# Patient Record
Sex: MALE | Race: WHITE | NOT HISPANIC OR LATINO | URBAN - METROPOLITAN AREA
[De-identification: names, ages, dates, MRNs, and addresses within clinical notes are randomized per-mention and may not be internally consistent; named-entity substitution may affect disease eponyms.]

---

## 2018-12-17 ENCOUNTER — EMERGENCY (EMERGENCY)
Facility: HOSPITAL | Age: 43
LOS: 1 days | Discharge: HOME | End: 2018-12-17
Attending: EMERGENCY MEDICINE
Payer: COMMERCIAL

## 2018-12-17 VITALS
DIASTOLIC BLOOD PRESSURE: 90 MMHG | TEMPERATURE: 98 F | HEART RATE: 74 BPM | WEIGHT: 227.96 LBS | OXYGEN SATURATION: 100 % | SYSTOLIC BLOOD PRESSURE: 133 MMHG | HEIGHT: 67 IN | RESPIRATION RATE: 16 BRPM

## 2018-12-17 PROCEDURE — 99283 EMERGENCY DEPT VISIT LOW MDM: CPT | Mod: 25

## 2018-12-17 PROCEDURE — 90715 TDAP VACCINE 7 YRS/> IM: CPT

## 2018-12-17 PROCEDURE — 99283 EMERGENCY DEPT VISIT LOW MDM: CPT

## 2018-12-17 PROCEDURE — 90471 IMMUNIZATION ADMIN: CPT

## 2018-12-17 RX ORDER — TETANUS TOXOID, REDUCED DIPHTHERIA TOXOID AND ACELLULAR PERTUSSIS VACCINE, ADSORBED 5; 2.5; 8; 8; 2.5 [IU]/.5ML; [IU]/.5ML; UG/.5ML; UG/.5ML; UG/.5ML
0.5 SUSPENSION INTRAMUSCULAR ONCE
Qty: 0 | Refills: 0 | Status: COMPLETED | OUTPATIENT
Start: 2018-12-17 | End: 2018-12-17

## 2018-12-17 RX ORDER — ACETAMINOPHEN 500 MG
975 TABLET ORAL ONCE
Qty: 0 | Refills: 0 | Status: COMPLETED | OUTPATIENT
Start: 2018-12-17 | End: 2018-12-17

## 2018-12-17 RX ADMIN — Medication 975 MILLIGRAM(S): at 22:42

## 2018-12-17 RX ADMIN — TETANUS TOXOID, REDUCED DIPHTHERIA TOXOID AND ACELLULAR PERTUSSIS VACCINE, ADSORBED 0.5 MILLILITER(S): 5; 2.5; 8; 8; 2.5 SUSPENSION INTRAMUSCULAR at 23:02

## 2018-12-17 NOTE — ED PROVIDER NOTE - MEDICAL DECISION MAKING DETAILS
42yo M no pmh presenting after head injury. Likely concussion. negative [place] head CT. DC with PMD f/u, concussion info, work note, and return precautions.

## 2018-12-17 NOTE — ED PROVIDER NOTE - PHYSICAL EXAMINATION
Gen: No acute distress, alert, cooperative  Head: Normocephalic, abrasion posterior left scalp, no active bleeding, no hematoma.   HEENT: PERRL, oral mucosa moist, normal conjunctiva, normal ocular motion all quadrants, no tenderness entire spine, normal neck ROM with no increased pain  Lung: CTAB, no respiratory distress, no crackles or wheezes  CV: rrr, no murmur  Abd: soft, NTND, no rebound or guarding  MSK: No LE edema, normal ROM all extremities  Neuro: No focal neurologic deficits, normal strength and sensation throughout, cranial nerves intact, speech clear, gait normal  Skin: Warm and dry, no evidence of rash   Psych: normal affect, follows commands

## 2018-12-17 NOTE — ED PROVIDER NOTE - OBJECTIVE STATEMENT
44yo M no pmh presenting after head injury. He works as a  and a spare time fell about 5 feet and hit him in the head. He fell to the ground, no LOC, able to get up and walk around after. Happened at 2:30pm. Since then has felt intermittent left sided headache, dizziness, nausea, fatigue. States he just wants to lay down and rest. 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 Tylenol 4:30pm, feeling much better after this. No blood thinners.

## 2018-12-17 NOTE — ED ADULT NURSE NOTE - NSIMPLEMENTINTERV_GEN_ALL_ED
Implemented All Fall Risk Interventions:  [place] to call system. Call bell, personal items and telephone within reach. Instruct patient to call for assistance. Room bathroom lighting operational. Non-slip footwear when patient is off stretcher. Physically safe environment: no spills, clutter or unnecessary equipment. Stretcher in lowest position, wheels locked, appropriate side rails in place. Provide visual cue, wrist band, yellow gown, etc. Monitor gait and stability. Monitor for mental status changes and reorient to person, place, and time. Review medications for side effects contributing to fall risk. Reinforce activity limits and safety measures with patient and family.

## 2018-12-17 NOTE — ED PROVIDER NOTE - ATTENDING CONTRIBUTION TO CARE
Patient presenting complaining of head injury earlier today - was working in his garage when a spare car tire he was storing fell out and struck him in the head.  Reporting seeing stars/headache but no LOC.  No blood thinners.  Since that point has been having headaches, intermittent nausea.  No numbness, tingling and weakness, no excruciating headaches, no projectile vomiting.    Exam:  General: Patient well appearing, vital signs within normal limits  HEENT: airway patent with moist mucous membranes  GI: abdomen soft, non tender, non distended  Neuro: CN II-XII intact, normal strength, sensation, coordination and ambulation  MSK:  no midline spinal tenderness  Skin: warm, well perfused, abrasion on L parietal scalp  Psych: normal mood and affect    Symptoms likely concussion, no evidence of clinically significant head injury/bleed by history and exam, needs updated tetanus, CT head not indicated clinically and patient agrees will give follow up with neurology

## 2018-12-17 NOTE — ED ADULT TRIAGE NOTE - CHIEF COMPLAINT QUOTE
"a spare tire fell on my head" was in garage and a spare tire on a shelf above his head came loose and hit him at 1430  fell to the ground and felt dizzy, did not lose consciousness  no blood thinners

## 2018-12-17 NOTE — ED PROVIDER NOTE - NSFOLLOWUPINSTRUCTIONS_ED_ALL_ED_FT
Follow-up with your Primary Care Doctor in 1-2 days. Return to ED for worsening, progressive or any other concerning symptoms such as fevers, severe pain, trouble breathing, weakness or lightheadedness.     Return with worsening fatigue/confusion/nausea/vomiting. You may have some of these symptoms with your concussion so follow-up with your PMD. You can also follow-up with sports med for concussion follow-up if you cant get in to see your PMD.

## 2018-12-17 NOTE — ED ADULT NURSE NOTE - OBJECTIVE STATEMENT
42 y/o male presenting to ED for head injury. Pt states "I was in the garage moving some stuff when a spare tire about 20-25 lb fell off a shelf above my head and hit me in the head. It knocked me to the ground. I didn't lose consciousness but I saw stars. I feel a little light headed and nauseous here and there." Upon exam pt A&Ox3 gross neuro intact, no difficulty speaking in complete sentences, 3mm PEERLA, able to ambulate, c/o throbbing headache and intermittent blurry vision. Abrasion noted on head, not bleeding at this time.

## 2019-06-01 ENCOUNTER — INPATIENT (INPATIENT)
Facility: HOSPITAL | Age: 44
LOS: 2 days | Discharge: HOME | DRG: 393 | End: 2019-06-04
Attending: INTERNAL MEDICINE | Admitting: INTERNAL MEDICINE
Payer: COMMERCIAL

## 2019-06-01 VITALS
TEMPERATURE: 98 F | SYSTOLIC BLOOD PRESSURE: 127 MMHG | HEIGHT: 67 IN | HEART RATE: 78 BPM | WEIGHT: 231.04 LBS | DIASTOLIC BLOOD PRESSURE: 86 MMHG | OXYGEN SATURATION: 97 % | RESPIRATION RATE: 17 BRPM

## 2019-06-01 DIAGNOSIS — M32.9 SYSTEMIC LUPUS ERYTHEMATOSUS, UNSPECIFIED: ICD-10-CM

## 2019-06-01 DIAGNOSIS — K62.5 HEMORRHAGE OF ANUS AND RECTUM: ICD-10-CM

## 2019-06-01 DIAGNOSIS — J45.20 MILD INTERMITTENT ASTHMA, UNCOMPLICATED: ICD-10-CM

## 2019-06-01 DIAGNOSIS — K92.2 GASTROINTESTINAL HEMORRHAGE, UNSPECIFIED: ICD-10-CM

## 2019-06-01 LAB
ALBUMIN SERPL ELPH-MCNC: 4.5 G/DL — SIGNIFICANT CHANGE UP (ref 3.3–5)
ALP SERPL-CCNC: 81 U/L — SIGNIFICANT CHANGE UP (ref 40–120)
ALT FLD-CCNC: 40 U/L — SIGNIFICANT CHANGE UP (ref 10–45)
ANION GAP SERPL CALC-SCNC: 11 MMOL/L — SIGNIFICANT CHANGE UP (ref 5–17)
APPEARANCE UR: CLEAR — SIGNIFICANT CHANGE UP
APTT BLD: 32.8 SEC — SIGNIFICANT CHANGE UP (ref 27.5–36.3)
AST SERPL-CCNC: 36 U/L — SIGNIFICANT CHANGE UP (ref 10–40)
BASOPHILS # BLD AUTO: 0 K/UL — SIGNIFICANT CHANGE UP (ref 0–0.2)
BASOPHILS NFR BLD AUTO: 0.5 % — SIGNIFICANT CHANGE UP (ref 0–2)
BILIRUB SERPL-MCNC: 0.3 MG/DL — SIGNIFICANT CHANGE UP (ref 0.2–1.2)
BILIRUB UR-MCNC: NEGATIVE — SIGNIFICANT CHANGE UP
BLD GP AB SCN SERPL QL: NEGATIVE — SIGNIFICANT CHANGE UP
BUN SERPL-MCNC: 12 MG/DL — SIGNIFICANT CHANGE UP (ref 7–23)
CALCIUM SERPL-MCNC: 9.7 MG/DL — SIGNIFICANT CHANGE UP (ref 8.4–10.5)
CHLORIDE SERPL-SCNC: 100 MMOL/L — SIGNIFICANT CHANGE UP (ref 96–108)
CO2 SERPL-SCNC: 26 MMOL/L — SIGNIFICANT CHANGE UP (ref 22–31)
COLOR SPEC: SIGNIFICANT CHANGE UP
CREAT SERPL-MCNC: 0.96 MG/DL — SIGNIFICANT CHANGE UP (ref 0.5–1.3)
DIFF PNL FLD: NEGATIVE — SIGNIFICANT CHANGE UP
EOSINOPHIL # BLD AUTO: 0.1 K/UL — SIGNIFICANT CHANGE UP (ref 0–0.5)
EOSINOPHIL NFR BLD AUTO: 2.4 % — SIGNIFICANT CHANGE UP (ref 0–6)
ERYTHROCYTE [SEDIMENTATION RATE] IN BLOOD: 34 MM/HR — HIGH (ref 0–15)
GLUCOSE SERPL-MCNC: 89 MG/DL — SIGNIFICANT CHANGE UP (ref 70–99)
GLUCOSE UR QL: NEGATIVE — SIGNIFICANT CHANGE UP
HCT VFR BLD CALC: 40.1 % — SIGNIFICANT CHANGE UP (ref 39–50)
HGB BLD-MCNC: 14 G/DL — SIGNIFICANT CHANGE UP (ref 13–17)
INR BLD: 1.05 RATIO — SIGNIFICANT CHANGE UP (ref 0.88–1.16)
KETONES UR-MCNC: NEGATIVE — SIGNIFICANT CHANGE UP
LEUKOCYTE ESTERASE UR-ACNC: NEGATIVE — SIGNIFICANT CHANGE UP
LYMPHOCYTES # BLD AUTO: 1.6 K/UL — SIGNIFICANT CHANGE UP (ref 1–3.3)
LYMPHOCYTES # BLD AUTO: 32.3 % — SIGNIFICANT CHANGE UP (ref 13–44)
MCHC RBC-ENTMCNC: 31.4 PG — SIGNIFICANT CHANGE UP (ref 27–34)
MCHC RBC-ENTMCNC: 35 GM/DL — SIGNIFICANT CHANGE UP (ref 32–36)
MCV RBC AUTO: 89.8 FL — SIGNIFICANT CHANGE UP (ref 80–100)
MONOCYTES # BLD AUTO: 0.6 K/UL — SIGNIFICANT CHANGE UP (ref 0–0.9)
MONOCYTES NFR BLD AUTO: 11.8 % — SIGNIFICANT CHANGE UP (ref 2–14)
NEUTROPHILS # BLD AUTO: 2.6 K/UL — SIGNIFICANT CHANGE UP (ref 1.8–7.4)
NEUTROPHILS NFR BLD AUTO: 53 % — SIGNIFICANT CHANGE UP (ref 43–77)
NITRITE UR-MCNC: NEGATIVE — SIGNIFICANT CHANGE UP
OB PNL STL: POSITIVE
PH UR: 6.5 — SIGNIFICANT CHANGE UP (ref 5–8)
PLATELET # BLD AUTO: 329 K/UL — SIGNIFICANT CHANGE UP (ref 150–400)
POTASSIUM SERPL-MCNC: 4 MMOL/L — SIGNIFICANT CHANGE UP (ref 3.5–5.3)
POTASSIUM SERPL-SCNC: 4 MMOL/L — SIGNIFICANT CHANGE UP (ref 3.5–5.3)
PROT SERPL-MCNC: 8.8 G/DL — HIGH (ref 6–8.3)
PROT UR-MCNC: NEGATIVE — SIGNIFICANT CHANGE UP
PROTHROM AB SERPL-ACNC: 12.1 SEC — SIGNIFICANT CHANGE UP (ref 10–12.9)
RBC # BLD: 4.46 M/UL — SIGNIFICANT CHANGE UP (ref 4.2–5.8)
RBC # FLD: 11.8 % — SIGNIFICANT CHANGE UP (ref 10.3–14.5)
RH IG SCN BLD-IMP: POSITIVE — SIGNIFICANT CHANGE UP
RH IG SCN BLD-IMP: POSITIVE — SIGNIFICANT CHANGE UP
SODIUM SERPL-SCNC: 137 MMOL/L — SIGNIFICANT CHANGE UP (ref 135–145)
SP GR SPEC: 1.01 — SIGNIFICANT CHANGE UP (ref 1.01–1.02)
UROBILINOGEN FLD QL: NEGATIVE — SIGNIFICANT CHANGE UP
WBC # BLD: 5 K/UL — SIGNIFICANT CHANGE UP (ref 3.8–10.5)
WBC # FLD AUTO: 5 K/UL — SIGNIFICANT CHANGE UP (ref 3.8–10.5)

## 2019-06-01 PROCEDURE — 99285 EMERGENCY DEPT VISIT HI MDM: CPT

## 2019-06-01 PROCEDURE — 71046 X-RAY EXAM CHEST 2 VIEWS: CPT | Mod: 26

## 2019-06-01 RX ORDER — PANTOPRAZOLE SODIUM 20 MG/1
40 TABLET, DELAYED RELEASE ORAL
Refills: 0 | Status: DISCONTINUED | OUTPATIENT
Start: 2019-06-01 | End: 2019-06-04

## 2019-06-01 RX ORDER — METOCLOPRAMIDE HCL 10 MG
10 TABLET ORAL ONCE
Refills: 0 | Status: COMPLETED | OUTPATIENT
Start: 2019-06-01 | End: 2019-06-01

## 2019-06-01 RX ORDER — SODIUM CHLORIDE 9 MG/ML
1000 INJECTION, SOLUTION INTRAVENOUS ONCE
Refills: 0 | Status: COMPLETED | OUTPATIENT
Start: 2019-06-01 | End: 2019-06-01

## 2019-06-01 RX ADMIN — Medication 10 MILLIGRAM(S): at 13:47

## 2019-06-01 RX ADMIN — PANTOPRAZOLE SODIUM 40 MILLIGRAM(S): 20 TABLET, DELAYED RELEASE ORAL at 18:01

## 2019-06-01 RX ADMIN — SODIUM CHLORIDE 1000 MILLILITER(S): 9 INJECTION, SOLUTION INTRAVENOUS at 13:47

## 2019-06-01 NOTE — H&P ADULT - GASTROINTESTINAL DETAILS
normal/no bruit/no rebound tenderness/no masses palpable/bowel sounds normal/soft/nontender/no distention

## 2019-06-01 NOTE — ED ADULT NURSE NOTE - OBJECTIVE STATEMENT
Pt is a 44 yo M who came to the ED amb c/o rectal bleeding and weakness x 3 days and nausea this morning.  States blood is dark brown, no abd pain, no hematuria. Feels like he is wheezing. A/O x3, resp regular and unlabored, lungs clear, abd soft, non tender, color good, skin warm, dry, + pulses, no edema.

## 2019-06-01 NOTE — H&P ADULT - NEGATIVE CARDIOVASCULAR SYMPTOMS
no chest pain/no dyspnea on exertion/no peripheral edema/no paroxysmal nocturnal dyspnea/no orthopnea/no palpitations

## 2019-06-01 NOTE — ED PROVIDER NOTE - PHYSICAL EXAMINATION
General: well appearing, interactive, well nourished, NAD  HEENT: pupils equal and reactive, normal mucosa, normal oropharynx, no lesions on the lips or on oral mucosa, normal external ears bilaterally   Resp: lungs clear to auscultation bilaterally, symmetric chest wall rise  Abd: soft, nontender, nondistended, normoactive bowel sounds  : no CVA tenderness  Neuro: Moving all extremities  Skin:  normal color for race  Rectal Exam performed with nurse chaparone: Dark blood

## 2019-06-01 NOTE — ED ADULT NURSE NOTE - NSIMPLEMENTINTERV_GEN_ALL_ED
Implemented All Universal Safety Interventions:  [place] to call system. Call bell, personal items and telephone within reach. Instruct patient to call for assistance. Room bathroom lighting operational. Non-slip footwear when patient is off stretcher. Physically safe environment: no spills, clutter or unnecessary equipment. Stretcher in lowest position, wheels locked, appropriate side rails in place.

## 2019-06-01 NOTE — ED PROVIDER NOTE - OBJECTIVE STATEMENT
44yo M pmhx of asthma pw cc of rectal bleeding    Felt weak earlier this week, 3 days ago small amount of blood in stool, this morning had "enough blood to fill up toilet bowl dark not bright red blood". Never has had a colonoscopy. Has an episode of vomiting this AM, now feels nauseas.   No CP/SOB. Has been gaining weight recently.   Diagnosed with SLE in January due to having facial rash, weakness and titers for blood tests came back positive. Did not start medications due to concussion in december, prescribed medications by rheumatologist however has not been taking it.     Meds: Albuterol (last used 1 year)

## 2019-06-01 NOTE — ED ADULT TRIAGE NOTE - CHIEF COMPLAINT QUOTE
Rectal bleed, weakness, nausea and vomiting. Pt states that he had one episode of vomiting, hx of Lupus

## 2019-06-01 NOTE — ED PROVIDER NOTE - CLINICAL SUMMARY MEDICAL DECISION MAKING FREE TEXT BOX
Attending Jesús Shaikh DO: 42 yo male hx of lupus, not on meds presents with large bloody bowel movement 3 hrs ago. No ab pain. +generalized malaise. No hx of GIB in the past. Not on NSAIDs. On exam, well appearing but fatigued, ab soft nt/nd. Concern for GIB, will obtain labs, type and screen, likely admit for observation and GI consultation.

## 2019-06-01 NOTE — H&P ADULT - ASSESSMENT
44yo M pmhx of Asthma ,Lupus .Said I have been feeling weaker and weaker for last few days . Today had blood in stool . Per patient was red as well dark .   	Diagnosed with SLE in January due to having facial rash, weakness and titers for blood tests came back positive. Did not start medications due to concussion in december, prescribed medications by rheumatologist however has not been taking it.

## 2019-06-01 NOTE — ED PROVIDER NOTE - NS ED ROS FT
CONSTITUTIONAL: No fevers, no chills  Eyes: no visual changes  Mouth/Throat: no sore throat  Cardiovascular: No Chest pain  Respiratory: No SOB  Gastrointestinal: No n/v/d, no abd pain  Genitourinary: no dysuria, no hematuria  SKIN: no rashes.  NEURO: +Weakness

## 2019-06-01 NOTE — H&P ADULT - HISTORY OF PRESENT ILLNESS
42yo M pmhx of Asthma ,Lupus .Said I have been feeling weaker and weaker for last few days . Today had blood in stool . Per patient was red as well dark .   	Diagnosed with SLE in January due to having facial rash, weakness and titers for blood tests came back positive. Did not start medications due to concussion in december, prescribed medications by rheumatologist however has not been taking it.

## 2019-06-02 DIAGNOSIS — K62.5 HEMORRHAGE OF ANUS AND RECTUM: ICD-10-CM

## 2019-06-02 DIAGNOSIS — Z71.89 OTHER SPECIFIED COUNSELING: ICD-10-CM

## 2019-06-02 LAB
HCT VFR BLD CALC: 40.4 % — SIGNIFICANT CHANGE UP (ref 39–50)
HCT VFR BLD CALC: 44.5 % — SIGNIFICANT CHANGE UP (ref 39–50)
HGB BLD-MCNC: 13.4 G/DL — SIGNIFICANT CHANGE UP (ref 13–17)
HGB BLD-MCNC: 15.6 G/DL — SIGNIFICANT CHANGE UP (ref 13–17)
MCHC RBC-ENTMCNC: 29.3 PG — SIGNIFICANT CHANGE UP (ref 27–34)
MCHC RBC-ENTMCNC: 31.6 PG — SIGNIFICANT CHANGE UP (ref 27–34)
MCHC RBC-ENTMCNC: 33.2 GM/DL — SIGNIFICANT CHANGE UP (ref 32–36)
MCHC RBC-ENTMCNC: 35 GM/DL — SIGNIFICANT CHANGE UP (ref 32–36)
MCV RBC AUTO: 88.4 FL — SIGNIFICANT CHANGE UP (ref 80–100)
MCV RBC AUTO: 90.3 FL — SIGNIFICANT CHANGE UP (ref 80–100)
PLATELET # BLD AUTO: 295 K/UL — SIGNIFICANT CHANGE UP (ref 150–400)
PLATELET # BLD AUTO: 320 K/UL — SIGNIFICANT CHANGE UP (ref 150–400)
RBC # BLD: 4.57 M/UL — SIGNIFICANT CHANGE UP (ref 4.2–5.8)
RBC # BLD: 4.93 M/UL — SIGNIFICANT CHANGE UP (ref 4.2–5.8)
RBC # FLD: 11.9 % — SIGNIFICANT CHANGE UP (ref 10.3–14.5)
RBC # FLD: 12.4 % — SIGNIFICANT CHANGE UP (ref 10.3–14.5)
WBC # BLD: 4.3 K/UL — SIGNIFICANT CHANGE UP (ref 3.8–10.5)
WBC # BLD: 4.59 K/UL — SIGNIFICANT CHANGE UP (ref 3.8–10.5)
WBC # FLD AUTO: 4.3 K/UL — SIGNIFICANT CHANGE UP (ref 3.8–10.5)
WBC # FLD AUTO: 4.59 K/UL — SIGNIFICANT CHANGE UP (ref 3.8–10.5)

## 2019-06-02 RX ORDER — ONDANSETRON 8 MG/1
4 TABLET, FILM COATED ORAL ONCE
Refills: 0 | Status: DISCONTINUED | OUTPATIENT
Start: 2019-06-02 | End: 2019-06-04

## 2019-06-02 RX ORDER — SODIUM CHLORIDE 9 MG/ML
1000 INJECTION INTRAMUSCULAR; INTRAVENOUS; SUBCUTANEOUS
Refills: 0 | Status: DISCONTINUED | OUTPATIENT
Start: 2019-06-02 | End: 2019-06-04

## 2019-06-02 RX ORDER — SOD SULF/SODIUM/NAHCO3/KCL/PEG
1 SOLUTION, RECONSTITUTED, ORAL ORAL EVERY 4 HOURS
Refills: 0 | Status: COMPLETED | OUTPATIENT
Start: 2019-06-02 | End: 2019-06-02

## 2019-06-02 RX ADMIN — Medication 1 LITER(S): at 17:14

## 2019-06-02 RX ADMIN — PANTOPRAZOLE SODIUM 40 MILLIGRAM(S): 20 TABLET, DELAYED RELEASE ORAL at 05:41

## 2019-06-02 RX ADMIN — PANTOPRAZOLE SODIUM 40 MILLIGRAM(S): 20 TABLET, DELAYED RELEASE ORAL at 17:17

## 2019-06-02 RX ADMIN — Medication 10 MILLIGRAM(S): at 17:13

## 2019-06-02 NOTE — CONSULT NOTE ADULT - PROBLEM SELECTOR RECOMMENDATION 9
- r/o diverticular bleed vs hemorrhoidal bleed vs rapid transit UGIB   - h/h stable and in normal range; cont to trend and transfuse prn   - cont Protonix 40mg IVP BID until upper gi bleed ruled out   - maalox prn   - clear liquid diet today   - bowel prep ordered   - NPO p MN for EGD/Colonoscopy as d/w patient who is agreeable

## 2019-06-02 NOTE — CONSULT NOTE ADULT - SUBJECTIVE AND OBJECTIVE BOX
Chief Complaint:  Patient is a 43y old  Male who presents with a chief complaint of Bleeding per rectum (2019 10:53)    Asthma  Lupus     HPI:  44yo M with h/o Asthma ,Lupus .Said I have been feeling weaker and weaker for last few days . Today had blood in stool . Per patient was red as well dark red. Diagnosed with SLE in January due to having facial rash, weakness and titers for blood tests came back positive. Did not start medications due to concussion in december, prescribed medications by rheumatologist however has not been taking it. GI consulted for rectal bleeding. The patient reports that he has been feeling fatigued recently. He had the urge to have a bowel movement and felt "different" while going. When he got up to look at the stool it was a "dark red" color, no clots noted. He subsequently had another bowel movement a few hours later and the stool was no longer a dark red but rather a lighter and brighter red color stool. He had no associated abdominal pain or cramping with the bowel movements. He did admit to to an episode of nausea with vomiting prior to the episode; vomit was yellow in color; no hematemesis. He has never had an egd or colonoscopy in the past.     No Known Allergies      bisacodyl 10 milliGRAM(s) Oral every 4 hours  pantoprazole  Injectable 40 milliGRAM(s) IV Push two times a day  polyethylene glycol/electrolyte Solution 1 Liter(s) Oral every 4 hours        FAMILY HISTORY:        Review of Systems:    General:  No wt loss, fevers, chills, night sweats, fatigue  Eyes:  Good vision, no reported pain  ENT:  No sore throat, pain, runny nose, dysphagia  CV:  No pain, palpitations, no lightheadedness  Resp:  No dyspnea, cough, tachypnea, wheezing  GI: as above  :  No pain, bleeding, incontinence, nocturia  Muscle:  No pain, weakness  Neuro:  No weakness, tingling, memory problems  Psych:  No fatigue, insomnia, mood problems, depression  Endocrine:  No polyuria, polydypsia, cold/heat intolerance  Heme:  No petechiae, ecchymosis, easy bruisability  Skin:  No rash, tattoos, scars, edema    Relevant Family History:   n/c    Relevant Social History: n/c      Physical Exam:    Vital Signs:  Vital Signs Last 24 Hrs  T(C): 36.5 (2019 11:47), Max: 37.4 (2019 13:42)  T(F): 97.7 (2019 11:47), Max: 99.3 (2019 13:42)  HR: 68 (2019 11:47) (61 - 90)  BP: 116/807 (2019 11:47) (104/70 - 130/88)  BP(mean): 100 (2019 17:09) (100 - 100)  RR: 17 (2019 11:47) (16 - 20)  SpO2: 97% (2019 11:47) (95% - 99%)  Daily Height in cm: 170.18 (2019 12:57)    Daily     General:  Appears stated age, well-groomed, nad  HEENT:  NC/AT,  conjunctivae clear and pink, no thyromegaly, nodules, adenopathy, no JVD  Chest:  Full & symmetric excursion, no increased effort, breath sounds clear  Cardiovascular:  Regular rhythm, S1, S2, no murmur/rub/S3/S4, no abdominal bruit, no edema  Abdomen:  Soft, non-tender, non-distended, normoactive bowel sounds,  no masses ,no hepatosplenomeagaly, no signs of chronic liver disease  Extremities:  no cyanosis,clubbing or edema  Skin:  No rash/erythema/ecchymoses/petechiae/wounds/abscess/warm/dry  Neuro/Psych:  A&Ox3  , no asterixis, no tremor, no encephalopathy    Laboratory:                            13.4   4.59  )-----------( 295      ( 2019 10:09 )             40.4     06-01    137  |  100  |  12  ----------------------------<  89  4.0   |  26  |  0.96    Ca    9.7      2019 13:51    TPro  8.8<H>  /  Alb  4.5  /  TBili  0.3  /  DBili  x   /  AST  36  /  ALT  40  /  AlkPhos  81  06-01    LIVER FUNCTIONS - ( 2019 13:51 )  Alb: 4.5 g/dL / Pro: 8.8 g/dL / ALK PHOS: 81 U/L / ALT: 40 U/L / AST: 36 U/L / GGT: x           PT/INR - ( 2019 13:51 )   PT: 12.1 sec;   INR: 1.05 ratio         PTT - ( 2019 13:51 )  PTT:32.8 sec  Urinalysis Basic - ( 2019 22:27 )    Color: Light Yellow / Appearance: Clear / S.012 / pH: x  Gluc: x / Ketone: Negative  / Bili: Negative / Urobili: Negative   Blood: x / Protein: Negative / Nitrite: Negative   Leuk Esterase: Negative / RBC: x / WBC x   Sq Epi: x / Non Sq Epi: x / Bacteria: x        Imaging:

## 2019-06-02 NOTE — CONSULT NOTE ADULT - ASSESSMENT
44yo male with h/o Asthma, newly diagnosed Lupus in January and cholecystectomy in 2012 presents with episode of rectal bleeding with associated generalized malaise.

## 2019-06-03 ENCOUNTER — RESULT REVIEW (OUTPATIENT)
Age: 44
End: 2019-06-03

## 2019-06-03 ENCOUNTER — TRANSCRIPTION ENCOUNTER (OUTPATIENT)
Age: 44
End: 2019-06-03

## 2019-06-03 LAB
ANION GAP SERPL CALC-SCNC: 12 MMOL/L — SIGNIFICANT CHANGE UP (ref 5–17)
APPEARANCE UR: CLEAR — SIGNIFICANT CHANGE UP
BILIRUB UR-MCNC: NEGATIVE — SIGNIFICANT CHANGE UP
BUN SERPL-MCNC: 9 MG/DL — SIGNIFICANT CHANGE UP (ref 7–23)
CALCIUM SERPL-MCNC: 9.6 MG/DL — SIGNIFICANT CHANGE UP (ref 8.4–10.5)
CHLORIDE SERPL-SCNC: 102 MMOL/L — SIGNIFICANT CHANGE UP (ref 96–108)
CO2 SERPL-SCNC: 25 MMOL/L — SIGNIFICANT CHANGE UP (ref 22–31)
COLOR SPEC: YELLOW — SIGNIFICANT CHANGE UP
CREAT ?TM UR-MCNC: 209 MG/DL — SIGNIFICANT CHANGE UP
CREAT SERPL-MCNC: 0.99 MG/DL — SIGNIFICANT CHANGE UP (ref 0.5–1.3)
DIFF PNL FLD: NEGATIVE — SIGNIFICANT CHANGE UP
GLUCOSE SERPL-MCNC: 84 MG/DL — SIGNIFICANT CHANGE UP (ref 70–99)
GLUCOSE UR QL: NEGATIVE — SIGNIFICANT CHANGE UP
HCT VFR BLD CALC: 40.8 % — SIGNIFICANT CHANGE UP (ref 39–50)
HGB BLD-MCNC: 13.7 G/DL — SIGNIFICANT CHANGE UP (ref 13–17)
KETONES UR-MCNC: NEGATIVE — SIGNIFICANT CHANGE UP
LEUKOCYTE ESTERASE UR-ACNC: NEGATIVE — SIGNIFICANT CHANGE UP
MCHC RBC-ENTMCNC: 29.5 PG — SIGNIFICANT CHANGE UP (ref 27–34)
MCHC RBC-ENTMCNC: 33.6 GM/DL — SIGNIFICANT CHANGE UP (ref 32–36)
MCV RBC AUTO: 87.7 FL — SIGNIFICANT CHANGE UP (ref 80–100)
NITRITE UR-MCNC: NEGATIVE — SIGNIFICANT CHANGE UP
PH UR: 6.5 — SIGNIFICANT CHANGE UP (ref 5–8)
PLATELET # BLD AUTO: 287 K/UL — SIGNIFICANT CHANGE UP (ref 150–400)
POTASSIUM SERPL-MCNC: 4 MMOL/L — SIGNIFICANT CHANGE UP (ref 3.5–5.3)
POTASSIUM SERPL-SCNC: 4 MMOL/L — SIGNIFICANT CHANGE UP (ref 3.5–5.3)
PROT ?TM UR-MCNC: 22 MG/DL — HIGH (ref 0–12)
PROT UR-MCNC: SIGNIFICANT CHANGE UP
PROT/CREAT UR-RTO: 0.1 RATIO — SIGNIFICANT CHANGE UP (ref 0–0.2)
RBC # BLD: 4.65 M/UL — SIGNIFICANT CHANGE UP (ref 4.2–5.8)
RBC # FLD: 12.5 % — SIGNIFICANT CHANGE UP (ref 10.3–14.5)
SODIUM SERPL-SCNC: 139 MMOL/L — SIGNIFICANT CHANGE UP (ref 135–145)
SP GR SPEC: 1.02 — SIGNIFICANT CHANGE UP (ref 1.01–1.02)
UROBILINOGEN FLD QL: SIGNIFICANT CHANGE UP
WBC # BLD: 4.6 K/UL — SIGNIFICANT CHANGE UP (ref 3.8–10.5)
WBC # FLD AUTO: 4.6 K/UL — SIGNIFICANT CHANGE UP (ref 3.8–10.5)

## 2019-06-03 PROCEDURE — 88305 TISSUE EXAM BY PATHOLOGIST: CPT | Mod: 26

## 2019-06-03 PROCEDURE — 99223 1ST HOSP IP/OBS HIGH 75: CPT | Mod: GC

## 2019-06-03 RX ORDER — ALBUTEROL 90 UG/1
2 AEROSOL, METERED ORAL EVERY 6 HOURS
Refills: 0 | Status: DISCONTINUED | OUTPATIENT
Start: 2019-06-03 | End: 2019-06-04

## 2019-06-03 RX ADMIN — PANTOPRAZOLE SODIUM 40 MILLIGRAM(S): 20 TABLET, DELAYED RELEASE ORAL at 05:23

## 2019-06-03 RX ADMIN — PANTOPRAZOLE SODIUM 40 MILLIGRAM(S): 20 TABLET, DELAYED RELEASE ORAL at 18:12

## 2019-06-03 RX ADMIN — SODIUM CHLORIDE 75 MILLILITER(S): 9 INJECTION INTRAMUSCULAR; INTRAVENOUS; SUBCUTANEOUS at 21:47

## 2019-06-03 RX ADMIN — SODIUM CHLORIDE 75 MILLILITER(S): 9 INJECTION INTRAMUSCULAR; INTRAVENOUS; SUBCUTANEOUS at 18:11

## 2019-06-03 RX ADMIN — ALBUTEROL 2 PUFF(S): 90 AEROSOL, METERED ORAL at 23:05

## 2019-06-03 NOTE — CONSULT NOTE ADULT - ASSESSMENT
44 yo M w/ hx of SLE p/w fatigue and rectal bleeding.    -    Chuy Olguin MD  Rheumatology Fellow PGY IV Assessment:  44 yo M with a PMH SLE ( not on medications) and Asthma presented to hospital for Bright red Blood per rectum (BRBPR) and generalized fatigue. Patient notes he was diagnosed with SLE at [facility] 1/2019 and recommended to begin Plaquinel as treatment however he has taken any medications. At this time patient endorses fatigue and stiffness of MCP and PIP bilaterally with no arthralgias nor joint swelling noted. ROS is positive only for flaky rash of right eyebrow, b/l hand stiffness mainly in his MCP and PIP joints. Patient with Elevated ESR ( when age adjusted).    1. Fatigue and b/l MCP and PIP joint stiffness: Patient with reported SLE history however we have records of auto-immune work up. While SLE is a possibility RA could also be consistent with clinical picture.     Plan:  1. SLE:  - Elevated ESR   - CRP pending  - C3/C4     - Rheumatology recommendations to follow Assessment:  44 yo M with a PMH SLE ( not on medications) and Asthma presented to hospital for Bright red Blood per rectum (BRBPR) and generalized fatigue. Patient notes he was diagnosed with SLE at [facility] 1/2019 and recommended to begin Plaquinel as treatment however he has taken any medications. At this time patient endorses fatigue and stiffness of MCP and PIP bilaterally with no arthralgias nor joint swelling noted. ROS is positive only for flaky rash of right eyebrow, b/l hand stiffness mainly in his MCP and PIP joints. Patient with Elevated ESR ( when age adjusted).    1. Fatigue and b/l MCP and PIP joint stiffness: Patient with reported SLE history however we have records of auto-immune work up. While SLE is a possibility RA could also be consistent with clinical picture.     Plan:  1. SLE:  - Patients wife provided results from work up completed with [facility].    RF, Anti-CCP negative  dsDNA positive  Beta 1 glycoprotein 1 AB (Ig M and Ig G are negative but IgA is positive)  - Will repeat work up panel: C3,C4, OWEN, dsDNA, Anti- Smith (REMIGIO) AB, Sjogren Antibodies, Urine Protein:Cr, UA, DRVVT, Silica Clotting time  - Diclofenac 1% gel    Will continue to follow with you Assessment:  42 yo M with a PMH SLE ( not on medications) and Asthma presented to hospital for Bright red Blood per rectum (BRBPR) and generalized fatigue. Patient notes he was diagnosed with SLE at [facility] 1/2019 and recommended to begin Plaquinel as treatment however he has taken any medications. At this time patient endorses fatigue and stiffness of MCP and PIP bilaterally with no arthralgias nor joint swelling noted. ROS is positive only for flaky rash of right eyebrow, b/l hand stiffness mainly in his MCP and PIP joints. Patient with Elevated ESR ( when age adjusted).    1. Fatigue and b/l MCP and PIP joint stiffness: Patient with reported SLE history however we have records of auto-immune work up. While SLE is a possibility RA could also be consistent with clinical picture.     Plan:  1. SLE:  - Patients wife provided results from work up completed with [facility].    RF, Anti-CCP negative  dsDNA positive  Beta 1 glycoprotein 1 AB (Ig M and Ig G are negative but IgA is positive)  - Will repeat work up panel: C3,C4, OWEN, dsDNA, Anti- Smith (REMIGIO) AB, Sjogren Antibodies, Urine Protein:Cr, UA, DRVVT, Silica Clotting time  - Diclofenac 1% gel    Will continue to follow with you. Assessment:  44 yo M with a PMH SLE ( not on medications) and Asthma presented to hospital for Bright red Blood per rectum (BRBPR) and generalized fatigue. Patient notes he was diagnosed with SLE at [facility] 1/2019 and recommended to begin Plaquinel as treatment however he has taken any medications. At this time patient endorses fatigue and stiffness of MCP and PIP bilaterally with no arthralgias nor joint swelling noted. ROS is positive only for flaky rash of right eyebrow, b/l hand stiffness mainly in his MCP and PIP joints. Patient with Elevated ESR ( when age adjusted).    -- Fatigue and b/l MCP and PIP joint stiffness: Patient with reported SLE history however we have records of auto-immune work up that are incomplete.     Plan:  1. SLE:  - Patients wife provided results from work up completed with [facility].    RF, Anti-CCP negative  dsDNA positive  Beta 1 glycoprotein 1 AB (Ig M and Ig G are negative but IgA is positive)  - Will repeat work up panel: C3,C4, OWEN, dsDNA, Anti- Smith (REMIGIO) AB, Sjogren Antibodies, Urine Protein:Cr, UA, DRVVT, Silica Clotting time  - Diclofenac 1% gel    Will continue to follow with you. Assessment:  44 yo M with a PMH SLE ( not on medications) and Asthma presented to hospital for Bright red Blood per rectum (BRBPR) and generalized fatigue. Patient notes he was diagnosed with SLE at [facility] 1/2019 and recommended to begin Plaquinel as treatment however he has taken any medications. At this time patient endorses fatigue and stiffness of MCP and PIP bilaterally with no arthralgias nor joint swelling noted. ROS is positive only for flaky rash of right eyebrow, b/l hand stiffness mainly in his MCP and PIP joints. Patient with Elevated ESR ( when age adjusted).    -- Fatigue and b/l MCP and PIP joint stiffness: Patient with reported SLE history however we have records of auto-immune work up that are incomplete.     Plan:  1. SLE:  - Patients wife provided results from work up completed with [facility].    RF, Anti-CCP negative  dsDNA positive  Beta 1 glycoprotein 1 AB (Ig M and Ig G are negative but IgA is positive)  - Will repeat work up panel: C3,C4, OWEN, dsDNA, Anti- Smith (REMIGIO) AB, Sjogren Antibodies, Urine Protein:Cr, UA, DRVVT, Silica Clotting time  - Diclofenac 1% gel for hand pain for now.  avoid oral nsaids or steroids at this time.     Will continue to follow with you.

## 2019-06-03 NOTE — CONSULT NOTE ADULT - SUBJECTIVE AND OBJECTIVE BOX
AMBERLY ESPITIA  [id number]    HISTORY OF PRESENT ILLNESS:    42 yo M w/ hx of SLE p/w fatigue and rectal bleeding.    Patient stated he has been having rectal bleeding along with fatigue. Patient evaluated by GI and EGD/colonoscopy planned.    Patient stated he was dx with SLE in 2019, he initially had a facial rash, fatigue, saw a rheumatologist and had blood work done which showed evidence of SLE. Patient was prescribed meds for SLE but has not taken them.    PAST MEDICAL & SURGICAL HISTORY:  Asthma  Lupus      Review of Systems:  Gen:  No fevers/chills, weight loss  HEENT: No blurry vision, no difficulty swallowing, no oral or nasal ulcers  CVS: No chest pain/palpitations  Resp: No SOB/wheezing  GI: No N/V/C/D/abdominal pain  MSK:  Skin: No new rashes  Neuro: No headaches    MEDICATIONS  (STANDING):  ondansetron Injectable 4 milliGRAM(s) IV Push once  pantoprazole  Injectable 40 milliGRAM(s) IV Push two times a day  sodium chloride 0.9%. 1000 milliLiter(s) (75 mL/Hr) IV Continuous <Continuous>    MEDICATIONS  (PRN):      Allergies    No Known Allergies    Intolerances        PERTINENT MEDICATION HISTORY:    SOCIAL HISTORY:  OCCUPATION:  TRAVEL HISTORY:    FAMILY HISTORY:      Vital Signs Last 24 Hrs  T(C): 36.4 (2019 04:29), Max: 36.8 (2019 20:55)  T(F): 97.5 (2019 04:29), Max: 98.2 (2019 20:55)  HR: 63 (2019 04:29) (63 - 71)  BP: 116/77 (2019 04:29) (116/77 - 131/86)  BP(mean): --  RR: 17 (2019 04:29) (17 - 17)  SpO2: 99% (2019 04:29) (95% - 99%)    Physical Exam:  General: No apparent distress  HEENT: EOMI, MMM  CVS: +S1/S2, RRR, no murmurs/rubs/gallops  Resp: CTA b/l. No crackles/wheezing  GI: Soft, NT/ND +BS  MSK:  Neuro: AAOx3  Skin: no visible rashes    LABS:                        15.6   4.3   )-----------( 320      ( 2019 18:34 )             44.5     06-03    139  |  102  |  9   ----------------------------<  84  4.0   |  25  |  0.99    Ca    9.6      2019 06:47    TPro  8.8<H>  /  Alb  4.5  /  TBili  0.3  /  DBili  x   /  AST  36  /  ALT  40  /  AlkPhos  81  06-    PT/INR - ( 2019 13:51 )   PT: 12.1 sec;   INR: 1.05 ratio         PTT - ( 2019 13:51 )  PTT:32.8 sec  Urinalysis Basic - ( 2019 22:27 )    Color: Light Yellow / Appearance: Clear / S.012 / pH: x  Gluc: x / Ketone: Negative  / Bili: Negative / Urobili: Negative   Blood: x / Protein: Negative / Nitrite: Negative   Leuk Esterase: Negative / RBC: x / WBC x   Sq Epi: x / Non Sq Epi: x / Bacteria: x        RADIOLOGY & ADDITIONAL STUDIES:    Sedimentation Rate, Erythrocyte: 34 mm/hr (19 @ 13:51) AMBERLY ESPITIA  [id number]    HISTORY OF PRESENT ILLNESS:    42 yo M  PMH: SLE ( not on medications), Asthma  cc: presented to hospital for Bright red Blood per rectum (BRBPR) and generalized fatigue  History Obtained from Patient:  Patient notes that he began to have BRBPR about 3 days ago. Patient notes that the blood is coating the stool and turns the toilet bowel water red. He notes his stool are soft and formed and not black in color or sticky in texture. He has never had an EGD or colonoscopy. He notes that he has had previous episode in the past but it was not as severe as this (usually just red streaks on toilet paper after wiping). Patient denies and weight loss or chills. Admits to subjective night sweats. He denies current usage of Ibuprofen and uses Tylenol occasionally. No family history of cancer. No family history of Rheumatologic conditions. He denies and itching or burning near rectal area. Patient also denies history of hemorrhoids Drinks one medium cup of coffee per day only. Denies previous history of smoking or current smoking. In terms of patient reported SLE history. Patient states that in January he began to develop and erythematous rash on his face and scaly lesions on his eye brow. Patient also notes that his significant other is in the medical profession and advised a Dermatology appointment. After a skin biopsy and blood work Dermatology had sent patient to Rheumatology for further evaluation. At that time patient notes that in addition to rash he was having arthralgias mainly of his hands. He states Rheumatology did a work up with blood tests and stated he had SLE. They had recommended he begin Plaquenil however patient held off until he has his work physical completed. He is currently on medications and has not been on any medications since his diagnosis 2019. Since then patient states his fatigue has been increasing and that he continues to have b/l stiffness of all his fingers.     ROS: Patient current denies erythematous rash, rash due to sun exposure, oral ulcers, chest pain, shortness of breath, foamy urine or hematuria, dysphagia, Anemia, history of seizures, b/le eye pain or eye redness, ulcers of finger tips or ulcers elsewhere on body, myalgias, weakness, or Alopecia.  Amits to flaky rash of right eyebrow.     PAST MEDICAL & SURGICAL HISTORY:  Asthma  Lupus      Review of Systems:  Gen:  No fevers/chills, weight loss  HEENT: No blurry vision, no difficulty swallowing, no oral or nasal ulcers  CVS: No chest pain/palpitations  Resp: No SOB/wheezing  GI: No N/V/C/D/abdominal pain  MSK:  Skin: No new rashes  Neuro: No headaches    MEDICATIONS  (STANDING):  ondansetron Injectable 4 milliGRAM(s) IV Push once  pantoprazole  Injectable 40 milliGRAM(s) IV Push two times a day  sodium chloride 0.9%. 1000 milliLiter(s) (75 mL/Hr) IV Continuous <Continuous>    MEDICATIONS  (PRN):      Allergies    No Known Allergies    Intolerances        PERTINENT MEDICATION HISTORY:    SOCIAL HISTORY:  OCCUPATION:  TRAVEL HISTORY:    FAMILY HISTORY:      Vital Signs Last 24 Hrs  T(C): 36.4 (2019 04:29), Max: 36.8 (2019 20:55)  T(F): 97.5 (2019 04:29), Max: 98.2 (2019 20:55)  HR: 63 (2019 04:29) (63 - 71)  BP: 116/77 (2019 04:29) (116/77 - 131/86)  BP(mean): --  RR: 17 (2019 04:29) (17 - 17)  SpO2: 99% (2019 04:29) (95% - 99%)    Physical Exam:  General: No apparent distress  HEENT: EOMI, MMM  CVS: +S1/S2, RRR, no murmurs/rubs/gallops  Resp: CTA b/l. No crackles/wheezing  GI: Soft, NT/ND +BS  MSK:  Neuro: AAOx3  Skin: no visible rashes    LABS:                        15.6   4.3   )-----------( 320      ( 2019 18:34 )             44.5     06-03    139  |  102  |  9   ----------------------------<  84  4.0   |  25  |  0.99    Ca    9.6      2019 06:47    TPro  8.8<H>  /  Alb  4.5  /  TBili  0.3  /  DBili  x   /  AST  36  /  ALT  40  /  AlkPhos  81  06-    PT/INR - ( 2019 13:51 )   PT: 12.1 sec;   INR: 1.05 ratio         PTT - ( 2019 13:51 )  PTT:32.8 sec  Urinalysis Basic - ( 2019 22:27 )    Color: Light Yellow / Appearance: Clear / S.012 / pH: x  Gluc: x / Ketone: Negative  / Bili: Negative / Urobili: Negative   Blood: x / Protein: Negative / Nitrite: Negative   Leuk Esterase: Negative / RBC: x / WBC x   Sq Epi: x / Non Sq Epi: x / Bacteria: x        RADIOLOGY & ADDITIONAL STUDIES:    Sedimentation Rate, Erythrocyte: 34 mm/hr (19 @ 13:51) AMBERLY ESPITIA  [id number]    HISTORY OF PRESENT ILLNESS:  44 yo M  PMH: SLE ( not on medications), Asthma  cc: presented to hospital for Bright red Blood per rectum (BRBPR) and generalized fatigue  History Obtained from Patient:  Patient notes that he began to have BRBPR about 3 days ago. Patient notes that the blood is coating the stool and turns the toilet bowel water red. He notes his stool are soft and formed and not black in color or sticky in texture. He has never had an EGD or colonoscopy. He notes that he has had previous episode in the past but it was not as severe as this (usually just red streaks on toilet paper after wiping). Patient denies and weight loss or chills. Admits to subjective night sweats. He denies current usage of Ibuprofen and uses Tylenol occasionally. No family history of cancer. No family history of Rheumatologic conditions. He denies and itching or burning near rectal area. Patient also denies history of hemorrhoids Drinks one medium cup of coffee per day only. Denies previous history of smoking or current smoking. In terms of patient reported SLE history. Patient states that in January he began to develop and erythematous rash on his face and scaly lesions on his eye brow. Patient also notes that his significant other is in the medical profession and advised a Dermatology appointment. After a skin biopsy and blood work Dermatology had sent patient to Rheumatology for further evaluation. At that time patient notes that in addition to rash he was having arthralgias mainly of his hands. He states Rheumatology did a work up with blood tests and stated he had SLE. The work up was completed and HSS. They had recommended he begin Plaquenil however patient held off until he has his work physical completed. He is currently on medications and has not been on any medications since his diagnosis 2019. Since then patient states his fatigue has been increasing and that he continues to have b/l stiffness of all his fingers. He denies any erythema or edema of joints in body. Stiff is present through out the day and improved with heat exposure.   ROS: Patient current denies erythematous rash, rash due to sun exposure, oral ulcers, chest pain, shortness of breath, foamy urine or hematuria, dysphagia, Anemia, history of seizures, b/le eye pain or eye redness, ulcers of finger tips or ulcers elsewhere on body, myalgias, weakness, or Alopecia.  Admits to flaky rash of right eyebrow, subjective fevers, increased fatigue when exposed to sun, b/l hand stiffness mainly in his MCP and PIP joints, b/l hand paresthesias and numbness, occasional headaches, and Raynaud's Phenomena. He admits to dry mouth but denies dry eyes.    PAST MEDICAL & SURGICAL HISTORY:  Asthma  Lupus    Review of Systems:   Gen:  Denies chills, weight loss. Endorses subjective fevers prior to hospitalization   HEENT: Denies difficulty swallowing, dry eyes or oral ulcers. Endorses occasional sensation of nasal ulcers and dry mouth  CVS: Denies CP, Palpitations   Resp: Denies SOB, Admits to wheezing  GI: Denies n/v/d/c, abdominal pain  MSK: Admits t b/l hand stiffness currently in MCP and PIP joints. Denies wrist pain  Skin: Endorses rash above right eyebrow and skin changes of fingers and pain with cold exposure.   Neuro: Endorses occasional headahce (not currently)    MEDICATIONS  (STANDING):  ondansetron Injectable 4 milliGRAM(s) IV Push once  pantoprazole  Injectable 40 milliGRAM(s) IV Push two times a day  sodium chloride 0.9%. 1000 milliLiter(s) (75 mL/Hr) IV Continuous <Continuous>    Allergies: No Known Allergies    PERTINENT MEDICATION HISTORY:    SOCIAL HISTORY: Denies tobacco usage, alcohol usage, or illicit drug usage    OCCUPATION: Work for the [employer]    TRAVEL HISTORY:    FAMILY HISTORY: No family history of cancer or Rheumatologic conditions     Vital Signs Last 24 Hrs  T(C): 36.4 (2019 04:29), Max: 36.8 (2019 20:55)  T(F): 97.5 (2019 04:29), Max: 98.2 (2019 20:55)  HR: 63 (2019 04:29) (63 - 71)  BP: 116/77 (2019 04:29) (116/77 - 131/86)  BP(mean): --  RR: 17 (2019 04:29) (17 - 17)  SpO2: 99% (2019 04:29) (95% - 99%)    Physical Exam:  General: No apparent distress, healthy appearing male  HEENT: EOMI, No posterior auricular cervical, or submandibular lymphnodes, Neck is Supple.   CVS: +S1/S2, RRR, no murmurs/rubs/gallops  Resp: CTA b/l. No w/r/r  GI: Soft, NT/ND +BS  MSK: No synovitis/ tenosynovitis of hand joints, writs, elbows, shoudler, knees, ankles. Synovitis of b/l feet noted. No swelling of MCP, PIP, BIP or wrists bilaterally. No swelling of shoulder or left elbow. Slight swelling of right elbow. No limited ROM. No swelling of s of knees, ankles, of joints of the feet.   Neuro: AAOx3  Skin: no visible rashes. Onychomycosis of feet.     LABS:             15.6   4.3   )-----------( 320      ( 2019 18:34 )             44.5     06-    139  |  102  |  9   ----------------------------<  84  4.0   |  25  |  0.99    Ca    9.6      2019 06:47    TPro  8.8<H>  /  Alb  4.5  /  TBili  0.3  /  DBili  x   /  AST  36  /  ALT  40  /  AlkPhos  81  06-    PT/INR - ( 2019 13:51 )   PT: 12.1 sec;   INR: 1.05 ratio      PTT - ( 2019 13:51 )  PTT:32.8 sec  Urinalysis Basic - ( 2019 22:27 )    Color: Light Yellow / Appearance: Clear / S.012 / pH: x  Gluc: x / Ketone: Negative  / Bili: Negative / Urobili: Negative   Blood: x / Protein: Negative / Nitrite: Negative   Leuk Esterase: Negative / RBC: x / WBC x   Sq Epi: x / Non Sq Epi: x / Bacteria: x    Sedimentation Rate, Erythrocyte: 34 mm/hr (19 @ 13:51)    RADIOLOGY & ADDITIONAL STUDIES:  - CXR: No acute cardiopulmonary disease AMBERLY ESPITIA  [id number]    HISTORY OF PRESENT ILLNESS:  44 yo M  PMH: SLE ( not on medications), Asthma  cc: presented to hospital for Bright red Blood per rectum (BRBPR) and generalized fatigue  History Obtained from Patient:  Patient notes that he began to have BRBPR about 3 days ago. Patient notes that the blood is coating the stool and turns the toilet bowel water red. He notes his stool are soft and formed and not black in color or sticky in texture. He has never had an EGD or colonoscopy. He notes that he has had previous episode in the past but it was not as severe as this (usually just red streaks on toilet paper after wiping). Patient denies and weight loss or chills. Admits to subjective night sweats. He denies current usage of Ibuprofen and uses Tylenol occasionally. No family history of cancer. Family history of RA on mothers ( mothers sister daughter). He denies and itching or burning near rectal area. Patient also denies history of hemorrhoids Drinks one medium cup of coffee per day only. Denies previous history of smoking or current smoking. In terms of patient reported SLE history. Patient states that in January he began to develop and erythematous rash on his face and scaly lesions on his eye brow. Patient also notes that his significant other is in the medical profession and advised a Dermatology appointment. After a skin biopsy and blood work Dermatology had sent patient to Rheumatology for further evaluation. At that time patient notes that in addition to rash he was having arthralgias mainly of his hands. He states Rheumatology did a work up with blood tests and stated he had SLE. The work up was completed and HSS. They had recommended he begin Plaquenil however patient held off until he has his work physical completed. He is currently on medications and has not been on any medications since his diagnosis 2019. Since then patient states his fatigue has been increasing and that he continues to have b/l stiffness of all his fingers. He denies any erythema or edema of joints in body. Stiff is present through out the day and improved with heat exposure.   ROS: Patient current denies erythematous rash, rash due to sun exposure, oral ulcers, chest pain, shortness of breath, foamy urine or hematuria, dysphagia, Anemia, history of seizures, b/le eye pain or eye redness, ulcers of finger tips or ulcers elsewhere on body, myalgias, weakness, or Alopecia.  Admits to flaky rash of right eyebrow, subjective fevers, increased fatigue when exposed to sun, b/l hand stiffness mainly in his MCP and PIP joints, b/l hand paresthesias and numbness, occasional headaches, and Raynaud's Phenomena. He admits to dry mouth but denies dry eyes.    PAST MEDICAL & SURGICAL HISTORY:  Asthma  Lupus    Review of Systems:   Gen:  Denies chills, weight loss. Endorses subjective fevers prior to hospitalization   HEENT: Denies difficulty swallowing, dry eyes or oral ulcers. Endorses occasional sensation of nasal ulcers and dry mouth  CVS: Denies CP, Palpitations   Resp: Denies SOB, Admits to wheezing  GI: Denies n/v/d/c, abdominal pain  MSK: Admits t b/l hand stiffness currently in MCP and PIP joints. Denies wrist pain  Skin: Endorses rash above right eyebrow and skin changes of fingers and pain with cold exposure.   Neuro: Endorses occasional headahce (not currently)    MEDICATIONS  (STANDING):  ondansetron Injectable 4 milliGRAM(s) IV Push once  pantoprazole  Injectable 40 milliGRAM(s) IV Push two times a day  sodium chloride 0.9%. 1000 milliLiter(s) (75 mL/Hr) IV Continuous <Continuous>    Allergies: No Known Allergies    PERTINENT MEDICATION HISTORY:    SOCIAL HISTORY: Denies tobacco usage, alcohol usage, or illicit drug usage    OCCUPATION: Work for the [employer] -     TRAVEL HISTORY: Most recent [place] 10/2018    FAMILY HISTORY: RA on mothers ( mothers sister daughter). Denies family history of cancer, Lupus or other Rheumatologic conditions other than RA, denies family history of blood clots.    Vital Signs Last 24 Hrs  T(C): 36.4 (2019 04:29), Max: 36.8 (2019 20:55)  T(F): 97.5 (2019 04:29), Max: 98.2 (2019 20:55)  HR: 63 (2019 04:29) (63 - 71)  BP: 116/77 (2019 04:29) (116/77 - 131/86)  BP(mean): --  RR: 17 (2019 04:29) (17 - 17)  SpO2: 99% (2019 04:29) (95% - 99%)    Physical Exam:  General: No apparent distress, healthy appearing male  HEENT: EOMI, No posterior auricular cervical, or submandibular lymphnodes, Neck is Supple.   CVS: +S1/S2, RRR, no murmurs/rubs/gallops  Resp: CTA b/l. No w/r/r  GI: Soft, NT/ND +BS  MSK: No synovitis/ tenosynovitis of hand joints, writs, elbows, shoudler, knees, ankles. Synovitis of b/l feet noted. No swelling of MCP, PIP, BIP or wrists bilaterally. No swelling of shoulder or left elbow. Slight swelling of right elbow. No limited ROM. No swelling of s of knees, ankles, of joints of the feet.   Neuro: AAOx3  Skin: no visible rashes. Onychomycosis of feet.     LABS:             15.6   4.3   )-----------( 320      ( 2019 18:34 )             44.5     06-03    139  |  102  |  9   ----------------------------<  84  4.0   |  25  |  0.99    Ca    9.6      2019 06:47    TPro  8.8<H>  /  Alb  4.5  /  TBili  0.3  /  DBili  x   /  AST  36  /  ALT  40  /  AlkPhos  81  06-01    PT/INR - ( 2019 13:51 )   PT: 12.1 sec;   INR: 1.05 ratio      PTT - ( 2019 13:51 )  PTT:32.8 sec  Urinalysis Basic - ( 2019 22:27 )    Color: Light Yellow / Appearance: Clear / S.012 / pH: x  Gluc: x / Ketone: Negative  / Bili: Negative / Urobili: Negative   Blood: x / Protein: Negative / Nitrite: Negative   Leuk Esterase: Negative / RBC: x / WBC x   Sq Epi: x / Non Sq Epi: x / Bacteria: x    Sedimentation Rate, Erythrocyte: 34 mm/hr (19 @ 13:51)    RADIOLOGY & ADDITIONAL STUDIES:  - CXR: No acute cardiopulmonary disease AMBERLY ESPITIA  [id number]    HISTORY OF PRESENT ILLNESS:  44 yo M  PMH: SLE ( not on medications), Asthma  cc: presented to hospital for Bright red Blood per rectum (BRBPR) and generalized fatigue  History Obtained from Patient:  Patient notes that he began to have BRBPR about 3 days ago. Patient notes that the blood is coating the stool and turns the toilet bowel water red. He notes his stools are soft and formed and not black in color or sticky in texture. He has never had an EGD or colonoscopy. He notes that he has had previous episode in the past but it was not as severe as this (usually just red streaks on toilet paper after wiping). Patient denies and weight loss or chills. Admits to subjective night sweats. He denies current usage of Ibuprofen and uses Tylenol occasionally. No family history of cancer. Family history of RA on mothers ( mothers sister daughter). He denies and itching or burning near rectal area. Patient also denies history of hemorrhoids. He drinks one medium cup of coffee per day only. Denies previous history of smoking or current smoking. In terms of patient reported SLE history, he states that in January he began to develop and erythematous rash on his face and scaly lesions on his eye brow. Patient also notes that his significant other is in the medical profession and advised a Dermatology appointment. After a skin biopsy and blood work Dermatology had sent patient to Rheumatology for further evaluation. At that time patient notes that in addition to rash he was having arthralgias mainly of his hands. He states Rheumatology did a work up with blood tests and stated he had SLE. The work up was completed and HSS. They had recommended he begin Plaquenil however patient held off until he has his work physical completed. He is currently on nomedications and has not been on any medications since his diagnosis 2019. Since then patient states his fatigue has been increasing and that he continues to have b/l stiffness of all his fingers. He denies any erythema or edema of joints in body. Stiffness is present through out the day and improved with heat exposure.   ROS: Patient current denies erythematous rash, photosensitivity, oral ulcers, chest pain, shortness of breath, foamy urine or hematuria, dysphagia, Anemia, history of seizures, b/le eye pain or eye redness, ulcers of finger tips or ulcers elsewhere on body, myalgias, weakness, or Alopecia.  Admits to flaky rash of right eyebrow, subjective fevers, b/l hand stiffness mainly in his MCP and PIP joints, b/l hand paresthesias and numbness, occasional headaches, and Raynaud's Phenomena. He admits to dry mouth but denies dry eyes.    PAST MEDICAL & SURGICAL HISTORY:  Asthma  Lupus    Review of Systems:   Gen:  Denies chills, weight loss. Endorses subjective fevers prior to hospitalization   HEENT: Denies difficulty swallowing, dry eyes or oral ulcers. Endorses occasional sensation of nasal ulcers and dry mouth  CVS: Denies CP, Palpitations   Resp: Denies SOB, Admits to wheezing  GI: Denies n/v/d/c, abdominal pain  MSK: Admits t b/l hand stiffness currently in MCP and PIP joints. Denies wrist pain  Skin: Endorses rash above right eyebrow and skin changes of fingers and pain with cold exposure.   Neuro: Endorses occasional headahce (not currently)    MEDICATIONS  (STANDING):  ondansetron Injectable 4 milliGRAM(s) IV Push once  pantoprazole  Injectable 40 milliGRAM(s) IV Push two times a day  sodium chloride 0.9%. 1000 milliLiter(s) (75 mL/Hr) IV Continuous <Continuous>    Allergies: No Known Allergies    PERTINENT MEDICATION HISTORY:    SOCIAL HISTORY: Denies tobacco usage, alcohol usage, or illicit drug usage    OCCUPATION: Work for the [employer] -     TRAVEL HISTORY: Most recent [place] 10/2018    FAMILY HISTORY: RA on mothers ( mothers sister daughter). Denies family history of cancer, Lupus or other Rheumatologic conditions other than RA, denies family history of blood clots.    Vital Signs Last 24 Hrs  T(C): 36.4 (2019 04:29), Max: 36.8 (2019 20:55)  T(F): 97.5 (2019 04:29), Max: 98.2 (2019 20:55)  HR: 63 (2019 04:29) (63 - 71)  BP: 116/77 (2019 04:29) (116/77 - 131/86)  BP(mean): --  RR: 17 (2019 04:29) (17 - 17)  SpO2: 99% (2019 04:29) (95% - 99%)    Physical Exam:  General: No apparent distress, healthy appearing male  HEENT: EOMI, No posterior auricular cervical, or submandibular lymphnodes, Neck is Supple. CVS: +S1/S2, RRR, no murmurs/rubs/gallops  Resp: CTA b/l. No w/r/r  GI: Soft, NT/ND +BS  MSK: No synovitis/ tenosynovitis of hand joints, writs, elbows, shoulder, knees, ankles. Synovitis of b/l feet noted. No swelling of MCP, PIP, BIP or wrists bilaterally. No swelling of shoulder or left elbow. Slight swelling of right elbow. No limited ROM. No swelling of s of knees, ankles, of joints of the feet.   Neuro: AAOx3  Skin:. Onychomycosis of feet. Dry scaly skin along the medial superior aspect of right eye brown possibly seborrheic keratosis       LABS:             15.6   4.3   )-----------( 320      ( 2019 18:34 )             44.5     06-    139  |  102  |  9   ----------------------------<  84  4.0   |  25  |  0.99    Ca    9.6      2019 06:47    TPro  8.8<H>  /  Alb  4.5  /  TBili  0.3  /  DBili  x   /  AST  36  /  ALT  40  /  AlkPhos  81  06-    PT/INR - ( 2019 13:51 )   PT: 12.1 sec;   INR: 1.05 ratio      PTT - ( 2019 13:51 )  PTT:32.8 sec  Urinalysis Basic - ( 2019 22:27 )    Color: Light Yellow / Appearance: Clear / S.012 / pH: x  Gluc: x / Ketone: Negative  / Bili: Negative / Urobili: Negative   Blood: x / Protein: Negative / Nitrite: Negative   Leuk Esterase: Negative / RBC: x / WBC x   Sq Epi: x / Non Sq Epi: x / Bacteria: x    Sedimentation Rate, Erythrocyte: 34 mm/hr (19 @ 13:51)    RADIOLOGY & ADDITIONAL STUDIES:  - CXR: No acute cardiopulmonary disease

## 2019-06-03 NOTE — DISCHARGE NOTE NURSING/CASE MANAGEMENT/SOCIAL WORK - NSDCDPATPORTLINK_GEN_ALL_CORE
You can access the [facility] Patient Portal, offered by [facility], by registering with the following website: [email or web address]

## 2019-06-03 NOTE — DISCHARGE NOTE NURSING/CASE MANAGEMENT/SOCIAL WORK - NSDCPEWEB_GEN_ALL_CORE
[facility] for Tobacco Control website --- [email or web address] website --- [email or web address]

## 2019-06-03 NOTE — DISCHARGE NOTE NURSING/CASE MANAGEMENT/SOCIAL WORK - NSDCPEEMAIL_GEN_ALL_CORE
[facility] for Tobacco Control email [email or web address]

## 2019-06-04 ENCOUNTER — TRANSCRIPTION ENCOUNTER (OUTPATIENT)
Age: 44
End: 2019-06-04

## 2019-06-04 VITALS
OXYGEN SATURATION: 98 % | SYSTOLIC BLOOD PRESSURE: 118 MMHG | DIASTOLIC BLOOD PRESSURE: 76 MMHG | HEART RATE: 81 BPM | TEMPERATURE: 98 F | RESPIRATION RATE: 18 BRPM

## 2019-06-04 LAB
ANTI-RIBONUCLEAR PROTEIN: >8 AI — HIGH
C3 SERPL-MCNC: 79 MG/DL — LOW (ref 81–157)
C4 SERPL-MCNC: 11 MG/DL — LOW (ref 13–39)
DRVVT SCREEN TO CONFIRM RATIO: SIGNIFICANT CHANGE UP
DSDNA AB FLD-ACNC: <0.2 AI — SIGNIFICANT CHANGE UP
ENA SM AB FLD QL: >8 AI — HIGH
ENA SS-A AB FLD IA-ACNC: <0.2 AI — SIGNIFICANT CHANGE UP
LA NT DPL PPP QL: 32.6 SEC — SIGNIFICANT CHANGE UP
NORMALIZED SCT PPP-RTO: 1.21 RATIO — HIGH (ref 0–1.16)
NORMALIZED SCT PPP-RTO: ABNORMAL
SURGICAL PATHOLOGY STUDY: SIGNIFICANT CHANGE UP

## 2019-06-04 PROCEDURE — 85652 RBC SED RATE AUTOMATED: CPT

## 2019-06-04 PROCEDURE — 86235 NUCLEAR ANTIGEN ANTIBODY: CPT

## 2019-06-04 PROCEDURE — 88305 TISSUE EXAM BY PATHOLOGIST: CPT

## 2019-06-04 PROCEDURE — 86160 COMPLEMENT ANTIGEN: CPT

## 2019-06-04 PROCEDURE — 81003 URINALYSIS AUTO W/O SCOPE: CPT

## 2019-06-04 PROCEDURE — 86255 FLUORESCENT ANTIBODY SCREEN: CPT

## 2019-06-04 PROCEDURE — 87040 BLOOD CULTURE FOR BACTERIA: CPT

## 2019-06-04 PROCEDURE — 80048 BASIC METABOLIC PNL TOTAL CA: CPT

## 2019-06-04 PROCEDURE — 85610 PROTHROMBIN TIME: CPT

## 2019-06-04 PROCEDURE — 82272 OCCULT BLD FECES 1-3 TESTS: CPT

## 2019-06-04 PROCEDURE — 86900 BLOOD TYPING SEROLOGIC ABO: CPT

## 2019-06-04 PROCEDURE — 71046 X-RAY EXAM CHEST 2 VIEWS: CPT

## 2019-06-04 PROCEDURE — 82570 ASSAY OF URINE CREATININE: CPT

## 2019-06-04 PROCEDURE — 86920 COMPATIBILITY TEST SPIN: CPT

## 2019-06-04 PROCEDURE — 85027 COMPLETE CBC AUTOMATED: CPT

## 2019-06-04 PROCEDURE — 85730 THROMBOPLASTIN TIME PARTIAL: CPT

## 2019-06-04 PROCEDURE — 96374 THER/PROPH/DIAG INJ IV PUSH: CPT

## 2019-06-04 PROCEDURE — 99285 EMERGENCY DEPT VISIT HI MDM: CPT | Mod: 25

## 2019-06-04 PROCEDURE — 86850 RBC ANTIBODY SCREEN: CPT

## 2019-06-04 PROCEDURE — 86901 BLOOD TYPING SEROLOGIC RH(D): CPT

## 2019-06-04 PROCEDURE — 80053 COMPREHEN METABOLIC PANEL: CPT

## 2019-06-04 PROCEDURE — 86038 ANTINUCLEAR ANTIBODIES: CPT

## 2019-06-04 PROCEDURE — 94640 AIRWAY INHALATION TREATMENT: CPT

## 2019-06-04 PROCEDURE — 84156 ASSAY OF PROTEIN URINE: CPT

## 2019-06-04 PROCEDURE — 82955 ASSAY OF G6PD ENZYME: CPT

## 2019-06-04 RX ADMIN — SODIUM CHLORIDE 75 MILLILITER(S): 9 INJECTION INTRAMUSCULAR; INTRAVENOUS; SUBCUTANEOUS at 05:43

## 2019-06-04 RX ADMIN — PANTOPRAZOLE SODIUM 40 MILLIGRAM(S): 20 TABLET, DELAYED RELEASE ORAL at 05:43

## 2019-06-04 NOTE — DISCHARGE NOTE PROVIDER - HOSPITAL COURSE
42yo M pmhx of Asthma ,Lupus .Said I have been feeling weaker and weaker for last few days . Today had blood in stool . Per patient was red as well dark .     	Diagnosed with SLE in January due to having facial rash, weakness and titers for blood tests came back positive. Did not start medications due to concussion in december, prescribed medications by rheumatologist however has not been taking it.         Problem/Plan - 1:    ·  Problem: GI bleed.  Plan: Hemodynamically stable . HH Stable . ON PPI and GI planning EGD as well colonoscopy today.          Problem/Plan - 2:    ·  Problem: Lupus (systemic lupus erythematosus).  Plan: Now with Fatigue and Joint pain Rheumatology consulted and will follow recommendations.           Problem/Plan - 3:    ·  Problem: Mild intermittent asthma, unspecified whether complicated.  Plan: Stable and PRN inhaler.

## 2019-06-04 NOTE — PROGRESS NOTE ADULT - ASSESSMENT
42yo M pmhx of Asthma ,Lupus .Said I have been feeling weaker and weaker for last few days . Today had blood in stool . Per patient was red as well dark .   	Diagnosed with SLE in January due to having facial rash, weakness and titers for blood tests came back positive. Did not start medications due to concussion in december, prescribed medications by rheumatologist however has not been taking it.     Problem/Plan - 1:  ·  Problem: GI bleed.  Plan: Hemodynamically stable . HH Stable . ON PPI and GI planning EGD as well colonoscopy today.      Problem/Plan - 2:  ·  Problem: Lupus (systemic lupus erythematosus).  Plan: Now with Fatigue and Joint pain Rheumatology consulted and will follow recommendations.       Problem/Plan - 3:  ·  Problem: Mild intermittent asthma, unspecified whether complicated.  Plan: Stable and PRN inhaler.
44yo M pmhx of Asthma ,Lupus .Said I have been feeling weaker and weaker for last few days . Today had blood in stool . Per patient was red as well dark .   	Diagnosed with SLE in January due to having facial rash, weakness and titers for blood tests came back positive. Did not start medications due to concussion in december, prescribed medications by rheumatologist however has not been taking it.     Problem/Plan - 1:  ·  Problem: GI bleed.  Plan: Hemodynamically stable . Hgb normal . ON PPI and GI planning EGD as well colonoscopy tomorrow.     Problem/Plan - 2:  ·  Problem: Lupus (systemic lupus erythematosus).  Plan: Rheumatology consult pending .      Problem/Plan - 3:  ·  Problem: Mild intermittent asthma, unspecified whether complicated.  Plan: Stable and PRN inhaler.
42yo male with h/o Asthma, newly diagnosed Lupus in January and cholecystectomy in 2012 presents with episode of rectal bleeding with associated generalized malaise.
44yo M pmhx of Asthma ,Lupus .Said I have been feeling weaker and weaker for last few days . Today had blood in stool . Per patient was red as well dark .   	Diagnosed with SLE in January due to having facial rash, weakness and titers for blood tests came back positive. Did not start medications due to concussion in december, prescribed medications by rheumatologist however has not been taking it.     Problem/Plan - 1:  ·  Problem: GI bleed.  Plan: Hemodynamically stable . HH Stable . ON PPI and GI S/P EGD as well colonoscopy ,.  Likely sec to Hemorrhoids . management per GI      Problem/Plan - 2:  ·  Problem: Lupus (systemic lupus erythematosus).  Plan: Now with Fatigue and Joint pain Rheumatology consult noted and will follow recommendations.       Problem/Plan - 3:  ·  Problem: Mild intermittent asthma, unspecified whether complicated.  Plan: Stable and PRN inhaler.     DC home to follow up outopt with PCP and Rheumatology.

## 2019-06-04 NOTE — DISCHARGE NOTE PROVIDER - NSFOLLOWUPCLINICS_GEN_ALL_ED_FT
[facility]  Rheumatology  [address] [address]  Phone: [phone]  Fax:   Follow Up Time:

## 2019-06-04 NOTE — PROGRESS NOTE ADULT - PROBLEM SELECTOR PLAN 1
s/p  upper gastrointestinal endoscopy 6/3 with  - Normal esophagus  - Gastritis  - Duodenitis. Biopsied  - s/p colonoscopy 6/3 with internal hemorrhoids were found during retroflexion. The hemorrhoids were moderate. The exam was otherwise without abnormality.  - Internal hemorrhoids.  - The examination was otherwise normal  - No specimens collected  - monitor h/h daily, transfuse prn

## 2019-06-04 NOTE — DISCHARGE NOTE PROVIDER - CARE PROVIDER_API CALL
[place] [address]  Phone: [phone]  Fax: [phone]  Follow Up Time: Ramiro Coats)  Medicine  [address] [place] [address]  Phone: [phone]  Fax: [phone]  Follow Up Time:     Katarzyna Ocampo ()  Internal Medicine; Rheumatology  [address] [facility], [address]  [place] [address]  Phone: [phone]  Fax: [phone]  Follow Up Time: Ramiro Coats)  Medicine  [address] [place] [address]  Phone: [phone]  Fax: [phone]  Follow Up Time:     Katarzyna Ocampo (DO)  Internal Medicine; Rheumatology  [address] [facility], [address]  [place] [address]  Phone: [phone]  Fax: [phone]  Follow Up Time:     Neri Harrison (DO)  Gastroenterology; Internal Medicine  [address] [place] [id number]  Phone: [phone]  Fax: [phone]  Follow Up Time:

## 2019-06-04 NOTE — DISCHARGE NOTE PROVIDER - NSDCCPCAREPLAN_GEN_ALL_CORE_FT
PRINCIPAL DISCHARGE DIAGNOSIS  Diagnosis: Rectal bleeding  Assessment and Plan of Treatment: There are 2 common types of GI Bleed, Upper GI Bleed and Lower GI Bleed.  Upper GI Bleed affects the esophagus, stomach, and first part of the small intestine. Lower GI Bleed affects the colon and rectum.  Upper GI Bleed signs and symptoms to notify your Health Care Provider are vomiting blood, or coffee ground vomitus, and bowel movements that look like black tar.  Lower GI Bleed signs and symptoms to notify your health care provider are bright red bloody bowel movements.   Take your medications as prescribed by your Gastroenterologist.  If you have had an Endoscopy or Colonoscopy, follow up with your Gastroenterologist for Pathology results.  Avoid NSAIDs unless your Health Care Provider tells you that it is ok (Aspirin, Ibuprofen, Advil, Motrin, Aleve).  Follow up with your Gastroenterologist within 1-2 weeks of discharge.        SECONDARY DISCHARGE DIAGNOSES  Diagnosis: Lupus (systemic lupus erythematosus)  Assessment and Plan of Treatment: Lupus (systemic lupus erythematosus)  follow up with Dr. Ocampo   Serologies pending       Diagnosis: Mild intermittent asthma, unspecified whether complicated  Assessment and Plan of Treatment: Mild intermittent asthma, unspecified whether complicated  Asthma attacks happen when the airways in the lungs become narrow and inflamed  Asthma symptoms can include - Wheezing or noisy breathing, coughing, tight feeling in the chest, shortness of breath  Almost everyone with asthma has a quick-relief inhaler that works in 5- 10mins that they carry with them and long-term controller medicines control asthma and prevent future symptoms. People with frequent asthma symptoms take these 1 or 2 times each day.  You can stay away from things that cause your symptoms or make them worse. Doctors call these "triggers."  avoid them as much as possible. Some common triggers include - Dust, mold, animals, such as dogs and cats, pollen and plants, cigarette smoke, getting sick with a cold or flu (that's why it's important to get a flu shot), stress  Talk to your caregiver about an action plan for managing asthma attacks. This includes the use of a peak flow meter which measures the severity of the attack and medicines that can help stop the attack.   SEEK MEDICAL CARE IF:  You have wheezing, shortness of breath, or a cough even if taking medicine to prevent attacks.   You have thickening of sputum, sputum changes from clear or white to yellow, green, gray, or bloody.   You have any problems that may be related to the medicines you are taking (such as a rash, itching, swelling, or trouble breathing).  You are using a reliever medicine more than 2–3 times per week.  Your peak flow is still at 50–79% of personal best after following your action plan for 1 hour.  SEEK IMMEDIATE MEDICAL CARE IF:  You are short of breath even at rest,or with very little physical activity, chest pain, heart beating fast, bluish color to your lips or fingernails, fever, dizziness,   You seem to be getting worse and are unresponsive to treatment during an asthma attack.   Your peak flow is less than 50% of personal best.

## 2019-06-04 NOTE — PROGRESS NOTE ADULT - SUBJECTIVE AND OBJECTIVE BOX
INTERVAL HPI/OVERNIGHT EVENTS: I feel fine.   Vital Signs Last 24 Hrs  T(C): 36.4 (2019 11:33), Max: 36.8 (2019 20:55)  T(F): 97.6 (2019 11:33), Max: 98.2 (2019 20:55)  HR: 59 (2019 11:33) (59 - 71)  BP: 135/86 (2019 11:33) (116/77 - 135/86)  BP(mean): --  RR: 18 (2019 11:33) (17 - 18)  SpO2: 98% (2019 11:33) (95% - 99%)  I&O's Summary    2019 07:01  -  2019 07:00  --------------------------------------------------------  IN: 360 mL / OUT: 0 mL / NET: 360 mL    2019 07:01  -  2019 14:59  --------------------------------------------------------  IN: 90 mL / OUT: 0 mL / NET: 90 mL      MEDICATIONS  (STANDING):  ondansetron Injectable 4 milliGRAM(s) IV Push once  pantoprazole  Injectable 40 milliGRAM(s) IV Push two times a day  sodium chloride 0.9%. 1000 milliLiter(s) (75 mL/Hr) IV Continuous <Continuous>    MEDICATIONS  (PRN):    LABS:                        13.7   4.60  )-----------( 287      ( 2019 08:21 )             40.8         139  |  102  |  9   ----------------------------<  84  4.0   |  25  |  0.99    Ca    9.6      2019 06:47        Urinalysis Basic - ( 2019 22:27 )    Color: Light Yellow / Appearance: Clear / S.012 / pH: x  Gluc: x / Ketone: Negative  / Bili: Negative / Urobili: Negative   Blood: x / Protein: Negative / Nitrite: Negative   Leuk Esterase: Negative / RBC: x / WBC x   Sq Epi: x / Non Sq Epi: x / Bacteria: x      CAPILLARY BLOOD GLUCOSE            Urinalysis Basic - ( 2019 22:27 )    Color: Light Yellow / Appearance: Clear / S.012 / pH: x  Gluc: x / Ketone: Negative  / Bili: Negative / Urobili: Negative   Blood: x / Protein: Negative / Nitrite: Negative   Leuk Esterase: Negative / RBC: x / WBC x   Sq Epi: x / Non Sq Epi: x / Bacteria: x      REVIEW OF SYSTEMS:  CONSTITUTIONAL: No fever, weight loss, or fatigue  EYES: No eye pain, visual disturbances, or discharge  ENMT:  No difficulty hearing, tinnitus, vertigo; No sinus or throat pain  NECK: No pain or stiffness  RESPIRATORY: No cough, wheezing, chills or hemoptysis; No shortness of breath  CARDIOVASCULAR: No chest pain, palpitations, dizziness, or leg swelling  GASTROINTESTINAL: No abdominal or epigastric pain. No nausea, vomiting, or hematemesis; No diarrhea or constipation. No melena but  hematochezia.  GENITOURINARY: No dysuria, frequency, hematuria, or incontinence  NEUROLOGICAL: No headaches, memory loss, loss of strength, numbness, or tremors    RADIOLOGY & ADDITIONAL TESTS:    Consultant(s) Notes Reviewed:  [x ] YES  [ ] NO    PHYSICAL EXAM:  GENERAL: NAD, well-groomed, well-developed,not in any distress ,  HEAD:  Atraumatic, Normocephalic  EYES: EOMI, PERRLA, conjunctiva and sclera clear  ENMT: No tonsillar erythema, exudates, or enlargement; Moist mucous membranes, Good dentition, No lesions  NECK: Supple, No JVD, Normal thyroid  NERVOUS SYSTEM:  Alert & Oriented X3, No focal deficit   CHEST/LUNG: Good air entry bilateral with no  rales, rhonchi, wheezing, or rubs  HEART: Regular rate and rhythm; No murmurs, rubs, or gallops  ABDOMEN: Soft, Nontender, Nondistended; Bowel sounds present  EXTREMITIES:  2+ Peripheral Pulses, No clubbing, cyanosis, or edema    Care Discussed with Consultants/Other Providers [ x] YES  [ ] NO
INTERVAL HPI/OVERNIGHT EVENTS: still bleeding per rectum  yvan blood   Vital Signs Last 24 Hrs  T(C): 36.8 (2019 20:55), Max: 36.8 (2019 20:55)  T(F): 98.2 (2019 20:55), Max: 98.2 (2019 20:55)  HR: 71 (2019 20:55) (61 - 71)  BP: 131/86 (2019 20:55) (104/70 - 131/86)  BP(mean): --  RR: 17 (2019 20:55) (17 - 17)  SpO2: 95% (2019 20:55) (95% - 97%)  I&O's Summary    2019 07:01  -  2019 07:00  --------------------------------------------------------  IN: 200 mL / OUT: 400 mL / NET: -200 mL    2019 07:01  -  2019 21:35  --------------------------------------------------------  IN: 240 mL / OUT: 0 mL / NET: 240 mL      MEDICATIONS  (STANDING):  bisacodyl 10 milliGRAM(s) Oral every 4 hours  ondansetron Injectable 4 milliGRAM(s) IV Push once  pantoprazole  Injectable 40 milliGRAM(s) IV Push two times a day  polyethylene glycol/electrolyte Solution 1 Liter(s) Oral every 4 hours  sodium chloride 0.9%. 1000 milliLiter(s) (75 mL/Hr) IV Continuous <Continuous>    MEDICATIONS  (PRN):    LABS:                        15.6   4.3   )-----------( 320      ( 2019 18:34 )             44.5     06-01    137  |  100  |  12  ----------------------------<  89  4.0   |  26  |  0.96    Ca    9.7      2019 13:51    TPro  8.8<H>  /  Alb  4.5  /  TBili  0.3  /  DBili  x   /  AST  36  /  ALT  40  /  AlkPhos  81  06-    PT/INR - ( 2019 13:51 )   PT: 12.1 sec;   INR: 1.05 ratio         PTT - ( 2019 13:51 )  PTT:32.8 sec  Urinalysis Basic - ( 2019 22:27 )    Color: Light Yellow / Appearance: Clear / S.012 / pH: x  Gluc: x / Ketone: Negative  / Bili: Negative / Urobili: Negative   Blood: x / Protein: Negative / Nitrite: Negative   Leuk Esterase: Negative / RBC: x / WBC x   Sq Epi: x / Non Sq Epi: x / Bacteria: x      CAPILLARY BLOOD GLUCOSE            Urinalysis Basic - ( 2019 22:27 )    Color: Light Yellow / Appearance: Clear / S.012 / pH: x  Gluc: x / Ketone: Negative  / Bili: Negative / Urobili: Negative   Blood: x / Protein: Negative / Nitrite: Negative   Leuk Esterase: Negative / RBC: x / WBC x   Sq Epi: x / Non Sq Epi: x / Bacteria: x      REVIEW OF SYSTEMS:  CONSTITUTIONAL: No fever, weight loss, or fatigue  EYES: No eye pain, visual disturbances, or discharge  ENMT:  No difficulty hearing, tinnitus, vertigo; No sinus or throat pain  NECK: No pain or stiffness  RESPIRATORY: No cough, wheezing, chills or hemoptysis; No shortness of breath  CARDIOVASCULAR: No chest pain, palpitations, dizziness, or leg swelling  GASTROINTESTINAL: No abdominal or epigastric pain. No nausea, vomiting, or hematemesis; No diarrhea or constipation. No melena or hematochezia.  GENITOURINARY: No dysuria, frequency, hematuria, or incontinence  NEUROLOGICAL: No headaches, memory loss, loss of strength, numbness, or tremors    Consultant(s) Notes Reviewed:  [x ] YES  [ ] NO    PHYSICAL EXAM:  GENERAL: NAD, well-groomed, well-developed,not in any distress ,  HEAD:  Atraumatic, Normocephalic  EYES: EOMI, PERRLA, conjunctiva and sclera clear  ENMT: No tonsillar erythema, exudates, or enlargement; Moist mucous membranes, Good dentition, No lesions  NECK: Supple, No JVD, Normal thyroid  NERVOUS SYSTEM:  Alert & Oriented X3, No focal deficit   CHEST/LUNG: Good air entry bilateral with no  rales, rhonchi, wheezing, or rubs  HEART: Regular rate and rhythm; No murmurs, rubs, or gallops  ABDOMEN: Soft, Nontender, Nondistended; Bowel sounds present  EXTREMITIES:  2+ Peripheral Pulses, No clubbing, cyanosis, or edema  SKIN: No rashes or lesions    Care Discussed with Consultants/Other Providers [ x] YES  [ ] NO
INTERVAL HPI/OVERNIGHT EVENTS:    pt seen and examined. He denies abdominal pain, n/v. Tolerating PO well. no brbpr/melena      MEDICATIONS  (STANDING):  ondansetron Injectable 4 milliGRAM(s) IV Push once  pantoprazole  Injectable 40 milliGRAM(s) IV Push two times a day    MEDICATIONS  (PRN):  ALBUTerol    90 MICROgram(s) HFA Inhaler 2 Puff(s) Inhalation every 6 hours PRN Shortness of Breath and/or Wheezing      Allergies    No Known Allergies    Intolerances        Review of Systems:    General:  No wt loss, fevers, chills, night sweats,fatigue,   Eyes:  Good vision, no reported pain  ENT:  No sore throat, pain, runny nose, dysphagia  CV:  No pain, palpitations, hypo/hypertension  Resp:  No dyspnea, cough, tachypnea, wheezing  GI:  No pain, No nausea, No vomiting, No diarrhea, No constipation, No weight loss, No fever, No pruritis, No rectal bleeding, No melena, No dysphagia  :  No pain, bleeding, incontinence, nocturia  Muscle:  No pain, weakness  Neuro:  No weakness, tingling, memory problems  Psych:  No fatigue, insomnia, mood problems, depression  Endocrine:  No polyuria, polydypsia, cold/heat intolerance  Heme:  No petechiae, ecchymosis, easy bruisability  Skin:  No rash, tattoos, scars, edema      Vital Signs Last 24 Hrs  T(C): 36.8 (2019 11:46), Max: 36.9 (2019 05:02)  T(F): 98.2 (2019 11:46), Max: 98.4 (2019 05:02)  HR: 81 (2019 11:46) (69 - 81)  BP: 118/76 (2019 11:46) (115/69 - 134/84)  BP(mean): --  RR: 18 (2019 11:46) (18 - 18)  SpO2: 98% (2019 11:46) (96% - 98%)    PHYSICAL EXAM:    Constitutional: NAD, well-developed  HEENT: EOMI, throat clear  Neck: No LAD, supple  Respiratory: CTA and P  Cardiovascular: S1 and S2, RRR, no M  Gastrointestinal: BS+, soft, NT/ND, neg HSM,  Extremities: No peripheral edema, neg clubing, cyanosis  Vascular: 2+ peripheral pulses  Neurological: A/O x 3, no focal deficits  Psychiatric: Normal mood, normal affect  Skin: No rashes      LABS:                        13.7   4.60  )-----------( 287      ( 2019 08:21 )             40.8     06-    139  |  102  |  9   ----------------------------<  84  4.0   |  25  |  0.99    Ca    9.6      2019 06:47        Urinalysis Basic - ( 2019 21:51 )    Color: Yellow / Appearance: Clear / S.023 / pH: x  Gluc: x / Ketone: Negative  / Bili: Negative / Urobili: <2 mg/dL   Blood: x / Protein: Trace / Nitrite: Negative   Leuk Esterase: Negative / RBC: x / WBC x   Sq Epi: x / Non Sq Epi: x / Bacteria: x        RADIOLOGY & ADDITIONAL TESTS:
INTERVAL HPI/OVERNIGHT EVENTS: I feel fine .   Vital Signs Last 24 Hrs  T(C): 36.8 (2019 11:46), Max: 36.9 (2019 05:02)  T(F): 98.2 (2019 11:46), Max: 98.4 (2019 05:02)  HR: 81 (2019 11:46) (69 - 81)  BP: 118/76 (2019 11:46) (115/69 - 134/84)  BP(mean): --  RR: 18 (2019 11:46) (18 - 18)  SpO2: 98% (2019 11:46) (96% - 98%)  I&O's Summary    2019 07:01  -  2019 07:00  --------------------------------------------------------  IN: 90 mL / OUT: 0 mL / NET: 90 mL    2019 07:01  -  2019 14:36  --------------------------------------------------------  IN: 600 mL / OUT: 300 mL / NET: 300 mL      MEDICATIONS  (STANDING):  ondansetron Injectable 4 milliGRAM(s) IV Push once  pantoprazole  Injectable 40 milliGRAM(s) IV Push two times a day    MEDICATIONS  (PRN):  ALBUTerol    90 MICROgram(s) HFA Inhaler 2 Puff(s) Inhalation every 6 hours PRN Shortness of Breath and/or Wheezing    LABS:                        13.7   4.60  )-----------( 287      ( 2019 08:21 )             40.8     -    139  |  102  |  9   ----------------------------<  84  4.0   |  25  |  0.99    Ca    9.6      2019 06:47        Urinalysis Basic - ( 2019 21:51 )    Color: Yellow / Appearance: Clear / S.023 / pH: x  Gluc: x / Ketone: Negative  / Bili: Negative / Urobili: <2 mg/dL   Blood: x / Protein: Trace / Nitrite: Negative   Leuk Esterase: Negative / RBC: x / WBC x   Sq Epi: x / Non Sq Epi: x / Bacteria: x      CAPILLARY BLOOD GLUCOSE            Urinalysis Basic - ( 2019 21:51 )    Color: Yellow / Appearance: Clear / S.023 / pH: x  Gluc: x / Ketone: Negative  / Bili: Negative / Urobili: <2 mg/dL   Blood: x / Protein: Trace / Nitrite: Negative   Leuk Esterase: Negative / RBC: x / WBC x   Sq Epi: x / Non Sq Epi: x / Bacteria: x      REVIEW OF SYSTEMS:  CONSTITUTIONAL: No fever, weight loss, or fatigue  EYES: No eye pain, visual disturbances, or discharge  ENMT:  No difficulty hearing, tinnitus, vertigo; No sinus or throat pain  NECK: No pain or stiffness  BREASTS: No pain, masses, or nipple discharge  RESPIRATORY: No cough, wheezing, chills or hemoptysis; No shortness of breath  CARDIOVASCULAR: No chest pain, palpitations, dizziness, or leg swelling  GASTROINTESTINAL: No abdominal or epigastric pain. No nausea, vomiting, or hematemesis; No diarrhea or constipation. No melena or hematochezia.  GENITOURINARY: No dysuria, frequency, hematuria, or incontinence  NEUROLOGICAL: No headaches, memory loss, loss of strength, numbness, or tremors  SKIN: No itching, burning, rashes, or lesions   LYMPH NODES: No enlarged glands  ENDOCRINE: No heat or cold intolerance; No hair loss  MUSCULOSKELETAL: No joint pain or swelling; No muscle, back, or extremity pain  PSYCHIATRIC: No depression, anxiety, mood swings, or difficulty sleeping  HEME/LYMPH: No easy bruising, or bleeding gums  ALLERY AND IMMUNOLOGIC: No hives or eczema    RADIOLOGY & ADDITIONAL TESTS:    Consultant(s) Notes Reviewed:  [x ] YES  [ ] NO    PHYSICAL EXAM:  GENERAL: NAD, well-groomed, well-developed,not in any distress ,  HEAD:  Atraumatic, Normocephalic  EYES: EOMI, PERRLA, conjunctiva and sclera clear  ENMT: No tonsillar erythema, exudates, or enlargement; Moist mucous membranes, Good dentition, No lesions  NECK: Supple, No JVD, Normal thyroid  NERVOUS SYSTEM:  Alert & Oriented X3, No focal deficit   CHEST/LUNG: Good air entry bilateral with no  rales, rhonchi, wheezing, or rubs  HEART: Regular rate and rhythm; No murmurs, rubs, or gallops  ABDOMEN: Soft, Nontender, Nondistended; Bowel sounds present  EXTREMITIES:  2+ Peripheral Pulses, No clubbing, cyanosis, or edema  SKIN: No rashes or lesions    Care Discussed with Consultants/Other Providers [ x] YES  [ ] NO
Pre-Endoscopy Evaluation      Referring Physician:  dr. romo                                  Procedure:  upper gastrointestinal endoscopy/colonoscopy    Indication for Procedure: gib    Pertinent History: 42yo male with PMH of Asthma, Lupus and cholecystectomy in 2012 presents with episode of rectal bleeding with associated generalized malaise.         Sedation by Anesthesia [x]    PAST MEDICAL & SURGICAL HISTORY:  Asthma  Lupus      PMH of Gastroparesis [ ]  Gastric Surgery [ ]  Gastric Outlet Obstruction [ ]    Allergies:    No Known Allergies    Intolerances:    Latex allergy: [ ] yes [x] no    Medications: MEDICATIONS  (STANDING):  ondansetron Injectable 4 milliGRAM(s) IV Push once  pantoprazole  Injectable 40 milliGRAM(s) IV Push two times a day  sodium chloride 0.9%. 1000 milliLiter(s) (75 mL/Hr) IV Continuous <Continuous>    MEDICATIONS  (PRN):      Smoking: [ ] yes  [x] no    AICD/PPM: [ ] yes   [x] no    Pertinent lab data:                        13.7   4.60  )-----------( 287      ( 03 Jun 2019 08:21 )             40.8     06-03    139  |  102  |  9   ----------------------------<  84  4.0   |  25  |  0.99    Ca    9.6      03 Jun 2019 06:47    TPro  8.8<H>  /  Alb  4.5  /  TBili  0.3  /  DBili  x   /  AST  36  /  ALT  40  /  AlkPhos  81  06-01    PT/INR - ( 01 Jun 2019 13:51 )   PT: 12.1 sec;   INR: 1.05 ratio      PTT - ( 01 Jun 2019 13:51 )  PTT:32.8 sec          Physical Examination:    Daily   Vital Signs Last 24 Hrs  T(C): 36.4 (03 Jun 2019 11:33), Max: 36.8 (02 Jun 2019 20:55)  T(F): 97.6 (03 Jun 2019 11:33), Max: 98.2 (02 Jun 2019 20:55)  HR: 59 (03 Jun 2019 11:33) (59 - 71)  BP: 135/86 (03 Jun 2019 11:33) (116/77 - 135/86)  BP(mean): --  RR: 18 (03 Jun 2019 11:33) (17 - 18)  SpO2: 98% (03 Jun 2019 11:33) (95% - 99%)    Drug Dosing Weight  Height (cm): 170.18 (01 Jun 2019 12:57)  Weight (kg): 104.8 (01 Jun 2019 12:57)  BMI (kg/m2): 36.2 (01 Jun 2019 12:57)  BSA (m2): 2.15 (01 Jun 2019 12:57)      Constitutional: NAD     Neck:  No JVD    Respiratory: CTAB/L    Cardiovascular: S1 and S2    Gastrointestinal: BS+, soft, NT/ND    Extremities: No peripheral edema    Neurological: A/O x 3    : No Reich    Skin: No rashes    Comments:    ASA Class: I [ ]  II [x]  III [ ]  IV [ ]    The patient is a suitable candidate for the planned procedure unless box checked [ ]  No, explain:

## 2019-06-04 NOTE — DISCHARGE NOTE PROVIDER - PROVIDER TOKENS
PROVIDER:[TOKEN:[[id number]]] PROVIDER:[TOKEN:[[id number]]],PROVIDER:[TOKEN:[[id number]]] PROVIDER:[TOKEN:[[id number]]],PROVIDER:[TOKEN:[[id number]]],PROVIDER:[TOKEN:[[id number]]]

## 2019-06-04 NOTE — DISCHARGE NOTE PROVIDER - CARE PROVIDERS DIRECT ADDRESSES
,DirectAddress_Unknown ,DirectAddress_Unknown,[email or web address] ,DirectAddress_Unknown,[email or web address],DirectAddress_Unknown

## 2019-06-04 NOTE — DISCHARGE NOTE PROVIDER - NSDCFUADDINST_GEN_ALL_CORE_FT
Follow-up with your primary care physician within 1 week. Call for appointment.  Please bring all discharge paperwork and list of medications to all follow up appointments  Please call for follow up appoints one day after discharge Follow-up with your primary care physician within 1 week. Call for appointment.  Please bring all discharge paperwork and list of medications to all follow up appointments  Please call for follow up appoints one day after discharge    Follow up with Rheumatology after discharge

## 2019-06-05 PROBLEM — M32.9 SYSTEMIC LUPUS ERYTHEMATOSUS, UNSPECIFIED: Chronic | Status: ACTIVE | Noted: 2019-06-01

## 2019-06-05 PROBLEM — J45.909 UNSPECIFIED ASTHMA, UNCOMPLICATED: Chronic | Status: ACTIVE | Noted: 2019-06-01

## 2019-06-05 LAB
ANA PAT FLD IF-IMP: ABNORMAL
ANA TITR SER: ABNORMAL

## 2019-06-06 LAB
CULTURE RESULTS: SIGNIFICANT CHANGE UP
CULTURE RESULTS: SIGNIFICANT CHANGE UP
DSDNA AB SER QL CLIF: NEGATIVE — SIGNIFICANT CHANGE UP
G6PD RBC-CCNC: 9.4 U/G HGB — SIGNIFICANT CHANGE UP (ref 7–20.5)
SPECIMEN SOURCE: SIGNIFICANT CHANGE UP
SPECIMEN SOURCE: SIGNIFICANT CHANGE UP

## 2019-06-13 ENCOUNTER — APPOINTMENT (OUTPATIENT)
Dept: INTERNAL MEDICINE | Facility: CLINIC | Age: 44
End: 2019-06-13
Payer: COMMERCIAL

## 2019-06-13 VITALS — SYSTOLIC BLOOD PRESSURE: 120 MMHG | DIASTOLIC BLOOD PRESSURE: 88 MMHG

## 2019-06-13 VITALS
OXYGEN SATURATION: 98 % | SYSTOLIC BLOOD PRESSURE: 120 MMHG | WEIGHT: 246 LBS | TEMPERATURE: 97.9 F | HEART RATE: 85 BPM | DIASTOLIC BLOOD PRESSURE: 78 MMHG

## 2019-06-13 DIAGNOSIS — Z82.49 FAMILY HISTORY OF ISCHEMIC HEART DISEASE AND OTHER DISEASES OF THE CIRCULATORY SYSTEM: ICD-10-CM

## 2019-06-13 DIAGNOSIS — F07.81 POSTCONCUSSIONAL SYNDROME: ICD-10-CM

## 2019-06-13 PROCEDURE — 99205 OFFICE O/P NEW HI 60 MIN: CPT

## 2019-06-13 NOTE — HEALTH RISK ASSESSMENT
[0] : 2) Feeling down, depressed, or hopeless: Not at all (0) [] : No [No falls in past year] : Patient reported no falls in the past year [de-identified] : Presently cannot due to lupus flare [[id number]] : 0

## 2019-06-13 NOTE — REVIEW OF SYSTEMS
[Fever] : no fever [Fatigue] : fatigue [Chills] : no chills [Night Sweats] : no night sweats [Recent Change In Weight] : ~T no recent weight change [Joint Stiffness] : joint stiffness [Joint Pain] : joint pain [Back Pain] : no back pain [Muscle Weakness] : no muscle weakness [Muscle Pain] : no muscle pain [Joint Swelling] : no joint swelling [Negative] : Heme/Lymph

## 2019-06-13 NOTE — HISTORY OF PRESENT ILLNESS
[FreeTextEntry8] : \par \par CC; diffuse joint pain and fatigue\par \par HPI: Patient is a 43-year-old male with history of asthma and lupus who presents for establishment of care and post hospital discharge for an acute lupus flare.  In January 2019 the patient developed a rash on face dermatology biopsy showed consistent with Maller rash sent to rheumatologist diagnosed with lupus was started on Plaquenil due to her concussion patient held off on starting medication presented to [facility] 1 with fatigue joint pain and melena patient hospitalized for acute GI bleed work-up of colonoscopy and endoscopy negative except for hemorrhoids patient was discharged on Plaquenil presently patient been taking Plaquenil for 1 week notes persistent joint pain fatigue denies fever, chills, rash states periodically feels short of breath.

## 2019-06-13 NOTE — PHYSICAL EXAM
[No Acute Distress] : no acute distress [Well Nourished] : well nourished [Normal Voice/Communication] : normal voice/communication [Well-Appearing] : well-appearing [Well Developed] : well developed [Normal Sclera/Conjunctiva] : normal sclera/conjunctiva [Normal Outer Ear/Nose] : the outer ears and nose were normal in appearance [PERRL] : pupils equal round and reactive to light [Normal Oropharynx] : the oropharynx was normal [Normal TMs] : both tympanic membranes were normal [Normal Nasal Mucosa] : the nasal mucosa was normal [Supple] : supple [No JVD] : no jugular venous distention [No Lymphadenopathy] : no lymphadenopathy [Thyroid Normal, No Nodules] : the thyroid was normal and there were no nodules present [Clear to Auscultation] : lungs were clear to auscultation bilaterally [No Respiratory Distress] : no respiratory distress  [Normal Percussion] : the chest was normal to percussion [Normal Rate] : normal rate  [No Accessory Muscle Use] : no accessory muscle use [No Murmur] : no murmur heard [Regular Rhythm] : with a regular rhythm [No Carotid Bruits] : no carotid bruits [Normal S1, S2] : normal S1 and S2 [No Edema] : there was no peripheral edema [No Varicosities] : no varicosities [No Abdominal Bruit] : a ~M bruit was not heard ~T in the abdomen [Normal Appearance] : normal in appearance [No Palpable Aorta] : no palpable aorta [No Extremity Clubbing/Cyanosis] : no extremity clubbing/cyanosis [No Axillary Lymphadenopathy] : no axillary lymphadenopathy [No Masses] : no palpable masses [No Nipple Discharge] : no nipple discharge [Non Tender] : non-tender [Non-distended] : non-distended [Soft] : abdomen soft [Normal Axillary Nodes] : no axillary lymphadenopathy [Normal Supraclavicular Nodes] : no supraclavicular lymphadenopathy [Normal Anterior Cervical Nodes] : no anterior cervical lymphadenopathy [No CVA Tenderness] : no CVA  tenderness [No Spinal Tenderness] : no spinal tenderness [Normal Inguinal Nodes] : no inguinal lymphadenopathy [No Rash] : no rash [No Joint Swelling] : no joint swelling [Normal Gait] : normal gait [No Skin Lesions] : no skin lesions [No Focal Deficits] : no focal deficits [Speech Grossly Normal] : speech grossly normal [Coordination Grossly Intact] : coordination grossly intact [Memory Grossly Normal] : memory grossly normal [Alert and Oriented x3] : oriented to person, place, and time [Normal Affect] : the affect was normal [Normal Mood] : the mood was normal [Normal Insight/Judgement] : insight and judgment were intact [de-identified] : The wrist and ankles no swelling no redness strength weakening of the hands

## 2019-06-13 NOTE — ASSESSMENT
[FreeTextEntry1] : Patient is a 43-year-old male with history of asthma allergies lupus and overweight who presents for follow-up of hospitalizations for GI bleed acute lupus flare\par \par \par #1 acute lupus from flare\par Overwhelming fatigue joint pain most likely due to active lupus\par Patient on Plaquenil 200 mg for 1 week\par Follow-up with rheumatology\par Patient notes persistent weakness of hands and overwhelming fatigue\par Follow-up in 1 week\par \par 2.  Asthma\par Presently well controlled on Symbicort Pulmicort Ventolin Spiriva\par Follow-up with pulmonology\par \par 3.  Allergy\par Presently getting injection therapy as per pulmonary\par \par 4.  Health maintenance\par We will schedule annual physical in the future will need vaccines

## 2019-06-20 ENCOUNTER — APPOINTMENT (OUTPATIENT)
Dept: INTERNAL MEDICINE | Facility: CLINIC | Age: 44
End: 2019-06-20
Payer: COMMERCIAL

## 2019-06-20 VITALS
OXYGEN SATURATION: 98 % | DIASTOLIC BLOOD PRESSURE: 80 MMHG | WEIGHT: 242 LBS | HEART RATE: 86 BPM | BODY MASS INDEX: 32.78 KG/M2 | HEIGHT: 72 IN | SYSTOLIC BLOOD PRESSURE: 120 MMHG

## 2019-06-20 PROCEDURE — 36415 COLL VENOUS BLD VENIPUNCTURE: CPT

## 2019-06-20 PROCEDURE — 99214 OFFICE O/P EST MOD 30 MIN: CPT | Mod: 25

## 2019-06-20 NOTE — HISTORY OF PRESENT ILLNESS
[FreeTextEntry1] : \par \par Follow-up for lupus asthma and complaint of increasing joint pain [de-identified] : Patient is a 44-year-old male with history of asthma and lupus recent hemorrhoids and allergic rhinitis who presents for follow-up patient notes no improvement in joint pain in fact hands and knees has increased in pain 7 out of 10 comes as waves denies rash, ulcers, swelling notes still trace bleeding from hemorrhoids denies fever, chills, shortness of breath cough

## 2019-06-20 NOTE — ASSESSMENT
[FreeTextEntry1] : Patient is a 44-year-old male with history of overweight, asthma, lupus and allergic rhinitis who presents for follow-up of lupus and increasing joint pain\par \par 1.  Lupus\par Presently on Plaquenil notes no improvement in pain may add Advil 400-800 every 8 hours for better pain control\par We will check activity levels by checking double-stranded DNA complement sed rate urine CBC\par We will try to schedule early appointment with rheumatology\par \par 2.  Asthma\par Well-controlled\par \par #3 health maintenance\par Check routine labs CPE in 2 to 3 months

## 2019-06-20 NOTE — PHYSICAL EXAM
[Well Nourished] : well nourished [No Acute Distress] : no acute distress [Well Developed] : well developed [Well-Appearing] : well-appearing [Normal Voice/Communication] : normal voice/communication [Normal Sclera/Conjunctiva] : normal sclera/conjunctiva [Normal Oropharynx] : the oropharynx was normal [Normal Outer Ear/Nose] : the outer ears and nose were normal in appearance [No JVD] : no jugular venous distention [Supple] : supple [No Lymphadenopathy] : no lymphadenopathy [No Respiratory Distress] : no respiratory distress  [Clear to Auscultation] : lungs were clear to auscultation bilaterally [No Accessory Muscle Use] : no accessory muscle use [Normal Rate] : normal rate  [Regular Rhythm] : with a regular rhythm [Normal S1, S2] : normal S1 and S2 [Soft] : abdomen soft [Non Tender] : non-tender [Non-distended] : non-distended [No HSM] : no HSM [Normal Bowel Sounds] : normal bowel sounds [Normal Axillary Nodes] : no axillary lymphadenopathy [Normal Supraclavicular Nodes] : no supraclavicular lymphadenopathy [Normal Anterior Cervical Nodes] : no anterior cervical lymphadenopathy [No CVA Tenderness] : no CVA  tenderness [No Spinal Tenderness] : no spinal tenderness

## 2019-06-20 NOTE — REVIEW OF SYSTEMS
[Fever] : no fever [Chills] : no chills [Hot Flashes] : no hot flashes [Fatigue] : fatigue [Recent Change In Weight] : ~T no recent weight change [Abdominal Pain] : no abdominal pain [Night Sweats] : no night sweats [Nausea] : no nausea [Constipation] : no constipation [Diarrhea] : no diarrhea [Vomiting] : no vomiting [Melena] : meljayshree [Joint Stiffness] : joint stiffness [Joint Pain] : joint pain [Muscle Pain] : no muscle pain [Muscle Weakness] : no muscle weakness [Back Pain] : no back pain [Joint Swelling] : no joint swelling [Negative] : Genitourinary

## 2019-06-24 LAB
ALBUMIN SERPL ELPH-MCNC: 4.6 G/DL
ALP BLD-CCNC: 94 U/L
ALT SERPL-CCNC: 35 U/L
ANION GAP SERPL CALC-SCNC: 12 MMOL/L
APPEARANCE: CLEAR
AST SERPL-CCNC: 24 U/L
BACTERIA: NEGATIVE
BASOPHILS # BLD AUTO: 0.02 K/UL
BASOPHILS NFR BLD AUTO: 0.3 %
BILIRUB SERPL-MCNC: 0.3 MG/DL
BILIRUBIN URINE: NEGATIVE
BLOOD URINE: NEGATIVE
BUN SERPL-MCNC: 12 MG/DL
C3 SERPL-MCNC: 121 MG/DL
C4 SERPL-MCNC: 17 MG/DL
CALCIUM SERPL-MCNC: 9.6 MG/DL
CHLORIDE SERPL-SCNC: 100 MMOL/L
CHOLEST SERPL-MCNC: 216 MG/DL
CHOLEST/HDLC SERPL: 4.4 RATIO
CO2 SERPL-SCNC: 26 MMOL/L
COLOR: YELLOW
CREAT SERPL-MCNC: 0.99 MG/DL
CRP SERPL-MCNC: 0.96 MG/DL
EOSINOPHIL # BLD AUTO: 0.12 K/UL
EOSINOPHIL NFR BLD AUTO: 2.1 %
ERYTHROCYTE [SEDIMENTATION RATE] IN BLOOD BY WESTERGREN METHOD: 34 MM/HR
GLUCOSE QUALITATIVE U: NEGATIVE
GLUCOSE SERPL-MCNC: 104 MG/DL
HCT VFR BLD CALC: 43.2 %
HDLC SERPL-MCNC: 49 MG/DL
HGB BLD-MCNC: 14.3 G/DL
HYALINE CASTS: 0 /LPF
IMM GRANULOCYTES NFR BLD AUTO: 0.7 %
KETONES URINE: NEGATIVE
LDLC SERPL CALC-MCNC: 154 MG/DL
LEUKOCYTE ESTERASE URINE: NEGATIVE
LYMPHOCYTES # BLD AUTO: 1.48 K/UL
LYMPHOCYTES NFR BLD AUTO: 25.7 %
MAN DIFF?: NORMAL
MCHC RBC-ENTMCNC: 30 PG
MCHC RBC-ENTMCNC: 33.1 GM/DL
MCV RBC AUTO: 90.8 FL
MICROSCOPIC-UA: NORMAL
MONOCYTES # BLD AUTO: 0.71 K/UL
MONOCYTES NFR BLD AUTO: 12.3 %
NEUTROPHILS # BLD AUTO: 3.39 K/UL
NEUTROPHILS NFR BLD AUTO: 58.9 %
NITRITE URINE: NEGATIVE
PH URINE: 6.5
PLATELET # BLD AUTO: 313 K/UL
POTASSIUM SERPL-SCNC: 4.6 MMOL/L
PROT SERPL-MCNC: 8.5 G/DL
PROTEIN URINE: ABNORMAL
RBC # BLD: 4.76 M/UL
RBC # FLD: 12.8 %
RED BLOOD CELLS URINE: 1 /HPF
SODIUM SERPL-SCNC: 138 MMOL/L
SPECIFIC GRAVITY URINE: 1.03
SQUAMOUS EPITHELIAL CELLS: 1 /HPF
TRIGL SERPL-MCNC: 64 MG/DL
UROBILINOGEN URINE: NORMAL
WBC # FLD AUTO: 5.76 K/UL
WHITE BLOOD CELLS URINE: 0 /HPF

## 2019-06-25 ENCOUNTER — LABORATORY RESULT (OUTPATIENT)
Age: 44
End: 2019-06-25

## 2019-06-25 ENCOUNTER — APPOINTMENT (OUTPATIENT)
Dept: RHEUMATOLOGY | Facility: CLINIC | Age: 44
End: 2019-06-25
Payer: COMMERCIAL

## 2019-06-25 ENCOUNTER — CLINICAL ADVICE (OUTPATIENT)
Age: 44
End: 2019-06-25

## 2019-06-25 VITALS
RESPIRATION RATE: 16 BRPM | SYSTOLIC BLOOD PRESSURE: 133 MMHG | TEMPERATURE: 98.3 F | DIASTOLIC BLOOD PRESSURE: 89 MMHG | HEART RATE: 82 BPM | WEIGHT: 242 LBS | BODY MASS INDEX: 32.78 KG/M2 | OXYGEN SATURATION: 97 % | HEIGHT: 72 IN

## 2019-06-25 LAB — DSDNA AB SER-ACNC: 66 IU/ML

## 2019-06-25 PROCEDURE — 99214 OFFICE O/P EST MOD 30 MIN: CPT

## 2019-06-28 LAB
ALBUMIN SERPL ELPH-MCNC: 4.6 G/DL
ALP BLD-CCNC: 93 U/L
ALT SERPL-CCNC: 30 U/L
ANION GAP SERPL CALC-SCNC: 14 MMOL/L
APPEARANCE: CLEAR
AST SERPL-CCNC: 24 U/L
BACTERIA: NEGATIVE
BASOPHILS # BLD AUTO: 0.02 K/UL
BASOPHILS NFR BLD AUTO: 0.3 %
BILIRUB SERPL-MCNC: 0.4 MG/DL
BILIRUBIN URINE: NEGATIVE
BLOOD URINE: NEGATIVE
BUN SERPL-MCNC: 12 MG/DL
CALCIUM SERPL-MCNC: 9.3 MG/DL
CHLORIDE SERPL-SCNC: 98 MMOL/L
CO2 SERPL-SCNC: 26 MMOL/L
COLOR: YELLOW
CREAT SERPL-MCNC: 1.03 MG/DL
CREAT SPEC-SCNC: 209 MG/DL
CREAT/PROT UR: 0.1 RATIO
DSDNA AB SER-ACNC: 61 IU/ML
EOSINOPHIL # BLD AUTO: 0.1 K/UL
EOSINOPHIL NFR BLD AUTO: 1.5 %
ERYTHROCYTE [SEDIMENTATION RATE] IN BLOOD BY WESTERGREN METHOD: 30 MM/HR
GLUCOSE QUALITATIVE U: NEGATIVE
GLUCOSE SERPL-MCNC: 52 MG/DL
HAV IGM SER QL: NONREACTIVE
HBV CORE IGG+IGM SER QL: NONREACTIVE
HBV CORE IGM SER QL: NONREACTIVE
HBV SURFACE AG SER QL: NONREACTIVE
HCT VFR BLD CALC: 42 %
HCV AB SER QL: NONREACTIVE
HCV S/CO RATIO: 0.08 S/CO
HGB BLD-MCNC: 14.1 G/DL
HYALINE CASTS: 0 /LPF
IMM GRANULOCYTES NFR BLD AUTO: 0.3 %
KETONES URINE: NEGATIVE
LEUKOCYTE ESTERASE URINE: NEGATIVE
LYMPHOCYTES # BLD AUTO: 1.58 K/UL
LYMPHOCYTES NFR BLD AUTO: 24.3 %
M TB IFN-G BLD-IMP: ABNORMAL
MAN DIFF?: NORMAL
MCHC RBC-ENTMCNC: 30.2 PG
MCHC RBC-ENTMCNC: 33.6 GM/DL
MCV RBC AUTO: 89.9 FL
MICROSCOPIC-UA: NORMAL
MONOCYTES # BLD AUTO: 0.65 K/UL
MONOCYTES NFR BLD AUTO: 10 %
NEUTROPHILS # BLD AUTO: 4.13 K/UL
NEUTROPHILS NFR BLD AUTO: 63.6 %
NITRITE URINE: NEGATIVE
PH URINE: 6
PLATELET # BLD AUTO: 342 K/UL
POTASSIUM SERPL-SCNC: 4.1 MMOL/L
PROT SERPL-MCNC: 8.5 G/DL
PROT UR-MCNC: 13 MG/DL
PROTEIN URINE: NORMAL
QUANTIFERON TB PLUS MITOGEN MINUS NIL: 0.3 IU/ML
QUANTIFERON TB PLUS NIL: 0.03 IU/ML
QUANTIFERON TB PLUS TB1 MINUS NIL: 0 IU/ML
QUANTIFERON TB PLUS TB2 MINUS NIL: 0 IU/ML
RBC # BLD: 4.67 M/UL
RBC # FLD: 12.8 %
RED BLOOD CELLS URINE: 1 /HPF
SODIUM SERPL-SCNC: 138 MMOL/L
SPECIFIC GRAVITY URINE: 1.03
SQUAMOUS EPITHELIAL CELLS: 1 /HPF
T3 SERPL-MCNC: 135 NG/DL
T3FREE SERPL-MCNC: 3.57 PG/ML
T3RU NFR SERPL: 1 TBI
T4 FREE SERPL-MCNC: 1 NG/DL
T4 SERPL-MCNC: 6.8 UG/DL
THYROGLOB AB SERPL-ACNC: 398 IU/ML
THYROPEROXIDASE AB SERPL IA-ACNC: 159 IU/ML
TSH SERPL-ACNC: 21.1 UIU/ML
UROBILINOGEN URINE: NORMAL
WBC # FLD AUTO: 6.5 K/UL
WHITE BLOOD CELLS URINE: 1 /HPF

## 2019-06-30 ENCOUNTER — CHART COPY (OUTPATIENT)
Age: 44
End: 2019-06-30

## 2019-06-30 LAB
TPMT ENZYME INTERPRETATION: NORMAL
TPMT ENZYME METHODOLOGY: NORMAL
TPMT ENZYME: 2.2

## 2019-07-01 ENCOUNTER — APPOINTMENT (OUTPATIENT)
Dept: RHEUMATOLOGY | Facility: CLINIC | Age: 44
End: 2019-07-01

## 2019-07-22 ENCOUNTER — RX RENEWAL (OUTPATIENT)
Age: 44
End: 2019-07-22

## 2019-07-24 ENCOUNTER — APPOINTMENT (OUTPATIENT)
Dept: PULMONOLOGY | Facility: CLINIC | Age: 44
End: 2019-07-24
Payer: COMMERCIAL

## 2019-07-24 VITALS
SYSTOLIC BLOOD PRESSURE: 128 MMHG | OXYGEN SATURATION: 98 % | HEART RATE: 84 BPM | WEIGHT: 239 LBS | RESPIRATION RATE: 15 BRPM | DIASTOLIC BLOOD PRESSURE: 72 MMHG | HEIGHT: 72 IN | BODY MASS INDEX: 32.37 KG/M2

## 2019-07-24 PROCEDURE — 94727 GAS DIL/WSHOT DETER LNG VOL: CPT

## 2019-07-24 PROCEDURE — 94640 AIRWAY INHALATION TREATMENT: CPT | Mod: 59

## 2019-07-24 PROCEDURE — 94010 BREATHING CAPACITY TEST: CPT

## 2019-07-24 PROCEDURE — 94729 DIFFUSING CAPACITY: CPT

## 2019-07-24 PROCEDURE — ZZZZZ: CPT

## 2019-07-24 PROCEDURE — 99204 OFFICE O/P NEW MOD 45 MIN: CPT | Mod: 25

## 2019-07-24 NOTE — ASSESSMENT
[FreeTextEntry1] : 44 year old male recent diagnosis Lupus presents for evaluation asthma, symptoms out of proportion to objective data, suspect anxiety a component of symptoms \par \par Initiate Breo_Ellipta 100-25 mcg daily\par Rescue inhaler every 4-6 hours as needed \par \par Follow up 2-4 weeks\par

## 2019-07-24 NOTE — HISTORY OF PRESENT ILLNESS
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  patient reports he has experienced 3 asthma episode in the past three months.  He reports he has been tried on multiple medications including QVAR, Budesonide, Spiriva and Symbicort.  He reports nothing is alleviating his symptoms.  He does report allergy related symptoms and has been seeing an Allergist for many years.  He uses Singulair.  He is here for evaluation of these symptoms.

## 2019-07-24 NOTE — REVIEW OF SYSTEMS
[Fatigue] : fatigue [Recent Wt Gain (___ Lbs)] : recent [unfilled] ~Ulb weight gain [Dyspnea] : dyspnea [Wheezing] : wheezing [Nasal Discharge] : nasal discharge [As Noted in HPI] : as noted in HPI [Rash] : [unfilled] rash [Anxiety] : anxiety [Thyroid Problem] : thyroid problem [Negative] : Pulmonary Hypertension

## 2019-07-24 NOTE — PROCEDURE
[FreeTextEntry1] : PFT 7/24/2019 personally reviewed Normal PFT\par \par CXR 6/1/2019 personally reviewed clear lungs

## 2019-07-24 NOTE — PHYSICAL EXAM
[General Appearance - Well Developed] : well developed [General Appearance - Well Nourished] : well nourished [General Appearance - In No Acute Distress] : no acute distress [Normal Conjunctiva] : the conjunctiva exhibited no abnormalities [Erythema] : erythema of the pharynx [] : the neck was supple [Jugular Venous Distention Increased] : there was no jugular-venous distention [Heart Sounds] : normal S1 and S2 [Respiration, Rhythm And Depth] : normal respiratory rhythm and effort [Murmurs] : no murmurs present [Auscultation Breath Sounds / Voice Sounds] : lungs were clear to auscultation bilaterally [Abdomen Soft] : soft [Abnormal Walk] : normal gait [Nail Clubbing] : no clubbing of the fingernails [Cyanosis, Localized] : no localized cyanosis [Non-Pitting] : non-pitting [FreeTextEntry1] : Multiple tattoos + malar rash [Oriented To Time, Place, And Person] : oriented to person, place, and time [Cranial Nerves] : cranial nerves 2-12 were intact

## 2019-08-01 ENCOUNTER — LABORATORY RESULT (OUTPATIENT)
Age: 44
End: 2019-08-01

## 2019-08-01 ENCOUNTER — APPOINTMENT (OUTPATIENT)
Dept: RHEUMATOLOGY | Facility: CLINIC | Age: 44
End: 2019-08-01
Payer: COMMERCIAL

## 2019-08-01 VITALS
BODY MASS INDEX: 32.37 KG/M2 | DIASTOLIC BLOOD PRESSURE: 81 MMHG | HEART RATE: 91 BPM | OXYGEN SATURATION: 97 % | HEIGHT: 72 IN | TEMPERATURE: 98.9 F | SYSTOLIC BLOOD PRESSURE: 134 MMHG | WEIGHT: 239 LBS

## 2019-08-01 PROCEDURE — 99214 OFFICE O/P EST MOD 30 MIN: CPT

## 2019-08-01 RX ORDER — PREDNISONE 10 MG/1
10 TABLET ORAL TWICE DAILY
Qty: 60 | Refills: 0 | Status: DISCONTINUED | COMMUNITY
Start: 2019-06-25 | End: 2019-08-01

## 2019-08-01 RX ORDER — HYDROXYCHLOROQUINE SULFATE 200 MG/1
200 TABLET, FILM COATED ORAL TWICE DAILY
Qty: 180 | Refills: 3 | Status: DISCONTINUED | COMMUNITY
Start: 2019-06-06 | End: 2019-08-01

## 2019-08-02 LAB
ALBUMIN SERPL ELPH-MCNC: 4.8 G/DL
ALP BLD-CCNC: 95 U/L
ALT SERPL-CCNC: 28 U/L
ANION GAP SERPL CALC-SCNC: 15 MMOL/L
APPEARANCE: CLEAR
AST SERPL-CCNC: 30 U/L
BACTERIA: NEGATIVE
BASOPHILS # BLD AUTO: 0.02 K/UL
BASOPHILS NFR BLD AUTO: 0.4 %
BILIRUB SERPL-MCNC: 0.2 MG/DL
BILIRUBIN URINE: NEGATIVE
BLOOD URINE: NEGATIVE
BUN SERPL-MCNC: 15 MG/DL
C3 SERPL-MCNC: 106 MG/DL
C4 SERPL-MCNC: 16 MG/DL
CALCIUM SERPL-MCNC: 9.9 MG/DL
CHLORIDE SERPL-SCNC: 102 MMOL/L
CO2 SERPL-SCNC: 24 MMOL/L
COLOR: YELLOW
CREAT SERPL-MCNC: 1 MG/DL
CREAT SPEC-SCNC: 118 MG/DL
CREAT/PROT UR: 0.1 RATIO
EOSINOPHIL # BLD AUTO: 0.1 K/UL
EOSINOPHIL NFR BLD AUTO: 1.9 %
ERYTHROCYTE [SEDIMENTATION RATE] IN BLOOD BY WESTERGREN METHOD: 58 MM/HR
GLUCOSE QUALITATIVE U: NEGATIVE
GLUCOSE SERPL-MCNC: 90 MG/DL
HCT VFR BLD CALC: 40.7 %
HGB BLD-MCNC: 13.8 G/DL
HYALINE CASTS: 0 /LPF
IMM GRANULOCYTES NFR BLD AUTO: 0.2 %
KETONES URINE: NEGATIVE
LEUKOCYTE ESTERASE URINE: NEGATIVE
LYMPHOCYTES # BLD AUTO: 1.24 K/UL
LYMPHOCYTES NFR BLD AUTO: 24.1 %
MAN DIFF?: NORMAL
MCHC RBC-ENTMCNC: 29.7 PG
MCHC RBC-ENTMCNC: 33.9 GM/DL
MCV RBC AUTO: 87.7 FL
MICROSCOPIC-UA: NORMAL
MONOCYTES # BLD AUTO: 0.65 K/UL
MONOCYTES NFR BLD AUTO: 12.6 %
NEUTROPHILS # BLD AUTO: 3.12 K/UL
NEUTROPHILS NFR BLD AUTO: 60.8 %
NITRITE URINE: NEGATIVE
PH URINE: 7
PLATELET # BLD AUTO: 350 K/UL
POTASSIUM SERPL-SCNC: 4.4 MMOL/L
PROT SERPL-MCNC: 8.7 G/DL
PROT UR-MCNC: 12 MG/DL
PROTEIN URINE: NORMAL
RBC # BLD: 4.64 M/UL
RBC # FLD: 12.9 %
RED BLOOD CELLS URINE: 1 /HPF
SODIUM SERPL-SCNC: 141 MMOL/L
SPECIFIC GRAVITY URINE: 1.02
SQUAMOUS EPITHELIAL CELLS: 0 /HPF
T3 SERPL-MCNC: 152 NG/DL
T3FREE SERPL-MCNC: 3.83 PG/ML
T3RU NFR SERPL: 1.1 TBI
T4 FREE SERPL-MCNC: 1.1 NG/DL
T4 SERPL-MCNC: 7.8 UG/DL
THYROGLOB AB SERPL-ACNC: 228 IU/ML
THYROPEROXIDASE AB SERPL IA-ACNC: 119 IU/ML
TSH SERPL-ACNC: 18 UIU/ML
UROBILINOGEN URINE: NORMAL
WBC # FLD AUTO: 5.14 K/UL
WHITE BLOOD CELLS URINE: 0 /HPF

## 2019-08-05 LAB — DSDNA AB SER-ACNC: 54 IU/ML

## 2019-08-06 ENCOUNTER — FORM ENCOUNTER (OUTPATIENT)
Age: 44
End: 2019-08-06

## 2019-08-07 ENCOUNTER — OUTPATIENT (OUTPATIENT)
Dept: OUTPATIENT SERVICES | Facility: HOSPITAL | Age: 44
LOS: 1 days | End: 2019-08-07
Payer: COMMERCIAL

## 2019-08-07 ENCOUNTER — APPOINTMENT (OUTPATIENT)
Dept: ULTRASOUND IMAGING | Facility: CLINIC | Age: 44
End: 2019-08-07
Payer: COMMERCIAL

## 2019-08-07 DIAGNOSIS — R22.1 LOCALIZED SWELLING, MASS AND LUMP, NECK: ICD-10-CM

## 2019-08-07 PROCEDURE — 76536 US EXAM OF HEAD AND NECK: CPT

## 2019-08-07 PROCEDURE — 76536 US EXAM OF HEAD AND NECK: CPT | Mod: 26

## 2019-08-13 ENCOUNTER — APPOINTMENT (OUTPATIENT)
Dept: INTERNAL MEDICINE | Facility: CLINIC | Age: 44
End: 2019-08-13
Payer: COMMERCIAL

## 2019-08-13 PROCEDURE — 90791 PSYCH DIAGNOSTIC EVALUATION: CPT

## 2019-08-14 ENCOUNTER — APPOINTMENT (OUTPATIENT)
Dept: INTERNAL MEDICINE | Facility: CLINIC | Age: 44
End: 2019-08-14
Payer: COMMERCIAL

## 2019-08-14 VITALS
OXYGEN SATURATION: 98 % | DIASTOLIC BLOOD PRESSURE: 80 MMHG | HEART RATE: 82 BPM | SYSTOLIC BLOOD PRESSURE: 130 MMHG | WEIGHT: 249 LBS | BODY MASS INDEX: 33.72 KG/M2 | HEIGHT: 72 IN

## 2019-08-14 PROCEDURE — 99214 OFFICE O/P EST MOD 30 MIN: CPT | Mod: 25

## 2019-08-14 PROCEDURE — 90732 PPSV23 VACC 2 YRS+ SUBQ/IM: CPT

## 2019-08-14 PROCEDURE — G0009: CPT

## 2019-08-14 NOTE — REVIEW OF SYSTEMS
[Fever] : no fever [Chills] : no chills [Fatigue] : fatigue [Hot Flashes] : no hot flashes [Night Sweats] : no night sweats [Recent Change In Weight] : ~T no recent weight change [Earache] : no earache [Hearing Loss] : no hearing loss [Nosebleeds] : no nosebleeds [Postnasal Drip] : no postnasal drip [Nasal Discharge] : nasal discharge [Sore Throat] : no sore throat [Hoarseness] : no hoarseness [Joint Pain] : joint pain [Joint Stiffness] : joint stiffness [Muscle Pain] : no muscle pain [Muscle Weakness] : no muscle weakness [Back Pain] : back pain [Joint Swelling] : no joint swelling [Negative] : Integumentary

## 2019-08-14 NOTE — HISTORY OF PRESENT ILLNESS
[FreeTextEntry1] : Follow-up for two week history of sore throat/neck pain and for hypothyroidism lupus [de-identified] : The patient is a 44-year-old male with history of lupus, asthma, allergic rhinitis, hypothyroidism, overweight who presents for two week history of sore throat and neck pain patient underwent sonogram of neck negative denies fever, chills, sick contacts complains of persistent fatigue and occasional joint pain denies fever, chills, rash. Taking Synthroid 50 David for one week

## 2019-08-14 NOTE — PHYSICAL EXAM
[No Acute Distress] : no acute distress [Well Nourished] : well nourished [Well Developed] : well developed [Normal Voice/Communication] : normal voice/communication [Well-Appearing] : well-appearing [PERRL] : pupils equal round and reactive to light [Normal Sclera/Conjunctiva] : normal sclera/conjunctiva [Normal Outer Ear/Nose] : the outer ears and nose were normal in appearance [Normal TMs] : both tympanic membranes were normal [No Lymphadenopathy] : no lymphadenopathy [No JVD] : no jugular venous distention [Supple] : supple [No Respiratory Distress] : no respiratory distress  [No Accessory Muscle Use] : no accessory muscle use [Clear to Auscultation] : lungs were clear to auscultation bilaterally [Normal Rate] : normal rate  [Normal S1, S2] : normal S1 and S2 [Regular Rhythm] : with a regular rhythm [Soft] : abdomen soft [Non Tender] : non-tender [No HSM] : no HSM [Normal Bowel Sounds] : normal bowel sounds [de-identified] : Large tonsils no erythema exudates nasal turbinates erythematous slightly swollen

## 2019-08-19 ENCOUNTER — APPOINTMENT (OUTPATIENT)
Dept: PULMONOLOGY | Facility: CLINIC | Age: 44
End: 2019-08-19
Payer: COMMERCIAL

## 2019-08-19 VITALS
HEIGHT: 72 IN | OXYGEN SATURATION: 97 % | WEIGHT: 249 LBS | SYSTOLIC BLOOD PRESSURE: 120 MMHG | DIASTOLIC BLOOD PRESSURE: 75 MMHG | RESPIRATION RATE: 15 BRPM | HEART RATE: 93 BPM | BODY MASS INDEX: 33.72 KG/M2

## 2019-08-19 PROCEDURE — 99215 OFFICE O/P EST HI 40 MIN: CPT | Mod: 25

## 2019-08-19 PROCEDURE — 94640 AIRWAY INHALATION TREATMENT: CPT

## 2019-08-19 NOTE — ASSESSMENT
[FreeTextEntry1] : 44 year old male recent diagnosis Lupus presents follow up now experiencing lupus flare\par \par Increase Breo-Ellipta 200-25 mcg daily\par Rescue inhaler every 4-6 hours as needed \par Nebulized medication every 4-6 hours if needed \par \par Follow up 2-4 weeks\par

## 2019-08-19 NOTE — PHYSICAL EXAM
[General Appearance - Well Developed] : well developed [General Appearance - Well Nourished] : well nourished [General Appearance - In No Acute Distress] : no acute distress [Normal Conjunctiva] : the conjunctiva exhibited no abnormalities [Erythema] : erythema of the pharynx [] : the neck was supple [Jugular Venous Distention Increased] : there was no jugular-venous distention [Murmurs] : no murmurs present [Heart Sounds] : normal S1 and S2 [Respiration, Rhythm And Depth] : normal respiratory rhythm and effort [Auscultation Breath Sounds / Voice Sounds] : lungs were clear to auscultation bilaterally [Abdomen Soft] : soft [Abnormal Walk] : normal gait [Nail Clubbing] : no clubbing of the fingernails [Cyanosis, Localized] : no localized cyanosis [Non-Pitting] : non-pitting [Cranial Nerves] : cranial nerves 2-12 were intact [Oriented To Time, Place, And Person] : oriented to person, place, and time [FreeTextEntry1] : Multiple tattoos + malar rash

## 2019-08-19 NOTE — HISTORY OF PRESENT ILLNESS
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 Patient is experiencing a Lupus flare.  He has facial rash, fatigue and malaise.  He reports the humidity is not helping respiratory symptoms.  He is here for follow up

## 2019-08-19 NOTE — PROCEDURE
[FreeTextEntry1] : PFT 7/24/2019 personally reviewed Normal PFT\par \par CXR 6/1/2019 personally reviewed clear lungs\par \par Nebulized Duo Neb given in office 8/19/2019

## 2019-08-19 NOTE — REVIEW OF SYSTEMS
[Fatigue] : fatigue [Dyspnea] : dyspnea [Recent Wt Gain (___ Lbs)] : recent [unfilled] ~Ulb weight gain [Wheezing] : wheezing [Nasal Discharge] : nasal discharge [As Noted in HPI] : as noted in HPI [Rash] : [unfilled] rash [Anxiety] : anxiety [Thyroid Problem] : thyroid problem [Negative] : Pulmonary Hypertension

## 2019-08-21 ENCOUNTER — EMERGENCY (EMERGENCY)
Facility: HOSPITAL | Age: 44
LOS: 1 days | Discharge: HOME | End: 2019-08-21
Attending: EMERGENCY MEDICINE
Payer: COMMERCIAL

## 2019-08-21 ENCOUNTER — APPOINTMENT (OUTPATIENT)
Dept: INTERNAL MEDICINE | Facility: CLINIC | Age: 44
End: 2019-08-21

## 2019-08-21 VITALS
TEMPERATURE: 98 F | OXYGEN SATURATION: 98 % | WEIGHT: 238.98 LBS | HEIGHT: 67 IN | HEART RATE: 86 BPM | SYSTOLIC BLOOD PRESSURE: 131 MMHG | RESPIRATION RATE: 16 BRPM | DIASTOLIC BLOOD PRESSURE: 86 MMHG

## 2019-08-21 VITALS
OXYGEN SATURATION: 98 % | TEMPERATURE: 98 F | HEART RATE: 76 BPM | DIASTOLIC BLOOD PRESSURE: 74 MMHG | RESPIRATION RATE: 18 BRPM | SYSTOLIC BLOOD PRESSURE: 119 MMHG

## 2019-08-21 LAB
ALBUMIN SERPL ELPH-MCNC: 4.7 G/DL — SIGNIFICANT CHANGE UP (ref 3.3–5)
ALP SERPL-CCNC: 94 U/L — SIGNIFICANT CHANGE UP (ref 40–120)
ALT FLD-CCNC: 34 U/L — SIGNIFICANT CHANGE UP (ref 10–45)
ANION GAP SERPL CALC-SCNC: 12 MMOL/L — SIGNIFICANT CHANGE UP (ref 5–17)
AST SERPL-CCNC: 40 U/L — SIGNIFICANT CHANGE UP (ref 10–40)
BILIRUB SERPL-MCNC: 0.4 MG/DL — SIGNIFICANT CHANGE UP (ref 0.2–1.2)
BUN SERPL-MCNC: 14 MG/DL — SIGNIFICANT CHANGE UP (ref 7–23)
CALCIUM SERPL-MCNC: 10.3 MG/DL — SIGNIFICANT CHANGE UP (ref 8.4–10.5)
CHLORIDE SERPL-SCNC: 96 MMOL/L — SIGNIFICANT CHANGE UP (ref 96–108)
CO2 SERPL-SCNC: 25 MMOL/L — SIGNIFICANT CHANGE UP (ref 22–31)
CREAT SERPL-MCNC: 0.95 MG/DL — SIGNIFICANT CHANGE UP (ref 0.5–1.3)
CRP SERPL-MCNC: 0.13 MG/DL — SIGNIFICANT CHANGE UP (ref 0–0.4)
ERYTHROCYTE [SEDIMENTATION RATE] IN BLOOD: 42 MM/HR — HIGH (ref 0–15)
GLUCOSE SERPL-MCNC: 93 MG/DL — SIGNIFICANT CHANGE UP (ref 70–99)
HCT VFR BLD CALC: 42.3 % — SIGNIFICANT CHANGE UP (ref 39–50)
HGB BLD-MCNC: 14.5 G/DL — SIGNIFICANT CHANGE UP (ref 13–17)
MCHC RBC-ENTMCNC: 30.5 PG — SIGNIFICANT CHANGE UP (ref 27–34)
MCHC RBC-ENTMCNC: 34.4 GM/DL — SIGNIFICANT CHANGE UP (ref 32–36)
MCV RBC AUTO: 88.8 FL — SIGNIFICANT CHANGE UP (ref 80–100)
PLATELET # BLD AUTO: 326 K/UL — SIGNIFICANT CHANGE UP (ref 150–400)
POTASSIUM SERPL-MCNC: 4.9 MMOL/L — SIGNIFICANT CHANGE UP (ref 3.5–5.3)
POTASSIUM SERPL-SCNC: 4.9 MMOL/L — SIGNIFICANT CHANGE UP (ref 3.5–5.3)
PROT SERPL-MCNC: 9.3 G/DL — HIGH (ref 6–8.3)
RAPID RVP RESULT: SIGNIFICANT CHANGE UP
RBC # BLD: 4.76 M/UL — SIGNIFICANT CHANGE UP (ref 4.2–5.8)
RBC # FLD: 11.8 % — SIGNIFICANT CHANGE UP (ref 10.3–14.5)
S PYO AG SPEC QL IA: NEGATIVE — SIGNIFICANT CHANGE UP
SODIUM SERPL-SCNC: 133 MMOL/L — LOW (ref 135–145)
WBC # BLD: 5.3 K/UL — SIGNIFICANT CHANGE UP (ref 3.8–10.5)
WBC # FLD AUTO: 5.3 K/UL — SIGNIFICANT CHANGE UP (ref 3.8–10.5)

## 2019-08-21 PROCEDURE — 99284 EMERGENCY DEPT VISIT MOD MDM: CPT

## 2019-08-21 PROCEDURE — 80053 COMPREHEN METABOLIC PANEL: CPT

## 2019-08-21 PROCEDURE — 87081 CULTURE SCREEN ONLY: CPT

## 2019-08-21 PROCEDURE — 87633 RESP VIRUS 12-25 TARGETS: CPT

## 2019-08-21 PROCEDURE — 84443 ASSAY THYROID STIM HORMONE: CPT

## 2019-08-21 PROCEDURE — 84436 ASSAY OF TOTAL THYROXINE: CPT

## 2019-08-21 PROCEDURE — 96361 HYDRATE IV INFUSION ADD-ON: CPT

## 2019-08-21 PROCEDURE — 86140 C-REACTIVE PROTEIN: CPT

## 2019-08-21 PROCEDURE — 85027 COMPLETE CBC AUTOMATED: CPT

## 2019-08-21 PROCEDURE — 96374 THER/PROPH/DIAG INJ IV PUSH: CPT

## 2019-08-21 PROCEDURE — 85652 RBC SED RATE AUTOMATED: CPT

## 2019-08-21 PROCEDURE — 82550 ASSAY OF CK (CPK): CPT

## 2019-08-21 PROCEDURE — 87581 M.PNEUMON DNA AMP PROBE: CPT

## 2019-08-21 PROCEDURE — 99282 EMERGENCY DEPT VISIT SF MDM: CPT

## 2019-08-21 PROCEDURE — 87798 DETECT AGENT NOS DNA AMP: CPT

## 2019-08-21 PROCEDURE — 87486 CHLMYD PNEUM DNA AMP PROBE: CPT

## 2019-08-21 PROCEDURE — 87880 STREP A ASSAY W/OPTIC: CPT

## 2019-08-21 PROCEDURE — 99284 EMERGENCY DEPT VISIT MOD MDM: CPT | Mod: 25

## 2019-08-21 RX ORDER — SODIUM CHLORIDE 9 MG/ML
1000 INJECTION INTRAMUSCULAR; INTRAVENOUS; SUBCUTANEOUS ONCE
Refills: 0 | Status: COMPLETED | OUTPATIENT
Start: 2019-08-21 | End: 2019-08-21

## 2019-08-21 RX ORDER — DIPHENHYDRAMINE HCL 50 MG
50 CAPSULE ORAL ONCE
Refills: 0 | Status: COMPLETED | OUTPATIENT
Start: 2019-08-21 | End: 2019-08-21

## 2019-08-21 RX ORDER — LORATADINE 10 MG/1
1 TABLET ORAL
Qty: 30 | Refills: 0
Start: 2019-08-21 | End: 2019-09-19

## 2019-08-21 RX ADMIN — SODIUM CHLORIDE 1000 MILLILITER(S): 9 INJECTION INTRAMUSCULAR; INTRAVENOUS; SUBCUTANEOUS at 14:14

## 2019-08-21 RX ADMIN — Medication 50 MILLIGRAM(S): at 17:09

## 2019-08-21 RX ADMIN — SODIUM CHLORIDE 1000 MILLILITER(S): 9 INJECTION INTRAMUSCULAR; INTRAVENOUS; SUBCUTANEOUS at 15:15

## 2019-08-21 NOTE — ED PROVIDER NOTE - ATTENDING CONTRIBUTION TO CARE
PMD Susanna [id number]  44y male pmh Asthma SLE x 8months, Hypothyrodism, ON synthorids and Plaquenil,inhaler  Pt comes to ed co "I'm having a lupus flare" Pt co fatigue onset 3d ago worsening yesterday. Myalgias/joint pains.NV x1 this am.  No fever chills.sob,hemoptysis, hemetemesis. PE WDWN male looking fatigued NCAT neck supple chest clear ap abd soft +bs no mass guarding neuro no focal defects cv no rgm  Ramiro Bertrand MD, Facep

## 2019-08-21 NOTE — ED PROVIDER NOTE - CARE PROVIDER_API CALL
Ariana Kelly)  Internal Medicine  [address] [place], [place] [id number]  Phone: [phone]  Fax: [phone]  Follow Up Time: 4-6 Days

## 2019-08-21 NOTE — ED ADULT NURSE NOTE - OBJECTIVE STATEMENT
Male 44 years old diagnosed with Lupus January 2019, on Levothyroxine and Plaquinil came in today for weakness. Pt states since Monday he's been feeling very weak, with intermittent sob yesterday and vomiting today. States that his joints are stiff and swollen. Denies chest pain, sob, fever, chills, abdominal pain or urinary symptoms. Comfort/safety maintained. Will continue to reassess. Male 44 years old diagnosed with Lupus January 2019, on Levothyroxine and Plaquenil came in today for weakness. Pt states since Monday he's been feeling very weak, with intermittent sob yesterday and vomiting today. States that his joints are stiff and swollen. Denies chest pain, sob, fever, chills, abdominal pain or urinary symptoms. Comfort/safety maintained. Will continue to reassess.

## 2019-08-21 NOTE — ED PROVIDER NOTE - CLINICAL SUMMARY MEDICAL DECISION MAKING FREE TEXT BOX
Patient is a 44 year old with history of lupus who presents with weakness he states is typical of a lupus flare. Will check inflammatory labs to see if he is in exacerbation. Checking thyroid function tests. Will reach out to Rheumatology to determine if he would benefit from steroid course given his baseline abnormal HPA axis.

## 2019-08-21 NOTE — ED PROVIDER NOTE - NSFOLLOWUPINSTRUCTIONS_ED_ALL_ED_FT
- You were seen by Rheumatology. It was felt that your symptoms were due to your lupus and perhaps exacerbated by your allergies. You were started on Claritin. Please continue this medication at home.   - Follow-up with Rheumatology regarding further treatment for your Lupus.   - Return to the ED if you begin developing any fevers, chills, or worsening fatigue.

## 2019-08-21 NOTE — ED PROVIDER NOTE - OBJECTIVE STATEMENT
Patient is a 44 year old with past history of SLE, asthma, and hypothyroidism who presents with complaints of weakness. Patient states that he began developing a generalized weakness in his back, UEs, and LEs. He states that the weakness steadily progressed until today, when he felt that he was in danger of collapsing on the street if he walked for too long. He was concerned that he would be unable to safely operate at work due to his degree of weakness. He states that his weakness is very similar to what he experiences when he has a flare of his lupus. He states that he had some shortness of breath when the episode of weakness started, but he was evaluated and cleared by his pulmonologist. He currently has no pulmonary complaints. He does not have any chest pain, dizziness, lightheadedness, or palpitations. No abdominal pain, nausea, vomiting, or urinary complaints.     He was diagnosed with lupus earlier this year and has been following with Rheumatology for treatment. He was started on Plaquenil for this. He is on levothyroxine for his thyroid issues.

## 2019-08-21 NOTE — ED ADULT NURSE NOTE - NSIMPLEMENTINTERV_GEN_ALL_ED
Implemented All Universal Safety Interventions:  [place] to call system. Call bell, personal items and telephone within reach. Instruct patient to call for assistance. Room bathroom lighting operational. Non-slip footwear when patient is off stretcher. Physically safe environment: no spills, clutter or unnecessary equipment. Stretcher in lowest position, wheels locked, appropriate side rails in place.

## 2019-08-21 NOTE — CONSULT NOTE ADULT - SUBJECTIVE AND OBJECTIVE BOX
HISTORY OF PRESENT ILLNESS:  44M presenting with 2 days history of severe generalized weakness. Pt report that he has been feeling weak, "inflamed", myalgias, polyarthralgias, since his diagnosis of lupus in 2019, but has felt markedly worse since Monday. Pt also reports nasal congestion and sore throat for the past 2 days. No fevers, chills. Joint pain localized to knees and ankles, no joint swelling appreciated. Also c/o hand stiffness but no swelling.  Pt denies sick contacts, but works as  for [employer] so exposed to a lot of people. Also with small child at home, feels very stressed lately and sleeps little due to  at home.   Pt seen last week by rheumatologist Dr. Neri when diagnosed with hypothyroidism based on elevated TSH (18), normal fT4. Pt was started on Synthroid 50mcg last week. Myalgias were attributed to hypothyroidism and steroids were held at the time.  Pt denies hair loss, oral/nasal ulcers, photosensitivity, facial rash, CP, SOB, GI complaints.    PAST MEDICAL & SURGICAL HISTORY:  Asthma  Lupus  No significant past surgical history    MEDICATIONS  (STANDING):  diphenhydrAMINE   Injectable 50 milliGRAM(s) IV Push Once    Allergies  No Known Allergies  Intolerances - NSAIDS     PERTINENT MEDICATION HISTORY: HCQ 200mg qd, Synthroid 50mcg    SOCIAL HISTORY: NC    FAMILY HISTORY:  No pertinent family history in first degree relatives    Vital Signs Last 24 Hrs  T(C): 36.8 (21 Aug 2019 12:49), Max: 36.8 (21 Aug 2019 12:49)  T(F): 98.3 (21 Aug 2019 12:49), Max: 98.3 (21 Aug 2019 12:49)  HR: 89 (21 Aug 2019 13:30) (86 - 89)  BP: 134/89 (21 Aug 2019 13:30) (131/86 - 134/89)  BP(mean): --  RR: 18 (21 Aug 2019 13:30) (16 - 18)  SpO2: 99% (21 Aug 2019 13:30) (98% - 99%)  Daily Height in cm: 170.18 (21 Aug 2019 12:49)      PHYSICAL EXAM:  Constitutional: NAD  ENMT: enlarged tonsils, slightly red, non-purulent, hyperemic pharynx  Neck: tender, slightly enlarged anterior cervical LN  Respiratory: CTA b/l  Neurological: muscle strength 5/5 UE, 5/5 RLE prox and distal, 4/5 LLE prox and distal, limited by pain  Skin: no rash  Musculoskeletal: TTP of b/l popliteal fossa, no knee effusions or warmth appreciated, TTP L ankle but no effusions or warmth of limited ROM appreciated.    LABS:                        14.5   5.3   )-----------( 326      ( 21 Aug 2019 14:07 )             42.3     08-    133<L>  |  96  |  14  ----------------------------<  93  4.9   |  25  |  0.95    Ca    10.3      21 Aug 2019 14:07    TPro  9.3<H>  /  Alb  4.7  /  TBili  0.4  /  DBili  x   /  AST  40  /  ALT  34  /  AlkPhos  94  -

## 2019-08-21 NOTE — ED PROVIDER NOTE - PROGRESS NOTE DETAILS
Endorsed to Dr NOE Bertrand MD, Facep Discussed case with Rheumatology. Recommended Benadryl and respiratory panel to rule out viral causes. Will follow-up with Rheumatology as outpatient.

## 2019-08-21 NOTE — CONSULT NOTE ADULT - ASSESSMENT
43M with asthma, recent diagnosis of SLE based on positive OWEN, dsDNA, RNP, arthritis, myalgias, also with recently diagnosed hypothyroidism, with worsening myalgias, arthralgias, now with nasal congestion and sore throat, with mild CK elevation.  Symptoms likely related to URI vs allergies vs hypothyroidism. Mil  Pt does not appear to be in yvan SLE flare.    Plan:  -recommend to get RVP. If negative, advise to give antihistamine now and send pt home on several days of claritin. NSAIDs are contraindicated in this patient due to asthma.  -Told patient to call rheumatology office in a few days if he does not feel better, at which point the decision can be made whether to start prednisone or not.  -follow up with rheumatologist Dr. Neri outpt.

## 2019-08-21 NOTE — ED PROVIDER NOTE - CARE PLAN
Principal Discharge DX:	Lupus  Secondary Diagnosis:	Allergic rhinitis due to pollen, unspecified seasonality

## 2019-08-21 NOTE — ED ADULT TRIAGE NOTE - BP NONINVASIVE DIASTOLIC (MM HG)
86 Area M Indication Text: Tumors in this location are included in Area M (cheek, forehead, scalp, neck, jawline and pretibial skin).  Mohs surgery is indicated for tumors 1 cm or larger in these anatomic locations.

## 2019-08-22 LAB
T4 AB SER-ACNC: 8.6 UG/DL — SIGNIFICANT CHANGE UP (ref 4.6–12)
TSH SERPL-MCNC: 12.6 UIU/ML — HIGH (ref 0.27–4.2)

## 2019-08-22 NOTE — ED POST DISCHARGE NOTE - DETAILS
Patient contacted and made aware of abnormal result.  Endorses compliance with synthroid at home.  Instructed to follow up with his PMD for further management.  He expressed understanding.  -Carmelo Hampton PA-C

## 2019-08-28 ENCOUNTER — RX RENEWAL (OUTPATIENT)
Age: 44
End: 2019-08-28

## 2019-09-09 ENCOUNTER — APPOINTMENT (OUTPATIENT)
Dept: INTERNAL MEDICINE | Facility: CLINIC | Age: 44
End: 2019-09-09
Payer: COMMERCIAL

## 2019-09-09 VITALS
HEART RATE: 82 BPM | BODY MASS INDEX: 33.46 KG/M2 | DIASTOLIC BLOOD PRESSURE: 80 MMHG | OXYGEN SATURATION: 98 % | WEIGHT: 247 LBS | HEIGHT: 72 IN | SYSTOLIC BLOOD PRESSURE: 120 MMHG

## 2019-09-09 PROCEDURE — 99396 PREV VISIT EST AGE 40-64: CPT | Mod: 25

## 2019-09-09 PROCEDURE — 36415 COLL VENOUS BLD VENIPUNCTURE: CPT

## 2019-09-09 NOTE — ASSESSMENT
[FreeTextEntry1] : Patient is a 44-year-old male with history of obesity, lupus, hypothyroidism, bronchospasm, allergic rhinitis, snoring, who presents for comprehensive annual physical examination complaints of generalized episodic pruritus and joint pain\par \par 1. Generalized pruritus\par no rash or definable lesions\par continue antihistamines trial of topical steroid cream\par referral to dermatology\par \par 2. Lupus\par continue plaque when L has follow-up with rheumatology on Wednesday\par check said rate UA\par \par 3. Overweight/obesity\par Counseled on a low carbohydrate,manly plant based diet.Closely monitor weight. Daily aerobic exercise for 30 minutes. Avoid high carb drinks.\par \par 4. Snoring\par check sleep study rule out obstructive sleep apnea\par \par 5. hypothyroidism\par continue levothyroxine 50 David check TFTs\par \par 6. Back pain\par check UA observe Tylenol and Advil as needed\par \par 7. Health maintenance\par flu shot when available\par check routine labs\par counseled on low cholesterol diet follow-up in two months\par

## 2019-09-09 NOTE — HEALTH RISK ASSESSMENT
[Fair] :  ~his/her~ mood as fair [No] : No [No falls in past year] : Patient reported no falls in the past year [0] : 2) Feeling down, depressed, or hopeless: Not at all (0) [None] : None [With Family] : lives with family [# of Members in Household ___] :  household currently consist of [unfilled] member(s) [High School] : high school [Employed] : employed [# Of Children ___] : has [unfilled] children [Significant Other] : lives with significant other [Sexually Active] : sexually active [Feels Safe at Home] : Feels safe at home [Fully functional (using the telephone, shopping, preparing meals, housekeeping, doing laundry, using] : Fully functional and needs no help or supervision to perform IADLs (using the telephone, shopping, preparing meals, housekeeping, doing laundry, using transportation, managing medications and managing finances) [Fully functional (bathing, dressing, toileting, transferring, walking, feeding)] : Fully functional (bathing, dressing, toileting, transferring, walking, feeding) [Reports normal functional visual acuity (ie: able to read med bottle)] : Reports normal functional visual acuity [Smoke Detector] : smoke detector [Carbon Monoxide Detector] : carbon monoxide detector [Safety elements used in home] : safety elements used in home [Seat Belt] :  uses seat belt [Sunscreen] : uses sunscreen [] : No [de-identified] : Average [[id number]] : 0 [de-identified] : Minimal [Change in mental status noted] : No change in mental status noted [Language] : denies difficulty with language [Handling Complex Tasks] : denies difficulty handling complex tasks [Behavior] : denies difficulty with behavior [Spatial Ability and Orientation] : denies difficulty with spatial ability and orientation [Reasoning] : denies difficulty with reasoning [High Risk Behavior] : no high risk behavior [Reports changes in hearing] : Reports no changes in hearing [Reports changes in dental health] : Reports no changes in dental health [Reports changes in vision] : Reports no changes in vision [Guns at Home] : no guns at home [TB Exposure] : is not being exposed to tuberculosis [Travel to Developing Areas] : does not  travel to developing areas [Caregiver Concerns] : does not have caregiver concerns

## 2019-09-09 NOTE — HISTORY OF PRESENT ILLNESS
[FreeTextEntry1] : \par \par Comprehensive annual physical examination [de-identified] : Patient is a 44-year-old male with history of rheumatoid arthritis on plaque canal recent hospitalization for increasing joint pain weakness treated with IV fluids, hypothyroidism, bronchospasm, snoring, who presents for comprehensive annual physical examination patient complains of itching periodically severe no rash, joint pain, associated with it patient complains of diffuse mild joint pain denies rash, fever, chills, nausea vomiting, diarrhea occasionally gets lower back pain

## 2019-09-09 NOTE — REVIEW OF SYSTEMS
[Fatigue] : fatigue [Joint Pain] : joint pain [Joint Stiffness] : joint stiffness [Back Pain] : back pain [Joint Swelling] : joint swelling [Itching] : itching [Negative] : Psychiatric [Fever] : no fever [Hot Flashes] : no hot flashes [Night Sweats] : no night sweats [Recent Change In Weight] : ~T no recent weight change [Muscle Pain] : no muscle pain [Muscle Weakness] : no muscle weakness [Nail Changes] : no nail changes [Mole Changes] : no mole changes [Hair Changes] : no hair changes [Skin Rash] : no skin rash

## 2019-09-09 NOTE — PHYSICAL EXAM
[No Acute Distress] : no acute distress [Well Nourished] : well nourished [Well Developed] : well developed [Well-Appearing] : well-appearing [Normal Sclera/Conjunctiva] : normal sclera/conjunctiva [EOMI] : extraocular movements intact [PERRL] : pupils equal round and reactive to light [Normal Outer Ear/Nose] : the outer ears and nose were normal in appearance [Normal Oropharynx] : the oropharynx was normal [No JVD] : no jugular venous distention [No Lymphadenopathy] : no lymphadenopathy [Supple] : supple [Thyroid Normal, No Nodules] : the thyroid was normal and there were no nodules present [No Respiratory Distress] : no respiratory distress  [No Accessory Muscle Use] : no accessory muscle use [Clear to Auscultation] : lungs were clear to auscultation bilaterally [Normal Rate] : normal rate  [Regular Rhythm] : with a regular rhythm [No Murmur] : no murmur heard [Normal S1, S2] : normal S1 and S2 [No Carotid Bruits] : no carotid bruits [No Abdominal Bruit] : a ~M bruit was not heard ~T in the abdomen [No Varicosities] : no varicosities [Pedal Pulses Present] : the pedal pulses are present [No Edema] : there was no peripheral edema [No Palpable Aorta] : no palpable aorta [No Extremity Clubbing/Cyanosis] : no extremity clubbing/cyanosis [Soft] : abdomen soft [Non Tender] : non-tender [Non-distended] : non-distended [No Masses] : no abdominal mass palpated [No HSM] : no HSM [Normal Bowel Sounds] : normal bowel sounds [Normal Sphincter Tone] : normal sphincter tone [No Mass] : no mass [Penis Abnormality] : normal uncircumcised penis [Testes Tenderness] : no tenderness of the testes [Scrotum] : the scrotum was normal [Testes Mass (___cm)] : there were no testicular masses [Prostate Enlargement] : the prostate was not enlarged [Normal Supraclavicular Nodes] : no supraclavicular lymphadenopathy [Prostate Tenderness] : the prostate was not tender [Normal Posterior Cervical Nodes] : no posterior cervical lymphadenopathy [Normal Axillary Nodes] : no axillary lymphadenopathy [Normal Inguinal Nodes] : no inguinal lymphadenopathy [Normal Anterior Cervical Nodes] : no anterior cervical lymphadenopathy [No CVA Tenderness] : no CVA  tenderness [Normal Femoral Nodes] : no femoral lymphadenopathy [No Spinal Tenderness] : no spinal tenderness [No Joint Swelling] : no joint swelling [Grossly Normal Strength/Tone] : grossly normal strength/tone [No Rash] : no rash [Coordination Grossly Intact] : coordination grossly intact [No Focal Deficits] : no focal deficits [Normal Gait] : normal gait [Deep Tendon Reflexes (DTR)] : deep tendon reflexes were 2+ and symmetric [Speech Grossly Normal] : speech grossly normal [Memory Grossly Normal] : memory grossly normal [Normal Mood] : the mood was normal [Normal Affect] : the affect was normal [Normal Insight/Judgement] : insight and judgment were intact [Stool Occult Blood] : stool negative for occult blood

## 2019-09-10 ENCOUNTER — MEDICATION RENEWAL (OUTPATIENT)
Age: 44
End: 2019-09-10

## 2019-09-10 LAB
ANION GAP SERPL CALC-SCNC: 16 MMOL/L
APPEARANCE: CLEAR
BACTERIA: NEGATIVE
BILIRUBIN URINE: NEGATIVE
BLOOD URINE: NEGATIVE
BUN SERPL-MCNC: 9 MG/DL
CALCIUM SERPL-MCNC: 9.8 MG/DL
CHLORIDE SERPL-SCNC: 103 MMOL/L
CO2 SERPL-SCNC: 25 MMOL/L
COLOR: YELLOW
CREAT SERPL-MCNC: 1.07 MG/DL
ERYTHROCYTE [SEDIMENTATION RATE] IN BLOOD BY WESTERGREN METHOD: 22 MM/HR
ESTIMATED AVERAGE GLUCOSE: 114 MG/DL
GLUCOSE QUALITATIVE U: NEGATIVE
GLUCOSE SERPL-MCNC: 79 MG/DL
HBA1C MFR BLD HPLC: 5.6 %
HYALINE CASTS: 0 /LPF
KETONES URINE: NEGATIVE
LEUKOCYTE ESTERASE URINE: NEGATIVE
MICROSCOPIC-UA: NORMAL
NITRITE URINE: NEGATIVE
PH URINE: 6.5
POTASSIUM SERPL-SCNC: 4.5 MMOL/L
PROTEIN URINE: ABNORMAL
RED BLOOD CELLS URINE: 1 /HPF
SODIUM SERPL-SCNC: 144 MMOL/L
SPECIFIC GRAVITY URINE: 1.02
SQUAMOUS EPITHELIAL CELLS: 0 /HPF
T4 FREE SERPL-MCNC: 1.4 NG/DL
TSH SERPL-ACNC: 11.8 UIU/ML
UROBILINOGEN URINE: NORMAL
WHITE BLOOD CELLS URINE: 1 /HPF

## 2019-09-11 ENCOUNTER — APPOINTMENT (OUTPATIENT)
Dept: RHEUMATOLOGY | Facility: CLINIC | Age: 44
End: 2019-09-11
Payer: COMMERCIAL

## 2019-09-11 VITALS
WEIGHT: 247 LBS | SYSTOLIC BLOOD PRESSURE: 131 MMHG | RESPIRATION RATE: 16 BRPM | TEMPERATURE: 98.7 F | BODY MASS INDEX: 33.46 KG/M2 | HEIGHT: 72 IN | HEART RATE: 80 BPM | OXYGEN SATURATION: 98 % | DIASTOLIC BLOOD PRESSURE: 84 MMHG

## 2019-09-11 PROCEDURE — 99214 OFFICE O/P EST MOD 30 MIN: CPT

## 2019-09-12 LAB
APPEARANCE: CLEAR
BACTERIA: NEGATIVE
BILIRUBIN URINE: NEGATIVE
BLOOD URINE: NEGATIVE
COLOR: NORMAL
CREAT SPEC-SCNC: 100 MG/DL
CREAT/PROT UR: 0.1 RATIO
GLUCOSE QUALITATIVE U: NEGATIVE
HYALINE CASTS: 0 /LPF
KETONES URINE: NEGATIVE
LEUKOCYTE ESTERASE URINE: NEGATIVE
MICROSCOPIC-UA: NORMAL
NITRITE URINE: NEGATIVE
PH URINE: 6.5
PROT UR-MCNC: 7 MG/DL
PROTEIN URINE: NEGATIVE
RED BLOOD CELLS URINE: 1 /HPF
SPECIFIC GRAVITY URINE: 1.01
SQUAMOUS EPITHELIAL CELLS: 1 /HPF
UROBILINOGEN URINE: NORMAL
WHITE BLOOD CELLS URINE: 0 /HPF

## 2019-09-13 ENCOUNTER — APPOINTMENT (OUTPATIENT)
Dept: PULMONOLOGY | Facility: CLINIC | Age: 44
End: 2019-09-13
Payer: COMMERCIAL

## 2019-09-13 VITALS
SYSTOLIC BLOOD PRESSURE: 120 MMHG | WEIGHT: 247 LBS | RESPIRATION RATE: 14 BRPM | HEART RATE: 87 BPM | DIASTOLIC BLOOD PRESSURE: 72 MMHG | BODY MASS INDEX: 33.46 KG/M2 | OXYGEN SATURATION: 98 % | HEIGHT: 72 IN

## 2019-09-13 PROCEDURE — 99215 OFFICE O/P EST HI 40 MIN: CPT

## 2019-09-13 NOTE — HISTORY OF PRESENT ILLNESS
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 Patient recently struggling with Lupus flare as well as hypothyroidism.  He is using Breo-Ellipta 200-25 daily.  He was started on prednisone for increased symptoms joint pain, difficulty swallowing.  He is using plaquenil daily.  He is accompanied by his wife and daughter.\par

## 2019-09-13 NOTE — PHYSICAL EXAM
[General Appearance - Well Nourished] : well nourished [General Appearance - Well Developed] : well developed [General Appearance - In No Acute Distress] : no acute distress [Normal Conjunctiva] : the conjunctiva exhibited no abnormalities [] : the neck was supple [Erythema] : erythema of the pharynx [Jugular Venous Distention Increased] : there was no jugular-venous distention [Heart Sounds] : normal S1 and S2 [Murmurs] : no murmurs present [Respiration, Rhythm And Depth] : normal respiratory rhythm and effort [Auscultation Breath Sounds / Voice Sounds] : lungs were clear to auscultation bilaterally [Abdomen Soft] : soft [Abnormal Walk] : normal gait [Nail Clubbing] : no clubbing of the fingernails [Non-Pitting] : non-pitting [Cyanosis, Localized] : no localized cyanosis [Cranial Nerves] : cranial nerves 2-12 were intact [Oriented To Time, Place, And Person] : oriented to person, place, and time [FreeTextEntry1] : Multiple tattoos + malar rash

## 2019-09-13 NOTE — REVIEW OF SYSTEMS
[Recent Wt Gain (___ Lbs)] : recent [unfilled] ~Ulb weight gain [Fatigue] : fatigue [Wheezing] : wheezing [Dyspnea] : dyspnea [As Noted in HPI] : as noted in HPI [Nasal Discharge] : nasal discharge [Rash] : [unfilled] rash [Anxiety] : anxiety [Thyroid Problem] : thyroid problem [Negative] : Pulmonary Hypertension

## 2019-09-13 NOTE — ASSESSMENT
[FreeTextEntry1] : 44 year old male recent diagnosis Lupus presents follow up now on prednisone increased lupus symptoms, \par \par Increase Breo-Ellipta 200-25 mcg daily\par Rescue inhaler every 4-6 hours as needed \par Nebulized medication every 4-6 hours if needed \par \par Follow up 4-6 weeks\par

## 2019-09-16 ENCOUNTER — APPOINTMENT (OUTPATIENT)
Dept: INTERNAL MEDICINE | Facility: CLINIC | Age: 44
End: 2019-09-16

## 2019-10-11 ENCOUNTER — APPOINTMENT (OUTPATIENT)
Dept: PULMONOLOGY | Facility: CLINIC | Age: 44
End: 2019-10-11
Payer: COMMERCIAL

## 2019-10-11 VITALS
WEIGHT: 248 LBS | HEIGHT: 67 IN | OXYGEN SATURATION: 98 % | DIASTOLIC BLOOD PRESSURE: 70 MMHG | BODY MASS INDEX: 38.92 KG/M2 | SYSTOLIC BLOOD PRESSURE: 110 MMHG | RESPIRATION RATE: 16 BRPM | HEART RATE: 92 BPM

## 2019-10-11 PROCEDURE — G0008: CPT

## 2019-10-11 PROCEDURE — 99214 OFFICE O/P EST MOD 30 MIN: CPT | Mod: 25

## 2019-10-11 PROCEDURE — 90682 RIV4 VACC RECOMBINANT DNA IM: CPT

## 2019-10-11 NOTE — ASSESSMENT
[FreeTextEntry1] : 44 year old male recent diagnosis Lupus presents follow up asthma now on prednisone increased lupus symptoms, now with acute sinusitis\par \par Continue Breo-Ellipta 200-25 mcg daily\par Rescue inhaler every 4-6 hours as needed \par Nebulized medication every 4-6 hours if needed \par CT sinuses ordered\par Influenza vaccine administered today\par \par Follow up after CT sinuses\par

## 2019-10-11 NOTE — HISTORY OF PRESENT ILLNESS
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 Patient recently struggling with Lupus flare as well as hypothyroidism.  He is using Breo-Ellipta 200-25 daily.  Patient currently on prednisone and  plaquenil daily. Patient reports he was experiencing increased sinus symptoms and pain.  He was evaluated by ENT and is currently on Augmentin.  He reports he was feeling better on Breo-Ellipta 200-25 until sinus infection.  He is here for follow up. \par

## 2019-10-11 NOTE — PHYSICAL EXAM
[General Appearance - Well Developed] : well developed [General Appearance - Well Nourished] : well nourished [General Appearance - In No Acute Distress] : no acute distress [Normal Conjunctiva] : the conjunctiva exhibited no abnormalities [Erythema] : erythema of the pharynx [] : the neck was supple [Jugular Venous Distention Increased] : there was no jugular-venous distention [Murmurs] : no murmurs present [Heart Sounds] : normal S1 and S2 [Abdomen Soft] : soft [Respiration, Rhythm And Depth] : normal respiratory rhythm and effort [Auscultation Breath Sounds / Voice Sounds] : lungs were clear to auscultation bilaterally [Abnormal Walk] : normal gait [Nail Clubbing] : no clubbing of the fingernails [Cyanosis, Localized] : no localized cyanosis [Non-Pitting] : non-pitting [Cranial Nerves] : cranial nerves 2-12 were intact [Oriented To Time, Place, And Person] : oriented to person, place, and time [FreeTextEntry1] : Multiple tattoos + malar rash

## 2019-10-11 NOTE — REVIEW OF SYSTEMS
[Fatigue] : fatigue [Dyspnea] : dyspnea [Recent Wt Gain (___ Lbs)] : recent [unfilled] ~Ulb weight gain [Nasal Discharge] : nasal discharge [Wheezing] : wheezing [As Noted in HPI] : as noted in HPI [Rash] : [unfilled] rash [Anxiety] : anxiety [Thyroid Problem] : thyroid problem [Negative] : Pulmonary Hypertension

## 2019-10-16 ENCOUNTER — LABORATORY RESULT (OUTPATIENT)
Age: 44
End: 2019-10-16

## 2019-10-16 ENCOUNTER — APPOINTMENT (OUTPATIENT)
Dept: RHEUMATOLOGY | Facility: CLINIC | Age: 44
End: 2019-10-16
Payer: COMMERCIAL

## 2019-10-16 VITALS
DIASTOLIC BLOOD PRESSURE: 88 MMHG | BODY MASS INDEX: 38.84 KG/M2 | OXYGEN SATURATION: 98 % | WEIGHT: 248 LBS | SYSTOLIC BLOOD PRESSURE: 134 MMHG | HEART RATE: 84 BPM

## 2019-10-16 PROCEDURE — 99215 OFFICE O/P EST HI 40 MIN: CPT

## 2019-10-16 NOTE — REASON FOR VISIT
[Follow-Up: _____] : a [unfilled] follow-up visit [FreeTextEntry1] : SLE , fatigue, rash , back pain, joint pain, ankle swelling.

## 2019-10-16 NOTE — HISTORY OF PRESENT ILLNESS
[FreeTextEntry1] : 43 yo M hx SLE here for follow up \par he was started on Levoxyl by pmd after last visit \par he doesn't think he feels any different now than when he was in the hospital \par he notes \par 1)  throat pain - started early July  and has seen ENT.  He was put on antibiotics by dr. Briseno (ENT) and was found to have sores in his throat and nose and was put on antibiotics.  Pending CT sinus from pulmonary. (NEW NASAL ULCERATIONS seen by ENT)\par 2)  sun sensitivity  - makes him feel tired and withdrawn \par 3)  fatigue - unchanged\par 4)  Back pain - center of lower back and feels like it vibrates if he sneezes or coughs and then he feels like his whole back is swollen in the muscles of the back and shoulder. \par 5)  hypothyroid  - on levoxyl  and recently inc from 50 to 75 based on blood work from 9-10-19\par 6)  mood liability - seeing psychologist on 8-13-19 -feels more stable and unchanged. \par 7)  deep itch that starts in mid back and spreads on his body.  worsening over time.  has seen dermatologist and also saw pmd.  Started on topicals and claritin and he doesn't feet it helps \par 8)  tingling in the fingers - this is intermittent and started about 3 weeks ago and worse with cold temperature. Last winter he developed Raynaud in the fingers (white/blue) which he discussed today. \par 10)  cramps in the toes - intermittent. \par 11)  eyesight - feels like has to strain to see distance - recent development. \par 12) joint pain - in the hands, wrist and ankles, \par \par  \par \par \par \par \par Patient was seen in [facility] 6/3/19, +LA, hypocomplementemia, OWEN 1:2560, +Sm, +RNP, fatigue, arthritis. Was told post-hospitalization to start HCQ.

## 2019-10-16 NOTE — HISTORY OF PRESENT ILLNESS
[FreeTextEntry1] : 45 yo M hx SLE here for follow up \par he was started on Levoxyl by pmd after last visit \par he doesn't think he feels any different now than when he was in the hospital \par he notes \par 1)  throat pain - started early July  and has seen ENT.  He was put on antibiotics by dr. Briseno (ENT) and was found to have sores in his throat and nose and was put on antibiotics.  Pending CT sinus from pulmonary. (NEW NASAL ULCERATIONS seen by ENT)\par 2)  sun sensitivity  - makes him feel tired and withdrawn \par 3)  fatigue - unchanged\par 4)  Back pain - center of lower back and feels like it vibrates if he sneezes or coughs and then he feels like his whole back is swollen in the muscles of the back and shoulder. \par 5)  hypothyroid  - on levoxyl  and recently inc from 50 to 75 based on blood work from 9-10-19\par 6)  mood liability - seeing psychologist on 8-13-19 -feels more stable and unchanged. \par 7)  deep itch that starts in mid back and spreads on his body.  worsening over time.  has seen dermatologist and also saw pmd.  Started on topicals and claritin and he doesn't feet it helps \par 8)  tingling in the fingers - this is intermittent and started about 3 weeks ago and worse with cold temperature. Last winter he developed Raynaud in the fingers (white/blue) which he discussed today. \par 10)  cramps in the toes - intermittent. \par 11)  eyesight - feels like has to strain to see distance - recent development. \par 12) joint pain - in the hands, wrist and ankles, \par \par  \par \par \par \par \par Patient was seen in [facility] 6/3/19, +LA, hypocomplementemia, OWEN 1:2560, +Sm, +RNP, fatigue, arthritis. Was told post-hospitalization to start HCQ.

## 2019-10-16 NOTE — DATA REVIEWED
[FreeTextEntry1] : Laboratory and radiology studies that were personally reviewed at the visit on 10/16/2019 are summarized below:\par 9-11-19:  ua and prot/cr - wnl \par 8-1-19 - c3/c4 WNL, dsdna = 54

## 2019-10-16 NOTE — PHYSICAL EXAM
[General Appearance - Alert] : alert [Extraocular Movements] : extraocular movements were intact [Outer Ear] : the ears and nose were normal in appearance [Nasal Cavity] : the nasal mucosa and septum were normal [Neck Appearance] : the appearance of the neck was normal [] : the neck was supple [Auscultation Breath Sounds / Voice Sounds] : lungs were clear to auscultation bilaterally [Heart Sounds] : normal S1 and S2 [Edema] : there was no peripheral edema [Abnormal Walk] : normal gait [Musculoskeletal - Swelling] : no joint swelling seen [Motor Tone] : muscle strength and tone were normal [Skin Color & Pigmentation] : normal skin color and pigmentation [No Focal Deficits] : no focal deficits [Oriented To Time, Place, And Person] : oriented to person, place, and time [FreeTextEntry1] : inappropriate affect - made jokes that were inappropriate while in the exam room.

## 2019-10-16 NOTE — ASSESSMENT
[FreeTextEntry1] : 45 yo M hx SLE here for follow up \par myalgias and fatigue - unclear if due to hypothyroid or due to SLE or combination. \par persistent symptoms despite starting Levoxyl \par notes neck fullness and difficulty swallowing \par \par 1)  elevated TSH:  follow up with PMD for management of levoxyl \par \par 2)  neck fullness and intermittent dysphagia and NEW ULCERS found in NOSE \par [] concern for sle related ulcers \par [] check vasculitis serologies \par [] follow up ENT\par \par 3)  SLE \par - continue HCQ\par - will need Ophth - referral given to patient \par - increase prednisone to 10mg BID\par - rtc in 6 weeks \par - to call if issues with steroids or with mood while on steroids. \par \par 4)  itching \par - follow up derm \par - follow up response to steroid increase\par - change claritin to Atarax\par \par 5)  Back pain \par [] check xray LS spine.\par \par 6)  mood liability - seeing psychologist on 8-13-19 -feels more stable and unchanged. \par [] follow up psychologist\par \par 7)  tingling in the fingers and cramps in toes - concern for neuropathy vs Raynaud \par [] monitor with increased steroids \par [] follow up back imaging. \par \par 8)  Raynaud and tingling in the fingers \par [] add low dose ca channel blocker. \par \par 8)  eyesight - feels like has to strain to see distance - recent development. \par []  to see optho

## 2019-10-18 LAB
ALBUMIN SERPL ELPH-MCNC: 4.7 G/DL
ALP BLD-CCNC: 91 U/L
ALT SERPL-CCNC: 23 U/L
ANION GAP SERPL CALC-SCNC: 13 MMOL/L
APPEARANCE: CLEAR
AST SERPL-CCNC: 31 U/L
BACTERIA: NEGATIVE
BASOPHILS # BLD AUTO: 0.02 K/UL
BASOPHILS NFR BLD AUTO: 0.4 %
BILIRUB SERPL-MCNC: 0.3 MG/DL
BILIRUBIN URINE: NEGATIVE
BLOOD URINE: NEGATIVE
BUN SERPL-MCNC: 12 MG/DL
C3 SERPL-MCNC: 118 MG/DL
C4 SERPL-MCNC: 19 MG/DL
CALCIUM SERPL-MCNC: 9.7 MG/DL
CHLORIDE SERPL-SCNC: 99 MMOL/L
CO2 SERPL-SCNC: 25 MMOL/L
COLOR: YELLOW
CREAT SERPL-MCNC: 0.95 MG/DL
CREAT SPEC-SCNC: 81 MG/DL
CREAT/PROT UR: 0.1 RATIO
DSDNA AB SER-ACNC: 34 IU/ML
EOSINOPHIL # BLD AUTO: 0.15 K/UL
EOSINOPHIL NFR BLD AUTO: 3.2 %
ERYTHROCYTE [SEDIMENTATION RATE] IN BLOOD BY WESTERGREN METHOD: 49 MM/HR
GLUCOSE QUALITATIVE U: NEGATIVE
GLUCOSE SERPL-MCNC: 78 MG/DL
HCT VFR BLD CALC: 41.6 %
HGB BLD-MCNC: 13.9 G/DL
HYALINE CASTS: 0 /LPF
IMM GRANULOCYTES NFR BLD AUTO: 0.4 %
KETONES URINE: NEGATIVE
LEUKOCYTE ESTERASE URINE: NEGATIVE
LYMPHOCYTES # BLD AUTO: 1.17 K/UL
LYMPHOCYTES NFR BLD AUTO: 25.3 %
MAN DIFF?: NORMAL
MCHC RBC-ENTMCNC: 29.4 PG
MCHC RBC-ENTMCNC: 33.4 GM/DL
MCV RBC AUTO: 87.9 FL
MICROSCOPIC-UA: NORMAL
MONOCYTES # BLD AUTO: 0.6 K/UL
MONOCYTES NFR BLD AUTO: 13 %
MPO AB + PR3 PNL SER: NORMAL
MYELOPEROXIDASE AB SER QL IA: <5 UNITS
MYELOPEROXIDASE CELLS FLD QL: NEGATIVE
NEUTROPHILS # BLD AUTO: 2.67 K/UL
NEUTROPHILS NFR BLD AUTO: 57.7 %
NITRITE URINE: NEGATIVE
PH URINE: 6.5
PLATELET # BLD AUTO: 324 K/UL
POTASSIUM SERPL-SCNC: 4.4 MMOL/L
PROT SERPL-MCNC: 8.1 G/DL
PROT UR-MCNC: 7 MG/DL
PROTEIN URINE: NEGATIVE
PROTEINASE3 AB SER IA-ACNC: <5 UNITS
PROTEINASE3 AB SER-ACNC: NEGATIVE
RBC # BLD: 4.73 M/UL
RBC # FLD: 12.8 %
RED BLOOD CELLS URINE: 1 /HPF
SODIUM SERPL-SCNC: 137 MMOL/L
SPECIFIC GRAVITY URINE: 1.02
SQUAMOUS EPITHELIAL CELLS: 0 /HPF
UROBILINOGEN URINE: NORMAL
WBC # FLD AUTO: 4.63 K/UL
WHITE BLOOD CELLS URINE: 0 /HPF

## 2019-10-20 ENCOUNTER — FORM ENCOUNTER (OUTPATIENT)
Age: 44
End: 2019-10-20

## 2019-10-21 ENCOUNTER — OUTPATIENT (OUTPATIENT)
Dept: OUTPATIENT SERVICES | Facility: HOSPITAL | Age: 44
LOS: 1 days | End: 2019-10-21
Payer: COMMERCIAL

## 2019-10-21 ENCOUNTER — APPOINTMENT (OUTPATIENT)
Dept: CT IMAGING | Facility: CLINIC | Age: 44
End: 2019-10-21
Payer: COMMERCIAL

## 2019-10-21 ENCOUNTER — APPOINTMENT (OUTPATIENT)
Dept: RADIOLOGY | Facility: CLINIC | Age: 44
End: 2019-10-21
Payer: COMMERCIAL

## 2019-10-21 DIAGNOSIS — J01.91 ACUTE RECURRENT SINUSITIS, UNSPECIFIED: ICD-10-CM

## 2019-10-21 PROCEDURE — 70486 CT MAXILLOFACIAL W/O DYE: CPT | Mod: 26

## 2019-10-21 PROCEDURE — 72110 X-RAY EXAM L-2 SPINE 4/>VWS: CPT

## 2019-10-21 PROCEDURE — 72070 X-RAY EXAM THORAC SPINE 2VWS: CPT | Mod: 26

## 2019-10-21 PROCEDURE — 72110 X-RAY EXAM L-2 SPINE 4/>VWS: CPT | Mod: 26

## 2019-10-21 PROCEDURE — 72070 X-RAY EXAM THORAC SPINE 2VWS: CPT

## 2019-10-21 PROCEDURE — 70486 CT MAXILLOFACIAL W/O DYE: CPT

## 2019-10-28 ENCOUNTER — EMERGENCY (EMERGENCY)
Facility: HOSPITAL | Age: 44
LOS: 1 days | Discharge: HOME | End: 2019-10-28
Attending: EMERGENCY MEDICINE
Payer: COMMERCIAL

## 2019-10-28 VITALS
HEIGHT: 67 IN | TEMPERATURE: 98 F | HEART RATE: 87 BPM | WEIGHT: 244.93 LBS | SYSTOLIC BLOOD PRESSURE: 137 MMHG | RESPIRATION RATE: 20 BRPM | DIASTOLIC BLOOD PRESSURE: 94 MMHG | OXYGEN SATURATION: 98 %

## 2019-10-28 PROCEDURE — 99284 EMERGENCY DEPT VISIT MOD MDM: CPT

## 2019-10-29 VITALS
DIASTOLIC BLOOD PRESSURE: 76 MMHG | SYSTOLIC BLOOD PRESSURE: 123 MMHG | HEART RATE: 87 BPM | OXYGEN SATURATION: 99 % | RESPIRATION RATE: 18 BRPM | TEMPERATURE: 98 F

## 2019-10-29 LAB
ALBUMIN SERPL ELPH-MCNC: 4.2 G/DL — SIGNIFICANT CHANGE UP (ref 3.3–5)
ALP SERPL-CCNC: 92 U/L — SIGNIFICANT CHANGE UP (ref 40–120)
ALT FLD-CCNC: 32 U/L — SIGNIFICANT CHANGE UP (ref 10–45)
ANION GAP SERPL CALC-SCNC: 12 MMOL/L — SIGNIFICANT CHANGE UP (ref 5–17)
AST SERPL-CCNC: 29 U/L — SIGNIFICANT CHANGE UP (ref 10–40)
BASOPHILS # BLD AUTO: 0.02 K/UL — SIGNIFICANT CHANGE UP (ref 0–0.2)
BASOPHILS NFR BLD AUTO: 0.3 % — SIGNIFICANT CHANGE UP (ref 0–2)
BILIRUB SERPL-MCNC: 0.4 MG/DL — SIGNIFICANT CHANGE UP (ref 0.2–1.2)
BUN SERPL-MCNC: 17 MG/DL — SIGNIFICANT CHANGE UP (ref 7–23)
CALCIUM SERPL-MCNC: 9.4 MG/DL — SIGNIFICANT CHANGE UP (ref 8.4–10.5)
CHLORIDE SERPL-SCNC: 100 MMOL/L — SIGNIFICANT CHANGE UP (ref 96–108)
CO2 SERPL-SCNC: 26 MMOL/L — SIGNIFICANT CHANGE UP (ref 22–31)
CREAT SERPL-MCNC: 0.99 MG/DL — SIGNIFICANT CHANGE UP (ref 0.5–1.3)
EOSINOPHIL # BLD AUTO: 0.2 K/UL — SIGNIFICANT CHANGE UP (ref 0–0.5)
EOSINOPHIL NFR BLD AUTO: 2.7 % — SIGNIFICANT CHANGE UP (ref 0–6)
GAS PNL BLDV: SIGNIFICANT CHANGE UP
GLUCOSE SERPL-MCNC: 97 MG/DL — SIGNIFICANT CHANGE UP (ref 70–99)
HCT VFR BLD CALC: 42.7 % — SIGNIFICANT CHANGE UP (ref 39–50)
HGB BLD-MCNC: 14.9 G/DL — SIGNIFICANT CHANGE UP (ref 13–17)
IMM GRANULOCYTES NFR BLD AUTO: 0.5 % — SIGNIFICANT CHANGE UP (ref 0–1.5)
LIDOCAIN IGE QN: 53 U/L — SIGNIFICANT CHANGE UP (ref 7–60)
LYMPHOCYTES # BLD AUTO: 2.09 K/UL — SIGNIFICANT CHANGE UP (ref 1–3.3)
LYMPHOCYTES # BLD AUTO: 28.7 % — SIGNIFICANT CHANGE UP (ref 13–44)
MCHC RBC-ENTMCNC: 29.7 PG — SIGNIFICANT CHANGE UP (ref 27–34)
MCHC RBC-ENTMCNC: 34.9 GM/DL — SIGNIFICANT CHANGE UP (ref 32–36)
MCV RBC AUTO: 85.2 FL — SIGNIFICANT CHANGE UP (ref 80–100)
MONOCYTES # BLD AUTO: 0.79 K/UL — SIGNIFICANT CHANGE UP (ref 0–0.9)
MONOCYTES NFR BLD AUTO: 10.9 % — SIGNIFICANT CHANGE UP (ref 2–14)
NEUTROPHILS # BLD AUTO: 4.14 K/UL — SIGNIFICANT CHANGE UP (ref 1.8–7.4)
NEUTROPHILS NFR BLD AUTO: 56.9 % — SIGNIFICANT CHANGE UP (ref 43–77)
NRBC # BLD: 0 /100 WBCS — SIGNIFICANT CHANGE UP (ref 0–0)
PLATELET # BLD AUTO: 280 K/UL — SIGNIFICANT CHANGE UP (ref 150–400)
POTASSIUM SERPL-MCNC: 3.9 MMOL/L — SIGNIFICANT CHANGE UP (ref 3.5–5.3)
POTASSIUM SERPL-SCNC: 3.9 MMOL/L — SIGNIFICANT CHANGE UP (ref 3.5–5.3)
PROT SERPL-MCNC: 8.3 G/DL — SIGNIFICANT CHANGE UP (ref 6–8.3)
RAPID RVP RESULT: SIGNIFICANT CHANGE UP
RBC # BLD: 5.01 M/UL — SIGNIFICANT CHANGE UP (ref 4.2–5.8)
RBC # FLD: 12.9 % — SIGNIFICANT CHANGE UP (ref 10.3–14.5)
SODIUM SERPL-SCNC: 138 MMOL/L — SIGNIFICANT CHANGE UP (ref 135–145)
WBC # BLD: 7.28 K/UL — SIGNIFICANT CHANGE UP (ref 3.8–10.5)
WBC # FLD AUTO: 7.28 K/UL — SIGNIFICANT CHANGE UP (ref 3.8–10.5)

## 2019-10-29 PROCEDURE — 83690 ASSAY OF LIPASE: CPT

## 2019-10-29 PROCEDURE — 83605 ASSAY OF LACTIC ACID: CPT

## 2019-10-29 PROCEDURE — 80053 COMPREHEN METABOLIC PANEL: CPT

## 2019-10-29 PROCEDURE — 71046 X-RAY EXAM CHEST 2 VIEWS: CPT | Mod: 26

## 2019-10-29 PROCEDURE — 82947 ASSAY GLUCOSE BLOOD QUANT: CPT

## 2019-10-29 PROCEDURE — 87633 RESP VIRUS 12-25 TARGETS: CPT

## 2019-10-29 PROCEDURE — 71046 X-RAY EXAM CHEST 2 VIEWS: CPT

## 2019-10-29 PROCEDURE — 82330 ASSAY OF CALCIUM: CPT

## 2019-10-29 PROCEDURE — 82803 BLOOD GASES ANY COMBINATION: CPT

## 2019-10-29 PROCEDURE — 84132 ASSAY OF SERUM POTASSIUM: CPT

## 2019-10-29 PROCEDURE — 84295 ASSAY OF SERUM SODIUM: CPT

## 2019-10-29 PROCEDURE — 87581 M.PNEUMON DNA AMP PROBE: CPT

## 2019-10-29 PROCEDURE — 87798 DETECT AGENT NOS DNA AMP: CPT

## 2019-10-29 PROCEDURE — 82565 ASSAY OF CREATININE: CPT

## 2019-10-29 PROCEDURE — 85027 COMPLETE CBC AUTOMATED: CPT

## 2019-10-29 PROCEDURE — 82435 ASSAY OF BLOOD CHLORIDE: CPT

## 2019-10-29 PROCEDURE — 96374 THER/PROPH/DIAG INJ IV PUSH: CPT

## 2019-10-29 PROCEDURE — 99284 EMERGENCY DEPT VISIT MOD MDM: CPT | Mod: 25

## 2019-10-29 PROCEDURE — 87486 CHLMYD PNEUM DNA AMP PROBE: CPT

## 2019-10-29 PROCEDURE — 85014 HEMATOCRIT: CPT

## 2019-10-29 RX ORDER — SODIUM CHLORIDE 9 MG/ML
1000 INJECTION, SOLUTION INTRAVENOUS ONCE
Refills: 0 | Status: COMPLETED | OUTPATIENT
Start: 2019-10-29 | End: 2019-10-29

## 2019-10-29 RX ORDER — ONDANSETRON 8 MG/1
4 TABLET, FILM COATED ORAL ONCE
Refills: 0 | Status: COMPLETED | OUTPATIENT
Start: 2019-10-29 | End: 2019-10-29

## 2019-10-29 RX ORDER — ACETAMINOPHEN 500 MG
650 TABLET ORAL ONCE
Refills: 0 | Status: COMPLETED | OUTPATIENT
Start: 2019-10-29 | End: 2019-10-29

## 2019-10-29 RX ADMIN — ONDANSETRON 4 MILLIGRAM(S): 8 TABLET, FILM COATED ORAL at 01:44

## 2019-10-29 RX ADMIN — SODIUM CHLORIDE 1000 MILLILITER(S): 9 INJECTION, SOLUTION INTRAVENOUS at 04:02

## 2019-10-29 RX ADMIN — Medication 650 MILLIGRAM(S): at 04:02

## 2019-10-29 NOTE — ED PROVIDER NOTE - PATIENT PORTAL LINK FT
You can access the FollowMyHealth Patient Portal offered by [facility] by registering at the following website: [email or web address]. By joining [facility]’s FollowMyHealth portal, you will also be able to view your health information using other applications (apps) compatible with our system.

## 2019-10-29 NOTE — ED ADULT NURSE NOTE - OBJECTIVE STATEMENT
43 YO male PMHx lupus c/o possible lupus flair up. Patient reports severe lethargy, which he reports always happens when he has a flair up. Patient also endorsing nausea, body aches, chills and HA. Patient denies fever, recent travel or sick contacts. Patient A&OX3, walked to bed from triage. denies chest pain, SOB, HA, N/V/D, abdominal pain, urinary symptoms, hematuria, dizziness, Peripheral pulses present b/l. Skin warm, dry and pink. Pt placed in position of comfort. Pt educated on call bell system and provided call bell. Bed in lowest position, wheels locked, appropriate side rails raised. Pt denies needs at this time.

## 2019-10-29 NOTE — ED PROVIDER NOTE - NS ED ROS FT
CONST: no fevers, no chills, no trauma  EYES: no pain, no visual disturbances  ENT: no sore throat, no epistaxis, no rhinorrhea, no hearing changes  CV: no chest pain, no palpitations, no orthopnea, no extremity pain or swelling  RESP: no shortness of breath, no cough, no sputum, no pleurisy, no wheezing  ABD: no abdominal pain, + nausea, no vomiting, no diarrhea, no black or bloody stool  : no dysuria, no hematuria, no frequency, no urgency  MSK: no back pain, no neck pain, no extremity pain  NEURO: no headache, no sensory disturbances, no focal weakness, no dizziness  HEME: no easy bleeding or bruising  SKIN: no diaphoresis, no rash

## 2019-10-29 NOTE — ED PROVIDER NOTE - CLINICAL SUMMARY MEDICAL DECISION MAKING FREE TEXT BOX
Attending MD Diane: 44M with SLE on prednisone and plaquenil presenting with nausea, body pain, fatigue and throat pain. Suspect likely SLE flare, plan for screening labs, discuss with rheum to see if increased dose of steroids warranted for suspect SLE flare

## 2019-10-29 NOTE — ED PROVIDER NOTE - OBJECTIVE STATEMENT
44M hx of SLE presenting with complaints of whole body weakness and pain. States that this feels like his usual SLE flare up, he is on plaquenil and steroids but started feeling very week two days ago. +lower extremity weakness, feels as though he may pass out if he is walking around. +nausea, and loss of appetite. No vomiting. No chest pain, SOB, fevers, chills, recent illness or injury.

## 2019-10-29 NOTE — ED PROVIDER NOTE - ATTENDING CONTRIBUTION TO CARE
Attending MD Diane:  I personally have seen and examined this patient.  Resident note reviewed and agree on plan of care and except where noted.  See HPI, PE, and MDM for details.

## 2019-10-29 NOTE — ED PROVIDER NOTE - PROGRESS NOTE DETAILS
Pt assessed at beside. Pt resting comfortably, pt questions answered. Vital signs stable. Spoke to patient about labs, recommend to follow up with Rheumatologist within the next 2-3 days. Pt agreeable.     Juwan Griffin, PGY1

## 2019-10-29 NOTE — ED PROVIDER NOTE - PHYSICAL EXAMINATION
Const: Well-nourished, Well-developed, appearing stated age.  Eyes: PERRL, no conjunctival injection, and symmetrical lids.  HEENT: Head NCAT, no lesions. Atraumatic external nose and ears. Moist MM.  Neck: Symmetric, trachea midline, No thyromegaly.  CVS: +S1/S2, Peripheral pulses 2+ and equal in all extremities.  RESP: Unlabored respiratory effort. Clear to auscultation bilaterally.  GI: Nontender/Nondistended, No hepatosplenomegaly.  MSK: Normocephalic/Atraumatic, Extremities w/o deformity or ttp. No cyanosis or clubbing  Skin: Warm, dry and intact. No rashes or lesions.  Neuro: CNs II-XII grossly intact. Motor & Sensation grossly intact.  Psych: Awake, Alert, & Oriented (AAO) x3. Appropriate mood and affect.

## 2019-10-30 ENCOUNTER — LABORATORY RESULT (OUTPATIENT)
Age: 44
End: 2019-10-30

## 2019-10-30 ENCOUNTER — APPOINTMENT (OUTPATIENT)
Dept: RHEUMATOLOGY | Facility: CLINIC | Age: 44
End: 2019-10-30
Payer: COMMERCIAL

## 2019-10-30 VITALS
BODY MASS INDEX: 39.16 KG/M2 | TEMPERATURE: 98.5 F | SYSTOLIC BLOOD PRESSURE: 146 MMHG | DIASTOLIC BLOOD PRESSURE: 89 MMHG | HEART RATE: 98 BPM | OXYGEN SATURATION: 98 % | WEIGHT: 250 LBS

## 2019-10-30 DIAGNOSIS — E88.89 OTHER SPECIFIED METABOLIC DISORDERS: ICD-10-CM

## 2019-10-30 PROCEDURE — 99215 OFFICE O/P EST HI 40 MIN: CPT

## 2019-11-02 LAB
ALBUMIN SERPL ELPH-MCNC: 4.5 G/DL
ALP BLD-CCNC: 92 U/L
ALT SERPL-CCNC: 34 U/L
ANION GAP SERPL CALC-SCNC: 13 MMOL/L
APPEARANCE: CLEAR
AST SERPL-CCNC: 31 U/L
BACTERIA: NEGATIVE
BASOPHILS # BLD AUTO: 0.02 K/UL
BASOPHILS NFR BLD AUTO: 0.3 %
BILIRUB SERPL-MCNC: 0.2 MG/DL
BILIRUBIN URINE: NEGATIVE
BLOOD URINE: NEGATIVE
BUN SERPL-MCNC: 10 MG/DL
C3 SERPL-MCNC: 107 MG/DL
C4 SERPL-MCNC: 18 MG/DL
CALCIUM SERPL-MCNC: 9.7 MG/DL
CHLORIDE SERPL-SCNC: 99 MMOL/L
CK SERPL-CCNC: 271 U/L
CO2 SERPL-SCNC: 28 MMOL/L
COLOR: YELLOW
CREAT SERPL-MCNC: 0.99 MG/DL
CREAT SPEC-SCNC: 154 MG/DL
CREAT/PROT UR: 0.1 RATIO
DSDNA AB SER-ACNC: 25 IU/ML
EOSINOPHIL # BLD AUTO: 0.08 K/UL
EOSINOPHIL NFR BLD AUTO: 1.2 %
ERYTHROCYTE [SEDIMENTATION RATE] IN BLOOD BY WESTERGREN METHOD: 14 MM/HR
GLUCOSE QUALITATIVE U: NEGATIVE
GLUCOSE SERPL-MCNC: 88 MG/DL
HCT VFR BLD CALC: 42.6 %
HGB BLD-MCNC: 14.1 G/DL
HYALINE CASTS: 0 /LPF
IMM GRANULOCYTES NFR BLD AUTO: 0.3 %
KETONES URINE: NEGATIVE
LEUKOCYTE ESTERASE URINE: NEGATIVE
LYMPHOCYTES # BLD AUTO: 1.4 K/UL
LYMPHOCYTES NFR BLD AUTO: 21 %
MAN DIFF?: NORMAL
MCHC RBC-ENTMCNC: 29.4 PG
MCHC RBC-ENTMCNC: 33.1 GM/DL
MCV RBC AUTO: 88.9 FL
MICROSCOPIC-UA: NORMAL
MONOCYTES # BLD AUTO: 0.85 K/UL
MONOCYTES NFR BLD AUTO: 12.7 %
NEUTROPHILS # BLD AUTO: 4.3 K/UL
NEUTROPHILS NFR BLD AUTO: 64.5 %
NITRITE URINE: NEGATIVE
PH URINE: 6.5
PLATELET # BLD AUTO: 276 K/UL
POTASSIUM SERPL-SCNC: 4.1 MMOL/L
PROT SERPL-MCNC: 8 G/DL
PROT UR-MCNC: 11 MG/DL
PROTEIN URINE: NORMAL
RBC # BLD: 4.79 M/UL
RBC # FLD: 12.7 %
RED BLOOD CELLS URINE: 1 /HPF
SODIUM SERPL-SCNC: 140 MMOL/L
SPECIFIC GRAVITY URINE: 1.02
SQUAMOUS EPITHELIAL CELLS: 1 /HPF
TSH SERPL-ACNC: 7.36 UIU/ML
UROBILINOGEN URINE: NORMAL
WBC # FLD AUTO: 6.67 K/UL
WHITE BLOOD CELLS URINE: 1 /HPF

## 2019-11-05 ENCOUNTER — MOBILE ON CALL (OUTPATIENT)
Age: 44
End: 2019-11-05

## 2019-11-15 ENCOUNTER — APPOINTMENT (OUTPATIENT)
Dept: OTOLARYNGOLOGY | Facility: CLINIC | Age: 44
End: 2019-11-15
Payer: COMMERCIAL

## 2019-11-15 VITALS
BODY MASS INDEX: 37.67 KG/M2 | HEIGHT: 67 IN | DIASTOLIC BLOOD PRESSURE: 84 MMHG | SYSTOLIC BLOOD PRESSURE: 123 MMHG | WEIGHT: 240 LBS

## 2019-11-15 PROCEDURE — 99204 OFFICE O/P NEW MOD 45 MIN: CPT | Mod: 25

## 2019-11-15 PROCEDURE — 31231 NASAL ENDOSCOPY DX: CPT

## 2019-11-15 RX ORDER — ALBUTEROL SULFATE 90 UG/1
108 (90 BASE) AEROSOL, METERED RESPIRATORY (INHALATION)
Qty: 1 | Refills: 2 | Status: DISCONTINUED | COMMUNITY
Start: 2019-08-19 | End: 2019-11-15

## 2019-11-15 RX ORDER — ALBUTEROL SULFATE 90 UG/1
108 (90 BASE) AEROSOL, METERED RESPIRATORY (INHALATION)
Qty: 1 | Refills: 3 | Status: DISCONTINUED | COMMUNITY
Start: 2019-07-24 | End: 2019-11-15

## 2019-11-15 RX ORDER — TIOTROPIUM BROMIDE 18 UG/1
18 CAPSULE ORAL; RESPIRATORY (INHALATION) DAILY
Qty: 1 | Refills: 1 | Status: DISCONTINUED | COMMUNITY
Start: 2019-06-13 | End: 2019-11-15

## 2019-11-15 RX ORDER — BUDESONIDE 0.5 MG/2ML
0.5 INHALANT ORAL DAILY
Qty: 1 | Refills: 0 | Status: DISCONTINUED | COMMUNITY
Start: 2019-06-13 | End: 2019-11-15

## 2019-11-15 NOTE — PHYSICAL EXAM
[Nasal Endoscopy Performed] : nasal endoscopy was performed, see procedure section for findings [de-identified] : 3+ tonsils [Midline] : trachea located in midline position [Normal] : assessment of respiratory effort is normal

## 2019-11-15 NOTE — HISTORY OF PRESENT ILLNESS
[de-identified] : 44M with multiple medical issues presents with chronic sinonasal complaints.PMH SLE, hypothyroidism, asthma. Follows with Dr. Neri of rheum, [facility] of pul. Also sees outside allergic and is currently receiving allergy shots for environment allergies.\par \par Reports several months of persistent nasal congestion, discolored anterior rhinorrhea, PND, facial pain and pressure. Has good sense of smell. Denies recurrent sinus infectoins but is sick frequently. Also with intermittent hoarseness, throat pain and odynophagia. Previously upper GI which reportedly did not show anything. He recently underwent CT sinuses are ordered by his pulmonologist-- this was reviewed. Demonstrates a severely deviated nasal septum with the R. There is narrowing of both the L and R [facility] but the paranasal sinuses are otherwise normal.\par \par Takes daily pred (20mg) for SLE but not on any nasal meds at this time.

## 2019-11-15 NOTE — REVIEW OF SYSTEMS
[Sneezing] : sneezing [Seasonal Allergies] : seasonal allergies [Nasal Congestion] : nasal congestion [Recurrent Sinus Infections] : recurrent sinus infections [Sinus Pain] : sinus pain [Sinus Pressure] : sinus pressure [Discolored Nasal Discharge] : discolored nasal discharge [Hoarseness] : hoarseness [Throat Dryness] : throat dryness [Throat Pain] : throat pain [Throat Itching] : throat itching [Swelling Neck] : swelling neck [Fever] : fever [Chills] : chills [Eyes Itch] : itching of the eyes [Shortness Of Breath] : shortness of breath [Joint Pain] : joint pain [Itching] : itching [Depression] : depression [Swollen Glands] : swollen glands [Negative] : Genitourinary [de-identified] : headache  [de-identified] : Throat infections, mouth breathing  [FreeTextEntry7] : difficulty swallowing  [FreeTextEntry3] : watery eyes  [de-identified] : feel warmer than others  [de-identified] : night sweats

## 2019-11-18 ENCOUNTER — APPOINTMENT (OUTPATIENT)
Dept: INTERNAL MEDICINE | Facility: CLINIC | Age: 44
End: 2019-11-18
Payer: COMMERCIAL

## 2019-11-18 VITALS
HEIGHT: 67 IN | OXYGEN SATURATION: 98 % | WEIGHT: 250 LBS | DIASTOLIC BLOOD PRESSURE: 85 MMHG | BODY MASS INDEX: 39.24 KG/M2 | HEART RATE: 83 BPM | SYSTOLIC BLOOD PRESSURE: 140 MMHG

## 2019-11-18 DIAGNOSIS — Z92.29 PERSONAL HISTORY OF OTHER DRUG THERAPY: ICD-10-CM

## 2019-11-18 DIAGNOSIS — J01.91 ACUTE RECURRENT SINUSITIS, UNSPECIFIED: ICD-10-CM

## 2019-11-18 DIAGNOSIS — Z87.898 PERSONAL HISTORY OF OTHER SPECIFIED CONDITIONS: ICD-10-CM

## 2019-11-18 DIAGNOSIS — Z87.09 PERSONAL HISTORY OF OTHER DISEASES OF THE RESPIRATORY SYSTEM: ICD-10-CM

## 2019-11-18 DIAGNOSIS — J34.0 ABSCESS, FURUNCLE AND CARBUNCLE OF NOSE: ICD-10-CM

## 2019-11-18 PROCEDURE — 99214 OFFICE O/P EST MOD 30 MIN: CPT

## 2019-11-18 NOTE — HISTORY OF PRESENT ILLNESS
[FreeTextEntry1] : Follow-up for lupus hypothyroidism and asthma  [de-identified] : The patient is a 44-year-old male with history of lupus on prednisone and hydrochloric when with some improvement, hypothyroidism, Raynaud's on nifedipine and chronic sinusitis on fluticasone who presents for follow-up patient notes improvement however complains of cold intolerance also complains of stress secondary new child multiple kids medical issues problems with sleep and appetite has recently gained more weight denies chest pain, shortness of breath palpitation lightheadedness

## 2019-11-18 NOTE — ASSESSMENT
[FreeTextEntry1] : Patient is a 44-year-old male with history of asthma, overweight, lupus, hypothyroidism, chronic sinusitis/allergic rhinitis deviated nasal septum who presents for follow-up\par \par 1. Hypothyroidism\par TSH still elevated seven increased levothyroxine 288 µg\par follow-up in six weeks\par \par 2 anxiety possible depression\par consider starting Lexapro will discuss with rheumatology\par \par 3. Asthma\par well-controlled on Symbicort and albuterol\par \par 4 allergic rhinitis/chronic sinusitis\par continue fluticasone follow-up with ENT\par \par 5 lupus\par continue prednisone reportedly 20 and Hydro chloroquine\par follow-up with rheumatology\par \par 6. Overweight\par  on diet and exercise.

## 2019-11-19 ENCOUNTER — APPOINTMENT (OUTPATIENT)
Dept: INTERNAL MEDICINE | Facility: CLINIC | Age: 44
End: 2019-11-19
Payer: COMMERCIAL

## 2019-11-19 PROCEDURE — 90834 PSYTX W PT 45 MINUTES: CPT

## 2019-11-20 LAB
EJ AB SER QL: NEGATIVE
ENA JO1 AB SER IA-ACNC: <20 UNITS
ENA PM/SCL AB SER-ACNC: 28 UNITS
ENA SM+RNP AB SER IA-ACNC: 146 UNITS
ENA SS-A IGG SER QL: 48 UNITS
FIBRILLARIN AB SER QL: NEGATIVE
KU AB SER QL: NEGATIVE
MDA-5 (P140)(CADM-140): <20 UNITS
MI2 AB SER QL: NEGATIVE
NXP-2 (P140): <20 UNITS
OJ AB SER QL: NEGATIVE
PL12 AB SER QL: NEGATIVE
PL7 AB SER QL: NEGATIVE
SRP AB SERPL QL: NEGATIVE
TIF GAMMA (P155/140): <20 UNITS
U2 SNRNP AB SER QL: NEGATIVE

## 2019-11-27 ENCOUNTER — APPOINTMENT (OUTPATIENT)
Dept: RHEUMATOLOGY | Facility: CLINIC | Age: 44
End: 2019-11-27

## 2019-12-05 ENCOUNTER — APPOINTMENT (OUTPATIENT)
Dept: NEUROLOGY | Facility: CLINIC | Age: 44
End: 2019-12-05
Payer: COMMERCIAL

## 2019-12-05 VITALS
HEIGHT: 67 IN | WEIGHT: 250 LBS | BODY MASS INDEX: 39.24 KG/M2 | SYSTOLIC BLOOD PRESSURE: 146 MMHG | HEART RATE: 90 BPM | DIASTOLIC BLOOD PRESSURE: 91 MMHG

## 2019-12-05 PROCEDURE — 99205 OFFICE O/P NEW HI 60 MIN: CPT

## 2019-12-05 NOTE — REVIEW OF SYSTEMS
[Feeling Poorly] : feeling poorly [Anxiety] : anxiety [Eyesight Problems] : eyesight problems [Constipation] : constipation [As Noted in HPI] : as noted in HPI [Negative] : Heme/Lymph

## 2019-12-05 NOTE — HISTORY OF PRESENT ILLNESS
[FreeTextEntry1] : The patient is a 44-year-old, left-handed, nonhypertensive, nondiabetic man, with a history of lupus, asthma and hypothyroidism, being evaluated for "itching fits".  He has the spontaneous eruption of intense itching that can affect any part of the body. Both commonly, it starts in the small of the back. It can radiate anywhere from there. It can also occur randomly in any part of the body. It can be small patches or widespread. When it happens, he has the urge to either scratch or slap at the spot. It may last from seconds to minutes. It is made worse by stress. He has not noticed any focal weakness.

## 2019-12-05 NOTE — ASSESSMENT
[FreeTextEntry1] : The patient is a 44-year-old, left-handed, nonhypertensive, nondiabetic man, with a history of lupus, asthma and hypothyroidism, being evaluated for "itching fits".  He has the spontaneous eruption of intense itching that can affect any part of the body. Both commonly, it starts in the small of the back. It can radiate anywhere from there. It can also occur randomly in any part of the body. It can be small patches or widespread. When it happens, he has the urge to either scratch or slap at the spot. It may last from seconds to minutes. It is made worse by stress. He has not noticed any focal weakness.\par With the exception of some hypertension and a large number of tattoos, the examination is essentially normal. He has patchy areas of hyperesthesia that do not follow a neurological pattern. The time course and distribution of his symptoms do not follow a neurological pattern. He may well have some skin sensitivity from the lupus, but does not appear to be a primary neurological problem.I have chosen not to order a lateral studies, because even if he has a peripheral neuropathy, it would not explain his current symptoms.  He does not meet the criteria for mast cell syndrome, but this does appear to be a superficial, skin reaction, rather than neuropathy.

## 2019-12-05 NOTE — PHYSICAL EXAM
[General Appearance - In No Acute Distress] : in no acute distress [General Appearance - Alert] : alert [Oriented To Time, Place, And Person] : oriented to person, place, and time [Impaired Insight] : insight and judgment were intact [Affect] : the affect was normal [Person] : oriented to person [Place] : oriented to place [Time] : oriented to time [Concentration Intact] : normal concentrating ability [Naming Objects] : no difficulty naming common objects [Visual Intact] : visual attention was ~T not ~L decreased [Repeating Phrases] : no difficulty repeating a phrase [Fluency] : fluency intact [Writing A Sentence] : no difficulty writing a sentence [Comprehension] : comprehension intact [Reading] : reading intact [Cranial Nerves Optic (II)] : visual acuity intact bilaterally,  visual fields full to confrontation, pupils equal round and reactive to light [Past History] : adequate knowledge of personal past history [Cranial Nerves Trigeminal (V)] : facial sensation intact symmetrically [Cranial Nerves Oculomotor (III)] : extraocular motion intact [Cranial Nerves Facial (VII)] : face symmetrical [Cranial Nerves Glossopharyngeal (IX)] : tongue and palate midline [Cranial Nerves Vestibulocochlear (VIII)] : hearing was intact bilaterally [Cranial Nerves Accessory (XI - Cranial And Spinal)] : head turning and shoulder shrug symmetric [Cranial Nerves Hypoglossal (XII)] : there was no tongue deviation with protrusion [Motor Strength] : muscle strength was normal in all four extremities [Sensation Tactile Decrease] : light touch was intact [No Muscle Atrophy] : normal bulk in all four extremities [Balance] : balance was intact [2+] : Ankle jerk left 2+ [Sclera] : the sclera and conjunctiva were normal [PERRL With Normal Accommodation] : pupils were equal in size, round, reactive to light, with normal accommodation [Extraocular Movements] : extraocular movements were intact [Outer Ear] : the ears and nose were normal in appearance [Oropharynx] : the oropharynx was normal [Jugular Venous Distention Increased] : there was no jugular-venous distention [Neck Cervical Mass (___cm)] : no neck mass was observed [Thyroid Diffuse Enlargement] : the thyroid was not enlarged [Neck Appearance] : the appearance of the neck was normal [Thyroid Nodule] : there were no palpable thyroid nodules [Auscultation Breath Sounds / Voice Sounds] : lungs were clear to auscultation bilaterally [Heart Rate And Rhythm] : heart rate was normal and rhythm regular [Heart Sounds] : normal S1 and S2 [Heart Sounds Gallop] : no gallops [Murmurs] : no murmurs [Heart Sounds Pericardial Friction Rub] : no pericardial rub [Full Pulse] : the pedal pulses are present [Edema] : there was no peripheral edema [No Spinal Tenderness] : no spinal tenderness [No CVA Tenderness] : no ~M costovertebral angle tenderness [Abnormal Walk] : normal gait [Musculoskeletal - Swelling] : no joint swelling seen [Nail Clubbing] : no clubbing  or cyanosis of the fingernails [Motor Tone] : muscle strength and tone were normal [Skin Color & Pigmentation] : normal skin color and pigmentation [] : no rash [Skin Turgor] : normal skin turgor [Past-pointing] : there was no past-pointing [Tremor] : no tremor present [Plantar Reflex Right Only] : normal on the right [Plantar Reflex Left Only] : normal on the left [FreeTextEntry1] : There is a right exophoria.

## 2019-12-11 ENCOUNTER — APPOINTMENT (OUTPATIENT)
Dept: RHEUMATOLOGY | Facility: CLINIC | Age: 44
End: 2019-12-11
Payer: COMMERCIAL

## 2019-12-11 VITALS
HEART RATE: 76 BPM | WEIGHT: 250 LBS | OXYGEN SATURATION: 98 % | SYSTOLIC BLOOD PRESSURE: 118 MMHG | BODY MASS INDEX: 39.24 KG/M2 | TEMPERATURE: 97.7 F | HEIGHT: 67 IN | DIASTOLIC BLOOD PRESSURE: 75 MMHG | RESPIRATION RATE: 16 BRPM

## 2019-12-11 PROCEDURE — 99214 OFFICE O/P EST MOD 30 MIN: CPT

## 2019-12-11 RX ORDER — FLUTICASONE FUROATE AND VILANTEROL TRIFENATATE 100; 25 UG/1; UG/1
100-25 POWDER RESPIRATORY (INHALATION) DAILY
Qty: 1 | Refills: 1 | Status: DISCONTINUED | COMMUNITY
Start: 2019-07-24 | End: 2019-12-11

## 2019-12-11 RX ORDER — BUDESONIDE AND FORMOTEROL FUMARATE DIHYDRATE 160; 4.5 UG/1; UG/1
160-4.5 AEROSOL RESPIRATORY (INHALATION) TWICE DAILY
Qty: 1 | Refills: 3 | Status: DISCONTINUED | COMMUNITY
Start: 2019-06-13 | End: 2019-12-11

## 2019-12-11 RX ORDER — ALBUTEROL SULFATE 2.5 MG/3ML
(2.5 MG/3ML) SOLUTION RESPIRATORY (INHALATION)
Qty: 1 | Refills: 3 | Status: COMPLETED | COMMUNITY
Start: 2019-06-13 | End: 2019-12-11

## 2019-12-16 LAB
ALBUMIN SERPL ELPH-MCNC: 4.6 G/DL
ALP BLD-CCNC: 87 U/L
ALT SERPL-CCNC: 36 U/L
ANION GAP SERPL CALC-SCNC: 15 MMOL/L
APPEARANCE: CLEAR
AST SERPL-CCNC: 40 U/L
BACTERIA: NEGATIVE
BASOPHILS # BLD AUTO: 0.02 K/UL
BASOPHILS NFR BLD AUTO: 0.3 %
BILIRUB SERPL-MCNC: 0.2 MG/DL
BILIRUBIN URINE: NEGATIVE
BLOOD URINE: NEGATIVE
BUN SERPL-MCNC: 11 MG/DL
C3 SERPL-MCNC: 116 MG/DL
C4 SERPL-MCNC: 20 MG/DL
CALCIUM SERPL-MCNC: 9.6 MG/DL
CHLORIDE SERPL-SCNC: 101 MMOL/L
CK SERPL-CCNC: 504 U/L
CO2 SERPL-SCNC: 24 MMOL/L
COLOR: YELLOW
CREAT SERPL-MCNC: 1.01 MG/DL
CREAT SPEC-SCNC: 79 MG/DL
CREAT/PROT UR: 0.1 RATIO
DSDNA AB SER-ACNC: 32 IU/ML
EOSINOPHIL # BLD AUTO: 0.09 K/UL
EOSINOPHIL NFR BLD AUTO: 1.5 %
ERYTHROCYTE [SEDIMENTATION RATE] IN BLOOD BY WESTERGREN METHOD: 22 MM/HR
GLUCOSE QUALITATIVE U: NEGATIVE
GLUCOSE SERPL-MCNC: 90 MG/DL
HCT VFR BLD CALC: 41.5 %
HGB BLD-MCNC: 13.9 G/DL
HYALINE CASTS: 0 /LPF
IMM GRANULOCYTES NFR BLD AUTO: 0.2 %
KETONES URINE: NEGATIVE
LEUKOCYTE ESTERASE URINE: NEGATIVE
LYMPHOCYTES # BLD AUTO: 1.62 K/UL
LYMPHOCYTES NFR BLD AUTO: 26.9 %
MAN DIFF?: NORMAL
MCHC RBC-ENTMCNC: 29.3 PG
MCHC RBC-ENTMCNC: 33.5 GM/DL
MCV RBC AUTO: 87.6 FL
MICROSCOPIC-UA: NORMAL
MONOCYTES # BLD AUTO: 0.69 K/UL
MONOCYTES NFR BLD AUTO: 11.4 %
NEUTROPHILS # BLD AUTO: 3.6 K/UL
NEUTROPHILS NFR BLD AUTO: 59.7 %
NITRITE URINE: NEGATIVE
PH URINE: 6.5
PLATELET # BLD AUTO: 313 K/UL
POTASSIUM SERPL-SCNC: 4 MMOL/L
PROT SERPL-MCNC: 8 G/DL
PROT UR-MCNC: 6 MG/DL
PROTEIN URINE: NORMAL
RBC # BLD: 4.74 M/UL
RBC # FLD: 13.1 %
RED BLOOD CELLS URINE: 0 /HPF
SODIUM SERPL-SCNC: 140 MMOL/L
SPECIFIC GRAVITY URINE: 1.01
SQUAMOUS EPITHELIAL CELLS: 0 /HPF
UROBILINOGEN URINE: NORMAL
WBC # FLD AUTO: 6.03 K/UL
WHITE BLOOD CELLS URINE: 0 /HPF

## 2019-12-17 ENCOUNTER — APPOINTMENT (OUTPATIENT)
Dept: INTERNAL MEDICINE | Facility: CLINIC | Age: 44
End: 2019-12-17
Payer: COMMERCIAL

## 2019-12-17 PROCEDURE — 90834 PSYTX W PT 45 MINUTES: CPT

## 2019-12-18 ENCOUNTER — RX RENEWAL (OUTPATIENT)
Age: 44
End: 2019-12-18

## 2019-12-30 ENCOUNTER — APPOINTMENT (OUTPATIENT)
Dept: INTERNAL MEDICINE | Facility: CLINIC | Age: 44
End: 2019-12-30
Payer: COMMERCIAL

## 2019-12-30 VITALS
HEART RATE: 72 BPM | OXYGEN SATURATION: 98 % | DIASTOLIC BLOOD PRESSURE: 90 MMHG | HEIGHT: 67 IN | SYSTOLIC BLOOD PRESSURE: 140 MMHG | BODY MASS INDEX: 39.24 KG/M2 | WEIGHT: 250 LBS

## 2019-12-30 VITALS — DIASTOLIC BLOOD PRESSURE: 88 MMHG | SYSTOLIC BLOOD PRESSURE: 132 MMHG

## 2019-12-30 PROCEDURE — 99214 OFFICE O/P EST MOD 30 MIN: CPT

## 2019-12-30 NOTE — HISTORY OF PRESENT ILLNESS
[FreeTextEntry1] : Follow-up for hypothyroidism, lupus, [de-identified] : Patient is a 44-year-old man with history of overweight snoring, hypothyroidism, lupus, asthma who presents for follow-up patient feels well complains of stress and fatigue and persistent joint pain however slightly improved denies chest pain, shortness of breath distant exertion coughing or sneezing. No cold or heat intolerance. But notes feels like sweaty

## 2019-12-30 NOTE — PHYSICAL EXAM
[Normal Supraclavicular Nodes] : no supraclavicular lymphadenopathy [Normal Axillary Nodes] : no axillary lymphadenopathy [Normal Posterior Cervical Nodes] : no posterior cervical lymphadenopathy [Normal Anterior Cervical Nodes] : no anterior cervical lymphadenopathy [Normal] : no CVA or spinal tenderness [de-identified] : Bilateral trace edema

## 2019-12-30 NOTE — ASSESSMENT
[FreeTextEntry1] : Patient is a 44-year-old male with history of obesity, asthma, elevated blood pressure, hypothyroidism, lupus, anxiety, allergic rhinitis, snoring who presents for follow-up\par \par 1 lupus\par slight improvement on CellCept fiber milligrams one tab twice a day and prednisone 5 mg two tablets twice a day\par follow-up with rheumatology\par \par 2 hypertension/isolated low blood pressure\par May be secondary storage continue nifedipine ER patient rushed over was late for appointment\par \par 3 obesity\par counseled on diet and exercise\par \par 4 asthma\par well-controlled on Brio and Ventolin as needed\par \par 5 hypothyroidism\par increase levothyroxine 200 David's today check in 62 months\par \par 6 anxiety/depression\par the patient refused antidepressant/antianxiety medication at present time\par \par 7 fatigue\par etiology unclear states he had normal sleep study at work [employer]\par check TFTs

## 2019-12-30 NOTE — REVIEW OF SYSTEMS
[Joint Pain] : joint pain [Back Pain] : back pain [Negative] : Genitourinary [Muscle Weakness] : no muscle weakness [Muscle Pain] : no muscle pain [Joint Stiffness] : no joint stiffness [Joint Swelling] : no joint swelling

## 2020-01-03 ENCOUNTER — APPOINTMENT (OUTPATIENT)
Dept: OTOLARYNGOLOGY | Facility: CLINIC | Age: 45
End: 2020-01-03

## 2020-01-15 ENCOUNTER — APPOINTMENT (OUTPATIENT)
Dept: RHEUMATOLOGY | Facility: CLINIC | Age: 45
End: 2020-01-15

## 2020-01-21 ENCOUNTER — APPOINTMENT (OUTPATIENT)
Dept: INTERNAL MEDICINE | Facility: CLINIC | Age: 45
End: 2020-01-21
Payer: COMMERCIAL

## 2020-01-21 PROCEDURE — 90834 PSYTX W PT 45 MINUTES: CPT

## 2020-02-13 ENCOUNTER — RESULT CHARGE (OUTPATIENT)
Age: 45
End: 2020-02-13

## 2020-02-13 ENCOUNTER — APPOINTMENT (OUTPATIENT)
Dept: INTERNAL MEDICINE | Facility: CLINIC | Age: 45
End: 2020-02-13
Payer: COMMERCIAL

## 2020-02-13 VITALS
SYSTOLIC BLOOD PRESSURE: 120 MMHG | HEIGHT: 67 IN | WEIGHT: 250 LBS | BODY MASS INDEX: 39.24 KG/M2 | HEART RATE: 75 BPM | OXYGEN SATURATION: 98 % | DIASTOLIC BLOOD PRESSURE: 80 MMHG

## 2020-02-13 VITALS — SYSTOLIC BLOOD PRESSURE: 124 MMHG | DIASTOLIC BLOOD PRESSURE: 84 MMHG

## 2020-02-13 LAB
BILIRUB UR QL STRIP: NORMAL
GLUCOSE UR-MCNC: NORMAL
HCG UR QL: 0.2 EU/DL
HGB UR QL STRIP.AUTO: NORMAL
KETONES UR-MCNC: NORMAL
LEUKOCYTE ESTERASE UR QL STRIP: NORMAL
NITRITE UR QL STRIP: NORMAL
PH UR STRIP: 6.5
PROT UR STRIP-MCNC: NORMAL
SP GR UR STRIP: 1.02

## 2020-02-13 PROCEDURE — 36415 COLL VENOUS BLD VENIPUNCTURE: CPT

## 2020-02-13 PROCEDURE — 99214 OFFICE O/P EST MOD 30 MIN: CPT | Mod: 25

## 2020-02-14 ENCOUNTER — RX RENEWAL (OUTPATIENT)
Age: 45
End: 2020-02-14

## 2020-02-18 ENCOUNTER — APPOINTMENT (OUTPATIENT)
Dept: INTERNAL MEDICINE | Facility: CLINIC | Age: 45
End: 2020-02-18

## 2020-02-20 ENCOUNTER — APPOINTMENT (OUTPATIENT)
Dept: INTERNAL MEDICINE | Facility: CLINIC | Age: 45
End: 2020-02-20

## 2020-02-21 ENCOUNTER — APPOINTMENT (OUTPATIENT)
Dept: INTERNAL MEDICINE | Facility: CLINIC | Age: 45
End: 2020-02-21

## 2020-02-25 LAB
ALBUMIN SERPL ELPH-MCNC: 4.7 G/DL
ALP BLD-CCNC: 90 U/L
ALT SERPL-CCNC: 26 U/L
ANION GAP SERPL CALC-SCNC: 13 MMOL/L
AST SERPL-CCNC: 31 U/L
BILIRUB SERPL-MCNC: 0.3 MG/DL
BUN SERPL-MCNC: 10 MG/DL
CALCIUM SERPL-MCNC: 9.8 MG/DL
CHLORIDE SERPL-SCNC: 99 MMOL/L
CO2 SERPL-SCNC: 28 MMOL/L
CREAT SERPL-MCNC: 1.08 MG/DL
GLUCOSE SERPL-MCNC: 87 MG/DL
POTASSIUM SERPL-SCNC: 4.4 MMOL/L
PROT SERPL-MCNC: 8 G/DL
SODIUM SERPL-SCNC: 139 MMOL/L
TSH SERPL-ACNC: 5.14 UIU/ML

## 2020-02-25 NOTE — REVIEW OF SYSTEMS
[Claudication] : leg claudication [Lower Ext Edema] : lower extremity edema [Negative] : Respiratory [Chest Pain] : no chest pain [Orthopena] : no orthopnea [Palpitations] : no palpitations [Paroxysmal Nocturnal Dyspnea] : no paroxysmal nocturnal dyspnea

## 2020-02-25 NOTE — PHYSICAL EXAM
[Normal] : no CVA or spinal tenderness [de-identified] : Lateral 2+ edema negative home and sign no

## 2020-02-25 NOTE — HISTORY OF PRESENT ILLNESS
[FreeTextEntry8] : \par \par CC: bilateral leg edema 1 to 2+\par \par HPI patient is a 44-year-old male with history of lupus on CellCept/prednisone 10 TID/plaque canal, hypertension on Norvasc obesity, chronic rhino sinusitis, asthma, who presents for several week history of increasing swelling of both legs he denies chest pain pleuritic, shortness of breath, distant exertion palpitation lightheadedness dizziness no leg pain

## 2020-02-25 NOTE — ASSESSMENT
[FreeTextEntry1] : Patient is a 44-year-old male with history of lupus, asthma, hypothyroidism, overweight, who presents with several week history of increasing leg edema\par \par 1 bilateral leg edema\par given history of lupus possibility of hypercoagulable state states of possible check Doppler's\par most likely secondary to medication prednisone/Norvasc\par will start Lasix 20 mg and see if diuresis\par counseled on salt use and elevation\par also rule out possibility nephrotic syndrome check UA blood pressure normal\par will discuss with rheumatology\par \par 2 lupus\par continue current management as per rheumatology\par \par 3 obesity\par counseled on diet\par follow-up in two weeks\par \par 4 hypothyroidism\par continue hundred micrograms of levothyroxine check TFTs

## 2020-03-02 ENCOUNTER — APPOINTMENT (OUTPATIENT)
Dept: INTERNAL MEDICINE | Facility: CLINIC | Age: 45
End: 2020-03-02
Payer: COMMERCIAL

## 2020-03-02 VITALS
OXYGEN SATURATION: 97 % | SYSTOLIC BLOOD PRESSURE: 128 MMHG | HEART RATE: 91 BPM | DIASTOLIC BLOOD PRESSURE: 78 MMHG | TEMPERATURE: 98 F | HEIGHT: 67 IN | WEIGHT: 255 LBS | BODY MASS INDEX: 40.02 KG/M2

## 2020-03-02 PROCEDURE — 99214 OFFICE O/P EST MOD 30 MIN: CPT | Mod: 25

## 2020-03-02 PROCEDURE — 36415 COLL VENOUS BLD VENIPUNCTURE: CPT

## 2020-03-02 NOTE — ASSESSMENT
[FreeTextEntry1] : Patient is a 44-year-old male with history of obesity, lupus, hypothyroidism, asthma, allergic rhinitis, obesity, bilateral leg edema who presents for follow-up\par \par 1. Bilateral leg edema\par most likely secondary to medications\par patient again instructed to do Doppler's of legs\par increase Lasix to 40 mg\par check potassium\par DC nifedipine\par \par 2. Hypothyroidism\par increase levothyroxine hundred and 12 µg PO Q day\par observe\par \par 3 asthma\par well-controlled\par \par 4. Lupus\par continue current management\par \par 5 itching\par continue Atarax 25 as needed

## 2020-03-02 NOTE — REVIEW OF SYSTEMS
[Lower Ext Edema] : lower extremity edema [Negative] : Gastrointestinal [Chest Pain] : no chest pain [Orthopena] : no orthopnea [Palpitations] : no palpitations [Claudication] : no  leg claudication [Paroxysmal Nocturnal Dyspnea] : no paroxysmal nocturnal dyspnea

## 2020-03-02 NOTE — HISTORY OF PRESENT ILLNESS
[FreeTextEntry1] : Follow-up for bilateral leg edema hypothyroidism [de-identified] : Patient is a 44-year-old male with history of rheumatoid arthritis on steroids, hypothyroidism, hypertension, obesity, overweight, who presented last visit four bilateral leg edema patient never went for Doppler's of the legs now presents for follow-up patient notes persistent swelling has been taking Lasix daily denies chest pain, shortness of breath lightheadedness dizziness

## 2020-03-02 NOTE — PHYSICAL EXAM
[Normal] : no respiratory distress, lungs were clear to auscultation bilaterally and no accessory muscle use [de-identified] : Bilateral one +2+ edema

## 2020-03-03 LAB
ANION GAP SERPL CALC-SCNC: 16 MMOL/L
BUN SERPL-MCNC: 9 MG/DL
CALCIUM SERPL-MCNC: 9.4 MG/DL
CHLORIDE SERPL-SCNC: 99 MMOL/L
CO2 SERPL-SCNC: 24 MMOL/L
CREAT SERPL-MCNC: 1.23 MG/DL
GLUCOSE SERPL-MCNC: 83 MG/DL
POTASSIUM SERPL-SCNC: 4 MMOL/L
SODIUM SERPL-SCNC: 139 MMOL/L

## 2020-03-09 ENCOUNTER — FORM ENCOUNTER (OUTPATIENT)
Age: 45
End: 2020-03-09

## 2020-03-10 ENCOUNTER — OUTPATIENT (OUTPATIENT)
Dept: OUTPATIENT SERVICES | Facility: HOSPITAL | Age: 45
LOS: 1 days | End: 2020-03-10
Payer: COMMERCIAL

## 2020-03-10 ENCOUNTER — APPOINTMENT (OUTPATIENT)
Dept: ULTRASOUND IMAGING | Facility: IMAGING CENTER | Age: 45
End: 2020-03-10
Payer: COMMERCIAL

## 2020-03-10 ENCOUNTER — APPOINTMENT (OUTPATIENT)
Dept: RHEUMATOLOGY | Facility: CLINIC | Age: 45
End: 2020-03-10
Payer: COMMERCIAL

## 2020-03-10 VITALS
OXYGEN SATURATION: 98 % | HEART RATE: 90 BPM | TEMPERATURE: 98.1 F | HEIGHT: 67 IN | DIASTOLIC BLOOD PRESSURE: 79 MMHG | SYSTOLIC BLOOD PRESSURE: 121 MMHG | RESPIRATION RATE: 16 BRPM | WEIGHT: 255 LBS | BODY MASS INDEX: 40.02 KG/M2

## 2020-03-10 DIAGNOSIS — R60.0 LOCALIZED EDEMA: ICD-10-CM

## 2020-03-10 PROCEDURE — 93970 EXTREMITY STUDY: CPT | Mod: 26

## 2020-03-10 PROCEDURE — 93970 EXTREMITY STUDY: CPT

## 2020-03-10 PROCEDURE — 99214 OFFICE O/P EST MOD 30 MIN: CPT

## 2020-03-10 RX ORDER — NIFEDIPINE 30 MG/1
30 TABLET, FILM COATED, EXTENDED RELEASE ORAL DAILY
Qty: 30 | Refills: 1 | Status: DISCONTINUED | COMMUNITY
Start: 2019-10-16 | End: 2020-03-10

## 2020-03-11 LAB
ALBUMIN SERPL ELPH-MCNC: 4.7 G/DL
ALP BLD-CCNC: 90 U/L
ALT SERPL-CCNC: 26 U/L
ANION GAP SERPL CALC-SCNC: 14 MMOL/L
APPEARANCE: CLEAR
AST SERPL-CCNC: 29 U/L
BACTERIA: NEGATIVE
BASOPHILS # BLD AUTO: 0.02 K/UL
BASOPHILS NFR BLD AUTO: 0.4 %
BILIRUB SERPL-MCNC: 0.3 MG/DL
BILIRUBIN URINE: NEGATIVE
BLOOD URINE: NEGATIVE
BUN SERPL-MCNC: 10 MG/DL
C3 SERPL-MCNC: 124 MG/DL
C4 SERPL-MCNC: 21 MG/DL
CALCIUM SERPL-MCNC: 9.7 MG/DL
CHLORIDE SERPL-SCNC: 99 MMOL/L
CK SERPL-CCNC: 212 U/L
CO2 SERPL-SCNC: 27 MMOL/L
COLOR: YELLOW
CREAT SERPL-MCNC: 1 MG/DL
CREAT SPEC-SCNC: 252 MG/DL
CREAT/PROT UR: 0.1 RATIO
DSDNA AB SER-ACNC: 27 IU/ML
EOSINOPHIL # BLD AUTO: 0.13 K/UL
EOSINOPHIL NFR BLD AUTO: 2.7 %
ERYTHROCYTE [SEDIMENTATION RATE] IN BLOOD BY WESTERGREN METHOD: 37 MM/HR
GLUCOSE QUALITATIVE U: NEGATIVE
GLUCOSE SERPL-MCNC: 81 MG/DL
HCT VFR BLD CALC: 42.5 %
HGB BLD-MCNC: 14.1 G/DL
HYALINE CASTS: 0 /LPF
IMM GRANULOCYTES NFR BLD AUTO: 0.2 %
KETONES URINE: NEGATIVE
LEUKOCYTE ESTERASE URINE: NEGATIVE
LYMPHOCYTES # BLD AUTO: 1.39 K/UL
LYMPHOCYTES NFR BLD AUTO: 29.4 %
MAN DIFF?: NORMAL
MCHC RBC-ENTMCNC: 29.3 PG
MCHC RBC-ENTMCNC: 33.2 GM/DL
MCV RBC AUTO: 88.4 FL
MICROSCOPIC-UA: NORMAL
MONOCYTES # BLD AUTO: 0.64 K/UL
MONOCYTES NFR BLD AUTO: 13.5 %
NEUTROPHILS # BLD AUTO: 2.54 K/UL
NEUTROPHILS NFR BLD AUTO: 53.8 %
NITRITE URINE: NEGATIVE
PH URINE: 6.5
PLATELET # BLD AUTO: 291 K/UL
POTASSIUM SERPL-SCNC: 4 MMOL/L
PROT SERPL-MCNC: 7.8 G/DL
PROT UR-MCNC: 18 MG/DL
PROTEIN URINE: ABNORMAL
RBC # BLD: 4.81 M/UL
RBC # FLD: 13 %
RED BLOOD CELLS URINE: 1 /HPF
SODIUM SERPL-SCNC: 140 MMOL/L
SPECIFIC GRAVITY URINE: 1.03
SQUAMOUS EPITHELIAL CELLS: 0 /HPF
UROBILINOGEN URINE: NORMAL
WBC # FLD AUTO: 4.73 K/UL
WHITE BLOOD CELLS URINE: 1 /HPF

## 2020-03-14 ENCOUNTER — RX RENEWAL (OUTPATIENT)
Age: 45
End: 2020-03-14

## 2020-03-25 LAB
EJ AB SER QL: NEGATIVE
ENA JO1 AB SER IA-ACNC: <20 UNITS
ENA PM/SCL AB SER-ACNC: <20 UNITS
ENA SM+RNP AB SER IA-ACNC: >150 UNITS
ENA SS-A IGG SER QL: 30 UNITS
FIBRILLARIN AB SER QL: NEGATIVE
KU AB SER QL: NEGATIVE
MDA-5 (P140)(CADM-140): <20 UNITS
MI2 AB SER QL: NEGATIVE
NXP-2 (P140): <20 UNITS
OJ AB SER QL: NEGATIVE
PL12 AB SER QL: NEGATIVE
PL7 AB SER QL: NEGATIVE
SRP AB SERPL QL: NEGATIVE
TIF GAMMA (P155/140): <20 UNITS
U2 SNRNP AB SER QL: ABNORMAL

## 2020-04-02 ENCOUNTER — RX RENEWAL (OUTPATIENT)
Age: 45
End: 2020-04-02

## 2020-05-04 ENCOUNTER — APPOINTMENT (OUTPATIENT)
Dept: INTERNAL MEDICINE | Facility: CLINIC | Age: 45
End: 2020-05-04
Payer: COMMERCIAL

## 2020-05-04 PROCEDURE — 99442: CPT

## 2020-05-26 ENCOUNTER — APPOINTMENT (OUTPATIENT)
Dept: RHEUMATOLOGY | Facility: CLINIC | Age: 45
End: 2020-05-26
Payer: COMMERCIAL

## 2020-05-26 ENCOUNTER — APPOINTMENT (OUTPATIENT)
Dept: PULMONOLOGY | Facility: CLINIC | Age: 45
End: 2020-05-26
Payer: COMMERCIAL

## 2020-05-26 VITALS
RESPIRATION RATE: 16 BRPM | OXYGEN SATURATION: 98 % | BODY MASS INDEX: 41.75 KG/M2 | SYSTOLIC BLOOD PRESSURE: 120 MMHG | WEIGHT: 266 LBS | TEMPERATURE: 98.2 F | HEIGHT: 67 IN | DIASTOLIC BLOOD PRESSURE: 80 MMHG | HEART RATE: 95 BPM

## 2020-05-26 VITALS
OXYGEN SATURATION: 98 % | HEIGHT: 67 IN | WEIGHT: 266 LBS | DIASTOLIC BLOOD PRESSURE: 85 MMHG | HEART RATE: 88 BPM | BODY MASS INDEX: 41.75 KG/M2 | SYSTOLIC BLOOD PRESSURE: 120 MMHG | TEMPERATURE: 97.5 F | RESPIRATION RATE: 16 BRPM

## 2020-05-26 PROCEDURE — 99214 OFFICE O/P EST MOD 30 MIN: CPT

## 2020-05-26 NOTE — PHYSICAL EXAM
[No Acute Distress] : no acute distress [Well Nourished] : well nourished [No Deformities] : no deformities [Well Developed] : well developed [Normal Oropharynx] : normal oropharynx [III] : Mallampati Class: III [Normal Appearance] : normal appearance [No Neck Mass] : no neck mass [Normal Rate/Rhythm] : normal rate/rhythm [Normal S1, S2] : normal s1, s2 [No Murmurs] : no murmurs [No Resp Distress] : no resp distress [Clear to Auscultation Bilaterally] : clear to auscultation bilaterally [No Abnormalities] : no abnormalities [Normal Gait] : normal gait [No Clubbing] : no clubbing [No Cyanosis] : no cyanosis [No Edema] : no edema [FROM] : FROM [Normal Color/ Pigmentation] : normal color/ pigmentation [No Focal Deficits] : no focal deficits [Oriented x3] : oriented x3 [Normal Affect] : normal affect

## 2020-05-26 NOTE — HISTORY OF PRESENT ILLNESS
[TextBox_4] : Patient is a 44 year old male [employer]  with history of Asthma, Allergic Rhinitis, Anxiety, Hypothyroidism, recent diagnosis Lupus, presents for follow up. \par \par His chief complaint is Asthma attack 4 days ago. \par \par Patient states on Friday he was taking his children to their pediatrician office and upon arriving back home he started to feel as though he could not catch his breath.  He does believe this was caused by a mix of environmental allergies d/t seasonal temperature changes as well as anxiety.  He admits to being overwhelmed and that turned into an asthma attack.  He notes he became so SOB that he contemplated going to the ED.  He notes he used his Albuterol and Budesonide via nebulizer and felt better.  He has continued with both medications TID via nebulizer since Friday.  Patient notes he is already on Prednisone 30 mg daily for his Lupus.  He notes yesterday afternoon he started to feel better.  \par \par He states today he feels "minor constriction" in his chest upon deep inhalation.  Patient notes that he discontinued his Breo 8-10 months ago because he ran out of it.  He did not request more because at that time his asthma was controlled and he tries to minimize the amount of medication he takes, so he felt as though he did not need it. He notes the mask makes his asthma worse and he feels dyspnea upon exertion while wearing the mask.  Patient notes that he can hear himself wheezing.  He states that he has been experiencing chills, but that's his baseline which he r/t Hypothyroidism and Levothyroxine he takes daily. Patient admits to random episodes of dizziness when he yawns and has a big stretch he "loses his sense of self for a moment", but right after that moment passes the dizziness subsides. \par \par He notes that he saw his ENT, Dr. Salvador, over the weekend for his environmental allergy shots, which he cannot recall the name of.  He notes he as been getting injections for allergies since he was 8 years ago and recently Dr. Salvador increased his dose from 1 injection to 2 injections and he receives them Q2 Weeks. He also notes he takes Atarex for his allergies/itchiness. \par \par Patient notes that his anxiety is currently untreated, but that his PCP, ENT, Rheum, and  MD's all have recommended he treats it.  He notes Dr. Salvador, his ENT, rx'ed him Xanax this past weekend.  Dr. Salvador also gave him a xanax at the visit and the patient notes it was helpful. The patient notes he has yet to pick the RX up because he is worried about the treatment and his job with the [employer].  Patient states he has been on Paternity Leave and due to return next week. \par \par Patient denies cough, fever, or other infectious symptoms at this time. \par

## 2020-05-26 NOTE — REVIEW OF SYSTEMS
[Fatigue] : fatigue [Recent Wt Gain (___ Lbs)] : ~T recent [unfilled] lb weight gain [Chills] : chills [Nasal Congestion] : nasal congestion [Chest Tightness] : chest tightness [Wheezing] : wheezing [SOB on Exertion] : sob on exertion [Itchy Eyes] : itchy eyes [Seasonal Allergies] : seasonal allergies [Headache] : headache [Dizziness] : dizziness [Anxiety] : anxiety [Thyroid Problem] : thyroid problem [Obesity] : obesity [Negative] : Hematologic [Fever] : no fever [Sinus Problems] : no sinus problems [Poor Appetite] : no poor appetite [Cough] : no cough [Sputum] : no sputum [Dyspnea] : no dyspnea [GERD] : no gerd [Vomiting] : no vomiting [Nausea] : no nausea [TextBox_134] : Untreated [Diarrhea] : no diarrhea

## 2020-05-26 NOTE — ASSESSMENT
[FreeTextEntry1] : The plan for the patient is as follows:\par \par 1. Asthma, flared state:\par - Continue Albuterol via nebulizer at least QD before Breo inhaler, or Q4-6H PRN. Discussed side effects of jitteriness and palpitations. \par - Add Breo 200 mcg; 1 inhale daily. Rinse and gargle after use. Discussed importance of oral hygiene at visit. \par - Discontinue Budesonide via nebulizer at this time.\par \par 2. Allergies:\par - Continue to follow-up with ENT, Dr. Salvador, for allergy injection therapy Q2 weeks.\par - Continue medication regimen as RX'ed by ENT.\par \par 3. Anxiety:\par - Patient to schedule appt with Dr. Gomez d/t recent feelings of Anxiety. \par \par 4. Screening for Viral Disease:\par - Patient to get Covid-IgG anti-body serum test. RX given to patient at discharge. \par \par Patient to follow-up with Dr. Goznales in 4 months with full PFT's. \par Patient verbalizes understanding and is agreeable.

## 2020-05-27 LAB
ALBUMIN SERPL ELPH-MCNC: 4.4 G/DL
ALP BLD-CCNC: 107 U/L
ALT SERPL-CCNC: 39 U/L
ANION GAP SERPL CALC-SCNC: 15 MMOL/L
APPEARANCE: CLEAR
AST SERPL-CCNC: 30 U/L
BACTERIA: NEGATIVE
BASOPHILS # BLD AUTO: 0.03 K/UL
BASOPHILS NFR BLD AUTO: 0.3 %
BILIRUB SERPL-MCNC: 0.3 MG/DL
BILIRUBIN URINE: NEGATIVE
BLOOD URINE: NEGATIVE
BUN SERPL-MCNC: 12 MG/DL
C3 SERPL-MCNC: 128 MG/DL
C4 SERPL-MCNC: 20 MG/DL
CALCIUM SERPL-MCNC: 9.6 MG/DL
CHLORIDE SERPL-SCNC: 99 MMOL/L
CO2 SERPL-SCNC: 27 MMOL/L
COLOR: YELLOW
CREAT SERPL-MCNC: 1.06 MG/DL
CREAT SPEC-SCNC: 303 MG/DL
CREAT/PROT UR: 0.1 RATIO
DSDNA AB SER-ACNC: 14 IU/ML
EOSINOPHIL # BLD AUTO: 0.06 K/UL
EOSINOPHIL NFR BLD AUTO: 0.6 %
ERYTHROCYTE [SEDIMENTATION RATE] IN BLOOD BY WESTERGREN METHOD: 43 MM/HR
GLUCOSE QUALITATIVE U: NEGATIVE
GLUCOSE SERPL-MCNC: 74 MG/DL
HCT VFR BLD CALC: 45.8 %
HGB BLD-MCNC: 14.7 G/DL
HYALINE CASTS: 0 /LPF
IMM GRANULOCYTES NFR BLD AUTO: 0.6 %
KETONES URINE: NEGATIVE
LEUKOCYTE ESTERASE URINE: NEGATIVE
LYMPHOCYTES # BLD AUTO: 3.34 K/UL
LYMPHOCYTES NFR BLD AUTO: 35.2 %
MAN DIFF?: NORMAL
MCHC RBC-ENTMCNC: 28.2 PG
MCHC RBC-ENTMCNC: 32.1 GM/DL
MCV RBC AUTO: 87.9 FL
MICROSCOPIC-UA: NORMAL
MONOCYTES # BLD AUTO: 0.95 K/UL
MONOCYTES NFR BLD AUTO: 10 %
NEUTROPHILS # BLD AUTO: 5.06 K/UL
NEUTROPHILS NFR BLD AUTO: 53.3 %
NITRITE URINE: NEGATIVE
PH URINE: 6.5
PLATELET # BLD AUTO: 334 K/UL
POTASSIUM SERPL-SCNC: 3.8 MMOL/L
PROT SERPL-MCNC: 8 G/DL
PROT UR-MCNC: 23 MG/DL
PROTEIN URINE: ABNORMAL
RBC # BLD: 5.21 M/UL
RBC # FLD: 13.3 %
RED BLOOD CELLS URINE: 1 /HPF
SARS-COV-2 IGG SERPL IA-ACNC: <0.1 INDEX
SARS-COV-2 IGG SERPL QL IA: NEGATIVE
SODIUM SERPL-SCNC: 141 MMOL/L
SPECIFIC GRAVITY URINE: 1.03
SQUAMOUS EPITHELIAL CELLS: 0 /HPF
UROBILINOGEN URINE: NORMAL
WBC # FLD AUTO: 9.5 K/UL
WHITE BLOOD CELLS URINE: 1 /HPF

## 2020-06-05 ENCOUNTER — APPOINTMENT (OUTPATIENT)
Dept: INTERNAL MEDICINE | Facility: CLINIC | Age: 45
End: 2020-06-05

## 2020-06-05 ENCOUNTER — APPOINTMENT (OUTPATIENT)
Dept: INTERNAL MEDICINE | Facility: CLINIC | Age: 45
End: 2020-06-05
Payer: COMMERCIAL

## 2020-06-05 VITALS
OXYGEN SATURATION: 96 % | WEIGHT: 262 LBS | BODY MASS INDEX: 41.12 KG/M2 | HEIGHT: 67 IN | HEART RATE: 105 BPM | SYSTOLIC BLOOD PRESSURE: 136 MMHG | DIASTOLIC BLOOD PRESSURE: 88 MMHG | TEMPERATURE: 98.1 F

## 2020-06-05 VITALS — DIASTOLIC BLOOD PRESSURE: 84 MMHG | SYSTOLIC BLOOD PRESSURE: 124 MMHG

## 2020-06-05 DIAGNOSIS — Z87.898 PERSONAL HISTORY OF OTHER SPECIFIED CONDITIONS: ICD-10-CM

## 2020-06-05 DIAGNOSIS — Z11.59 ENCOUNTER FOR SCREENING FOR OTHER VIRAL DISEASES: ICD-10-CM

## 2020-06-05 DIAGNOSIS — E66.3 OVERWEIGHT: ICD-10-CM

## 2020-06-05 DIAGNOSIS — L29.9 PRURITUS, UNSPECIFIED: ICD-10-CM

## 2020-06-05 PROCEDURE — 36415 COLL VENOUS BLD VENIPUNCTURE: CPT

## 2020-06-05 PROCEDURE — 99214 OFFICE O/P EST MOD 30 MIN: CPT | Mod: 25

## 2020-06-05 NOTE — HISTORY OF PRESENT ILLNESS
[FreeTextEntry1] : \par Follow-up for hypothyroidism, anxiety, lupus and complaint of jerking motion at night [de-identified] : The patient is a 44-year-old man with history of obesity, lupus, hypothyroidism, seasonal allergies, hypertension, asthma, and anxiety, who presents for follow-up patient feels well recent bout of lupus flare sort rheumatology. Complains of jerking motion mainly at night fatigue denies fever, chills, bradycardia, tachycardia, cold or heat intolerance. Also feels anxious, gets agitated at times. Denies chest pain, shortness of breath palpitations

## 2020-06-05 NOTE — ASSESSMENT
[FreeTextEntry1] : Patient is a 44-year-old male with history of obesity, asthma, hypertension, hypothyroidism, lupus, allergic rhinitis, anxiety who presents for follow-up of hypothyroidism, lupus, asthma, and complaints of jerky movements\par \par 1. Jerky nocturnal movements\par May be secondary to medication versus thyroid versus periodic limb movement disorder\par consider sleep study patient initially refused\par will observe for now check TSH\par \par 2. Hypothyroidism\par continue levothyroxine hundred and 12 µg PO Q day check TFTs\par \par 3 anxiety disorder\par start Lexapro 10 and continue Xanax as needed\par follow-up in five weeks\par \par 4. Asthma\par controlled all with albuterol and Brio new\par \par 5 hypertension\par controlled with amlodipine five\par \par 6 leg edema\par probably secondary to medication continue Lasix 40 mg a day\par \par 7 lupus\par continue hydroxychloroquine, prednisone 15 and mycophenolate 500 mg three tablets twice a day\par follow-up with rheumatology\par

## 2020-06-05 NOTE — REVIEW OF SYSTEMS
[Fatigue] : fatigue [Recent Change In Weight] : ~T recent weight change [Joint Pain] : joint pain [Joint Stiffness] : joint stiffness [Joint Swelling] : joint swelling [Headache] : headache [Anxiety] : anxiety [Insomnia] : insomnia [Negative] : Heme/Lymph [Fever] : no fever [Chills] : no chills [Night Sweats] : no night sweats [Muscle Pain] : no muscle pain [Hot Flashes] : no hot flashes [Back Pain] : no back pain [Muscle Weakness] : no muscle weakness [Dizziness] : no dizziness [Confusion] : no confusion [Fainting] : no fainting [Depression] : no depression [Unsteady Walk] : no ataxia [Suicidal] : not suicidal

## 2020-06-05 NOTE — PHYSICAL EXAM
[Normal Supraclavicular Nodes] : no supraclavicular lymphadenopathy [Normal Anterior Cervical Nodes] : no anterior cervical lymphadenopathy [Normal] : affect was normal and insight and judgment were intact

## 2020-06-06 ENCOUNTER — LABORATORY RESULT (OUTPATIENT)
Age: 45
End: 2020-06-06

## 2020-06-06 ENCOUNTER — APPOINTMENT (OUTPATIENT)
Dept: DISASTER EMERGENCY | Facility: CLINIC | Age: 45
End: 2020-06-06

## 2020-06-08 LAB
T4 FREE SERPL-MCNC: 1.4 NG/DL
TSH SERPL-ACNC: 2.49 UIU/ML

## 2020-06-09 ENCOUNTER — APPOINTMENT (OUTPATIENT)
Dept: PULMONOLOGY | Facility: CLINIC | Age: 45
End: 2020-06-09
Payer: COMMERCIAL

## 2020-06-09 VITALS
HEIGHT: 67 IN | SYSTOLIC BLOOD PRESSURE: 130 MMHG | DIASTOLIC BLOOD PRESSURE: 70 MMHG | OXYGEN SATURATION: 98 % | WEIGHT: 273 LBS | TEMPERATURE: 97.3 F | BODY MASS INDEX: 42.85 KG/M2 | HEART RATE: 105 BPM | RESPIRATION RATE: 17 BRPM

## 2020-06-09 PROCEDURE — ZZZZZ: CPT

## 2020-06-09 PROCEDURE — 99215 OFFICE O/P EST HI 40 MIN: CPT

## 2020-06-09 NOTE — HISTORY OF PRESENT ILLNESS
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 Patient recently struggling with Lupus flare as well as hypothyroidism.  He is using Breo-Ellipta 200-25 daily.  He was started on prednisone for increased symptoms joint pain, difficulty swallowing.  He is using Plaquenil daily and mycophenolate.  He reports he had another baby on Mother's day.  He is feeling very stressed.  He reports he had two asthma flares over the past month and prednisone was increased to 30 mg daily.  He is here for follow up.

## 2020-06-09 NOTE — COUNSELING
[Other: ____] : [unfilled] [Good understanding] : Patient has a good understanding of lifestyle changes and steps needed to achieve self management goal [de-identified] : Anxiety

## 2020-06-09 NOTE — PHYSICAL EXAM
[General Appearance - Well Developed] : well developed [General Appearance - Well Nourished] : well nourished [Normal Conjunctiva] : the conjunctiva exhibited no abnormalities [General Appearance - In No Acute Distress] : no acute distress [Erythema] : erythema of the pharynx [] : the neck was supple [Jugular Venous Distention Increased] : there was no jugular-venous distention [Heart Sounds] : normal S1 and S2 [Respiration, Rhythm And Depth] : normal respiratory rhythm and effort [Murmurs] : no murmurs present [Auscultation Breath Sounds / Voice Sounds] : lungs were clear to auscultation bilaterally [Abdomen Soft] : soft [Abnormal Walk] : normal gait [Cyanosis, Localized] : no localized cyanosis [Nail Clubbing] : no clubbing of the fingernails [Cranial Nerves] : cranial nerves 2-12 were intact [Non-Pitting] : non-pitting [FreeTextEntry1] : Multiple tattoos + malar rash

## 2020-06-09 NOTE — REVIEW OF SYSTEMS
[Recent Wt Gain (___ Lbs)] : recent [unfilled] ~Ulb weight gain [Fatigue] : fatigue [Wheezing] : wheezing [Dyspnea] : dyspnea [Nasal Discharge] : nasal discharge [Rash] : [unfilled] rash [As Noted in HPI] : as noted in HPI [Anxiety] : anxiety [Thyroid Problem] : thyroid problem [Negative] : Pulmonary Hypertension

## 2020-06-09 NOTE — ASSESSMENT
[FreeTextEntry1] : 44 year old male recent diagnosis Lupus presents follow up now on prednisone, hydroxychloroquine, mycophenolate increased lupus symptoms, anxiety\par \par Breo-Ellipta 200-25 mcg daily\par Add Tudorza 1 puff BID \par Continue prednisone 30 mg for now\par Follow up Rheumatology\par Rescue inhaler every 4-6 hours as needed \par Nebulized medication every 4-6 hours if needed \par Continue Lexapro per PCP \par \par PFT next visit \par \par Follow up after PFT

## 2020-06-11 ENCOUNTER — APPOINTMENT (OUTPATIENT)
Dept: PULMONOLOGY | Facility: CLINIC | Age: 45
End: 2020-06-11
Payer: COMMERCIAL

## 2020-06-11 VITALS
RESPIRATION RATE: 16 BRPM | OXYGEN SATURATION: 98 % | TEMPERATURE: 97.7 F | HEIGHT: 67 IN | WEIGHT: 273 LBS | DIASTOLIC BLOOD PRESSURE: 82 MMHG | SYSTOLIC BLOOD PRESSURE: 135 MMHG | BODY MASS INDEX: 42.85 KG/M2 | HEART RATE: 77 BPM

## 2020-06-11 PROCEDURE — 94727 GAS DIL/WSHOT DETER LNG VOL: CPT

## 2020-06-11 PROCEDURE — 99214 OFFICE O/P EST MOD 30 MIN: CPT | Mod: 25

## 2020-06-11 PROCEDURE — 94010 BREATHING CAPACITY TEST: CPT

## 2020-06-11 PROCEDURE — ZZZZZ: CPT

## 2020-06-11 PROCEDURE — 94729 DIFFUSING CAPACITY: CPT

## 2020-06-11 NOTE — REVIEW OF SYSTEMS
[Fatigue] : fatigue [Recent Wt Gain (___ Lbs)] : recent [unfilled] ~Ulb weight gain [Dyspnea] : dyspnea [Wheezing] : wheezing [Nasal Discharge] : nasal discharge [As Noted in HPI] : as noted in HPI [Rash] : [unfilled] rash [Anxiety] : anxiety [Thyroid Problem] : thyroid problem [Negative] : Neurologic

## 2020-06-11 NOTE — PROCEDURE
[FreeTextEntry1] : PFT 7/24/2019 personally reviewed Normal PFT\par \par CXR 6/1/2019 personally reviewed clear lungs\par \par Nebulized Duo Neb given in office 8/19/2019\par \par PFT 6/11/2020 personally reviewed Normal Spirometry, inspiratory loop suggestive dynamic extrathoracic obstruction, correlate clinically

## 2020-06-11 NOTE — HISTORY OF PRESENT ILLNESS
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 Patient recently struggling with Lupus flare as well as hypothyroidism.  He is using Breo-Ellipta 200-25 daily.  He was started on prednisone for increased symptoms joint pain, difficulty swallowing.  He is using Plaquenil daily and mycophenolate.  He reports he had another baby on Mother's day.  He is feeling very stressed.  He reports he had two asthma flares over the past month and prednisone was increased to 30 mg daily. Patient returns today for repeat PFT and follow up

## 2020-06-11 NOTE — COUNSELING
[Other: ____] : [unfilled] [Good understanding] : Patient has a good understanding of lifestyle changes and steps needed to achieve self management goal [de-identified] : Anxiety

## 2020-06-11 NOTE — ASSESSMENT
[FreeTextEntry1] : 44 year old male recent diagnosis Lupus presents follow up now on prednisone, hydroxychloroquine, mycophenolate increased lupus symptoms, anxiety\par \par Breo-Ellipta 200-25 mcg daily\par Add Tudorza 1 puff BID \par Continue prednisone 30 mg for now\par Follow up Rheumatology\par Rescue inhaler every 4-6 hours as needed \par Nebulized medication every 4-6 hours if needed \par Continue Lexapro per PCP \par Repeat CT sinuses\par \par Follow up after ENT evaluation

## 2020-06-11 NOTE — PHYSICAL EXAM
[General Appearance - Well Nourished] : well nourished [General Appearance - Well Developed] : well developed [General Appearance - In No Acute Distress] : no acute distress [Normal Conjunctiva] : the conjunctiva exhibited no abnormalities [Erythema] : erythema of the pharynx [] : the neck was supple [Jugular Venous Distention Increased] : there was no jugular-venous distention [Murmurs] : no murmurs present [Heart Sounds] : normal S1 and S2 [Respiration, Rhythm And Depth] : normal respiratory rhythm and effort [Auscultation Breath Sounds / Voice Sounds] : lungs were clear to auscultation bilaterally [Abdomen Soft] : soft [Abnormal Walk] : normal gait [Nail Clubbing] : no clubbing of the fingernails [Cyanosis, Localized] : no localized cyanosis [Non-Pitting] : non-pitting [Cranial Nerves] : cranial nerves 2-12 were intact [FreeTextEntry1] : Anxious

## 2020-06-15 ENCOUNTER — APPOINTMENT (OUTPATIENT)
Dept: CT IMAGING | Facility: IMAGING CENTER | Age: 45
End: 2020-06-15
Payer: COMMERCIAL

## 2020-06-15 ENCOUNTER — OUTPATIENT (OUTPATIENT)
Dept: OUTPATIENT SERVICES | Facility: HOSPITAL | Age: 45
LOS: 1 days | End: 2020-06-15
Payer: COMMERCIAL

## 2020-06-15 DIAGNOSIS — J34.2 DEVIATED NASAL SEPTUM: ICD-10-CM

## 2020-06-15 PROCEDURE — 70486 CT MAXILLOFACIAL W/O DYE: CPT

## 2020-06-15 PROCEDURE — 70486 CT MAXILLOFACIAL W/O DYE: CPT | Mod: 26

## 2020-06-18 ENCOUNTER — APPOINTMENT (OUTPATIENT)
Dept: RHEUMATOLOGY | Facility: CLINIC | Age: 45
End: 2020-06-18
Payer: COMMERCIAL

## 2020-07-02 ENCOUNTER — LABORATORY RESULT (OUTPATIENT)
Age: 45
End: 2020-07-02

## 2020-07-02 ENCOUNTER — APPOINTMENT (OUTPATIENT)
Dept: OTOLARYNGOLOGY | Facility: CLINIC | Age: 45
End: 2020-07-02
Payer: COMMERCIAL

## 2020-07-02 ENCOUNTER — APPOINTMENT (OUTPATIENT)
Dept: INTERNAL MEDICINE | Facility: CLINIC | Age: 45
End: 2020-07-02
Payer: COMMERCIAL

## 2020-07-02 VITALS
DIASTOLIC BLOOD PRESSURE: 84 MMHG | SYSTOLIC BLOOD PRESSURE: 140 MMHG | HEART RATE: 98 BPM | BODY MASS INDEX: 42.85 KG/M2 | OXYGEN SATURATION: 96 % | WEIGHT: 273 LBS | HEIGHT: 67 IN | TEMPERATURE: 98.2 F

## 2020-07-02 VITALS — SYSTOLIC BLOOD PRESSURE: 126 MMHG | DIASTOLIC BLOOD PRESSURE: 86 MMHG

## 2020-07-02 DIAGNOSIS — J45.901 UNSPECIFIED ASTHMA WITH (ACUTE) EXACERBATION: ICD-10-CM

## 2020-07-02 PROCEDURE — 31231 NASAL ENDOSCOPY DX: CPT

## 2020-07-02 PROCEDURE — 99214 OFFICE O/P EST MOD 30 MIN: CPT | Mod: 25

## 2020-07-02 PROCEDURE — 99214 OFFICE O/P EST MOD 30 MIN: CPT

## 2020-07-02 NOTE — REASON FOR VISIT
[Subsequent Evaluation] : a subsequent evaluation for [Other: _____] : [unfilled] [FreeTextEntry2] : f/u CT Sinuses 06/15/2020

## 2020-07-02 NOTE — PHYSICAL EXAM
[Nasal Endoscopy Performed] : nasal endoscopy was performed, see procedure section for findings [] : septum deviated to the right [Midline] : trachea located in midline position [de-identified] : malar rash [Normal] : orientation to person, place, and time: normal

## 2020-07-02 NOTE — HISTORY OF PRESENT ILLNESS
[de-identified] : 45M with SLE presents for follow-up\par [facility] 11/2019 at which time he reported chronic sinonasal symptoms-- on exam was found to have severely deviated nasal septum and moderate inflammation throughout sinonasal cavity\par His SLE has been poorly controlled over the last few months-- including 2 pulmonary exacerbations-- currently on pred (30mg), hydroxychloroquine\[facility] Has recently seen Dr. Neri (rheum) who ordered labs), as well as Dr. Gonzales (pul) who ordered CT sinus\par CT sinus- I reviewed- demonstrates a severe R-sided septal deformity, minimal L-sided inflammation and mucosal thickening, paranasal sinuses grossly normal otherwise\par Reports his sinonasal symptoms have persisted-- no improvement on medical therapy\par Continues to be predominate mouth breather during sleep-- was previously told he needs sleep study\par

## 2020-07-02 NOTE — DATA REVIEWED
[de-identified] : \par EXAM: CT SINUSES \par \par \par PROCEDURE DATE: 06/15/2020 \par \par \par \par INTERPRETATION: CLINICAL INFORMATION: Deviated nasal septum. History of \par lupus. Follow-up abnormality on prior CT exam. \par \par TECHNIQUE: A noncontrast CT scan of the paranasal sinuses utilizing a \par [employer] protocol was performed. Sagittal and coronal \par reconstructed images were also reviewed. \par \par COMPARISON: Prior sinus CT study from 10/21/2019. \par \par FINDINGS: \par \par POST SURGICAL CHANGES: None. \par \par SINOCRANIAL AND SINOORBITAL JUNCTIONS: The lamina papyracea, cribiform \par plates, and fovea ethmoidalis are intact. There is unchanged mild bony \par thinning of the right lateral lamella. The left fovea ethmoidalis lies lower \par in relation to the right fovea ethmoidalis. \par \par NASAL SEPTUM: There is unchanged rightward deviation of the nasal septum \par with bony spurring. The nasal septal spurring forms a contact point with the \par inferior turbinate with resultant narrowing of the right nasal passage. \par \par NASAL CAVITY / NASOPHARYNX: Evaluation of the nasal cavity fails to \par demonstrate dominant masses. Paradoxical curvature of the right inferior \par turbinate is again seen. There is also lateralization of the right middle \par turbinate. There is also paradoxical curvature of the bilateral middle \par turbinates. The nasopharynx is unremarkable. \par \par FRONTAL SINUS, DRAINAGE PATHWAYS, AND ASSOCIATED ANATOMIC VARIANTS: The \par frontal sinuses are well developed. A right-sided interfrontal septal cell \par is again seen. Minimal mucosal thickening is again seen within the inferior \par aspect of the left frontal sinus and left anterior ethmoid air cells. \par Bilateral agger nasi cells are noted. Bilateral type I frontal cells are \par seen. \par \par ETHMOID SINUSES: Minimal scattered mucosal thickening is seen. A large \par posterior left inferolateral ethmoid bulla is seen extending underneath the \par orbital wall. \par \par SPHENOID SINUSES, DRAINAGE PATHWAYS, AND ASSOCIATED ANATOMIC VARIANTS: The \par sphenoid sinuses are aerated. Partial aeration of the left anterior clinoid \par process is again seen. There is pterygoid extension of the bilateral \par sphenoid sinuses. There is thinning of the bony roofs of the bilateral \par vidian canals. The bilateral sphenoethmoidal recesses are free of abnormal \par soft tissue. The intersinus septum is bowed toward the left and it's \par posterior attachment is along the basisphenoid. The bilateral carotid canals \par are covered by bone. \par \par MAXILLARY SINUS, DRAINAGE PATHWAYS, AND ASSOCIATED ANATOMIC VARIANTS: A \par small unchanged nonspecific lucent lesion is again seen along the zygomatic \par process of the left maxilla. The maxillary sinuses are well developed and \par mostly aerated. Bilateral accessory maxillary sinus ostia are again seen. A \par very small area of focal mucosal thickening is noted along the junction of \par the left alveolar and zygomatic recesses of the left maxillary sinus. The \par right maxillary sinus is clear. The superior attachment of the uncinate \par processes are along the lamina papyracea with resultant bilateral recesses \par terminalis. A left-sided Lincoln cell is again seen with narrowing of the \par infundibulum. \par \par OTHER: Limited evaluation of the brain parenchyma demonstrates no \par abnormalities. \par \par IMPRESSION: \par \par 1. Unchanged rightward nasal septal deviation and septal spurring. \par \par 2. Unchanged small nonspecific focal lucent lesion involving the zygomatic \par process of the left maxilla. 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 \par \par 3. Unchanged narrowing of the left OMU which may predispose to sinus outflow \par obstruction. \par \par 4. Unchanged minimal mucosal thickening involving the left frontal, anterior \par ethmoid, and left maxillary sinuses. \par \par 5. Sinonasal anatomic variation, as discussed. \par \par \par \par \par \par \par \par \par TEJAS BETANCOURT M.D., ATTENDING RADIOLOGIST \par This document has been electronically signed. Jun 16 2020 11:35AM \par \par

## 2020-07-06 ENCOUNTER — APPOINTMENT (OUTPATIENT)
Dept: INTERNAL MEDICINE | Facility: CLINIC | Age: 45
End: 2020-07-06

## 2020-07-06 LAB
ALBUMIN SERPL ELPH-MCNC: 4.6 G/DL
ALP BLD-CCNC: 97 U/L
ALT SERPL-CCNC: 44 U/L
ANION GAP SERPL CALC-SCNC: 13 MMOL/L
APPEARANCE: CLEAR
AST SERPL-CCNC: 41 U/L
BACTERIA: NEGATIVE
BASOPHILS # BLD AUTO: 0.03 K/UL
BASOPHILS NFR BLD AUTO: 0.5 %
BILIRUB SERPL-MCNC: 0.2 MG/DL
BILIRUBIN URINE: NEGATIVE
BLOOD URINE: NEGATIVE
BUN SERPL-MCNC: 8 MG/DL
C3 SERPL-MCNC: 120 MG/DL
C4 SERPL-MCNC: 21 MG/DL
CALCIUM SERPL-MCNC: 9.5 MG/DL
CHLORIDE SERPL-SCNC: 102 MMOL/L
CK SERPL-CCNC: 324 U/L
CO2 SERPL-SCNC: 26 MMOL/L
COLOR: YELLOW
CREAT SERPL-MCNC: 1.2 MG/DL
CREAT SPEC-SCNC: 233 MG/DL
CREAT/PROT UR: 0.1 RATIO
DSDNA AB SER-ACNC: <12 IU/ML
EOSINOPHIL # BLD AUTO: 0.07 K/UL
EOSINOPHIL NFR BLD AUTO: 1.1 %
ERYTHROCYTE [SEDIMENTATION RATE] IN BLOOD BY WESTERGREN METHOD: 85 MM/HR
GLUCOSE QUALITATIVE U: NEGATIVE
GLUCOSE SERPL-MCNC: 116 MG/DL
HCT VFR BLD CALC: 44.5 %
HGB BLD-MCNC: 14.6 G/DL
HYALINE CASTS: 0 /LPF
IMM GRANULOCYTES NFR BLD AUTO: 0.5 %
KETONES URINE: NEGATIVE
LEUKOCYTE ESTERASE URINE: NEGATIVE
LYMPHOCYTES # BLD AUTO: 1.74 K/UL
LYMPHOCYTES NFR BLD AUTO: 26.9 %
MAN DIFF?: NORMAL
MCHC RBC-ENTMCNC: 28.6 PG
MCHC RBC-ENTMCNC: 32.8 GM/DL
MCV RBC AUTO: 87.1 FL
MICROSCOPIC-UA: NORMAL
MONOCYTES # BLD AUTO: 0.66 K/UL
MONOCYTES NFR BLD AUTO: 10.2 %
MPO AB + PR3 PNL SER: NORMAL
NEUTROPHILS # BLD AUTO: 3.93 K/UL
NEUTROPHILS NFR BLD AUTO: 60.8 %
NITRITE URINE: NEGATIVE
PH URINE: 6.5
PLATELET # BLD AUTO: 355 K/UL
POTASSIUM SERPL-SCNC: 4.1 MMOL/L
PROT SERPL-MCNC: 7.9 G/DL
PROT UR-MCNC: 16 MG/DL
PROTEIN URINE: NORMAL
RBC # BLD: 5.11 M/UL
RBC # FLD: 14.1 %
RED BLOOD CELLS URINE: 1 /HPF
SODIUM SERPL-SCNC: 140 MMOL/L
SPECIFIC GRAVITY URINE: 1.02
SQUAMOUS EPITHELIAL CELLS: 1 /HPF
UROBILINOGEN URINE: NORMAL
WBC # FLD AUTO: 6.46 K/UL
WHITE BLOOD CELLS URINE: 1 /HPF

## 2020-07-06 NOTE — HISTORY OF PRESENT ILLNESS
[FreeTextEntry8] : \par \par cc; severe headache, vomiting, not feeling well \par HPI, patient is a 45-year-old male with history of lupus, asthma, hypertension, hypothyroidism, obesity, chronic rhino sinusitis who presents for several day history of severe pulsatile left-sided headache with chronic right side right-sided mild pain notes nausea and one episode of vomiting with blood denies fever, chills, chest pain, shortness of breath, focal weakness. Patient under a lot of stress eight kids, on temporary family leave due to pandemic and lupus poor sleeping

## 2020-07-06 NOTE — ASSESSMENT
[FreeTextEntry1] : Patient is a 45-year-old male with history of lupus, asthma, anxiety, hypertension, headaches, obesity, hypothyroidism who presents for complaint of headaches with episode of vomiting, not feeling well\par \par 1 headaches\par could be tension versus migraine\par continue Tylenol as needed trial of triptans 100 mg as needed\par observe follow-up in four weeks\par \par 2 episode of vomiting with hemoptysis\par quite negative hemorrhoids on rectal\par \par 3 lupus\par spoke with rheumatology who ordered blood work for active disease observe\par \par 4 anxiety\par continue Xanax trial of Lexapro which she may never started 10 mg a day\par follow-up in four weeks\par \par 5 hypothyroidism\par continue levothyroxine check TFTs\par \par 6 sinus issues\par so ENT today has chronic changes\par \par 7. Hypertension\par continue amlodipine five borderline control but agitated.

## 2020-07-06 NOTE — REVIEW OF SYSTEMS
[Fever] : no fever [Chills] : no chills [Hot Flashes] : no hot flashes [Night Sweats] : no night sweats [Muscle Pain] : no muscle pain [Recent Change In Weight] : ~T no recent weight change [Back Pain] : no back pain [Dizziness] : no dizziness [Fainting] : no fainting [Confusion] : no confusion [Unsteady Walk] : no ataxia [Memory Loss] : no memory loss

## 2020-07-06 NOTE — ED ADULT NURSE NOTE - NS ED NURSE LEVEL OF CONSCIOUSNESS MENTAL STATUS
Awake Quality 226: Preventive Care And Screening: Tobacco Use: Screening And Cessation Intervention: Patient screened for tobacco use and is an ex/non-smoker Detail Level: Detailed Quality 130: Documentation Of Current Medications In The Medical Record: Current Medications Documented Quality 431: Preventive Care And Screening: Unhealthy Alcohol Use - Screening: Patient screened for unhealthy alcohol use using a single question and scores less than 2 times per year

## 2020-07-07 ENCOUNTER — APPOINTMENT (OUTPATIENT)
Dept: RHEUMATOLOGY | Facility: CLINIC | Age: 45
End: 2020-07-07
Payer: COMMERCIAL

## 2020-07-07 VITALS
TEMPERATURE: 97.7 F | DIASTOLIC BLOOD PRESSURE: 89 MMHG | OXYGEN SATURATION: 98 % | RESPIRATION RATE: 16 BRPM | HEIGHT: 67 IN | SYSTOLIC BLOOD PRESSURE: 122 MMHG | HEART RATE: 88 BPM

## 2020-07-07 DIAGNOSIS — J34.2 DEVIATED NASAL SEPTUM: ICD-10-CM

## 2020-07-07 PROCEDURE — 99215 OFFICE O/P EST HI 40 MIN: CPT

## 2020-08-06 ENCOUNTER — APPOINTMENT (OUTPATIENT)
Dept: INTERNAL MEDICINE | Facility: CLINIC | Age: 45
End: 2020-08-06

## 2020-08-07 ENCOUNTER — APPOINTMENT (OUTPATIENT)
Dept: RHEUMATOLOGY | Facility: CLINIC | Age: 45
End: 2020-08-07
Payer: COMMERCIAL

## 2020-08-07 VITALS
TEMPERATURE: 98 F | BODY MASS INDEX: 41.59 KG/M2 | RESPIRATION RATE: 16 BRPM | DIASTOLIC BLOOD PRESSURE: 84 MMHG | OXYGEN SATURATION: 97 % | SYSTOLIC BLOOD PRESSURE: 134 MMHG | HEIGHT: 67 IN | HEART RATE: 101 BPM | WEIGHT: 265 LBS

## 2020-08-07 PROCEDURE — 99214 OFFICE O/P EST MOD 30 MIN: CPT

## 2020-08-11 LAB
25(OH)D3 SERPL-MCNC: 39.5 NG/ML
ALBUMIN SERPL ELPH-MCNC: 4.9 G/DL
ALP BLD-CCNC: 87 U/L
ALT SERPL-CCNC: 44 U/L
ANION GAP SERPL CALC-SCNC: 15 MMOL/L
APPEARANCE: CLEAR
AST SERPL-CCNC: 34 U/L
BACTERIA: NEGATIVE
BASOPHILS # BLD AUTO: 0.02 K/UL
BASOPHILS NFR BLD AUTO: 0.2 %
BILIRUB SERPL-MCNC: 0.3 MG/DL
BILIRUBIN URINE: NEGATIVE
BLOOD URINE: NEGATIVE
BUN SERPL-MCNC: 13 MG/DL
C3 SERPL-MCNC: 137 MG/DL
C4 SERPL-MCNC: 25 MG/DL
CALCIUM SERPL-MCNC: 10.1 MG/DL
CHLORIDE SERPL-SCNC: 100 MMOL/L
CK SERPL-CCNC: 331 U/L
CO2 SERPL-SCNC: 23 MMOL/L
COLOR: NORMAL
CREAT SERPL-MCNC: 0.96 MG/DL
CREAT SPEC-SCNC: 45 MG/DL
CREAT/PROT UR: 0.1 RATIO
DSDNA AB SER-ACNC: 13 IU/ML
EOSINOPHIL # BLD AUTO: 0.02 K/UL
EOSINOPHIL NFR BLD AUTO: 0.2 %
ERYTHROCYTE [SEDIMENTATION RATE] IN BLOOD BY WESTERGREN METHOD: 82 MM/HR
GLUCOSE QUALITATIVE U: NEGATIVE
GLUCOSE SERPL-MCNC: 106 MG/DL
HCT VFR BLD CALC: 42.3 %
HGB BLD-MCNC: 14.4 G/DL
HYALINE CASTS: 0 /LPF
IMM GRANULOCYTES NFR BLD AUTO: 0.3 %
KETONES URINE: NEGATIVE
LEUKOCYTE ESTERASE URINE: NEGATIVE
LYMPHOCYTES # BLD AUTO: 0.92 K/UL
LYMPHOCYTES NFR BLD AUTO: 10.6 %
MAN DIFF?: NORMAL
MCHC RBC-ENTMCNC: 29 PG
MCHC RBC-ENTMCNC: 34 GM/DL
MCV RBC AUTO: 85.1 FL
MICROSCOPIC-UA: NORMAL
MONOCYTES # BLD AUTO: 0.5 K/UL
MONOCYTES NFR BLD AUTO: 5.8 %
NEUTROPHILS # BLD AUTO: 7.17 K/UL
NEUTROPHILS NFR BLD AUTO: 82.9 %
NITRITE URINE: NEGATIVE
PH URINE: 7
PLATELET # BLD AUTO: 338 K/UL
POTASSIUM SERPL-SCNC: 4.6 MMOL/L
PROT SERPL-MCNC: 7.6 G/DL
PROT UR-MCNC: 5 MG/DL
PROTEIN URINE: NEGATIVE
RBC # BLD: 4.97 M/UL
RBC # FLD: 14.2 %
RED BLOOD CELLS URINE: 0 /HPF
SODIUM SERPL-SCNC: 137 MMOL/L
SPECIFIC GRAVITY URINE: 1.01
SQUAMOUS EPITHELIAL CELLS: 0 /HPF
UROBILINOGEN URINE: NORMAL
WBC # FLD AUTO: 8.66 K/UL
WHITE BLOOD CELLS URINE: 0 /HPF

## 2020-08-24 ENCOUNTER — APPOINTMENT (OUTPATIENT)
Dept: CT IMAGING | Facility: CLINIC | Age: 45
End: 2020-08-24
Payer: COMMERCIAL

## 2020-08-24 ENCOUNTER — RESULT REVIEW (OUTPATIENT)
Age: 45
End: 2020-08-24

## 2020-08-24 ENCOUNTER — OUTPATIENT (OUTPATIENT)
Dept: OUTPATIENT SERVICES | Facility: HOSPITAL | Age: 45
LOS: 1 days | End: 2020-08-24
Payer: COMMERCIAL

## 2020-08-24 DIAGNOSIS — R51 HEADACHE: ICD-10-CM

## 2020-08-24 PROCEDURE — 70450 CT HEAD/BRAIN W/O DYE: CPT | Mod: 26

## 2020-08-24 PROCEDURE — 70450 CT HEAD/BRAIN W/O DYE: CPT

## 2020-09-16 ENCOUNTER — APPOINTMENT (OUTPATIENT)
Dept: RHEUMATOLOGY | Facility: CLINIC | Age: 45
End: 2020-09-16
Payer: COMMERCIAL

## 2020-09-16 VITALS
HEART RATE: 99 BPM | TEMPERATURE: 98.1 F | OXYGEN SATURATION: 97 % | DIASTOLIC BLOOD PRESSURE: 82 MMHG | SYSTOLIC BLOOD PRESSURE: 126 MMHG | BODY MASS INDEX: 41.59 KG/M2 | HEIGHT: 67 IN | WEIGHT: 265 LBS | RESPIRATION RATE: 16 BRPM

## 2020-09-16 DIAGNOSIS — Z23 ENCOUNTER FOR IMMUNIZATION: ICD-10-CM

## 2020-09-16 PROCEDURE — G0008: CPT

## 2020-09-16 PROCEDURE — 90686 IIV4 VACC NO PRSV 0.5 ML IM: CPT

## 2020-09-16 PROCEDURE — 99214 OFFICE O/P EST MOD 30 MIN: CPT | Mod: 25

## 2020-09-16 RX ORDER — AMLODIPINE BESYLATE 5 MG/1
5 TABLET ORAL DAILY
Qty: 90 | Refills: 0 | Status: DISCONTINUED | COMMUNITY
Start: 2020-05-26 | End: 2020-09-16

## 2020-09-17 ENCOUNTER — APPOINTMENT (OUTPATIENT)
Dept: INTERNAL MEDICINE | Facility: CLINIC | Age: 45
End: 2020-09-17
Payer: COMMERCIAL

## 2020-09-17 VITALS
DIASTOLIC BLOOD PRESSURE: 78 MMHG | BODY MASS INDEX: 41.59 KG/M2 | OXYGEN SATURATION: 97 % | WEIGHT: 265 LBS | HEART RATE: 96 BPM | HEIGHT: 67 IN | TEMPERATURE: 98 F | SYSTOLIC BLOOD PRESSURE: 128 MMHG

## 2020-09-17 LAB
25(OH)D3 SERPL-MCNC: 43.4 NG/ML
ALBUMIN SERPL ELPH-MCNC: 4.7 G/DL
ALBUMIN SERPL ELPH-MCNC: 4.7 G/DL
ALP BLD-CCNC: 84 U/L
ALP BLD-CCNC: 84 U/L
ALT SERPL-CCNC: 42 U/L
ALT SERPL-CCNC: 42 U/L
ANION GAP SERPL CALC-SCNC: 13 MMOL/L
ANION GAP SERPL CALC-SCNC: 13 MMOL/L
APPEARANCE: CLEAR
AST SERPL-CCNC: 31 U/L
AST SERPL-CCNC: 31 U/L
BACTERIA: NEGATIVE
BASOPHILS # BLD AUTO: 0.02 K/UL
BASOPHILS NFR BLD AUTO: 0.2 %
BILIRUB SERPL-MCNC: 0.3 MG/DL
BILIRUB SERPL-MCNC: 0.3 MG/DL
BILIRUBIN URINE: NEGATIVE
BLOOD URINE: NEGATIVE
BUN SERPL-MCNC: 15 MG/DL
BUN SERPL-MCNC: 15 MG/DL
C3 SERPL-MCNC: 135 MG/DL
C4 SERPL-MCNC: 25 MG/DL
CALCIUM SERPL-MCNC: 9.6 MG/DL
CALCIUM SERPL-MCNC: 9.6 MG/DL
CHLORIDE SERPL-SCNC: 98 MMOL/L
CHLORIDE SERPL-SCNC: 98 MMOL/L
CK SERPL-CCNC: 203 U/L
CO2 SERPL-SCNC: 26 MMOL/L
CO2 SERPL-SCNC: 26 MMOL/L
COLOR: YELLOW
CREAT SERPL-MCNC: 0.96 MG/DL
CREAT SERPL-MCNC: 0.96 MG/DL
CREAT SPEC-SCNC: 187 MG/DL
CREAT/PROT UR: 0.1 RATIO
DSDNA AB SER-ACNC: <12 IU/ML
EOSINOPHIL # BLD AUTO: 0.04 K/UL
EOSINOPHIL NFR BLD AUTO: 0.5 %
ERYTHROCYTE [SEDIMENTATION RATE] IN BLOOD BY WESTERGREN METHOD: 72 MM/HR
GLUCOSE QUALITATIVE U: NEGATIVE
GLUCOSE SERPL-MCNC: 115 MG/DL
HCT VFR BLD CALC: 42.7 %
HGB BLD-MCNC: 14.3 G/DL
HYALINE CASTS: 0 /LPF
IMM GRANULOCYTES NFR BLD AUTO: 0.5 %
KETONES URINE: NEGATIVE
LEUKOCYTE ESTERASE URINE: NEGATIVE
LYMPHOCYTES # BLD AUTO: 1.02 K/UL
LYMPHOCYTES NFR BLD AUTO: 12.1 %
MAN DIFF?: NORMAL
MCHC RBC-ENTMCNC: 28.7 PG
MCHC RBC-ENTMCNC: 33.5 GM/DL
MCV RBC AUTO: 85.6 FL
MICROSCOPIC-UA: NORMAL
MONOCYTES # BLD AUTO: 0.51 K/UL
MONOCYTES NFR BLD AUTO: 6 %
NEUTROPHILS # BLD AUTO: 6.8 K/UL
NEUTROPHILS NFR BLD AUTO: 80.7 %
NITRITE URINE: NEGATIVE
PH URINE: 7.5
PLATELET # BLD AUTO: 331 K/UL
POTASSIUM SERPL-SCNC: 4.3 MMOL/L
POTASSIUM SERPL-SCNC: 4.3 MMOL/L
PROT SERPL-MCNC: 7.6 G/DL
PROT SERPL-MCNC: 7.6 G/DL
PROT UR-MCNC: 18 MG/DL
PROTEIN URINE: ABNORMAL
RBC # BLD: 4.99 M/UL
RBC # FLD: 14.4 %
RED BLOOD CELLS URINE: 3 /HPF
SODIUM SERPL-SCNC: 136 MMOL/L
SODIUM SERPL-SCNC: 136 MMOL/L
SPECIFIC GRAVITY URINE: 1.03
SQUAMOUS EPITHELIAL CELLS: 0 /HPF
UROBILINOGEN URINE: NORMAL
WBC # FLD AUTO: 8.43 K/UL
WHITE BLOOD CELLS URINE: 1 /HPF

## 2020-09-17 PROCEDURE — 99214 OFFICE O/P EST MOD 30 MIN: CPT

## 2020-09-17 NOTE — REVIEW OF SYSTEMS
[Fatigue] : fatigue [Joint Pain] : joint pain [Joint Stiffness] : joint stiffness [Negative] : Gastrointestinal [Fever] : no fever [Chills] : no chills [Hot Flashes] : no hot flashes [Night Sweats] : no night sweats [Recent Change In Weight] : ~T no recent weight change [Earache] : no earache [Hearing Loss] : no hearing loss [Nosebleeds] : no nosebleeds [Postnasal Drip] : no postnasal drip [Nasal Discharge] : no nasal discharge [Sore Throat] : no sore throat [Hoarseness] : no hoarseness [Muscle Pain] : no muscle pain [Muscle Weakness] : no muscle weakness [Back Pain] : no back pain [Joint Swelling] : no joint swelling

## 2020-09-17 NOTE — PHYSICAL EXAM
[Normal Sclera/Conjunctiva] : normal sclera/conjunctiva [Normal] : no posterior cervical lymphadenopathy and no anterior cervical lymphadenopathy [de-identified] : TMs clear slight fluid on the left

## 2020-09-17 NOTE — ASSESSMENT
[FreeTextEntry1] : Patient is a 45-year-old male with history of lupus, obesity, asthma, allergic rhinitis, anxiety, hypothyroidism who presents with symptoms consistent with vertigo and nasal pressure\par \par 1.. Vertigo/nasal pressure\par possibly secondary to allergic rhinitis eustachian tube dysfunction\par trial of amoxicillin 875 BID for seven days and Flonase\par referral to ENT.\par \par 2.. Hypothyroidism\par continue levothyroxine hundred and 112 µg\par check next visit\par \par 3. health maintenance\par receive flu shot yesterday feels slightly 80

## 2020-09-17 NOTE — HISTORY OF PRESENT ILLNESS
[FreeTextEntry8] : CC: several week history of dizziness described as spitting and nasal pressure\par \par HPI: patient is a 45-year-old male with history of overweight/obesity, lupus, asthma, anxiety, hypothyroidism deviated septum who presents with several week history of dizziness described as spinning motion associated with pressure on left side of the head denies rhinorrhea nausea vomiting fever chills joint pain consistent recent increasing prednisone 30 mg a day

## 2020-09-22 ENCOUNTER — RX RENEWAL (OUTPATIENT)
Age: 45
End: 2020-09-22

## 2020-09-24 ENCOUNTER — RX RENEWAL (OUTPATIENT)
Age: 45
End: 2020-09-24

## 2020-10-02 ENCOUNTER — APPOINTMENT (OUTPATIENT)
Dept: OTOLARYNGOLOGY | Facility: CLINIC | Age: 45
End: 2020-10-02
Payer: COMMERCIAL

## 2020-10-02 VITALS
DIASTOLIC BLOOD PRESSURE: 86 MMHG | BODY MASS INDEX: 40.16 KG/M2 | SYSTOLIC BLOOD PRESSURE: 132 MMHG | WEIGHT: 265 LBS | HEART RATE: 73 BPM | HEIGHT: 68 IN

## 2020-10-02 DIAGNOSIS — G43.109 MIGRAINE WITH AURA, NOT INTRACTABLE, W/OUT STATUS MIGRAINOSUS: ICD-10-CM

## 2020-10-02 PROCEDURE — 92504 EAR MICROSCOPY EXAMINATION: CPT

## 2020-10-02 PROCEDURE — 99214 OFFICE O/P EST MOD 30 MIN: CPT | Mod: 25

## 2020-10-02 PROCEDURE — 92557 COMPREHENSIVE HEARING TEST: CPT

## 2020-10-02 PROCEDURE — 92567 TYMPANOMETRY: CPT

## 2020-10-02 NOTE — HISTORY OF PRESENT ILLNESS
[de-identified] : 45 year old male with SLE presents for vertigo  for the past month, occurs about every other day, lasting a few minutes with nausea without vomiting. Describes spinning sensation alternating with sensation of "falling" when leaning back. Reports left ear pressure and otalgia worsen with loud noise, with pressure around the eye. Reports new onset of migraines with photophobia. Seen by PCP, was prescribed and continues to take Amoxicillin. Denies hearing loss, otorrhea, prior ear infections. Reports recent MRI. Currently on prednisone and Mychophenolate for SLE.

## 2020-10-02 NOTE — PROCEDURE
[FreeTextEntry3] : Procedure note:  Binocular microscopy\par \par Oct 02, 2020 \par \par Inspection of the ears was performed under binocular microscopy because of need to accurately visualize otologic landmarks and potential pathologic conditions that would not otherwise be visible through simple otoscopic exam.\par

## 2020-10-02 NOTE — DATA REVIEWED
[de-identified] : I personally reviewed an audiogram, which shows normal hearing bilaterally with type A tympanograms bilaterally.  Word discrimination scores are excellent bilaterally.\par  [de-identified] : I personally reviewed head CT images and the report, which shows no ME/mastoid pathology.\par

## 2020-10-02 NOTE — PHYSICAL EXAM
[Normal] : no rashes [de-identified] : jerrod hallpike negative for nystagmus b/l, positive for subjective dizziness, more so on left

## 2020-10-02 NOTE — CONSULT LETTER
[Consult Letter:] : I had the pleasure of evaluating your patient, [unfilled]. [Please see my note below.] : Please see my note below. [Consult Closing:] : Thank you very much for allowing me to participate in the care of this patient.  If you have any questions, please do not hesitate to contact me. [FreeTextEntry2] : Pritesh Everett MD

## 2020-10-12 ENCOUNTER — APPOINTMENT (OUTPATIENT)
Dept: INTERNAL MEDICINE | Facility: CLINIC | Age: 45
End: 2020-10-12
Payer: COMMERCIAL

## 2020-10-12 VITALS
SYSTOLIC BLOOD PRESSURE: 138 MMHG | HEART RATE: 90 BPM | TEMPERATURE: 96.8 F | DIASTOLIC BLOOD PRESSURE: 84 MMHG | OXYGEN SATURATION: 98 %

## 2020-10-12 PROCEDURE — 99214 OFFICE O/P EST MOD 30 MIN: CPT | Mod: 25

## 2020-10-12 PROCEDURE — 90732 PPSV23 VACC 2 YRS+ SUBQ/IM: CPT

## 2020-10-12 PROCEDURE — G0009: CPT

## 2020-10-12 NOTE — REVIEW OF SYSTEMS
[Joint Pain] : joint pain [Joint Stiffness] : joint stiffness [Muscle Pain] : no muscle pain [Muscle Weakness] : no muscle weakness [Back Pain] : no back pain [Joint Swelling] : joint swelling [Negative] : Integumentary

## 2020-10-12 NOTE — HISTORY OF PRESENT ILLNESS
[FreeTextEntry1] : Follow-up for lupus, isolated elevated blood pressure, hypothyroidism asthma [de-identified] : The patient is 45-year-old male with history of lupus, hypothyroidism, hypertension, asthma, allergic rhinitis, headaches, obesity, snoring, who presents for follow-up patient complains of fatigue joint pain muscle pain unable to be active with kids. Also complains of anxiety stress depression patient works as a  concerned about COVID-19 patient requesting disability. He denies fever, chills, shortness of breath, chest pain

## 2020-10-12 NOTE — ASSESSMENT
[FreeTextEntry1] : Patient is a 45-year-old male with history of obesity, hypertension, lupus, hypothyroidism, asthma, anxiety, history of snoring, who presents for complaint of fatigue joint pain lack of energy\par \par 1. Fatigue life of energy\par etiology unclear whether secondary lupus or other medical condition\par check TFTs\par follow-up with rheumatology on Wednesday\par \par 2. Left ear/head pain/headache\par whether the severity migraines unclear as appointment neurology on Friday.\par \par 3. SLE\par continue steroids\par has follow-up with rheumatology\par \par 4.. Hypothyroidism\par continue hundred 112 µg check TFTs\par \par 5. Obesity\par multifactorial from steroids to depression\par \par 6. Anxiety\par patient should start Lexapro 20 mg a day\par \par 7 asthma\par continue inhalers doing well\par \par 8. Health maintenance\par patient receive flu shot pneumococcal vaccine today\par \par

## 2020-10-12 NOTE — PHYSICAL EXAM
[Normal Sclera/Conjunctiva] : normal sclera/conjunctiva [Normal Outer Ear/Nose] : the outer ears and nose were normal in appearance [Normal] : no CVA or spinal tenderness

## 2020-10-14 ENCOUNTER — APPOINTMENT (OUTPATIENT)
Dept: RHEUMATOLOGY | Facility: CLINIC | Age: 45
End: 2020-10-14
Payer: COMMERCIAL

## 2020-10-14 VITALS
BODY MASS INDEX: 40.16 KG/M2 | HEIGHT: 68 IN | TEMPERATURE: 97.8 F | DIASTOLIC BLOOD PRESSURE: 93 MMHG | WEIGHT: 265 LBS | OXYGEN SATURATION: 96 % | HEART RATE: 91 BPM | SYSTOLIC BLOOD PRESSURE: 134 MMHG | RESPIRATION RATE: 16 BRPM

## 2020-10-14 PROCEDURE — 99215 OFFICE O/P EST HI 40 MIN: CPT

## 2020-10-15 LAB
25(OH)D3 SERPL-MCNC: 34.1 NG/ML
ALBUMIN SERPL ELPH-MCNC: 4.7 G/DL
ALP BLD-CCNC: 100 U/L
ALT SERPL-CCNC: 32 U/L
ANION GAP SERPL CALC-SCNC: 14 MMOL/L
APPEARANCE: CLEAR
AST SERPL-CCNC: 24 U/L
BACTERIA: NEGATIVE
BASOPHILS # BLD AUTO: 0.02 K/UL
BASOPHILS NFR BLD AUTO: 0.2 %
BILIRUB SERPL-MCNC: 0.2 MG/DL
BILIRUBIN URINE: NEGATIVE
BLOOD URINE: NEGATIVE
BUN SERPL-MCNC: 16 MG/DL
C3 SERPL-MCNC: 164 MG/DL
C4 SERPL-MCNC: 28 MG/DL
CALCIUM SERPL-MCNC: 10.2 MG/DL
CHLORIDE SERPL-SCNC: 98 MMOL/L
CK SERPL-CCNC: 145 U/L
CO2 SERPL-SCNC: 24 MMOL/L
COLOR: NORMAL
CREAT SERPL-MCNC: 1.04 MG/DL
CREAT SPEC-SCNC: 49 MG/DL
CREAT/PROT UR: 0.1 RATIO
DSDNA AB SER-ACNC: 14 IU/ML
EOSINOPHIL # BLD AUTO: 0 K/UL
EOSINOPHIL NFR BLD AUTO: 0 %
ERYTHROCYTE [SEDIMENTATION RATE] IN BLOOD BY WESTERGREN METHOD: 60 MM/HR
GLUCOSE QUALITATIVE U: NEGATIVE
GLUCOSE SERPL-MCNC: 113 MG/DL
HAV IGM SER QL: ABNORMAL
HBV CORE IGG+IGM SER QL: NONREACTIVE
HBV CORE IGM SER QL: NONREACTIVE
HBV SURFACE AG SER QL: NONREACTIVE
HCT VFR BLD CALC: 44.6 %
HCV AB SER QL: NONREACTIVE
HCV S/CO RATIO: 0.1 S/CO
HGB BLD-MCNC: 14.7 G/DL
HYALINE CASTS: 0 /LPF
IMM GRANULOCYTES NFR BLD AUTO: 0.7 %
KETONES URINE: NEGATIVE
LEUKOCYTE ESTERASE URINE: NEGATIVE
LYMPHOCYTES # BLD AUTO: 0.97 K/UL
LYMPHOCYTES NFR BLD AUTO: 7.4 %
MAN DIFF?: NORMAL
MCHC RBC-ENTMCNC: 28.6 PG
MCHC RBC-ENTMCNC: 33 GM/DL
MCV RBC AUTO: 86.8 FL
MICROSCOPIC-UA: NORMAL
MONOCYTES # BLD AUTO: 0.48 K/UL
MONOCYTES NFR BLD AUTO: 3.7 %
NEUTROPHILS # BLD AUTO: 11.51 K/UL
NEUTROPHILS NFR BLD AUTO: 88 %
NITRITE URINE: NEGATIVE
PH URINE: 6.5
PLATELET # BLD AUTO: 365 K/UL
POTASSIUM SERPL-SCNC: 4.7 MMOL/L
PROT SERPL-MCNC: 7.8 G/DL
PROT UR-MCNC: 5 MG/DL
PROTEIN URINE: NEGATIVE
RBC # BLD: 5.14 M/UL
RBC # FLD: 14.1 %
RED BLOOD CELLS URINE: 0 /HPF
SODIUM SERPL-SCNC: 136 MMOL/L
SPECIFIC GRAVITY URINE: 1.01
SQUAMOUS EPITHELIAL CELLS: 0 /HPF
TSH SERPL-ACNC: 1.65 UIU/ML
UROBILINOGEN URINE: NORMAL
WBC # FLD AUTO: 13.07 K/UL
WHITE BLOOD CELLS URINE: 0 /HPF

## 2020-10-16 ENCOUNTER — APPOINTMENT (OUTPATIENT)
Dept: PAIN MANAGEMENT | Facility: CLINIC | Age: 45
End: 2020-10-16
Payer: COMMERCIAL

## 2020-10-16 ENCOUNTER — TRANSCRIPTION ENCOUNTER (OUTPATIENT)
Age: 45
End: 2020-10-16

## 2020-10-16 VITALS
DIASTOLIC BLOOD PRESSURE: 83 MMHG | HEIGHT: 68 IN | WEIGHT: 265 LBS | SYSTOLIC BLOOD PRESSURE: 129 MMHG | BODY MASS INDEX: 40.16 KG/M2 | HEART RATE: 93 BPM

## 2020-10-16 PROCEDURE — 99204 OFFICE O/P NEW MOD 45 MIN: CPT

## 2020-10-18 NOTE — PHYSICAL EXAM
[General Appearance - Alert] : alert [General Appearance - Well Nourished] : well nourished [General Appearance - Well-Appearing] : healthy appearing [General Appearance - Well Developed] : well developed [FreeTextEntry1] : morbidly obese [Oriented To Time, Place, And Person] : oriented to person, place, and time [Affect] : the affect was normal [Impaired Insight] : insight and judgment were intact [Mood] : the mood was normal [Memory Recent] : recent memory was not impaired [Memory Remote] : remote memory was not impaired [Place] : oriented to place [Person] : oriented to person [Time] : oriented to time [Short Term Intact] : short term memory intact [Registration Intact] : recent registration memory intact [Remote Intact] : remote memory intact [Concentration Intact] : normal concentrating ability [Naming Objects] : no difficulty naming common objects [Visual Intact] : visual attention was ~T not ~L decreased [Writing A Sentence] : no difficulty writing a sentence [Fluency] : fluency intact [Comprehension] : comprehension intact [Current Events] : adequate knowledge of current events [Reading] : reading intact [Cranial Nerves Oculomotor (III)] : extraocular motion intact [Past History] : adequate knowledge of personal past history [Cranial Nerves Vestibulocochlear (VIII)] : hearing was intact bilaterally [Cranial Nerves Facial (VII)] : face symmetrical [Cranial Nerves Accessory (XI - Cranial And Spinal)] : head turning and shoulder shrug symmetric [Involuntary Movements] : no involuntary movements were seen [No Muscle Atrophy] : normal bulk in all four extremities [Motor Handedness Right-Handed] : the patient is right hand dominant [Dysdiadochokinesia Bilaterally] : not present [Tremor] : no tremor present [Strabismus] : no strabismus was seen [No ILIANA] : no internuclear ophthalmoplegia [Sclera] : the sclera and conjunctiva were normal [Outer Ear] : the ears and nose were normal in appearance [Neck Cervical Mass (___cm)] : no neck mass was observed [Exaggerated Use Of Accessory Muscles For Inspiration] : no accessory muscle use [Edema] : there was no peripheral edema [Abnormal Walk] : normal gait [Nail Clubbing] : no clubbing  or cyanosis of the fingernails [] : no rash [Skin Color & Pigmentation] : normal skin color and pigmentation

## 2020-10-18 NOTE — HISTORY OF PRESENT ILLNESS
[FreeTextEntry1] : Pt has had history of lupus, asthma and hypothyroidism amongst other diagnosis and is on multiple medications including hydrochlorothiazide.\par Notes that he has begun to have migraines severely more recently.  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 Kanner in 12/2019 when he was having generalized parasthesias as well which led to generalized itching worsened with stress.\par Notes that the pressures he has in his head is "like pulsating basketball." mild nausea, mild p/p phobia\par Does feel that headache really only last 6 months.\par Does feel that some of this is related to steroid use and does feel that his other meds may play a role as well.\par \par Feels he has to close eyes.  Feels under great fatigue at night as well.  \par "feel like an echo in my head."\par Does take microphenylate. \par Does note "lock jaw movement." he relates to use of medications including that most recently.\par  [Headache] : headache [Dizziness] : dizziness [Neck Pain] : neck pain [Nausea] : nausea [> 4 hours] : > 4 hours [Scalp Tenderness] : scalp tenderness [Worsened] : The patient reports ~his/her~ symptoms since the last visit have worsened [worsened] : worsened [de-identified] : variable

## 2020-10-18 NOTE — ASSESSMENT
[FreeTextEntry1] : Pt with multifactorial reasons for worsening headaches.  Have migrainous phenotype but unclear worsening secondary to medical illness, obesity, snoring/ apnea or medication.\par Will give trial of vitamin prevention\par consider for use of topiramate vs cgrp antibody prophylactically.\par will give info re: AMF and dawnbuse websites\par consider use of cgrp antagonist prn.

## 2020-10-19 ENCOUNTER — EMERGENCY (EMERGENCY)
Facility: HOSPITAL | Age: 45
LOS: 1 days | Discharge: HOME | End: 2020-10-19
Attending: EMERGENCY MEDICINE
Payer: COMMERCIAL

## 2020-10-19 VITALS
HEART RATE: 89 BPM | OXYGEN SATURATION: 95 % | DIASTOLIC BLOOD PRESSURE: 78 MMHG | WEIGHT: 265 LBS | SYSTOLIC BLOOD PRESSURE: 126 MMHG | TEMPERATURE: 99 F | HEIGHT: 67 IN | RESPIRATION RATE: 19 BRPM

## 2020-10-19 VITALS
SYSTOLIC BLOOD PRESSURE: 132 MMHG | HEART RATE: 75 BPM | OXYGEN SATURATION: 100 % | DIASTOLIC BLOOD PRESSURE: 99 MMHG | RESPIRATION RATE: 16 BRPM

## 2020-10-19 DIAGNOSIS — Z90.49 ACQUIRED ABSENCE OF OTHER SPECIFIED PARTS OF DIGESTIVE TRACT: Chronic | ICD-10-CM

## 2020-10-19 LAB
ALBUMIN SERPL ELPH-MCNC: 4.6 G/DL — SIGNIFICANT CHANGE UP (ref 3.3–5)
ALP SERPL-CCNC: 96 U/L — SIGNIFICANT CHANGE UP (ref 40–120)
ALT FLD-CCNC: 44 U/L — SIGNIFICANT CHANGE UP (ref 10–45)
ANION GAP SERPL CALC-SCNC: 12 MMOL/L — SIGNIFICANT CHANGE UP (ref 5–17)
APPEARANCE UR: CLEAR — SIGNIFICANT CHANGE UP
AST SERPL-CCNC: 24 U/L — SIGNIFICANT CHANGE UP (ref 10–40)
BASOPHILS # BLD AUTO: 0.01 K/UL — SIGNIFICANT CHANGE UP (ref 0–0.2)
BASOPHILS NFR BLD AUTO: 0.1 % — SIGNIFICANT CHANGE UP (ref 0–2)
BILIRUB SERPL-MCNC: 0.2 MG/DL — SIGNIFICANT CHANGE UP (ref 0.2–1.2)
BILIRUB UR-MCNC: NEGATIVE — SIGNIFICANT CHANGE UP
BUN SERPL-MCNC: 9 MG/DL — SIGNIFICANT CHANGE UP (ref 7–23)
CALCIUM SERPL-MCNC: 9.8 MG/DL — SIGNIFICANT CHANGE UP (ref 8.4–10.5)
CHLORIDE SERPL-SCNC: 99 MMOL/L — SIGNIFICANT CHANGE UP (ref 96–108)
CO2 SERPL-SCNC: 27 MMOL/L — SIGNIFICANT CHANGE UP (ref 22–31)
COLOR SPEC: SIGNIFICANT CHANGE UP
CREAT SERPL-MCNC: 0.96 MG/DL — SIGNIFICANT CHANGE UP (ref 0.5–1.3)
DIFF PNL FLD: NEGATIVE — SIGNIFICANT CHANGE UP
EOSINOPHIL # BLD AUTO: 0 K/UL — SIGNIFICANT CHANGE UP (ref 0–0.5)
EOSINOPHIL NFR BLD AUTO: 0 % — SIGNIFICANT CHANGE UP (ref 0–6)
GAS PNL BLDV: SIGNIFICANT CHANGE UP
GAS PNL BLDV: SIGNIFICANT CHANGE UP
GLUCOSE SERPL-MCNC: 123 MG/DL — HIGH (ref 70–99)
GLUCOSE UR QL: NEGATIVE — SIGNIFICANT CHANGE UP
HCT VFR BLD CALC: 44.7 % — SIGNIFICANT CHANGE UP (ref 39–50)
HGB BLD-MCNC: 15.2 G/DL — SIGNIFICANT CHANGE UP (ref 13–17)
IMM GRANULOCYTES NFR BLD AUTO: 0.8 % — SIGNIFICANT CHANGE UP (ref 0–1.5)
KETONES UR-MCNC: NEGATIVE — SIGNIFICANT CHANGE UP
LEUKOCYTE ESTERASE UR-ACNC: NEGATIVE — SIGNIFICANT CHANGE UP
LIDOCAIN IGE QN: 61 U/L — HIGH (ref 7–60)
LYMPHOCYTES # BLD AUTO: 0.93 K/UL — LOW (ref 1–3.3)
LYMPHOCYTES # BLD AUTO: 9.1 % — LOW (ref 13–44)
MCHC RBC-ENTMCNC: 29 PG — SIGNIFICANT CHANGE UP (ref 27–34)
MCHC RBC-ENTMCNC: 34 GM/DL — SIGNIFICANT CHANGE UP (ref 32–36)
MCV RBC AUTO: 85.3 FL — SIGNIFICANT CHANGE UP (ref 80–100)
MONOCYTES # BLD AUTO: 0.38 K/UL — SIGNIFICANT CHANGE UP (ref 0–0.9)
MONOCYTES NFR BLD AUTO: 3.7 % — SIGNIFICANT CHANGE UP (ref 2–14)
NEUTROPHILS # BLD AUTO: 8.87 K/UL — HIGH (ref 1.8–7.4)
NEUTROPHILS NFR BLD AUTO: 86.3 % — HIGH (ref 43–77)
NITRITE UR-MCNC: NEGATIVE — SIGNIFICANT CHANGE UP
NRBC # BLD: 0 /100 WBCS — SIGNIFICANT CHANGE UP (ref 0–0)
PH UR: 7 — SIGNIFICANT CHANGE UP (ref 5–8)
PLATELET # BLD AUTO: 343 K/UL — SIGNIFICANT CHANGE UP (ref 150–400)
POTASSIUM SERPL-MCNC: 3.9 MMOL/L — SIGNIFICANT CHANGE UP (ref 3.5–5.3)
POTASSIUM SERPL-SCNC: 3.9 MMOL/L — SIGNIFICANT CHANGE UP (ref 3.5–5.3)
PROT SERPL-MCNC: 8.4 G/DL — HIGH (ref 6–8.3)
PROT UR-MCNC: NEGATIVE — SIGNIFICANT CHANGE UP
RBC # BLD: 5.24 M/UL — SIGNIFICANT CHANGE UP (ref 4.2–5.8)
RBC # FLD: 13.6 % — SIGNIFICANT CHANGE UP (ref 10.3–14.5)
SODIUM SERPL-SCNC: 138 MMOL/L — SIGNIFICANT CHANGE UP (ref 135–145)
SP GR SPEC: 1.01 — SIGNIFICANT CHANGE UP (ref 1.01–1.02)
TROPONIN T, HIGH SENSITIVITY RESULT: <6 NG/L — SIGNIFICANT CHANGE UP (ref 0–51)
UROBILINOGEN FLD QL: NEGATIVE — SIGNIFICANT CHANGE UP
WBC # BLD: 10.27 K/UL — SIGNIFICANT CHANGE UP (ref 3.8–10.5)
WBC # FLD AUTO: 10.27 K/UL — SIGNIFICANT CHANGE UP (ref 3.8–10.5)

## 2020-10-19 PROCEDURE — 83605 ASSAY OF LACTIC ACID: CPT

## 2020-10-19 PROCEDURE — 82947 ASSAY GLUCOSE BLOOD QUANT: CPT

## 2020-10-19 PROCEDURE — 99284 EMERGENCY DEPT VISIT MOD MDM: CPT | Mod: 25

## 2020-10-19 PROCEDURE — 84484 ASSAY OF TROPONIN QUANT: CPT

## 2020-10-19 PROCEDURE — 76705 ECHO EXAM OF ABDOMEN: CPT

## 2020-10-19 PROCEDURE — 74177 CT ABD & PELVIS W/CONTRAST: CPT

## 2020-10-19 PROCEDURE — 85014 HEMATOCRIT: CPT

## 2020-10-19 PROCEDURE — 80053 COMPREHEN METABOLIC PANEL: CPT

## 2020-10-19 PROCEDURE — 84295 ASSAY OF SERUM SODIUM: CPT

## 2020-10-19 PROCEDURE — 93005 ELECTROCARDIOGRAM TRACING: CPT | Mod: 76

## 2020-10-19 PROCEDURE — 82803 BLOOD GASES ANY COMBINATION: CPT

## 2020-10-19 PROCEDURE — 93010 ELECTROCARDIOGRAM REPORT: CPT

## 2020-10-19 PROCEDURE — 96374 THER/PROPH/DIAG INJ IV PUSH: CPT | Mod: XU

## 2020-10-19 PROCEDURE — 85025 COMPLETE CBC W/AUTO DIFF WBC: CPT

## 2020-10-19 PROCEDURE — 99284 EMERGENCY DEPT VISIT MOD MDM: CPT

## 2020-10-19 PROCEDURE — 81003 URINALYSIS AUTO W/O SCOPE: CPT

## 2020-10-19 PROCEDURE — 71046 X-RAY EXAM CHEST 2 VIEWS: CPT | Mod: 26

## 2020-10-19 PROCEDURE — 76705 ECHO EXAM OF ABDOMEN: CPT | Mod: 26,RT

## 2020-10-19 PROCEDURE — 82435 ASSAY OF BLOOD CHLORIDE: CPT

## 2020-10-19 PROCEDURE — 71046 X-RAY EXAM CHEST 2 VIEWS: CPT

## 2020-10-19 PROCEDURE — 85018 HEMOGLOBIN: CPT

## 2020-10-19 PROCEDURE — 84132 ASSAY OF SERUM POTASSIUM: CPT

## 2020-10-19 PROCEDURE — 82330 ASSAY OF CALCIUM: CPT

## 2020-10-19 PROCEDURE — 83690 ASSAY OF LIPASE: CPT

## 2020-10-19 PROCEDURE — 74177 CT ABD & PELVIS W/CONTRAST: CPT | Mod: 26

## 2020-10-19 PROCEDURE — 96361 HYDRATE IV INFUSION ADD-ON: CPT

## 2020-10-19 RX ORDER — MORPHINE SULFATE 50 MG/1
4 CAPSULE, EXTENDED RELEASE ORAL ONCE
Refills: 0 | Status: DISCONTINUED | OUTPATIENT
Start: 2020-10-19 | End: 2020-10-19

## 2020-10-19 RX ORDER — SODIUM CHLORIDE 9 MG/ML
1000 INJECTION INTRAMUSCULAR; INTRAVENOUS; SUBCUTANEOUS ONCE
Refills: 0 | Status: COMPLETED | OUTPATIENT
Start: 2020-10-19 | End: 2020-10-19

## 2020-10-19 RX ORDER — ACETAMINOPHEN 500 MG
975 TABLET ORAL ONCE
Refills: 0 | Status: COMPLETED | OUTPATIENT
Start: 2020-10-19 | End: 2020-10-19

## 2020-10-19 RX ADMIN — SODIUM CHLORIDE 1000 MILLILITER(S): 9 INJECTION INTRAMUSCULAR; INTRAVENOUS; SUBCUTANEOUS at 17:53

## 2020-10-19 RX ADMIN — Medication 975 MILLIGRAM(S): at 21:18

## 2020-10-19 RX ADMIN — MORPHINE SULFATE 4 MILLIGRAM(S): 50 CAPSULE, EXTENDED RELEASE ORAL at 16:48

## 2020-10-19 RX ADMIN — SODIUM CHLORIDE 1000 MILLILITER(S): 9 INJECTION INTRAMUSCULAR; INTRAVENOUS; SUBCUTANEOUS at 16:49

## 2020-10-19 RX ADMIN — SODIUM CHLORIDE 1000 MILLILITER(S): 9 INJECTION INTRAMUSCULAR; INTRAVENOUS; SUBCUTANEOUS at 19:30

## 2020-10-19 RX ADMIN — MORPHINE SULFATE 4 MILLIGRAM(S): 50 CAPSULE, EXTENDED RELEASE ORAL at 17:53

## 2020-10-19 NOTE — ED ADULT NURSE NOTE - OBJECTIVE STATEMENT
46yo M aaaox4 ambulatory from home presents to Ed c./o abdominal pain and sob, as per pt states 2 days began abdominal pain (epigastrium)  L side area , w/ radiation to the 46yo M aaaox4 Hx lupus and hypothyroidism ambulatory from home presents to Ed c./o abdominal pain and sob, as per pt states 2 days began abdominal pain (epigastrium)  L side area , w/ radiation to the L axilla and "sometimes" to the back, pt reports nausea yesterday, denies at this time nausea ., also c/o ha  Pt denies CP, SOB, vision changes, n/v/d, fevers chills, Gu issues, weakness, dizziness . Safety and comfort measures initiated- bed placed in lowest position and side rails raised. Pt oriented to call bell system.

## 2020-10-19 NOTE — ED ADULT NURSE NOTE - NSIMPLEMENTINTERV_GEN_ALL_ED
Implemented All Universal Safety Interventions:  [place] to call system. Call bell, personal items and telephone within reach. Instruct patient to call for assistance. Room bathroom lighting operational. Non-slip footwear when patient is off stretcher. Physically safe environment: no spills, clutter or unnecessary equipment. Stretcher in lowest position, wheels locked, appropriate side rails in place.

## 2020-10-19 NOTE — ED PROVIDER NOTE - PROGRESS NOTE DETAILS
ED Sign Out 1730, eval for abd pain, pending imaging and close reassessments for disposition decision -- Dilshad Lamar MD labs/imaging wnl, nml VS, tolerating PO, benign repeat exams, in depth dw pt about ddx, tx, chavez, continued close outpt fu. -- Dilshad Lamar MD

## 2020-10-19 NOTE — ED PROVIDER NOTE - NSFOLLOWUPINSTRUCTIONS_ED_ALL_ED_FT
See your Primary Doctor/Specialists this week for follow up -- call to discuss.    Stay well hydrated, eat regular healthy meals, get plenty of rest.    Continue all current medications/treatments as previously directed.    See ABDOMINAL PAIN information and return instructions given to you.    Seek immediate medical care for new/worsening symptoms/concerns.

## 2020-10-19 NOTE — ED ADULT NURSE NOTE - CAS TRG GENERAL NORM CIRC DET
Twan Wayne  Identified pt with two pt identifiers(name and ). Chief Complaint   Patient presents with    Hypertension    Cholesterol Problem       1. Have you been to the ER, urgent care clinic since your last visit? Hospitalized since your last visit? [facility]    2. Have you seen or consulted any other health care providers outside of the [facility] since your last visit? Include any pap smears or colon screening. NO    Today's provider has been notified of reason for visit, vitals and flowsheets obtained on patients. Patient received paperwork for advance directive during previous visit but has not completed at this time     Reviewed record In preparation for visit, huddled with provider and have obtained necessary documentation      There are no preventive care reminders to display for this patient.     Wt Readings from Last 3 Encounters:   19 230 lb (104.3 kg)   19 227 lb 3.2 oz (103.1 kg)   19 227 lb (103 kg)     Temp Readings from Last 3 Encounters:   19 98 °F (36.7 °C) (Oral)   19 98 °F (36.7 °C) (Oral)   19 98.6 °F (37 °C) (Oral)     BP Readings from Last 3 Encounters:   19 124/67   19 134/66   19 130/82     Pulse Readings from Last 3 Encounters:   19 72   19 72   19 79     Vitals:    19 0815   BP: 124/67   Pulse: 72   Resp: 18   Temp: 98 °F (36.7 °C)   TempSrc: Oral   SpO2: 96%   Weight: 230 lb (104.3 kg)   Height: 5' 9\" (1.753 m)   PainSc:   0 - No pain         Learning Assessment:  :     Learning Assessment 10/5/2017 2014   PRIMARY LEARNER Patient Patient   HIGHEST LEVEL OF EDUCATION - PRIMARY LEARNER  - DID NOT GRADUATE HIGH SCHOOL   BARRIERS PRIMARY LEARNER - NONE   CO-LEARNER CAREGIVER - No   PRIMARY LANGUAGE ENGLISH ENGLISH    NEED - No   LEARNER PREFERENCE PRIMARY LISTENING LISTENING     DEMONSTRATION -     READING -   ANSWERED BY patient Patient   RELATIONSHIP SELF SELF Depression Screening:  :     3 most recent PHQ Screens 3/5/2019   Little interest or pleasure in doing things Not at all   Feeling down, depressed, irritable, or hopeless Not at all   Total Score PHQ 2 0       Fall Risk Assessment:  :     Fall Risk Assessment, last 12 mths 3/5/2019   Able to walk? Yes   Fall in past 12 months? No   Fall with injury? -       Abuse Screening:  :     Abuse Screening Questionnaire 9/4/2018 7/3/2018 10/5/2017   Do you ever feel afraid of your partner? N N N   Are you in a relationship with someone who physically or mentally threatens you? N N N   Is it safe for you to go home? Y Y Y       ADL Screening:  :     ADL Assessment 7/3/2018   Feeding yourself No Help Needed   Getting from bed to chair No Help Needed   Getting dressed No Help Needed   Bathing or showering No Help Needed   Walk across the room (includes cane/walker) No Help Needed   Using the telphone No Help Needed   Taking your medications No Help Needed   Preparing meals No Help Needed   Managing money (expenses/bills) No Help Needed   Moderately strenuous housework (laundry) No Help Needed   Shopping for personal items (toiletries/medicines) No Help Needed   Shopping for groceries No Help Needed   Driving No Help Needed   Climbing a flight of stairs No Help Needed   Getting to places beyond walking distances No Help Needed                 Medication reconciliation up to date and corrected with patient at this time. Capillary refill less/equal to 2 seconds/Strong peripheral pulses

## 2020-10-19 NOTE — ED ADULT NURSE NOTE - CHPI ED NUR SYMPTOMS NEG
no blood in stool/no hematuria/no nausea/no dysuria/no chills/no vomiting/no fever/no burning urination/no diarrhea/no abdominal distension

## 2020-10-19 NOTE — ED PROVIDER NOTE - NS ED ROS FT
REVIEW OF SYSTEMS:  General:  no fever, no chills  Cardiac: no chest pain  Respiratory: no cough, + shortness of breath when pain occurs  Gastrointestinal: +epigastric abdominal pain, no nausea, no vomiting  Genitourinary: no hematuria, no dysuria, no urinary frequency, no urinary hesitancy   -Azra Simms PGY-3

## 2020-10-19 NOTE — ED PROVIDER NOTE - OBJECTIVE STATEMENT
46 yo M with PMH lupus on plaquenil, prednisone, hypothyroidism, asthma, on Lasix for LE edema, hx cholecystectomy presents with 2 days of epigastric abdominal pain radiating to the back, initially intermittent, now constant associated with SOB (only feels SOB when he has the pain). No n/v, no urinary symptoms, last BM today. No fevers, no cough, no sick contacts. States pain feels similar to when he had gall bladder disease.

## 2020-10-19 NOTE — ED PROVIDER NOTE - CLINICAL SUMMARY MEDICAL DECISION MAKING FREE TEXT BOX
Attending MD Youssef: 44 yo male with PMH for SLE, hypothyroidism, asthma and migraines, on lasix for LE edema, on Plaquenil presents with epigastric pain initially intermittent and now constant.  SOB when pain comes.  Exam: A & O x 3, NAD, HEENT WNL and no facial asymmetry; lungs CTAB, heart with reg rhythm without murmur; abdomen soft epig and RUQ tenderness to palpation and some mild ; extremities with no edema; skin with no rashes, neuro exam non focal with no motor or sensory deficits.  Plan:  RUQ ultrasound but patient with hx of cholecystectomy so consider choledocholithiasis and if neg CT abd and pelvis

## 2020-10-19 NOTE — ED PROVIDER NOTE - PATIENT PORTAL LINK FT
You can access the FollowMyHealth Patient Portal offered by [facility] by registering at the following website: [email or web address]. By joining [facility]’s FollowMyHealth portal, you will also be able to view your health information using other applications (apps) compatible with our system.

## 2020-10-19 NOTE — ED PROVIDER NOTE - ATTENDING CONTRIBUTION TO CARE
Attending MD Youssef:  I personally have seen and examined this patient.  Resident note reviewed and agree on plan of care and except where noted.

## 2020-10-19 NOTE — ED PROVIDER NOTE - PHYSICAL EXAMINATION
PHYSICAL EXAM:   General: well-appearing, appears stated age, not in extremis   HEENT: NC/AT, airway patent  Cardiovascular: regular rate and rhythm, + S1/S2, no murmurs, rubs, gallops appreciated  Respiratory: clear to auscultation bilaterally, good aeration bilaterally, nonlabored respirations  Abdominal: soft, +bilateral upper quadrant pain (R>L), epigastric and RLQ pain, nondistended, no rebound, guarding or rigidity  Neuro: awake, alert, interactive  Psychiatric: appropriate mood and affect.   -Azra Simms PGY-3

## 2020-10-22 ENCOUNTER — APPOINTMENT (OUTPATIENT)
Dept: INTERNAL MEDICINE | Facility: CLINIC | Age: 45
End: 2020-10-22

## 2020-10-22 PROBLEM — E03.9 HYPOTHYROIDISM, UNSPECIFIED: Chronic | Status: ACTIVE | Noted: 2020-10-19

## 2020-10-23 ENCOUNTER — NON-APPOINTMENT (OUTPATIENT)
Age: 45
End: 2020-10-23

## 2020-11-06 ENCOUNTER — TRANSCRIPTION ENCOUNTER (OUTPATIENT)
Age: 45
End: 2020-11-06

## 2020-11-06 ENCOUNTER — APPOINTMENT (OUTPATIENT)
Dept: RADIOLOGY | Facility: CLINIC | Age: 45
End: 2020-11-06
Payer: COMMERCIAL

## 2020-11-06 ENCOUNTER — OUTPATIENT (OUTPATIENT)
Dept: OUTPATIENT SERVICES | Facility: HOSPITAL | Age: 45
LOS: 1 days | End: 2020-11-06
Payer: COMMERCIAL

## 2020-11-06 DIAGNOSIS — Z00.8 ENCOUNTER FOR OTHER GENERAL EXAMINATION: ICD-10-CM

## 2020-11-06 DIAGNOSIS — Z90.49 ACQUIRED ABSENCE OF OTHER SPECIFIED PARTS OF DIGESTIVE TRACT: Chronic | ICD-10-CM

## 2020-11-06 PROCEDURE — 73564 X-RAY EXAM KNEE 4 OR MORE: CPT | Mod: 26,50

## 2020-11-06 PROCEDURE — 73564 X-RAY EXAM KNEE 4 OR MORE: CPT

## 2020-11-08 ENCOUNTER — APPOINTMENT (OUTPATIENT)
Dept: DISASTER EMERGENCY | Facility: CLINIC | Age: 45
End: 2020-11-08

## 2020-11-09 ENCOUNTER — LABORATORY RESULT (OUTPATIENT)
Age: 45
End: 2020-11-09

## 2020-11-09 ENCOUNTER — APPOINTMENT (OUTPATIENT)
Dept: RHEUMATOLOGY | Facility: CLINIC | Age: 45
End: 2020-11-09
Payer: COMMERCIAL

## 2020-11-09 LAB — SARS-COV-2 N GENE NPH QL NAA+PROBE: NOT DETECTED

## 2020-11-09 PROCEDURE — 99072 ADDL SUPL MATRL&STAF TM PHE: CPT

## 2020-11-09 PROCEDURE — 36415 COLL VENOUS BLD VENIPUNCTURE: CPT

## 2020-11-09 PROCEDURE — 96375 TX/PRO/DX INJ NEW DRUG ADDON: CPT

## 2020-11-09 PROCEDURE — 96413 CHEMO IV INFUSION 1 HR: CPT

## 2020-11-10 ENCOUNTER — TRANSCRIPTION ENCOUNTER (OUTPATIENT)
Age: 45
End: 2020-11-10

## 2020-11-11 ENCOUNTER — NON-APPOINTMENT (OUTPATIENT)
Age: 45
End: 2020-11-11

## 2020-11-13 ENCOUNTER — APPOINTMENT (OUTPATIENT)
Dept: OTOLARYNGOLOGY | Facility: CLINIC | Age: 45
End: 2020-11-13
Payer: COMMERCIAL

## 2020-11-13 PROCEDURE — 92537 CALORIC VSTBLR TEST W/REC: CPT

## 2020-11-13 PROCEDURE — 99072 ADDL SUPL MATRL&STAF TM PHE: CPT

## 2020-11-13 PROCEDURE — 92700A: CUSTOM

## 2020-11-13 PROCEDURE — 92567 TYMPANOMETRY: CPT

## 2020-11-13 PROCEDURE — 92547 SUPPLEMENTAL ELECTRICAL TEST: CPT

## 2020-11-13 PROCEDURE — 92540 BASIC VESTIBULAR EVALUATION: CPT

## 2020-11-23 ENCOUNTER — LABORATORY RESULT (OUTPATIENT)
Age: 45
End: 2020-11-23

## 2020-11-23 ENCOUNTER — APPOINTMENT (OUTPATIENT)
Dept: RHEUMATOLOGY | Facility: CLINIC | Age: 45
End: 2020-11-23
Payer: COMMERCIAL

## 2020-11-23 PROCEDURE — 36415 COLL VENOUS BLD VENIPUNCTURE: CPT

## 2020-11-23 PROCEDURE — 96375 TX/PRO/DX INJ NEW DRUG ADDON: CPT

## 2020-11-23 PROCEDURE — 96413 CHEMO IV INFUSION 1 HR: CPT

## 2020-11-24 ENCOUNTER — TRANSCRIPTION ENCOUNTER (OUTPATIENT)
Age: 45
End: 2020-11-24

## 2020-11-30 ENCOUNTER — NON-APPOINTMENT (OUTPATIENT)
Age: 45
End: 2020-11-30

## 2020-12-03 ENCOUNTER — RX RENEWAL (OUTPATIENT)
Age: 45
End: 2020-12-03

## 2020-12-03 NOTE — PATIENT PROFILE ADULT - DO YOU FEEL LIKE HURTING YOURSELF OR OTHERS?
Detail Level: Detailed
Size Of Lesion: 5x4mm
Size Of Lesion In Cm (Optional): 0
Body Location Override (Optional - Billing Will Still Be Based On Selected Body Map Location If Applicable): BCC left lateral nasal tip 2008
Body Location Override (Optional - Billing Will Still Be Based On Selected Body Map Location If Applicable): SCCIS right nasal wall 2010
Size Of Lesion: 6x5mm
no

## 2020-12-07 ENCOUNTER — LABORATORY RESULT (OUTPATIENT)
Age: 45
End: 2020-12-07

## 2020-12-07 ENCOUNTER — APPOINTMENT (OUTPATIENT)
Dept: RHEUMATOLOGY | Facility: CLINIC | Age: 45
End: 2020-12-07
Payer: COMMERCIAL

## 2020-12-07 PROCEDURE — 96413 CHEMO IV INFUSION 1 HR: CPT

## 2020-12-07 PROCEDURE — 36415 COLL VENOUS BLD VENIPUNCTURE: CPT

## 2020-12-07 PROCEDURE — 99072 ADDL SUPL MATRL&STAF TM PHE: CPT

## 2020-12-07 PROCEDURE — 96375 TX/PRO/DX INJ NEW DRUG ADDON: CPT

## 2020-12-08 ENCOUNTER — TRANSCRIPTION ENCOUNTER (OUTPATIENT)
Age: 45
End: 2020-12-08

## 2020-12-11 ENCOUNTER — APPOINTMENT (OUTPATIENT)
Dept: COLORECTAL SURGERY | Facility: CLINIC | Age: 45
End: 2020-12-11
Payer: COMMERCIAL

## 2020-12-11 VITALS
BODY MASS INDEX: 42.38 KG/M2 | TEMPERATURE: 97.1 F | OXYGEN SATURATION: 97 % | DIASTOLIC BLOOD PRESSURE: 86 MMHG | SYSTOLIC BLOOD PRESSURE: 123 MMHG | WEIGHT: 270 LBS | HEIGHT: 67 IN | RESPIRATION RATE: 16 BRPM | HEART RATE: 95 BPM

## 2020-12-11 PROCEDURE — 45300 PROCTOSIGMOIDOSCOPY DX: CPT

## 2020-12-11 PROCEDURE — 99072 ADDL SUPL MATRL&STAF TM PHE: CPT

## 2020-12-11 PROCEDURE — 99242 OFF/OP CONSLTJ NEW/EST SF 20: CPT | Mod: 25

## 2020-12-11 RX ORDER — ESCITALOPRAM OXALATE 10 MG/1
10 TABLET ORAL DAILY
Qty: 30 | Refills: 1 | Status: DISCONTINUED | COMMUNITY
Start: 2020-06-05 | End: 2020-12-11

## 2020-12-11 RX ORDER — FLUTICASONE PROPIONATE 50 UG/1
50 SPRAY, METERED NASAL
Qty: 1 | Refills: 3 | Status: DISCONTINUED | COMMUNITY
Start: 2019-11-15 | End: 2020-12-11

## 2020-12-11 RX ORDER — FLUTICASONE PROPIONATE 50 UG/1
50 SPRAY, METERED NASAL DAILY
Qty: 1 | Refills: 0 | Status: DISCONTINUED | COMMUNITY
Start: 2020-09-17 | End: 2020-12-11

## 2020-12-11 RX ORDER — AMOXICILLIN 875 MG/1
875 TABLET, FILM COATED ORAL
Qty: 14 | Refills: 0 | Status: DISCONTINUED | COMMUNITY
Start: 2020-09-17 | End: 2020-12-11

## 2020-12-11 RX ORDER — MOMETASONE FUROATE 1 MG/G
0.1 CREAM TOPICAL TWICE DAILY
Qty: 1 | Refills: 0 | Status: DISCONTINUED | COMMUNITY
Start: 2019-09-09 | End: 2020-12-11

## 2020-12-13 NOTE — ASSESSMENT
[FreeTextEntry1] : Pleasant 45-year-old gentleman who carries a diagnosis of lupus. He presents today with a chief complaint of rectal bleeding. He had the bleeding approximately a year and a half ago and he underwent a colonoscopy and upper endoscopy which were reportedly negative. The bleeding stopped but recurred recently. Generally the bleeding is painless but recently he has had pain associated with the bleeding. 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  A limited sigmoidoscopy reveals normal distal rectal mucosa.\par \par I believe the patient requires an examination under anesthesia and anal fistulotomy. He may concomitantly have hemorrhoids and I will assess these in the operating room.

## 2020-12-13 NOTE — HISTORY OF PRESENT ILLNESS
[FreeTextEntry1] : Patient is a 44 yo male here with complaints of rectal pain and bleeding with bowel movements.  He states that sometimes there is no pain with bowel movements but still will experience bleeding.  He was diagnosed with Lupus a couple of years ago and is on chemo type medications. He states that last year he was in the hospital for the Lupus and had the bleeding then and was given an upper and lower endoscopy and found to have hemorrhoids.  Within the last three months the bleeding has increased and that is around the same time that he started Benlysta.  He passes stool usually once daily and does not strain.

## 2020-12-13 NOTE — REVIEW OF SYSTEMS
[Anxiety] : anxiety [Negative] : Neurological [FreeTextEntry6] : Asthma [FreeTextEntry7] : Daily BM [de-identified] : Hypothyroid [de-identified] : Lupus

## 2020-12-13 NOTE — PHYSICAL EXAM
[Normal Breath Sounds] : Normal breath sounds [Normal Heart Sounds] : normal heart sounds [No Rash or Lesion] : No rash or lesion [Alert] : alert [Oriented to Person] : oriented to person [Oriented to Place] : oriented to place [Oriented to Time] : oriented to time [Calm] : calm [de-identified] : obese abdomen, +BS [de-identified] : obese male [de-identified] : NC/AT [de-identified] : TAO/+ROM [de-identified] : Intact

## 2020-12-14 ENCOUNTER — APPOINTMENT (OUTPATIENT)
Dept: INTERNAL MEDICINE | Facility: CLINIC | Age: 45
End: 2020-12-14
Payer: COMMERCIAL

## 2020-12-14 VITALS — WEIGHT: 275 LBS | BODY MASS INDEX: 43.07 KG/M2 | SYSTOLIC BLOOD PRESSURE: 130 MMHG | DIASTOLIC BLOOD PRESSURE: 88 MMHG

## 2020-12-14 VITALS — DIASTOLIC BLOOD PRESSURE: 88 MMHG | SYSTOLIC BLOOD PRESSURE: 130 MMHG

## 2020-12-14 DIAGNOSIS — Z01.818 ENCOUNTER FOR OTHER PREPROCEDURAL EXAMINATION: ICD-10-CM

## 2020-12-14 PROCEDURE — 99214 OFFICE O/P EST MOD 30 MIN: CPT | Mod: 25

## 2020-12-14 PROCEDURE — 99072 ADDL SUPL MATRL&STAF TM PHE: CPT

## 2020-12-14 PROCEDURE — 36415 COLL VENOUS BLD VENIPUNCTURE: CPT

## 2020-12-14 NOTE — HISTORY OF PRESENT ILLNESS
[FreeTextEntry1] : Follow-up for multiple medical problems and complaint of back and joint pain [de-identified] : The patient is a 45-year-old male with history of lupus, obesity, hypothyroidism, hypertension, migraines, chronic fatigue, anxiety, allergic rhinitis snoring who presents for follow-up. Patient complains of fatigue overwhelming at times under a lot of stress issues with low domestic partner children, also complains of diffuse muscle pain and joint pain denies rashes, chest pain, shortness of breath palpitations

## 2020-12-14 NOTE — REVIEW OF SYSTEMS
[Joint Pain] : joint pain [Joint Stiffness] : joint stiffness [Muscle Pain] : muscle pain [Muscle Weakness] : muscle weakness [Back Pain] : back pain [Joint Swelling] : no joint swelling [Negative] : Psychiatric

## 2020-12-14 NOTE — PHYSICAL EXAM
[Normal Sclera/Conjunctiva] : normal sclera/conjunctiva [Normal Outer Ear/Nose] : the outer ears and nose were normal in appearance [Normal] : no joint swelling and grossly normal strength and tone

## 2020-12-14 NOTE — ASSESSMENT
[FreeTextEntry1] : Patient is a 45-year-old male with history of lupus, obesity, hypertension, hypothyroidism, asthma, anxiety, migraines, myalgias, chronic rhino sinusitis who presents for follow-up and complaint of muscle pain fatigue back pain\par \par \par \par 1. Back pain\par musculoskeletal\par referral to rehab\par \par 2 lupus\par continue Benlysta hundred 20 mg infusion every week hydrochloric went and prednisone\par follow-up with rheumatology and CellCept\par complains of muscle pain will check CKs\par \par 3 asthma\par well-controlled on inhalers\par \par 4. Hypertension\par borderline control on Lasix 40 mg\par observe consider medical therapy\par \par 5. Hypothyroidism\par continue levothyroxine 112 µg check TFTs\par \par 6 chronic fatigue\par etiology unclear refuses sleep study check TFTs\par \par 7 anxiety\par patient under a lot of stress will observe for now \par \par 8 follow-up in one month

## 2020-12-18 ENCOUNTER — RX RENEWAL (OUTPATIENT)
Age: 45
End: 2020-12-18

## 2020-12-21 ENCOUNTER — APPOINTMENT (OUTPATIENT)
Dept: RHEUMATOLOGY | Facility: CLINIC | Age: 45
End: 2020-12-21

## 2020-12-21 ENCOUNTER — RX RENEWAL (OUTPATIENT)
Age: 45
End: 2020-12-21

## 2020-12-24 LAB
ANION GAP SERPL CALC-SCNC: 12 MMOL/L
BUN SERPL-MCNC: 10 MG/DL
CALCIUM SERPL-MCNC: 9.5 MG/DL
CHLORIDE SERPL-SCNC: 101 MMOL/L
CK SERPL-CCNC: 200 U/L
CO2 SERPL-SCNC: 26 MMOL/L
CREAT SERPL-MCNC: 1.06 MG/DL
GLUCOSE SERPL-MCNC: 129 MG/DL
POTASSIUM SERPL-SCNC: 3.5 MMOL/L
SODIUM SERPL-SCNC: 138 MMOL/L
T4 FREE SERPL-MCNC: 1.4 NG/DL
TSH SERPL-ACNC: 2.78 UIU/ML

## 2021-01-04 ENCOUNTER — APPOINTMENT (OUTPATIENT)
Dept: INTERNAL MEDICINE | Facility: CLINIC | Age: 46
End: 2021-01-04
Payer: COMMERCIAL

## 2021-01-04 VITALS
HEART RATE: 125 BPM | DIASTOLIC BLOOD PRESSURE: 82 MMHG | TEMPERATURE: 97 F | OXYGEN SATURATION: 96 % | SYSTOLIC BLOOD PRESSURE: 143 MMHG | HEIGHT: 67 IN

## 2021-01-04 VITALS — SYSTOLIC BLOOD PRESSURE: 128 MMHG | DIASTOLIC BLOOD PRESSURE: 80 MMHG

## 2021-01-04 PROCEDURE — 99072 ADDL SUPL MATRL&STAF TM PHE: CPT

## 2021-01-04 PROCEDURE — 99214 OFFICE O/P EST MOD 30 MIN: CPT

## 2021-01-04 NOTE — ASSESSMENT
[FreeTextEntry1] : Patient is a 45-year-old male with history of lupus, asthma, hypertension, obesity, hypothyroidism, and nondistressed who presents for follow-up\par \par 1. Hypertension\par well-controlled on no medication for now\par observe\par \par 2 hypothyroidism\par continue levothyroxine hundred 12 µg\par last TFTs normal clinically you thyroid\par \par 3 lupus\par continue hydrochloric went to hundred milligrams one tablet twice a day prednisone and benlysta\par follow-up with rheumatology\par \par 4. Obesity\par has recently gained weight counseled on diet exercise\par \par 5 back pain\par encourage referral for physical therapy and weight reduction\par \par 6. Asthma\par doing well on albuterol and Breo\par \par 7 obesity\par morbid\par counseled on diet and exercise\par BMI 43.\par Follow-up in 2 to 3 months\par \par

## 2021-01-04 NOTE — HISTORY OF PRESENT ILLNESS
[FreeTextEntry1] : Follow-up for joint pain back pain, weight gain and anxiety [de-identified] : Patient is a 45-year-old male with history of asthma, lupus, hypertension, back pain, allergic rhinitis, migraines/headaches, obesity, snoring, who presents for follow-up patient notes improvement in joint pain and mobility have a muscle still hurt denies chest pain continue to have back pain eating poorly has continued to gain weight not working presently denies chest pain palpitation lightheadedness dizziness fever chills.

## 2021-01-04 NOTE — REVIEW OF SYSTEMS
[Joint Pain] : joint pain [Joint Stiffness] : joint stiffness [Muscle Pain] : muscle pain [Back Pain] : back pain [Anxiety] : anxiety [Negative] : Integumentary [Muscle Weakness] : no muscle weakness [Joint Swelling] : no joint swelling [Suicidal] : not suicidal [Insomnia] : no insomnia [Depression] : no depression

## 2021-01-05 ENCOUNTER — LABORATORY RESULT (OUTPATIENT)
Age: 46
End: 2021-01-05

## 2021-01-05 ENCOUNTER — APPOINTMENT (OUTPATIENT)
Dept: RHEUMATOLOGY | Facility: CLINIC | Age: 46
End: 2021-01-05
Payer: COMMERCIAL

## 2021-01-05 VITALS
DIASTOLIC BLOOD PRESSURE: 83 MMHG | BODY MASS INDEX: 43.16 KG/M2 | TEMPERATURE: 97.9 F | SYSTOLIC BLOOD PRESSURE: 123 MMHG | HEART RATE: 97 BPM | OXYGEN SATURATION: 99 % | RESPIRATION RATE: 16 BRPM | WEIGHT: 275 LBS | HEIGHT: 67 IN

## 2021-01-05 PROCEDURE — 99215 OFFICE O/P EST HI 40 MIN: CPT | Mod: 25

## 2021-01-05 PROCEDURE — 96413 CHEMO IV INFUSION 1 HR: CPT

## 2021-01-05 PROCEDURE — ZZZZZ: CPT

## 2021-01-05 PROCEDURE — 96375 TX/PRO/DX INJ NEW DRUG ADDON: CPT

## 2021-01-05 PROCEDURE — 99072 ADDL SUPL MATRL&STAF TM PHE: CPT

## 2021-01-05 PROCEDURE — 36415 COLL VENOUS BLD VENIPUNCTURE: CPT

## 2021-01-05 RX ORDER — PREDNISONE 5 MG/1
5 TABLET ORAL
Qty: 180 | Refills: 2 | Status: DISCONTINUED | COMMUNITY
Start: 2020-09-24 | End: 2021-01-05

## 2021-01-05 RX ORDER — SUMATRIPTAN 50 MG/1
50 TABLET, FILM COATED ORAL
Qty: 8 | Refills: 1 | Status: DISCONTINUED | COMMUNITY
Start: 2020-07-06 | End: 2021-01-05

## 2021-01-11 ENCOUNTER — RX RENEWAL (OUTPATIENT)
Age: 46
End: 2021-01-11

## 2021-02-03 ENCOUNTER — LABORATORY RESULT (OUTPATIENT)
Age: 46
End: 2021-02-03

## 2021-02-03 ENCOUNTER — APPOINTMENT (OUTPATIENT)
Dept: RHEUMATOLOGY | Facility: CLINIC | Age: 46
End: 2021-02-03
Payer: COMMERCIAL

## 2021-02-03 ENCOUNTER — APPOINTMENT (OUTPATIENT)
Dept: PAIN MANAGEMENT | Facility: CLINIC | Age: 46
End: 2021-02-03

## 2021-02-03 PROCEDURE — 96413 CHEMO IV INFUSION 1 HR: CPT

## 2021-02-03 PROCEDURE — 36415 COLL VENOUS BLD VENIPUNCTURE: CPT

## 2021-02-03 PROCEDURE — 96375 TX/PRO/DX INJ NEW DRUG ADDON: CPT

## 2021-02-03 PROCEDURE — 99072 ADDL SUPL MATRL&STAF TM PHE: CPT

## 2021-02-04 ENCOUNTER — TRANSCRIPTION ENCOUNTER (OUTPATIENT)
Age: 46
End: 2021-02-04

## 2021-02-07 ENCOUNTER — NON-APPOINTMENT (OUTPATIENT)
Age: 46
End: 2021-02-07

## 2021-02-11 ENCOUNTER — APPOINTMENT (OUTPATIENT)
Dept: PULMONOLOGY | Facility: CLINIC | Age: 46
End: 2021-02-11
Payer: COMMERCIAL

## 2021-02-11 ENCOUNTER — NON-APPOINTMENT (OUTPATIENT)
Age: 46
End: 2021-02-11

## 2021-02-11 VITALS
BODY MASS INDEX: 43.32 KG/M2 | TEMPERATURE: 97.3 F | DIASTOLIC BLOOD PRESSURE: 70 MMHG | WEIGHT: 276 LBS | OXYGEN SATURATION: 99 % | SYSTOLIC BLOOD PRESSURE: 126 MMHG | HEIGHT: 67 IN | HEART RATE: 136 BPM | RESPIRATION RATE: 16 BRPM

## 2021-02-11 PROCEDURE — 99215 OFFICE O/P EST HI 40 MIN: CPT

## 2021-02-11 PROCEDURE — 99072 ADDL SUPL MATRL&STAF TM PHE: CPT

## 2021-02-11 NOTE — COUNSELING
[Other: ____] : [unfilled] [Good understanding] : Patient has a good understanding of lifestyle changes and steps needed to achieve self management goal [de-identified] : Anxiety

## 2021-02-11 NOTE — HISTORY OF PRESENT ILLNESS
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 Patient has not been using Breo-Ellipta 200-25 daily. He is very concerned about weight gain and symptoms of fatigue.  Patient currently on prednisone 30 mg daily, Benlysta, Plaquenil and mycophenolate for Lupus.  He reports he feels "terrible" most of the time.  He has been unable to secure a COVID vaccine and is anxious about this. He is very fatigued since weight gain.  He is here for these these symptoms.

## 2021-02-11 NOTE — ASSESSMENT
[FreeTextEntry1] : 44 year old male Hx  Lupus presents follow up now on prednisone, hydroxychloroquine, mycophenolate, Plaquenil, significant weight gain, fatigue, lupus related symptoms, anxiety\par \par Recommend HST \par Initiate trial of Trelegy Ellipta daily as directed, patient counseled on compliance \par Follow up Rheumatology\par Rescue inhaler every 4-6 hours as needed \par Nebulized medication every 4-6 hours if needed \par PFT next visit\par Consider repeat CXR\par \par Follow up 1 month

## 2021-02-11 NOTE — PHYSICAL EXAM
[General Appearance - Well Developed] : well developed [General Appearance - Well Nourished] : well nourished [General Appearance - In No Acute Distress] : no acute distress [Normal Conjunctiva] : the conjunctiva exhibited no abnormalities [Erythema] : erythema of the pharynx [] : the neck was supple [Jugular Venous Distention Increased] : there was no jugular-venous distention [Heart Sounds] : normal S1 and S2 [Murmurs] : no murmurs present [Respiration, Rhythm And Depth] : normal respiratory rhythm and effort [Auscultation Breath Sounds / Voice Sounds] : lungs were clear to auscultation bilaterally [Abdomen Soft] : soft [Abnormal Walk] : normal gait [Nail Clubbing] : no clubbing of the fingernails [Cyanosis, Localized] : no localized cyanosis [Non-Pitting] : non-pitting [Cranial Nerves] : cranial nerves 2-12 were intact [FreeTextEntry1] : Multiple tattoos + malar rash

## 2021-02-12 ENCOUNTER — NON-APPOINTMENT (OUTPATIENT)
Age: 46
End: 2021-02-12

## 2021-02-12 ENCOUNTER — EMERGENCY (EMERGENCY)
Facility: HOSPITAL | Age: 46
LOS: 1 days | End: 2021-02-12
Attending: EMERGENCY MEDICINE
Payer: COMMERCIAL

## 2021-02-12 VITALS
RESPIRATION RATE: 19 BRPM | TEMPERATURE: 98 F | HEIGHT: 67 IN | OXYGEN SATURATION: 97 % | WEIGHT: 274.92 LBS | HEART RATE: 97 BPM | SYSTOLIC BLOOD PRESSURE: 144 MMHG | DIASTOLIC BLOOD PRESSURE: 86 MMHG

## 2021-02-12 VITALS
RESPIRATION RATE: 18 BRPM | HEART RATE: 105 BPM | DIASTOLIC BLOOD PRESSURE: 80 MMHG | TEMPERATURE: 98 F | SYSTOLIC BLOOD PRESSURE: 115 MMHG | OXYGEN SATURATION: 99 %

## 2021-02-12 DIAGNOSIS — Z90.49 ACQUIRED ABSENCE OF OTHER SPECIFIED PARTS OF DIGESTIVE TRACT: Chronic | ICD-10-CM

## 2021-02-12 LAB
ALBUMIN SERPL ELPH-MCNC: 3.7 G/DL — SIGNIFICANT CHANGE UP (ref 3.3–5)
ALP SERPL-CCNC: 92 U/L — SIGNIFICANT CHANGE UP (ref 40–120)
ALT FLD-CCNC: 32 U/L — SIGNIFICANT CHANGE UP (ref 10–45)
ANION GAP SERPL CALC-SCNC: 16 MMOL/L — SIGNIFICANT CHANGE UP (ref 5–17)
AST SERPL-CCNC: 37 U/L — SIGNIFICANT CHANGE UP (ref 10–40)
BASOPHILS # BLD AUTO: 0.03 K/UL — SIGNIFICANT CHANGE UP (ref 0–0.2)
BASOPHILS NFR BLD AUTO: 0.3 % — SIGNIFICANT CHANGE UP (ref 0–2)
BILIRUB SERPL-MCNC: 0.4 MG/DL — SIGNIFICANT CHANGE UP (ref 0.2–1.2)
BUN SERPL-MCNC: 12 MG/DL — SIGNIFICANT CHANGE UP (ref 7–23)
CALCIUM SERPL-MCNC: 9.5 MG/DL — SIGNIFICANT CHANGE UP (ref 8.4–10.5)
CHLORIDE SERPL-SCNC: 100 MMOL/L — SIGNIFICANT CHANGE UP (ref 96–108)
CO2 SERPL-SCNC: 21 MMOL/L — LOW (ref 22–31)
CREAT SERPL-MCNC: 0.93 MG/DL — SIGNIFICANT CHANGE UP (ref 0.5–1.3)
EOSINOPHIL # BLD AUTO: 0.12 K/UL — SIGNIFICANT CHANGE UP (ref 0–0.5)
EOSINOPHIL NFR BLD AUTO: 1.4 % — SIGNIFICANT CHANGE UP (ref 0–6)
GLUCOSE SERPL-MCNC: 116 MG/DL — HIGH (ref 70–99)
HCT VFR BLD CALC: 44.6 % — SIGNIFICANT CHANGE UP (ref 39–50)
HGB BLD-MCNC: 14.9 G/DL — SIGNIFICANT CHANGE UP (ref 13–17)
IMM GRANULOCYTES NFR BLD AUTO: 1.2 % — SIGNIFICANT CHANGE UP (ref 0–1.5)
LYMPHOCYTES # BLD AUTO: 2.1 K/UL — SIGNIFICANT CHANGE UP (ref 1–3.3)
LYMPHOCYTES # BLD AUTO: 24.5 % — SIGNIFICANT CHANGE UP (ref 13–44)
MCHC RBC-ENTMCNC: 29.4 PG — SIGNIFICANT CHANGE UP (ref 27–34)
MCHC RBC-ENTMCNC: 33.4 GM/DL — SIGNIFICANT CHANGE UP (ref 32–36)
MCV RBC AUTO: 88 FL — SIGNIFICANT CHANGE UP (ref 80–100)
MONOCYTES # BLD AUTO: 0.65 K/UL — SIGNIFICANT CHANGE UP (ref 0–0.9)
MONOCYTES NFR BLD AUTO: 7.6 % — SIGNIFICANT CHANGE UP (ref 2–14)
NEUTROPHILS # BLD AUTO: 5.58 K/UL — SIGNIFICANT CHANGE UP (ref 1.8–7.4)
NEUTROPHILS NFR BLD AUTO: 65 % — SIGNIFICANT CHANGE UP (ref 43–77)
NRBC # BLD: 0 /100 WBCS — SIGNIFICANT CHANGE UP (ref 0–0)
PLATELET # BLD AUTO: 284 K/UL — SIGNIFICANT CHANGE UP (ref 150–400)
POTASSIUM SERPL-MCNC: 4.1 MMOL/L — SIGNIFICANT CHANGE UP (ref 3.5–5.3)
POTASSIUM SERPL-SCNC: 4.1 MMOL/L — SIGNIFICANT CHANGE UP (ref 3.5–5.3)
PROT SERPL-MCNC: 7.2 G/DL — SIGNIFICANT CHANGE UP (ref 6–8.3)
RBC # BLD: 5.07 M/UL — SIGNIFICANT CHANGE UP (ref 4.2–5.8)
RBC # FLD: 13.7 % — SIGNIFICANT CHANGE UP (ref 10.3–14.5)
SODIUM SERPL-SCNC: 137 MMOL/L — SIGNIFICANT CHANGE UP (ref 135–145)
WBC # BLD: 8.58 K/UL — SIGNIFICANT CHANGE UP (ref 3.8–10.5)
WBC # FLD AUTO: 8.58 K/UL — SIGNIFICANT CHANGE UP (ref 3.8–10.5)

## 2021-02-12 PROCEDURE — 85025 COMPLETE CBC W/AUTO DIFF WBC: CPT

## 2021-02-12 PROCEDURE — 96374 THER/PROPH/DIAG INJ IV PUSH: CPT

## 2021-02-12 PROCEDURE — 99285 EMERGENCY DEPT VISIT HI MDM: CPT

## 2021-02-12 PROCEDURE — 99284 EMERGENCY DEPT VISIT MOD MDM: CPT | Mod: 25

## 2021-02-12 PROCEDURE — 96375 TX/PRO/DX INJ NEW DRUG ADDON: CPT

## 2021-02-12 PROCEDURE — 71045 X-RAY EXAM CHEST 1 VIEW: CPT | Mod: 26

## 2021-02-12 PROCEDURE — 80053 COMPREHEN METABOLIC PANEL: CPT

## 2021-02-12 PROCEDURE — 71045 X-RAY EXAM CHEST 1 VIEW: CPT

## 2021-02-12 RX ORDER — LIDOCAINE HCL 20 MG/ML
30 VIAL (ML) INJECTION ONCE
Refills: 0 | Status: COMPLETED | OUTPATIENT
Start: 2021-02-12 | End: 2021-02-12

## 2021-02-12 RX ORDER — OXYCODONE HYDROCHLORIDE 5 MG/1
5 TABLET ORAL ONCE
Refills: 0 | Status: DISCONTINUED | OUTPATIENT
Start: 2021-02-12 | End: 2021-02-12

## 2021-02-12 RX ORDER — FUROSEMIDE 40 MG
40 TABLET ORAL ONCE
Refills: 0 | Status: COMPLETED | OUTPATIENT
Start: 2021-02-12 | End: 2021-02-12

## 2021-02-12 RX ORDER — AMPICILLIN SODIUM AND SULBACTAM SODIUM 250; 125 MG/ML; MG/ML
1.5 INJECTION, POWDER, FOR SUSPENSION INTRAMUSCULAR; INTRAVENOUS ONCE
Refills: 0 | Status: COMPLETED | OUTPATIENT
Start: 2021-02-12 | End: 2021-02-12

## 2021-02-12 RX ADMIN — OXYCODONE HYDROCHLORIDE 5 MILLIGRAM(S): 5 TABLET ORAL at 11:19

## 2021-02-12 RX ADMIN — Medication 40 MILLIGRAM(S): at 11:19

## 2021-02-12 RX ADMIN — OXYCODONE HYDROCHLORIDE 5 MILLIGRAM(S): 5 TABLET ORAL at 15:14

## 2021-02-12 RX ADMIN — AMPICILLIN SODIUM AND SULBACTAM SODIUM 200 GRAM(S): 250; 125 INJECTION, POWDER, FOR SUSPENSION INTRAMUSCULAR; INTRAVENOUS at 16:24

## 2021-02-12 RX ADMIN — Medication 30 MILLILITER(S): at 15:26

## 2021-02-12 NOTE — ED PROVIDER NOTE - NSFOLLOWUPINSTRUCTIONS_ED_ALL_ED_FT
You were seen in the Emergency Department for hand swelling.    Follow up with your primary care provider on Tuesday.    Keep your hand elevated, use ice on your hand for swelling.     You are being sent a prescription for doxycyline to take 2 times a day for seven days. Please see medication labels for instructions and warnings.     If you have continued swelling, if your fingers are showing any sign of infection, if your finger looks more swollen, you have change in sensation or motor ability of your fingers, if your redness and swelling begins moving, if you develop fever, chills, nausea, vomiting, new or worsening pain, or if you have any new symptoms return to the Emergency Department.

## 2021-02-12 NOTE — ED PROVIDER NOTE - PATIENT PORTAL LINK FT
You can access the FollowMyHealth Patient Portal offered by [facility] by registering at the following website: [email or web address]. By joining [facility]’s FollowMyHealth portal, you will also be able to view your health information using other applications (apps) compatible with our system.

## 2021-02-12 NOTE — ED PROVIDER NOTE - ATTENDING CONTRIBUTION TO CARE
44 y/o m with pmhx Lupus s/p infusion on Wed presents for increased edema. Hands / legs are swollen and not improving despite taking usual lasix. min urine outpt. no fever or chills no cough. no heart palp. no weakness or numbness.   Gen.  no acute distress  HEENT:  perrl eom  Lungs:  b/l bs, no crackles no wheezing no rhonchi.   CVS: S1S2   Abd;  soft non tender no distention  Ext: b/l upper hand edema no erythema. b/l lower ext pitting edema no erythema no calf tend.   Neuro: aaox3 no focal deficits  MSK: full range of motion. no ttp. strength 5/5 b/l upper and lower ext

## 2021-02-12 NOTE — ED ADULT TRIAGE NOTE - CHIEF COMPLAINT QUOTE
Lupus on chemo last infusion on Wednesday, pt here for edema concerned with rings stuck on left hand

## 2021-02-12 NOTE — ED ADULT NURSE NOTE - DISCHARGE DATE/TIME
Routing refill request to provider for review/approval because:  Medication is reported/historical    CEDRIC ChenN RN          
12-Feb-2021 18:39

## 2021-02-12 NOTE — ED PROVIDER NOTE - PROGRESS NOTE DETAILS
ATTG: : I spoke with Dr. Neri and Dr. CONI Everett (both rheum and pmd), agree with plan. he can follow next week with them. no changes in current medications. return precautions provided.

## 2021-02-12 NOTE — ED PROVIDER NOTE - CLINICAL SUMMARY MEDICAL DECISION MAKING FREE TEXT BOX
ATTG: : edema of ext, likely secondary to underlying diagnosis and therapy, check labs, diuresis, removal of rings, re eval for dispo

## 2021-02-12 NOTE — ED ADULT NURSE REASSESSMENT NOTE - NS ED NURSE REASSESS COMMENT FT1
Pt resting in stretcher. VSS. Pt still c/o some pain in her hands. Call bell at bedside. Will continue to monitor.

## 2021-02-12 NOTE — ED PROVIDER NOTE - CHIEF COMPLAINT
The patient is a 45y Male complaining of  The patient is a 45y Male complaining of hand swelling, rings stuck on his hands

## 2021-02-12 NOTE — ED ADULT NURSE NOTE - NSIMPLEMENTINTERV_GEN_ALL_ED
Implemented All Universal Safety Interventions:  [place] to call system. Call bell, personal items and telephone within reach. Instruct patient to call for assistance. Room bathroom lighting operational. Non-slip footwear when patient is off stretcher. Physically safe environment: no spills, clutter or unnecessary equipment. Stretcher in lowest position, wheels locked, appropriate side rails in place.

## 2021-02-12 NOTE — ED PROVIDER NOTE - OBJECTIVE STATEMENT
45M PMH Lupus on Plaquenil, prednisone, has been getting infusions of Benlysta last infusion on Wednesday presents to ED for edema since wednesday which has caused his hands to swell, trapping rings on both of his hands. He states that he never had swelling like this before from his Benlysta, has been taking his 40mg po lasix as normal, no other changes or trauma which he would think causes his swelling. Pt denies recent illness, fevers, chills. Pt states rings have gotten so tight he cannot remove his rings from his L hand, particularly over his l middle and L ring finger. pt states he still has sensation in his fingers, still has the ability to move his fingers, but is limited by pain especially in his ring finger. Pt tried to take rings of at home without success.

## 2021-02-12 NOTE — ED PROVIDER NOTE - NS ED ROS FT
Constitutional:  (-) fever, (-) chills, (-) lethargy  Eyes:  (-) eye pain (-) visual changes  ENMT: (-) nasal discharge, (-) sore throat. (-) neck pain or stiffness  Cardiac: (-) chest pain (-) palpitations  Respiratory:  (-) cough (-) respiratory distress.   GI:  (-) nausea (-) vomiting (-) diarrhea (-) abdominal pain.  :  (-) dysuria (-) frequency (-) burning.  MS:  (-) back pain (+) finger pain  Neuro:  (-) headache (-) numbness (-) tingling (-) focal weakness   Skin:  (-) rash  Except as documented in the HPI,  all other systems are negative

## 2021-02-12 NOTE — ED PROCEDURE NOTE - CPROC ED TIME OUT STATEMENT1
“Patient's name, , procedure and correct site were confirmed during the [facility] Timeout.”
“Patient's name, , procedure and correct site were confirmed during the [facility] Timeout.”

## 2021-02-12 NOTE — ED PROVIDER NOTE - PHYSICAL EXAMINATION
CONSTITUTIONAL: Uncomfortable appearing  SKIN: Warm dry, normal skin turgor, fingers swollen, L hand digits 3,4 edematous, rings are cutting into skin  HEAD: NCAT  EYES: EOMI, PERRLA, no scleral icterus, conjunctiva pink  ENT: normal pharynx with no erythema or exudates  NECK: Supple; non tender. Full ROM.  CARD: RRR, no murmurs.  RESP: clear to ausculation b/l. No crackles or wheezing.  ABD: soft, non-tender, non-distended, no rebound or guarding.  EXT: L fingers 3-4 swollen, edematous, erythematous, rings cutting into 3rd and 4th fingers on L, unable to remove, cap refill <2 sec distal, sensation in tact distally  NEURO: normal motor. normal sensory. Cerebellar testing normal.   PSYCH: Cooperative, appropriate.

## 2021-02-12 NOTE — ED ADULT NURSE NOTE - OBJECTIVE STATEMENT
45yr old male with a PMH of Lupus, Asthma and hypothyroidism coming in after getting a chemo infusion on Wednesday for his Lupus with B/L upper extremity swelling and unable to get his rings off his fingers. Pt states that he is also on prednisone and takes Lasix, but the swelling has increased and the lasix is not working as well. Pt has multiple metal rings on fingers on b/l hands. Pt has B/L +2 palpable radial pulses. Pt states that he tried multiple things to get the rings off and was unsuccessful. Pt denies any SOB, fever, chills, n/v/d or CP at current time. VSS. Pt A&Ox4. PO oxycodone 5mg given for the pain before attempting to remove the rings. Cap refill <2 seconds. Safety maintained. Call bell at bedside will continue to monitor.

## 2021-02-16 ENCOUNTER — NON-APPOINTMENT (OUTPATIENT)
Age: 46
End: 2021-02-16

## 2021-02-18 ENCOUNTER — RX RENEWAL (OUTPATIENT)
Age: 46
End: 2021-02-18

## 2021-03-01 ENCOUNTER — LABORATORY RESULT (OUTPATIENT)
Age: 46
End: 2021-03-01

## 2021-03-01 ENCOUNTER — NON-APPOINTMENT (OUTPATIENT)
Age: 46
End: 2021-03-01

## 2021-03-01 ENCOUNTER — APPOINTMENT (OUTPATIENT)
Dept: RHEUMATOLOGY | Facility: CLINIC | Age: 46
End: 2021-03-01
Payer: COMMERCIAL

## 2021-03-01 PROCEDURE — 36415 COLL VENOUS BLD VENIPUNCTURE: CPT

## 2021-03-01 PROCEDURE — 99072 ADDL SUPL MATRL&STAF TM PHE: CPT

## 2021-03-01 PROCEDURE — 96375 TX/PRO/DX INJ NEW DRUG ADDON: CPT

## 2021-03-01 PROCEDURE — 96413 CHEMO IV INFUSION 1 HR: CPT

## 2021-03-03 ENCOUNTER — APPOINTMENT (OUTPATIENT)
Dept: RHEUMATOLOGY | Facility: CLINIC | Age: 46
End: 2021-03-03
Payer: COMMERCIAL

## 2021-03-03 PROCEDURE — 99442: CPT

## 2021-03-11 ENCOUNTER — APPOINTMENT (OUTPATIENT)
Dept: PULMONOLOGY | Facility: CLINIC | Age: 46
End: 2021-03-11

## 2021-03-15 ENCOUNTER — RX RENEWAL (OUTPATIENT)
Age: 46
End: 2021-03-15

## 2021-03-19 ENCOUNTER — APPOINTMENT (OUTPATIENT)
Dept: CARDIOLOGY | Facility: CLINIC | Age: 46
End: 2021-03-19
Payer: COMMERCIAL

## 2021-03-19 PROCEDURE — 93306 TTE W/DOPPLER COMPLETE: CPT

## 2021-03-19 PROCEDURE — 99072 ADDL SUPL MATRL&STAF TM PHE: CPT

## 2021-03-24 ENCOUNTER — APPOINTMENT (OUTPATIENT)
Dept: RHEUMATOLOGY | Facility: CLINIC | Age: 46
End: 2021-03-24
Payer: COMMERCIAL

## 2021-03-24 VITALS
SYSTOLIC BLOOD PRESSURE: 136 MMHG | TEMPERATURE: 97.2 F | OXYGEN SATURATION: 98 % | BODY MASS INDEX: 44.42 KG/M2 | DIASTOLIC BLOOD PRESSURE: 78 MMHG | HEIGHT: 67 IN | WEIGHT: 283 LBS | HEART RATE: 100 BPM | RESPIRATION RATE: 16 BRPM

## 2021-03-24 PROCEDURE — 99072 ADDL SUPL MATRL&STAF TM PHE: CPT

## 2021-03-24 PROCEDURE — 99215 OFFICE O/P EST HI 40 MIN: CPT

## 2021-03-24 RX ORDER — AMLODIPINE BESYLATE 2.5 MG/1
2.5 TABLET ORAL
Qty: 90 | Refills: 0 | Status: DISCONTINUED | COMMUNITY
Start: 2021-01-05 | End: 2021-03-24

## 2021-03-25 ENCOUNTER — TRANSCRIPTION ENCOUNTER (OUTPATIENT)
Age: 46
End: 2021-03-25

## 2021-03-25 LAB
25(OH)D3 SERPL-MCNC: 24 NG/ML
ALBUMIN SERPL ELPH-MCNC: 4.5 G/DL
ALP BLD-CCNC: 97 U/L
ALT SERPL-CCNC: 31 U/L
ANION GAP SERPL CALC-SCNC: 18 MMOL/L
APPEARANCE: CLEAR
AST SERPL-CCNC: 27 U/L
BACTERIA: NEGATIVE
BASOPHILS # BLD AUTO: 0.04 K/UL
BASOPHILS NFR BLD AUTO: 0.3 %
BILIRUB SERPL-MCNC: 0.2 MG/DL
BILIRUBIN URINE: NEGATIVE
BLOOD URINE: NEGATIVE
BUN SERPL-MCNC: 6 MG/DL
C3 SERPL-MCNC: 142 MG/DL
C4 SERPL-MCNC: 30 MG/DL
CALCIUM SERPL-MCNC: 9.5 MG/DL
CHLORIDE SERPL-SCNC: 99 MMOL/L
CO2 SERPL-SCNC: 23 MMOL/L
COLOR: NORMAL
CREAT SERPL-MCNC: 0.93 MG/DL
CREAT SPEC-SCNC: 68 MG/DL
CREAT/PROT UR: 0.1 RATIO
CRP SERPL-MCNC: 8 MG/L
DSDNA AB SER-ACNC: <12 IU/ML
EOSINOPHIL # BLD AUTO: 0.11 K/UL
EOSINOPHIL NFR BLD AUTO: 0.9 %
ERYTHROCYTE [SEDIMENTATION RATE] IN BLOOD BY WESTERGREN METHOD: 52 MM/HR
ESTIMATED AVERAGE GLUCOSE: 131 MG/DL
GLUCOSE QUALITATIVE U: NEGATIVE
GLUCOSE SERPL-MCNC: 106 MG/DL
HBA1C MFR BLD HPLC: 6.2 %
HCT VFR BLD CALC: 44.7 %
HGB BLD-MCNC: 14.4 G/DL
HYALINE CASTS: 0 /LPF
IMM GRANULOCYTES NFR BLD AUTO: 0.8 %
KETONES URINE: NEGATIVE
LEUKOCYTE ESTERASE URINE: NEGATIVE
LYMPHOCYTES # BLD AUTO: 1.89 K/UL
LYMPHOCYTES NFR BLD AUTO: 15.9 %
MAGNESIUM SERPL-MCNC: 2.3 MG/DL
MAN DIFF?: NORMAL
MCHC RBC-ENTMCNC: 29 PG
MCHC RBC-ENTMCNC: 32.2 GM/DL
MCV RBC AUTO: 90.1 FL
MICROSCOPIC-UA: NORMAL
MONOCYTES # BLD AUTO: 1.07 K/UL
MONOCYTES NFR BLD AUTO: 9 %
NEUTROPHILS # BLD AUTO: 8.67 K/UL
NEUTROPHILS NFR BLD AUTO: 73.1 %
NITRITE URINE: NEGATIVE
PH URINE: 7
PHOSPHATE SERPL-MCNC: 3.8 MG/DL
PLATELET # BLD AUTO: 376 K/UL
POTASSIUM SERPL-SCNC: 3.7 MMOL/L
PROT SERPL-MCNC: 7.5 G/DL
PROT UR-MCNC: 5 MG/DL
PROTEIN URINE: NEGATIVE
RBC # BLD: 4.96 M/UL
RBC # FLD: 14 %
RED BLOOD CELLS URINE: 1 /HPF
SODIUM SERPL-SCNC: 140 MMOL/L
SPECIFIC GRAVITY URINE: 1.01
SQUAMOUS EPITHELIAL CELLS: 0 /HPF
URINE COMMENTS: NORMAL
UROBILINOGEN URINE: NORMAL
WBC # FLD AUTO: 11.88 K/UL
WHITE BLOOD CELLS URINE: 0 /HPF

## 2021-03-28 ENCOUNTER — TRANSCRIPTION ENCOUNTER (OUTPATIENT)
Age: 46
End: 2021-03-28

## 2021-03-29 ENCOUNTER — APPOINTMENT (OUTPATIENT)
Dept: RHEUMATOLOGY | Facility: CLINIC | Age: 46
End: 2021-03-29

## 2021-03-29 LAB
COVID-19 SPIKE DOMAIN ANTIBODY INTERPRETATION: POSITIVE
SARS-COV-2 AB SERPL IA-ACNC: 81.8 U/ML

## 2021-03-31 ENCOUNTER — LABORATORY RESULT (OUTPATIENT)
Age: 46
End: 2021-03-31

## 2021-04-01 ENCOUNTER — APPOINTMENT (OUTPATIENT)
Dept: RHEUMATOLOGY | Facility: CLINIC | Age: 46
End: 2021-04-01
Payer: COMMERCIAL

## 2021-04-01 PROCEDURE — 99072 ADDL SUPL MATRL&STAF TM PHE: CPT

## 2021-04-01 PROCEDURE — 96413 CHEMO IV INFUSION 1 HR: CPT

## 2021-04-01 PROCEDURE — 96375 TX/PRO/DX INJ NEW DRUG ADDON: CPT

## 2021-04-01 PROCEDURE — 36415 COLL VENOUS BLD VENIPUNCTURE: CPT

## 2021-04-02 ENCOUNTER — TRANSCRIPTION ENCOUNTER (OUTPATIENT)
Age: 46
End: 2021-04-02

## 2021-04-05 ENCOUNTER — APPOINTMENT (OUTPATIENT)
Dept: INTERNAL MEDICINE | Facility: CLINIC | Age: 46
End: 2021-04-05
Payer: COMMERCIAL

## 2021-04-05 VITALS
HEART RATE: 97 BPM | SYSTOLIC BLOOD PRESSURE: 141 MMHG | TEMPERATURE: 97 F | HEIGHT: 67 IN | DIASTOLIC BLOOD PRESSURE: 85 MMHG | OXYGEN SATURATION: 98 %

## 2021-04-05 VITALS — SYSTOLIC BLOOD PRESSURE: 122 MMHG | DIASTOLIC BLOOD PRESSURE: 90 MMHG

## 2021-04-05 PROCEDURE — 99072 ADDL SUPL MATRL&STAF TM PHE: CPT

## 2021-04-05 PROCEDURE — 99214 OFFICE O/P EST MOD 30 MIN: CPT | Mod: 25

## 2021-04-05 PROCEDURE — 36415 COLL VENOUS BLD VENIPUNCTURE: CPT

## 2021-04-05 NOTE — PHYSICAL EXAM
[Normal Sclera/Conjunctiva] : normal sclera/conjunctiva [Normal Outer Ear/Nose] : the outer ears and nose were normal in appearance [Normal] : no rash

## 2021-04-05 NOTE — HISTORY OF PRESENT ILLNESS
[FreeTextEntry1] : Follow-up for hypertension edema [de-identified] : \par \par The patient is a 45-year-old male with history of lupus, obesity, hypothyroidism, asthma, allergic chronic sinusitis, chronic joint pain, edema six secondary to fluid retention from prednisone and medication headaches,/migraines, snoring, Raynaud's phenomena who presents for follow-up patient complains of Raynaud's phenomena however joint pain has improved still complains of slight swelling/edema denies palpitations lightheadedness dizziness recent noted to have an elevated blood pressure after amlodipine DC due to swelling. Patient uses Lasix PRN 1 to 2 pills a day

## 2021-04-05 NOTE — ASSESSMENT
[FreeTextEntry1] : Patient is a 44-year-old male with history of obesity, lupus, Raynaud's phenomena, edema, migraines/headaches, hypothyroidism, hypertension, who presents for follow-up\par \par 1 hypertension\par deseed amlodipine\par start losartan 50 mg once a day\par follow-up in four weeks\par \par 2. Hypothyroidism\par continue levothyroxine 112µg PO Q day\par \par 3. Edema\par continue Lasix 40 mg once a day as needed and Zyrtec as needed.\par check electrolytes\par \par 4. Chronic sinusitis\par refer to ENT for further evaluation\par \par 5 lupus\par continue prednisone 10 mg three times a day\par CellCept 503 tabs Q 12 hours and hydrochloric went to hundred milligrams one tablet BID\par follow-up to rheumatology\par \par 6. Asthma\par continue Breo and albuterol as needed\par \par 7 snoring renal patient should reconsider doing sleep study patient refused

## 2021-04-05 NOTE — REVIEW OF SYSTEMS
[Lower Ext Edema] : lower extremity edema [Joint Pain] : joint pain [Negative] : Integumentary [Fever] : no fever [Chills] : no chills [Fatigue] : no fatigue [Night Sweats] : no night sweats [Recent Change In Weight] : ~T no recent weight change [Chest Pain] : no chest pain [Palpitations] : no palpitations [Claudication] : no  leg claudication [Orthopena] : no orthopnea [Paroxysmal Nocturnal Dyspnea] : no paroxysmal nocturnal dyspnea [Joint Stiffness] : no joint stiffness [Muscle Pain] : no muscle pain [Back Pain] : no back pain [Joint Swelling] : no joint swelling

## 2021-04-06 LAB
ANION GAP SERPL CALC-SCNC: 14 MMOL/L
BUN SERPL-MCNC: 6 MG/DL
CALCIUM SERPL-MCNC: 9.5 MG/DL
CHLORIDE SERPL-SCNC: 97 MMOL/L
CO2 SERPL-SCNC: 27 MMOL/L
CREAT SERPL-MCNC: 1 MG/DL
GLUCOSE SERPL-MCNC: 95 MG/DL
POTASSIUM SERPL-SCNC: 3.1 MMOL/L
SODIUM SERPL-SCNC: 139 MMOL/L
T4 FREE SERPL-MCNC: 1.4 NG/DL
TSH SERPL-ACNC: 4.63 UIU/ML

## 2021-04-07 ENCOUNTER — RX RENEWAL (OUTPATIENT)
Age: 46
End: 2021-04-07

## 2021-04-08 ENCOUNTER — APPOINTMENT (OUTPATIENT)
Dept: CV DIAGNOSITCS | Facility: HOSPITAL | Age: 46
End: 2021-04-08

## 2021-04-08 ENCOUNTER — APPOINTMENT (OUTPATIENT)
Dept: NEPHROLOGY | Facility: CLINIC | Age: 46
End: 2021-04-08
Payer: COMMERCIAL

## 2021-04-08 PROCEDURE — 99205 OFFICE O/P NEW HI 60 MIN: CPT | Mod: 95

## 2021-04-08 NOTE — HISTORY OF PRESENT ILLNESS
[Home] : at home, [unfilled] , at the time of the visit. [Medical Office: ([facility])___] : at the medical office located in  [Verbal consent obtained from patient] : the patient, [unfilled] [FreeTextEntry1] : Today I had the pleasure of meeting Francisco Javier Pinzon who is a 45 year old  with 8 kids.  He is here today for  evaluation of edema in the setting of SLE.  He has a history of asthma in [facility].  About 2 years ago upon returning from [facility] he noted a malar rash and was then diagnosed with lupus.  He has been on prednisone since about that time as well as cellcept and more recently belimumab.  He noted swelling about six months ago which he describes as most pronounced in his legs but really all over his body.  He has says it feels like his legs are just very heavy.  When he presses his ankle it leaves an indentation.  He recently went to the ED because the swelling was so bad.  He reports that when he takes furosemide 40 daily it "keeps him at bay" but it doesn't make progress.  When he takes it twice per day he really feels like the swelling goes away and makes him lighter on his feet.  There is no associated dyspnea, orthopnea.  He reports foamy urine.  Interestingly he presently has only trace edema and no hypoalbuminemia.

## 2021-04-08 NOTE — ASSESSMENT
[FreeTextEntry1] : Francisco Javier Pinzon is a 45 years old man with SLE here for evaluation of edema\par \par Edema -- The etiology of the edema is unclear.  At the present time the patient does not appear to have significant enough albuminuria to be responsible for this pitting edema.  Other etiologies might be related to sodium retention from chronic steroid use or from obesity.  The patient will be started on losartan for hypertension and this will augment the diuretic effect of lasix and help with the hypokalemia.  I have recommended to the patient that since he feels significant relief with BID dosing that he continue it.  I have reviewed the recent notes from Drs latanya and isai as well as the recent CBC, metabolic panel, and urine tests and made an independent assessment.\par \par Hypokalemia -- The patient's last serum potassium was low.  Now that the patient is on potassium chloride, furosemide BID and losartan it is prudent to recheck and monitor for potassium balance closely.  \par \par Patient will follow up with me again within two months.\par \par

## 2021-04-08 NOTE — REVIEW OF SYSTEMS
[Fever] : no fever [Chills] : no chills [Feeling Poorly] : feeling poorly [Eyesight Problems] : no eyesight problems [Nosebleeds] : no nosebleeds [Chest Pain] : no chest pain [Lower Ext Edema] : lower extremity edema [Shortness Of Breath] : no shortness of breath [Abdominal Pain] : no abdominal pain [Vomiting] : no vomiting [As Noted in HPI] : as noted in HPI [Joint Pain] : joint pain [Dizziness] : no dizziness [Fainting] : no fainting [Anxiety] : no anxiety [Depression] : no depression [Easy Bleeding] : no tendency for easy bleeding [Easy Bruising] : no tendency for easy bruising

## 2021-04-12 ENCOUNTER — RX RENEWAL (OUTPATIENT)
Age: 46
End: 2021-04-12

## 2021-04-20 ENCOUNTER — RX RENEWAL (OUTPATIENT)
Age: 46
End: 2021-04-20

## 2021-04-20 RX ORDER — PREDNISONE 5 MG/1
5 TABLET ORAL
Qty: 90 | Refills: 1 | Status: DISCONTINUED | COMMUNITY
Start: 2021-01-11 | End: 2021-04-20

## 2021-04-23 ENCOUNTER — NON-APPOINTMENT (OUTPATIENT)
Age: 46
End: 2021-04-23

## 2021-04-24 ENCOUNTER — OUTPATIENT (OUTPATIENT)
Dept: OUTPATIENT SERVICES | Facility: HOSPITAL | Age: 46
LOS: 1 days | End: 2021-04-24
Payer: COMMERCIAL

## 2021-04-24 ENCOUNTER — APPOINTMENT (OUTPATIENT)
Dept: ULTRASOUND IMAGING | Facility: CLINIC | Age: 46
End: 2021-04-24
Payer: COMMERCIAL

## 2021-04-24 DIAGNOSIS — Z00.8 ENCOUNTER FOR OTHER GENERAL EXAMINATION: ICD-10-CM

## 2021-04-24 DIAGNOSIS — Z90.49 ACQUIRED ABSENCE OF OTHER SPECIFIED PARTS OF DIGESTIVE TRACT: Chronic | ICD-10-CM

## 2021-04-24 PROCEDURE — 93970 EXTREMITY STUDY: CPT

## 2021-04-24 PROCEDURE — 93970 EXTREMITY STUDY: CPT | Mod: 26

## 2021-04-26 ENCOUNTER — TRANSCRIPTION ENCOUNTER (OUTPATIENT)
Age: 46
End: 2021-04-26

## 2021-04-29 ENCOUNTER — LABORATORY RESULT (OUTPATIENT)
Age: 46
End: 2021-04-29

## 2021-04-29 ENCOUNTER — APPOINTMENT (OUTPATIENT)
Dept: RHEUMATOLOGY | Facility: CLINIC | Age: 46
End: 2021-04-29
Payer: COMMERCIAL

## 2021-04-29 VITALS
DIASTOLIC BLOOD PRESSURE: 77 MMHG | BODY MASS INDEX: 44.42 KG/M2 | SYSTOLIC BLOOD PRESSURE: 116 MMHG | HEART RATE: 88 BPM | TEMPERATURE: 97 F | OXYGEN SATURATION: 98 % | HEIGHT: 67 IN | WEIGHT: 283 LBS

## 2021-04-29 PROCEDURE — 99072 ADDL SUPL MATRL&STAF TM PHE: CPT

## 2021-04-29 PROCEDURE — 99214 OFFICE O/P EST MOD 30 MIN: CPT | Mod: 25

## 2021-04-29 PROCEDURE — ZZZZZ: CPT

## 2021-04-29 PROCEDURE — 36415 COLL VENOUS BLD VENIPUNCTURE: CPT

## 2021-04-29 PROCEDURE — 96375 TX/PRO/DX INJ NEW DRUG ADDON: CPT

## 2021-04-29 PROCEDURE — 96413 CHEMO IV INFUSION 1 HR: CPT

## 2021-04-30 ENCOUNTER — NON-APPOINTMENT (OUTPATIENT)
Age: 46
End: 2021-04-30

## 2021-04-30 ENCOUNTER — OUTPATIENT (OUTPATIENT)
Dept: OUTPATIENT SERVICES | Facility: HOSPITAL | Age: 46
LOS: 1 days | End: 2021-04-30
Payer: COMMERCIAL

## 2021-04-30 ENCOUNTER — APPOINTMENT (OUTPATIENT)
Dept: MRI IMAGING | Facility: IMAGING CENTER | Age: 46
End: 2021-04-30
Payer: COMMERCIAL

## 2021-04-30 ENCOUNTER — APPOINTMENT (OUTPATIENT)
Dept: ORTHOPEDIC SURGERY | Facility: CLINIC | Age: 46
End: 2021-04-30
Payer: COMMERCIAL

## 2021-04-30 VITALS — BODY MASS INDEX: 45.52 KG/M2 | WEIGHT: 290 LBS | HEIGHT: 67 IN

## 2021-04-30 DIAGNOSIS — M25.561 PAIN IN RIGHT KNEE: ICD-10-CM

## 2021-04-30 DIAGNOSIS — Z90.49 ACQUIRED ABSENCE OF OTHER SPECIFIED PARTS OF DIGESTIVE TRACT: Chronic | ICD-10-CM

## 2021-04-30 DIAGNOSIS — Z00.8 ENCOUNTER FOR OTHER GENERAL EXAMINATION: ICD-10-CM

## 2021-04-30 PROCEDURE — 99072 ADDL SUPL MATRL&STAF TM PHE: CPT

## 2021-04-30 PROCEDURE — 73721 MRI JNT OF LWR EXTRE W/O DYE: CPT

## 2021-04-30 PROCEDURE — 99203 OFFICE O/P NEW LOW 30 MIN: CPT

## 2021-04-30 PROCEDURE — 73721 MRI JNT OF LWR EXTRE W/O DYE: CPT | Mod: 26,RT

## 2021-04-30 PROCEDURE — 73564 X-RAY EXAM KNEE 4 OR MORE: CPT | Mod: RT

## 2021-04-30 NOTE — HISTORY OF PRESENT ILLNESS
[de-identified] : 45 year old male presents to the office for the first time for an evaluation of right posterior knee pain. Pt has a hx of Lupus and is currently undergoing chemotherapy since October 2020, and is taking steroid medications. He states he has gained approximately 60 lbs since October. Pt has extreme discomfort with sit to stand activities after long periods of time. Pt has a feeling of his knee giving away after sitting too long and than standing. Pain also with knee and hip extension. He has difficulty with stairs. Pt is getting MRI later today.

## 2021-04-30 NOTE — ADDENDUM
[FreeTextEntry1] : All medical record entries made by the [facility] were at my, Dr. Jaylen Rivas, direction and personally dictated by me on 04/30/2021. I have reviewed the chart and agree that the record accurately reflects my personal performance of the history, physical exam, assessment and plan. I have also personally directed, reviewed, and agreed with the chart.

## 2021-04-30 NOTE — PHYSICAL EXAM
[Normal RLE] : Right Lower Extremity: No scars, rashes, lesions, ulcers, skin intact [Normal LLE] : Left Lower Extremity: No scars, rashes, lesions, ulcers, skin intact [Normal Touch] : sensation intact for touch [Normal] : Alert and in no acute distress [Poor Appearance] : well-appearing [Acute Distress] : not in acute distress [Obese] : not obese [de-identified] : Right Lower Extremity  \par o Knee :\par ¦ Inspection/Palpation : medial tenderness, mild lateral tenderness, moderate swelling, 1-2+ effusion, mild popliteal fullness \par ¦ Range of Motion : 0 - 100  degrees, no crepitus\par ¦ Stability : no valgus or varus instability present on provocative testing, negative Lachman's test\par ¦ Strength : quadriceps strength 4/5 with pain\par o Muscle Bulk : normal muscle bulk present \par o Skin : no erythema or ecchymosis present \par o Sensation : sensation to pin intact\par o Vascular Exam : no edema, no cyanosis, dorsalis pedis artery pulse 2+, posterior tibial artery pulse 2+ \par \par Left Lower Extremity \par o Knee :\par ¦ Inspection/Palpation : no tenderness to palpation, 1-2+ effusion\par ¦ Range of Motion : 0 - 110  degrees, no crepitus\par ¦ Stability : no valgus or varus instability present on provocative testing, negative Lachman's test\par ¦ Strength : flexion and extension strength 5/5\par o Muscle Bulk : normal muscle bulk present \par o Skin : no erythema or ecchymosis present \par o Sensation : sensation to pin intact\par o Vascular Exam : no edema, no cyanosis, dorsalis pedis artery pulse 2+, posterior tibial artery pulse 2+  [de-identified] : o Right Knee : AP, lateral, sunrise, and Arango views of the knee were obtained, there are no soft tissue abnormalities, alignment is normal, no fractures, normal appearing joint spaces, normal bone density, no bony lesions

## 2021-04-30 NOTE — DISCUSSION/SUMMARY
[de-identified] : The underlying pathophysiology was reviewed in great detail with the patient as well as the various treatment options, including analgesics, NSAIDS, Physical therapy, aspiration, steroid injections. \par \par Weight reduction was recommended. \par \par A home exercise sheet was given and discussed with the patient to follow. A theraband was provided for the patient to use for exercises. \par \par We will go over the MRI results with him upon obtaining the results in the office and advise him of further treatment options.\par \par All questions were answered, all alternatives discussed and the patient is in complete agreement with that plan. Follow-up appointment as instructed. Any issues and the patient will call or come in sooner.

## 2021-05-03 ENCOUNTER — OUTPATIENT (OUTPATIENT)
Dept: OUTPATIENT SERVICES | Facility: HOSPITAL | Age: 46
LOS: 1 days | End: 2021-05-03
Payer: COMMERCIAL

## 2021-05-03 ENCOUNTER — APPOINTMENT (OUTPATIENT)
Dept: MRI IMAGING | Facility: CLINIC | Age: 46
End: 2021-05-03
Payer: COMMERCIAL

## 2021-05-03 DIAGNOSIS — Z90.49 ACQUIRED ABSENCE OF OTHER SPECIFIED PARTS OF DIGESTIVE TRACT: Chronic | ICD-10-CM

## 2021-05-03 DIAGNOSIS — M54.5 LOW BACK PAIN: ICD-10-CM

## 2021-05-03 PROCEDURE — 72148 MRI LUMBAR SPINE W/O DYE: CPT | Mod: 26

## 2021-05-03 PROCEDURE — 72148 MRI LUMBAR SPINE W/O DYE: CPT

## 2021-05-04 ENCOUNTER — TRANSCRIPTION ENCOUNTER (OUTPATIENT)
Age: 46
End: 2021-05-04

## 2021-05-06 ENCOUNTER — TRANSCRIPTION ENCOUNTER (OUTPATIENT)
Age: 46
End: 2021-05-06

## 2021-05-06 ENCOUNTER — APPOINTMENT (OUTPATIENT)
Dept: INTERNAL MEDICINE | Facility: CLINIC | Age: 46
End: 2021-05-06
Payer: COMMERCIAL

## 2021-05-06 VITALS — SYSTOLIC BLOOD PRESSURE: 122 MMHG | DIASTOLIC BLOOD PRESSURE: 82 MMHG

## 2021-05-06 VITALS
WEIGHT: 295 LBS | BODY MASS INDEX: 46.3 KG/M2 | SYSTOLIC BLOOD PRESSURE: 119 MMHG | OXYGEN SATURATION: 98 % | TEMPERATURE: 97.5 F | HEIGHT: 67 IN | DIASTOLIC BLOOD PRESSURE: 81 MMHG | HEART RATE: 105 BPM

## 2021-05-06 PROCEDURE — 99072 ADDL SUPL MATRL&STAF TM PHE: CPT

## 2021-05-06 PROCEDURE — 99214 OFFICE O/P EST MOD 30 MIN: CPT | Mod: 25

## 2021-05-06 PROCEDURE — 36415 COLL VENOUS BLD VENIPUNCTURE: CPT

## 2021-05-06 NOTE — ASSESSMENT
[FreeTextEntry1] : Patient is a 45-year-old male with history of obesity, lupus, asthma, allergies, hypertension, hypothyroidism, bilateral leg edema who presents for follow-up of hypertension\par \par 1. hypertension\par well-controlled on losartan\par \par 2. Asthma\par well-controlled on inhalers lungs clear\par \par 3 hypokalemia\par continue supplementation check level\par \par 4 hypothyroidism\par continue supplement 112mcg check TFTs\par \par 5 lupus\par add meloxicam 15 mg for pain follow-up with rheumatology.\par

## 2021-05-06 NOTE — HISTORY OF PRESENT ILLNESS
[FreeTextEntry1] : Follow-up for hypertension hypokalemia [de-identified] : Patient is a 45-year-old male with history of asthma, allergies, anxiety,Hypothyroidism lupus, migraines, obesity, Raynaud's phenomena, who presents for follow-up of elevated blood pressure and hypokalemia. Patient complains of some joint pain not taking any chronic pain relief other than Tylenol denies chest pain, shortness of breath distant exertion notes dizziness when getting up suddenly especially in the bathroom. Patient also complains of persistent fatigue, denies cold or heat intolerance

## 2021-05-06 NOTE — PHYSICAL EXAM
[Normal Sclera/Conjunctiva] : normal sclera/conjunctiva [Normal Outer Ear/Nose] : the outer ears and nose were normal in appearance [Normal] : no joint swelling and grossly normal strength and tone [de-identified] : bilateral 1+ edema

## 2021-05-06 NOTE — REVIEW OF SYSTEMS
[Joint Pain] : joint pain [Joint Stiffness] : joint stiffness [Negative] : Integumentary [Muscle Pain] : no muscle pain [Muscle Weakness] : no muscle weakness [Back Pain] : no back pain [Joint Swelling] : no joint swelling

## 2021-05-07 ENCOUNTER — TRANSCRIPTION ENCOUNTER (OUTPATIENT)
Age: 46
End: 2021-05-07

## 2021-05-14 ENCOUNTER — APPOINTMENT (OUTPATIENT)
Dept: ORTHOPEDIC SURGERY | Facility: CLINIC | Age: 46
End: 2021-05-14
Payer: COMMERCIAL

## 2021-05-14 VITALS
SYSTOLIC BLOOD PRESSURE: 135 MMHG | WEIGHT: 283 LBS | DIASTOLIC BLOOD PRESSURE: 85 MMHG | HEART RATE: 80 BPM | HEIGHT: 67 IN | BODY MASS INDEX: 44.42 KG/M2

## 2021-05-14 PROCEDURE — 72082 X-RAY EXAM ENTIRE SPI 2/3 VW: CPT

## 2021-05-14 PROCEDURE — 99072 ADDL SUPL MATRL&STAF TM PHE: CPT

## 2021-05-14 PROCEDURE — 99214 OFFICE O/P EST MOD 30 MIN: CPT

## 2021-05-14 NOTE — DISCUSSION/SUMMARY
[de-identified] : We discussed further treatment options.  He will try course of physical therapy.  We discussed potential referral for pain management for injections should physical therapy fail to improve his symptoms.

## 2021-05-14 NOTE — PHYSICAL EXAM
[Antalgic] : antalgic [Stooped] : stooped [de-identified] : Examination of the lumbar spine reveals no midline tenderness palpation, step-offs, or skin lesions. Decreased range of motion with respect to flexion, extension, lateral bending, and rotation. No tenderness to palpation of the sciatic notch. No tenderness palpation of the bilateral greater trochanters. No pain with passive internal/external rotation of the hips. No instability of bilateral lower extremities.  Negative JOHN. Negative straight leg raise bilaterally. No bowstring. Negative femoral stretch. 5 out of 5 iliopsoas, hip abductors, hips adductors, quadriceps, hamstrings, gastrocsoleus, tibialis anterior, extensor hallucis longus, peroneals. Grossly intact sensation to light touch bilateral lower extremities. 1+ patellar and Achilles reflexes. Downgoing Babinski. No clonus. Intact proprioception. Palpable pulses. No skin lesion and no edema on the right and left lower extremities. [de-identified] : Lumbar MRI reveals degenerative disc disease L3-4 with some increased stenosis\par \par AP lateral scoliosis x-rays reveal some spondylosis with preserved alignment.  No gross instability or aggressive lesions

## 2021-05-14 NOTE — HISTORY OF PRESENT ILLNESS
[de-identified] : Mr. AMBERLY ESPITIA  is a 45 year old male who presents with a chronic 4-6 year history of low back and thoracic pain.  Denies any LE radicular symptoms.  Normal bowel and bladder control.   Denies any recent fevers, chills, sweats, weight loss, or infection.  He takes Tylenol for symptom control.  He has a history of Lupus. \par \par The patients past medical history, past surgical history, medications, allergies, and social history were reviewed by me today with the patient and documented accordingly.  In addition, the patient's family history, which is noncontributory to their visit, was also reviewed.\par

## 2021-05-15 ENCOUNTER — APPOINTMENT (OUTPATIENT)
Dept: MRI IMAGING | Facility: CLINIC | Age: 46
End: 2021-05-15
Payer: COMMERCIAL

## 2021-05-15 ENCOUNTER — OUTPATIENT (OUTPATIENT)
Dept: OUTPATIENT SERVICES | Facility: HOSPITAL | Age: 46
LOS: 1 days | End: 2021-05-15
Payer: COMMERCIAL

## 2021-05-15 DIAGNOSIS — M25.561 PAIN IN RIGHT KNEE: ICD-10-CM

## 2021-05-15 DIAGNOSIS — Z90.49 ACQUIRED ABSENCE OF OTHER SPECIFIED PARTS OF DIGESTIVE TRACT: Chronic | ICD-10-CM

## 2021-05-15 DIAGNOSIS — Z00.8 ENCOUNTER FOR OTHER GENERAL EXAMINATION: ICD-10-CM

## 2021-05-15 PROCEDURE — 73718 MRI LOWER EXTREMITY W/O DYE: CPT

## 2021-05-15 PROCEDURE — 73718 MRI LOWER EXTREMITY W/O DYE: CPT | Mod: 26,RT

## 2021-05-17 ENCOUNTER — EMERGENCY (EMERGENCY)
Facility: HOSPITAL | Age: 46
LOS: 1 days | Discharge: HOME | End: 2021-05-17
Attending: EMERGENCY MEDICINE
Payer: COMMERCIAL

## 2021-05-17 VITALS
SYSTOLIC BLOOD PRESSURE: 133 MMHG | DIASTOLIC BLOOD PRESSURE: 69 MMHG | RESPIRATION RATE: 16 BRPM | HEART RATE: 85 BPM | OXYGEN SATURATION: 100 %

## 2021-05-17 VITALS
HEIGHT: 67 IN | WEIGHT: 283.07 LBS | HEART RATE: 119 BPM | RESPIRATION RATE: 20 BRPM | TEMPERATURE: 98 F | OXYGEN SATURATION: 97 % | SYSTOLIC BLOOD PRESSURE: 112 MMHG | DIASTOLIC BLOOD PRESSURE: 66 MMHG

## 2021-05-17 DIAGNOSIS — Z90.49 ACQUIRED ABSENCE OF OTHER SPECIFIED PARTS OF DIGESTIVE TRACT: Chronic | ICD-10-CM

## 2021-05-17 LAB
ALBUMIN SERPL ELPH-MCNC: 4.1 G/DL — SIGNIFICANT CHANGE UP (ref 3.3–5)
ALP SERPL-CCNC: 93 U/L — SIGNIFICANT CHANGE UP (ref 40–120)
ALT FLD-CCNC: 27 U/L — SIGNIFICANT CHANGE UP (ref 10–45)
ANION GAP SERPL CALC-SCNC: 16 MMOL/L — SIGNIFICANT CHANGE UP (ref 5–17)
APPEARANCE UR: CLEAR — SIGNIFICANT CHANGE UP
APTT BLD: 33.2 SEC — SIGNIFICANT CHANGE UP (ref 27.5–35.5)
AST SERPL-CCNC: 25 U/L — SIGNIFICANT CHANGE UP (ref 10–40)
BACTERIA # UR AUTO: NEGATIVE — SIGNIFICANT CHANGE UP
BASE EXCESS BLDV CALC-SCNC: 4.6 MMOL/L — HIGH (ref -2–2)
BASE EXCESS BLDV CALC-SCNC: 4.8 MMOL/L — HIGH (ref -2–2)
BASOPHILS # BLD AUTO: 0.04 K/UL — SIGNIFICANT CHANGE UP (ref 0–0.2)
BASOPHILS NFR BLD AUTO: 0.4 % — SIGNIFICANT CHANGE UP (ref 0–2)
BILIRUB SERPL-MCNC: 0.4 MG/DL — SIGNIFICANT CHANGE UP (ref 0.2–1.2)
BILIRUB UR-MCNC: NEGATIVE — SIGNIFICANT CHANGE UP
BUN SERPL-MCNC: 8 MG/DL — SIGNIFICANT CHANGE UP (ref 7–23)
CA-I SERPL-SCNC: 1.16 MMOL/L — SIGNIFICANT CHANGE UP (ref 1.12–1.3)
CA-I SERPL-SCNC: 1.19 MMOL/L — SIGNIFICANT CHANGE UP (ref 1.12–1.3)
CALCIUM SERPL-MCNC: 9.4 MG/DL — SIGNIFICANT CHANGE UP (ref 8.4–10.5)
CHLORIDE BLDV-SCNC: 100 MMOL/L — SIGNIFICANT CHANGE UP (ref 96–108)
CHLORIDE BLDV-SCNC: 101 MMOL/L — SIGNIFICANT CHANGE UP (ref 96–108)
CHLORIDE SERPL-SCNC: 99 MMOL/L — SIGNIFICANT CHANGE UP (ref 96–108)
CO2 BLDV-SCNC: 30 MMOL/L — SIGNIFICANT CHANGE UP (ref 22–30)
CO2 BLDV-SCNC: 32 MMOL/L — HIGH (ref 22–30)
CO2 SERPL-SCNC: 24 MMOL/L — SIGNIFICANT CHANGE UP (ref 22–31)
COLOR SPEC: YELLOW — SIGNIFICANT CHANGE UP
CREAT SERPL-MCNC: 1.12 MG/DL — SIGNIFICANT CHANGE UP (ref 0.5–1.3)
DIFF PNL FLD: NEGATIVE — SIGNIFICANT CHANGE UP
EOSINOPHIL # BLD AUTO: 0.13 K/UL — SIGNIFICANT CHANGE UP (ref 0–0.5)
EOSINOPHIL NFR BLD AUTO: 1.4 % — SIGNIFICANT CHANGE UP (ref 0–6)
EPI CELLS # UR: 1 /HPF — SIGNIFICANT CHANGE UP
GAS PNL BLDV: 134 MMOL/L — LOW (ref 135–145)
GAS PNL BLDV: 137 MMOL/L — SIGNIFICANT CHANGE UP (ref 135–145)
GAS PNL BLDV: SIGNIFICANT CHANGE UP
GLUCOSE BLDV-MCNC: 139 MG/DL — HIGH (ref 70–99)
GLUCOSE BLDV-MCNC: 140 MG/DL — HIGH (ref 70–99)
GLUCOSE SERPL-MCNC: 127 MG/DL — HIGH (ref 70–99)
GLUCOSE UR QL: NEGATIVE — SIGNIFICANT CHANGE UP
HCO3 BLDV-SCNC: 29 MMOL/L — SIGNIFICANT CHANGE UP (ref 21–29)
HCO3 BLDV-SCNC: 30 MMOL/L — HIGH (ref 21–29)
HCT VFR BLD CALC: 40.6 % — SIGNIFICANT CHANGE UP (ref 39–50)
HCT VFR BLDA CALC: 42 % — SIGNIFICANT CHANGE UP (ref 39–50)
HCT VFR BLDA CALC: 46 % — SIGNIFICANT CHANGE UP (ref 39–50)
HGB BLD CALC-MCNC: 13.8 G/DL — SIGNIFICANT CHANGE UP (ref 13–17)
HGB BLD CALC-MCNC: 15.1 G/DL — SIGNIFICANT CHANGE UP (ref 13–17)
HGB BLD-MCNC: 13.5 G/DL — SIGNIFICANT CHANGE UP (ref 13–17)
HYALINE CASTS # UR AUTO: 2 /LPF — SIGNIFICANT CHANGE UP (ref 0–2)
IMM GRANULOCYTES NFR BLD AUTO: 1 % — SIGNIFICANT CHANGE UP (ref 0–1.5)
INR BLD: 1.06 RATIO — SIGNIFICANT CHANGE UP (ref 0.88–1.16)
KETONES UR-MCNC: NEGATIVE — SIGNIFICANT CHANGE UP
LACTATE BLDV-MCNC: 1.5 MMOL/L — SIGNIFICANT CHANGE UP (ref 0.7–2)
LACTATE BLDV-MCNC: 2.6 MMOL/L — HIGH (ref 0.7–2)
LEUKOCYTE ESTERASE UR-ACNC: NEGATIVE — SIGNIFICANT CHANGE UP
LYMPHOCYTES # BLD AUTO: 1.02 K/UL — SIGNIFICANT CHANGE UP (ref 1–3.3)
LYMPHOCYTES # BLD AUTO: 10.8 % — LOW (ref 13–44)
MCHC RBC-ENTMCNC: 28.5 PG — SIGNIFICANT CHANGE UP (ref 27–34)
MCHC RBC-ENTMCNC: 33.3 GM/DL — SIGNIFICANT CHANGE UP (ref 32–36)
MCV RBC AUTO: 85.8 FL — SIGNIFICANT CHANGE UP (ref 80–100)
MONOCYTES # BLD AUTO: 0.83 K/UL — SIGNIFICANT CHANGE UP (ref 0–0.9)
MONOCYTES NFR BLD AUTO: 8.8 % — SIGNIFICANT CHANGE UP (ref 2–14)
NEUTROPHILS # BLD AUTO: 7.34 K/UL — SIGNIFICANT CHANGE UP (ref 1.8–7.4)
NEUTROPHILS NFR BLD AUTO: 77.6 % — HIGH (ref 43–77)
NITRITE UR-MCNC: NEGATIVE — SIGNIFICANT CHANGE UP
NRBC # BLD: 0 /100 WBCS — SIGNIFICANT CHANGE UP (ref 0–0)
PCO2 BLDV: 44 MMHG — SIGNIFICANT CHANGE UP (ref 35–50)
PCO2 BLDV: 49 MMHG — SIGNIFICANT CHANGE UP (ref 35–50)
PH BLDV: 7.4 — SIGNIFICANT CHANGE UP (ref 7.35–7.45)
PH BLDV: 7.43 — SIGNIFICANT CHANGE UP (ref 7.35–7.45)
PH UR: 6 — SIGNIFICANT CHANGE UP (ref 5–8)
PLATELET # BLD AUTO: 381 K/UL — SIGNIFICANT CHANGE UP (ref 150–400)
PO2 BLDV: 32 MMHG — SIGNIFICANT CHANGE UP (ref 25–45)
PO2 BLDV: 32 MMHG — SIGNIFICANT CHANGE UP (ref 25–45)
POTASSIUM BLDV-SCNC: 3.1 MMOL/L — LOW (ref 3.5–5.3)
POTASSIUM BLDV-SCNC: 4.3 MMOL/L — SIGNIFICANT CHANGE UP (ref 3.5–5.3)
POTASSIUM SERPL-MCNC: 3.4 MMOL/L — LOW (ref 3.5–5.3)
POTASSIUM SERPL-SCNC: 3.4 MMOL/L — LOW (ref 3.5–5.3)
PROT SERPL-MCNC: 7.2 G/DL — SIGNIFICANT CHANGE UP (ref 6–8.3)
PROT UR-MCNC: 100 — SIGNIFICANT CHANGE UP
PROTHROM AB SERPL-ACNC: 12.7 SEC — SIGNIFICANT CHANGE UP (ref 10.6–13.6)
RBC # BLD: 4.73 M/UL — SIGNIFICANT CHANGE UP (ref 4.2–5.8)
RBC # FLD: 13.3 % — SIGNIFICANT CHANGE UP (ref 10.3–14.5)
RBC CASTS # UR COMP ASSIST: 2 /HPF — SIGNIFICANT CHANGE UP (ref 0–4)
SAO2 % BLDV: 56 % — LOW (ref 67–88)
SAO2 % BLDV: 60 % — LOW (ref 67–88)
SODIUM SERPL-SCNC: 139 MMOL/L — SIGNIFICANT CHANGE UP (ref 135–145)
SP GR SPEC: 1.03 — HIGH (ref 1.01–1.02)
TROPONIN T, HIGH SENSITIVITY RESULT: <6 NG/L — SIGNIFICANT CHANGE UP (ref 0–51)
UROBILINOGEN FLD QL: ABNORMAL
WBC # BLD: 9.45 K/UL — SIGNIFICANT CHANGE UP (ref 3.8–10.5)
WBC # FLD AUTO: 9.45 K/UL — SIGNIFICANT CHANGE UP (ref 3.8–10.5)
WBC UR QL: 2 /HPF — SIGNIFICANT CHANGE UP (ref 0–5)

## 2021-05-17 PROCEDURE — 93005 ELECTROCARDIOGRAM TRACING: CPT

## 2021-05-17 PROCEDURE — 85730 THROMBOPLASTIN TIME PARTIAL: CPT

## 2021-05-17 PROCEDURE — 82803 BLOOD GASES ANY COMBINATION: CPT

## 2021-05-17 PROCEDURE — 84132 ASSAY OF SERUM POTASSIUM: CPT

## 2021-05-17 PROCEDURE — 84484 ASSAY OF TROPONIN QUANT: CPT

## 2021-05-17 PROCEDURE — 82330 ASSAY OF CALCIUM: CPT

## 2021-05-17 PROCEDURE — 93010 ELECTROCARDIOGRAM REPORT: CPT

## 2021-05-17 PROCEDURE — 83605 ASSAY OF LACTIC ACID: CPT

## 2021-05-17 PROCEDURE — 80053 COMPREHEN METABOLIC PANEL: CPT

## 2021-05-17 PROCEDURE — 81001 URINALYSIS AUTO W/SCOPE: CPT

## 2021-05-17 PROCEDURE — 82947 ASSAY GLUCOSE BLOOD QUANT: CPT

## 2021-05-17 PROCEDURE — 99284 EMERGENCY DEPT VISIT MOD MDM: CPT | Mod: 25

## 2021-05-17 PROCEDURE — 85610 PROTHROMBIN TIME: CPT

## 2021-05-17 PROCEDURE — 94640 AIRWAY INHALATION TREATMENT: CPT

## 2021-05-17 PROCEDURE — 71275 CT ANGIOGRAPHY CHEST: CPT

## 2021-05-17 PROCEDURE — 85025 COMPLETE CBC W/AUTO DIFF WBC: CPT

## 2021-05-17 PROCEDURE — 71275 CT ANGIOGRAPHY CHEST: CPT | Mod: 26,MA

## 2021-05-17 PROCEDURE — 85014 HEMATOCRIT: CPT

## 2021-05-17 PROCEDURE — 85018 HEMOGLOBIN: CPT

## 2021-05-17 PROCEDURE — 99285 EMERGENCY DEPT VISIT HI MDM: CPT

## 2021-05-17 PROCEDURE — 84295 ASSAY OF SERUM SODIUM: CPT

## 2021-05-17 PROCEDURE — 82435 ASSAY OF BLOOD CHLORIDE: CPT

## 2021-05-17 RX ORDER — SODIUM CHLORIDE 9 MG/ML
1000 INJECTION, SOLUTION INTRAVENOUS ONCE
Refills: 0 | Status: COMPLETED | OUTPATIENT
Start: 2021-05-17 | End: 2021-05-17

## 2021-05-17 RX ORDER — IPRATROPIUM/ALBUTEROL SULFATE 18-103MCG
3 AEROSOL WITH ADAPTER (GRAM) INHALATION
Refills: 0 | Status: COMPLETED | OUTPATIENT
Start: 2021-05-17 | End: 2021-05-17

## 2021-05-17 RX ORDER — LORATADINE 10 MG/1
10 TABLET ORAL ONCE
Refills: 0 | Status: COMPLETED | OUTPATIENT
Start: 2021-05-17 | End: 2021-05-17

## 2021-05-17 RX ORDER — FLUTICASONE PROPIONATE 50 MCG
1 SPRAY, SUSPENSION NASAL ONCE
Refills: 0 | Status: COMPLETED | OUTPATIENT
Start: 2021-05-17 | End: 2021-05-17

## 2021-05-17 RX ADMIN — Medication 3 MILLILITER(S): at 20:03

## 2021-05-17 RX ADMIN — LORATADINE 10 MILLIGRAM(S): 10 TABLET ORAL at 14:20

## 2021-05-17 RX ADMIN — Medication 3 MILLILITER(S): at 20:16

## 2021-05-17 RX ADMIN — SODIUM CHLORIDE 1000 MILLILITER(S): 9 INJECTION, SOLUTION INTRAVENOUS at 17:32

## 2021-05-17 RX ADMIN — Medication 40 MILLIGRAM(S): at 19:20

## 2021-05-17 RX ADMIN — Medication 1 SPRAY(S): at 14:20

## 2021-05-17 RX ADMIN — Medication 3 MILLILITER(S): at 19:23

## 2021-05-17 NOTE — ED PROVIDER NOTE - DISCHARGE DATE
17-May-2021 bilateral lower extremity ROM was WFL (within functional limits)/bilateral upper extremity ROM was WFL (within functional limits)

## 2021-05-17 NOTE — ED PROVIDER NOTE - PATIENT PORTAL LINK FT
You can access the FollowMyHealth Patient Portal offered by [facility] by registering at the following website: [email or web address]. By joining [employer]’s FollowMyHealth portal, you will also be able to view your health information using other applications (apps) compatible with our system.

## 2021-05-17 NOTE — ED ADULT NURSE NOTE - NS_ED_NURSE_TEACHING_TOPIC_ED_A_ED
Occupational Therapy  Visit Type: treatment  Precautions:  Medical precautions:  fall risk; standard precautions.  Abdominal incision precautions  Lines:     Basic: central line and telemetry    Complex: J.P. drain (PCA)      Lines in chart and on patient reviewed, cautions maintained throughout session.  Safety Measures: bed alarm    SUBJECTIVE                                                                                                            Patient agreed to participate in therapy this date.    Patient is a 64 year old male admitted to [facility] for GI bleed. Patient underwent EGD 11/13 which revealed active bleeding from duodenal ulcer, had epi injection and clip placement.   General surgery consultation obtained and patient was taken for ex lap and pyloroplasty with oversewing of duodenal ulcer on 11/14.  Patient required 8 units PRBC.  Patient started on TPN on 11/15.    Pt lives with significant other and step son in a mobile home with 3 steps entering.  At baseline, patient is Independent with self cares and functional mobility tasks using no assistive device most of time, occassionally uses walking stick due to Lt knee pain. Reports he physically takes care of significant other who is disabled and step son.     Patient / Family Goal: maximize function and return home      OBJECTIVE                                                                                                              Level of consciousness: alert    Oriented to person, place, time and situation   Patient activity tolerance: 1 to 1 activity to rest  Balance  Sitting:    Static:  independent     Dynamic:  independent  Standing - Firm Surface - Eyes Open:     Static: supervision (sba)    Dynamic:  supervision (sba)    Bed mobility:      Supine to sit: supervision (SBA)    Sit to supine: supervision (SBA)  Training completed:    Tasks: all aspects of bed mobility    Education details: body mechanics and patient safety  Transfers:    
Assistive devices: gait belt and 2-wheeled walker    Sit to stand: supervision (sba)    Stand to sit: supervision (sba)  Training completed:    Tasks: sit to stand, stand to sit and toilet    Education details: body mechanics and patient safety  Functional Ambulation:    Assistance:supervision (sba; assist with line management)   Assistive device:gait belt and 2-wheeled walker    Distance (ft): 10;20    Surface: even  Activities of Daily Living (ADLs):  Lower Body Dressing:     Footwear assistance: supervision (SBA; cues to follow abdominal precautions)    Footwear position: edge of bed    Assist needed for: increased time to complete, set up, supervision/safety and verbal cueing  Toilet transfer:     Assist: supervision (sba)    Device: gait belt and 2-wheeled walker    Equipment: grab bar use      Interventions                                                                                                                 Training provided: activity tolerance, bed mobility training, body mechanics, compensatory techniques, transfer training, ADL training and positioning  Skilled input: verbal instruction/cues  Verbal Consent: Writer verbally educated and received verbal consent for hand placement, positioning of patient, and techniques to be performed today from patient         ASSESSMENT                                                                                                                  Visit # since seen by OT:  0    Patient is displaying good progress as evidenced by improving self care and functional mobility participation and tolerance. Pt unable to recall abdominal precautions at beginning of session; re-educated pt, but required additional cues throughout session to maintain precautions. Pt educated on use of figure 4 method to complete LB dressing, able to complete with SBA seated EOB. Pt needing supine<>sit transfer, sit<>stand transfer, and toilet transfer with SBA while maintaining 
precautions and assist for line management. Pt reports having assist from family with ADLs and IADLs as needed. Pt would benefit from additional training to maintain abdominal precautions. At this time the patient continues to demonstrate pain, decreased activity tolerance which is limiting the completion of ADLs, IADLs and functional mobility that require moderate energy exertion.  Further skilled occupational therapy is required to address these limitations in attempt to maximize the patient's independence.    Discharge Recommendations  Recommendations for Discharge: OT WI: Post acute therapy (Home; Home Therapy if family is able to assist)           PT/OT Mobility Equipment for Discharge: TBD  PT/OT ADL Equipment for Discharge: TBD  OT Identified Barriers to Discharge: pain     Skilled therapy is required to address these limitations in attempt to maximize the patient's independence.  Progress: progressing toward goals    End of Session:   Location: in bed  Safety measures: alarm system in place/re-engaged, lines intact and call light within reach  Handoff to: nurse    PLAN                                                                                                                          Suggestions for next session as indicated: Review abdominal precautions, review LB dressing, sinkside grooming tasks, item retrieval    OT Frequency: 5 days/week         Interventions: activity tolerance training, ADL retraining, balance, bed mobility training, body mechanics, compensatory technique education, equipment eval/education, functional transfer training, patient/family training, positioning, transfer training, therapeutic activity, therapeutic exercise, upper extremity strengthening/ROM, use of adaptive equipment and IADL  Agreement to plan and goals: patient agrees with goals and treatment plan      GOALS:  Review Date: 11/23/2020  Long Term Goals: (to be met by time of discharge from hospital)  State 
precautions: Patient able to state precautions independent.  Status: progressing/ongoing  Maintain precautions: Patient able to maintain precautions modified independent.  Status: progressing/ongoing  Grooming: Patient will complete grooming tasks modified independent. Status: progressing/ongoing  Lower body dressing: Patient will complete lower body dressing modified independent. Status: progressing/ongoing  Toileting: Patient will complete toileting modified independent.  Status: progressing/ongoing  Toilet transfer: Patient will complete toilet transfer with gait belt, modified independent.   Status: progressing/ongoing  Item retrieval: Patient will complete item retrieval modified independent.   Status: progressing/ongoing        Documented in the chart in the following areas: Pain. Assessment. Plan.      
Spine appears normal, movement of extremities grossly intact.
Respiratory

## 2021-05-17 NOTE — ED PROVIDER NOTE - CLINICAL SUMMARY MEDICAL DECISION MAKING FREE TEXT BOX
46 y/o M presenting with SOB, sinus congestion considering exacerbation of seasonal allergies and will treat symptomatically. Given hx of SLE also considering PE given tachy and SOB. Unlikely asthma exacerbation. Will obtain basic labs, CTA. Dispo pending results 46 y/o M presenting with SOB, sinus congestion considering exacerbation of seasonal allergies and will treat symptomatically. Given hx of SLE also considering PE given tachy and SOB. Unlikely asthma exacerbation. Will obtain basic labs, CTA. Dispo pending results    Attending MD Youssef: 44 yo male with SLE, seasonal allergies presents with complaint of SOB for 5 days.  Took albuterol without relief.  No LE edama.  Lungs clear.  Denies chest pain.  Given tachycardia and SLE with obtain CTA to rule out PE.  EKG and re-evaluate.

## 2021-05-17 NOTE — ED PROVIDER NOTE - PROGRESS NOTE DETAILS
DO Vasiliy PGY-2: patient reports slight improvement of symptoms, has f/u with rheum tomorrow and pulm day after. will DC with return precautions

## 2021-05-17 NOTE — ED ADULT NURSE NOTE - OBJECTIVE STATEMENT
45 year old male hx of Lupus who gets chemo every month for treatment has increased shortness of breath Pt has seasonal asthma and did his HHN at home with no results Pt heart rate 105 Regular rhythm Pt complaining of back pain  with some chest pain.  Lungs clear no distress noted Pt placed on 2 Lnc Pox 100% but pt asked for the oxygen.  IVL placed and bloods sent as ordered mPuleo rn

## 2021-05-17 NOTE — ED PROVIDER NOTE - OBJECTIVE STATEMENT
44 y/o M with PMH of SLE, hypothyroidism, asthma presenting with SOB x5 days. Also has nasal congestion with difficulty breathing x3 days. States he has ulcers in his nose 2/2 to lupus which are making his symptoms worse. Has tried albuterol neb without any relief. Has not tried OTC allergy meds but states he gets immune shots ever 2-3 weeks.  Has slight chest pain, sore throat

## 2021-05-17 NOTE — ED PROVIDER NOTE - PHYSICAL EXAMINATION
gen: well appearing  Mentation: AAO x 3  psych: mood appropriate  ENT: +sinus tenderness, airway patent, no pharyngeal erythema  Eyes: conjunctivae clear bilaterally  Cardio: tachy, no m/r/g  Resp: normal BS b/l  GI: s/nt/nd  : no CVA tenderness  Neuro: sensation and motor function intact  Skin: No evidence of rash  MSK: normal movement of all extremities  Lymph/Vasc: no LE edema gen: well appearing  Mentation: AAO x 3  psych: mood appropriate  ENT: +sinus tenderness, airway patent, no pharyngeal erythema  Eyes: conjunctivae clear bilaterally  Cardio: tachy, no m/r/g  Resp: normal BS b/l  GI: s/nt/nd  : no CVA tenderness  Neuro: sensation and motor function intact  Skin: +malar rash  MSK: normal movement of all extremities  Lymph/Vasc: no LE edema

## 2021-05-17 NOTE — ED PROVIDER NOTE - NS ED ROS FT
CONSTITUTIONAL: No fevers, no chills, no lightheadedness, no dizziness  Eyes: no visual changes  Ears: no ear drainage, no ear pain  Nose: +nasal congestion  Mouth/Throat: +sore throat  CV: +chest pain, no palpitations  PULM: +SOB, no cough  GI: No n/v/d, no abd pain  : no dysuria, no hematuria  SKIN: no rashes.  NEURO: no headache, no focal weakness or numbness  LYMPH/VASC: no LE swelling

## 2021-05-18 ENCOUNTER — APPOINTMENT (OUTPATIENT)
Dept: RHEUMATOLOGY | Facility: CLINIC | Age: 46
End: 2021-05-18
Payer: COMMERCIAL

## 2021-05-18 VITALS
BODY MASS INDEX: 44.42 KG/M2 | SYSTOLIC BLOOD PRESSURE: 127 MMHG | HEIGHT: 67 IN | RESPIRATION RATE: 16 BRPM | WEIGHT: 283 LBS | HEART RATE: 82 BPM | TEMPERATURE: 97.9 F | OXYGEN SATURATION: 98 % | DIASTOLIC BLOOD PRESSURE: 82 MMHG

## 2021-05-18 PROCEDURE — 99072 ADDL SUPL MATRL&STAF TM PHE: CPT

## 2021-05-18 PROCEDURE — 99214 OFFICE O/P EST MOD 30 MIN: CPT | Mod: GC

## 2021-05-19 ENCOUNTER — APPOINTMENT (OUTPATIENT)
Dept: PULMONOLOGY | Facility: CLINIC | Age: 46
End: 2021-05-19
Payer: COMMERCIAL

## 2021-05-19 ENCOUNTER — TRANSCRIPTION ENCOUNTER (OUTPATIENT)
Age: 46
End: 2021-05-19

## 2021-05-19 VITALS
SYSTOLIC BLOOD PRESSURE: 128 MMHG | RESPIRATION RATE: 16 BRPM | WEIGHT: 283 LBS | TEMPERATURE: 97.7 F | DIASTOLIC BLOOD PRESSURE: 72 MMHG | OXYGEN SATURATION: 98 % | HEIGHT: 67 IN | BODY MASS INDEX: 44.42 KG/M2 | HEART RATE: 115 BPM

## 2021-05-19 LAB
ANION GAP SERPL CALC-SCNC: 13 MMOL/L
BUN SERPL-MCNC: 11 MG/DL
CALCIUM SERPL-MCNC: 9.6 MG/DL
CHLORIDE SERPL-SCNC: 100 MMOL/L
CO2 SERPL-SCNC: 26 MMOL/L
CREAT SERPL-MCNC: 0.94 MG/DL
GLUCOSE SERPL-MCNC: 79 MG/DL
POTASSIUM SERPL-SCNC: 3.8 MMOL/L
SODIUM SERPL-SCNC: 138 MMOL/L
T4 FREE SERPL-MCNC: 1.4 NG/DL
TSH SERPL-ACNC: 4.24 UIU/ML

## 2021-05-19 PROCEDURE — 99072 ADDL SUPL MATRL&STAF TM PHE: CPT

## 2021-05-19 PROCEDURE — 99215 OFFICE O/P EST HI 40 MIN: CPT

## 2021-05-19 NOTE — ASSESSMENT
[FreeTextEntry1] : 45 year old male Hx Lupus/myositis?, asthma, anxiety, presents for follow up recent asthma exacerbation, shortness of breath multifactorial including weight gain secondary steroids, poor mechanics breathing, neuropathy\par \par Increase Trelegy dose 200-25 mcg daily \par Continue prednisone 40 mg x 7 days then reduce to 20 mg\par Discontinue albuterol for now\par Add Yupelri prior to Trelegy in am daily\par Famotidine 40 mg QHS \par \par Follow up 2-3 weeks

## 2021-05-19 NOTE — PROCEDURE
[FreeTextEntry1] : PFT 7/24/2019 personally reviewed Normal PFT\par \par CXR 6/1/2019 personally reviewed clear lungs\par \par Nebulized Duo Neb given in office 8/19/2019\par \par CT angio 5/17/21 personally reviewed negative for pulmonary embolism

## 2021-05-19 NOTE — COUNSELING
[Other: ____] : [unfilled] [Good understanding] : Patient has a good understanding of lifestyle changes and steps needed to achieve self management goal [de-identified] : Anxiety

## 2021-05-19 NOTE — HISTORY OF PRESENT ILLNESS
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 Patient in ER May 17 for asthma exacerbation.  He reports he is having difficulty breathing due to pollen.  He is following with Rheumatology and on CellCept, IV Benlysta, prednisone and Plaquenil.  He was also prescribed colchicine for pain.  He had CT angio which was negative for pulmonary embolus.  He is here for follow up recent ER visit for asthma exacerbation

## 2021-05-20 ENCOUNTER — TRANSCRIPTION ENCOUNTER (OUTPATIENT)
Age: 46
End: 2021-05-20

## 2021-05-21 ENCOUNTER — APPOINTMENT (OUTPATIENT)
Dept: NEPHROLOGY | Facility: CLINIC | Age: 46
End: 2021-05-21
Payer: COMMERCIAL

## 2021-05-21 ENCOUNTER — TRANSCRIPTION ENCOUNTER (OUTPATIENT)
Age: 46
End: 2021-05-21

## 2021-05-21 VITALS
HEART RATE: 98 BPM | WEIGHT: 283 LBS | OXYGEN SATURATION: 99 % | TEMPERATURE: 97 F | HEIGHT: 67 IN | DIASTOLIC BLOOD PRESSURE: 82 MMHG | SYSTOLIC BLOOD PRESSURE: 124 MMHG | BODY MASS INDEX: 44.42 KG/M2

## 2021-05-21 PROCEDURE — 99215 OFFICE O/P EST HI 40 MIN: CPT

## 2021-05-21 PROCEDURE — 99072 ADDL SUPL MATRL&STAF TM PHE: CPT

## 2021-05-21 NOTE — REVIEW OF SYSTEMS
[Fever] : no fever [Chills] : no chills [Feeling Poorly] : feeling poorly [Eyesight Problems] : no eyesight problems [Nosebleeds] : no nosebleeds [Chest Pain] : no chest pain [Lower Ext Edema] : lower extremity edema [Abdominal Pain] : no abdominal pain [Vomiting] : no vomiting [As Noted in HPI] : as noted in HPI [Joint Pain] : joint pain [Dizziness] : no dizziness [Fainting] : no fainting [Anxiety] : no anxiety [Depression] : no depression [Easy Bleeding] : no tendency for easy bleeding [Easy Bruising] : no tendency for easy bruising

## 2021-05-21 NOTE — HISTORY OF PRESENT ILLNESS
[FreeTextEntry1] : Today I had the pleasure of meeting Shereen Pinzon who is a 45 year old  with 8 kids.  He is here today for  evaluation of edema in the setting of SLE.  He has a history of asthma in [facility].  About 2 years ago upon returning from [facility] he noted a malar rash and was then diagnosed with lupus.  He has been on prednisone since about that time as well as cellcept and more recently belimumab.  He noted swelling about six months ago which he describes as most pronounced in his legs but really all over his body.  He has says it feels like his legs are just very heavy.  When he presses his ankle it leaves an indentation.  He recently went to the ED because the swelling was so bad.  He reports that when he takes furosemide 40 daily it "keeps him at bay" but it doesn't make progress.  When he takes it twice per day he really feels like the swelling goes away and makes him lighter on his feet.  There is no associated dyspnea, orthopnea.  He reports foamy urine.  Interestingly he presently has only trace edema and no hypoalbuminemia.\par \par 5/21/21 -- I saw shereen in the office today.  He reports some dyspnea and a recent asthma exacerbation.  Now on colchicine.  has no orthopnea.  reports stable edema on lasix 40 bid.  No chest pain.  Reports urinary frequency.

## 2021-05-21 NOTE — ASSESSMENT
[FreeTextEntry1] : Francisco Javier Pinzon is a 45 years old man with SLE here for evaluation of edema\par \par Edema -- The etiology of the edema is unclear.  At the present time the patient does not appear to have significant enough albuminuria to be responsible for this pitting edema and so nephrotic syndrome is ruled out.  Other etiologies might be related to sodium retention from chronic steroid use and from obesity.  The patient was started on losartan for hypertension and this will augment the diuretic effect of lasix and help with the hypokalemia.  I have recommended to the patient that since he feels significant relief with BID dosing that he continue it.  I have reviewed the recent notes from Alex hammond and isai and shaila as well as the recent CBC, metabolic panel, and urine tests and made an independent assessment.\par \par Hypokalemia -- The patient's last serum potassium was low but is now improved on losartan.  continue to monitor serum potassium\par \par follow up in 6 months or sooner if needed.\par \par

## 2021-05-21 NOTE — PHYSICAL EXAM
[General Appearance - Alert] : alert [General Appearance - In No Acute Distress] : in no acute distress [FreeTextEntry1] : obese [Sclera] : the sclera and conjunctiva were normal [Hearing Threshold Finger Rub Not Traverse] : hearing was normal [Respiration, Rhythm And Depth] : normal respiratory rhythm and effort [Exaggerated Use Of Accessory Muscles For Inspiration] : no accessory muscle use [Auscultation Breath Sounds / Voice Sounds] : lungs were clear to auscultation bilaterally [Heart Sounds] : normal S1 and S2 [___ +] : bilateral [unfilled]+ pretibial pitting edema [Abdomen Soft] : soft [Abdomen Tenderness] : non-tender [] : no hepato-splenomegaly [No CVA Tenderness] : no ~M costovertebral angle tenderness [Abnormal Walk] : normal gait [Oriented To Time, Place, And Person] : oriented to person, place, and time [Impaired Insight] : insight and judgment were intact [Affect] : the affect was normal [Mood] : the mood was normal

## 2021-05-24 ENCOUNTER — TRANSCRIPTION ENCOUNTER (OUTPATIENT)
Age: 46
End: 2021-05-24

## 2021-05-25 ENCOUNTER — TRANSCRIPTION ENCOUNTER (OUTPATIENT)
Age: 46
End: 2021-05-25

## 2021-05-27 ENCOUNTER — APPOINTMENT (OUTPATIENT)
Dept: RHEUMATOLOGY | Facility: CLINIC | Age: 46
End: 2021-05-27
Payer: COMMERCIAL

## 2021-05-27 ENCOUNTER — LABORATORY RESULT (OUTPATIENT)
Age: 46
End: 2021-05-27

## 2021-05-27 PROCEDURE — 36415 COLL VENOUS BLD VENIPUNCTURE: CPT

## 2021-05-27 PROCEDURE — 99072 ADDL SUPL MATRL&STAF TM PHE: CPT

## 2021-05-27 PROCEDURE — 96413 CHEMO IV INFUSION 1 HR: CPT

## 2021-05-27 PROCEDURE — 96375 TX/PRO/DX INJ NEW DRUG ADDON: CPT

## 2021-05-28 ENCOUNTER — TRANSCRIPTION ENCOUNTER (OUTPATIENT)
Age: 46
End: 2021-05-28

## 2021-06-01 ENCOUNTER — RX RENEWAL (OUTPATIENT)
Age: 46
End: 2021-06-01

## 2021-06-09 ENCOUNTER — APPOINTMENT (OUTPATIENT)
Dept: PULMONOLOGY | Facility: CLINIC | Age: 46
End: 2021-06-09
Payer: COMMERCIAL

## 2021-06-09 ENCOUNTER — RX RENEWAL (OUTPATIENT)
Age: 46
End: 2021-06-09

## 2021-06-09 VITALS
RESPIRATION RATE: 16 BRPM | BODY MASS INDEX: 44.42 KG/M2 | WEIGHT: 283 LBS | HEIGHT: 67 IN | TEMPERATURE: 97.3 F | SYSTOLIC BLOOD PRESSURE: 120 MMHG | OXYGEN SATURATION: 97 % | DIASTOLIC BLOOD PRESSURE: 70 MMHG | HEART RATE: 116 BPM

## 2021-06-09 PROCEDURE — 99072 ADDL SUPL MATRL&STAF TM PHE: CPT

## 2021-06-09 PROCEDURE — 99215 OFFICE O/P EST HI 40 MIN: CPT

## 2021-06-09 NOTE — ASSESSMENT
[FreeTextEntry1] : 45 year old male [employer]  presents fopr follow up asthma exacerbation, lupus flare, myositis on Cell Cept , chronic prednisone, plaquenil \par \par Continue Trelegy 200-25 mcg daily\par Continue prednisone 20 mg daily for now\par Continue Yupelri via nebulizer prior to Trelegy\par Continue Famotidine 40 mg daily\par Follow up Rheumatology\par PFT next visit/CXR \par \par Follow up 4-6 weeks

## 2021-06-09 NOTE — COUNSELING
[Other: ____] : [unfilled] [Good understanding] : Patient has a good understanding of lifestyle changes and steps needed to achieve self management goal [de-identified] : Anxiety

## 2021-06-09 NOTE — HISTORY OF PRESENT ILLNESS
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 He reports he is feeling somewhat better this visit.  He needs Rx for Sleep Study per [employer] [facility] guidelines.  He is following with Rheumatology and on CellCept, IV Benlysta, prednisone and Plaquenil.  He was prescribed colchicine for pain. CT angio which was negative for pulmonary embolus.  He is here for follow up  10-Mar-2019 14:22

## 2021-06-14 ENCOUNTER — RX CHANGE (OUTPATIENT)
Age: 46
End: 2021-06-14

## 2021-06-14 RX ORDER — FAMOTIDINE 40 MG/1
40 TABLET, FILM COATED ORAL
Qty: 30 | Refills: 1 | Status: DISCONTINUED | COMMUNITY
Start: 2021-05-19 | End: 2021-06-14

## 2021-06-15 ENCOUNTER — RX RENEWAL (OUTPATIENT)
Age: 46
End: 2021-06-15

## 2021-06-23 ENCOUNTER — LABORATORY RESULT (OUTPATIENT)
Age: 46
End: 2021-06-23

## 2021-06-24 ENCOUNTER — LABORATORY RESULT (OUTPATIENT)
Age: 46
End: 2021-06-24

## 2021-06-24 ENCOUNTER — APPOINTMENT (OUTPATIENT)
Dept: RHEUMATOLOGY | Facility: CLINIC | Age: 46
End: 2021-06-24
Payer: COMMERCIAL

## 2021-06-24 PROCEDURE — 99072 ADDL SUPL MATRL&STAF TM PHE: CPT

## 2021-06-24 PROCEDURE — 36415 COLL VENOUS BLD VENIPUNCTURE: CPT

## 2021-06-24 PROCEDURE — 96413 CHEMO IV INFUSION 1 HR: CPT

## 2021-06-24 PROCEDURE — 96375 TX/PRO/DX INJ NEW DRUG ADDON: CPT

## 2021-06-29 ENCOUNTER — APPOINTMENT (OUTPATIENT)
Dept: RHEUMATOLOGY | Facility: CLINIC | Age: 46
End: 2021-06-29
Payer: COMMERCIAL

## 2021-06-29 VITALS
TEMPERATURE: 97.6 F | HEART RATE: 100 BPM | BODY MASS INDEX: 44.73 KG/M2 | HEIGHT: 67 IN | WEIGHT: 285 LBS | RESPIRATION RATE: 16 BRPM | OXYGEN SATURATION: 97 % | DIASTOLIC BLOOD PRESSURE: 78 MMHG | SYSTOLIC BLOOD PRESSURE: 127 MMHG

## 2021-06-29 PROCEDURE — 99072 ADDL SUPL MATRL&STAF TM PHE: CPT

## 2021-06-29 PROCEDURE — 99214 OFFICE O/P EST MOD 30 MIN: CPT

## 2021-06-29 RX ORDER — COLCHICINE 0.6 MG/1
0.6 CAPSULE ORAL
Qty: 90 | Refills: 0 | Status: DISCONTINUED | COMMUNITY
Start: 2021-05-20 | End: 2021-06-29

## 2021-07-08 ENCOUNTER — APPOINTMENT (OUTPATIENT)
Dept: INTERNAL MEDICINE | Facility: CLINIC | Age: 46
End: 2021-07-08
Payer: COMMERCIAL

## 2021-07-08 VITALS — SYSTOLIC BLOOD PRESSURE: 132 MMHG | DIASTOLIC BLOOD PRESSURE: 88 MMHG

## 2021-07-08 VITALS
HEART RATE: 97 BPM | BODY MASS INDEX: 46.3 KG/M2 | HEIGHT: 67 IN | OXYGEN SATURATION: 94 % | SYSTOLIC BLOOD PRESSURE: 128 MMHG | WEIGHT: 295 LBS | DIASTOLIC BLOOD PRESSURE: 81 MMHG | TEMPERATURE: 97.3 F

## 2021-07-08 VITALS — DIASTOLIC BLOOD PRESSURE: 82 MMHG | SYSTOLIC BLOOD PRESSURE: 130 MMHG

## 2021-07-08 PROCEDURE — G0009: CPT

## 2021-07-08 PROCEDURE — 99214 OFFICE O/P EST MOD 30 MIN: CPT | Mod: 25

## 2021-07-08 PROCEDURE — 99072 ADDL SUPL MATRL&STAF TM PHE: CPT

## 2021-07-08 PROCEDURE — 90670 PCV13 VACCINE IM: CPT

## 2021-07-08 PROCEDURE — 36415 COLL VENOUS BLD VENIPUNCTURE: CPT

## 2021-07-08 NOTE — PHYSICAL EXAM
[Normal Sclera/Conjunctiva] : normal sclera/conjunctiva [Normal Outer Ear/Nose] : the outer ears and nose were normal in appearance [Normal] : no carotid or abdominal bruits heard, no varicosities, pedal pulses are present, no peripheral edema, no extremity clubbing or cyanosis and no palpable aorta PAIN/NAUSEA

## 2021-07-08 NOTE — HISTORY OF PRESENT ILLNESS
[FreeTextEntry1] : Follow-up for multiple medical issues [de-identified] : The patient is a 46-year-old male with history of asthma, lupus, hypothyroidism, hypertension, obesity, chronic rhino sinusitis, lumbar disc disease, history of edema, migraines, who presents for follow-up. Patient feels better less joint pain has taper down to prednisone 15 but still complains of fatigue taking his Synthroid daily denies chest pain, shortness of breath distant exertion palpitation lightheadedness

## 2021-07-08 NOTE — REVIEW OF SYSTEMS
[Fatigue] : fatigue [Joint Pain] : joint pain [Muscle Pain] : muscle pain [Back Pain] : back pain [Negative] : Neurological [Fever] : no fever [Chills] : no chills [Hot Flashes] : no hot flashes [Night Sweats] : no night sweats [Recent Change In Weight] : ~T no recent weight change [Joint Stiffness] : no joint stiffness [Muscle Weakness] : no muscle weakness [Joint Swelling] : no joint swelling [FreeTextEntry9] : Has improved

## 2021-07-09 ENCOUNTER — APPOINTMENT (OUTPATIENT)
Dept: PULMONOLOGY | Facility: CLINIC | Age: 46
End: 2021-07-09
Payer: COMMERCIAL

## 2021-07-09 VITALS
WEIGHT: 287 LBS | SYSTOLIC BLOOD PRESSURE: 130 MMHG | HEART RATE: 106 BPM | RESPIRATION RATE: 16 BRPM | HEIGHT: 67 IN | OXYGEN SATURATION: 98 % | DIASTOLIC BLOOD PRESSURE: 80 MMHG | BODY MASS INDEX: 45.04 KG/M2 | TEMPERATURE: 97.7 F

## 2021-07-09 PROCEDURE — 99215 OFFICE O/P EST HI 40 MIN: CPT

## 2021-07-09 PROCEDURE — 99072 ADDL SUPL MATRL&STAF TM PHE: CPT

## 2021-07-12 NOTE — REASON FOR VISIT
[Follow-Up] : a follow-up visit [Asthma] : asthma [Sleep Apnea] : sleep apnea [TextBox_44] : LUPUs, Central TAMMY

## 2021-07-12 NOTE — COUNSELING
[Other: ____] : [unfilled] [Good understanding] : Patient has a good understanding of lifestyle changes and steps needed to achieve self management goal [de-identified] : Anxiety

## 2021-07-12 NOTE — HISTORY OF PRESENT ILLNESS
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 He reports he is feeling somewhat better this visit. He is following with Rheumatology and on CellCept, IV Benlysta, prednisone and Plaquenil.  He was prescribed colchicine for pain.  Patient underwent Sleep Study revealing central sleep apnea. Patient now prednisone 15 mg daily.  He reports extreme fatigue.  He is here for follow up.

## 2021-07-12 NOTE — ASSESSMENT
[FreeTextEntry1] : 46 year old male [employer]  presents for follow up asthma exacerbation, lupus flare, myositis, now central sleep apnea, on Cell Cept , Benlysta, chronic prednisone, Plaquenil \par \par Continue Trelegy 200-25 mcg daily\par Continue prednisone 15 mg daily for now\par Continue Yupelri via nebulizer prior to Trelegy\par Continue Famotidine 40 mg daily\par Follow up Rheumatology\par In Lab CPAP/BIPAP titratration \par Sleep Medicine Specialist Referral\par PFT next visit\par \par Follow up 3 months

## 2021-07-13 ENCOUNTER — TRANSCRIPTION ENCOUNTER (OUTPATIENT)
Age: 46
End: 2021-07-13

## 2021-07-17 ENCOUNTER — TRANSCRIPTION ENCOUNTER (OUTPATIENT)
Age: 46
End: 2021-07-17

## 2021-07-20 LAB
ANION GAP SERPL CALC-SCNC: 15 MMOL/L
BUN SERPL-MCNC: 12 MG/DL
CALCIUM SERPL-MCNC: 9.2 MG/DL
CHLORIDE SERPL-SCNC: 100 MMOL/L
CO2 SERPL-SCNC: 22 MMOL/L
CREAT SERPL-MCNC: 0.98 MG/DL
GLUCOSE SERPL-MCNC: 85 MG/DL
MAGNESIUM SERPL-MCNC: 2 MG/DL
POTASSIUM SERPL-SCNC: 3.9 MMOL/L
SODIUM SERPL-SCNC: 138 MMOL/L
T4 FREE SERPL-MCNC: 1.3 NG/DL
TSH SERPL-ACNC: 4.26 UIU/ML

## 2021-07-23 ENCOUNTER — LABORATORY RESULT (OUTPATIENT)
Age: 46
End: 2021-07-23

## 2021-07-23 ENCOUNTER — APPOINTMENT (OUTPATIENT)
Dept: RHEUMATOLOGY | Facility: CLINIC | Age: 46
End: 2021-07-23
Payer: COMMERCIAL

## 2021-07-23 PROCEDURE — 99072 ADDL SUPL MATRL&STAF TM PHE: CPT

## 2021-07-23 PROCEDURE — 96413 CHEMO IV INFUSION 1 HR: CPT

## 2021-07-23 PROCEDURE — 96375 TX/PRO/DX INJ NEW DRUG ADDON: CPT

## 2021-07-23 PROCEDURE — 36415 COLL VENOUS BLD VENIPUNCTURE: CPT

## 2021-07-24 ENCOUNTER — NON-APPOINTMENT (OUTPATIENT)
Age: 46
End: 2021-07-24

## 2021-07-24 ENCOUNTER — EMERGENCY (EMERGENCY)
Facility: HOSPITAL | Age: 46
LOS: 1 days | Discharge: HOME | End: 2021-07-24
Attending: EMERGENCY MEDICINE
Payer: COMMERCIAL

## 2021-07-24 VITALS
SYSTOLIC BLOOD PRESSURE: 103 MMHG | HEART RATE: 70 BPM | RESPIRATION RATE: 19 BRPM | OXYGEN SATURATION: 99 % | DIASTOLIC BLOOD PRESSURE: 78 MMHG | TEMPERATURE: 98 F

## 2021-07-24 VITALS
OXYGEN SATURATION: 96 % | HEART RATE: 85 BPM | RESPIRATION RATE: 18 BRPM | SYSTOLIC BLOOD PRESSURE: 127 MMHG | TEMPERATURE: 99 F | WEIGHT: 287.04 LBS | HEIGHT: 67 IN | DIASTOLIC BLOOD PRESSURE: 81 MMHG

## 2021-07-24 DIAGNOSIS — Z90.49 ACQUIRED ABSENCE OF OTHER SPECIFIED PARTS OF DIGESTIVE TRACT: Chronic | ICD-10-CM

## 2021-07-24 LAB
ALBUMIN SERPL ELPH-MCNC: 4.3 G/DL — SIGNIFICANT CHANGE UP (ref 3.3–5)
ALP SERPL-CCNC: 88 U/L — SIGNIFICANT CHANGE UP (ref 40–120)
ALT FLD-CCNC: 27 U/L — SIGNIFICANT CHANGE UP (ref 10–45)
ANION GAP SERPL CALC-SCNC: 13 MMOL/L — SIGNIFICANT CHANGE UP (ref 5–17)
APPEARANCE UR: CLEAR — SIGNIFICANT CHANGE UP
AST SERPL-CCNC: 26 U/L — SIGNIFICANT CHANGE UP (ref 10–40)
BACTERIA # UR AUTO: NEGATIVE — SIGNIFICANT CHANGE UP
BASOPHILS # BLD AUTO: 0.03 K/UL — SIGNIFICANT CHANGE UP (ref 0–0.2)
BASOPHILS NFR BLD AUTO: 0.3 % — SIGNIFICANT CHANGE UP (ref 0–2)
BILIRUB SERPL-MCNC: 0.2 MG/DL — SIGNIFICANT CHANGE UP (ref 0.2–1.2)
BILIRUB UR-MCNC: NEGATIVE — SIGNIFICANT CHANGE UP
BUN SERPL-MCNC: 8 MG/DL — SIGNIFICANT CHANGE UP (ref 7–23)
CALCIUM SERPL-MCNC: 9.8 MG/DL — SIGNIFICANT CHANGE UP (ref 8.4–10.5)
CHLORIDE SERPL-SCNC: 100 MMOL/L — SIGNIFICANT CHANGE UP (ref 96–108)
CO2 SERPL-SCNC: 27 MMOL/L — SIGNIFICANT CHANGE UP (ref 22–31)
COLOR SPEC: YELLOW — SIGNIFICANT CHANGE UP
CREAT SERPL-MCNC: 0.97 MG/DL — SIGNIFICANT CHANGE UP (ref 0.5–1.3)
CRP SERPL-MCNC: 4 MG/L — SIGNIFICANT CHANGE UP (ref 0–4)
DIFF PNL FLD: NEGATIVE — SIGNIFICANT CHANGE UP
EOSINOPHIL # BLD AUTO: 0.14 K/UL — SIGNIFICANT CHANGE UP (ref 0–0.5)
EOSINOPHIL NFR BLD AUTO: 1.6 % — SIGNIFICANT CHANGE UP (ref 0–6)
EPI CELLS # UR: 0 /HPF — SIGNIFICANT CHANGE UP
ERYTHROCYTE [SEDIMENTATION RATE] IN BLOOD: 59 MM/HR — HIGH (ref 0–15)
GLUCOSE SERPL-MCNC: 67 MG/DL — LOW (ref 70–99)
GLUCOSE UR QL: NEGATIVE — SIGNIFICANT CHANGE UP
HCT VFR BLD CALC: 42.1 % — SIGNIFICANT CHANGE UP (ref 39–50)
HGB BLD-MCNC: 14 G/DL — SIGNIFICANT CHANGE UP (ref 13–17)
HIV 1 & 2 AB SERPL IA.RAPID: SIGNIFICANT CHANGE UP
HYALINE CASTS # UR AUTO: 1 /LPF — SIGNIFICANT CHANGE UP (ref 0–2)
IMM GRANULOCYTES NFR BLD AUTO: 0.5 % — SIGNIFICANT CHANGE UP (ref 0–1.5)
KETONES UR-MCNC: NEGATIVE — SIGNIFICANT CHANGE UP
LEUKOCYTE ESTERASE UR-ACNC: NEGATIVE — SIGNIFICANT CHANGE UP
LYMPHOCYTES # BLD AUTO: 2.28 K/UL — SIGNIFICANT CHANGE UP (ref 1–3.3)
LYMPHOCYTES # BLD AUTO: 26.5 % — SIGNIFICANT CHANGE UP (ref 13–44)
MCHC RBC-ENTMCNC: 28.6 PG — SIGNIFICANT CHANGE UP (ref 27–34)
MCHC RBC-ENTMCNC: 33.3 GM/DL — SIGNIFICANT CHANGE UP (ref 32–36)
MCV RBC AUTO: 86.1 FL — SIGNIFICANT CHANGE UP (ref 80–100)
MONOCYTES # BLD AUTO: 1.15 K/UL — HIGH (ref 0–0.9)
MONOCYTES NFR BLD AUTO: 13.4 % — SIGNIFICANT CHANGE UP (ref 2–14)
NEUTROPHILS # BLD AUTO: 4.97 K/UL — SIGNIFICANT CHANGE UP (ref 1.8–7.4)
NEUTROPHILS NFR BLD AUTO: 57.7 % — SIGNIFICANT CHANGE UP (ref 43–77)
NITRITE UR-MCNC: NEGATIVE — SIGNIFICANT CHANGE UP
NRBC # BLD: 0 /100 WBCS — SIGNIFICANT CHANGE UP (ref 0–0)
PH UR: 6.5 — SIGNIFICANT CHANGE UP (ref 5–8)
PLATELET # BLD AUTO: 321 K/UL — SIGNIFICANT CHANGE UP (ref 150–400)
POTASSIUM SERPL-MCNC: 3 MMOL/L — LOW (ref 3.5–5.3)
POTASSIUM SERPL-SCNC: 3 MMOL/L — LOW (ref 3.5–5.3)
PROT SERPL-MCNC: 7.8 G/DL — SIGNIFICANT CHANGE UP (ref 6–8.3)
PROT UR-MCNC: ABNORMAL
RAPID RVP RESULT: SIGNIFICANT CHANGE UP
RBC # BLD: 4.89 M/UL — SIGNIFICANT CHANGE UP (ref 4.2–5.8)
RBC # FLD: 13.2 % — SIGNIFICANT CHANGE UP (ref 10.3–14.5)
RBC CASTS # UR COMP ASSIST: 1 /HPF — SIGNIFICANT CHANGE UP (ref 0–4)
SARS-COV-2 RNA SPEC QL NAA+PROBE: SIGNIFICANT CHANGE UP
SODIUM SERPL-SCNC: 140 MMOL/L — SIGNIFICANT CHANGE UP (ref 135–145)
SP GR SPEC: 1.02 — SIGNIFICANT CHANGE UP (ref 1.01–1.02)
TROPONIN T, HIGH SENSITIVITY RESULT: <6 NG/L — SIGNIFICANT CHANGE UP (ref 0–51)
UROBILINOGEN FLD QL: NEGATIVE — SIGNIFICANT CHANGE UP
WBC # BLD: 8.61 K/UL — SIGNIFICANT CHANGE UP (ref 3.8–10.5)
WBC # FLD AUTO: 8.61 K/UL — SIGNIFICANT CHANGE UP (ref 3.8–10.5)
WBC UR QL: 1 /HPF — SIGNIFICANT CHANGE UP (ref 0–5)

## 2021-07-24 PROCEDURE — 85652 RBC SED RATE AUTOMATED: CPT

## 2021-07-24 PROCEDURE — 87040 BLOOD CULTURE FOR BACTERIA: CPT

## 2021-07-24 PROCEDURE — 93010 ELECTROCARDIOGRAM REPORT: CPT

## 2021-07-24 PROCEDURE — 80053 COMPREHEN METABOLIC PANEL: CPT

## 2021-07-24 PROCEDURE — 99285 EMERGENCY DEPT VISIT HI MDM: CPT

## 2021-07-24 PROCEDURE — 86140 C-REACTIVE PROTEIN: CPT

## 2021-07-24 PROCEDURE — 81001 URINALYSIS AUTO W/SCOPE: CPT

## 2021-07-24 PROCEDURE — 86703 HIV-1/HIV-2 1 RESULT ANTBDY: CPT

## 2021-07-24 PROCEDURE — 71045 X-RAY EXAM CHEST 1 VIEW: CPT

## 2021-07-24 PROCEDURE — 93005 ELECTROCARDIOGRAM TRACING: CPT

## 2021-07-24 PROCEDURE — 85025 COMPLETE CBC W/AUTO DIFF WBC: CPT

## 2021-07-24 PROCEDURE — 99284 EMERGENCY DEPT VISIT MOD MDM: CPT | Mod: 25

## 2021-07-24 PROCEDURE — 83880 ASSAY OF NATRIURETIC PEPTIDE: CPT

## 2021-07-24 PROCEDURE — 87086 URINE CULTURE/COLONY COUNT: CPT

## 2021-07-24 PROCEDURE — 0225U NFCT DS DNA&RNA 21 SARSCOV2: CPT

## 2021-07-24 PROCEDURE — 71045 X-RAY EXAM CHEST 1 VIEW: CPT | Mod: 26

## 2021-07-24 PROCEDURE — 82962 GLUCOSE BLOOD TEST: CPT

## 2021-07-24 PROCEDURE — 84484 ASSAY OF TROPONIN QUANT: CPT

## 2021-07-24 RX ORDER — POTASSIUM CHLORIDE 20 MEQ
40 PACKET (EA) ORAL ONCE
Refills: 0 | Status: COMPLETED | OUTPATIENT
Start: 2021-07-24 | End: 2021-07-24

## 2021-07-24 RX ADMIN — Medication 40 MILLIEQUIVALENT(S): at 15:52

## 2021-07-24 NOTE — ED PROVIDER NOTE - PROGRESS NOTE DETAILS
ZR: rheum fellow contacted, sent to ED for evaluation of fatigue and chest pain. Last CTA was 1 month ago negative for PE. NO DYSPNEA in ED, ECG wnl. Pending Labs within normal limits. Patient denies active CP or SOB. Will discharge home to f/u with rheumatology. Francisco Javier Ly, DO PGY3

## 2021-07-24 NOTE — ED PROVIDER NOTE - PATIENT PORTAL LINK FT
You can access the FollowMyHealth Patient Portal offered by [facility] by registering at the following website: [email or web address]. By joining [employer]’s FollowMyHealth portal, you will also be able to view your health information using other applications (apps) compatible with our system.

## 2021-07-24 NOTE — ED PROVIDER NOTE - NS_EDPROVIDERDISPOUSERTYPE_ED_A_ED
Problem: Patient Care Overview (Adult)  Goal: Plan of Care Review  Outcome: Ongoing (interventions implemented as appropriate)   02/22/18 1919   Coping/Psychosocial Response Interventions   Plan Of Care Reviewed With patient   Patient Care Overview   Progress improving   Outcome Evaluation   Outcome Summary/Follow up Plan VSS. A&Ox4. Nicotine patch dosage increased to 21mg, but pt states he doesn't think it's necessary. Pt has permission to be off telemetry. Will CTM          Attending Attestation (For Attendings USE Only)...

## 2021-07-24 NOTE — ED PROVIDER NOTE - CLINICAL SUMMARY MEDICAL DECISION MAKING FREE TEXT BOX
46 Y OLD morbidly obese male diagnosed with SLE  1/2018  on iv Benlysta  since  october 2021 ,came in complains of weakness ,fatigue ,chest pain and SOB after last treatment ,no fever ,had a chills ,spoke with dr Chavarria rheumatology who recommend him to go to ER ,Will obtain blood work ,chest xray ,ua and reassess ZR

## 2021-07-24 NOTE — ED ADULT NURSE REASSESSMENT NOTE - NS ED NURSE REASSESS COMMENT FT1
vitals stable, sitting comfortably, pt is eating, awaiting a finger stick with an improved blood sugar before discharge

## 2021-07-24 NOTE — ED PROVIDER NOTE - PHYSICAL EXAMINATION
GENERAL: Awake, alert, NAD, obese  HEENT: NC/AT, moist mucous membranes, mild erythematous rash to face  LUNGS: CTAB, no wheezes or crackles   CARDIAC: RRR, no m/r/g  ABDOMEN: Soft, normal BS, non tender, non distended, no rebound, no guarding  EXT: 1+ edema b/l lower extremities, no calf tenderness, 2+ DP pulses bilaterally, no deformities.  NEURO: A&Ox3. Moving all extremities.  SKIN: Warm and dry. No rash.  PSYCH: Normal affect.

## 2021-07-24 NOTE — ED PROVIDER NOTE - IV ALTEPLASE INCLUSION HIDDEN
Patient comes to clinic for follow up anticoagulation visit.  DX: PE Goal range: 2.0-3.0    LAST(INR) below goal at 1.8 on 12/18/17. Dose maintained.   Todays INR is 2.3. Dose maintained as per protocol.  Follow up 4 wks. See Ambulatory Anticoagulation Flow Sheet.    Teaching: dose, return date, conditions requiring contact with [facility]. Dosing calendar provided.    Pt verbalized understanding of instructions and is aware of need to call with any questions or concerns and to report any changes in medications, health, diet and / or lifestyle.     Dr. Rocha is in office today supervising treatment. Note forwarded to physician for review.      show

## 2021-07-24 NOTE — ED PROVIDER NOTE - OBJECTIVE STATEMENT
45 y/o M with PMH lupus, asthma, hypothyroid c/o weakness and fatigue. Patient had his monoclonal infusion yesterday and reports feeling more fatigued afterward. Today began to experience chest tightness and shortness of breath. He called rheumatology and was advised to come to the ED. States he has noticed some increased swelling in his legs over the past few days.

## 2021-07-24 NOTE — ED ADULT NURSE NOTE - NSIMPLEMENTINTERV_GEN_ALL_ED
Implemented All Universal Safety Interventions:  [place] to call system. Call bell, personal items and telephone within reach. Instruct patient to call for assistance. Room bathroom lighting operational. Non-slip footwear when patient is off stretcher. Physically safe environment: no spills, clutter or unnecessary equipment. Stretcher in lowest position, wheels locked, appropriate side rails in place.

## 2021-07-24 NOTE — ED ADULT NURSE NOTE - OBJECTIVE STATEMENT
pt is a 46 y.o. male who came to the ER from home complaining of fatigue and weakness. pt has a PMH of lupus, asthma, and hypothyroidism. pt presented with weakness and a lupus butterfly rash on his face. vital signs are stable, lungs are clear bilaterally, no abdominal tenderness, no lower extremity edema present, skin is dry and intact, steady gait. pt denies any current chest pain or dizziness. pt states that he got an infusion for the lupus yesterday and usually feels this fatigue and weakness after the infusions

## 2021-07-25 LAB
CULTURE RESULTS: NO GROWTH — SIGNIFICANT CHANGE UP
SPECIMEN SOURCE: SIGNIFICANT CHANGE UP

## 2021-07-25 NOTE — ED POST DISCHARGE NOTE - DETAILS
7/25: no answer, will reattempt tomorrow, hx SLE, seen for fatigue and weakness, should follow up with his rheum 7/26: no option for luan - Dodie Min PA-C 7/27/21: No answer, no option to lvm. Will send telegram. - Williams Ruiz PA-C

## 2021-07-26 ENCOUNTER — NON-APPOINTMENT (OUTPATIENT)
Age: 46
End: 2021-07-26

## 2021-07-26 LAB
APPEARANCE: CLEAR
BACTERIA: NEGATIVE
BILIRUBIN URINE: NEGATIVE
BLOOD URINE: NEGATIVE
COLOR: NORMAL
CREAT SPEC-SCNC: 91 MG/DL
CREAT/PROT UR: 0.1 RATIO
GLUCOSE QUALITATIVE U: NEGATIVE
HYALINE CASTS: 0 /LPF
KETONES URINE: NEGATIVE
LEUKOCYTE ESTERASE URINE: NEGATIVE
MICROSCOPIC-UA: NORMAL
NITRITE URINE: NEGATIVE
PH URINE: 6.5
PROT UR-MCNC: 6 MG/DL
PROTEIN URINE: NEGATIVE
RED BLOOD CELLS URINE: 1 /HPF
SPECIFIC GRAVITY URINE: 1.01
SQUAMOUS EPITHELIAL CELLS: 0 /HPF
UROBILINOGEN URINE: NORMAL
WHITE BLOOD CELLS URINE: 0 /HPF

## 2021-07-26 RX ORDER — DOXYCYCLINE 100 MG/1
100 CAPSULE ORAL
Qty: 14 | Refills: 0 | Status: DISCONTINUED | COMMUNITY
Start: 2021-02-12 | End: 2021-07-26

## 2021-07-28 ENCOUNTER — APPOINTMENT (OUTPATIENT)
Dept: PULMONOLOGY | Facility: CLINIC | Age: 46
End: 2021-07-28
Payer: COMMERCIAL

## 2021-07-28 VITALS
OXYGEN SATURATION: 98 % | HEART RATE: 86 BPM | SYSTOLIC BLOOD PRESSURE: 128 MMHG | BODY MASS INDEX: 44.26 KG/M2 | TEMPERATURE: 97.8 F | DIASTOLIC BLOOD PRESSURE: 84 MMHG | WEIGHT: 282 LBS | HEIGHT: 67 IN

## 2021-07-28 PROCEDURE — 99072 ADDL SUPL MATRL&STAF TM PHE: CPT

## 2021-07-28 PROCEDURE — 99205 OFFICE O/P NEW HI 60 MIN: CPT

## 2021-07-28 NOTE — REVIEW OF SYSTEMS
[Negative] : Psychiatric [EDS: ESS=____] : daytime somnolence: ESS=[unfilled] [Fatigue] : fatigue [A.M. Dry Mouth] : a.m. dry mouth [Snoring] : snoring [Witnessed Apneas] : demonstrated ~M apnea [Frequent Nocturnal Awakenings] : frequent nocturnal awakenings from sleep [Daytime Somnolence: ESS=____] : daytime somnolence: ESS=[unfilled] [Unintentional Sleep while inactive] : unintentional sleep while inactive [Awakes Unrefreshed] : nonrestorative sleep [Awakes With Headache] : awakes with a headache [Recent Wt Gain (___ Lbs)] : recent [unfilled] ~Ulb weight gain

## 2021-07-28 NOTE — ASSESSMENT
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 He endorsed EDS and was sent for an HSAT which showed an PREM of 9.7/hr on 06/2021, which per the report was primarily central apnea events.  He was sent for a CPAP titration which showed an optimal pressure of 8 cmH2O but events did persist. Notes a 60 lbs weight gain over the past 3 years while being on steroids.  From the HSAT it is unclear if his central events are wake to sleep transitions or actual CSA events.  Assessment and treatment of TAMMY/CSA is important for management of somnolence and nonrestorative sleep. The patient was referred to the [facility] for a CPAP titration for further assessment. The ramifications of TAMMY/CSA and its potential therapeutic modalities were discussed with the patient. The dangers of drowsy driving were discussed with the patient.  The patient was warned to avoid drowsy driving. The patient will followup after results of this study are available.\par  \par Lance Nix, DO\par Pulmonary, Critical Care and Sleep Medicine Attending

## 2021-07-28 NOTE — HISTORY OF PRESENT ILLNESS
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  He was sent for a CPAP titration which showed an optimal pressure of 8 cmH2O but events did persist. Notes a 60 lbs weight gain over the past 3 years while being on steroids. \par \par ____________________________________________________________________\par EPWORTH SLEEPINESS SCALE\par \par How likely are you to doze off or fall asleep in the situations described below, in contrast to feeling just tired?  \par This refers to your usual way of life in recent times.  \par Even if you haven't done some of these things recently, try to work out how they would have affected you.\par Use the following scale to choose one most appropriate number for each situation.\par \par Chance of dozing.............Situation\par ...............0.........................Sitting and reading\par ...............1.........................Watching TV\par ...............1.........................Sitting inactive in a public place (eg a theatre or a meeting)\par ...............0.........................As a passenger in a car for an hour without a break\par ...............3.........................Lying down to rest in the afternoon when circumstances permit\par ...............0.........................Sitting and talking to someone\par ...............2.........................Sitting quietly after lunch without alcohol\par ...............0.........................In a car, while stopped for a few minutes in traffic\par \par ...............7........................TOTAL SCORE\par \par 0 = Never would doze\par 1 = Slight chance of dozing\par 2 = Moderate chance of dozing\par 3 = High chance of dozing \par ______________________________________________________________________  [Central Sleep Apnea] : central sleep apnea [Snoring] : snoring [Frequent Nocturnal Awakening] : frequent nocturnal awakening [Unintentional Sleep While Inactive] : unintentional sleep while inactive [Awakes Unrefreshed] : awakening unrefreshed [Awakes with Headache] : headache upon awakening [Recent  Weight Gain] : recent weight gain [Daytime Somnolence] : daytime somnolence [ESS] : 7

## 2021-07-29 LAB
CULTURE RESULTS: SIGNIFICANT CHANGE UP
CULTURE RESULTS: SIGNIFICANT CHANGE UP
SPECIMEN SOURCE: SIGNIFICANT CHANGE UP
SPECIMEN SOURCE: SIGNIFICANT CHANGE UP

## 2021-07-30 ENCOUNTER — TRANSCRIPTION ENCOUNTER (OUTPATIENT)
Age: 46
End: 2021-07-30

## 2021-08-02 ENCOUNTER — TRANSCRIPTION ENCOUNTER (OUTPATIENT)
Age: 46
End: 2021-08-02

## 2021-08-04 ENCOUNTER — APPOINTMENT (OUTPATIENT)
Dept: PULMONOLOGY | Facility: CLINIC | Age: 46
End: 2021-08-04
Payer: COMMERCIAL

## 2021-08-04 VITALS
OXYGEN SATURATION: 97 % | BODY MASS INDEX: 44.42 KG/M2 | HEIGHT: 67 IN | TEMPERATURE: 97.3 F | WEIGHT: 283 LBS | HEART RATE: 95 BPM | SYSTOLIC BLOOD PRESSURE: 126 MMHG | DIASTOLIC BLOOD PRESSURE: 80 MMHG | RESPIRATION RATE: 16 BRPM

## 2021-08-04 PROCEDURE — 99214 OFFICE O/P EST MOD 30 MIN: CPT | Mod: 25

## 2021-08-04 PROCEDURE — ZZZZZ: CPT

## 2021-08-04 PROCEDURE — 94010 BREATHING CAPACITY TEST: CPT

## 2021-08-04 PROCEDURE — 94729 DIFFUSING CAPACITY: CPT

## 2021-08-04 PROCEDURE — 94727 GAS DIL/WSHOT DETER LNG VOL: CPT

## 2021-08-04 NOTE — COUNSELING
[Other: ____] : [unfilled] [Good understanding] : Patient has a good understanding of lifestyle changes and steps needed to achieve self management goal [de-identified] : Anxiety

## 2021-08-04 NOTE — PROCEDURE
[FreeTextEntry1] : PFT 7/24/2019 personally reviewed Normal PFT\par \par CXR 6/1/2019 personally reviewed clear lungs\par \par Nebulized Duo Neb given in office 8/19/2019\par \par CT angio 5/17/21 personally reviewed negative for pulmonary embolism \par \par PFT 8/4/21 personally reviewed normal spirometry, normal lung volumes, mild gas exchange abnormality

## 2021-08-04 NOTE — ASSESSMENT
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

## 2021-08-04 NOTE — HISTORY OF PRESENT ILLNESS
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  His prednisone was raised to 30 mg daily.  He had Sleep Medicine evaluation and is following with Sleep Specialist.  He is using Trelegy regularly.  He is here for follow up

## 2021-08-05 ENCOUNTER — APPOINTMENT (OUTPATIENT)
Dept: RHEUMATOLOGY | Facility: CLINIC | Age: 46
End: 2021-08-05
Payer: COMMERCIAL

## 2021-08-05 VITALS
DIASTOLIC BLOOD PRESSURE: 81 MMHG | OXYGEN SATURATION: 98 % | TEMPERATURE: 97.4 F | BODY MASS INDEX: 44.42 KG/M2 | HEIGHT: 67 IN | RESPIRATION RATE: 17 BRPM | HEART RATE: 91 BPM | WEIGHT: 283 LBS | SYSTOLIC BLOOD PRESSURE: 130 MMHG

## 2021-08-05 PROCEDURE — 99214 OFFICE O/P EST MOD 30 MIN: CPT

## 2021-08-06 ENCOUNTER — TRANSCRIPTION ENCOUNTER (OUTPATIENT)
Age: 46
End: 2021-08-06

## 2021-08-06 LAB
ALBUMIN SERPL ELPH-MCNC: 4.5 G/DL
ALBUMIN SERPL ELPH-MCNC: 4.5 G/DL
ALP BLD-CCNC: 102 U/L
ALP BLD-CCNC: 102 U/L
ALT SERPL-CCNC: 29 U/L
ALT SERPL-CCNC: 29 U/L
ANION GAP SERPL CALC-SCNC: 13 MMOL/L
ANION GAP SERPL CALC-SCNC: 13 MMOL/L
APPEARANCE: CLEAR
AST SERPL-CCNC: 24 U/L
AST SERPL-CCNC: 24 U/L
BACTERIA: NEGATIVE
BASOPHILS # BLD AUTO: 0.03 K/UL
BASOPHILS NFR BLD AUTO: 0.2 %
BILIRUB SERPL-MCNC: 0.2 MG/DL
BILIRUB SERPL-MCNC: 0.2 MG/DL
BILIRUBIN URINE: NEGATIVE
BLOOD URINE: NEGATIVE
BUN SERPL-MCNC: 12 MG/DL
BUN SERPL-MCNC: 12 MG/DL
C3 SERPL-MCNC: 140 MG/DL
C4 SERPL-MCNC: 32 MG/DL
CALCIUM SERPL-MCNC: 9.9 MG/DL
CALCIUM SERPL-MCNC: 9.9 MG/DL
CHLORIDE SERPL-SCNC: 99 MMOL/L
CHLORIDE SERPL-SCNC: 99 MMOL/L
CK SERPL-CCNC: 133 U/L
CO2 SERPL-SCNC: 28 MMOL/L
CO2 SERPL-SCNC: 28 MMOL/L
COLOR: YELLOW
COVID-19 SPIKE DOMAIN ANTIBODY INTERPRETATION: POSITIVE
CREAT SERPL-MCNC: 1.02 MG/DL
CREAT SERPL-MCNC: 1.02 MG/DL
CREAT SPEC-SCNC: 146 MG/DL
CREAT/PROT UR: 0.1 RATIO
EOSINOPHIL # BLD AUTO: 0.1 K/UL
EOSINOPHIL NFR BLD AUTO: 0.8 %
GLUCOSE QUALITATIVE U: NEGATIVE
GLUCOSE SERPL-MCNC: 71 MG/DL
HCT VFR BLD CALC: 41.1 %
HGB BLD-MCNC: 13.6 G/DL
HYALINE CASTS: 0 /LPF
IMM GRANULOCYTES NFR BLD AUTO: 0.7 %
KETONES URINE: NEGATIVE
LEUKOCYTE ESTERASE URINE: NEGATIVE
LYMPHOCYTES # BLD AUTO: 2.54 K/UL
LYMPHOCYTES NFR BLD AUTO: 20.8 %
MAN DIFF?: NORMAL
MCHC RBC-ENTMCNC: 28.3 PG
MCHC RBC-ENTMCNC: 33.1 GM/DL
MCV RBC AUTO: 85.4 FL
MICROSCOPIC-UA: NORMAL
MONOCYTES # BLD AUTO: 1.33 K/UL
MONOCYTES NFR BLD AUTO: 10.9 %
NEUTROPHILS # BLD AUTO: 8.14 K/UL
NEUTROPHILS NFR BLD AUTO: 66.6 %
NITRITE URINE: NEGATIVE
PH URINE: 6.5
PLATELET # BLD AUTO: 386 K/UL
POTASSIUM SERPL-SCNC: 4.2 MMOL/L
POTASSIUM SERPL-SCNC: 4.2 MMOL/L
PROT SERPL-MCNC: 7.2 G/DL
PROT SERPL-MCNC: 7.2 G/DL
PROT UR-MCNC: 11 MG/DL
PROTEIN URINE: ABNORMAL
RBC # BLD: 4.81 M/UL
RBC # FLD: 13.5 %
RED BLOOD CELLS URINE: 1 /HPF
SARS-COV-2 AB SERPL IA-ACNC: >250 U/ML
SODIUM SERPL-SCNC: 140 MMOL/L
SODIUM SERPL-SCNC: 140 MMOL/L
SPECIFIC GRAVITY URINE: 1.03
SQUAMOUS EPITHELIAL CELLS: 0 /HPF
UROBILINOGEN URINE: NORMAL
WBC # FLD AUTO: 12.23 K/UL
WHITE BLOOD CELLS URINE: 1 /HPF

## 2021-08-17 ENCOUNTER — NON-APPOINTMENT (OUTPATIENT)
Age: 46
End: 2021-08-17

## 2021-08-19 ENCOUNTER — LABORATORY RESULT (OUTPATIENT)
Age: 46
End: 2021-08-19

## 2021-08-19 ENCOUNTER — APPOINTMENT (OUTPATIENT)
Dept: RHEUMATOLOGY | Facility: CLINIC | Age: 46
End: 2021-08-19
Payer: COMMERCIAL

## 2021-08-19 PROCEDURE — 36415 COLL VENOUS BLD VENIPUNCTURE: CPT

## 2021-08-19 PROCEDURE — 96413 CHEMO IV INFUSION 1 HR: CPT

## 2021-08-19 PROCEDURE — 96375 TX/PRO/DX INJ NEW DRUG ADDON: CPT

## 2021-08-20 ENCOUNTER — TRANSCRIPTION ENCOUNTER (OUTPATIENT)
Age: 46
End: 2021-08-20

## 2021-09-07 ENCOUNTER — RX RENEWAL (OUTPATIENT)
Age: 46
End: 2021-09-07

## 2021-09-08 ENCOUNTER — APPOINTMENT (OUTPATIENT)
Dept: PULMONOLOGY | Facility: CLINIC | Age: 46
End: 2021-09-08
Payer: COMMERCIAL

## 2021-09-08 VITALS
WEIGHT: 282 LBS | OXYGEN SATURATION: 98 % | BODY MASS INDEX: 44.26 KG/M2 | HEART RATE: 100 BPM | HEIGHT: 67 IN | RESPIRATION RATE: 16 BRPM | SYSTOLIC BLOOD PRESSURE: 120 MMHG | TEMPERATURE: 96.8 F | DIASTOLIC BLOOD PRESSURE: 86 MMHG

## 2021-09-08 PROCEDURE — 99214 OFFICE O/P EST MOD 30 MIN: CPT

## 2021-09-08 RX ORDER — FLUTICASONE FUROATE AND VILANTEROL TRIFENATATE 200; 25 UG/1; UG/1
200-25 POWDER RESPIRATORY (INHALATION) DAILY
Qty: 1 | Refills: 2 | Status: DISCONTINUED | COMMUNITY
Start: 2019-08-19 | End: 2021-09-08

## 2021-09-08 NOTE — COUNSELING
[Other: ____] : [unfilled] [Good understanding] : Patient has a good understanding of lifestyle changes and steps needed to achieve self management goal [de-identified] : Anxiety

## 2021-09-08 NOTE — HISTORY OF PRESENT ILLNESS
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 patient currently using Trelegy Ellipta 200 with good symptoms control.  He is currently on methylprednisolone 8 mg as well as Mycophenolate and Benlysta for lupus related symptoms.  He is feeling less swollen lately.  He is using Lasix.  He underwent sleep study revealing mild TAMMY. He is here for follow up.

## 2021-09-13 ENCOUNTER — NON-APPOINTMENT (OUTPATIENT)
Age: 46
End: 2021-09-13

## 2021-09-13 ENCOUNTER — APPOINTMENT (OUTPATIENT)
Dept: INTERNAL MEDICINE | Facility: CLINIC | Age: 46
End: 2021-09-13
Payer: COMMERCIAL

## 2021-09-13 VITALS
TEMPERATURE: 97.8 F | BODY MASS INDEX: 44.26 KG/M2 | SYSTOLIC BLOOD PRESSURE: 126 MMHG | HEART RATE: 95 BPM | WEIGHT: 282 LBS | OXYGEN SATURATION: 95 % | HEIGHT: 67 IN | DIASTOLIC BLOOD PRESSURE: 86 MMHG

## 2021-09-13 VITALS — DIASTOLIC BLOOD PRESSURE: 90 MMHG | SYSTOLIC BLOOD PRESSURE: 130 MMHG

## 2021-09-13 VITALS — DIASTOLIC BLOOD PRESSURE: 74 MMHG | SYSTOLIC BLOOD PRESSURE: 124 MMHG

## 2021-09-13 DIAGNOSIS — H93.292 OTHER ABNORMAL AUDITORY PERCEPTIONS, LEFT EAR: ICD-10-CM

## 2021-09-13 DIAGNOSIS — H92.02 OTALGIA, LEFT EAR: ICD-10-CM

## 2021-09-13 DIAGNOSIS — M25.461 EFFUSION, RIGHT KNEE: ICD-10-CM

## 2021-09-13 DIAGNOSIS — Z87.09 PERSONAL HISTORY OF OTHER DISEASES OF THE RESPIRATORY SYSTEM: ICD-10-CM

## 2021-09-13 DIAGNOSIS — Z92.89 PERSONAL HISTORY OF OTHER MEDICAL TREATMENT: ICD-10-CM

## 2021-09-13 DIAGNOSIS — Z20.822 CONTACT WITH AND (SUSPECTED) EXPOSURE TO COVID-19: ICD-10-CM

## 2021-09-13 DIAGNOSIS — H69.82 OTHER SPECIFIED DISORDERS OF EUSTACHIAN TUBE, LEFT EAR: ICD-10-CM

## 2021-09-13 DIAGNOSIS — Z86.19 PERSONAL HISTORY OF OTHER INFECTIOUS AND PARASITIC DISEASES: ICD-10-CM

## 2021-09-13 DIAGNOSIS — J45.901 UNSPECIFIED ASTHMA WITH (ACUTE) EXACERBATION: ICD-10-CM

## 2021-09-13 DIAGNOSIS — R25.8 OTHER ABNORMAL INVOLUNTARY MOVEMENTS: ICD-10-CM

## 2021-09-13 DIAGNOSIS — Z87.898 PERSONAL HISTORY OF OTHER SPECIFIED CONDITIONS: ICD-10-CM

## 2021-09-13 DIAGNOSIS — M79.606 PAIN IN LEG, UNSPECIFIED: ICD-10-CM

## 2021-09-13 DIAGNOSIS — M54.6 PAIN IN THORACIC SPINE: ICD-10-CM

## 2021-09-13 PROCEDURE — 36415 COLL VENOUS BLD VENIPUNCTURE: CPT

## 2021-09-13 PROCEDURE — 93000 ELECTROCARDIOGRAM COMPLETE: CPT

## 2021-09-13 PROCEDURE — 99396 PREV VISIT EST AGE 40-64: CPT | Mod: 25

## 2021-09-13 NOTE — HEALTH RISK ASSESSMENT
[Fair] :  ~his/her~ mood as fair [No] : No [No falls in past year] : Patient reported no falls in the past year [0] : 2) Feeling down, depressed, or hopeless: Not at all (0) [PHQ-2 Negative - No further assessment needed] : PHQ-2 Negative - No further assessment needed [Patient reported colonoscopy was normal] : Patient reported colonoscopy was normal [HIV test declined] : HIV test declined [Hepatitis C test declined] : Hepatitis C test declined [None] : None [With Family] : lives with family [# of Members in Household ___] :  household currently consist of [unfilled] member(s) [Employed] : employed [High School] : high school [] :  [Sexually Active] : sexually active [Feels Safe at Home] : Feels safe at home [Fully functional (bathing, dressing, toileting, transferring, walking, feeding)] : Fully functional (bathing, dressing, toileting, transferring, walking, feeding) [Fully functional (using the telephone, shopping, preparing meals, housekeeping, doing laundry, using] : Fully functional and needs no help or supervision to perform IADLs (using the telephone, shopping, preparing meals, housekeeping, doing laundry, using transportation, managing medications and managing finances) [Reports normal functional visual acuity (ie: able to read med bottle)] : Reports normal functional visual acuity [Smoke Detector] : smoke detector [Carbon Monoxide Detector] : carbon monoxide detector [Safety elements used in home] : safety elements used in home [Seat Belt] :  uses seat belt [Sunscreen] : uses sunscreen [] : No [de-identified] : Walks [de-identified] : Average [[id number]] : 0 [Change in mental status noted] : No change in mental status noted [Language] : denies difficulty with language [Behavior] : denies difficulty with behavior [Learning/Retaining New Information] : denies difficulty learning/retaining new information [Handling Complex Tasks] : denies difficulty handling complex tasks [Reasoning] : denies difficulty with reasoning [Spatial Ability and Orientation] : denies difficulty with spatial ability and orientation [High Risk Behavior] : no high risk behavior [Reports changes in hearing] : Reports no changes in hearing [Reports changes in vision] : Reports no changes in vision [Reports changes in dental health] : Reports no changes in dental health [Guns at Home] : no guns at home [Travel to Developing Areas] : does not  travel to developing areas [TB Exposure] : is not being exposed to tuberculosis [Caregiver Concerns] : does not have caregiver concerns [ColonoscopyDate] : June 2019

## 2021-09-13 NOTE — ASSESSMENT
[FreeTextEntry1] : Patient is a 46-year-old male with history of lupus/myositis inflammatory mixed connective tissue, obesity, mild obstructive sleep apnea, hypertension, hypothyroidism, asthma, osteoarthritis, neuropathy, who presents for comprehensive annual physical examination complains of fatigue/multiple joint pain and depression/anxiety\par \par 1 lupus myositis inflammation\par continue CellCept methylprednisolone, hydrochloric: sulfate 200 mg twice a day and Benlysta infusion every four weeks meloxicam PRN for pain\par follow-up with rheumatology\par \par 2. Hypertension\par controlled on losartan 50 mg and hydrochlorothiazide\par \par \par 3 asthma\par continue Trlegy one puffed Q day\par follow-up with pulmonary\par \par 4 depression/anxiety\par thought Cymbalta 30 mg once a day\par follow-up one month may also help with pain\par \par 5 obesity\par counseled on diet and exercise\par \par 6 mild obstructive sleep apnea/asthma\par follow-up with pulmonary\par \par 7 health maintenance\par receipt COVID vaccine should get third\par check routine labs\par colonoscopy referral \par \par 8 fatigue\par  most likely secondary to lupus will check TSH TFTs liver function test\par follow-up in one month.

## 2021-09-13 NOTE — PHYSICAL EXAM
[Normal Sclera/Conjunctiva] : normal sclera/conjunctiva [Normal Outer Ear/Nose] : the outer ears and nose were normal in appearance [No Carotid Bruits] : no carotid bruits [No Abdominal Bruit] : a ~M bruit was not heard ~T in the abdomen [No Varicosities] : no varicosities [Pedal Pulses Present] : the pedal pulses are present [No Palpable Aorta] : no palpable aorta [No Extremity Clubbing/Cyanosis] : no extremity clubbing/cyanosis [Normal Appearance] : normal in appearance [No Masses] : no palpable masses [No Nipple Discharge] : no nipple discharge [No Axillary Lymphadenopathy] : no axillary lymphadenopathy [Normal Sphincter Tone] : normal sphincter tone [No Mass] : no mass [Penis Abnormality] : normal circumcised penis [Urinary Bladder Findings] : the bladder was normal on palpation [Normal] : affect was normal and insight and judgment were intact [Stool Occult Blood] : stool negative for occult blood [Prostate Nodule] : did not have a nodule [Prostate Enlarged] : was not enlarged [Prostate Tenderness] : was not tender [Prostate Fluctuant] : was not fluctuant [de-identified] : trace edema [de-identified] : levyos

## 2021-09-13 NOTE — HISTORY OF PRESENT ILLNESS
[FreeTextEntry1] : Comprehensive annual physical examination follow-up for bronchospasm, hypertension, hypothyroidism lupus [de-identified] : The patient is a 46-year-old male with history of lupus/inflammatory myositis overlap, obesity, hypertension, bronchospasm/asthma, mild obstructive sleep apnea, hypothyroidism, lumbar disc disease, who presents for comprehensive annual physical examination. Patient complains of weakness and diffuse joint pain continues to get treatment for Lupus patient denies fever, chills, nausea vomiting but notes recent melena. He denies shortness of breath palpitations lightheadedness dizziness.

## 2021-09-13 NOTE — REVIEW OF SYSTEMS
[Fatigue] : fatigue [Joint Pain] : joint pain [Muscle Pain] : muscle pain [Back Pain] : back pain [Negative] : Heme/Lymph [Fever] : no fever [Chills] : no chills [Hot Flashes] : no hot flashes [Night Sweats] : no night sweats [Recent Change In Weight] : ~T no recent weight change [Joint Stiffness] : no joint stiffness [Muscle Weakness] : no muscle weakness [Joint Swelling] : no joint swelling

## 2021-09-15 ENCOUNTER — APPOINTMENT (OUTPATIENT)
Dept: RHEUMATOLOGY | Facility: CLINIC | Age: 46
End: 2021-09-15
Payer: COMMERCIAL

## 2021-09-15 VITALS
HEIGHT: 67 IN | SYSTOLIC BLOOD PRESSURE: 141 MMHG | WEIGHT: 282 LBS | TEMPERATURE: 97.9 F | DIASTOLIC BLOOD PRESSURE: 79 MMHG | OXYGEN SATURATION: 96 % | BODY MASS INDEX: 44.26 KG/M2 | RESPIRATION RATE: 16 BRPM | HEART RATE: 90 BPM

## 2021-09-15 PROCEDURE — 36415 COLL VENOUS BLD VENIPUNCTURE: CPT

## 2021-09-15 PROCEDURE — 20610 DRAIN/INJ JOINT/BURSA W/O US: CPT | Mod: RT

## 2021-09-15 PROCEDURE — ZZZZZ: CPT

## 2021-09-15 PROCEDURE — 96413 CHEMO IV INFUSION 1 HR: CPT

## 2021-09-15 PROCEDURE — 96375 TX/PRO/DX INJ NEW DRUG ADDON: CPT | Mod: 59

## 2021-09-15 PROCEDURE — 99215 OFFICE O/P EST HI 40 MIN: CPT | Mod: 25

## 2021-09-15 RX ORDER — METHYLPRED ACET/NACL,ISO-OS/PF 40 MG/ML
40 VIAL (ML) INJECTION
Qty: 1 | Refills: 0 | Status: COMPLETED | OUTPATIENT
Start: 2021-09-15

## 2021-09-15 RX ORDER — LIDOCAINE HYDROCHLORIDE 10 MG/ML
1 INJECTION, SOLUTION INFILTRATION; PERINEURAL
Refills: 0 | Status: COMPLETED | OUTPATIENT
Start: 2021-09-15

## 2021-09-15 RX ADMIN — METHYLPREDNISOLONE ACETATE 1 MG/ML: 40 INJECTION, SUSPENSION INTRA-ARTICULAR; INTRALESIONAL; INTRAMUSCULAR; SOFT TISSUE at 00:00

## 2021-09-15 RX ADMIN — LIDOCAINE HYDROCHLORIDE %: 10 INJECTION, SOLUTION INFILTRATION; PERINEURAL at 00:00

## 2021-09-16 ENCOUNTER — TRANSCRIPTION ENCOUNTER (OUTPATIENT)
Age: 46
End: 2021-09-16

## 2021-09-16 LAB
APPEARANCE: CLEAR
BACTERIA: NEGATIVE
BILIRUBIN URINE: NEGATIVE
BLOOD URINE: NEGATIVE
C3 SERPL-MCNC: 139 MG/DL
C4 SERPL-MCNC: 32 MG/DL
CK SERPL-CCNC: 118 U/L
COLOR: NORMAL
CREAT SPEC-SCNC: 90 MG/DL
CREAT/PROT UR: 0.1 RATIO
GLUCOSE QUALITATIVE U: NEGATIVE
HYALINE CASTS: 0 /LPF
KETONES URINE: NEGATIVE
LEUKOCYTE ESTERASE URINE: NEGATIVE
MICROSCOPIC-UA: NORMAL
NITRITE URINE: NEGATIVE
PH URINE: 6
PROT UR-MCNC: 12 MG/DL
PROTEIN URINE: NEGATIVE
RED BLOOD CELLS URINE: 0 /HPF
SPECIFIC GRAVITY URINE: 1.01
SQUAMOUS EPITHELIAL CELLS: 0 /HPF
UROBILINOGEN URINE: NORMAL
WHITE BLOOD CELLS URINE: 0 /HPF

## 2021-09-19 LAB — DSDNA AB SER-ACNC: 16 IU/ML

## 2021-09-20 LAB
MYCOPHENOLATE SERPL LC/MS/MS-MCNC: 1.4 UG/ML
MYCOPHENOLIC ACID GLUCURONIDE: 13 UG/ML

## 2021-09-28 LAB
ALBUMIN SERPL ELPH-MCNC: 4.4 G/DL
ALP BLD-CCNC: 95 U/L
ALT SERPL-CCNC: 30 U/L
ANION GAP SERPL CALC-SCNC: 14 MMOL/L
AST SERPL-CCNC: 23 U/L
BASOPHILS # BLD AUTO: 0.04 K/UL
BASOPHILS NFR BLD AUTO: 0.3 %
BILIRUB SERPL-MCNC: 0.2 MG/DL
BUN SERPL-MCNC: 12 MG/DL
CALCIUM SERPL-MCNC: 9.4 MG/DL
CHLORIDE SERPL-SCNC: 99 MMOL/L
CHOLEST SERPL-MCNC: 215 MG/DL
CO2 SERPL-SCNC: 26 MMOL/L
CREAT SERPL-MCNC: 0.99 MG/DL
EOSINOPHIL # BLD AUTO: 0.1 K/UL
EOSINOPHIL NFR BLD AUTO: 0.8 %
FERRITIN SERPL-MCNC: 97 NG/ML
GLUCOSE SERPL-MCNC: 84 MG/DL
HCT VFR BLD CALC: 45.4 %
HDLC SERPL-MCNC: 63 MG/DL
HGB BLD-MCNC: 14.4 G/DL
IMM GRANULOCYTES NFR BLD AUTO: 0.6 %
LDLC SERPL CALC-MCNC: 137 MG/DL
LYMPHOCYTES # BLD AUTO: 2.24 K/UL
LYMPHOCYTES NFR BLD AUTO: 17.7 %
MAN DIFF?: NORMAL
MCHC RBC-ENTMCNC: 27.9 PG
MCHC RBC-ENTMCNC: 31.7 GM/DL
MCV RBC AUTO: 87.8 FL
MONOCYTES # BLD AUTO: 1.35 K/UL
MONOCYTES NFR BLD AUTO: 10.6 %
NEUTROPHILS # BLD AUTO: 8.87 K/UL
NEUTROPHILS NFR BLD AUTO: 70 %
NONHDLC SERPL-MCNC: 153 MG/DL
PLATELET # BLD AUTO: 333 K/UL
POTASSIUM SERPL-SCNC: 3.6 MMOL/L
PROT SERPL-MCNC: 7.1 G/DL
RBC # BLD: 5.17 M/UL
RBC # FLD: 13.8 %
SODIUM SERPL-SCNC: 139 MMOL/L
T4 FREE SERPL-MCNC: 1.5 NG/DL
TRIGL SERPL-MCNC: 77 MG/DL
TSH SERPL-ACNC: 3.25 UIU/ML
WBC # FLD AUTO: 12.68 K/UL

## 2021-10-05 ENCOUNTER — TRANSCRIPTION ENCOUNTER (OUTPATIENT)
Age: 46
End: 2021-10-05

## 2021-10-11 ENCOUNTER — RX RENEWAL (OUTPATIENT)
Age: 46
End: 2021-10-11

## 2021-10-14 ENCOUNTER — LABORATORY RESULT (OUTPATIENT)
Age: 46
End: 2021-10-14

## 2021-10-14 ENCOUNTER — APPOINTMENT (OUTPATIENT)
Dept: RHEUMATOLOGY | Facility: CLINIC | Age: 46
End: 2021-10-14
Payer: COMMERCIAL

## 2021-10-14 PROCEDURE — 36415 COLL VENOUS BLD VENIPUNCTURE: CPT

## 2021-10-14 PROCEDURE — 96374 THER/PROPH/DIAG INJ IV PUSH: CPT | Mod: 59

## 2021-10-14 PROCEDURE — 96413 CHEMO IV INFUSION 1 HR: CPT

## 2021-10-15 ENCOUNTER — TRANSCRIPTION ENCOUNTER (OUTPATIENT)
Age: 46
End: 2021-10-15

## 2021-10-22 ENCOUNTER — APPOINTMENT (OUTPATIENT)
Dept: NEUROLOGY | Facility: CLINIC | Age: 46
End: 2021-10-22
Payer: COMMERCIAL

## 2021-10-22 VITALS
DIASTOLIC BLOOD PRESSURE: 91 MMHG | SYSTOLIC BLOOD PRESSURE: 137 MMHG | BODY MASS INDEX: 44.73 KG/M2 | WEIGHT: 285 LBS | HEART RATE: 89 BPM | HEIGHT: 67 IN

## 2021-10-22 PROCEDURE — 99214 OFFICE O/P EST MOD 30 MIN: CPT

## 2021-10-22 NOTE — PHYSICAL EXAM
[General Appearance - Alert] : alert [General Appearance - In No Acute Distress] : in no acute distress [Person] : oriented to person [Place] : oriented to place [Time] : oriented to time [Short Term Intact] : short term memory intact [Remote Intact] : remote memory intact [Registration Intact] : recent registration memory intact [Concentration Intact] : normal concentrating ability [Visual Intact] : visual attention was ~T not ~L decreased [Naming Objects] : no difficulty naming common objects [Repeating Phrases] : no difficulty repeating a phrase [Writing A Sentence] : no difficulty writing a sentence [Fluency] : fluency intact [Comprehension] : comprehension intact [Reading] : reading intact [Past History] : adequate knowledge of personal past history [Cranial Nerves Optic (II)] : visual acuity intact bilaterally,  visual fields full to confrontation, pupils equal round and reactive to light [Cranial Nerves Trigeminal (V)] : facial sensation intact symmetrically [Cranial Nerves Facial (VII)] : face symmetrical [Cranial Nerves Vestibulocochlear (VIII)] : hearing was intact bilaterally [Cranial Nerves Glossopharyngeal (IX)] : tongue and palate midline [Cranial Nerves Accessory (XI - Cranial And Spinal)] : head turning and shoulder shrug symmetric [Cranial Nerves Hypoglossal (XII)] : there was no tongue deviation with protrusion [Motor Tone] : muscle tone was normal in all four extremities [Motor Strength] : muscle strength was normal in all four extremities [Involuntary Movements] : no involuntary movements were seen [No Muscle Atrophy] : normal bulk in all four extremities [Motor Handedness Left-Handed] : the patient is left hand dominant [+4] : arm extension  +4/5 [5] : ankle plantar flexion 5/5 [Sensation Tactile Decrease] : light touch was intact [Sensation Vibration Decrease] : vibration was intact [Abnormal Walk] : normal gait [Balance] : balance was intact [2+] : Ankle jerk right 2+ [1+] : Ankle jerk left 1+ [Hand Weakness Right] : normal hand  [Hand Weakness Left] : normal hand  [Romberg's Sign] : Romberg's sign was negtive [Position Sensation Decrease Leg/Foot At Level Of Toes] : not impaired at the toes in the right leg [Past-pointing] : there was no past-pointing [Tremor] : no tremor present [Plantar Reflex Right Only] : normal on the right [Plantar Reflex Left Only] : normal on the left [FreeTextEntry5] : hypertropia OS with left head tilt [FreeTextEntry8] : fair squat

## 2021-10-22 NOTE — ASSESSMENT
[FreeTextEntry1] : Patient has mild UE weakness on exam and subtle findings ?of neuropathy. \par \par CK 2 years ago was elevated to 500 and has come down so myositis is in DDx but not likely with normal CK last month of 118. \par \par Lupus neuropathy is in DDx and will have him back for EMG to help delineate if there is organic weakness that needs to be treated.

## 2021-10-22 NOTE — HISTORY OF PRESENT ILLNESS
[FreeTextEntry1] : 46 year old  LH with a PMH of spinal stenosis, hypothyroidism, SLE (diagnosed 3 years ago) who presents with weakness. \par He first noticed a little more than 3 years ago general sense of fatigue. Has increased difficulty with walking/climbing stairs. Weakness has worsened. Can't stand for long, or walk for long distances  Has difficulty going up stairs because of weakness and also feels winded. Has intermittent difficulty with hand . After about 20 minutes, he regains the strength. Has been having leg cramps R>L in the calf. He can lift heavy objects above his head. Denies muscle pain when he uses them. He notices twitches in his left upper extremity  and notices twitches in his left D2  He denies coughing with swallowing.\par Distribution: legs, arms, and especially the hands\par He has also noticed tingling and numbness in the hands and toes that started about 5 years before being diagnosed with SLE. It is positional in the lower extremities. \par Patient was on prednisone for 3 years, taken off of it in September. He was switched to methylprednisolone.  Has been on mycophenolate for 3 years as well.  \par

## 2021-10-25 ENCOUNTER — APPOINTMENT (OUTPATIENT)
Dept: NEUROLOGY | Facility: CLINIC | Age: 46
End: 2021-10-25
Payer: COMMERCIAL

## 2021-10-25 ENCOUNTER — APPOINTMENT (OUTPATIENT)
Dept: PULMONOLOGY | Facility: CLINIC | Age: 46
End: 2021-10-25
Payer: COMMERCIAL

## 2021-10-25 VITALS
OXYGEN SATURATION: 98 % | DIASTOLIC BLOOD PRESSURE: 70 MMHG | BODY MASS INDEX: 45.04 KG/M2 | WEIGHT: 287 LBS | SYSTOLIC BLOOD PRESSURE: 120 MMHG | TEMPERATURE: 97.3 F | RESPIRATION RATE: 16 BRPM | HEIGHT: 67 IN | HEART RATE: 103 BPM

## 2021-10-25 PROCEDURE — 99214 OFFICE O/P EST MOD 30 MIN: CPT | Mod: 25

## 2021-10-25 PROCEDURE — 95909 NRV CNDJ TST 5-6 STUDIES: CPT

## 2021-10-25 PROCEDURE — 95885 MUSC TST DONE W/NERV TST LIM: CPT | Mod: 50

## 2021-10-25 PROCEDURE — G0008: CPT

## 2021-10-25 PROCEDURE — 90682 RIV4 VACC RECOMBINANT DNA IM: CPT

## 2021-10-25 NOTE — HISTORY OF PRESENT ILLNESS
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 patient currently using Trelegy Ellipta 200 with good symptoms control.  He is currently on methylprednisolone 8 mg as well as Mycophenolate and Benlysta for lupus related symptoms. He was recently evaluated by Neurology and weakness secondary to myositis.  He is here for follow up.

## 2021-10-25 NOTE — ASSESSMENT
[FreeTextEntry1] : EMG today shows normal NCV's and myopathic motor units with minimal irritative features by EMG. 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 \par \par Will f/u with rheumatology

## 2021-10-25 NOTE — HISTORY OF PRESENT ILLNESS
[FreeTextEntry1] : Patient presents for EMG for evaluation of weakness.  \par \par No change in sx's since last visit on Friday.

## 2021-10-25 NOTE — PHYSICAL EXAM
[Person] : oriented to person [Place] : oriented to place [Time] : oriented to time [Short Term Intact] : short term memory intact [Remote Intact] : remote memory intact [Registration Intact] : recent registration memory intact [Concentration Intact] : normal concentrating ability [Visual Intact] : visual attention was ~T not ~L decreased [Naming Objects] : no difficulty naming common objects [Repeating Phrases] : no difficulty repeating a phrase [Writing A Sentence] : no difficulty writing a sentence [Fluency] : fluency intact [Comprehension] : comprehension intact [Reading] : reading intact [Past History] : adequate knowledge of personal past history [Cranial Nerves Optic (II)] : visual acuity intact bilaterally,  visual fields full to confrontation, pupils equal round and reactive to light [Cranial Nerves Trigeminal (V)] : facial sensation intact symmetrically [Cranial Nerves Facial (VII)] : face symmetrical [Cranial Nerves Vestibulocochlear (VIII)] : hearing was intact bilaterally [Cranial Nerves Glossopharyngeal (IX)] : tongue and palate midline [Cranial Nerves Accessory (XI - Cranial And Spinal)] : head turning and shoulder shrug symmetric [Cranial Nerves Hypoglossal (XII)] : there was no tongue deviation with protrusion [Motor Tone] : muscle tone was normal in all four extremities [Motor Strength] : muscle strength was normal in all four extremities [Involuntary Movements] : no involuntary movements were seen [No Muscle Atrophy] : normal bulk in all four extremities [Motor Handedness Left-Handed] : the patient is left hand dominant [+4] : arm extension  +4/5 [5] : ankle plantar flexion 5/5 [Sensation Tactile Decrease] : light touch was intact [Sensation Vibration Decrease] : vibration was intact [Abnormal Walk] : normal gait [Balance] : balance was intact [2+] : Ankle jerk right 2+ [1+] : Ankle jerk left 1+ [Hand Weakness Right] : normal hand  [Hand Weakness Left] : normal hand  [Romberg's Sign] : Romberg's sign was negtive [Position Sensation Decrease Leg/Foot At Level Of Toes] : not impaired at the toes in the right leg [Past-pointing] : there was no past-pointing [Tremor] : no tremor present [Plantar Reflex Right Only] : normal on the right [Plantar Reflex Left Only] : normal on the left [FreeTextEntry5] : hypertropia OS with left head tilt [FreeTextEntry8] : fair squat

## 2021-10-25 NOTE — COUNSELING
[Other: ____] : [unfilled] [Good understanding] : Patient has a good understanding of lifestyle changes and steps needed to achieve self management goal [de-identified] : Anxiety

## 2021-10-25 NOTE — ASSESSMENT
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

## 2021-10-28 ENCOUNTER — APPOINTMENT (OUTPATIENT)
Dept: SLEEP CENTER | Facility: CLINIC | Age: 46
End: 2021-10-28

## 2021-10-29 ENCOUNTER — APPOINTMENT (OUTPATIENT)
Dept: INTERNAL MEDICINE | Facility: CLINIC | Age: 46
End: 2021-10-29

## 2021-11-03 ENCOUNTER — RX RENEWAL (OUTPATIENT)
Age: 46
End: 2021-11-03

## 2021-11-04 ENCOUNTER — APPOINTMENT (OUTPATIENT)
Dept: INTERNAL MEDICINE | Facility: CLINIC | Age: 46
End: 2021-11-04
Payer: COMMERCIAL

## 2021-11-04 VITALS
DIASTOLIC BLOOD PRESSURE: 76 MMHG | WEIGHT: 289 LBS | TEMPERATURE: 97 F | SYSTOLIC BLOOD PRESSURE: 123 MMHG | HEART RATE: 96 BPM | OXYGEN SATURATION: 98 % | HEIGHT: 67 IN | BODY MASS INDEX: 45.36 KG/M2

## 2021-11-04 VITALS — DIASTOLIC BLOOD PRESSURE: 94 MMHG | SYSTOLIC BLOOD PRESSURE: 150 MMHG

## 2021-11-04 PROCEDURE — 36415 COLL VENOUS BLD VENIPUNCTURE: CPT

## 2021-11-04 PROCEDURE — 99214 OFFICE O/P EST MOD 30 MIN: CPT | Mod: 25

## 2021-11-04 NOTE — ASSESSMENT
[FreeTextEntry1] : Patient is a 46-year-old male with history of obesity, sleep apnea, lupus, myositis, hypothyroidism, hypertension, asthma, allergic rhinitis, anxiety, who presents with decrease in urine output, left leg muscle spasm and renewal medication.\par \par 1. Hypertension\par secondary noncompliance\par we start losartan 50 mg once a day\par follow-up in one month\par \par 2. Decreased urine output\par coverage hydration check UA negative for WBCs RBCs\par BUN creatinine check PSA if persist check sonogram\par observe\par continue Lasix 40 mg a day\par \par 3. Leg spasm\par I etiology unclear whether this leg spasm versus myositis will observe for now\par \par 4 lupus\par continue current meds follow-up with rheumatology.\par \par

## 2021-11-04 NOTE — HISTORY OF PRESENT ILLNESS
[FreeTextEntry8] : \par \par C:C: decreased urine output, spasm of left leg times two, and need refill for blood pressure medication\par \par HPI: the patient is a 46-year-old male with history of. Lupus myositis, on medication followed by rheumatology neurology, hypertension, obesity, central sleep apnea, anxiety, asthma,hypothyroidism, migraines obesity, Raynaud's phenomena who presents for decreased urine output in spite of furosemide. Patient notes decreased urine output denies decreased fluid intake, pain fever chills burning also complains of two episodes of spasm of left leg no rash no pain no numbness. Patient manner of blood pressure medication several days ago denies headache, dizziness. Blood pressure noted to be 154/94.

## 2021-11-08 ENCOUNTER — APPOINTMENT (OUTPATIENT)
Dept: INTERNAL MEDICINE | Facility: CLINIC | Age: 46
End: 2021-11-08
Payer: COMMERCIAL

## 2021-11-08 ENCOUNTER — TRANSCRIPTION ENCOUNTER (OUTPATIENT)
Age: 46
End: 2021-11-08

## 2021-11-08 LAB
ANION GAP SERPL CALC-SCNC: 16 MMOL/L
BASOPHILS # BLD AUTO: 0.01 K/UL
BASOPHILS NFR BLD AUTO: 0.1 %
BILIRUB UR QL STRIP: NEGATIVE
BUN SERPL-MCNC: 8 MG/DL
CALCIUM SERPL-MCNC: 9.8 MG/DL
CHLORIDE SERPL-SCNC: 100 MMOL/L
CLARITY UR: CLEAR
CO2 SERPL-SCNC: 25 MMOL/L
COLLECTION METHOD: NORMAL
CREAT SERPL-MCNC: 1.07 MG/DL
EOSINOPHIL # BLD AUTO: 0.03 K/UL
EOSINOPHIL NFR BLD AUTO: 0.2 %
GLUCOSE SERPL-MCNC: 102 MG/DL
GLUCOSE UR-MCNC: NEGATIVE
HCG UR QL: 0.2 EU/DL
HCT VFR BLD CALC: 45.6 %
HGB BLD-MCNC: 14.6 G/DL
HGB UR QL STRIP.AUTO: NEGATIVE
IMM GRANULOCYTES NFR BLD AUTO: 0.6 %
KETONES UR-MCNC: NEGATIVE
LEUKOCYTE ESTERASE UR QL STRIP: NEGATIVE
LYMPHOCYTES # BLD AUTO: 1.36 K/UL
LYMPHOCYTES NFR BLD AUTO: 10.6 %
MAN DIFF?: NORMAL
MCHC RBC-ENTMCNC: 28.1 PG
MCHC RBC-ENTMCNC: 32 GM/DL
MCV RBC AUTO: 87.7 FL
MONOCYTES # BLD AUTO: 1.14 K/UL
MONOCYTES NFR BLD AUTO: 8.9 %
NEUTROPHILS # BLD AUTO: 10.25 K/UL
NEUTROPHILS NFR BLD AUTO: 79.6 %
NITRITE UR QL STRIP: NEGATIVE
PH UR STRIP: 7
PLATELET # BLD AUTO: 328 K/UL
POTASSIUM SERPL-SCNC: 3.8 MMOL/L
PROT UR STRIP-MCNC: NEGATIVE
PSA SERPL-MCNC: 1.16 NG/ML
RBC # BLD: 5.2 M/UL
RBC # FLD: 14.6 %
SODIUM SERPL-SCNC: 141 MMOL/L
SP GR UR STRIP: 1.01
WBC # FLD AUTO: 12.87 K/UL

## 2021-11-08 PROCEDURE — 99214 OFFICE O/P EST MOD 30 MIN: CPT

## 2021-11-08 NOTE — REVIEW OF SYSTEMS
[Joint Pain] : no joint pain [Joint Stiffness] : no joint stiffness [Muscle Pain] : no muscle pain [Muscle Weakness] : muscle weakness [Back Pain] : no back pain [Joint Swelling] : no joint swelling [Negative] : Integumentary

## 2021-11-08 NOTE — HISTORY OF PRESENT ILLNESS
[FreeTextEntry8] : \par \par CC acute onset of left sided weakness\par \par HPI: the patient is a 46-year-old male with history of lupus, obesity, myopathy, neuropathy, hypothyroidism, hypertension, migraines, degenerative disc disease lumbar, obesity, obstructive sleep apnea who presents for sudden onset of this morning of left sided weakness and numbness. Patient awoke this morning complaining of left sided numbness weakness both on legs but not face patient then to cut toes felt dizzy lightheaded call for rule out stroke patient denies chest pain palpitations lightheadedness dizziness..\par \par

## 2021-11-08 NOTE — ASSESSMENT
[FreeTextEntry1] : Patient is a 46-year-old male with history of overweight obstructive sleep apnea lupus, my apathy, neuropathy, hypertension, hypothyroidism, lumbar disc disease, migraines, who presents with acute onset of left sided numbness weakness and episode of dizziness\par \par \par 1. Left sided weakness/numbness\par wonder whether this is cervical neck disease however will rule out stroke even history of lupus.\par Patient may use aspirin 81 mg Q day\par consider MRI of the neck after CT of the head\par \par 2. Dizziness\par most likely vasovagal from bending down cutting toes will observe. Follow-up in two weeks\par \par 3 hypertension\par patient rushed over here slightly hypertension will continue current meds and observe

## 2021-11-08 NOTE — PHYSICAL EXAM
[Normal Outer Ear/Nose] : the outer ears and nose were normal in appearance [No JVD] : no jugular venous distention [No Joint Swelling] : no joint swelling [Normal] : affect was normal and insight and judgment were intact [de-identified] : Slightly weaker on left upper extremity equals lower no numbness [de-identified] : Slightly weaker on left upper extremity equals lower no numbness bilateral one plus knee reflex. Sensory grossly intact

## 2021-11-09 ENCOUNTER — APPOINTMENT (OUTPATIENT)
Dept: CT IMAGING | Facility: IMAGING CENTER | Age: 46
End: 2021-11-09
Payer: COMMERCIAL

## 2021-11-09 ENCOUNTER — OUTPATIENT (OUTPATIENT)
Dept: OUTPATIENT SERVICES | Facility: HOSPITAL | Age: 46
LOS: 1 days | End: 2021-11-09
Payer: COMMERCIAL

## 2021-11-09 DIAGNOSIS — Z90.49 ACQUIRED ABSENCE OF OTHER SPECIFIED PARTS OF DIGESTIVE TRACT: Chronic | ICD-10-CM

## 2021-11-09 DIAGNOSIS — R53.1 WEAKNESS: ICD-10-CM

## 2021-11-09 PROCEDURE — 70450 CT HEAD/BRAIN W/O DYE: CPT

## 2021-11-09 PROCEDURE — 70450 CT HEAD/BRAIN W/O DYE: CPT | Mod: 26

## 2021-11-10 ENCOUNTER — LABORATORY RESULT (OUTPATIENT)
Age: 46
End: 2021-11-10

## 2021-11-10 ENCOUNTER — APPOINTMENT (OUTPATIENT)
Dept: RHEUMATOLOGY | Facility: CLINIC | Age: 46
End: 2021-11-10
Payer: COMMERCIAL

## 2021-11-10 VITALS
RESPIRATION RATE: 16 BRPM | WEIGHT: 289 LBS | OXYGEN SATURATION: 98 % | SYSTOLIC BLOOD PRESSURE: 142 MMHG | HEART RATE: 102 BPM | HEIGHT: 67 IN | BODY MASS INDEX: 45.36 KG/M2 | DIASTOLIC BLOOD PRESSURE: 95 MMHG | TEMPERATURE: 97.8 F

## 2021-11-10 PROCEDURE — 96413 CHEMO IV INFUSION 1 HR: CPT

## 2021-11-10 PROCEDURE — 36415 COLL VENOUS BLD VENIPUNCTURE: CPT

## 2021-11-10 PROCEDURE — 99215 OFFICE O/P EST HI 40 MIN: CPT | Mod: 25

## 2021-11-10 PROCEDURE — ZZZZZ: CPT

## 2021-11-11 ENCOUNTER — TRANSCRIPTION ENCOUNTER (OUTPATIENT)
Age: 46
End: 2021-11-11

## 2021-11-16 ENCOUNTER — TRANSCRIPTION ENCOUNTER (OUTPATIENT)
Age: 46
End: 2021-11-16

## 2021-11-22 ENCOUNTER — APPOINTMENT (OUTPATIENT)
Dept: INTERNAL MEDICINE | Facility: CLINIC | Age: 46
End: 2021-11-22

## 2021-11-29 ENCOUNTER — RX RENEWAL (OUTPATIENT)
Age: 46
End: 2021-11-29

## 2021-11-29 ENCOUNTER — APPOINTMENT (OUTPATIENT)
Dept: PULMONOLOGY | Facility: CLINIC | Age: 46
End: 2021-11-29
Payer: COMMERCIAL

## 2021-11-29 VITALS
RESPIRATION RATE: 16 BRPM | SYSTOLIC BLOOD PRESSURE: 130 MMHG | BODY MASS INDEX: 45.36 KG/M2 | WEIGHT: 289 LBS | HEIGHT: 67 IN | DIASTOLIC BLOOD PRESSURE: 80 MMHG | TEMPERATURE: 97.4 F | OXYGEN SATURATION: 98 % | HEART RATE: 112 BPM

## 2021-11-29 PROCEDURE — 99214 OFFICE O/P EST MOD 30 MIN: CPT

## 2021-11-29 NOTE — COUNSELING
[Other: ____] : [unfilled] [Good understanding] : Patient has a good understanding of lifestyle changes and steps needed to achieve self management goal [de-identified] : Anxiety

## 2021-11-29 NOTE — HISTORY OF PRESENT ILLNESS
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 Patient currently using Trelegy Ellipta 200 with good symptom control.  He is currently on methylprednisolone 8 mg as well as Mycophenolate and Benlysta for lupus related symptoms.He reports he is getting over a Lupus flare.  He is very anxious.  He reports pleuritic chest pain, numbness across left side.  He is here for follow up.

## 2021-11-29 NOTE — PHYSICAL EXAM
[General Appearance - Well Developed] : well developed [General Appearance - Well Nourished] : well nourished [General Appearance - In No Acute Distress] : no acute distress [Normal Conjunctiva] : the conjunctiva exhibited no abnormalities [Erythema] : erythema of the pharynx [] : the neck was supple [Jugular Venous Distention Increased] : there was no jugular-venous distention [Heart Sounds] : normal S1 and S2 [Murmurs] : no murmurs present [Respiration, Rhythm And Depth] : normal respiratory rhythm and effort [Auscultation Breath Sounds / Voice Sounds] : lungs were clear to auscultation bilaterally [Abdomen Soft] : soft [Abnormal Walk] : normal gait [Nail Clubbing] : no clubbing of the fingernails [Cyanosis, Localized] : no localized cyanosis [Non-Pitting] : non-pitting [Cranial Nerves] : cranial nerves 2-12 were intact [FreeTextEntry1] : Multiple tattoos + malar rash, redness

## 2021-11-29 NOTE — ASSESSMENT
[FreeTextEntry1] : 46 year old male [employer]  presents for follow up now lupus associated symptoms, pleuritic chest pain, joint pain, numbness, consider possibility pericarditis  \par \par Continue Trelegy 200-25 mcg daily\par Colchicine .6 mg daily x 5 days pleuritic chest pain\par Continue methylprednisolone 8 mg daily Cell Cept, Benlysta per Rheumatology.\par  Continue Famotidine 40 mg daily\par Follow up Rheumatology\par Recommend Opthalmology evaluation \par \par Follow up 4-6 weeks

## 2021-11-30 ENCOUNTER — APPOINTMENT (OUTPATIENT)
Dept: MRI IMAGING | Facility: CLINIC | Age: 46
End: 2021-11-30
Payer: COMMERCIAL

## 2021-11-30 ENCOUNTER — OUTPATIENT (OUTPATIENT)
Dept: OUTPATIENT SERVICES | Facility: HOSPITAL | Age: 46
LOS: 1 days | End: 2021-11-30
Payer: COMMERCIAL

## 2021-11-30 ENCOUNTER — TRANSCRIPTION ENCOUNTER (OUTPATIENT)
Age: 46
End: 2021-11-30

## 2021-11-30 ENCOUNTER — APPOINTMENT (OUTPATIENT)
Dept: ULTRASOUND IMAGING | Facility: CLINIC | Age: 46
End: 2021-11-30
Payer: COMMERCIAL

## 2021-11-30 DIAGNOSIS — G62.9 POLYNEUROPATHY, UNSPECIFIED: ICD-10-CM

## 2021-11-30 DIAGNOSIS — M79.10 MYALGIA, UNSPECIFIED SITE: ICD-10-CM

## 2021-11-30 DIAGNOSIS — Z90.49 ACQUIRED ABSENCE OF OTHER SPECIFIED PARTS OF DIGESTIVE TRACT: Chronic | ICD-10-CM

## 2021-11-30 PROCEDURE — 93880 EXTRACRANIAL BILAT STUDY: CPT | Mod: 26

## 2021-11-30 PROCEDURE — 70553 MRI BRAIN STEM W/O & W/DYE: CPT

## 2021-11-30 PROCEDURE — 70553 MRI BRAIN STEM W/O & W/DYE: CPT | Mod: 26

## 2021-11-30 PROCEDURE — 93880 EXTRACRANIAL BILAT STUDY: CPT

## 2021-11-30 PROCEDURE — 72141 MRI NECK SPINE W/O DYE: CPT | Mod: 26

## 2021-11-30 PROCEDURE — 72141 MRI NECK SPINE W/O DYE: CPT

## 2021-11-30 PROCEDURE — A9585: CPT

## 2021-12-01 ENCOUNTER — TRANSCRIPTION ENCOUNTER (OUTPATIENT)
Age: 46
End: 2021-12-01

## 2021-12-05 ENCOUNTER — RX RENEWAL (OUTPATIENT)
Age: 46
End: 2021-12-05

## 2021-12-06 ENCOUNTER — TRANSCRIPTION ENCOUNTER (OUTPATIENT)
Age: 46
End: 2021-12-06

## 2021-12-07 ENCOUNTER — TRANSCRIPTION ENCOUNTER (OUTPATIENT)
Age: 46
End: 2021-12-07

## 2021-12-08 ENCOUNTER — APPOINTMENT (OUTPATIENT)
Dept: MRI IMAGING | Facility: CLINIC | Age: 46
End: 2021-12-08

## 2021-12-09 ENCOUNTER — LABORATORY RESULT (OUTPATIENT)
Age: 46
End: 2021-12-09

## 2021-12-09 ENCOUNTER — APPOINTMENT (OUTPATIENT)
Dept: RHEUMATOLOGY | Facility: CLINIC | Age: 46
End: 2021-12-09
Payer: COMMERCIAL

## 2021-12-09 ENCOUNTER — APPOINTMENT (OUTPATIENT)
Dept: INTERNAL MEDICINE | Facility: CLINIC | Age: 46
End: 2021-12-09
Payer: COMMERCIAL

## 2021-12-09 VITALS — SYSTOLIC BLOOD PRESSURE: 128 MMHG | DIASTOLIC BLOOD PRESSURE: 80 MMHG

## 2021-12-09 VITALS
OXYGEN SATURATION: 97 % | WEIGHT: 274 LBS | HEART RATE: 88 BPM | TEMPERATURE: 97.3 F | BODY MASS INDEX: 42.91 KG/M2 | DIASTOLIC BLOOD PRESSURE: 95 MMHG | SYSTOLIC BLOOD PRESSURE: 149 MMHG

## 2021-12-09 PROCEDURE — 36415 COLL VENOUS BLD VENIPUNCTURE: CPT

## 2021-12-09 PROCEDURE — 99214 OFFICE O/P EST MOD 30 MIN: CPT | Mod: 25

## 2021-12-09 PROCEDURE — 96413 CHEMO IV INFUSION 1 HR: CPT

## 2021-12-09 NOTE — HISTORY OF PRESENT ILLNESS
[FreeTextEntry1] : Follow-up for hypertension lupus and asthma [de-identified] : The patient is a 46-year-old male with history of lupus, inflammatory my myositis, fatigue, hypothyroidism, migraines, asthma, obesity and obstructive sleep apnea who presents for follow-up patient was at the infusion center feels weak fatigue and muscle pain denies chest pain, palpitations, lightheadedness, dizziness blood pressure confusion set in normal

## 2021-12-09 NOTE — PHYSICAL EXAM
[Normal Sclera/Conjunctiva] : normal sclera/conjunctiva [Normal Outer Ear/Nose] : the outer ears and nose were normal in appearance [No Carotid Bruits] : no carotid bruits [No Abdominal Bruit] : a ~M bruit was not heard ~T in the abdomen [No Palpable Aorta] : no palpable aorta [No Extremity Clubbing/Cyanosis] : no extremity clubbing/cyanosis [Normal Supraclavicular Nodes] : no supraclavicular lymphadenopathy [Normal Posterior Cervical Nodes] : no posterior cervical lymphadenopathy [Normal Anterior Cervical Nodes] : no anterior cervical lymphadenopathy [Normal] : no CVA or spinal tenderness [de-identified] : Trace edema

## 2021-12-09 NOTE — ASSESSMENT
[FreeTextEntry1] : Patient is a 46-year-old male with history of obesity, hypertension, lupus, myositis related to lupus, fatigue, obstructive sleep apnea, hypothyroidism, who presents for follow-up\par \par 1 hypertension\par continue losartan 50 mg once a day blood pressure well-controlled\par follow-up in two months\par 2,hypothyroidism\par continue levothyroxine hundred 25 µg check TFTs\par \par 3. Asthma\par well-controlled on inhalers recently so pulmonary\par status post flu shot\par \par 4. Lupus/myositis\par check said rate C-reactive protein CK's\par observe continue current management as per rheumatology\par \par 5 fatigue/chronic fatigue\par etiology multifactorial inflammatory disease medications\par check TFTs observe\par \par 6 pain myalgias\par referral to pain management\par \par 7 obesity\par  on diet and exercise\par patient down 15 pounds\par \par 8 obstructive sleep apnea\par observe patient reluctant CPAP mask\par follow-up in two months

## 2021-12-10 ENCOUNTER — NON-APPOINTMENT (OUTPATIENT)
Age: 46
End: 2021-12-10

## 2021-12-28 ENCOUNTER — APPOINTMENT (OUTPATIENT)
Dept: PULMONOLOGY | Facility: CLINIC | Age: 46
End: 2021-12-28

## 2022-01-03 ENCOUNTER — RX RENEWAL (OUTPATIENT)
Age: 47
End: 2022-01-03

## 2022-01-05 ENCOUNTER — TRANSCRIPTION ENCOUNTER (OUTPATIENT)
Age: 47
End: 2022-01-05

## 2022-01-05 ENCOUNTER — NON-APPOINTMENT (OUTPATIENT)
Age: 47
End: 2022-01-05

## 2022-01-06 ENCOUNTER — APPOINTMENT (OUTPATIENT)
Age: 47
End: 2022-01-06

## 2022-01-06 ENCOUNTER — APPOINTMENT (OUTPATIENT)
Dept: RHEUMATOLOGY | Facility: CLINIC | Age: 47
End: 2022-01-06

## 2022-01-06 ENCOUNTER — OUTPATIENT (OUTPATIENT)
Dept: INPATIENT UNIT | Facility: HOSPITAL | Age: 47
LOS: 1 days | Discharge: HOME | End: 2022-01-06

## 2022-01-06 VITALS — TEMPERATURE: 98 F | RESPIRATION RATE: 20 BRPM | OXYGEN SATURATION: 95 % | HEART RATE: 92 BPM

## 2022-01-06 VITALS — TEMPERATURE: 97 F | HEART RATE: 96 BPM | OXYGEN SATURATION: 97 % | RESPIRATION RATE: 18 BRPM

## 2022-01-06 DIAGNOSIS — Z90.49 ACQUIRED ABSENCE OF OTHER SPECIFIED PARTS OF DIGESTIVE TRACT: Chronic | ICD-10-CM

## 2022-01-06 DIAGNOSIS — U07.1 COVID-19: ICD-10-CM

## 2022-01-06 LAB
C4 SERPL-MCNC: 31 MG/DL
CK SERPL-CCNC: 95 U/L
CRP SERPL-MCNC: 6 MG/L
ERYTHROCYTE [SEDIMENTATION RATE] IN BLOOD BY WESTERGREN METHOD: 52 MM/HR
TSH SERPL-ACNC: 1.14 UIU/ML

## 2022-01-06 RX ORDER — SOTROVIMAB 62.5 MG/ML
500 INJECTION, SOLUTION, CONCENTRATE INTRAVENOUS ONCE
Refills: 0 | Status: COMPLETED | OUTPATIENT
Start: 2022-01-06 | End: 2022-01-06

## 2022-01-06 RX ORDER — SODIUM CHLORIDE 9 MG/ML
250 INJECTION INTRAMUSCULAR; INTRAVENOUS; SUBCUTANEOUS
Refills: 0 | Status: DISCONTINUED | OUTPATIENT
Start: 2022-01-06 | End: 2022-01-07

## 2022-01-06 RX ADMIN — SODIUM CHLORIDE 25 MILLILITER(S): 9 INJECTION INTRAMUSCULAR; INTRAVENOUS; SUBCUTANEOUS at 12:07

## 2022-01-06 RX ADMIN — SOTROVIMAB 116 MILLIGRAM(S): 62.5 INJECTION, SOLUTION, CONCENTRATE INTRAVENOUS at 09:00

## 2022-01-06 NOTE — CHART NOTE - NSCHARTNOTEFT_GEN_A_CORE
Pt is s/p monoclonal Ab without complications.  -Tyl prn temp >100.4  -If worsening of condition present to ED

## 2022-01-09 ENCOUNTER — TRANSCRIPTION ENCOUNTER (OUTPATIENT)
Age: 47
End: 2022-01-09

## 2022-01-10 ENCOUNTER — TRANSCRIPTION ENCOUNTER (OUTPATIENT)
Age: 47
End: 2022-01-10

## 2022-01-11 ENCOUNTER — TRANSCRIPTION ENCOUNTER (OUTPATIENT)
Age: 47
End: 2022-01-11

## 2022-01-14 ENCOUNTER — TRANSCRIPTION ENCOUNTER (OUTPATIENT)
Age: 47
End: 2022-01-14

## 2022-01-14 NOTE — ED ADULT NURSE NOTE - NSFALLRSKUNASSIST_ED_ALL_ED
Detail Level: Detailed When Should The Patient Follow-Up For Their Next Full-Body Skin Exam?: 3 Months Quality 397: Melanoma: Reporting: The pathology report includes a pT Category and statement on thickness and ulceration for pT1, mitotic rate. Quality 138: Melanoma: Coordination Of Care: A treatment plan was communicated to the physicians providing continuing care within one month of diagnosis outlining: diagnosis, tumor thickness and a plan for surgery or alternate care. Quality 137: Melanoma: Continuity Of Care - Recall System: Patient information entered into a recall system that includes: target date for the next exam specified AND a process to follow up with patients regarding missed or unscheduled appointments Show Follow-Up Variable: Yes no

## 2022-01-18 ENCOUNTER — TRANSCRIPTION ENCOUNTER (OUTPATIENT)
Age: 47
End: 2022-01-18

## 2022-01-18 DIAGNOSIS — J20.9 ACUTE BRONCHITIS, UNSPECIFIED: ICD-10-CM

## 2022-01-19 ENCOUNTER — TRANSCRIPTION ENCOUNTER (OUTPATIENT)
Age: 47
End: 2022-01-19

## 2022-01-19 RX ORDER — CEFUROXIME AXETIL 250 MG
1 TABLET ORAL
Qty: 0 | Refills: 0 | DISCHARGE
Start: 2022-01-19

## 2022-01-26 ENCOUNTER — RX RENEWAL (OUTPATIENT)
Age: 47
End: 2022-01-26

## 2022-01-26 ENCOUNTER — INPATIENT (INPATIENT)
Facility: HOSPITAL | Age: 47
LOS: 9 days | Discharge: HOME | DRG: 202 | End: 2022-02-05
Attending: HOSPITALIST | Admitting: GENERAL ACUTE CARE HOSPITAL
Payer: COMMERCIAL

## 2022-01-26 ENCOUNTER — APPOINTMENT (OUTPATIENT)
Dept: PULMONOLOGY | Facility: CLINIC | Age: 47
End: 2022-01-26

## 2022-01-26 ENCOUNTER — NON-APPOINTMENT (OUTPATIENT)
Age: 47
End: 2022-01-26

## 2022-01-26 VITALS
HEIGHT: 67 IN | DIASTOLIC BLOOD PRESSURE: 85 MMHG | RESPIRATION RATE: 18 BRPM | SYSTOLIC BLOOD PRESSURE: 124 MMHG | OXYGEN SATURATION: 95 % | WEIGHT: 274.92 LBS | HEART RATE: 113 BPM | TEMPERATURE: 98 F

## 2022-01-26 DIAGNOSIS — R06.02 SHORTNESS OF BREATH: ICD-10-CM

## 2022-01-26 DIAGNOSIS — Z90.49 ACQUIRED ABSENCE OF OTHER SPECIFIED PARTS OF DIGESTIVE TRACT: Chronic | ICD-10-CM

## 2022-01-26 LAB
ALBUMIN SERPL ELPH-MCNC: 4 G/DL — SIGNIFICANT CHANGE UP (ref 3.3–5)
ALP SERPL-CCNC: 98 U/L — SIGNIFICANT CHANGE UP (ref 40–120)
ALT FLD-CCNC: 49 U/L — HIGH (ref 10–45)
ANION GAP SERPL CALC-SCNC: 17 MMOL/L — SIGNIFICANT CHANGE UP (ref 5–17)
AST SERPL-CCNC: 38 U/L — SIGNIFICANT CHANGE UP (ref 10–40)
BASE EXCESS BLDV CALC-SCNC: 7.4 MMOL/L — HIGH (ref -2–2)
BASOPHILS # BLD AUTO: 0.04 K/UL — SIGNIFICANT CHANGE UP (ref 0–0.2)
BASOPHILS NFR BLD AUTO: 0.5 % — SIGNIFICANT CHANGE UP (ref 0–2)
BILIRUB SERPL-MCNC: 0.8 MG/DL — SIGNIFICANT CHANGE UP (ref 0.2–1.2)
BUN SERPL-MCNC: 6 MG/DL — LOW (ref 7–23)
CA-I SERPL-SCNC: 1.17 MMOL/L — SIGNIFICANT CHANGE UP (ref 1.15–1.33)
CALCIUM SERPL-MCNC: 9.8 MG/DL — SIGNIFICANT CHANGE UP (ref 8.4–10.5)
CHLORIDE BLDV-SCNC: 96 MMOL/L — SIGNIFICANT CHANGE UP (ref 96–108)
CHLORIDE SERPL-SCNC: 96 MMOL/L — SIGNIFICANT CHANGE UP (ref 96–108)
CO2 BLDV-SCNC: 36 MMOL/L — HIGH (ref 22–26)
CO2 SERPL-SCNC: 26 MMOL/L — SIGNIFICANT CHANGE UP (ref 22–31)
CREAT SERPL-MCNC: 0.99 MG/DL — SIGNIFICANT CHANGE UP (ref 0.5–1.3)
CRP SERPL-MCNC: 30 MG/L — HIGH (ref 0–4)
D DIMER BLD IA.RAPID-MCNC: 238 NG/ML DDU — HIGH
EOSINOPHIL # BLD AUTO: 0.13 K/UL — SIGNIFICANT CHANGE UP (ref 0–0.5)
EOSINOPHIL NFR BLD AUTO: 1.7 % — SIGNIFICANT CHANGE UP (ref 0–6)
FLUAV AG NPH QL: SIGNIFICANT CHANGE UP
FLUBV AG NPH QL: SIGNIFICANT CHANGE UP
GAS PNL BLDV: 135 MMOL/L — LOW (ref 136–145)
GAS PNL BLDV: SIGNIFICANT CHANGE UP
GAS PNL BLDV: SIGNIFICANT CHANGE UP
GLUCOSE BLDV-MCNC: 87 MG/DL — SIGNIFICANT CHANGE UP (ref 70–99)
GLUCOSE SERPL-MCNC: 85 MG/DL — SIGNIFICANT CHANGE UP (ref 70–99)
HCO3 BLDV-SCNC: 34 MMOL/L — HIGH (ref 22–29)
HCT VFR BLD CALC: 43.5 % — SIGNIFICANT CHANGE UP (ref 39–50)
HCT VFR BLDA CALC: 44 % — SIGNIFICANT CHANGE UP (ref 39–51)
HGB BLD CALC-MCNC: 14.6 G/DL — SIGNIFICANT CHANGE UP (ref 12.6–17.4)
HGB BLD-MCNC: 14.3 G/DL — SIGNIFICANT CHANGE UP (ref 13–17)
IMM GRANULOCYTES NFR BLD AUTO: 0.9 % — SIGNIFICANT CHANGE UP (ref 0–1.5)
LACTATE BLDV-MCNC: 1.9 MMOL/L — SIGNIFICANT CHANGE UP (ref 0.7–2)
LYMPHOCYTES # BLD AUTO: 1.1 K/UL — SIGNIFICANT CHANGE UP (ref 1–3.3)
LYMPHOCYTES # BLD AUTO: 14.7 % — SIGNIFICANT CHANGE UP (ref 13–44)
MCHC RBC-ENTMCNC: 29 PG — SIGNIFICANT CHANGE UP (ref 27–34)
MCHC RBC-ENTMCNC: 32.9 GM/DL — SIGNIFICANT CHANGE UP (ref 32–36)
MCV RBC AUTO: 88.2 FL — SIGNIFICANT CHANGE UP (ref 80–100)
MONOCYTES # BLD AUTO: 1 K/UL — HIGH (ref 0–0.9)
MONOCYTES NFR BLD AUTO: 13.4 % — SIGNIFICANT CHANGE UP (ref 2–14)
NEUTROPHILS # BLD AUTO: 5.13 K/UL — SIGNIFICANT CHANGE UP (ref 1.8–7.4)
NEUTROPHILS NFR BLD AUTO: 68.8 % — SIGNIFICANT CHANGE UP (ref 43–77)
NRBC # BLD: 0 /100 WBCS — SIGNIFICANT CHANGE UP (ref 0–0)
NT-PROBNP SERPL-SCNC: 13 PG/ML — SIGNIFICANT CHANGE UP (ref 0–300)
PCO2 BLDV: 55 MMHG — SIGNIFICANT CHANGE UP (ref 42–55)
PH BLDV: 7.4 — SIGNIFICANT CHANGE UP (ref 7.32–7.43)
PLATELET # BLD AUTO: 330 K/UL — SIGNIFICANT CHANGE UP (ref 150–400)
PO2 BLDV: 21 MMHG — LOW (ref 25–45)
POTASSIUM BLDV-SCNC: 2.9 MMOL/L — CRITICAL LOW (ref 3.5–5.1)
POTASSIUM SERPL-MCNC: 3.3 MMOL/L — LOW (ref 3.5–5.3)
POTASSIUM SERPL-SCNC: 3.3 MMOL/L — LOW (ref 3.5–5.3)
PROCALCITONIN SERPL-MCNC: 0.07 NG/ML — SIGNIFICANT CHANGE UP (ref 0.02–0.1)
PROT SERPL-MCNC: 7.4 G/DL — SIGNIFICANT CHANGE UP (ref 6–8.3)
RBC # BLD: 4.93 M/UL — SIGNIFICANT CHANGE UP (ref 4.2–5.8)
RBC # FLD: 13.5 % — SIGNIFICANT CHANGE UP (ref 10.3–14.5)
RSV RNA NPH QL NAA+NON-PROBE: SIGNIFICANT CHANGE UP
SAO2 % BLDV: 33.1 % — LOW (ref 67–88)
SARS-COV-2 RNA SPEC QL NAA+PROBE: DETECTED
SODIUM SERPL-SCNC: 139 MMOL/L — SIGNIFICANT CHANGE UP (ref 135–145)
TROPONIN T, HIGH SENSITIVITY RESULT: 12 NG/L — SIGNIFICANT CHANGE UP (ref 0–51)
WBC # BLD: 7.47 K/UL — SIGNIFICANT CHANGE UP (ref 3.8–10.5)
WBC # FLD AUTO: 7.47 K/UL — SIGNIFICANT CHANGE UP (ref 3.8–10.5)

## 2022-01-26 PROCEDURE — 99285 EMERGENCY DEPT VISIT HI MDM: CPT

## 2022-01-26 PROCEDURE — 71045 X-RAY EXAM CHEST 1 VIEW: CPT | Mod: 26

## 2022-01-26 PROCEDURE — 71275 CT ANGIOGRAPHY CHEST: CPT | Mod: 26,MA

## 2022-01-26 RX ORDER — LOSARTAN POTASSIUM 100 MG/1
50 TABLET, FILM COATED ORAL DAILY
Refills: 0 | Status: DISCONTINUED | OUTPATIENT
Start: 2022-01-26 | End: 2022-02-02

## 2022-01-26 RX ORDER — POTASSIUM CHLORIDE 20 MEQ
20 PACKET (EA) ORAL DAILY
Refills: 0 | Status: DISCONTINUED | OUTPATIENT
Start: 2022-01-26 | End: 2022-02-05

## 2022-01-26 RX ORDER — MYCOPHENOLATE MOFETIL 250 MG/1
1500 CAPSULE ORAL
Refills: 0 | Status: DISCONTINUED | OUTPATIENT
Start: 2022-01-26 | End: 2022-01-27

## 2022-01-26 RX ORDER — IPRATROPIUM/ALBUTEROL SULFATE 18-103MCG
3 AEROSOL WITH ADAPTER (GRAM) INHALATION EVERY 6 HOURS
Refills: 0 | Status: DISCONTINUED | OUTPATIENT
Start: 2022-01-26 | End: 2022-02-03

## 2022-01-26 RX ORDER — POTASSIUM CHLORIDE 20 MEQ
20 PACKET (EA) ORAL ONCE
Refills: 0 | Status: COMPLETED | OUTPATIENT
Start: 2022-01-26 | End: 2022-01-26

## 2022-01-26 RX ORDER — IPRATROPIUM/ALBUTEROL SULFATE 18-103MCG
3 AEROSOL WITH ADAPTER (GRAM) INHALATION
Refills: 0 | Status: COMPLETED | OUTPATIENT
Start: 2022-01-26 | End: 2022-01-26

## 2022-01-26 RX ORDER — HYDROXYCHLOROQUINE SULFATE 200 MG
200 TABLET ORAL
Refills: 0 | Status: DISCONTINUED | OUTPATIENT
Start: 2022-01-26 | End: 2022-01-29

## 2022-01-26 RX ORDER — POTASSIUM CHLORIDE 20 MEQ
40 PACKET (EA) ORAL ONCE
Refills: 0 | Status: COMPLETED | OUTPATIENT
Start: 2022-01-26 | End: 2022-01-27

## 2022-01-26 RX ORDER — FUROSEMIDE 40 MG
1 TABLET ORAL
Qty: 0 | Refills: 0 | DISCHARGE

## 2022-01-26 RX ORDER — LEVOTHYROXINE SODIUM 125 MCG
125 TABLET ORAL DAILY
Refills: 0 | Status: DISCONTINUED | OUTPATIENT
Start: 2022-01-26 | End: 2022-02-05

## 2022-01-26 RX ORDER — ASPIRIN/CALCIUM CARB/MAGNESIUM 324 MG
81 TABLET ORAL DAILY
Refills: 0 | Status: DISCONTINUED | OUTPATIENT
Start: 2022-01-26 | End: 2022-02-05

## 2022-01-26 RX ORDER — ENOXAPARIN SODIUM 100 MG/ML
40 INJECTION SUBCUTANEOUS
Refills: 0 | Status: DISCONTINUED | OUTPATIENT
Start: 2022-01-26 | End: 2022-02-05

## 2022-01-26 RX ORDER — FUROSEMIDE 40 MG
40 TABLET ORAL DAILY
Refills: 0 | Status: DISCONTINUED | OUTPATIENT
Start: 2022-01-26 | End: 2022-01-29

## 2022-01-26 RX ADMIN — Medication 3 MILLILITER(S): at 15:21

## 2022-01-26 RX ADMIN — Medication 20 MILLIEQUIVALENT(S): at 17:47

## 2022-01-26 RX ADMIN — Medication 125 MILLIGRAM(S): at 15:13

## 2022-01-26 RX ADMIN — Medication 3 MILLILITER(S): at 15:19

## 2022-01-26 RX ADMIN — Medication 3 MILLILITER(S): at 15:16

## 2022-01-26 NOTE — ED PROVIDER NOTE - PHYSICAL EXAMINATION
CONSTITUTIONAL: Well appearing and in no apparent distress.  ENT: Airway patent, moist mucous membranes.   EYES: Pupils equal, round and reactive to light. EOMI. Conjunctiva normal appearing.   CARDIAC: Normal rate, regular rhythm.  Heart sounds S1, S2.    RESPIRATORY: Decreased breath sounds throughout. Speaking in full sentences. O2 sat 98% on RA with good wave form. No resp distress, no accessory muscle use.   GASTROINTESTINAL: Abdomen soft, non-tender, not distended.  MUSCULOSKELETAL: Spine appears normal.  NEUROLOGICAL: Alert and oriented x3, no focal deficits, no motor or sensory deficits. 5/5 muscle strength throughout.  SKIN: Skin normal color, warm, dry and intact.   PSYCHIATRIC: Normal mood and affect.

## 2022-01-26 NOTE — ED ADULT NURSE NOTE - OBJECTIVE STATEMENT
47 yo M with a PMH of asthma (never intubated), Lupus (on Plaquenil, Cellcept when he tested COVID pos) p/w prod cough, chest tightness, SOB x 5 days. States sxs originally started when pt tested COVID pos 01/05/22, received MAB infusion on 01/06/22. Sxs persisted so called PMD who prescribed cefuroxime which he has been taking with no relief. Called PMD again today who advised presenting to ED. Pt states he feels a spasm in his chest every time he tries to speak full sentences, coughs and then is able to continue talking. States this is causing him a lot of chest tightness. . Denies nausea, vomiting, fever, chills, leg pain/swelling, abd pain, headache, syncope. Nonsmoker  Pt has swelling B/L normal FOR HIM PT TAKES HIS LASIX EVERY DAY pT pox 100 % ON ROOM AIR  IVL placed adb bloods sent as ordered Srini

## 2022-01-26 NOTE — PATIENT PROFILE ADULT - FALL HARM RISK - RISK INTERVENTIONS
Bed in lowest position, wheels locked, appropriate side rails in place/Call bell, personal items and telephone in reach/Instruct patient to call for assistance before getting out of bed or chair/Non-slip footwear when patient is out of bed/[place] to call system/Physically safe environment - no spills, clutter or unnecessary equipment/Purposeful Proactive Rounding

## 2022-01-26 NOTE — ED PROVIDER NOTE - SHIFT CHANGE DETAILS
Attending MD Benito: 46M w COVID, asthma no wheezing, got steroids here, on baseline steroids, history of SLE, triple vacc'ed s/p MAB, here with chest pain and sob x 3d, pending CTA, labs

## 2022-01-26 NOTE — H&P ADULT - ASSESSMENT
45 yo M with a PMH of asthma (never intubated), Lupus (on plaquenil, cellcept-stopped when he tested COVID pos) p/w prod cough, chest tightness, SOB x 5 days. States sxs originally started when pt tested COVID pos 01/05/22, received MAB infusion on 01/06/22. Sxs persisted so called PMD who prescribed cefuroxime which he has been taking with no relief. Called PMD again today who advised presenting to ED. Pt states he feels a spasm in his chest every time he tries to speak full sentences, coughs and then is able to continue talking. States this is causing him a lot of chest tightness. Has not been on steroids recently. Denies nausea, vomiting, fever, chills, leg pain/swelling, abd pain, headache, syncope. Nonsmoker    dyspnea : unclear etiology   no PE on CTA   no pna   no wheezing on exam   will give short course of steroids and consult pul   doubt cardiac etiology       Lupus : off cellcept     COVID infection : no hypoxia   monitor

## 2022-01-26 NOTE — ED PROVIDER NOTE - OBJECTIVE STATEMENT
45 yo M with a PMH of asthma (never intubated), Lupus (on plaquenil, cellcept-stopped when he tested COVID pos) p/w prod cough, chest tightness, SOB x 5 days. States sxs originally started when pt tested COVID pos 01/05/22, received MAB infusion on 01/06/22. Sxs persisted so called PMD who prescribed cefuroxime which he has been taking with no relief. Called PMD again today who advised presenting to ED. Pt states he feels a spasm in his chest every time he tries to speak full sentences, coughs and then is able to continue talking. States this is causing him a lot of chest tightness. Has not been on steroids recently. Denies nausea, vomiting, fever, chills, leg pain/swelling, abd pain, headache, syncope. Nonsmoker

## 2022-01-26 NOTE — ED PROVIDER NOTE - CLINICAL SUMMARY MEDICAL DECISION MAKING FREE TEXT BOX
Dr. Gil (Attending Physician)  Pt. with ho SLE on plaquenil and cellcept, asthma, triple vaccinated pw covid since 1/5, monoclonal ab on 1/6. Started on cefuroxime for possible pna 5 days ago. Will check labs, d-dimer, crp, pct, CTA chest. Give duonebs, steroids and reassess. Dr. Gil (Attending Physician)  Pt. with ho SLE on plaquenil and cellcept, asthma, triple vaccinated pw covid since 1/5, monoclonal ab on 1/6. Started on cefuroxime for possible pna 5 days ago. Will check labs, d-dimer, crp, pct, CTA chest. Give duonebs, steroids and reassess.    SOB/cough/chest tightness recent COVID infection and hx of Lupus r/o PE/PNA  Labs  CXR  EKG  CTA chest  Supplemental O2 PRN  Nebs/steroids   Reassess Dr. Gil (Attending Physician)  Pt. with ho SLE on plaquenil and cellcept, asthma, triple vaccinated pw covid since 1/5, monoclonal ab on 1/6. Started on cefuroxime for possible pna 5 days ago. Will check labs, d-dimer, crp, pct, CTA chest. Give duonebs, steroids and reassess. SOB/cough/chest tightness recent COVID infection and hx of Lupus r/o PE/PNA, Labs, CXR, EKG, CTA chest, Supplemental O2 PRN, Nebs/steroids   Reassess

## 2022-01-26 NOTE — ED PROVIDER NOTE - PROGRESS NOTE DETAILS
Attending MD Benito: Case presented to Dr. Camacho, will admit to his service. Attending MD Benito: Patient re-evaluated, with exertion increased work of breathing and tachypnea to 30s briefly 40, sat remains >93%, HR 110s-120s.  No wheezing on exam.  Will admit.

## 2022-01-26 NOTE — H&P ADULT - HISTORY OF PRESENT ILLNESS
45 yo M with a PMH of asthma (never intubated), Lupus (on plaquenil, cellcept-stopped when he tested COVID pos) p/w cough, faituge , SOB x 5 days. States sxs originally started when pt tested COVID pos 01/05/22, received MAB infusion on 01/06/22. Sxs persisted so called PMD who prescribed cefuroxime which he has been taking with no relief. Called PMD again today who advised presenting to ED. Pt states he feels a spasm in his chest every time he tries to speak full sentences, coughs and then is able to continue talking. States this is causing him a lot of chest tightness.  Denies nausea, vomiting, fever, chills, leg pain/swelling, abd pain, headache, syncope. Nonsmoker  no fever or chills   CTA in ED was neg for PE and did not show pna   pt was persistently tachycardiac and tachypnic in ED however without hypoxia

## 2022-01-27 ENCOUNTER — RX RENEWAL (OUTPATIENT)
Age: 47
End: 2022-01-27

## 2022-01-27 ENCOUNTER — TRANSCRIPTION ENCOUNTER (OUTPATIENT)
Age: 47
End: 2022-01-27

## 2022-01-27 DIAGNOSIS — J45.901 UNSPECIFIED ASTHMA WITH (ACUTE) EXACERBATION: ICD-10-CM

## 2022-01-27 DIAGNOSIS — M32.9 SYSTEMIC LUPUS ERYTHEMATOSUS, UNSPECIFIED: ICD-10-CM

## 2022-01-27 DIAGNOSIS — U07.1 COVID-19: ICD-10-CM

## 2022-01-27 LAB
CRP SERPL-MCNC: 25 MG/L — HIGH (ref 0–4)
FERRITIN SERPL-MCNC: 288 NG/ML — SIGNIFICANT CHANGE UP (ref 30–400)
HCT VFR BLD CALC: 42.3 % — SIGNIFICANT CHANGE UP (ref 39–50)
HGB BLD-MCNC: 14.1 G/DL — SIGNIFICANT CHANGE UP (ref 13–17)
LDH SERPL L TO P-CCNC: 352 U/L — HIGH (ref 50–242)
MAGNESIUM SERPL-MCNC: 2.1 MG/DL — SIGNIFICANT CHANGE UP (ref 1.6–2.6)
MCHC RBC-ENTMCNC: 28.7 PG — SIGNIFICANT CHANGE UP (ref 27–34)
MCHC RBC-ENTMCNC: 33.3 GM/DL — SIGNIFICANT CHANGE UP (ref 32–36)
MCV RBC AUTO: 86.2 FL — SIGNIFICANT CHANGE UP (ref 80–100)
NRBC # BLD: 0 /100 WBCS — SIGNIFICANT CHANGE UP (ref 0–0)
NT-PROBNP SERPL-SCNC: 61 PG/ML — SIGNIFICANT CHANGE UP (ref 0–300)
PLATELET # BLD AUTO: 343 K/UL — SIGNIFICANT CHANGE UP (ref 150–400)
RBC # BLD: 4.91 M/UL — SIGNIFICANT CHANGE UP (ref 4.2–5.8)
RBC # FLD: 13.3 % — SIGNIFICANT CHANGE UP (ref 10.3–14.5)
WBC # BLD: 12.26 K/UL — HIGH (ref 3.8–10.5)
WBC # FLD AUTO: 12.26 K/UL — HIGH (ref 3.8–10.5)

## 2022-01-27 RX ORDER — MYCOPHENOLATE MOFETIL 250 MG/1
1500 CAPSULE ORAL
Refills: 0 | Status: DISCONTINUED | OUTPATIENT
Start: 2022-01-28 | End: 2022-01-29

## 2022-01-27 RX ORDER — TIOTROPIUM BROMIDE 18 UG/1
1 CAPSULE ORAL; RESPIRATORY (INHALATION) DAILY
Refills: 0 | Status: DISCONTINUED | OUTPATIENT
Start: 2022-01-27 | End: 2022-02-05

## 2022-01-27 RX ORDER — BUDESONIDE AND FORMOTEROL FUMARATE DIHYDRATE 160; 4.5 UG/1; UG/1
2 AEROSOL RESPIRATORY (INHALATION)
Refills: 0 | Status: DISCONTINUED | OUTPATIENT
Start: 2022-01-27 | End: 2022-02-05

## 2022-01-27 RX ORDER — MYCOPHENOLATE MOFETIL 250 MG/1
1500 CAPSULE ORAL
Refills: 0 | Status: DISCONTINUED | OUTPATIENT
Start: 2022-01-27 | End: 2022-01-27

## 2022-01-27 RX ADMIN — Medication 20 MILLIGRAM(S): at 13:39

## 2022-01-27 RX ADMIN — Medication 3 MILLILITER(S): at 13:50

## 2022-01-27 RX ADMIN — Medication 200 MILLIGRAM(S): at 13:40

## 2022-01-27 RX ADMIN — Medication 20 MILLIEQUIVALENT(S): at 13:38

## 2022-01-27 RX ADMIN — Medication 125 MICROGRAM(S): at 05:38

## 2022-01-27 RX ADMIN — Medication 40 MILLIGRAM(S): at 13:39

## 2022-01-27 RX ADMIN — LOSARTAN POTASSIUM 50 MILLIGRAM(S): 100 TABLET, FILM COATED ORAL at 06:40

## 2022-01-27 RX ADMIN — ENOXAPARIN SODIUM 40 MILLIGRAM(S): 100 INJECTION SUBCUTANEOUS at 05:37

## 2022-01-27 RX ADMIN — Medication 3 MILLILITER(S): at 18:37

## 2022-01-27 RX ADMIN — Medication 20 MILLIGRAM(S): at 18:15

## 2022-01-27 RX ADMIN — Medication 81 MILLIGRAM(S): at 13:39

## 2022-01-27 RX ADMIN — ENOXAPARIN SODIUM 40 MILLIGRAM(S): 100 INJECTION SUBCUTANEOUS at 18:16

## 2022-01-27 RX ADMIN — Medication 40 MILLIEQUIVALENT(S): at 05:37

## 2022-01-27 RX ADMIN — Medication 3 MILLILITER(S): at 05:39

## 2022-01-27 RX ADMIN — Medication 200 MILLIGRAM(S): at 18:15

## 2022-01-27 NOTE — CONSULT NOTE ADULT - SUBJECTIVE AND OBJECTIVE BOX
HPI:   Patient is a 46y male with lupus takes benlysta, cellcept, plaquenil and methylprednisolone. He developed cough and fever 3 weeks ago and tested positive for covid then. He received MAB but not sure which. He continued to have a lot of coughing, feeling hot and chills, severe fatigue, sob with exertion, hard time talking, wheezing. He took a course of antibiotics, uses trentilegy but not better so came to hospital yesterday. He has some diarrhea at times. He has not taken cellcept or benlysta since covid dx. He has no yvan chest pain but the chest feels tight. No report of ha , visual changes , focal neuro complaints. CT angio chest shows no PE no pneumonia. HE is not hypoxic. He is now on iv steroids and feels a bit better.     REVIEW OF SYSTEMS:  All other review of systems negative (Comprehensive ROS)    PAST MEDICAL & SURGICAL HISTORY:  Lupus    Asthma    Hypothyroid    History of cholecystectomy        Allergies    No Known Allergies    Intolerances        Antimicrobials Day #  :  hydroxychloroquine 200 milliGRAM(s) Oral two times a day    Other Medications:  albuterol/ipratropium for Nebulization 3 milliLiter(s) Nebulizer every 6 hours  aspirin enteric coated 81 milliGRAM(s) Oral daily  budesonide 160 MICROgram(s)/formoterol 4.5 MICROgram(s) Inhaler 2 Puff(s) Inhalation two times a day  enoxaparin Injectable 40 milliGRAM(s) SubCutaneous two times a day  furosemide    Tablet 40 milliGRAM(s) Oral daily  levothyroxine 125 MICROGram(s) Oral daily  losartan 50 milliGRAM(s) Oral daily  methylPREDNISolone sodium succinate Injectable 20 milliGRAM(s) IV Push every 8 hours  potassium chloride    Tablet ER 20 milliEquivalent(s) Oral daily  tiotropium 18 MICROgram(s) Capsule 1 Capsule(s) Inhalation daily      FAMILY HISTORY:  No pertinent family history in first degree relatives        SOCIAL HISTORY:  Smoking: [ ]Yes [ x]No  ETOH: [ ]Yes [ x]No  Drug Use: [ ]Yes [ ]xNo   [x ] Single[ ]    T(F): 98 (01-27-22 @ 13:32), Max: 98.4 (01-26-22 @ 19:49)  HR: 92 (01-27-22 @ 13:32)  BP: 124/87 (01-27-22 @ 13:32)  RR: 18 (01-27-22 @ 13:32)  SpO2: 96% (01-27-22 @ 13:32)  Wt(kg): --    PHYSICAL EXAM:  General: alert, no acute distress  Eyes:  anicteric, no conjunctival injection, no discharge  Oropharynx: no lesions or injection 	  Neck: supple, without adenopathy  Lungs: no audible wheeze but slow exhalation to auscultation  Heart: regular rate and rhythm; no murmur, rubs or gallops  Abdomen: soft, nondistended, nontender, without mass or organomegaly  Skin: no lesions  Extremities: no clubbing, cyanosis, or edema  Neurologic: alert, oriented, moves all extremities    LAB RESULTS:                        14.1   12.26 )-----------( 343      ( 27 Jan 2022 11:50 )             42.3     01-26    139  |  96  |  6<L>  ----------------------------<  85  3.3<L>   |  26  |  0.99    Ca    9.8      26 Jan 2022 15:45  Mg     2.1     01-27    TPro  7.4  /  Alb  4.0  /  TBili  0.8  /  DBili  x   /  AST  38  /  ALT  49<H>  /  AlkPhos  98  01-26    LIVER FUNCTIONS - ( 26 Jan 2022 15:45 )  Alb: 4.0 g/dL / Pro: 7.4 g/dL / ALK PHOS: 98 U/L / ALT: 49 U/L / AST: 38 U/L / GGT: x               MICROBIOLOGY:  RECENT CULTURES:        RADIOLOGY REVIEWED:  < from: CT Angio Chest PE Protocol w/ IV Cont (01.26.22 @ 16:20) >    ACC: [id number] EXAM:  CT ANGIO CHEST PULM ART [facility]                          PROCEDURE DATE:  01/26/2022          INTERPRETATION:  CLINICAL INFORMATION: Shortness of breath, cough, chest   tightness for 5 days. Evaluate for pulmonary embolism.    COMPARISON: CT angiogram chest 5/17/2021.    CONTRAST/COMPLICATIONS:  IV Contrast: Omnipaque 350  70 cc administered   30 cc discarded  Oral Contrast: NONE  Complications: None reported at time of study completion    PROCEDURE:  CT Angiogram of the chest was obtained with intravenous contrast. Three   dimensional maximum intensity projection (MIP) images were generated.    FINDINGS:    PULMONARY ANGIOGRAM: No main, right or left main, or lobar pulmonary   embolism. Limited evaluation of the segmental and subsegmental arteries   secondary to transient interruption of the contrast bolus.    LYMPH NODES: No lymphadenopathy. The thyroid gland is normal. Hiatal   hernia.    HEART/VASCULATURE: The heart size is normal. No pericardial effusion.    AIRWAYS/LUNGS/PLEURA: Patent central airways. The lungs are clear. No   pleural effusion or pneumothorax.    UPPER ABDOMEN: Cholecystectomy.    BONES/SOFT TISSUES: Mild degenerative changes of the spine.    IMPRESSION:    1.  No main, right or left main, or lobar pulmonary embolism.        --- End of Report ---            < end of copied text >          Impression:  Patient with lupus on benlysta, plaquenil, cellcept , steroids , dx with covid early Jan, took Mab, despite this ongoing sob, wheezes, tight chest fatigue, feels feverish but not documented. He took a course of po antibiotics. He has been holding benlysta and cellcept since covid dx. He has slow airway excursion on exam. He is not hypoxic, ct chest no PE and no infiltrates. Suspect he has reactive airways/bronchospasm/asthma exacerbation triggered by covid. No indication for remdesivir since already 3 weeks out, no pneumonia, no fever, not behaving like active covid despite being immunosuppressed host. No indication for antibiotics either.   Recommendations:  systemic steroids, bronchodilators per pulmonary  DVT prophylaxis  No ID objection to restart lupus meds

## 2022-01-27 NOTE — PROGRESS NOTE ADULT - ASSESSMENT
45 yo M with a PMH of asthma (never intubated), Lupus (on plaquenil, cellcept-stopped when he tested COVID pos) p/w prod cough, chest tightness, SOB x 5 days. States sxs originally started when pt tested COVID pos 01/05/22, received MAB infusion on 01/06/22. Sxs persisted so called PMD who prescribed cefuroxime which he has been taking with no relief. Called PMD again today who advised presenting to ED. Pt states he feels a spasm in his chest every time he tries to speak full sentences, coughs and then is able to continue talking. States this is causing him a lot of chest tightness. Has not been on steroids recently. Denies nausea, vomiting, fever, chills, leg pain/swelling, abd pain, headache, syncope. Nonsmoker    dyspnea : unclear etiology   ? aasthma exacerbation ?   no PE on CTA   no pna   ? mild  wheezing on exam   will give short course of steroids and consult pul   doubt cardiac etiology       Lupus : cont meds     COVID infection : no hypoxia   monitor     discussed at length with pt   discussed with pul and np   consider dc in 24 to 48 hrs on steroids and fu with pmd

## 2022-01-27 NOTE — CONSULT NOTE ADULT - ASSESSMENT
45 y/o M with a PMH of asthma (never intubated), Lupus (on plaquenil, cellcept-stopped when he tested COVID pos). Presents with worsening cough, SOB wheezing. States sxs originally started when pt tested COVID pos 01/05/22, received MAB infusion on 01/06/22. Sxs persisted so called PMD who prescribed cefuroxime which he has been taking with no relief. Called PMD again today who advised presenting to ED. Pt states he feels a spasm in his chest every time he tries to speak full sentences, coughs and then is able to continue talking. States this is causing him a lot of chest tightness. Pt reportedly tachycardiac and tachypneic in ED. CTA chest neg PE, no PNA.

## 2022-01-27 NOTE — CONSULT NOTE ADULT - PROBLEM SELECTOR RECOMMENDATION 9
pt with c/o worsening SOB, cough, wheezing since dx COVID a few weeks ago. Reportedly tachycardic and tachypneic in ED - physical exam noted for decreased BS.   -CTA chest neg PE, no PNA   -Seems to have better air movement at this time, minimal forced end expiratory wheeze on exam  -Continue solumedrol 20mg IVP q8h for now, will re-evaluate tomorrow.  -Normoxic, keep sats >90% with O2 PRN  -Start Symbicort BID, Spiriva qd (home med: Trelegy)  -Duoneb q6h

## 2022-01-27 NOTE — CHART NOTE - NSCHARTNOTEFT_GEN_A_CORE
Patient is known to me from outpatient setting   He sent me a portal message today on [facility] that he is being offered cellcept while in the hospital.   Chart reviewed   Cellcept d/w on 1-5-22.  Please do not restart cellcept while being treated for infection.   Will restart cellcept once infection has resolved.       Susanna Neri MD  [facility], Division of Rheumatology   [phone]  [email or web address]

## 2022-01-27 NOTE — PROGRESS NOTE ADULT - SUBJECTIVE AND OBJECTIVE BOX
Date of service: 01-27-22 @ 20:53      Patient is a 46y old  Male who presents with a chief complaint of wheeze, sob (27 Jan 2022 16:10)                                                               INTERVAL HPI/OVERNIGHT EVENTS:    REVIEW OF SYSTEMS:     CONSTITUTIONAL: No weakness, fevers or chills  EYES/ENT: No visual changes , no ear ache   NECK: No pain or stiffness  RESPIRATORY: still with cough and shortness of breath  CARDIOVASCULAR: No chest pain or palpitations  GASTROINTESTINAL: No abdominal pain  . No nausea, vomiting, or hematemesis; No diarrhea or constipation. No melena or hematochezia.  GENITOURINARY: No dysuria, frequency or hematuria  NEUROLOGICAL: No numbness or weakness  SKIN: No itching, burning, rashes, or lesions                                                                                                                                                                                                                                                                                 Medications:  MEDICATIONS  (STANDING):  albuterol/ipratropium for Nebulization 3 milliLiter(s) Nebulizer every 6 hours  aspirin enteric coated 81 milliGRAM(s) Oral daily  budesonide 160 MICROgram(s)/formoterol 4.5 MICROgram(s) Inhaler 2 Puff(s) Inhalation two times a day  enoxaparin Injectable 40 milliGRAM(s) SubCutaneous two times a day  furosemide    Tablet 40 milliGRAM(s) Oral daily  hydroxychloroquine 200 milliGRAM(s) Oral two times a day  levothyroxine 125 MICROGram(s) Oral daily  losartan 50 milliGRAM(s) Oral daily  methylPREDNISolone sodium succinate Injectable 20 milliGRAM(s) IV Push every 8 hours  mycophenolate mofetil 1500 milliGRAM(s) Oral two times a day  potassium chloride    Tablet ER 20 milliEquivalent(s) Oral daily  tiotropium 18 MICROgram(s) Capsule 1 Capsule(s) Inhalation daily    MEDICATIONS  (PRN):       Allergies    No Known Allergies    Intolerances      Vital Signs Last 24 Hrs  T(C): 36.7 (27 Jan 2022 13:32), Max: 36.9 (26 Jan 2022 13:50)  T(F): 98 (27 Jan 2022 13:32), Max: 98.4 (26 Jan 2022 13:50)  HR: 92 (27 Jan 2022 13:32) (92 - 117)  BP: 124/87 (27 Jan 2022 13:32) (117/77 - 135/87)  BP(mean): 95 (26 Jan 2022 19:49) (95 - 95)  RR: 18 (27 Jan 2022 13:32) (17 - 24)  SpO2: 96% (27 Jan 2022 13:32) (95% - 97%)      VBG pH 7.40 01-26 @ 15:44  VBG pCO2 55 01-26 @ 15:44  VBG O2 sat 33.1 01-26 @ 15:44  VBG lactate 1.9 01-26 @ 15:44      Physical Exam:    Daily     Daily   General:  morbidly obese   HEENT:  Nonicteric, PERRLA  CV:  RRR, S1S2   Lungs: minimal wheezing   Abdomen:  Soft, non-tender, no distended, positive BS  Extremities:  2+ pulses, no c/c, no edema  Skin:  Warm and dry, no rashes  :  No reeves  Neuro:  AAOx3, non-focal, grossly intact                                                                                                                                                                                                                                                                                                LABS:                               14.1   12.26 )-----------( 343      ( 27 Jan 2022 11:50 )             42.3                      01-26    139  |  96  |  6<L>  ----------------------------<  85  3.3<L>   |  26  |  0.99    Ca    9.8      26 Jan 2022 15:45  Mg     2.1     01-27    TPro  7.4  /  Alb  4.0  /  TBili  0.8  /  DBili  x   /  AST  38  /  ALT  49<H>  /  AlkPhos  98  01-26                       RADIOLOGY & ADDITIONAL TESTS         I personally reviewed: [  ]EKG   [  ]CXR    [  ] CT      A/P:         Discussed with :     Paty consultants' Notes   Time spent :

## 2022-01-27 NOTE — CONSULT NOTE ADULT - SUBJECTIVE AND OBJECTIVE BOX
PULMONARY CONSULT    HPI: 47 y/o M with a PMH of asthma (never intubated), Lupus (on plaquenil, cellcept-stopped when he tested COVID pos). Presents with worsening cough, SOB wheezing. States sxs originally started when pt tested COVID pos 01/05/22, received MAB infusion on 01/06/22. Sxs persisted so called PMD who prescribed cefuroxime which he has been taking with no relief. Called PMD again today who advised presenting to ED. Pt states he feels a spasm in his chest every time he tries to speak full sentences, coughs and then is able to continue talking. States this is causing him a lot of chest tightness. Pt reportedly tachycardiac and tachypneic in ED. CTA chest neg PE, no PNA. Started on IV steroids with some improvement in symptoms.       PAST MEDICAL & SURGICAL HISTORY:  Lupus  Asthma  Hypothyroid  History of cholecystectomy      Allergies  No Known Allergies      FAMILY HISTORY:  No pertinent family history in first degree relatives      Social history: never a smoker     Review of Systems:  CONSTITUTIONAL: No fever, chills, or fatigue  EYES: No eye pain, visual disturbances, or discharge  ENMT:  No difficulty hearing, tinnitus, vertigo; No sinus or throat pain  NECK: No pain or stiffness  RESPIRATORY: Per above  CARDIOVASCULAR: Per above   GASTROINTESTINAL: No abdominal or epigastric pain. No nausea, vomiting, or hematemesis; No diarrhea or constipation. No melena or hematochezia.  GENITOURINARY: No dysuria, frequency, hematuria, or incontinence  NEUROLOGICAL: No headaches, memory loss, loss of strength, numbness, or tremors  SKIN: No itching, burning, rashes, or lesions   MUSCULOSKELETAL: No joint pain or swelling; No muscle, back, or extremity pain  PSYCHIATRIC: No depression, anxiety, mood swings, or difficulty sleeping      Medications:  MEDICATIONS  (STANDING):  albuterol/ipratropium for Nebulization 3 milliLiter(s) Nebulizer every 6 hours  aspirin enteric coated 81 milliGRAM(s) Oral daily  budesonide 160 MICROgram(s)/formoterol 4.5 MICROgram(s) Inhaler 2 Puff(s) Inhalation two times a day  enoxaparin Injectable 40 milliGRAM(s) SubCutaneous two times a day  furosemide    Tablet 40 milliGRAM(s) Oral daily  hydroxychloroquine 200 milliGRAM(s) Oral two times a day  levothyroxine 125 MICROGram(s) Oral daily  losartan 50 milliGRAM(s) Oral daily  methylPREDNISolone sodium succinate Injectable 20 milliGRAM(s) IV Push every 8 hours  potassium chloride    Tablet ER 20 milliEquivalent(s) Oral daily  tiotropium 18 MICROgram(s) Capsule 1 Capsule(s) Inhalation daily          Vital Signs Last 24 Hrs  T(C): 36.7 (27 Jan 2022 13:32), Max: 36.9 (26 Jan 2022 13:50)  T(F): 98 (27 Jan 2022 13:32), Max: 98.4 (26 Jan 2022 13:50)  HR: 92 (27 Jan 2022 13:32) (92 - 117)  BP: 124/87 (27 Jan 2022 13:32) (117/77 - 135/87)  BP(mean): 95 (26 Jan 2022 19:49) (95 - 95)  RR: 18 (27 Jan 2022 13:32) (17 - 24)  SpO2: 96% (27 Jan 2022 13:32) (95% - 97%)      VBG pH 7.40 01-26 @ 15:44  VBG pCO2 55 01-26 @ 15:44  VBG O2 sat 33.1 01-26 @ 15:44  VBG lactate 1.9 01-26 @ 15:44        01-26 @ 07:01  -  01-27 @ 07:00  --------------------------------------------------------  IN: 180 mL / OUT: 0 mL / NET: 180 mL          LABS:                        14.1   12.26 )-----------( 343      ( 27 Jan 2022 11:50 )             42.3     01-26    139  |  96  |  6<L>  ----------------------------<  85  3.3<L>   |  26  |  0.99    Ca    9.8      26 Jan 2022 15:45  Mg     2.1     01-27    TPro  7.4  /  Alb  4.0  /  TBili  0.8  /  DBili  x   /  AST  38  /  ALT  49<H>  /  AlkPhos  98  01-26          CAPILLARY BLOOD GLUCOSE            Procalcitonin, Serum: 0.07 ng/mL (01-26-22 @ 15:45)    Serum Pro-Brain Natriuretic Peptide: 61 pg/mL (01-27-22 @ 07:26)  Serum Pro-Brain Natriuretic Peptide: 13 pg/mL (01-26-22 @ 15:45)                Physical Examination:    General: No acute distress.      HEENT: Pupils equal, reactive to light.  Symmetric.    PULM: minimal forced end expiratory wheeze    CVS: S1, S2    ABD: Soft, nondistended, nontender, normoactive bowel sounds, no masses    EXT: No edema, nontender    SKIN: Warm and well perfused, no rashes noted.    NEURO: Alert, oriented, interactive, nonfocal      RADIOLOGY REVIEWED    CT chest: < from: CT Angio Chest PE Protocol w/ IV Cont (01.26.22 @ 16:20) >  FINDINGS:    PULMONARY ANGIOGRAM: No main, right or left main, or lobar pulmonary   embolism. Limited evaluation of the segmental and subsegmental arteries   secondary to transient interruption of the contrast bolus.    LYMPH NODES: No lymphadenopathy. The thyroid gland is normal. Hiatal   hernia.    HEART/VASCULATURE: The heart size is normal. No pericardial effusion.    AIRWAYS/LUNGS/PLEURA: Patent central airways. The lungs are clear. No   pleural effusion or pneumothorax.    UPPER ABDOMEN: Cholecystectomy.    BONES/SOFT TISSUES: Mild degenerative changes of the spine.    IMPRESSION:    1.  No main, right or left main, or lobar pulmonary embolism.    < end of copied text >

## 2022-01-28 ENCOUNTER — RX RENEWAL (OUTPATIENT)
Age: 47
End: 2022-01-28

## 2022-01-28 LAB
ANION GAP SERPL CALC-SCNC: 16 MMOL/L — SIGNIFICANT CHANGE UP (ref 5–17)
BUN SERPL-MCNC: 13 MG/DL — SIGNIFICANT CHANGE UP (ref 7–23)
CALCIUM SERPL-MCNC: 10 MG/DL — SIGNIFICANT CHANGE UP (ref 8.4–10.5)
CHLORIDE SERPL-SCNC: 99 MMOL/L — SIGNIFICANT CHANGE UP (ref 96–108)
CO2 SERPL-SCNC: 23 MMOL/L — SIGNIFICANT CHANGE UP (ref 22–31)
CREAT SERPL-MCNC: 0.88 MG/DL — SIGNIFICANT CHANGE UP (ref 0.5–1.3)
GLUCOSE SERPL-MCNC: 147 MG/DL — HIGH (ref 70–99)
HCT VFR BLD CALC: 43.6 % — SIGNIFICANT CHANGE UP (ref 39–50)
HGB BLD-MCNC: 14.6 G/DL — SIGNIFICANT CHANGE UP (ref 13–17)
MCHC RBC-ENTMCNC: 29.2 PG — SIGNIFICANT CHANGE UP (ref 27–34)
MCHC RBC-ENTMCNC: 33.5 GM/DL — SIGNIFICANT CHANGE UP (ref 32–36)
MCV RBC AUTO: 87.2 FL — SIGNIFICANT CHANGE UP (ref 80–100)
NRBC # BLD: 0 /100 WBCS — SIGNIFICANT CHANGE UP (ref 0–0)
PLATELET # BLD AUTO: 378 K/UL — SIGNIFICANT CHANGE UP (ref 150–400)
POTASSIUM SERPL-MCNC: 4.2 MMOL/L — SIGNIFICANT CHANGE UP (ref 3.5–5.3)
POTASSIUM SERPL-SCNC: 4.2 MMOL/L — SIGNIFICANT CHANGE UP (ref 3.5–5.3)
RBC # BLD: 5 M/UL — SIGNIFICANT CHANGE UP (ref 4.2–5.8)
RBC # FLD: 13.6 % — SIGNIFICANT CHANGE UP (ref 10.3–14.5)
SODIUM SERPL-SCNC: 138 MMOL/L — SIGNIFICANT CHANGE UP (ref 135–145)
WBC # BLD: 22.32 K/UL — HIGH (ref 3.8–10.5)
WBC # FLD AUTO: 22.32 K/UL — HIGH (ref 3.8–10.5)

## 2022-01-28 PROCEDURE — 99232 SBSQ HOSP IP/OBS MODERATE 35: CPT

## 2022-01-28 RX ADMIN — ENOXAPARIN SODIUM 40 MILLIGRAM(S): 100 INJECTION SUBCUTANEOUS at 06:58

## 2022-01-28 RX ADMIN — TIOTROPIUM BROMIDE 1 CAPSULE(S): 18 CAPSULE ORAL; RESPIRATORY (INHALATION) at 20:22

## 2022-01-28 RX ADMIN — Medication 3 MILLILITER(S): at 06:59

## 2022-01-28 RX ADMIN — Medication 20 MILLIGRAM(S): at 12:16

## 2022-01-28 RX ADMIN — Medication 200 MILLIGRAM(S): at 18:19

## 2022-01-28 RX ADMIN — Medication 40 MILLIGRAM(S): at 18:17

## 2022-01-28 RX ADMIN — BUDESONIDE AND FORMOTEROL FUMARATE DIHYDRATE 2 PUFF(S): 160; 4.5 AEROSOL RESPIRATORY (INHALATION) at 18:19

## 2022-01-28 RX ADMIN — Medication 125 MICROGRAM(S): at 06:58

## 2022-01-28 RX ADMIN — Medication 3 MILLILITER(S): at 02:29

## 2022-01-28 RX ADMIN — Medication 20 MILLIGRAM(S): at 02:29

## 2022-01-28 RX ADMIN — Medication 81 MILLIGRAM(S): at 12:17

## 2022-01-28 RX ADMIN — BUDESONIDE AND FORMOTEROL FUMARATE DIHYDRATE 2 PUFF(S): 160; 4.5 AEROSOL RESPIRATORY (INHALATION) at 06:59

## 2022-01-28 RX ADMIN — ENOXAPARIN SODIUM 40 MILLIGRAM(S): 100 INJECTION SUBCUTANEOUS at 18:18

## 2022-01-28 RX ADMIN — Medication 3 MILLILITER(S): at 18:18

## 2022-01-28 RX ADMIN — LOSARTAN POTASSIUM 50 MILLIGRAM(S): 100 TABLET, FILM COATED ORAL at 12:17

## 2022-01-28 RX ADMIN — Medication 20 MILLIEQUIVALENT(S): at 12:17

## 2022-01-28 RX ADMIN — Medication 20 MILLIGRAM(S): at 18:20

## 2022-01-28 NOTE — PROGRESS NOTE ADULT - SUBJECTIVE AND OBJECTIVE BOX
CC: f/u for post Covid dyspnea    Patient reports: mild improvement in chest tightness    REVIEW OF SYSTEMS:  All other review of systems negative (Comprehensive ROS)    Antimicrobials Day #  :  hydroxychloroquine 200 milliGRAM(s) Oral two times a day    Other Medications Reviewed  MEDICATIONS  (STANDING):  albuterol/ipratropium for Nebulization 3 milliLiter(s) Nebulizer every 6 hours  aspirin enteric coated 81 milliGRAM(s) Oral daily  budesonide 160 MICROgram(s)/formoterol 4.5 MICROgram(s) Inhaler 2 Puff(s) Inhalation two times a day  enoxaparin Injectable 40 milliGRAM(s) SubCutaneous two times a day  furosemide    Tablet 40 milliGRAM(s) Oral daily  hydroxychloroquine 200 milliGRAM(s) Oral two times a day  levothyroxine 125 MICROGram(s) Oral daily  losartan 50 milliGRAM(s) Oral daily  methylPREDNISolone sodium succinate Injectable 20 milliGRAM(s) IV Push every 8 hours  mycophenolate mofetil 1500 milliGRAM(s) Oral two times a day  potassium chloride    Tablet ER 20 milliEquivalent(s) Oral daily  tiotropium 18 MICROgram(s) Capsule 1 Capsule(s) Inhalation daily    T(F): 97.6 (01-28-22 @ 06:44), Max: 98 (01-27-22 @ 22:33)  HR: 87 (01-28-22 @ 06:44)  BP: 131/83 (01-28-22 @ 06:44)  RR: 18 (01-28-22 @ 06:44)  SpO2: 96% (01-28-22 @ 06:44)  Wt(kg): --    PHYSICAL EXAM:  General: alert, no acute distress  Eyes:  anicteric, no conjunctival injection, no discharge  Oropharynx: no lesions or injection 	  Neck: supple, without adenopathy  Lungs: few wheezes  Heart: regular rate and rhythm; no murmur, rubs or gallops  Abdomen: soft, nondistended, nontender, without mass or organomegaly  Skin: no lesions  Extremities: no clubbing, cyanosis, or edema  Neurologic: alert, oriented, moves all extremities    LAB RESULTS:                        14.6   22.32 )-----------( 378      ( 28 Jan 2022 09:30 )             43.6     01-28    138  |  99  |  13  ----------------------------<  147<H>  4.2   |  23  |  0.88    Ca    10.0      28 Jan 2022 09:30  Mg     2.1     01-27    TPro  7.4  /  Alb  4.0  /  TBili  0.8  /  DBili  x   /  AST  38  /  ALT  49<H>  /  AlkPhos  98  01-26    LIVER FUNCTIONS - ( 26 Jan 2022 15:45 )  Alb: 4.0 g/dL / Pro: 7.4 g/dL / ALK PHOS: 98 U/L / ALT: 49 U/L / AST: 38 U/L / GGT: x             MICROBIOLOGY:  RECENT CULTURES:      RADIOLOGY REVIEWED:  < from: Xray Chest 1 View AP/PA (01.26.22 @ 15:27) >  Impression:    Clear lungs.    < end of copied text >  CTA no PE

## 2022-01-28 NOTE — PROGRESS NOTE ADULT - ASSESSMENT
45 yo male with SLE on cellcept and HC , s/p MAB for Covid in early January admitted with chest tightness and wheezes.  History of asthma as well.  No signs of active infection.  Suspect reactive airways related to Covid and underlying asthma.  No evidence of pneumonia.He is oxygenating well on RA.  Suggest:  1.No role for antibiotics  2.No indications for RDV  3.Steroids and respiratory treatment   as per pulmonary  4.Will defer to his rheumatologist on whether to continue cellcept.

## 2022-01-28 NOTE — PROGRESS NOTE ADULT - ASSESSMENT
47 yo M with a PMH of asthma (never intubated), Lupus (on plaquenil, cellcept-stopped when he tested COVID pos) p/w prod cough, chest tightness, SOB x 5 days. States sxs originally started when pt tested COVID pos 01/05/22, received MAB infusion on 01/06/22. Sxs persisted so called PMD who prescribed cefuroxime which he has been taking with no relief. Called PMD again today who advised presenting to ED. Pt states he feels a spasm in his chest every time he tries to speak full sentences, coughs and then is able to continue talking. States this is causing him a lot of chest tightness. Has not been on steroids recently. Denies nausea, vomiting, fever, chills, leg pain/swelling, abd pain, headache, syncope. Nonsmoker

## 2022-01-28 NOTE — PROGRESS NOTE ADULT - SUBJECTIVE AND OBJECTIVE BOX
Follow-up Pulm Progress Note    Feeling better today, less chest tightness     Medications:  MEDICATIONS  (STANDING):  albuterol/ipratropium for Nebulization 3 milliLiter(s) Nebulizer every 6 hours  aspirin enteric coated 81 milliGRAM(s) Oral daily  budesonide 160 MICROgram(s)/formoterol 4.5 MICROgram(s) Inhaler 2 Puff(s) Inhalation two times a day  enoxaparin Injectable 40 milliGRAM(s) SubCutaneous two times a day  furosemide    Tablet 40 milliGRAM(s) Oral daily  hydroxychloroquine 200 milliGRAM(s) Oral two times a day  levothyroxine 125 MICROGram(s) Oral daily  losartan 50 milliGRAM(s) Oral daily  methylPREDNISolone sodium succinate Injectable 20 milliGRAM(s) IV Push every 8 hours  mycophenolate mofetil 1500 milliGRAM(s) Oral two times a day  potassium chloride    Tablet ER 20 milliEquivalent(s) Oral daily  tiotropium 18 MICROgram(s) Capsule 1 Capsule(s) Inhalation daily          Vital Signs Last 24 Hrs  T(C): 36.4 (28 Jan 2022 06:44), Max: 36.7 (27 Jan 2022 11:55)  T(F): 97.6 (28 Jan 2022 06:44), Max: 98.1 (27 Jan 2022 11:55)  HR: 87 (28 Jan 2022 06:44) (87 - 105)  BP: 131/83 (28 Jan 2022 06:44) (119/77 - 135/87)  BP(mean): --  RR: 18 (28 Jan 2022 06:44) (18 - 18)  SpO2: 96% (28 Jan 2022 06:44) (95% - 96%)      VBG pH 7.40 01-26 @ 15:44    VBG pCO2 55 01-26 @ 15:44    VBG O2 sat 33.1 01-26 @ 15:44    VBG lactate 1.9 01-26 @ 15:44      01-27 @ 07:01  -  01-28 @ 07:00  --------------------------------------------------------  IN: 1080 mL / OUT: 500 mL / NET: 580 mL          LABS:                        14.6   22.32 )-----------( 378      ( 28 Jan 2022 09:30 )             43.6     01-28    138  |  99  |  13  ----------------------------<  147<H>  4.2   |  23  |  0.88    Ca    10.0      28 Jan 2022 09:30  Mg     2.1     01-27    TPro  7.4  /  Alb  4.0  /  TBili  0.8  /  DBili  x   /  AST  38  /  ALT  49<H>  /  AlkPhos  98  01-26          Procalcitonin, Serum: 0.07 ng/mL (01-26-22 @ 15:45)    Serum Pro-Brain Natriuretic Peptide: 61 pg/mL (01-27-22 @ 07:26)  Serum Pro-Brain Natriuretic Peptide: 13 pg/mL (01-26-22 @ 15:45)          Physical Examination:  PULM: Clear to auscultation bilaterally, no significant sputum production  CVS: S1, S2 heard    RADIOLOGY REVIEWED    CT chest: < from: CT Angio Chest PE Protocol w/ IV Cont (01.26.22 @ 16:20) >  FINDINGS:    PULMONARY ANGIOGRAM: No main, right or left main, or lobar pulmonary   embolism. Limited evaluation of the segmental and subsegmental arteries   secondary to transient interruption of the contrast bolus.    LYMPH NODES: No lymphadenopathy. The thyroid gland is normal. Hiatal   hernia.    HEART/VASCULATURE: The heart size is normal. No pericardial effusion.    AIRWAYS/LUNGS/PLEURA: Patent central airways. The lungs are clear. No   pleural effusion or pneumothorax.    UPPER ABDOMEN: Cholecystectomy.    BONES/SOFT TISSUES: Mild degenerative changes of the spine.    IMPRESSION:    1.  No main, right or left main, or lobar pulmonary embolism.      < end of copied text >

## 2022-01-28 NOTE — PROGRESS NOTE ADULT - SUBJECTIVE AND OBJECTIVE BOX
[facility] Division of Hospital Medicine  Benny Lewis MD  Pager (M-F, 8A-5P): [phone]  Other Times:  [phone]    Patient is a 46y old  Male who presents with a chief complaint of shortness of breath (28 Jan 2022 13:31)      SUBJECTIVE / OVERNIGHT EVENTS: after shower had wheezing and took his symbicort w improvement  ADDITIONAL REVIEW OF SYSTEMS:    MEDICATIONS  (STANDING):  albuterol/ipratropium for Nebulization 3 milliLiter(s) Nebulizer every 6 hours  aspirin enteric coated 81 milliGRAM(s) Oral daily  budesonide 160 MICROgram(s)/formoterol 4.5 MICROgram(s) Inhaler 2 Puff(s) Inhalation two times a day  enoxaparin Injectable 40 milliGRAM(s) SubCutaneous two times a day  furosemide    Tablet 40 milliGRAM(s) Oral daily  hydroxychloroquine 200 milliGRAM(s) Oral two times a day  levothyroxine 125 MICROGram(s) Oral daily  losartan 50 milliGRAM(s) Oral daily  methylPREDNISolone sodium succinate Injectable 20 milliGRAM(s) IV Push every 8 hours  mycophenolate mofetil 1500 milliGRAM(s) Oral two times a day  potassium chloride    Tablet ER 20 milliEquivalent(s) Oral daily  tiotropium 18 MICROgram(s) Capsule 1 Capsule(s) Inhalation daily    MEDICATIONS  (PRN):      CAPILLARY BLOOD GLUCOSE        I&O's Summary    27 Jan 2022 07:01  -  28 Jan 2022 07:00  --------------------------------------------------------  IN: 1080 mL / OUT: 500 mL / NET: 580 mL        PHYSICAL EXAM:  Vital Signs Last 24 Hrs  T(C): 36.4 (28 Jan 2022 06:44), Max: 36.7 (27 Jan 2022 22:33)  T(F): 97.6 (28 Jan 2022 06:44), Max: 98 (27 Jan 2022 22:33)  HR: 87 (28 Jan 2022 06:44) (87 - 105)  BP: 131/83 (28 Jan 2022 06:44) (119/77 - 131/83)  BP(mean): --  RR: 18 (28 Jan 2022 06:44) (18 - 18)  SpO2: 96% (28 Jan 2022 06:44) (95% - 96%)  General:  morbidly obese   HEENT:  Nonicteric, PERRLA  CV:  RRR, S1S2   Lungs: no wheeze wheezing   Abdomen:  Soft, non-tender, no distended, positive BS  Extremities:  2+ pulses, no c/c, no edema  Skin:  Warm and dry, no rashes. tattoos  :  No reeves  Neuro:  AAOx3, non-focal, grossly intact                                                                                                                                                                                                                                                                                          LABS:                        14.6   22.32 )-----------( 378      ( 28 Jan 2022 09:30 )             43.6     01-28    138  |  99  |  13  ----------------------------<  147<H>  4.2   |  23  |  0.88    Ca    10.0      28 Jan 2022 09:30  Mg     2.1     01-27    TPro  7.4  /  Alb  4.0  /  TBili  0.8  /  DBili  x   /  AST  38  /  ALT  49<H>  /  AlkPhos  98  01-26                RADIOLOGY & ADDITIONAL TESTS:  Results Reviewed:   Imaging Personally Reviewed:  Electrocardiogram Personally Reviewed:    COORDINATION OF CARE:  Care Discussed with Consultants/Other Providers [Y/N]:  Prior or Outpatient Records Reviewed [Y/N]:

## 2022-01-28 NOTE — PROGRESS NOTE ADULT - PROBLEM SELECTOR PLAN 1
pt with c/o worsening SOB, cough, wheezing since dx COVID a few weeks ago. Reportedly tachycardic and tachypneic in ED - physical exam noted for decreased BS.   -CTA chest neg PE, no PNA   -Seems to have better air movement at this time. Minimal forced end exp wheezes on exam yesterday, none today.  -Continue solumedrol 20mg IVP q8h for now. Will likely change to prednisone tomorrow.   -Normoxic, keep sats >90% with O2 PRN  -Symbicort BID, Spiriva qd (home med: Trelegy)  -Duoneb q6h. pt with c/o worsening SOB, cough, wheezing since dx COVID a few weeks ago. Reportedly tachycardic and tachypneic in ED - physical exam noted for decreased BS.   -CTA chest neg PE, no PNA   -Seems to have better air movement at this time. Minimal forced end exp wheezes on exam yesterday, none today.  -Continue solumedrol 20mg IVP q8h through weekend   -Normoxic, keep sats >90% with O2 PRN  -Symbicort BID, Spiriva qd (home med: Trelegy)  -Duoneb q6h.

## 2022-01-29 DIAGNOSIS — E03.9 HYPOTHYROIDISM, UNSPECIFIED: ICD-10-CM

## 2022-01-29 DIAGNOSIS — E66.01 MORBID (SEVERE) OBESITY DUE TO EXCESS CALORIES: ICD-10-CM

## 2022-01-29 DIAGNOSIS — I10 ESSENTIAL (PRIMARY) HYPERTENSION: ICD-10-CM

## 2022-01-29 LAB
ANION GAP SERPL CALC-SCNC: 19 MMOL/L — HIGH (ref 5–17)
BUN SERPL-MCNC: 13 MG/DL — SIGNIFICANT CHANGE UP (ref 7–23)
CALCIUM SERPL-MCNC: 10.2 MG/DL — SIGNIFICANT CHANGE UP (ref 8.4–10.5)
CHLORIDE SERPL-SCNC: 96 MMOL/L — SIGNIFICANT CHANGE UP (ref 96–108)
CO2 SERPL-SCNC: 22 MMOL/L — SIGNIFICANT CHANGE UP (ref 22–31)
CREAT SERPL-MCNC: 0.86 MG/DL — SIGNIFICANT CHANGE UP (ref 0.5–1.3)
GLUCOSE SERPL-MCNC: 129 MG/DL — HIGH (ref 70–99)
HCT VFR BLD CALC: 45.3 % — SIGNIFICANT CHANGE UP (ref 39–50)
HGB BLD-MCNC: 14.7 G/DL — SIGNIFICANT CHANGE UP (ref 13–17)
MCHC RBC-ENTMCNC: 28.9 PG — SIGNIFICANT CHANGE UP (ref 27–34)
MCHC RBC-ENTMCNC: 32.5 GM/DL — SIGNIFICANT CHANGE UP (ref 32–36)
MCV RBC AUTO: 89.2 FL — SIGNIFICANT CHANGE UP (ref 80–100)
NRBC # BLD: 0 /100 WBCS — SIGNIFICANT CHANGE UP (ref 0–0)
PLATELET # BLD AUTO: 383 K/UL — SIGNIFICANT CHANGE UP (ref 150–400)
POTASSIUM SERPL-MCNC: 3.7 MMOL/L — SIGNIFICANT CHANGE UP (ref 3.5–5.3)
POTASSIUM SERPL-SCNC: 3.7 MMOL/L — SIGNIFICANT CHANGE UP (ref 3.5–5.3)
RBC # BLD: 5.08 M/UL — SIGNIFICANT CHANGE UP (ref 4.2–5.8)
RBC # FLD: 13.7 % — SIGNIFICANT CHANGE UP (ref 10.3–14.5)
SODIUM SERPL-SCNC: 137 MMOL/L — SIGNIFICANT CHANGE UP (ref 135–145)
WBC # BLD: 23.2 K/UL — HIGH (ref 3.8–10.5)
WBC # FLD AUTO: 23.2 K/UL — HIGH (ref 3.8–10.5)

## 2022-01-29 PROCEDURE — 99232 SBSQ HOSP IP/OBS MODERATE 35: CPT

## 2022-01-29 RX ORDER — FUROSEMIDE 40 MG
1 TABLET ORAL
Qty: 0 | Refills: 0 | DISCHARGE

## 2022-01-29 RX ORDER — FUROSEMIDE 40 MG
60 TABLET ORAL DAILY
Refills: 0 | Status: DISCONTINUED | OUTPATIENT
Start: 2022-01-30 | End: 2022-02-02

## 2022-01-29 RX ORDER — FUROSEMIDE 40 MG
20 TABLET ORAL ONCE
Refills: 0 | Status: COMPLETED | OUTPATIENT
Start: 2022-01-29 | End: 2022-01-29

## 2022-01-29 RX ORDER — HYDROXYCHLOROQUINE SULFATE 200 MG
400 TABLET ORAL
Refills: 0 | Status: DISCONTINUED | OUTPATIENT
Start: 2022-01-29 | End: 2022-02-05

## 2022-01-29 RX ADMIN — Medication 3 MILLILITER(S): at 12:12

## 2022-01-29 RX ADMIN — BUDESONIDE AND FORMOTEROL FUMARATE DIHYDRATE 2 PUFF(S): 160; 4.5 AEROSOL RESPIRATORY (INHALATION) at 18:19

## 2022-01-29 RX ADMIN — Medication 3 MILLILITER(S): at 18:09

## 2022-01-29 RX ADMIN — TIOTROPIUM BROMIDE 1 CAPSULE(S): 18 CAPSULE ORAL; RESPIRATORY (INHALATION) at 12:10

## 2022-01-29 RX ADMIN — LOSARTAN POTASSIUM 50 MILLIGRAM(S): 100 TABLET, FILM COATED ORAL at 09:52

## 2022-01-29 RX ADMIN — Medication 20 MILLIGRAM(S): at 09:53

## 2022-01-29 RX ADMIN — ENOXAPARIN SODIUM 40 MILLIGRAM(S): 100 INJECTION SUBCUTANEOUS at 18:11

## 2022-01-29 RX ADMIN — Medication 81 MILLIGRAM(S): at 13:01

## 2022-01-29 RX ADMIN — Medication 20 MILLIGRAM(S): at 13:05

## 2022-01-29 RX ADMIN — Medication 20 MILLIGRAM(S): at 00:32

## 2022-01-29 RX ADMIN — ENOXAPARIN SODIUM 40 MILLIGRAM(S): 100 INJECTION SUBCUTANEOUS at 06:51

## 2022-01-29 RX ADMIN — Medication 125 MICROGRAM(S): at 06:51

## 2022-01-29 RX ADMIN — Medication 20 MILLIEQUIVALENT(S): at 09:54

## 2022-01-29 RX ADMIN — Medication 400 MILLIGRAM(S): at 18:19

## 2022-01-29 RX ADMIN — BUDESONIDE AND FORMOTEROL FUMARATE DIHYDRATE 2 PUFF(S): 160; 4.5 AEROSOL RESPIRATORY (INHALATION) at 06:51

## 2022-01-29 RX ADMIN — Medication 20 MILLIGRAM(S): at 18:09

## 2022-01-29 RX ADMIN — Medication 3 MILLILITER(S): at 00:32

## 2022-01-29 RX ADMIN — Medication 3 MILLILITER(S): at 06:51

## 2022-01-29 NOTE — PROGRESS NOTE ADULT - SUBJECTIVE AND OBJECTIVE BOX
Serjio Zendejas MD  Division of Hospital Medicine  Pager [phone]  If no response or off hours page: [phone]  ---------------------------------------------------------    AMBERLY ESPITIA  46y  Male      Patient is a 46y old  Male who presents with a chief complaint of asthma exacerbation (28 Jan 2022 15:08)      INTERVAL HPI/OVERNIGHT EVENTS:  Seen at bedside. Still feels tight.       REVIEW OF SYSTEMS: 10 point ROS negative unless listed above    T(C): 36.6 (01-29-22 @ 06:34), Max: 36.7 (01-28-22 @ 21:12)  HR: 89 (01-29-22 @ 06:34) (89 - 111)  BP: 126/81 (01-29-22 @ 06:34) (126/81 - 131/85)  RR: 18 (01-29-22 @ 06:34) (18 - 18)  SpO2: 96% (01-29-22 @ 06:34) (96% - 96%)  Wt(kg): --Vital Signs Last 24 Hrs  T(C): 36.6 (29 Jan 2022 06:34), Max: 36.7 (28 Jan 2022 21:12)  T(F): 97.8 (29 Jan 2022 06:34), Max: 98 (28 Jan 2022 21:12)  HR: 89 (29 Jan 2022 06:34) (89 - 111)  BP: 126/81 (29 Jan 2022 06:34) (126/81 - 131/85)  BP(mean): --  RR: 18 (29 Jan 2022 06:34) (18 - 18)  SpO2: 96% (29 Jan 2022 06:34) (96% - 96%)    PHYSICAL EXAM:  GENERAL: NAD, morbidly obese   NECK: Supple, No JVD  CHEST/LUNG: Slight wheeze  HEART: Regular rate and rhythm; No murmurs, rubs, or gallops  ABDOMEN: Soft, Nontender, Nondistended; Bowel sounds present.    EXTREMITIES:  2+ Peripheral Pulses, No clubbing, cyanosis, trace edema  SKIN: No rashes or lesions  PSYCH: Alert & Oriented x3    Consultant(s) Notes Reviewed:  [x ] YES  [ ] NO  Care Discussed with Consultants/Other Providers [ x] YES  [ ] NO    LABS:                        14.6   22.32 )-----------( 378      ( 28 Jan 2022 09:30 )             43.6     01-28    138  |  99  |  13  ----------------------------<  147<H>  4.2   |  23  |  0.88    Ca    10.0      28 Jan 2022 09:30          CAPILLARY BLOOD GLUCOSE                RADIOLOGY & ADDITIONAL TESTS:    Imaging Personally Reviewed:  [ ] YES  [ ] NO

## 2022-01-29 NOTE — PROGRESS NOTE ADULT - ASSESSMENT
47 y/o M with a PMH of asthma (never intubated), Lupus (on plaquenil, cellcept-stopped when he tested COVID pos). Presents with worsening cough, SOB wheezing. States sxs originally started when pt tested COVID pos 01/05/22, received MAB infusion on 01/06/22. Sxs persisted so called PMD who prescribed cefuroxime which he has been taking with no relief. Called PMD again today who advised presenting to ED. Pt states he feels a spasm in his chest every time he tries to speak full sentences, coughs and then is able to continue talking. States this is causing him a lot of chest tightness. Pt reportedly tachycardiac and tachypneic in ED. CTA chest neg PE, no PNA.

## 2022-01-29 NOTE — PROGRESS NOTE ADULT - SUBJECTIVE AND OBJECTIVE BOX
Date of Service: 01-29-22 @ 11:17    Patient is a 46y old  Male who presents with a chief complaint of asthma exacerbation (28 Jan 2022 15:08)      Any change in ROS: Still c/o tightness in chest :   dont hear much wheezing:       MEDICATIONS  (STANDING):  albuterol/ipratropium for Nebulization 3 milliLiter(s) Nebulizer every 6 hours  aspirin enteric coated 81 milliGRAM(s) Oral daily  budesonide 160 MICROgram(s)/formoterol 4.5 MICROgram(s) Inhaler 2 Puff(s) Inhalation two times a day  enoxaparin Injectable 40 milliGRAM(s) SubCutaneous two times a day  furosemide   Injectable 20 milliGRAM(s) IV Push once  hydroxychloroquine 400 milliGRAM(s) Oral <User Schedule>  levothyroxine 125 MICROGram(s) Oral daily  losartan 50 milliGRAM(s) Oral daily  methylPREDNISolone sodium succinate Injectable 20 milliGRAM(s) IV Push every 8 hours  potassium chloride    Tablet ER 20 milliEquivalent(s) Oral daily  tiotropium 18 MICROgram(s) Capsule 1 Capsule(s) Inhalation daily    MEDICATIONS  (PRN):    Vital Signs Last 24 Hrs  T(C): 36.6 (29 Jan 2022 06:34), Max: 36.7 (28 Jan 2022 21:12)  T(F): 97.8 (29 Jan 2022 06:34), Max: 98 (28 Jan 2022 21:12)  HR: 89 (29 Jan 2022 06:34) (89 - 111)  BP: 126/81 (29 Jan 2022 06:34) (126/81 - 131/85)  BP(mean): --  RR: 18 (29 Jan 2022 06:34) (18 - 18)  SpO2: 96% (29 Jan 2022 06:34) (96% - 96%)    I&O's Summary    28 Jan 2022 07:01  -  29 Jan 2022 07:00  --------------------------------------------------------  IN: 780 mL / OUT: 575 mL / NET: 205 mL          Physical Exam:   GENERAL: Obese+  HEENT: AUGUST/   Atraumatic, Normocephalic  ENMT: No tonsillar erythema, exudates, or enlargement; Moist mucous membranes, Good dentition, No lesions  NECK: Supple, No JVD, Normal thyroid  CHEST/LUNG: Clear to auscultaion, ; No rales, rhonchi, wheezing, or rubs  CVS: Regular rate and rhythm; No murmurs, rubs, or gallops  GI: : Soft, Nontender, Nondistended; Bowel sounds present  NERVOUS SYSTEM:  Alert & Oriented X3  EXTREMITIES:  2+ Peripheral Pulses, No clubbing, cyanosis, or edema  LYMPH: No lymphadenopathy noted  SKIN: No rashes or lesions  ENDOCRINOLOGY: No Thyromegaly  PSYCH: Appropriate    Labs:  34                            14.6   22.32 )-----------( 378      ( 28 Jan 2022 09:30 )             43.6                         14.1   12.26 )-----------( 343      ( 27 Jan 2022 11:50 )             42.3                         14.3   7.47  )-----------( 330      ( 26 Jan 2022 15:45 )             43.5     01-28    138  |  99  |  13  ----------------------------<  147<H>  4.2   |  23  |  0.88  01-26    139  |  96  |  6<L>  ----------------------------<  85  3.3<L>   |  26  |  0.99    Ca    10.0      28 Jan 2022 09:30    TPro  7.4  /  Alb  4.0  /  TBili  0.8  /  DBili  x   /  AST  38  /  ALT  49<H>  /  AlkPhos  98  01-26    CAPILLARY BLOOD GLUCOSE                D-Dimer Assay, Quantitative: 238 ng/mL DDU (01-26 @ 15:45)  Procalcitonin, Serum: 0.07 ng/mL (01-26 @ 15:45)  Serum Pro-Brain Natriuretic Peptide: 61 pg/mL (01-27 @ 07:26)  Serum Pro-Brain Natriuretic Peptide: 13 pg/mL (01-26 @ 15:45)        RECENT CULTURES:    ra< from: CT Angio Chest PE Protocol w/ IV Cont (01.26.22 @ 16:20) >   was obtained with intravenous contrast. Three   dimensional maximum intensity projection (MIP) images were generated.    FINDINGS:    PULMONARY ANGIOGRAM: No main, right or left main, or lobar pulmonary   embolism. Limited evaluation of the segmental and subsegmental arteries   secondary to transient interruption of the contrast bolus.    LYMPH NODES: No lymphadenopathy. The thyroid gland is normal. Hiatal   hernia.    HEART/VASCULATURE: The heart size is normal. No pericardial effusion.    AIRWAYS/LUNGS/PLEURA: Patent central airways. The lungs are clear. No   pleural effusion or pneumothorax.    UPPER ABDOMEN: Cholecystectomy.    BONES/SOFT TISSUES: Mild degenerative changes of the spine.    IMPRESSION:    1.  No main, right or left main, or lobar pulmonary embolism.        --- End of Report ---           EMILY MILIAN MD; Resident Radiology  This document has been electronically signed.  SUYAPA STILES MD; Attending Radiologist  This document has been electronically signed. Jan 26 2    < end of copied text >      RESPIRATORY CULTURES:          Studies  Chest X-RAY  CT SCAN Chest   Venous Dopplers: LE:   CT Abdomen  Others

## 2022-01-29 NOTE — PROVIDER CONTACT NOTE (OTHER) - SITUATION
Pt refusing Cellcept as Rheumatologist stated he should not be taking it at this time. Pt also stated he takes Plaquenil 400mg during the afternoon all at once instead of 200mg twice a day.

## 2022-01-29 NOTE — PROGRESS NOTE ADULT - PROBLEM SELECTOR PLAN 1
pt with c/o worsening SOB, cough, wheezing since dx COVID a few weeks ago. Reportedly tachycardic and tachypneic in ED - physical exam noted for decreased BS.   -CTA chest neg PE, no PNA   -Seems to have better air movement at this time. Minimal forced end exp wheezes on exam yesterday, none today.  -Continue solumedrol 20mg IVP q8h through weekend   -Normoxic, keep sats >90% with O2 PRN  -Symbicort BID, Spiriva qd (home med: Trelegy)  -Duoneb q6h.    1/29: /: seems OK: still with chest tightness and cough: no wheezing , I could hear: cont steroids today and tomorrow IV ; wbc is high : likely secondary to steroids: no fever:

## 2022-01-29 NOTE — PROGRESS NOTE ADULT - PROBLEM SELECTOR PLAN 2
On Plaquenil and Cellcept as well as chronic steroids.   c/w Plaquenil, pt states he takes 400mg after 6pm, currently receiving 200mg BID, will change at pt request  Cellcept was ordered, rheum recommending holding, will d/c today  Will increase Lasix 40mg to home dose of 60mg daily

## 2022-01-29 NOTE — PROGRESS NOTE ADULT - PROBLEM SELECTOR PLAN 1
Still states that he remains tight. Occasional wheezing on exam.   Imaging negative for PNA or PE  Seen by pulm recommending continuing Solumedrol 20mg Q8 through weekend  c/w duonebs, symbicort and spiriva

## 2022-01-30 LAB
ANION GAP SERPL CALC-SCNC: 14 MMOL/L — SIGNIFICANT CHANGE UP (ref 5–17)
BUN SERPL-MCNC: 11 MG/DL — SIGNIFICANT CHANGE UP (ref 7–23)
CALCIUM SERPL-MCNC: 9.8 MG/DL — SIGNIFICANT CHANGE UP (ref 8.4–10.5)
CHLORIDE SERPL-SCNC: 97 MMOL/L — SIGNIFICANT CHANGE UP (ref 96–108)
CO2 SERPL-SCNC: 24 MMOL/L — SIGNIFICANT CHANGE UP (ref 22–31)
CREAT SERPL-MCNC: 0.81 MG/DL — SIGNIFICANT CHANGE UP (ref 0.5–1.3)
GLUCOSE SERPL-MCNC: 168 MG/DL — HIGH (ref 70–99)
HCT VFR BLD CALC: 42.9 % — SIGNIFICANT CHANGE UP (ref 39–50)
HGB BLD-MCNC: 14 G/DL — SIGNIFICANT CHANGE UP (ref 13–17)
MAGNESIUM SERPL-MCNC: 2.2 MG/DL — SIGNIFICANT CHANGE UP (ref 1.6–2.6)
MCHC RBC-ENTMCNC: 29.1 PG — SIGNIFICANT CHANGE UP (ref 27–34)
MCHC RBC-ENTMCNC: 32.6 GM/DL — SIGNIFICANT CHANGE UP (ref 32–36)
MCV RBC AUTO: 89.2 FL — SIGNIFICANT CHANGE UP (ref 80–100)
NRBC # BLD: 0 /100 WBCS — SIGNIFICANT CHANGE UP (ref 0–0)
PLATELET # BLD AUTO: 336 K/UL — SIGNIFICANT CHANGE UP (ref 150–400)
POTASSIUM SERPL-MCNC: 4 MMOL/L — SIGNIFICANT CHANGE UP (ref 3.5–5.3)
POTASSIUM SERPL-SCNC: 4 MMOL/L — SIGNIFICANT CHANGE UP (ref 3.5–5.3)
RBC # BLD: 4.81 M/UL — SIGNIFICANT CHANGE UP (ref 4.2–5.8)
RBC # FLD: 13.7 % — SIGNIFICANT CHANGE UP (ref 10.3–14.5)
SODIUM SERPL-SCNC: 135 MMOL/L — SIGNIFICANT CHANGE UP (ref 135–145)
WBC # BLD: 17.98 K/UL — HIGH (ref 3.8–10.5)
WBC # FLD AUTO: 17.98 K/UL — HIGH (ref 3.8–10.5)

## 2022-01-30 PROCEDURE — 99232 SBSQ HOSP IP/OBS MODERATE 35: CPT

## 2022-01-30 RX ADMIN — BUDESONIDE AND FORMOTEROL FUMARATE DIHYDRATE 2 PUFF(S): 160; 4.5 AEROSOL RESPIRATORY (INHALATION) at 17:34

## 2022-01-30 RX ADMIN — Medication 3 MILLILITER(S): at 11:25

## 2022-01-30 RX ADMIN — TIOTROPIUM BROMIDE 1 CAPSULE(S): 18 CAPSULE ORAL; RESPIRATORY (INHALATION) at 12:40

## 2022-01-30 RX ADMIN — Medication 3 MILLILITER(S): at 05:24

## 2022-01-30 RX ADMIN — Medication 125 MICROGRAM(S): at 05:24

## 2022-01-30 RX ADMIN — Medication 20 MILLIGRAM(S): at 17:33

## 2022-01-30 RX ADMIN — Medication 3 MILLILITER(S): at 00:19

## 2022-01-30 RX ADMIN — Medication 3 MILLILITER(S): at 23:06

## 2022-01-30 RX ADMIN — ENOXAPARIN SODIUM 40 MILLIGRAM(S): 100 INJECTION SUBCUTANEOUS at 05:24

## 2022-01-30 RX ADMIN — Medication 81 MILLIGRAM(S): at 11:25

## 2022-01-30 RX ADMIN — Medication 3 MILLILITER(S): at 17:33

## 2022-01-30 RX ADMIN — LOSARTAN POTASSIUM 50 MILLIGRAM(S): 100 TABLET, FILM COATED ORAL at 11:26

## 2022-01-30 RX ADMIN — Medication 20 MILLIEQUIVALENT(S): at 11:26

## 2022-01-30 RX ADMIN — BUDESONIDE AND FORMOTEROL FUMARATE DIHYDRATE 2 PUFF(S): 160; 4.5 AEROSOL RESPIRATORY (INHALATION) at 05:26

## 2022-01-30 RX ADMIN — Medication 60 MILLIGRAM(S): at 12:38

## 2022-01-30 RX ADMIN — Medication 20 MILLIGRAM(S): at 11:28

## 2022-01-30 RX ADMIN — Medication 20 MILLIGRAM(S): at 01:51

## 2022-01-30 RX ADMIN — ENOXAPARIN SODIUM 40 MILLIGRAM(S): 100 INJECTION SUBCUTANEOUS at 17:33

## 2022-01-30 RX ADMIN — Medication 400 MILLIGRAM(S): at 17:33

## 2022-01-30 NOTE — PROGRESS NOTE ADULT - SUBJECTIVE AND OBJECTIVE BOX
Serjio Zendejas MD  Division of Hospital Medicine  Pager [phone]  If no response or off hours page: [phone]  ---------------------------------------------------------    AMBERLY ESPITIA  46y  Male      Patient is a 46y old  Male who presents with a chief complaint of wheezes and shortness of breath (30 Jan 2022 07:08)      INTERVAL HPI/OVERNIGHT EVENTS:  Seen at bedside. Feels weaker today, perhaps because of his lupus. Still feels tight      REVIEW OF SYSTEMS: 10 point ROS negative unless listed above    T(C): 36.6 (01-30-22 @ 05:32), Max: 36.8 (01-29-22 @ 21:30)  HR: 96 (01-30-22 @ 05:32) (96 - 114)  BP: 117/74 (01-30-22 @ 05:32) (117/74 - 138/91)  RR: 18 (01-30-22 @ 05:32) (18 - 20)  SpO2: 96% (01-30-22 @ 05:32) (95% - 99%)  Wt(kg): --Vital Signs Last 24 Hrs  T(C): 36.6 (30 Jan 2022 05:32), Max: 36.8 (29 Jan 2022 21:30)  T(F): 97.8 (30 Jan 2022 05:32), Max: 98.2 (29 Jan 2022 21:30)  HR: 96 (30 Jan 2022 05:32) (96 - 114)  BP: 117/74 (30 Jan 2022 05:32) (117/74 - 138/91)  BP(mean): --  RR: 18 (30 Jan 2022 05:32) (18 - 20)  SpO2: 96% (30 Jan 2022 05:32) (95% - 99%)    PHYSICAL EXAM:  GENERAL: NAD, morbidly obese   NECK: Supple, No JVD  CHEST/LUNG: No wheezing.   HEART: Regular rate and rhythm; No murmurs, rubs, or gallops  ABDOMEN: Soft, Nontender, Nondistended; Bowel sounds present.    EXTREMITIES:  2+ Peripheral Pulses, No clubbing, cyanosis, trace edema  SKIN: No rashes or lesions  PSYCH: Alert & Oriented x3    Consultant(s) Notes Reviewed:  [x ] YES  [ ] NO  Care Discussed with Consultants/Other Providers [ x] YES  [ ] NO    LABS:                        14.0   17.98 )-----------( 336      ( 30 Jan 2022 09:43 )             42.9     01-30    135  |  97  |  11  ----------------------------<  168<H>  4.0   |  24  |  0.81    Ca    9.8      30 Jan 2022 09:43  Mg     2.2     01-30          CAPILLARY BLOOD GLUCOSE                RADIOLOGY & ADDITIONAL TESTS:    Imaging Personally Reviewed:  [ ] YES  [ ] NO

## 2022-01-30 NOTE — PROGRESS NOTE ADULT - PROBLEM SELECTOR PLAN 1
Still states that he remains tight. No wheezing on exam.   Imaging negative for PNA or PE  Seen by pulm recommending continuing Solumedrol 20mg Q8 through weekend  c/w duonebs, symbicort and spiriva  f/u further pulm recommendations

## 2022-01-30 NOTE — PROGRESS NOTE ADULT - SUBJECTIVE AND OBJECTIVE BOX
CC: f/u for bronchospasm and recent Covid    Patient reports: slight improvement in dyspnea and chest tightness when walking    REVIEW OF SYSTEMS:  All other review of systems negative (Comprehensive ROS)    Antimicrobials Day #  :  hydroxychloroquine 400 milliGRAM(s) Oral <User Schedule>    Other Medications Reviewed    T(F): 97.8 (01-30-22 @ 05:32), Max: 98.2 (01-29-22 @ 21:30)  HR: 96 (01-30-22 @ 05:32)  BP: 117/74 (01-30-22 @ 05:32)  RR: 18 (01-30-22 @ 05:32)  SpO2: 96% (01-30-22 @ 05:32)  Wt(kg): --    PHYSICAL EXAM:  General: alert, no acute distress  Eyes:  anicteric, no conjunctival injection, no discharge  Oropharynx: no lesions or injection 	  Neck: supple, without adenopathy  Lungs: few wheezes  Heart: regular rate and rhythm; no murmur, rubs or gallops  Abdomen: soft, nondistended, nontender, without mass or organomegaly  Skin: no lesions  Extremities: no clubbing, cyanosis, or edema  Neurologic: alert, oriented, moves all extremities    LAB RESULTS:                        14.7   23.20 )-----------( 383      ( 29 Jan 2022 12:24 )             45.3     01-29    137  |  96  |  13  ----------------------------<  129<H>  3.7   |  22  |  0.86    Ca    10.2      29 Jan 2022 12:24          MICROBIOLOGY:  RECENT CULTURES:      RADIOLOGY REVIEWED:    < from: CT Angio Chest PE Protocol w/ IV Cont (01.26.22 @ 16:20) >    IMPRESSION:    1.  No main, right or left main, or lobar pulmonary embolism.    < end of copied text >

## 2022-01-30 NOTE — PROGRESS NOTE ADULT - ASSESSMENT
47 yo male with SLE on cellcept and HC , s/p MAB for Covid in early January admitted with chest tightness and wheezes.  History of asthma as well.  No signs of active infection.  Suspect reactive airways related to Covid and underlying asthma.  No evidence of pneumonia.He is oxygenating well on RA.  Suggest:  1.No role for antibiotics  2.No indications for RDV  3.Steroids and respiratory treatment   as per pulmonary  4.Will defer to his rheumatologist on whether to continue cellcept.  5.No additional ID w/u planned, we will therefore stop actively following, please call if ID issues arise 45 yo male with SLE on cellcept and HC , s/p MAB for Covid in early January admitted with chest tightness and wheezes.  History of asthma as well.  No signs of active infection.  Suspect reactive airways related to Covid and underlying asthma.  No evidence of pneumonia.He is oxygenating well on RA.  Leukocytosis secondary to steroids  Suggest:  1.No role for antibiotics  2.No indications for RDV  3.Steroids and respiratory treatment   as per pulmonary  4.Will defer to his rheumatologist on whether to continue cellcept.  5.No additional ID w/u planned, we will stop actively following, please call if ID issue arise

## 2022-01-30 NOTE — PROGRESS NOTE ADULT - PROBLEM SELECTOR PLAN 2
On Plaquenil and Cellcept as well as chronic steroids.   c/w Plaquenil, pt states he takes 400mg after 6pm, currently receiving 200mg BID, will change at pt request  Cellcept was ordered, rheum recommending holding, d/c'd on 1/29  Increased Lasix back to home dose of 60mg daily yesterday

## 2022-01-30 NOTE — PROGRESS NOTE ADULT - ASSESSMENT
47 yo M with a PMH of asthma (never intubated), Lupus (on plaquenil, cellcept-stopped when he tested COVID pos) p/w prod cough, chest tightness, SOB x 5 days. States sxs originally started when pt tested COVID pos 01/05/22, received MAB infusion on 01/06/22. Sxs persisted so called PMD who prescribed cefuroxime which he has been taking with no relief. Called PMD again today who advised presenting to ED. Pt states he feels a spasm in his chest every time he tries to speak full sentences, coughs and then is able to continue talking. States this is causing him a lot of chest tightness. Has not been on steroids recently.

## 2022-01-31 LAB — TROPONIN T, HIGH SENSITIVITY RESULT: 8 NG/L — SIGNIFICANT CHANGE UP (ref 0–51)

## 2022-01-31 PROCEDURE — 99255 IP/OBS CONSLTJ NEW/EST HI 80: CPT | Mod: GC

## 2022-01-31 PROCEDURE — 99232 SBSQ HOSP IP/OBS MODERATE 35: CPT

## 2022-01-31 PROCEDURE — 93010 ELECTROCARDIOGRAM REPORT: CPT

## 2022-01-31 RX ORDER — LANOLIN ALCOHOL/MO/W.PET/CERES
3 CREAM (GRAM) TOPICAL ONCE
Refills: 0 | Status: COMPLETED | OUTPATIENT
Start: 2022-01-31 | End: 2022-01-31

## 2022-01-31 RX ORDER — MYCOPHENOLATE MOFETIL 250 MG/1
1500 CAPSULE ORAL DAILY
Refills: 0 | Status: DISCONTINUED | OUTPATIENT
Start: 2022-01-31 | End: 2022-02-03

## 2022-01-31 RX ADMIN — ENOXAPARIN SODIUM 40 MILLIGRAM(S): 100 INJECTION SUBCUTANEOUS at 18:06

## 2022-01-31 RX ADMIN — Medication 20 MILLIGRAM(S): at 10:47

## 2022-01-31 RX ADMIN — BUDESONIDE AND FORMOTEROL FUMARATE DIHYDRATE 2 PUFF(S): 160; 4.5 AEROSOL RESPIRATORY (INHALATION) at 18:06

## 2022-01-31 RX ADMIN — ENOXAPARIN SODIUM 40 MILLIGRAM(S): 100 INJECTION SUBCUTANEOUS at 05:06

## 2022-01-31 RX ADMIN — Medication 1200 MILLIGRAM(S): at 18:07

## 2022-01-31 RX ADMIN — BUDESONIDE AND FORMOTEROL FUMARATE DIHYDRATE 2 PUFF(S): 160; 4.5 AEROSOL RESPIRATORY (INHALATION) at 05:06

## 2022-01-31 RX ADMIN — Medication 3 MILLIGRAM(S): at 21:00

## 2022-01-31 RX ADMIN — Medication 20 MILLIGRAM(S): at 18:05

## 2022-01-31 RX ADMIN — Medication 81 MILLIGRAM(S): at 13:14

## 2022-01-31 RX ADMIN — TIOTROPIUM BROMIDE 1 CAPSULE(S): 18 CAPSULE ORAL; RESPIRATORY (INHALATION) at 13:15

## 2022-01-31 RX ADMIN — LOSARTAN POTASSIUM 50 MILLIGRAM(S): 100 TABLET, FILM COATED ORAL at 13:15

## 2022-01-31 RX ADMIN — Medication 3 MILLILITER(S): at 13:14

## 2022-01-31 RX ADMIN — Medication 3 MILLILITER(S): at 23:12

## 2022-01-31 RX ADMIN — Medication 20 MILLIEQUIVALENT(S): at 13:15

## 2022-01-31 RX ADMIN — Medication 125 MICROGRAM(S): at 05:06

## 2022-01-31 RX ADMIN — Medication 400 MILLIGRAM(S): at 18:06

## 2022-01-31 RX ADMIN — Medication 60 MILLIGRAM(S): at 13:14

## 2022-01-31 RX ADMIN — Medication 20 MILLIGRAM(S): at 01:36

## 2022-01-31 RX ADMIN — Medication 3 MILLILITER(S): at 18:06

## 2022-01-31 RX ADMIN — Medication 3 MILLILITER(S): at 05:06

## 2022-01-31 NOTE — PROGRESS NOTE ADULT - SUBJECTIVE AND OBJECTIVE BOX
Follow-up Pulm Progress Note    Still with WAITE, complaints of chest tightness  O2 sats 96% on RA  +congested cough    Medications:  MEDICATIONS  (STANDING):  albuterol/ipratropium for Nebulization 3 milliLiter(s) Nebulizer every 6 hours  aspirin enteric coated 81 milliGRAM(s) Oral daily  budesonide 160 MICROgram(s)/formoterol 4.5 MICROgram(s) Inhaler 2 Puff(s) Inhalation two times a day  enoxaparin Injectable 40 milliGRAM(s) SubCutaneous two times a day  furosemide    Tablet 60 milliGRAM(s) Oral daily  hydroxychloroquine 400 milliGRAM(s) Oral <User Schedule>  levothyroxine 125 MICROGram(s) Oral daily  losartan 50 milliGRAM(s) Oral daily  methylPREDNISolone sodium succinate Injectable 20 milliGRAM(s) IV Push every 8 hours  potassium chloride    Tablet ER 20 milliEquivalent(s) Oral daily  tiotropium 18 MICROgram(s) Capsule 1 Capsule(s) Inhalation daily    Vital Signs Last 24 Hrs  T(C): 36.4 (31 Jan 2022 04:58), Max: 36.8 (30 Jan 2022 14:02)  T(F): 97.6 (31 Jan 2022 04:58), Max: 98.2 (30 Jan 2022 14:02)  HR: 95 (31 Jan 2022 04:58) (95 - 108)  BP: 121/80 (31 Jan 2022 04:58) (121/80 - 147/85)  BP(mean): --  RR: 18 (31 Jan 2022 04:58) (18 - 18)  SpO2: 95% (31 Jan 2022 04:58) (95% - 97%)    01-30 @ 07:01  -  01-31 @ 07:00  --------------------------------------------------------  IN: 340 mL / OUT: 1850 mL / NET: -1510 mL    LABS:                        14.0   17.98 )-----------( 336      ( 30 Jan 2022 09:43 )             42.9     01-30    135  |  97  |  11  ----------------------------<  168<H>  4.0   |  24  |  0.81    Ca    9.8      30 Jan 2022 09:43  Mg     2.2     01-30    Physical Examination:  PULM: Decreased air entry  CVS: RRR    RADIOLOGY REVIEWED    CT chest: < from: CT Angio Chest PE Protocol w/ IV Cont (01.26.22 @ 16:20) >  FINDINGS:    PULMONARY ANGIOGRAM: No main, right or left main, or lobar pulmonary   embolism. Limited evaluation of the segmental and subsegmental arteries   secondary to transient interruption of the contrast bolus.    LYMPH NODES: No lymphadenopathy. The thyroid gland is normal. Hiatal   hernia.    HEART/VASCULATURE: The heart size is normal. No pericardial effusion.    AIRWAYS/LUNGS/PLEURA: Patent central airways. The lungs are clear. No   pleural effusion or pneumothorax.    UPPER ABDOMEN: Cholecystectomy.    BONES/SOFT TISSUES: Mild degenerative changes of the spine.    IMPRESSION:    1.  No main, right or left main, or lobar pulmonary embolism.    < end of copied text >

## 2022-01-31 NOTE — DIETITIAN INITIAL EVALUATION ADULT. - CHIEF COMPLAINT
This is a "45 yo M with a PMH of asthma (never intubated), Lupus (on plaquenil, cellcept-stopped when he tested COVID pos) p/w prod cough, chest tightness, SOB x 5 days. States sxs originally started when pt tested COVID pos 01/05/22, received MAB infusion on 01/06/22."

## 2022-01-31 NOTE — DIETITIAN INITIAL EVALUATION ADULT. - OTHER INFO
Weight: Pt endorses weight gain over the last ~ 3 years due to steroid use. Reports UBW was ~ 220lbs, now ~ 270lbs. Pt reports he also used to be very active, go to the gym regularly however not recently due to Lupus. Current dosing weight is 276lbs.     Since admission pt reports overall good PO intake and appetite, consuming ~75% of meals or greater for most of meals. No acute GI distress noted. No BM noted. Pt declining need for in-depth diet education, reports nothing he does seems to work regarding losing weight. Pt encouraged to try to incorporate more whole grains, high fiber foods into diet. Also discussed pairing protein with meals to help increase satisfaction with meals. Pt accepted written materials regarding Heart-Healthy Nutrition Therapy and Weight Loss Tips. Patient with no nutrition-related questions at this time. Made aware RD remains available as needed.

## 2022-01-31 NOTE — PROGRESS NOTE ADULT - PROBLEM SELECTOR PLAN 2
On Plaquenil and Cellcept as well as chronic steroids.   c/w Plaquenil, pt states he takes 400mg after 6pm, currently receiving 200mg BID, will change at pt request  Cellcept was ordered, rheum recommending holding, d/c'd on 1/29  Increased Lasix back to home dose of 60mg daily

## 2022-01-31 NOTE — PROGRESS NOTE ADULT - PROBLEM SELECTOR PLAN 1
Still states that he remains tight. No wheezing on exam.   Imaging negative for PNA or PE  Seen by pulm recommending continuing Solumedrol 20mg Q8 through weekend  c/w duonebs, symbicort and spiriva  f/u further pulm recommendations  c/w steroids for now  chest tightness - will check echo and trop, as well as ekg  reached out to pulm and rheum outpt doctors as requested by pt

## 2022-01-31 NOTE — DIETITIAN INITIAL EVALUATION ADULT. - ADD RECOMMEND
1) Continue current diet as tolerated. 2) Diet education provided, reinforce as needed. 3) BMI > 40Kg/m2 placed in EMR

## 2022-01-31 NOTE — PROGRESS NOTE ADULT - SUBJECTIVE AND OBJECTIVE BOX
Patient is a 46y old  Male who presents with a chief complaint of wheezes and shortness of breath (31 Jan 2022 14:08)    SUBJECTIVE / OVERNIGHT EVENTS: patient reporting dyspnea with exertion/small movement, and chest tightness. Patient is upset and frustrated with the current care.     MEDICATIONS  (STANDING):  albuterol/ipratropium for Nebulization 3 milliLiter(s) Nebulizer every 6 hours  aspirin enteric coated 81 milliGRAM(s) Oral daily  budesonide 160 MICROgram(s)/formoterol 4.5 MICROgram(s) Inhaler 2 Puff(s) Inhalation two times a day  enoxaparin Injectable 40 milliGRAM(s) SubCutaneous two times a day  furosemide    Tablet 60 milliGRAM(s) Oral daily  guaiFENesin ER 1200 milliGRAM(s) Oral every 12 hours  hydroxychloroquine 400 milliGRAM(s) Oral <User Schedule>  levothyroxine 125 MICROGram(s) Oral daily  losartan 50 milliGRAM(s) Oral daily  methylPREDNISolone sodium succinate Injectable 20 milliGRAM(s) IV Push every 8 hours  potassium chloride    Tablet ER 20 milliEquivalent(s) Oral daily  tiotropium 18 MICROgram(s) Capsule 1 Capsule(s) Inhalation daily    MEDICATIONS  (PRN):      Vital Signs Last 24 Hrs  T(C): 36.7 (31 Jan 2022 12:58), Max: 36.7 (30 Jan 2022 18:53)  T(F): 98 (31 Jan 2022 12:58), Max: 98 (30 Jan 2022 18:53)  HR: 98 (31 Jan 2022 12:58) (95 - 102)  BP: 136/89 (31 Jan 2022 12:58) (121/80 - 147/85)  BP(mean): --  RR: 18 (31 Jan 2022 12:58) (18 - 18)  SpO2: 95% (31 Jan 2022 12:58) (95% - 97%)  CAPILLARY BLOOD GLUCOSE        I&O's Summary    30 Jan 2022 07:01  -  31 Jan 2022 07:00  --------------------------------------------------------  IN: 340 mL / OUT: 1850 mL / NET: -1510 mL    PHYSICAL EXAM:  GENERAL: NAD  EYES: conjunctiva and sclera clear  CHEST/LUNG: no wheezing noted   HEART: +S1/S2   ABDOMEN: Soft, Nontender, Nondistended  EXTREMITIES: trace LLE edema   PSYCH: AAOx3    LABS:                        14.0   17.98 )-----------( 336      ( 30 Jan 2022 09:43 )             42.9     01-30    135  |  97  |  11  ----------------------------<  168<H>  4.0   |  24  |  0.81    Ca    9.8      30 Jan 2022 09:43  Mg     2.2     01-30

## 2022-01-31 NOTE — PROGRESS NOTE ADULT - PROBLEM SELECTOR PLAN 1
pt with c/o worsening SOB, cough, wheezing since dx COVID a few weeks ago. Reportedly tachycardic and tachypneic in ED - physical exam noted for decreased BS.   -CTA chest neg PE, no PNA   -Seems to have better air movement at this time. No audible wheeze but still with c/o chest tightness and WAITE  -C/w Solumedrol 20 mg IVP q8h for now, will discuss with Dr. Oliveira plan going forward for steroids  -Normoxic, keep sats >90% with O2 PRN  -Symbicort BID, Spiriva qd (home med: Trelegy)  -Duoneb q6h  -Reports congested cough, start Mucinex 1200 mg BID x5 days  -May consider TTE given still with chest tightness despite IV steroids. pt with c/o worsening SOB, cough, wheezing since dx COVID a few weeks ago. Reportedly tachycardic and tachypneic in ED - physical exam noted for decreased BS.   -CTA chest neg PE, no PNA   -Seems to have better air movement at this time. No audible wheeze but still with c/o chest tightness and WAITE  -C/w Solumedrol 20 mg IVP q8h for now, plan to transition to Prednisone 30 mg PO BID tomorrow AM.   -Normoxic, keep sats >90% with O2 PRN  -Symbicort BID, Spiriva qd (home med: Trelegy)  -Duoneb q6h  -Reports congested cough, start Mucinex 1200 mg BID x5 days.

## 2022-01-31 NOTE — CONSULT NOTE ADULT - ASSESSMENT
47 yo M with a PMH of asthma (never intubated), Lupus (on plaquenil, cellcept-stopped when he tested COVID pos) p/w cough, faituge , SOB x 5 days due to asthma exacerbation. Rheumatology called for medication management of known SLE    #SLE  -start PO cellcept 1500mg daily for now and increase back to home dose as tolerated (ordered)  -check dsDNA and complements (ordered)  -steroid plan per pulm then return back to home Medrolafter asthma exacerbation tx is completed    Full note to follow  Plan discussed with Dr. Hawley 45 yo M with a PMH of asthma (never intubated), Lupus (on plaquenil, cellcept-stopped when he tested COVID pos) p/w cough, faituge , SOB x 5 days due to asthma exacerbation. Rheumatology called for medication management of known SLE    #SLE: Dx in 2019. Follows Dr. Neri. Antibody Profile: OWEN 1:2560, + SM, + RNP, + LA, Low complement, + U1rnp and weak positive PM/Sc100. The patient has no history of nephritis. Symptoms include sicca, rash, fatigue, arthritis, arthralgias and sun sensitivity. MRI right thigh 5/2021 - no muscle edema moderate sized right knee joint effusion. Home meds MMF 6 pills daily, HCQ 200mg daily, Benlysta IV 1300mg monthly, medrol  -PO CellCept originally held in the setting of r/o infection. Given infection r/o can restart PO cellcept 1500mg daily for now and increase back to home dose as tolerated (ordered)  -check dsDNA and complements (ordered)  -steroid plan per pulm then return back to home Medrol after asthma exacerbation tx is completed    Plan discussed with Dr. Chandan Mae DO  Rheumatology Fellow PGY4  Pager [phone] 47 yo M with a PMH of asthma (never intubated), Lupus (on plaquenil, cellcept-stopped when he tested COVID pos) p/w cough, faituge , SOB x 5 days due to asthma exacerbation. Rheumatology called for medication management of known SLE as these have been on hold since covid infection.     #SLE: Dx in 2019. Follows Dr. Neri. Antibody Profile: OWEN 1:2560, + SM, + RNP, + LA, Low complement, + U1rnp and weak positive PM/Sc100. The patient has no history of nephritis. Symptoms include sicca, rash, fatigue, arthritis, arthralgias and sun sensitivity. MRI right thigh 5/2021 - no muscle edema moderate sized right knee joint effusion. Home meds MMF 6 pills daily, HCQ 200mg daily, Benlysta IV 1300mg monthly, medrol  -PO CellCept originally held in the setting of r/o infection. Given infection r/o can restart PO cellcept 1500mg daily for now and increase back to home dose as tolerated (ordered)  -check dsDNA and complements (ordered)  -steroid plan per pulm then return back to home Medrol after asthma exacerbation tx is completed  - he is due for Benlysta, will see if this can be done as outpatient or if he will require it while admitted     Plan discussed with Dr. Chandan Mae DO  Rheumatology Fellow PGY4  Pager [phone]

## 2022-01-31 NOTE — DIETITIAN INITIAL EVALUATION ADULT. - ORAL INTAKE PTA/DIET HISTORY
Pt reports decreased PO intake for ~ 1 week PTA due to feeling unwell, Follows regular diet at home with no restrictions. Per diet recall, intake high in refined CHO (donuts, bagels, pasta) with limited intake of high fiber foods. NKFA. Pt denies chewing/swallowing difficulty, nausea, vomiting, diarrhea, constipation. No micronutrient supplementation.

## 2022-01-31 NOTE — CONSULT NOTE ADULT - SUBJECTIVE AND OBJECTIVE BOX
INCOMPLETE NOTE    HISTORY OF PRESENT ILLNESS: 47 yo M with a PMH of asthma (never intubated), Lupus (on plaquenil, cellcept-stopped when he tested COVID pos) p/w cough, faituge , SOB x 5 days. States sxs originally started when pt tested COVID pos 01/05/22, received MAB infusion on 01/06/22. Sxs persisted so called PMD who prescribed cefuroxime which he has been taking with no relief. Called PMD again today who advised presenting to ED. Pt states he feels a spasm in his chest every time he tries to speak full sentences, coughs and then is able to continue talking. States this is causing him a lot of chest tightness.  Denies nausea, vomiting, fever, chills, leg pain/swelling, abd pain, headache, syncope. Nonsmoker  no fever or chills   CTA in ED was neg for PE and did not show pna   pt was persistently tachycardiac and tachypnic in ED however without hypoxia     Hx SLE:   Follows Dr. Neri    The patient was diagnosed with SLE in 2019.   Antibody Profile: OWEN 1:2560, + SM, + RNP, + LA, Low complement,. The patient has no history of nephritis. Symptoms currently experiencing include sicca, rash, fatigue, arthritis, arthralgias and sun sensitivity, but no headaches, no chest pain and no shortness of breath. muscle cramps improved but weakness persists with normal CK and MRI sin muscle edema    + U1rnp and weak positive PM/Sc100  5-19-21: MRI right thigh - no muscle edema moderate sized right knee joint effusion   5-19-21: MRI Lumbar spine - diffuse spinal canal narrowing and mild disc bulge with mod spinal cnala stenosis    Meds: MMF 6 pills daily, HCQ 200mg daily, Benlysta IV 1300mg monthly, medrol 24mg bid  ----------------    Patient seen and examined at bedside    PAST MEDICAL & SURGICAL HISTORY:  Lupus    Asthma    Hypothyroid    History of cholecystectomy        REVIEW OF SYSTEMS      General:	    Skin/Breast:  	  Ophthalmologic:  	  ENMT:	    Respiratory and Thorax:  	  Cardiovascular:	    Gastrointestinal:	    Genitourinary:	    Musculoskeletal:	    Neurological:	    Psychiatric:	    Hematology/Lymphatics:	    Endocrine:	    Allergic/Immunologic:	    MEDICATIONS  (STANDING):  albuterol/ipratropium for Nebulization 3 milliLiter(s) Nebulizer every 6 hours  aspirin enteric coated 81 milliGRAM(s) Oral daily  budesonide 160 MICROgram(s)/formoterol 4.5 MICROgram(s) Inhaler 2 Puff(s) Inhalation two times a day  enoxaparin Injectable 40 milliGRAM(s) SubCutaneous two times a day  furosemide    Tablet 60 milliGRAM(s) Oral daily  guaiFENesin ER 1200 milliGRAM(s) Oral every 12 hours  hydroxychloroquine 400 milliGRAM(s) Oral <User Schedule>  levothyroxine 125 MICROGram(s) Oral daily  losartan 50 milliGRAM(s) Oral daily  methylPREDNISolone sodium succinate Injectable 20 milliGRAM(s) IV Push every 8 hours  potassium chloride    Tablet ER 20 milliEquivalent(s) Oral daily  tiotropium 18 MICROgram(s) Capsule 1 Capsule(s) Inhalation daily    MEDICATIONS  (PRN):      Allergies    No Known Allergies    Intolerances        PERTINENT MEDICATION HISTORY:    SOCIAL HISTORY:    FAMILY HISTORY:  No pertinent family history in first degree relatives        Vital Signs Last 24 Hrs  T(C): 36.7 (31 Jan 2022 12:58), Max: 36.7 (30 Jan 2022 21:16)  T(F): 98 (31 Jan 2022 12:58), Max: 98 (30 Jan 2022 21:16)  HR: 98 (31 Jan 2022 12:58) (95 - 102)  BP: 136/89 (31 Jan 2022 12:58) (121/80 - 136/89)  BP(mean): --  RR: 18 (31 Jan 2022 12:58) (18 - 18)  SpO2: 95% (31 Jan 2022 12:58) (95% - 95%)    Daily     Daily     PHYSICAL EXAM:      Constitutional:    Eyes:    ENMT:    Neck:    Breasts:    Back:    Respiratory:    Cardiovascular:    Gastrointestinal:    Genitourinary:    Rectal:    Extremities:    Vascular:    Neurological:    Skin:    Lymph Nodes:    Musculoskeletal:    Psychiatric:        LABS:                        14.0   17.98 )-----------( 336      ( 30 Jan 2022 09:43 )             42.9     01-30    135  |  97  |  11  ----------------------------<  168<H>  4.0   |  24  |  0.81    Ca    9.8      30 Jan 2022 09:43  Mg     2.2     01-30            RADIOLOGY & ADDITIONAL STUDIES:  < from: CT Angio Chest PE Protocol w/ IV Cont (01.26.22 @ 16:20) >  IMPRESSION:    1.  No main, right or left main, or lobar pulmonary embolism.   HISTORY OF PRESENT ILLNESS: 47 yo M with a PMH of asthma (never intubated), Lupus (on plaquenil, cellcept-stopped when he tested COVID pos) p/w cough, faituge , SOB x 5 days. States sxs originally started when pt tested COVID pos 01/05/22, received MAB infusion on 01/06/22. Sxs persisted so called PMD who prescribed cefuroxime which he has been taking with no relief. Called PMD again today who advised presenting to ED. Pt states he feels a spasm in his chest every time he tries to speak full sentences, coughs and then is able to continue talking. States this is causing him a lot of chest tightness.  Denies nausea, vomiting, fever, chills, leg pain/swelling, abd pain, headache, syncope. Nonsmoker  no fever or chills   CTA in ED was neg for PE and did not show pna   pt was persistently tachycardiac and tachypnic in ED however without hypoxia     Hx SLE:   Follows Dr. Neri    The patient was diagnosed with SLE in 2019.   Antibody Profile: OWEN 1:2560, + SM, + RNP, + LA, Low complement,. The patient has no history of nephritis. Symptoms currently experiencing include sicca, rash, fatigue, arthritis, arthralgias and sun sensitivity, but no headaches, no chest pain and no shortness of breath. muscle cramps improved but weakness persists with normal CK and MRI sin muscle edema    + U1rnp and weak positive PM/Sc100  5-19-21: MRI right thigh - no muscle edema moderate sized right knee joint effusion   5-19-21: MRI Lumbar spine - diffuse spinal canal narrowing and mild disc bulge with mod spinal cnala stenosis    Meds: MMF 6 pills daily, HCQ 200mg daily, Benlysta IV 1300mg monthly, medrol 24mg bid  ----------------    Patient seen and examined at bedside. Patient reports chest tightness and SOB with minimall movements.    PAST MEDICAL & SURGICAL HISTORY:  Lupus    Asthma    Hypothyroid    History of cholecystectomy        REVIEW OF SYSTEMS  as per hpi    MEDICATIONS  (STANDING):  albuterol/ipratropium for Nebulization 3 milliLiter(s) Nebulizer every 6 hours  aspirin enteric coated 81 milliGRAM(s) Oral daily  budesonide 160 MICROgram(s)/formoterol 4.5 MICROgram(s) Inhaler 2 Puff(s) Inhalation two times a day  enoxaparin Injectable 40 milliGRAM(s) SubCutaneous two times a day  furosemide    Tablet 60 milliGRAM(s) Oral daily  guaiFENesin ER 1200 milliGRAM(s) Oral every 12 hours  hydroxychloroquine 400 milliGRAM(s) Oral <User Schedule>  levothyroxine 125 MICROGram(s) Oral daily  losartan 50 milliGRAM(s) Oral daily  methylPREDNISolone sodium succinate Injectable 20 milliGRAM(s) IV Push every 8 hours  potassium chloride    Tablet ER 20 milliEquivalent(s) Oral daily  tiotropium 18 MICROgram(s) Capsule 1 Capsule(s) Inhalation daily    MEDICATIONS  (PRN):      Allergies    No Known Allergies    Intolerances        PERTINENT MEDICATION HISTORY:    SOCIAL HISTORY:    FAMILY HISTORY:  No pertinent family history in first degree relatives        Vital Signs Last 24 Hrs  T(C): 36.7 (31 Jan 2022 12:58), Max: 36.7 (30 Jan 2022 21:16)  T(F): 98 (31 Jan 2022 12:58), Max: 98 (30 Jan 2022 21:16)  HR: 98 (31 Jan 2022 12:58) (95 - 102)  BP: 136/89 (31 Jan 2022 12:58) (121/80 - 136/89)  BP(mean): --  RR: 18 (31 Jan 2022 12:58) (18 - 18)  SpO2: 95% (31 Jan 2022 12:58) (95% - 95%)    Daily     Daily     PHYSICAL EXAM:    Constitutional: WDWN resting comfortably in bed; NAD  Head: NC/AT  Eyes: clear conjunctiva  ENT: MMM  Neck: supple  Respiratory: decreased breath sounds throughout. RUL wheeze  Cardiac: +S1/S2; RRR  Gastrointestinal: soft, NT/ND; no rebound or guarding; +BSx4  Extremities: WWP, no clubbing or cyanosis; no peripheral edema  Musculoskeletal: no synovitis  Dermatologic: skin warm, dry and intact; no rashes, wounds, or scars    LABS:                        14.0   17.98 )-----------( 336      ( 30 Jan 2022 09:43 )             42.9     01-30    135  |  97  |  11  ----------------------------<  168<H>  4.0   |  24  |  0.81    Ca    9.8      30 Jan 2022 09:43  Mg     2.2     01-30            RADIOLOGY & ADDITIONAL STUDIES:  < from: CT Angio Chest PE Protocol w/ IV Cont (01.26.22 @ 16:20) >  IMPRESSION:    1.  No main, right or left main, or lobar pulmonary embolism.   HISTORY OF PRESENT ILLNESS: 45 yo M with a PMH of asthma (never intubated), Lupus (on plaquenil, cellcept-stopped when he tested COVID pos) p/w cough, faituge , SOB x 5 days. States sxs originally started when pt tested COVID pos 01/05/22, received MAB infusion on 01/06/22. Sxs persisted so called PMD who prescribed cefuroxime which he has been taking with no relief. Called PMD again today who advised presenting to ED. Pt states he feels a spasm in his chest every time he tries to speak full sentences, coughs and then is able to continue talking. States this is causing him a lot of chest tightness.  Denies nausea, vomiting, fever, chills, leg pain/swelling, abd pain, headache, syncope. Nonsmoker  no fever or chills   CTA in ED was neg for PE and did not show pna   pt was persistently tachycardiac and tachypnic in ED however without hypoxia     Hx SLE:   Follows Dr. Neri    The patient was diagnosed with SLE in 2019.   Antibody Profile: OWEN 1:2560, + SM, + RNP, + LA, Low complement,. The patient has no history of nephritis. Symptoms currently experiencing include sicca, rash, fatigue, arthritis, arthralgias and sun sensitivity, but no headaches, no chest pain and no shortness of breath. muscle cramps improved but weakness persists with normal CK and MRI sin muscle edema    + U1rnp and weak positive PM/Sc100  5-19-21: MRI right thigh - no muscle edema moderate sized right knee joint effusion   5-19-21: MRI Lumbar spine - diffuse spinal canal narrowing and mild disc bulge with mod spinal cnala stenosis    Meds: MMF 6 pills daily, HCQ 200mg daily, Benlysta IV 1300mg monthly, medrol 24mg bid  ----------------    Patient seen and examined at bedside. Patient reports chest tightness and SOB with minimall movements and reproducible anterior chest wall TTP. Also diffuse arthralgias/stiffness but no yvan synovitis/effusions. Remainder of CTD ROS negative today.     PAST MEDICAL & SURGICAL HISTORY:  Lupus    Asthma    Hypothyroid    History of cholecystectomy        REVIEW OF SYSTEMS  as per hpi    MEDICATIONS  (STANDING):  albuterol/ipratropium for Nebulization 3 milliLiter(s) Nebulizer every 6 hours  aspirin enteric coated 81 milliGRAM(s) Oral daily  budesonide 160 MICROgram(s)/formoterol 4.5 MICROgram(s) Inhaler 2 Puff(s) Inhalation two times a day  enoxaparin Injectable 40 milliGRAM(s) SubCutaneous two times a day  furosemide    Tablet 60 milliGRAM(s) Oral daily  guaiFENesin ER 1200 milliGRAM(s) Oral every 12 hours  hydroxychloroquine 400 milliGRAM(s) Oral <User Schedule>  levothyroxine 125 MICROGram(s) Oral daily  losartan 50 milliGRAM(s) Oral daily  methylPREDNISolone sodium succinate Injectable 20 milliGRAM(s) IV Push every 8 hours  potassium chloride    Tablet ER 20 milliEquivalent(s) Oral daily  tiotropium 18 MICROgram(s) Capsule 1 Capsule(s) Inhalation daily    MEDICATIONS  (PRN):      Allergies    No Known Allergies    Intolerances        PERTINENT MEDICATION HISTORY:    SOCIAL HISTORY:    FAMILY HISTORY:  No pertinent family history in first degree relatives        Vital Signs Last 24 Hrs  T(C): 36.7 (31 Jan 2022 12:58), Max: 36.7 (30 Jan 2022 21:16)  T(F): 98 (31 Jan 2022 12:58), Max: 98 (30 Jan 2022 21:16)  HR: 98 (31 Jan 2022 12:58) (95 - 102)  BP: 136/89 (31 Jan 2022 12:58) (121/80 - 136/89)  RR: 18 (31 Jan 2022 12:58) (18 - 18)  SpO2: 95% (31 Jan 2022 12:58) (95% - 95%)      PHYSICAL EXAM:    Constitutional: WDWN resting comfortably in bed; NAD  Head: NC/AT  Eyes: clear conjunctiva  ENT: MMM  Neck: supple  Respiratory: decreased breath sounds throughout. RUL wheeze  Cardiac: +S1/S2; RRR, + anterior chest wall TTP over costochondral junctions   Gastrointestinal: soft, NT/ND; no rebound or guarding; +BSx4  Extremities: WWP, no clubbing or cyanosis; no peripheral edema  Musculoskeletal: no synovitis but TTP over joints diffusely   Dermatologic: skin warm, dry and intact; no rashes, wounds, or scars    LABS:                        14.0   17.98 )-----------( 336      ( 30 Jan 2022 09:43 )             42.9     01-30    135  |  97  |  11  ----------------------------<  168<H>  4.0   |  24  |  0.81    Ca    9.8      30 Jan 2022 09:43  Mg     2.2     01-30            RADIOLOGY & ADDITIONAL STUDIES:  < from: CT Angio Chest PE Protocol w/ IV Cont (01.26.22 @ 16:20) >  IMPRESSION:    1.  No main, right or left main, or lobar pulmonary embolism.

## 2022-01-31 NOTE — PROGRESS NOTE ADULT - ASSESSMENT
45 yo M with a PMH of asthma (never intubated), Lupus (on plaquenil, cellcept-stopped when he tested COVID pos) p/w prod cough, chest tightness, SOB x 5 days. States sxs originally started when pt tested COVID pos 01/05/22, received MAB infusion on 01/06/22. Sxs persisted so called PMD who prescribed cefuroxime which he has been taking with no relief. Called PMD again today who advised presenting to ED. Pt states he feels a spasm in his chest every time he tries to speak full sentences, coughs and then is able to continue talking. States this is causing him a lot of chest tightness. Has not been on steroids recently.

## 2022-02-01 ENCOUNTER — RX RENEWAL (OUTPATIENT)
Age: 47
End: 2022-02-01

## 2022-02-01 DIAGNOSIS — R07.9 CHEST PAIN, UNSPECIFIED: ICD-10-CM

## 2022-02-01 LAB
C3 SERPL-MCNC: 130 MG/DL — SIGNIFICANT CHANGE UP (ref 81–157)
C4 SERPL-MCNC: 30 MG/DL — SIGNIFICANT CHANGE UP (ref 13–39)
CK MB BLD-MCNC: 2.5 % — SIGNIFICANT CHANGE UP (ref 0–3.5)
CK MB CFR SERPL CALC: 1.5 NG/ML — SIGNIFICANT CHANGE UP (ref 0–6.7)
CK SERPL-CCNC: 59 U/L — SIGNIFICANT CHANGE UP (ref 30–200)
TROPONIN T, HIGH SENSITIVITY RESULT: 8 NG/L — SIGNIFICANT CHANGE UP (ref 0–51)

## 2022-02-01 PROCEDURE — 99232 SBSQ HOSP IP/OBS MODERATE 35: CPT

## 2022-02-01 PROCEDURE — 93010 ELECTROCARDIOGRAM REPORT: CPT

## 2022-02-01 RX ORDER — ACETAMINOPHEN 500 MG
1000 TABLET ORAL ONCE
Refills: 0 | Status: COMPLETED | OUTPATIENT
Start: 2022-02-01 | End: 2022-02-01

## 2022-02-01 RX ORDER — LANOLIN ALCOHOL/MO/W.PET/CERES
3 CREAM (GRAM) TOPICAL ONCE
Refills: 0 | Status: COMPLETED | OUTPATIENT
Start: 2022-02-01 | End: 2022-02-01

## 2022-02-01 RX ADMIN — Medication 20 MILLIGRAM(S): at 10:27

## 2022-02-01 RX ADMIN — Medication 3 MILLILITER(S): at 23:14

## 2022-02-01 RX ADMIN — Medication 3 MILLILITER(S): at 17:10

## 2022-02-01 RX ADMIN — Medication 125 MICROGRAM(S): at 05:56

## 2022-02-01 RX ADMIN — Medication 1200 MILLIGRAM(S): at 05:56

## 2022-02-01 RX ADMIN — Medication 30 MILLIGRAM(S): at 17:11

## 2022-02-01 RX ADMIN — Medication 20 MILLIGRAM(S): at 01:04

## 2022-02-01 RX ADMIN — BUDESONIDE AND FORMOTEROL FUMARATE DIHYDRATE 2 PUFF(S): 160; 4.5 AEROSOL RESPIRATORY (INHALATION) at 06:29

## 2022-02-01 RX ADMIN — Medication 3 MILLILITER(S): at 12:22

## 2022-02-01 RX ADMIN — Medication 3 MILLIGRAM(S): at 22:33

## 2022-02-01 RX ADMIN — ENOXAPARIN SODIUM 40 MILLIGRAM(S): 100 INJECTION SUBCUTANEOUS at 17:10

## 2022-02-01 RX ADMIN — ENOXAPARIN SODIUM 40 MILLIGRAM(S): 100 INJECTION SUBCUTANEOUS at 05:56

## 2022-02-01 RX ADMIN — LOSARTAN POTASSIUM 50 MILLIGRAM(S): 100 TABLET, FILM COATED ORAL at 12:20

## 2022-02-01 RX ADMIN — Medication 1000 MILLIGRAM(S): at 11:30

## 2022-02-01 RX ADMIN — Medication 400 MILLIGRAM(S): at 10:27

## 2022-02-01 RX ADMIN — Medication 400 MILLIGRAM(S): at 17:11

## 2022-02-01 RX ADMIN — Medication 60 MILLIGRAM(S): at 12:20

## 2022-02-01 RX ADMIN — MYCOPHENOLATE MOFETIL 1500 MILLIGRAM(S): 250 CAPSULE ORAL at 12:20

## 2022-02-01 RX ADMIN — TIOTROPIUM BROMIDE 1 CAPSULE(S): 18 CAPSULE ORAL; RESPIRATORY (INHALATION) at 12:20

## 2022-02-01 RX ADMIN — Medication 3 MILLILITER(S): at 05:56

## 2022-02-01 RX ADMIN — BUDESONIDE AND FORMOTEROL FUMARATE DIHYDRATE 2 PUFF(S): 160; 4.5 AEROSOL RESPIRATORY (INHALATION) at 17:11

## 2022-02-01 RX ADMIN — Medication 1200 MILLIGRAM(S): at 17:10

## 2022-02-01 RX ADMIN — Medication 20 MILLIEQUIVALENT(S): at 12:20

## 2022-02-01 RX ADMIN — Medication 81 MILLIGRAM(S): at 12:21

## 2022-02-01 NOTE — CONSULT NOTE ADULT - SUBJECTIVE AND OBJECTIVE BOX
Date of Service :   22 @ 15:34  CHIEF COMPLAINT:Patient is a 46y old  Male who presents with a chief complaint of wheezes and shortness of breath (2022 14:08)      HISTORY OF PRESENT ILLNESS:HPI:  45 yo M with a PMH of asthma (never intubated), Lupus (on plaquenil, cellcept-stopped when he tested COVID pos) p/w cough, faituge , SOB x 5 days. States sxs originally started when pt tested COVID pos 22, received MAB infusion on 22. Sxs persisted so called PMD who prescribed cefuroxime which he has been taking with no relief. Called PMD again today who advised presenting to ED. Pt states he feels a spasm in his chest every time he tries to speak full sentences, coughs and then is able to continue talking. States this is causing him a lot of chest tightness.  Denies nausea, vomiting, fever, chills, leg pain/swelling, abd pain, headache, syncope. Nonsmoker  no fever or chills   CTA in ED was neg for PE and did not show pna   pt was persistently tachycardiac and tachypnic in ED however without hypoxia    (2022 21:47)      PAST MEDICAL & SURGICAL HISTORY:  Lupus    Asthma    Hypothyroid    History of cholecystectomy            MEDICATIONS:  aspirin enteric coated 81 milliGRAM(s) Oral daily  enoxaparin Injectable 40 milliGRAM(s) SubCutaneous two times a day  furosemide    Tablet 60 milliGRAM(s) Oral daily  losartan 50 milliGRAM(s) Oral daily    hydroxychloroquine 400 milliGRAM(s) Oral <User Schedule>    albuterol/ipratropium for Nebulization 3 milliLiter(s) Nebulizer every 6 hours  budesonide 160 MICROgram(s)/formoterol 4.5 MICROgram(s) Inhaler 2 Puff(s) Inhalation two times a day  guaiFENesin ER 1200 milliGRAM(s) Oral every 12 hours  tiotropium 18 MICROgram(s) Capsule 1 Capsule(s) Inhalation daily        levothyroxine 125 MICROGram(s) Oral daily  predniSONE   Tablet 30 milliGRAM(s) Oral two times a day    mycophenolate mofetil 1500 milliGRAM(s) Oral daily  potassium chloride    Tablet ER 20 milliEquivalent(s) Oral daily      FAMILY HISTORY:  No pertinent family history in first degree relatives        Non-contributory    SOCIAL HISTORY:    [ ] Tobacco  [ ] Drugs  [ ] Alcohol    Allergies    No Known Allergies    Intolerances    	    REVIEW OF SYSTEMS:  CONSTITUTIONAL: No fever  EYES: No eye pain, visual disturbances, or discharge  ENMT:  No difficulty hearing, tinnitus  NECK: No pain or stiffness  RESPIRATORY: No cough, wheezing,  CARDIOVASCULAR: No chest pain, palpitations, passing out, dizziness, or leg swelling  GASTROINTESTINAL:  No nausea, vomiting, diarrhea or constipation. No melena.  GENITOURINARY: No dysuria, hematuria  NEUROLOGICAL: No stroke like symptoms  SKIN: No burning or lesions   ENDOCRINE: No heat or cold intolerance  MUSCULOSKELETAL: No joint pain or swelling  PSYCHIATRIC: No  anxiety, mood swings  HEME/LYMPH: No bleeding gums  ALLERGY AND IMMUNOLOGIC: No hives or eczema	    All other ROS negative    PHYSICAL EXAM:  T(C): 37.1 (22 @ 14:00), Max: 37.1 (22 @ 14:00)  HR: 75 (22 @ 14:00) (75 - 100)  BP: 135/76 (22 @ 14:00) (119/77 - 135/76)  RR: 18 (22 @ 14:00) (18 - 19)  SpO2: 95% (22 @ 14:00) (95% - 96%)  Wt(kg): --  I&O's Summary    2022 07:01  -  2022 07:00  --------------------------------------------------------  IN: 670 mL / OUT: 1075 mL / NET: -405 mL        Appearance: Normal	  HEENT:   Normal oral mucosa, EOMI	  Cardiovascular:  S1 S2, No JVD,    Respiratory: Lungs clear to auscultation	  Psychiatry: Alert  Gastrointestinal:  Soft, Non-tender, + BS	  Skin: No rashes   Neurologic: Non-focal  Extremities:  No edema  Vascular: Peripheral pulses palpable    	    	  	  CARDIAC MARKERS:  Labs personally reviewed by me                          EKG: Personally reviewed by me -  W/ TWA   Radiology: Personally reviewed by me -   < from: CT Angio Chest PE Protocol w/ IV Cont (22 @ 16:20) >  IMPRESSION:    1.  No main, right or left main, or lobar pulmonary embolism.        < end of copied text >  < from: Xray Chest 1 View AP/PA (22 @ 15:27) >  Impression:    Clear lungs.      < end of copied text >      Assessment /Plan:   45 yo M with a PMH of asthma (never intubated), Lupus p/w cough, faituge , SOB x 5 days. States sxs originally started when pt tested COVID pos 22, received MAB infusion on 22. Sxs persisted so called PMD who prescribed cefuroxime which he has been taking with no relief. Called PMD again today who advised presenting to ED. Pt states he feels a spasm in his chest every time he tries to speak full sentences, coughs and then is able to continue talking. States this is causing him a lot of chest tightness.  Denies nausea, vomiting, fever, chills, leg pain/swelling, abd pain, headache, syncope. Nonsmoker.     Pt reports cardiac w/u stress test and echo 6 months ago and results were reportedly normal. Denies any cardiac history, on Losartan for Lupus per pt. Pt today  reports CP that starts at midsternum, "waves" radiates to the right breast then the left chest. denies any associates sx other than nausea. Pt reports previous episodes of such pain at home. He reports that it is different from CP with cough. He also reports mildly reproductive with pressure. Pt's mother  or cardiac event at age 46. Pt is obese, denies smoking.           Problem/Plan - 1:  ·  Problem: Chest pain   - EKG noted   - Trop 8 was 12 on admin   - sat well on RA   - CT angio w cors ordered   - tele monitor   - CTA negative for PE   - TTE pending     Problem/Plan - 2:  ·  Problem: Asthma exacerbation.   - CXR clear lungs   - c/w duonebs, symbicort and spiriva  - sat well on RA       Problem/Plan - 3:  ·  Problem: 2019 novel coronavirus disease (COVID-19).    pulm/id following  considered asymptomatic  pt received mab earlier in illness.  supportive care     Problem/Plan - 4:  ·  Problem: Essential hypertension? .   -  c/w Losartan  - monitor BP         Taniya Paiz FN-BC   Anjel English DO [facility]  Cardiovascular Medicine  [address] [place] [address] Dr, [address]  Office [phone]  Cell [phone] Date of Service :   22 @ 15:34  CHIEF COMPLAINT:Patient is a 46y old  Male who presents with a chief complaint of wheezes and shortness of breath (2022 14:08)      HISTORY OF PRESENT ILLNESS:HPI:  47 yo M with a PMH of asthma (never intubated), Lupus (on plaquenil, cellcept-stopped when he tested COVID pos) p/w cough, faituge , SOB x 5 days. States sxs originally started when pt tested COVID pos 22, received MAB infusion on 22. Sxs persisted so called PMD who prescribed cefuroxime which he has been taking with no relief. Called PMD again today who advised presenting to ED. Pt states he feels a spasm in his chest every time he tries to speak full sentences, coughs and then is able to continue talking. States this is causing him a lot of chest tightness.  Denies nausea, vomiting, fever, chills, leg pain/swelling, abd pain, headache, syncope. Nonsmoker  no fever or chills   CTA in ED was neg for PE and did not show pna   pt was persistently tachycardiac and tachypnic in ED however without hypoxia    (2022 21:47)      PAST MEDICAL & SURGICAL HISTORY:  Lupus    Asthma    Hypothyroid    History of cholecystectomy            MEDICATIONS:  aspirin enteric coated 81 milliGRAM(s) Oral daily  enoxaparin Injectable 40 milliGRAM(s) SubCutaneous two times a day  furosemide    Tablet 60 milliGRAM(s) Oral daily  losartan 50 milliGRAM(s) Oral daily    hydroxychloroquine 400 milliGRAM(s) Oral <User Schedule>    albuterol/ipratropium for Nebulization 3 milliLiter(s) Nebulizer every 6 hours  budesonide 160 MICROgram(s)/formoterol 4.5 MICROgram(s) Inhaler 2 Puff(s) Inhalation two times a day  guaiFENesin ER 1200 milliGRAM(s) Oral every 12 hours  tiotropium 18 MICROgram(s) Capsule 1 Capsule(s) Inhalation daily        levothyroxine 125 MICROGram(s) Oral daily  predniSONE   Tablet 30 milliGRAM(s) Oral two times a day    mycophenolate mofetil 1500 milliGRAM(s) Oral daily  potassium chloride    Tablet ER 20 milliEquivalent(s) Oral daily      FAMILY HISTORY:  No pertinent family history in first degree relatives        Non-contributory    SOCIAL HISTORY:    [ ] not a smoker    Allergies    No Known Allergies    Intolerances    	    REVIEW OF SYSTEMS:  CONSTITUTIONAL: No fever  EYES: No eye pain, visual disturbances, or discharge  ENMT:  No difficulty hearing, tinnitus  NECK: No pain or stiffness  RESPIRATORY: No cough, wheezing,   CARDIOVASCULAR: + chest pain, no palpitations, passing out, dizziness, or leg swelling  GASTROINTESTINAL:  No nausea, vomiting, diarrhea or constipation. No melena.  GENITOURINARY: No dysuria, hematuria  NEUROLOGICAL: No stroke like symptoms  SKIN: No burning or lesions   ENDOCRINE: No heat or cold intolerance  MUSCULOSKELETAL: No joint pain or swelling  PSYCHIATRIC: No  anxiety, mood swings  HEME/LYMPH: No bleeding gums  ALLERGY AND IMMUNOLOGIC: No hives or eczema	    All other ROS negative    PHYSICAL EXAM:  T(C): 37.1 (22 @ 14:00), Max: 37.1 (22 @ 14:00)  HR: 75 (22 @ 14:00) (75 - 100)  BP: 135/76 (22 @ 14:00) (119/77 - 135/76)  RR: 18 (22 @ 14:00) (18 - 19)  SpO2: 95% (22 @ 14:00) (95% - 96%)  Wt(kg): --  I&O's Summary    2022 07:01  -  2022 07:00  --------------------------------------------------------  IN: 670 mL / OUT: 1075 mL / NET: -405 mL        Appearance: Normal	  HEENT:   Normal oral mucosa, EOMI	  Cardiovascular:  S1 S2, No JVD,    Respiratory: Lungs clear to auscultation	  Psychiatry: Alert  Gastrointestinal:  Soft, Non-tender, + BS	  Skin: No rashes   Neurologic: Non-focal  Extremities:  No edema  Vascular: Peripheral pulses palpable    	    	  	  CARDIAC MARKERS:  Labs personally reviewed by me          EKG: Personally reviewed by me -  W/ TWA   Radiology: Personally reviewed by me -   < from: CT Angio Chest PE Protocol w/ IV Cont (22 @ 16:20) >  IMPRESSION:    1.  No main, right or left main, or lobar pulmonary embolism.        < end of copied text >  < from: Xray Chest 1 View AP/PA (22 @ 15:27) >  Impression:    Clear lungs.      < end of copied text >      Assessment /Plan:   47 yo M with a PMH of asthma (never intubated), Lupus p/w cough, faituge , SOB x 5 days. States sxs originally started when pt tested COVID pos 22, received MAB infusion on 22. Sxs persisted so called PMD who prescribed cefuroxime which he has been taking with no relief. Called PMD again today who advised presenting to ED. Pt states he feels a spasm in his chest every time he tries to speak full sentences, coughs and then is able to continue talking. States this is causing him a lot of chest tightness.  Denies nausea, vomiting, fever, chills, leg pain/swelling, abd pain, headache, syncope. Nonsmoker.     Pt reports cardiac w/u stress test and echo 6 months ago and results were reportedly normal. Denies any cardiac history, on Losartan for Lupus per pt. Pt today  reports CP that starts at midsternum, "waves" radiates to the right breast then the left chest. denies any associates sx other than nausea. Pt reports previous episodes of such pain at home. He reports that it is different from CP with cough. He also reports mildly reproductive with pressure. Pt's mother  or cardiac event at age 46. Pt is obese, denies smoking.         Problem/Plan - 1:  ·  Problem: Chest pain   - EKG noted, non ishcmemic  - Trop 8 was 12 on admin   - states he has 2 diff types of chest pain - one that is reproducible 2/2 MSK pain from severe cough and another that is pressure like and new   - given persistent chest pain with strong family history of CAD will pursue CT angio heart coronaries to define cor anatomy  - he is tachycardiac that may preclude CT heart -- will hold Prednisone in AM as will exacerbate tachy, hold Losartan in AM as well to allow more room for beta blocker to reduce HR  - tele monitor   - PE ruled out  - TTE pending     Problem/Plan - 2:  ·  Problem: Asthma exacerbation.   - CXR clear lungs   - c/w duonebs, symbicort and spiriva  - sat well on RA       Problem/Plan - 3:  ·  Problem: 2019 novel coronavirus disease (COVID-19).    pulm/id following  considered asymptomatic  pt received mab earlier in illness.  supportive care     Problem/Plan - 4:  ·  Problem: Essential hypertension? .   -  c/w Losartan, hold for CT heart  - monitor BP       Advanced care planning/advanced directives discussed with patient/family. DNR status including forceful chest compressions to attempt to restart the heart, ventilator support/artificial breathing, electric shock, artificial nutrition, health care proxy, Molst form all discussed with pt. Pt wishes to consider.  More than fifteen minutes spent on discussing advanced directives. OMT on six regions for acute somatic dysfunctions done at the bedside       Taniya Paiz [facility]   Anjel English DO [facility]  Cardiovascular Medicine  [address] [place] [address], [address]  Office [phone]  Cell [phone]

## 2022-02-01 NOTE — PROVIDER CONTACT NOTE (OTHER) - REASON
COVID Isolation Discontinuation
Pt refusing Cellcept at this time and pt stated he takes Plaquenil 400mg during the afternoon
Pt complaining of chest pain associated with coughing and ambulation

## 2022-02-01 NOTE — PROGRESS NOTE ADULT - SUBJECTIVE AND OBJECTIVE BOX
Follow-up Pulm Progress Note    C/o chest pain again this AM  +WAITE but improving. SOB worse when CP occuring   sats 96% RA    Medications:  MEDICATIONS  (STANDING):  acetaminophen   IVPB .. 1000 milliGRAM(s) IV Intermittent once  albuterol/ipratropium for Nebulization 3 milliLiter(s) Nebulizer every 6 hours  aspirin enteric coated 81 milliGRAM(s) Oral daily  budesonide 160 MICROgram(s)/formoterol 4.5 MICROgram(s) Inhaler 2 Puff(s) Inhalation two times a day  enoxaparin Injectable 40 milliGRAM(s) SubCutaneous two times a day  furosemide    Tablet 60 milliGRAM(s) Oral daily  guaiFENesin ER 1200 milliGRAM(s) Oral every 12 hours  hydroxychloroquine 400 milliGRAM(s) Oral <User Schedule>  levothyroxine 125 MICROGram(s) Oral daily  losartan 50 milliGRAM(s) Oral daily  methylPREDNISolone sodium succinate Injectable 20 milliGRAM(s) IV Push every 8 hours  mycophenolate mofetil 1500 milliGRAM(s) Oral daily  potassium chloride    Tablet ER 20 milliEquivalent(s) Oral daily  tiotropium 18 MICROgram(s) Capsule 1 Capsule(s) Inhalation daily          Vital Signs Last 24 Hrs  T(C): 36.7 (01 Feb 2022 05:53), Max: 36.7 (31 Jan 2022 12:58)  T(F): 98 (01 Feb 2022 05:53), Max: 98 (31 Jan 2022 12:58)  HR: 90 (01 Feb 2022 05:53) (90 - 100)  BP: 119/77 (01 Feb 2022 05:53) (119/77 - 136/89)  BP(mean): --  RR: 19 (01 Feb 2022 05:53) (18 - 19)  SpO2: 96% (01 Feb 2022 05:53) (95% - 96%)          01-31 @ 07:01  -  02-01 @ 07:00  --------------------------------------------------------  IN: 670 mL / OUT: 1075 mL / NET: -405 mL          Physical Examination:  PULM: Decreased air entry  CVS: RRR    RADIOLOGY REVIEWED    CT chest: < from: CT Angio Chest PE Protocol w/ IV Cont (01.26.22 @ 16:20) >  FINDINGS:    PULMONARY ANGIOGRAM: No main, right or left main, or lobar pulmonary   embolism. Limited evaluation of the segmental and subsegmental arteries   secondary to transient interruption of the contrast bolus.    LYMPH NODES: No lymphadenopathy. The thyroid gland is normal. Hiatal   hernia.    HEART/VASCULATURE: The heart size is normal. No pericardial effusion.    AIRWAYS/LUNGS/PLEURA: Patent central airways. The lungs are clear. No   pleural effusion or pneumothorax.    UPPER ABDOMEN: Cholecystectomy.    BONES/SOFT TISSUES: Mild degenerative changes of the spine.    IMPRESSION:    1.  No main, right or left main, or lobar pulmonary embolism.    < end of copied text >

## 2022-02-01 NOTE — CONSULT NOTE ADULT - ATTENDING COMMENTS
Pt care and plan discussed and reviewed with NP. Plan as outlined above edited by me to reflect our discussion.  Plan of care discussed with patient after the evaluation. Patient expresses clear understanding and satisfaction with the plan of care. OMT on six regions for acute somatic dysfunctions done at the bedside. One hundred ten minutes spent on encounter, of which more than fifty percent of the encounter was spent on counseling and/or coordinating care by the attending physician.
Appears to be having a lupus flare in setting of being off all meds for Covid infection earlier this month, no current infectious sx tho PCR remains +, exam more consistent with asthma exacerbation, increasing dose of steroids per pulm for this as minimal improvement. Some diffuse arthralgias, costochondral TTP and ? pleurisy complicating picture. W/u as above, agree with increased dose of steroids, will resume 1/2 dose of cellcept then if tolerating resume full dose in a few days and see how to get him Benlysta as he missed his Sung dose.
Maintain sat>92% w 02 if needed

## 2022-02-01 NOTE — PROGRESS NOTE ADULT - PROBLEM SELECTOR PLAN 1
pt with c/o worsening SOB, cough, wheezing since dx COVID a few weeks ago. Reportedly tachycardic and tachypneic in ED - physical exam noted for decreased BS.   -CTA chest neg PE, no PNA   -Seems to have better air movement at this time. No audible wheeze but still with c/o chest tightness and WAITE  -C/w Solumedrol 20 mg IVP q8h for now, will re-evaluate later this afternoon/tomorrow AM   -Normoxic, keep sats >90% with O2 PRN  -Symbicort BID, Spiriva qd (home med: Trelegy)  -Duoneb q6h  -Mucinex 1200 mg BID x5 days.

## 2022-02-01 NOTE — PROGRESS NOTE ADULT - PROBLEM SELECTOR PLAN 2
On Plaquenil and Cellcept as well as chronic steroids.   c/w Plaquenil, cellcept to be resumed as per rheum recs  appreciate rheum eval On Plaquenil and Cellcept as well as chronic steroids.   c/w Plaquenil, cellcept to be resumed at half dose as per rheum recs  monitor for the next few days and uptitrate to full dose pending clinical course   ?inpatient Benlysta infusion as pt missed his outpt January dose, f/u rheum

## 2022-02-01 NOTE — PROGRESS NOTE ADULT - PROBLEM SELECTOR PLAN 1
Still states that he remains tight. No wheezing on exam.   Imaging negative for PNA or PE  c/w duonebs, symbicort and spiriva  f/u further pulm recommendations  c/w steroids for now  chest tightness - trop negative, echo is pending   cards eval   will check ABG   discussed with Dr. Gonzales and with Dr. Roberts, patient's outpt physicians. C/w current workup and management per my discussion with them   d/c IV steroids and start prednisone today as per pulm Still states that he remains tight. No wheezing on exam.   Imaging negative for PNA or PE  c/w duonebs, symbicort and spiriva  chest tightness - trop negative, echo is pending   cards eval   will check ABG   discussed with Dr. Gonzales and with Dr. Roberts, patient's outpt physicians. C/w current workup and management per my discussion with them   d/c IV steroids and start prednisone today as per pulm  ?pleuritis?

## 2022-02-01 NOTE — PROVIDER CONTACT NOTE (OTHER) - ACTION/TREATMENT ORDERED:
Pt had covid on Jan 5th, 22, no need for further isolation.
MINOR Cam aware of patient's request not to take Cellcept and also patient's request to take 400mg of Plaquenil once daily in the afternoon. Will continue to monitor
Provider notified. Will discuss with Dr Trinh and will follow up.

## 2022-02-01 NOTE — PROGRESS NOTE ADULT - SUBJECTIVE AND OBJECTIVE BOX
Patient is a 46y old  Male who presents with a chief complaint of wheezes and shortness of breath (31 Jan 2022 14:08)    SUBJECTIVE / OVERNIGHT EVENTS: pt reporting chest pain this morning, central chest area, now improved     MEDICATIONS  (STANDING):  albuterol/ipratropium for Nebulization 3 milliLiter(s) Nebulizer every 6 hours  aspirin enteric coated 81 milliGRAM(s) Oral daily  budesonide 160 MICROgram(s)/formoterol 4.5 MICROgram(s) Inhaler 2 Puff(s) Inhalation two times a day  enoxaparin Injectable 40 milliGRAM(s) SubCutaneous two times a day  furosemide    Tablet 60 milliGRAM(s) Oral daily  guaiFENesin ER 1200 milliGRAM(s) Oral every 12 hours  hydroxychloroquine 400 milliGRAM(s) Oral <User Schedule>  levothyroxine 125 MICROGram(s) Oral daily  losartan 50 milliGRAM(s) Oral daily  methylPREDNISolone sodium succinate Injectable 20 milliGRAM(s) IV Push every 8 hours  mycophenolate mofetil 1500 milliGRAM(s) Oral daily  potassium chloride    Tablet ER 20 milliEquivalent(s) Oral daily  tiotropium 18 MICROgram(s) Capsule 1 Capsule(s) Inhalation daily    MEDICATIONS  (PRN):      Vital Signs Last 24 Hrs  T(C): 36.7 (01 Feb 2022 05:53), Max: 36.7 (01 Feb 2022 05:53)  T(F): 98 (01 Feb 2022 05:53), Max: 98 (01 Feb 2022 05:53)  HR: 90 (01 Feb 2022 05:53) (90 - 100)  BP: 119/77 (01 Feb 2022 05:53) (119/77 - 128/83)  BP(mean): --  RR: 19 (01 Feb 2022 05:53) (18 - 19)  SpO2: 96% (01 Feb 2022 05:53) (95% - 96%)  CAPILLARY BLOOD GLUCOSE        I&O's Summary    31 Jan 2022 07:01  -  01 Feb 2022 07:00  --------------------------------------------------------  IN: 670 mL / OUT: 1075 mL / NET: -405 mL        PHYSICAL EXAM:  GENERAL: NAD  EYES:  conjunctiva and sclera clear  CHEST/LUNG: no wheezing noted   HEART: +S1/S2   ABDOMEN: Soft, Nontender, Nondistended  EXTREMITIES: no LE edema   PSYCH: AAOx3

## 2022-02-01 NOTE — PROVIDER CONTACT NOTE (OTHER) - BACKGROUND
Pt C/O same symptoms yesterday 1/31 where an EKG was done along with blood draw for troponin level.
Pt admitted for SOB x5 days and chest tightness

## 2022-02-02 LAB — SARS-COV-2 RNA SPEC QL NAA+PROBE: SIGNIFICANT CHANGE UP

## 2022-02-02 PROCEDURE — 93306 TTE W/DOPPLER COMPLETE: CPT | Mod: 26

## 2022-02-02 PROCEDURE — 99232 SBSQ HOSP IP/OBS MODERATE 35: CPT | Mod: 25

## 2022-02-02 RX ORDER — BENZOCAINE AND MENTHOL 5; 1 G/100ML; G/100ML
1 LIQUID ORAL
Refills: 0 | Status: DISCONTINUED | OUTPATIENT
Start: 2022-02-02 | End: 2022-02-05

## 2022-02-02 RX ADMIN — TIOTROPIUM BROMIDE 1 CAPSULE(S): 18 CAPSULE ORAL; RESPIRATORY (INHALATION) at 13:08

## 2022-02-02 RX ADMIN — BUDESONIDE AND FORMOTEROL FUMARATE DIHYDRATE 2 PUFF(S): 160; 4.5 AEROSOL RESPIRATORY (INHALATION) at 17:20

## 2022-02-02 RX ADMIN — Medication 1200 MILLIGRAM(S): at 05:17

## 2022-02-02 RX ADMIN — Medication 30 MILLIGRAM(S): at 05:17

## 2022-02-02 RX ADMIN — MYCOPHENOLATE MOFETIL 1500 MILLIGRAM(S): 250 CAPSULE ORAL at 13:08

## 2022-02-02 RX ADMIN — BUDESONIDE AND FORMOTEROL FUMARATE DIHYDRATE 2 PUFF(S): 160; 4.5 AEROSOL RESPIRATORY (INHALATION) at 05:17

## 2022-02-02 RX ADMIN — Medication 81 MILLIGRAM(S): at 13:08

## 2022-02-02 RX ADMIN — Medication 3 MILLILITER(S): at 05:17

## 2022-02-02 RX ADMIN — ENOXAPARIN SODIUM 40 MILLIGRAM(S): 100 INJECTION SUBCUTANEOUS at 17:20

## 2022-02-02 RX ADMIN — Medication 3 MILLILITER(S): at 13:08

## 2022-02-02 RX ADMIN — Medication 30 MILLIGRAM(S): at 17:20

## 2022-02-02 RX ADMIN — ENOXAPARIN SODIUM 40 MILLIGRAM(S): 100 INJECTION SUBCUTANEOUS at 05:17

## 2022-02-02 RX ADMIN — Medication 1200 MILLIGRAM(S): at 17:19

## 2022-02-02 RX ADMIN — Medication 20 MILLIEQUIVALENT(S): at 13:07

## 2022-02-02 RX ADMIN — Medication 400 MILLIGRAM(S): at 17:19

## 2022-02-02 RX ADMIN — Medication 3 MILLILITER(S): at 17:20

## 2022-02-02 RX ADMIN — BENZOCAINE AND MENTHOL 1 LOZENGE: 5; 1 LIQUID ORAL at 18:27

## 2022-02-02 RX ADMIN — Medication 125 MICROGRAM(S): at 05:17

## 2022-02-02 RX ADMIN — Medication 60 MILLIGRAM(S): at 13:08

## 2022-02-02 RX ADMIN — Medication 3 MILLILITER(S): at 23:15

## 2022-02-02 NOTE — PROGRESS NOTE ADULT - SUBJECTIVE AND OBJECTIVE BOX
DATE OF SERVICE: 22      Patient is a 46y old  Male who presents with a chief complaint of WAITE, COVID19 (2022 15:33)      INTERVAL HISTORY: feels ok  REVIEW OF SYSTEMS:  CONSTITUTIONAL: No weakness  EYES/ENT: No visual changes;  No throat pain   NECK: No pain or stiffness  RESPIRATORY: No cough, wheezing; No shortness of breath  CARDIOVASCULAR: No chest pain or palpitations  GASTROINTESTINAL: No abdominal  pain. No nausea, vomiting, or hematemesis  GENITOURINARY: No dysuria, frequency or hematuria  NEUROLOGICAL: No stroke like symptoms  SKIN: No rashes        PHYSICAL EXAM:  T(C): 36.8 (22 @ 22:11), Max: 37.1 (22 @ 13:27)  HR: 95 (22 @ 22:11) (87 - 110)  BP: 111/77 (22 @ 22:11) (111/77 - 132/90)  RR: 17 (22 @ 22:11) (17 - 19)  SpO2: 95% (22 @ 22:11) (95% - 96%)  Wt(kg): --  I&O's Summary    2022 07:  -  2022 07:00  --------------------------------------------------------  IN: 480 mL / OUT: 1050 mL / NET: -570 mL    2022 07:01  -  2022 22:31  --------------------------------------------------------  IN: 1700 mL / OUT: 400 mL / NET: 1300 mL          Appearance: In no distress	  HEENT:    PERRL, EOMI	  Cardiovascular:  S1 S2, No JVD  Respiratory: Lungs clear to auscultation	  Gastrointestinal:  Soft, Non-tender, + BS	  Vascularature:  No edema of LE  Psychiatric: Appropriate affect   Neuro: no acute focal deficits           Labs personally reviewed      EKG: Personally reviewed by me -  110 W/ TWA   Radiology: Personally reviewed by me -   < from: CT Angio Chest PE Protocol w/ IV Cont (22 @ 16:20) >  IMPRESSION:    1.  No main, right or left main, or lobar pulmonary embolism.        < end of copied text >  < from: Xray Chest 1 View AP/PA (22 @ 15:27) >  Impression:    Clear lungs.      < end of copied text >      < from: TTE with Doppler (w/Cont) (22 @ 09:08) >  Conclusions:  1. Normal mitral valve. Minimal mitral regurgitation.  2. Aortic valve not well visualized; appears to be a normal  trileaflet valve with normal leaflet opening. No aortic  valve regurgitation seen.  3. Endocardial visualization enhanced with intravenous  injection of Ultrasonic Enhancing Agent (Definity). Normal  left ventricular systolic function. No segmental wall  motion abnormalities.  4. The right ventricle is not well visualized; grossly  normal right ventricular size and systolic function.    < end of copied text >      Assessment /Plan:   45 yo M with a PMH of asthma (never intubated), Lupus p/w cough, faituge , SOB x 5 days. States sxs originally started when pt tested COVID pos 22, received MAB infusion on 22. Sxs persisted so called PMD who prescribed cefuroxime which he has been taking with no relief. Called PMD again today who advised presenting to ED. Pt states he feels a spasm in his chest every time he tries to speak full sentences, coughs and then is able to continue talking. States this is causing him a lot of chest tightness.  Denies nausea, vomiting, fever, chills, leg pain/swelling, abd pain, headache, syncope. Nonsmoker.   2/  Pt reports cardiac w/u stress test and echo 6 months ago and results were reportedly normal. Denies any cardiac history, on Losartan for Lupus per pt. Pt today  reports CP that starts at midsternum, "waves" radiates to the right breast then the left chest. denies any associates sx other than nausea. Pt reports previous episodes of such pain at home. He reports that it is different from CP with cough. He also reports mildly reproductive with pressure. Pt's mother  or cardiac event at age 46. Pt is obese, denies smoking.         Problem/Plan - 1:  ·  Problem: Chest pain   - EKG noted, non ishcmemic  - Trop 8 was 12 on admin   - states he has 2 diff types of chest pain - one that is reproducible 2/2 MSK pain from severe cough and another that is pressure like and new   - given persistent chest pain with strong family history of CAD will pursue CT angio heart coronaries to define cor anatomy  - he is tachycardiac that may preclude CT heart -- will hold Prednisone in AM as will exacerbate tachy, hold Losartan and Lasix in AM as well to allow more room for beta blocker to reduce HR  - tele monitor   - PE ruled out  - TTE unremarkable    Problem/Plan - 2:  ·  Problem: Asthma exacerbation.   - CXR clear lungs   - c/w duonebs, symbicort and spiriva  - sat well on RA       Problem/Plan - 3:  ·  Problem: 2019 novel coronavirus disease (COVID-19).    pulm/id following  considered asymptomatic  pt received mab earlier in illness.  supportive care     Problem/Plan - 4:  ·  Problem: Essential hypertension? .   -  c/w Losartan, hold for CT heart  - monitor BP     Thirty five minutes spent on encounter, of which more than fifty percent of the encounter was spent on counseling and/or coordinating care by the attending physician.      Anjel English DO [facility]  Cardiovascular Medicine  [address] [place] [address], [address]  Office: [phone]  Cell: [phone]

## 2022-02-02 NOTE — PROGRESS NOTE ADULT - SUBJECTIVE AND OBJECTIVE BOX
Follow-up Pulm Progress Note    Dyspnea and cough improving  Reports intermittent episodes of chest pressure  O2 sats 97% on RA    Medications:  MEDICATIONS  (STANDING):  albuterol/ipratropium for Nebulization 3 milliLiter(s) Nebulizer every 6 hours  aspirin enteric coated 81 milliGRAM(s) Oral daily  budesonide 160 MICROgram(s)/formoterol 4.5 MICROgram(s) Inhaler 2 Puff(s) Inhalation two times a day  enoxaparin Injectable 40 milliGRAM(s) SubCutaneous two times a day  furosemide    Tablet 60 milliGRAM(s) Oral daily  guaiFENesin ER 1200 milliGRAM(s) Oral every 12 hours  hydroxychloroquine 400 milliGRAM(s) Oral <User Schedule>  levothyroxine 125 MICROGram(s) Oral daily  losartan 50 milliGRAM(s) Oral daily  mycophenolate mofetil 1500 milliGRAM(s) Oral daily  potassium chloride    Tablet ER 20 milliEquivalent(s) Oral daily  predniSONE   Tablet 30 milliGRAM(s) Oral two times a day  tiotropium 18 MICROgram(s) Capsule 1 Capsule(s) Inhalation daily    Vital Signs Last 24 Hrs  T(C): 36.4 (02 Feb 2022 06:41), Max: 37.1 (01 Feb 2022 14:00)  T(F): 97.5 (02 Feb 2022 06:41), Max: 98.8 (01 Feb 2022 14:00)  HR: 110 (02 Feb 2022 06:41) (75 - 110)  BP: 127/85 (02 Feb 2022 06:41) (105/69 - 135/76)  BP(mean): --  RR: 19 (02 Feb 2022 06:41) (18 - 19)  SpO2: 96% (02 Feb 2022 06:41) (95% - 96%)    02-01 @ 07:01  -  02-02 @ 07:00  --------------------------------------------------------  IN: 480 mL / OUT: 1050 mL / NET: -570 mL    LABS:    CARDIAC MARKERS ( 01 Feb 2022 13:40 )  x     / x     / 59 U/L / x     / 1.5 ng/mL    CAPILLARY BLOOD GLUCOSE    Physical Examination:  PULM: Decreased air entry  CVS: RRR    RADIOLOGY REVIEWED    CT chest: < from: CT Angio Chest PE Protocol w/ IV Cont (01.26.22 @ 16:20) >  FINDINGS:    PULMONARY ANGIOGRAM: No main, right or left main, or lobar pulmonary   embolism. Limited evaluation of the segmental and subsegmental arteries   secondary to transient interruption of the contrast bolus.    LYMPH NODES: No lymphadenopathy. The thyroid gland is normal. Hiatal   hernia.    HEART/VASCULATURE: The heart size is normal. No pericardial effusion.    AIRWAYS/LUNGS/PLEURA: Patent central airways. The lungs are clear. No   pleural effusion or pneumothorax.    UPPER ABDOMEN: Cholecystectomy.    BONES/SOFT TISSUES: Mild degenerative changes of the spine.    IMPRESSION:    1.  No main, right or left main, or lobar pulmonary embolism.        --- End of Report ---    < end of copied text >

## 2022-02-02 NOTE — PROGRESS NOTE ADULT - PROBLEM SELECTOR PLAN 1
pt with c/o worsening SOB, cough, wheezing since dx COVID a few weeks ago. Reportedly tachycardic and tachypneic in ED - physical exam noted for decreased BS.   -CTA chest neg PE, no PNA   -Seems to have better air movement at this time. No audible wheeze but still with c/o chest tightness and WAITE  -C/w Prednisone 30 mg PO BID for now, will re-evaluate later this afternoon/tomorrow AM regarding further taper  -Normoxic, keep sats >90% with O2 PRN  -Symbicort BID, Spiriva qd (home med: Trelegy)  -Duoneb q6h  -Mucinex 1200 mg BID x5 days.

## 2022-02-02 NOTE — PROGRESS NOTE ADULT - PROBLEM SELECTOR PLAN 2
On Plaquenil and Cellcept as well as chronic steroids.   c/w Plaquenil, cellcept to be resumed at half dose as per rheum recs  monitor for the next few days and uptitrate to full dose pending clinical course   pt tp get Benlysta infusion as an outpt, as pt missed his outpt January dose, f/u rheum

## 2022-02-02 NOTE — PROGRESS NOTE ADULT - SUBJECTIVE AND OBJECTIVE BOX
Patient is a 46y old  Male who presents with a chief complaint of RAVINDRA, COVID19 (01 Feb 2022 15:33)    SUBJECTIVE / OVERNIGHT EVENTS: pt had chest tightness again this morning    MEDICATIONS  (STANDING):  albuterol/ipratropium for Nebulization 3 milliLiter(s) Nebulizer every 6 hours  aspirin enteric coated 81 milliGRAM(s) Oral daily  budesonide 160 MICROgram(s)/formoterol 4.5 MICROgram(s) Inhaler 2 Puff(s) Inhalation two times a day  enoxaparin Injectable 40 milliGRAM(s) SubCutaneous two times a day  guaiFENesin ER 1200 milliGRAM(s) Oral every 12 hours  hydroxychloroquine 400 milliGRAM(s) Oral <User Schedule>  levothyroxine 125 MICROGram(s) Oral daily  mycophenolate mofetil 1500 milliGRAM(s) Oral daily  potassium chloride    Tablet ER 20 milliEquivalent(s) Oral daily  predniSONE   Tablet 30 milliGRAM(s) Oral two times a day  tiotropium 18 MICROgram(s) Capsule 1 Capsule(s) Inhalation daily    MEDICATIONS  (PRN):      Vital Signs Last 24 Hrs  T(C): 37.1 (02 Feb 2022 13:27), Max: 37.1 (02 Feb 2022 13:27)  T(F): 98.7 (02 Feb 2022 13:27), Max: 98.7 (02 Feb 2022 13:27)  HR: 87 (02 Feb 2022 13:27) (87 - 110)  BP: 132/90 (02 Feb 2022 13:27) (105/69 - 132/90)  BP(mean): --  RR: 18 (02 Feb 2022 13:27) (18 - 19)  SpO2: 96% (02 Feb 2022 13:27) (95% - 96%)  CAPILLARY BLOOD GLUCOSE        I&O's Summary    01 Feb 2022 07:01  -  02 Feb 2022 07:00  --------------------------------------------------------  IN: 480 mL / OUT: 1050 mL / NET: -570 mL    02 Feb 2022 07:01  -  02 Feb 2022 14:41  --------------------------------------------------------  IN: 500 mL / OUT: 400 mL / NET: 100 mL        PHYSICAL EXAM:  GENERAL: NAD  EYES: conjunctiva and sclera clear  NECK: Supple, No JVD  CHEST/LUNG: no wheezing noted   HEART: +s1/s2   ABDOMEN: Soft, Nontender, Nondistended  EXTREMITIES:  trace b/l LE edema   PSYCH: AAOx3              CARDIAC MARKERS ( 01 Feb 2022 13:40 )  x     / x     / 59 U/L / x     / 1.5 ng/mL      Consultant(s) Notes Reviewed:  Cardiology

## 2022-02-02 NOTE — CHART NOTE - NSCHARTNOTEFT_GEN_A_CORE
As per cardiology recommended , hold losartan and Lasix in order to make room for CT coronaries . Patient needs HR in low 60s and presently in 100s . As per cardiology recommended , hold losartan and Lasix in order to make room for Beta blocker so patient can have Ct coronaries. Patient needs HR in low 60s and presently in 100s .

## 2022-02-02 NOTE — PROGRESS NOTE ADULT - PROBLEM SELECTOR PLAN 1
Still states that he remains tight. No wheezing on exam.   Imaging negative for PNA or PE  c/w duonebs, symbicort and spiriva  chest tightness - trop negative, echo is pending   cards eval   will check ABG   discussed with Dr. Gonzales and with Dr. Roberts, patient's outpt physicians. C/w current workup and management per my discussion with them   c/w prednisone  appreciate cardiology eval for chest discomfort/chest pain, planning for CT coronaries. D/Rj lorsartan and lasix to make room for BB to be given tomorrow morning in preparation for the test   echo unremarkable

## 2022-02-03 ENCOUNTER — APPOINTMENT (OUTPATIENT)
Dept: RHEUMATOLOGY | Facility: CLINIC | Age: 47
End: 2022-02-03

## 2022-02-03 LAB
APPEARANCE UR: CLEAR — SIGNIFICANT CHANGE UP
BILIRUB UR-MCNC: NEGATIVE — SIGNIFICANT CHANGE UP
COLOR SPEC: SIGNIFICANT CHANGE UP
CREAT ?TM UR-MCNC: 63 MG/DL — SIGNIFICANT CHANGE UP
DIFF PNL FLD: NEGATIVE — SIGNIFICANT CHANGE UP
DSDNA AB SER-ACNC: <12 IU/ML — SIGNIFICANT CHANGE UP
GLUCOSE UR QL: ABNORMAL
KETONES UR-MCNC: NEGATIVE — SIGNIFICANT CHANGE UP
LEUKOCYTE ESTERASE UR-ACNC: NEGATIVE — SIGNIFICANT CHANGE UP
NITRITE UR-MCNC: NEGATIVE — SIGNIFICANT CHANGE UP
PH UR: 6.5 — SIGNIFICANT CHANGE UP (ref 5–8)
PROT ?TM UR-MCNC: 8 MG/DL — SIGNIFICANT CHANGE UP (ref 0–12)
PROT UR-MCNC: SIGNIFICANT CHANGE UP
PROT/CREAT UR-RTO: 0.1 RATIO — SIGNIFICANT CHANGE UP (ref 0–0.2)
SP GR SPEC: 1.02 — SIGNIFICANT CHANGE UP (ref 1.01–1.02)
UROBILINOGEN FLD QL: NEGATIVE — SIGNIFICANT CHANGE UP

## 2022-02-03 PROCEDURE — 99232 SBSQ HOSP IP/OBS MODERATE 35: CPT

## 2022-02-03 PROCEDURE — 99232 SBSQ HOSP IP/OBS MODERATE 35: CPT | Mod: GC

## 2022-02-03 PROCEDURE — 75574 CT ANGIO HRT W/3D IMAGE: CPT | Mod: 26

## 2022-02-03 RX ORDER — ACETAMINOPHEN 500 MG
650 TABLET ORAL ONCE
Refills: 0 | Status: COMPLETED | OUTPATIENT
Start: 2022-02-03 | End: 2022-02-03

## 2022-02-03 RX ORDER — DIPHENHYDRAMINE HCL 50 MG
25 CAPSULE ORAL ONCE
Refills: 0 | Status: COMPLETED | OUTPATIENT
Start: 2022-02-03 | End: 2022-02-03

## 2022-02-03 RX ORDER — METOPROLOL TARTRATE 50 MG
50 TABLET ORAL ONCE
Refills: 0 | Status: COMPLETED | OUTPATIENT
Start: 2022-02-03 | End: 2022-02-03

## 2022-02-03 RX ORDER — MYCOPHENOLATE MOFETIL 250 MG/1
1500 CAPSULE ORAL
Refills: 0 | Status: DISCONTINUED | OUTPATIENT
Start: 2022-02-04 | End: 2022-02-05

## 2022-02-03 RX ORDER — FLUCONAZOLE 150 MG/1
200 TABLET ORAL DAILY
Refills: 0 | Status: DISCONTINUED | OUTPATIENT
Start: 2022-02-04 | End: 2022-02-05

## 2022-02-03 RX ORDER — FLUCONAZOLE 150 MG/1
400 TABLET ORAL ONCE
Refills: 0 | Status: COMPLETED | OUTPATIENT
Start: 2022-02-03 | End: 2022-02-03

## 2022-02-03 RX ORDER — IPRATROPIUM BROMIDE 0.2 MG/ML
500 SOLUTION, NON-ORAL INHALATION EVERY 6 HOURS
Refills: 0 | Status: DISCONTINUED | OUTPATIENT
Start: 2022-02-03 | End: 2022-02-03

## 2022-02-03 RX ORDER — ALBUTEROL 90 UG/1
1.25 AEROSOL, METERED ORAL EVERY 6 HOURS
Refills: 0 | Status: DISCONTINUED | OUTPATIENT
Start: 2022-02-03 | End: 2022-02-03

## 2022-02-03 RX ORDER — IPRATROPIUM/ALBUTEROL SULFATE 18-103MCG
3 AEROSOL WITH ADAPTER (GRAM) INHALATION EVERY 6 HOURS
Refills: 0 | Status: DISCONTINUED | OUTPATIENT
Start: 2022-02-03 | End: 2022-02-05

## 2022-02-03 RX ORDER — LOSARTAN POTASSIUM 100 MG/1
25 TABLET, FILM COATED ORAL DAILY
Refills: 0 | Status: DISCONTINUED | OUTPATIENT
Start: 2022-02-03 | End: 2022-02-05

## 2022-02-03 RX ORDER — FUROSEMIDE 40 MG
60 TABLET ORAL DAILY
Refills: 0 | Status: DISCONTINUED | OUTPATIENT
Start: 2022-02-03 | End: 2022-02-05

## 2022-02-03 RX ADMIN — Medication 125 MICROGRAM(S): at 05:49

## 2022-02-03 RX ADMIN — MYCOPHENOLATE MOFETIL 1500 MILLIGRAM(S): 250 CAPSULE ORAL at 12:37

## 2022-02-03 RX ADMIN — Medication 3 MILLILITER(S): at 15:02

## 2022-02-03 RX ADMIN — Medication 3 MILLILITER(S): at 05:49

## 2022-02-03 RX ADMIN — ENOXAPARIN SODIUM 40 MILLIGRAM(S): 100 INJECTION SUBCUTANEOUS at 05:49

## 2022-02-03 RX ADMIN — Medication 650 MILLIGRAM(S): at 21:55

## 2022-02-03 RX ADMIN — Medication 400 MILLIGRAM(S): at 17:54

## 2022-02-03 RX ADMIN — Medication 1200 MILLIGRAM(S): at 17:54

## 2022-02-03 RX ADMIN — Medication 3 MILLILITER(S): at 22:29

## 2022-02-03 RX ADMIN — Medication 81 MILLIGRAM(S): at 11:16

## 2022-02-03 RX ADMIN — Medication 30 MILLIGRAM(S): at 14:52

## 2022-02-03 RX ADMIN — BUDESONIDE AND FORMOTEROL FUMARATE DIHYDRATE 2 PUFF(S): 160; 4.5 AEROSOL RESPIRATORY (INHALATION) at 17:51

## 2022-02-03 RX ADMIN — Medication 30 MILLIGRAM(S): at 17:58

## 2022-02-03 RX ADMIN — BUDESONIDE AND FORMOTEROL FUMARATE DIHYDRATE 2 PUFF(S): 160; 4.5 AEROSOL RESPIRATORY (INHALATION) at 05:49

## 2022-02-03 RX ADMIN — TIOTROPIUM BROMIDE 1 CAPSULE(S): 18 CAPSULE ORAL; RESPIRATORY (INHALATION) at 15:58

## 2022-02-03 RX ADMIN — Medication 1200 MILLIGRAM(S): at 05:49

## 2022-02-03 RX ADMIN — Medication 20 MILLIEQUIVALENT(S): at 11:16

## 2022-02-03 RX ADMIN — FLUCONAZOLE 400 MILLIGRAM(S): 150 TABLET ORAL at 21:55

## 2022-02-03 RX ADMIN — ENOXAPARIN SODIUM 40 MILLIGRAM(S): 100 INJECTION SUBCUTANEOUS at 17:54

## 2022-02-03 RX ADMIN — Medication 25 MILLIGRAM(S): at 21:56

## 2022-02-03 RX ADMIN — Medication 3 MILLILITER(S): at 17:56

## 2022-02-03 RX ADMIN — Medication 25 MILLIGRAM(S): at 17:49

## 2022-02-03 RX ADMIN — Medication 50 MILLIGRAM(S): at 11:16

## 2022-02-03 RX ADMIN — Medication 650 MILLIGRAM(S): at 22:55

## 2022-02-03 NOTE — PROGRESS NOTE ADULT - SUBJECTIVE AND OBJECTIVE BOX
Date of Service   22 @ 13:56    Patient is a 46y old  Male who presents with a chief complaint of WAITE, ERICID19 (2022 15:33)      INTERVAL HISTORY: pt feels ok     REVIEW OF SYSTEMS:   CONSTITUTIONAL: No weakness  EYES/ENT: No visual changes; No throat pain  Neck: No pain or stiffness  Respiratory: No cough, wheezing, No shortness of breath  CARDIOVASCULAR: no chest pain or palpitations  GASTROINTESTINAL: No abdominal pain, no nausea, vomiting or hematemesis  GENITOURINARY: No dysuria, frequency or hematuria  NEUROLOGICAL: No stroke like symptoms  SKIN: No rashes    	  MEDICATIONS:        PHYSICAL EXAM:  T(C): 36.4 (22 @ 13:18), Max: 36.8 (22 @ 22:11)  HR: 89 (22 @ 13:18) (89 - 102)  BP: 116/81 (22 @ 13:18) (111/77 - 119/74)  RR: 18 (22 @ 13:18) (17 - 18)  SpO2: 95% (22 @ 13:18) (94% - 95%)  Wt(kg): --  I&O's Summary    2022 07:01  -  2022 07:00  --------------------------------------------------------  IN: 1940 mL / OUT: 1200 mL / NET: 740 mL          Appearance: In no distress	  HEENT:    PERRL, EOMI	  Cardiovascular:  S1 S2, No JVD  Respiratory: Lungs clear to auscultation	  Gastrointestinal:  Soft, Non-tender, + BS	  Vascularature:  No edema of LE  Psychiatric: Appropriate affect   Neuro: no acute focal deficits                     Labs personally reviewed      ASSESSMENT/PLAN: 	  45 yo M with a PMH of asthma (never intubated), Lupus p/w cough, faituge , SOB x 5 days. States sxs originally started when pt tested COVID pos 22, received MAB infusion on 22. Sxs persisted so called PMD who prescribed cefuroxime which he has been taking with no relief. Called PMD again today who advised presenting to ED. Pt states he feels a spasm in his chest every time he tries to speak full sentences, coughs and then is able to continue talking. States this is causing him a lot of chest tightness.  Denies nausea, vomiting, fever, chills, leg pain/swelling, abd pain, headache, syncope. Nonsmoker.     Pt reports cardiac w/u stress test and echo 6 months ago and results were reportedly normal. Denies any cardiac history, on Losartan for Lupus per pt. Pt today  reports CP that starts at midsternum, "waves" radiates to the right breast then the left chest. denies any associates sx other than nausea. Pt reports previous episodes of such pain at home. He reports that it is different from CP with cough. He also reports mildly reproductive with pressure. Pt's mother  or cardiac event at age 46. Pt is obese, denies smoking.         Problem/Plan - 1:  ·  Problem: Chest pain   - EKG noted, non ishcmemic  - Trop 8 was 12 on admin   - states he has 2 diff types of chest pain - one that is reproducible 2/2 MSK pain from severe cough and another that is pressure like and new   - given persistent chest pain with strong family history of CAD will pursue CT angio heart coronaries to define cor anatomy  - he is tachycardiac that may preclude CT heart -- Prednisone held this AM as will exacerbate tachy, hold Losartan and Lasix as well to allow more room for beta blocker to reduce HR  - tele monitor   - PE ruled out  - TTE unremarkable  - CT angio w cors pending     Problem/Plan - 2:  ·  Problem: Asthma exacerbation.   - CXR clear lungs   - c/w duonebs, symbicort and spiriva  - sat well on RA       Problem/Plan - 3:  ·  Problem: 2019 novel coronavirus disease (COVID-19).    pulm/id following  considered asymptomatic  pt received mab earlier in illness.  supportive care     Problem/Plan - 4:  ·  Problem: Essential hypertension? .   -  c/w Losartan, hold for CT heart  - monitor BP           Taniya MAHARAJ-BC   Anjel English DO [facility]  Cardiovascular Medicine  [address], [address]  Office: [phone]  Cell: [phone] Date of Service   22 @ 13:56    Patient is a 46y old  Male who presents with a chief complaint of WAITE, EIRCID19 (2022 15:33)      INTERVAL HISTORY: pt feels ok     REVIEW OF SYSTEMS:   CONSTITUTIONAL: No weakness  EYES/ENT: No visual changes; No throat pain  Neck: No pain or stiffness  Respiratory: No cough, wheezing, No shortness of breath  CARDIOVASCULAR: no chest pain or palpitations  GASTROINTESTINAL: No abdominal pain, no nausea, vomiting or hematemesis  GENITOURINARY: No dysuria, frequency or hematuria  NEUROLOGICAL: No stroke like symptoms  SKIN: No rashes    	  MEDICATIONS:        PHYSICAL EXAM:  T(C): 36.4 (22 @ 13:18), Max: 36.8 (22 @ 22:11)  HR: 89 (22 @ 13:18) (89 - 102)  BP: 116/81 (22 @ 13:18) (111/77 - 119/74)  RR: 18 (22 @ 13:18) (17 - 18)  SpO2: 95% (22 @ 13:18) (94% - 95%)  Wt(kg): --  I&O's Summary    2022 07:01  -  2022 07:00  --------------------------------------------------------  IN: 1940 mL / OUT: 1200 mL / NET: 740 mL          Appearance: In no distress	  HEENT:    PERRL, EOMI	  Cardiovascular:  S1 S2, No JVD  Respiratory: Lungs clear to auscultation	  Gastrointestinal:  Soft, Non-tender, + BS	  Vascularature:  No edema of LE  Psychiatric: Appropriate affect   Neuro: no acute focal deficits                     Labs personally reviewed      ASSESSMENT/PLAN: 	  47 yo M with a PMH of asthma (never intubated), Lupus p/w cough, faituge , SOB x 5 days. States sxs originally started when pt tested COVID pos 22, received MAB infusion on 22. Sxs persisted so called PMD who prescribed cefuroxime which he has been taking with no relief. Called PMD again today who advised presenting to ED. Pt states he feels a spasm in his chest every time he tries to speak full sentences, coughs and then is able to continue talking. States this is causing him a lot of chest tightness.  Denies nausea, vomiting, fever, chills, leg pain/swelling, abd pain, headache, syncope. Nonsmoker.     Pt reports cardiac w/u stress test and echo 6 months ago and results were reportedly normal. Denies any cardiac history, on Losartan for Lupus per pt. Pt today  reports CP that starts at midsternum, "waves" radiates to the right breast then the left chest. denies any associates sx other than nausea. Pt reports previous episodes of such pain at home. He reports that it is different from CP with cough. He also reports mildly reproductive with pressure. Pt's mother  or cardiac event at age 46. Pt is obese, denies smoking.         Problem/Plan - 1:  ·  Problem: Chest pain   - EKG noted, non ishcmemic  - Trop 8 was 12 on admin   - states he has 2 diff types of chest pain - one that is reproducible 2/2 MSK pain from severe cough and another that is pressure like and new   - given persistent chest pain with strong family history of CAD will pursue CT angio heart coronaries to define cor anatomy  - s/p CT cors with normal coronaries though small in caliber  - resume Losartan, Lasix now post CT heart  - tele monitor   - PE ruled out  - TTE unremarkable    Problem/Plan - 2:  ·  Problem: Asthma exacerbation.   - CXR clear lungs   - c/w duonebs, symbicort and spiriva  - sat well on RA       Problem/Plan - 3:  ·  Problem: 2019 novel coronavirus disease (COVID-19).    pulm/id following  considered asymptomatic  pt received mab earlier in illness.  supportive care     Problem/Plan - 4:  ·  Problem: Essential hypertension .   -  c/w Losartan, Lasix  - monitor BP           Taniya NINA-BC   Anjel English DO [facility]  Cardiovascular Medicine  [address], [address]  Office: [phone]  Cell: [phone]

## 2022-02-03 NOTE — PROGRESS NOTE ADULT - SUBJECTIVE AND OBJECTIVE BOX
Follow-up Pulm Progress Note    Prednisone held this AM per cards  O2 sats 97% on RA  Chest tightness, dyspnea improving     Medications:  MEDICATIONS  (STANDING):  aspirin enteric coated 81 milliGRAM(s) Oral daily  budesonide 160 MICROgram(s)/formoterol 4.5 MICROgram(s) Inhaler 2 Puff(s) Inhalation two times a day  enoxaparin Injectable 40 milliGRAM(s) SubCutaneous two times a day  guaiFENesin ER 1200 milliGRAM(s) Oral every 12 hours  hydroxychloroquine 400 milliGRAM(s) Oral <User Schedule>  ipratropium    for Nebulization 500 MICROGram(s) Nebulizer every 6 hours  levothyroxine 125 MICROGram(s) Oral daily  mycophenolate mofetil 1500 milliGRAM(s) Oral daily  potassium chloride    Tablet ER 20 milliEquivalent(s) Oral daily  predniSONE   Tablet 30 milliGRAM(s) Oral two times a day  tiotropium 18 MICROgram(s) Capsule 1 Capsule(s) Inhalation daily    MEDICATIONS  (PRN):  benzocaine 15 mG/menthol 3.6 mG Lozenge 1 Lozenge Oral five times a day PRN Sore Throat    Vital Signs Last 24 Hrs  T(C): 36.6 (03 Feb 2022 06:41), Max: 37.1 (02 Feb 2022 13:27)  T(F): 97.8 (03 Feb 2022 06:41), Max: 98.7 (02 Feb 2022 13:27)  HR: 102 (03 Feb 2022 06:41) (87 - 102)  BP: 119/74 (03 Feb 2022 06:41) (111/77 - 132/90)  BP(mean): --  RR: 18 (03 Feb 2022 06:41) (17 - 18)  SpO2: 94% (03 Feb 2022 06:41) (94% - 96%)    02-02 @ 07:01  -  02-03 @ 07:00  --------------------------------------------------------  IN: 1940 mL / OUT: 1200 mL / NET: 740 mL    CARDIAC MARKERS ( 01 Feb 2022 13:40 )  x     / x     / 59 U/L / x     / 1.5 ng/mL    CAPILLARY BLOOD GLUCOSE    Physical Examination:  PULM: Decreased air entry, improving   CVS: S1, S2 heard    RADIOLOGY REVIEWED  CT chest: < from: CT Angio Chest PE Protocol w/ IV Cont (01.26.22 @ 16:20) >    FINDINGS:    PULMONARY ANGIOGRAM: No main, right or left main, or lobar pulmonary   embolism. Limited evaluation of the segmental and subsegmental arteries   secondary to transient interruption of the contrast bolus.    LYMPH NODES: No lymphadenopathy. The thyroid gland is normal. Hiatal   hernia.    HEART/VASCULATURE: The heart size is normal. No pericardial effusion.    AIRWAYS/LUNGS/PLEURA: Patent central airways. The lungs are clear. No   pleural effusion or pneumothorax.    UPPER ABDOMEN: Cholecystectomy.    BONES/SOFT TISSUES: Mild degenerative changes of the spine.    IMPRESSION:    1.  No main, right or left main, or lobar pulmonary embolism.        --- End of Report ---    < end of copied text >    < from: TTE with Doppler (w/Cont) (02.02.22 @ 09:08) >  ------------------------------------------------------------------------  PROCEDURE: Transthoracic echocardiogram with 2-D, M-Mode  and complete spectral and color flow Doppler. Verbal  consent was obtained for injection of  Ultrasonic Enhancing  Agent following a discussion of risks and benefits.  Following intravenous injection of Ultrasonic Enhancing  Agent , harmonic imaging was performed.  INDICATION: Dyspnea, unspecified (R06.00)  ------------------------------------------------------------------------  Dimensions:    Normal Values:  LA:     3.6    2.0 - 4.0 cm  Ao:     3.9    2.0 - 3.8 cm  SEPTUM: 1.2    0.6 - 1.2 cm  PWT:    0.8    0.6 - 1.1 cm  LVIDd:  3.7    3.0 - 5.6 cm  LVIDs:  2.5    1.8 - 4.0 cm  Derived variables:  LVMI: 49 g/m2  RWT: 0.43  Fractional short: 32 %  EF (Visual Estimate): 55-60 %  ------------------------------------------------------------------------  Observations:  Mitral Valve: Normal mitral valve. Minimal mitral  regurgitation.  Aortic Valve/Aorta: Aortic valve not well visualized;  appears to be a normal trileaflet valve with normal leaflet  opening. No aortic valve regurgitation seen.  Aortic Root: 3.9 cm.  Left Atrium: Left atrium notwell visualized, probably  normal.  Left Ventricle: Endocardial visualization enhanced with  intravenous injection of Ultrasonic Enhancing Agent  (Definity). Normal left ventricular systolic function. No  segmental wall motion abnormalities. Normal left  ventricular internal dimensions and wall thicknesses.  Indeterminate diastolic function.  Right Heart: Right atrium not well visualized, probably  normal. The right ventricle is not well visualized; grossly  normal right ventricular size and systolic function.  Tricuspid valve not well visualized, probably normal.  Minimal tricuspid regurgitation. Pulmonic valve not well  visualized, probably normal. No pulmonic regurgitation.  Pericardium/Pleura: Normal pericardium with no pericardial  effusion.  Hemodynamic: Estimated right atrial pressure is 8 mm Hg.  Inadequate tricuspid regurgitation Doppler envelope  precludes estimation of RVSP.  ------------------------------------------------------------------------  Conclusions:  1. Normal mitral valve. Minimal mitral regurgitation.  2. Aortic valve not well visualized; appears to be a normal  trileaflet valve with normal leaflet opening. No aortic  valve regurgitation seen.  3. Endocardial visualization enhanced with intravenous  injection of Ultrasonic Enhancing Agent (Definity). Normal  left ventricular systolic function. No segmental wall  motion abnormalities.  4. The right ventricle is not well visualized; grossly  normal right ventricular size and systolic function.    < end of copied text >

## 2022-02-03 NOTE — PROGRESS NOTE ADULT - PROBLEM SELECTOR PLAN 1
Still states that he remains tight. No wheezing on exam.   Imaging negative for PNA or PE  c/w duonebs, symbicort and spiriva  chest tightness - trop negative, echo is pending   cards eval   will check ABG   discussed with Dr. Gonzales and with Dr. Roberts, patient's outpt physicians. C/w current workup and management per my discussion with them   c/w prednisone  appreciate cardiology eval for chest discomfort/chest pain, planning for CT coronaries. D/Rj lorsartan and lasix to make room for BB, which was given this morning  f/u CT coronaries result, pt went down for test today unable to clinically determine if triggered by COVID alone  Still states that he remains tight. No wheezing on exam.   Imaging negative for PNA or PE  c/w duonebs, symbicort and spiriva  chest tightness - trop negative, echo is pending   cards eval   will check ABG   discussed with Dr. Gonzales and with Dr. Roberts, patient's outpt physicians. C/w current workup and management per my discussion with them   c/w prednisone  appreciate cardiology eval for chest discomfort/chest pain, planning for CT coronaries. D/Rj lorsartan and lasix to make room for BB, which was given this morning  f/u CT coronaries result, pt went down for test today

## 2022-02-03 NOTE — PROGRESS NOTE ADULT - SUBJECTIVE AND OBJECTIVE BOX
Patient is a 46y old  Male who presents with a chief complaint of WAITE, COVID19 (01 Feb 2022 15:33)    SUBJECTIVE / OVERNIGHT EVENTS: no acute events overnight, got BB this AM     MEDICATIONS  (STANDING):  ALBUTerol   0.042% 1.25 milliGRAM(s) Nebulizer every 6 hours  aspirin enteric coated 81 milliGRAM(s) Oral daily  budesonide 160 MICROgram(s)/formoterol 4.5 MICROgram(s) Inhaler 2 Puff(s) Inhalation two times a day  enoxaparin Injectable 40 milliGRAM(s) SubCutaneous two times a day  guaiFENesin ER 1200 milliGRAM(s) Oral every 12 hours  hydroxychloroquine 400 milliGRAM(s) Oral <User Schedule>  ipratropium    for Nebulization 500 MICROGram(s) Nebulizer every 6 hours  levothyroxine 125 MICROGram(s) Oral daily  mycophenolate mofetil 1500 milliGRAM(s) Oral daily  potassium chloride    Tablet ER 20 milliEquivalent(s) Oral daily  predniSONE   Tablet 30 milliGRAM(s) Oral two times a day  tiotropium 18 MICROgram(s) Capsule 1 Capsule(s) Inhalation daily    MEDICATIONS  (PRN):  benzocaine 15 mG/menthol 3.6 mG Lozenge 1 Lozenge Oral five times a day PRN Sore Throat      Vital Signs Last 24 Hrs  T(C): 36.4 (03 Feb 2022 13:18), Max: 36.8 (02 Feb 2022 22:11)  T(F): 97.6 (03 Feb 2022 13:18), Max: 98.2 (02 Feb 2022 22:11)  HR: 89 (03 Feb 2022 13:18) (89 - 102)  BP: 116/81 (03 Feb 2022 13:18) (111/77 - 119/74)  BP(mean): --  RR: 18 (03 Feb 2022 13:18) (17 - 18)  SpO2: 95% (03 Feb 2022 13:18) (94% - 95%)  CAPILLARY BLOOD GLUCOSE        I&O's Summary    02 Feb 2022 07:01  -  03 Feb 2022 07:00  --------------------------------------------------------  IN: 1940 mL / OUT: 1200 mL / NET: 740 mL        PHYSICAL EXAM:  GENERAL: NAD  EYES: EOMI, PERRLA, conjunctiva and sclera clear  NECK: Supple, No JVD  CHEST/LUNG: Clear to auscultation bilaterally; No wheeze  HEART: +S1/S2   ABDOMEN: Soft, Nontender, Nondistended  EXTREMITIES: no LE edema   PSYCH: AAOx3

## 2022-02-03 NOTE — PROGRESS NOTE ADULT - SUBJECTIVE AND OBJECTIVE BOX
INCOMPLETE NOTE    OVERNIGHT EVENTS:    SUBJECTIVE / INTERVAL HPI: Patient seen and examined at bedside.     VITAL SIGNS:  Vital Signs Last 24 Hrs  T(C): 36.6 (03 Feb 2022 06:41), Max: 37.1 (02 Feb 2022 13:27)  T(F): 97.8 (03 Feb 2022 06:41), Max: 98.7 (02 Feb 2022 13:27)  HR: 102 (03 Feb 2022 06:41) (87 - 102)  BP: 119/74 (03 Feb 2022 06:41) (111/77 - 132/90)  BP(mean): --  RR: 18 (03 Feb 2022 06:41) (17 - 18)  SpO2: 94% (03 Feb 2022 06:41) (94% - 96%)    PHYSICAL EXAM:    General: WDWN  HEENT: NC/AT; PERRL, anicteric sclera; MMM  Neck: supple  Cardiovascular: +S1/S2, RRR  Respiratory: CTA B/L; no W/R/R  Gastrointestinal: soft, NT/ND; +BSx4  Extremities: WWP; no edema, clubbing or cyanosis  Vascular: 2+ radial, DP/PT pulses B/L  Neurological: AAOx3; no focal deficits    MEDICATIONS:  MEDICATIONS  (STANDING):  albuterol/ipratropium for Nebulization 3 milliLiter(s) Nebulizer every 6 hours  aspirin enteric coated 81 milliGRAM(s) Oral daily  budesonide 160 MICROgram(s)/formoterol 4.5 MICROgram(s) Inhaler 2 Puff(s) Inhalation two times a day  enoxaparin Injectable 40 milliGRAM(s) SubCutaneous two times a day  guaiFENesin ER 1200 milliGRAM(s) Oral every 12 hours  hydroxychloroquine 400 milliGRAM(s) Oral <User Schedule>  levothyroxine 125 MICROGram(s) Oral daily  metoprolol tartrate 50 milliGRAM(s) Oral once  mycophenolate mofetil 1500 milliGRAM(s) Oral daily  potassium chloride    Tablet ER 20 milliEquivalent(s) Oral daily  predniSONE   Tablet 30 milliGRAM(s) Oral two times a day  tiotropium 18 MICROgram(s) Capsule 1 Capsule(s) Inhalation daily    MEDICATIONS  (PRN):  benzocaine 15 mG/menthol 3.6 mG Lozenge 1 Lozenge Oral five times a day PRN Sore Throat      ALLERGIES:  Allergies    No Known Allergies    Intolerances        LABS:              CAPILLARY BLOOD GLUCOSE          RADIOLOGY & ADDITIONAL TESTS: Reviewed. INCOMPLETE NOTE    SUBJECTIVE / INTERVAL HPI: Patient seen and examined at bedside. Patient reports some improvement in chest pain and SOB but still with multiple complaints - diffuse myalgias/arthralgias, joint pain and stiffness (AM worse, 3 hours) worse in the b/l hands and feet joints, muscle weakness (generalized but worse in the LLE), HA, left eye visual change (reports black ?floater/bubble, no blindness), b/l jaw pain with ?jaw claudication, left scalp tenderness, chronic painful nasal ulcer, sore throat, foamy urine, b/l arm and leg paresthesias  Denies fevers, rash, eye redness, nausea, vomiting, diarrhea, LE swelling, hematuria, dysuria, urinary freq    VITAL SIGNS:  Vital Signs Last 24 Hrs  T(C): 36.6 (03 Feb 2022 06:41), Max: 37.1 (02 Feb 2022 13:27)  T(F): 97.8 (03 Feb 2022 06:41), Max: 98.7 (02 Feb 2022 13:27)  HR: 102 (03 Feb 2022 06:41) (87 - 102)  BP: 119/74 (03 Feb 2022 06:41) (111/77 - 132/90)  BP(mean): --  RR: 18 (03 Feb 2022 06:41) (17 - 18)  SpO2: 94% (03 Feb 2022 06:41) (94% - 96%)    PHYSICAL EXAM:    General: WDWN  HEENT: clear conjunctiva. Left scalp tenderness. Left temporal pain. b/l symmetrically diminished temporal artery pulses. Mouth with ulcer over hard palate with posterior pharyngeal erythema.   Neck: supple  Cardiovascular: +S1/S2, RRR  Respiratory: CTA B/L; no W/R/R  Gastrointestinal: soft, NT/ND; +BSx4  Extremities: WWP; no edema, clubbing or cyanosis  MSK: No synovitis. Tenderness to palpation over trapezius muscle, arms, forearms, thighs, legs b/l  Neurological: Muscle strength 5/5 throughout except at left hip flexion 4/5.    MEDICATIONS:  MEDICATIONS  (STANDING):  albuterol/ipratropium for Nebulization 3 milliLiter(s) Nebulizer every 6 hours  aspirin enteric coated 81 milliGRAM(s) Oral daily  budesonide 160 MICROgram(s)/formoterol 4.5 MICROgram(s) Inhaler 2 Puff(s) Inhalation two times a day  enoxaparin Injectable 40 milliGRAM(s) SubCutaneous two times a day  guaiFENesin ER 1200 milliGRAM(s) Oral every 12 hours  hydroxychloroquine 400 milliGRAM(s) Oral <User Schedule>  levothyroxine 125 MICROGram(s) Oral daily  metoprolol tartrate 50 milliGRAM(s) Oral once  mycophenolate mofetil 1500 milliGRAM(s) Oral daily  potassium chloride    Tablet ER 20 milliEquivalent(s) Oral daily  predniSONE   Tablet 30 milliGRAM(s) Oral two times a day  tiotropium 18 MICROgram(s) Capsule 1 Capsule(s) Inhalation daily    MEDICATIONS  (PRN):  benzocaine 15 mG/menthol 3.6 mG Lozenge 1 Lozenge Oral five times a day PRN Sore Throat      ALLERGIES:  Allergies    No Known Allergies    Intolerances        LABS:              CAPILLARY BLOOD GLUCOSE          RADIOLOGY & ADDITIONAL TESTS: Reviewed. SUBJECTIVE / INTERVAL HPI: Patient seen and examined at bedside. Patient reports some improvement in chest pain and SOB but still with multiple complaints - diffuse myalgias/arthralgias, joint pain and stiffness (AM worse, 3 hours) worse in the b/l hands and feet joints, muscle weakness (generalized but worse in the LLE), HA, left eye visual change (reports black ?floater/bubble, no blindness), b/l jaw pain with ?jaw claudication, left scalp tenderness, chronic painful nasal ulcer, sore throat, foamy urine, b/l arm and leg paresthesias  Denies fevers, rash, eye redness, nausea, vomiting, diarrhea, LE swelling, hematuria, dysuria, urinary freq    VITAL SIGNS:  Vital Signs Last 24 Hrs  T(C): 36.6 (03 Feb 2022 06:41), Max: 37.1 (02 Feb 2022 13:27)  T(F): 97.8 (03 Feb 2022 06:41), Max: 98.7 (02 Feb 2022 13:27)  HR: 102 (03 Feb 2022 06:41) (87 - 102)  BP: 119/74 (03 Feb 2022 06:41) (111/77 - 132/90)  BP(mean): --  RR: 18 (03 Feb 2022 06:41) (17 - 18)  SpO2: 94% (03 Feb 2022 06:41) (94% - 96%)    PHYSICAL EXAM:    General: WDWN  HEENT: clear conjunctiva. Left scalp tenderness. Left temporal pain. b/l symmetrically diminished temporal artery pulses. Mouth with ulcer over hard palate with posterior pharyngeal erythema.   Neck: supple  Cardiovascular: +S1/S2, RRR  Respiratory: CTA B/L; no W/R/R  Gastrointestinal: soft, NT/ND; +BSx4  Extremities: WWP; no edema, clubbing or cyanosis  MSK: No synovitis. Tenderness to palpation over trapezius muscle, arms, forearms, thighs, legs b/l  Neurological: Muscle strength 5/5 throughout except at left hip flexion 4/5.    MEDICATIONS:  MEDICATIONS  (STANDING):  albuterol/ipratropium for Nebulization 3 milliLiter(s) Nebulizer every 6 hours  aspirin enteric coated 81 milliGRAM(s) Oral daily  budesonide 160 MICROgram(s)/formoterol 4.5 MICROgram(s) Inhaler 2 Puff(s) Inhalation two times a day  enoxaparin Injectable 40 milliGRAM(s) SubCutaneous two times a day  guaiFENesin ER 1200 milliGRAM(s) Oral every 12 hours  hydroxychloroquine 400 milliGRAM(s) Oral <User Schedule>  levothyroxine 125 MICROGram(s) Oral daily  metoprolol tartrate 50 milliGRAM(s) Oral once  mycophenolate mofetil 1500 milliGRAM(s) Oral daily  potassium chloride    Tablet ER 20 milliEquivalent(s) Oral daily  predniSONE   Tablet 30 milliGRAM(s) Oral two times a day  tiotropium 18 MICROgram(s) Capsule 1 Capsule(s) Inhalation daily    MEDICATIONS  (PRN):  benzocaine 15 mG/menthol 3.6 mG Lozenge 1 Lozenge Oral five times a day PRN Sore Throat      ALLERGIES:  Allergies    No Known Allergies    Intolerances        LABS:              CAPILLARY BLOOD GLUCOSE          RADIOLOGY & ADDITIONAL TESTS: Reviewed.

## 2022-02-03 NOTE — PROGRESS NOTE ADULT - PROBLEM SELECTOR PLAN 1
pt with c/o worsening SOB, cough, wheezing since dx COVID a few weeks ago. Reportedly tachycardic and tachypneic in ED - physical exam noted for decreased BS.   -CTA chest neg PE, no PNA   -Seems to have better air movement at this time. No audible wheeze but still with c/o chest tightness and WAITE  -On Prednisone 30 mg PO BID for now. Dose this AM held per cardiology due to concern for tachycardia. Will likely further taper this afternoon. Unlikely that Prednisone is contributing to tachycardia, would not hold any future doses  -Normoxic, keep sats >90% with O2 PRN  -Symbicort BID, Spiriva qd (home med: Trelegy)  -Due to tachycardia, will change nebs to Atrovent 500 mcg q6h/Albuterol 1.25 mg q6h - discussed with Pharmacy, if still tachycardic with 1/2 dose of Albuterol, would need to document intolerance to Albuterol to start Xopenex.   -Mucinex 1200 mg BID x5 days. pt with c/o worsening SOB, cough, wheezing since dx COVID a few weeks ago. Reportedly tachycardic and tachypneic in ED - physical exam noted for decreased BS.   -CTA chest neg PE, no PNA   -Seems to have better air movement at this time. No audible wheeze but still with c/o chest tightness and WAITE  -C/w Prednisone 30 mg PO BID. Dose this AM held per cardiology due to concern for tachycardia. Unlikely that Prednisone is contributing to tachycardia, would not hold any future doses  -Give dose of Prednisone 30 mg PO now, okay to give scheduled dose this evening  -Normoxic, keep sats >90% with O2 PRN  -Symbicort BID, Spiriva qd (home med: Trelegy)  -Due to tachycardia, will change nebs to Atrovent 500 mcg q6h/Albuterol 1.25 mg q6h - discussed with Pharmacy, if still tachycardic with 1/2 dose of Albuterol, would need to document intolerance to Albuterol to start Xopenex.   -Mucinex 1200 mg BID x5 days. pt with c/o worsening SOB, cough, wheezing since dx COVID a few weeks ago. Reportedly tachycardic and tachypneic in ED - physical exam noted for decreased BS.   -CTA chest neg PE, no PNA   -Seems to have better air movement at this time. No audible wheeze but still with c/o chest tightness and WAITE  -C/w Prednisone 30 mg PO BID. Dose this AM held per cardiology due to concern for tachycardia. Unlikely that Prednisone is contributing to tachycardia, would not hold any future doses  -Give dose of Prednisone 30 mg PO now, okay to give scheduled dose this evening  -Normoxic, keep sats >90% with O2 PRN  -Symbicort BID, Spiriva qd (home med: Trelegy)  -Duoneb q6h  -Mucinex 1200 mg BID x5 days.

## 2022-02-03 NOTE — PROGRESS NOTE ADULT - ASSESSMENT
45 yo M with a PMH of asthma (never intubated), Lupus (on plaquenil, cellcept-stopped when he tested COVID pos) p/w cough, faituge , SOB x 5 days due to asthma exacerbation. Rheumatology called for medication management of known SLE as these have been on hold since covid infection.     #SLE: Dx in 2019. Follows Dr. Neri. Antibody Profile: OWEN 1:2560, + SM, + RNP, + LA, Low complement, + U1rnp and weak positive PM/Sc100. The patient has no history of nephritis. Symptoms include sicca, rash, fatigue, arthritis, arthralgias and sun sensitivity. MRI right thigh 5/2021 - no muscle edema moderate sized right knee joint effusion. Home meds MMF 6 pills daily, HCQ 200mg daily, Benlysta IV 1300mg monthly, medrol 8mg/day  -PO CellCept originally held in the setting of r/o infection. Given infection r/o and patient tolerating PO cellcept 1500mg daily, can increase back to home dose of PO CellCept 1500mg BID tomorrow (ordered)   - he is due for his home benlysta infusion however will defer inpatient administration and coordinate outpatient when patient is discharged  -dsDNA and complements WNL  -check UA and urine p/c ratio given report of foamy urine (ordered)  -steroid plan per pulm then return back to home Medrol after asthma exacerbation tx is complete  -patient with multiple somatic complaints including myalgias/arthralgias, joint stiffness which do not appear rheumatologic. Patient is on high doses of steroids and therefore doubt that arthritic symptoms are related to SLE activity. Patient does not appear to be in flare  -Patient will require outpatient rheumatologic follow up with Dr. Neri upon discharge within 1-2 weeks. [address] [facility], [address], [place] [address]. Phone: [phone]  -no rheumatologic contraindication to discharge    #Myalgias/Arthralgias: Patient with diffuse arthralgias/myalgias with several tender points on back, arms, forearms, joints, thighs, legs b/l. Given history of SLE and ?overlap myositis with +U1RNP and weak positive PM/ there is a low threshold to consider symptomatology to be related to underlying autoimmune illness. However, patient is on high doses of steroids (PO prednisone 60mg/day) which would improve symptoms if related to underlying inflammatory process. CK WNL but ?weakness left leg. Overall doubt flaring of SLE, possible contributing non-inflammatory pain etiology such as fibromyalgia is possible   -outpatient rheum follow up    #Headache: Left sided head pain with tenderness to palpation over the left temporal area and jaw pain. Does report left eye visual disturbance described as black bubbles/floaters, ?aura - has history of migraines. Patient is too young to be considered at risk for GCA so doubt this as etiology. Possibly TMJ?  -work up per primary team, does not appear rheumatologic    #Throat Pain: Oral thrush with pain upon swallowing in the context of immunosuppression. Possibly 2/2 candida esophagitis  -Treat with PO fluconazole 400mg x1 then 200mg/day for 14 days (ordered)    Plan discussed with Dr. Chandan Mae DO  Rheumatology Fellow PGY4  Pager [phone] 45 yo M with a PMH of asthma (never intubated), Lupus (on plaquenil, cellcept-stopped when he tested COVID pos) p/w cough, faituge , SOB x 5 days due to asthma exacerbation. Rheumatology called for medication management of known SLE as these have been on hold since covid infection.     #SLE: Dx in 2019. Follows Dr. Neri. Antibody Profile: OWEN 1:2560, + SM, + RNP, + LA, Low complement, + U1rnp and weak positive PM/Sc100. The patient has no history of nephritis. Symptoms include sicca, rash, fatigue, arthritis, arthralgias and sun sensitivity. MRI right thigh 5/2021 - no muscle edema moderate sized right knee joint effusion. Home meds MMF 6 pills daily, HCQ 200mg daily, Benlysta IV 1300mg monthly, medrol 8mg/day  -PO CellCept originally held in the setting of r/o infection. Given infection r/o and patient tolerating PO cellcept 1500mg daily, can increase back to home dose of PO CellCept 1500mg BID tomorrow (ordered)   - he is due for his home benlysta infusion however will defer inpatient administration and coordinate outpatient when patient is discharged  -dsDNA and complements WNL  -check UA and urine p/c ratio given report of foamy urine (ordered)  -steroid plan per pulm then return back to home Medrol dose of 8mg/day after asthma exacerbation tx is complete  -patient with multiple somatic complaints including myalgias/arthralgias, joint stiffness which do not appear rheumatologic. Patient is on high doses of steroids and therefore doubt that arthritic symptoms are related to SLE activity. Patient does not appear to be in flare  -Patient will require outpatient rheumatologic follow up with Dr. Neri upon discharge within 1-2 weeks. [address] [facility], [address], [place] [address]. Phone: [phone]  -no rheumatologic contraindication to discharge    #Myalgias/Arthralgias: Patient with diffuse arthralgias/myalgias with several tender points on back, arms, forearms, joints, thighs, legs b/l. Given history of SLE and ?overlap myositis with +U1RNP and weak positive PM/ there is a low threshold to consider symptomatology to be related to underlying autoimmune illness. However, patient is on high doses of steroids (PO prednisone 60mg/day) which would improve symptoms if related to underlying inflammatory process. CK WNL but ?weakness left leg. Overall doubt flaring of SLE, possible contributing non-inflammatory pain etiology such as fibromyalgia is possible   -outpatient rheum follow up    #Headache: Left sided head pain with tenderness to palpation over the left temporal area and jaw pain. Does report left eye visual disturbance described as black bubbles/floaters, ?aura - has history of migraines. Patient is too young to be considered at risk for GCA and on full dose of indicated steroid without improvement so doubt this as etiology. Possibly TMJ?  -work up per primary team, does not appear rheumatologic    #Throat Pain: Oral thrush with pain upon swallowing in the context of immunosuppression. Possibly 2/2 candida esophagitis  -Treat with PO fluconazole 400mg x1 then 200mg/day for 14 days (ordered)    Plan discussed with Dr. Chandan Mae DO  Rheumatology Fellow PGY4  Pager [phone]

## 2022-02-04 DIAGNOSIS — R07.9 CHEST PAIN, UNSPECIFIED: ICD-10-CM

## 2022-02-04 PROCEDURE — 99232 SBSQ HOSP IP/OBS MODERATE 35: CPT

## 2022-02-04 RX ORDER — ACETAMINOPHEN 500 MG
650 TABLET ORAL ONCE
Refills: 0 | Status: COMPLETED | OUTPATIENT
Start: 2022-02-04 | End: 2022-02-04

## 2022-02-04 RX ORDER — PANTOPRAZOLE SODIUM 20 MG/1
40 TABLET, DELAYED RELEASE ORAL
Refills: 0 | Status: DISCONTINUED | OUTPATIENT
Start: 2022-02-04 | End: 2022-02-05

## 2022-02-04 RX ORDER — LANOLIN ALCOHOL/MO/W.PET/CERES
5 CREAM (GRAM) TOPICAL ONCE
Refills: 0 | Status: COMPLETED | OUTPATIENT
Start: 2022-02-04 | End: 2022-02-04

## 2022-02-04 RX ADMIN — Medication 650 MILLIGRAM(S): at 10:00

## 2022-02-04 RX ADMIN — BENZOCAINE AND MENTHOL 1 LOZENGE: 5; 1 LIQUID ORAL at 12:54

## 2022-02-04 RX ADMIN — Medication 125 MICROGRAM(S): at 05:40

## 2022-02-04 RX ADMIN — ENOXAPARIN SODIUM 40 MILLIGRAM(S): 100 INJECTION SUBCUTANEOUS at 17:39

## 2022-02-04 RX ADMIN — TIOTROPIUM BROMIDE 1 CAPSULE(S): 18 CAPSULE ORAL; RESPIRATORY (INHALATION) at 12:55

## 2022-02-04 RX ADMIN — Medication 400 MILLIGRAM(S): at 17:40

## 2022-02-04 RX ADMIN — Medication 5 MILLIGRAM(S): at 22:15

## 2022-02-04 RX ADMIN — Medication 3 MILLILITER(S): at 05:40

## 2022-02-04 RX ADMIN — Medication 3 MILLILITER(S): at 17:42

## 2022-02-04 RX ADMIN — Medication 30 MILLIGRAM(S): at 17:42

## 2022-02-04 RX ADMIN — Medication 1 TABLET(S): at 17:45

## 2022-02-04 RX ADMIN — BUDESONIDE AND FORMOTEROL FUMARATE DIHYDRATE 2 PUFF(S): 160; 4.5 AEROSOL RESPIRATORY (INHALATION) at 17:39

## 2022-02-04 RX ADMIN — BUDESONIDE AND FORMOTEROL FUMARATE DIHYDRATE 2 PUFF(S): 160; 4.5 AEROSOL RESPIRATORY (INHALATION) at 05:43

## 2022-02-04 RX ADMIN — LOSARTAN POTASSIUM 25 MILLIGRAM(S): 100 TABLET, FILM COATED ORAL at 05:41

## 2022-02-04 RX ADMIN — Medication 20 MILLIEQUIVALENT(S): at 12:54

## 2022-02-04 RX ADMIN — MYCOPHENOLATE MOFETIL 1500 MILLIGRAM(S): 250 CAPSULE ORAL at 05:40

## 2022-02-04 RX ADMIN — FLUCONAZOLE 200 MILLIGRAM(S): 150 TABLET ORAL at 12:58

## 2022-02-04 RX ADMIN — Medication 1200 MILLIGRAM(S): at 05:40

## 2022-02-04 RX ADMIN — Medication 3 MILLILITER(S): at 23:01

## 2022-02-04 RX ADMIN — Medication 60 MILLIGRAM(S): at 12:54

## 2022-02-04 RX ADMIN — Medication 1200 MILLIGRAM(S): at 17:40

## 2022-02-04 RX ADMIN — ENOXAPARIN SODIUM 40 MILLIGRAM(S): 100 INJECTION SUBCUTANEOUS at 05:40

## 2022-02-04 RX ADMIN — MYCOPHENOLATE MOFETIL 1500 MILLIGRAM(S): 250 CAPSULE ORAL at 17:40

## 2022-02-04 RX ADMIN — Medication 30 MILLIGRAM(S): at 05:41

## 2022-02-04 RX ADMIN — Medication 81 MILLIGRAM(S): at 12:54

## 2022-02-04 RX ADMIN — Medication 650 MILLIGRAM(S): at 10:30

## 2022-02-04 RX ADMIN — Medication 3 MILLILITER(S): at 12:55

## 2022-02-04 NOTE — PROGRESS NOTE ADULT - SUBJECTIVE AND OBJECTIVE BOX
Date of Service   22 @ 12:17    Patient is a 46y old  Male who presents with a chief complaint of WAITE, COVID19 (2022 15:33)      INTERVAL HISTORY: pt feels ok,     REVIEW OF SYSTEMS:   CONSTITUTIONAL: No weakness  EYES/ENT: No visual changes; No throat pain  Neck: No pain or stiffness  Respiratory: No cough, wheezing, No shortness of breath  CARDIOVASCULAR: no chest pain or palpitations  GASTROINTESTINAL: No abdominal pain, no nausea, vomiting or hematemesis  GENITOURINARY: No dysuria, frequency or hematuria  NEUROLOGICAL: No stroke like symptoms  SKIN: No rashes    	  MEDICATIONS:  furosemide    Tablet 60 milliGRAM(s) Oral daily  losartan 25 milliGRAM(s) Oral daily        PHYSICAL EXAM:  T(C): 37.2 (22 @ 10:02), Max: 37.2 (22 @ 10:02)  HR: 84 (22 @ 10:02) (83 - 89)  BP: 115/76 (22 @ 10:02) (101/69 - 120/78)  RR: 17 (22 @ 10:02) (16 - 18)  SpO2: 97% (22 @ 10:02) (95% - 97%)  Wt(kg): --  I&O's Summary    2022 07:01  -  2022 07:00  --------------------------------------------------------  IN: 950 mL / OUT: 1700 mL / NET: -750 mL    2022 07:01  -  2022 12:17  --------------------------------------------------------  IN: 300 mL / OUT: 0 mL / NET: 300 mL          Appearance: In no distress	  HEENT:    PERRL, EOMI	  Cardiovascular:  S1 S2, No JVD  Respiratory: Lungs clear to auscultation	  Gastrointestinal:  Soft, Non-tender, + BS	  Vascularature:  trace edema of LE  Psychiatric: Appropriate affect   Neuro: no acute focal deficits                     Labs personally reviewed      ASSESSMENT/PLAN: 	    47 yo M with a PMH of asthma (never intubated), Lupus p/w cough, faituge , SOB x 5 days. States sxs originally started when pt tested COVID pos 22, received MAB infusion on 22. Sxs persisted so called PMD who prescribed cefuroxime which he has been taking with no relief. Called PMD again today who advised presenting to ED. Pt states he feels a spasm in his chest every time he tries to speak full sentences, coughs and then is able to continue talking. States this is causing him a lot of chest tightness.  Denies nausea, vomiting, fever, chills, leg pain/swelling, abd pain, headache, syncope. Nonsmoker.     Pt reports cardiac w/u stress test and echo 6 months ago and results were reportedly normal. Denies any cardiac history, on Losartan for Lupus per pt. Pt today  reports CP that starts at midsternum, "waves" radiates to the right breast then the left chest. denies any associates sx other than nausea. Pt reports previous episodes of such pain at home. He reports that it is different from CP with cough. He also reports mildly reproductive with pressure. Pt's mother  or cardiac event at age 45. Pt is obese, denies smoking.         Problem/Plan - 1:  ·  Problem: Chest pain   - EKG noted, non ishcmemic  - Trop 8 was 12 on admin   - states he has 2 diff types of chest pain - one that is reproducible 2/2 MSK pain from severe cough and another that is pressure like and new   - given persistent chest pain with strong family history of CAD will pursue CT angio heart coronaries to define cor anatomy  - s/p CT cors with normal coronaries though small in caliber  - resume Losartan, Lasix now post CT heart  - tele monitor   - PE ruled out  - TTE unremarkable  - most likely MSK / or from cough, as reproducible with pressure    Problem/Plan - 2:  ·  Problem: Asthma exacerbation.   - CXR clear lungs   - c/w duonebs, symbicort and spiriva  - sat well on RA     Problem/Plan - 3:  ·  Problem: 2019 novel coronavirus disease (COVID-19).    pulm/id following  considered asymptomatic  pt received mab earlier in illness.  supportive care     Problem/Plan - 4:  ·  Problem: Essential hypertension .   -  c/w Losartan, Lasix  - monitor BP         Taniya MAHARAJMary Bridge Children's Hospital   Anjel English DO [facility]  Cardiovascular Medicine  [address] [place] [address], [address]  Office: [phone]  Cell: [phone]

## 2022-02-04 NOTE — PROGRESS NOTE ADULT - PROBLEM SELECTOR PLAN 1
chest tightness - trop negative, echo unremarkable   discussed with Dr. Gonzales and with Dr. Roberts, patient's outpt physicians. C/w current workup and management per my discussion with them   c/w prednisone  appreciate cardiology eval for chest discomfort/chest pain, Ct coronaries done which are negative  reproducible pain, likely MSK  per rheum eval, likely not from lupus flare. ?fibromyalgia?  will get pain service eval

## 2022-02-04 NOTE — PROGRESS NOTE ADULT - PROBLEM SELECTOR PLAN 1
Pt with c/o worsening SOB, cough, wheezing since dx COVID a few weeks ago. Reportedly tachycardic and tachypneic in ED - physical exam noted for decreased BS.   -CTA chest neg PE, no PNA   -Seems to have better air movement at this time. No audible wheeze. Dyspnea improved, still with mild chest tightness.  -C/w Prednisone 30 mg PO BID for now. Will reassess later this afternoon regarding further taper for d/c planning  -Normoxic, keep sats >90% with O2 PRN  -Symbicort BID, Spiriva qd (home med: Trelegy)  -Duoneb q6h  -Mucinex 1200 mg BID x5 days. Pt with c/o worsening SOB, cough, wheezing since dx COVID a few weeks ago. Reportedly tachycardic and tachypneic in ED - physical exam noted for decreased BS.   -CTA chest neg PE, no PNA   -Seems to have better air movement at this time. No audible wheeze. Dyspnea improved, still with mild chest tightness.  -Normoxic, keep sats >90% with O2 PRN  -Symbicort BID, Spiriva qd (home med: Trelegy)  -Duoneb q6h  -Mucinex 1200 mg BID x5 days  -C/w Prednisone 30 mg PO BID until f/u with outpatient pulmonologist. Start Bactrim 1 DS tab 3x a week for PCP ppx, GI ppx with Protonix, discussed with ACP  -D/c planning per primary team, no c/i from pulmonary perspective.

## 2022-02-04 NOTE — PROGRESS NOTE ADULT - PROBLEM SELECTOR PLAN 5
Nutrition eval
holding losartan as above
Nutrition eval

## 2022-02-04 NOTE — PROGRESS NOTE ADULT - PROBLEM SELECTOR PROBLEM 5
Morbid obesity
Essential hypertension

## 2022-02-04 NOTE — PROGRESS NOTE ADULT - SUBJECTIVE AND OBJECTIVE BOX
Patient is a 46y old  Male who presents with a chief complaint of RAVINDRA, ERICID19 (2022 15:33)      SUBJECTIVE / OVERNIGHT EVENTS: patient reports that wheezing has improved, but still sore in his chest area and all over his body. Reporting whole body myalgias and soreness, and feeling very tired and weak.     MEDICATIONS  (STANDING):  albuterol/ipratropium for Nebulization 3 milliLiter(s) Nebulizer every 6 hours  aspirin enteric coated 81 milliGRAM(s) Oral daily  budesonide 160 MICROgram(s)/formoterol 4.5 MICROgram(s) Inhaler 2 Puff(s) Inhalation two times a day  enoxaparin Injectable 40 milliGRAM(s) SubCutaneous two times a day  fluconAZOLE   Tablet 200 milliGRAM(s) Oral daily  furosemide    Tablet 60 milliGRAM(s) Oral daily  guaiFENesin ER 1200 milliGRAM(s) Oral every 12 hours  hydroxychloroquine 400 milliGRAM(s) Oral <User Schedule>  levothyroxine 125 MICROGram(s) Oral daily  losartan 25 milliGRAM(s) Oral daily  mycophenolate mofetil 1500 milliGRAM(s) Oral two times a day  potassium chloride    Tablet ER 20 milliEquivalent(s) Oral daily  predniSONE   Tablet 30 milliGRAM(s) Oral two times a day  tiotropium 18 MICROgram(s) Capsule 1 Capsule(s) Inhalation daily    MEDICATIONS  (PRN):  benzocaine 15 mG/menthol 3.6 mG Lozenge 1 Lozenge Oral five times a day PRN Sore Throat      Vital Signs Last 24 Hrs  T(C): 37.2 (2022 10:02), Max: 37.2 (2022 10:02)  T(F): 98.9 (2022 10:02), Max: 98.9 (2022 10:02)  HR: 84 (2022 10:02) (83 - 89)  BP: 115/76 (2022 10:02) (101/69 - 120/78)  BP(mean): --  RR: 17 (2022 10:02) (16 - 18)  SpO2: 97% (2022 10:02) (95% - 97%)  CAPILLARY BLOOD GLUCOSE        I&O's Summary    2022 07:01  -  2022 07:00  --------------------------------------------------------  IN: 950 mL / OUT: 1700 mL / NET: -750 mL    2022 07:01  -  2022 12:30  --------------------------------------------------------  IN: 300 mL / OUT: 0 mL / NET: 300 mL        PHYSICAL EXAM:  GENERAL: NAD  EYES: conjunctiva and sclera clear  CHEST/LUNG: no wheezing noted   HEART: +S1/S2   ABDOMEN: Soft, Nontender, Nondistended  EXTREMITIES: no LE edema   PSYCH: AAOx3  MSK: tenderness to palpation in the mid sternal area             Urinalysis Basic - ( 2022 21:54 )    Color: Light Yellow / Appearance: Clear / S.021 / pH: x  Gluc: x / Ketone: Negative  / Bili: Negative / Urobili: Negative   Blood: x / Protein: Trace / Nitrite: Negative   Leuk Esterase: Negative / RBC: x / WBC x   Sq Epi: x / Non Sq Epi: x / Bacteria: x        Consultant(s) Notes Reviewed:  Rheum, Cards

## 2022-02-04 NOTE — PROGRESS NOTE ADULT - NUTRITIONAL ASSESSMENT
This patient has been assessed with a concern for Malnutrition and has been determined to have a diagnosis/diagnoses of Morbid obesity (BMI > 40).    This patient is being managed with:   Diet Regular-  Entered: Jan 26 2022  9:55PM    

## 2022-02-04 NOTE — PROGRESS NOTE ADULT - PROBLEM SELECTOR PLAN 2
unable to clinically determine if triggered by COVID alone  Still states that he remains tight. No wheezing on exam.   Imaging negative for PNA or PE  c/w duonebs, symbicort and spiriva  c/w prednisone  sats stable on RA

## 2022-02-04 NOTE — CHART NOTE - NSCHARTNOTEFT_GEN_A_CORE
Chart and plan discussed with attending.     UA without proteinuria. Remainder of plan as per note from yesterday. Patient will require outpatient follow up with Dr. Neri outpatient within 1-2 weeks from discharge. Office [address] [facility], [address], [place] [address]. Phone: [phone]    Rheumatology will sign off. Please re-consult if there are further questions.  Plan discussed with Dr. Hawley Chart and plan discussed with attending.     UA without proteinuria. Remainder of plan as per note from yesterday. Patient will require outpatient follow up with Dr. Neri outpatient within 1-2 weeks from discharge. Will coordinate Benlysta infusion. Office [address] [facility], [address], [place] [address]. Phone: [phone]    Rheumatology will sign off. Please re-consult if there are further questions.  Plan discussed with Dr. Hawley

## 2022-02-04 NOTE — PROGRESS NOTE ADULT - SUBJECTIVE AND OBJECTIVE BOX
Follow-up Pulm Progress Note    Reports feeling weak, muscle/joint aches  Still with chest tightness but overall improved  Dyspnea improved  O2 sats 97% on RA    Medications:  MEDICATIONS  (STANDING):  albuterol/ipratropium for Nebulization 3 milliLiter(s) Nebulizer every 6 hours  aspirin enteric coated 81 milliGRAM(s) Oral daily  budesonide 160 MICROgram(s)/formoterol 4.5 MICROgram(s) Inhaler 2 Puff(s) Inhalation two times a day  enoxaparin Injectable 40 milliGRAM(s) SubCutaneous two times a day  fluconAZOLE   Tablet 200 milliGRAM(s) Oral daily  furosemide    Tablet 60 milliGRAM(s) Oral daily  guaiFENesin ER 1200 milliGRAM(s) Oral every 12 hours  hydroxychloroquine 400 milliGRAM(s) Oral <User Schedule>  levothyroxine 125 MICROGram(s) Oral daily  losartan 25 milliGRAM(s) Oral daily  mycophenolate mofetil 1500 milliGRAM(s) Oral two times a day  potassium chloride    Tablet ER 20 milliEquivalent(s) Oral daily  predniSONE   Tablet 30 milliGRAM(s) Oral two times a day  tiotropium 18 MICROgram(s) Capsule 1 Capsule(s) Inhalation daily    MEDICATIONS  (PRN):  benzocaine 15 mG/menthol 3.6 mG Lozenge 1 Lozenge Oral five times a day PRN Sore Throat    Vital Signs Last 24 Hrs  T(C): 37.2 (2022 10:02), Max: 37.2 (2022 10:02)  T(F): 98.9 (2022 10:02), Max: 98.9 (2022 10:02)  HR: 84 (2022 10:02) (83 - 89)  BP: 115/76 (2022 10:02) (101/69 - 120/78)  BP(mean): --  RR: 17 (2022 10:02) (16 - 18)  SpO2: 97% (2022 10:02) (95% - 97%)    -03 @ 07:01  -  02-04 @ 07:00  --------------------------------------------------------  IN: 950 mL / OUT: 1700 mL / NET: -750 mL    Urinalysis Basic - ( 2022 21:54 )    Color: Light Yellow / Appearance: Clear / S.021 / pH: x  Gluc: x / Ketone: Negative  / Bili: Negative / Urobili: Negative   Blood: x / Protein: Trace / Nitrite: Negative   Leuk Esterase: Negative / RBC: x / WBC x   Sq Epi: x / Non Sq Epi: x / Bacteria: x    Physical Examination:  PULM: Mildly decreased BS, improving air entry   CVS: RRR    RADIOLOGY REVIEWED      CT chest: < from: CT Angio Chest PE Protocol w/ IV Cont (22 @ 16:20) >  FINDINGS:    PULMONARY ANGIOGRAM: No main, right or left main, or lobar pulmonary   embolism. Limited evaluation of the segmental and subsegmental arteries   secondary to transient interruption of the contrast bolus.    LYMPH NODES: No lymphadenopathy. The thyroid gland is normal. Hiatal   hernia.    HEART/VASCULATURE: The heart size is normal. No pericardial effusion.    AIRWAYS/LUNGS/PLEURA: Patent central airways. The lungs are clear. No   pleural effusion or pneumothorax.    UPPER ABDOMEN: Cholecystectomy.    BONES/SOFT TISSUES: Mild degenerative changes of the spine.    IMPRESSION:    1.  No main, right or left main, or lobar pulmonary embolism.        --- End of Report ---    < end of copied text >    < from: TTE with Doppler (w/Cont) (22 @ 09:08) >  Dimensions:    Normal Values:  LA:     3.6    2.0 - 4.0 cm  Ao:     3.9    2.0 - 3.8 cm  SEPTUM: 1.2    0.6 - 1.2 cm  PWT:    0.8    0.6 - 1.1 cm  LVIDd:  3.7    3.0 - 5.6 cm  LVIDs:  2.5    1.8 - 4.0 cm  Derived variables:  LVMI: 49 g/m2  RWT: 0.43  Fractional short: 32 %  EF (Visual Estimate): 55-60 %  ------------------------------------------------------------------------  Observations:  Mitral Valve: Normal mitral valve. Minimal mitral  regurgitation.  Aortic Valve/Aorta: Aortic valve not well visualized;  appears to be a normal trileaflet valve with normal leaflet  opening. No aortic valve regurgitation seen.  Aortic Root: 3.9 cm.  Left Atrium: Left atrium notwell visualized, probably  normal.  Left Ventricle: Endocardial visualization enhanced with  intravenous injection of Ultrasonic Enhancing Agent  (Definity). Normal left ventricular systolic function. No  segmental wall motion abnormalities. Normal left  ventricular internal dimensions and wall thicknesses.  Indeterminate diastolic function.  Right Heart: Right atrium not well visualized, probably  normal. The right ventricle is not well visualized; grossly  normal right ventricular size and systolic function.  Tricuspid valve not well visualized, probably normal.  Minimal tricuspid regurgitation. Pulmonic valve not well  visualized, probably normal. No pulmonic regurgitation.  Pericardium/Pleura: Normal pericardium with no pericardial  effusion.  Hemodynamic: Estimated right atrial pressure is 8 mm Hg.  Inadequate tricuspid regurgitation Doppler envelope  precludes estimation of RVSP.  ------------------------------------------------------------------------  Conclusions:  1. Normal mitral valve. Minimal mitral regurgitation.  2. Aortic valve not well visualized; appears to be a normal  trileaflet valve with normal leaflet opening. No aortic  valve regurgitation seen.  3. Endocardial visualization enhanced with intravenous  injection of Ultrasonic Enhancing Agent (Definity). Normal  left ventricular systolic function. No segmental wall  motion abnormalities.  4. The right ventricle is not well visualized; grossly  normal right ventricular size and systolic function.    < end of copied text >    < from: CT Angio Heart and Coronaries w/ IV Cont (22 @ 14:32) >  COMPARISON: Chest CT from 2022.      TECHNIQUE: Unenhanced prospective and enhanced retrospective images were   obtained of the heart. Images were obtained using a Siemens Somatom   Drive. Images were obtained before and after the uneventful   administration of nonionic intravenous contrast (72 cc Omnipaque 350 was   administered; 28 cc Omnipaque 350 was discarded). The patient was imaged   using a body mass index protocol, automatic exposure control, and   iterative reconstruction. Curved multiplanar images and 3-D reformatted   images were generated using an independent software program.      MEDICATION:  0.8 mg SL nitroglycerin.  10 mg IV metoprolol.    HEART RATE: Average 90 BPM during acquisition.    DOSE: 801.1 mGy.cm.      QUALITY:  Fair. Coronaries were assessed on multiple phases of the R-R   interval.      FINDINGS:    CALCIUM SCORE: The calculated Agatston score is 0.    Agatston Score:  Left main: 0.  Left anterior descendin.  Left circumflex: 0.  Right coronary: 0.    Calcium volume (cubic millimeters):  Left main: 0.  Left anterior descendin.  Left circumflex: 0.  Right coronary: 0.      0: No plaque is present.  1-10: A minimal amount of plaque is present.  : A mild amount of plaque is present.  101-400: A moderate amount of plaque is present.  Greater than 400: A large amount of plaque is present.    CORONARY: Left artery dominance. No evidence for anomalous coronary   arteries.    LEFT MAIN: Normal bifurcation into LAD, LCX. No significant stenosis or   plaques.    LEFT ANTERIOR DESCENDING: Three patent diagonal branches. Distal vessel   wraps around apex.  No significant stenosis or plaques.    LEFT CIRCUMFLEX: Two patent obtuse marginal branches. No significant   stenosis or plaques.    RIGHT CORONARY ARTERY: No significant stenosis or plaques.    MORPHOLOGY: Normal LV size and shape.  Normal end diastolic left   ventricular wall thickness.    VALVES: Within normal limits.    PERICARDIUM: Normal pericardial contour. No pericardial effusion.    NONCARDIAC FINDINGS: Mild degenerative changes of the visualized spine.    IMPRESSION:    Small caliber coronaries despite vasodilator administration. Grossly   normal coronaries.    --- End of Report ---      < end of copied text >

## 2022-02-05 ENCOUNTER — TRANSCRIPTION ENCOUNTER (OUTPATIENT)
Age: 47
End: 2022-02-05

## 2022-02-05 VITALS
DIASTOLIC BLOOD PRESSURE: 91 MMHG | RESPIRATION RATE: 18 BRPM | SYSTOLIC BLOOD PRESSURE: 130 MMHG | HEART RATE: 97 BPM | OXYGEN SATURATION: 95 % | TEMPERATURE: 98 F

## 2022-02-05 PROCEDURE — 96374 THER/PROPH/DIAG INJ IV PUSH: CPT

## 2022-02-05 PROCEDURE — 93005 ELECTROCARDIOGRAM TRACING: CPT

## 2022-02-05 PROCEDURE — 36415 COLL VENOUS BLD VENIPUNCTURE: CPT

## 2022-02-05 PROCEDURE — 83735 ASSAY OF MAGNESIUM: CPT

## 2022-02-05 PROCEDURE — U0003: CPT

## 2022-02-05 PROCEDURE — 82330 ASSAY OF CALCIUM: CPT

## 2022-02-05 PROCEDURE — 82435 ASSAY OF BLOOD CHLORIDE: CPT

## 2022-02-05 PROCEDURE — 84484 ASSAY OF TROPONIN QUANT: CPT

## 2022-02-05 PROCEDURE — C8929: CPT

## 2022-02-05 PROCEDURE — 86225 DNA ANTIBODY NATIVE: CPT

## 2022-02-05 PROCEDURE — 82947 ASSAY GLUCOSE BLOOD QUANT: CPT

## 2022-02-05 PROCEDURE — 94640 AIRWAY INHALATION TREATMENT: CPT

## 2022-02-05 PROCEDURE — 84156 ASSAY OF PROTEIN URINE: CPT

## 2022-02-05 PROCEDURE — 87637 SARSCOV2&INF A&B&RSV AMP PRB: CPT

## 2022-02-05 PROCEDURE — 83615 LACTATE (LD) (LDH) ENZYME: CPT

## 2022-02-05 PROCEDURE — 85027 COMPLETE CBC AUTOMATED: CPT

## 2022-02-05 PROCEDURE — 75574 CT ANGIO HRT W/3D IMAGE: CPT

## 2022-02-05 PROCEDURE — 80053 COMPREHEN METABOLIC PANEL: CPT

## 2022-02-05 PROCEDURE — 82803 BLOOD GASES ANY COMBINATION: CPT

## 2022-02-05 PROCEDURE — 85014 HEMATOCRIT: CPT

## 2022-02-05 PROCEDURE — 82553 CREATINE MB FRACTION: CPT

## 2022-02-05 PROCEDURE — 71275 CT ANGIOGRAPHY CHEST: CPT | Mod: MA

## 2022-02-05 PROCEDURE — 99239 HOSP IP/OBS DSCHRG MGMT >30: CPT

## 2022-02-05 PROCEDURE — 84295 ASSAY OF SERUM SODIUM: CPT

## 2022-02-05 PROCEDURE — 85379 FIBRIN DEGRADATION QUANT: CPT

## 2022-02-05 PROCEDURE — 86140 C-REACTIVE PROTEIN: CPT

## 2022-02-05 PROCEDURE — 81003 URINALYSIS AUTO W/O SCOPE: CPT

## 2022-02-05 PROCEDURE — 82570 ASSAY OF URINE CREATININE: CPT

## 2022-02-05 PROCEDURE — 82550 ASSAY OF CK (CPK): CPT

## 2022-02-05 PROCEDURE — 86160 COMPLEMENT ANTIGEN: CPT

## 2022-02-05 PROCEDURE — 71045 X-RAY EXAM CHEST 1 VIEW: CPT

## 2022-02-05 PROCEDURE — 84145 PROCALCITONIN (PCT): CPT

## 2022-02-05 PROCEDURE — 85025 COMPLETE CBC W/AUTO DIFF WBC: CPT

## 2022-02-05 PROCEDURE — 83880 ASSAY OF NATRIURETIC PEPTIDE: CPT

## 2022-02-05 PROCEDURE — 85018 HEMOGLOBIN: CPT

## 2022-02-05 PROCEDURE — 80048 BASIC METABOLIC PNL TOTAL CA: CPT

## 2022-02-05 PROCEDURE — 82728 ASSAY OF FERRITIN: CPT

## 2022-02-05 PROCEDURE — U0005: CPT

## 2022-02-05 PROCEDURE — 84132 ASSAY OF SERUM POTASSIUM: CPT

## 2022-02-05 PROCEDURE — 99285 EMERGENCY DEPT VISIT HI MDM: CPT | Mod: 25

## 2022-02-05 PROCEDURE — 83605 ASSAY OF LACTIC ACID: CPT

## 2022-02-05 RX ORDER — HYDROXYCHLOROQUINE SULFATE 200 MG
1 TABLET ORAL
Qty: 0 | Refills: 0 | DISCHARGE

## 2022-02-05 RX ORDER — LOSARTAN POTASSIUM 100 MG/1
1 TABLET, FILM COATED ORAL
Qty: 25 | Refills: 0
Start: 2022-02-05 | End: 2022-03-01

## 2022-02-05 RX ORDER — PANTOPRAZOLE SODIUM 20 MG/1
1 TABLET, DELAYED RELEASE ORAL
Qty: 30 | Refills: 0
Start: 2022-02-05 | End: 2022-03-06

## 2022-02-05 RX ORDER — HYDROXYCHLOROQUINE SULFATE 200 MG
2 TABLET ORAL
Qty: 60 | Refills: 0
Start: 2022-02-05 | End: 2022-03-06

## 2022-02-05 RX ORDER — LOSARTAN POTASSIUM 100 MG/1
1 TABLET, FILM COATED ORAL
Qty: 0 | Refills: 0 | DISCHARGE

## 2022-02-05 RX ADMIN — FLUCONAZOLE 200 MILLIGRAM(S): 150 TABLET ORAL at 13:39

## 2022-02-05 RX ADMIN — LOSARTAN POTASSIUM 25 MILLIGRAM(S): 100 TABLET, FILM COATED ORAL at 07:47

## 2022-02-05 RX ADMIN — BUDESONIDE AND FORMOTEROL FUMARATE DIHYDRATE 2 PUFF(S): 160; 4.5 AEROSOL RESPIRATORY (INHALATION) at 06:25

## 2022-02-05 RX ADMIN — MYCOPHENOLATE MOFETIL 1500 MILLIGRAM(S): 250 CAPSULE ORAL at 06:24

## 2022-02-05 RX ADMIN — ENOXAPARIN SODIUM 40 MILLIGRAM(S): 100 INJECTION SUBCUTANEOUS at 06:20

## 2022-02-05 RX ADMIN — Medication 3 MILLILITER(S): at 06:24

## 2022-02-05 RX ADMIN — PANTOPRAZOLE SODIUM 40 MILLIGRAM(S): 20 TABLET, DELAYED RELEASE ORAL at 06:23

## 2022-02-05 RX ADMIN — Medication 30 MILLIGRAM(S): at 06:23

## 2022-02-05 RX ADMIN — Medication 125 MICROGRAM(S): at 06:20

## 2022-02-05 RX ADMIN — TIOTROPIUM BROMIDE 1 CAPSULE(S): 18 CAPSULE ORAL; RESPIRATORY (INHALATION) at 11:34

## 2022-02-05 RX ADMIN — Medication 20 MILLIEQUIVALENT(S): at 11:34

## 2022-02-05 RX ADMIN — Medication 1200 MILLIGRAM(S): at 06:24

## 2022-02-05 RX ADMIN — Medication 81 MILLIGRAM(S): at 11:34

## 2022-02-05 RX ADMIN — Medication 3 MILLILITER(S): at 13:39

## 2022-02-05 RX ADMIN — Medication 60 MILLIGRAM(S): at 11:34

## 2022-02-05 NOTE — DISCHARGE NOTE PROVIDER - NSDCMRMEDTOKEN_GEN_ALL_CORE_FT
aspirin 81 mg oral delayed release tablet: 1 tab(s) orally once a day  cefuroxime 500 mg oral tablet: 1 tab(s) orally every 12 hours x5 days  NOTE: due to GI upset patient was taking only one tablet per day instead of one tablet twice daily  DuoNeb 0.5 mg-2.5 mg/3 mL inhalation solution: 3 milliliter(s) inhaled 4 times a day, As Needed  furosemide 20 mg oral tablet: 3 tab(s) orally once a day  hydroxychloroquine 200 mg oral tablet: 1 tab(s) orally 2 times a day  losartan 50 mg oral tablet: 1 tab(s) orally once a day  methylPREDNISolone 8 mg oral tablet: 3 tab(s) orally once a day  mycophenolate mofetil 500 mg oral tablet: 3 tab(s) orally 2 times a day  NOTE: on hold as of 1/5/22  potassium chloride 20 mEq oral tablet, extended release: 1 tab(s) orally once a day  predniSONE 20 mg oral tablet: 1.5 tab(s) orally every 12 hours   Synthroid 125 mcg (0.125 mg) oral tablet: 1 tab(s) orally once a day  Trelegy Ellipta 200 mcg-62.5 mcg-25 mcg/inh inhalation powder: 1 puff(s) inhaled once a day  NOTE: last dispensed July 2021   aspirin 81 mg oral delayed release tablet: 1 tab(s) orally once a day  DuoNeb 0.5 mg-2.5 mg/3 mL inhalation solution: 3 milliliter(s) inhaled 4 times a day, As Needed  fluconazole 200 mg oral tablet: 1 tab(s) orally once a day  furosemide 20 mg oral tablet: 3 tab(s) orally once a day  hydroxychloroquine 200 mg oral tablet: 2 tab(s) orally once a day.  TAKE AT 6:30 PM.   losartan 25 mg oral tablet: 1 tab(s) orally once a day  mycophenolate mofetil 500 mg oral tablet: 3 tab(s) orally 2 times a day  NOTE: on hold as of 1/5/22  pantoprazole 40 mg oral delayed release tablet: 1 tab(s) orally once a day (before a meal)  potassium chloride 20 mEq oral tablet, extended release: 1 tab(s) orally once a day  predniSONE 20 mg oral tablet: 1.5 tab(s) orally every 12 hours   sulfamethoxazole-trimethoprim 800 mg-160 mg oral tablet: 1 tab(s) orally 3 times a week   TAKE ON SUNDAY, tUES, FRI  Synthroid 125 mcg (0.125 mg) oral tablet: 1 tab(s) orally once a day  Trelegy Ellipta 200 mcg-62.5 mcg-25 mcg/inh inhalation powder: 1 puff(s) inhaled once a day  NOTE: last dispensed July 2021

## 2022-02-05 NOTE — CHART NOTE - NSCHARTNOTEFT_GEN_A_CORE
Patient Seen and examined, Briefly:  45 yo M with a PMH of asthma (never intubated), Lupus (on plaquenil, cellcept-stopped when he tested COVID pos) p/w prod cough, chest tightness, SOB x 5 days. States sxs originally started when pt tested COVID pos 01/05/22, received MAB infusion on 01/06/22. Sxs persisted so called PMD who prescribed cefuroxime which he has been taking with no relief. Called PMD again who advised presenting to ED. Pt states he feels a spasm in his chest every time he tries to speak full sentences, coughs and then is able to continue talking. States this is causing him a lot of chest tightness. Has not been on steroids recently. Evaluated for cardiac causes included trop and CT coronaries, work up negative. CTPe neg. Restarted on home mmf. Continued steroids and started on pcp ppx. Pain improving although not fully resolved.     Plan  Continue home plaguenil, MMF, losartan   Prednisone 30mg BID until f/u with outpatient pulm, Bactrim PPX  Continue home trelegy  f/u with outpatient pcp    PE  GENERAL: NAD, obese  EYES: conjunctiva and sclera clear  CHEST/LUNG: no wheezing noted   HEART: +S1/S2   ABDOMEN: Soft, Nontender, Nondistended  EXTREMITIES: no LE edema   PSYCH: AAOx3, pleasant   MSK: tenderness to palpation in the mid sternal area Patient Seen and examined, Briefly:  45 yo M with a PMH of asthma (never intubated), Lupus (on plaquenil, cellcept-stopped when he tested COVID pos) p/w prod cough, chest tightness, SOB x 5 days. States sxs originally started when pt tested COVID pos 01/05/22, received MAB infusion on 01/06/22. Sxs persisted so called PMD who prescribed cefuroxime which he has been taking with no relief. Called PMD again who advised presenting to ED. Pt states he feels a spasm in his chest every time he tries to speak full sentences, coughs and then is able to continue talking. States this is causing him a lot of chest tightness. Has not been on steroids recently. Evaluated for cardiac causes included trop and CT coronaries, work up negative. CTPe neg. Restarted on home mmf. Continued steroids and started on pcp ppx. Pain improving although not fully resolved.     Plan  Continue home plaguenil, MMF, losartan   Prednisone 30mg BID until f/u with outpatient pulm, Bactrim PPX  Continue home trelegy  f/u with outpatient pcp    PE  GENERAL: NAD, obese  EYES: conjunctiva and sclera clear  CHEST/LUNG: no wheezing noted   HEART: +S1/S2   ABDOMEN: Soft, Nontender, Nondistended  EXTREMITIES: no LE edema   PSYCH: AAOx3, pleasant   MSK: tenderness to palpation in the mid sternal area    Discharge time spent by provider 41 minutes Patient Seen and examined, Briefly:  47 yo M with a PMH of asthma (never intubated), Lupus (on plaquenil, cellcept-stopped when he tested COVID pos) p/w prod cough, chest tightness, SOB x 5 days. States sxs originally started when pt tested COVID pos 01/05/22, received MAB infusion on 01/06/22. Sxs persisted so called PMD who prescribed cefuroxime which he has been taking with no relief. Called PMD again who advised presenting to ED. Pt states he feels a spasm in his chest every time he tries to speak full sentences, coughs and then is able to continue talking. States this is causing him a lot of chest tightness. Has not been on steroids recently. Evaluated for cardiac causes included trop and CT coronaries, work up negative. CTPe neg. Restarted on home mmf. Continued steroids and started on pcp ppx. Pain improving although not fully resolved.     Plan  Continue home plaguenil, MMF, losartan   Prednisone 30mg BID until f/u with outpatient pulm, Bactrim PPX  Continue home trelegy  Fluconazole for oral candidiasis for 14 days  f/u with outpatient pcp    PE  GENERAL: NAD, obese  EYES: conjunctiva and sclera clear  CHEST/LUNG: no wheezing noted   HEART: +S1/S2   ABDOMEN: Soft, Nontender, Nondistended  EXTREMITIES: no LE edema   PSYCH: AAOx3, pleasant   MSK: tenderness to palpation in the mid sternal area    Discharge time spent by provider 41 minutes

## 2022-02-05 NOTE — DISCHARGE NOTE PROVIDER - NSDCFUSCHEDAPPT_GEN_ALL_CORE_FT
AMBERLY ESPITIA ; 02/08/2022 ; NPP Med Rheum [address] [facility]  AMBERLY ESPITIA ; 02/09/2022 ; NPP PULMMED [address] [facility]  AMBERLY ESPITIA ; 02/10/2022 ; NPP Internal Med [address] [facility]  AMBERLY ESPITIA ; 02/16/2022 ; NPP Med Rheum [address] [facility]  AMBERLY ESPITIA ; 03/03/2022 ; NPP Med Rheum [address] [facility]  AMBERLY ESPITIA ; 03/31/2022 ; NPP Med Rheum [address] [facility]  AMBERLY ESPITIA ; 04/28/2022 ; NPP Med Rheum [address] [facility]

## 2022-02-05 NOTE — PROGRESS NOTE ADULT - SUBJECTIVE AND OBJECTIVE BOX
Follow-up Pulm Progress Note    Dyspnea, chest tightness improved  O2 sats 97% on RA    Medications:  MEDICATIONS  (STANDING):  albuterol/ipratropium for Nebulization 3 milliLiter(s) Nebulizer every 6 hours  aspirin enteric coated 81 milliGRAM(s) Oral daily  budesonide 160 MICROgram(s)/formoterol 4.5 MICROgram(s) Inhaler 2 Puff(s) Inhalation two times a day  enoxaparin Injectable 40 milliGRAM(s) SubCutaneous two times a day  fluconAZOLE   Tablet 200 milliGRAM(s) Oral daily  furosemide    Tablet 60 milliGRAM(s) Oral daily  hydroxychloroquine 400 milliGRAM(s) Oral <User Schedule>  levothyroxine 125 MICROGram(s) Oral daily  losartan 25 milliGRAM(s) Oral daily  mycophenolate mofetil 1500 milliGRAM(s) Oral two times a day  pantoprazole    Tablet 40 milliGRAM(s) Oral before breakfast  potassium chloride    Tablet ER 20 milliEquivalent(s) Oral daily  predniSONE   Tablet 30 milliGRAM(s) Oral two times a day  tiotropium 18 MICROgram(s) Capsule 1 Capsule(s) Inhalation daily  trimethoprim  160 mG/sulfamethoxazole 800 mG 1 Tablet(s) Oral <User Schedule>    MEDICATIONS  (PRN):  benzocaine 15 mG/menthol 3.6 mG Lozenge 1 Lozenge Oral five times a day PRN Sore Throat    Vital Signs Last 24 Hrs  T(C): 36.5 (2022 04:37), Max: 37.2 (2022 10:02)  T(F): 97.7 (2022 04:37), Max: 98.9 (2022 10:02)  HR: 86 (2022 04:37) (82 - 95)  BP: 120/77 (2022 04:37) (115/76 - 126/82)  BP(mean): --  RR: 18 (2022 04:37) (17 - 18)  SpO2: 97% (2022 04:37) (95% - 98%)    02-04 @ 07:01  -  02-05 @ 07:00  --------------------------------------------------------  IN: 1000 mL / OUT: 3450 mL / NET: -2450 mL    Urinalysis Basic - ( 2022 21:54 )    Color: Light Yellow / Appearance: Clear / S.021 / pH: x  Gluc: x / Ketone: Negative  / Bili: Negative / Urobili: Negative   Blood: x / Protein: Trace / Nitrite: Negative   Leuk Esterase: Negative / RBC: x / WBC x   Sq Epi: x / Non Sq Epi: x / Bacteria: x    Physical Examination:  PULM: Clear to auscultation bilaterally, no significant sputum production  CVS: RRR    RADIOLOGY REVIEWED  CT chest: < from: CT Angio Chest PE Protocol w/ IV Cont (22 @ 16:20) >  FINDINGS:    PULMONARY ANGIOGRAM: No main, right or left main, or lobar pulmonary   embolism. Limited evaluation of the segmental and subsegmental arteries   secondary to transient interruption of the contrast bolus.    LYMPH NODES: No lymphadenopathy. The thyroid gland is normal. Hiatal   hernia.    HEART/VASCULATURE: The heart size is normal. No pericardial effusion.    AIRWAYS/LUNGS/PLEURA: Patent central airways. The lungs are clear. No   pleural effusion or pneumothorax.    UPPER ABDOMEN: Cholecystectomy.    BONES/SOFT TISSUES: Mild degenerative changes of the spine.    IMPRESSION:    1.  No main, right or left main, or lobar pulmonary embolism.    --- End of Report ---      < end of copied text >    TTE: < from: TTE with Doppler (w/Cont) (22 @ 09:08) >  ------------------------------------------------------------------------  Dimensions:    Normal Values:  LA:     3.6    2.0 - 4.0 cm  Ao:     3.9    2.0 - 3.8 cm  SEPTUM: 1.2    0.6 - 1.2 cm  PWT:    0.8    0.6 - 1.1 cm  LVIDd:  3.7    3.0 - 5.6 cm  LVIDs:  2.5    1.8 - 4.0 cm  Derived variables:  LVMI: 49 g/m2  RWT: 0.43  Fractional short: 32 %  EF (Visual Estimate): 55-60 %  ------------------------------------------------------------------------  Observations:  Mitral Valve: Normal mitral valve. Minimal mitral  regurgitation.  Aortic Valve/Aorta: Aortic valve not well visualized;  appears to be a normal trileaflet valve with normal leaflet  opening. No aortic valve regurgitation seen.  Aortic Root: 3.9 cm.  Left Atrium: Left atrium notwell visualized, probably  normal.  Left Ventricle: Endocardial visualization enhanced with  intravenous injection of Ultrasonic Enhancing Agent  (Definity). Normal left ventricular systolic function. No  segmental wall motion abnormalities. Normal left  ventricular internal dimensions and wall thicknesses.  Indeterminate diastolic function.  Right Heart: Right atrium not well visualized, probably  normal. The right ventricle is not well visualized; grossly  normal right ventricular size and systolic function.  Tricuspid valve not well visualized, probably normal.  Minimal tricuspid regurgitation. Pulmonic valve not well  visualized, probably normal. No pulmonic regurgitation.  Pericardium/Pleura: Normal pericardium with no pericardial  effusion.  Hemodynamic: Estimated right atrial pressure is 8 mm Hg.  Inadequate tricuspid regurgitation Doppler envelope  precludes estimation of RVSP.  ------------------------------------------------------------------------  Conclusions:  1. Normal mitral valve. Minimal mitral regurgitation.  2. Aortic valve not well visualized; appears to be a normal  trileaflet valve with normal leaflet opening. No aortic  valve regurgitation seen.  3. Endocardial visualization enhanced with intravenous  injection of Ultrasonic Enhancing Agent (Definity). Normal  left ventricular systolic function. No segmental wall  motion abnormalities.  4. The right ventricle is not well visualized; grossly  normal right ventricular size and systolic function.    < end of copied text >    < from: CT Angio Heart and Coronaries w/ IV Cont (22 @ 14:32) >    TECHNIQUE: Unenhanced prospective and enhanced retrospective images were   obtained of the heart. Images were obtained using a Siemens Somatom   Drive. Images were obtained before and after the uneventful   administration of nonionic intravenous contrast (72 cc Omnipaque 350 was   administered; 28 cc Omnipaque 350 was discarded). The patient was imaged   using a body mass index protocol, automatic exposure control, and   iterative reconstruction. Curved multiplanar images and 3-D reformatted   images were generated using an independent software program.      MEDICATION:  0.8 mg SL nitroglycerin.  10 mg IV metoprolol.    HEART RATE: Average 90 BPM during acquisition.    DOSE: 801.1 mGy.cm.      QUALITY:  Fair. Coronaries were assessed on multiple phases of the R-R   interval.      FINDINGS:    CALCIUM SCORE: The calculated Agatston score is 0.    Agatston Score:  Left main: 0.  Left anterior descendin.  Left circumflex: 0.  Right coronary: 0.    Calcium volume (cubic millimeters):  Left main: 0.  Left anterior descendin.  Left circumflex: 0.  Right coronary: 0.      0: No plaque is present.  1-10: A minimal amount of plaque is present.  : A mild amount of plaque is present.  101-400: A moderate amount of plaque is present.  Greater than 400: A large amount of plaque is present.    CORONARY: Left artery dominance. No evidence for anomalous coronary   arteries.    LEFT MAIN: Normal bifurcation into LAD, LCX. No significant stenosis or   plaques.    LEFT ANTERIOR DESCENDING: Three patent diagonal branches. Distal vessel   wraps around apex.  No significant stenosis or plaques.    LEFT CIRCUMFLEX: Two patent obtuse marginal branches. No significant   stenosis or plaques.    RIGHT CORONARY ARTERY: No significant stenosis or plaques.    MORPHOLOGY: Normal LV size and shape.  Normal end diastolic left   ventricular wall thickness.    VALVES: Within normal limits.    PERICARDIUM: Normal pericardial contour. No pericardial effusion.    NONCARDIAC FINDINGS: Mild degenerative changes of the visualized spine.    IMPRESSION:    Small caliber coronaries despite vasodilator administration. Grossly   normal coronaries.    --- End of Report ---    < end of copied text >

## 2022-02-05 NOTE — PROGRESS NOTE ADULT - SUBJECTIVE AND OBJECTIVE BOX
Date of Service   22 @ 13:11    Patient is a 46y old  Male who presents with a chief complaint of WAITE, COVID19 (2022 15:33)      INTERVAL HISTORY: pt feels ok    REVIEW OF SYSTEMS:   CONSTITUTIONAL: No weakness  EYES/ENT: No visual changes; No throat pain  Neck: No pain or stiffness  Respiratory: No cough, wheezing, No shortness of breath  CARDIOVASCULAR: + chest soreness, no palpitations  GASTROINTESTINAL: No abdominal pain, no nausea, vomiting or hematemesis  GENITOURINARY: No dysuria, frequency or hematuria  NEUROLOGICAL: No stroke like symptoms  SKIN: No rashes    	  MEDICATIONS:  furosemide    Tablet 60 milliGRAM(s) Oral daily  losartan 25 milliGRAM(s) Oral daily        PHYSICAL EXAM:  T(C): 37.1 (22 @ 11:36), Max: 37.1 (22 @ 11:36)  HR: 94 (22 @ 11:36) (82 - 95)  BP: 128/79 (22 @ 11:36) (117/79 - 128/79)  RR: 18 (22 @ 11:36) (18 - 18)  SpO2: 96% (22 @ 11:36) (95% - 98%)  Wt(kg): --  I&O's Summary    2022 07:01  -  2022 07:00  --------------------------------------------------------  IN: 1000 mL / OUT: 3450 mL / NET: -2450 mL          Appearance: In no distress	  HEENT:    PERRL, EOMI	  Cardiovascular:  S1 S2, No JVD  Respiratory: Lungs clear to auscultation	  Gastrointestinal:  Soft, Non-tender, + BS	  Vascularature:  No edema of LE  Psychiatric: Appropriate affect   Neuro: no acute focal deficits                     Labs personally reviewed      ASSESSMENT/PLAN: 	      47 yo M with a PMH of asthma (never intubated), Lupus p/w cough, faituge , SOB x 5 days. States sxs originally started when pt tested COVID pos 22, received MAB infusion on 22. Sxs persisted so called PMD who prescribed cefuroxime which he has been taking with no relief. Called PMD again today who advised presenting to ED. Pt states he feels a spasm in his chest every time he tries to speak full sentences, coughs and then is able to continue talking. States this is causing him a lot of chest tightness.  Denies nausea, vomiting, fever, chills, leg pain/swelling, abd pain, headache, syncope. Nonsmoker.     Pt reports cardiac w/u stress test and echo 6 months ago and results were reportedly normal. Denies any cardiac history, on Losartan for Lupus per pt. Pt today  reports CP that starts at midsternum, "waves" radiates to the right breast then the left chest. denies any associates sx other than nausea. Pt reports previous episodes of such pain at home. He reports that it is different from CP with cough. He also reports mildly reproductive with pressure. Pt's mother  or cardiac event at age 46. Pt is obese, denies smoking.         Problem/Plan - 1:  ·  Problem: Chest pain   - EKG noted, non ishcmemic  - Trop 8 was 12 on admin   - states he has 2 diff types of chest pain - one that is reproducible 2/2 MSK pain from severe cough and another that is pressure like and new   - given persistent chest pain with strong family history of CAD will pursue CT angio heart coronaries to define cor anatomy  - s/p CT cors with normal coronaries though small in caliber  - resume Losartan, Lasix now post CT heart  - tele monitor   - PE ruled out  - TTE unremarkable  - most likely MSK / or from cough, as soreness with is constant and reproducible with pressure      Problem/Plan - 2:  ·  Problem: Asthma exacerbation.   - CXR clear lungs   - c/w duonebs, symbicort and spiriva  - sat well on RA     Problem/Plan - 3:  ·  Problem: 2019 novel coronavirus disease (COVID-19).    pulm/id following  considered asymptomatic  pt received mab earlier in illness.  supportive care   off iso     Problem/Plan - 4:  ·  Problem: Essential hypertension .   -  c/w Losartan, Lasix  - monitor BP         Taniya NINA-BC   Anjel English DO [facility]  Cardiovascular Medicine  [address], [address]  Office: [phone]  Cell: [phone]

## 2022-02-05 NOTE — DISCHARGE NOTE NURSING/CASE MANAGEMENT/SOCIAL WORK - NSDCPEFALRISK_GEN_ALL_CORE
For information on Fall & Injury Prevention, visit: [email or web address]  [email or web address] OR  https://www.cdc.gov/steadi/patient.html

## 2022-02-05 NOTE — DISCHARGE NOTE PROVIDER - DETAILS OF MALNUTRITION DIAGNOSIS/DIAGNOSES
This patient has been assessed with a concern for Malnutrition and was treated during this hospitalization for the following Nutrition diagnosis/diagnoses:     -  01/31/2022: Morbid obesity (BMI > 40)

## 2022-02-05 NOTE — PROGRESS NOTE ADULT - ATTENDING COMMENTS
Pt care and plan discussed and reviewed with NP. Plan as outlined above edited by me to reflect our discussion. I had a prolonged conversation with the patient regarding hospital course, differential diagnosis and results of diagnostic tests.  Plan of care discussed with patient after the evaluation. Patient expresses clear understanding and satisfaction with the plan of care. OMT on six regions for acute somatic dysfunctions done at the bedside. Sixty five minutes spent on encounter, of which more than fifty percent of the encounter was spent on counseling and/or coordinating care by the attending physician.
Pt care and plan discussed and reviewed with NP. Plan as outlined above edited by me to reflect our discussion. Thirty five minutes spent on encounter, of which more than fifty percent of the encounter was spent on counseling and/or coordinating care by the attending physician.
Overall appears stable from lupus standpoint, improving CP since last eval. Some new somatic symptoms tho do not fit with a CTD process and presently on high dose steroids so unlikely to be flaring thru that dose. + thrush, please start fluconazole. Will resume full dose cellcept and set up OP benlysta infusion once he is discharged. Steroid taper per pulm back to home dose of medrol 8mg/day which is prednisone 10mg/day.
pt better, but states chest tightness w sob with showering, relieved w nebs.  suggest contine iv solumedrol as is for next 48h  d/w pt
pt now with post viral cough, minimal exp wheeze  suggest mucinex 1200 bid w 8oz of water, dc solumedrol and start prednisone 30 po bid in am, cont b.dilators  dw pt and wife on facetime
lung exam improved  change to prednisone 30bid, continue bdilators  pt described pressure like chest pain this am, would consider cardiology eval and tte
radha with outpt pulmonary md
pt ok from pulmonary perspective to dc home  suggest prednisone 30mg bid, bactrim ds one tiw, continue symbicort/spiriva, duonebs prn  radha w his pulmonologist next week  dw pt
suggest restart prednisone 30 bid  from pulmonary perspective pt ok for dc home

## 2022-02-05 NOTE — DISCHARGE NOTE PROVIDER - CARE PROVIDERS DIRECT ADDRESSES
[email or web address]

## 2022-02-05 NOTE — PROGRESS NOTE ADULT - PROBLEM SELECTOR PLAN 1
Pt with c/o worsening SOB, cough, wheezing since dx COVID a few weeks ago. Reportedly tachycardic and tachypneic in ED - physical exam noted for decreased BS.   -CTA chest neg PE, no PNA   -Seems to have better air movement at this time. No audible wheeze. Dyspnea and chest tightness improved  -Normoxic, keep sats >90% with O2 PRN  -Symbicort BID, Spiriva qd (home med: Trelegy)  -Duoneb q6h, can make PRN on discharge   -S/p Mucinex 1200 mg BID x5 days  -C/w Prednisone 30 mg PO BID until f/u with outpatient pulmonologist. Start Bactrim 1 DS tab 3x a week for PCP ppx, GI ppx with Protonix  -D/c planning per primary team, no c/i from pulmonary perspective.

## 2022-02-05 NOTE — PROGRESS NOTE ADULT - PROVIDER SPECIALTY LIST ADULT
Cardiology
Hospitalist
Infectious Disease
Rheumatology
Cardiology
Pulmonology
Infectious Disease
Pulmonology
Hospitalist
Pulmonology
Internal Medicine
Hospitalist
Internal Medicine
Pulmonology
Hospitalist
Pulmonology
Internal Medicine
Pulmonology
Hospitalist
Hospitalist

## 2022-02-05 NOTE — PROGRESS NOTE ADULT - PROBLEM SELECTOR PLAN 4
-CTA chest neg PE   -Troponin WNL  -C/o chest pain again 2/1  -Troponin, CKMB WNL  -Cards recs noted, plan for CT angio heart & coronaries  -F/u TTE.
-CTA chest neg PE   -C/o chest pain again 2/1  -Troponin, CKMB WNL  -TTE with minimal MR, normal LV & RV function   -CTA heart & coronaries with grossly normal coronaries  -Cards f/u.
holding losartan as above
-CTA chest neg PE   -C/o chest pain again 2/1  -Troponin, CKMB WNL  -TTE with minimal MR, normal LV & RV function   -CTA heart & coronaries with grossly normal coronaries  -Cards f/u.
c/w Losartan
-CTA chest neg PE   -Troponin WNL  -C/o chest pain again 2/1  -Troponin, CKMB WNL  -Cards recs noted, plan for CT angio heart & coronaries  -TTE with minimal MR, normal LV & RV function   -Monitor for change in respiratory status given asthma exacerbation if giving beta blockers.
c/w Losartan
holding losartan as above
-CTA chest neg PE   -Troponin WNL  -C/o chest pain again today. Check EKG, cardiac enzymes again.  -F/u TTE
c/w Losartan
pulm/id following  considered asymptomatic  pt received mab earlier in illness
c/w Losartan

## 2022-02-05 NOTE — PROGRESS NOTE ADULT - PROBLEM SELECTOR PROBLEM 3
2019 novel coronavirus disease (COVID-19)
Lupus

## 2022-02-05 NOTE — DISCHARGE NOTE PROVIDER - NSDCCPCAREPLAN_GEN_ALL_CORE_FT
PRINCIPAL DISCHARGE DIAGNOSIS  Diagnosis: Asthma exacerbation  Assessment and Plan of Treatment:   Take medicines as directed by your caregiver.  Control your home environment in the following ways to help prevent asthma attacks:  Change your heating and air conditioning filter at least once a month.  Do not smoke. Do not stay in places where others are smoking.  Get rid of pests (such as roaches and mice) and their droppings.  Use a blanket that is made of polyester or cotton with a tight nap.  Clean bathrooms and rolando with bleach and repaint with mold-resistant paint.   Wash hands frequently.  Talk to your caregiver about an action plan for managing asthma attacks. This includes the use of a peak flow meter which measures the severity of the attack and medicines that can help stop the attack. An action plan can help minimize or stop the attack without having to seek medical care.  Always have a plan prepared for seeking medical attention. This should include contacting your caregiver and in the case of a severe attack, calling your local emergency services (_____________________).  SEEK MEDICAL CARE IF:  You have wheezing, shortness of breath, or a cough even if taking medicine to prevent attacks.   You have thickening of sputum.   Your sputum changes from clear or white to yellow, green, gray, or bloody.   You have any problems that may be related to the medicines you are taking (such as a rash, itching, swelling, or trouble breathing).  You are using a reliever medicine more than 2–3 times per week.  Your peak flow is still at 50–79% of personal best after following your action plan for 1 hour.  SEEK IMMEDIATE MEDICAL CARE IF:  You are short of breath even at rest.  You get short of breath when doing very little physical activity.  You have difficulty eating, drinking, or talking due to asthma symptoms.  You have chest pain or you feel that your heart is beating fast.   You have a bluish color to your lips or fingernails.        SECONDARY DISCHARGE DIAGNOSES  Diagnosis: Lupus  Assessment and Plan of Treatment: Take your medication as prescribed.   Follow up with your medical doctor for routine blood  work monitoring, and to establish long term treatment goals.      Diagnosis: Essential hypertension  Assessment and Plan of Treatment: Take your medication as prescribed.  Follow up with your medical doctor for routine blood pressure monitoring, and to establish long term blood pressure treatment goals.  Low salt diet  Activity as tolerated.  Notify your doctor if you have any of the following symptoms:   Dizziness, Lightheadedness, Blurry vision, Headache, Chest pain, Shortness of breath      Diagnosis: Hypothyroidism  Assessment and Plan of Treatment: Take your medication as prescribed.   Follow up with your medical doctor for routine blood  work monitoring, and to establish long term treatment goals.      Diagnosis: Morbid obesity  Assessment and Plan of Treatment: Increase your activity as tolerated.

## 2022-02-05 NOTE — DISCHARGE NOTE PROVIDER - HOSPITAL COURSE
45 yo M with a PMH of asthma (never intubated), Lupus (on plaquenil, cellcept - stopped when he tested COVID pos) p/w prod cough, chest tightness, SOB x 5 days. States sxs originally started when pt tested COVID pos 01/05/22, received MAB infusion on 01/06/22. Sxs persisted so called PMD who prescribed cefuroxime which he has been taking with no relief. Called PMD again today who advised presenting to ED. Pt states he feels a spasm in his chest every time he tries to speak full sentences, coughs and then is able to continue talking. States this is causing him a lot of chest tightness. Has not been on steroids recently.     Nutritional Assessment:  Nutritional Assessment	This patient has been assessed with a concern for Malnutrition and has been determined to have a diagnosis/diagnoses of Morbid obesity (BMI > 40).    This patient is being managed with:   Diet Regular-      Chest pain.   Chest tightness - trop negative, echo unremarkable   Discussed with Dr. Gonzales and with Dr. Roberts, patient's out pt physicians. C/w current workup and management per my discussion with them   c/w prednisone  Appreciate cardiology eval for chest discomfort/chest pain, Ct coronaries done which are negative  Reproducible pain, likely MSK  Per rheum eval, likely not from lupus flare. ?fibromyalgia?  Pain service eval Consult.    Asthma exacerbation.   Unable to clinically determine if triggered by COVID alone  Still states that he remains tight. No wheezing on exam.   Imaging negative for PNA or PE  c/w duonebs, symbicort and spiriva  c/w prednisone  O2 Saturation  stable on RA.    Lupus.   On Plaquenil and Cellcept as well as chronic steroids.   Cellcept increased to full dose as per rheum   pt tp get Benlysta infusion as an outpt, as pt missed his outpt January dose  will check UA and urine protein/cr ratio  outpatient neurology eval for headache   on fluconazole for thrush.    2019 Novel Coronavirus Disease (COVID-19).   Pulm/id following  Considered asymptomatic  Pt received mab earlier in illness.    Essential hypertension.   Holding losartan as above.    Morbid obesity.   Nutrition eval.    Hypothyroidism.   Continue Synthroid.    Pt  medically cleared for discharge home per Med Attending Dr Mcfarlane.

## 2022-02-05 NOTE — DISCHARGE NOTE PROVIDER - CARE PROVIDER_API CALL
Alyce Gonzales ()  Internal Medicine  [address] [facility]  [place] [address]  Phone: [phone]  Fax: [phone]  Established Patient  Follow Up Time: 1-3 days    Pritesh Everett)  Internal Medicine  [address] [place] [address]  Phone: [phone]  Fax: [phone]  Follow Up Time: 1 week    Susanna Neri)  Internal Medicine; Rheumatology  [address] [facility]  [place] [address]  Phone: [phone]  Fax: [phone]  Established Patient  Follow Up Time: 1-3 days

## 2022-02-05 NOTE — PROGRESS NOTE ADULT - PROBLEM SELECTOR PROBLEM 4
Chest pain
Essential hypertension
2019 novel coronavirus disease (COVID-19)
Chest pain
Chest pain
Essential hypertension
Chest pain
Chest pain
Essential hypertension

## 2022-02-05 NOTE — PROGRESS NOTE ADULT - PROBLEM SELECTOR PROBLEM 2
Lupus
Asthma exacerbation

## 2022-02-05 NOTE — PROGRESS NOTE ADULT - PROBLEM SELECTOR PROBLEM 1
Asthma exacerbation
Chest pain
Asthma exacerbation

## 2022-02-05 NOTE — DISCHARGE NOTE PROVIDER - PROVIDER TOKENS
PROVIDER:[TOKEN:[[id number]],FOLLOWUP:[1-3 days],ESTABLISHEDPATIENT:[T]],PROVIDER:[TOKEN:[[id number]],FOLLOWUP:[1 week]],PROVIDER:[TOKEN:[[id number]],FOLLOWUP:[1-3 days],ESTABLISHEDPATIENT:[T]]

## 2022-02-05 NOTE — DISCHARGE NOTE NURSING/CASE MANAGEMENT/SOCIAL WORK - PATIENT PORTAL LINK FT
You can access the FollowMyHealth Patient Portal offered by [facility] by registering at the following website: [email or web address]. By joining [employer]’s FollowMyHealth portal, you will also be able to view your health information using other applications (apps) compatible with our system.

## 2022-02-05 NOTE — DISCHARGE NOTE NURSING/CASE MANAGEMENT/SOCIAL WORK - NSDCVIVACCINE_GEN_ALL_CORE_FT
Tdap; 17-Dec-2018 23:02; Candi Bedolla (ALEKSEY); [employer]; [id number] (Exp. Date: 03-Dec-2020); IntraMuscular; Deltoid Left.; 0.5 milliLiter(s); VIS (VIS Published: 09-May-2013, VIS Presented: 17-Dec-2018);

## 2022-02-06 RX ORDER — FLUCONAZOLE 150 MG/1
1 TABLET ORAL
Qty: 12 | Refills: 0
Start: 2022-02-06 | End: 2022-02-17

## 2022-02-07 ENCOUNTER — LABORATORY RESULT (OUTPATIENT)
Age: 47
End: 2022-02-07

## 2022-02-08 ENCOUNTER — APPOINTMENT (OUTPATIENT)
Dept: RHEUMATOLOGY | Facility: CLINIC | Age: 47
End: 2022-02-08
Payer: COMMERCIAL

## 2022-02-08 ENCOUNTER — LABORATORY RESULT (OUTPATIENT)
Age: 47
End: 2022-02-08

## 2022-02-08 ENCOUNTER — NON-APPOINTMENT (OUTPATIENT)
Age: 47
End: 2022-02-08

## 2022-02-08 PROCEDURE — 96374 THER/PROPH/DIAG INJ IV PUSH: CPT | Mod: 59

## 2022-02-08 PROCEDURE — 36415 COLL VENOUS BLD VENIPUNCTURE: CPT

## 2022-02-08 PROCEDURE — 96413 CHEMO IV INFUSION 1 HR: CPT

## 2022-02-09 ENCOUNTER — NON-APPOINTMENT (OUTPATIENT)
Age: 47
End: 2022-02-09

## 2022-02-09 ENCOUNTER — APPOINTMENT (OUTPATIENT)
Dept: PULMONOLOGY | Facility: CLINIC | Age: 47
End: 2022-02-09
Payer: COMMERCIAL

## 2022-02-09 VITALS
SYSTOLIC BLOOD PRESSURE: 126 MMHG | HEIGHT: 68 IN | BODY MASS INDEX: 41.07 KG/M2 | TEMPERATURE: 97.1 F | DIASTOLIC BLOOD PRESSURE: 86 MMHG | HEART RATE: 105 BPM | WEIGHT: 271 LBS | OXYGEN SATURATION: 99 %

## 2022-02-09 PROCEDURE — 99214 OFFICE O/P EST MOD 30 MIN: CPT

## 2022-02-09 NOTE — ASSESSMENT
[FreeTextEntry1] : 46 year old male MTA  Hx Janessa, mild asthma, s/p recent COVID 19 virus infection s/p MAB infusion, recent hospitalization presents for follow up\par \par Continue Trelegy 200-25 mcg daily\par Continue methylprednisolone, Cell Cept, Benlysta per Rheumatology.\par  Continue Famotidine 40 mg daily\par Follow up Rheumatology\par Referral to Physical Theray\par \par Follow up 4-6 weeks

## 2022-02-09 NOTE — COUNSELING
[Other: ____] : [unfilled] [Good understanding] : Patient has a good understanding of lifestyle changes and steps needed to achieve self management goal [de-identified] : Anxiety

## 2022-02-09 NOTE — HISTORY OF PRESENT ILLNESS
[Never] : never [TextBox_4] : 46 year old male Hx Hx Lupus, asthma, on plaquenil, cell cept stopped when tested positive for COVID, 1/5/22 received MAB infusion, hospitalized 1/26/22 through 2/5/22 presents for follow up. Patient very weak and ambulating with cane.  He is having difficulty walking.  His respiratory status is stable.  He reports he had Benlysta infusion yesterday. He reports he is very "down" He is here for hospital follow up accompanied his partner.

## 2022-02-10 ENCOUNTER — APPOINTMENT (OUTPATIENT)
Dept: INTERNAL MEDICINE | Facility: CLINIC | Age: 47
End: 2022-02-10
Payer: COMMERCIAL

## 2022-02-10 VITALS
TEMPERATURE: 97.4 F | DIASTOLIC BLOOD PRESSURE: 85 MMHG | OXYGEN SATURATION: 97 % | SYSTOLIC BLOOD PRESSURE: 123 MMHG | WEIGHT: 271 LBS | BODY MASS INDEX: 41.21 KG/M2 | HEART RATE: 101 BPM

## 2022-02-10 PROCEDURE — 99214 OFFICE O/P EST MOD 30 MIN: CPT | Mod: 25

## 2022-02-10 PROCEDURE — 36415 COLL VENOUS BLD VENIPUNCTURE: CPT

## 2022-02-10 NOTE — ASSESSMENT
[FreeTextEntry1] : The patient is a 46-year-old male with history of obesity, lupus, asthma, hypertension, who  who was hospitalized after COVID-19 with decompensated asthma\par \par \par Decompensated asthma\par follow-up with pulmonary\par present and 40 prednisone continue inhalers.\par doing well\par \par 2 severe myalgia/muscle weakness\par hospitalization costs severe weakness\par check CK L dose said rate\par referral for physical therapy\par \par 3. Hypertension\par adequately controlled\par \par 4 lupus\par follow-up with rheumatology continue medication\par \par 5 hypothyroidism\par continue levothyroxine check TFTs

## 2022-02-10 NOTE — HISTORY OF PRESENT ILLNESS
[Home] : at home, [unfilled] , at the time of the visit. [Post-hospitalization from ___ Hospital] : Post-hospitalization from [unfilled] Hospital [Patient Contacted By: ____] : and contacted by [unfilled] [FreeTextEntry3] : Discharge for COVID and decompensated asthma [FreeTextEntry2] : The patient is a 46-year-old male with history of lupus, obesity, hypertension, asthma, hypothyroidism, who was recently diagnosed COVID positive in January 5 treaty monoclonal antibodies after two weeks improvement suddenly had decompensated shortness of breath secondary to asthma was hospitalized treated with steroids bedrest was hospitalized for approximately two weeks patient now presents for follow-up complaining of severe weakness denies fever chills shortness of breath

## 2022-02-10 NOTE — PHYSICAL EXAM
[No Acute Distress] : no acute distress [Well Nourished] : well nourished [Well Developed] : well developed [No JVD] : no jugular venous distention [Supple] : supple [No Lymphadenopathy] : no lymphadenopathy [No Respiratory Distress] : no respiratory distress  [Clear to Auscultation] : lungs were clear to auscultation bilaterally [No Accessory Muscle Use] : no accessory muscle use [Normal Rate] : normal rate  [Regular Rhythm] : with a regular rhythm [Normal S1, S2] : normal S1 and S2 [No Carotid Bruits] : no carotid bruits [No Abdominal Bruit] : a ~M bruit was not heard ~T in the abdomen [No Varicosities] : no varicosities [No Extremity Clubbing/Cyanosis] : no extremity clubbing/cyanosis [Normal Supraclavicular Nodes] : no supraclavicular lymphadenopathy [Normal Axillary Nodes] : no axillary lymphadenopathy [No CVA Tenderness] : no CVA  tenderness [No Spinal Tenderness] : no spinal tenderness [No Joint Swelling] : no joint swelling [Grossly Normal Strength/Tone] : grossly normal strength/tone [No Rash] : no rash [No Skin Lesions] : no skin lesions [Normal Gait] : normal gait [Coordination Grossly Intact] : coordination grossly intact [No Focal Deficits] : no focal deficits [Speech Grossly Normal] : speech grossly normal [de-identified] : Appears week using a cane to walk

## 2022-02-12 ENCOUNTER — NON-APPOINTMENT (OUTPATIENT)
Age: 47
End: 2022-02-12

## 2022-02-16 ENCOUNTER — NON-APPOINTMENT (OUTPATIENT)
Age: 47
End: 2022-02-16

## 2022-02-16 ENCOUNTER — APPOINTMENT (OUTPATIENT)
Dept: RHEUMATOLOGY | Facility: CLINIC | Age: 47
End: 2022-02-16
Payer: COMMERCIAL

## 2022-02-16 VITALS
BODY MASS INDEX: 40.92 KG/M2 | SYSTOLIC BLOOD PRESSURE: 136 MMHG | HEART RATE: 110 BPM | HEIGHT: 68 IN | DIASTOLIC BLOOD PRESSURE: 91 MMHG | OXYGEN SATURATION: 98 % | WEIGHT: 270 LBS | TEMPERATURE: 97.4 F

## 2022-02-16 DIAGNOSIS — G43.909 MIGRAINE, UNSPECIFIED, NOT INTRACTABLE, W/OUT STATUS MIGRAINOSUS: ICD-10-CM

## 2022-02-16 PROCEDURE — 99215 OFFICE O/P EST HI 40 MIN: CPT

## 2022-02-16 RX ORDER — MECLIZINE HYDROCHLORIDE 25 MG/1
25 TABLET ORAL 3 TIMES DAILY
Qty: 90 | Refills: 1 | Status: DISCONTINUED | COMMUNITY
Start: 2021-11-10 | End: 2022-02-16

## 2022-02-16 RX ORDER — CEFUROXIME AXETIL 500 MG/1
500 TABLET ORAL
Qty: 10 | Refills: 0 | Status: DISCONTINUED | COMMUNITY
Start: 2022-01-18 | End: 2022-02-16

## 2022-02-16 RX ORDER — ALBUTEROL SULFATE 2.5 MG/3ML
(2.5 MG/3ML) SOLUTION RESPIRATORY (INHALATION)
Qty: 1 | Refills: 0 | Status: DISCONTINUED | COMMUNITY
Start: 2020-05-26 | End: 2022-02-16

## 2022-02-16 RX ORDER — POTASSIUM CHLORIDE 1500 MG/1
20 TABLET, FILM COATED, EXTENDED RELEASE ORAL
Qty: 30 | Refills: 1 | Status: DISCONTINUED | COMMUNITY
Start: 2021-04-06 | End: 2022-02-16

## 2022-02-16 RX ORDER — ALBUTEROL SULFATE 90 UG/1
108 (90 BASE) INHALANT RESPIRATORY (INHALATION)
Qty: 1 | Refills: 2 | Status: DISCONTINUED | COMMUNITY
Start: 2020-03-14 | End: 2022-02-16

## 2022-02-16 RX ORDER — COLCHICINE 0.6 MG/1
0.6 TABLET ORAL DAILY
Qty: 5 | Refills: 0 | Status: DISCONTINUED | COMMUNITY
Start: 2021-11-29 | End: 2022-02-16

## 2022-02-16 RX ORDER — PREDNISONE 20 MG/1
20 TABLET ORAL
Refills: 0 | Status: DISCONTINUED | COMMUNITY
Start: 2022-02-16 | End: 2022-02-16

## 2022-02-16 RX ORDER — HYDROXYZINE HYDROCHLORIDE 25 MG/1
25 TABLET ORAL
Qty: 30 | Refills: 2 | Status: DISCONTINUED | COMMUNITY
Start: 2019-10-16 | End: 2022-02-16

## 2022-02-16 RX ORDER — BUDESONIDE 0.5 MG/2ML
0.5 INHALANT ORAL
Qty: 1 | Refills: 1 | Status: DISCONTINUED | COMMUNITY
Start: 2022-01-11 | End: 2022-02-16

## 2022-02-16 RX ORDER — TOLNAFTATE 10 MG/G
1 CREAM TOPICAL
Qty: 1 | Refills: 0 | Status: DISCONTINUED | COMMUNITY
Start: 2021-08-05 | End: 2022-02-16

## 2022-02-16 RX ORDER — FAMOTIDINE 40 MG/1
40 TABLET, FILM COATED ORAL
Qty: 90 | Refills: 1 | Status: DISCONTINUED | COMMUNITY
Start: 2021-06-14 | End: 2022-02-16

## 2022-02-16 RX ORDER — CETIRIZINE HYDROCHLORIDE 10 MG/1
10 CAPSULE, LIQUID FILLED ORAL
Qty: 30 | Refills: 3 | Status: DISCONTINUED | COMMUNITY
Start: 2021-03-24 | End: 2022-02-16

## 2022-02-16 RX ORDER — DULOXETINE HYDROCHLORIDE 30 MG/1
30 CAPSULE, DELAYED RELEASE PELLETS ORAL
Qty: 30 | Refills: 1 | Status: DISCONTINUED | COMMUNITY
Start: 2021-09-13 | End: 2022-02-16

## 2022-02-17 ENCOUNTER — TRANSCRIPTION ENCOUNTER (OUTPATIENT)
Age: 47
End: 2022-02-17

## 2022-02-17 LAB
ALBUMIN SERPL ELPH-MCNC: 4.2 G/DL
ALP BLD-CCNC: 87 U/L
ALT SERPL-CCNC: 49 U/L
ANION GAP SERPL CALC-SCNC: 19 MMOL/L
APPEARANCE: CLEAR
AST SERPL-CCNC: 22 U/L
BACTERIA: NEGATIVE
BASOPHILS # BLD AUTO: 0.03 K/UL
BASOPHILS NFR BLD AUTO: 0.2 %
BILIRUB SERPL-MCNC: 0.3 MG/DL
BILIRUBIN URINE: NEGATIVE
BLOOD URINE: NEGATIVE
BUN SERPL-MCNC: 11 MG/DL
C3 SERPL-MCNC: 157 MG/DL
C4 SERPL-MCNC: 33 MG/DL
CALCIUM OXALATE CRYSTALS: ABNORMAL
CALCIUM SERPL-MCNC: 9.7 MG/DL
CHLORIDE SERPL-SCNC: 101 MMOL/L
CK SERPL-CCNC: 79 U/L
CK SERPL-CCNC: 82 U/L
CO2 SERPL-SCNC: 19 MMOL/L
COLOR: YELLOW
COVID-19 NUCLEOCAPSID  GAM ANTIBODY INTERPRETATION: NEGATIVE
COVID-19 SPIKE DOMAIN ANTIBODY INTERPRETATION: POSITIVE
CREAT SERPL-MCNC: 1.06 MG/DL
CREAT SPEC-SCNC: 306 MG/DL
CREAT/PROT UR: 0.1 RATIO
DEPRECATED D DIMER PPP IA-ACNC: <150 NG/ML DDU
DSDNA AB SER-ACNC: <12 IU/ML
EOSINOPHIL # BLD AUTO: 0.06 K/UL
EOSINOPHIL NFR BLD AUTO: 0.4 %
ERYTHROCYTE [SEDIMENTATION RATE] IN BLOOD BY WESTERGREN METHOD: 54 MM/HR
GLUCOSE QUALITATIVE U: NEGATIVE
GLUCOSE SERPL-MCNC: 110 MG/DL
HCT VFR BLD CALC: 42.3 %
HGB BLD-MCNC: 14.1 G/DL
HYALINE CASTS: 0 /LPF
IMM GRANULOCYTES NFR BLD AUTO: 1.1 %
KETONES URINE: NEGATIVE
LEUKOCYTE ESTERASE URINE: NEGATIVE
LYMPHOCYTES # BLD AUTO: 2.8 K/UL
LYMPHOCYTES NFR BLD AUTO: 18.9 %
MAN DIFF?: NORMAL
MCHC RBC-ENTMCNC: 29.2 PG
MCHC RBC-ENTMCNC: 33.3 GM/DL
MCV RBC AUTO: 87.6 FL
MICROSCOPIC-UA: NORMAL
MONOCYTES # BLD AUTO: 1.2 K/UL
MONOCYTES NFR BLD AUTO: 8.1 %
NEUTROPHILS # BLD AUTO: 10.6 K/UL
NEUTROPHILS NFR BLD AUTO: 71.3 %
NITRITE URINE: NEGATIVE
PH URINE: 6.5
PLATELET # BLD AUTO: 294 K/UL
POTASSIUM SERPL-SCNC: 3.7 MMOL/L
PROT SERPL-MCNC: 6.5 G/DL
PROT UR-MCNC: 31 MG/DL
PROTEIN URINE: ABNORMAL
RBC # BLD: 4.83 M/UL
RBC # FLD: 14.4 %
RED BLOOD CELLS URINE: 0 /HPF
SARS-COV-2 AB SERPL IA-ACNC: >250 U/ML
SARS-COV-2 AB SERPL QL IA: 0.48 INDEX
SODIUM SERPL-SCNC: 139 MMOL/L
SPECIFIC GRAVITY URINE: 1.03
SQUAMOUS EPITHELIAL CELLS: 1 /HPF
UROBILINOGEN URINE: NORMAL
VZV AB TITR SER: POSITIVE
VZV IGG SER IF-ACNC: 1676 INDEX
WBC # FLD AUTO: 14.85 K/UL
WHITE BLOOD CELLS URINE: 1 /HPF

## 2022-02-20 LAB
A-TUMOR NECROSIS FACT SERPL-MCNC: <1.7 PG/ML
IGNF SERPL-MCNC: <4.2 PG/ML
IL10 SERPL-MCNC: 3.6 PG/ML
IL12 SERPL-MCNC: <1.9 PG/ML
IL13 SERPL-MCNC: <1.7 PG/ML
IL17A SERPL-MCNC: <1.4 PG/ML
IL2 SERPL-MCNC: 292.7 PG/ML
IL2 SERPL-MCNC: <2.1 PG/ML
IL4 SERPL-MCNC: <2.2 PG/ML
IL6 SERPL-MCNC: <2 PG/ML
IL8 SERPL-MCNC: <3 PG/ML
INTERLEUKIN 1 BETA: <6.5 PG/ML
INTERLEUKIN 5: <2.1 PG/ML

## 2022-02-24 ENCOUNTER — RX RENEWAL (OUTPATIENT)
Age: 47
End: 2022-02-24

## 2022-02-25 LAB
ALDOLASE SERPL-CCNC: 6.6 U/L
ANION GAP SERPL CALC-SCNC: 18 MMOL/L
BASOPHILS # BLD AUTO: 0.03 K/UL
BASOPHILS NFR BLD AUTO: 0.2 %
BUN SERPL-MCNC: 10 MG/DL
CALCIUM SERPL-MCNC: 9.6 MG/DL
CHLORIDE SERPL-SCNC: 98 MMOL/L
CK SERPL-CCNC: 113 U/L
CO2 SERPL-SCNC: 22 MMOL/L
CREAT SERPL-MCNC: 0.99 MG/DL
CRP SERPL-MCNC: 9 MG/L
EOSINOPHIL # BLD AUTO: 0.05 K/UL
EOSINOPHIL NFR BLD AUTO: 0.3 %
GLUCOSE SERPL-MCNC: 86 MG/DL
HCT VFR BLD CALC: 44.4 %
HGB BLD-MCNC: 14.4 G/DL
IMM GRANULOCYTES NFR BLD AUTO: 2 %
LYMPHOCYTES # BLD AUTO: 2.13 K/UL
LYMPHOCYTES NFR BLD AUTO: 12.1 %
MAN DIFF?: NORMAL
MCHC RBC-ENTMCNC: 28.6 PG
MCHC RBC-ENTMCNC: 32.4 GM/DL
MCV RBC AUTO: 88.1 FL
MONOCYTES # BLD AUTO: 1.5 K/UL
MONOCYTES NFR BLD AUTO: 8.5 %
NEUTROPHILS # BLD AUTO: 13.58 K/UL
NEUTROPHILS NFR BLD AUTO: 76.9 %
PLATELET # BLD AUTO: 259 K/UL
POTASSIUM SERPL-SCNC: 3.7 MMOL/L
RBC # BLD: 5.04 M/UL
RBC # FLD: 14.5 %
SODIUM SERPL-SCNC: 139 MMOL/L
TSH SERPL-ACNC: 0.99 UIU/ML
WBC # FLD AUTO: 17.65 K/UL

## 2022-03-07 ENCOUNTER — APPOINTMENT (OUTPATIENT)
Dept: INTERNAL MEDICINE | Facility: CLINIC | Age: 47
End: 2022-03-07
Payer: COMMERCIAL

## 2022-03-07 VITALS
HEART RATE: 103 BPM | DIASTOLIC BLOOD PRESSURE: 93 MMHG | WEIGHT: 270 LBS | BODY MASS INDEX: 41.05 KG/M2 | SYSTOLIC BLOOD PRESSURE: 138 MMHG | OXYGEN SATURATION: 96 % | TEMPERATURE: 97.3 F

## 2022-03-07 VITALS — SYSTOLIC BLOOD PRESSURE: 144 MMHG | DIASTOLIC BLOOD PRESSURE: 80 MMHG

## 2022-03-07 PROCEDURE — 99214 OFFICE O/P EST MOD 30 MIN: CPT

## 2022-03-07 NOTE — REVIEW OF SYSTEMS
[Muscle Pain] : muscle pain [Muscle Weakness] : muscle weakness [Negative] : Psychiatric [Joint Stiffness] : no joint stiffness [Back Pain] : no back pain [Joint Swelling] : no joint swelling

## 2022-03-07 NOTE — ASSESSMENT
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 Lupus/inflammatory myopathy\par content continue current management with CellCept/benlysta/prednisone\par \par 3 hypertension\par continue Lasix and losartan 50 mg once a day\par \par 4 asthma\par continue trelegy one puffed in the morning\par \par 5 hypothyroidism\par continue levothyroxine 125 mg Q day\par next visit check TFTs clinically you thyroid\par \par 6 follow-up in two months\par prescription for rolling walker given\par \par 7 anxiety\par I stop number [phone] \par continue Xanax 0.5 mg as needed dispense 28

## 2022-03-07 NOTE — HISTORY OF PRESENT ILLNESS
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

## 2022-03-08 ENCOUNTER — APPOINTMENT (OUTPATIENT)
Dept: PULMONOLOGY | Facility: CLINIC | Age: 47
End: 2022-03-08
Payer: COMMERCIAL

## 2022-03-08 VITALS
HEART RATE: 113 BPM | OXYGEN SATURATION: 96 % | WEIGHT: 270 LBS | RESPIRATION RATE: 16 BRPM | DIASTOLIC BLOOD PRESSURE: 88 MMHG | SYSTOLIC BLOOD PRESSURE: 150 MMHG | BODY MASS INDEX: 42.38 KG/M2 | HEIGHT: 67 IN | TEMPERATURE: 97.7 F

## 2022-03-08 PROCEDURE — 99214 OFFICE O/P EST MOD 30 MIN: CPT | Mod: 25

## 2022-03-08 NOTE — REASON FOR VISIT
[Follow-Up] : a follow-up visit [TextBox_44] : Lupus, lupus related muscle weakness, poor respiratory  mechanics

## 2022-03-08 NOTE — COUNSELING
[Other: ____] : [unfilled] [Good understanding] : Patient has a good understanding of lifestyle changes and steps needed to achieve self management goal [de-identified] : Anxiety

## 2022-03-08 NOTE — HISTORY OF PRESENT ILLNESS
[Never] : never [TextBox_4] : 46 year old male Hx Hx Lupus, asthma, on plaquenil, cell cept recent COVID 19 virus infection, recently restarted immunosuppressants , 1/5/22 received MAB infusion, hospitalized 1/26/22 through 2/5/22 presents for follow up. Patient very weak and ambulating with cane.  He is having difficulty walking.  His respiratory status is stable.  He reports he had Benlysta infusion yesterday. He reports he is very depressed and anxious.  He is attending physical therapy.  He is asking to see Behavioral Health.  He is here for follow up.

## 2022-03-10 ENCOUNTER — LABORATORY RESULT (OUTPATIENT)
Age: 47
End: 2022-03-10

## 2022-03-10 ENCOUNTER — NON-APPOINTMENT (OUTPATIENT)
Age: 47
End: 2022-03-10

## 2022-03-10 ENCOUNTER — APPOINTMENT (OUTPATIENT)
Dept: RHEUMATOLOGY | Facility: CLINIC | Age: 47
End: 2022-03-10
Payer: COMMERCIAL

## 2022-03-10 PROCEDURE — 36415 COLL VENOUS BLD VENIPUNCTURE: CPT

## 2022-03-10 PROCEDURE — 96374 THER/PROPH/DIAG INJ IV PUSH: CPT | Mod: 59

## 2022-03-10 PROCEDURE — 96413 CHEMO IV INFUSION 1 HR: CPT

## 2022-03-11 ENCOUNTER — TRANSCRIPTION ENCOUNTER (OUTPATIENT)
Age: 47
End: 2022-03-11

## 2022-03-18 ENCOUNTER — OUTPATIENT (OUTPATIENT)
Dept: OUTPATIENT SERVICES | Facility: HOSPITAL | Age: 47
LOS: 1 days | Discharge: HOME | End: 2022-03-18

## 2022-03-18 DIAGNOSIS — Z90.49 ACQUIRED ABSENCE OF OTHER SPECIFIED PARTS OF DIGESTIVE TRACT: Chronic | ICD-10-CM

## 2022-03-20 ENCOUNTER — TRANSCRIPTION ENCOUNTER (OUTPATIENT)
Age: 47
End: 2022-03-20

## 2022-03-20 ENCOUNTER — RX RENEWAL (OUTPATIENT)
Age: 47
End: 2022-03-20

## 2022-03-21 ENCOUNTER — TRANSCRIPTION ENCOUNTER (OUTPATIENT)
Age: 47
End: 2022-03-21

## 2022-03-23 ENCOUNTER — APPOINTMENT (OUTPATIENT)
Dept: RHEUMATOLOGY | Facility: CLINIC | Age: 47
End: 2022-03-23
Payer: COMMERCIAL

## 2022-03-23 VITALS
WEIGHT: 270 LBS | DIASTOLIC BLOOD PRESSURE: 96 MMHG | HEART RATE: 117 BPM | OXYGEN SATURATION: 97 % | HEIGHT: 67 IN | TEMPERATURE: 97.2 F | BODY MASS INDEX: 42.38 KG/M2 | SYSTOLIC BLOOD PRESSURE: 144 MMHG

## 2022-03-23 PROCEDURE — 99215 OFFICE O/P EST HI 40 MIN: CPT

## 2022-03-23 RX ORDER — ONDANSETRON 4 MG/1
4 TABLET, ORALLY DISINTEGRATING ORAL
Qty: 5 | Refills: 0 | Status: DISCONTINUED | COMMUNITY
Start: 2021-11-16 | End: 2022-03-23

## 2022-03-23 RX ORDER — MELOXICAM 15 MG/1
15 TABLET ORAL
Qty: 30 | Refills: 1 | Status: DISCONTINUED | COMMUNITY
Start: 2021-05-06 | End: 2022-03-23

## 2022-03-23 RX ORDER — METHYLPREDNISOLONE 8 MG/1
8 TABLET ORAL DAILY
Qty: 90 | Refills: 2 | Status: DISCONTINUED | COMMUNITY
Start: 2021-08-05 | End: 2022-03-23

## 2022-03-31 ENCOUNTER — APPOINTMENT (OUTPATIENT)
Dept: RHEUMATOLOGY | Facility: CLINIC | Age: 47
End: 2022-03-31

## 2022-04-11 ENCOUNTER — LABORATORY RESULT (OUTPATIENT)
Age: 47
End: 2022-04-11

## 2022-04-11 ENCOUNTER — APPOINTMENT (OUTPATIENT)
Dept: RHEUMATOLOGY | Facility: CLINIC | Age: 47
End: 2022-04-11
Payer: COMMERCIAL

## 2022-04-11 ENCOUNTER — EMERGENCY (EMERGENCY)
Facility: HOSPITAL | Age: 47
LOS: 1 days | Discharge: HOME | End: 2022-04-11
Attending: EMERGENCY MEDICINE
Payer: COMMERCIAL

## 2022-04-11 ENCOUNTER — APPOINTMENT (OUTPATIENT)
Dept: RHEUMATOLOGY | Facility: CLINIC | Age: 47
End: 2022-04-11

## 2022-04-11 VITALS
OXYGEN SATURATION: 99 % | TEMPERATURE: 98 F | HEIGHT: 67 IN | WEIGHT: 229.94 LBS | DIASTOLIC BLOOD PRESSURE: 90 MMHG | RESPIRATION RATE: 22 BRPM | SYSTOLIC BLOOD PRESSURE: 134 MMHG | HEART RATE: 90 BPM

## 2022-04-11 DIAGNOSIS — Z90.49 ACQUIRED ABSENCE OF OTHER SPECIFIED PARTS OF DIGESTIVE TRACT: Chronic | ICD-10-CM

## 2022-04-11 DIAGNOSIS — K21.9 GASTRO-ESOPHAGEAL REFLUX DISEASE WITHOUT ESOPHAGITIS: ICD-10-CM

## 2022-04-11 DIAGNOSIS — B37.0 CANDIDAL STOMATITIS: ICD-10-CM

## 2022-04-11 PROBLEM — Z11.59 SCREENING FOR VIRAL DISEASE: Status: RESOLVED | Noted: 2020-05-26 | Resolved: 2022-04-11

## 2022-04-11 PROCEDURE — 99284 EMERGENCY DEPT VISIT MOD MDM: CPT | Mod: 25

## 2022-04-11 PROCEDURE — 96413 CHEMO IV INFUSION 1 HR: CPT

## 2022-04-11 PROCEDURE — 93010 ELECTROCARDIOGRAM REPORT: CPT

## 2022-04-11 PROCEDURE — 31575 DIAGNOSTIC LARYNGOSCOPY: CPT

## 2022-04-11 PROCEDURE — 96375 TX/PRO/DX INJ NEW DRUG ADDON: CPT

## 2022-04-11 PROCEDURE — 96374 THER/PROPH/DIAG INJ IV PUSH: CPT | Mod: 59

## 2022-04-11 PROCEDURE — 99284 EMERGENCY DEPT VISIT MOD MDM: CPT

## 2022-04-11 PROCEDURE — 93005 ELECTROCARDIOGRAM TRACING: CPT

## 2022-04-11 RX ORDER — MULTIVIT-MIN/FERROUS GLUCONATE 9 MG/15 ML
0 LIQUID (ML) ORAL
Qty: 0 | Refills: 0 | DISCHARGE

## 2022-04-11 RX ORDER — IPRATROPIUM/ALBUTEROL SULFATE 18-103MCG
3 AEROSOL WITH ADAPTER (GRAM) INHALATION
Qty: 0 | Refills: 0 | DISCHARGE

## 2022-04-11 RX ORDER — ALBUTEROL 90 UG/1
2 AEROSOL, METERED ORAL
Qty: 0 | Refills: 0 | DISCHARGE

## 2022-04-11 RX ORDER — PANAX, AMERICAN GINSG/B12/ROYL 150-25-25
0 CAPSULE ORAL
Qty: 0 | Refills: 0 | DISCHARGE

## 2022-04-11 RX ORDER — FLUCONAZOLE 150 MG/1
200 TABLET ORAL ONCE
Refills: 0 | Status: COMPLETED | OUTPATIENT
Start: 2022-04-11 | End: 2022-04-11

## 2022-04-11 RX ORDER — FLUCONAZOLE 150 MG/1
1 TABLET ORAL
Qty: 12 | Refills: 0
Start: 2022-04-11 | End: 2022-04-22

## 2022-04-11 RX ADMIN — FLUCONAZOLE 200 MILLIGRAM(S): 150 TABLET ORAL at 18:18

## 2022-04-11 NOTE — CONSULT NOTE ADULT - ASSESSMENT
45 y/o M with PMH of lupus on Benlysta, hypothyroidism, asthma presenting from infusion center with concern for allergic reaction. Patient states after infusion he began to develop L sided neck pain and discomfort, throat swelling but no difficulty breathing. On exam, no edema noted. Indirect laryngoscopy performed which was notable for thrush on epiglottis, vallecula and base of tongue. Also noted to have pachydermia consistent with GERD.

## 2022-04-11 NOTE — ED PROVIDER NOTE - CARE PLAN
1 Principal Discharge DX:	Allergic reaction  Secondary Diagnosis:	Thrush  Secondary Diagnosis:	Laryngopharyngeal reflux

## 2022-04-11 NOTE — ED PROVIDER NOTE - PROGRESS NOTE DETAILS
Resident Fer: pt signed out to me pending ENT eval. S/P Scope without any evidence of mucosal edema/lesions. Thrush patent on scope - given Diflucan and Rx sent. No acute concerns for anaphylaxis. Stable for DC.

## 2022-04-11 NOTE — ED PROVIDER NOTE - PATIENT PORTAL LINK FT
You can access the FollowMyHealth Patient Portal offered by [facility] by registering at the following website: [email or web address]. By joining [employer]’s FollowMyHealth portal, you will also be able to view your health information using other applications (apps) compatible with our system.

## 2022-04-11 NOTE — ED PROVIDER NOTE - NSFOLLOWUPINSTRUCTIONS_ED_ALL_ED_FT
You were seen and evaluated in the ED for your allergic reaction.     History and physical exam demonstrated no life-threatening illness or emergency at this time, we provided you with oral medications to treat your reaction.     - YOU LIKELY ALSO HAVE THRUSH: PLEASE TAKE THE ANTI-FUNGALS AS PRESCRIBED        Please follow up with [facility]; please call [phone] to make an appointment for further evaluation of your allergies.     Should your rash worsen, or you develop new onset nausea, vomiting, shortness of breath, or palpitations please call 911 and report to the ED immediately thereafter.

## 2022-04-11 NOTE — ED PROVIDER NOTE - DISPOSITION TYPE
Operative Report  Patient: Prasanna Siegel; 68 year old   MRN#: [id number]  Surgery Date: 2/1/2022    PREOPERATIVE DIAGNOSIS:  Visually significant cataract of the left eye.    POSTOPERATIVE DIAGNOSIS:  Visually significant cataract of the left eye. H25.812    PROCEDURE:  Phacoemulsification and cataract extraction with posterior chamber intraocular lens placement of the left eye. 20859    SURGEON:  Brown Light MD  ANESTHESIOLOGIST:  [facility].    ANESTHESIA TYPE:  MAC IV  TOPICAL  ESTIMATED BLOOD LOSS:  None    SURGICAL DESCRIPTION:  The patient was properly identified in the preoperative holding area and the operating room where a time-out was taken to verify surgical site. The patient was given topical anesthesia drops in the operative eye as well as MAC IV sedation. The patient was then prepped and draped in the usual sterile fashion using 5% Betadine, which was also placed into the conjunctival sac. A Octavio lid speculum was inserted into the operative eye.     A paracentesis was made 90 degrees to the left of the main temporal incision. Then 1% preservative free lidocaine was injected into the anterior chamber followed by Viscoat. A three plane clear corneal temporal incision was made with a 2.6 mm keratome. A bent cystotome needle and Utrata forceps were used to create a curvilinear capsulorrhexis. Hydrodisection was completed with the Gibbons cannula. Phacoemulsification was preformed in a stop-and-chop technique to remove the nucleus and epinucleus. Single piece irrigation and aspiration was used to remove the remaining lens cortex. The capsular bag was then filled with the Viscoelastic. The lens was injected into the bag and rotated into place with the Sinskey hook. All the Viscoelastic was removed from the eye using single piece irrigation and aspiration. The eye was filled to an appropriate pressure with BSS. The wounds were hydrated with BSS.     At the end of the case, the  lens was perfectly centered, the eye was filled to an appropriate pressure and all wounds were watertight, checked with Weck-Kelsea sponges. The Octavio lid speculum was removed from the operative eye. Two drops of Vigamox were placed into the operative eye and then a Fair shield was placed.   The patient was transported to the recovery room in stable condition.  Brown Light MD       DISCHARGE

## 2022-04-11 NOTE — ED PROVIDER NOTE - OBJECTIVE STATEMENT
47 y/o M with PMH of lupus on Benlysta, hypothyroidism, asthma presenting from infusion center with concern for allergic reaction. Patient states after infusion he began to develop L sided neck pain, throat swelling but no difficulty breathing. State he also felt "skin was on fire from the inside and itching would make it worse". No cp, sob, n/v, abd pain, hives.  Patient states has been on Benlysta since 2019, gets infusions monthly, no hx of complications in past

## 2022-04-11 NOTE — CONSULT NOTE ADULT - SUBJECTIVE AND OBJECTIVE BOX
CC: globus sensation in throat, r/o allergic rxn     HPI: 47 y/o M with PMH of lupus on Benlysta, hypothyroidism, asthma presenting from infusion center with concern for allergic reaction. Patient states after infusion he began to develop L sided neck pain, throat swelling but no difficulty breathing. State he also felt "skin was on fire from the inside and itching would make it worse". No cp, sob, n/v, abd pain, hives. Patient states has been on Benlysta since 2019, gets infusions monthly, no hx of complications in past. Denies dysphagia, odynophagia, dysphonia, dyspnea, changes in voice or inability to tolerate secretions.       PAST MEDICAL & SURGICAL HISTORY:  Lupus    Asthma    Hypothyroid    History of cholecystectomy      Allergies    No Known Allergies    Intolerances      MEDICATIONS  (STANDING):    MEDICATIONS  (PRN):      Social History: no pertinent social history     Family history: no pertinent family history     ROS:   ENT: all negative except as noted in HPI   CV: denies palpitations  Pulm: denies SOB, cough, hemoptysis  GI: denies change in appetite, indigestion, n/v  : denies pertinent urinary symptoms, urgency  Neuro: denies numbness/tingling, loss of sensation  Psych: denies anxiety  MS: denies muscle weakness, instability  Heme: denies easy bruising or bleeding  Endo: denies heat/cold intolerance, excessive sweating  Vascular: denies LE edema    Vital Signs Last 24 Hrs  T(C): 36.5 (11 Apr 2022 14:09), Max: 36.5 (11 Apr 2022 14:09)  T(F): 97.7 (11 Apr 2022 14:09), Max: 97.7 (11 Apr 2022 14:09)  HR: 90 (11 Apr 2022 14:09) (90 - 90)  BP: 134/90 (11 Apr 2022 14:09) (134/90 - 134/90)  BP(mean): --  RR: 22 (11 Apr 2022 14:09) (22 - 22)  SpO2: 99% (11 Apr 2022 14:09) (99% - 99%)              PHYSICAL EXAM:  Gen: NAD  Skin: No rashes, bruises, or lesions  Head: Normocephalic, Atraumatic  Face: no edema, erythema, or fluctuance. Parotid glands soft without mass  Eyes: no scleral injection  Nose: Nares bilaterally patent, no discharge  Mouth: No Stridor / Drooling / Trismus.  Mucosa moist, tongue/uvula midline, oropharynx clear  Neck: Flat, supple, no lymphadenopathy, trachea midline, no masses  Lymphatic: No lymphadenopathy  Resp: breathing easily, no stridor  CV: no peripheral edema/cyanosis  GI: nondistended   Peripheral vascular: no JVD or edema  Neuro: facial nerve intact, no facial droop        Fiberoptic Indirect laryngoscopy:  (Scope #2 used)  Reason for Laryngoscopy: globus sensation, r/o allergic rxn     Patient was unable to cooperate with mirror.  +thrush noted on epiglottis, vallecula, base of tongue. +pachydermia consistent with GERD. Nasopharynx, oropharynx, and hypopharynx clear, no bleeding. posterior pharyngeal wall and subglottis appear normal. No erythema, edema, pooling of secretions, masses or lesions. Airway patent, no foreign body visualized. No glottic/supraglottic edema. True vocal cords, arytenoids, vestibular folds, ventricles, pyriform sinuses, and aryepiglottic folds appear normal bilaterally. Vocal cords mobile with good contact b/l.

## 2022-04-11 NOTE — ED PROVIDER NOTE - ATTENDING CONTRIBUTION TO CARE
Nemes - 47yo M with PMH of lupus on Benlysta, hypothyroidism, asthma presenting from infusion center with concern for allergic reaction. Patient states after infusion he began to develop L sided neck pain, throat swelling but no difficulty breathing. State he also felt "skin was on fire from the inside and itching would make it worse". No cp, sob, n/v, abd pain, hives.  Patient states has been on Benlysta since 2019, gets infusions monthly, no hx of complications in past.  VS wnl, well appearing, in NAD. Moist mucosae, pink conjunctivae. Neck supple, no lymphadenopathy, throat exam wnl, uvula midline, no stridor, neuro grossly intact. Lungs clear, cardiac wnl, no JVD. Abdomen soft/NT, no CVAT. No pedal edema, no calf TTP. No rash.  Low suspicion for allergic reaction but given sensation fo throat discomfort and tightening/closing, will get ENT eval for indirect laryngoscopy r/o posterior pharynx edema

## 2022-04-11 NOTE — CONSULT NOTE ADULT - PROBLEM SELECTOR RECOMMENDATION 9
- Recommend Diflucan  - Patient should follow up in ENT office as an outpatient. May see Dr. Noel or Chong. Call [phone].

## 2022-04-11 NOTE — ED PROVIDER NOTE - PHYSICAL EXAMINATION
gen: well appearing  Mentation: AAO x 3  psych: mood appropriate  ENT: airway patent  Eyes: conjunctivae clear bilaterally  Cardio: RRR, no m/r/g  Resp: normal BS b/l, no wheezing, no stridor  GI: s/nt/nd  Neuro: sensation and motor function intact  Skin: No evidence of rash, no hives  MSK: normal movement of all extremities  Lymph/Vasc: no LE edema

## 2022-04-11 NOTE — ED ADULT NURSE NOTE - OBJECTIVE STATEMENT
46 yr old male came in after getting his monthly lupus infusion and he started to feel itchy and skin burning after infusion, has happened once before. benadryl and solumedrol were given. on assessment a and o x 3 lungs clear abd soft non tender no swelling in extremities no n/v/d no fevers no other complaints. no diff breathing pt is normally anxious.

## 2022-04-11 NOTE — ED PROVIDER NOTE - CLINICAL SUMMARY MEDICAL DECISION MAKING FREE TEXT BOX
SANDHYA Amanda. PGY-3: 45 y/o M presenting with skin burning, ?throat swelling and L neck pain without any significant findings on exam. Presentation does not seem consistent with allergic reaction, will c/s ENT to scope to eval for pharyngeal/vocal cord edema. Likely DC home with f/u with rheum to discuss continuation of treatment

## 2022-04-12 ENCOUNTER — RX RENEWAL (OUTPATIENT)
Age: 47
End: 2022-04-12

## 2022-04-12 ENCOUNTER — TRANSCRIPTION ENCOUNTER (OUTPATIENT)
Age: 47
End: 2022-04-12

## 2022-04-13 ENCOUNTER — TRANSCRIPTION ENCOUNTER (OUTPATIENT)
Age: 47
End: 2022-04-13

## 2022-04-18 ENCOUNTER — RX RENEWAL (OUTPATIENT)
Age: 47
End: 2022-04-18

## 2022-04-18 DIAGNOSIS — F41.1 GENERALIZED ANXIETY DISORDER: ICD-10-CM

## 2022-04-18 DIAGNOSIS — F39 UNSPECIFIED MOOD [AFFECTIVE] DISORDER: ICD-10-CM

## 2022-04-18 DIAGNOSIS — F43.29 ADJUSTMENT DISORDER WITH OTHER SYMPTOMS: ICD-10-CM

## 2022-04-19 ENCOUNTER — APPOINTMENT (OUTPATIENT)
Dept: PULMONOLOGY | Facility: CLINIC | Age: 47
End: 2022-04-19
Payer: COMMERCIAL

## 2022-04-19 VITALS
WEIGHT: 272 LBS | SYSTOLIC BLOOD PRESSURE: 140 MMHG | HEART RATE: 115 BPM | DIASTOLIC BLOOD PRESSURE: 78 MMHG | HEIGHT: 67 IN | OXYGEN SATURATION: 99 % | TEMPERATURE: 97.7 F | BODY MASS INDEX: 42.69 KG/M2

## 2022-04-19 PROCEDURE — 99214 OFFICE O/P EST MOD 30 MIN: CPT

## 2022-04-19 NOTE — PHYSICAL EXAM
[General Appearance - Well Developed] : well developed [General Appearance - Well Nourished] : well nourished [General Appearance - In No Acute Distress] : no acute distress [Normal Conjunctiva] : the conjunctiva exhibited no abnormalities [Erythema] : erythema of the pharynx [] : the neck was supple [Jugular Venous Distention Increased] : there was no jugular-venous distention [Murmurs] : no murmurs present [Heart Sounds] : normal S1 and S2 [Respiration, Rhythm And Depth] : normal respiratory rhythm and effort [Auscultation Breath Sounds / Voice Sounds] : lungs were clear to auscultation bilaterally [Abdomen Soft] : soft [Abnormal Walk] : normal gait [Nail Clubbing] : no clubbing of the fingernails [Cyanosis, Localized] : no localized cyanosis [Non-Pitting] : non-pitting [Cranial Nerves] : cranial nerves 2-12 were intact [FreeTextEntry1] : Multiple tattoos + malar rash, redness

## 2022-04-19 NOTE — ASSESSMENT
[FreeTextEntry1] : 46 year old male MTA  Hx Lupus, mild asthma, s/p recent COVID 19 virus infection \par \par Continue Trelegy 200-25 mcg daily\par Continue methylprednisolone, Cell Cept, Plaquenil per Rheumatology\par  Continue Famotidine 40 mg daily\par Follow up Rheumatology\par Continue  Physical Therapy\par Follow up Behavioral Health\par \par Follow up 4-6 weeks

## 2022-04-19 NOTE — HISTORY OF PRESENT ILLNESS
[Never] : never [TextBox_4] : 46 year old male Hx Hx Lupus, asthma, on Plaquenil, cell cept recent COVID 19 virus infection, presents for follow up.  Patient now off Benlysta secondary to allergic reaction.  He continues on prednisone, Cell Cept, Plaquenil.    He is attending physical therapy as prescribed.  He reports respiratory status is stable.  He is here for follow up.

## 2022-04-19 NOTE — COUNSELING
[Other: ____] : [unfilled] [Good understanding] : Patient has a good understanding of lifestyle changes and steps needed to achieve self management goal [de-identified] : Anxiety

## 2022-04-28 ENCOUNTER — APPOINTMENT (OUTPATIENT)
Dept: RHEUMATOLOGY | Facility: CLINIC | Age: 47
End: 2022-04-28

## 2022-04-29 ENCOUNTER — APPOINTMENT (OUTPATIENT)
Dept: RHEUMATOLOGY | Facility: CLINIC | Age: 47
End: 2022-04-29
Payer: COMMERCIAL

## 2022-04-29 VITALS
BODY MASS INDEX: 42.69 KG/M2 | WEIGHT: 272 LBS | SYSTOLIC BLOOD PRESSURE: 146 MMHG | OXYGEN SATURATION: 98 % | HEART RATE: 122 BPM | TEMPERATURE: 97.3 F | HEIGHT: 67 IN | DIASTOLIC BLOOD PRESSURE: 97 MMHG

## 2022-04-29 PROCEDURE — 99215 OFFICE O/P EST HI 40 MIN: CPT

## 2022-04-30 ENCOUNTER — NON-APPOINTMENT (OUTPATIENT)
Age: 47
End: 2022-04-30

## 2022-05-01 ENCOUNTER — TRANSCRIPTION ENCOUNTER (OUTPATIENT)
Age: 47
End: 2022-05-01

## 2022-05-01 LAB
25(OH)D3 SERPL-MCNC: 21.4 NG/ML
ALBUMIN SERPL ELPH-MCNC: 4.2 G/DL
ALP BLD-CCNC: 86 U/L
ALT SERPL-CCNC: 50 U/L
ANION GAP SERPL CALC-SCNC: 18 MMOL/L
APPEARANCE: CLEAR
AST SERPL-CCNC: 24 U/L
BACTERIA: NEGATIVE
BASOPHILS # BLD AUTO: 0.04 K/UL
BASOPHILS NFR BLD AUTO: 0.3 %
BILIRUB SERPL-MCNC: 0.3 MG/DL
BILIRUBIN URINE: NEGATIVE
BLOOD URINE: NEGATIVE
BUN SERPL-MCNC: 11 MG/DL
C3 SERPL-MCNC: 156 MG/DL
C4 SERPL-MCNC: 32 MG/DL
CALCIUM OXALATE CRYSTALS: ABNORMAL
CALCIUM SERPL-MCNC: 9.3 MG/DL
CHLORIDE SERPL-SCNC: 101 MMOL/L
CO2 SERPL-SCNC: 22 MMOL/L
COLOR: YELLOW
CREAT SERPL-MCNC: 0.82 MG/DL
CREAT SPEC-SCNC: 337 MG/DL
CREAT/PROT UR: 0.2 RATIO
CRP SERPL-MCNC: 3 MG/L
EGFR: 110 ML/MIN/1.73M2
EOSINOPHIL # BLD AUTO: 0.02 K/UL
EOSINOPHIL NFR BLD AUTO: 0.1 %
ERYTHROCYTE [SEDIMENTATION RATE] IN BLOOD BY WESTERGREN METHOD: 39 MM/HR
GLUCOSE QUALITATIVE U: NORMAL
GLUCOSE SERPL-MCNC: 148 MG/DL
HCT VFR BLD CALC: 45 %
HGB BLD-MCNC: 15.5 G/DL
HYALINE CASTS: 0 /LPF
IMM GRANULOCYTES NFR BLD AUTO: 2 %
KETONES URINE: NORMAL
LEUKOCYTE ESTERASE URINE: NEGATIVE
LYMPHOCYTES # BLD AUTO: 2.41 K/UL
LYMPHOCYTES NFR BLD AUTO: 17.8 %
MAN DIFF?: NORMAL
MCHC RBC-ENTMCNC: 30.2 PG
MCHC RBC-ENTMCNC: 34.4 GM/DL
MCV RBC AUTO: 87.7 FL
MICROSCOPIC-UA: NORMAL
MONOCYTES # BLD AUTO: 1.22 K/UL
MONOCYTES NFR BLD AUTO: 9 %
NEUTROPHILS # BLD AUTO: 9.58 K/UL
NEUTROPHILS NFR BLD AUTO: 70.8 %
NITRITE URINE: NEGATIVE
PH URINE: 6.5
PLATELET # BLD AUTO: 255 K/UL
POTASSIUM SERPL-SCNC: 3.4 MMOL/L
PROT SERPL-MCNC: 6.5 G/DL
PROT UR-MCNC: 56 MG/DL
PROTEIN URINE: ABNORMAL
RBC # BLD: 5.13 M/UL
RBC # FLD: 14.2 %
RED BLOOD CELLS URINE: 1 /HPF
SODIUM SERPL-SCNC: 141 MMOL/L
SPECIFIC GRAVITY URINE: 1.04
SQUAMOUS EPITHELIAL CELLS: 2 /HPF
URINE COMMENTS: NORMAL
UROBILINOGEN URINE: NORMAL
WBC # FLD AUTO: 13.54 K/UL
WHITE BLOOD CELLS URINE: 4 /HPF

## 2022-05-03 LAB — DSDNA AB SER-ACNC: <12 IU/ML

## 2022-05-05 ENCOUNTER — APPOINTMENT (OUTPATIENT)
Dept: RHEUMATOLOGY | Facility: CLINIC | Age: 47
End: 2022-05-05

## 2022-05-09 ENCOUNTER — LABORATORY RESULT (OUTPATIENT)
Age: 47
End: 2022-05-09

## 2022-05-09 ENCOUNTER — APPOINTMENT (OUTPATIENT)
Dept: INTERNAL MEDICINE | Facility: CLINIC | Age: 47
End: 2022-05-09
Payer: COMMERCIAL

## 2022-05-09 VITALS — SYSTOLIC BLOOD PRESSURE: 122 MMHG | DIASTOLIC BLOOD PRESSURE: 88 MMHG

## 2022-05-09 VITALS
SYSTOLIC BLOOD PRESSURE: 117 MMHG | OXYGEN SATURATION: 97 % | DIASTOLIC BLOOD PRESSURE: 81 MMHG | HEIGHT: 68.5 IN | BODY MASS INDEX: 39.85 KG/M2 | HEART RATE: 99 BPM | WEIGHT: 266 LBS | TEMPERATURE: 97.3 F

## 2022-05-09 DIAGNOSIS — Z87.39 PERSONAL HISTORY OF OTHER DISEASES OF THE MUSCULOSKELETAL SYSTEM AND CONNECTIVE TISSUE: ICD-10-CM

## 2022-05-09 DIAGNOSIS — R53.1 WEAKNESS: ICD-10-CM

## 2022-05-09 DIAGNOSIS — R53.81 OTHER MALAISE: ICD-10-CM

## 2022-05-09 DIAGNOSIS — Z87.898 PERSONAL HISTORY OF OTHER SPECIFIED CONDITIONS: ICD-10-CM

## 2022-05-09 DIAGNOSIS — M25.561 PAIN IN RIGHT KNEE: ICD-10-CM

## 2022-05-09 DIAGNOSIS — R34 ANURIA AND OLIGURIA: ICD-10-CM

## 2022-05-09 DIAGNOSIS — R07.81 PLEURODYNIA: ICD-10-CM

## 2022-05-09 DIAGNOSIS — M25.562 PAIN IN RIGHT KNEE: ICD-10-CM

## 2022-05-09 DIAGNOSIS — U07.1 COVID-19: ICD-10-CM

## 2022-05-09 DIAGNOSIS — Z30.09 ENCOUNTER FOR OTHER GENERAL COUNSELING AND ADVICE ON CONTRACEPTION: ICD-10-CM

## 2022-05-09 DIAGNOSIS — Z86.79 PERSONAL HISTORY OF OTHER DISEASES OF THE CIRCULATORY SYSTEM: ICD-10-CM

## 2022-05-09 PROCEDURE — 99214 OFFICE O/P EST MOD 30 MIN: CPT | Mod: 25

## 2022-05-09 PROCEDURE — 36415 COLL VENOUS BLD VENIPUNCTURE: CPT

## 2022-05-09 NOTE — ASSESSMENT
[FreeTextEntry1] : Patient is a 46-year-old male with history of lupus/mixed connective disorder/inflammatory myopathy, obesity, hypertension, hypothyroidism, asthma, seasonal allergies, lumbar sacral stenosis, central/obstructive sleep apnea, lumbar disc disease, who presents for follow-up\par \par 1 hypothyroidism\par continue levothyroxine hundred 25 µg PO Q day\par check TFTs clinically you thyroid\par \par 2.upus\par continue current management including methylprednisolone hydroxychloroquine and  Benlysta\par follow-up with rheumatology\par clinic improve\par \par 3. Asthma\par continue inhalers doing well\par \par 4. Seasonal allergies\par continue current meds follow-up with allergist\par \par 5 elevated blood pressure\par we start loss losartan 50 mg once a day\par \par 6 hypokalemia\par we check potassium continue supplements probably secondary to diuretics\par \par 7. Myopathy \par weakness has markedly improved on methylprednisolone\par continue follow-up with rheumatology. And neurology.\par \par 8. Health maintenance\par will check titers however suggested second COVID vaccine booster\par f/u in two week \par \par

## 2022-05-09 NOTE — HISTORY OF PRESENT ILLNESS
[FreeTextEntry1] : Follow-up for hypertension and hypothyroidism [de-identified] : The patient is a 46-year-old male with history of lupus/Ms. connective tissue disorder/inflammatory myopathy, hypertension, obstructive/central sleep apnea, disc degeneration, hypothyroidism, obesity, asthma, who presents for follow-up. Patient started on methylprednisolone continue hydroxychloroquine and Benlysta which may switch to Saphnelo. Presently patient feels well less fatigued able to walk strong the legs denies joint pain, headache, dizziness stopped losartan on his own. He denies shortness of breath but notes some increasing allergy symptoms.

## 2022-05-09 NOTE — REVIEW OF SYSTEMS
[Nasal Discharge] : nasal discharge [Joint Pain] : joint pain [Muscle Pain] : muscle pain [Negative] : Integumentary [Earache] : no earache [Hearing Loss] : no hearing loss [Nosebleeds] : no nosebleeds [Postnasal Drip] : no postnasal drip [Sore Throat] : no sore throat [Hoarseness] : no hoarseness [Joint Stiffness] : no joint stiffness [Muscle Weakness] : no muscle weakness [Back Pain] : no back pain [Joint Swelling] : no joint swelling

## 2022-05-12 ENCOUNTER — TRANSCRIPTION ENCOUNTER (OUTPATIENT)
Age: 47
End: 2022-05-12

## 2022-05-12 ENCOUNTER — APPOINTMENT (OUTPATIENT)
Dept: RHEUMATOLOGY | Facility: CLINIC | Age: 47
End: 2022-05-12

## 2022-05-13 ENCOUNTER — TRANSCRIPTION ENCOUNTER (OUTPATIENT)
Age: 47
End: 2022-05-13

## 2022-05-17 ENCOUNTER — APPOINTMENT (OUTPATIENT)
Dept: PULMONOLOGY | Facility: CLINIC | Age: 47
End: 2022-05-17
Payer: COMMERCIAL

## 2022-05-17 VITALS
OXYGEN SATURATION: 96 % | BODY MASS INDEX: 39.85 KG/M2 | HEIGHT: 68.5 IN | DIASTOLIC BLOOD PRESSURE: 80 MMHG | SYSTOLIC BLOOD PRESSURE: 128 MMHG | WEIGHT: 266 LBS | HEART RATE: 102 BPM | TEMPERATURE: 97.3 F

## 2022-05-17 PROCEDURE — 99214 OFFICE O/P EST MOD 30 MIN: CPT | Mod: 25

## 2022-05-17 NOTE — HISTORY OF PRESENT ILLNESS
[Never] : never [TextBox_4] : 46 year old male Hx Hx Lupus, asthma, on Plaquenil, cell cept recent COVID 19 virus infection, presents for follow up.  Patient now off Benlysta secondary to allergic reaction. He is anticipating starting Sapnelo.  He is not working as yet although he would like to.  His respiratory status is stable.  He is here for follow up

## 2022-05-17 NOTE — COUNSELING
[Other: ____] : [unfilled] [Good understanding] : Patient has a good understanding of lifestyle changes and steps needed to achieve self management goal [de-identified] : Anxiety

## 2022-05-23 ENCOUNTER — RX RENEWAL (OUTPATIENT)
Age: 47
End: 2022-05-23

## 2022-05-23 NOTE — ASSESSMENT
[FreeTextEntry1] : 46 year old male MTA  presents for follow up asthma exacerbation, lupus flare, myositis, now central sleep apnea, on Cell Cept , Benlysta, chronic prednisone, Plaquenil \par \par Continue Trelegy 200-25 mcg daily\par Continue methylprednisolone 8 mg daily Cell Cept, Benlysta per Rheumatology.\par  Continue Famotidine 40 mg daily\par Follow up Rheumatology\par Follow up Sleep Medicine\par \par Follow up 1 month  Complex Repair And O-T Advancement Flap Text: The defect edges were debeveled with a #15 scalpel blade.  The primary defect was closed partially with a complex linear closure.  Given the location of the remaining defect, shape of the defect and the proximity to free margins an O-T advancement flap was deemed most appropriate for complete closure of the defect.  Using a sterile surgical marker, an appropriate advancement flap was drawn incorporating the defect and placing the expected incisions within the relaxed skin tension lines where possible.    The area thus outlined was incised deep to adipose tissue with a #15 scalpel blade.  The skin margins were undermined to an appropriate distance in all directions utilizing iris scissors.

## 2022-05-26 ENCOUNTER — APPOINTMENT (OUTPATIENT)
Dept: RHEUMATOLOGY | Facility: CLINIC | Age: 47
End: 2022-05-26

## 2022-05-27 LAB
ALT SERPL-CCNC: 50 U/L
ANION GAP SERPL CALC-SCNC: 19 MMOL/L
BUN SERPL-MCNC: 7 MG/DL
CALCIUM SERPL-MCNC: 9.4 MG/DL
CHLORIDE SERPL-SCNC: 98 MMOL/L
CO2 SERPL-SCNC: 22 MMOL/L
COVID-19 SPIKE DOMAIN ANTIBODY INTERPRETATION: POSITIVE
CREAT SERPL-MCNC: 0.87 MG/DL
EGFR: 108 ML/MIN/1.73M2
GLUCOSE SERPL-MCNC: 110 MG/DL
POTASSIUM SERPL-SCNC: 3.1 MMOL/L
SARS-COV-2 AB SERPL IA-ACNC: >250 U/ML
SODIUM SERPL-SCNC: 139 MMOL/L
T4 FREE SERPL-MCNC: 1.4 NG/DL
TSH SERPL-ACNC: 1.79 UIU/ML

## 2022-06-02 ENCOUNTER — APPOINTMENT (OUTPATIENT)
Dept: RHEUMATOLOGY | Facility: CLINIC | Age: 47
End: 2022-06-02

## 2022-06-03 ENCOUNTER — LABORATORY RESULT (OUTPATIENT)
Age: 47
End: 2022-06-03

## 2022-06-03 ENCOUNTER — APPOINTMENT (OUTPATIENT)
Dept: RHEUMATOLOGY | Facility: CLINIC | Age: 47
End: 2022-06-03
Payer: COMMERCIAL

## 2022-06-03 VITALS
HEART RATE: 93 BPM | RESPIRATION RATE: 16 BRPM | SYSTOLIC BLOOD PRESSURE: 128 MMHG | DIASTOLIC BLOOD PRESSURE: 88 MMHG | TEMPERATURE: 97.5 F | OXYGEN SATURATION: 98 %

## 2022-06-03 VITALS
OXYGEN SATURATION: 96 % | DIASTOLIC BLOOD PRESSURE: 88 MMHG | HEART RATE: 107 BPM | TEMPERATURE: 97.5 F | SYSTOLIC BLOOD PRESSURE: 141 MMHG | RESPIRATION RATE: 16 BRPM

## 2022-06-03 PROCEDURE — 96365 THER/PROPH/DIAG IV INF INIT: CPT

## 2022-06-03 PROCEDURE — 96374 THER/PROPH/DIAG INJ IV PUSH: CPT | Mod: 59

## 2022-06-03 RX ORDER — ANIFROLUMAB 300 MG/2ML
300 INJECTION, SOLUTION INTRAVENOUS
Qty: 0 | Refills: 0 | Status: COMPLETED | OUTPATIENT
Start: 2022-06-02

## 2022-06-03 RX ORDER — CETIRIZINE HYDROCHLORIDE 10 MG/1
10 TABLET, FILM COATED ORAL
Qty: 0 | Refills: 0 | Status: COMPLETED | OUTPATIENT
Start: 2022-06-02

## 2022-06-03 RX ORDER — HYDROCORTISONE SODIUM SUCCINATE 100 MG/2ML
100 INJECTION, POWDER, FOR SOLUTION INTRAMUSCULAR; INTRAVENOUS
Qty: 0 | Refills: 0 | Status: COMPLETED | OUTPATIENT
Start: 2022-06-02

## 2022-06-03 RX ORDER — ACETAMINOPHEN 325 MG/1
325 TABLET, FILM COATED ORAL
Qty: 0 | Refills: 0 | Status: COMPLETED | OUTPATIENT
Start: 2022-06-02

## 2022-06-05 ENCOUNTER — TRANSCRIPTION ENCOUNTER (OUTPATIENT)
Age: 47
End: 2022-06-05

## 2022-06-08 ENCOUNTER — TRANSCRIPTION ENCOUNTER (OUTPATIENT)
Age: 47
End: 2022-06-08

## 2022-06-09 ENCOUNTER — APPOINTMENT (OUTPATIENT)
Dept: INTERNAL MEDICINE | Facility: CLINIC | Age: 47
End: 2022-06-09
Payer: COMMERCIAL

## 2022-06-09 ENCOUNTER — OUTPATIENT (OUTPATIENT)
Dept: OUTPATIENT SERVICES | Facility: HOSPITAL | Age: 47
LOS: 1 days | End: 2022-06-09
Payer: COMMERCIAL

## 2022-06-09 ENCOUNTER — TRANSCRIPTION ENCOUNTER (OUTPATIENT)
Age: 47
End: 2022-06-09

## 2022-06-09 ENCOUNTER — APPOINTMENT (OUTPATIENT)
Dept: RHEUMATOLOGY | Facility: CLINIC | Age: 47
End: 2022-06-09

## 2022-06-09 ENCOUNTER — APPOINTMENT (OUTPATIENT)
Dept: ULTRASOUND IMAGING | Facility: IMAGING CENTER | Age: 47
End: 2022-06-09
Payer: COMMERCIAL

## 2022-06-09 VITALS
HEART RATE: 121 BPM | HEIGHT: 69 IN | OXYGEN SATURATION: 99 % | DIASTOLIC BLOOD PRESSURE: 85 MMHG | BODY MASS INDEX: 39.25 KG/M2 | WEIGHT: 265 LBS | SYSTOLIC BLOOD PRESSURE: 140 MMHG | TEMPERATURE: 98.6 F

## 2022-06-09 DIAGNOSIS — Z90.49 ACQUIRED ABSENCE OF OTHER SPECIFIED PARTS OF DIGESTIVE TRACT: Chronic | ICD-10-CM

## 2022-06-09 DIAGNOSIS — M32.9 SYSTEMIC LUPUS ERYTHEMATOSUS, UNSPECIFIED: ICD-10-CM

## 2022-06-09 DIAGNOSIS — Z00.8 ENCOUNTER FOR OTHER GENERAL EXAMINATION: ICD-10-CM

## 2022-06-09 PROCEDURE — 93970 EXTREMITY STUDY: CPT

## 2022-06-09 PROCEDURE — 99214 OFFICE O/P EST MOD 30 MIN: CPT | Mod: 25

## 2022-06-09 PROCEDURE — 93970 EXTREMITY STUDY: CPT | Mod: 26

## 2022-06-09 PROCEDURE — 36415 COLL VENOUS BLD VENIPUNCTURE: CPT

## 2022-06-09 RX ADMIN — OXYCODONE HYDROCHLORIDE 5 MILLIGRAM(S): 5 TABLET ORAL at 21:35

## 2022-06-09 NOTE — HISTORY OF PRESENT ILLNESS
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 He denies fever, chills, sick contacts.\par \par ,

## 2022-06-09 NOTE — PHYSICAL EXAM
[Normal] : soft, non-tender, non-distended, no masses palpated, no HSM and normal bowel sounds [de-identified] : Carcinoid looking [de-identified] : Whitish plant material in the posterior pharynx

## 2022-06-09 NOTE — REVIEW OF SYSTEMS
[Earache] : no earache [Hearing Loss] : no hearing loss [Nosebleeds] : no nosebleeds [Postnasal Drip] : no postnasal drip [Nasal Discharge] : no nasal discharge [Sore Throat] : sore throat [Hoarseness] : no hoarseness [Negative] : Psychiatric

## 2022-06-09 NOTE — ASSESSMENT
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 Venous insufficiency fluid retention/leg edema\par patient has good response to Lasix 40 mg however will try elastic stockings\par \par 4 asthma\par well-controlled follow-up in 2 to 3 weeks\par \par

## 2022-06-09 NOTE — PLAN
[FreeTextEntry1] : Patient is a 46-year-old male with history of lupus, lupus myopathy, hypertension hypothyroidism asthma, venous insufficiency and leg edema who presents for Pepe aphasia

## 2022-06-10 ENCOUNTER — TRANSCRIPTION ENCOUNTER (OUTPATIENT)
Age: 47
End: 2022-06-10

## 2022-06-10 LAB
ANION GAP SERPL CALC-SCNC: 21 MMOL/L
BUN SERPL-MCNC: 9 MG/DL
CALCIUM SERPL-MCNC: 9.6 MG/DL
CHLORIDE SERPL-SCNC: 96 MMOL/L
CO2 SERPL-SCNC: 25 MMOL/L
CREAT SERPL-MCNC: 0.91 MG/DL
EGFR: 106 ML/MIN/1.73M2
GLUCOSE SERPL-MCNC: 126 MG/DL
POTASSIUM SERPL-SCNC: 3 MMOL/L
SODIUM SERPL-SCNC: 142 MMOL/L

## 2022-06-13 ENCOUNTER — APPOINTMENT (OUTPATIENT)
Dept: INTERNAL MEDICINE | Facility: CLINIC | Age: 47
End: 2022-06-13

## 2022-06-14 ENCOUNTER — EMERGENCY (EMERGENCY)
Facility: HOSPITAL | Age: 47
LOS: 1 days | Discharge: HOME | End: 2022-06-14
Attending: EMERGENCY MEDICINE
Payer: COMMERCIAL

## 2022-06-14 ENCOUNTER — APPOINTMENT (OUTPATIENT)
Dept: PULMONOLOGY | Facility: CLINIC | Age: 47
End: 2022-06-14
Payer: COMMERCIAL

## 2022-06-14 VITALS
HEIGHT: 67 IN | RESPIRATION RATE: 20 BRPM | SYSTOLIC BLOOD PRESSURE: 148 MMHG | HEART RATE: 100 BPM | WEIGHT: 265 LBS | DIASTOLIC BLOOD PRESSURE: 112 MMHG | TEMPERATURE: 98 F

## 2022-06-14 VITALS
DIASTOLIC BLOOD PRESSURE: 104 MMHG | SYSTOLIC BLOOD PRESSURE: 141 MMHG | HEART RATE: 104 BPM | RESPIRATION RATE: 19 BRPM | TEMPERATURE: 98 F | OXYGEN SATURATION: 97 %

## 2022-06-14 VITALS
HEART RATE: 60 BPM | HEIGHT: 68 IN | TEMPERATURE: 97 F | RESPIRATION RATE: 16 BRPM | WEIGHT: 265 LBS | OXYGEN SATURATION: 98 % | BODY MASS INDEX: 40.16 KG/M2

## 2022-06-14 DIAGNOSIS — Z90.49 ACQUIRED ABSENCE OF OTHER SPECIFIED PARTS OF DIGESTIVE TRACT: Chronic | ICD-10-CM

## 2022-06-14 LAB
ALBUMIN SERPL ELPH-MCNC: 4.4 G/DL — SIGNIFICANT CHANGE UP (ref 3.3–5)
ALP SERPL-CCNC: 90 U/L — SIGNIFICANT CHANGE UP (ref 40–120)
ALT FLD-CCNC: 42 U/L — SIGNIFICANT CHANGE UP (ref 10–45)
ANION GAP SERPL CALC-SCNC: 15 MMOL/L — SIGNIFICANT CHANGE UP (ref 5–17)
APTT BLD: 29.2 SEC — SIGNIFICANT CHANGE UP (ref 27.5–35.5)
AST SERPL-CCNC: 64 U/L — HIGH (ref 10–40)
BASOPHILS # BLD AUTO: 0 K/UL — SIGNIFICANT CHANGE UP (ref 0–0.2)
BASOPHILS NFR BLD AUTO: 0 % — SIGNIFICANT CHANGE UP (ref 0–2)
BILIRUB SERPL-MCNC: 0.3 MG/DL — SIGNIFICANT CHANGE UP (ref 0.2–1.2)
BUN SERPL-MCNC: 9 MG/DL — SIGNIFICANT CHANGE UP (ref 7–23)
CALCIUM SERPL-MCNC: 10.2 MG/DL — SIGNIFICANT CHANGE UP (ref 8.4–10.5)
CHLORIDE SERPL-SCNC: 99 MMOL/L — SIGNIFICANT CHANGE UP (ref 96–108)
CK MB CFR SERPL CALC: 1.4 NG/ML — SIGNIFICANT CHANGE UP (ref 0–6.7)
CO2 SERPL-SCNC: 26 MMOL/L — SIGNIFICANT CHANGE UP (ref 22–31)
CREAT SERPL-MCNC: 0.81 MG/DL — SIGNIFICANT CHANGE UP (ref 0.5–1.3)
D DIMER BLD IA.RAPID-MCNC: <150 NG/ML DDU — SIGNIFICANT CHANGE UP
EGFR: 111 ML/MIN/1.73M2 — SIGNIFICANT CHANGE UP
EOSINOPHIL # BLD AUTO: 0 K/UL — SIGNIFICANT CHANGE UP (ref 0–0.5)
EOSINOPHIL NFR BLD AUTO: 0 % — SIGNIFICANT CHANGE UP (ref 0–6)
GAS PNL BLDV: SIGNIFICANT CHANGE UP
GLUCOSE SERPL-MCNC: 107 MG/DL — HIGH (ref 70–99)
HCT VFR BLD CALC: 45.3 % — SIGNIFICANT CHANGE UP (ref 39–50)
HGB BLD-MCNC: 15.2 G/DL — SIGNIFICANT CHANGE UP (ref 13–17)
INR BLD: 1.03 RATIO — SIGNIFICANT CHANGE UP (ref 0.88–1.16)
LYMPHOCYTES # BLD AUTO: 17.4 % — SIGNIFICANT CHANGE UP (ref 13–44)
LYMPHOCYTES # BLD AUTO: 2.5 K/UL — SIGNIFICANT CHANGE UP (ref 1–3.3)
MCHC RBC-ENTMCNC: 29.9 PG — SIGNIFICANT CHANGE UP (ref 27–34)
MCHC RBC-ENTMCNC: 33.6 GM/DL — SIGNIFICANT CHANGE UP (ref 32–36)
MCV RBC AUTO: 89.2 FL — SIGNIFICANT CHANGE UP (ref 80–100)
MONOCYTES # BLD AUTO: 1.62 K/UL — HIGH (ref 0–0.9)
MONOCYTES NFR BLD AUTO: 11.3 % — SIGNIFICANT CHANGE UP (ref 2–14)
NEUTROPHILS # BLD AUTO: 10.22 K/UL — HIGH (ref 1.8–7.4)
NEUTROPHILS NFR BLD AUTO: 71.3 % — SIGNIFICANT CHANGE UP (ref 43–77)
PLATELET # BLD AUTO: 320 K/UL — SIGNIFICANT CHANGE UP (ref 150–400)
POTASSIUM SERPL-MCNC: 5.1 MMOL/L — SIGNIFICANT CHANGE UP (ref 3.5–5.3)
POTASSIUM SERPL-SCNC: 5.1 MMOL/L — SIGNIFICANT CHANGE UP (ref 3.5–5.3)
PROT SERPL-MCNC: 7.9 G/DL — SIGNIFICANT CHANGE UP (ref 6–8.3)
PROTHROM AB SERPL-ACNC: 11.9 SEC — SIGNIFICANT CHANGE UP (ref 10.5–13.4)
RAPID RVP RESULT: DETECTED
RBC # BLD: 5.08 M/UL — SIGNIFICANT CHANGE UP (ref 4.2–5.8)
RBC # FLD: 13.9 % — SIGNIFICANT CHANGE UP (ref 10.3–14.5)
RV+EV RNA SPEC QL NAA+PROBE: DETECTED
SARS-COV-2 RNA SPEC QL NAA+PROBE: SIGNIFICANT CHANGE UP
SODIUM SERPL-SCNC: 140 MMOL/L — SIGNIFICANT CHANGE UP (ref 135–145)
TROPONIN T, HIGH SENSITIVITY RESULT: 11 NG/L — SIGNIFICANT CHANGE UP (ref 0–51)
WBC # BLD: 14.34 K/UL — HIGH (ref 3.8–10.5)
WBC # FLD AUTO: 14.34 K/UL — HIGH (ref 3.8–10.5)

## 2022-06-14 PROCEDURE — 93308 TTE F-UP OR LMTD: CPT

## 2022-06-14 PROCEDURE — 84484 ASSAY OF TROPONIN QUANT: CPT

## 2022-06-14 PROCEDURE — 85379 FIBRIN DEGRADATION QUANT: CPT

## 2022-06-14 PROCEDURE — 82803 BLOOD GASES ANY COMBINATION: CPT

## 2022-06-14 PROCEDURE — 85730 THROMBOPLASTIN TIME PARTIAL: CPT

## 2022-06-14 PROCEDURE — 82553 CREATINE MB FRACTION: CPT

## 2022-06-14 PROCEDURE — 0225U NFCT DS DNA&RNA 21 SARSCOV2: CPT

## 2022-06-14 PROCEDURE — 99285 EMERGENCY DEPT VISIT HI MDM: CPT

## 2022-06-14 PROCEDURE — 94640 AIRWAY INHALATION TREATMENT: CPT

## 2022-06-14 PROCEDURE — 99285 EMERGENCY DEPT VISIT HI MDM: CPT | Mod: 25

## 2022-06-14 PROCEDURE — 84295 ASSAY OF SERUM SODIUM: CPT

## 2022-06-14 PROCEDURE — 80053 COMPREHEN METABOLIC PANEL: CPT

## 2022-06-14 PROCEDURE — 82330 ASSAY OF CALCIUM: CPT

## 2022-06-14 PROCEDURE — 93010 ELECTROCARDIOGRAM REPORT: CPT

## 2022-06-14 PROCEDURE — 93308 TTE F-UP OR LMTD: CPT | Mod: 26

## 2022-06-14 PROCEDURE — 85025 COMPLETE CBC W/AUTO DIFF WBC: CPT

## 2022-06-14 PROCEDURE — 83605 ASSAY OF LACTIC ACID: CPT

## 2022-06-14 PROCEDURE — 36415 COLL VENOUS BLD VENIPUNCTURE: CPT

## 2022-06-14 PROCEDURE — 93005 ELECTROCARDIOGRAM TRACING: CPT

## 2022-06-14 PROCEDURE — 85018 HEMOGLOBIN: CPT

## 2022-06-14 PROCEDURE — 85014 HEMATOCRIT: CPT

## 2022-06-14 PROCEDURE — 85610 PROTHROMBIN TIME: CPT

## 2022-06-14 PROCEDURE — 84132 ASSAY OF SERUM POTASSIUM: CPT

## 2022-06-14 PROCEDURE — 82435 ASSAY OF BLOOD CHLORIDE: CPT

## 2022-06-14 PROCEDURE — 82947 ASSAY GLUCOSE BLOOD QUANT: CPT

## 2022-06-14 PROCEDURE — 84145 PROCALCITONIN (PCT): CPT

## 2022-06-14 PROCEDURE — 99214 OFFICE O/P EST MOD 30 MIN: CPT | Mod: 25

## 2022-06-14 PROCEDURE — 71045 X-RAY EXAM CHEST 1 VIEW: CPT | Mod: 26

## 2022-06-14 PROCEDURE — 71045 X-RAY EXAM CHEST 1 VIEW: CPT

## 2022-06-14 RX ORDER — IPRATROPIUM/ALBUTEROL SULFATE 18-103MCG
3 AEROSOL WITH ADAPTER (GRAM) INHALATION ONCE
Refills: 0 | Status: COMPLETED | OUTPATIENT
Start: 2022-06-14 | End: 2022-06-14

## 2022-06-14 RX ORDER — POTASSIUM CHLORIDE 20 MEQ
40 PACKET (EA) ORAL ONCE
Refills: 0 | Status: COMPLETED | OUTPATIENT
Start: 2022-06-14 | End: 2022-06-14

## 2022-06-14 RX ORDER — POTASSIUM CHLORIDE 20 MEQ
10 PACKET (EA) ORAL ONCE
Refills: 0 | Status: DISCONTINUED | OUTPATIENT
Start: 2022-06-14 | End: 2022-06-14

## 2022-06-14 RX ORDER — POTASSIUM CHLORIDE 20 MEQ
40 PACKET (EA) ORAL ONCE
Refills: 0 | Status: DISCONTINUED | OUTPATIENT
Start: 2022-06-14 | End: 2022-06-14

## 2022-06-14 RX ADMIN — Medication 40 MILLIEQUIVALENT(S): at 18:57

## 2022-06-14 RX ADMIN — Medication 3 MILLILITER(S): at 17:49

## 2022-06-14 NOTE — ED PROVIDER NOTE - PROGRESS NOTE DETAILS
Raheem PGY3: Patient reevaluated and feeling better. 96% sat on ambulation. mild tachy low 100's. received albuterol. Reviewed and discussed results with patient. Discussed importance of follow up and return precautions. Patient agrees with plan.

## 2022-06-14 NOTE — ED PROVIDER NOTE - ATTENDING CONTRIBUTION TO CARE
Attending MD Diane:  I personally have seen and examined this patient. I have performed a substantive portion of the visit including all aspects of the medical decision making.  Resident note reviewed and agree on plan of care and except where noted.        46M with lupus, asthma presenting with several days of cough, pleuritic chest pain and dyspnea. Exam notable for no distress, no increased WOB, clear lungs, low grade tachycardia 100s, ECG without diagnostic ischemic changes or changes c/w pericarditis. Overall suspect viral illness, however will remain vigilant for bacterial PNA (lower likelihood give nonfocal  lung exam) given immunosuppression. Will exclude PE with d-dimer, rule out myocarditis with biomarkers, CXR, trial bronchodilators and reassess           *The above represents an initial assessment/impression. Please refer to progress notes for potential changes in patient clinical course*

## 2022-06-14 NOTE — COUNSELING
[Other: ____] : [unfilled] [Good understanding] : Patient has a good understanding of lifestyle changes and steps needed to achieve self management goal [de-identified] : Anxiety

## 2022-06-14 NOTE — ED PROVIDER NOTE - PATIENT PORTAL LINK FT
You can access the FollowMyHealth Patient Portal offered by [facility] by registering at the following website: [email or web address]. By joining [facility]’s FollowMyHealth portal, you will also be able to view your health information using other applications (apps) compatible with our system.

## 2022-06-14 NOTE — ASSESSMENT
[FreeTextEntry1] : 46 year old male MTA  Hx Lupus, on multiple medications including Cell Cept, methylprednisolone, Sapnelo. Hx mild/moderate  asthma, on Trelegy Ellipta 200 presents with shortness of breath, cough, productive yellow phlegm, chest discomfort \par \par Continue Trelegy 200-25 mcg daily\par Continue methylprednisolone, Cell Cept, Sapnelo, Bactrim\par  Continue Famotidine 40 mg daily\par Follow up Rheumatology\par Continue  Physical Therapy\par Follow up Behavioral Health\par \par Patient is uncomfortable going home with current symptoms and advised to go to ER for evaluation

## 2022-06-14 NOTE — ED PROVIDER NOTE - OBJECTIVE STATEMENT
46M PMH lupus asthma CC chest pain sob. Symptoms started since Friday, has been progressively getting worse in nature. PT denotes has been doing albuterol treatments at home but has not been helping him with as much as usual. PT chest pain pleuritic in nature and is coughing up yellow sputum. Is not having any fever reported.

## 2022-06-14 NOTE — ED PROVIDER NOTE - PHYSICAL EXAMINATION
GENERAL: Awake, alert, NAD  LUNGS: CTAB, no wheezes or crackles   CARDIAC: RRR, no m/r/g  ABDOMEN: Soft, , non tender, non distended, no rebound, no guarding  EXT: edema b/l no calf tenderness, 2+ DP pulses bilaterally, no deformities.  NEURO: A&Ox3. Moving all extremities.  SKIN: Warm and dry. No rash.  PSYCH: Normal affect.

## 2022-06-14 NOTE — ED PROVIDER NOTE - NSFOLLOWUPINSTRUCTIONS_ED_ALL_ED_FT
Today you were seen in the ED for shortness of breath     It was found that you have **********    Please follow up with    Please return to the ED if you have any of the following:     Shortness of breath (dyspnea) means you have trouble breathing and could indicate a medical problem. Causes include lung disease, heart disease, low amount of red blood cells (anemia), poor physical fitness, being overweight, smoking, etc. Your health care provider today may not be able to find a cause for your shortness of breath after your exam. In this case, it is important to have a follow-up exam with your primary care physician as instructed. If medicines were prescribed, take them as directed for the full length of time directed. Refrain from tobacco products.    SEEK IMMEDIATE MEDICAL CARE IF YOU HAVE ANY OF THE FOLLOWING SYMPTOMS: worsening shortness of breath, chest pain, back pain, abdominal pain, fever, coughing up blood, lightheadedness/dizziness. Continue all regular medications    Today you were seen in the ED for shortness of breath and cough    Please follow up with your pulmonologist    Please return to the ED if you have any of the following:     Shortness of breath (dyspnea) means you have trouble breathing and could indicate a medical problem. Causes include lung disease, heart disease, low amount of red blood cells (anemia), poor physical fitness, being overweight, smoking, etc. Your health care provider today may not be able to find a cause for your shortness of breath after your exam. In this case, it is important to have a follow-up exam with your primary care physician as instructed. If medicines were prescribed, take them as directed for the full length of time directed. Refrain from tobacco products.    SEEK IMMEDIATE MEDICAL CARE IF YOU HAVE ANY OF THE FOLLOWING SYMPTOMS: worsening shortness of breath, chest pain, back pain, abdominal pain, fever, coughing up blood, lightheadedness/dizziness.

## 2022-06-14 NOTE — HISTORY OF PRESENT ILLNESS
[Never] : never [TextBox_4] : 46 year old male Hx Hx Lupus, asthma, on Plaquenil, Cell Cept recent COVID 19 virus infection, presents for follow up.  Patient now off Benlysta secondary to allergic reaction. He started on Sapnelo.  Patient has now developed thrush, chest tightness, cough productive of yellow phlegm.  He reports he is having difficulty breathing.  He is here for follow up

## 2022-06-14 NOTE — ED ADULT NURSE NOTE - OBJECTIVE STATEMENT
46y M arrived to the ED c/o chest pain. Pt PMHx asthma, lupus presents to ED sent by pulmonologist office for cough and to "rule out pneumonia" Pt reports having cough with yellow sputum since Friday with chest pain and SOB. Pt breathing spontaneous, non labored at present. Pt denies HA, N/V/D, abdominal pain. Skin warm, dry and pink. Pt placed in position of comfort. Pt educated on call bell system and provided call bell. Bed in lowest position, wheels locked, appropriate side rails raised. Pt denies needs at this time.

## 2022-06-14 NOTE — ED PROVIDER NOTE - CLINICAL SUMMARY MEDICAL DECISION MAKING FREE TEXT BOX
46M w/ PMH lupus asthma CC cp and sob given hx and physical looking to r/o pe w/ d dimer myocarditis / pericarditis  will get inflammatory markers and bed side echo

## 2022-06-14 NOTE — ED CLERICAL - NS ED CLERK NOTE PRE-ARRIVAL INFORMATION; ADDITIONAL PRE-ARRIVAL INFORMATION
CC/Reason For referral: History of loptus; Chest Discomfort; S.O.B; Wheezing; Productive cough; Mucus in chest; Need Covid-19 swab; Chronically Ill  Preferred Consultant(if applicable):  Who admits for you (if needed):  Do you have documents you would like to fax over?  Would you still like to speak to an ED attending? YES

## 2022-06-15 ENCOUNTER — TRANSCRIPTION ENCOUNTER (OUTPATIENT)
Age: 47
End: 2022-06-15

## 2022-06-15 NOTE — ED POST DISCHARGE NOTE - DETAILS
6/15: No answer, left voice message for pt to call back admin line. - CORTEZ RosarioC 6/16: left vm for c/b. -Yolanda Burgess PA-C 6/17/22: [facility] with admin # for callback. attempt #3 will send certified letter. face sheet given to rep - Blaine Gonzales PA-C

## 2022-06-17 ENCOUNTER — TRANSCRIPTION ENCOUNTER (OUTPATIENT)
Age: 47
End: 2022-06-17

## 2022-06-20 ENCOUNTER — TRANSCRIPTION ENCOUNTER (OUTPATIENT)
Age: 47
End: 2022-06-20

## 2022-06-21 ENCOUNTER — RX RENEWAL (OUTPATIENT)
Age: 47
End: 2022-06-21

## 2022-06-22 ENCOUNTER — TRANSCRIPTION ENCOUNTER (OUTPATIENT)
Age: 47
End: 2022-06-22

## 2022-06-22 RX ORDER — FLUTICASONE FUROATE, UMECLIDINIUM BROMIDE AND VILANTEROL TRIFENATATE 200; 62.5; 25 UG/1; UG/1; UG/1
200-62.5-25 POWDER RESPIRATORY (INHALATION)
Qty: 3 | Refills: 1 | Status: ACTIVE | COMMUNITY
Start: 2021-07-15 | End: 1900-01-01

## 2022-06-23 ENCOUNTER — APPOINTMENT (OUTPATIENT)
Dept: RHEUMATOLOGY | Facility: CLINIC | Age: 47
End: 2022-06-23

## 2022-06-24 ENCOUNTER — APPOINTMENT (OUTPATIENT)
Dept: CT IMAGING | Facility: IMAGING CENTER | Age: 47
End: 2022-06-24
Payer: COMMERCIAL

## 2022-06-24 ENCOUNTER — OUTPATIENT (OUTPATIENT)
Dept: OUTPATIENT SERVICES | Facility: HOSPITAL | Age: 47
LOS: 1 days | End: 2022-06-24
Payer: COMMERCIAL

## 2022-06-24 ENCOUNTER — APPOINTMENT (OUTPATIENT)
Dept: PULMONOLOGY | Facility: CLINIC | Age: 47
End: 2022-06-24
Payer: COMMERCIAL

## 2022-06-24 VITALS
HEART RATE: 81 BPM | DIASTOLIC BLOOD PRESSURE: 84 MMHG | BODY MASS INDEX: 40.16 KG/M2 | TEMPERATURE: 97.9 F | HEIGHT: 68 IN | OXYGEN SATURATION: 98 % | WEIGHT: 265 LBS | SYSTOLIC BLOOD PRESSURE: 130 MMHG

## 2022-06-24 DIAGNOSIS — G70.89 OTHER SPECIFIED MYONEURAL DISORDERS: ICD-10-CM

## 2022-06-24 DIAGNOSIS — Z90.49 ACQUIRED ABSENCE OF OTHER SPECIFIED PARTS OF DIGESTIVE TRACT: Chronic | ICD-10-CM

## 2022-06-24 DIAGNOSIS — I10 ESSENTIAL (PRIMARY) HYPERTENSION: ICD-10-CM

## 2022-06-24 DIAGNOSIS — J45.909 UNSPECIFIED ASTHMA, UNCOMPLICATED: ICD-10-CM

## 2022-06-24 PROCEDURE — 71275 CT ANGIOGRAPHY CHEST: CPT

## 2022-06-24 PROCEDURE — 82565 ASSAY OF CREATININE: CPT

## 2022-06-24 PROCEDURE — 71275 CT ANGIOGRAPHY CHEST: CPT | Mod: 26

## 2022-06-24 PROCEDURE — 99214 OFFICE O/P EST MOD 30 MIN: CPT | Mod: 25

## 2022-06-24 RX ORDER — BELIMUMAB 120 MG/1.5ML
120 INJECTION, POWDER, LYOPHILIZED, FOR SOLUTION INTRAVENOUS
Qty: 4 | Refills: 3 | Status: DISCONTINUED | OUTPATIENT
Start: 2020-10-14 | End: 2022-06-24

## 2022-06-24 NOTE — COUNSELING
[Other: ____] : [unfilled] [Good understanding] : Patient has a good understanding of lifestyle changes and steps needed to achieve self management goal [de-identified] : Anxiety

## 2022-06-24 NOTE — ASSESSMENT
[FreeTextEntry1] : 47 year old male MTA  Hx Lupus, on multiple medications including Cell Cept, methylprednisolone, Sapnelo. Hx mild/moderate  asthma, on Trelegy Ellipta 200 presents with shortness of breath, cough, productive yellow phlegm, chest discomfort \par \par CT angio evaluate for pulmonary embolism\par Continue Trelegy 200-25 mcg daily\par Continue methylprednisolone, Cell Cept, Saphnelo, Bactrim, may need to increase steroid dose if CTA unrevealing\par  Continue Famotidine 40 mg daily\par Follow up Rheumatology\par Continue  Physical Therapy\par Follow up Behavioral Health\par \par Follow up pending CTA\par \par

## 2022-06-24 NOTE — HISTORY OF PRESENT ILLNESS
[Never] : never [TextBox_4] : 47 year old male Hx Hx Lupus, asthma, on Plaquenil, Cell Cept recent COVID 19 virus infection, presents for follow up.  Patient now off Benlysta secondary to allergic reaction. He started on Sapnelo.  Patient was seen in office last week for worsening  thrush, chest tightness, cough productive of yellow phlegm. He was evaluated in ER and sent home.  He reports he continues to experience shortness of breath, atypical chest discomfort.  He is very frustrated.  He is here for these symptoms.

## 2022-06-25 ENCOUNTER — TRANSCRIPTION ENCOUNTER (OUTPATIENT)
Age: 47
End: 2022-06-25

## 2022-06-25 ENCOUNTER — INPATIENT (INPATIENT)
Facility: HOSPITAL | Age: 47
LOS: 15 days | Discharge: HOME HEALTH | DRG: 202 | End: 2022-07-11
Attending: STUDENT IN AN ORGANIZED HEALTH CARE EDUCATION/TRAINING PROGRAM | Admitting: STUDENT IN AN ORGANIZED HEALTH CARE EDUCATION/TRAINING PROGRAM
Payer: COMMERCIAL

## 2022-06-25 VITALS
TEMPERATURE: 98 F | HEART RATE: 102 BPM | HEIGHT: 67 IN | WEIGHT: 265 LBS | RESPIRATION RATE: 24 BRPM | OXYGEN SATURATION: 96 % | SYSTOLIC BLOOD PRESSURE: 134 MMHG | DIASTOLIC BLOOD PRESSURE: 74 MMHG

## 2022-06-25 DIAGNOSIS — Z90.49 ACQUIRED ABSENCE OF OTHER SPECIFIED PARTS OF DIGESTIVE TRACT: Chronic | ICD-10-CM

## 2022-06-25 DIAGNOSIS — J45.901 UNSPECIFIED ASTHMA WITH (ACUTE) EXACERBATION: ICD-10-CM

## 2022-06-25 LAB
ALBUMIN SERPL ELPH-MCNC: 4 G/DL — SIGNIFICANT CHANGE UP (ref 3.3–5)
ALP SERPL-CCNC: 83 U/L — SIGNIFICANT CHANGE UP (ref 40–120)
ALT FLD-CCNC: 54 U/L — HIGH (ref 10–45)
ANION GAP SERPL CALC-SCNC: 15 MMOL/L — SIGNIFICANT CHANGE UP (ref 5–17)
AST SERPL-CCNC: 29 U/L — SIGNIFICANT CHANGE UP (ref 10–40)
BASE EXCESS BLDV CALC-SCNC: 4.4 MMOL/L — HIGH (ref -2–2)
BASOPHILS # BLD AUTO: 0 K/UL — SIGNIFICANT CHANGE UP (ref 0–0.2)
BASOPHILS NFR BLD AUTO: 0 % — SIGNIFICANT CHANGE UP (ref 0–2)
BILIRUB SERPL-MCNC: 0.4 MG/DL — SIGNIFICANT CHANGE UP (ref 0.2–1.2)
BUN SERPL-MCNC: 10 MG/DL — SIGNIFICANT CHANGE UP (ref 7–23)
CA-I SERPL-SCNC: 1.19 MMOL/L — SIGNIFICANT CHANGE UP (ref 1.15–1.33)
CALCIUM SERPL-MCNC: 9.4 MG/DL — SIGNIFICANT CHANGE UP (ref 8.4–10.5)
CHLORIDE BLDV-SCNC: 97 MMOL/L — SIGNIFICANT CHANGE UP (ref 96–108)
CHLORIDE SERPL-SCNC: 96 MMOL/L — SIGNIFICANT CHANGE UP (ref 96–108)
CO2 BLDV-SCNC: 32 MMOL/L — HIGH (ref 22–26)
CO2 SERPL-SCNC: 27 MMOL/L — SIGNIFICANT CHANGE UP (ref 22–31)
CREAT SERPL-MCNC: 0.83 MG/DL — SIGNIFICANT CHANGE UP (ref 0.5–1.3)
EGFR: 109 ML/MIN/1.73M2 — SIGNIFICANT CHANGE UP
EOSINOPHIL # BLD AUTO: 0.32 K/UL — SIGNIFICANT CHANGE UP (ref 0–0.5)
EOSINOPHIL NFR BLD AUTO: 2.7 % — SIGNIFICANT CHANGE UP (ref 0–6)
GAS PNL BLDV: 133 MMOL/L — LOW (ref 136–145)
GAS PNL BLDV: SIGNIFICANT CHANGE UP
GAS PNL BLDV: SIGNIFICANT CHANGE UP
GIANT PLATELETS BLD QL SMEAR: PRESENT — SIGNIFICANT CHANGE UP
GLUCOSE BLDV-MCNC: 140 MG/DL — HIGH (ref 70–99)
GLUCOSE SERPL-MCNC: 144 MG/DL — HIGH (ref 70–99)
HCO3 BLDV-SCNC: 31 MMOL/L — HIGH (ref 22–29)
HCT VFR BLD CALC: 40.7 % — SIGNIFICANT CHANGE UP (ref 39–50)
HCT VFR BLDA CALC: 42 % — SIGNIFICANT CHANGE UP (ref 39–51)
HGB BLD CALC-MCNC: 14.1 G/DL — SIGNIFICANT CHANGE UP (ref 12.6–17.4)
HGB BLD-MCNC: 13.9 G/DL — SIGNIFICANT CHANGE UP (ref 13–17)
LACTATE BLDV-MCNC: 2.5 MMOL/L — HIGH (ref 0.7–2)
LYMPHOCYTES # BLD AUTO: 26.8 % — SIGNIFICANT CHANGE UP (ref 13–44)
LYMPHOCYTES # BLD AUTO: 3.2 K/UL — SIGNIFICANT CHANGE UP (ref 1–3.3)
MAGNESIUM SERPL-MCNC: 2.1 MG/DL — SIGNIFICANT CHANGE UP (ref 1.6–2.6)
MANUAL SMEAR VERIFICATION: SIGNIFICANT CHANGE UP
MCHC RBC-ENTMCNC: 30.3 PG — SIGNIFICANT CHANGE UP (ref 27–34)
MCHC RBC-ENTMCNC: 34.2 GM/DL — SIGNIFICANT CHANGE UP (ref 32–36)
MCV RBC AUTO: 88.7 FL — SIGNIFICANT CHANGE UP (ref 80–100)
METAMYELOCYTES # FLD: 1.8 % — HIGH (ref 0–0)
MONOCYTES # BLD AUTO: 0.74 K/UL — SIGNIFICANT CHANGE UP (ref 0–0.9)
MONOCYTES NFR BLD AUTO: 6.2 % — SIGNIFICANT CHANGE UP (ref 2–14)
MYELOCYTES NFR BLD: 0.9 % — HIGH (ref 0–0)
NEUTROPHILS # BLD AUTO: 7.03 K/UL — SIGNIFICANT CHANGE UP (ref 1.8–7.4)
NEUTROPHILS NFR BLD AUTO: 58.9 % — SIGNIFICANT CHANGE UP (ref 43–77)
PCO2 BLDV: 52 MMHG — SIGNIFICANT CHANGE UP (ref 42–55)
PH BLDV: 7.38 — SIGNIFICANT CHANGE UP (ref 7.32–7.43)
PLAT MORPH BLD: NORMAL — SIGNIFICANT CHANGE UP
PLATELET # BLD AUTO: 252 K/UL — SIGNIFICANT CHANGE UP (ref 150–400)
PO2 BLDV: 33 MMHG — SIGNIFICANT CHANGE UP (ref 25–45)
POTASSIUM BLDV-SCNC: 2.9 MMOL/L — CRITICAL LOW (ref 3.5–5.1)
POTASSIUM SERPL-MCNC: 3.4 MMOL/L — LOW (ref 3.5–5.3)
POTASSIUM SERPL-SCNC: 3.4 MMOL/L — LOW (ref 3.5–5.3)
PROT SERPL-MCNC: 7 G/DL — SIGNIFICANT CHANGE UP (ref 6–8.3)
RBC # BLD: 4.59 M/UL — SIGNIFICANT CHANGE UP (ref 4.2–5.8)
RBC # FLD: 13.6 % — SIGNIFICANT CHANGE UP (ref 10.3–14.5)
RBC BLD AUTO: SIGNIFICANT CHANGE UP
SAO2 % BLDV: 45.3 % — LOW (ref 67–88)
SODIUM SERPL-SCNC: 138 MMOL/L — SIGNIFICANT CHANGE UP (ref 135–145)
VARIANT LYMPHS # BLD: 2.7 % — SIGNIFICANT CHANGE UP (ref 0–6)
WBC # BLD: 11.93 K/UL — HIGH (ref 3.8–10.5)
WBC # FLD AUTO: 11.93 K/UL — HIGH (ref 3.8–10.5)

## 2022-06-25 PROCEDURE — 93010 ELECTROCARDIOGRAM REPORT: CPT

## 2022-06-25 PROCEDURE — 71045 X-RAY EXAM CHEST 1 VIEW: CPT | Mod: 26

## 2022-06-25 PROCEDURE — 99285 EMERGENCY DEPT VISIT HI MDM: CPT

## 2022-06-25 RX ORDER — MAGNESIUM SULFATE 500 MG/ML
2 VIAL (ML) INJECTION ONCE
Refills: 0 | Status: COMPLETED | OUTPATIENT
Start: 2022-06-25 | End: 2022-06-25

## 2022-06-25 RX ORDER — IPRATROPIUM/ALBUTEROL SULFATE 18-103MCG
3 AEROSOL WITH ADAPTER (GRAM) INHALATION ONCE
Refills: 0 | Status: COMPLETED | OUTPATIENT
Start: 2022-06-25 | End: 2022-06-25

## 2022-06-25 RX ORDER — POTASSIUM CHLORIDE 20 MEQ
40 PACKET (EA) ORAL ONCE
Refills: 0 | Status: COMPLETED | OUTPATIENT
Start: 2022-06-25 | End: 2022-06-25

## 2022-06-25 RX ORDER — MORPHINE SULFATE 50 MG/1
2 CAPSULE, EXTENDED RELEASE ORAL ONCE
Refills: 0 | Status: DISCONTINUED | OUTPATIENT
Start: 2022-06-25 | End: 2022-06-25

## 2022-06-25 RX ORDER — LIDOCAINE 4 G/100G
1 CREAM TOPICAL ONCE
Refills: 0 | Status: COMPLETED | OUTPATIENT
Start: 2022-06-25 | End: 2022-06-25

## 2022-06-25 RX ORDER — KETOROLAC TROMETHAMINE 30 MG/ML
15 SYRINGE (ML) INJECTION ONCE
Refills: 0 | Status: DISCONTINUED | OUTPATIENT
Start: 2022-06-25 | End: 2022-06-25

## 2022-06-25 RX ORDER — ACETAMINOPHEN 500 MG
650 TABLET ORAL ONCE
Refills: 0 | Status: COMPLETED | OUTPATIENT
Start: 2022-06-25 | End: 2022-06-25

## 2022-06-25 RX ORDER — DIAZEPAM 5 MG
5 TABLET ORAL ONCE
Refills: 0 | Status: DISCONTINUED | OUTPATIENT
Start: 2022-06-25 | End: 2022-06-25

## 2022-06-25 RX ADMIN — Medication 125 MILLIGRAM(S): at 20:29

## 2022-06-25 RX ADMIN — Medication 150 GRAM(S): at 20:30

## 2022-06-25 RX ADMIN — Medication 40 MILLIEQUIVALENT(S): at 21:09

## 2022-06-25 RX ADMIN — Medication 3 MILLILITER(S): at 20:29

## 2022-06-25 RX ADMIN — Medication 15 MILLIGRAM(S): at 20:28

## 2022-06-25 RX ADMIN — MORPHINE SULFATE 2 MILLIGRAM(S): 50 CAPSULE, EXTENDED RELEASE ORAL at 23:28

## 2022-06-25 RX ADMIN — LIDOCAINE 1 PATCH: 4 CREAM TOPICAL at 20:29

## 2022-06-25 RX ADMIN — Medication 650 MILLIGRAM(S): at 20:30

## 2022-06-25 NOTE — ED PROVIDER NOTE - CLINICAL SUMMARY MEDICAL DECISION MAKING FREE TEXT BOX
pt with asthma, lupus, presents for cough/sob x2 weeks. has back and cp associated w/ the forceful coughs. pt otherwise well appaering. afebrile. decreased air entry b/l, R>L. no wheeze. had a negative PE study yday. ddx asthma exacerbation, pna. pt w/ muscle tenderness in back. no red flag sx. will check labs, cxr, rvp, nebs, steroids, mag, analgesia, reassess

## 2022-06-25 NOTE — ED PROVIDER NOTE - ATTENDING CONTRIBUTION TO CARE
RGUJRAL 46yo male hx listed presents with cough, SOB and chest tightness. States pt was seen for symptoms here 11 days ago, diagnosed with enterovirus and was on methylprednisolone at the time. Pt wo improvement of symptoms, seen by his pulm yesterday, outpt cta negative for pe/pna and started on prednisone. Has not taken any today. States symptoms are persistent and now with back pain due to persistent coughing. No f/c.   On exam, Patient is awake,alert,oriented x 3. Patient is well appearing and in no acute distress. Patient's chest w mild dec air entry, +s1s2. Abdomen is soft nd/nt +BS. Extremity with no swelling or calf tenderness.   Check labs, xray chest. Duoneb, steroids and monitor.

## 2022-06-25 NOTE — ED ADULT TRIAGE NOTE - CHIEF COMPLAINT QUOTE
shortness of breath worsening x2 wks not improved with inhaler, appears labored, hx asthma. had negative CT for PE yest

## 2022-06-25 NOTE — ED PROVIDER NOTE - PHYSICAL EXAMINATION
PHYSICAL EXAM:  GENERAL: non-toxic appearing; in no respiratory distress  HEAD Atraumatic, Normocephalic  NECK: No JVD; trachea midline  EYES: PERRL, EOMs intact b/l w/out deficits; normal conjunctiva  CHEST/LUNG: decreased air entry b/l, R>L; no wheeze  HEART: RRR no murmur/gallops/rubs  ABDOMEN: soft, NT, ND  EXTREMITIES: No LE edema, +2 radial pulses b/l, +2 DP/PT pulses b/l  MUSCULOSKELETAL: FROM of all 4 extremities;  NERVOUS SYSTEM:  A&Ox3, No motor deficits or sensory deficits; CNII-XII intact; Speech is fluent and appropriate  SKIN:  Warm and dry as visualized

## 2022-06-25 NOTE — ED ADULT NURSE NOTE - OBJECTIVE STATEMENT
Pt was seen in admitted to the hospital recently for c/o diff breathing and cough. Breath sounds diminished bilaterally and pt noted to be tachypneic.   Was placed on steroids without significant improvement.  He reports persistent cough, sometimes productive of white or yellow mucous, denies fevers, chills  He reports that when he coughs, he has severe back pain.  He had CTA of hest yesterday, neg for PE.

## 2022-06-25 NOTE — ED PROVIDER NOTE - PROGRESS NOTE DETAILS
Francisco Javier Gandhi MD. no cdu beds. pt received the tx. pt lungs clearer to auscultation but with still b/l decreased air entry. pt feeling only mildly improved, still feels sob. will admit

## 2022-06-25 NOTE — ED PROVIDER NOTE - OBJECTIVE STATEMENT
45 yo M PMHx asthma, lupus on steroids, presents to the ED for about 2 weeks of daily, persistent cough and SOB. His cough is productive of greenish mucous. His doctor increased his Prednisone to 60 mg yesterday. Of note, he was seen here on 6/14/22. Of note, he had a negative CTA for PE yesterday. His cough is so forceful that he threw out his back. He also has CP when he coughs. He denies fevers, abd pain, n/v/d. 48 yo M PMHx asthma, lupus on steroids, presents to the ED for about 2 weeks of daily, persistent cough and SOB. His cough is productive of greenish mucous. His doctor increased his Prednisone to 60 mg yesterday. Of note, he was seen here on 6/14/22. Of note, he had a negative CTA for PE yesterday. His cough is so forceful that he threw out his back. He also has CP when he coughs. He denies fevers, abd pain, n/v/d.

## 2022-06-25 NOTE — ED ADULT NURSE REASSESSMENT NOTE - NS ED NURSE REASSESS COMMENT FT1
VSS. Pt given crackers per MD Gandhi. Pt denies having food allergies. Pending bed assignment, pt aware. Has no complaints at this time. Will continue to monitor.

## 2022-06-26 DIAGNOSIS — R09.89 OTHER SPECIFIED SYMPTOMS AND SIGNS INVOLVING THE CIRCULATORY AND RESPIRATORY SYSTEMS: ICD-10-CM

## 2022-06-26 DIAGNOSIS — Z29.9 ENCOUNTER FOR PROPHYLACTIC MEASURES, UNSPECIFIED: ICD-10-CM

## 2022-06-26 DIAGNOSIS — E03.9 HYPOTHYROIDISM, UNSPECIFIED: ICD-10-CM

## 2022-06-26 DIAGNOSIS — I10 ESSENTIAL (PRIMARY) HYPERTENSION: ICD-10-CM

## 2022-06-26 DIAGNOSIS — M32.9 SYSTEMIC LUPUS ERYTHEMATOSUS, UNSPECIFIED: ICD-10-CM

## 2022-06-26 DIAGNOSIS — J45.901 UNSPECIFIED ASTHMA WITH (ACUTE) EXACERBATION: ICD-10-CM

## 2022-06-26 LAB
ANION GAP SERPL CALC-SCNC: 14 MMOL/L — SIGNIFICANT CHANGE UP (ref 5–17)
BUN SERPL-MCNC: 11 MG/DL — SIGNIFICANT CHANGE UP (ref 7–23)
CALCIUM SERPL-MCNC: 9.8 MG/DL — SIGNIFICANT CHANGE UP (ref 8.4–10.5)
CHLORIDE SERPL-SCNC: 95 MMOL/L — LOW (ref 96–108)
CO2 SERPL-SCNC: 23 MMOL/L — SIGNIFICANT CHANGE UP (ref 22–31)
CREAT SERPL-MCNC: 0.77 MG/DL — SIGNIFICANT CHANGE UP (ref 0.5–1.3)
EGFR: 112 ML/MIN/1.73M2 — SIGNIFICANT CHANGE UP
GLUCOSE SERPL-MCNC: 347 MG/DL — HIGH (ref 70–99)
HCT VFR BLD CALC: 39.2 % — SIGNIFICANT CHANGE UP (ref 39–50)
HGB BLD-MCNC: 13.6 G/DL — SIGNIFICANT CHANGE UP (ref 13–17)
HPIV3 RNA SPEC QL NAA+PROBE: DETECTED
MAGNESIUM SERPL-MCNC: 2.2 MG/DL — SIGNIFICANT CHANGE UP (ref 1.6–2.6)
MCHC RBC-ENTMCNC: 30.5 PG — SIGNIFICANT CHANGE UP (ref 27–34)
MCHC RBC-ENTMCNC: 34.7 GM/DL — SIGNIFICANT CHANGE UP (ref 32–36)
MCV RBC AUTO: 87.9 FL — SIGNIFICANT CHANGE UP (ref 80–100)
NRBC # BLD: 0 /100 WBCS — SIGNIFICANT CHANGE UP (ref 0–0)
PHOSPHATE SERPL-MCNC: 2.7 MG/DL — SIGNIFICANT CHANGE UP (ref 2.5–4.5)
PLATELET # BLD AUTO: 266 K/UL — SIGNIFICANT CHANGE UP (ref 150–400)
POTASSIUM SERPL-MCNC: 4.5 MMOL/L — SIGNIFICANT CHANGE UP (ref 3.5–5.3)
POTASSIUM SERPL-SCNC: 4.5 MMOL/L — SIGNIFICANT CHANGE UP (ref 3.5–5.3)
RAPID RVP RESULT: DETECTED
RBC # BLD: 4.46 M/UL — SIGNIFICANT CHANGE UP (ref 4.2–5.8)
RBC # FLD: 13.6 % — SIGNIFICANT CHANGE UP (ref 10.3–14.5)
RV+EV RNA SPEC QL NAA+PROBE: DETECTED
SARS-COV-2 RNA SPEC QL NAA+PROBE: SIGNIFICANT CHANGE UP
SODIUM SERPL-SCNC: 132 MMOL/L — LOW (ref 135–145)
WBC # BLD: 11.43 K/UL — HIGH (ref 3.8–10.5)
WBC # FLD AUTO: 11.43 K/UL — HIGH (ref 3.8–10.5)

## 2022-06-26 PROCEDURE — 99223 1ST HOSP IP/OBS HIGH 75: CPT

## 2022-06-26 PROCEDURE — 12345: CPT | Mod: NC

## 2022-06-26 RX ORDER — ALBUTEROL 90 UG/1
2 AEROSOL, METERED ORAL
Qty: 0 | Refills: 0 | DISCHARGE

## 2022-06-26 RX ORDER — OXYCODONE HYDROCHLORIDE 5 MG/1
5 TABLET ORAL EVERY 6 HOURS
Refills: 0 | Status: DISCONTINUED | OUTPATIENT
Start: 2022-06-26 | End: 2022-07-03

## 2022-06-26 RX ORDER — HYDROXYCHLOROQUINE SULFATE 200 MG
400 TABLET ORAL DAILY
Refills: 0 | Status: DISCONTINUED | OUTPATIENT
Start: 2022-06-26 | End: 2022-07-09

## 2022-06-26 RX ORDER — IPRATROPIUM/ALBUTEROL SULFATE 18-103MCG
3 AEROSOL WITH ADAPTER (GRAM) INHALATION EVERY 6 HOURS
Refills: 0 | Status: DISCONTINUED | OUTPATIENT
Start: 2022-06-26 | End: 2022-06-26

## 2022-06-26 RX ORDER — ACETAMINOPHEN 500 MG
650 TABLET ORAL EVERY 6 HOURS
Refills: 0 | Status: DISCONTINUED | OUTPATIENT
Start: 2022-06-26 | End: 2022-07-04

## 2022-06-26 RX ORDER — LANOLIN ALCOHOL/MO/W.PET/CERES
3 CREAM (GRAM) TOPICAL AT BEDTIME
Refills: 0 | Status: DISCONTINUED | OUTPATIENT
Start: 2022-06-26 | End: 2022-07-11

## 2022-06-26 RX ORDER — FUROSEMIDE 40 MG
60 TABLET ORAL DAILY
Refills: 0 | Status: DISCONTINUED | OUTPATIENT
Start: 2022-06-26 | End: 2022-07-11

## 2022-06-26 RX ORDER — IPRATROPIUM/ALBUTEROL SULFATE 18-103MCG
3 AEROSOL WITH ADAPTER (GRAM) INHALATION EVERY 4 HOURS
Refills: 0 | Status: DISCONTINUED | OUTPATIENT
Start: 2022-06-26 | End: 2022-07-08

## 2022-06-26 RX ORDER — ASPIRIN/CALCIUM CARB/MAGNESIUM 324 MG
81 TABLET ORAL DAILY
Refills: 0 | Status: DISCONTINUED | OUTPATIENT
Start: 2022-06-26 | End: 2022-07-11

## 2022-06-26 RX ORDER — FUROSEMIDE 40 MG
60 TABLET ORAL DAILY
Refills: 0 | Status: DISCONTINUED | OUTPATIENT
Start: 2022-06-26 | End: 2022-06-26

## 2022-06-26 RX ORDER — MYCOPHENOLATE MOFETIL 250 MG/1
3 CAPSULE ORAL
Qty: 0 | Refills: 0 | DISCHARGE

## 2022-06-26 RX ORDER — ENOXAPARIN SODIUM 100 MG/ML
40 INJECTION SUBCUTANEOUS EVERY 12 HOURS
Refills: 0 | Status: DISCONTINUED | OUTPATIENT
Start: 2022-06-26 | End: 2022-07-11

## 2022-06-26 RX ORDER — PANTOPRAZOLE SODIUM 20 MG/1
40 TABLET, DELAYED RELEASE ORAL
Refills: 0 | Status: DISCONTINUED | OUTPATIENT
Start: 2022-06-26 | End: 2022-07-11

## 2022-06-26 RX ORDER — LOSARTAN POTASSIUM 100 MG/1
50 TABLET, FILM COATED ORAL DAILY
Refills: 0 | Status: DISCONTINUED | OUTPATIENT
Start: 2022-06-26 | End: 2022-07-11

## 2022-06-26 RX ORDER — LEVOTHYROXINE SODIUM 125 MCG
125 TABLET ORAL DAILY
Refills: 0 | Status: DISCONTINUED | OUTPATIENT
Start: 2022-06-26 | End: 2022-07-11

## 2022-06-26 RX ORDER — LIDOCAINE 4 G/100G
1 CREAM TOPICAL DAILY
Refills: 0 | Status: DISCONTINUED | OUTPATIENT
Start: 2022-06-26 | End: 2022-07-11

## 2022-06-26 RX ORDER — MYCOPHENOLATE MOFETIL 250 MG/1
1500 CAPSULE ORAL DAILY
Refills: 0 | Status: DISCONTINUED | OUTPATIENT
Start: 2022-06-26 | End: 2022-06-27

## 2022-06-26 RX ORDER — ONDANSETRON 8 MG/1
4 TABLET, FILM COATED ORAL EVERY 8 HOURS
Refills: 0 | Status: DISCONTINUED | OUTPATIENT
Start: 2022-06-26 | End: 2022-07-11

## 2022-06-26 RX ORDER — POTASSIUM CHLORIDE 20 MEQ
1 PACKET (EA) ORAL
Qty: 0 | Refills: 0 | DISCHARGE

## 2022-06-26 RX ORDER — TIOTROPIUM BROMIDE 18 UG/1
1 CAPSULE ORAL; RESPIRATORY (INHALATION) DAILY
Refills: 0 | Status: DISCONTINUED | OUTPATIENT
Start: 2022-06-26 | End: 2022-06-26

## 2022-06-26 RX ORDER — BUDESONIDE AND FORMOTEROL FUMARATE DIHYDRATE 160; 4.5 UG/1; UG/1
2 AEROSOL RESPIRATORY (INHALATION)
Refills: 0 | Status: DISCONTINUED | OUTPATIENT
Start: 2022-06-26 | End: 2022-06-26

## 2022-06-26 RX ADMIN — Medication 40 MILLIGRAM(S): at 21:15

## 2022-06-26 RX ADMIN — LIDOCAINE 1 PATCH: 4 CREAM TOPICAL at 22:45

## 2022-06-26 RX ADMIN — Medication 40 MILLIGRAM(S): at 13:19

## 2022-06-26 RX ADMIN — LOSARTAN POTASSIUM 50 MILLIGRAM(S): 100 TABLET, FILM COATED ORAL at 06:12

## 2022-06-26 RX ADMIN — Medication 650 MILLIGRAM(S): at 19:31

## 2022-06-26 RX ADMIN — OXYCODONE HYDROCHLORIDE 5 MILLIGRAM(S): 5 TABLET ORAL at 15:30

## 2022-06-26 RX ADMIN — Medication 20 MILLIGRAM(S): at 06:12

## 2022-06-26 RX ADMIN — Medication 3 MILLILITER(S): at 06:19

## 2022-06-26 RX ADMIN — Medication 650 MILLIGRAM(S): at 18:20

## 2022-06-26 RX ADMIN — Medication 3 MILLILITER(S): at 21:16

## 2022-06-26 RX ADMIN — Medication 81 MILLIGRAM(S): at 13:19

## 2022-06-26 RX ADMIN — LIDOCAINE 1 PATCH: 4 CREAM TOPICAL at 13:20

## 2022-06-26 RX ADMIN — Medication 3 MILLILITER(S): at 13:19

## 2022-06-26 RX ADMIN — OXYCODONE HYDROCHLORIDE 5 MILLIGRAM(S): 5 TABLET ORAL at 05:34

## 2022-06-26 RX ADMIN — Medication 3 MILLILITER(S): at 17:39

## 2022-06-26 RX ADMIN — Medication 400 MILLIGRAM(S): at 13:19

## 2022-06-26 RX ADMIN — PANTOPRAZOLE SODIUM 40 MILLIGRAM(S): 20 TABLET, DELAYED RELEASE ORAL at 08:43

## 2022-06-26 RX ADMIN — Medication 60 MILLIGRAM(S): at 13:19

## 2022-06-26 RX ADMIN — OXYCODONE HYDROCHLORIDE 5 MILLIGRAM(S): 5 TABLET ORAL at 22:15

## 2022-06-26 RX ADMIN — Medication 100 MILLIGRAM(S): at 06:11

## 2022-06-26 RX ADMIN — Medication 100 MILLIGRAM(S): at 13:19

## 2022-06-26 RX ADMIN — Medication 125 MICROGRAM(S): at 05:34

## 2022-06-26 RX ADMIN — OXYCODONE HYDROCHLORIDE 5 MILLIGRAM(S): 5 TABLET ORAL at 14:41

## 2022-06-26 RX ADMIN — MYCOPHENOLATE MOFETIL 1500 MILLIGRAM(S): 250 CAPSULE ORAL at 13:20

## 2022-06-26 RX ADMIN — Medication 3 MILLIGRAM(S): at 21:16

## 2022-06-26 RX ADMIN — ENOXAPARIN SODIUM 40 MILLIGRAM(S): 100 INJECTION SUBCUTANEOUS at 06:17

## 2022-06-26 RX ADMIN — ENOXAPARIN SODIUM 40 MILLIGRAM(S): 100 INJECTION SUBCUTANEOUS at 17:39

## 2022-06-26 RX ADMIN — Medication 100 MILLIGRAM(S): at 21:16

## 2022-06-26 RX ADMIN — Medication 1 TABLET(S): at 13:20

## 2022-06-26 RX ADMIN — OXYCODONE HYDROCHLORIDE 5 MILLIGRAM(S): 5 TABLET ORAL at 21:16

## 2022-06-26 NOTE — H&P ADULT - HISTORY OF PRESENT ILLNESS
47M with PMH of SLE on steroids, asthma, hypothyroidism, HTN p/w cough and SOB. Symptoms of cough and SOB started approx 2 weeks ago; was in [facility] ED on 6/14 for these symptoms, diagnosed with asthma exacerbation and discharged after steroids and nebulizers Pt states since that time, his symptoms have been getting progressively worse. Has ongoing paroxysms of productive cough, with associated severe chest pain and back pain; endorses SOB with minimal exertion and +wheezing. Has been using home nebulizers every 4-6 hours without improvement in symptoms. Pt is chronically on solumedrol 16mg daily for SLE; he saw his pulmonologist on 6/24 and had steroids changed to prednisone 40mg. Also had CT angio performed which showed no PE. Pt comes to ED today as symptoms have not improved and the chest and back pain when he coughs is getting worse. Denies fevers, chills, nausea/vomiting, abd pain, on urinary symptoms; +b/l LE swelling (chronic).

## 2022-06-26 NOTE — H&P ADULT - PROBLEM SELECTOR PLAN 3
BP stable, near goal   c/w home medications  - losartan 50mg daily  - c/w lasix 60mg daily (for chronic LE swelling)

## 2022-06-26 NOTE — PROGRESS NOTE ADULT - PROBLEM SELECTOR PLAN 2
c/w steroids as above   c/w Mycophenolate 1500mg daily  c/w Plaquenil 400mg daily   c/w bactrim for ppx   check dsDNA, c3, c4   Rheumatology consult in am (follows with Dr Neri)

## 2022-06-26 NOTE — H&P ADULT - ASSESSMENT
47M with PMH of SLE on steroids, asthma, hypothyroidism, HTN p/w severe cough and SOB, a/w acute asthma exacerbation in setting parainfluenza and entero/rhinovirus.

## 2022-06-26 NOTE — PROGRESS NOTE ADULT - SUBJECTIVE AND OBJECTIVE BOX
patient seen and examined. c/o coughing fits with pain in chest and back due to cough. _+chest tightness. +swelling in b/l LE (chronic but slightly more) and b/l hands - no joint pains    PHYSICAL EXAM:  Lungs - poor air entry, slight end expiratory wheeze, no crackles appreciated  Ext - 1+ b/l LE edema, some swelling of b/l fingers  no rash

## 2022-06-26 NOTE — H&P ADULT - NSHPPHYSICALEXAM_GEN_ALL_CORE
Vital Signs Last 24 Hrs  T(C): 36.4 (25 Jun 2022 23:25), Max: 36.8 (25 Jun 2022 15:58)  T(F): 97.6 (25 Jun 2022 23:25), Max: 98.2 (25 Jun 2022 15:58)  HR: 107 (25 Jun 2022 23:25) (89 - 117)  BP: 146/72 (25 Jun 2022 23:25) (133/89 - 146/72)  BP(mean): --  RR: 19 (25 Jun 2022 23:25) (19 - 24)  SpO2: 97% (25 Jun 2022 23:25) (96% - 97%)    PHYSICAL EXAM:  GENERAL: NAD, well-developed  HEAD:  Atraumatic, normocephalic  EYES: EOMI, conjunctiva and sclera clear  NECK: Supple, no JVD  CHEST/LUNG: Clear to auscultation bilaterally; no wheezing or rales  HEART: tachycardic, S1, S2, no murmurs  ABDOMEN: Soft, nontender, nondistended; bowel sounds present  EXTREMITIES:  2+ Peripheral Pulses, 1-2+ b/l LE edema  PSYCH: calm affect, not anxious  NEUROLOGY: non-focal, AAOx3, MIX  SKIN: No rashes or lesions  MUSCULOSKELETAL: no back pain on palpation, no joint swelling

## 2022-06-26 NOTE — H&P ADULT - NSHPLABSRESULTS_GEN_ALL_CORE
Labs, imaging and EKG personally reviewed and interpreted by me.   labs notable for mild leukocytosis, normal Hb, mild hypoK 3.4 otherwise normal lytes.   Imaging personally reviewed and interpreted by me - CXR - no focal consolidations   EKG personally reviewed and interpreted by me -                           13.9   11.93 )-----------( 252      ( 25 Jun 2022 20:11 )             40.7     06-25    138  |  96  |  10  ----------------------------<  144<H>  3.4<L>   |  27  |  0.83    Ca    9.4      25 Jun 2022 20:11  Mg     2.1     06-25    TPro  7.0  /  Alb  4.0  /  TBili  0.4  /  DBili  x   /  AST  29  /  ALT  54<H>  /  AlkPhos  83  06-25                   < from: Xray Chest 1 View- PORTABLE-Urgent (Xray Chest 1 View- PORTABLE-Urgent .) (06.25.22 @ 20:20) >    FINDINGS:  Heart/Vascular: Heart size cannot be accurately assessed in this   projection.  Pulmonary: Clear lungs. No pneumothorax or pleural effusion.  Bones: No acute bony pathology.    Impression:  Clear lungs.    < end of copied text >    < from: CT Angio Chest PE Protocol w/ IV Cont (06.24.22 @ 17:20) >    FINDINGS:    PULMONARY ANGIOGRAM:  No pulmonary embolism.    LUNGS/AIRWAYS/PLEURA: Patent trachea and bronchi. Clear lungs.   Unremarkable pleura.    LYMPH NODES/MEDIASTINUM: No enlarged lymph nodes.    HEART/VASCULATURE: Normal heart size. Unremarkable pericardium. Normal   caliber aorta.    UPPER ABDOMEN: Cholecystectomy.    BONES/SOFT TISSUES: Unremarkable.      IMPRESSION:    No pulmonary embolism.    < end of copied text >

## 2022-06-26 NOTE — PATIENT PROFILE ADULT - FALL HARM RISK - UNIVERSAL INTERVENTIONS
Bed in lowest position, wheels locked, appropriate side rails in place/Call bell, personal items and telephone in reach/Instruct patient to call for assistance before getting out of bed or chair/Non-slip footwear when patient is out of bed/[place] to call system/Physically safe environment - no spills, clutter or unnecessary equipment/Purposeful Proactive Rounding/Room/bathroom lighting operational, light cord in reach

## 2022-06-26 NOTE — PROGRESS NOTE ADULT - PROBLEM SELECTOR PLAN 1
pt with cough, SOB, chest tightness x 2 weeks c/w asthma exacerbation in setting of infections with parainfluenza and entero/rhinovirus. CXR and CTA reviewed - no consolidation, no PE.   increase solumedrol to 40mg Q8, change duonebs to Q4 ATC  c/w Symbicort   check peak flow  house pulm if no improvement (follows with Alyce Alvares outpt)  severe chest and back pain with coughing -Tessalon perle TID, hycodan prn cough   tylenol prn, lidocaine patch, oxycodone prn for now

## 2022-06-26 NOTE — H&P ADULT - NSHPREVIEWOFSYSTEMS_GEN_ALL_CORE
REVIEW OF SYSTEMS:    CONSTITUTIONAL: No fevers, chills  EYES/ENT: No visual changes;  + throat pain   NECK: No pain or stiffness  RESPIRATORY: as per HPI  CARDIOVASCULAR: +chest pain, no palpitations, +LE swelling   GASTROINTESTINAL: no nausea, vomiting, no abdominal pain, no BRBPR  GENITOURINARY: no polyuria, no dysuria  NEUROLOGICAL: no numbness, no headaches, no confusion   MUSCULOSKELETAL: +back pain, no focal weakness   SKIN: No itching, burning, rashes, or lesions   PSYCH: no anxiety, depression  HEME: no gum bleeding, no bruising

## 2022-06-26 NOTE — H&P ADULT - PROBLEM SELECTOR PLAN 2
c/w steroids as above   c/w Mycophenolate 1500mg daily  c/w Plaquenil 400mg daily   c/w bactrim for ppx   can consider rheumatology consult if symptoms not improving (follows with Dr Neri)

## 2022-06-26 NOTE — H&P ADULT - PROBLEM SELECTOR PLAN 1
pt with cough, SOB, chest tightness x 2 weeks c/w asthma exacerbation in setting of infections with parainfluenza and entero/rhinovirus. CXR and CTA reviewed - no e/o PNA, no PE.   - will start on solu-medrol 20mg IVP q8h for now, titrate to PO as tolerated - monitor CBC/CMP while on IV steroids  - duonebs q6h standing   - start on Symbicort and tiotropium for now   - monitor peak expiratory flow  - consider pulmonology consult (follows with Alyce Alvares)  - tessalon perle prn  - has severe chest and back pain with coughing, no acute pathology on CT - pain control with Tylenol, lidocaine patch; oxycodone 5mg q6h prn

## 2022-06-27 ENCOUNTER — APPOINTMENT (OUTPATIENT)
Dept: INTERNAL MEDICINE | Facility: CLINIC | Age: 47
End: 2022-06-27

## 2022-06-27 ENCOUNTER — TRANSCRIPTION ENCOUNTER (OUTPATIENT)
Age: 47
End: 2022-06-27

## 2022-06-27 DIAGNOSIS — R73.9 HYPERGLYCEMIA, UNSPECIFIED: ICD-10-CM

## 2022-06-27 LAB
ANION GAP SERPL CALC-SCNC: 16 MMOL/L — SIGNIFICANT CHANGE UP (ref 5–17)
APPEARANCE UR: CLEAR — SIGNIFICANT CHANGE UP
BACTERIA # UR AUTO: NEGATIVE — SIGNIFICANT CHANGE UP
BILIRUB UR-MCNC: NEGATIVE — SIGNIFICANT CHANGE UP
BUN SERPL-MCNC: 9 MG/DL — SIGNIFICANT CHANGE UP (ref 7–23)
C3 SERPL-MCNC: 140 MG/DL — SIGNIFICANT CHANGE UP (ref 81–157)
C4 SERPL-MCNC: 31 MG/DL — SIGNIFICANT CHANGE UP (ref 13–39)
CALCIUM SERPL-MCNC: 9.8 MG/DL — SIGNIFICANT CHANGE UP (ref 8.4–10.5)
CHLORIDE SERPL-SCNC: 96 MMOL/L — SIGNIFICANT CHANGE UP (ref 96–108)
CO2 SERPL-SCNC: 23 MMOL/L — SIGNIFICANT CHANGE UP (ref 22–31)
COLOR SPEC: SIGNIFICANT CHANGE UP
CREAT ?TM UR-MCNC: 72 MG/DL — SIGNIFICANT CHANGE UP
CREAT SERPL-MCNC: 0.74 MG/DL — SIGNIFICANT CHANGE UP (ref 0.5–1.3)
DIFF PNL FLD: NEGATIVE — SIGNIFICANT CHANGE UP
EGFR: 113 ML/MIN/1.73M2 — SIGNIFICANT CHANGE UP
EPI CELLS # UR: 0 /HPF — SIGNIFICANT CHANGE UP
GLUCOSE BLDC GLUCOMTR-MCNC: 229 MG/DL — HIGH (ref 70–99)
GLUCOSE BLDC GLUCOMTR-MCNC: 278 MG/DL — HIGH (ref 70–99)
GLUCOSE BLDC GLUCOMTR-MCNC: 399 MG/DL — HIGH (ref 70–99)
GLUCOSE BLDC GLUCOMTR-MCNC: 416 MG/DL — HIGH (ref 70–99)
GLUCOSE SERPL-MCNC: 269 MG/DL — HIGH (ref 70–99)
GLUCOSE UR QL: ABNORMAL
HCT VFR BLD CALC: 39.3 % — SIGNIFICANT CHANGE UP (ref 39–50)
HGB BLD-MCNC: 13.3 G/DL — SIGNIFICANT CHANGE UP (ref 13–17)
KETONES UR-MCNC: NEGATIVE — SIGNIFICANT CHANGE UP
LEUKOCYTE ESTERASE UR-ACNC: NEGATIVE — SIGNIFICANT CHANGE UP
MCHC RBC-ENTMCNC: 30.2 PG — SIGNIFICANT CHANGE UP (ref 27–34)
MCHC RBC-ENTMCNC: 33.8 GM/DL — SIGNIFICANT CHANGE UP (ref 32–36)
MCV RBC AUTO: 89.3 FL — SIGNIFICANT CHANGE UP (ref 80–100)
NITRITE UR-MCNC: NEGATIVE — SIGNIFICANT CHANGE UP
NRBC # BLD: 0 /100 WBCS — SIGNIFICANT CHANGE UP (ref 0–0)
PH UR: 7 — SIGNIFICANT CHANGE UP (ref 5–8)
PLATELET # BLD AUTO: 297 K/UL — SIGNIFICANT CHANGE UP (ref 150–400)
POTASSIUM SERPL-MCNC: 4.1 MMOL/L — SIGNIFICANT CHANGE UP (ref 3.5–5.3)
POTASSIUM SERPL-SCNC: 4.1 MMOL/L — SIGNIFICANT CHANGE UP (ref 3.5–5.3)
PROT ?TM UR-MCNC: 10 MG/DL — SIGNIFICANT CHANGE UP (ref 0–12)
PROT UR-MCNC: SIGNIFICANT CHANGE UP
PROT/CREAT UR-RTO: 0.1 RATIO — SIGNIFICANT CHANGE UP (ref 0–0.2)
RBC # BLD: 4.4 M/UL — SIGNIFICANT CHANGE UP (ref 4.2–5.8)
RBC # FLD: 13.5 % — SIGNIFICANT CHANGE UP (ref 10.3–14.5)
RBC CASTS # UR COMP ASSIST: 1 /HPF — SIGNIFICANT CHANGE UP (ref 0–4)
SODIUM SERPL-SCNC: 135 MMOL/L — SIGNIFICANT CHANGE UP (ref 135–145)
SP GR SPEC: 1.01 — SIGNIFICANT CHANGE UP (ref 1.01–1.02)
UROBILINOGEN FLD QL: NEGATIVE — SIGNIFICANT CHANGE UP
WBC # BLD: 17.33 K/UL — HIGH (ref 3.8–10.5)
WBC # FLD AUTO: 17.33 K/UL — HIGH (ref 3.8–10.5)
WBC UR QL: 0 /HPF — SIGNIFICANT CHANGE UP (ref 0–5)

## 2022-06-27 PROCEDURE — 99221 1ST HOSP IP/OBS SF/LOW 40: CPT

## 2022-06-27 PROCEDURE — 99233 SBSQ HOSP IP/OBS HIGH 50: CPT

## 2022-06-27 RX ORDER — DEXTROSE 50 % IN WATER 50 %
15 SYRINGE (ML) INTRAVENOUS ONCE
Refills: 0 | Status: DISCONTINUED | OUTPATIENT
Start: 2022-06-27 | End: 2022-07-11

## 2022-06-27 RX ORDER — INSULIN LISPRO 100/ML
VIAL (ML) SUBCUTANEOUS
Refills: 0 | Status: DISCONTINUED | OUTPATIENT
Start: 2022-06-27 | End: 2022-07-11

## 2022-06-27 RX ORDER — INSULIN GLARGINE 100 [IU]/ML
5 INJECTION, SOLUTION SUBCUTANEOUS AT BEDTIME
Refills: 0 | Status: DISCONTINUED | OUTPATIENT
Start: 2022-06-27 | End: 2022-06-28

## 2022-06-27 RX ORDER — SODIUM CHLORIDE 9 MG/ML
1000 INJECTION, SOLUTION INTRAVENOUS
Refills: 0 | Status: DISCONTINUED | OUTPATIENT
Start: 2022-06-27 | End: 2022-07-11

## 2022-06-27 RX ORDER — DEXTROSE 50 % IN WATER 50 %
25 SYRINGE (ML) INTRAVENOUS ONCE
Refills: 0 | Status: DISCONTINUED | OUTPATIENT
Start: 2022-06-27 | End: 2022-07-11

## 2022-06-27 RX ORDER — CYCLOBENZAPRINE HYDROCHLORIDE 10 MG/1
5 TABLET, FILM COATED ORAL ONCE
Refills: 0 | Status: COMPLETED | OUTPATIENT
Start: 2022-06-27 | End: 2022-06-27

## 2022-06-27 RX ORDER — BUDESONIDE AND FORMOTEROL FUMARATE DIHYDRATE 160; 4.5 UG/1; UG/1
2 AEROSOL RESPIRATORY (INHALATION)
Refills: 0 | Status: DISCONTINUED | OUTPATIENT
Start: 2022-06-27 | End: 2022-07-11

## 2022-06-27 RX ORDER — CYCLOBENZAPRINE HYDROCHLORIDE 10 MG/1
5 TABLET, FILM COATED ORAL THREE TIMES A DAY
Refills: 0 | Status: DISCONTINUED | OUTPATIENT
Start: 2022-06-27 | End: 2022-07-04

## 2022-06-27 RX ORDER — DEXTROSE 50 % IN WATER 50 %
12.5 SYRINGE (ML) INTRAVENOUS ONCE
Refills: 0 | Status: DISCONTINUED | OUTPATIENT
Start: 2022-06-27 | End: 2022-07-11

## 2022-06-27 RX ORDER — INSULIN LISPRO 100/ML
VIAL (ML) SUBCUTANEOUS AT BEDTIME
Refills: 0 | Status: DISCONTINUED | OUTPATIENT
Start: 2022-06-27 | End: 2022-07-11

## 2022-06-27 RX ORDER — GLUCAGON INJECTION, SOLUTION 0.5 MG/.1ML
1 INJECTION, SOLUTION SUBCUTANEOUS ONCE
Refills: 0 | Status: DISCONTINUED | OUTPATIENT
Start: 2022-06-27 | End: 2022-07-11

## 2022-06-27 RX ADMIN — Medication 100 MILLIGRAM(S): at 06:13

## 2022-06-27 RX ADMIN — Medication 2: at 17:24

## 2022-06-27 RX ADMIN — MYCOPHENOLATE MOFETIL 1500 MILLIGRAM(S): 250 CAPSULE ORAL at 11:15

## 2022-06-27 RX ADMIN — Medication 400 MILLIGRAM(S): at 11:15

## 2022-06-27 RX ADMIN — Medication 60 MILLIGRAM(S): at 11:15

## 2022-06-27 RX ADMIN — Medication 40 MILLIGRAM(S): at 13:02

## 2022-06-27 RX ADMIN — Medication 3 MILLILITER(S): at 06:13

## 2022-06-27 RX ADMIN — Medication 3 MILLILITER(S): at 15:19

## 2022-06-27 RX ADMIN — OXYCODONE HYDROCHLORIDE 5 MILLIGRAM(S): 5 TABLET ORAL at 11:15

## 2022-06-27 RX ADMIN — ENOXAPARIN SODIUM 40 MILLIGRAM(S): 100 INJECTION SUBCUTANEOUS at 06:13

## 2022-06-27 RX ADMIN — Medication 8: at 22:11

## 2022-06-27 RX ADMIN — INSULIN GLARGINE 5 UNIT(S): 100 INJECTION, SOLUTION SUBCUTANEOUS at 22:11

## 2022-06-27 RX ADMIN — Medication 40 MILLIGRAM(S): at 21:10

## 2022-06-27 RX ADMIN — CYCLOBENZAPRINE HYDROCHLORIDE 5 MILLIGRAM(S): 10 TABLET, FILM COATED ORAL at 21:10

## 2022-06-27 RX ADMIN — Medication 40 MILLIGRAM(S): at 06:21

## 2022-06-27 RX ADMIN — Medication 3 MILLILITER(S): at 21:10

## 2022-06-27 RX ADMIN — LOSARTAN POTASSIUM 50 MILLIGRAM(S): 100 TABLET, FILM COATED ORAL at 06:13

## 2022-06-27 RX ADMIN — LIDOCAINE 1 PATCH: 4 CREAM TOPICAL at 22:27

## 2022-06-27 RX ADMIN — OXYCODONE HYDROCHLORIDE 5 MILLIGRAM(S): 5 TABLET ORAL at 23:41

## 2022-06-27 RX ADMIN — Medication 100 MILLIGRAM(S): at 21:10

## 2022-06-27 RX ADMIN — OXYCODONE HYDROCHLORIDE 5 MILLIGRAM(S): 5 TABLET ORAL at 12:27

## 2022-06-27 RX ADMIN — Medication 100 MILLIGRAM(S): at 13:02

## 2022-06-27 RX ADMIN — Medication 3: at 13:30

## 2022-06-27 RX ADMIN — Medication 81 MILLIGRAM(S): at 11:14

## 2022-06-27 RX ADMIN — ENOXAPARIN SODIUM 40 MILLIGRAM(S): 100 INJECTION SUBCUTANEOUS at 17:19

## 2022-06-27 RX ADMIN — PANTOPRAZOLE SODIUM 40 MILLIGRAM(S): 20 TABLET, DELAYED RELEASE ORAL at 06:13

## 2022-06-27 RX ADMIN — Medication 3 MILLILITER(S): at 17:19

## 2022-06-27 RX ADMIN — LIDOCAINE 1 PATCH: 4 CREAM TOPICAL at 13:01

## 2022-06-27 RX ADMIN — Medication 3 MILLILITER(S): at 11:48

## 2022-06-27 RX ADMIN — Medication 125 MICROGRAM(S): at 06:13

## 2022-06-27 RX ADMIN — BUDESONIDE AND FORMOTEROL FUMARATE DIHYDRATE 2 PUFF(S): 160; 4.5 AEROSOL RESPIRATORY (INHALATION) at 17:18

## 2022-06-27 RX ADMIN — OXYCODONE HYDROCHLORIDE 5 MILLIGRAM(S): 5 TABLET ORAL at 22:11

## 2022-06-27 NOTE — PROGRESS NOTE ADULT - PROBLEM SELECTOR PLAN 4
BP stable, near goal   c/w home medications  - losartan 50mg daily  - c/w lasix 60mg daily (for chronic LE swelling) - improved

## 2022-06-27 NOTE — PROGRESS NOTE ADULT - PROBLEM SELECTOR PLAN 3
c/w steroids as above   c/w Mycophenolate 1500mg daily  c/w Plaquenil 400mg daily   c/w bactrim for ppx   check dsDNA, c3, c4   Rheumatology consult called (follows with Dr Neri)

## 2022-06-27 NOTE — CONSULT NOTE ADULT - ATTENDING COMMENTS
hospital course reviewed   agree with above  trial of flexeril   pt is aware and in agreement with our impression and plans

## 2022-06-27 NOTE — CONSULT NOTE ADULT - SUBJECTIVE AND OBJECTIVE BOX
INCOMPLETE NOTE    HISTORY OF PRESENT ILLNESS:    Autoimmune Hx  The patient was diagnosed with SLE in 2019.   Antibody Profile: OWEN 1:2560, + SM, + RNP, + LA, Low complement,. The patient has no history of nephritis. Symptoms currently experiencing include sicca, rash, fatigue, arthritis, arthralgias and sun sensitivity, but no headaches, no chest pain and no shortness of breath. muscle cramps improved but weakness persists with normal CK and MRI sin muscle edema    + U1rnp and weak positive PM/Sc100  5-19-21: MRI right thigh - no muscle edema moderate sized right knee joint effusion   5-19-21: MRI Lumbar spine - diffuse spinal canal narrowing and mild disc bulge with mod spinal cnala stenosis    Meds: MMF 6 pills daily, HCQ 200mg daily, Benlysta IV 1300mg monthly -> Saphneo, medrol 24mg daily      PAST MEDICAL & SURGICAL HISTORY:  Lupus      Asthma      Hypothyroid      History of cholecystectomy          REVIEW OF SYSTEMS      General:	    Skin/Breast:  	  Ophthalmologic:  	  ENMT:	    Respiratory and Thorax:  	  Cardiovascular:	    Gastrointestinal:	    Genitourinary:	    Musculoskeletal:	    Neurological:	    Psychiatric:	    Hematology/Lymphatics:	    Endocrine:	    Allergic/Immunologic:	    MEDICATIONS  (STANDING):  albuterol/ipratropium for Nebulization 3 milliLiter(s) Nebulizer every 4 hours  aspirin enteric coated 81 milliGRAM(s) Oral daily  benzonatate 100 milliGRAM(s) Oral three times a day  dextrose 5%. 1000 milliLiter(s) (50 mL/Hr) IV Continuous <Continuous>  dextrose 5%. 1000 milliLiter(s) (100 mL/Hr) IV Continuous <Continuous>  dextrose 50% Injectable 25 Gram(s) IV Push once  dextrose 50% Injectable 12.5 Gram(s) IV Push once  dextrose 50% Injectable 25 Gram(s) IV Push once  enoxaparin Injectable 40 milliGRAM(s) SubCutaneous every 12 hours  furosemide    Tablet 60 milliGRAM(s) Oral daily  glucagon  Injectable 1 milliGRAM(s) IntraMuscular once  hydroxychloroquine 400 milliGRAM(s) Oral daily  insulin lispro (ADMELOG) corrective regimen sliding scale   SubCutaneous three times a day before meals  levothyroxine 125 MICROGram(s) Oral daily  lidocaine   4% Patch 1 Patch Transdermal daily  losartan 50 milliGRAM(s) Oral daily  methylPREDNISolone sodium succinate Injectable 40 milliGRAM(s) IV Push every 8 hours  mycophenolate mofetil 1500 milliGRAM(s) Oral daily  pantoprazole    Tablet 40 milliGRAM(s) Oral before breakfast  trimethoprim  160 mG/sulfamethoxazole 800 mG 1 Tablet(s) Oral <User Schedule>    MEDICATIONS  (PRN):  acetaminophen     Tablet .. 650 milliGRAM(s) Oral every 6 hours PRN Temp greater or equal to 38C (100.4F), Mild Pain (1 - 3)  aluminum hydroxide/magnesium hydroxide/simethicone Suspension 30 milliLiter(s) Oral every 4 hours PRN Dyspepsia  dextrose Oral Gel 15 Gram(s) Oral once PRN Blood Glucose LESS THAN 70 milliGRAM(s)/deciliter  hydrocodone/homatropine Syrup 5 milliLiter(s) Oral every 6 hours PRN Cough  melatonin 3 milliGRAM(s) Oral at bedtime PRN Insomnia  ondansetron Injectable 4 milliGRAM(s) IV Push every 8 hours PRN Nausea and/or Vomiting  oxyCODONE    IR 5 milliGRAM(s) Oral every 6 hours PRN moderate and severe pain      Allergies    No Known Allergies    Intolerances        PERTINENT MEDICATION HISTORY:    SOCIAL HISTORY:    FAMILY HISTORY:  No pertinent family history in first degree relatives        Vital Signs Last 24 Hrs  T(C): 36.3 (27 Jun 2022 12:50), Max: 37 (26 Jun 2022 21:00)  T(F): 97.3 (27 Jun 2022 12:50), Max: 98.6 (26 Jun 2022 21:00)  HR: 98 (27 Jun 2022 12:50) (83 - 115)  BP: 126/88 (27 Jun 2022 12:50) (118/72 - 132/78)  BP(mean): --  RR: 19 (27 Jun 2022 12:50) (19 - 20)  SpO2: 96% (27 Jun 2022 12:50) (94% - 96%)    Daily     Daily     PHYSICAL EXAM:      Constitutional:    Eyes:    ENMT:    Neck:    Breasts:    Back:    Respiratory:    Cardiovascular:    Gastrointestinal:    Genitourinary:    Rectal:    Extremities:    Vascular:    Neurological:    Skin:    Lymph Nodes:    Musculoskeletal:    Psychiatric:        LABS:                        13.3   17.33 )-----------( 297      ( 27 Jun 2022 06:22 )             39.3     06-27    135  |  96  |  9   ----------------------------<  269<H>  4.1   |  23  |  0.74    Ca    9.8      27 Jun 2022 06:22  Phos  2.7     06-26  Mg     2.2     06-26    TPro  7.0  /  Alb  4.0  /  TBili  0.4  /  DBili  x   /  AST  29  /  ALT  54<H>  /  AlkPhos  83  06-25          RADIOLOGY & ADDITIONAL STUDIES: HISTORY OF PRESENT ILLNESS: 47M with PMH of SLE on steroids, asthma, hypothyroidism, HTN p/w cough and SOB. Symptoms of cough and SOB started approx 2 weeks ago; was in [facility] ED on 6/14 for these symptoms, diagnosed with asthma exacerbation and discharged after steroids and nebulizers Pt states since that time, his symptoms have been getting progressively worse. Has ongoing paroxysms of productive cough, with associated severe chest pain and back pain; endorses SOB with minimal exertion and +wheezing. Has been using home nebulizers every 4-6 hours without improvement in symptoms. Pt is chronically on solumedrol 16mg daily for SLE; he saw his pulmonologist on 6/24 and had steroids changed to prednisone 40mg. Also had CT angio performed which showed no PE. Pt comes to ED today as symptoms have not improved and the chest and back pain when he coughs is getting worse. Denies fevers, chills, nausea/vomiting, abd pain, on urinary symptoms; +b/l LE swelling (chronic).     Autoimmune Hx  The patient was diagnosed with SLE in 2019.   Antibody Profile: OWEN 1:2560, + SM, + RNP, + LA, Low complement,. The patient has no history of nephritis. Symptoms currently experiencing include sicca, rash, fatigue, arthritis, arthralgias and sun sensitivity, but no headaches, no chest pain and no shortness of breath. muscle cramps improved but weakness persists with normal CK and MRI sin muscle edema    + U1rnp and weak positive PM/Sc100  5-19-21: MRI right thigh - no muscle edema moderate sized right knee joint effusion   5-19-21: MRI Lumbar spine - diffuse spinal canal narrowing and mild disc bulge with mod spinal cnala stenosis    Meds: MMF 3 pills daily, HCQ 200mg daily, Benlysta IV 1300mg monthly -> Saphneo first infusion ~1 month ago, medrol 24mg daily    Patient seen and examined at bedside. Patient reports improvement in hand and foot pain and swelling with higher doses of steroids. Patient reports chest and back pleuritic-type pain with coughing. Feels electric-like pain with coughing. Has some wheezing and SOB. Denies fevers, chills, night sweats, eye pain, eye redness, vision changes, rash, ulcers, abdominal pain, n/v, changes in urinary freq, dysuria, hematuria. Reports stable foamy urine. Denies muscle weakness or numbness.    PAST MEDICAL & SURGICAL HISTORY:  Lupus      Asthma      Hypothyroid      History of cholecystectomy          REVIEW OF SYSTEMS    as per HPI    MEDICATIONS  (STANDING):  albuterol/ipratropium for Nebulization 3 milliLiter(s) Nebulizer every 4 hours  aspirin enteric coated 81 milliGRAM(s) Oral daily  benzonatate 100 milliGRAM(s) Oral three times a day  dextrose 5%. 1000 milliLiter(s) (50 mL/Hr) IV Continuous <Continuous>  dextrose 5%. 1000 milliLiter(s) (100 mL/Hr) IV Continuous <Continuous>  dextrose 50% Injectable 25 Gram(s) IV Push once  dextrose 50% Injectable 12.5 Gram(s) IV Push once  dextrose 50% Injectable 25 Gram(s) IV Push once  enoxaparin Injectable 40 milliGRAM(s) SubCutaneous every 12 hours  furosemide    Tablet 60 milliGRAM(s) Oral daily  glucagon  Injectable 1 milliGRAM(s) IntraMuscular once  hydroxychloroquine 400 milliGRAM(s) Oral daily  insulin lispro (ADMELOG) corrective regimen sliding scale   SubCutaneous three times a day before meals  levothyroxine 125 MICROGram(s) Oral daily  lidocaine   4% Patch 1 Patch Transdermal daily  losartan 50 milliGRAM(s) Oral daily  methylPREDNISolone sodium succinate Injectable 40 milliGRAM(s) IV Push every 8 hours  mycophenolate mofetil 1500 milliGRAM(s) Oral daily  pantoprazole    Tablet 40 milliGRAM(s) Oral before breakfast  trimethoprim  160 mG/sulfamethoxazole 800 mG 1 Tablet(s) Oral <User Schedule>    MEDICATIONS  (PRN):  acetaminophen     Tablet .. 650 milliGRAM(s) Oral every 6 hours PRN Temp greater or equal to 38C (100.4F), Mild Pain (1 - 3)  aluminum hydroxide/magnesium hydroxide/simethicone Suspension 30 milliLiter(s) Oral every 4 hours PRN Dyspepsia  dextrose Oral Gel 15 Gram(s) Oral once PRN Blood Glucose LESS THAN 70 milliGRAM(s)/deciliter  hydrocodone/homatropine Syrup 5 milliLiter(s) Oral every 6 hours PRN Cough  melatonin 3 milliGRAM(s) Oral at bedtime PRN Insomnia  ondansetron Injectable 4 milliGRAM(s) IV Push every 8 hours PRN Nausea and/or Vomiting  oxyCODONE    IR 5 milliGRAM(s) Oral every 6 hours PRN moderate and severe pain      Allergies    No Known Allergies    Intolerances        PERTINENT MEDICATION HISTORY:    SOCIAL HISTORY:    FAMILY HISTORY:  No pertinent family history in first degree relatives        Vital Signs Last 24 Hrs  T(C): 36.3 (27 Jun 2022 12:50), Max: 37 (26 Jun 2022 21:00)  T(F): 97.3 (27 Jun 2022 12:50), Max: 98.6 (26 Jun 2022 21:00)  HR: 98 (27 Jun 2022 12:50) (83 - 115)  BP: 126/88 (27 Jun 2022 12:50) (118/72 - 132/78)  BP(mean): --  RR: 19 (27 Jun 2022 12:50) (19 - 20)  SpO2: 96% (27 Jun 2022 12:50) (94% - 96%)    Daily     Daily     PHYSICAL EXAM:    Constitutional: WDWN resting comfortably in bed; NAD  Head: NC/AT  Eyes: clear conjunctiva  ENT: no nasal discharge; uvula midline, no oropharyngeal erythema or exudates; MMM. No ulcers. No thrush appreciated  Neck: supple  Respiratory: b/l wheezing upper lung fields  Cardiac: +S1/S2; RRR  Gastrointestinal: soft, NT/ND; no rebound or guarding; +BSx4  Back: spine midline, no bony tenderness or step-offs; no CVAT B/L. Tenderness to palpation paraspinal muscles b/l thoracic area. Board-like texture to trapezius muscle  Extremities: WWP, no clubbing or cyanosis; no peripheral edema  Musculoskeletal: No synovitis. Tenderness to palpation over anterior chest wall b/l  Dermatologic: No rash  Lymphatic: no submandibular or cervical LAD  Neurologic: Muscle strength 5/5 throughout. Sensation intact b/l       LABS:                        13.3   17.33 )-----------( 297      ( 27 Jun 2022 06:22 )             39.3     06-27    135  |  96  |  9   ----------------------------<  269<H>  4.1   |  23  |  0.74    Ca    9.8      27 Jun 2022 06:22  Phos  2.7     06-26  Mg     2.2     06-26    TPro  7.0  /  Alb  4.0  /  TBili  0.4  /  DBili  x   /  AST  29  /  ALT  54<H>  /  AlkPhos  83  06-25          RADIOLOGY & ADDITIONAL STUDIES:  < from: CT Angio Chest PE Protocol w/ IV Cont (06.24.22 @ 17:20) >    IMPRESSION:    No pulmonary embolism.    < end of copied text >

## 2022-06-27 NOTE — PROGRESS NOTE ADULT - PROBLEM SELECTOR PLAN 1
pt with cough, SOB, chest tightness x 2 weeks c/w asthma exacerbation in setting of infections with parainfluenza and entero/rhinovirus. CXR and CTA reviewed - no consolidation, no PE.   c/w solumedrol to 40mg Q8, change duonebs to Q4 ATC  c/w Symbicort   check peak flow  house pulm consult called (follows with Dr Gonzales outpt)  severe chest and back pain with coughing -Tessalon perle TID, hycodan prn cough   tylenol prn, lidocaine patch, oxycodone prn for now

## 2022-06-27 NOTE — PROGRESS NOTE ADULT - SUBJECTIVE AND OBJECTIVE BOX
[facility] Division of Hospital Medicine  Natalie Fernandez DO  8a-5pm: [phone]  after 5pm call [phone]    Patient is a 47y old  Male who presents with a chief complaint of cough, SOB (27 Jun 2022 14:40)        SUBJECTIVE / OVERNIGHT EVENTS: c/o persistent cough with fits at times - feels he is overall unchanged. he does feel like the hycodan prn does help a little.       MEDICATIONS  (STANDING):  albuterol/ipratropium for Nebulization 3 milliLiter(s) Nebulizer every 4 hours  aspirin enteric coated 81 milliGRAM(s) Oral daily  benzonatate 100 milliGRAM(s) Oral three times a day  dextrose 5%. 1000 milliLiter(s) (50 mL/Hr) IV Continuous <Continuous>  dextrose 5%. 1000 milliLiter(s) (100 mL/Hr) IV Continuous <Continuous>  dextrose 50% Injectable 25 Gram(s) IV Push once  dextrose 50% Injectable 12.5 Gram(s) IV Push once  dextrose 50% Injectable 25 Gram(s) IV Push once  enoxaparin Injectable 40 milliGRAM(s) SubCutaneous every 12 hours  furosemide    Tablet 60 milliGRAM(s) Oral daily  glucagon  Injectable 1 milliGRAM(s) IntraMuscular once  hydroxychloroquine 400 milliGRAM(s) Oral daily  insulin lispro (ADMELOG) corrective regimen sliding scale   SubCutaneous three times a day before meals  levothyroxine 125 MICROGram(s) Oral daily  lidocaine   4% Patch 1 Patch Transdermal daily  losartan 50 milliGRAM(s) Oral daily  methylPREDNISolone sodium succinate Injectable 40 milliGRAM(s) IV Push every 8 hours  mycophenolate mofetil 1500 milliGRAM(s) Oral daily  pantoprazole    Tablet 40 milliGRAM(s) Oral before breakfast  trimethoprim  160 mG/sulfamethoxazole 800 mG 1 Tablet(s) Oral <User Schedule>    MEDICATIONS  (PRN):  acetaminophen     Tablet .. 650 milliGRAM(s) Oral every 6 hours PRN Temp greater or equal to 38C (100.4F), Mild Pain (1 - 3)  aluminum hydroxide/magnesium hydroxide/simethicone Suspension 30 milliLiter(s) Oral every 4 hours PRN Dyspepsia  dextrose Oral Gel 15 Gram(s) Oral once PRN Blood Glucose LESS THAN 70 milliGRAM(s)/deciliter  hydrocodone/homatropine Syrup 5 milliLiter(s) Oral every 6 hours PRN Cough  melatonin 3 milliGRAM(s) Oral at bedtime PRN Insomnia  ondansetron Injectable 4 milliGRAM(s) IV Push every 8 hours PRN Nausea and/or Vomiting  oxyCODONE    IR 5 milliGRAM(s) Oral every 6 hours PRN moderate and severe pain      Vital Signs Last 24 Hrs  T(C): 36.3 (27 Jun 2022 12:50), Max: 37 (26 Jun 2022 21:00)  T(F): 97.3 (27 Jun 2022 12:50), Max: 98.6 (26 Jun 2022 21:00)  HR: 98 (27 Jun 2022 12:50) (83 - 115)  BP: 126/88 (27 Jun 2022 12:50) (118/72 - 132/78)  BP(mean): --  RR: 19 (27 Jun 2022 12:50) (19 - 20)  SpO2: 96% (27 Jun 2022 12:50) (94% - 96%)  CAPILLARY BLOOD GLUCOSE      POCT Blood Glucose.: 278 mg/dL (27 Jun 2022 12:34)    I&O's Summary    26 Jun 2022 07:01  -  27 Jun 2022 07:00  --------------------------------------------------------  IN: 480 mL / OUT: 600 mL / NET: -120 mL    27 Jun 2022 07:01  -  27 Jun 2022 15:08  --------------------------------------------------------  IN: 240 mL / OUT: 0 mL / NET: 240 mL        PHYSICAL EXAM:  GENERAL: NAD, breathing normal - but starts coughing with deep inspiration  HEAD:  Atraumatic, Normocephalic  EYES: conjunctiva and sclera clear  NECK: supple, No JVD  CHEST/LUNG: no wheezing appreciated but does have decrease air entry  HEART: S1 S2 RRR  ABDOMEN: +BS Soft, NT/ND  EXTREMITIES:  2+ DP Pulses, No c/c. trace b/l LE edema  NEUROLOGY: AAOx3, no facial droop, moving all extremities  SKIN: No rashes or lesions    LABS:                        13.3   17.33 )-----------( 297      ( 27 Jun 2022 06:22 )             39.3     06-27    135  |  96  |  9   ----------------------------<  269<H>  4.1   |  23  |  0.74    Ca    9.8      27 Jun 2022 06:22  Phos  2.7     06-26  Mg     2.2     06-26    TPro  7.0  /  Alb  4.0  /  TBili  0.4  /  DBili  x   /  AST  29  /  ALT  54<H>  /  AlkPhos  83  06-25              RADIOLOGY & ADDITIONAL TESTS:    Imaging Personally Reviewed:  Consultant(s) Notes Reviewed:    Care Discussed with Consultants/Other Providers:

## 2022-06-27 NOTE — CONSULT NOTE ADULT - SUBJECTIVE AND OBJECTIVE BOX
[facility] - Division of Pulmonary, Critical Care and Sleep Medicine   Please call [phone] between 8am-pm weekdays, [phone] after hours and weekends      CHIEF COMPLAINT:    HPI:  47M with PMH of SLE on chronic steroids and Salphnelo, mild/moderate asthma, hypothyroidism, COVID infection in Jan 2021, HTN p/w cough and SOB x2 weeks. Presented to [facility] ED on 6/14 for these symptoms, diagnosed with asthma exacerbation and discharged with steroids and nebulizers. His symptoms have been getting progressively worse. Has ongoing paroxysms of productive cough, with associated chest and back pain, WAITE and wheezing. Has been using home nebulizers every 4-6 hours without improvement in symptoms. Pt is chronically on solumedrol 16mg daily for SLE, was seen by Dr. Messer on 6/24 and steroids were changed to prednisone 40mg. CT angio performed which did not show PE or other acute change.   RVP positive parainfluenza and entero/rhinovirus. Being treated with Solumedrol 40 Q 8 hours, Tessalon perles, Hycodan every 6 hours  Not hypoxic, initial VBG WNL.   CTPA negative for PE, effusion or pneumonia.     Home meds: Trelegy, Methylprednisolone 16 mg, Duonebs Q6 hours    PAST MEDICAL & SURGICAL HISTORY:  Lupus      Asthma      Hypothyroid      History of cholecystectomy          FAMILY HISTORY:  No pertinent family history in first degree relatives        SOCIAL HISTORY:  Smoking: [ ] Never Smoked [ ] Former Smoker (__ packs x ___ years) [ ] Current Smoker  (__ packs x ___ years)  Substance Use: [ ] Never Used [ ] Used ____  EtOH Use:  Marital Status: [ ] Single [ ]  [ ]  [ ]   Occupation:  Recent Travel:  Country of Birth:  Advance Directives:    Allergies    No Known Allergies    Intolerances        HOME MEDICATIONS:    REVIEW OF SYSTEMS:  Constitutional: [ ] fevers [ ] chills [ ] weight loss [ ] weight gain  HEENT: [ ] dry eyes [ ] eye irritation [ ] postnasal drip [ ] nasal congestion  CV: [ ] chest pain [ ] orthopnea [ ] palpitations [ ] murmur  Resp: [ ] cough [ ] shortness of breath [ ] wheezing [ ] sputum [ ] hemoptysis  GI: [ ] nausea [ ] vomiting [ ] diarrhea [ ] constipation [ ] abd pain [ ] dysphagia   : [ ] dysuria [ ] nocturia [ ] hematuria [ ] increased urinary frequency  Musculoskeletal: [ ] myalgias [ ] arthralgias   Skin: [ ] rash [ ] itch  Neurological: [ ] headache [ ] dizziness [ ] syncope [ ] weakness [ ] numbness  Psychiatric: [ ] anxiety [ ] depression  Endocrine: [ ] diabetes [ ] thyroid problem  Hematologic/Lymphatic: [ ] anemia [ ] bleeding problem  Allergic/Immunologic: [ ] itchy eyes [ ] nasal discharge [ ] hives [ ] angioedema  [ ] All other systems negative  [ ] Unable to assess ROS because ________    OBJECTIVE:  ICU Vital Signs Last 24 Hrs  T(C): 36.3 (27 Jun 2022 12:50), Max: 37 (26 Jun 2022 21:00)  T(F): 97.3 (27 Jun 2022 12:50), Max: 98.6 (26 Jun 2022 21:00)  HR: 98 (27 Jun 2022 12:50) (83 - 115)  BP: 126/88 (27 Jun 2022 12:50) (118/72 - 132/78)  BP(mean): --  ABP: --  ABP(mean): --  RR: 19 (27 Jun 2022 12:50) (19 - 20)  SpO2: 96% (27 Jun 2022 12:50) (94% - 96%)        06-26 @ 07:01 - 06-27 @ 07:00  --------------------------------------------------------  IN: 480 mL / OUT: 600 mL / NET: -120 mL    06-27 @ 07:01  - 06-27 @ 14:41  --------------------------------------------------------  IN: 240 mL / OUT: 0 mL / NET: 240 mL      CAPILLARY BLOOD GLUCOSE      POCT Blood Glucose.: 278 mg/dL (27 Jun 2022 12:34)      PHYSICAL EXAM:  General:  NAD  HEENT:  NC/AT  Lymph Nodes: No cervical or supraclavicular lymphadenopathy   Neck: Supple  Respiratory:  CTA B/L, no wheezes, crackles or rhonchi  Cardiovascular:  RRR, no m/r/g  Abdomen: soft, NT/ND, +BS  Extremities: no clubbing, cyanosis or edema, warm  Skin: no rash  Neurological: AAOx3, no focal deficits  Psychiatry: not anxious, normal affect    HOSPITAL MEDICATIONS:  MEDICATIONS  (STANDING):  albuterol/ipratropium for Nebulization 3 milliLiter(s) Nebulizer every 4 hours  aspirin enteric coated 81 milliGRAM(s) Oral daily  benzonatate 100 milliGRAM(s) Oral three times a day  dextrose 5%. 1000 milliLiter(s) (50 mL/Hr) IV Continuous <Continuous>  dextrose 5%. 1000 milliLiter(s) (100 mL/Hr) IV Continuous <Continuous>  dextrose 50% Injectable 25 Gram(s) IV Push once  dextrose 50% Injectable 12.5 Gram(s) IV Push once  dextrose 50% Injectable 25 Gram(s) IV Push once  enoxaparin Injectable 40 milliGRAM(s) SubCutaneous every 12 hours  furosemide    Tablet 60 milliGRAM(s) Oral daily  glucagon  Injectable 1 milliGRAM(s) IntraMuscular once  hydroxychloroquine 400 milliGRAM(s) Oral daily  insulin lispro (ADMELOG) corrective regimen sliding scale   SubCutaneous three times a day before meals  levothyroxine 125 MICROGram(s) Oral daily  lidocaine   4% Patch 1 Patch Transdermal daily  losartan 50 milliGRAM(s) Oral daily  methylPREDNISolone sodium succinate Injectable 40 milliGRAM(s) IV Push every 8 hours  mycophenolate mofetil 1500 milliGRAM(s) Oral daily  pantoprazole    Tablet 40 milliGRAM(s) Oral before breakfast  trimethoprim  160 mG/sulfamethoxazole 800 mG 1 Tablet(s) Oral <User Schedule>    MEDICATIONS  (PRN):  acetaminophen     Tablet .. 650 milliGRAM(s) Oral every 6 hours PRN Temp greater or equal to 38C (100.4F), Mild Pain (1 - 3)  aluminum hydroxide/magnesium hydroxide/simethicone Suspension 30 milliLiter(s) Oral every 4 hours PRN Dyspepsia  dextrose Oral Gel 15 Gram(s) Oral once PRN Blood Glucose LESS THAN 70 milliGRAM(s)/deciliter  hydrocodone/homatropine Syrup 5 milliLiter(s) Oral every 6 hours PRN Cough  melatonin 3 milliGRAM(s) Oral at bedtime PRN Insomnia  ondansetron Injectable 4 milliGRAM(s) IV Push every 8 hours PRN Nausea and/or Vomiting  oxyCODONE    IR 5 milliGRAM(s) Oral every 6 hours PRN moderate and severe pain      LABS:                        13.3   17.33 )-----------( 297      ( 27 Jun 2022 06:22 )             39.3     Hgb Trend: 13.3<--, 13.6<--, 13.9<--  06-27    135  |  96  |  9   ----------------------------<  269<H>  4.1   |  23  |  0.74    Ca    9.8      27 Jun 2022 06:22  Phos  2.7     06-26  Mg     2.2     06-26    TPro  7.0  /  Alb  4.0  /  TBili  0.4  /  DBili  x   /  AST  29  /  ALT  54<H>  /  AlkPhos  83  06-25    Creatinine Trend: 0.74<--, 0.77<--, 0.83<--, 0.81<--        Venous Blood Gas:  06-25 @ 19:40  7.38/52/33/31/45.3  VBG Lactate: 2.5      MICROBIOLOGY:     RADIOLOGY:  [x ] Reviewed and interpreted by me  [id number] [facility]  - ORDERED BY: BEST MESSER      PROCEDURE DATE:  06/24/2022           INTERPRETATION:  INDICATION: Chest pain    TECHNIQUE: Volumetric images of the chest were obtained after the   administration of 90 mL of Omnipaque 350. Maximum intensity projection   images were generated.    COMPARISON: 1/26/2022.    FINDINGS:    PULMONARY ANGIOGRAM:  No pulmonary embolism.    LUNGS/AIRWAYS/PLEURA: Patent trachea and bronchi. Clear lungs.   Unremarkable pleura.    LYMPH NODES/MEDIASTINUM: No enlarged lymph nodes.    HEART/VASCULATURE: Normal heart size. Unremarkable pericardium. Normal   caliber aorta.    UPPER ABDOMEN: Cholecystectomy.    BONES/SOFT TISSUES: Unremarkable.      IMPRESSION:    No pulmonary embolism.    --- End of Report ---    CXR 6/25/22 - clear lungs    PULMONARY FUNCTION TESTS:     EKG:     [facility] - Division of Pulmonary, Critical Care and Sleep Medicine   Please call [phone] between 8am-pm weekdays, [phone] after hours and weekends      CHIEF COMPLAINT: Cough, wheeze and dyspnea x2 weeks    HPI:  47M with PMH of SLE on chronic steroids and Salphnelo, mild/moderate asthma, hypothyroidism, COVID infection in Jan 2021, HTN p/w cough and SOB x2 weeks. Presented to [facility] ED on 6/14 for these symptoms, diagnosed with asthma exacerbation and discharged with steroids and nebulizers. His symptoms have been getting progressively worse. Has ongoing paroxysms of productive cough, with associated chest and back pain, WAITE and wheezing. Has been using home nebulizers every 4-6 hours without improvement in symptoms. Pt is chronically on solumedrol 16mg daily for SLE, was seen by Dr. Messer on 6/24 and steroids were changed to prednisone 40mg. CT angio performed which did not show PE or other acute change.   RVP positive parainfluenza and entero/rhinovirus. Being treated with Solumedrol 40 Q 8 hours, Tessalon perles, Hycodan every 6 hours  Not hypoxic, initial VBG WNL.   CTPA negative for PE, effusion or pneumonia.     Home meds: Trelegy, Methylprednisolone 16 mg, Duonebs Q6 hours    PAST MEDICAL & SURGICAL HISTORY:  Lupus      Asthma      Hypothyroid      History of cholecystectomy          FAMILY HISTORY:  No pertinent family history in first degree relatives        SOCIAL HISTORY:  Smoking: [x ] Never Smoked [ ] Former Smoker (__ packs x ___ years) [ ] Current Smoker  (__ packs x ___ years)  Substance Use: [x ] Never Used [ ] Used ____  EtOH Use: denies    Allergies  No Known Allergies    HOME MEDICATIONS: review from H&P    REVIEW OF SYSTEMS:  Constitutional: [ ] fevers [ ] chills [ ] weight loss [ ] weight gain  HEENT: [ ] dry eyes [ ] eye irritation [ ] postnasal drip [ ] nasal congestion  CV: [x ] chest pain [ ] orthopnea [ ] palpitations [ ] murmur  Resp: [x ] cough [ ] shortness of breath [x ] wheezing [ ] sputum [ ] hemoptysis  GI: [ ] nausea [ ] vomiting [ ] diarrhea [ ] constipation [ ] abd pain [ ] dysphagia   : [ ] dysuria [ ] nocturia [ ] hematuria [ ] increased urinary frequency  Musculoskeletal: [ ] myalgias [ ] arthralgias   Skin: [ ] rash [ ] itch  Neurological: [ ] headache [ ] dizziness [ ] syncope [ ] weakness [ ] numbness  Psychiatric: [ ] anxiety [ ] depression  Endocrine: [ ] diabetes [ ] thyroid problem  Hematologic/Lymphatic: [ ] anemia [ ] bleeding problem  Allergic/Immunologic: [ ] itchy eyes [ ] nasal discharge [ ] hives [ ] angioedema  [x ] All other systems negative  [ ] Unable to assess ROS because ________    OBJECTIVE:  ICU Vital Signs Last 24 Hrs  T(C): 36.3 (27 Jun 2022 12:50), Max: 37 (26 Jun 2022 21:00)  T(F): 97.3 (27 Jun 2022 12:50), Max: 98.6 (26 Jun 2022 21:00)  HR: 98 (27 Jun 2022 12:50) (83 - 115)  BP: 126/88 (27 Jun 2022 12:50) (118/72 - 132/78)  BP(mean): --  ABP: --  ABP(mean): --  RR: 19 (27 Jun 2022 12:50) (19 - 20)  SpO2: 96% (27 Jun 2022 12:50) (94% - 96%)        06-26 @ 07:01 - 06-27 @ 07:00  --------------------------------------------------------  IN: 480 mL / OUT: 600 mL / NET: -120 mL    06-27 @ 07:01  -  06-27 @ 14:41  --------------------------------------------------------  IN: 240 mL / OUT: 0 mL / NET: 240 mL      CAPILLARY BLOOD GLUCOSE      POCT Blood Glucose.: 278 mg/dL (27 Jun 2022 12:34)      PHYSICAL EXAM:  General:  NAD  HEENT:  NC/AT  Lymph Nodes: No cervical or supraclavicular lymphadenopathy   Neck: Supple  Respiratory:  CTA B/L, no wheezes, crackles or rhonchi  Cardiovascular:  RRR, no m/r/g  Abdomen: soft, NT/ND, +BS  Extremities: no clubbing, cyanosis or edema, warm  Skin: no rash  Neurological: AAOx3, no focal deficits  Psychiatry: not anxious, normal affect    HOSPITAL MEDICATIONS:  MEDICATIONS  (STANDING):  albuterol/ipratropium for Nebulization 3 milliLiter(s) Nebulizer every 4 hours  aspirin enteric coated 81 milliGRAM(s) Oral daily  benzonatate 100 milliGRAM(s) Oral three times a day  dextrose 5%. 1000 milliLiter(s) (50 mL/Hr) IV Continuous <Continuous>  dextrose 5%. 1000 milliLiter(s) (100 mL/Hr) IV Continuous <Continuous>  dextrose 50% Injectable 25 Gram(s) IV Push once  dextrose 50% Injectable 12.5 Gram(s) IV Push once  dextrose 50% Injectable 25 Gram(s) IV Push once  enoxaparin Injectable 40 milliGRAM(s) SubCutaneous every 12 hours  furosemide    Tablet 60 milliGRAM(s) Oral daily  glucagon  Injectable 1 milliGRAM(s) IntraMuscular once  hydroxychloroquine 400 milliGRAM(s) Oral daily  insulin lispro (ADMELOG) corrective regimen sliding scale   SubCutaneous three times a day before meals  levothyroxine 125 MICROGram(s) Oral daily  lidocaine   4% Patch 1 Patch Transdermal daily  losartan 50 milliGRAM(s) Oral daily  methylPREDNISolone sodium succinate Injectable 40 milliGRAM(s) IV Push every 8 hours  mycophenolate mofetil 1500 milliGRAM(s) Oral daily  pantoprazole    Tablet 40 milliGRAM(s) Oral before breakfast  trimethoprim  160 mG/sulfamethoxazole 800 mG 1 Tablet(s) Oral <User Schedule>    MEDICATIONS  (PRN):  acetaminophen     Tablet .. 650 milliGRAM(s) Oral every 6 hours PRN Temp greater or equal to 38C (100.4F), Mild Pain (1 - 3)  aluminum hydroxide/magnesium hydroxide/simethicone Suspension 30 milliLiter(s) Oral every 4 hours PRN Dyspepsia  dextrose Oral Gel 15 Gram(s) Oral once PRN Blood Glucose LESS THAN 70 milliGRAM(s)/deciliter  hydrocodone/homatropine Syrup 5 milliLiter(s) Oral every 6 hours PRN Cough  melatonin 3 milliGRAM(s) Oral at bedtime PRN Insomnia  ondansetron Injectable 4 milliGRAM(s) IV Push every 8 hours PRN Nausea and/or Vomiting  oxyCODONE    IR 5 milliGRAM(s) Oral every 6 hours PRN moderate and severe pain      LABS:                        13.3   17.33 )-----------( 297      ( 27 Jun 2022 06:22 )             39.3     Hgb Trend: 13.3<--, 13.6<--, 13.9<--  06-27    135  |  96  |  9   ----------------------------<  269<H>  4.1   |  23  |  0.74    Ca    9.8      27 Jun 2022 06:22  Phos  2.7     06-26  Mg     2.2     06-26    TPro  7.0  /  Alb  4.0  /  TBili  0.4  /  DBili  x   /  AST  29  /  ALT  54<H>  /  AlkPhos  83  06-25    Creatinine Trend: 0.74<--, 0.77<--, 0.83<--, 0.81<--        Venous Blood Gas:  06-25 @ 19:40  7.38/52/33/31/45.3  VBG Lactate: 2.5      MICROBIOLOGY:     RADIOLOGY:  [x ] Reviewed and interpreted by me  EXAM: [id number] - CT ANGIO CHEST PULM [facility]  - ORDERED BY: BEST MESSER      PROCEDURE DATE:  06/24/2022           INTERPRETATION:  INDICATION: Chest pain    TECHNIQUE: Volumetric images of the chest were obtained after the   administration of 90 mL of Omnipaque 350. Maximum intensity projection   images were generated.    COMPARISON: 1/26/2022.    FINDINGS:    PULMONARY ANGIOGRAM:  No pulmonary embolism.    LUNGS/AIRWAYS/PLEURA: Patent trachea and bronchi. Clear lungs.   Unremarkable pleura.    LYMPH NODES/MEDIASTINUM: No enlarged lymph nodes.    HEART/VASCULATURE: Normal heart size. Unremarkable pericardium. Normal   caliber aorta.    UPPER ABDOMEN: Cholecystectomy.    BONES/SOFT TISSUES: Unremarkable.      IMPRESSION:    No pulmonary embolism.    --- End of Report ---    CXR 6/25/22 - clear lungs    PULMONARY FUNCTION TESTS: 8/4/2021: normal spirometry and lung volumes, borderline reduced DLCO.     EKG:

## 2022-06-28 ENCOUNTER — APPOINTMENT (OUTPATIENT)
Dept: RHEUMATOLOGY | Facility: CLINIC | Age: 47
End: 2022-06-28

## 2022-06-28 LAB
A1C WITH ESTIMATED AVERAGE GLUCOSE RESULT: 8.3 % — HIGH (ref 4–5.6)
ANION GAP SERPL CALC-SCNC: 12 MMOL/L — SIGNIFICANT CHANGE UP (ref 5–17)
BUN SERPL-MCNC: 10 MG/DL — SIGNIFICANT CHANGE UP (ref 7–23)
CALCIUM SERPL-MCNC: 9.5 MG/DL — SIGNIFICANT CHANGE UP (ref 8.4–10.5)
CHLORIDE SERPL-SCNC: 95 MMOL/L — LOW (ref 96–108)
CK SERPL-CCNC: 104 U/L — SIGNIFICANT CHANGE UP (ref 30–200)
CO2 SERPL-SCNC: 26 MMOL/L — SIGNIFICANT CHANGE UP (ref 22–31)
CREAT SERPL-MCNC: 0.84 MG/DL — SIGNIFICANT CHANGE UP (ref 0.5–1.3)
DSDNA AB SER-ACNC: <12 IU/ML — SIGNIFICANT CHANGE UP
EGFR: 109 ML/MIN/1.73M2 — SIGNIFICANT CHANGE UP
ESTIMATED AVERAGE GLUCOSE: 192 MG/DL — HIGH (ref 68–114)
GLUCOSE BLDC GLUCOMTR-MCNC: 215 MG/DL — HIGH (ref 70–99)
GLUCOSE BLDC GLUCOMTR-MCNC: 267 MG/DL — HIGH (ref 70–99)
GLUCOSE BLDC GLUCOMTR-MCNC: 281 MG/DL — HIGH (ref 70–99)
GLUCOSE BLDC GLUCOMTR-MCNC: 288 MG/DL — HIGH (ref 70–99)
GLUCOSE BLDC GLUCOMTR-MCNC: 337 MG/DL — HIGH (ref 70–99)
GLUCOSE SERPL-MCNC: 280 MG/DL — HIGH (ref 70–99)
HCT VFR BLD CALC: 39.4 % — SIGNIFICANT CHANGE UP (ref 39–50)
HGB BLD-MCNC: 13 G/DL — SIGNIFICANT CHANGE UP (ref 13–17)
MCHC RBC-ENTMCNC: 29.6 PG — SIGNIFICANT CHANGE UP (ref 27–34)
MCHC RBC-ENTMCNC: 33 GM/DL — SIGNIFICANT CHANGE UP (ref 32–36)
MCV RBC AUTO: 89.7 FL — SIGNIFICANT CHANGE UP (ref 80–100)
NRBC # BLD: 0 /100 WBCS — SIGNIFICANT CHANGE UP (ref 0–0)
PLATELET # BLD AUTO: 253 K/UL — SIGNIFICANT CHANGE UP (ref 150–400)
POTASSIUM SERPL-MCNC: 4.3 MMOL/L — SIGNIFICANT CHANGE UP (ref 3.5–5.3)
POTASSIUM SERPL-SCNC: 4.3 MMOL/L — SIGNIFICANT CHANGE UP (ref 3.5–5.3)
RBC # BLD: 4.39 M/UL — SIGNIFICANT CHANGE UP (ref 4.2–5.8)
RBC # FLD: 13.5 % — SIGNIFICANT CHANGE UP (ref 10.3–14.5)
SODIUM SERPL-SCNC: 133 MMOL/L — LOW (ref 135–145)
WBC # BLD: 13.38 K/UL — HIGH (ref 3.8–10.5)
WBC # FLD AUTO: 13.38 K/UL — HIGH (ref 3.8–10.5)

## 2022-06-28 PROCEDURE — 99232 SBSQ HOSP IP/OBS MODERATE 35: CPT | Mod: GC

## 2022-06-28 PROCEDURE — 99233 SBSQ HOSP IP/OBS HIGH 50: CPT

## 2022-06-28 RX ORDER — INSULIN LISPRO 100/ML
2 VIAL (ML) SUBCUTANEOUS
Refills: 0 | Status: DISCONTINUED | OUTPATIENT
Start: 2022-06-28 | End: 2022-06-29

## 2022-06-28 RX ORDER — INSULIN GLARGINE 100 [IU]/ML
10 INJECTION, SOLUTION SUBCUTANEOUS AT BEDTIME
Refills: 0 | Status: DISCONTINUED | OUTPATIENT
Start: 2022-06-28 | End: 2022-06-29

## 2022-06-28 RX ADMIN — Medication 2: at 09:26

## 2022-06-28 RX ADMIN — CYCLOBENZAPRINE HYDROCHLORIDE 5 MILLIGRAM(S): 10 TABLET, FILM COATED ORAL at 06:43

## 2022-06-28 RX ADMIN — OXYCODONE HYDROCHLORIDE 5 MILLIGRAM(S): 5 TABLET ORAL at 17:00

## 2022-06-28 RX ADMIN — Medication 4: at 17:55

## 2022-06-28 RX ADMIN — Medication 100 MILLIGRAM(S): at 06:03

## 2022-06-28 RX ADMIN — Medication 3: at 12:36

## 2022-06-28 RX ADMIN — Medication 40 MILLIGRAM(S): at 21:54

## 2022-06-28 RX ADMIN — Medication 650 MILLIGRAM(S): at 09:32

## 2022-06-28 RX ADMIN — Medication 2 UNIT(S): at 12:36

## 2022-06-28 RX ADMIN — Medication 650 MILLIGRAM(S): at 10:02

## 2022-06-28 RX ADMIN — LOSARTAN POTASSIUM 50 MILLIGRAM(S): 100 TABLET, FILM COATED ORAL at 06:07

## 2022-06-28 RX ADMIN — Medication 3 MILLILITER(S): at 09:27

## 2022-06-28 RX ADMIN — Medication 2 UNIT(S): at 09:28

## 2022-06-28 RX ADMIN — Medication 400 MILLIGRAM(S): at 12:32

## 2022-06-28 RX ADMIN — CYCLOBENZAPRINE HYDROCHLORIDE 5 MILLIGRAM(S): 10 TABLET, FILM COATED ORAL at 13:08

## 2022-06-28 RX ADMIN — BUDESONIDE AND FORMOTEROL FUMARATE DIHYDRATE 2 PUFF(S): 160; 4.5 AEROSOL RESPIRATORY (INHALATION) at 06:05

## 2022-06-28 RX ADMIN — Medication 2 UNIT(S): at 17:55

## 2022-06-28 RX ADMIN — OXYCODONE HYDROCHLORIDE 5 MILLIGRAM(S): 5 TABLET ORAL at 16:01

## 2022-06-28 RX ADMIN — Medication 60 MILLIGRAM(S): at 12:35

## 2022-06-28 RX ADMIN — BUDESONIDE AND FORMOTEROL FUMARATE DIHYDRATE 2 PUFF(S): 160; 4.5 AEROSOL RESPIRATORY (INHALATION) at 17:56

## 2022-06-28 RX ADMIN — Medication 1 TABLET(S): at 06:03

## 2022-06-28 RX ADMIN — Medication 3 MILLILITER(S): at 17:56

## 2022-06-28 RX ADMIN — LIDOCAINE 1 PATCH: 4 CREAM TOPICAL at 18:08

## 2022-06-28 RX ADMIN — Medication 2: at 21:55

## 2022-06-28 RX ADMIN — LIDOCAINE 1 PATCH: 4 CREAM TOPICAL at 03:28

## 2022-06-28 RX ADMIN — LIDOCAINE 1 PATCH: 4 CREAM TOPICAL at 12:33

## 2022-06-28 RX ADMIN — Medication 3 MILLILITER(S): at 06:06

## 2022-06-28 RX ADMIN — INSULIN GLARGINE 10 UNIT(S): 100 INJECTION, SOLUTION SUBCUTANEOUS at 21:55

## 2022-06-28 RX ADMIN — Medication 3 MILLILITER(S): at 13:09

## 2022-06-28 RX ADMIN — Medication 40 MILLIGRAM(S): at 13:09

## 2022-06-28 RX ADMIN — ENOXAPARIN SODIUM 40 MILLIGRAM(S): 100 INJECTION SUBCUTANEOUS at 06:03

## 2022-06-28 RX ADMIN — CYCLOBENZAPRINE HYDROCHLORIDE 5 MILLIGRAM(S): 10 TABLET, FILM COATED ORAL at 23:01

## 2022-06-28 RX ADMIN — Medication 100 MILLIGRAM(S): at 13:08

## 2022-06-28 RX ADMIN — Medication 40 MILLIGRAM(S): at 06:06

## 2022-06-28 RX ADMIN — Medication 3 MILLILITER(S): at 21:54

## 2022-06-28 RX ADMIN — PANTOPRAZOLE SODIUM 40 MILLIGRAM(S): 20 TABLET, DELAYED RELEASE ORAL at 06:07

## 2022-06-28 RX ADMIN — ENOXAPARIN SODIUM 40 MILLIGRAM(S): 100 INJECTION SUBCUTANEOUS at 17:56

## 2022-06-28 RX ADMIN — Medication 100 MILLIGRAM(S): at 21:54

## 2022-06-28 RX ADMIN — Medication 81 MILLIGRAM(S): at 12:34

## 2022-06-28 RX ADMIN — Medication 125 MICROGRAM(S): at 06:05

## 2022-06-28 NOTE — PROGRESS NOTE ADULT - PROBLEM SELECTOR PLAN 1
pt with cough, SOB, chest tightness x 2 weeks c/w asthma exacerbation in setting of infections with parainfluenza and entero/rhinovirus. CXR and CTA reviewed - no consolidation, no PE.   c/w solumedrol to 40mg Q8, change duonebs to Q4 ATC  c/w Symbicort   monitor peak flow  pulm consult appreciated   severe chest and back pain with coughing -Tessalon perle TID, hycodan prn cough   tylenol prn, lidocaine patch, oxycodone prn for now

## 2022-06-28 NOTE — PROGRESS NOTE ADULT - ASSESSMENT
47M with PMH of SLE, asthma, hypothyroidism, HTN p/w cough and SOB found to have parainfluenza and entero/rhinovirus with asthma exacerbation. Rheumatology consulted for SLE management    #SLE: Dx in 2019. Follows Dr. Neri. Antibody Profile: OWEN 1:2560, + SM, + RNP, + LA, Low complement, + U1rnp and weak positive PM/Sc100. The patient has no history of nephritis. Symptoms include sicca, rash, fatigue, arthritis, arthralgias and sun sensitivity. MRI right thigh 5/2021 - no muscle edema moderate sized right knee joint effusion. Home meds MMF 3 pills daily, HCQ 400mg daily, Saphnelo (first infusion 6/2/22), medrol 24mg daily.  -Admitted with asthma exacerbation 2/2 parainfluenza + entero/rhinovirus on high dose steroids IV solu-medrol 40mg q8hrs, tapering plan per primary team  -Reports no specific SLE activity, complements, dsDNA negative. No KIKI or active urinary sediment to suggest LN. Has serositis/costochondritis but this comes in setting of muscle tension/spasms, asthma exac, viral illness so there are alternative explanations for symptoms rather than SLE activity. Regardless, patient is on high dose steroids at this time for asthma exacerbation, which will cover any possible SLE activity at this time. CT PE negative.    Plan:  -hold MMF for now while on high dose steroids (ordered). Please resume once tapered back to home dose Medrol 24mg/day  -c/w PO Flexeril 5mg TID PRN for muscle spasm    DW Dr. Faith-Malcom Mae DO  Rheumatology Fellow PGY4  Pager [phone]

## 2022-06-28 NOTE — PROGRESS NOTE ADULT - SUBJECTIVE AND OBJECTIVE BOX
[facility] Division of Hospital Medicine  Natalie Fernandez DO  8a-5pm: [phone]  after 5pm call [phone]    Patient is a 47y old  Male who presents with a chief complaint of cough, SOB (2022 09:54)        SUBJECTIVE / OVERNIGHT EVENTS: feeling slightly better      MEDICATIONS  (STANDING):  albuterol/ipratropium for Nebulization 3 milliLiter(s) Nebulizer every 4 hours  aspirin enteric coated 81 milliGRAM(s) Oral daily  benzonatate 100 milliGRAM(s) Oral three times a day  budesonide 160 MICROgram(s)/formoterol 4.5 MICROgram(s) Inhaler 2 Puff(s) Inhalation two times a day  dextrose 5%. 1000 milliLiter(s) (100 mL/Hr) IV Continuous <Continuous>  dextrose 5%. 1000 milliLiter(s) (50 mL/Hr) IV Continuous <Continuous>  dextrose 50% Injectable 25 Gram(s) IV Push once  dextrose 50% Injectable 12.5 Gram(s) IV Push once  dextrose 50% Injectable 25 Gram(s) IV Push once  enoxaparin Injectable 40 milliGRAM(s) SubCutaneous every 12 hours  furosemide    Tablet 60 milliGRAM(s) Oral daily  glucagon  Injectable 1 milliGRAM(s) IntraMuscular once  hydroxychloroquine 400 milliGRAM(s) Oral daily  insulin glargine Injectable (LANTUS) 10 Unit(s) SubCutaneous at bedtime  insulin lispro (ADMELOG) corrective regimen sliding scale   SubCutaneous at bedtime  insulin lispro (ADMELOG) corrective regimen sliding scale   SubCutaneous three times a day before meals  insulin lispro Injectable (ADMELOG) 2 Unit(s) SubCutaneous before breakfast  insulin lispro Injectable (ADMELOG) 2 Unit(s) SubCutaneous before lunch  insulin lispro Injectable (ADMELOG) 2 Unit(s) SubCutaneous before dinner  levothyroxine 125 MICROGram(s) Oral daily  lidocaine   4% Patch 1 Patch Transdermal daily  losartan 50 milliGRAM(s) Oral daily  methylPREDNISolone sodium succinate Injectable 40 milliGRAM(s) IV Push every 8 hours  pantoprazole    Tablet 40 milliGRAM(s) Oral before breakfast  trimethoprim  160 mG/sulfamethoxazole 800 mG 1 Tablet(s) Oral <User Schedule>    MEDICATIONS  (PRN):  acetaminophen     Tablet .. 650 milliGRAM(s) Oral every 6 hours PRN Temp greater or equal to 38C (100.4F), Mild Pain (1 - 3)  aluminum hydroxide/magnesium hydroxide/simethicone Suspension 30 milliLiter(s) Oral every 4 hours PRN Dyspepsia  cyclobenzaprine 5 milliGRAM(s) Oral three times a day PRN Muscle Spasm  dextrose Oral Gel 15 Gram(s) Oral once PRN Blood Glucose LESS THAN 70 milliGRAM(s)/deciliter  hydrocodone/homatropine Syrup 10 milliLiter(s) Oral every 6 hours PRN Cough  melatonin 3 milliGRAM(s) Oral at bedtime PRN Insomnia  ondansetron Injectable 4 milliGRAM(s) IV Push every 8 hours PRN Nausea and/or Vomiting  oxyCODONE    IR 5 milliGRAM(s) Oral every 6 hours PRN moderate and severe pain      Vital Signs Last 24 Hrs  T(C): 36.8 (2022 13:00), Max: 36.8 (2022 17:17)  T(F): 98.2 (2022 13:00), Max: 98.3 (2022 21:30)  HR: 105 (2022 13:00) (99 - 107)  BP: 130/82 (2022 13:00) (130/82 - 142/74)  BP(mean): --  RR: 20 (2022 13:00) (18 - 20)  SpO2: 97% (2022 13:00) (96% - 98%)  CAPILLARY BLOOD GLUCOSE      POCT Blood Glucose.: 267 mg/dL (2022 12:30)  POCT Blood Glucose.: 215 mg/dL (2022 09:10)  POCT Blood Glucose.: 288 mg/dL (2022 03:24)  POCT Blood Glucose.: 399 mg/dL (2022 22:14)  POCT Blood Glucose.: 416 mg/dL (2022 22:08)  POCT Blood Glucose.: 229 mg/dL (2022 17:13)    I&O's Summary    2022 07:01  -  2022 07:00  --------------------------------------------------------  IN: 720 mL / OUT: 900 mL / NET: -180 mL    2022 07:01  -  2022 15:52  --------------------------------------------------------  IN: 700 mL / OUT: 0 mL / NET: 700 mL        PHYSICAL EXAM:  GENERAL: NAD, breathing normal - but starts coughing with deep inspiration  HEAD:  Atraumatic, Normocephalic  EYES: conjunctiva and sclera clear  NECK: supple, No JVD  CHEST/LUNG: +end expiratory wheezing today with better air entry  HEART: S1 S2 RRR  ABDOMEN: +BS Soft, NT/ND  EXTREMITIES:  2+ DP Pulses, No c/c. trace b/l LE edema  NEUROLOGY: AAOx3, no facial droop, moving all extremities  SKIN: No rashes or lesions    LABS:                        13.0   13.38 )-----------( 253      ( 2022 06:14 )             39.4     06-28    133<L>  |  95<L>  |  10  ----------------------------<  280<H>  4.3   |  26  |  0.84    Ca    9.5      2022 06:14        CARDIAC MARKERS ( 2022 06:14 )  x     / x     / 104 U/L / x     / x          Urinalysis Basic - ( 2022 15:57 )    Color: Light Yellow / Appearance: Clear / S.014 / pH: x  Gluc: x / Ketone: Negative  / Bili: Negative / Urobili: Negative   Blood: x / Protein: Trace / Nitrite: Negative   Leuk Esterase: Negative / RBC: 1 /hpf / WBC 0 /HPF   Sq Epi: x / Non Sq Epi: 0 /hpf / Bacteria: Negative        RADIOLOGY & ADDITIONAL TESTS:    Imaging Personally Reviewed:  Consultant(s) Notes Reviewed:    Care Discussed with Consultants/Other Providers:

## 2022-06-28 NOTE — PROGRESS NOTE ADULT - SUBJECTIVE AND OBJECTIVE BOX
[facility] - Division of Pulmonary, Critical Care and Sleep Medicine   Please call [phone] between 8am-pm weekdays, [phone] after hours and weekends    Interval Events:    REVIEW OF SYSTEMS:  CV: [ ] chest pain   Resp: [ ] cough [ ] shortness of breath   [ ] All other systems negative  [ ] Unable to assess ROS because ________    OBJECTIVE:  ICU Vital Signs Last 24 Hrs  T(C): 36.6 (2022 05:29), Max: 36.8 (2022 17:17)  T(F): 97.9 (2022 05:29), Max: 98.3 (2022 21:30)  HR: 99 (2022 05:29) (98 - 115)  BP: 134/68 (2022 05:29) (126/88 - 142/74)  BP(mean): --  ABP: --  ABP(mean): --  RR: 18 (2022 05:29) (18 - 20)  SpO2: 98% (2022 05:29) (94% - 98%)         @ 07:01  -   @ 07:00  --------------------------------------------------------  IN: 720 mL / OUT: 900 mL / NET: -180 mL      CAPILLARY BLOOD GLUCOSE      POCT Blood Glucose.: 215 mg/dL (2022 09:10)      PHYSICAL EXAM:  General: NAD  HEENT: NC/AT  Lymph Nodes: no cervical or supraclavicular lymphadenopathy  Neck: supple  Respiratory:  CTA b/l, no wheezes, crackles or rhonchi  Cardiovascular:  RRR, no m/r/g  Abdomen: soft, NT/ND, +BS  Extremities: no clubbing, cyanosis or edema, warm  Skin: no rash  Neurological: AAOx3, non focal exam  Psychiatry: not anxious appearing, normal affect and mood    HOSPITAL MEDICATIONS:  MEDICATIONS  (STANDING):  albuterol/ipratropium for Nebulization 3 milliLiter(s) Nebulizer every 4 hours  aspirin enteric coated 81 milliGRAM(s) Oral daily  benzonatate 100 milliGRAM(s) Oral three times a day  budesonide 160 MICROgram(s)/formoterol 4.5 MICROgram(s) Inhaler 2 Puff(s) Inhalation two times a day  dextrose 5%. 1000 milliLiter(s) (100 mL/Hr) IV Continuous <Continuous>  dextrose 5%. 1000 milliLiter(s) (50 mL/Hr) IV Continuous <Continuous>  dextrose 50% Injectable 25 Gram(s) IV Push once  dextrose 50% Injectable 12.5 Gram(s) IV Push once  dextrose 50% Injectable 25 Gram(s) IV Push once  enoxaparin Injectable 40 milliGRAM(s) SubCutaneous every 12 hours  furosemide    Tablet 60 milliGRAM(s) Oral daily  glucagon  Injectable 1 milliGRAM(s) IntraMuscular once  hydroxychloroquine 400 milliGRAM(s) Oral daily  insulin glargine Injectable (LANTUS) 10 Unit(s) SubCutaneous at bedtime  insulin lispro (ADMELOG) corrective regimen sliding scale   SubCutaneous at bedtime  insulin lispro (ADMELOG) corrective regimen sliding scale   SubCutaneous three times a day before meals  insulin lispro Injectable (ADMELOG) 2 Unit(s) SubCutaneous before breakfast  insulin lispro Injectable (ADMELOG) 2 Unit(s) SubCutaneous before lunch  insulin lispro Injectable (ADMELOG) 2 Unit(s) SubCutaneous before dinner  levothyroxine 125 MICROGram(s) Oral daily  lidocaine   4% Patch 1 Patch Transdermal daily  losartan 50 milliGRAM(s) Oral daily  methylPREDNISolone sodium succinate Injectable 40 milliGRAM(s) IV Push every 8 hours  pantoprazole    Tablet 40 milliGRAM(s) Oral before breakfast  trimethoprim  160 mG/sulfamethoxazole 800 mG 1 Tablet(s) Oral <User Schedule>    MEDICATIONS  (PRN):  acetaminophen     Tablet .. 650 milliGRAM(s) Oral every 6 hours PRN Temp greater or equal to 38C (100.4F), Mild Pain (1 - 3)  aluminum hydroxide/magnesium hydroxide/simethicone Suspension 30 milliLiter(s) Oral every 4 hours PRN Dyspepsia  cyclobenzaprine 5 milliGRAM(s) Oral three times a day PRN Muscle Spasm  dextrose Oral Gel 15 Gram(s) Oral once PRN Blood Glucose LESS THAN 70 milliGRAM(s)/deciliter  hydrocodone/homatropine Syrup 10 milliLiter(s) Oral every 6 hours PRN Cough  melatonin 3 milliGRAM(s) Oral at bedtime PRN Insomnia  ondansetron Injectable 4 milliGRAM(s) IV Push every 8 hours PRN Nausea and/or Vomiting  oxyCODONE    IR 5 milliGRAM(s) Oral every 6 hours PRN moderate and severe pain      LABS:                        13.0   13.38 )-----------( 253      ( 2022 06:14 )             39.4     Hgb Trend: 13.0<--, 13.3<--, 13.6<--, 13.9<--  06-28    133<L>  |  95<L>  |  10  ----------------------------<  280<H>  4.3   |  26  |  0.84    Ca    9.5      2022 06:14      Creatinine Trend: 0.84<--, 0.74<--, 0.77<--, 0.83<--, 0.81<--    Urinalysis Basic - ( 2022 15:57 )    Color: Light Yellow / Appearance: Clear / S.014 / pH: x  Gluc: x / Ketone: Negative  / Bili: Negative / Urobili: Negative   Blood: x / Protein: Trace / Nitrite: Negative   Leuk Esterase: Negative / RBC: 1 /hpf / WBC 0 /HPF   Sq Epi: x / Non Sq Epi: 0 /hpf / Bacteria: Negative            MICROBIOLOGY:     RADIOLOGY:  [x ] Reviewed and interpreted by me   [facility] - Division of Pulmonary, Critical Care and Sleep Medicine   Please call [phone] between 8am-pm weekdays, [phone] after hours and weekends    Interval Events: Overall slightly improved - had 2 coughing fits overnight, but less severe. Feels Hycodan is helping. Still with wheezing     REVIEW OF SYSTEMS:  CV: [ ] chest pain   Resp: [x ] cough [ ] shortness of breath   [x ] All other systems negative  [ ] Unable to assess ROS because ________    OBJECTIVE:  ICU Vital Signs Last 24 Hrs  T(C): 36.6 (2022 05:29), Max: 36.8 (2022 17:17)  T(F): 97.9 (2022 05:29), Max: 98.3 (2022 21:30)  HR: 99 (2022 05:29) (98 - 115)  BP: 134/68 (2022 05:29) (126/88 - 142/74)  BP(mean): --  ABP: --  ABP(mean): --  RR: 18 (2022 05:29) (18 - 20)  SpO2: 98% (2022 05:29) (94% - 98%)         @ 07:01  -   @ 07:00  --------------------------------------------------------  IN: 720 mL / OUT: 900 mL / NET: -180 mL      CAPILLARY BLOOD GLUCOSE      POCT Blood Glucose.: 215 mg/dL (2022 09:10)      PHYSICAL EXAM:  General: NAD  HEENT: NC/AT  Neck: supple  Respiratory:  end exp wheeze- scattered, otherwise moving air b/l, no crackles or rhonchi  Cardiovascular:  RRR, no m/r/g  Abdomen: soft, NT/ND, +BS  Extremities: no clubbing, cyanosis or edema, warm  Skin: no rash  Neurological: AAOx3, non focal exam  Psychiatry: not anxious appearing, normal affect and mood    HOSPITAL MEDICATIONS:  MEDICATIONS  (STANDING):  albuterol/ipratropium for Nebulization 3 milliLiter(s) Nebulizer every 4 hours  aspirin enteric coated 81 milliGRAM(s) Oral daily  benzonatate 100 milliGRAM(s) Oral three times a day  budesonide 160 MICROgram(s)/formoterol 4.5 MICROgram(s) Inhaler 2 Puff(s) Inhalation two times a day  dextrose 5%. 1000 milliLiter(s) (100 mL/Hr) IV Continuous <Continuous>  dextrose 5%. 1000 milliLiter(s) (50 mL/Hr) IV Continuous <Continuous>  dextrose 50% Injectable 25 Gram(s) IV Push once  dextrose 50% Injectable 12.5 Gram(s) IV Push once  dextrose 50% Injectable 25 Gram(s) IV Push once  enoxaparin Injectable 40 milliGRAM(s) SubCutaneous every 12 hours  furosemide    Tablet 60 milliGRAM(s) Oral daily  glucagon  Injectable 1 milliGRAM(s) IntraMuscular once  hydroxychloroquine 400 milliGRAM(s) Oral daily  insulin glargine Injectable (LANTUS) 10 Unit(s) SubCutaneous at bedtime  insulin lispro (ADMELOG) corrective regimen sliding scale   SubCutaneous at bedtime  insulin lispro (ADMELOG) corrective regimen sliding scale   SubCutaneous three times a day before meals  insulin lispro Injectable (ADMELOG) 2 Unit(s) SubCutaneous before breakfast  insulin lispro Injectable (ADMELOG) 2 Unit(s) SubCutaneous before lunch  insulin lispro Injectable (ADMELOG) 2 Unit(s) SubCutaneous before dinner  levothyroxine 125 MICROGram(s) Oral daily  lidocaine   4% Patch 1 Patch Transdermal daily  losartan 50 milliGRAM(s) Oral daily  methylPREDNISolone sodium succinate Injectable 40 milliGRAM(s) IV Push every 8 hours  pantoprazole    Tablet 40 milliGRAM(s) Oral before breakfast  trimethoprim  160 mG/sulfamethoxazole 800 mG 1 Tablet(s) Oral <User Schedule>    MEDICATIONS  (PRN):  acetaminophen     Tablet .. 650 milliGRAM(s) Oral every 6 hours PRN Temp greater or equal to 38C (100.4F), Mild Pain (1 - 3)  aluminum hydroxide/magnesium hydroxide/simethicone Suspension 30 milliLiter(s) Oral every 4 hours PRN Dyspepsia  cyclobenzaprine 5 milliGRAM(s) Oral three times a day PRN Muscle Spasm  dextrose Oral Gel 15 Gram(s) Oral once PRN Blood Glucose LESS THAN 70 milliGRAM(s)/deciliter  hydrocodone/homatropine Syrup 10 milliLiter(s) Oral every 6 hours PRN Cough  melatonin 3 milliGRAM(s) Oral at bedtime PRN Insomnia  ondansetron Injectable 4 milliGRAM(s) IV Push every 8 hours PRN Nausea and/or Vomiting  oxyCODONE    IR 5 milliGRAM(s) Oral every 6 hours PRN moderate and severe pain      LABS:                        13.0   13.38 )-----------( 253      ( 2022 06:14 )             39.4     Hgb Trend: 13.0<--, 13.3<--, 13.6<--, 13.9<--  06-28    133<L>  |  95<L>  |  10  ----------------------------<  280<H>  4.3   |  26  |  0.84    Ca    9.5      2022 06:14      Creatinine Trend: 0.84<--, 0.74<--, 0.77<--, 0.83<--, 0.81<--    Urinalysis Basic - ( 2022 15:57 )    Color: Light Yellow / Appearance: Clear / S.014 / pH: x  Gluc: x / Ketone: Negative  / Bili: Negative / Urobili: Negative   Blood: x / Protein: Trace / Nitrite: Negative   Leuk Esterase: Negative / RBC: 1 /hpf / WBC 0 /HPF   Sq Epi: x / Non Sq Epi: 0 /hpf / Bacteria: Negative            MICROBIOLOGY:     RADIOLOGY:  [x ] Reviewed and interpreted by me

## 2022-06-28 NOTE — PROGRESS NOTE ADULT - ATTENDING COMMENTS
pt seen and examined by me personally   agree with above   pt is aware and in agreement with our impression and plans   will reassess later in the week    please call via teams with questions/concerns/acute status changes

## 2022-06-28 NOTE — PROGRESS NOTE ADULT - ASSESSMENT
47M with PMH of SLE on chronic steroids and Salphnelo, mild/moderate asthma, hypothyroidism, COVID infection in Jan 2021, HTN p/w cough and SOB x2 weeks. Presented to [facility] ED on 6/14 for these symptoms, diagnosed with asthma exacerbation and discharged with steroids and nebulizers. His symptoms have been getting progressively worse. Has ongoing paroxysms of productive cough, with associated chest and back pain, WAITE and wheezing. Has been using home nebulizers every 4-6 hours without improvement in symptoms. Pt is chronically on solumedrol 16mg daily for SLE, was seen by Dr. Gonzales on 6/24 and steroids were changed to prednisone 40mg. CT angio performed which did not show PE or other acute change.   RVP positive parainfluenza and entero/rhinovirus. Being treated with Solumedrol 40 Q 8 hours, Tessalon perles, Hycodan every 6 hours  Not hypoxic, initial VBG WNL.   CTPA negative for PE, effusion or pneumonia.    A/P: Asthma exacerbation - Bronchospasm and cough in a patient with viral LRTI and underlying asthma  Would c/w Solumedrol at current dose as well as tessalon perles  c/w Symbicort 160/4.5 mg BID, Duonebs  c/w Hycodan dose to 10 ml every 8 hours (max dose 30/9 mg) and monitor for improvement.  Monitor O2 sats, goal 92-96%  DVT ppx    Above was discussed with the patient at length, all questions answered.       NOTE INCOMPLETE    Attestation Statements:   Attestation Statements:  Time-based billing (NON-critical care).     35 minutes spent on total encounter; more than 50% of the visit was spent counseling and / or coordinating care by the attending physician.  The necessity of the time spent during the encounter on this date of service was due to:     review of EMR and imaging, discussion with patient and coordination of care.     47M with PMH of SLE on chronic steroids and Salphnelo, mild/moderate asthma, hypothyroidism, COVID infection in Jan 2021, HTN p/w cough and SOB x2 weeks. Presented to [facility] ED on 6/14 for these symptoms, diagnosed with asthma exacerbation and discharged with steroids and nebulizers. His symptoms have been getting progressively worse. Has ongoing paroxysms of productive cough, with associated chest and back pain, WAITE and wheezing. Has been using home nebulizers every 4-6 hours without improvement in symptoms. Pt is chronically on solumedrol 16mg daily for SLE, was seen by Dr. Gonzales on 6/24 and steroids were changed to prednisone 40mg. CT angio performed which did not show PE or other acute change.   RVP positive parainfluenza and entero/rhinovirus. Being treated with Solumedrol 40 Q 8 hours, Tessalon perles, Hycodan every 6 hours  Not hypoxic, initial VBG WNL.   CTPA negative for PE, effusion or pneumonia.    A/P: Asthma exacerbation - Bronchospasm and cough in a patient with viral LRTI and underlying asthma  c/w Solumedrol at current dose as well as tessalon perles- Will try to reduce Solumedrol to 40 mg BID tomorrow  c/w Symbicort 160/4.5 mg BID, Duonebs  c/w Hycodan dose to 10 ml every 8 hours (max dose 30/9 mg)   Elevated FS related to Steroids - on Lispro TIDAC and SS coverage  Monitor O2 sats, goal 92-96%  DVT ppx  Ambulate as tolerated  Explained that improvement will also take time but overall he is feeling better.    Above was discussed with the patient at length, all questions answered.         Attestation Statements:   Attestation Statements:  Time-based billing (NON-critical care).     35 minutes spent on total encounter; more than 50% of the visit was spent counseling and / or coordinating care by the attending physician.  The necessity of the time spent during the encounter on this date of service was due to:     review of EMR and imaging, discussion with patient and coordination of care.

## 2022-06-28 NOTE — PROGRESS NOTE ADULT - PROBLEM SELECTOR PLAN 2
likely due to steroids  A1c -8.3 - now new onset DM  monitor FS  increase lantus and add premeal admelog  ISS for coverage

## 2022-06-29 LAB
ANION GAP SERPL CALC-SCNC: 16 MMOL/L — SIGNIFICANT CHANGE UP (ref 5–17)
BUN SERPL-MCNC: 12 MG/DL — SIGNIFICANT CHANGE UP (ref 7–23)
CALCIUM SERPL-MCNC: 9.5 MG/DL — SIGNIFICANT CHANGE UP (ref 8.4–10.5)
CHLORIDE SERPL-SCNC: 95 MMOL/L — LOW (ref 96–108)
CO2 SERPL-SCNC: 25 MMOL/L — SIGNIFICANT CHANGE UP (ref 22–31)
CREAT SERPL-MCNC: 0.71 MG/DL — SIGNIFICANT CHANGE UP (ref 0.5–1.3)
EGFR: 115 ML/MIN/1.73M2 — SIGNIFICANT CHANGE UP
GLUCOSE BLDC GLUCOMTR-MCNC: 236 MG/DL — HIGH (ref 70–99)
GLUCOSE BLDC GLUCOMTR-MCNC: 262 MG/DL — HIGH (ref 70–99)
GLUCOSE BLDC GLUCOMTR-MCNC: 264 MG/DL — HIGH (ref 70–99)
GLUCOSE BLDC GLUCOMTR-MCNC: 293 MG/DL — HIGH (ref 70–99)
GLUCOSE BLDC GLUCOMTR-MCNC: 348 MG/DL — HIGH (ref 70–99)
GLUCOSE BLDC GLUCOMTR-MCNC: 353 MG/DL — HIGH (ref 70–99)
GLUCOSE SERPL-MCNC: 284 MG/DL — HIGH (ref 70–99)
HCT VFR BLD CALC: 41.1 % — SIGNIFICANT CHANGE UP (ref 39–50)
HGB BLD-MCNC: 14 G/DL — SIGNIFICANT CHANGE UP (ref 13–17)
MCHC RBC-ENTMCNC: 30.3 PG — SIGNIFICANT CHANGE UP (ref 27–34)
MCHC RBC-ENTMCNC: 34.1 GM/DL — SIGNIFICANT CHANGE UP (ref 32–36)
MCV RBC AUTO: 89 FL — SIGNIFICANT CHANGE UP (ref 80–100)
NRBC # BLD: 0 /100 WBCS — SIGNIFICANT CHANGE UP (ref 0–0)
PLATELET # BLD AUTO: 268 K/UL — SIGNIFICANT CHANGE UP (ref 150–400)
POTASSIUM SERPL-MCNC: 3.9 MMOL/L — SIGNIFICANT CHANGE UP (ref 3.5–5.3)
POTASSIUM SERPL-SCNC: 3.9 MMOL/L — SIGNIFICANT CHANGE UP (ref 3.5–5.3)
RBC # BLD: 4.62 M/UL — SIGNIFICANT CHANGE UP (ref 4.2–5.8)
RBC # FLD: 13.7 % — SIGNIFICANT CHANGE UP (ref 10.3–14.5)
SODIUM SERPL-SCNC: 136 MMOL/L — SIGNIFICANT CHANGE UP (ref 135–145)
WBC # BLD: 14.73 K/UL — HIGH (ref 3.8–10.5)
WBC # FLD AUTO: 14.73 K/UL — HIGH (ref 3.8–10.5)

## 2022-06-29 PROCEDURE — 99233 SBSQ HOSP IP/OBS HIGH 50: CPT

## 2022-06-29 PROCEDURE — 99232 SBSQ HOSP IP/OBS MODERATE 35: CPT

## 2022-06-29 RX ORDER — INSULIN LISPRO 100/ML
8 VIAL (ML) SUBCUTANEOUS ONCE
Refills: 0 | Status: COMPLETED | OUTPATIENT
Start: 2022-06-29 | End: 2022-06-29

## 2022-06-29 RX ORDER — INSULIN GLARGINE 100 [IU]/ML
12 INJECTION, SOLUTION SUBCUTANEOUS AT BEDTIME
Refills: 0 | Status: DISCONTINUED | OUTPATIENT
Start: 2022-06-29 | End: 2022-07-02

## 2022-06-29 RX ORDER — INSULIN LISPRO 100/ML
3 VIAL (ML) SUBCUTANEOUS
Refills: 0 | Status: DISCONTINUED | OUTPATIENT
Start: 2022-06-29 | End: 2022-06-30

## 2022-06-29 RX ADMIN — ENOXAPARIN SODIUM 40 MILLIGRAM(S): 100 INJECTION SUBCUTANEOUS at 17:25

## 2022-06-29 RX ADMIN — Medication 4: at 17:26

## 2022-06-29 RX ADMIN — Medication 3: at 13:28

## 2022-06-29 RX ADMIN — Medication 3 MILLILITER(S): at 17:25

## 2022-06-29 RX ADMIN — Medication 40 MILLIGRAM(S): at 13:29

## 2022-06-29 RX ADMIN — Medication 3 UNIT(S): at 13:29

## 2022-06-29 RX ADMIN — LIDOCAINE 1 PATCH: 4 CREAM TOPICAL at 00:00

## 2022-06-29 RX ADMIN — Medication 3 MILLILITER(S): at 05:53

## 2022-06-29 RX ADMIN — Medication 3 MILLILITER(S): at 11:05

## 2022-06-29 RX ADMIN — Medication 400 MILLIGRAM(S): at 11:05

## 2022-06-29 RX ADMIN — Medication 60 MILLIGRAM(S): at 13:29

## 2022-06-29 RX ADMIN — PANTOPRAZOLE SODIUM 40 MILLIGRAM(S): 20 TABLET, DELAYED RELEASE ORAL at 05:53

## 2022-06-29 RX ADMIN — LIDOCAINE 1 PATCH: 4 CREAM TOPICAL at 19:00

## 2022-06-29 RX ADMIN — OXYCODONE HYDROCHLORIDE 5 MILLIGRAM(S): 5 TABLET ORAL at 22:20

## 2022-06-29 RX ADMIN — Medication 3: at 08:15

## 2022-06-29 RX ADMIN — Medication 81 MILLIGRAM(S): at 11:05

## 2022-06-29 RX ADMIN — OXYCODONE HYDROCHLORIDE 5 MILLIGRAM(S): 5 TABLET ORAL at 06:51

## 2022-06-29 RX ADMIN — OXYCODONE HYDROCHLORIDE 5 MILLIGRAM(S): 5 TABLET ORAL at 21:20

## 2022-06-29 RX ADMIN — Medication 3 UNIT(S): at 17:26

## 2022-06-29 RX ADMIN — Medication 3 MILLILITER(S): at 13:29

## 2022-06-29 RX ADMIN — BUDESONIDE AND FORMOTEROL FUMARATE DIHYDRATE 2 PUFF(S): 160; 4.5 AEROSOL RESPIRATORY (INHALATION) at 05:53

## 2022-06-29 RX ADMIN — Medication 125 MICROGRAM(S): at 05:53

## 2022-06-29 RX ADMIN — Medication 3 MILLILITER(S): at 21:22

## 2022-06-29 RX ADMIN — Medication 100 MILLIGRAM(S): at 21:21

## 2022-06-29 RX ADMIN — ENOXAPARIN SODIUM 40 MILLIGRAM(S): 100 INJECTION SUBCUTANEOUS at 05:54

## 2022-06-29 RX ADMIN — LOSARTAN POTASSIUM 50 MILLIGRAM(S): 100 TABLET, FILM COATED ORAL at 05:53

## 2022-06-29 RX ADMIN — Medication 100 MILLIGRAM(S): at 05:54

## 2022-06-29 RX ADMIN — Medication 2 UNIT(S): at 08:15

## 2022-06-29 RX ADMIN — OXYCODONE HYDROCHLORIDE 5 MILLIGRAM(S): 5 TABLET ORAL at 06:17

## 2022-06-29 RX ADMIN — Medication 8 UNIT(S): at 23:28

## 2022-06-29 RX ADMIN — LIDOCAINE 1 PATCH: 4 CREAM TOPICAL at 13:28

## 2022-06-29 RX ADMIN — Medication 100 MILLIGRAM(S): at 13:30

## 2022-06-29 RX ADMIN — Medication 40 MILLIGRAM(S): at 05:54

## 2022-06-29 RX ADMIN — BUDESONIDE AND FORMOTEROL FUMARATE DIHYDRATE 2 PUFF(S): 160; 4.5 AEROSOL RESPIRATORY (INHALATION) at 17:25

## 2022-06-29 RX ADMIN — INSULIN GLARGINE 12 UNIT(S): 100 INJECTION, SOLUTION SUBCUTANEOUS at 22:10

## 2022-06-29 NOTE — PROGRESS NOTE ADULT - ASSESSMENT
47M with PMH of SLE on chronic steroids and Salphnelo, mild/moderate asthma, hypothyroidism, COVID infection in Jan 2021, HTN p/w cough and SOB x2 weeks. Presented to [facility] ED on 6/14 for these symptoms, diagnosed with asthma exacerbation and discharged with steroids and nebulizers. His symptoms have been getting progressively worse. Has ongoing paroxysms of productive cough, with associated chest and back pain, WAITE and wheezing. Has been using home nebulizers every 4-6 hours without improvement in symptoms. Pt is chronically on solumedrol 16mg daily for SLE, was seen by Dr. Gonzales on 6/24 and steroids were changed to prednisone 40mg. CT angio performed which did not show PE or other acute change.   RVP positive parainfluenza and entero/rhinovirus. Being treated with Solumedrol 40 Q 8 hours, Tessalon perles, Hycodan every 6 hours  Not hypoxic, initial VBG WNL.   CTPA negative for PE, effusion or pneumonia.    A/P: Asthma exacerbation - Bronchospasm and cough in a patient with viral LRTI and underlying asthma  c/w Solumedrol at current dose as well as tessalon perles- Will try to reduce Solumedrol to 40 mg BID tomorrow  c/w Symbicort 160/4.5 mg BID, Duonebs  c/w Hycodan dose to 10 ml every 8 hours (max dose 30/9 mg)   Elevated FS related to Steroids - on Lispro TIDAC and SS coverage  Monitor O2 sats, goal 92-96%  DVT ppx  Ambulate as tolerated  Explained that improvement will also take time but overall he is feeling better.    Above was discussed with the patient at length, all questions answered.       NOTE INCOMPLETE 47M with PMH of SLE on chronic steroids and Salphnelo, mild/moderate asthma, hypothyroidism, COVID infection in Jan 2021, HTN p/w cough and SOB x2 weeks. Presented to [facility] ED on 6/14 for these symptoms, diagnosed with asthma exacerbation and discharged with steroids and nebulizers. His symptoms have been getting progressively worse. Has ongoing paroxysms of productive cough, with associated chest and back pain, WAITE and wheezing. Has been using home nebulizers every 4-6 hours without improvement in symptoms. Pt is chronically on solumedrol 16mg daily for SLE, was seen by Dr. Gonzales on 6/24 and steroids were changed to prednisone 40mg. CT angio performed which did not show PE or other acute change.   RVP positive parainfluenza and entero/rhinovirus. Being treated with Solumedrol 40 Q 8 hours, Tessalon perles, Hycodan every 6 hours  Not hypoxic, initial VBG WNL.   CTPA negative for PE, effusion or pneumonia.    A/P: Asthma exacerbation - Bronchospasm and cough in a patient with viral LRTI and underlying asthma  c/w Solumedrol at current dose as well as tessalon perles- would reduce Solumedrol to 40 mg BID   c/w Symbicort 160/4.5 mg BID, Duonebs  c/w Hycodan dose to 10 ml every 8 hours (max dose 30/9 mg)   Elevated FS related to Steroids - on Lispro TIDAC and SS coverage  Monitor O2 sats, goal 92-96%  DVT ppx  Ambulate as tolerated  Explained that improvement will also take time but overall he is feeling better.    Above was discussed with the patient at length, all questions answered.

## 2022-06-29 NOTE — PROGRESS NOTE ADULT - SUBJECTIVE AND OBJECTIVE BOX
Patient is a 47y old  Male who presents with a chief complaint of cough, SOB (2022 10:24)      SUBJECTIVE / OVERNIGHT EVENTS: No On events. Pt reports his wheezing, cough, penaloza has not improved. Reports clear mucous with scant amount of blood mixed in, no yvan hemoptysis. Denies cp, LE swelling, pain, f/c.         ADDITIONAL REVIEW OF SYSTEMS: Negative except for above    MEDICATIONS  (STANDING):  albuterol/ipratropium for Nebulization 3 milliLiter(s) Nebulizer every 4 hours  aspirin enteric coated 81 milliGRAM(s) Oral daily  benzonatate 100 milliGRAM(s) Oral three times a day  budesonide 160 MICROgram(s)/formoterol 4.5 MICROgram(s) Inhaler 2 Puff(s) Inhalation two times a day  dextrose 5%. 1000 milliLiter(s) (50 mL/Hr) IV Continuous <Continuous>  dextrose 5%. 1000 milliLiter(s) (100 mL/Hr) IV Continuous <Continuous>  dextrose 50% Injectable 25 Gram(s) IV Push once  dextrose 50% Injectable 12.5 Gram(s) IV Push once  dextrose 50% Injectable 25 Gram(s) IV Push once  enoxaparin Injectable 40 milliGRAM(s) SubCutaneous every 12 hours  furosemide    Tablet 60 milliGRAM(s) Oral daily  glucagon  Injectable 1 milliGRAM(s) IntraMuscular once  hydroxychloroquine 400 milliGRAM(s) Oral daily  insulin glargine Injectable (LANTUS) 12 Unit(s) SubCutaneous at bedtime  insulin lispro (ADMELOG) corrective regimen sliding scale   SubCutaneous at bedtime  insulin lispro (ADMELOG) corrective regimen sliding scale   SubCutaneous three times a day before meals  insulin lispro Injectable (ADMELOG) 3 Unit(s) SubCutaneous before breakfast  insulin lispro Injectable (ADMELOG) 3 Unit(s) SubCutaneous before lunch  insulin lispro Injectable (ADMELOG) 3 Unit(s) SubCutaneous before dinner  levothyroxine 125 MICROGram(s) Oral daily  lidocaine   4% Patch 1 Patch Transdermal daily  losartan 50 milliGRAM(s) Oral daily  methylPREDNISolone sodium succinate Injectable 40 milliGRAM(s) IV Push every 8 hours  pantoprazole    Tablet 40 milliGRAM(s) Oral before breakfast  trimethoprim  160 mG/sulfamethoxazole 800 mG 1 Tablet(s) Oral <User Schedule>    MEDICATIONS  (PRN):  acetaminophen     Tablet .. 650 milliGRAM(s) Oral every 6 hours PRN Temp greater or equal to 38C (100.4F), Mild Pain (1 - 3)  aluminum hydroxide/magnesium hydroxide/simethicone Suspension 30 milliLiter(s) Oral every 4 hours PRN Dyspepsia  cyclobenzaprine 5 milliGRAM(s) Oral three times a day PRN Muscle Spasm  dextrose Oral Gel 15 Gram(s) Oral once PRN Blood Glucose LESS THAN 70 milliGRAM(s)/deciliter  hydrocodone/homatropine Syrup 10 milliLiter(s) Oral every 6 hours PRN Cough  melatonin 3 milliGRAM(s) Oral at bedtime PRN Insomnia  ondansetron Injectable 4 milliGRAM(s) IV Push every 8 hours PRN Nausea and/or Vomiting  oxyCODONE    IR 5 milliGRAM(s) Oral every 6 hours PRN moderate and severe pain      CAPILLARY BLOOD GLUCOSE      POCT Blood Glucose.: 236 mg/dL (2022 09:17)  POCT Blood Glucose.: 262 mg/dL (2022 07:39)  POCT Blood Glucose.: 281 mg/dL (2022 21:33)  POCT Blood Glucose.: 337 mg/dL (2022 17:52)  POCT Blood Glucose.: 267 mg/dL (2022 12:30)    I&O's Summary    2022 07:01  -  2022 07:00  --------------------------------------------------------  IN: 1200 mL / OUT: 1400 mL / NET: -200 mL        PHYSICAL EXAM:  Vital Signs Last 24 Hrs  T(C): 36.6 (2022 09:00), Max: 36.9 (2022 15:57)  T(F): 97.8 (2022 09:00), Max: 98.4 (2022 15:57)  HR: 102 (2022 09:00) (102 - 107)  BP: 142/88 (2022 09:00) (108/69 - 142/88)  BP(mean): 82 (2022 15:57) (82 - 82)  RR: 17 (2022 09:00) (17 - 20)  SpO2: 95% (2022 09:00) (95% - 97%)    PHYSICAL EXAM:  GENERAL: NAD, well-developed on RA  HEAD:  Atraumatic, Normocephalic   EYES:  conjunctiva and sclera clear  NECK: Supple, No JVD  CHEST/LUNG: faint wheezing  HEART: Regular rate and rhythm; No murmurs, rubs, or gallops  ABDOMEN: Soft, Nontender, Nondistended; Bowel sounds present  EXTREMITIES:  2+ Peripheral Pulses, No clubbing, cyanosis, or edema  PSYCH: AAOx3  NEUROLOGY: non-focal  SKIN: No rashes or lesions      LABS:                        14.0   14.73 )-----------( 268      ( 2022 07:00 )             41.1     06-    136  |  95<L>  |  12  ----------------------------<  284<H>  3.9   |  25  |  0.71    Ca    9.5      2022 07:03        CARDIAC MARKERS ( 2022 06:14 )  x     / x     / 104 U/L / x     / x          Urinalysis Basic - ( 2022 15:57 )    Color: Light Yellow / Appearance: Clear / S.014 / pH: x  Gluc: x / Ketone: Negative  / Bili: Negative / Urobili: Negative   Blood: x / Protein: Trace / Nitrite: Negative   Leuk Esterase: Negative / RBC: 1 /hpf / WBC 0 /HPF   Sq Epi: x / Non Sq Epi: 0 /hpf / Bacteria: Negative          RADIOLOGY & ADDITIONAL TESTS:    Imaging Personally Reviewed:    Electrocardiogram Personally Reviewed:    COORDINATION OF CARE:  Care Discussed with Consultants/Other Providers [Y/N]:  Prior or Outpatient Records Reviewed [Y/N]:

## 2022-06-29 NOTE — PROGRESS NOTE ADULT - PROBLEM SELECTOR PLAN 2
worsened due to steroids  A1c -8.3 - now new onset DM  FS 200s  monitor FS  increase lantus to 12 units qhs and increase premeal to 3 admelog TID  ISS for coverage

## 2022-06-29 NOTE — PROGRESS NOTE ADULT - SUBJECTIVE AND OBJECTIVE BOX
[facility] - Division of Pulmonary, Critical Care and Sleep Medicine   Please call [phone] between 8am-pm weekdays, [phone] after hours and weekends    Interval Events:     REVIEW OF SYSTEMS:  CV: [ ] chest pain   Resp: [ ] cough [ ] shortness of breath   [ ] All other systems negative  [ ] Unable to assess ROS because ________    OBJECTIVE:  ICU Vital Signs Last 24 Hrs  T(C): 36.6 (2022 09:00), Max: 36.9 (2022 15:57)  T(F): 97.8 (2022 09:00), Max: 98.4 (2022 15:57)  HR: 102 (2022 09:00) (102 - 107)  BP: 142/88 (2022 09:00) (108/69 - 142/88)  BP(mean): 82 (2022 15:57) (82 - 82)  ABP: --  ABP(mean): --  RR: 17 (2022 09:00) (17 - 20)  SpO2: 95% (2022 09:00) (95% - 97%)        - @ 07:01  -  - @ 07:00  --------------------------------------------------------  IN: 1200 mL / OUT: 1400 mL / NET: -200 mL      CAPILLARY BLOOD GLUCOSE      POCT Blood Glucose.: 236 mg/dL (2022 09:17)      PHYSICAL EXAM:  General: NAD  HEENT: NC/AT  Lymph Nodes: no cervical or supraclavicular lymphadenopathy  Neck: supple  Respiratory:  CTA b/l, no wheezes, crackles or rhonchi  Cardiovascular:  RRR, no m/r/g  Abdomen: soft, NT/ND, +BS  Extremities: no clubbing, cyanosis or edema, warm  Skin: no rash  Neurological: AAOx3, non focal exam  Psychiatry: not anxious appearing, normal affect and mood    HOSPITAL MEDICATIONS:  MEDICATIONS  (STANDING):  albuterol/ipratropium for Nebulization 3 milliLiter(s) Nebulizer every 4 hours  aspirin enteric coated 81 milliGRAM(s) Oral daily  benzonatate 100 milliGRAM(s) Oral three times a day  budesonide 160 MICROgram(s)/formoterol 4.5 MICROgram(s) Inhaler 2 Puff(s) Inhalation two times a day  dextrose 5%. 1000 milliLiter(s) (100 mL/Hr) IV Continuous <Continuous>  dextrose 5%. 1000 milliLiter(s) (50 mL/Hr) IV Continuous <Continuous>  dextrose 50% Injectable 25 Gram(s) IV Push once  dextrose 50% Injectable 12.5 Gram(s) IV Push once  dextrose 50% Injectable 25 Gram(s) IV Push once  enoxaparin Injectable 40 milliGRAM(s) SubCutaneous every 12 hours  furosemide    Tablet 60 milliGRAM(s) Oral daily  glucagon  Injectable 1 milliGRAM(s) IntraMuscular once  hydroxychloroquine 400 milliGRAM(s) Oral daily  insulin glargine Injectable (LANTUS) 10 Unit(s) SubCutaneous at bedtime  insulin lispro (ADMELOG) corrective regimen sliding scale   SubCutaneous at bedtime  insulin lispro (ADMELOG) corrective regimen sliding scale   SubCutaneous three times a day before meals  insulin lispro Injectable (ADMELOG) 2 Unit(s) SubCutaneous before breakfast  insulin lispro Injectable (ADMELOG) 2 Unit(s) SubCutaneous before lunch  insulin lispro Injectable (ADMELOG) 2 Unit(s) SubCutaneous before dinner  levothyroxine 125 MICROGram(s) Oral daily  lidocaine   4% Patch 1 Patch Transdermal daily  losartan 50 milliGRAM(s) Oral daily  methylPREDNISolone sodium succinate Injectable 40 milliGRAM(s) IV Push every 8 hours  pantoprazole    Tablet 40 milliGRAM(s) Oral before breakfast  trimethoprim  160 mG/sulfamethoxazole 800 mG 1 Tablet(s) Oral <User Schedule>    MEDICATIONS  (PRN):  acetaminophen     Tablet .. 650 milliGRAM(s) Oral every 6 hours PRN Temp greater or equal to 38C (100.4F), Mild Pain (1 - 3)  aluminum hydroxide/magnesium hydroxide/simethicone Suspension 30 milliLiter(s) Oral every 4 hours PRN Dyspepsia  cyclobenzaprine 5 milliGRAM(s) Oral three times a day PRN Muscle Spasm  dextrose Oral Gel 15 Gram(s) Oral once PRN Blood Glucose LESS THAN 70 milliGRAM(s)/deciliter  hydrocodone/homatropine Syrup 10 milliLiter(s) Oral every 6 hours PRN Cough  melatonin 3 milliGRAM(s) Oral at bedtime PRN Insomnia  ondansetron Injectable 4 milliGRAM(s) IV Push every 8 hours PRN Nausea and/or Vomiting  oxyCODONE    IR 5 milliGRAM(s) Oral every 6 hours PRN moderate and severe pain      LABS:                        14.0   14.73 )-----------( 268      ( 2022 07:00 )             41.1     Hgb Trend: 14.0<--, 13.0<--, 13.3<--, 13.6<--, 13.9<--  06-29    136  |  95<L>  |  12  ----------------------------<  284<H>  3.9   |  25  |  0.71    Ca    9.5      2022 07:03      Creatinine Trend: 0.71<--, 0.84<--, 0.74<--, 0.77<--, 0.83<--, 0.81<--    Urinalysis Basic - ( 2022 15:57 )    Color: Light Yellow / Appearance: Clear / S.014 / pH: x  Gluc: x / Ketone: Negative  / Bili: Negative / Urobili: Negative   Blood: x / Protein: Trace / Nitrite: Negative   Leuk Esterase: Negative / RBC: 1 /hpf / WBC 0 /HPF   Sq Epi: x / Non Sq Epi: 0 /hpf / Bacteria: Negative            MICROBIOLOGY:     RADIOLOGY:  [ ] Reviewed and interpreted by me   [facility] - Division of Pulmonary, Critical Care and Sleep Medicine   Please call [phone] between 8am-pm weekdays, [phone] after hours and weekends    Interval Events: No acute events, continues to have coughing spasms, worse in the morning. Otherwise, unchanged    REVIEW OF SYSTEMS:  CV: [ ] chest pain   Resp: [x ] cough [ ] shortness of breath   [x ] All other systems negative  [ ] Unable to assess ROS because ________    OBJECTIVE:  ICU Vital Signs Last 24 Hrs  T(C): 36.6 (2022 09:00), Max: 36.9 (2022 15:57)  T(F): 97.8 (2022 09:00), Max: 98.4 (2022 15:57)  HR: 102 (2022 09:00) (102 - 107)  BP: 142/88 (2022 09:00) (108/69 - 142/88)  BP(mean): 82 (2022 15:57) (82 - 82)  ABP: --  ABP(mean): --  RR: 17 (2022 09:00) (17 - 20)  SpO2: 95% (2022 09:00) (95% - 97%)        - @ 07:01  -   @ 07:00  --------------------------------------------------------  IN: 1200 mL / OUT: 1400 mL / NET: -200 mL      CAPILLARY BLOOD GLUCOSE      POCT Blood Glucose.: 236 mg/dL (2022 09:17)      PHYSICAL EXAM:  General: NAD  HEENT: NC/AT  Neck: supple  Respiratory:  end exp wheezes, moving air throughout, no crackles or rhonchi  Cardiovascular:  RRR, no m/r/g  Abdomen: soft, NT/ND, +BS  Extremities: no clubbing, cyanosis or edema, warm  Skin: no rash  Neurological: AAOx3, non focal exam  Psychiatry: not anxious appearing, normal affect and mood    HOSPITAL MEDICATIONS:  MEDICATIONS  (STANDING):  albuterol/ipratropium for Nebulization 3 milliLiter(s) Nebulizer every 4 hours  aspirin enteric coated 81 milliGRAM(s) Oral daily  benzonatate 100 milliGRAM(s) Oral three times a day  budesonide 160 MICROgram(s)/formoterol 4.5 MICROgram(s) Inhaler 2 Puff(s) Inhalation two times a day  dextrose 5%. 1000 milliLiter(s) (100 mL/Hr) IV Continuous <Continuous>  dextrose 5%. 1000 milliLiter(s) (50 mL/Hr) IV Continuous <Continuous>  dextrose 50% Injectable 25 Gram(s) IV Push once  dextrose 50% Injectable 12.5 Gram(s) IV Push once  dextrose 50% Injectable 25 Gram(s) IV Push once  enoxaparin Injectable 40 milliGRAM(s) SubCutaneous every 12 hours  furosemide    Tablet 60 milliGRAM(s) Oral daily  glucagon  Injectable 1 milliGRAM(s) IntraMuscular once  hydroxychloroquine 400 milliGRAM(s) Oral daily  insulin glargine Injectable (LANTUS) 10 Unit(s) SubCutaneous at bedtime  insulin lispro (ADMELOG) corrective regimen sliding scale   SubCutaneous at bedtime  insulin lispro (ADMELOG) corrective regimen sliding scale   SubCutaneous three times a day before meals  insulin lispro Injectable (ADMELOG) 2 Unit(s) SubCutaneous before breakfast  insulin lispro Injectable (ADMELOG) 2 Unit(s) SubCutaneous before lunch  insulin lispro Injectable (ADMELOG) 2 Unit(s) SubCutaneous before dinner  levothyroxine 125 MICROGram(s) Oral daily  lidocaine   4% Patch 1 Patch Transdermal daily  losartan 50 milliGRAM(s) Oral daily  methylPREDNISolone sodium succinate Injectable 40 milliGRAM(s) IV Push every 8 hours  pantoprazole    Tablet 40 milliGRAM(s) Oral before breakfast  trimethoprim  160 mG/sulfamethoxazole 800 mG 1 Tablet(s) Oral <User Schedule>    MEDICATIONS  (PRN):  acetaminophen     Tablet .. 650 milliGRAM(s) Oral every 6 hours PRN Temp greater or equal to 38C (100.4F), Mild Pain (1 - 3)  aluminum hydroxide/magnesium hydroxide/simethicone Suspension 30 milliLiter(s) Oral every 4 hours PRN Dyspepsia  cyclobenzaprine 5 milliGRAM(s) Oral three times a day PRN Muscle Spasm  dextrose Oral Gel 15 Gram(s) Oral once PRN Blood Glucose LESS THAN 70 milliGRAM(s)/deciliter  hydrocodone/homatropine Syrup 10 milliLiter(s) Oral every 6 hours PRN Cough  melatonin 3 milliGRAM(s) Oral at bedtime PRN Insomnia  ondansetron Injectable 4 milliGRAM(s) IV Push every 8 hours PRN Nausea and/or Vomiting  oxyCODONE    IR 5 milliGRAM(s) Oral every 6 hours PRN moderate and severe pain      LABS:                        14.0   14.73 )-----------( 268      ( 2022 07:00 )             41.1     Hgb Trend: 14.0<--, 13.0<--, 13.3<--, 13.6<--, 13.9<--  06-29    136  |  95<L>  |  12  ----------------------------<  284<H>  3.9   |  25  |  0.71    Ca    9.5      2022 07:03      Creatinine Trend: 0.71<--, 0.84<--, 0.74<--, 0.77<--, 0.83<--, 0.81<--    Urinalysis Basic - ( 2022 15:57 )    Color: Light Yellow / Appearance: Clear / S.014 / pH: x  Gluc: x / Ketone: Negative  / Bili: Negative / Urobili: Negative   Blood: x / Protein: Trace / Nitrite: Negative   Leuk Esterase: Negative / RBC: 1 /hpf / WBC 0 /HPF   Sq Epi: x / Non Sq Epi: 0 /hpf / Bacteria: Negative            MICROBIOLOGY:     RADIOLOGY:  [x ] Reviewed and interpreted by me

## 2022-06-29 NOTE — PROGRESS NOTE ADULT - PROBLEM SELECTOR PLAN 3
c/w steroids as above   on Mycophenolate 1500mg daily at home   c/w home Plaquenil 400mg daily   c/w home bactrim for ppx   dsDNA, c3, c4 normal   Rheumatology consult appreciated - flexeril prn, cellcept held

## 2022-06-29 NOTE — PROGRESS NOTE ADULT - PROBLEM SELECTOR PLAN 1
cough, SOB, chest tightness x 2 weeks c/w asthma exacerbation in setting of infections with parainfluenza and entero/rhinovirus.   -CXR and CTA reviewed - no consolidation, no PE.   c/w IV [id number] Q8,   cw duonebs Q4 ATC  c/w Symbicort bid   monitor peak flow  pulm consult appreciated : f/u further recs  Tessalon perle TID, hycodan prn cough   tylenol prn, lidocaine patch, oxycodone prn

## 2022-06-30 ENCOUNTER — APPOINTMENT (OUTPATIENT)
Dept: RHEUMATOLOGY | Facility: CLINIC | Age: 47
End: 2022-06-30

## 2022-06-30 ENCOUNTER — TRANSCRIPTION ENCOUNTER (OUTPATIENT)
Age: 47
End: 2022-06-30

## 2022-06-30 LAB
ANION GAP SERPL CALC-SCNC: 11 MMOL/L — SIGNIFICANT CHANGE UP (ref 5–17)
BUN SERPL-MCNC: 11 MG/DL — SIGNIFICANT CHANGE UP (ref 7–23)
CALCIUM SERPL-MCNC: 9.3 MG/DL — SIGNIFICANT CHANGE UP (ref 8.4–10.5)
CHLORIDE SERPL-SCNC: 97 MMOL/L — SIGNIFICANT CHANGE UP (ref 96–108)
CO2 SERPL-SCNC: 29 MMOL/L — SIGNIFICANT CHANGE UP (ref 22–31)
CREAT SERPL-MCNC: 0.77 MG/DL — SIGNIFICANT CHANGE UP (ref 0.5–1.3)
EGFR: 112 ML/MIN/1.73M2 — SIGNIFICANT CHANGE UP
GLUCOSE BLDC GLUCOMTR-MCNC: 157 MG/DL — HIGH (ref 70–99)
GLUCOSE BLDC GLUCOMTR-MCNC: 174 MG/DL — HIGH (ref 70–99)
GLUCOSE BLDC GLUCOMTR-MCNC: 202 MG/DL — HIGH (ref 70–99)
GLUCOSE BLDC GLUCOMTR-MCNC: 209 MG/DL — HIGH (ref 70–99)
GLUCOSE BLDC GLUCOMTR-MCNC: 285 MG/DL — HIGH (ref 70–99)
GLUCOSE SERPL-MCNC: 205 MG/DL — HIGH (ref 70–99)
HCT VFR BLD CALC: 41 % — SIGNIFICANT CHANGE UP (ref 39–50)
HGB BLD-MCNC: 13.6 G/DL — SIGNIFICANT CHANGE UP (ref 13–17)
MCHC RBC-ENTMCNC: 29.8 PG — SIGNIFICANT CHANGE UP (ref 27–34)
MCHC RBC-ENTMCNC: 33.2 GM/DL — SIGNIFICANT CHANGE UP (ref 32–36)
MCV RBC AUTO: 89.9 FL — SIGNIFICANT CHANGE UP (ref 80–100)
NRBC # BLD: 0 /100 WBCS — SIGNIFICANT CHANGE UP (ref 0–0)
PLATELET # BLD AUTO: 260 K/UL — SIGNIFICANT CHANGE UP (ref 150–400)
POTASSIUM SERPL-MCNC: 3.4 MMOL/L — LOW (ref 3.5–5.3)
POTASSIUM SERPL-SCNC: 3.4 MMOL/L — LOW (ref 3.5–5.3)
RBC # BLD: 4.56 M/UL — SIGNIFICANT CHANGE UP (ref 4.2–5.8)
RBC # FLD: 13.7 % — SIGNIFICANT CHANGE UP (ref 10.3–14.5)
SODIUM SERPL-SCNC: 137 MMOL/L — SIGNIFICANT CHANGE UP (ref 135–145)
WBC # BLD: 12.99 K/UL — HIGH (ref 3.8–10.5)
WBC # FLD AUTO: 12.99 K/UL — HIGH (ref 3.8–10.5)

## 2022-06-30 PROCEDURE — 99233 SBSQ HOSP IP/OBS HIGH 50: CPT

## 2022-06-30 PROCEDURE — 99232 SBSQ HOSP IP/OBS MODERATE 35: CPT

## 2022-06-30 RX ORDER — INSULIN LISPRO 100/ML
2 VIAL (ML) SUBCUTANEOUS
Refills: 0 | Status: DISCONTINUED | OUTPATIENT
Start: 2022-06-30 | End: 2022-07-01

## 2022-06-30 RX ORDER — AZITHROMYCIN 500 MG/1
500 TABLET, FILM COATED ORAL DAILY
Refills: 0 | Status: COMPLETED | OUTPATIENT
Start: 2022-06-30 | End: 2022-07-04

## 2022-06-30 RX ORDER — POTASSIUM CHLORIDE 20 MEQ
40 PACKET (EA) ORAL ONCE
Refills: 0 | Status: DISCONTINUED | OUTPATIENT
Start: 2022-06-30 | End: 2022-06-30

## 2022-06-30 RX ORDER — POTASSIUM CHLORIDE 20 MEQ
40 PACKET (EA) ORAL ONCE
Refills: 0 | Status: COMPLETED | OUTPATIENT
Start: 2022-06-30 | End: 2022-06-30

## 2022-06-30 RX ADMIN — LOSARTAN POTASSIUM 50 MILLIGRAM(S): 100 TABLET, FILM COATED ORAL at 06:06

## 2022-06-30 RX ADMIN — Medication 2: at 12:46

## 2022-06-30 RX ADMIN — Medication 2 UNIT(S): at 18:06

## 2022-06-30 RX ADMIN — LIDOCAINE 1 PATCH: 4 CREAM TOPICAL at 23:43

## 2022-06-30 RX ADMIN — OXYCODONE HYDROCHLORIDE 5 MILLIGRAM(S): 5 TABLET ORAL at 21:15

## 2022-06-30 RX ADMIN — AZITHROMYCIN 500 MILLIGRAM(S): 500 TABLET, FILM COATED ORAL at 12:52

## 2022-06-30 RX ADMIN — Medication 1: at 09:22

## 2022-06-30 RX ADMIN — Medication 40 MILLIGRAM(S): at 18:07

## 2022-06-30 RX ADMIN — ENOXAPARIN SODIUM 40 MILLIGRAM(S): 100 INJECTION SUBCUTANEOUS at 18:07

## 2022-06-30 RX ADMIN — LIDOCAINE 1 PATCH: 4 CREAM TOPICAL at 07:08

## 2022-06-30 RX ADMIN — Medication 3 MILLILITER(S): at 14:37

## 2022-06-30 RX ADMIN — Medication 60 MILLIGRAM(S): at 12:47

## 2022-06-30 RX ADMIN — INSULIN GLARGINE 12 UNIT(S): 100 INJECTION, SOLUTION SUBCUTANEOUS at 21:15

## 2022-06-30 RX ADMIN — Medication 2: at 18:06

## 2022-06-30 RX ADMIN — BUDESONIDE AND FORMOTEROL FUMARATE DIHYDRATE 2 PUFF(S): 160; 4.5 AEROSOL RESPIRATORY (INHALATION) at 18:07

## 2022-06-30 RX ADMIN — Medication 2 UNIT(S): at 12:46

## 2022-06-30 RX ADMIN — Medication 3 MILLILITER(S): at 21:16

## 2022-06-30 RX ADMIN — LIDOCAINE 1 PATCH: 4 CREAM TOPICAL at 19:01

## 2022-06-30 RX ADMIN — LIDOCAINE 1 PATCH: 4 CREAM TOPICAL at 12:46

## 2022-06-30 RX ADMIN — PANTOPRAZOLE SODIUM 40 MILLIGRAM(S): 20 TABLET, DELAYED RELEASE ORAL at 06:11

## 2022-06-30 RX ADMIN — OXYCODONE HYDROCHLORIDE 5 MILLIGRAM(S): 5 TABLET ORAL at 09:21

## 2022-06-30 RX ADMIN — Medication 3 MILLILITER(S): at 09:23

## 2022-06-30 RX ADMIN — Medication 400 MILLIGRAM(S): at 12:47

## 2022-06-30 RX ADMIN — CYCLOBENZAPRINE HYDROCHLORIDE 5 MILLIGRAM(S): 10 TABLET, FILM COATED ORAL at 14:37

## 2022-06-30 RX ADMIN — OXYCODONE HYDROCHLORIDE 5 MILLIGRAM(S): 5 TABLET ORAL at 21:45

## 2022-06-30 RX ADMIN — Medication 40 MILLIEQUIVALENT(S): at 09:30

## 2022-06-30 RX ADMIN — OXYCODONE HYDROCHLORIDE 5 MILLIGRAM(S): 5 TABLET ORAL at 10:21

## 2022-06-30 RX ADMIN — Medication 3 MILLILITER(S): at 06:06

## 2022-06-30 RX ADMIN — Medication 40 MILLIGRAM(S): at 06:07

## 2022-06-30 RX ADMIN — CYCLOBENZAPRINE HYDROCHLORIDE 5 MILLIGRAM(S): 10 TABLET, FILM COATED ORAL at 22:41

## 2022-06-30 RX ADMIN — Medication 125 MICROGRAM(S): at 06:06

## 2022-06-30 RX ADMIN — Medication 3 UNIT(S): at 09:22

## 2022-06-30 RX ADMIN — Medication 3 MILLILITER(S): at 18:07

## 2022-06-30 RX ADMIN — Medication 100 MILLIGRAM(S): at 06:07

## 2022-06-30 RX ADMIN — Medication 81 MILLIGRAM(S): at 12:47

## 2022-06-30 RX ADMIN — Medication 100 MILLIGRAM(S): at 22:23

## 2022-06-30 RX ADMIN — Medication 100 MILLIGRAM(S): at 14:37

## 2022-06-30 RX ADMIN — Medication 2: at 21:16

## 2022-06-30 RX ADMIN — ENOXAPARIN SODIUM 40 MILLIGRAM(S): 100 INJECTION SUBCUTANEOUS at 06:07

## 2022-06-30 RX ADMIN — BUDESONIDE AND FORMOTEROL FUMARATE DIHYDRATE 2 PUFF(S): 160; 4.5 AEROSOL RESPIRATORY (INHALATION) at 06:07

## 2022-06-30 NOTE — PROGRESS NOTE ADULT - SUBJECTIVE AND OBJECTIVE BOX
[facility] - Division of Pulmonary, Critical Care and Sleep Medicine   Please call [phone] between 8am-pm weekdays, [phone] after hours and weekends    Interval Events:    REVIEW OF SYSTEMS:  CV: [ ] chest pain   Resp: [ ] cough [ ] shortness of breath   [ ] All other systems negative  [ ] Unable to assess ROS because ________    OBJECTIVE:  ICU Vital Signs Last 24 Hrs  T(C): 36.4 (30 Jun 2022 09:00), Max: 37.1 (30 Jun 2022 05:00)  T(F): 97.5 (30 Jun 2022 09:00), Max: 98.8 (30 Jun 2022 05:00)  HR: 100 (30 Jun 2022 09:00) (98 - 109)  BP: 119/77 (30 Jun 2022 09:00) (119/77 - 134/82)  BP(mean): --  ABP: --  ABP(mean): --  RR: 18 (30 Jun 2022 09:00) (18 - 20)  SpO2: 95% (30 Jun 2022 09:00) (95% - 98%)        06-29 @ 07:01  -  06-30 @ 07:00  --------------------------------------------------------  IN: 1970 mL / OUT: 1400 mL / NET: 570 mL      CAPILLARY BLOOD GLUCOSE      POCT Blood Glucose.: 174 mg/dL (30 Jun 2022 06:34)      PHYSICAL EXAM:  General: NAD  HEENT: NC/AT  Lymph Nodes: no cervical or supraclavicular lymphadenopathy  Neck: supple  Respiratory:  CTA b/l, no wheezes, crackles or rhonchi  Cardiovascular:  RRR, no m/r/g  Abdomen: soft, NT/ND, +BS  Extremities: no clubbing, cyanosis or edema, warm  Skin: no rash  Neurological: AAOx3, non focal exam  Psychiatry: not anxious appearing, normal affect and mood    HOSPITAL MEDICATIONS:  MEDICATIONS  (STANDING):  albuterol/ipratropium for Nebulization 3 milliLiter(s) Nebulizer every 4 hours  aspirin enteric coated 81 milliGRAM(s) Oral daily  benzonatate 100 milliGRAM(s) Oral three times a day  budesonide 160 MICROgram(s)/formoterol 4.5 MICROgram(s) Inhaler 2 Puff(s) Inhalation two times a day  dextrose 5%. 1000 milliLiter(s) (50 mL/Hr) IV Continuous <Continuous>  dextrose 5%. 1000 milliLiter(s) (100 mL/Hr) IV Continuous <Continuous>  dextrose 50% Injectable 25 Gram(s) IV Push once  dextrose 50% Injectable 12.5 Gram(s) IV Push once  dextrose 50% Injectable 25 Gram(s) IV Push once  enoxaparin Injectable 40 milliGRAM(s) SubCutaneous every 12 hours  furosemide    Tablet 60 milliGRAM(s) Oral daily  glucagon  Injectable 1 milliGRAM(s) IntraMuscular once  hydroxychloroquine 400 milliGRAM(s) Oral daily  insulin glargine Injectable (LANTUS) 12 Unit(s) SubCutaneous at bedtime  insulin lispro (ADMELOG) corrective regimen sliding scale   SubCutaneous three times a day before meals  insulin lispro (ADMELOG) corrective regimen sliding scale   SubCutaneous at bedtime  insulin lispro Injectable (ADMELOG) 3 Unit(s) SubCutaneous before breakfast  insulin lispro Injectable (ADMELOG) 3 Unit(s) SubCutaneous before lunch  insulin lispro Injectable (ADMELOG) 3 Unit(s) SubCutaneous before dinner  levothyroxine 125 MICROGram(s) Oral daily  lidocaine   4% Patch 1 Patch Transdermal daily  losartan 50 milliGRAM(s) Oral daily  methylPREDNISolone sodium succinate Injectable 40 milliGRAM(s) IV Push every 12 hours  pantoprazole    Tablet 40 milliGRAM(s) Oral before breakfast  potassium chloride    Tablet ER 40 milliEquivalent(s) Oral once  trimethoprim  160 mG/sulfamethoxazole 800 mG 1 Tablet(s) Oral <User Schedule>    MEDICATIONS  (PRN):  acetaminophen     Tablet .. 650 milliGRAM(s) Oral every 6 hours PRN Temp greater or equal to 38C (100.4F), Mild Pain (1 - 3)  aluminum hydroxide/magnesium hydroxide/simethicone Suspension 30 milliLiter(s) Oral every 4 hours PRN Dyspepsia  cyclobenzaprine 5 milliGRAM(s) Oral three times a day PRN Muscle Spasm  dextrose Oral Gel 15 Gram(s) Oral once PRN Blood Glucose LESS THAN 70 milliGRAM(s)/deciliter  hydrocodone/homatropine Syrup 10 milliLiter(s) Oral every 6 hours PRN Cough  melatonin 3 milliGRAM(s) Oral at bedtime PRN Insomnia  ondansetron Injectable 4 milliGRAM(s) IV Push every 8 hours PRN Nausea and/or Vomiting  oxyCODONE    IR 5 milliGRAM(s) Oral every 6 hours PRN moderate and severe pain      LABS:                        13.6   12.99 )-----------( 260      ( 30 Jun 2022 07:07 )             41.0     Hgb Trend: 13.6<--, 14.0<--, 13.0<--, 13.3<--, 13.6<--  06-30    137  |  97  |  11  ----------------------------<  205<H>  3.4<L>   |  29  |  0.77    Ca    9.3      30 Jun 2022 06:46      Creatinine Trend: 0.77<--, 0.71<--, 0.84<--, 0.74<--, 0.77<--, 0.83<--            MICROBIOLOGY:     RADIOLOGY:  [ ] Reviewed and interpreted by me   [facility] - Division of Pulmonary, Critical Care and Sleep Medicine   Please call [phone] between 8am-pm weekdays, [phone] after hours and weekends    Interval Events: minimal improvement in cough and wheeze - worse in the morning. Producing yellow sputum    REVIEW OF SYSTEMS:  CV: [ ] chest pain   Resp: [ x] cough [ ] shortness of breath   [x ] All other systems negative  [ ] Unable to assess ROS because ________    OBJECTIVE:  ICU Vital Signs Last 24 Hrs  T(C): 36.4 (30 Jun 2022 09:00), Max: 37.1 (30 Jun 2022 05:00)  T(F): 97.5 (30 Jun 2022 09:00), Max: 98.8 (30 Jun 2022 05:00)  HR: 100 (30 Jun 2022 09:00) (98 - 109)  BP: 119/77 (30 Jun 2022 09:00) (119/77 - 134/82)  BP(mean): --  ABP: --  ABP(mean): --  RR: 18 (30 Jun 2022 09:00) (18 - 20)  SpO2: 95% (30 Jun 2022 09:00) (95% - 98%)        06-29 @ 07:01  -  06-30 @ 07:00  --------------------------------------------------------  IN: 1970 mL / OUT: 1400 mL / NET: 570 mL      CAPILLARY BLOOD GLUCOSE      POCT Blood Glucose.: 174 mg/dL (30 Jun 2022 06:34)      PHYSICAL EXAM:  General: NAD  HEENT: NC/AT  Neck: supple  Respiratory: end expiratory wheezes   Cardiovascular:  RRR, no m/r/g  Abdomen: soft, NT/ND, +BS  Extremities: no clubbing, cyanosis or edema, warm  Skin: no rash  Neurological: AAOx3, non focal exam  Psychiatry: not anxious appearing, normal affect and mood    HOSPITAL MEDICATIONS:  MEDICATIONS  (STANDING):  albuterol/ipratropium for Nebulization 3 milliLiter(s) Nebulizer every 4 hours  aspirin enteric coated 81 milliGRAM(s) Oral daily  benzonatate 100 milliGRAM(s) Oral three times a day  budesonide 160 MICROgram(s)/formoterol 4.5 MICROgram(s) Inhaler 2 Puff(s) Inhalation two times a day  dextrose 5%. 1000 milliLiter(s) (50 mL/Hr) IV Continuous <Continuous>  dextrose 5%. 1000 milliLiter(s) (100 mL/Hr) IV Continuous <Continuous>  dextrose 50% Injectable 25 Gram(s) IV Push once  dextrose 50% Injectable 12.5 Gram(s) IV Push once  dextrose 50% Injectable 25 Gram(s) IV Push once  enoxaparin Injectable 40 milliGRAM(s) SubCutaneous every 12 hours  furosemide    Tablet 60 milliGRAM(s) Oral daily  glucagon  Injectable 1 milliGRAM(s) IntraMuscular once  hydroxychloroquine 400 milliGRAM(s) Oral daily  insulin glargine Injectable (LANTUS) 12 Unit(s) SubCutaneous at bedtime  insulin lispro (ADMELOG) corrective regimen sliding scale   SubCutaneous three times a day before meals  insulin lispro (ADMELOG) corrective regimen sliding scale   SubCutaneous at bedtime  insulin lispro Injectable (ADMELOG) 3 Unit(s) SubCutaneous before breakfast  insulin lispro Injectable (ADMELOG) 3 Unit(s) SubCutaneous before lunch  insulin lispro Injectable (ADMELOG) 3 Unit(s) SubCutaneous before dinner  levothyroxine 125 MICROGram(s) Oral daily  lidocaine   4% Patch 1 Patch Transdermal daily  losartan 50 milliGRAM(s) Oral daily  methylPREDNISolone sodium succinate Injectable 40 milliGRAM(s) IV Push every 12 hours  pantoprazole    Tablet 40 milliGRAM(s) Oral before breakfast  potassium chloride    Tablet ER 40 milliEquivalent(s) Oral once  trimethoprim  160 mG/sulfamethoxazole 800 mG 1 Tablet(s) Oral <User Schedule>    MEDICATIONS  (PRN):  acetaminophen     Tablet .. 650 milliGRAM(s) Oral every 6 hours PRN Temp greater or equal to 38C (100.4F), Mild Pain (1 - 3)  aluminum hydroxide/magnesium hydroxide/simethicone Suspension 30 milliLiter(s) Oral every 4 hours PRN Dyspepsia  cyclobenzaprine 5 milliGRAM(s) Oral three times a day PRN Muscle Spasm  dextrose Oral Gel 15 Gram(s) Oral once PRN Blood Glucose LESS THAN 70 milliGRAM(s)/deciliter  hydrocodone/homatropine Syrup 10 milliLiter(s) Oral every 6 hours PRN Cough  melatonin 3 milliGRAM(s) Oral at bedtime PRN Insomnia  ondansetron Injectable 4 milliGRAM(s) IV Push every 8 hours PRN Nausea and/or Vomiting  oxyCODONE    IR 5 milliGRAM(s) Oral every 6 hours PRN moderate and severe pain      LABS:                        13.6   12.99 )-----------( 260      ( 30 Jun 2022 07:07 )             41.0     Hgb Trend: 13.6<--, 14.0<--, 13.0<--, 13.3<--, 13.6<--  06-30    137  |  97  |  11  ----------------------------<  205<H>  3.4<L>   |  29  |  0.77    Ca    9.3      30 Jun 2022 06:46      Creatinine Trend: 0.77<--, 0.71<--, 0.84<--, 0.74<--, 0.77<--, 0.83<--            MICROBIOLOGY:     RADIOLOGY:  [x ] Reviewed and interpreted by me

## 2022-06-30 NOTE — PROGRESS NOTE ADULT - ASSESSMENT
47M with PMH of SLE on chronic steroids and Salphnelo, mild/moderate asthma, hypothyroidism, COVID infection in Jan 2021, HTN p/w cough and SOB x2 weeks. Presented to [facility] ED on 6/14 for these symptoms, diagnosed with asthma exacerbation and discharged with steroids and nebulizers. His symptoms have been getting progressively worse. Has ongoing paroxysms of productive cough, with associated chest and back pain, WAITE and wheezing. Has been using home nebulizers every 4-6 hours without improvement in symptoms. Pt is chronically on solumedrol 16mg daily for SLE, was seen by Dr. Gonzales on 6/24 and steroids were changed to prednisone 40mg. CT angio performed which did not show PE or other acute change.   RVP positive parainfluenza and entero/rhinovirus. Being treated with Solumedrol 40 Q 8 hours, Tessalon perles, Hycodan every 6 hours  Not hypoxic, initial VBG WNL.   CTPA negative for PE, effusion or pneumonia.    A/P: Asthma exacerbation - Bronchospasm and cough in a patient with viral LRTI and underlying asthma  c/w Solumedrol at current dose as well as tessalon perles- c/w Solumedrol to 40 mg BID   c/w Symbicort 160/4.5 mg BID, Duonebs  c/w Hycodan dose to 10 ml every 8 hours (max dose 30/9 mg)   Elevated FS related to Steroids - on Lispro TIDAC and SS coverage  Monitor O2 sats, goal 92-96%  DVT ppx  Ambulate as tolerated  Explained that improvement will also take time but overall he is feeling better.    Above was discussed with the patient at length, all questions answered.       NOTE INCOMPLETE 47M with PMH of SLE on chronic steroids and Salphnelo, mild/moderate asthma, hypothyroidism, COVID infection in Jan 2021, HTN p/w cough and SOB x2 weeks. Presented to [facility] ED on 6/14 for these symptoms, diagnosed with asthma exacerbation and discharged with steroids and nebulizers. His symptoms have been getting progressively worse. Has ongoing paroxysms of productive cough, with associated chest and back pain, WAITE and wheezing. Has been using home nebulizers every 4-6 hours without improvement in symptoms. Pt is chronically on solumedrol 16mg daily for SLE, was seen by Dr. Gonzales on 6/24 and steroids were changed to prednisone 40mg. CT angio performed which did not show PE or other acute change.   RVP positive parainfluenza and entero/rhinovirus. Being treated with Solumedrol 40 Q 8 hours, Tessalon perles, Hycodan every 6 hours  Not hypoxic, initial VBG WNL.   CTPA negative for PE, effusion or pneumonia.    A/P: Asthma exacerbation - Bronchospasm and cough in a patient with viral LRTI and underlying asthma  c/w Solumedrol at current dose as well as tessalon perles- c/w Solumedrol to 40 mg BID   Would add Azithro x5 days for anti-inflammatory effects  c/w Symbicort 160/4.5 mg BID, Duonebs  c/w Hycodan dose to 10 ml every 8 hours (max dose 30/9 mg)   Elevated FS related to Steroids - on Lispro TIDAC and SS coverage  Monitor O2 sats, goal 92-96%  DVT ppx  Ambulate as tolerated  Explained that improvement will also take time but overall he is feeling better.    Above was discussed with the patient at length, all questions answered.       d/w Dr. West

## 2022-06-30 NOTE — PROGRESS NOTE ADULT - PROBLEM SELECTOR PLAN 1
cough, SOB, chest tightness x 2 weeks c/w asthma exacerbation in setting of infections with parainfluenza and entero/rhinovirus.   -improving slightly but having productive cough and episodes of severe wheezing still  -02 sat mid 90s  -CXR and CTA reviewed - no consolidation, no PE.   -lowered  IV solumedrol 40mg to q12 per pulm  -discussed with pulm, add azithromycin 500mg x 5 days  cw duonebs Q4 ATC  c/w Symbicort bid   monitor peak flow  pulm following  Tessalon perle TID, hycodan prn cough   tylenol prn, lidocaine patch, oxycodone prn

## 2022-06-30 NOTE — PROGRESS NOTE ADULT - PROBLEM SELECTOR PLAN 2
A1c -8.3 - now new onset DM  improved FS and tapering steroids  monitor FS  c/w lantus  12 units qhs  decrease premeal to 2 admelog TID  ISS for coverage

## 2022-06-30 NOTE — PROGRESS NOTE ADULT - SUBJECTIVE AND OBJECTIVE BOX
Patient is a 47y old  Male who presents with a chief complaint of cough, SOB (30 Jun 2022 09:00)      SUBJECTIVE / OVERNIGHT EVENTS: ON developed severe wheezing episode which resolved after medication. This AM, says hes mildly wheezing. +productive yellow cough no blood, no sob, penaloza, cp.        ADDITIONAL REVIEW OF SYSTEMS: Negative except for above    MEDICATIONS  (STANDING):  albuterol/ipratropium for Nebulization 3 milliLiter(s) Nebulizer every 4 hours  aspirin enteric coated 81 milliGRAM(s) Oral daily  azithromycin   Tablet 500 milliGRAM(s) Oral daily  benzonatate 100 milliGRAM(s) Oral three times a day  budesonide 160 MICROgram(s)/formoterol 4.5 MICROgram(s) Inhaler 2 Puff(s) Inhalation two times a day  dextrose 5%. 1000 milliLiter(s) (50 mL/Hr) IV Continuous <Continuous>  dextrose 5%. 1000 milliLiter(s) (100 mL/Hr) IV Continuous <Continuous>  dextrose 50% Injectable 25 Gram(s) IV Push once  dextrose 50% Injectable 12.5 Gram(s) IV Push once  dextrose 50% Injectable 25 Gram(s) IV Push once  enoxaparin Injectable 40 milliGRAM(s) SubCutaneous every 12 hours  furosemide    Tablet 60 milliGRAM(s) Oral daily  glucagon  Injectable 1 milliGRAM(s) IntraMuscular once  hydroxychloroquine 400 milliGRAM(s) Oral daily  insulin glargine Injectable (LANTUS) 12 Unit(s) SubCutaneous at bedtime  insulin lispro (ADMELOG) corrective regimen sliding scale   SubCutaneous at bedtime  insulin lispro (ADMELOG) corrective regimen sliding scale   SubCutaneous three times a day before meals  insulin lispro Injectable (ADMELOG) 3 Unit(s) SubCutaneous before breakfast  insulin lispro Injectable (ADMELOG) 3 Unit(s) SubCutaneous before lunch  insulin lispro Injectable (ADMELOG) 3 Unit(s) SubCutaneous before dinner  levothyroxine 125 MICROGram(s) Oral daily  lidocaine   4% Patch 1 Patch Transdermal daily  losartan 50 milliGRAM(s) Oral daily  methylPREDNISolone sodium succinate Injectable 40 milliGRAM(s) IV Push every 12 hours  pantoprazole    Tablet 40 milliGRAM(s) Oral before breakfast  trimethoprim  160 mG/sulfamethoxazole 800 mG 1 Tablet(s) Oral <User Schedule>    MEDICATIONS  (PRN):  acetaminophen     Tablet .. 650 milliGRAM(s) Oral every 6 hours PRN Temp greater or equal to 38C (100.4F), Mild Pain (1 - 3)  aluminum hydroxide/magnesium hydroxide/simethicone Suspension 30 milliLiter(s) Oral every 4 hours PRN Dyspepsia  cyclobenzaprine 5 milliGRAM(s) Oral three times a day PRN Muscle Spasm  dextrose Oral Gel 15 Gram(s) Oral once PRN Blood Glucose LESS THAN 70 milliGRAM(s)/deciliter  hydrocodone/homatropine Syrup 10 milliLiter(s) Oral every 6 hours PRN Cough  melatonin 3 milliGRAM(s) Oral at bedtime PRN Insomnia  ondansetron Injectable 4 milliGRAM(s) IV Push every 8 hours PRN Nausea and/or Vomiting  oxyCODONE    IR 5 milliGRAM(s) Oral every 6 hours PRN moderate and severe pain      CAPILLARY BLOOD GLUCOSE      POCT Blood Glucose.: 157 mg/dL (30 Jun 2022 09:16)  POCT Blood Glucose.: 174 mg/dL (30 Jun 2022 06:34)  POCT Blood Glucose.: 293 mg/dL (29 Jun 2022 23:15)  POCT Blood Glucose.: 353 mg/dL (29 Jun 2022 21:29)  POCT Blood Glucose.: 348 mg/dL (29 Jun 2022 17:09)  POCT Blood Glucose.: 264 mg/dL (29 Jun 2022 12:52)    I&O's Summary    29 Jun 2022 07:01  -  30 Jun 2022 07:00  --------------------------------------------------------  IN: 1970 mL / OUT: 1400 mL / NET: 570 mL        PHYSICAL EXAM:  Vital Signs Last 24 Hrs  T(C): 36.4 (30 Jun 2022 09:00), Max: 37.1 (30 Jun 2022 05:00)  T(F): 97.5 (30 Jun 2022 09:00), Max: 98.8 (30 Jun 2022 05:00)  HR: 100 (30 Jun 2022 09:00) (98 - 109)  BP: 119/77 (30 Jun 2022 09:00) (119/77 - 134/82)  BP(mean): --  RR: 18 (30 Jun 2022 09:00) (18 - 20)  SpO2: 95% (30 Jun 2022 09:00) (95% - 98%)    PHYSICAL EXAM:   GENERAL: NAD, well-developed  HEAD:  Atraumatic, Normocephalic  EYES:  conjunctiva and sclera clear  NECK: Supple, No JVD  CHEST/LUNG: mild diffuse wheezing  HEART: Regular rate and rhythm;   ABDOMEN: Soft, Nontender, Nondistended; Bowel sounds present  EXTREMITIES:  2+ Peripheral Pulses, No clubbing, cyanosis, or edema  PSYCH: AAOx3  NEUROLOGY: non-focal  SKIN: No rashes or lesions      LABS:                        13.6   12.99 )-----------( 260      ( 30 Jun 2022 07:07 )             41.0     06-30    137  |  97  |  11  ----------------------------<  205<H>  3.4<L>   |  29  |  0.77    Ca    9.3      30 Jun 2022 06:46                  RADIOLOGY & ADDITIONAL TESTS:    Imaging Personally Reviewed:    Electrocardiogram Personally Reviewed:    COORDINATION OF CARE:  Care Discussed with Consultants/Other Providers [Y/N]:  Prior or Outpatient Records Reviewed [Y/N]:

## 2022-07-01 LAB
ANION GAP SERPL CALC-SCNC: 13 MMOL/L — SIGNIFICANT CHANGE UP (ref 5–17)
BUN SERPL-MCNC: 11 MG/DL — SIGNIFICANT CHANGE UP (ref 7–23)
CALCIUM SERPL-MCNC: 9.3 MG/DL — SIGNIFICANT CHANGE UP (ref 8.4–10.5)
CHLORIDE SERPL-SCNC: 95 MMOL/L — LOW (ref 96–108)
CO2 SERPL-SCNC: 27 MMOL/L — SIGNIFICANT CHANGE UP (ref 22–31)
CREAT SERPL-MCNC: 0.77 MG/DL — SIGNIFICANT CHANGE UP (ref 0.5–1.3)
EGFR: 112 ML/MIN/1.73M2 — SIGNIFICANT CHANGE UP
GLUCOSE BLDC GLUCOMTR-MCNC: 189 MG/DL — HIGH (ref 70–99)
GLUCOSE BLDC GLUCOMTR-MCNC: 198 MG/DL — HIGH (ref 70–99)
GLUCOSE BLDC GLUCOMTR-MCNC: 281 MG/DL — HIGH (ref 70–99)
GLUCOSE BLDC GLUCOMTR-MCNC: 297 MG/DL — HIGH (ref 70–99)
GLUCOSE SERPL-MCNC: 223 MG/DL — HIGH (ref 70–99)
HCT VFR BLD CALC: 40.5 % — SIGNIFICANT CHANGE UP (ref 39–50)
HGB BLD-MCNC: 13.7 G/DL — SIGNIFICANT CHANGE UP (ref 13–17)
MCHC RBC-ENTMCNC: 30 PG — SIGNIFICANT CHANGE UP (ref 27–34)
MCHC RBC-ENTMCNC: 33.8 GM/DL — SIGNIFICANT CHANGE UP (ref 32–36)
MCV RBC AUTO: 88.8 FL — SIGNIFICANT CHANGE UP (ref 80–100)
NRBC # BLD: 0 /100 WBCS — SIGNIFICANT CHANGE UP (ref 0–0)
PLATELET # BLD AUTO: 266 K/UL — SIGNIFICANT CHANGE UP (ref 150–400)
POTASSIUM SERPL-MCNC: 4.1 MMOL/L — SIGNIFICANT CHANGE UP (ref 3.5–5.3)
POTASSIUM SERPL-SCNC: 4.1 MMOL/L — SIGNIFICANT CHANGE UP (ref 3.5–5.3)
RBC # BLD: 4.56 M/UL — SIGNIFICANT CHANGE UP (ref 4.2–5.8)
RBC # FLD: 13.5 % — SIGNIFICANT CHANGE UP (ref 10.3–14.5)
SODIUM SERPL-SCNC: 135 MMOL/L — SIGNIFICANT CHANGE UP (ref 135–145)
WBC # BLD: 12.87 K/UL — HIGH (ref 3.8–10.5)
WBC # FLD AUTO: 12.87 K/UL — HIGH (ref 3.8–10.5)

## 2022-07-01 PROCEDURE — 99233 SBSQ HOSP IP/OBS HIGH 50: CPT

## 2022-07-01 PROCEDURE — 99232 SBSQ HOSP IP/OBS MODERATE 35: CPT

## 2022-07-01 PROCEDURE — 71045 X-RAY EXAM CHEST 1 VIEW: CPT | Mod: 26

## 2022-07-01 RX ORDER — INSULIN LISPRO 100/ML
3 VIAL (ML) SUBCUTANEOUS
Refills: 0 | Status: DISCONTINUED | OUTPATIENT
Start: 2022-07-01 | End: 2022-07-02

## 2022-07-01 RX ORDER — BUDESONIDE, MICRONIZED 100 %
0.25 POWDER (GRAM) MISCELLANEOUS EVERY 12 HOURS
Refills: 0 | Status: DISCONTINUED | OUTPATIENT
Start: 2022-07-01 | End: 2022-07-07

## 2022-07-01 RX ADMIN — OXYCODONE HYDROCHLORIDE 5 MILLIGRAM(S): 5 TABLET ORAL at 05:25

## 2022-07-01 RX ADMIN — Medication 40 MILLIGRAM(S): at 05:23

## 2022-07-01 RX ADMIN — Medication 3 UNIT(S): at 09:16

## 2022-07-01 RX ADMIN — Medication 100 MILLIGRAM(S): at 05:24

## 2022-07-01 RX ADMIN — Medication 100 MILLIGRAM(S): at 15:19

## 2022-07-01 RX ADMIN — INSULIN GLARGINE 12 UNIT(S): 100 INJECTION, SOLUTION SUBCUTANEOUS at 21:39

## 2022-07-01 RX ADMIN — Medication 3 MILLILITER(S): at 05:24

## 2022-07-01 RX ADMIN — Medication 40 MILLIGRAM(S): at 18:26

## 2022-07-01 RX ADMIN — Medication 1: at 09:15

## 2022-07-01 RX ADMIN — BUDESONIDE AND FORMOTEROL FUMARATE DIHYDRATE 2 PUFF(S): 160; 4.5 AEROSOL RESPIRATORY (INHALATION) at 18:27

## 2022-07-01 RX ADMIN — LIDOCAINE 1 PATCH: 4 CREAM TOPICAL at 12:36

## 2022-07-01 RX ADMIN — Medication 3: at 12:36

## 2022-07-01 RX ADMIN — LOSARTAN POTASSIUM 50 MILLIGRAM(S): 100 TABLET, FILM COATED ORAL at 05:24

## 2022-07-01 RX ADMIN — Medication 3 MILLILITER(S): at 15:19

## 2022-07-01 RX ADMIN — BUDESONIDE AND FORMOTEROL FUMARATE DIHYDRATE 2 PUFF(S): 160; 4.5 AEROSOL RESPIRATORY (INHALATION) at 05:25

## 2022-07-01 RX ADMIN — Medication 60 MILLIGRAM(S): at 12:34

## 2022-07-01 RX ADMIN — AZITHROMYCIN 500 MILLIGRAM(S): 500 TABLET, FILM COATED ORAL at 12:36

## 2022-07-01 RX ADMIN — OXYCODONE HYDROCHLORIDE 5 MILLIGRAM(S): 5 TABLET ORAL at 22:08

## 2022-07-01 RX ADMIN — Medication 81 MILLIGRAM(S): at 12:35

## 2022-07-01 RX ADMIN — PANTOPRAZOLE SODIUM 40 MILLIGRAM(S): 20 TABLET, DELAYED RELEASE ORAL at 05:24

## 2022-07-01 RX ADMIN — Medication 125 MICROGRAM(S): at 05:24

## 2022-07-01 RX ADMIN — OXYCODONE HYDROCHLORIDE 5 MILLIGRAM(S): 5 TABLET ORAL at 05:55

## 2022-07-01 RX ADMIN — ENOXAPARIN SODIUM 40 MILLIGRAM(S): 100 INJECTION SUBCUTANEOUS at 18:26

## 2022-07-01 RX ADMIN — Medication 3 MILLILITER(S): at 09:16

## 2022-07-01 RX ADMIN — Medication 100 MILLIGRAM(S): at 23:41

## 2022-07-01 RX ADMIN — OXYCODONE HYDROCHLORIDE 5 MILLIGRAM(S): 5 TABLET ORAL at 21:38

## 2022-07-01 RX ADMIN — Medication 3 UNIT(S): at 12:45

## 2022-07-01 RX ADMIN — Medication 1: at 18:25

## 2022-07-01 RX ADMIN — CYCLOBENZAPRINE HYDROCHLORIDE 5 MILLIGRAM(S): 10 TABLET, FILM COATED ORAL at 23:41

## 2022-07-01 RX ADMIN — Medication 0.25 MILLIGRAM(S): at 20:23

## 2022-07-01 RX ADMIN — Medication 3 UNIT(S): at 18:26

## 2022-07-01 RX ADMIN — Medication 3 MILLILITER(S): at 18:26

## 2022-07-01 RX ADMIN — Medication 400 MILLIGRAM(S): at 12:35

## 2022-07-01 RX ADMIN — LIDOCAINE 1 PATCH: 4 CREAM TOPICAL at 19:28

## 2022-07-01 RX ADMIN — Medication 2: at 21:40

## 2022-07-01 RX ADMIN — Medication 3 MILLILITER(S): at 21:38

## 2022-07-01 RX ADMIN — Medication 1 TABLET(S): at 12:35

## 2022-07-01 RX ADMIN — ENOXAPARIN SODIUM 40 MILLIGRAM(S): 100 INJECTION SUBCUTANEOUS at 05:23

## 2022-07-01 NOTE — PROGRESS NOTE ADULT - PROBLEM SELECTOR PLAN 1
cough, SOB, chest tightness x 2 weeks c/w asthma exacerbation in setting of infections with parainfluenza and entero/rhinovirus.   -still having productive cough and episodes of severe wheezing   -02 sat mid 90s  -CXR and CTA reviewed - no consolidation, no PE.   -c/w  IV solumedrol 40mg to q12 per pulm  -c/w azithromycin 500mg x 5 days  -c/w duonebs Q4 ATC  c/w Symbicort bid   -will get CXR Today  -f/u with pulm if can add mucinex, Mg, and nacl nebs  monitor peak flow  pulm following  Tessalon perle TID, hycodan prn cough   tylenol prn, lidocaine patch, oxycodone prn

## 2022-07-01 NOTE — PROGRESS NOTE ADULT - SUBJECTIVE AND OBJECTIVE BOX
Patient is a 47y old  Male who presents with a chief complaint of cough, SOB (30 Jun 2022 12:22)      SUBJECTIVE / OVERNIGHT EVENTS: No On events. Still having coughing fits with severe episodes of wheezing. Denies sob, cp, penaloza, f/c, dizziness.       ADDITIONAL REVIEW OF SYSTEMS: Negative except for above    MEDICATIONS  (STANDING):  albuterol/ipratropium for Nebulization 3 milliLiter(s) Nebulizer every 4 hours  aspirin enteric coated 81 milliGRAM(s) Oral daily  azithromycin   Tablet 500 milliGRAM(s) Oral daily  benzonatate 100 milliGRAM(s) Oral three times a day  budesonide 160 MICROgram(s)/formoterol 4.5 MICROgram(s) Inhaler 2 Puff(s) Inhalation two times a day  dextrose 5%. 1000 milliLiter(s) (50 mL/Hr) IV Continuous <Continuous>  dextrose 5%. 1000 milliLiter(s) (100 mL/Hr) IV Continuous <Continuous>  dextrose 50% Injectable 25 Gram(s) IV Push once  dextrose 50% Injectable 12.5 Gram(s) IV Push once  dextrose 50% Injectable 25 Gram(s) IV Push once  enoxaparin Injectable 40 milliGRAM(s) SubCutaneous every 12 hours  furosemide    Tablet 60 milliGRAM(s) Oral daily  glucagon  Injectable 1 milliGRAM(s) IntraMuscular once  hydroxychloroquine 400 milliGRAM(s) Oral daily  insulin glargine Injectable (LANTUS) 12 Unit(s) SubCutaneous at bedtime  insulin lispro (ADMELOG) corrective regimen sliding scale   SubCutaneous three times a day before meals  insulin lispro (ADMELOG) corrective regimen sliding scale   SubCutaneous at bedtime  insulin lispro Injectable (ADMELOG) 2 Unit(s) SubCutaneous before breakfast  insulin lispro Injectable (ADMELOG) 2 Unit(s) SubCutaneous before lunch  insulin lispro Injectable (ADMELOG) 2 Unit(s) SubCutaneous before dinner  levothyroxine 125 MICROGram(s) Oral daily  lidocaine   4% Patch 1 Patch Transdermal daily  losartan 50 milliGRAM(s) Oral daily  methylPREDNISolone sodium succinate Injectable 40 milliGRAM(s) IV Push every 12 hours  pantoprazole    Tablet 40 milliGRAM(s) Oral before breakfast  trimethoprim  160 mG/sulfamethoxazole 800 mG 1 Tablet(s) Oral <User Schedule>    MEDICATIONS  (PRN):  acetaminophen     Tablet .. 650 milliGRAM(s) Oral every 6 hours PRN Temp greater or equal to 38C (100.4F), Mild Pain (1 - 3)  aluminum hydroxide/magnesium hydroxide/simethicone Suspension 30 milliLiter(s) Oral every 4 hours PRN Dyspepsia  cyclobenzaprine 5 milliGRAM(s) Oral three times a day PRN Muscle Spasm  dextrose Oral Gel 15 Gram(s) Oral once PRN Blood Glucose LESS THAN 70 milliGRAM(s)/deciliter  hydrocodone/homatropine Syrup 10 milliLiter(s) Oral every 6 hours PRN Cough  melatonin 3 milliGRAM(s) Oral at bedtime PRN Insomnia  ondansetron Injectable 4 milliGRAM(s) IV Push every 8 hours PRN Nausea and/or Vomiting  oxyCODONE    IR 5 milliGRAM(s) Oral every 6 hours PRN moderate and severe pain      CAPILLARY BLOOD GLUCOSE      POCT Blood Glucose.: 189 mg/dL (01 Jul 2022 08:30)  POCT Blood Glucose.: 285 mg/dL (30 Jun 2022 21:11)  POCT Blood Glucose.: 202 mg/dL (30 Jun 2022 17:31)  POCT Blood Glucose.: 209 mg/dL (30 Jun 2022 12:26)  POCT Blood Glucose.: 157 mg/dL (30 Jun 2022 09:16)    I&O's Summary    30 Jun 2022 07:01  -  01 Jul 2022 07:00  --------------------------------------------------------  IN: 1280 mL / OUT: 2500 mL / NET: -1220 mL        PHYSICAL EXAM:  Vital Signs Last 24 Hrs  T(C): 36.6 (01 Jul 2022 05:00), Max: 36.9 (30 Jun 2022 21:00)  T(F): 97.9 (01 Jul 2022 05:00), Max: 98.4 (30 Jun 2022 21:00)  HR: 101 (01 Jul 2022 05:00) (100 - 114)  BP: 126/74 (01 Jul 2022 05:00) (102/63 - 126/74)  BP(mean): --  RR: 18 (01 Jul 2022 05:00) (18 - 18)  SpO2: 96% (01 Jul 2022 05:00) (95% - 97%)    PHYSICAL EXAM:  GENERAL: NAD, well-developed on RA  HEAD:  Atraumatic, Normocephalic  EYES:  conjunctiva and sclera clear  NECK: Supple, No JVD  CHEST/LUNG: mild wheezing b/l  HEART: Regular rate and rhythm; No murmurs, rubs, or gallops  ABDOMEN: Soft, Nontender, Nondistended; Bowel sounds present  EXTREMITIES:  2+ Peripheral Pulses, No clubbing, cyanosis, or edema  PSYCH: AAOx3  NEUROLOGY: non-focal  SKIN: No rashes or lesions      LABS:                        13.7   12.87 )-----------( 266      ( 01 Jul 2022 06:32 )             40.5     07-01    135  |  95<L>  |  11  ----------------------------<  223<H>  4.1   |  27  |  0.77    Ca    9.3      01 Jul 2022 06:32                  RADIOLOGY & ADDITIONAL TESTS:    Imaging Personally Reviewed:    Electrocardiogram Personally Reviewed:    COORDINATION OF CARE:  Care Discussed with Consultants/Other Providers [Y/N]:  Prior or Outpatient Records Reviewed [Y/N]:

## 2022-07-01 NOTE — PROGRESS NOTE ADULT - SUBJECTIVE AND OBJECTIVE BOX
[facility] - Division of Pulmonary, Critical Care and Sleep Medicine   Please call [phone] between 8am-pm weekdays, [phone] after hours and weekends    Interval Events: Continues to have intermittent cough and wheezing episodes, feels like he is not getting much better.     REVIEW OF SYSTEMS:  CV: [ ] chest pain   Resp: [ x] cough [ ] shortness of breath   [x ] All other systems negative  [ ] Unable to assess ROS because ________    OBJECTIVE:  ICU Vital Signs Last 24 Hrs  T(C): 36.8 (01 Jul 2022 12:24), Max: 36.9 (30 Jun 2022 21:00)  T(F): 98.2 (01 Jul 2022 12:24), Max: 98.4 (30 Jun 2022 21:00)  HR: 123 (01 Jul 2022 12:24) (101 - 123)  BP: 133/83 (01 Jul 2022 12:24) (115/83 - 133/83)  BP(mean): --  ABP: --  ABP(mean): --  RR: 22 (01 Jul 2022 12:24) (18 - 22)  SpO2: 96% (01 Jul 2022 12:24) (95% - 97%)        06-30 @ 07:01  -  07-01 @ 07:00  --------------------------------------------------------  IN: 1280 mL / OUT: 2500 mL / NET: -1220 mL      CAPILLARY BLOOD GLUCOSE      POCT Blood Glucose.: 281 mg/dL (01 Jul 2022 12:29)      PHYSICAL EXAM:  General: NAD  HEENT: NC/AT  Neck: supple  Respiratory: end expiratory wheezing, no crackles or rhonchi  Cardiovascular:  RRR, no m/r/g  Abdomen: soft, NT/ND, +BS  Extremities: no clubbing, cyanosis or edema, warm  Skin: no rash  Neurological: AAOx3, non focal exam  Psychiatry: not anxious appearing, normal affect and mood    HOSPITAL MEDICATIONS:  MEDICATIONS  (STANDING):  albuterol/ipratropium for Nebulization 3 milliLiter(s) Nebulizer every 4 hours  aspirin enteric coated 81 milliGRAM(s) Oral daily  azithromycin   Tablet 500 milliGRAM(s) Oral daily  benzonatate 100 milliGRAM(s) Oral three times a day  budesonide 160 MICROgram(s)/formoterol 4.5 MICROgram(s) Inhaler 2 Puff(s) Inhalation two times a day  dextrose 5%. 1000 milliLiter(s) (50 mL/Hr) IV Continuous <Continuous>  dextrose 5%. 1000 milliLiter(s) (100 mL/Hr) IV Continuous <Continuous>  dextrose 50% Injectable 25 Gram(s) IV Push once  dextrose 50% Injectable 12.5 Gram(s) IV Push once  dextrose 50% Injectable 25 Gram(s) IV Push once  enoxaparin Injectable 40 milliGRAM(s) SubCutaneous every 12 hours  furosemide    Tablet 60 milliGRAM(s) Oral daily  glucagon  Injectable 1 milliGRAM(s) IntraMuscular once  hydroxychloroquine 400 milliGRAM(s) Oral daily  insulin glargine Injectable (LANTUS) 12 Unit(s) SubCutaneous at bedtime  insulin lispro (ADMELOG) corrective regimen sliding scale   SubCutaneous at bedtime  insulin lispro (ADMELOG) corrective regimen sliding scale   SubCutaneous three times a day before meals  insulin lispro Injectable (ADMELOG) 3 Unit(s) SubCutaneous three times a day with meals  levothyroxine 125 MICROGram(s) Oral daily  lidocaine   4% Patch 1 Patch Transdermal daily  losartan 50 milliGRAM(s) Oral daily  methylPREDNISolone sodium succinate Injectable 40 milliGRAM(s) IV Push every 12 hours  pantoprazole    Tablet 40 milliGRAM(s) Oral before breakfast  trimethoprim  160 mG/sulfamethoxazole 800 mG 1 Tablet(s) Oral <User Schedule>    MEDICATIONS  (PRN):  acetaminophen     Tablet .. 650 milliGRAM(s) Oral every 6 hours PRN Temp greater or equal to 38C (100.4F), Mild Pain (1 - 3)  aluminum hydroxide/magnesium hydroxide/simethicone Suspension 30 milliLiter(s) Oral every 4 hours PRN Dyspepsia  cyclobenzaprine 5 milliGRAM(s) Oral three times a day PRN Muscle Spasm  dextrose Oral Gel 15 Gram(s) Oral once PRN Blood Glucose LESS THAN 70 milliGRAM(s)/deciliter  hydrocodone/homatropine Syrup 10 milliLiter(s) Oral every 6 hours PRN Cough  melatonin 3 milliGRAM(s) Oral at bedtime PRN Insomnia  ondansetron Injectable 4 milliGRAM(s) IV Push every 8 hours PRN Nausea and/or Vomiting  oxyCODONE    IR 5 milliGRAM(s) Oral every 6 hours PRN moderate and severe pain      LABS:                        13.7   12.87 )-----------( 266      ( 01 Jul 2022 06:32 )             40.5     Hgb Trend: 13.7<--, 13.6<--, 14.0<--, 13.0<--, 13.3<--  07-01    135  |  95<L>  |  11  ----------------------------<  223<H>  4.1   |  27  |  0.77    Ca    9.3      01 Jul 2022 06:32      Creatinine Trend: 0.77<--, 0.77<--, 0.71<--, 0.84<--, 0.74<--, 0.77<--            MICROBIOLOGY:     RADIOLOGY:  [ x] Reviewed and interpreted by me  CXR - clear

## 2022-07-01 NOTE — PROGRESS NOTE ADULT - PROBLEM SELECTOR PLAN 6
lovenox for VTE ppx   DASH diet   fall precautions    discussed with NP S[place]     Dispo: pending improvement of asthma

## 2022-07-01 NOTE — PROGRESS NOTE ADULT - PROBLEM SELECTOR PLAN 2
A1c -8.3 - now new onset DM  improved FS and tapering steroids  monitor FS  c/w lantus  12 units qhs  increase premeal to 3 admelog TID  ISS for coverage

## 2022-07-01 NOTE — PROGRESS NOTE ADULT - ASSESSMENT
47M with PMH of SLE on chronic steroids and Salphnelo, mild/moderate asthma, hypothyroidism, COVID infection in Jan 2021, HTN p/w cough and SOB x2 weeks. Presented to [facility] ED on 6/14 for these symptoms, diagnosed with asthma exacerbation and discharged with steroids and nebulizers. His symptoms have been getting progressively worse. Has ongoing paroxysms of productive cough, with associated chest and back pain, WAITE and wheezing. Has been using home nebulizers every 4-6 hours without improvement in symptoms. Pt is chronically on solumedrol 16mg daily for SLE, was seen by Dr. Gonzales on 6/24 and steroids were changed to prednisone 40mg. CT angio performed which did not show PE or other acute change.   RVP positive parainfluenza and entero/rhinovirus. Being treated with Solumedrol 40 Q 8 hours, Tessalon perles, Hycodan every 6 hours  Not hypoxic, initial VBG WNL.   CTPA negative for PE, effusion or pneumonia.    A/P: Asthma exacerbation - Bronchospasm and cough in a patient with viral LRTI and underlying asthma  c/w Solumedrol at current dose as well as tessalon perles- c/w Solumedrol to 40 mg BID   c/w Azithro x5 days  c/w Symbicort 160/4.5 mg BID, Mesfin, will add Budesonide nebs BID for next 3 days and monitor for improvement. CXR this morning is clear  c/w Hycodan dose to 10 ml every 8 hours  Elevated FS related to Steroids - on Lispro TIDAC and SS coverage  Monitor O2 sats, goal 92-96%  DVT ppx  Ambulate as tolerated  Explained that improvement will also take time but overall he is feeling better.    Above was discussed with the patient at length, all questions answered.       d/w Dr. West

## 2022-07-01 NOTE — CHART NOTE - NSCHARTNOTEFT_GEN_A_CORE
MEDICINE NP NOTE  Spectra [id number]      Called by RN for patient with complaints of it being more difficult to breath today.  Patient was seen and examined at the bedside.  Patient states that he just needs someone to talk to because he's concerned that he's not improving as quickly as he thought he would and no one seems to be able to tell him why.  This writer reviewed the treatment plan with him and the recommendations from pulmonary.  CXR that was ordered this morning, preliminary read is clear lungs.  Patient goes on to state that its humid and hot in the room. Validated patient's feelings and asked nursing to call engineering to see if there's anything that can be done.  On exam noted to have some expiratory wheezes on the Right side which are consistent with previous exams.  Patient is sating 97% on RA, was placed on 2LNC by nursing for comfort.  Slightly tachycardiac to the low 110s, likely a side effect from the albuterol, patient states that he does not want albuterol switched because it helps him.  Patient was in no apparent distress when this writer left the patient's bedside.

## 2022-07-02 LAB
ANION GAP SERPL CALC-SCNC: 14 MMOL/L — SIGNIFICANT CHANGE UP (ref 5–17)
BUN SERPL-MCNC: 10 MG/DL — SIGNIFICANT CHANGE UP (ref 7–23)
CALCIUM SERPL-MCNC: 9.3 MG/DL — SIGNIFICANT CHANGE UP (ref 8.4–10.5)
CHLORIDE SERPL-SCNC: 96 MMOL/L — SIGNIFICANT CHANGE UP (ref 96–108)
CO2 SERPL-SCNC: 29 MMOL/L — SIGNIFICANT CHANGE UP (ref 22–31)
CREAT SERPL-MCNC: 0.8 MG/DL — SIGNIFICANT CHANGE UP (ref 0.5–1.3)
EGFR: 111 ML/MIN/1.73M2 — SIGNIFICANT CHANGE UP
GLUCOSE BLDC GLUCOMTR-MCNC: 151 MG/DL — HIGH (ref 70–99)
GLUCOSE BLDC GLUCOMTR-MCNC: 184 MG/DL — HIGH (ref 70–99)
GLUCOSE BLDC GLUCOMTR-MCNC: 251 MG/DL — HIGH (ref 70–99)
GLUCOSE BLDC GLUCOMTR-MCNC: 297 MG/DL — HIGH (ref 70–99)
GLUCOSE BLDC GLUCOMTR-MCNC: 309 MG/DL — HIGH (ref 70–99)
GLUCOSE SERPL-MCNC: 169 MG/DL — HIGH (ref 70–99)
HCT VFR BLD CALC: 40.4 % — SIGNIFICANT CHANGE UP (ref 39–50)
HGB BLD-MCNC: 13.6 G/DL — SIGNIFICANT CHANGE UP (ref 13–17)
MCHC RBC-ENTMCNC: 29.6 PG — SIGNIFICANT CHANGE UP (ref 27–34)
MCHC RBC-ENTMCNC: 33.7 GM/DL — SIGNIFICANT CHANGE UP (ref 32–36)
MCV RBC AUTO: 88 FL — SIGNIFICANT CHANGE UP (ref 80–100)
NRBC # BLD: 0 /100 WBCS — SIGNIFICANT CHANGE UP (ref 0–0)
PLATELET # BLD AUTO: 264 K/UL — SIGNIFICANT CHANGE UP (ref 150–400)
POTASSIUM SERPL-MCNC: 4 MMOL/L — SIGNIFICANT CHANGE UP (ref 3.5–5.3)
POTASSIUM SERPL-SCNC: 4 MMOL/L — SIGNIFICANT CHANGE UP (ref 3.5–5.3)
RBC # BLD: 4.59 M/UL — SIGNIFICANT CHANGE UP (ref 4.2–5.8)
RBC # FLD: 13.6 % — SIGNIFICANT CHANGE UP (ref 10.3–14.5)
SARS-COV-2 RNA SPEC QL NAA+PROBE: SIGNIFICANT CHANGE UP
SODIUM SERPL-SCNC: 139 MMOL/L — SIGNIFICANT CHANGE UP (ref 135–145)
WBC # BLD: 12.63 K/UL — HIGH (ref 3.8–10.5)
WBC # FLD AUTO: 12.63 K/UL — HIGH (ref 3.8–10.5)

## 2022-07-02 PROCEDURE — 99233 SBSQ HOSP IP/OBS HIGH 50: CPT

## 2022-07-02 PROCEDURE — 99233 SBSQ HOSP IP/OBS HIGH 50: CPT | Mod: GC

## 2022-07-02 RX ORDER — INSULIN LISPRO 100/ML
5 VIAL (ML) SUBCUTANEOUS
Refills: 0 | Status: DISCONTINUED | OUTPATIENT
Start: 2022-07-02 | End: 2022-07-02

## 2022-07-02 RX ORDER — ALPRAZOLAM 0.25 MG
0.5 TABLET ORAL THREE TIMES A DAY
Refills: 0 | Status: DISCONTINUED | OUTPATIENT
Start: 2022-07-02 | End: 2022-07-09

## 2022-07-02 RX ORDER — INSULIN GLARGINE 100 [IU]/ML
16 INJECTION, SOLUTION SUBCUTANEOUS AT BEDTIME
Refills: 0 | Status: DISCONTINUED | OUTPATIENT
Start: 2022-07-02 | End: 2022-07-03

## 2022-07-02 RX ORDER — TIOTROPIUM BROMIDE 18 UG/1
1 CAPSULE ORAL; RESPIRATORY (INHALATION) DAILY
Refills: 0 | Status: DISCONTINUED | OUTPATIENT
Start: 2022-07-02 | End: 2022-07-11

## 2022-07-02 RX ORDER — FLUTICASONE PROPIONATE 50 MCG
2 SPRAY, SUSPENSION NASAL
Refills: 0 | Status: DISCONTINUED | OUTPATIENT
Start: 2022-07-02 | End: 2022-07-11

## 2022-07-02 RX ORDER — INSULIN LISPRO 100/ML
4 VIAL (ML) SUBCUTANEOUS
Refills: 0 | Status: DISCONTINUED | OUTPATIENT
Start: 2022-07-02 | End: 2022-07-03

## 2022-07-02 RX ADMIN — CYCLOBENZAPRINE HYDROCHLORIDE 5 MILLIGRAM(S): 10 TABLET, FILM COATED ORAL at 22:10

## 2022-07-02 RX ADMIN — Medication 125 MICROGRAM(S): at 06:16

## 2022-07-02 RX ADMIN — PANTOPRAZOLE SODIUM 40 MILLIGRAM(S): 20 TABLET, DELAYED RELEASE ORAL at 06:18

## 2022-07-02 RX ADMIN — INSULIN GLARGINE 16 UNIT(S): 100 INJECTION, SOLUTION SUBCUTANEOUS at 22:07

## 2022-07-02 RX ADMIN — Medication 4: at 12:45

## 2022-07-02 RX ADMIN — Medication 3 MILLILITER(S): at 06:17

## 2022-07-02 RX ADMIN — Medication 3 MILLILITER(S): at 22:08

## 2022-07-02 RX ADMIN — Medication 1: at 09:17

## 2022-07-02 RX ADMIN — ENOXAPARIN SODIUM 40 MILLIGRAM(S): 100 INJECTION SUBCUTANEOUS at 06:18

## 2022-07-02 RX ADMIN — Medication 40 MILLIGRAM(S): at 17:53

## 2022-07-02 RX ADMIN — Medication 3 MILLILITER(S): at 17:53

## 2022-07-02 RX ADMIN — AZITHROMYCIN 500 MILLIGRAM(S): 500 TABLET, FILM COATED ORAL at 12:44

## 2022-07-02 RX ADMIN — OXYCODONE HYDROCHLORIDE 5 MILLIGRAM(S): 5 TABLET ORAL at 20:42

## 2022-07-02 RX ADMIN — Medication 3: at 17:54

## 2022-07-02 RX ADMIN — OXYCODONE HYDROCHLORIDE 5 MILLIGRAM(S): 5 TABLET ORAL at 19:42

## 2022-07-02 RX ADMIN — Medication 650 MILLIGRAM(S): at 12:46

## 2022-07-02 RX ADMIN — Medication 3 MILLILITER(S): at 09:17

## 2022-07-02 RX ADMIN — Medication 3 UNIT(S): at 17:54

## 2022-07-02 RX ADMIN — Medication 100 MILLIGRAM(S): at 22:09

## 2022-07-02 RX ADMIN — ENOXAPARIN SODIUM 40 MILLIGRAM(S): 100 INJECTION SUBCUTANEOUS at 17:53

## 2022-07-02 RX ADMIN — LOSARTAN POTASSIUM 50 MILLIGRAM(S): 100 TABLET, FILM COATED ORAL at 06:17

## 2022-07-02 RX ADMIN — Medication 60 MILLIGRAM(S): at 12:43

## 2022-07-02 RX ADMIN — Medication 3 MILLILITER(S): at 12:44

## 2022-07-02 RX ADMIN — LIDOCAINE 1 PATCH: 4 CREAM TOPICAL at 00:35

## 2022-07-02 RX ADMIN — Medication 100 MILLIGRAM(S): at 06:19

## 2022-07-02 RX ADMIN — OXYCODONE HYDROCHLORIDE 5 MILLIGRAM(S): 5 TABLET ORAL at 06:22

## 2022-07-02 RX ADMIN — BUDESONIDE AND FORMOTEROL FUMARATE DIHYDRATE 2 PUFF(S): 160; 4.5 AEROSOL RESPIRATORY (INHALATION) at 06:18

## 2022-07-02 RX ADMIN — Medication 0.25 MILLIGRAM(S): at 17:53

## 2022-07-02 RX ADMIN — Medication 3 UNIT(S): at 12:46

## 2022-07-02 RX ADMIN — BUDESONIDE AND FORMOTEROL FUMARATE DIHYDRATE 2 PUFF(S): 160; 4.5 AEROSOL RESPIRATORY (INHALATION) at 18:02

## 2022-07-02 RX ADMIN — Medication 2 SPRAY(S): at 17:53

## 2022-07-02 RX ADMIN — Medication 81 MILLIGRAM(S): at 12:44

## 2022-07-02 RX ADMIN — Medication 100 MILLIGRAM(S): at 12:44

## 2022-07-02 RX ADMIN — Medication 2: at 22:08

## 2022-07-02 RX ADMIN — Medication 400 MILLIGRAM(S): at 12:44

## 2022-07-02 RX ADMIN — Medication 650 MILLIGRAM(S): at 13:20

## 2022-07-02 RX ADMIN — OXYCODONE HYDROCHLORIDE 5 MILLIGRAM(S): 5 TABLET ORAL at 06:52

## 2022-07-02 RX ADMIN — Medication 40 MILLIGRAM(S): at 06:17

## 2022-07-02 RX ADMIN — Medication 0.25 MILLIGRAM(S): at 06:17

## 2022-07-02 NOTE — PROGRESS NOTE ADULT - SUBJECTIVE AND OBJECTIVE BOX
Interval Events:  Coughing. Triggers sob, wheezing.      REVIEW OF SYSTEMS:  Constitutional: No fevers or chills. No weight loss. No fatigue or generalised malaise.  Eyes: No itching or discharge from the eyes  ENT: No ear pain. No ear discharge. No nasal congestion. No post nasal drip. No epistaxis. No throat pain. No sore throat. No difficulty swallowing.   CV: No chest pain. No palpitations. No lightheadedness or dizziness.   Resp: HPI  GI: No nausea. No vomiting. No diarrhea.  MSK: No joint pain or pain in any extremities  Integumentary: No skin lesions. No pedal edema.  Neurological: No gross motor weakness. No sensory changes.  [x ] All other systems negative  [ ] Unable to assess ROS because ________    OBJECTIVE:  ICU Vital Signs Last 24 Hrs  T(C): 36.6 (02 Jul 2022 12:30), Max: 36.7 (01 Jul 2022 17:00)  T(F): 97.9 (02 Jul 2022 12:30), Max: 98.1 (01 Jul 2022 17:00)  HR: 100 (02 Jul 2022 12:30) (100 - 111)  BP: 121/77 (02 Jul 2022 12:30) (117/76 - 124/71)  BP(mean): --  ABP: --  ABP(mean): --  RR: 18 (02 Jul 2022 12:30) (18 - 20)  SpO2: 96% (02 Jul 2022 12:30) (95% - 98%)        07-01 @ 07:01  -  07-02 @ 07:00  --------------------------------------------------------  IN: 1560 mL / OUT: 2100 mL / NET: -540 mL      CAPILLARY BLOOD GLUCOSE      POCT Blood Glucose.: 309 mg/dL (02 Jul 2022 12:34)      PHYSICAL EXAM:  General: Awake, alert, oriented X 3.   HEENT: cushing  Lymph Nodes: No palpable lymphadenopathy  Neck: No JVD. No carotid bruit.   Respiratory: Normal chest expansion                         Normal percussion                         Normal and equal air entry                         few exp wheezes  Cardiovascular: S1 S2 normal. No murmurs, rubs or gallops.   Abdomen: Soft, non-tender, non-distended. No organomegaly.  Extremities: Warm to touch. Peripheral pulse palpable. No pedal edema.   Skin: No rashes or skin lesions  Neurological: Motor and sensory examination equal and normal in all four extremities.  Psychiatry: Appropriate mood and affect.    HOSPITAL MEDICATIONS:  MEDICATIONS  (STANDING):  albuterol/ipratropium for Nebulization 3 milliLiter(s) Nebulizer every 4 hours  aspirin enteric coated 81 milliGRAM(s) Oral daily  azithromycin   Tablet 500 milliGRAM(s) Oral daily  benzonatate 100 milliGRAM(s) Oral three times a day  buDESOnide    Inhalation Suspension 0.25 milliGRAM(s) Inhalation every 12 hours  budesonide 160 MICROgram(s)/formoterol 4.5 MICROgram(s) Inhaler 2 Puff(s) Inhalation two times a day  dextrose 5%. 1000 milliLiter(s) (50 mL/Hr) IV Continuous <Continuous>  dextrose 5%. 1000 milliLiter(s) (100 mL/Hr) IV Continuous <Continuous>  dextrose 50% Injectable 25 Gram(s) IV Push once  dextrose 50% Injectable 12.5 Gram(s) IV Push once  dextrose 50% Injectable 25 Gram(s) IV Push once  enoxaparin Injectable 40 milliGRAM(s) SubCutaneous every 12 hours  furosemide    Tablet 60 milliGRAM(s) Oral daily  glucagon  Injectable 1 milliGRAM(s) IntraMuscular once  hydroxychloroquine 400 milliGRAM(s) Oral daily  insulin glargine Injectable (LANTUS) 12 Unit(s) SubCutaneous at bedtime  insulin lispro (ADMELOG) corrective regimen sliding scale   SubCutaneous at bedtime  insulin lispro (ADMELOG) corrective regimen sliding scale   SubCutaneous three times a day before meals  insulin lispro Injectable (ADMELOG) 3 Unit(s) SubCutaneous three times a day with meals  levothyroxine 125 MICROGram(s) Oral daily  lidocaine   4% Patch 1 Patch Transdermal daily  losartan 50 milliGRAM(s) Oral daily  methylPREDNISolone sodium succinate Injectable 40 milliGRAM(s) IV Push every 12 hours  pantoprazole    Tablet 40 milliGRAM(s) Oral before breakfast  trimethoprim  160 mG/sulfamethoxazole 800 mG 1 Tablet(s) Oral <User Schedule>    MEDICATIONS  (PRN):  acetaminophen     Tablet .. 650 milliGRAM(s) Oral every 6 hours PRN Temp greater or equal to 38C (100.4F), Mild Pain (1 - 3)  aluminum hydroxide/magnesium hydroxide/simethicone Suspension 30 milliLiter(s) Oral every 4 hours PRN Dyspepsia  cyclobenzaprine 5 milliGRAM(s) Oral three times a day PRN Muscle Spasm  dextrose Oral Gel 15 Gram(s) Oral once PRN Blood Glucose LESS THAN 70 milliGRAM(s)/deciliter  hydrocodone/homatropine Syrup 10 milliLiter(s) Oral every 6 hours PRN Cough  melatonin 3 milliGRAM(s) Oral at bedtime PRN Insomnia  ondansetron Injectable 4 milliGRAM(s) IV Push every 8 hours PRN Nausea and/or Vomiting  oxyCODONE    IR 5 milliGRAM(s) Oral every 6 hours PRN moderate and severe pain      LABS:                        13.6   12.63 )-----------( 264      ( 02 Jul 2022 06:45 )             40.4     07-02    139  |  96  |  10  ----------------------------<  169<H>  4.0   |  29  |  0.80    Ca    9.3      02 Jul 2022 06:44                MICROBIOLOGY:     RADIOLOGY:  [ ] Reviewed and interpreted by me    Point of Care Ultrasound Findings:    PFT:    EKG:

## 2022-07-02 NOTE — PROGRESS NOTE ADULT - ASSESSMENT
SLE, asthma, chronic steroids  Parainfluenza infection with asthma exacerbation  Also likely upper airway component  0xygen is stable. Lungs now are nearly clear, good movement. Says wheezing triggered by cough    Currently on medrol 40 IV bid. Plan to taper slowly  Please add nasal fluticasone for upper airway component. Pt actually takes nasacort at home, has been off it here  Also, pt takes Trelegy at home, here on symbicort -- please add Spiriva as well, to make the equivalent medication  He is also getting added budesonide for 3 days to assist

## 2022-07-02 NOTE — PROGRESS NOTE ADULT - SUBJECTIVE AND OBJECTIVE BOX
Patient is a 47y old  Male who presents with a chief complaint of cough, SOB (02 Jul 2022 15:36)        SUBJECTIVE / OVERNIGHT EVENTS: patient reports intermittent cough and wheeze and sob still there, but a little better.       MEDICATIONS  (STANDING):  albuterol/ipratropium for Nebulization 3 milliLiter(s) Nebulizer every 4 hours  aspirin enteric coated 81 milliGRAM(s) Oral daily  azithromycin   Tablet 500 milliGRAM(s) Oral daily  benzonatate 100 milliGRAM(s) Oral three times a day  buDESOnide    Inhalation Suspension 0.25 milliGRAM(s) Inhalation every 12 hours  budesonide 160 MICROgram(s)/formoterol 4.5 MICROgram(s) Inhaler 2 Puff(s) Inhalation two times a day  dextrose 5%. 1000 milliLiter(s) (50 mL/Hr) IV Continuous <Continuous>  dextrose 5%. 1000 milliLiter(s) (100 mL/Hr) IV Continuous <Continuous>  dextrose 50% Injectable 25 Gram(s) IV Push once  dextrose 50% Injectable 12.5 Gram(s) IV Push once  dextrose 50% Injectable 25 Gram(s) IV Push once  enoxaparin Injectable 40 milliGRAM(s) SubCutaneous every 12 hours  fluticasone propionate 50 MICROgram(s)/spray Nasal Spray 2 Spray(s) Both Nostrils two times a day  furosemide    Tablet 60 milliGRAM(s) Oral daily  glucagon  Injectable 1 milliGRAM(s) IntraMuscular once  hydroxychloroquine 400 milliGRAM(s) Oral daily  insulin glargine Injectable (LANTUS) 16 Unit(s) SubCutaneous at bedtime  insulin lispro (ADMELOG) corrective regimen sliding scale   SubCutaneous at bedtime  insulin lispro (ADMELOG) corrective regimen sliding scale   SubCutaneous three times a day before meals  insulin lispro Injectable (ADMELOG) 4 Unit(s) SubCutaneous three times a day with meals  levothyroxine 125 MICROGram(s) Oral daily  lidocaine   4% Patch 1 Patch Transdermal daily  losartan 50 milliGRAM(s) Oral daily  methylPREDNISolone sodium succinate Injectable 40 milliGRAM(s) IV Push every 12 hours  pantoprazole    Tablet 40 milliGRAM(s) Oral before breakfast  tiotropium 18 MICROgram(s) Capsule 1 Capsule(s) Inhalation daily  trimethoprim  160 mG/sulfamethoxazole 800 mG 1 Tablet(s) Oral <User Schedule>    MEDICATIONS  (PRN):  acetaminophen     Tablet .. 650 milliGRAM(s) Oral every 6 hours PRN Temp greater or equal to 38C (100.4F), Mild Pain (1 - 3)  ALPRAZolam 0.5 milliGRAM(s) Oral three times a day PRN anxiety  aluminum hydroxide/magnesium hydroxide/simethicone Suspension 30 milliLiter(s) Oral every 4 hours PRN Dyspepsia  cyclobenzaprine 5 milliGRAM(s) Oral three times a day PRN Muscle Spasm  dextrose Oral Gel 15 Gram(s) Oral once PRN Blood Glucose LESS THAN 70 milliGRAM(s)/deciliter  hydrocodone/homatropine Syrup 10 milliLiter(s) Oral every 6 hours PRN Cough  melatonin 3 milliGRAM(s) Oral at bedtime PRN Insomnia  ondansetron Injectable 4 milliGRAM(s) IV Push every 8 hours PRN Nausea and/or Vomiting  oxyCODONE    IR 5 milliGRAM(s) Oral every 6 hours PRN moderate and severe pain      Vital Signs Last 24 Hrs  T(C): 36.6 (02 Jul 2022 17:00), Max: 36.7 (01 Jul 2022 21:00)  T(F): 97.9 (02 Jul 2022 17:00), Max: 98.1 (01 Jul 2022 21:00)  HR: 102 (02 Jul 2022 17:00) (100 - 111)  BP: 128/86 (02 Jul 2022 17:00) (117/76 - 128/86)  BP(mean): --  RR: 18 (02 Jul 2022 17:00) (18 - 20)  SpO2: 96% (02 Jul 2022 17:00) (95% - 98%)  CAPILLARY BLOOD GLUCOSE      POCT Blood Glucose.: 251 mg/dL (02 Jul 2022 17:25)  POCT Blood Glucose.: 309 mg/dL (02 Jul 2022 12:34)  POCT Blood Glucose.: 184 mg/dL (02 Jul 2022 09:06)  POCT Blood Glucose.: 151 mg/dL (02 Jul 2022 06:27)  POCT Blood Glucose.: 297 mg/dL (01 Jul 2022 21:36)    I&O's Summary    01 Jul 2022 07:01  -  02 Jul 2022 07:00  --------------------------------------------------------  IN: 1560 mL / OUT: 2100 mL / NET: -540 mL    02 Jul 2022 07:01  -  02 Jul 2022 20:33  --------------------------------------------------------  IN: 720 mL / OUT: 0 mL / NET: 720 mL          PHYSICAL EXAM:   GENERAL: NAD, well-developed  EYES: EOMI, PERRLA, conjunctiva and sclera clear  NECK: Supple   CHEST/LUNG: distant bs  HEART: S1S2 normal. Regular rate and rhythm; No murmurs, rubs, or gallops  ABDOMEN: Soft, Nontender, Nondistended; Bowel sounds present  EXTREMITIES:    No clubbing, cyanosis, or edema  PSYCH/Neuro: AAOx3. Non-focal.   SKIN: No rashes or lesions      LABS:                        13.6   12.63 )-----------( 264      ( 02 Jul 2022 06:45 )             40.4     07-02    139  |  96  |  10  ----------------------------<  169<H>  4.0   |  29  |  0.80    Ca    9.3      02 Jul 2022 06:44                  RADIOLOGY & ADDITIONAL TESTS:    Imaging Personally Reviewed:  Consultant(s) Notes Reviewed:  pulm  Care Discussed with Consultants/Other Providers:

## 2022-07-02 NOTE — PROGRESS NOTE ADULT - PROBLEM SELECTOR PLAN 2
A1c -8.3 - now new onset DM  improved FS and tapering steroids  monitor FS  c/w lantus  to 16 units qhs  increase premeal to 4 admelog TID  ISS for coverage  -RD eval.

## 2022-07-02 NOTE — PROGRESS NOTE ADULT - PROBLEM SELECTOR PLAN 1
cough, SOB, chest tightness x 2 weeks c/w asthma exacerbation in setting of infections with parainfluenza and entero/rhinovirus.   -still having productive cough and episodes of severe wheezing   -02 sat mid 90s  -CXR and CTA reviewed - no consolidation, no PE.   -c/w  IV solumedrol 40mg to q12 per pulm  -c/w azithromycin 500mg x 5 days  -c/w duonebs Q4 ATC  -spiriva and fluticasone per pulm.   c/w Symbicort bid   -f/u with pulm if can add mucinex, Mg, and nacl nebs  monitor peak flow  pulm following  Tessalon perle TID, hycodan prn cough   tylenol prn, lidocaine patch, oxycodone prn  -xanax prn anxiety.

## 2022-07-02 NOTE — PROGRESS NOTE ADULT - PROBLEM SELECTOR PLAN 6
lovenox for VTE ppx   DASH diet   fall precautions    discussed with patient and his wife.     Dispo: pending improvement of asthma

## 2022-07-03 LAB
GLUCOSE BLDC GLUCOMTR-MCNC: 223 MG/DL — HIGH (ref 70–99)
GLUCOSE BLDC GLUCOMTR-MCNC: 238 MG/DL — HIGH (ref 70–99)
GLUCOSE BLDC GLUCOMTR-MCNC: 241 MG/DL — HIGH (ref 70–99)
GLUCOSE BLDC GLUCOMTR-MCNC: 261 MG/DL — HIGH (ref 70–99)
GLUCOSE BLDC GLUCOMTR-MCNC: 317 MG/DL — HIGH (ref 70–99)
HCT VFR BLD CALC: 41.9 % — SIGNIFICANT CHANGE UP (ref 39–50)
HGB BLD-MCNC: 14.1 G/DL — SIGNIFICANT CHANGE UP (ref 13–17)
MCHC RBC-ENTMCNC: 30.3 PG — SIGNIFICANT CHANGE UP (ref 27–34)
MCHC RBC-ENTMCNC: 33.7 GM/DL — SIGNIFICANT CHANGE UP (ref 32–36)
MCV RBC AUTO: 90.1 FL — SIGNIFICANT CHANGE UP (ref 80–100)
NRBC # BLD: 0 /100 WBCS — SIGNIFICANT CHANGE UP (ref 0–0)
PLATELET # BLD AUTO: 286 K/UL — SIGNIFICANT CHANGE UP (ref 150–400)
RBC # BLD: 4.65 M/UL — SIGNIFICANT CHANGE UP (ref 4.2–5.8)
RBC # FLD: 13.6 % — SIGNIFICANT CHANGE UP (ref 10.3–14.5)
WBC # BLD: 15.71 K/UL — HIGH (ref 3.8–10.5)
WBC # FLD AUTO: 15.71 K/UL — HIGH (ref 3.8–10.5)

## 2022-07-03 PROCEDURE — 99232 SBSQ HOSP IP/OBS MODERATE 35: CPT | Mod: GC

## 2022-07-03 RX ORDER — INSULIN GLARGINE 100 [IU]/ML
18 INJECTION, SOLUTION SUBCUTANEOUS AT BEDTIME
Refills: 0 | Status: DISCONTINUED | OUTPATIENT
Start: 2022-07-03 | End: 2022-07-04

## 2022-07-03 RX ORDER — INSULIN LISPRO 100/ML
5 VIAL (ML) SUBCUTANEOUS
Refills: 0 | Status: DISCONTINUED | OUTPATIENT
Start: 2022-07-03 | End: 2022-07-04

## 2022-07-03 RX ORDER — OXYCODONE HYDROCHLORIDE 5 MG/1
5 TABLET ORAL EVERY 6 HOURS
Refills: 0 | Status: DISCONTINUED | OUTPATIENT
Start: 2022-07-04 | End: 2022-07-09

## 2022-07-03 RX ADMIN — Medication 100 MILLIGRAM(S): at 23:00

## 2022-07-03 RX ADMIN — AZITHROMYCIN 500 MILLIGRAM(S): 500 TABLET, FILM COATED ORAL at 12:26

## 2022-07-03 RX ADMIN — BUDESONIDE AND FORMOTEROL FUMARATE DIHYDRATE 2 PUFF(S): 160; 4.5 AEROSOL RESPIRATORY (INHALATION) at 17:32

## 2022-07-03 RX ADMIN — OXYCODONE HYDROCHLORIDE 5 MILLIGRAM(S): 5 TABLET ORAL at 23:00

## 2022-07-03 RX ADMIN — Medication 400 MILLIGRAM(S): at 12:25

## 2022-07-03 RX ADMIN — Medication 3 MILLILITER(S): at 21:07

## 2022-07-03 RX ADMIN — Medication 4: at 21:07

## 2022-07-03 RX ADMIN — Medication 60 MILLIGRAM(S): at 12:25

## 2022-07-03 RX ADMIN — Medication 1 TABLET(S): at 12:25

## 2022-07-03 RX ADMIN — OXYCODONE HYDROCHLORIDE 5 MILLIGRAM(S): 5 TABLET ORAL at 23:51

## 2022-07-03 RX ADMIN — PANTOPRAZOLE SODIUM 40 MILLIGRAM(S): 20 TABLET, DELAYED RELEASE ORAL at 05:45

## 2022-07-03 RX ADMIN — Medication 3: at 12:37

## 2022-07-03 RX ADMIN — INSULIN GLARGINE 18 UNIT(S): 100 INJECTION, SOLUTION SUBCUTANEOUS at 21:06

## 2022-07-03 RX ADMIN — Medication 100 MILLIGRAM(S): at 05:30

## 2022-07-03 RX ADMIN — Medication 2: at 08:59

## 2022-07-03 RX ADMIN — Medication 4 UNIT(S): at 08:59

## 2022-07-03 RX ADMIN — Medication 2 SPRAY(S): at 17:32

## 2022-07-03 RX ADMIN — OXYCODONE HYDROCHLORIDE 5 MILLIGRAM(S): 5 TABLET ORAL at 06:36

## 2022-07-03 RX ADMIN — Medication 3 MILLILITER(S): at 17:33

## 2022-07-03 RX ADMIN — Medication 2: at 17:33

## 2022-07-03 RX ADMIN — ENOXAPARIN SODIUM 40 MILLIGRAM(S): 100 INJECTION SUBCUTANEOUS at 17:33

## 2022-07-03 RX ADMIN — Medication 0.25 MILLIGRAM(S): at 17:33

## 2022-07-03 RX ADMIN — CYCLOBENZAPRINE HYDROCHLORIDE 5 MILLIGRAM(S): 10 TABLET, FILM COATED ORAL at 14:08

## 2022-07-03 RX ADMIN — Medication 40 MILLIGRAM(S): at 05:42

## 2022-07-03 RX ADMIN — Medication 3 MILLILITER(S): at 05:26

## 2022-07-03 RX ADMIN — Medication 81 MILLIGRAM(S): at 12:26

## 2022-07-03 RX ADMIN — TIOTROPIUM BROMIDE 1 CAPSULE(S): 18 CAPSULE ORAL; RESPIRATORY (INHALATION) at 12:39

## 2022-07-03 RX ADMIN — Medication 0.5 MILLIGRAM(S): at 11:36

## 2022-07-03 RX ADMIN — Medication 100 MILLIGRAM(S): at 12:26

## 2022-07-03 RX ADMIN — Medication 3 MILLILITER(S): at 09:00

## 2022-07-03 RX ADMIN — Medication 4 UNIT(S): at 12:37

## 2022-07-03 RX ADMIN — Medication 0.25 MILLIGRAM(S): at 05:26

## 2022-07-03 RX ADMIN — BUDESONIDE AND FORMOTEROL FUMARATE DIHYDRATE 2 PUFF(S): 160; 4.5 AEROSOL RESPIRATORY (INHALATION) at 05:31

## 2022-07-03 RX ADMIN — Medication 3 MILLILITER(S): at 14:08

## 2022-07-03 RX ADMIN — Medication 2 SPRAY(S): at 05:31

## 2022-07-03 RX ADMIN — Medication 125 MICROGRAM(S): at 05:26

## 2022-07-03 RX ADMIN — LOSARTAN POTASSIUM 50 MILLIGRAM(S): 100 TABLET, FILM COATED ORAL at 05:30

## 2022-07-03 RX ADMIN — ENOXAPARIN SODIUM 40 MILLIGRAM(S): 100 INJECTION SUBCUTANEOUS at 05:30

## 2022-07-03 RX ADMIN — Medication 4 UNIT(S): at 17:34

## 2022-07-03 RX ADMIN — OXYCODONE HYDROCHLORIDE 5 MILLIGRAM(S): 5 TABLET ORAL at 07:10

## 2022-07-03 RX ADMIN — Medication 40 MILLIGRAM(S): at 17:33

## 2022-07-03 NOTE — CHART NOTE - NSCHARTNOTEFT_GEN_A_CORE
This report was requested by: Gamal Sampson | Reference #: [id number]    Others' Prescriptions  Patient Name: Francisco Javier Schmidt Date: 1975  Address: [address] [place] [id number]: Male  Rx Written	Rx Dispensed	Drug	Quantity	Days Supply	Prescriber Name	Prescriber June #	Payment Method	Dispenser  03/07/2022	03/09/2022	alprazolam 0.5 mg tablet	28	14	Pritesh Everett MD	[id number]	Insurance	[facility] #[id number]

## 2022-07-03 NOTE — PROGRESS NOTE ADULT - PROBLEM SELECTOR PLAN 1
cough, SOB, chest tightness x 2 weeks c/w asthma exacerbation in setting of infections with parainfluenza and entero/rhinovirus.   -still having productive cough and episodes of severe wheezing   -02 sat mid 90s  -CXR and CTA reviewed - no consolidation, no PE.   -c/w  IV solumedrol 40mg to q12 per pulm  -c/w azithromycin 500mg x 5 days  -c/w duonebs Q4 ATC  -spiriva and fluticasone per pulm.   c/w Symbicort bid   -f/u with pulm if can add mucinex, Mg, and nacl nebs  monitor peak flow  pulm following  Tessalon perle TID, hycodan prn cough   tylenol prn, lidocaine patch, oxycodone prn  -xanax prn anxiety. cough, SOB, chest tightness x 2 weeks c/w asthma exacerbation in setting of infections with parainfluenza and entero/rhinovirus.   -still having productive cough and episodes of severe wheezing   -02 sat mid 90s  -CXR and CTA reviewed - no consolidation, no PE.   -c/w  IV solumedrol 40mg to q12 per pulm  -c/w azithromycin 500mg x 5 days  -c/w duonebs Q4 ATC  -spiriva and fluticasone per pulm.   c/w Symbicort bid   -f/u with pulm if can add mucinex, Mg, and nacl nebs  monitor peak flow  pulm following  Tessalon perle TID, hycodan prn cough   tylenol prn, lidocaine patch, oxycodone prn  -xanax prn anxiety. -istop in chart.

## 2022-07-03 NOTE — PROGRESS NOTE ADULT - PROBLEM SELECTOR PLAN 4
BP stable, near goal   c/w home medications  - losartan 50mg daily  - c/w lasix 60mg daily (for chronic LE swelling) - improved normal...

## 2022-07-03 NOTE — PROGRESS NOTE ADULT - PROBLEM SELECTOR PLAN 2
A1c -8.3 - now new onset DM  improved FS and tapering steroids  monitor FS  c/w lantus  to 18 units qhs  increase premeal to 5 admelog TID  ISS for coverage  -RD eval.

## 2022-07-04 LAB
ANION GAP SERPL CALC-SCNC: 16 MMOL/L — SIGNIFICANT CHANGE UP (ref 5–17)
BUN SERPL-MCNC: 13 MG/DL — SIGNIFICANT CHANGE UP (ref 7–23)
CALCIUM SERPL-MCNC: 9.6 MG/DL — SIGNIFICANT CHANGE UP (ref 8.4–10.5)
CHLORIDE SERPL-SCNC: 96 MMOL/L — SIGNIFICANT CHANGE UP (ref 96–108)
CO2 SERPL-SCNC: 24 MMOL/L — SIGNIFICANT CHANGE UP (ref 22–31)
CREAT SERPL-MCNC: 0.79 MG/DL — SIGNIFICANT CHANGE UP (ref 0.5–1.3)
EGFR: 111 ML/MIN/1.73M2 — SIGNIFICANT CHANGE UP
GLUCOSE BLDC GLUCOMTR-MCNC: 258 MG/DL — HIGH (ref 70–99)
GLUCOSE BLDC GLUCOMTR-MCNC: 282 MG/DL — HIGH (ref 70–99)
GLUCOSE BLDC GLUCOMTR-MCNC: 344 MG/DL — HIGH (ref 70–99)
GLUCOSE BLDC GLUCOMTR-MCNC: 349 MG/DL — HIGH (ref 70–99)
GLUCOSE SERPL-MCNC: 283 MG/DL — HIGH (ref 70–99)
HCT VFR BLD CALC: 40.8 % — SIGNIFICANT CHANGE UP (ref 39–50)
HGB BLD-MCNC: 13.9 G/DL — SIGNIFICANT CHANGE UP (ref 13–17)
MCHC RBC-ENTMCNC: 30.3 PG — SIGNIFICANT CHANGE UP (ref 27–34)
MCHC RBC-ENTMCNC: 34.1 GM/DL — SIGNIFICANT CHANGE UP (ref 32–36)
MCV RBC AUTO: 89.1 FL — SIGNIFICANT CHANGE UP (ref 80–100)
NRBC # BLD: 0 /100 WBCS — SIGNIFICANT CHANGE UP (ref 0–0)
PLATELET # BLD AUTO: 299 K/UL — SIGNIFICANT CHANGE UP (ref 150–400)
POTASSIUM SERPL-MCNC: 3.8 MMOL/L — SIGNIFICANT CHANGE UP (ref 3.5–5.3)
POTASSIUM SERPL-SCNC: 3.8 MMOL/L — SIGNIFICANT CHANGE UP (ref 3.5–5.3)
RBC # BLD: 4.58 M/UL — SIGNIFICANT CHANGE UP (ref 4.2–5.8)
RBC # FLD: 13.4 % — SIGNIFICANT CHANGE UP (ref 10.3–14.5)
SODIUM SERPL-SCNC: 136 MMOL/L — SIGNIFICANT CHANGE UP (ref 135–145)
WBC # BLD: 16.43 K/UL — HIGH (ref 3.8–10.5)
WBC # FLD AUTO: 16.43 K/UL — HIGH (ref 3.8–10.5)

## 2022-07-04 PROCEDURE — 99233 SBSQ HOSP IP/OBS HIGH 50: CPT

## 2022-07-04 PROCEDURE — 71275 CT ANGIOGRAPHY CHEST: CPT | Mod: 26

## 2022-07-04 PROCEDURE — 93010 ELECTROCARDIOGRAM REPORT: CPT

## 2022-07-04 RX ORDER — INSULIN LISPRO 100/ML
8 VIAL (ML) SUBCUTANEOUS
Refills: 0 | Status: DISCONTINUED | OUTPATIENT
Start: 2022-07-04 | End: 2022-07-05

## 2022-07-04 RX ORDER — ACETAMINOPHEN 500 MG
650 TABLET ORAL EVERY 6 HOURS
Refills: 0 | Status: DISCONTINUED | OUTPATIENT
Start: 2022-07-04 | End: 2022-07-09

## 2022-07-04 RX ORDER — CYCLOBENZAPRINE HYDROCHLORIDE 10 MG/1
5 TABLET, FILM COATED ORAL THREE TIMES A DAY
Refills: 0 | Status: DISCONTINUED | OUTPATIENT
Start: 2022-07-04 | End: 2022-07-11

## 2022-07-04 RX ORDER — INSULIN GLARGINE 100 [IU]/ML
24 INJECTION, SOLUTION SUBCUTANEOUS AT BEDTIME
Refills: 0 | Status: DISCONTINUED | OUTPATIENT
Start: 2022-07-04 | End: 2022-07-05

## 2022-07-04 RX ORDER — BENZOCAINE AND MENTHOL 5; 1 G/100ML; G/100ML
1 LIQUID ORAL EVERY 4 HOURS
Refills: 0 | Status: DISCONTINUED | OUTPATIENT
Start: 2022-07-04 | End: 2022-07-11

## 2022-07-04 RX ORDER — BENZOCAINE AND MENTHOL 5; 1 G/100ML; G/100ML
1 LIQUID ORAL ONCE
Refills: 0 | Status: COMPLETED | OUTPATIENT
Start: 2022-07-04 | End: 2022-07-04

## 2022-07-04 RX ADMIN — Medication 60 MILLIGRAM(S): at 12:08

## 2022-07-04 RX ADMIN — Medication 3 MILLILITER(S): at 17:38

## 2022-07-04 RX ADMIN — AZITHROMYCIN 500 MILLIGRAM(S): 500 TABLET, FILM COATED ORAL at 12:09

## 2022-07-04 RX ADMIN — BENZOCAINE AND MENTHOL 1 LOZENGE: 5; 1 LIQUID ORAL at 06:18

## 2022-07-04 RX ADMIN — ENOXAPARIN SODIUM 40 MILLIGRAM(S): 100 INJECTION SUBCUTANEOUS at 17:37

## 2022-07-04 RX ADMIN — Medication 81 MILLIGRAM(S): at 12:09

## 2022-07-04 RX ADMIN — Medication 125 MICROGRAM(S): at 05:12

## 2022-07-04 RX ADMIN — Medication 5 UNIT(S): at 08:49

## 2022-07-04 RX ADMIN — CYCLOBENZAPRINE HYDROCHLORIDE 5 MILLIGRAM(S): 10 TABLET, FILM COATED ORAL at 22:29

## 2022-07-04 RX ADMIN — Medication 100 MILLIGRAM(S): at 05:12

## 2022-07-04 RX ADMIN — Medication 3 MILLILITER(S): at 22:28

## 2022-07-04 RX ADMIN — Medication 3 MILLILITER(S): at 09:53

## 2022-07-04 RX ADMIN — Medication 0.25 MILLIGRAM(S): at 05:30

## 2022-07-04 RX ADMIN — Medication 4: at 21:32

## 2022-07-04 RX ADMIN — Medication 100 MILLIGRAM(S): at 22:28

## 2022-07-04 RX ADMIN — Medication 650 MILLIGRAM(S): at 13:12

## 2022-07-04 RX ADMIN — INSULIN GLARGINE 24 UNIT(S): 100 INJECTION, SOLUTION SUBCUTANEOUS at 21:32

## 2022-07-04 RX ADMIN — Medication 40 MILLIGRAM(S): at 17:37

## 2022-07-04 RX ADMIN — OXYCODONE HYDROCHLORIDE 5 MILLIGRAM(S): 5 TABLET ORAL at 18:54

## 2022-07-04 RX ADMIN — Medication 8 UNIT(S): at 17:40

## 2022-07-04 RX ADMIN — Medication 100 MILLIGRAM(S): at 14:49

## 2022-07-04 RX ADMIN — Medication 0.5 MILLIGRAM(S): at 12:35

## 2022-07-04 RX ADMIN — Medication 2 SPRAY(S): at 17:38

## 2022-07-04 RX ADMIN — OXYCODONE HYDROCHLORIDE 5 MILLIGRAM(S): 5 TABLET ORAL at 06:10

## 2022-07-04 RX ADMIN — LIDOCAINE 1 PATCH: 4 CREAM TOPICAL at 12:09

## 2022-07-04 RX ADMIN — Medication 3 MILLIGRAM(S): at 22:29

## 2022-07-04 RX ADMIN — BENZOCAINE AND MENTHOL 1 LOZENGE: 5; 1 LIQUID ORAL at 17:37

## 2022-07-04 RX ADMIN — Medication 3: at 08:50

## 2022-07-04 RX ADMIN — Medication 3 MILLILITER(S): at 05:30

## 2022-07-04 RX ADMIN — ENOXAPARIN SODIUM 40 MILLIGRAM(S): 100 INJECTION SUBCUTANEOUS at 05:30

## 2022-07-04 RX ADMIN — Medication 4: at 13:05

## 2022-07-04 RX ADMIN — Medication 0.5 MILLIGRAM(S): at 21:13

## 2022-07-04 RX ADMIN — Medication 2 SPRAY(S): at 06:10

## 2022-07-04 RX ADMIN — Medication 40 MILLIGRAM(S): at 05:12

## 2022-07-04 RX ADMIN — Medication 650 MILLIGRAM(S): at 12:12

## 2022-07-04 RX ADMIN — BUDESONIDE AND FORMOTEROL FUMARATE DIHYDRATE 2 PUFF(S): 160; 4.5 AEROSOL RESPIRATORY (INHALATION) at 17:39

## 2022-07-04 RX ADMIN — Medication 3: at 17:41

## 2022-07-04 RX ADMIN — BUDESONIDE AND FORMOTEROL FUMARATE DIHYDRATE 2 PUFF(S): 160; 4.5 AEROSOL RESPIRATORY (INHALATION) at 05:30

## 2022-07-04 RX ADMIN — OXYCODONE HYDROCHLORIDE 5 MILLIGRAM(S): 5 TABLET ORAL at 17:54

## 2022-07-04 RX ADMIN — PANTOPRAZOLE SODIUM 40 MILLIGRAM(S): 20 TABLET, DELAYED RELEASE ORAL at 05:13

## 2022-07-04 RX ADMIN — Medication 400 MILLIGRAM(S): at 12:14

## 2022-07-04 RX ADMIN — CYCLOBENZAPRINE HYDROCHLORIDE 5 MILLIGRAM(S): 10 TABLET, FILM COATED ORAL at 05:35

## 2022-07-04 RX ADMIN — TIOTROPIUM BROMIDE 1 CAPSULE(S): 18 CAPSULE ORAL; RESPIRATORY (INHALATION) at 12:14

## 2022-07-04 RX ADMIN — Medication 0.25 MILLIGRAM(S): at 17:39

## 2022-07-04 RX ADMIN — Medication 5 UNIT(S): at 13:04

## 2022-07-04 RX ADMIN — OXYCODONE HYDROCHLORIDE 5 MILLIGRAM(S): 5 TABLET ORAL at 05:12

## 2022-07-04 RX ADMIN — LOSARTAN POTASSIUM 50 MILLIGRAM(S): 100 TABLET, FILM COATED ORAL at 05:12

## 2022-07-04 RX ADMIN — Medication 3 MILLILITER(S): at 14:48

## 2022-07-04 NOTE — DIETITIAN INITIAL EVALUATION ADULT - REASON FOR ADMISSION
Asthma with acute exacerbation    "47M with PMH of SLE on steroids, asthma, hypothyroidism, HTN p/w severe cough and SOB, a/w acute asthma exacerbation in setting parainfluenza and entero/rhinovirus."

## 2022-07-04 NOTE — DIETITIAN INITIAL EVALUATION ADULT - PERTINENT MEDS FT
MEDICATIONS  (STANDING):  albuterol/ipratropium for Nebulization 3 milliLiter(s) Nebulizer every 4 hours  aspirin enteric coated 81 milliGRAM(s) Oral daily  benzonatate 100 milliGRAM(s) Oral three times a day  buDESOnide    Inhalation Suspension 0.25 milliGRAM(s) Inhalation every 12 hours  budesonide 160 MICROgram(s)/formoterol 4.5 MICROgram(s) Inhaler 2 Puff(s) Inhalation two times a day  dextrose 5%. 1000 milliLiter(s) (50 mL/Hr) IV Continuous <Continuous>  dextrose 5%. 1000 milliLiter(s) (100 mL/Hr) IV Continuous <Continuous>  dextrose 50% Injectable 25 Gram(s) IV Push once  dextrose 50% Injectable 12.5 Gram(s) IV Push once  dextrose 50% Injectable 25 Gram(s) IV Push once  enoxaparin Injectable 40 milliGRAM(s) SubCutaneous every 12 hours  fluticasone propionate 50 MICROgram(s)/spray Nasal Spray 2 Spray(s) Both Nostrils two times a day  furosemide    Tablet 60 milliGRAM(s) Oral daily  glucagon  Injectable 1 milliGRAM(s) IntraMuscular once  hydroxychloroquine 400 milliGRAM(s) Oral daily  insulin glargine Injectable (LANTUS) 18 Unit(s) SubCutaneous at bedtime  insulin lispro (ADMELOG) corrective regimen sliding scale   SubCutaneous at bedtime  insulin lispro (ADMELOG) corrective regimen sliding scale   SubCutaneous three times a day before meals  insulin lispro Injectable (ADMELOG) 5 Unit(s) SubCutaneous three times a day with meals  levothyroxine 125 MICROGram(s) Oral daily  lidocaine   4% Patch 1 Patch Transdermal daily  losartan 50 milliGRAM(s) Oral daily  methylPREDNISolone sodium succinate Injectable 40 milliGRAM(s) IV Push every 12 hours  pantoprazole    Tablet 40 milliGRAM(s) Oral before breakfast  tiotropium 18 MICROgram(s) Capsule 1 Capsule(s) Inhalation daily  trimethoprim  160 mG/sulfamethoxazole 800 mG 1 Tablet(s) Oral <User Schedule>    MEDICATIONS  (PRN):  acetaminophen     Tablet .. 650 milliGRAM(s) Oral every 6 hours PRN Temp greater or equal to 38C (100.4F), Mild Pain (1 - 3)  ALPRAZolam 0.5 milliGRAM(s) Oral three times a day PRN anxiety  aluminum hydroxide/magnesium hydroxide/simethicone Suspension 30 milliLiter(s) Oral every 4 hours PRN Dyspepsia  cyclobenzaprine 5 milliGRAM(s) Oral three times a day PRN Muscle Spasm  dextrose Oral Gel 15 Gram(s) Oral once PRN Blood Glucose LESS THAN 70 milliGRAM(s)/deciliter  hydrocodone/homatropine Syrup 10 milliLiter(s) Oral every 6 hours PRN Cough  melatonin 3 milliGRAM(s) Oral at bedtime PRN Insomnia  ondansetron Injectable 4 milliGRAM(s) IV Push every 8 hours PRN Nausea and/or Vomiting  oxyCODONE    IR 5 milliGRAM(s) Oral every 6 hours PRN moderate and severe pain

## 2022-07-04 NOTE — PROGRESS NOTE ADULT - ASSESSMENT
2nd attempt-haven't reviewed it yet   47M with PMH of SLE on steroids, asthma, hypothyroidism, HTN p/w severe cough and SOB, a/w acute asthma exacerbation in setting parainfluenza and entero/rhinovirus.

## 2022-07-04 NOTE — DIETITIAN INITIAL EVALUATION ADULT - ADD RECOMMEND
1) Change DASH diet to Consistent Carbohydrate diet.  2) Recommend trial of Glucerna 2x/day (Vanilla/chocolate).  3) Monitor PO intake, GI tolerance, skin integrity, labs, weight, and bowel movement regularity.   4) Honor food preferences as feasible. Assist with meals PRN and encourage PO intake.  5) RD remains available upon request and will follow-up per protocol.  6) malnutrition/BMI >40 alert placed in chart

## 2022-07-04 NOTE — PROGRESS NOTE ADULT - PROBLEM SELECTOR PLAN 2
A1c -8.3 - now new onset DM  improved FS and tapering steroids  monitor FS  c/w lantus  to 24 units qhs  increase premeal to 8u admelog TID  ISS for coverage  -RD isra appreciated.

## 2022-07-04 NOTE — DIETITIAN INITIAL EVALUATION ADULT - ETIOLOGY
difficulty meeting estimated needs in setting of respiratory illness new DM diagnosis in setting of chronic steroid use

## 2022-07-04 NOTE — DIETITIAN INITIAL EVALUATION ADULT - LITERATURE/VIDEOS GIVEN
Carbohydrate Counting for People with Diabetes, Using Nutrition Labels: Carbohydrates, and Diabetes Label Reading Tips handouts.

## 2022-07-04 NOTE — DIETITIAN NUTRITION RISK NOTIFICATION - TREATMENT: THE FOLLOWING DIET HAS BEEN RECOMMENDED
Diet, Regular:   DASH/TLC {Sodium & Cholesterol Restricted} (DASH) (06-26-22 @ 03:13) [Active]

## 2022-07-04 NOTE — DIETITIAN INITIAL EVALUATION ADULT - ORAL INTAKE PTA/DIET HISTORY
Pt interviewed at bedside. Reports reduced PO intake x 1.5 months PTA due to not feeling well, was not following any therapeutic diet. Confirms NKFA.

## 2022-07-04 NOTE — DIETITIAN INITIAL EVALUATION ADULT - PHYSCIAL ASSESSMENT
Visual nutrition-focused physical examination conducted as pt states he is having muscle spasms at this time. Visual findings noted.

## 2022-07-04 NOTE — DIETITIAN INITIAL EVALUATION ADULT - NSFNSGIIOFT_GEN_A_CORE
Denies nausea, vomiting, diarrhea. States some constipation. Reports last BM 7/3. Pt not currently on bowel regimen.

## 2022-07-04 NOTE — PROGRESS NOTE ADULT - PROBLEM SELECTOR PLAN 4
BP stable, near goal   c/w home medications  - losartan 50mg daily  - c/w lasix 60mg daily (for chronic LE swelling) - improved  -f/u bnp.

## 2022-07-04 NOTE — DIETITIAN INITIAL EVALUATION ADULT - ENERGY INTAKE
Fair (50-75%) Pt reports fair-good PO intake in-house; noted as fair per flowsheets. States he has a burning sensation/soreness in his throat in setting of his respiratory status.  Pt very clear in stating he wants to eat a burger, yogurt, and tea.

## 2022-07-04 NOTE — PROGRESS NOTE ADULT - PROBLEM SELECTOR PLAN 1
cough, SOB, chest tightness x 2 weeks c/w asthma exacerbation in setting of infections with parainfluenza and entero/rhinovirus.   -still having productive cough and episodes of severe wheezing   -02 sat mid 90s  -CXR and CTA reviewed - no consolidation, no PE.   -c/w  IV solumedrol 40mg to q12 per pulm  -c/w azithromycin 500mg x 5 days  -c/w duonebs Q4 ATC  -spiriva and fluticasone per pulm.   c/w Symbicort bid   -f/u with pulm if can add mucinex, Mg, and nacl nebs  monitor peak flow  pulm following  Tessalon perle TID, hycodan prn cough   tylenol prn, lidocaine patch, oxycodone prn. -flexeril now 5mg tid from back spasm from coughing.   -xanax prn anxiety. -istop in chart.  -today with worsening CP from coughing. EKG stable. But will get CTA chest to r/o PE just in case. -If PE found, will need to start full AC. Can place on telemetry too. -Can send CE's, BNP, and D-dimer if more CP. -Consider LE duplex.

## 2022-07-04 NOTE — PROGRESS NOTE ADULT - SUBJECTIVE AND OBJECTIVE BOX
Patient is a 47y old  Male who presents with a chief complaint of Asthma with acute exacerbation    "47M with PMH of SLE on steroids, asthma, hypothyroidism, HTN p/w severe cough and SOB, a/w acute asthma exacerbation in setting parainfluenza and entero/rhinovirus." (04 Jul 2022 13:42)        SUBJECTIVE / OVERNIGHT EVENTS: reports some anterior cp from coughing.       MEDICATIONS  (STANDING):  albuterol/ipratropium for Nebulization 3 milliLiter(s) Nebulizer every 4 hours  aspirin enteric coated 81 milliGRAM(s) Oral daily  benzonatate 100 milliGRAM(s) Oral three times a day  buDESOnide    Inhalation Suspension 0.25 milliGRAM(s) Inhalation every 12 hours  budesonide 160 MICROgram(s)/formoterol 4.5 MICROgram(s) Inhaler 2 Puff(s) Inhalation two times a day  cyclobenzaprine 5 milliGRAM(s) Oral three times a day  dextrose 5%. 1000 milliLiter(s) (50 mL/Hr) IV Continuous <Continuous>  dextrose 5%. 1000 milliLiter(s) (100 mL/Hr) IV Continuous <Continuous>  dextrose 50% Injectable 25 Gram(s) IV Push once  dextrose 50% Injectable 12.5 Gram(s) IV Push once  dextrose 50% Injectable 25 Gram(s) IV Push once  enoxaparin Injectable 40 milliGRAM(s) SubCutaneous every 12 hours  fluticasone propionate 50 MICROgram(s)/spray Nasal Spray 2 Spray(s) Both Nostrils two times a day  furosemide    Tablet 60 milliGRAM(s) Oral daily  glucagon  Injectable 1 milliGRAM(s) IntraMuscular once  hydroxychloroquine 400 milliGRAM(s) Oral daily  insulin glargine Injectable (LANTUS) 24 Unit(s) SubCutaneous at bedtime  insulin lispro (ADMELOG) corrective regimen sliding scale   SubCutaneous at bedtime  insulin lispro (ADMELOG) corrective regimen sliding scale   SubCutaneous three times a day before meals  insulin lispro Injectable (ADMELOG) 8 Unit(s) SubCutaneous three times a day with meals  levothyroxine 125 MICROGram(s) Oral daily  lidocaine   4% Patch 1 Patch Transdermal daily  losartan 50 milliGRAM(s) Oral daily  methylPREDNISolone sodium succinate Injectable 40 milliGRAM(s) IV Push every 12 hours  pantoprazole    Tablet 40 milliGRAM(s) Oral before breakfast  tiotropium 18 MICROgram(s) Capsule 1 Capsule(s) Inhalation daily  trimethoprim  160 mG/sulfamethoxazole 800 mG 1 Tablet(s) Oral <User Schedule>    MEDICATIONS  (PRN):  acetaminophen     Tablet .. 650 milliGRAM(s) Oral every 6 hours PRN Temp greater or equal to 38C (100.4F), Mild Pain (1 - 3), Moderate Pain (4 - 6)  ALPRAZolam 0.5 milliGRAM(s) Oral three times a day PRN anxiety  aluminum hydroxide/magnesium hydroxide/simethicone Suspension 30 milliLiter(s) Oral every 4 hours PRN Dyspepsia  benzocaine 15 mG/menthol 3.6 mG Lozenge 1 Lozenge Oral every 4 hours PRN cough/sore throat  dextrose Oral Gel 15 Gram(s) Oral once PRN Blood Glucose LESS THAN 70 milliGRAM(s)/deciliter  hydrocodone/homatropine Syrup 10 milliLiter(s) Oral every 6 hours PRN Cough  melatonin 3 milliGRAM(s) Oral at bedtime PRN Insomnia  ondansetron Injectable 4 milliGRAM(s) IV Push every 8 hours PRN Nausea and/or Vomiting  oxyCODONE    IR 5 milliGRAM(s) Oral every 6 hours PRN moderate and severe pain      Vital Signs Last 24 Hrs  T(C): 36.8 (04 Jul 2022 12:15), Max: 36.8 (04 Jul 2022 12:15)  T(F): 98.3 (04 Jul 2022 12:15), Max: 98.3 (04 Jul 2022 12:15)  HR: 116 (04 Jul 2022 12:15) (92 - 116)  BP: 125/79 (04 Jul 2022 12:15) (120/78 - 127/87)  BP(mean): --  RR: 18 (04 Jul 2022 12:15) (18 - 18)  SpO2: 96% (04 Jul 2022 12:15) (95% - 98%)  CAPILLARY BLOOD GLUCOSE      POCT Blood Glucose.: 282 mg/dL (04 Jul 2022 17:24)  POCT Blood Glucose.: 344 mg/dL (04 Jul 2022 12:48)  POCT Blood Glucose.: 258 mg/dL (04 Jul 2022 08:27)  POCT Blood Glucose.: 317 mg/dL (03 Jul 2022 21:06)    I&O's Summary    03 Jul 2022 07:01  -  04 Jul 2022 07:00  --------------------------------------------------------  IN: 720 mL / OUT: 1500 mL / NET: -780 mL          PHYSICAL EXAM:   GENERAL: NAD, well-developed  HEAD:  Atraumatic, Normocephalic  EYES: EOMI, PERRLA, conjunctiva and sclera clear  NECK: Supple   CHEST/LUNG: distant reduced bs  HEART: S1S2 normal. Regular rate and rhythm; No murmurs, rubs, or gallops  ABDOMEN: Soft, Nontender, Nondistended; Bowel sounds present  EXTREMITIES:  trace le edema  PSYCH/Neuro: AAOx3. Non-focal.   SKIN: No rashes or lesions      LABS:                        13.9   16.43 )-----------( 299      ( 04 Jul 2022 06:25 )             40.8     07-04    136  |  96  |  13  ----------------------------<  283<H>  3.8   |  24  |  0.79    Ca    9.6      04 Jul 2022 06:25            ekg showed sinus tachy.       RADIOLOGY & ADDITIONAL TESTS:    Imaging Personally Reviewed: ekg  Consultant(s) Notes Reviewed:    Care Discussed with Consultants/Other Providers:

## 2022-07-04 NOTE — DIETITIAN INITIAL EVALUATION ADULT - SIGNS/SYMPTOMS
A1c 8.3%, hyperglycemia </=75% estimated needs >/= 1 month, mild fat loss, +1 edema, 3% wt loss x 3 months/10% x 1 year

## 2022-07-04 NOTE — PROGRESS NOTE ADULT - PROBLEM SELECTOR PLAN 6
lovenox for VTE ppx   DASH diet   fall precautions  -oob to chair. incentive spirometer.     Dispo: pending improvement of asthma    7. Discussed with ACP Lisette.

## 2022-07-04 NOTE — DIETITIAN INITIAL EVALUATION ADULT - OTHER INFO
Per chart, pt currently ordered for bactrim, lantus, admelog SSI, Solu-medrol, synthroid, and lasix in-house.    Reports -274 lbs. States he has had weight loss in the past 1.5 months, and was weighed 1 week ago at 265 lbs. Also reports unintentional weight loss has been ongoing over the past year.  Dosing wt: 265 lbs (06-25)  Wt history per chart: 265 lbs (06-09), 266 lbs (05-10), 272 lbs (04-19), 270 lbs (03-10), 275 lbs (01-26), 289 lbs (11/04/21), 295 lbs (07/09/21).  Indicates 10% wt loss x 1 year, 3% in past 3 months. RD to continue to monitor weight trends as able/available.     Food preferences explored and documented. Pt made aware RD remains available.

## 2022-07-04 NOTE — DIETITIAN INITIAL EVALUATION ADULT - PERSON TAUGHT/METHOD
Provided education on Nutrition Therapy for Diabetes. Emphasis on what foods contain carbohydrates, importance of pairing carbohydrates with protein for glycemic control; choosing whole grains vs refined carbohydrates; limiting refined sugars, portion sizes. Pt made aware RD to remain available for any questions./verbal instruction/patient instructed

## 2022-07-04 NOTE — DIETITIAN INITIAL EVALUATION ADULT - PERTINENT LABORATORY DATA
07-04    136  |  96  |  13  ----------------------------<  283<H>  3.8   |  24  |  0.79    Ca    9.6      04 Jul 2022 06:25    POCT Blood Glucose.: 344 mg/dL (07-04-22 @ 12:48)    CAPILLARY BLOOD GLUCOSE  POCT Blood Glucose.: 344 mg/dL (04 Jul 2022 12:48)  POCT Blood Glucose.: 258 mg/dL (04 Jul 2022 08:27)  POCT Blood Glucose.: 317 mg/dL (03 Jul 2022 21:06)  POCT Blood Glucose.: 238 mg/dL (03 Jul 2022 17:14)    A1C with Estimated Average Glucose Result: 8.3 % (06-28-22 @ 06:14)

## 2022-07-05 LAB
ANION GAP SERPL CALC-SCNC: 13 MMOL/L — SIGNIFICANT CHANGE UP (ref 5–17)
BUN SERPL-MCNC: 12 MG/DL — SIGNIFICANT CHANGE UP (ref 7–23)
CALCIUM SERPL-MCNC: 9.6 MG/DL — SIGNIFICANT CHANGE UP (ref 8.4–10.5)
CHLORIDE SERPL-SCNC: 94 MMOL/L — LOW (ref 96–108)
CO2 SERPL-SCNC: 26 MMOL/L — SIGNIFICANT CHANGE UP (ref 22–31)
CREAT SERPL-MCNC: 0.74 MG/DL — SIGNIFICANT CHANGE UP (ref 0.5–1.3)
D DIMER BLD IA.RAPID-MCNC: <150 NG/ML DDU — SIGNIFICANT CHANGE UP
EGFR: 113 ML/MIN/1.73M2 — SIGNIFICANT CHANGE UP
GLUCOSE BLDC GLUCOMTR-MCNC: 217 MG/DL — HIGH (ref 70–99)
GLUCOSE BLDC GLUCOMTR-MCNC: 250 MG/DL — HIGH (ref 70–99)
GLUCOSE BLDC GLUCOMTR-MCNC: 306 MG/DL — HIGH (ref 70–99)
GLUCOSE BLDC GLUCOMTR-MCNC: 376 MG/DL — HIGH (ref 70–99)
GLUCOSE SERPL-MCNC: 239 MG/DL — HIGH (ref 70–99)
HCT VFR BLD CALC: 40.4 % — SIGNIFICANT CHANGE UP (ref 39–50)
HGB BLD-MCNC: 13.8 G/DL — SIGNIFICANT CHANGE UP (ref 13–17)
MCHC RBC-ENTMCNC: 30.1 PG — SIGNIFICANT CHANGE UP (ref 27–34)
MCHC RBC-ENTMCNC: 34.2 GM/DL — SIGNIFICANT CHANGE UP (ref 32–36)
MCV RBC AUTO: 88.2 FL — SIGNIFICANT CHANGE UP (ref 80–100)
NRBC # BLD: 0 /100 WBCS — SIGNIFICANT CHANGE UP (ref 0–0)
NT-PROBNP SERPL-SCNC: 20 PG/ML — SIGNIFICANT CHANGE UP (ref 0–300)
PLATELET # BLD AUTO: 292 K/UL — SIGNIFICANT CHANGE UP (ref 150–400)
POTASSIUM SERPL-MCNC: 3.6 MMOL/L — SIGNIFICANT CHANGE UP (ref 3.5–5.3)
POTASSIUM SERPL-SCNC: 3.6 MMOL/L — SIGNIFICANT CHANGE UP (ref 3.5–5.3)
RBC # BLD: 4.58 M/UL — SIGNIFICANT CHANGE UP (ref 4.2–5.8)
RBC # FLD: 13.6 % — SIGNIFICANT CHANGE UP (ref 10.3–14.5)
SODIUM SERPL-SCNC: 133 MMOL/L — LOW (ref 135–145)
WBC # BLD: 17.19 K/UL — HIGH (ref 3.8–10.5)
WBC # FLD AUTO: 17.19 K/UL — HIGH (ref 3.8–10.5)

## 2022-07-05 PROCEDURE — 99233 SBSQ HOSP IP/OBS HIGH 50: CPT

## 2022-07-05 PROCEDURE — 99232 SBSQ HOSP IP/OBS MODERATE 35: CPT

## 2022-07-05 RX ORDER — INSULIN LISPRO 100/ML
9 VIAL (ML) SUBCUTANEOUS
Refills: 0 | Status: DISCONTINUED | OUTPATIENT
Start: 2022-07-05 | End: 2022-07-06

## 2022-07-05 RX ORDER — INSULIN GLARGINE 100 [IU]/ML
27 INJECTION, SOLUTION SUBCUTANEOUS AT BEDTIME
Refills: 0 | Status: DISCONTINUED | OUTPATIENT
Start: 2022-07-05 | End: 2022-07-06

## 2022-07-05 RX ADMIN — Medication 40 MILLIGRAM(S): at 06:21

## 2022-07-05 RX ADMIN — Medication 8 UNIT(S): at 09:49

## 2022-07-05 RX ADMIN — CYCLOBENZAPRINE HYDROCHLORIDE 5 MILLIGRAM(S): 10 TABLET, FILM COATED ORAL at 06:20

## 2022-07-05 RX ADMIN — OXYCODONE HYDROCHLORIDE 5 MILLIGRAM(S): 5 TABLET ORAL at 13:20

## 2022-07-05 RX ADMIN — Medication 3 MILLILITER(S): at 17:05

## 2022-07-05 RX ADMIN — Medication 20 MILLIGRAM(S): at 22:25

## 2022-07-05 RX ADMIN — Medication 3 MILLILITER(S): at 15:29

## 2022-07-05 RX ADMIN — TIOTROPIUM BROMIDE 1 CAPSULE(S): 18 CAPSULE ORAL; RESPIRATORY (INHALATION) at 12:49

## 2022-07-05 RX ADMIN — OXYCODONE HYDROCHLORIDE 5 MILLIGRAM(S): 5 TABLET ORAL at 12:45

## 2022-07-05 RX ADMIN — Medication 20 MILLIGRAM(S): at 15:31

## 2022-07-05 RX ADMIN — Medication 3 MILLILITER(S): at 22:24

## 2022-07-05 RX ADMIN — Medication 1 TABLET(S): at 12:49

## 2022-07-05 RX ADMIN — ENOXAPARIN SODIUM 40 MILLIGRAM(S): 100 INJECTION SUBCUTANEOUS at 17:05

## 2022-07-05 RX ADMIN — Medication 2: at 09:48

## 2022-07-05 RX ADMIN — Medication 2 SPRAY(S): at 17:05

## 2022-07-05 RX ADMIN — Medication 100 MILLIGRAM(S): at 22:25

## 2022-07-05 RX ADMIN — CYCLOBENZAPRINE HYDROCHLORIDE 5 MILLIGRAM(S): 10 TABLET, FILM COATED ORAL at 15:29

## 2022-07-05 RX ADMIN — LIDOCAINE 1 PATCH: 4 CREAM TOPICAL at 22:32

## 2022-07-05 RX ADMIN — Medication 400 MILLIGRAM(S): at 12:49

## 2022-07-05 RX ADMIN — Medication 0.25 MILLIGRAM(S): at 17:05

## 2022-07-05 RX ADMIN — INSULIN GLARGINE 27 UNIT(S): 100 INJECTION, SOLUTION SUBCUTANEOUS at 22:25

## 2022-07-05 RX ADMIN — CYCLOBENZAPRINE HYDROCHLORIDE 5 MILLIGRAM(S): 10 TABLET, FILM COATED ORAL at 22:24

## 2022-07-05 RX ADMIN — Medication 81 MILLIGRAM(S): at 12:52

## 2022-07-05 RX ADMIN — OXYCODONE HYDROCHLORIDE 5 MILLIGRAM(S): 5 TABLET ORAL at 07:39

## 2022-07-05 RX ADMIN — LIDOCAINE 1 PATCH: 4 CREAM TOPICAL at 12:50

## 2022-07-05 RX ADMIN — Medication 100 MILLIGRAM(S): at 15:29

## 2022-07-05 RX ADMIN — BUDESONIDE AND FORMOTEROL FUMARATE DIHYDRATE 2 PUFF(S): 160; 4.5 AEROSOL RESPIRATORY (INHALATION) at 17:06

## 2022-07-05 RX ADMIN — Medication 3 MILLILITER(S): at 09:50

## 2022-07-05 RX ADMIN — Medication 8 UNIT(S): at 12:47

## 2022-07-05 RX ADMIN — Medication 0.5 MILLIGRAM(S): at 14:50

## 2022-07-05 RX ADMIN — OXYCODONE HYDROCHLORIDE 5 MILLIGRAM(S): 5 TABLET ORAL at 06:29

## 2022-07-05 RX ADMIN — PANTOPRAZOLE SODIUM 40 MILLIGRAM(S): 20 TABLET, DELAYED RELEASE ORAL at 06:21

## 2022-07-05 RX ADMIN — Medication 0.25 MILLIGRAM(S): at 06:22

## 2022-07-05 RX ADMIN — Medication 5: at 12:47

## 2022-07-05 RX ADMIN — Medication 125 MICROGRAM(S): at 06:20

## 2022-07-05 RX ADMIN — ENOXAPARIN SODIUM 40 MILLIGRAM(S): 100 INJECTION SUBCUTANEOUS at 06:21

## 2022-07-05 RX ADMIN — Medication 60 MILLIGRAM(S): at 12:48

## 2022-07-05 RX ADMIN — OXYCODONE HYDROCHLORIDE 5 MILLIGRAM(S): 5 TABLET ORAL at 18:50

## 2022-07-05 RX ADMIN — Medication 2: at 17:40

## 2022-07-05 RX ADMIN — Medication 3 MILLIGRAM(S): at 22:51

## 2022-07-05 RX ADMIN — Medication 9 UNIT(S): at 17:41

## 2022-07-05 RX ADMIN — Medication 4: at 22:26

## 2022-07-05 RX ADMIN — BUDESONIDE AND FORMOTEROL FUMARATE DIHYDRATE 2 PUFF(S): 160; 4.5 AEROSOL RESPIRATORY (INHALATION) at 06:20

## 2022-07-05 RX ADMIN — Medication 2 SPRAY(S): at 06:20

## 2022-07-05 RX ADMIN — OXYCODONE HYDROCHLORIDE 5 MILLIGRAM(S): 5 TABLET ORAL at 19:22

## 2022-07-05 RX ADMIN — Medication 3 MILLILITER(S): at 06:19

## 2022-07-05 RX ADMIN — LIDOCAINE 1 PATCH: 4 CREAM TOPICAL at 00:51

## 2022-07-05 RX ADMIN — Medication 100 MILLIGRAM(S): at 06:21

## 2022-07-05 RX ADMIN — LOSARTAN POTASSIUM 50 MILLIGRAM(S): 100 TABLET, FILM COATED ORAL at 06:20

## 2022-07-05 NOTE — PROGRESS NOTE ADULT - PROBLEM SELECTOR PLAN 6
lovenox for VTE ppx   DASH diet   fall precautions  -oob to chair. incentive spirometer.     Dispo: pending improvement of asthma    discussed with MARITA Hansen

## 2022-07-05 NOTE — PROGRESS NOTE ADULT - PROBLEM SELECTOR PLAN 3
c/w steroids as above   on Mycophenolate 1500mg daily at home   c/w home Plaquenil 400mg daily   c/w home bactrim for ppx   dsDNA, c3, c4 normal   Rheumatology consult appreciated - flexeril as above, cellcept held

## 2022-07-05 NOTE — PROVIDER CONTACT NOTE (OTHER) - BACKGROUND
48 y/o M pmh of asthma, lupus, cough/sob for x2 weeks. has back and cp associated with forceful cough

## 2022-07-05 NOTE — PROGRESS NOTE ADULT - PROBLEM SELECTOR PLAN 2
A1c -8.3 - now new onset DM  FS elevated  monitor FS  increase lantus to 27 units qhs  increase premeal to 9u admelog TID  ISS for coverage  -RD isra appreciated.

## 2022-07-05 NOTE — PROVIDER CONTACT NOTE (OTHER) - ASSESSMENT
no s/s of hyperglycemia. VS as charted. pt is in stable condition
patient complaining of acute respiratory distress. face is red and flushed, patient is calm but is complaining of chest tightness and difficulty catching breath. BP and temperature stable but patient is tachycardic ranging 117-125. O2 saturation is 96% on room air but placed on 2LNC for comfort.
patient FS retaken by mistake. Was originally taken around change of shift as patient requested to eat cookies and cereal with AM medications. Patient FS then retaken around 9AM for breakfast tray but patient did not eat much of tray. Educated patient that he should attempt to eat on a schedule as the FS monitoring is ordered and patient taught back understanding of teaching but patient is also stating that he cannot take medications on an empty stomach. Since patient did not each much of breakfast tray he is concerned of receiving extra insulin dosing at this time.
Pt asymptomatic. S/p snack. Correctional insulin and lantus given
none

## 2022-07-05 NOTE — PROGRESS NOTE ADULT - PROBLEM SELECTOR PLAN 1
cough, SOB, chest tightness x 2 weeks c/w asthma exacerbation in setting of infections with parainfluenza and entero/rhinovirus.   -still having wheezing, coughing fits with uncontrolled chest pain, muscle spasms, which is likely MSK   -CTA 7/4: negative for PE/PNA  -02 sat mid 90s  -per pulm, decrease IV solumedrol to 20mg q8  -s/p azithromycin x 5 days  -c/w duonebs Q4 ATC  -spiriva and fluticasone  c/w Symbicort bid   monitor peak flow  change Tessalon and hyocodan TID ATC with holding parameters  -tylenol prn, lidocaine patch, oxycodone prn. flexeril now 5mg tid from back spasm from coughing.   -xanax prn anxiety. -istop in chart.

## 2022-07-05 NOTE — PROGRESS NOTE ADULT - SUBJECTIVE AND OBJECTIVE BOX
[facility] DIVISION OF PULMONARY, CRITICAL CARE and SLEEP MEDICINE  PULMONARY PROGRESS NOTE  OFFICE NUMBER: [phone]    PATIENT INFORMATION:  NAME: AMBERLY ESPITIA:  MRN: [id number]    CHIEF COMPLAINT: Patient is a 47y old  Male who presents with a chief complaint of Asthma with acute exacerbation    "47M with PMH of SLE on steroids, asthma, hypothyroidism, HTN p/w severe cough and SOB, a/w acute asthma exacerbation in setting parainfluenza and entero/rhinovirus." (04 Jul 2022 13:42)      [x] INITIAL CONSULT, H&P, FAMILY HISTORY and PAST MEDICAL AND SURGICAL HISTORY REVIEWED    OVERNIGHT EVENTS or CHANGES TO HPI:     ========================REVIEW OF SYSTEMS========================  CONSTITUTIONAL:  CARDIOVASCULAR:  PULMONARY:  [] REMAINING REVIEW OF SYSTEMS NEGATIVE  [] UNABLE TO OBTAIN REVIEW OF SYSTEMS DUE TO _______________    ========================MEDICATIONS=============================  MEDICATIONS  (STANDING):  albuterol/ipratropium for Nebulization 3 milliLiter(s) Nebulizer every 4 hours  aspirin enteric coated 81 milliGRAM(s) Oral daily  benzonatate 100 milliGRAM(s) Oral three times a day  buDESOnide    Inhalation Suspension 0.25 milliGRAM(s) Inhalation every 12 hours  budesonide 160 MICROgram(s)/formoterol 4.5 MICROgram(s) Inhaler 2 Puff(s) Inhalation two times a day  cyclobenzaprine 5 milliGRAM(s) Oral three times a day  dextrose 5%. 1000 milliLiter(s) (50 mL/Hr) IV Continuous <Continuous>  dextrose 5%. 1000 milliLiter(s) (100 mL/Hr) IV Continuous <Continuous>  dextrose 50% Injectable 25 Gram(s) IV Push once  dextrose 50% Injectable 12.5 Gram(s) IV Push once  dextrose 50% Injectable 25 Gram(s) IV Push once  enoxaparin Injectable 40 milliGRAM(s) SubCutaneous every 12 hours  fluticasone propionate 50 MICROgram(s)/spray Nasal Spray 2 Spray(s) Both Nostrils two times a day  furosemide    Tablet 60 milliGRAM(s) Oral daily  glucagon  Injectable 1 milliGRAM(s) IntraMuscular once  hydroxychloroquine 400 milliGRAM(s) Oral daily  insulin glargine Injectable (LANTUS) 24 Unit(s) SubCutaneous at bedtime  insulin lispro (ADMELOG) corrective regimen sliding scale   SubCutaneous three times a day before meals  insulin lispro (ADMELOG) corrective regimen sliding scale   SubCutaneous at bedtime  insulin lispro Injectable (ADMELOG) 8 Unit(s) SubCutaneous three times a day with meals  levothyroxine 125 MICROGram(s) Oral daily  lidocaine   4% Patch 1 Patch Transdermal daily  losartan 50 milliGRAM(s) Oral daily  methylPREDNISolone sodium succinate Injectable 40 milliGRAM(s) IV Push every 12 hours  pantoprazole    Tablet 40 milliGRAM(s) Oral before breakfast  tiotropium 18 MICROgram(s) Capsule 1 Capsule(s) Inhalation daily  trimethoprim  160 mG/sulfamethoxazole 800 mG 1 Tablet(s) Oral <User Schedule>      MEDICATIONS  (PRN):  acetaminophen     Tablet .. 650 milliGRAM(s) Oral every 6 hours PRN Temp greater or equal to 38C (100.4F), Mild Pain (1 - 3), Moderate Pain (4 - 6)  ALPRAZolam 0.5 milliGRAM(s) Oral three times a day PRN anxiety  aluminum hydroxide/magnesium hydroxide/simethicone Suspension 30 milliLiter(s) Oral every 4 hours PRN Dyspepsia  benzocaine 15 mG/menthol 3.6 mG Lozenge 1 Lozenge Oral every 4 hours PRN cough/sore throat  dextrose Oral Gel 15 Gram(s) Oral once PRN Blood Glucose LESS THAN 70 milliGRAM(s)/deciliter  hydrocodone/homatropine Syrup 10 milliLiter(s) Oral every 6 hours PRN Cough  melatonin 3 milliGRAM(s) Oral at bedtime PRN Insomnia  ondansetron Injectable 4 milliGRAM(s) IV Push every 8 hours PRN Nausea and/or Vomiting  oxyCODONE    IR 5 milliGRAM(s) Oral every 6 hours PRN moderate and severe pain      ========================PHYSICAL EXAM============================    VITALS: ICU Vital Signs Last 24 Hrs  T(C): 36.6 (05 Jul 2022 05:00), Max: 36.8 (04 Jul 2022 12:15)  T(F): 97.8 (05 Jul 2022 05:00), Max: 98.3 (04 Jul 2022 12:15)  HR: 101 (05 Jul 2022 05:00) (101 - 116)  BP: 127/85 (05 Jul 2022 05:00) (125/79 - 131/80)  BP(mean): --  ABP: --  ABP(mean): --  RR: 18 (05 Jul 2022 05:00) (18 - 18)  SpO2: 95% (05 Jul 2022 05:00) (94% - 96%)      INTAKE and OUTPUT: I&O's Summary    04 Jul 2022 07:01  -  05 Jul 2022 07:00  --------------------------------------------------------  IN: 1180 mL / OUT: 1050 mL / NET: 130 mL        VENTILATOR SETTINGS:     GENERAL:   EYES:  EAR/NOSE/MOUTH/THROAT:  NECK:  LYMPH NODES:  CARDIOVASCULAR:  RESPIRATORY:  ABDOMEN:  EXTREMITIES  SKIN:  MUSCULOSKELETAL:   NEUROLOGIC:  PSYCHIATRIC    ========================LABORATORY RESULTS AND IMAGING=============                        13.8   17.19 )-----------( 292      ( 05 Jul 2022 06:41 )             40.4                                                    07-05    133<L>  |  94<L>  |  12  ----------------------------<  239<H>  3.6   |  26  |  0.74    Ca    9.6      05 Jul 2022 06:41            Creatinine Trend: 0.74<--, 0.79<--, 0.80<--, 0.77<--, 0.77<--, 0.71<--    CT CHEST:     [] RADIOLOGY REVIEWED AND INTERPRETED BY ME      THANK YOU FOR ALLOWING US TO PARTICIPATE IN THE CARE OF THIS PATIENT     [facility] DIVISION OF PULMONARY, CRITICAL CARE and SLEEP MEDICINE  PULMONARY PROGRESS NOTE  OFFICE NUMBER: [phone]    PATIENT INFORMATION:  NAME: AMBERLY ESPITIA:  MRN: [id number]    CHIEF COMPLAINT: Patient is a 47y old  Male who presents with a chief complaint of Asthma with acute exacerbation    "47M with PMH of SLE on steroids, asthma, hypothyroidism, HTN p/w severe cough and SOB, a/w acute asthma exacerbation in setting parainfluenza and entero/rhinovirus." (04 Jul 2022 13:42)      [x] INITIAL CONSULT, H&P, FAMILY HISTORY and PAST MEDICAL AND SURGICAL HISTORY REVIEWED    OVERNIGHT EVENTS or CHANGES TO HPI:   - Patient states he is still SOB, wheezing, and coughing  - States he does not feel any better and is not ready to go home  - Most pressing issue is the pain - in chest and back - ? spasms  - Had CTPA yesterday - no PE and clear lungs    ========================REVIEW OF SYSTEMS========================  CONSTITUTIONAL: Fatigue, Pain  CARDIOVASCULAR: Bilateral chest and rib discomfort with pain  PULMONARY: Cough, Wheezing, SOB, no hemoptysis  [x] REMAINING REVIEW OF SYSTEMS NEGATIVE  [] UNABLE TO OBTAIN REVIEW OF SYSTEMS DUE TO _______________    ========================MEDICATIONS=============================  MEDICATIONS  (STANDING):  albuterol/ipratropium for Nebulization 3 milliLiter(s) Nebulizer every 4 hours  aspirin enteric coated 81 milliGRAM(s) Oral daily  benzonatate 100 milliGRAM(s) Oral three times a day  buDESOnide    Inhalation Suspension 0.25 milliGRAM(s) Inhalation every 12 hours  budesonide 160 MICROgram(s)/formoterol 4.5 MICROgram(s) Inhaler 2 Puff(s) Inhalation two times a day  cyclobenzaprine 5 milliGRAM(s) Oral three times a day  dextrose 5%. 1000 milliLiter(s) (50 mL/Hr) IV Continuous <Continuous>  dextrose 5%. 1000 milliLiter(s) (100 mL/Hr) IV Continuous <Continuous>  dextrose 50% Injectable 25 Gram(s) IV Push once  dextrose 50% Injectable 12.5 Gram(s) IV Push once  dextrose 50% Injectable 25 Gram(s) IV Push once  enoxaparin Injectable 40 milliGRAM(s) SubCutaneous every 12 hours  fluticasone propionate 50 MICROgram(s)/spray Nasal Spray 2 Spray(s) Both Nostrils two times a day  furosemide    Tablet 60 milliGRAM(s) Oral daily  glucagon  Injectable 1 milliGRAM(s) IntraMuscular once  hydroxychloroquine 400 milliGRAM(s) Oral daily  insulin glargine Injectable (LANTUS) 24 Unit(s) SubCutaneous at bedtime  insulin lispro (ADMELOG) corrective regimen sliding scale   SubCutaneous three times a day before meals  insulin lispro (ADMELOG) corrective regimen sliding scale   SubCutaneous at bedtime  insulin lispro Injectable (ADMELOG) 8 Unit(s) SubCutaneous three times a day with meals  levothyroxine 125 MICROGram(s) Oral daily  lidocaine   4% Patch 1 Patch Transdermal daily  losartan 50 milliGRAM(s) Oral daily  methylPREDNISolone sodium succinate Injectable 40 milliGRAM(s) IV Push every 12 hours  pantoprazole    Tablet 40 milliGRAM(s) Oral before breakfast  tiotropium 18 MICROgram(s) Capsule 1 Capsule(s) Inhalation daily  trimethoprim  160 mG/sulfamethoxazole 800 mG 1 Tablet(s) Oral <User Schedule>      MEDICATIONS  (PRN):  acetaminophen     Tablet .. 650 milliGRAM(s) Oral every 6 hours PRN Temp greater or equal to 38C (100.4F), Mild Pain (1 - 3), Moderate Pain (4 - 6)  ALPRAZolam 0.5 milliGRAM(s) Oral three times a day PRN anxiety  aluminum hydroxide/magnesium hydroxide/simethicone Suspension 30 milliLiter(s) Oral every 4 hours PRN Dyspepsia  benzocaine 15 mG/menthol 3.6 mG Lozenge 1 Lozenge Oral every 4 hours PRN cough/sore throat  dextrose Oral Gel 15 Gram(s) Oral once PRN Blood Glucose LESS THAN 70 milliGRAM(s)/deciliter  hydrocodone/homatropine Syrup 10 milliLiter(s) Oral every 6 hours PRN Cough  melatonin 3 milliGRAM(s) Oral at bedtime PRN Insomnia  ondansetron Injectable 4 milliGRAM(s) IV Push every 8 hours PRN Nausea and/or Vomiting  oxyCODONE    IR 5 milliGRAM(s) Oral every 6 hours PRN moderate and severe pain      ========================PHYSICAL EXAM============================    VITALS: ICU Vital Signs Last 24 Hrs  T(C): 36.6 (05 Jul 2022 05:00), Max: 36.8 (04 Jul 2022 12:15)  T(F): 97.8 (05 Jul 2022 05:00), Max: 98.3 (04 Jul 2022 12:15)  HR: 101 (05 Jul 2022 05:00) (101 - 116)  BP: 127/85 (05 Jul 2022 05:00) (125/79 - 131/80)  RR: 18 (05 Jul 2022 05:00) (18 - 18)  SpO2: 95% (05 Jul 2022 05:00) (94% - 96%)      INTAKE and OUTPUT: I&O's Summary    04 Jul 2022 07:01  -  05 Jul 2022 07:00  --------------------------------------------------------  IN: 1180 mL / OUT: 1050 mL / NET: 130 mL    VENTILATOR SETTINGS: N/A    GENERAL: AAOx3, pain with cough, sitting still without acute distress  EYES: anicteric, EOMI  EAR/NOSE/MOUTH/THROAT: NCAT, MMM, nares clear, trachea midline, no thrush  NECK: supple, no JVD   LYMPH NODES: no palpable supraclavicular LAD  CARDIOVASCULAR: tachycardic, S1S2  RESPIRATORY: good air entry, faint expiratory wheeze (just received nebulizer about 20 minutes prior), no accessory muscle use  ABDOMEN: soft, NT, ND, +BS  EXTREMITIES: no clubbing   SKIN: warm and dry   MUSCULOSKELETAL: strength intact   NEUROLOGIC: nonfocal exam  PSYCHIATRIC: calm    ========================LABORATORY RESULTS AND IMAGING=============                        13.8   17.19 )-----------( 292      ( 05 Jul 2022 06:41 )             40.4                                                    07-05    133<L>  |  94<L>  |  12  ----------------------------<  239<H>  3.6   |  26  |  0.74    Ca    9.6      05 Jul 2022 06:41    Creatinine Trend: 0.74<--, 0.79<--, 0.80<--, 0.77<--, 0.77<--, 0.71<--    CT CHEST: < from: CT Angio Chest PE Protocol w/ IV Cont (07.04.22 @ 21:59) >  FINDINGS:    PULMONARY ANGIOGRAM: There is adequate filling of the pulmonary arterial   tree. There is no main, lobar, segmental or subsegmental pulmonary   embolism.    LYMPH NODES: Unremarkable.    HEART/VASCULATURE: Heart size is normal. No pericardial effusion.    AIRWAYS/LUNGS/PLEURA: Patent central airways. The lungs are clear. No   pleural effusion. No pneumothorax.    UPPER ABDOMEN: Cholecystectomy.    BONES/SOFT TISSUES: Degenerative changes.    IMPRESSION:  No pulmonary embolism.    < end of copied text >      [x] RADIOLOGY REVIEWED AND INTERPRETED BY ME      THANK YOU FOR ALLOWING US TO PARTICIPATE IN THE CARE OF THIS PATIENT

## 2022-07-05 NOTE — PROVIDER CONTACT NOTE (OTHER) - RECOMMENDATIONS
does team want patient on telemetry monitoring or any other interventions on this time?
is team okay with rechecking FS at lunch and intervening as ordered then?
give lantus and sliding scale as ordered. No further interventions at this time
Recheck

## 2022-07-05 NOTE — PROVIDER CONTACT NOTE (OTHER) - SITUATION
Pt FS elevated
patient complaining of acute respiratory distress
patient FS retaken by mistake
pts fingerstick of 349

## 2022-07-05 NOTE — PROGRESS NOTE ADULT - SUBJECTIVE AND OBJECTIVE BOX
Patient is a 47y old  Male who presents with a chief complaint of cough, SOB (05 Jul 2022 11:52)      SUBJECTIVE / OVERNIGHT EVENTS: No On events. Reports uncontrolled mid and lower chest pain when he coughs. Still reports wheezing and muscle spasms, denies sob, f/c, n/v.         ADDITIONAL REVIEW OF SYSTEMS: Negative except for above    MEDICATIONS  (STANDING):  albuterol/ipratropium for Nebulization 3 milliLiter(s) Nebulizer every 4 hours  aspirin enteric coated 81 milliGRAM(s) Oral daily  benzonatate 100 milliGRAM(s) Oral three times a day  buDESOnide    Inhalation Suspension 0.25 milliGRAM(s) Inhalation every 12 hours  budesonide 160 MICROgram(s)/formoterol 4.5 MICROgram(s) Inhaler 2 Puff(s) Inhalation two times a day  cyclobenzaprine 5 milliGRAM(s) Oral three times a day  dextrose 5%. 1000 milliLiter(s) (100 mL/Hr) IV Continuous <Continuous>  dextrose 5%. 1000 milliLiter(s) (50 mL/Hr) IV Continuous <Continuous>  dextrose 50% Injectable 25 Gram(s) IV Push once  dextrose 50% Injectable 12.5 Gram(s) IV Push once  dextrose 50% Injectable 25 Gram(s) IV Push once  enoxaparin Injectable 40 milliGRAM(s) SubCutaneous every 12 hours  fluticasone propionate 50 MICROgram(s)/spray Nasal Spray 2 Spray(s) Both Nostrils two times a day  furosemide    Tablet 60 milliGRAM(s) Oral daily  glucagon  Injectable 1 milliGRAM(s) IntraMuscular once  hydroxychloroquine 400 milliGRAM(s) Oral daily  insulin glargine Injectable (LANTUS) 24 Unit(s) SubCutaneous at bedtime  insulin lispro (ADMELOG) corrective regimen sliding scale   SubCutaneous three times a day before meals  insulin lispro (ADMELOG) corrective regimen sliding scale   SubCutaneous at bedtime  insulin lispro Injectable (ADMELOG) 8 Unit(s) SubCutaneous three times a day with meals  levothyroxine 125 MICROGram(s) Oral daily  lidocaine   4% Patch 1 Patch Transdermal daily  losartan 50 milliGRAM(s) Oral daily  methylPREDNISolone sodium succinate Injectable 40 milliGRAM(s) IV Push every 12 hours  pantoprazole    Tablet 40 milliGRAM(s) Oral before breakfast  tiotropium 18 MICROgram(s) Capsule 1 Capsule(s) Inhalation daily  trimethoprim  160 mG/sulfamethoxazole 800 mG 1 Tablet(s) Oral <User Schedule>    MEDICATIONS  (PRN):  acetaminophen     Tablet .. 650 milliGRAM(s) Oral every 6 hours PRN Temp greater or equal to 38C (100.4F), Mild Pain (1 - 3), Moderate Pain (4 - 6)  ALPRAZolam 0.5 milliGRAM(s) Oral three times a day PRN anxiety  aluminum hydroxide/magnesium hydroxide/simethicone Suspension 30 milliLiter(s) Oral every 4 hours PRN Dyspepsia  benzocaine 15 mG/menthol 3.6 mG Lozenge 1 Lozenge Oral every 4 hours PRN cough/sore throat  dextrose Oral Gel 15 Gram(s) Oral once PRN Blood Glucose LESS THAN 70 milliGRAM(s)/deciliter  hydrocodone/homatropine Syrup 10 milliLiter(s) Oral every 6 hours PRN Cough  melatonin 3 milliGRAM(s) Oral at bedtime PRN Insomnia  ondansetron Injectable 4 milliGRAM(s) IV Push every 8 hours PRN Nausea and/or Vomiting  oxyCODONE    IR 5 milliGRAM(s) Oral every 6 hours PRN moderate and severe pain      CAPILLARY BLOOD GLUCOSE      POCT Blood Glucose.: 376 mg/dL (05 Jul 2022 12:19)  POCT Blood Glucose.: 250 mg/dL (05 Jul 2022 09:19)  POCT Blood Glucose.: 349 mg/dL (04 Jul 2022 21:23)  POCT Blood Glucose.: 282 mg/dL (04 Jul 2022 17:24)    I&O's Summary    04 Jul 2022 07:01  -  05 Jul 2022 07:00  --------------------------------------------------------  IN: 1180 mL / OUT: 1050 mL / NET: 130 mL        PHYSICAL EXAM:  Vital Signs Last 24 Hrs  T(C): 36.9 (05 Jul 2022 12:40), Max: 36.9 (05 Jul 2022 12:40)  T(F): 98.4 (05 Jul 2022 12:40), Max: 98.4 (05 Jul 2022 12:40)  HR: 119 (05 Jul 2022 12:40) (101 - 119)  BP: 117/77 (05 Jul 2022 12:40) (117/77 - 131/80)  BP(mean): --  RR: 20 (05 Jul 2022 12:40) (18 - 20)  SpO2: 96% (05 Jul 2022 12:40) (94% - 96%)    PHYSICAL EXAM:  GENERAL: NAD, well-developed on RA  HEAD:  Atraumatic, Normocephalic  EYES:  conjunctiva and sclera clear  NECK: Supple, No JVD  CHEST/LUNG: Clear to auscultation bilaterally; No wheeze. +reproducible chest tenderness  HEART: Regular rate and rhythm; No murmurs, rubs, or gallops  ABDOMEN: Soft, Nontender, Nondistended; Bowel sounds present  EXTREMITIES:  2+ Peripheral Pulses, No clubbing, cyanosis, or edema  PSYCH: AAOx3  NEUROLOGY: non-focal  SKIN: No rashes or lesions      LABS:                        13.8   17.19 )-----------( 292      ( 05 Jul 2022 06:41 )             40.4     07-05    133<L>  |  94<L>  |  12  ----------------------------<  239<H>  3.6   |  26  |  0.74    Ca    9.6      05 Jul 2022 06:41                  RADIOLOGY & ADDITIONAL TESTS:    Imaging Personally Reviewed:    Electrocardiogram Personally Reviewed:    COORDINATION OF CARE:  Care Discussed with Consultants/Other Providers [Y/N]:  Prior or Outpatient Records Reviewed [Y/N]:

## 2022-07-05 NOTE — PROGRESS NOTE ADULT - ASSESSMENT
47M with PMH of SLE on chronic steroids and Salphnelo, mild/moderate asthma, hypothyroidism, COVID infection in Jan 2021, HTN p/w cough and SOB x2 weeks. Presented to [facility] ED on 6/14 for these symptoms, diagnosed with asthma exacerbation and discharged with steroids and nebulizers. His symptoms have been getting progressively worse. Has ongoing paroxysms of productive cough, with associated chest and back pain, WAITE and wheezing. Has been using home nebulizers every 4-6 hours without improvement in symptoms. Pt is chronically on solumedrol 16mg daily for SLE, was seen by Dr. Gonzales on 6/24 and steroids were changed to prednisone 40mg. CT angio performed which did not show PE or other acute change.     RVP positive parainfluenza and entero/rhinovirus. Being treated with Solumedrol, Tessalon perles, Hycodan every 6 hours  Not hypoxic  CTPA negative for PE, effusion or pneumonia.    1. Acute Asthma Exacerbation - likely in setting of viral respiratory tract infection  - Bronchospasms appear improved on exam though patient does not feel any changes - at  time of my evaluation patient did not have very significant wheezing as what he described to me from prior  - would change Solumedrol to 20mg Q8 Hours - continue IV for now - is on daily steroids per Pulm  - would make Hycodan and Tessalon ATC - with hold parameter for sedation  - Continue Symbicort, Spiriva, Budesonide, and Albuterol for bronchodilator therapy - ATC therapy. Rinse mouth after ICS use to prevent thrush  - Maintain O2 sat > 90%  - Incentive spirometer and acapella to facilitate breathing  - Completed Oliva    2. Costochondritis  - Lidocaine patch  - pain control  - topical pain therapy to ribs may be beneficial  - muscle spasms in back - continue cyclobenzaprine    3. Hyperglycemia - in setting of steroids  - FS and insulin management as per primary team    4. SLE  - continue therapy as per Rheum  - Cellcept currently on hold  - Continue Solumedrol for SLE and then taper back to home Prednisone  - Bactrim for PCP prophylaxis    5. Proph  - DVT proph - on lovenox BID as per primary team  - GI proph  - Maintain O2 sat > 90%  - Incentive spirometer and acapella  - Ambulation  - No pulmonary contraindications to showering - steam will likely help patient's respiratory status    Above was discussed with the patient at length, all questions answered.

## 2022-07-05 NOTE — PROVIDER CONTACT NOTE (OTHER) - REASON
patient complaining of acute respiratory distress
patient FS retaken by mistake
Pt  and 399
fingerstick of 349

## 2022-07-05 NOTE — PROVIDER CONTACT NOTE (OTHER) - ACTION/TREATMENT ORDERED:
recheck FS at lunch, intervene as ordered. Follow up if FS is higher than patients baseline
Will be seen by endocrine possibly. Recheck
gave lantus and sliding scale as ordered. will continue nurisng care with pt
will come see patient now will follow up with proper interventions

## 2022-07-06 ENCOUNTER — TRANSCRIPTION ENCOUNTER (OUTPATIENT)
Age: 47
End: 2022-07-06

## 2022-07-06 LAB
ANION GAP SERPL CALC-SCNC: 11 MMOL/L — SIGNIFICANT CHANGE UP (ref 5–17)
BUN SERPL-MCNC: 14 MG/DL — SIGNIFICANT CHANGE UP (ref 7–23)
CALCIUM SERPL-MCNC: 9.3 MG/DL — SIGNIFICANT CHANGE UP (ref 8.4–10.5)
CHLORIDE SERPL-SCNC: 94 MMOL/L — LOW (ref 96–108)
CO2 SERPL-SCNC: 29 MMOL/L — SIGNIFICANT CHANGE UP (ref 22–31)
CREAT SERPL-MCNC: 0.78 MG/DL — SIGNIFICANT CHANGE UP (ref 0.5–1.3)
EGFR: 111 ML/MIN/1.73M2 — SIGNIFICANT CHANGE UP
GLUCOSE BLDC GLUCOMTR-MCNC: 202 MG/DL — HIGH (ref 70–99)
GLUCOSE BLDC GLUCOMTR-MCNC: 225 MG/DL — HIGH (ref 70–99)
GLUCOSE BLDC GLUCOMTR-MCNC: 344 MG/DL — HIGH (ref 70–99)
GLUCOSE BLDC GLUCOMTR-MCNC: 344 MG/DL — HIGH (ref 70–99)
GLUCOSE SERPL-MCNC: 253 MG/DL — HIGH (ref 70–99)
HCT VFR BLD CALC: 38 % — LOW (ref 39–50)
HGB BLD-MCNC: 13 G/DL — SIGNIFICANT CHANGE UP (ref 13–17)
MCHC RBC-ENTMCNC: 30.5 PG — SIGNIFICANT CHANGE UP (ref 27–34)
MCHC RBC-ENTMCNC: 34.2 GM/DL — SIGNIFICANT CHANGE UP (ref 32–36)
MCV RBC AUTO: 89.2 FL — SIGNIFICANT CHANGE UP (ref 80–100)
NRBC # BLD: 0 /100 WBCS — SIGNIFICANT CHANGE UP (ref 0–0)
PLATELET # BLD AUTO: 267 K/UL — SIGNIFICANT CHANGE UP (ref 150–400)
POTASSIUM SERPL-MCNC: 3.8 MMOL/L — SIGNIFICANT CHANGE UP (ref 3.5–5.3)
POTASSIUM SERPL-SCNC: 3.8 MMOL/L — SIGNIFICANT CHANGE UP (ref 3.5–5.3)
RBC # BLD: 4.26 M/UL — SIGNIFICANT CHANGE UP (ref 4.2–5.8)
RBC # FLD: 13.5 % — SIGNIFICANT CHANGE UP (ref 10.3–14.5)
SODIUM SERPL-SCNC: 134 MMOL/L — LOW (ref 135–145)
WBC # BLD: 16.35 K/UL — HIGH (ref 3.8–10.5)
WBC # FLD AUTO: 16.35 K/UL — HIGH (ref 3.8–10.5)

## 2022-07-06 PROCEDURE — 99233 SBSQ HOSP IP/OBS HIGH 50: CPT

## 2022-07-06 PROCEDURE — 99232 SBSQ HOSP IP/OBS MODERATE 35: CPT

## 2022-07-06 RX ORDER — INSULIN GLARGINE 100 [IU]/ML
29 INJECTION, SOLUTION SUBCUTANEOUS AT BEDTIME
Refills: 0 | Status: DISCONTINUED | OUTPATIENT
Start: 2022-07-06 | End: 2022-07-11

## 2022-07-06 RX ORDER — INSULIN LISPRO 100/ML
10 VIAL (ML) SUBCUTANEOUS
Refills: 0 | Status: DISCONTINUED | OUTPATIENT
Start: 2022-07-06 | End: 2022-07-11

## 2022-07-06 RX ADMIN — Medication 125 MICROGRAM(S): at 06:54

## 2022-07-06 RX ADMIN — CYCLOBENZAPRINE HYDROCHLORIDE 5 MILLIGRAM(S): 10 TABLET, FILM COATED ORAL at 22:58

## 2022-07-06 RX ADMIN — Medication 4: at 22:09

## 2022-07-06 RX ADMIN — Medication 100 MILLIGRAM(S): at 22:08

## 2022-07-06 RX ADMIN — Medication 100 MILLIGRAM(S): at 06:53

## 2022-07-06 RX ADMIN — BENZOCAINE AND MENTHOL 1 LOZENGE: 5; 1 LIQUID ORAL at 14:12

## 2022-07-06 RX ADMIN — Medication 3 MILLILITER(S): at 15:33

## 2022-07-06 RX ADMIN — Medication 0.25 MILLIGRAM(S): at 06:53

## 2022-07-06 RX ADMIN — LIDOCAINE 1 PATCH: 4 CREAM TOPICAL at 17:33

## 2022-07-06 RX ADMIN — Medication 2: at 08:11

## 2022-07-06 RX ADMIN — Medication 20 MILLIGRAM(S): at 14:12

## 2022-07-06 RX ADMIN — Medication 10 UNIT(S): at 17:41

## 2022-07-06 RX ADMIN — Medication 81 MILLIGRAM(S): at 12:27

## 2022-07-06 RX ADMIN — Medication 3 MILLIGRAM(S): at 22:08

## 2022-07-06 RX ADMIN — LOSARTAN POTASSIUM 50 MILLIGRAM(S): 100 TABLET, FILM COATED ORAL at 06:54

## 2022-07-06 RX ADMIN — Medication 400 MILLIGRAM(S): at 13:22

## 2022-07-06 RX ADMIN — CYCLOBENZAPRINE HYDROCHLORIDE 5 MILLIGRAM(S): 10 TABLET, FILM COATED ORAL at 06:55

## 2022-07-06 RX ADMIN — PANTOPRAZOLE SODIUM 40 MILLIGRAM(S): 20 TABLET, DELAYED RELEASE ORAL at 06:54

## 2022-07-06 RX ADMIN — Medication 20 MILLIGRAM(S): at 06:54

## 2022-07-06 RX ADMIN — Medication 3 MILLILITER(S): at 06:52

## 2022-07-06 RX ADMIN — TIOTROPIUM BROMIDE 1 CAPSULE(S): 18 CAPSULE ORAL; RESPIRATORY (INHALATION) at 13:33

## 2022-07-06 RX ADMIN — CYCLOBENZAPRINE HYDROCHLORIDE 5 MILLIGRAM(S): 10 TABLET, FILM COATED ORAL at 15:33

## 2022-07-06 RX ADMIN — INSULIN GLARGINE 29 UNIT(S): 100 INJECTION, SOLUTION SUBCUTANEOUS at 22:09

## 2022-07-06 RX ADMIN — BUDESONIDE AND FORMOTEROL FUMARATE DIHYDRATE 2 PUFF(S): 160; 4.5 AEROSOL RESPIRATORY (INHALATION) at 06:53

## 2022-07-06 RX ADMIN — Medication 2 SPRAY(S): at 06:55

## 2022-07-06 RX ADMIN — LIDOCAINE 1 PATCH: 4 CREAM TOPICAL at 12:27

## 2022-07-06 RX ADMIN — ENOXAPARIN SODIUM 40 MILLIGRAM(S): 100 INJECTION SUBCUTANEOUS at 06:55

## 2022-07-06 RX ADMIN — OXYCODONE HYDROCHLORIDE 5 MILLIGRAM(S): 5 TABLET ORAL at 20:09

## 2022-07-06 RX ADMIN — Medication 60 MILLIGRAM(S): at 12:27

## 2022-07-06 RX ADMIN — BUDESONIDE AND FORMOTEROL FUMARATE DIHYDRATE 2 PUFF(S): 160; 4.5 AEROSOL RESPIRATORY (INHALATION) at 17:40

## 2022-07-06 RX ADMIN — OXYCODONE HYDROCHLORIDE 5 MILLIGRAM(S): 5 TABLET ORAL at 06:59

## 2022-07-06 RX ADMIN — OXYCODONE HYDROCHLORIDE 5 MILLIGRAM(S): 5 TABLET ORAL at 07:30

## 2022-07-06 RX ADMIN — LIDOCAINE 1 PATCH: 4 CREAM TOPICAL at 01:32

## 2022-07-06 RX ADMIN — ENOXAPARIN SODIUM 40 MILLIGRAM(S): 100 INJECTION SUBCUTANEOUS at 17:40

## 2022-07-06 RX ADMIN — Medication 3 MILLILITER(S): at 12:27

## 2022-07-06 RX ADMIN — Medication 3 MILLILITER(S): at 20:08

## 2022-07-06 RX ADMIN — Medication 2: at 17:41

## 2022-07-06 RX ADMIN — Medication 20 MILLIGRAM(S): at 22:08

## 2022-07-06 RX ADMIN — Medication 0.25 MILLIGRAM(S): at 18:12

## 2022-07-06 RX ADMIN — OXYCODONE HYDROCHLORIDE 5 MILLIGRAM(S): 5 TABLET ORAL at 18:56

## 2022-07-06 NOTE — PROGRESS NOTE ADULT - PROBLEM SELECTOR PLAN 1
cough, SOB, chest tightness x 2 weeks c/w asthma exacerbation in setting of infections with parainfluenza and entero/rhinovirus.   -still having wheezing, coughing fits with uncontrolled chest pain, muscle spasms,  likely MSK   -CTA 7/4: negative for PE/PNA  -02 sat mid 90s  -c/w IV solumedrol 20mg q8  -s/p azithromycin x 5 days  -c/w duonebs Q4 ATC  -spiriva and fluticasone  c/w Symbicort bid   monitor peak flow  c/w Tessalon and hyocodan TID ATC with holding parameters  -tylenol prn, lidocaine patch, oxycodone prn. flexeril now 5mg tid from back spasm from coughing.   -xanax prn anxiety. -istop in chart.

## 2022-07-06 NOTE — PROGRESS NOTE ADULT - PROBLEM SELECTOR PLAN 6
lovenox for VTE ppx   DASH diet   fall precautions  -oob to chair. incentive spirometer.     Dispo: pending improvement of asthma    discussed with NP Pearl

## 2022-07-06 NOTE — PROGRESS NOTE ADULT - ASSESSMENT
47M with PMH of SLE on chronic steroids and Salphnelo, mild/moderate asthma, hypothyroidism, COVID infection in Jan 2021, HTN p/w cough and SOB x2 weeks. Presented to [facility] ED on 6/14 for these symptoms, diagnosed with asthma exacerbation and discharged with steroids and nebulizers. His symptoms have been getting progressively worse. Has ongoing paroxysms of productive cough, with associated chest and back pain, WAITE and wheezing. Has been using home nebulizers every 4-6 hours without improvement in symptoms. Pt is chronically on solumedrol 16mg daily for SLE, was seen by Dr. Gonzales on 6/24 and steroids were changed to prednisone 40mg. CT angio performed which did not show PE or other acute change.     RVP positive parainfluenza and entero/rhinovirus. Being treated with Solumedrol, Tessalon perles, Hycodan every 6 hours  CTPA negative for PE, effusion or pneumonia.    1. Acute Asthma Exacerbation - likely in setting of viral respiratory tract infection  - Bronchospasms improved on exam though patient feels only minimal improvement - at  time of my evaluation patient did not have wheezing as what he described to me from prior  - continue Solumedrol to 20mg Q8 Hours - continue IV for now - is on daily steroids per Rheum   - continue Hycodan and Tessalon ATC - with hold parameter for sedation  - Continue Symbicort, Spiriva, Budesonide, and Albuterol for bronchodilator therapy - ATC therapy. Rinse mouth after ICS use to prevent thrush  - Maintain O2 sat > 90%  - Incentive spirometer and acapella to facilitate breathing  - Completed Mohanro    2. Costochondritis and Viral Myalgias  - Lidocaine patch  - pain control as per primary team  - topical pain therapy to ribs may be beneficial  - muscle spasms in back - continue cyclobenzaprine  - anxiolytics may also be of benefit    3. Hyperglycemia - in setting of steroids  - FS and insulin management as per primary team    4. SLE  - continue therapy as per Rheum  - Cellcept currently on hold  - Continue Solumedrol for SLE and then taper back to home steroid dose (home Methylprednisolone 16mg equivalent to 20mg Prednisone daily)  - Bactrim for PCP prophylaxis    5. Proph  - DVT proph - on lovenox BID as per primary team  - GI proph  - Maintain O2 sat > 90%  - Incentive spirometer and acapella  - Ambulation    Above was discussed with the patient at length, all questions answered.

## 2022-07-06 NOTE — PROGRESS NOTE ADULT - SUBJECTIVE AND OBJECTIVE BOX
[facility] DIVISION OF PULMONARY, CRITICAL CARE and SLEEP MEDICINE  PULMONARY PROGRESS NOTE  OFFICE NUMBER: [phone]    PATIENT INFORMATION:  NAME: AMBERLY ESPITIA:  MRN: [id number]    CHIEF COMPLAINT: Patient is a 47y old  Male who presents with a chief complaint of cough, SOB (05 Jul 2022 13:30)      [x] INITIAL CONSULT, H&P, FAMILY HISTORY and PAST MEDICAL AND SURGICAL HISTORY REVIEWED    OVERNIGHT EVENTS or CHANGES TO HPI:   - Feels about the same  - Was moved to 8M overnight - now in window room - but had trouble at time of move  - Has anxiety and splinting - afraid to take deep breaths  - Not wheezing on my exam today - steroids lowered dose but increased frequency yesterday  - Continues on bronchodilators    ========================REVIEW OF SYSTEMS========================  CONSTITUTIONAL: No acute changes  CARDIOVASCULAR: Tachycardia  PULMONARY: Breathing stable - still with pain worse with coughing  [x] REMAINING REVIEW OF SYSTEMS NEGATIVE  [] UNABLE TO OBTAIN REVIEW OF SYSTEMS DUE TO _______________    ========================MEDICATIONS=============================  MEDICATIONS  (STANDING):  albuterol/ipratropium for Nebulization 3 milliLiter(s) Nebulizer every 4 hours  aspirin enteric coated 81 milliGRAM(s) Oral daily  benzonatate 100 milliGRAM(s) Oral three times a day  buDESOnide    Inhalation Suspension 0.25 milliGRAM(s) Inhalation every 12 hours  budesonide 160 MICROgram(s)/formoterol 4.5 MICROgram(s) Inhaler 2 Puff(s) Inhalation two times a day  cyclobenzaprine 5 milliGRAM(s) Oral three times a day  dextrose 5%. 1000 milliLiter(s) (50 mL/Hr) IV Continuous <Continuous>  dextrose 5%. 1000 milliLiter(s) (100 mL/Hr) IV Continuous <Continuous>  dextrose 50% Injectable 25 Gram(s) IV Push once  dextrose 50% Injectable 25 Gram(s) IV Push once  dextrose 50% Injectable 12.5 Gram(s) IV Push once  enoxaparin Injectable 40 milliGRAM(s) SubCutaneous every 12 hours  fluticasone propionate 50 MICROgram(s)/spray Nasal Spray 2 Spray(s) Both Nostrils two times a day  furosemide    Tablet 60 milliGRAM(s) Oral daily  glucagon  Injectable 1 milliGRAM(s) IntraMuscular once  hydrocodone/homatropine Syrup 10 milliLiter(s) Oral every 8 hours  hydroxychloroquine 400 milliGRAM(s) Oral daily  insulin glargine Injectable (LANTUS) 27 Unit(s) SubCutaneous at bedtime  insulin lispro (ADMELOG) corrective regimen sliding scale   SubCutaneous three times a day before meals  insulin lispro (ADMELOG) corrective regimen sliding scale   SubCutaneous at bedtime  insulin lispro Injectable (ADMELOG) 9 Unit(s) SubCutaneous three times a day with meals  levothyroxine 125 MICROGram(s) Oral daily  lidocaine   4% Patch 1 Patch Transdermal daily  losartan 50 milliGRAM(s) Oral daily  methylPREDNISolone sodium succinate Injectable 20 milliGRAM(s) IV Push every 8 hours  pantoprazole    Tablet 40 milliGRAM(s) Oral before breakfast  tiotropium 18 MICROgram(s) Capsule 1 Capsule(s) Inhalation daily  trimethoprim  160 mG/sulfamethoxazole 800 mG 1 Tablet(s) Oral <User Schedule>      MEDICATIONS  (PRN):  acetaminophen     Tablet .. 650 milliGRAM(s) Oral every 6 hours PRN Temp greater or equal to 38C (100.4F), Mild Pain (1 - 3), Moderate Pain (4 - 6)  ALPRAZolam 0.5 milliGRAM(s) Oral three times a day PRN anxiety  aluminum hydroxide/magnesium hydroxide/simethicone Suspension 30 milliLiter(s) Oral every 4 hours PRN Dyspepsia  benzocaine 15 mG/menthol 3.6 mG Lozenge 1 Lozenge Oral every 4 hours PRN cough/sore throat  dextrose Oral Gel 15 Gram(s) Oral once PRN Blood Glucose LESS THAN 70 milliGRAM(s)/deciliter  melatonin 3 milliGRAM(s) Oral at bedtime PRN Insomnia  ondansetron Injectable 4 milliGRAM(s) IV Push every 8 hours PRN Nausea and/or Vomiting  oxyCODONE    IR 5 milliGRAM(s) Oral every 6 hours PRN moderate and severe pain      ========================PHYSICAL EXAM============================    VITALS: ICU Vital Signs Last 24 Hrs  T(C): 36.7 (06 Jul 2022 00:57), Max: 36.9 (05 Jul 2022 12:40)  T(F): 98.1 (06 Jul 2022 00:57), Max: 98.4 (05 Jul 2022 12:40)  HR: 105 (06 Jul 2022 06:57) (102 - 124)  BP: 130/91 (06 Jul 2022 06:57) (117/17 - 148/96)  RR: 19 (06 Jul 2022 00:57) (19 - 20)  SpO2: 97% (06 Jul 2022 00:57) (96% - 98%)      INTAKE and OUTPUT: I&O's Summary    05 Jul 2022 07:01  -  06 Jul 2022 07:00  --------------------------------------------------------  IN: 360 mL / OUT: 0 mL / NET: 360 mL        VENTILATOR SETTINGS:     GENERAL:    EYES: anicteric, EOMI  EAR/NOSE/MOUTH/THROAT: NCAT, MMM, nares clear, trachea midline  NECK: supple, no JVD  LYMPH NODES: no palpable supraclavicular LAD  CARDIOVASCULAR: tachy, S1S2  RESPIRATORY: CTA bilaterally, no wheeze, moderate effort (did not take deep breaths due to splinting) - patient tells me he had nebulizer 25 minutes prior to exam  ABDOMEN: soft, NT, ND, +BS  EXTREMITIES: no clubbing or cyanosis  SKIN: warm and dry  MUSCULOSKELETAL: strength intact (but patient hesitant to move due to diffuse pain)  NEUROLOGIC: nonfocal exam  PSYCHIATRIC: Calm     ========================LABORATORY RESULTS AND IMAGING=============                        13.0   16.35 )-----------( 267      ( 06 Jul 2022 07:06 )             38.0                                                    07-06    134<L>  |  94<L>  |  14  ----------------------------<  253<H>  3.8   |  29  |  0.78    Ca    9.3      06 Jul 2022 07:05            Creatinine Trend: 0.78<--, 0.74<--, 0.79<--, 0.80<--, 0.77<--, 0.77<--    CT CHEST:     [] RADIOLOGY REVIEWED AND INTERPRETED BY ME      THANK YOU FOR ALLOWING US TO PARTICIPATE IN THE CARE OF THIS PATIENT

## 2022-07-06 NOTE — PROGRESS NOTE ADULT - SUBJECTIVE AND OBJECTIVE BOX
Patient is a 47y old  Male who presents with a chief complaint of cough, SOB (06 Jul 2022 09:41)      SUBJECTIVE / OVERNIGHT EVENTS: No ON events. Still reports episodes of severe cough with chest pain and sob despite meds. Says he cannot go home like this. When hes not coughing hes doing well. Denies f/c, n/v.         ADDITIONAL REVIEW OF SYSTEMS: Negative except for above    MEDICATIONS  (STANDING):  albuterol/ipratropium for Nebulization 3 milliLiter(s) Nebulizer every 4 hours  aspirin enteric coated 81 milliGRAM(s) Oral daily  benzonatate 100 milliGRAM(s) Oral three times a day  buDESOnide    Inhalation Suspension 0.25 milliGRAM(s) Inhalation every 12 hours  budesonide 160 MICROgram(s)/formoterol 4.5 MICROgram(s) Inhaler 2 Puff(s) Inhalation two times a day  cyclobenzaprine 5 milliGRAM(s) Oral three times a day  dextrose 5%. 1000 milliLiter(s) (50 mL/Hr) IV Continuous <Continuous>  dextrose 5%. 1000 milliLiter(s) (100 mL/Hr) IV Continuous <Continuous>  dextrose 50% Injectable 25 Gram(s) IV Push once  dextrose 50% Injectable 25 Gram(s) IV Push once  dextrose 50% Injectable 12.5 Gram(s) IV Push once  enoxaparin Injectable 40 milliGRAM(s) SubCutaneous every 12 hours  fluticasone propionate 50 MICROgram(s)/spray Nasal Spray 2 Spray(s) Both Nostrils two times a day  furosemide    Tablet 60 milliGRAM(s) Oral daily  glucagon  Injectable 1 milliGRAM(s) IntraMuscular once  hydrocodone/homatropine Syrup 10 milliLiter(s) Oral every 8 hours  hydroxychloroquine 400 milliGRAM(s) Oral daily  insulin glargine Injectable (LANTUS) 27 Unit(s) SubCutaneous at bedtime  insulin lispro (ADMELOG) corrective regimen sliding scale   SubCutaneous three times a day before meals  insulin lispro (ADMELOG) corrective regimen sliding scale   SubCutaneous at bedtime  insulin lispro Injectable (ADMELOG) 9 Unit(s) SubCutaneous three times a day with meals  levothyroxine 125 MICROGram(s) Oral daily  lidocaine   4% Patch 1 Patch Transdermal daily  losartan 50 milliGRAM(s) Oral daily  methylPREDNISolone sodium succinate Injectable 20 milliGRAM(s) IV Push every 8 hours  pantoprazole    Tablet 40 milliGRAM(s) Oral before breakfast  tiotropium 18 MICROgram(s) Capsule 1 Capsule(s) Inhalation daily  trimethoprim  160 mG/sulfamethoxazole 800 mG 1 Tablet(s) Oral <User Schedule>    MEDICATIONS  (PRN):  acetaminophen     Tablet .. 650 milliGRAM(s) Oral every 6 hours PRN Temp greater or equal to 38C (100.4F), Mild Pain (1 - 3), Moderate Pain (4 - 6)  ALPRAZolam 0.5 milliGRAM(s) Oral three times a day PRN anxiety  aluminum hydroxide/magnesium hydroxide/simethicone Suspension 30 milliLiter(s) Oral every 4 hours PRN Dyspepsia  benzocaine 15 mG/menthol 3.6 mG Lozenge 1 Lozenge Oral every 4 hours PRN cough/sore throat  dextrose Oral Gel 15 Gram(s) Oral once PRN Blood Glucose LESS THAN 70 milliGRAM(s)/deciliter  melatonin 3 milliGRAM(s) Oral at bedtime PRN Insomnia  ondansetron Injectable 4 milliGRAM(s) IV Push every 8 hours PRN Nausea and/or Vomiting  oxyCODONE    IR 5 milliGRAM(s) Oral every 6 hours PRN moderate and severe pain      CAPILLARY BLOOD GLUCOSE      POCT Blood Glucose.: 202 mg/dL (06 Jul 2022 07:57)  POCT Blood Glucose.: 306 mg/dL (05 Jul 2022 21:40)  POCT Blood Glucose.: 217 mg/dL (05 Jul 2022 17:09)  POCT Blood Glucose.: 376 mg/dL (05 Jul 2022 12:19)    I&O's Summary    05 Jul 2022 07:01  -  06 Jul 2022 07:00  --------------------------------------------------------  IN: 360 mL / OUT: 0 mL / NET: 360 mL        PHYSICAL EXAM:  Vital Signs Last 24 Hrs  T(C): 36.7 (06 Jul 2022 00:57), Max: 36.9 (05 Jul 2022 12:40)  T(F): 98.1 (06 Jul 2022 00:57), Max: 98.4 (05 Jul 2022 12:40)  HR: 105 (06 Jul 2022 06:57) (102 - 124)  BP: 130/91 (06 Jul 2022 06:57) (117/17 - 148/96)  BP(mean): --  RR: 19 (06 Jul 2022 00:57) (19 - 20)  SpO2: 97% (06 Jul 2022 00:57) (96% - 98%)    PHYSICAL EXAM:  GENERAL: NAD, well-developed on RA  HEAD:  Atraumatic, Normocephalic  EYES:  conjunctiva and sclera clear  NECK: Supple, No JVD  CHEST/LUNG: Clear to auscultation bilaterally; No wheeze  HEART: Regular rate and rhythm; No murmurs, rubs, or gallops  ABDOMEN: Soft, Nontender, Nondistended; Bowel sounds present  EXTREMITIES:  2+ Peripheral Pulses, No clubbing, cyanosis, or edema  PSYCH: AAOx3  NEUROLOGY: non-focal  SKIN: No rashes or lesions      LABS:                        13.0   16.35 )-----------( 267      ( 06 Jul 2022 07:06 )             38.0     07-06    134<L>  |  94<L>  |  14  ----------------------------<  253<H>  3.8   |  29  |  0.78    Ca    9.3      06 Jul 2022 07:05                  RADIOLOGY & ADDITIONAL TESTS:    Imaging Personally Reviewed:    Electrocardiogram Personally Reviewed:    COORDINATION OF CARE:  Care Discussed with Consultants/Other Providers [Y/N]:  Prior or Outpatient Records Reviewed [Y/N]:

## 2022-07-06 NOTE — PROGRESS NOTE ADULT - PROBLEM SELECTOR PLAN 2
A1c -8.3 - now new onset DM  FS elevated  monitor FS  increase lantus to 29 units qhs  c/w premeal  9u admelog TID  ISS for coverage  -RD isra appreciated.

## 2022-07-07 DIAGNOSIS — B37.0 CANDIDAL STOMATITIS: ICD-10-CM

## 2022-07-07 LAB
GLUCOSE BLDC GLUCOMTR-MCNC: 265 MG/DL — HIGH (ref 70–99)
GLUCOSE BLDC GLUCOMTR-MCNC: 277 MG/DL — HIGH (ref 70–99)
GLUCOSE BLDC GLUCOMTR-MCNC: 306 MG/DL — HIGH (ref 70–99)
GLUCOSE BLDC GLUCOMTR-MCNC: 372 MG/DL — HIGH (ref 70–99)
HCT VFR BLD CALC: 37.4 % — LOW (ref 39–50)
HGB BLD-MCNC: 12.9 G/DL — LOW (ref 13–17)
MCHC RBC-ENTMCNC: 30.7 PG — SIGNIFICANT CHANGE UP (ref 27–34)
MCHC RBC-ENTMCNC: 34.5 GM/DL — SIGNIFICANT CHANGE UP (ref 32–36)
MCV RBC AUTO: 89 FL — SIGNIFICANT CHANGE UP (ref 80–100)
NRBC # BLD: 0 /100 WBCS — SIGNIFICANT CHANGE UP (ref 0–0)
PLATELET # BLD AUTO: 234 K/UL — SIGNIFICANT CHANGE UP (ref 150–400)
RBC # BLD: 4.2 M/UL — SIGNIFICANT CHANGE UP (ref 4.2–5.8)
RBC # FLD: 13.5 % — SIGNIFICANT CHANGE UP (ref 10.3–14.5)
WBC # BLD: 15.66 K/UL — HIGH (ref 3.8–10.5)
WBC # FLD AUTO: 15.66 K/UL — HIGH (ref 3.8–10.5)

## 2022-07-07 PROCEDURE — 99233 SBSQ HOSP IP/OBS HIGH 50: CPT

## 2022-07-07 RX ORDER — NYSTATIN 500MM UNIT
500000 POWDER (EA) MISCELLANEOUS
Refills: 0 | Status: DISCONTINUED | OUTPATIENT
Start: 2022-07-07 | End: 2022-07-11

## 2022-07-07 RX ORDER — FLUCONAZOLE 150 MG/1
100 TABLET ORAL DAILY
Refills: 0 | Status: DISCONTINUED | OUTPATIENT
Start: 2022-07-07 | End: 2022-07-11

## 2022-07-07 RX ADMIN — OXYCODONE HYDROCHLORIDE 5 MILLIGRAM(S): 5 TABLET ORAL at 06:58

## 2022-07-07 RX ADMIN — Medication 400 MILLIGRAM(S): at 13:10

## 2022-07-07 RX ADMIN — Medication 3 MILLILITER(S): at 08:40

## 2022-07-07 RX ADMIN — CYCLOBENZAPRINE HYDROCHLORIDE 5 MILLIGRAM(S): 10 TABLET, FILM COATED ORAL at 13:12

## 2022-07-07 RX ADMIN — Medication 3 MILLILITER(S): at 06:00

## 2022-07-07 RX ADMIN — Medication 100 MILLIGRAM(S): at 21:52

## 2022-07-07 RX ADMIN — TIOTROPIUM BROMIDE 1 CAPSULE(S): 18 CAPSULE ORAL; RESPIRATORY (INHALATION) at 13:11

## 2022-07-07 RX ADMIN — Medication 60 MILLIGRAM(S): at 13:11

## 2022-07-07 RX ADMIN — Medication 100 MILLIGRAM(S): at 13:12

## 2022-07-07 RX ADMIN — Medication 125 MICROGRAM(S): at 05:57

## 2022-07-07 RX ADMIN — Medication 2 SPRAY(S): at 17:36

## 2022-07-07 RX ADMIN — FLUCONAZOLE 100 MILLIGRAM(S): 150 TABLET ORAL at 17:46

## 2022-07-07 RX ADMIN — ENOXAPARIN SODIUM 40 MILLIGRAM(S): 100 INJECTION SUBCUTANEOUS at 05:59

## 2022-07-07 RX ADMIN — ENOXAPARIN SODIUM 40 MILLIGRAM(S): 100 INJECTION SUBCUTANEOUS at 17:35

## 2022-07-07 RX ADMIN — Medication 500000 UNIT(S): at 17:46

## 2022-07-07 RX ADMIN — LIDOCAINE 1 PATCH: 4 CREAM TOPICAL at 13:09

## 2022-07-07 RX ADMIN — Medication 30 MILLIGRAM(S): at 13:26

## 2022-07-07 RX ADMIN — Medication 6: at 21:52

## 2022-07-07 RX ADMIN — OXYCODONE HYDROCHLORIDE 5 MILLIGRAM(S): 5 TABLET ORAL at 13:45

## 2022-07-07 RX ADMIN — Medication 3: at 17:33

## 2022-07-07 RX ADMIN — Medication 2 SPRAY(S): at 05:59

## 2022-07-07 RX ADMIN — Medication 100 MILLIGRAM(S): at 05:59

## 2022-07-07 RX ADMIN — CYCLOBENZAPRINE HYDROCHLORIDE 5 MILLIGRAM(S): 10 TABLET, FILM COATED ORAL at 06:36

## 2022-07-07 RX ADMIN — Medication 3: at 08:39

## 2022-07-07 RX ADMIN — LOSARTAN POTASSIUM 50 MILLIGRAM(S): 100 TABLET, FILM COATED ORAL at 05:58

## 2022-07-07 RX ADMIN — BUDESONIDE AND FORMOTEROL FUMARATE DIHYDRATE 2 PUFF(S): 160; 4.5 AEROSOL RESPIRATORY (INHALATION) at 17:47

## 2022-07-07 RX ADMIN — INSULIN GLARGINE 29 UNIT(S): 100 INJECTION, SOLUTION SUBCUTANEOUS at 21:52

## 2022-07-07 RX ADMIN — Medication 81 MILLIGRAM(S): at 13:09

## 2022-07-07 RX ADMIN — OXYCODONE HYDROCHLORIDE 5 MILLIGRAM(S): 5 TABLET ORAL at 21:03

## 2022-07-07 RX ADMIN — LIDOCAINE 1 PATCH: 4 CREAM TOPICAL at 00:00

## 2022-07-07 RX ADMIN — Medication 10 UNIT(S): at 11:58

## 2022-07-07 RX ADMIN — Medication 20 MILLIGRAM(S): at 05:57

## 2022-07-07 RX ADMIN — Medication 0.25 MILLIGRAM(S): at 06:36

## 2022-07-07 RX ADMIN — OXYCODONE HYDROCHLORIDE 5 MILLIGRAM(S): 5 TABLET ORAL at 13:09

## 2022-07-07 RX ADMIN — Medication 3 MILLILITER(S): at 20:26

## 2022-07-07 RX ADMIN — CYCLOBENZAPRINE HYDROCHLORIDE 5 MILLIGRAM(S): 10 TABLET, FILM COATED ORAL at 21:53

## 2022-07-07 RX ADMIN — Medication 3 MILLILITER(S): at 17:36

## 2022-07-07 RX ADMIN — LIDOCAINE 1 PATCH: 4 CREAM TOPICAL at 19:57

## 2022-07-07 RX ADMIN — Medication 4: at 11:58

## 2022-07-07 RX ADMIN — Medication 10 UNIT(S): at 08:40

## 2022-07-07 RX ADMIN — Medication 3 MILLILITER(S): at 13:11

## 2022-07-07 RX ADMIN — OXYCODONE HYDROCHLORIDE 5 MILLIGRAM(S): 5 TABLET ORAL at 20:03

## 2022-07-07 RX ADMIN — Medication 3 MILLIGRAM(S): at 22:25

## 2022-07-07 RX ADMIN — BUDESONIDE AND FORMOTEROL FUMARATE DIHYDRATE 2 PUFF(S): 160; 4.5 AEROSOL RESPIRATORY (INHALATION) at 05:59

## 2022-07-07 RX ADMIN — PANTOPRAZOLE SODIUM 40 MILLIGRAM(S): 20 TABLET, DELAYED RELEASE ORAL at 05:58

## 2022-07-07 RX ADMIN — Medication 10 UNIT(S): at 17:33

## 2022-07-07 RX ADMIN — OXYCODONE HYDROCHLORIDE 5 MILLIGRAM(S): 5 TABLET ORAL at 05:58

## 2022-07-07 NOTE — PROGRESS NOTE ADULT - SUBJECTIVE AND OBJECTIVE BOX
[facility] DIVISION OF PULMONARY, CRITICAL CARE and SLEEP MEDICINE  PULMONARY PROGRESS NOTE  OFFICE NUMBER: [phone]    PATIENT INFORMATION:  NAME: AMBERLY ESPITIA:  MRN: [id number]    CHIEF COMPLAINT: Patient is a 47y old  Male who presents with a chief complaint of cough, SOB (06 Jul 2022 11:47)      [x] INITIAL CONSULT, H&P, FAMILY HISTORY and PAST MEDICAL AND SURGICAL HISTORY REVIEWED    OVERNIGHT EVENTS or CHANGES TO HPI:   - Pain with swallowing - appears to have thrush despite rinsing mouth with inhalers  - States he was wheezing severely earlier - no wheeze on my exam today  - Continues to appear fatigued - and is scared/hesitant of going home    ========================REVIEW OF SYSTEMS========================  CONSTITUTIONAL: Pain on swallowing, sore throat  CARDIOVASCULAR: Overall chest discomfort with breathing  PULMONARY: Dry cough persists, pain with coughing, wheezing reported overnight  [x] REMAINING REVIEW OF SYSTEMS NEGATIVE  [] UNABLE TO OBTAIN REVIEW OF SYSTEMS DUE TO _______________    ========================MEDICATIONS=============================  MEDICATIONS  (STANDING):  albuterol/ipratropium for Nebulization 3 milliLiter(s) Nebulizer every 4 hours  aspirin enteric coated 81 milliGRAM(s) Oral daily  benzonatate 100 milliGRAM(s) Oral three times a day  buDESOnide    Inhalation Suspension 0.25 milliGRAM(s) Inhalation every 12 hours  budesonide 160 MICROgram(s)/formoterol 4.5 MICROgram(s) Inhaler 2 Puff(s) Inhalation two times a day  cyclobenzaprine 5 milliGRAM(s) Oral three times a day  dextrose 5%. 1000 milliLiter(s) (50 mL/Hr) IV Continuous <Continuous>  dextrose 5%. 1000 milliLiter(s) (100 mL/Hr) IV Continuous <Continuous>  dextrose 50% Injectable 25 Gram(s) IV Push once  dextrose 50% Injectable 12.5 Gram(s) IV Push once  dextrose 50% Injectable 25 Gram(s) IV Push once  enoxaparin Injectable 40 milliGRAM(s) SubCutaneous every 12 hours  fluticasone propionate 50 MICROgram(s)/spray Nasal Spray 2 Spray(s) Both Nostrils two times a day  furosemide    Tablet 60 milliGRAM(s) Oral daily  glucagon  Injectable 1 milliGRAM(s) IntraMuscular once  hydrocodone/homatropine Syrup 10 milliLiter(s) Oral every 8 hours  hydroxychloroquine 400 milliGRAM(s) Oral daily  insulin glargine Injectable (LANTUS) 29 Unit(s) SubCutaneous at bedtime  insulin lispro (ADMELOG) corrective regimen sliding scale   SubCutaneous three times a day before meals  insulin lispro (ADMELOG) corrective regimen sliding scale   SubCutaneous at bedtime  insulin lispro Injectable (ADMELOG) 10 Unit(s) SubCutaneous three times a day with meals  levothyroxine 125 MICROGram(s) Oral daily  lidocaine   4% Patch 1 Patch Transdermal daily  losartan 50 milliGRAM(s) Oral daily  methylPREDNISolone sodium succinate Injectable 20 milliGRAM(s) IV Push every 8 hours  pantoprazole    Tablet 40 milliGRAM(s) Oral before breakfast  tiotropium 18 MICROgram(s) Capsule 1 Capsule(s) Inhalation daily  trimethoprim  160 mG/sulfamethoxazole 800 mG 1 Tablet(s) Oral <User Schedule>      MEDICATIONS  (PRN):  acetaminophen     Tablet .. 650 milliGRAM(s) Oral every 6 hours PRN Temp greater or equal to 38C (100.4F), Mild Pain (1 - 3), Moderate Pain (4 - 6)  ALPRAZolam 0.5 milliGRAM(s) Oral three times a day PRN anxiety  aluminum hydroxide/magnesium hydroxide/simethicone Suspension 30 milliLiter(s) Oral every 4 hours PRN Dyspepsia  benzocaine 15 mG/menthol 3.6 mG Lozenge 1 Lozenge Oral every 4 hours PRN cough/sore throat  dextrose Oral Gel 15 Gram(s) Oral once PRN Blood Glucose LESS THAN 70 milliGRAM(s)/deciliter  melatonin 3 milliGRAM(s) Oral at bedtime PRN Insomnia  ondansetron Injectable 4 milliGRAM(s) IV Push every 8 hours PRN Nausea and/or Vomiting  oxyCODONE    IR 5 milliGRAM(s) Oral every 6 hours PRN moderate and severe pain      ========================PHYSICAL EXAM============================    VITALS: ICU Vital Signs Last 24 Hrs  T(C): 36.6 (07 Jul 2022 10:03), Max: 36.8 (06 Jul 2022 16:38)  T(F): 97.8 (07 Jul 2022 10:03), Max: 98.2 (06 Jul 2022 16:38)  HR: 107 (07 Jul 2022 10:03) (94 - 119)  BP: 133/86 (07 Jul 2022 10:03) (133/86 - 145/96)  RR: 18 (07 Jul 2022 10:03) (18 - 20)  SpO2: 98% (07 Jul 2022 10:03) (96% - 98%)      INTAKE and OUTPUT: I&O's Summary    06 Jul 2022 07:01  -  07 Jul 2022 07:00  --------------------------------------------------------  IN: 0 mL / OUT: 650 mL / NET: -650 mL    07 Jul 2022 07:01  -  07 Jul 2022 10:43  --------------------------------------------------------  IN: 240 mL / OUT: 0 mL / NET: 240 mL        VENTILATOR SETTINGS: N/A    GENERAL: NAD, appears fatigue, AAOx3  EYES: anicteric, EOMI  EAR/NOSE/MOUTH/THROAT: NCAT, THRUSH in posterior oropharynx, MMM, nares clear, trachea midline  NECK: supple, no JVD  CARDIOVASCULAR: RRR, S1S2  RESPIRATORY: CTA bilaterally - no wheeze on exam today  ABDOMEN: soft, NT, ND, +BS  EXTREMITIES: no clubbing or cyanosis, no LE edema  SKIN: warm and dry  MUSCULOSKELETAL: strength intact  NEUROLOGIC: nonfocal exam  PSYCHIATRIC: calm    ========================LABORATORY RESULTS AND IMAGING=============                        12.9   15.66 )-----------( 234      ( 07 Jul 2022 06:38 )             37.4                                                    07-06    134<L>  |  94<L>  |  14  ----------------------------<  253<H>  3.8   |  29  |  0.78    Ca    9.3      06 Jul 2022 07:05            Creatinine Trend: 0.78<--, 0.74<--, 0.79<--, 0.80<--, 0.77<--, 0.77<--    CT CHEST:     [] RADIOLOGY REVIEWED AND INTERPRETED BY ME      THANK YOU FOR ALLOWING US TO PARTICIPATE IN THE CARE OF THIS PATIENT

## 2022-07-07 NOTE — CONSULT NOTE ADULT - PROBLEM SELECTOR RECOMMENDATION 9
- diflucan 200mg po q1day, 100q 9days   - Patient should follow up in ENT office as an outpatient. May see Dr. Noel or Chong. Call [phone]. - diflucan 200mg po q1day, 100q 9days   - nystatin swish and spit   - Patient should follow up in ENT office as an outpatient. May see Dr. Noel or Chong. Call [phone]. - diflucan 200mg po q1day, 100q 9days   - nystatin swish and spit   - pt may benefit from spacer for inhaler  - Patient should follow up in ENT office as an outpatient. May see Dr. Nole or Chong. Call [phone].

## 2022-07-07 NOTE — PROGRESS NOTE ADULT - PROBLEM SELECTOR PLAN 5
c/w synthroid 125mcg BP stable, near goal   c/w home medications  - losartan 50mg daily  - c/w lasix 60mg daily (for chronic LE swelling) - improved

## 2022-07-07 NOTE — CONSULT NOTE ADULT - ASSESSMENT
47ywith long standing steroid use found to have both oropharyngeal and laryngeal thrush noted on exam and indirect laryngoscopy. 
47M with PMH of SLE, asthma, hypothyroidism, HTN p/w cough and SOB found to have parainfluenza and entero/rhinovirus with asthma exacerbation. Rheumatology consulted for SLE management    #SLE: Dx in 2019. Follows Dr. Neri. Antibody Profile: OWEN 1:2560, + SM, + RNP, + LA, Low complement, + U1rnp and weak positive PM/Sc100. The patient has no history of nephritis. Symptoms include sicca, rash, fatigue, arthritis, arthralgias and sun sensitivity. MRI right thigh 5/2021 - no muscle edema moderate sized right knee joint effusion. Home meds MMF 3 pills daily, HCQ 400mg daily, Saphnelo (first infusion 6/2/22), medrol 24mg daily.  -Admitted with asthma exacerbation 2/2 parainfluenza + entero/rhinovirus on high dose steroids IV solu-medrol 40mg q8hrs  -Reports no specific SLE activity, complements negative. Given foamy urine (although stable and chronic per patient ), will need to evaluate for LN activity but doubt this, no KIKI. Has serositis/costochondritis but this comes in setting of muscle tension/spasms, asthma exac, viral illness so there are alternative explanations for symptoms rather than SLE activity. Regardless, patient is on high dose steroids at this time for asthma exacerbation, which will cover any possible SLE activity at this time. CT PE negative.    Plan:  -f/u dsDNA  -check UA, urine p/c ratio, CK (ordered)  -hold MMF for now while on high dose steroids (ordered). Please resume once tapered back to home dose Medrol 24mg/day  -for muscle tension/spasms please start Flexeril 5mg x1 dose now then 5mg TID PRN (ordered)    WOO Faith-Malcom Mae DO  Rheumatology Fellow PGY4  Pager [phone]  
47M with PMH of SLE on chronic steroids and Salphnelo, mild/moderate asthma, hypothyroidism, COVID infection in Jan 2021, HTN p/w cough and SOB x2 weeks. Presented to [facility] ED on 6/14 for these symptoms, diagnosed with asthma exacerbation and discharged with steroids and nebulizers. His symptoms have been getting progressively worse. Has ongoing paroxysms of productive cough, with associated chest and back pain, WAITE and wheezing. Has been using home nebulizers every 4-6 hours without improvement in symptoms. Pt is chronically on solumedrol 16mg daily for SLE, was seen by Dr. Gonzales on 6/24 and steroids were changed to prednisone 40mg. CT angio performed which did not show PE or other acute change.   RVP positive parainfluenza and entero/rhinovirus. Being treated with Solumedrol 40 Q 8 hours, Tessalon perles, Hycodan every 6 hours  Not hypoxic, initial VBG WNL.   CTPA negative for PE, effusion or pneumonia.    A/P: Asthma exacerbation - Bronchospasm and cough in a patient with viral LRTI and underlying asthma  Would c/w Solumedrol at current dose as well as tessalon perles, Dariusonebs  Would restart ICS/LABA - Symbicort 160/4.5 mg BID  Can increase Hycodan dose to 10 ml every 8 hours (max dose 30/9 mg) and monitor for improvement.  Monitor O2 sats, goal 92-96%  DVT ppx    Above was discussed with the patient at length, all questions answered.

## 2022-07-07 NOTE — CONSULT NOTE ADULT - SUBJECTIVE AND OBJECTIVE BOX
CC: thrush odynophagia     HPI:  47M with PMH of SLE on steroids, asthma, hypothyroidism, HTN p/w severe cough and SOB, a/w acute asthma exacerbation in setting parainfluenza and entero/rhinovirus. ENT called for odynophagia x 1 week, pt seen in april for thrush, feels the same. PT denies any dysphagia, dysphonia, sob, dyspnea, changes in voice or inability to tolerated secretions       PAST MEDICAL & SURGICAL HISTORY:  Lupus      Asthma      Hypothyroid      History of cholecystectomy        Allergies    No Known Allergies    Intolerances      MEDICATIONS  (STANDING):  albuterol/ipratropium for Nebulization 3 milliLiter(s) Nebulizer every 4 hours  aspirin enteric coated 81 milliGRAM(s) Oral daily  benzonatate 100 milliGRAM(s) Oral three times a day  budesonide 160 MICROgram(s)/formoterol 4.5 MICROgram(s) Inhaler 2 Puff(s) Inhalation two times a day  cyclobenzaprine 5 milliGRAM(s) Oral three times a day  dextrose 5%. 1000 milliLiter(s) (100 mL/Hr) IV Continuous <Continuous>  dextrose 5%. 1000 milliLiter(s) (50 mL/Hr) IV Continuous <Continuous>  dextrose 50% Injectable 25 Gram(s) IV Push once  dextrose 50% Injectable 12.5 Gram(s) IV Push once  dextrose 50% Injectable 25 Gram(s) IV Push once  enoxaparin Injectable 40 milliGRAM(s) SubCutaneous every 12 hours  fluconAZOLE   Tablet 100 milliGRAM(s) Oral daily  fluticasone propionate 50 MICROgram(s)/spray Nasal Spray 2 Spray(s) Both Nostrils two times a day  furosemide    Tablet 60 milliGRAM(s) Oral daily  glucagon  Injectable 1 milliGRAM(s) IntraMuscular once  hydrocodone/homatropine Syrup 10 milliLiter(s) Oral every 8 hours  hydroxychloroquine 400 milliGRAM(s) Oral daily  insulin glargine Injectable (LANTUS) 29 Unit(s) SubCutaneous at bedtime  insulin lispro (ADMELOG) corrective regimen sliding scale   SubCutaneous three times a day before meals  insulin lispro (ADMELOG) corrective regimen sliding scale   SubCutaneous at bedtime  insulin lispro Injectable (ADMELOG) 10 Unit(s) SubCutaneous three times a day with meals  levothyroxine 125 MICROGram(s) Oral daily  lidocaine   4% Patch 1 Patch Transdermal daily  losartan 50 milliGRAM(s) Oral daily  nystatin    Suspension 879376 Unit(s) Oral two times a day  pantoprazole    Tablet 40 milliGRAM(s) Oral before breakfast  predniSONE   Tablet   Oral   predniSONE   Tablet 30 milliGRAM(s) Oral daily  tiotropium 18 MICROgram(s) Capsule 1 Capsule(s) Inhalation daily  trimethoprim  160 mG/sulfamethoxazole 800 mG 1 Tablet(s) Oral <User Schedule>    MEDICATIONS  (PRN):  acetaminophen     Tablet .. 650 milliGRAM(s) Oral every 6 hours PRN Temp greater or equal to 38C (100.4F), Mild Pain (1 - 3), Moderate Pain (4 - 6)  ALPRAZolam 0.5 milliGRAM(s) Oral three times a day PRN anxiety  aluminum hydroxide/magnesium hydroxide/simethicone Suspension 30 milliLiter(s) Oral every 4 hours PRN Dyspepsia  benzocaine 15 mG/menthol 3.6 mG Lozenge 1 Lozenge Oral every 4 hours PRN cough/sore throat  dextrose Oral Gel 15 Gram(s) Oral once PRN Blood Glucose LESS THAN 70 milliGRAM(s)/deciliter  melatonin 3 milliGRAM(s) Oral at bedtime PRN Insomnia  ondansetron Injectable 4 milliGRAM(s) IV Push every 8 hours PRN Nausea and/or Vomiting  oxyCODONE    IR 5 milliGRAM(s) Oral every 6 hours PRN moderate and severe pain      Social History: no tobacco, no etoh     Family history: Pt denies any sign FHx    ROS:   ENT: all negative except as noted in HPI   CV: denies palpitations  Pulm: denies SOB, cough, hemoptysis  GI: denies change in apetite, indigestion, n/v  : denies pertinent urinary symptoms, urgency  Neuro: denies numbness/tingling, loss of sensation  Psych: denies anxiety  MS: denies muscle weakness, instability  Heme: denies easy bruising or bleeding  Endo: denies heat/cold intolerance, excessive sweating  Vascular: denies LE edema    Vital Signs Last 24 Hrs  T(C): 36.6 (07 Jul 2022 10:03), Max: 36.8 (06 Jul 2022 16:38)  T(F): 97.8 (07 Jul 2022 10:03), Max: 98.2 (06 Jul 2022 16:38)  HR: 107 (07 Jul 2022 10:03) (94 - 119)  BP: 133/86 (07 Jul 2022 10:03) (133/86 - 145/96)  BP(mean): --  RR: 18 (07 Jul 2022 10:03) (18 - 20)  SpO2: 98% (07 Jul 2022 10:03) (96% - 98%)                          12.9   15.66 )-----------( 234      ( 07 Jul 2022 06:38 )             37.4    07-06    134<L>  |  94<L>  |  14  ----------------------------<  253<H>  3.8   |  29  |  0.78    Ca    9.3      06 Jul 2022 07:05         PHYSICAL EXAM:  Gen: NAD  Skin: No rashes, bruises, or lesions  Head: Normocephalic, Atraumatic  Face: no edema, erythema, or fluctuance. Parotid glands soft without mass  Eyes: no scleral injection  Nose: Nares bilaterally patent, no discharge  Mouth: No Stridor / Drooling / Trismus.  Mucosa moist, tongue/uvula midline, oropharynx with diffuse thrush, noted on posterior tongue, soft palate, under tongue   Neck: Flat, supple, no lymphadenopathy, trachea midline, no masses  Lymphatic: No lymphadenopathy  Resp: breathing easily, no stridor  CV: no peripheral edema/cyanosis  GI: nondistended   Peripheral vascular: no JVD or edema  Neuro: facial nerve intact, no facial droop    Fiberoptic Indirect laryngoscopy:  (Scope #2 used) Reason for Laryngoscopy:    Patient was unable to cooperate with mirror.  Nasopharynx, oropharynx, and hypopharynx clear, no bleeding. Tongue base, posterior pharyngeal wall, vallecula, epiglottis, and subglottis appear normal. No erythema, edema, pooling of secretions, masses or lesions. Airway patent, no foreign body visualized. No glottic/supraglottic edema. True vocal cords, arytenoids, vestibular folds, ventricles, pyriform sinuses, and aryepiglottic folds appear normal bilaterally. Vocal cords mobile with good contact b/l.-- diffuse laryngeal thrush noted

## 2022-07-07 NOTE — PROGRESS NOTE ADULT - PROBLEM SELECTOR PLAN 1
cough, SOB, chest tightness x 2 weeks c/w asthma exacerbation in setting of infections with parainfluenza and entero/rhinovirus.   -still having wheezing, coughing fits with uncontrolled chest pain, muscle spasms,    -CTA 7/4: negative for PE/PNA  -02 sat mid 90s  -discussed with pulm Dr Ocampo: rectransition steroids to Prednisone 30mg PO daily x 7 days and then down to 20mg daily - is on daily steroids per Rheum   -s/p azithromycin x 5 days  -c/w duonebs Q4 ATC  -spiriva and fluticasone  c/w Symbicort bid   monitor peak flow  c/w Tessalon and hyocodan TID ATC with holding parameters  -tylenol prn, lidocaine patch, oxycodone prn. flexeril now 5mg tid from back spasm from coughing.   -xanax prn anxiety. -istop in chart. cough, SOB, chest tightness x 2 weeks c/w asthma exacerbation in setting of infections with parainfluenza and entero/rhinovirus and covid a few months ago  -still having wheezing, coughing fits with uncontrolled chest pain, muscle spasms,    -CTA 7/4: negative for PE/PNA  -02 sat mid 90s  -discussed with pulm Dr Ocampo: rec transition steroids to Prednisone 30mg PO daily x 7 days and then down to 20mg daily - is on daily steroids per Rheum   -s/p azithromycin x 5 days  -c/w duonebs Q4 ATC  -spiriva and fluticasone  c/w Symbicort bid   monitor peak flow  c/w Tessalon and hyocodan TID ATC with holding parameters  -tylenol prn, lidocaine patch, oxycodone prn. flexeril now 5mg tid from back spasm from coughing.   -xanax prn anxiety. -istop in chart. cough, SOB, chest tightness x 2 weeks c/w asthma exacerbation in setting of infections with parainfluenza and entero/rhinovirus and covid a few months ago  -still having wheezing, coughing fits with uncontrolled chest pain, muscle spasms,    -CTA 7/4: negative for PE/PNA  -02 sat mid 90s  -discussed with pulm Dr Ocampo: rec transition steroids to Prednisone 30mg PO daily x 7 days and then down to 20mg daily - is on daily steroids per Rheum, d/c budesonide inhalers  -s/p azithromycin x 5 days  -c/w duonebs Q4 ATC  -Spiriva   c/w Symbicort bid   monitor peak flow  c/w Tessalon and hyocodan TID ATC with holding parameters  -tylenol prn, lidocaine patch, oxycodone prn. flexeril now 5mg tid from back spasm from coughing.   -xanax prn anxiety. -istop in chart.

## 2022-07-07 NOTE — PROGRESS NOTE ADULT - ASSESSMENT
47M with PMH of SLE on chronic steroids and Salphnelo, mild/moderate asthma, hypothyroidism, COVID infection in Jan 2021, HTN p/w cough and SOB x2 weeks. Presented to [facility] ED on 6/14 for these symptoms, diagnosed with asthma exacerbation and discharged with steroids and nebulizers. His symptoms have been getting progressively worse. Has ongoing paroxysms of productive cough, with associated chest and back pain, WAITE and wheezing. Has been using home nebulizers every 4-6 hours without improvement in symptoms. Pt is chronically on solumedrol 16mg daily for SLE, was seen by Dr. Gonzales on 6/24 and steroids were changed to prednisone 40mg. CT angio performed which did not show PE or other acute change.     RVP positive parainfluenza and entero/rhinovirus. Being treated with Solumedrol, Tessalon perles, Hycodan every 6 hours  CTPA negative for PE, effusion or pneumonia.    1. Acute Asthma Exacerbation - likely in setting of viral respiratory tract infection  - Bronchospasms improved on exam though patient feels only minimal improvement - at  time of my evaluation patient did not have any wheezing  - would transition steroids to Prednisone 30mg PO daily x 7 days and then down to 20mg daily - is on daily steroids per Rheum - Please give first dose of Prednisone 30mg this afternoon/evening  - continue Hycodan and Tessalon ATC - with hold parameter for sedation  - Continue Symbicort, Spiriva, and Albuterol for bronchodilator therapy - ATC therapy. D/C Pulmicort  - Maintain O2 sat > 90%  - Incentive spirometer and acapella to facilitate breathing  - Completed Azithro    2. Thrush  - appears to have thrush lesions in posterior oropharynx  - would start Diflucan  - consider ENT evaluation for further inspection of oropharynx as patient describing throat pain and odynophagia    3. Costochondritis and Viral Myalgias  - Lidocaine patch  - pain control as per primary team  - topical pain therapy to ribs may be beneficial  - muscle spasms in back - continue cyclobenzaprine  - anxiolytics may also be of benefit    4. Hyperglycemia - in setting of steroids  - FS and insulin management as per primary team - continue to adjust as needed    5. SLE  - continue therapy as per Rheum  - Cellcept currently on hold  - Transition to Prednisone 30mg daily for asthma exacerbation/RAD and then taper back to home steroid dose (home Methylprednisolone 16mg equivalent to 20mg Prednisone daily) for SLE management  - Bactrim for PCP prophylaxis    6. Proph  - DVT proph - on lovenox BID as per primary team  - GI proph  - Maintain O2 sat > 90%  - Incentive spirometer and acapella  - Ambulation    Above was discussed with the patient at length, all questions answered.     Patient has told me that he does not feel comfortable with going home even as we are transitioning all his meds to PO as he is unable to recuperate at home and he is afraid of his dyspnea and pain and thinks he will come right back to the hospital.

## 2022-07-07 NOTE — CONSULT NOTE ADULT - NS ATTEND AMEND GEN_ALL_CORE FT
diffuse laryngeal thrush- likely related to inhaler use   consider adding a spacer   consider nystatin after inhaler use  meanwhile use fluconazole x 7 days to treat current flare up  f/up outpatient

## 2022-07-07 NOTE — PROGRESS NOTE ADULT - PROBLEM SELECTOR PLAN 4
BP stable, near goal   c/w home medications  - losartan 50mg daily  - c/w lasix 60mg daily (for chronic LE swelling) - improved c/w steroids as above   on Mycophenolate 1500mg daily at home   c/w home Plaquenil 400mg daily   c/w home bactrim for ppx   dsDNA, c3, c4 normal   Rheumatology consult appreciated - flexeril as above, cellcept held c/w steroids as above   on Mycophenolate 1500mg daily at home   c/w home Plaquenil 400mg daily   c/w home bactrim for ppx   dsDNA, c3, c4 normal   at home baseline methylpred 16 daily which is about pred 20mg daily  Rheumatology consult appreciated - flexeril as above, cellcept held

## 2022-07-07 NOTE — PROGRESS NOTE ADULT - PROBLEM SELECTOR PLAN 6
lovenox for VTE ppx   DASH diet   fall precautions  -oob to chair. incentive spirometer.     Dispo: pending improvement of asthma    discussed with NP Pearl c/w synthroid 125mcg

## 2022-07-07 NOTE — PROGRESS NOTE ADULT - PROBLEM SELECTOR PLAN 7
lovenox for VTE ppx   DASH diet   fall precautions  -oob to chair. incentive spirometer.     Dispo: pending improvement of asthma    discussed with NP Pearl lovenox for VTE ppx   DASH diet   fall precautions  -oob to chair. incentive spirometer.     Dispo: pending improvement of asthma    discussed with MARITA Cardoso and Dr Ocampo

## 2022-07-07 NOTE — PROGRESS NOTE ADULT - PROBLEM SELECTOR PLAN 2
A1c -8.3 - now new onset DM  FS elevated  monitor FS  increase lantus to 29 units qhs  c/w premeal  9u admelog TID  ISS for coverage  -RD isra appreciated. likely due to steroids likely due to steroids  discussed with pulm rec start diflucan 100gm daily  nystatin swish and swallow  ent eval given odynophagia

## 2022-07-07 NOTE — PROGRESS NOTE ADULT - PROBLEM SELECTOR PLAN 3
c/w steroids as above   on Mycophenolate 1500mg daily at home   c/w home Plaquenil 400mg daily   c/w home bactrim for ppx   dsDNA, c3, c4 normal   Rheumatology consult appreciated - flexeril as above, cellcept held A1c -8.3 - now new onset DM  FS elevated  monitor FS  increase lantus to 29 units qhs  c/w premeal  9u admelog TID  ISS for coverage  -RD isra appreciated. A1c -8.3 - now new onset DM  FS elevated but lowering steroid so will not increase insulin doses today  c/w lantus  29 units qhs  c/w premeal  10u admelog TID  ISS for coverage  -VINCENT briscoe.

## 2022-07-07 NOTE — PROGRESS NOTE ADULT - SUBJECTIVE AND OBJECTIVE BOX
Patient is a 47y old  Male who presents with a chief complaint of cough, SOB (07 Jul 2022 10:42)      SUBJECTIVE / OVERNIGHT EVENTS: No On events. Still reports some coughing fits associated with cp/sob. Scared to go home. Mary Beth cp at rest, n/v, palpitations dizziness.       ADDITIONAL REVIEW OF SYSTEMS: Negative except for above    MEDICATIONS  (STANDING):  albuterol/ipratropium for Nebulization 3 milliLiter(s) Nebulizer every 4 hours  aspirin enteric coated 81 milliGRAM(s) Oral daily  benzonatate 100 milliGRAM(s) Oral three times a day  budesonide 160 MICROgram(s)/formoterol 4.5 MICROgram(s) Inhaler 2 Puff(s) Inhalation two times a day  cyclobenzaprine 5 milliGRAM(s) Oral three times a day  dextrose 5%. 1000 milliLiter(s) (100 mL/Hr) IV Continuous <Continuous>  dextrose 5%. 1000 milliLiter(s) (50 mL/Hr) IV Continuous <Continuous>  dextrose 50% Injectable 25 Gram(s) IV Push once  dextrose 50% Injectable 12.5 Gram(s) IV Push once  dextrose 50% Injectable 25 Gram(s) IV Push once  enoxaparin Injectable 40 milliGRAM(s) SubCutaneous every 12 hours  fluticasone propionate 50 MICROgram(s)/spray Nasal Spray 2 Spray(s) Both Nostrils two times a day  furosemide    Tablet 60 milliGRAM(s) Oral daily  glucagon  Injectable 1 milliGRAM(s) IntraMuscular once  hydrocodone/homatropine Syrup 10 milliLiter(s) Oral every 8 hours  hydroxychloroquine 400 milliGRAM(s) Oral daily  insulin glargine Injectable (LANTUS) 29 Unit(s) SubCutaneous at bedtime  insulin lispro (ADMELOG) corrective regimen sliding scale   SubCutaneous at bedtime  insulin lispro (ADMELOG) corrective regimen sliding scale   SubCutaneous three times a day before meals  insulin lispro Injectable (ADMELOG) 10 Unit(s) SubCutaneous three times a day with meals  levothyroxine 125 MICROGram(s) Oral daily  lidocaine   4% Patch 1 Patch Transdermal daily  losartan 50 milliGRAM(s) Oral daily  nystatin    Suspension 107035 Unit(s) Oral two times a day  pantoprazole    Tablet 40 milliGRAM(s) Oral before breakfast  predniSONE   Tablet   Oral   predniSONE   Tablet 30 milliGRAM(s) Oral daily  tiotropium 18 MICROgram(s) Capsule 1 Capsule(s) Inhalation daily  trimethoprim  160 mG/sulfamethoxazole 800 mG 1 Tablet(s) Oral <User Schedule>    MEDICATIONS  (PRN):  acetaminophen     Tablet .. 650 milliGRAM(s) Oral every 6 hours PRN Temp greater or equal to 38C (100.4F), Mild Pain (1 - 3), Moderate Pain (4 - 6)  ALPRAZolam 0.5 milliGRAM(s) Oral three times a day PRN anxiety  aluminum hydroxide/magnesium hydroxide/simethicone Suspension 30 milliLiter(s) Oral every 4 hours PRN Dyspepsia  benzocaine 15 mG/menthol 3.6 mG Lozenge 1 Lozenge Oral every 4 hours PRN cough/sore throat  dextrose Oral Gel 15 Gram(s) Oral once PRN Blood Glucose LESS THAN 70 milliGRAM(s)/deciliter  melatonin 3 milliGRAM(s) Oral at bedtime PRN Insomnia  ondansetron Injectable 4 milliGRAM(s) IV Push every 8 hours PRN Nausea and/or Vomiting  oxyCODONE    IR 5 milliGRAM(s) Oral every 6 hours PRN moderate and severe pain      CAPILLARY BLOOD GLUCOSE      POCT Blood Glucose.: 306 mg/dL (07 Jul 2022 11:35)  POCT Blood Glucose.: 277 mg/dL (07 Jul 2022 07:59)  POCT Blood Glucose.: 344 mg/dL (06 Jul 2022 21:21)  POCT Blood Glucose.: 225 mg/dL (06 Jul 2022 17:18)    I&O's Summary    06 Jul 2022 07:01  -  07 Jul 2022 07:00  --------------------------------------------------------  IN: 0 mL / OUT: 650 mL / NET: -650 mL    07 Jul 2022 07:01  -  07 Jul 2022 14:55  --------------------------------------------------------  IN: 480 mL / OUT: 0 mL / NET: 480 mL        PHYSICAL EXAM:  Vital Signs Last 24 Hrs  T(C): 36.6 (07 Jul 2022 10:03), Max: 36.8 (06 Jul 2022 16:38)  T(F): 97.8 (07 Jul 2022 10:03), Max: 98.2 (06 Jul 2022 16:38)  HR: 107 (07 Jul 2022 10:03) (94 - 119)  BP: 133/86 (07 Jul 2022 10:03) (133/86 - 145/96)  BP(mean): --  RR: 18 (07 Jul 2022 10:03) (18 - 20)  SpO2: 98% (07 Jul 2022 10:03) (96% - 98%)    PHYSICAL EXAM:  GENERAL: NAD, well-developed on RA comfortable  HEAD:  Atraumatic, Normocephalic  EYES:  conjunctiva and sclera clear  NECK: Supple, No JVD  CHEST/LUNG: Clear to auscultation bilaterally; No wheeze  HEART: Regular rate and rhythm;   ABDOMEN: Soft, Nontender, Nondistended; Bowel sounds present  EXTREMITIES:  2+ Peripheral Pulses, No clubbing, cyanosis, or edema  PSYCH: AAOx3  NEUROLOGY: non-focal  SKIN: No rashes or lesions      LABS:                        12.9   15.66 )-----------( 234      ( 07 Jul 2022 06:38 )             37.4     07-06    134<L>  |  94<L>  |  14  ----------------------------<  253<H>  3.8   |  29  |  0.78    Ca    9.3      06 Jul 2022 07:05                  RADIOLOGY & ADDITIONAL TESTS:    Imaging Personally Reviewed:    Electrocardiogram Personally Reviewed:    COORDINATION OF CARE:  Care Discussed with Consultants/Other Providers [Y/N]:  Prior or Outpatient Records Reviewed [Y/N]:     Patient is a 47y old  Male who presents with a chief complaint of cough, SOB (07 Jul 2022 10:42)      SUBJECTIVE / OVERNIGHT EVENTS: No On events. Still reports some coughing fits associated with cp/sob. Scared to go home. Mary Beth cp at rest, n/v, palpitations dizziness.  Reports new dysphagia and odynophagia      ADDITIONAL REVIEW OF SYSTEMS: Negative except for above    MEDICATIONS  (STANDING):  albuterol/ipratropium for Nebulization 3 milliLiter(s) Nebulizer every 4 hours  aspirin enteric coated 81 milliGRAM(s) Oral daily  benzonatate 100 milliGRAM(s) Oral three times a day  budesonide 160 MICROgram(s)/formoterol 4.5 MICROgram(s) Inhaler 2 Puff(s) Inhalation two times a day  cyclobenzaprine 5 milliGRAM(s) Oral three times a day  dextrose 5%. 1000 milliLiter(s) (100 mL/Hr) IV Continuous <Continuous>  dextrose 5%. 1000 milliLiter(s) (50 mL/Hr) IV Continuous <Continuous>  dextrose 50% Injectable 25 Gram(s) IV Push once  dextrose 50% Injectable 12.5 Gram(s) IV Push once  dextrose 50% Injectable 25 Gram(s) IV Push once  enoxaparin Injectable 40 milliGRAM(s) SubCutaneous every 12 hours  fluticasone propionate 50 MICROgram(s)/spray Nasal Spray 2 Spray(s) Both Nostrils two times a day  furosemide    Tablet 60 milliGRAM(s) Oral daily  glucagon  Injectable 1 milliGRAM(s) IntraMuscular once  hydrocodone/homatropine Syrup 10 milliLiter(s) Oral every 8 hours  hydroxychloroquine 400 milliGRAM(s) Oral daily  insulin glargine Injectable (LANTUS) 29 Unit(s) SubCutaneous at bedtime  insulin lispro (ADMELOG) corrective regimen sliding scale   SubCutaneous at bedtime  insulin lispro (ADMELOG) corrective regimen sliding scale   SubCutaneous three times a day before meals  insulin lispro Injectable (ADMELOG) 10 Unit(s) SubCutaneous three times a day with meals  levothyroxine 125 MICROGram(s) Oral daily  lidocaine   4% Patch 1 Patch Transdermal daily  losartan 50 milliGRAM(s) Oral daily  nystatin    Suspension 620603 Unit(s) Oral two times a day  pantoprazole    Tablet 40 milliGRAM(s) Oral before breakfast  predniSONE   Tablet   Oral   predniSONE   Tablet 30 milliGRAM(s) Oral daily  tiotropium 18 MICROgram(s) Capsule 1 Capsule(s) Inhalation daily  trimethoprim  160 mG/sulfamethoxazole 800 mG 1 Tablet(s) Oral <User Schedule>    MEDICATIONS  (PRN):  acetaminophen     Tablet .. 650 milliGRAM(s) Oral every 6 hours PRN Temp greater or equal to 38C (100.4F), Mild Pain (1 - 3), Moderate Pain (4 - 6)  ALPRAZolam 0.5 milliGRAM(s) Oral three times a day PRN anxiety  aluminum hydroxide/magnesium hydroxide/simethicone Suspension 30 milliLiter(s) Oral every 4 hours PRN Dyspepsia  benzocaine 15 mG/menthol 3.6 mG Lozenge 1 Lozenge Oral every 4 hours PRN cough/sore throat  dextrose Oral Gel 15 Gram(s) Oral once PRN Blood Glucose LESS THAN 70 milliGRAM(s)/deciliter  melatonin 3 milliGRAM(s) Oral at bedtime PRN Insomnia  ondansetron Injectable 4 milliGRAM(s) IV Push every 8 hours PRN Nausea and/or Vomiting  oxyCODONE    IR 5 milliGRAM(s) Oral every 6 hours PRN moderate and severe pain      CAPILLARY BLOOD GLUCOSE      POCT Blood Glucose.: 306 mg/dL (07 Jul 2022 11:35)  POCT Blood Glucose.: 277 mg/dL (07 Jul 2022 07:59)  POCT Blood Glucose.: 344 mg/dL (06 Jul 2022 21:21)  POCT Blood Glucose.: 225 mg/dL (06 Jul 2022 17:18)    I&O's Summary    06 Jul 2022 07:01  -  07 Jul 2022 07:00  --------------------------------------------------------  IN: 0 mL / OUT: 650 mL / NET: -650 mL    07 Jul 2022 07:01  -  07 Jul 2022 14:55  --------------------------------------------------------  IN: 480 mL / OUT: 0 mL / NET: 480 mL        PHYSICAL EXAM:  Vital Signs Last 24 Hrs  T(C): 36.6 (07 Jul 2022 10:03), Max: 36.8 (06 Jul 2022 16:38)  T(F): 97.8 (07 Jul 2022 10:03), Max: 98.2 (06 Jul 2022 16:38)  HR: 107 (07 Jul 2022 10:03) (94 - 119)  BP: 133/86 (07 Jul 2022 10:03) (133/86 - 145/96)  BP(mean): --  RR: 18 (07 Jul 2022 10:03) (18 - 20)  SpO2: 98% (07 Jul 2022 10:03) (96% - 98%)    PHYSICAL EXAM:  GENERAL: NAD, well-developed on RA comfortable  HEAD:  Atraumatic, Normocephalic +oral thrush  EYES:  conjunctiva and sclera clear  NECK: Supple, No JVD  CHEST/LUNG: Clear to auscultation bilaterally; No wheeze  HEART: Regular rate and rhythm;   ABDOMEN: Soft, Nontender, Nondistended; Bowel sounds present  EXTREMITIES:  2+ Peripheral Pulses, No clubbing, cyanosis, or edema  PSYCH: AAOx3  NEUROLOGY: non-focal  SKIN: No rashes or lesions      LABS:                        12.9   15.66 )-----------( 234      ( 07 Jul 2022 06:38 )             37.4     07-06    134<L>  |  94<L>  |  14  ----------------------------<  253<H>  3.8   |  29  |  0.78    Ca    9.3      06 Jul 2022 07:05                  RADIOLOGY & ADDITIONAL TESTS:    Imaging Personally Reviewed:    Electrocardiogram Personally Reviewed:    COORDINATION OF CARE:  Care Discussed with Consultants/Other Providers [Y/N]:  Prior or Outpatient Records Reviewed [Y/N]:

## 2022-07-08 DIAGNOSIS — M79.89 OTHER SPECIFIED SOFT TISSUE DISORDERS: ICD-10-CM

## 2022-07-08 LAB
GLUCOSE BLDC GLUCOMTR-MCNC: 158 MG/DL — HIGH (ref 70–99)
GLUCOSE BLDC GLUCOMTR-MCNC: 239 MG/DL — HIGH (ref 70–99)
GLUCOSE BLDC GLUCOMTR-MCNC: 286 MG/DL — HIGH (ref 70–99)
GLUCOSE BLDC GLUCOMTR-MCNC: 309 MG/DL — HIGH (ref 70–99)
SARS-COV-2 RNA SPEC QL NAA+PROBE: SIGNIFICANT CHANGE UP

## 2022-07-08 PROCEDURE — 93010 ELECTROCARDIOGRAM REPORT: CPT

## 2022-07-08 PROCEDURE — 99254 IP/OBS CNSLTJ NEW/EST MOD 60: CPT | Mod: 25

## 2022-07-08 PROCEDURE — 99233 SBSQ HOSP IP/OBS HIGH 50: CPT

## 2022-07-08 PROCEDURE — 31575 DIAGNOSTIC LARYNGOSCOPY: CPT

## 2022-07-08 RX ORDER — ALBUTEROL 90 UG/1
2.5 AEROSOL, METERED ORAL EVERY 4 HOURS
Refills: 0 | Status: DISCONTINUED | OUTPATIENT
Start: 2022-07-08 | End: 2022-07-08

## 2022-07-08 RX ORDER — FUROSEMIDE 40 MG
40 TABLET ORAL ONCE
Refills: 0 | Status: DISCONTINUED | OUTPATIENT
Start: 2022-07-08 | End: 2022-07-08

## 2022-07-08 RX ORDER — ALBUTEROL 90 UG/1
2.5 AEROSOL, METERED ORAL EVERY 4 HOURS
Refills: 0 | Status: DISCONTINUED | OUTPATIENT
Start: 2022-07-08 | End: 2022-07-11

## 2022-07-08 RX ORDER — FUROSEMIDE 40 MG
40 TABLET ORAL ONCE
Refills: 0 | Status: COMPLETED | OUTPATIENT
Start: 2022-07-08 | End: 2022-07-08

## 2022-07-08 RX ADMIN — Medication 30 MILLIGRAM(S): at 06:31

## 2022-07-08 RX ADMIN — LOSARTAN POTASSIUM 50 MILLIGRAM(S): 100 TABLET, FILM COATED ORAL at 06:15

## 2022-07-08 RX ADMIN — BUDESONIDE AND FORMOTEROL FUMARATE DIHYDRATE 2 PUFF(S): 160; 4.5 AEROSOL RESPIRATORY (INHALATION) at 06:16

## 2022-07-08 RX ADMIN — ENOXAPARIN SODIUM 40 MILLIGRAM(S): 100 INJECTION SUBCUTANEOUS at 17:34

## 2022-07-08 RX ADMIN — Medication 3: at 17:32

## 2022-07-08 RX ADMIN — Medication 4: at 11:45

## 2022-07-08 RX ADMIN — Medication 500000 UNIT(S): at 17:35

## 2022-07-08 RX ADMIN — ALBUTEROL 2.5 MILLIGRAM(S): 90 AEROSOL, METERED ORAL at 13:42

## 2022-07-08 RX ADMIN — CYCLOBENZAPRINE HYDROCHLORIDE 5 MILLIGRAM(S): 10 TABLET, FILM COATED ORAL at 06:14

## 2022-07-08 RX ADMIN — Medication 100 MILLIGRAM(S): at 13:42

## 2022-07-08 RX ADMIN — Medication 100 MILLIGRAM(S): at 22:00

## 2022-07-08 RX ADMIN — LIDOCAINE 1 PATCH: 4 CREAM TOPICAL at 01:05

## 2022-07-08 RX ADMIN — Medication 10 UNIT(S): at 08:20

## 2022-07-08 RX ADMIN — ALBUTEROL 2.5 MILLIGRAM(S): 90 AEROSOL, METERED ORAL at 17:44

## 2022-07-08 RX ADMIN — Medication 40 MILLIGRAM(S): at 19:10

## 2022-07-08 RX ADMIN — CYCLOBENZAPRINE HYDROCHLORIDE 5 MILLIGRAM(S): 10 TABLET, FILM COATED ORAL at 13:45

## 2022-07-08 RX ADMIN — Medication 60 MILLIGRAM(S): at 13:43

## 2022-07-08 RX ADMIN — CYCLOBENZAPRINE HYDROCHLORIDE 5 MILLIGRAM(S): 10 TABLET, FILM COATED ORAL at 21:59

## 2022-07-08 RX ADMIN — Medication 1 TABLET(S): at 13:43

## 2022-07-08 RX ADMIN — Medication 10 UNIT(S): at 17:33

## 2022-07-08 RX ADMIN — Medication 3 MILLILITER(S): at 06:15

## 2022-07-08 RX ADMIN — Medication 3 MILLIGRAM(S): at 21:59

## 2022-07-08 RX ADMIN — INSULIN GLARGINE 29 UNIT(S): 100 INJECTION, SOLUTION SUBCUTANEOUS at 22:00

## 2022-07-08 RX ADMIN — BUDESONIDE AND FORMOTEROL FUMARATE DIHYDRATE 2 PUFF(S): 160; 4.5 AEROSOL RESPIRATORY (INHALATION) at 17:34

## 2022-07-08 RX ADMIN — Medication 100 MILLIGRAM(S): at 06:13

## 2022-07-08 RX ADMIN — TIOTROPIUM BROMIDE 1 CAPSULE(S): 18 CAPSULE ORAL; RESPIRATORY (INHALATION) at 11:34

## 2022-07-08 RX ADMIN — FLUCONAZOLE 100 MILLIGRAM(S): 150 TABLET ORAL at 06:32

## 2022-07-08 RX ADMIN — Medication 500000 UNIT(S): at 06:15

## 2022-07-08 RX ADMIN — OXYCODONE HYDROCHLORIDE 5 MILLIGRAM(S): 5 TABLET ORAL at 06:15

## 2022-07-08 RX ADMIN — Medication 125 MICROGRAM(S): at 06:14

## 2022-07-08 RX ADMIN — OXYCODONE HYDROCHLORIDE 5 MILLIGRAM(S): 5 TABLET ORAL at 20:35

## 2022-07-08 RX ADMIN — Medication 650 MILLIGRAM(S): at 11:34

## 2022-07-08 RX ADMIN — LIDOCAINE 1 PATCH: 4 CREAM TOPICAL at 22:15

## 2022-07-08 RX ADMIN — Medication 0.5 MILLIGRAM(S): at 11:45

## 2022-07-08 RX ADMIN — ALBUTEROL 2.5 MILLIGRAM(S): 90 AEROSOL, METERED ORAL at 21:26

## 2022-07-08 RX ADMIN — Medication 3 MILLILITER(S): at 08:21

## 2022-07-08 RX ADMIN — ENOXAPARIN SODIUM 40 MILLIGRAM(S): 100 INJECTION SUBCUTANEOUS at 06:13

## 2022-07-08 RX ADMIN — Medication 1: at 08:19

## 2022-07-08 RX ADMIN — LIDOCAINE 1 PATCH: 4 CREAM TOPICAL at 19:56

## 2022-07-08 RX ADMIN — Medication 2 SPRAY(S): at 06:17

## 2022-07-08 RX ADMIN — Medication 2 SPRAY(S): at 17:35

## 2022-07-08 RX ADMIN — PANTOPRAZOLE SODIUM 40 MILLIGRAM(S): 20 TABLET, DELAYED RELEASE ORAL at 06:13

## 2022-07-08 RX ADMIN — Medication 81 MILLIGRAM(S): at 11:34

## 2022-07-08 RX ADMIN — LIDOCAINE 1 PATCH: 4 CREAM TOPICAL at 11:33

## 2022-07-08 RX ADMIN — Medication 10 UNIT(S): at 11:46

## 2022-07-08 NOTE — PROGRESS NOTE ADULT - PROBLEM SELECTOR PLAN 4
chronic LE edema on po lasix 60mg daily at home  -increase LE edema, likely worsened by steroids  -give IV lasix 40mg x 1 today  -LE duplex to r/o DVT given pain in RLE and prolonged hospital stay

## 2022-07-08 NOTE — PROGRESS NOTE ADULT - TIME BILLING
review of records/results, pulmonary evaluation and assessment, and discussion with patient and medical attending
review of records/results, pulmonary evaluation and assessment, and discussion with patient and medical team

## 2022-07-08 NOTE — PROGRESS NOTE ADULT - SUBJECTIVE AND OBJECTIVE BOX
[facility] DIVISION OF PULMONARY, CRITICAL CARE and SLEEP MEDICINE  PULMONARY PROGRESS NOTE  OFFICE NUMBER: [phone]    PATIENT INFORMATION:  NAME: AMBERLY ESPITIA:  MRN: [id number]    CHIEF COMPLAINT: Patient is a 47y old  Male who presents with a chief complaint of cough, SOB (07 Jul 2022 16:31)      [x] INITIAL CONSULT, H&P, FAMILY HISTORY and PAST MEDICAL AND SURGICAL HISTORY REVIEWED    OVERNIGHT EVENTS or CHANGES TO HPI:   - Requesting rheumatology followup for LE joint pain  - Thrush feels symptomatically improved - now on Fluconazole and Nystatin swish/spit  - No wheezing on exam this AM - transitioned to PO Prednisone  - Myalgias persist but improved  - Patient appears improved with increased energy this AM - was sitting up in bed for breakfast and moving around more freely than prior days    ========================REVIEW OF SYSTEMS========================  CONSTITUTIONAL: Still fatigued - now with LE edema/joint pain  CARDIOVASCULAR: Denies chest pain or palpitations   PULMONARY: Reports wheezing overnight, cough improved but persists, no hemoptysis   [x] REMAINING REVIEW OF SYSTEMS NEGATIVE  [] UNABLE TO OBTAIN REVIEW OF SYSTEMS DUE TO _______________    ========================MEDICATIONS=============================  MEDICATIONS  (STANDING):  albuterol/ipratropium for Nebulization 3 milliLiter(s) Nebulizer every 4 hours  aspirin enteric coated 81 milliGRAM(s) Oral daily  benzonatate 100 milliGRAM(s) Oral three times a day  budesonide 160 MICROgram(s)/formoterol 4.5 MICROgram(s) Inhaler 2 Puff(s) Inhalation two times a day  cyclobenzaprine 5 milliGRAM(s) Oral three times a day  dextrose 5%. 1000 milliLiter(s) (50 mL/Hr) IV Continuous <Continuous>  dextrose 5%. 1000 milliLiter(s) (100 mL/Hr) IV Continuous <Continuous>  dextrose 50% Injectable 25 Gram(s) IV Push once  dextrose 50% Injectable 25 Gram(s) IV Push once  dextrose 50% Injectable 12.5 Gram(s) IV Push once  enoxaparin Injectable 40 milliGRAM(s) SubCutaneous every 12 hours  fluconAZOLE   Tablet 100 milliGRAM(s) Oral daily  fluticasone propionate 50 MICROgram(s)/spray Nasal Spray 2 Spray(s) Both Nostrils two times a day  furosemide    Tablet 60 milliGRAM(s) Oral daily  glucagon  Injectable 1 milliGRAM(s) IntraMuscular once  hydrocodone/homatropine Syrup 10 milliLiter(s) Oral every 8 hours  hydroxychloroquine 400 milliGRAM(s) Oral daily  insulin glargine Injectable (LANTUS) 29 Unit(s) SubCutaneous at bedtime  insulin lispro (ADMELOG) corrective regimen sliding scale   SubCutaneous three times a day before meals  insulin lispro (ADMELOG) corrective regimen sliding scale   SubCutaneous at bedtime  insulin lispro Injectable (ADMELOG) 10 Unit(s) SubCutaneous three times a day with meals  levothyroxine 125 MICROGram(s) Oral daily  lidocaine   4% Patch 1 Patch Transdermal daily  losartan 50 milliGRAM(s) Oral daily  nystatin    Suspension 972232 Unit(s) Oral two times a day  pantoprazole    Tablet 40 milliGRAM(s) Oral before breakfast  predniSONE   Tablet   Oral   predniSONE   Tablet 30 milliGRAM(s) Oral daily  tiotropium 18 MICROgram(s) Capsule 1 Capsule(s) Inhalation daily  trimethoprim  160 mG/sulfamethoxazole 800 mG 1 Tablet(s) Oral <User Schedule>      MEDICATIONS  (PRN):  acetaminophen     Tablet .. 650 milliGRAM(s) Oral every 6 hours PRN Temp greater or equal to 38C (100.4F), Mild Pain (1 - 3), Moderate Pain (4 - 6)  ALPRAZolam 0.5 milliGRAM(s) Oral three times a day PRN anxiety  aluminum hydroxide/magnesium hydroxide/simethicone Suspension 30 milliLiter(s) Oral every 4 hours PRN Dyspepsia  benzocaine 15 mG/menthol 3.6 mG Lozenge 1 Lozenge Oral every 4 hours PRN cough/sore throat  dextrose Oral Gel 15 Gram(s) Oral once PRN Blood Glucose LESS THAN 70 milliGRAM(s)/deciliter  melatonin 3 milliGRAM(s) Oral at bedtime PRN Insomnia  ondansetron Injectable 4 milliGRAM(s) IV Push every 8 hours PRN Nausea and/or Vomiting  oxyCODONE    IR 5 milliGRAM(s) Oral every 6 hours PRN moderate and severe pain      ========================PHYSICAL EXAM============================    VITALS: ICU Vital Signs Last 24 Hrs  T(C): 36.9 (08 Jul 2022 08:42), Max: 36.9 (08 Jul 2022 08:42)  T(F): 98.4 (08 Jul 2022 08:42), Max: 98.4 (08 Jul 2022 08:42)  HR: 112 (08 Jul 2022 08:42) (103 - 114)  BP: 144/100 (08 Jul 2022 08:42) (133/86 - 144/100)  RR: 18 (08 Jul 2022 08:42) (18 - 19)  SpO2: 96% (08 Jul 2022 08:42) (96% - 98%)    O2 Parameters below as of 08 Jul 2022 08:42  Patient On (Oxygen Delivery Method): room air        INTAKE and OUTPUT: I&O's Summary    07 Jul 2022 07:01  -  08 Jul 2022 07:00  --------------------------------------------------------  IN: 780 mL / OUT: 1400 mL / NET: -620 mL    08 Jul 2022 07:01  -  08 Jul 2022 09:27  --------------------------------------------------------  IN: 360 mL / OUT: 0 mL / NET: 360 mL        VENTILATOR SETTINGS:     GENERAL: AAOx3, NAD  EYES: anicteric, EOMI  EAR/NOSE/MOUTH/THROAT: NCAT, MMM, nares clear, trachea midline, +THRUSH  NECK: supple   CARDIOVASCULAR: RRR, S1S2  RESPIRATORY: CTA bilaterally, no wheeze, no accessory muscle use  ABDOMEN: soft, obese, NT, ND  EXTREMITIES: no clubbing or cyanosis, LE edema (pitting) bilat  SKIN: warm and dry  MUSCULOSKELETAL: feels week - but strength preserved  NEUROLOGIC: nonfocal exam  PSYCHIATRIC: calm     ========================LABORATORY RESULTS AND IMAGING=============                        12.9   15.66 )-----------( 234      ( 07 Jul 2022 06:38 )             37.4                                                          Creatinine Trend: 0.78<--, 0.74<--, 0.79<--, 0.80<--, 0.77<--, 0.77<--    CT CHEST:     [] RADIOLOGY REVIEWED AND INTERPRETED BY ME      THANK YOU FOR ALLOWING US TO PARTICIPATE IN THE CARE OF THIS PATIENT

## 2022-07-08 NOTE — PROGRESS NOTE ADULT - SUBJECTIVE AND OBJECTIVE BOX
Patient is a 47y old  Male who presents with a chief complaint of cough, SOB (08 Jul 2022 09:27)      SUBJECTIVE / OVERNIGHT EVENTS: No On events. Breathing, chest pain, and coughing fits improving. Reports worsening LE edema b/l R>L with some pain in R calf.           ADDITIONAL REVIEW OF SYSTEMS: Negative except for above    MEDICATIONS  (STANDING):  ALBUTerol    0.083% 2.5 milliGRAM(s) Nebulizer every 4 hours  aspirin enteric coated 81 milliGRAM(s) Oral daily  benzonatate 100 milliGRAM(s) Oral three times a day  budesonide 160 MICROgram(s)/formoterol 4.5 MICROgram(s) Inhaler 2 Puff(s) Inhalation two times a day  cyclobenzaprine 5 milliGRAM(s) Oral three times a day  dextrose 5%. 1000 milliLiter(s) (50 mL/Hr) IV Continuous <Continuous>  dextrose 5%. 1000 milliLiter(s) (100 mL/Hr) IV Continuous <Continuous>  dextrose 50% Injectable 25 Gram(s) IV Push once  dextrose 50% Injectable 12.5 Gram(s) IV Push once  dextrose 50% Injectable 25 Gram(s) IV Push once  enoxaparin Injectable 40 milliGRAM(s) SubCutaneous every 12 hours  fluconAZOLE   Tablet 100 milliGRAM(s) Oral daily  fluticasone propionate 50 MICROgram(s)/spray Nasal Spray 2 Spray(s) Both Nostrils two times a day  furosemide    Tablet 60 milliGRAM(s) Oral daily  furosemide   Injectable 40 milliGRAM(s) IV Push once  glucagon  Injectable 1 milliGRAM(s) IntraMuscular once  hydrocodone/homatropine Syrup 10 milliLiter(s) Oral every 8 hours  hydroxychloroquine 400 milliGRAM(s) Oral daily  insulin glargine Injectable (LANTUS) 29 Unit(s) SubCutaneous at bedtime  insulin lispro (ADMELOG) corrective regimen sliding scale   SubCutaneous at bedtime  insulin lispro (ADMELOG) corrective regimen sliding scale   SubCutaneous three times a day before meals  insulin lispro Injectable (ADMELOG) 10 Unit(s) SubCutaneous three times a day with meals  levothyroxine 125 MICROGram(s) Oral daily  lidocaine   4% Patch 1 Patch Transdermal daily  losartan 50 milliGRAM(s) Oral daily  nystatin    Suspension 518434 Unit(s) Oral two times a day  pantoprazole    Tablet 40 milliGRAM(s) Oral before breakfast  predniSONE   Tablet   Oral   predniSONE   Tablet 30 milliGRAM(s) Oral daily  tiotropium 18 MICROgram(s) Capsule 1 Capsule(s) Inhalation daily  trimethoprim  160 mG/sulfamethoxazole 800 mG 1 Tablet(s) Oral <User Schedule>    MEDICATIONS  (PRN):  acetaminophen     Tablet .. 650 milliGRAM(s) Oral every 6 hours PRN Temp greater or equal to 38C (100.4F), Mild Pain (1 - 3), Moderate Pain (4 - 6)  ALPRAZolam 0.5 milliGRAM(s) Oral three times a day PRN anxiety  aluminum hydroxide/magnesium hydroxide/simethicone Suspension 30 milliLiter(s) Oral every 4 hours PRN Dyspepsia  benzocaine 15 mG/menthol 3.6 mG Lozenge 1 Lozenge Oral every 4 hours PRN cough/sore throat  dextrose Oral Gel 15 Gram(s) Oral once PRN Blood Glucose LESS THAN 70 milliGRAM(s)/deciliter  melatonin 3 milliGRAM(s) Oral at bedtime PRN Insomnia  ondansetron Injectable 4 milliGRAM(s) IV Push every 8 hours PRN Nausea and/or Vomiting  oxyCODONE    IR 5 milliGRAM(s) Oral every 6 hours PRN moderate and severe pain      CAPILLARY BLOOD GLUCOSE      POCT Blood Glucose.: 309 mg/dL (08 Jul 2022 11:42)  POCT Blood Glucose.: 158 mg/dL (08 Jul 2022 07:40)  POCT Blood Glucose.: 372 mg/dL (07 Jul 2022 21:10)  POCT Blood Glucose.: 265 mg/dL (07 Jul 2022 17:02)    I&O's Summary    07 Jul 2022 07:01  -  08 Jul 2022 07:00  --------------------------------------------------------  IN: 780 mL / OUT: 1400 mL / NET: -620 mL    08 Jul 2022 07:01  -  08 Jul 2022 16:02  --------------------------------------------------------  IN: 680 mL / OUT: 0 mL / NET: 680 mL        PHYSICAL EXAM:  Vital Signs Last 24 Hrs  T(C): 36.8 (08 Jul 2022 10:38), Max: 36.9 (08 Jul 2022 08:42)  T(F): 98.2 (08 Jul 2022 10:38), Max: 98.4 (08 Jul 2022 08:42)  HR: 116 (08 Jul 2022 10:38) (103 - 116)  BP: 124/74 (08 Jul 2022 10:38) (124/74 - 144/100)  BP(mean): --  RR: 18 (08 Jul 2022 10:38) (18 - 19)  SpO2: 95% (08 Jul 2022 10:38) (95% - 98%)    Parameters below as of 08 Jul 2022 10:38  Patient On (Oxygen Delivery Method): room air        PHYSICAL EXAM:  GENERAL: NAD, well-developed  HEAD:  Atraumatic, Normocephalic  EYES:  conjunctiva and sclera clear  NECK: Supple, No JVD  CHEST/LUNG: Clear to auscultation bilaterally; No wheeze  HEART: Regular rate and rhythm; No murmurs, rubs, or gallops  ABDOMEN: Soft, Nontender, Nondistended; Bowel sounds present  EXTREMITIES:  2+ Peripheral Pulses, 1+ pitting edema  PSYCH: AAOx3  NEUROLOGY: non-focal  SKIN: No rashes or lesions      LABS:                        12.9   15.66 )-----------( 234      ( 07 Jul 2022 06:38 )             37.4                       RADIOLOGY & ADDITIONAL TESTS:    Imaging Personally Reviewed:    Electrocardiogram Personally Reviewed:    COORDINATION OF CARE:  Care Discussed with Consultants/Other Providers [Y/N]:  Prior or Outpatient Records Reviewed [Y/N]:

## 2022-07-08 NOTE — PROGRESS NOTE ADULT - PROBLEM SELECTOR PLAN 6
c/w steroids as above   on Mycophenolate 1500mg daily at home   c/w home Plaquenil 400mg daily   c/w home bactrim for ppx   dsDNA, c3, c4 normal   at home baseline methylpred 16 daily which is about pred 20mg daily  Rheumatology consult appreciated - flexeril as above, cellcept held

## 2022-07-08 NOTE — PROGRESS NOTE ADULT - PROBLEM SELECTOR PLAN 3
A1c -8.3 - now new onset DM  FS elevated but lowering steroid so will not increase insulin doses today  c/w lantus  29 units qhs  c/w premeal  10u admelog TID  ISS for coverage  -VINCENT briscoe.

## 2022-07-08 NOTE — PROGRESS NOTE ADULT - ASSESSMENT
47M with PMH of SLE on chronic steroids and Salphnelo, mild/moderate asthma, hypothyroidism, COVID infection in Jan 2021, HTN p/w cough and SOB x2 weeks. Presented to [facility] ED on 6/14 for these symptoms, diagnosed with asthma exacerbation and discharged with steroids and nebulizers. His symptoms have been getting progressively worse. Has ongoing paroxysms of productive cough, with associated chest and back pain, WAITE and wheezing. Has been using home nebulizers every 4-6 hours without improvement in symptoms. Pt is chronically on solumedrol 16mg daily for SLE, was seen by Dr. Gonzales on 6/24 and steroids were changed to prednisone 40mg. CT angio performed which did not show PE or other acute change.     RVP positive parainfluenza and entero/rhinovirus. Being treated with Solumedrol, Tessalon perles, Hycodan every 6 hours  CTPA negative for PE, effusion or pneumonia.    1. Acute Asthma Exacerbation - likely in setting of viral respiratory tract infection  - No wheezing on exam again today - bronchospasms appear improved  - Prednisone 30mg PO daily x 7 days and then down to 20mg daily - is on daily steroids per Rheum  - continue Hycodan and Tessalon ATC - with hold parameter for sedation  - Continue Symbicort, Spiriva, and Albuterol for bronchodilator therapy - ATC therapy. No longer on Pulmicort.  - Would either D/C Spiriva or change Duoneb to Albuterol to prevent dual anticholinergic therapy - as patient has had clinical improvement - on discharge should receive Symbicort/Spiriva/Albuterol  - Maintain O2 sat > 90%  - Incentive spirometer and acapella to facilitate breathing  - Completed Azithromycin    2. Thrush  - appears to have thrush lesions in posterior oropharynx  - ENT evaluation appreciated  - continue Diflucan  - Odynophagia improved today    3. Costochondritis and Viral Myalgias - likely will see very slow improvement as we have seen thus far through the week  - Lidocaine patch  - pain control as per primary team  - topical pain therapy to ribs may be beneficial  - muscle spasms in back - continue cyclobenzaprine  - anxiolytics may also be of benefit    4. Hyperglycemia - in setting of steroids  - FS and insulin management as per primary team - continue to adjust as needed    5. SLE  - continue therapy as per Rheum - patient requesting Rheumatology followup today  - Cellcept currently on hold  - Transitioned to Prednisone 30mg daily for asthma exacerbation/RAD and then taper back to home steroid dose (home Methylprednisolone 16mg equivalent to 20mg Prednisone daily) for SLE management  - Bactrim for PCP prophylaxis    6. Proph  - DVT proph - on lovenox BID as per primary team  - GI proph  - Maintain O2 sat > 90%  - Incentive spirometer and acapella  - Ambulation    Above was discussed with the patient at length, all questions answered.

## 2022-07-08 NOTE — PROGRESS NOTE ADULT - PROBLEM SELECTOR PLAN 1
cough, SOB, chest tightness x 2 weeks c/w asthma exacerbation in setting of infections with parainfluenza and entero/rhinovirus and covid a few months ago  -coughing fits with chest pain, muscle spasms, improving  -CTA 7/4: negative for PE/PNA  -02 sat mid 90s  -discussed with pulm Dr Ocampo: c/w po Prednisone 30mg PO daily x 7 days and then down to 20mg daily - is on daily steroids per Rheum,   -s/p azithromycin x 5 days  -change to albuterol q4  prn  -c/w Spiriva   c/w Symbicort bid   c/w Tessalon and hyocodan TID ATC with holding parameters  -tylenol prn, lidocaine patch, oxycodone prn. flexeril now 5mg tid from back spasm from coughing.   -xanax prn anxiety. -istop in chart.

## 2022-07-08 NOTE — PROGRESS NOTE ADULT - PROBLEM SELECTOR PLAN 2
likely due to steroids  symptoms improved  c/w diflucan 100gm daily x 10 days  nystatin swish and swallow  ent eval appreciated

## 2022-07-08 NOTE — PROGRESS NOTE ADULT - NSPROGADDITIONALINFOA_GEN_ALL_CORE
Outpatient pulmonary followup with Dr. Gonzales.  Transition steroids to Prednisone 30mg daily x 7 days and then 20mg daily which is equivalent to his home PO dose. Rheum followup regarding long term steroid management for SLE    No pulmonary objections to Mucinex or Magnesium - however would not use Hypersal at this time as this may worsen cough.
Outpatient pulmonary followup with Dr. Gonzales.    No further changes in pulmonary management at this time. Patient on PO Prednisone and bronchodilator therapy. Outpatient pulmonary followup will be important.
Outpatient pulmonary followup with Dr. Gonzales.    No pulmonary objections to Mucinex or Magnesium - however would not use Hypersal at this time as this may worsen cough.
Outpatient pulmonary followup with Dr. Gonzales.    No pulmonary objections to Mucinex and Magnesium - however would not use Hypersal at this time as this may worsen cough.
d/w MARITA Arango
d/w MINOR Forrester
d/w MINOR Clarke

## 2022-07-09 LAB
ANION GAP SERPL CALC-SCNC: 14 MMOL/L — SIGNIFICANT CHANGE UP (ref 5–17)
ANION GAP SERPL CALC-SCNC: 15 MMOL/L — SIGNIFICANT CHANGE UP (ref 5–17)
BUN SERPL-MCNC: 11 MG/DL — SIGNIFICANT CHANGE UP (ref 7–23)
BUN SERPL-MCNC: 14 MG/DL — SIGNIFICANT CHANGE UP (ref 7–23)
CALCIUM SERPL-MCNC: 9.1 MG/DL — SIGNIFICANT CHANGE UP (ref 8.4–10.5)
CALCIUM SERPL-MCNC: 9.3 MG/DL — SIGNIFICANT CHANGE UP (ref 8.4–10.5)
CHLORIDE SERPL-SCNC: 93 MMOL/L — LOW (ref 96–108)
CHLORIDE SERPL-SCNC: 93 MMOL/L — LOW (ref 96–108)
CO2 SERPL-SCNC: 25 MMOL/L — SIGNIFICANT CHANGE UP (ref 22–31)
CO2 SERPL-SCNC: 30 MMOL/L — SIGNIFICANT CHANGE UP (ref 22–31)
CREAT SERPL-MCNC: 0.96 MG/DL — SIGNIFICANT CHANGE UP (ref 0.5–1.3)
CREAT SERPL-MCNC: 1.04 MG/DL — SIGNIFICANT CHANGE UP (ref 0.5–1.3)
EGFR: 90 ML/MIN/1.73M2 — SIGNIFICANT CHANGE UP
EGFR: 99 ML/MIN/1.73M2 — SIGNIFICANT CHANGE UP
GLUCOSE BLDC GLUCOMTR-MCNC: 169 MG/DL — HIGH (ref 70–99)
GLUCOSE BLDC GLUCOMTR-MCNC: 218 MG/DL — HIGH (ref 70–99)
GLUCOSE BLDC GLUCOMTR-MCNC: 247 MG/DL — HIGH (ref 70–99)
GLUCOSE BLDC GLUCOMTR-MCNC: 344 MG/DL — HIGH (ref 70–99)
GLUCOSE SERPL-MCNC: 290 MG/DL — HIGH (ref 70–99)
GLUCOSE SERPL-MCNC: 90 MG/DL — SIGNIFICANT CHANGE UP (ref 70–99)
HCT VFR BLD CALC: 39.5 % — SIGNIFICANT CHANGE UP (ref 39–50)
HGB BLD-MCNC: 13.6 G/DL — SIGNIFICANT CHANGE UP (ref 13–17)
MCHC RBC-ENTMCNC: 30.4 PG — SIGNIFICANT CHANGE UP (ref 27–34)
MCHC RBC-ENTMCNC: 34.4 GM/DL — SIGNIFICANT CHANGE UP (ref 32–36)
MCV RBC AUTO: 88.2 FL — SIGNIFICANT CHANGE UP (ref 80–100)
NRBC # BLD: 0 /100 WBCS — SIGNIFICANT CHANGE UP (ref 0–0)
PLATELET # BLD AUTO: 246 K/UL — SIGNIFICANT CHANGE UP (ref 150–400)
POTASSIUM SERPL-MCNC: 2.9 MMOL/L — CRITICAL LOW (ref 3.5–5.3)
POTASSIUM SERPL-MCNC: 3.7 MMOL/L — SIGNIFICANT CHANGE UP (ref 3.5–5.3)
POTASSIUM SERPL-SCNC: 2.9 MMOL/L — CRITICAL LOW (ref 3.5–5.3)
POTASSIUM SERPL-SCNC: 3.7 MMOL/L — SIGNIFICANT CHANGE UP (ref 3.5–5.3)
RBC # BLD: 4.48 M/UL — SIGNIFICANT CHANGE UP (ref 4.2–5.8)
RBC # FLD: 13.7 % — SIGNIFICANT CHANGE UP (ref 10.3–14.5)
SODIUM SERPL-SCNC: 133 MMOL/L — LOW (ref 135–145)
SODIUM SERPL-SCNC: 137 MMOL/L — SIGNIFICANT CHANGE UP (ref 135–145)
WBC # BLD: 12.77 K/UL — HIGH (ref 3.8–10.5)
WBC # FLD AUTO: 12.77 K/UL — HIGH (ref 3.8–10.5)

## 2022-07-09 PROCEDURE — 99233 SBSQ HOSP IP/OBS HIGH 50: CPT

## 2022-07-09 RX ORDER — OXYCODONE AND ACETAMINOPHEN 5; 325 MG/1; MG/1
2 TABLET ORAL EVERY 6 HOURS
Refills: 0 | Status: DISCONTINUED | OUTPATIENT
Start: 2022-07-09 | End: 2022-07-11

## 2022-07-09 RX ORDER — HYDROXYCHLOROQUINE SULFATE 200 MG
400 TABLET ORAL
Refills: 0 | Status: DISCONTINUED | OUTPATIENT
Start: 2022-07-09 | End: 2022-07-11

## 2022-07-09 RX ORDER — ALPRAZOLAM 0.25 MG
0.5 TABLET ORAL THREE TIMES A DAY
Refills: 0 | Status: DISCONTINUED | OUTPATIENT
Start: 2022-07-09 | End: 2022-07-11

## 2022-07-09 RX ORDER — MYCOPHENOLATE MOFETIL 250 MG/1
1500 CAPSULE ORAL DAILY
Refills: 0 | Status: DISCONTINUED | OUTPATIENT
Start: 2022-07-09 | End: 2022-07-11

## 2022-07-09 RX ORDER — POTASSIUM CHLORIDE 20 MEQ
40 PACKET (EA) ORAL EVERY 4 HOURS
Refills: 0 | Status: COMPLETED | OUTPATIENT
Start: 2022-07-09 | End: 2022-07-09

## 2022-07-09 RX ADMIN — BUDESONIDE AND FORMOTEROL FUMARATE DIHYDRATE 2 PUFF(S): 160; 4.5 AEROSOL RESPIRATORY (INHALATION) at 06:12

## 2022-07-09 RX ADMIN — Medication 650 MILLIGRAM(S): at 09:22

## 2022-07-09 RX ADMIN — CYCLOBENZAPRINE HYDROCHLORIDE 5 MILLIGRAM(S): 10 TABLET, FILM COATED ORAL at 06:10

## 2022-07-09 RX ADMIN — FLUCONAZOLE 100 MILLIGRAM(S): 150 TABLET ORAL at 12:25

## 2022-07-09 RX ADMIN — Medication 4: at 12:22

## 2022-07-09 RX ADMIN — OXYCODONE AND ACETAMINOPHEN 2 TABLET(S): 5; 325 TABLET ORAL at 14:49

## 2022-07-09 RX ADMIN — TIOTROPIUM BROMIDE 1 CAPSULE(S): 18 CAPSULE ORAL; RESPIRATORY (INHALATION) at 12:26

## 2022-07-09 RX ADMIN — Medication 0.5 MILLIGRAM(S): at 09:49

## 2022-07-09 RX ADMIN — Medication 100 MILLIGRAM(S): at 21:59

## 2022-07-09 RX ADMIN — OXYCODONE AND ACETAMINOPHEN 2 TABLET(S): 5; 325 TABLET ORAL at 15:49

## 2022-07-09 RX ADMIN — ALBUTEROL 2.5 MILLIGRAM(S): 90 AEROSOL, METERED ORAL at 13:47

## 2022-07-09 RX ADMIN — Medication 10 UNIT(S): at 12:22

## 2022-07-09 RX ADMIN — ALBUTEROL 2.5 MILLIGRAM(S): 90 AEROSOL, METERED ORAL at 09:51

## 2022-07-09 RX ADMIN — PANTOPRAZOLE SODIUM 40 MILLIGRAM(S): 20 TABLET, DELAYED RELEASE ORAL at 06:08

## 2022-07-09 RX ADMIN — INSULIN GLARGINE 29 UNIT(S): 100 INJECTION, SOLUTION SUBCUTANEOUS at 22:24

## 2022-07-09 RX ADMIN — Medication 10 UNIT(S): at 17:39

## 2022-07-09 RX ADMIN — BUDESONIDE AND FORMOTEROL FUMARATE DIHYDRATE 2 PUFF(S): 160; 4.5 AEROSOL RESPIRATORY (INHALATION) at 17:37

## 2022-07-09 RX ADMIN — Medication 81 MILLIGRAM(S): at 12:27

## 2022-07-09 RX ADMIN — MYCOPHENOLATE MOFETIL 1500 MILLIGRAM(S): 250 CAPSULE ORAL at 12:25

## 2022-07-09 RX ADMIN — Medication 400 MILLIGRAM(S): at 21:21

## 2022-07-09 RX ADMIN — Medication 500000 UNIT(S): at 06:06

## 2022-07-09 RX ADMIN — OXYCODONE HYDROCHLORIDE 5 MILLIGRAM(S): 5 TABLET ORAL at 12:21

## 2022-07-09 RX ADMIN — Medication 40 MILLIEQUIVALENT(S): at 09:04

## 2022-07-09 RX ADMIN — Medication 100 MILLIGRAM(S): at 06:08

## 2022-07-09 RX ADMIN — Medication 2 SPRAY(S): at 17:37

## 2022-07-09 RX ADMIN — LOSARTAN POTASSIUM 50 MILLIGRAM(S): 100 TABLET, FILM COATED ORAL at 06:09

## 2022-07-09 RX ADMIN — OXYCODONE AND ACETAMINOPHEN 2 TABLET(S): 5; 325 TABLET ORAL at 21:59

## 2022-07-09 RX ADMIN — Medication 100 MILLIGRAM(S): at 13:47

## 2022-07-09 RX ADMIN — Medication 30 MILLIGRAM(S): at 06:07

## 2022-07-09 RX ADMIN — OXYCODONE HYDROCHLORIDE 5 MILLIGRAM(S): 5 TABLET ORAL at 13:21

## 2022-07-09 RX ADMIN — Medication 60 MILLIGRAM(S): at 12:27

## 2022-07-09 RX ADMIN — Medication 2 SPRAY(S): at 06:11

## 2022-07-09 RX ADMIN — LIDOCAINE 1 PATCH: 4 CREAM TOPICAL at 12:23

## 2022-07-09 RX ADMIN — CYCLOBENZAPRINE HYDROCHLORIDE 5 MILLIGRAM(S): 10 TABLET, FILM COATED ORAL at 22:00

## 2022-07-09 RX ADMIN — LIDOCAINE 1 PATCH: 4 CREAM TOPICAL at 19:40

## 2022-07-09 RX ADMIN — ALBUTEROL 2.5 MILLIGRAM(S): 90 AEROSOL, METERED ORAL at 17:36

## 2022-07-09 RX ADMIN — ENOXAPARIN SODIUM 40 MILLIGRAM(S): 100 INJECTION SUBCUTANEOUS at 17:38

## 2022-07-09 RX ADMIN — ALBUTEROL 2.5 MILLIGRAM(S): 90 AEROSOL, METERED ORAL at 06:05

## 2022-07-09 RX ADMIN — OXYCODONE HYDROCHLORIDE 5 MILLIGRAM(S): 5 TABLET ORAL at 06:10

## 2022-07-09 RX ADMIN — LIDOCAINE 1 PATCH: 4 CREAM TOPICAL at 23:16

## 2022-07-09 RX ADMIN — ALBUTEROL 2.5 MILLIGRAM(S): 90 AEROSOL, METERED ORAL at 21:22

## 2022-07-09 RX ADMIN — OXYCODONE AND ACETAMINOPHEN 2 TABLET(S): 5; 325 TABLET ORAL at 22:59

## 2022-07-09 RX ADMIN — ENOXAPARIN SODIUM 40 MILLIGRAM(S): 100 INJECTION SUBCUTANEOUS at 06:11

## 2022-07-09 RX ADMIN — CYCLOBENZAPRINE HYDROCHLORIDE 5 MILLIGRAM(S): 10 TABLET, FILM COATED ORAL at 13:46

## 2022-07-09 RX ADMIN — OXYCODONE HYDROCHLORIDE 5 MILLIGRAM(S): 5 TABLET ORAL at 07:10

## 2022-07-09 RX ADMIN — Medication 2: at 17:38

## 2022-07-09 RX ADMIN — Medication 3 MILLIGRAM(S): at 22:24

## 2022-07-09 RX ADMIN — Medication 650 MILLIGRAM(S): at 08:22

## 2022-07-09 RX ADMIN — Medication 40 MILLIEQUIVALENT(S): at 13:47

## 2022-07-09 RX ADMIN — Medication 125 MICROGRAM(S): at 06:06

## 2022-07-09 RX ADMIN — Medication 500000 UNIT(S): at 17:37

## 2022-07-09 RX ADMIN — Medication 1: at 08:22

## 2022-07-09 RX ADMIN — Medication 10 UNIT(S): at 08:23

## 2022-07-09 NOTE — PROGRESS NOTE ADULT - PROBLEM SELECTOR PLAN 4
chronic LE edema on po lasix 60mg daily at home  -increase LE edema, likely worsened by steroids  -give IV lasix 40mg x 1 on 7/8 with improvement  -LE duplex to r/o DVT given pain in RLE and prolonged hospital stay

## 2022-07-09 NOTE — CHART NOTE - NSCHARTNOTEFT_GEN_A_CORE
,  pt is a 47yM with known diagnosis of SLE with positive serologies and with mostly MSK and cutaneous manifestations, admitted with asthma exacerbation 2/2 parainfluenza + entero/rhinovirus on high dose steroids on 6/26.   home medications: MMF 3 pills daily, HCQ 400mg daily, Saphnelo (first infusion 6/2/22), medrol 24mg daily.  MMF on hold since 6/26.  pt seen and examined on 7/8,  no wheezing on exam, remained afebrile.     recommend resume MMF 1500mg daily, HCQ 400mg daily   pt can follow up with Dr. Neri after discharge at Rheumatology Clinic at [address] [place] [address], [address], [place],  [phone]    Duong Garrido  Rheumatology Fellow   PGY-5 pt is a 47yM with known diagnosis of SLE with positive serologies and with mostly MSK and cutaneous manifestations, admitted with asthma exacerbation 2/2 parainfluenza + entero/rhinovirus on high dose steroids on 6/26.   home medications: MMF 3 pills daily, HCQ 400mg daily, Saphnelo (first infusion 6/2/22), medrol 16mg daily.  MMF on hold since 6/26.  pt seen and examined on 7/8,  no wheezing on exam, remained afebrile.     Plan:   - resume MMF 1500mg daily, HCQ 400mg daily   - agree with steroid taper by pulm: currently on prednisone 30mg daily, with plan to taper to prednisone 20mg qD in 1 week    - pt can follow up with Dr. Neri after discharge at Rheumatology Clinic at [address] [place] [address], [address], [place],  [phone]    Duong Garrido  Rheumatology Fellow   PGY-5

## 2022-07-09 NOTE — PROGRESS NOTE ADULT - PROBLEM SELECTOR PLAN 6
c/w steroids as above   on Mycophenolate 1500mg daily at home   c/w home Plaquenil 400mg daily   c/w home bactrim for ppx   dsDNA, c3, c4 normal   at home baseline methylpred 16 daily which is about pred 20mg daily  Rheumatology consult appreciated - flexeril as above, cellcept restarted

## 2022-07-09 NOTE — PROGRESS NOTE ADULT - SUBJECTIVE AND OBJECTIVE BOX
[facility] Division of Hospital Medicine  Derick Mcfarlane  Pager (ISABELA-F, 8A-5P): [phone]  Other Times:  [phone]    CC: 46 y/o M presenting with SOB, cough    SUBJECTIVE / OVERNIGHT EVENTS:  no acute events overnight  alan reports pain with coughing  Denies worsening joint pain/Swelling  +urination yesterday with extra dose lasix.   Denies f/chills/wheeze    ADDITIONAL REVIEW OF SYSTEMS:    MEDICATIONS  (STANDING):  ALBUTerol    0.083% 2.5 milliGRAM(s) Nebulizer every 4 hours  aspirin enteric coated 81 milliGRAM(s) Oral daily  benzonatate 100 milliGRAM(s) Oral three times a day  budesonide 160 MICROgram(s)/formoterol 4.5 MICROgram(s) Inhaler 2 Puff(s) Inhalation two times a day  cyclobenzaprine 5 milliGRAM(s) Oral three times a day  dextrose 5%. 1000 milliLiter(s) (50 mL/Hr) IV Continuous <Continuous>  dextrose 5%. 1000 milliLiter(s) (100 mL/Hr) IV Continuous <Continuous>  dextrose 50% Injectable 25 Gram(s) IV Push once  dextrose 50% Injectable 12.5 Gram(s) IV Push once  dextrose 50% Injectable 25 Gram(s) IV Push once  enoxaparin Injectable 40 milliGRAM(s) SubCutaneous every 12 hours  fluconAZOLE   Tablet 100 milliGRAM(s) Oral daily  fluticasone propionate 50 MICROgram(s)/spray Nasal Spray 2 Spray(s) Both Nostrils two times a day  furosemide    Tablet 60 milliGRAM(s) Oral daily  glucagon  Injectable 1 milliGRAM(s) IntraMuscular once  hydrocodone/homatropine Syrup 10 milliLiter(s) Oral every 8 hours  hydroxychloroquine 400 milliGRAM(s) Oral <User Schedule>  insulin glargine Injectable (LANTUS) 29 Unit(s) SubCutaneous at bedtime  insulin lispro (ADMELOG) corrective regimen sliding scale   SubCutaneous three times a day before meals  insulin lispro (ADMELOG) corrective regimen sliding scale   SubCutaneous at bedtime  insulin lispro Injectable (ADMELOG) 10 Unit(s) SubCutaneous three times a day with meals  levothyroxine 125 MICROGram(s) Oral daily  lidocaine   4% Patch 1 Patch Transdermal daily  losartan 50 milliGRAM(s) Oral daily  mycophenolate mofetil 1500 milliGRAM(s) Oral daily  nystatin    Suspension 522760 Unit(s) Oral two times a day  pantoprazole    Tablet 40 milliGRAM(s) Oral before breakfast  predniSONE   Tablet   Oral   predniSONE   Tablet 30 milliGRAM(s) Oral daily  tiotropium 18 MICROgram(s) Capsule 1 Capsule(s) Inhalation daily  trimethoprim  160 mG/sulfamethoxazole 800 mG 1 Tablet(s) Oral <User Schedule>    MEDICATIONS  (PRN):  acetaminophen     Tablet .. 650 milliGRAM(s) Oral every 6 hours PRN Temp greater or equal to 38C (100.4F), Mild Pain (1 - 3), Moderate Pain (4 - 6)  ALPRAZolam 0.5 milliGRAM(s) Oral three times a day PRN anxiety  aluminum hydroxide/magnesium hydroxide/simethicone Suspension 30 milliLiter(s) Oral every 4 hours PRN Dyspepsia  benzocaine 15 mG/menthol 3.6 mG Lozenge 1 Lozenge Oral every 4 hours PRN cough/sore throat  dextrose Oral Gel 15 Gram(s) Oral once PRN Blood Glucose LESS THAN 70 milliGRAM(s)/deciliter  melatonin 3 milliGRAM(s) Oral at bedtime PRN Insomnia  ondansetron Injectable 4 milliGRAM(s) IV Push every 8 hours PRN Nausea and/or Vomiting  oxyCODONE    IR 5 milliGRAM(s) Oral every 6 hours PRN moderate and severe pain      I&O's Summary    08 Jul 2022 07:01  -  09 Jul 2022 07:00  --------------------------------------------------------  IN: 920 mL / OUT: 1500 mL / NET: -580 mL    09 Jul 2022 07:01  -  09 Jul 2022 14:33  --------------------------------------------------------  IN: 640 mL / OUT: 700 mL / NET: -60 mL        PHYSICAL EXAM:  Vital Signs Last 24 Hrs  T(C): 36.7 (09 Jul 2022 08:05), Max: 36.7 (08 Jul 2022 18:03)  T(F): 98.1 (09 Jul 2022 08:05), Max: 98.1 (09 Jul 2022 08:05)  HR: 107 (09 Jul 2022 08:05) (107 - 115)  BP: 114/78 (09 Jul 2022 08:05) (114/78 - 132/89)  BP(mean): --  RR: 18 (09 Jul 2022 08:05) (18 - 18)  SpO2: 95% (09 Jul 2022 08:05) (91% - 95%)    Parameters below as of 09 Jul 2022 08:05  Patient On (Oxygen Delivery Method): room air      CONSTITUTIONAL: NAD, well-developed  EYES: PERRLA; conjunctiva and sclera clear  ENMT: Moist oral mucosa, no pharyngeal injection or exudates  NECK: Supple, no palpable masses  RESPIRATORY: Normal respiratory effort; lungs are clear to auscultation bilaterally  CARDIOVASCULAR: Regular rate and rhythm, normal S1 and S2, no murmur/rub/gallop;1+ pitting edema to mid shin b/l; Peripheral pulses are 2+ bilaterally  ABDOMEN: Nontender to palpation, normoactive bowel sounds, no rebound/guarding  MUSCULOSKELETAL:  no clubbing or cyanosis of digits; no joint swelling or tenderness to palpation  PSYCH: A+O to person, place, and time; affect appropriate  NEUROLOGY: CN 2-12 are intact and symmetric; no gross sensory deficits   SKIN: No rashes; no palpable lesions    LABS:                        13.6   12.77 )-----------( 246      ( 09 Jul 2022 07:13 )             39.5     07-09    137  |  93<L>  |  11  ----------------------------<  90  2.9<LL>   |  30  |  0.96    Ca    9.3      09 Jul 2022 07:11

## 2022-07-09 NOTE — PROGRESS NOTE ADULT - PROBLEM SELECTOR PLAN 1
cough, SOB, chest tightness x 2 weeks c/w asthma exacerbation in setting of infections with parainfluenza and entero/rhinovirus and covid a few months ago  -coughing fits with chest pain, muscle spasms, improving  -CTA 7/4: negative for PE/PNA  -02 sat mid 90s on RA  -discussed with pulm Dr Ocampo: c/w po Prednisone 30mg PO daily x 7 days and then down to 20mg daily - is on daily steroids per Rheum,   -s/p azithromycin x 5 days  -albuterol q4  prn  -c/w Spiriva   c/w Symbicort bid   c/w Tessalon and hyocodan TID ATC with holding parameters  -tylenol prn, lidocaine patch, oxycodone prn. flexeril now 5mg tid from back spasm from coughing.   -xanax prn anxiety. -istop in chart.

## 2022-07-10 ENCOUNTER — TRANSCRIPTION ENCOUNTER (OUTPATIENT)
Age: 47
End: 2022-07-10

## 2022-07-10 LAB
ANION GAP SERPL CALC-SCNC: 13 MMOL/L — SIGNIFICANT CHANGE UP (ref 5–17)
BUN SERPL-MCNC: 13 MG/DL — SIGNIFICANT CHANGE UP (ref 7–23)
CALCIUM SERPL-MCNC: 9.3 MG/DL — SIGNIFICANT CHANGE UP (ref 8.4–10.5)
CHLORIDE SERPL-SCNC: 95 MMOL/L — LOW (ref 96–108)
CO2 SERPL-SCNC: 26 MMOL/L — SIGNIFICANT CHANGE UP (ref 22–31)
CREAT SERPL-MCNC: 1.11 MG/DL — SIGNIFICANT CHANGE UP (ref 0.5–1.3)
EGFR: 83 ML/MIN/1.73M2 — SIGNIFICANT CHANGE UP
GLUCOSE BLDC GLUCOMTR-MCNC: 184 MG/DL — HIGH (ref 70–99)
GLUCOSE BLDC GLUCOMTR-MCNC: 192 MG/DL — HIGH (ref 70–99)
GLUCOSE BLDC GLUCOMTR-MCNC: 261 MG/DL — HIGH (ref 70–99)
GLUCOSE BLDC GLUCOMTR-MCNC: 273 MG/DL — HIGH (ref 70–99)
GLUCOSE SERPL-MCNC: 141 MG/DL — HIGH (ref 70–99)
HCT VFR BLD CALC: 40 % — SIGNIFICANT CHANGE UP (ref 39–50)
HGB BLD-MCNC: 13.4 G/DL — SIGNIFICANT CHANGE UP (ref 13–17)
MCHC RBC-ENTMCNC: 29.9 PG — SIGNIFICANT CHANGE UP (ref 27–34)
MCHC RBC-ENTMCNC: 33.5 GM/DL — SIGNIFICANT CHANGE UP (ref 32–36)
MCV RBC AUTO: 89.3 FL — SIGNIFICANT CHANGE UP (ref 80–100)
NRBC # BLD: 0 /100 WBCS — SIGNIFICANT CHANGE UP (ref 0–0)
PLATELET # BLD AUTO: 250 K/UL — SIGNIFICANT CHANGE UP (ref 150–400)
POTASSIUM SERPL-MCNC: 3.5 MMOL/L — SIGNIFICANT CHANGE UP (ref 3.5–5.3)
POTASSIUM SERPL-SCNC: 3.5 MMOL/L — SIGNIFICANT CHANGE UP (ref 3.5–5.3)
RBC # BLD: 4.48 M/UL — SIGNIFICANT CHANGE UP (ref 4.2–5.8)
RBC # FLD: 13.8 % — SIGNIFICANT CHANGE UP (ref 10.3–14.5)
SODIUM SERPL-SCNC: 134 MMOL/L — LOW (ref 135–145)
WBC # BLD: 11.8 K/UL — HIGH (ref 3.8–10.5)
WBC # FLD AUTO: 11.8 K/UL — HIGH (ref 3.8–10.5)

## 2022-07-10 PROCEDURE — 93970 EXTREMITY STUDY: CPT | Mod: 26

## 2022-07-10 PROCEDURE — 99232 SBSQ HOSP IP/OBS MODERATE 35: CPT

## 2022-07-10 RX ORDER — POTASSIUM CHLORIDE 20 MEQ
20 PACKET (EA) ORAL DAILY
Refills: 0 | Status: DISCONTINUED | OUTPATIENT
Start: 2022-07-11 | End: 2022-07-11

## 2022-07-10 RX ORDER — POTASSIUM CHLORIDE 20 MEQ
40 PACKET (EA) ORAL ONCE
Refills: 0 | Status: COMPLETED | OUTPATIENT
Start: 2022-07-10 | End: 2022-07-10

## 2022-07-10 RX ORDER — FUROSEMIDE 40 MG
60 TABLET ORAL ONCE
Refills: 0 | Status: COMPLETED | OUTPATIENT
Start: 2022-07-10 | End: 2022-07-10

## 2022-07-10 RX ADMIN — BUDESONIDE AND FORMOTEROL FUMARATE DIHYDRATE 2 PUFF(S): 160; 4.5 AEROSOL RESPIRATORY (INHALATION) at 06:28

## 2022-07-10 RX ADMIN — LIDOCAINE 1 PATCH: 4 CREAM TOPICAL at 12:27

## 2022-07-10 RX ADMIN — ALBUTEROL 2.5 MILLIGRAM(S): 90 AEROSOL, METERED ORAL at 14:25

## 2022-07-10 RX ADMIN — OXYCODONE AND ACETAMINOPHEN 2 TABLET(S): 5; 325 TABLET ORAL at 06:24

## 2022-07-10 RX ADMIN — Medication 30 MILLIGRAM(S): at 06:24

## 2022-07-10 RX ADMIN — ALBUTEROL 2.5 MILLIGRAM(S): 90 AEROSOL, METERED ORAL at 06:22

## 2022-07-10 RX ADMIN — TIOTROPIUM BROMIDE 1 CAPSULE(S): 18 CAPSULE ORAL; RESPIRATORY (INHALATION) at 12:29

## 2022-07-10 RX ADMIN — OXYCODONE AND ACETAMINOPHEN 2 TABLET(S): 5; 325 TABLET ORAL at 07:24

## 2022-07-10 RX ADMIN — CYCLOBENZAPRINE HYDROCHLORIDE 5 MILLIGRAM(S): 10 TABLET, FILM COATED ORAL at 06:24

## 2022-07-10 RX ADMIN — Medication 100 MILLIGRAM(S): at 21:35

## 2022-07-10 RX ADMIN — PANTOPRAZOLE SODIUM 40 MILLIGRAM(S): 20 TABLET, DELAYED RELEASE ORAL at 06:24

## 2022-07-10 RX ADMIN — Medication 125 MICROGRAM(S): at 06:25

## 2022-07-10 RX ADMIN — Medication 100 MILLIGRAM(S): at 06:24

## 2022-07-10 RX ADMIN — ALBUTEROL 2.5 MILLIGRAM(S): 90 AEROSOL, METERED ORAL at 17:07

## 2022-07-10 RX ADMIN — Medication 1: at 08:22

## 2022-07-10 RX ADMIN — CYCLOBENZAPRINE HYDROCHLORIDE 5 MILLIGRAM(S): 10 TABLET, FILM COATED ORAL at 21:36

## 2022-07-10 RX ADMIN — Medication 2 SPRAY(S): at 17:08

## 2022-07-10 RX ADMIN — OXYCODONE AND ACETAMINOPHEN 2 TABLET(S): 5; 325 TABLET ORAL at 19:46

## 2022-07-10 RX ADMIN — Medication 40 MILLIEQUIVALENT(S): at 18:46

## 2022-07-10 RX ADMIN — OXYCODONE AND ACETAMINOPHEN 2 TABLET(S): 5; 325 TABLET ORAL at 12:26

## 2022-07-10 RX ADMIN — Medication 3: at 12:26

## 2022-07-10 RX ADMIN — Medication 3: at 17:05

## 2022-07-10 RX ADMIN — ENOXAPARIN SODIUM 40 MILLIGRAM(S): 100 INJECTION SUBCUTANEOUS at 17:08

## 2022-07-10 RX ADMIN — OXYCODONE AND ACETAMINOPHEN 2 TABLET(S): 5; 325 TABLET ORAL at 18:46

## 2022-07-10 RX ADMIN — Medication 10 UNIT(S): at 08:22

## 2022-07-10 RX ADMIN — Medication 10 UNIT(S): at 12:26

## 2022-07-10 RX ADMIN — Medication 10 UNIT(S): at 17:06

## 2022-07-10 RX ADMIN — INSULIN GLARGINE 29 UNIT(S): 100 INJECTION, SOLUTION SUBCUTANEOUS at 21:35

## 2022-07-10 RX ADMIN — BUDESONIDE AND FORMOTEROL FUMARATE DIHYDRATE 2 PUFF(S): 160; 4.5 AEROSOL RESPIRATORY (INHALATION) at 17:07

## 2022-07-10 RX ADMIN — FLUCONAZOLE 100 MILLIGRAM(S): 150 TABLET ORAL at 12:28

## 2022-07-10 RX ADMIN — Medication 60 MILLIGRAM(S): at 12:35

## 2022-07-10 RX ADMIN — ALBUTEROL 2.5 MILLIGRAM(S): 90 AEROSOL, METERED ORAL at 10:21

## 2022-07-10 RX ADMIN — LOSARTAN POTASSIUM 50 MILLIGRAM(S): 100 TABLET, FILM COATED ORAL at 06:30

## 2022-07-10 RX ADMIN — Medication 400 MILLIGRAM(S): at 21:35

## 2022-07-10 RX ADMIN — Medication 0.5 MILLIGRAM(S): at 10:34

## 2022-07-10 RX ADMIN — Medication 500000 UNIT(S): at 06:25

## 2022-07-10 RX ADMIN — ALBUTEROL 2.5 MILLIGRAM(S): 90 AEROSOL, METERED ORAL at 21:37

## 2022-07-10 RX ADMIN — Medication 60 MILLIGRAM(S): at 18:46

## 2022-07-10 RX ADMIN — OXYCODONE AND ACETAMINOPHEN 2 TABLET(S): 5; 325 TABLET ORAL at 13:30

## 2022-07-10 RX ADMIN — LIDOCAINE 1 PATCH: 4 CREAM TOPICAL at 19:39

## 2022-07-10 RX ADMIN — MYCOPHENOLATE MOFETIL 1500 MILLIGRAM(S): 250 CAPSULE ORAL at 12:27

## 2022-07-10 RX ADMIN — ENOXAPARIN SODIUM 40 MILLIGRAM(S): 100 INJECTION SUBCUTANEOUS at 06:27

## 2022-07-10 RX ADMIN — Medication 81 MILLIGRAM(S): at 12:27

## 2022-07-10 RX ADMIN — Medication 100 MILLIGRAM(S): at 14:25

## 2022-07-10 RX ADMIN — Medication 1 TABLET(S): at 06:29

## 2022-07-10 RX ADMIN — Medication 500000 UNIT(S): at 17:07

## 2022-07-10 RX ADMIN — CYCLOBENZAPRINE HYDROCHLORIDE 5 MILLIGRAM(S): 10 TABLET, FILM COATED ORAL at 14:25

## 2022-07-10 RX ADMIN — Medication 2 SPRAY(S): at 06:29

## 2022-07-10 NOTE — PROGRESS NOTE ADULT - PROVIDER SPECIALTY LIST ADULT
Hospitalist
Pulmonology
Rheumatology
Pulmonology
Hospitalist
Hospitalist
Internal Medicine
Hospitalist
Internal Medicine
Hospitalist
Internal Medicine
Hospitalist
Internal Medicine
Internal Medicine

## 2022-07-10 NOTE — PROGRESS NOTE ADULT - PROBLEM SELECTOR PROBLEM 1
Acute asthma exacerbation

## 2022-07-10 NOTE — DISCHARGE NOTE NURSING/CASE MANAGEMENT/SOCIAL WORK - NSDCPEFALRISK_GEN_ALL_CORE
For information on Fall & Injury Prevention, visit: [email or web address] OR  [email or web address] OR  https://www.cdc.gov/steadi/patient.html

## 2022-07-10 NOTE — PROGRESS NOTE ADULT - PROBLEM SELECTOR PLAN 8
lovenox for VTE ppx   DASH diet   fall precautions  -oob to chair. incentive spirometer.     Dispo: possible weekend discharge pending improvement of swelling, asthma and workup    discussed with MARITA Pruitt and Dr Ocampo
lovenox for VTE ppx   DASH diet   fall precautions  -oob to chair. incentive spirometer.     Dispo: possible d/c tomorrow pending improvement of swelling, dupplex
lovenox for VTE ppx   DASH diet   fall precautions  -oob to chair. incentive spirometer.     Dispo: d/c tomorrow

## 2022-07-10 NOTE — PROGRESS NOTE ADULT - PROBLEM SELECTOR PLAN 4
chronic LE edema on po lasix 60mg daily at home  -increase LE edema, likely worsened by steroids  -given IV lasix 40mg x 1 on 7/8 with improvement, extra dose 60 po today  -recent negative LE dupplex, repeat negative

## 2022-07-10 NOTE — PROGRESS NOTE ADULT - REASON FOR ADMISSION
cough, SOB

## 2022-07-10 NOTE — PROGRESS NOTE ADULT - SUBJECTIVE AND OBJECTIVE BOX
[facility] Division of Hospital Medicine  Derick Mcfarlane  Pager (M-F, 8A-5P): [phone]  Other Times:  [phone]    CC: 46 y/o M presenting with SOB,     SUBJECTIVE / OVERNIGHT EVENTS:  no acute events overnight  cough improving but slow  joint pain relatively unchanged  denies f/chills/sob    ADDITIONAL REVIEW OF SYSTEMS:    MEDICATIONS  (STANDING):  ALBUTerol    0.083% 2.5 milliGRAM(s) Nebulizer every 4 hours  aspirin enteric coated 81 milliGRAM(s) Oral daily  benzonatate 100 milliGRAM(s) Oral three times a day  budesonide 160 MICROgram(s)/formoterol 4.5 MICROgram(s) Inhaler 2 Puff(s) Inhalation two times a day  cyclobenzaprine 5 milliGRAM(s) Oral three times a day  dextrose 5%. 1000 milliLiter(s) (50 mL/Hr) IV Continuous <Continuous>  dextrose 5%. 1000 milliLiter(s) (100 mL/Hr) IV Continuous <Continuous>  dextrose 50% Injectable 25 Gram(s) IV Push once  dextrose 50% Injectable 12.5 Gram(s) IV Push once  dextrose 50% Injectable 25 Gram(s) IV Push once  enoxaparin Injectable 40 milliGRAM(s) SubCutaneous every 12 hours  fluconAZOLE   Tablet 100 milliGRAM(s) Oral daily  fluticasone propionate 50 MICROgram(s)/spray Nasal Spray 2 Spray(s) Both Nostrils two times a day  furosemide    Tablet 60 milliGRAM(s) Oral daily  glucagon  Injectable 1 milliGRAM(s) IntraMuscular once  hydrocodone/homatropine Syrup 10 milliLiter(s) Oral every 8 hours  hydroxychloroquine 400 milliGRAM(s) Oral <User Schedule>  insulin glargine Injectable (LANTUS) 29 Unit(s) SubCutaneous at bedtime  insulin lispro (ADMELOG) corrective regimen sliding scale   SubCutaneous three times a day before meals  insulin lispro (ADMELOG) corrective regimen sliding scale   SubCutaneous at bedtime  insulin lispro Injectable (ADMELOG) 10 Unit(s) SubCutaneous three times a day with meals  levothyroxine 125 MICROGram(s) Oral daily  lidocaine   4% Patch 1 Patch Transdermal daily  losartan 50 milliGRAM(s) Oral daily  mycophenolate mofetil 1500 milliGRAM(s) Oral daily  nystatin    Suspension [id number] Unit(s) Oral two times a day  pantoprazole    Tablet 40 milliGRAM(s) Oral before breakfast  predniSONE   Tablet 30 milliGRAM(s) Oral daily  predniSONE   Tablet   Oral   tiotropium 18 MICROgram(s) Capsule 1 Capsule(s) Inhalation daily  trimethoprim  160 mG/sulfamethoxazole 800 mG 1 Tablet(s) Oral <User Schedule>    MEDICATIONS  (PRN):  ALPRAZolam 0.5 milliGRAM(s) Oral three times a day PRN anxiety  aluminum hydroxide/magnesium hydroxide/simethicone Suspension 30 milliLiter(s) Oral every 4 hours PRN Dyspepsia  benzocaine 15 mG/menthol 3.6 mG Lozenge 1 Lozenge Oral every 4 hours PRN cough/sore throat  dextrose Oral Gel 15 Gram(s) Oral once PRN Blood Glucose LESS THAN 70 milliGRAM(s)/deciliter  melatonin 3 milliGRAM(s) Oral at bedtime PRN Insomnia  ondansetron Injectable 4 milliGRAM(s) IV Push every 8 hours PRN Nausea and/or Vomiting  oxycodone    5 mG/acetaminophen 325 mG 2 Tablet(s) Oral every 6 hours PRN Moderate Pain (4 - 6)      I&O's Summary    09 Jul 2022 07:01  -  10 Jul 2022 07:00  --------------------------------------------------------  IN: 640 mL / OUT: 1100 mL / NET: -460 mL    10 Jul 2022 07:01  -  10 Jul 2022 13:56  --------------------------------------------------------  IN: 420 mL / OUT: 600 mL / NET: -180 mL        PHYSICAL EXAM:  Vital Signs Last 24 Hrs  T(C): 36.2 (10 Jul 2022 06:18), Max: 36.5 (09 Jul 2022 16:32)  T(F): 97.2 (10 Jul 2022 06:18), Max: 97.7 (09 Jul 2022 16:32)  HR: 105 (10 Jul 2022 06:18) (105 - 112)  BP: 116/78 (10 Jul 2022 06:18) (116/78 - 121/81)  BP(mean): --  RR: 20 (10 Jul 2022 06:18) (20 - 20)  SpO2: 95% (10 Jul 2022 06:18) (92% - 95%)    Parameters below as of 10 Jul 2022 06:18  Patient On (Oxygen Delivery Method): room air      CONSTITUTIONAL: NAD, well-developed  EYES: PERRLA; conjunctiva and sclera clear  ENMT: Moist oral mucosa, no pharyngeal injection or exudates  NECK: Supple, no palpable masses  RESPIRATORY: Normal respiratory effort; lungs are clear to auscultation bilaterally  CARDIOVASCULAR: Regular rate and rhythm, normal S1 and S2, no murmur/rub/gallop;1+ pitting edema to mid shin b/l; Peripheral pulses are 2+ bilaterally  ABDOMEN: Nontender to palpation, normoactive bowel sounds, no rebound/guarding  MUSCULOSKELETAL:  no clubbing or cyanosis of digits; no joint swelling or tenderness to palpation  PSYCH: A+O to person, place, and time; affect appropriate  NEUROLOGY: CN 2-12 are intact and symmetric; no gross sensory deficits   SKIN: No rashes; no palpable lesions    LABS:                        13.4   11.80 )-----------( 250      ( 10 Jul 2022 06:43 )             40.0     07-10    134<L>  |  95<L>  |  13  ----------------------------<  141<H>  3.5   |  26  |  1.11    Ca    9.3      10 Jul 2022 06:43

## 2022-07-10 NOTE — PROGRESS NOTE ADULT - PROBLEM SELECTOR PLAN 3
A1c -8.3 - now new onset DM  FS elevated but lowering steroid so will not increase insulin  c/w lantus  29 units qhs  c/w premeal  10u admelog TID  ISS for coverage  -VINCENT briscoe.

## 2022-07-10 NOTE — DISCHARGE NOTE NURSING/CASE MANAGEMENT/SOCIAL WORK - NSDPDISTO_GEN_ALL_CORE
Amount of fiber in different foods   Food Serving Grams of fiber   Fruits   Apple (with skin) 1 medium apple 4.4   Banana 1 medium banana 3.1   Oranges 1 orange 3.1   Prunes 1 cup, pitted 12.4   Juices   Apple, unsweetened, with added ascorbic acid 1 cup 0.5   Grapefruit, white, canned, sweetened 1 cup 0.2   Grape, unsweetened, with added ascorbic acid 1 cup 0.5   Orange 1 cup 0.7   Vegetables   Cooked   Green beans 1 cup 4.0   Carrots 1/2 cup sliced 2.3   Peas 1 cup 8.8   Potato (baked, with skin) 1 medium potato 3.8   Raw   Cucumber (with peel) 1 cucumber 1.5   Lettuce 1 cup shredded 0.5   Tomato 1 medium tomato 1.5   Spinach 1 cup 0.7   Legumes   Baked beans, canned, no salt added 1 cup 13.9   Kidney beans, canned 1 cup 13.6   Lima beans, canned 1 cup 11.6   Lentils, boiled 1 cup 15.6   Breads, pastas, flours   Bran muffins 1 medium muffin 5.2   Oatmeal, cooked 1 cup 4.0   White bread 1 slice 0.6   Whole-wheat bread 1 slice 1.9   Pasta and rice, cooked   Macaroni 1 cup 2.5   Rice, brown 1 cup 3.5   Rice, white 1 cup 0.6   Spaghetti (regular) 1 cup 2.5   Nuts   Almonds 1/2 cup 8.7   Peanuts 1/2 cup 7.9 Home with home care Home

## 2022-07-10 NOTE — DISCHARGE NOTE NURSING/CASE MANAGEMENT/SOCIAL WORK - PATIENT PORTAL LINK FT
You can access the FollowMyHealth Patient Portal offered by [facility] by registering at the following website: [email or web address]. By joining [employer]’s FollowMyHealth portal, you will also be able to view your health information using other applications (apps) compatible with our system.

## 2022-07-10 NOTE — DISCHARGE NOTE NURSING/CASE MANAGEMENT/SOCIAL WORK - NSDCVIVACCINE_GEN_ALL_CORE_FT
Tdap; 17-Dec-2018 23:02; Candi Bedolla (ALEKSEY); [employer]; [id number] (Exp. Date: 03-Dec-2020); IntraMuscular; Deltoid Left.; 0.5 milliLiter(s); VIS (VIS Published: 09-May-2013, VIS Presented: 17-Dec-2018);

## 2022-07-10 NOTE — PROGRESS NOTE ADULT - NUTRITIONAL ASSESSMENT
This patient has been assessed with a concern for Malnutrition and has been determined to have a diagnosis/diagnoses of Moderate protein-calorie malnutrition and Morbid obesity (BMI > 40).    This patient is being managed with:   Diet Consistent Carbohydrate w/Evening Snack-  Supplement Feeding Modality:  Oral  Glucerna Shake Cans or Servings Per Day:  2       Frequency:  Daily  Entered: Jul 4 2022  2:11PM    

## 2022-07-11 ENCOUNTER — TRANSCRIPTION ENCOUNTER (OUTPATIENT)
Age: 47
End: 2022-07-11

## 2022-07-11 ENCOUNTER — NON-APPOINTMENT (OUTPATIENT)
Age: 47
End: 2022-07-11

## 2022-07-11 VITALS
RESPIRATION RATE: 18 BRPM | HEART RATE: 100 BPM | TEMPERATURE: 98 F | SYSTOLIC BLOOD PRESSURE: 138 MMHG | DIASTOLIC BLOOD PRESSURE: 93 MMHG | OXYGEN SATURATION: 100 %

## 2022-07-11 LAB
ANION GAP SERPL CALC-SCNC: 14 MMOL/L — SIGNIFICANT CHANGE UP (ref 5–17)
BUN SERPL-MCNC: 10 MG/DL — SIGNIFICANT CHANGE UP (ref 7–23)
CALCIUM SERPL-MCNC: 9.1 MG/DL — SIGNIFICANT CHANGE UP (ref 8.4–10.5)
CHLORIDE SERPL-SCNC: 97 MMOL/L — SIGNIFICANT CHANGE UP (ref 96–108)
CO2 SERPL-SCNC: 27 MMOL/L — SIGNIFICANT CHANGE UP (ref 22–31)
CREAT SERPL-MCNC: 1.02 MG/DL — SIGNIFICANT CHANGE UP (ref 0.5–1.3)
EGFR: 92 ML/MIN/1.73M2 — SIGNIFICANT CHANGE UP
GLUCOSE BLDC GLUCOMTR-MCNC: 132 MG/DL — HIGH (ref 70–99)
GLUCOSE BLDC GLUCOMTR-MCNC: 145 MG/DL — HIGH (ref 70–99)
GLUCOSE BLDC GLUCOMTR-MCNC: 230 MG/DL — HIGH (ref 70–99)
GLUCOSE SERPL-MCNC: 76 MG/DL — SIGNIFICANT CHANGE UP (ref 70–99)
HCT VFR BLD CALC: 39.4 % — SIGNIFICANT CHANGE UP (ref 39–50)
HGB BLD-MCNC: 13.2 G/DL — SIGNIFICANT CHANGE UP (ref 13–17)
MCHC RBC-ENTMCNC: 30.3 PG — SIGNIFICANT CHANGE UP (ref 27–34)
MCHC RBC-ENTMCNC: 33.5 GM/DL — SIGNIFICANT CHANGE UP (ref 32–36)
MCV RBC AUTO: 90.4 FL — SIGNIFICANT CHANGE UP (ref 80–100)
NRBC # BLD: 0 /100 WBCS — SIGNIFICANT CHANGE UP (ref 0–0)
PLATELET # BLD AUTO: 249 K/UL — SIGNIFICANT CHANGE UP (ref 150–400)
POTASSIUM SERPL-MCNC: 3.4 MMOL/L — LOW (ref 3.5–5.3)
POTASSIUM SERPL-SCNC: 3.4 MMOL/L — LOW (ref 3.5–5.3)
RBC # BLD: 4.36 M/UL — SIGNIFICANT CHANGE UP (ref 4.2–5.8)
RBC # FLD: 13.6 % — SIGNIFICANT CHANGE UP (ref 10.3–14.5)
SODIUM SERPL-SCNC: 138 MMOL/L — SIGNIFICANT CHANGE UP (ref 135–145)
WBC # BLD: 11.81 K/UL — HIGH (ref 3.8–10.5)
WBC # FLD AUTO: 11.81 K/UL — HIGH (ref 3.8–10.5)

## 2022-07-11 PROCEDURE — 86160 COMPLEMENT ANTIGEN: CPT

## 2022-07-11 PROCEDURE — 80053 COMPREHEN METABOLIC PANEL: CPT

## 2022-07-11 PROCEDURE — 85025 COMPLETE CBC W/AUTO DIFF WBC: CPT

## 2022-07-11 PROCEDURE — 83036 HEMOGLOBIN GLYCOSYLATED A1C: CPT

## 2022-07-11 PROCEDURE — 82947 ASSAY GLUCOSE BLOOD QUANT: CPT

## 2022-07-11 PROCEDURE — 83735 ASSAY OF MAGNESIUM: CPT

## 2022-07-11 PROCEDURE — 83880 ASSAY OF NATRIURETIC PEPTIDE: CPT

## 2022-07-11 PROCEDURE — 84100 ASSAY OF PHOSPHORUS: CPT

## 2022-07-11 PROCEDURE — 36415 COLL VENOUS BLD VENIPUNCTURE: CPT

## 2022-07-11 PROCEDURE — 85027 COMPLETE CBC AUTOMATED: CPT

## 2022-07-11 PROCEDURE — 82550 ASSAY OF CK (CPK): CPT

## 2022-07-11 PROCEDURE — 84132 ASSAY OF SERUM POTASSIUM: CPT

## 2022-07-11 PROCEDURE — 71045 X-RAY EXAM CHEST 1 VIEW: CPT

## 2022-07-11 PROCEDURE — 82435 ASSAY OF BLOOD CHLORIDE: CPT

## 2022-07-11 PROCEDURE — 96374 THER/PROPH/DIAG INJ IV PUSH: CPT

## 2022-07-11 PROCEDURE — 82803 BLOOD GASES ANY COMBINATION: CPT

## 2022-07-11 PROCEDURE — 85379 FIBRIN DEGRADATION QUANT: CPT

## 2022-07-11 PROCEDURE — 84295 ASSAY OF SERUM SODIUM: CPT

## 2022-07-11 PROCEDURE — 93005 ELECTROCARDIOGRAM TRACING: CPT

## 2022-07-11 PROCEDURE — 99239 HOSP IP/OBS DSCHRG MGMT >30: CPT

## 2022-07-11 PROCEDURE — U0003: CPT

## 2022-07-11 PROCEDURE — 93970 EXTREMITY STUDY: CPT

## 2022-07-11 PROCEDURE — 84156 ASSAY OF PROTEIN URINE: CPT

## 2022-07-11 PROCEDURE — 86225 DNA ANTIBODY NATIVE: CPT

## 2022-07-11 PROCEDURE — 85014 HEMATOCRIT: CPT

## 2022-07-11 PROCEDURE — 80048 BASIC METABOLIC PNL TOTAL CA: CPT

## 2022-07-11 PROCEDURE — 99285 EMERGENCY DEPT VISIT HI MDM: CPT

## 2022-07-11 PROCEDURE — 81001 URINALYSIS AUTO W/SCOPE: CPT

## 2022-07-11 PROCEDURE — U0005: CPT

## 2022-07-11 PROCEDURE — 71275 CT ANGIOGRAPHY CHEST: CPT

## 2022-07-11 PROCEDURE — 82570 ASSAY OF URINE CREATININE: CPT

## 2022-07-11 PROCEDURE — 83605 ASSAY OF LACTIC ACID: CPT

## 2022-07-11 PROCEDURE — 94640 AIRWAY INHALATION TREATMENT: CPT

## 2022-07-11 PROCEDURE — 85018 HEMOGLOBIN: CPT

## 2022-07-11 PROCEDURE — 82330 ASSAY OF CALCIUM: CPT

## 2022-07-11 PROCEDURE — 0225U NFCT DS DNA&RNA 21 SARSCOV2: CPT

## 2022-07-11 PROCEDURE — 82962 GLUCOSE BLOOD TEST: CPT

## 2022-07-11 RX ORDER — TIOTROPIUM BROMIDE 18 UG/1
1 CAPSULE ORAL; RESPIRATORY (INHALATION)
Qty: 30 | Refills: 0
Start: 2022-07-11 | End: 2022-08-09

## 2022-07-11 RX ORDER — FLUCONAZOLE 150 MG/1
1 TABLET ORAL
Qty: 2 | Refills: 0
Start: 2022-07-11 | End: 2022-07-12

## 2022-07-11 RX ORDER — BUDESONIDE AND FORMOTEROL FUMARATE DIHYDRATE 160; 4.5 UG/1; UG/1
1 AEROSOL RESPIRATORY (INHALATION)
Qty: 60 | Refills: 0
Start: 2022-07-11 | End: 2022-08-09

## 2022-07-11 RX ORDER — POTASSIUM CHLORIDE 20 MEQ
1 PACKET (EA) ORAL
Qty: 0 | Refills: 0 | DISCHARGE

## 2022-07-11 RX ORDER — NALOXONE HYDROCHLORIDE 4 MG/.1ML
4 SPRAY NASAL
Qty: 1 | Refills: 0
Start: 2022-07-11

## 2022-07-11 RX ORDER — FLUTICASONE PROPIONATE 50 MCG
2 SPRAY, SUSPENSION NASAL
Qty: 1 | Refills: 0
Start: 2022-07-11 | End: 2022-08-09

## 2022-07-11 RX ORDER — ASPIRIN/CALCIUM CARB/MAGNESIUM 324 MG
1 TABLET ORAL
Qty: 0 | Refills: 0 | DISCHARGE
Start: 2022-07-11

## 2022-07-11 RX ORDER — ALBUTEROL 90 UG/1
1 AEROSOL, METERED ORAL
Qty: 720 | Refills: 0
Start: 2022-07-11 | End: 2022-08-09

## 2022-07-11 RX ORDER — FLUTICASONE FUROATE, UMECLIDINIUM BROMIDE AND VILANTEROL TRIFENATATE 200; 62.5; 25 UG/1; UG/1; UG/1
1 POWDER RESPIRATORY (INHALATION)
Qty: 0 | Refills: 0 | DISCHARGE

## 2022-07-11 RX ORDER — METFORMIN HYDROCHLORIDE 850 MG/1
1 TABLET ORAL
Qty: 60 | Refills: 0
Start: 2022-07-11 | End: 2022-08-09

## 2022-07-11 RX ADMIN — Medication 20 MILLIEQUIVALENT(S): at 11:13

## 2022-07-11 RX ADMIN — OXYCODONE AND ACETAMINOPHEN 2 TABLET(S): 5; 325 TABLET ORAL at 12:12

## 2022-07-11 RX ADMIN — Medication 81 MILLIGRAM(S): at 11:13

## 2022-07-11 RX ADMIN — LOSARTAN POTASSIUM 50 MILLIGRAM(S): 100 TABLET, FILM COATED ORAL at 06:26

## 2022-07-11 RX ADMIN — ENOXAPARIN SODIUM 40 MILLIGRAM(S): 100 INJECTION SUBCUTANEOUS at 06:27

## 2022-07-11 RX ADMIN — Medication 100 MILLIGRAM(S): at 06:25

## 2022-07-11 RX ADMIN — CYCLOBENZAPRINE HYDROCHLORIDE 5 MILLIGRAM(S): 10 TABLET, FILM COATED ORAL at 11:56

## 2022-07-11 RX ADMIN — CYCLOBENZAPRINE HYDROCHLORIDE 5 MILLIGRAM(S): 10 TABLET, FILM COATED ORAL at 06:26

## 2022-07-11 RX ADMIN — Medication 2 SPRAY(S): at 06:29

## 2022-07-11 RX ADMIN — OXYCODONE AND ACETAMINOPHEN 2 TABLET(S): 5; 325 TABLET ORAL at 11:12

## 2022-07-11 RX ADMIN — Medication 30 MILLIGRAM(S): at 06:25

## 2022-07-11 RX ADMIN — BUDESONIDE AND FORMOTEROL FUMARATE DIHYDRATE 2 PUFF(S): 160; 4.5 AEROSOL RESPIRATORY (INHALATION) at 06:29

## 2022-07-11 RX ADMIN — OXYCODONE AND ACETAMINOPHEN 2 TABLET(S): 5; 325 TABLET ORAL at 05:59

## 2022-07-11 RX ADMIN — ALBUTEROL 2.5 MILLIGRAM(S): 90 AEROSOL, METERED ORAL at 05:59

## 2022-07-11 RX ADMIN — Medication 125 MICROGRAM(S): at 06:00

## 2022-07-11 RX ADMIN — LIDOCAINE 1 PATCH: 4 CREAM TOPICAL at 00:03

## 2022-07-11 RX ADMIN — Medication 10 UNIT(S): at 08:12

## 2022-07-11 RX ADMIN — PANTOPRAZOLE SODIUM 40 MILLIGRAM(S): 20 TABLET, DELAYED RELEASE ORAL at 06:26

## 2022-07-11 RX ADMIN — ALBUTEROL 2.5 MILLIGRAM(S): 90 AEROSOL, METERED ORAL at 16:40

## 2022-07-11 RX ADMIN — Medication 10 UNIT(S): at 11:55

## 2022-07-11 RX ADMIN — Medication 500000 UNIT(S): at 06:25

## 2022-07-11 RX ADMIN — Medication 10 UNIT(S): at 16:58

## 2022-07-11 RX ADMIN — FLUCONAZOLE 100 MILLIGRAM(S): 150 TABLET ORAL at 11:12

## 2022-07-11 RX ADMIN — OXYCODONE AND ACETAMINOPHEN 2 TABLET(S): 5; 325 TABLET ORAL at 17:58

## 2022-07-11 RX ADMIN — MYCOPHENOLATE MOFETIL 1500 MILLIGRAM(S): 250 CAPSULE ORAL at 11:55

## 2022-07-11 RX ADMIN — OXYCODONE AND ACETAMINOPHEN 2 TABLET(S): 5; 325 TABLET ORAL at 06:59

## 2022-07-11 RX ADMIN — Medication 100 MILLIGRAM(S): at 11:56

## 2022-07-11 RX ADMIN — OXYCODONE AND ACETAMINOPHEN 2 TABLET(S): 5; 325 TABLET ORAL at 16:58

## 2022-07-11 RX ADMIN — LIDOCAINE 1 PATCH: 4 CREAM TOPICAL at 11:13

## 2022-07-11 RX ADMIN — Medication 500000 UNIT(S): at 16:58

## 2022-07-11 RX ADMIN — Medication 60 MILLIGRAM(S): at 11:12

## 2022-07-11 RX ADMIN — Medication 2: at 11:55

## 2022-07-11 NOTE — DISCHARGE NOTE PROVIDER - DETAILS OF MALNUTRITION DIAGNOSIS/DIAGNOSES
This patient has been assessed with a concern for Malnutrition and was treated during this hospitalization for the following Nutrition diagnosis/diagnoses:     -  07/04/2022: Moderate protein-calorie malnutrition   -  07/04/2022: Morbid obesity (BMI > 40)

## 2022-07-11 NOTE — CHART NOTE - NSCHARTNOTEFT_GEN_A_CORE
patient seen and examined, briefly:    48 y/o M with known diagnosis of SLE with predominant MSK manifestations presenting with cough sob found to have asthma exacerbation iso entero/rhinovirus. Satting well on RA. Started on duonebs + prednisone taper with some improvement although continues to have cough with pain with coughing c/w costochondritis. Course c/b hyperglycemia and worsening LE edema iso prednisone. s/p diuresis and insulin titration with improvement. Rheum and pulm consulted during admission. Course also notable for thrush, started on nystatin swish and fluconazole with improvement.     Exam  CONSTITUTIONAL: NAD, well-developed  EYES: PERRLA; conjunctiva and sclera clear  ENMT: Moist oral mucosa, no pharyngeal injection or exudates  NECK: Supple, no palpable masses  RESPIRATORY: Normal respiratory effort; lungs are clear to auscultation bilaterally  CARDIOVASCULAR: Regular rate and rhythm, normal S1 and S2, no murmur/rub/gallop;1+ pitting edema to mid shin b/l; Peripheral pulses are 2+ bilaterally  ABDOMEN: Nontender to palpation, normoactive bowel sounds, no rebound/guarding  MUSCULOSKELETAL:  no clubbing or cyanosis of digits; no joint swelling or tenderness to palpation  PSYCH: A+O to person, place, and time; affect appropriate  NEUROLOGY: CN 2-12 are intact and symmetric; no gross sensory deficits   SKIN: No rashes; no palpable lesions    Plan  -Continue steroid taper (pred 30 through 7/14 followed by 20mg until f/u with pulm)  -Continue plaquenil, mmf, bactrim ppx  -Continue fluconazole through 7/14  -Continue patient seen and examined, briefly:    46 y/o M with known diagnosis of SLE with predominant MSK manifestations presenting with cough sob found to have asthma exacerbation iso entero/rhinovirus. Satting well on RA. Started on duonebs + prednisone taper with some improvement although continues to have cough with pain with coughing c/w costochondritis. Course c/b hyperglycemia and worsening LE edema iso prednisone. s/p diuresis and insulin titration with improvement. Rheum and pulm consulted during admission. Course also notable for thrush, started on nystatin swish and fluconazole with improvement.     Exam  CONSTITUTIONAL: NAD, well-developed  EYES: PERRLA; conjunctiva and sclera clear  ENMT: Moist oral mucosa, no pharyngeal injection or exudates  NECK: Supple, no palpable masses  RESPIRATORY: Normal respiratory effort; lungs are clear to auscultation bilaterally  CARDIOVASCULAR: Regular rate and rhythm, normal S1 and S2, no murmur/rub/gallop;1+ pitting edema to mid shin b/l; Peripheral pulses are 2+ bilaterally  ABDOMEN: Nontender to palpation, normoactive bowel sounds, no rebound/guarding  MUSCULOSKELETAL:  no clubbing or cyanosis of digits; no joint swelling or tenderness to palpation  PSYCH: A+O to person, place, and time; affect appropriate  NEUROLOGY: CN 2-12 are intact and symmetric; no gross sensory deficits   SKIN: No rashes; no palpable lesions    Plan  -Continue steroid taper (pred 30 through 7/14 followed by 20mg until f/u with pulm)  -Continue plaquenil, mmf, bactrim ppx  -Continue fluconazole through 7/14  -Continue hycodan cyclobenzaprine, tessalon perle  -continue lasix 60mg daily  -continue synthroid  -f/u with outpatient pulm and rheum patient seen and examined, briefly:    46 y/o M with known diagnosis of SLE with predominant MSK manifestations presenting with cough sob found to have asthma exacerbation iso entero/rhinovirus. Satting well on RA. Started on duonebs + prednisone taper with some improvement although continues to have cough with pain with coughing c/w costochondritis. Course c/b hyperglycemia and worsening LE edema iso prednisone. s/p diuresis and insulin titration with improvement. Rheum and pulm consulted during admission. Course also notable for thrush, started on nystatin swish and fluconazole with improvement.     Exam  CONSTITUTIONAL: NAD, well-developed  EYES: PERRLA; conjunctiva and sclera clear  ENMT: Moist oral mucosa, no pharyngeal injection or exudates  NECK: Supple, no palpable masses  RESPIRATORY: Normal respiratory effort; lungs are clear to auscultation bilaterally  CARDIOVASCULAR: Regular rate and rhythm, normal S1 and S2, no murmur/rub/gallop;1+ pitting edema to mid shin b/l; Peripheral pulses are 2+ bilaterally  ABDOMEN: Nontender to palpation, normoactive bowel sounds, no rebound/guarding  MUSCULOSKELETAL:  no clubbing or cyanosis of digits; no joint swelling or tenderness to palpation  PSYCH: A+O to person, place, and time; affect appropriate  NEUROLOGY: CN 2-12 are intact and symmetric; no gross sensory deficits   SKIN: No rashes; no palpable lesions    Plan  -Continue steroid taper (pred 30 through 7/14 followed by 20mg until f/u with pulm)  -Continue plaquenil, mmf, bactrim ppx  -Continue fluconazole through 7/14  -Continue hycodan cyclobenzaprine, tessalon perle  -continue lasix 60mg daily  -continue synthroid  -f/u with outpatient pulm and rheum  discharge time spent by provider 45 minutes patient seen and examined, briefly:    46 y/o M with known diagnosis of SLE with predominant MSK manifestations presenting with cough sob found to have asthma exacerbation iso entero/rhinovirus. Satting well on RA. Started on duonebs + prednisone taper with some improvement although continues to have cough with pain with coughing c/w costochondritis. Course c/b hyperglycemia and worsening LE edema iso prednisone. s/p diuresis and insulin titration with improvement. Rheum and pulm consulted during admission. Course also notable for thrush, started on nystatin swish and fluconazole with improvement.     Exam  CONSTITUTIONAL: NAD, well-developed  EYES: PERRLA; conjunctiva and sclera clear  ENMT: Moist oral mucosa, no pharyngeal injection or exudates  NECK: Supple, no palpable masses  RESPIRATORY: Normal respiratory effort; lungs are clear to auscultation bilaterally  CARDIOVASCULAR: Regular rate and rhythm, normal S1 and S2, no murmur/rub/gallop;1+ pitting edema to mid shin b/l; Peripheral pulses are 2+ bilaterally  ABDOMEN: Nontender to palpation, normoactive bowel sounds, no rebound/guarding  MUSCULOSKELETAL:  no clubbing or cyanosis of digits; no joint swelling or tenderness to palpation  PSYCH: A+O to person, place, and time; affect appropriate  NEUROLOGY: CN 2-12 are intact and symmetric; no gross sensory deficits   SKIN: No rashes; no palpable lesions    Plan  -Continue steroid taper (pred 30 through 7/14 followed by 20mg until f/u with pulm)  -Continue plaquenil, mmf, bactrim ppx  -Continue fluconazole through 7/14  -Continue hycodan cyclobenzaprine, tessalon perle  -continue lasix 60mg daily  -continue synthroid  -not willing to take insulin outpatient, will start metformin 500mg BID  -f/u with outpatient pulm and rheum  -f/u with pcp outpatient  discharge time spent by provider 45 minutes

## 2022-07-11 NOTE — DISCHARGE NOTE PROVIDER - CARE PROVIDER_API CALL
Susanna Neri)  Internal Medicine; Rheumatology  [address], [address]  [place] [address]  Phone: [phone]  Fax: [phone]  Follow Up Time:     Federico Ocampo)  Critical Care Medicine; Internal Medicine; Pulmonary Disease  [address]  [place] [address]  Phone: [phone]  Fax: [phone]  Follow Up Time:

## 2022-07-11 NOTE — DISCHARGE NOTE PROVIDER - NSDCFUSCHEDAPPT_GEN_ALL_CORE_FT
Pritesh Everett  [facility]  INTMED [address]  Scheduled Appointment: 07/18/2022    [facility]  RHEUM [address]  Scheduled Appointment: 07/28/2022    [facility]  RHEUM [address]  Scheduled Appointment: 08/25/2022

## 2022-07-11 NOTE — DISCHARGE NOTE PROVIDER - HOSPITAL COURSE
47M with PMH of SLE on steroids, asthma, hypothyroidism, HTN p/w severe cough and SOB, a/w acute asthma exacerbation in setting parainfluenza and entero/rhinovirus.   :  ·  Problem: Acute asthma exacerbation.   ·  Plan: cough, SOB, chest tightness x 2 weeks c/w asthma exacerbation in setting of infections with parainfluenza and entero/rhinovirus and covid a few months ago  -coughing fits with chest pain, muscle spasms, improving  -CTA 7/4: negative for PE/PNA  -02 sat mid 90s on RA  -discussed with pulm Dr Ocampo: c/w po Prednisone 30mg PO daily x 7 days and then down to 20mg daily - is on daily steroids per Rheum,   -s/p azithromycin x 5 days  -albuterol q4  prn  -c/w Spiriva   c/w Symbicort bid   c/w Tessalon and hyocodan TID ATC with holding parameters  -tylenol prn, lidocaine patch, oxycodone prn. flexeril now 5mg tid from back spasm from coughing.   -xanax prn anxiety. -istop in chart.    Problem/Plan - 2:  ·  Problem: Oral thrush.   ·  Plan: likely due to steroids  symptoms improved  c/w diflucan 100gm daily x 10 days  nystatin swish and swallow  ent eval appreciated.    Problem/Plan - 3:  ·  Problem: Hyperglycemia.   ·  Plan: A1c -8.3 - now new onset DM  FS elevated but lowering steroid so will not increase insulin  c/w lantus  29 units qhs  c/w premeal  10u admelog TID  ISS for coverage  -RD eval appreciated.    Problem/Plan - 4:  ·  Problem: Swelling of both lower extremities.   ·  Plan: chronic LE edema on po lasix 60mg daily at home  -increase LE edema, likely worsened by steroids  -given IV lasix 40mg x 1 on 7/8 with improvement, extra dose 60 po today  -recent negative LE dupplex, repeat negative.    Problem/Plan - 5:  ·  Problem: HTN (hypertension).   ·  Plan: BP stable, near goal   c/w home medications  - losartan 50mg daily  - c/w po lasix 60mg daily.    Problem/Plan - 6:  ·  Problem: Systemic lupus erythematosus.   ·  Plan: c/w steroids as above   on Mycophenolate 1500mg daily at home   c/w home Plaquenil 400mg daily   c/w home bactrim for ppx   dsDNA, c3, c4 normal   at home baseline methylpred 16 daily which is about pred 20mg daily  Rheumatology consult appreciated - flexeril as above, cellcept restarted.    Problem/Plan - 7:  ·  Problem: Hypothyroidism.   ·  Plan: c/w synthroid 125mcg.    Problem/Plan - 8:  ·  Problem: Prophylactic measure.   ·  Plan: lovenox for VTE ppx   DASH diet   fall precautions  -oob to chair. incentive spirometer.     Patient is stable ambulating without sob ---- medication reconsultation reviewed and resolved with Dr. Mcfarlane and prescribed as advised ( metformin 500mg bid, Symbicort , Spiriva, prednisone and albuterol per pulm)   Dr. Mcfarlane has now medically cleared patient for discharge home with follow-up as advised      47M with PMH of SLE on steroids, asthma, hypothyroidism, HTN p/w severe cough and SOB, a/w acute asthma exacerbation in setting parainfluenza and entero/rhinovirus.   :  ·  Problem: Acute asthma exacerbation.   ·  Plan: cough, SOB, chest tightness x 2 weeks c/w asthma exacerbation in setting of infections with parainfluenza and entero/rhinovirus and covid a few months ago  -coughing fits with chest pain, muscle spasms, improving  -CTA 7/4: negative for PE/PNA  -02 sat mid 90s on RA  -discussed with pulm Dr Ocampo: c/w po Prednisone 30mg PO daily x 7 days and then down to 20mg daily - is on daily steroids per Rheum,   -s/p azithromycin x 5 days  -albuterol q4  prn  -c/w Spiriva   c/w Symbicort bid   c/w Tessalon and hyocodan TID ATC with holding parameters  -tylenol prn, lidocaine patch, oxycodone prn. flexeril now 5mg tid from back spasm from coughing.   -xanax prn anxiety. -istop in chart.    Problem/Plan - 2:  ·  Problem: Oral thrush.   ·  Plan: likely due to steroids  symptoms improved  c/w diflucan 100gm daily x 10 days  nystatin swish and swallow  ent eval appreciated.    Problem/Plan - 3:  ·  Problem: Hyperglycemia.   ·  Plan: A1c -8.3 - now new onset DM  FS elevated but lowering steroid so will not increase insulin  c/w lantus  29 units qhs  c/w premeal  10u admelog TID  ISS for coverage  -RD eval appreciated.    Problem/Plan - 4:  ·  Problem: Swelling of both lower extremities.   ·  Plan: chronic LE edema on po lasix 60mg daily at home  -increase LE edema, likely worsened by steroids  -given IV lasix 40mg x 1 on 7/8 with improvement, extra dose 60 po today  -recent negative LE dupplex, repeat negative.    Problem/Plan - 5:  ·  Problem: HTN (hypertension).   ·  Plan: BP stable, near goal   c/w home medications  - losartan 50mg daily  - c/w po lasix 60mg daily.    Problem/Plan - 6:  ·  Problem: Systemic lupus erythematosus.   ·  Plan: c/w steroids as above   on Mycophenolate 1500mg daily at home   c/w home Plaquenil 400mg daily   c/w home bactrim for ppx   dsDNA, c3, c4 normal   at home baseline methylpred 16 daily which is about pred 20mg daily  Rheumatology consult appreciated - flexeril as above, cellcept restarted.    Problem/Plan - 7:  ·  Problem: Hypothyroidism.   ·  Plan: c/w synthroid 125mcg.    Problem/Plan - 8:  ·  Problem: Prophylactic measure.   ·  Plan: lovenox for VTE ppx   DASH diet   fall precautions  -oob to chair. incentive spirometer.     Patient is stable ambulating without sob ---- medication reconsultation reviewed and resolved with Dr. Mcfarlane and prescribed as advised ( metformin 500mg bid, Symbicort , Spiriva, prednisone and albuterol per pulm; percocet  for back pain associated with cough, tessalon aslo prescribed).   Dr. Mcfarlane has now medically cleared patient for discharge home with follow-up as advised

## 2022-07-11 NOTE — DISCHARGE NOTE PROVIDER - NSDCCPCAREPLAN_GEN_ALL_CORE_FT
PRINCIPAL DISCHARGE DIAGNOSIS  Diagnosis: Acute asthma exacerbation  Assessment and Plan of Treatment: Acute asthma exacerbation ---respolved    -CTA 7/4: negative for PE/PNA  - c/w po Prednisone 30mg PO daily total x7 days  and then down to 20mg daily   -s/p azithromycin x 5 days  -albuterol q4  prn  -c/w Spiriva   c/w Symbicort bid   -Follow-up with your PCP for further monitorig   .  .Federico Ocampo)  Critical Care Medicine; Internal Medicine; Pulmonary Disease  [address] [facility], [address]  [place] [address]  Phone: [phone]  Fax: [phone]        SECONDARY DISCHARGE DIAGNOSES  Diagnosis: Systemic lupus erythematosus  Assessment and Plan of Treatment: -continue current medication regimen   -follow-up with Dr. Neri as scheduled July 28, 2022  Susanna Neri)  Internal Medicine; Rheumatology  [address] [place] [address] [address]  [place] [address]  Phone: [phone]  Fax: [phone]      Diagnosis: HTN (hypertension)  Assessment and Plan of Treatment: Low salt diet  Activity as tolerated.  Take all medication as prescribed.  Follow up with your medical doctor for routine blood pressure monitoring at your next visit.  Notify your doctor if you have any of the following symptoms:   Dizziness, Lightheadedness, Blurry vision, Headache, Chest pain, Shortness of breath    Diagnosis: Thrush  Assessment and Plan of Treatment: continue medications as prescribed   rinse mouth after inhaled steriod   Follow-up with PCP    Diagnosis: DM (diabetes mellitus), type 2  Assessment and Plan of Treatment: HgA1C this admission 8.3  Make sure you get your HgA1c checked every three months.  If you take oral diabetes medications, check your blood glucose two times a day.  If you take insulin, check your blood glucose before meals and at bedtime.  It's important not to skip any meals.  Keep a log of your blood glucose results and always take it with you to your doctor appointments.  Keep a list of your current medications including injectables and over the counter medications and bring this medication list with you to all your doctor appointments.  If you have not seen your ophthalmologist this year call for appointment.  Check your feet daily for redness, sores, or openings. Do not self treat. If no improvement in two days call your primary care physician for an appointment.  Low blood sugar (hypoglycemia) is a blood sugar below 70mg/dl. Check your blood sugar if you feel signs/symptoms of hypoglycemia. If your blood sugar is below 70 take 15 grams of carbohydrates (ex 4 oz of apple juice, 3-4 glucose tablets, or 4-6 oz of regular soda) wait 15 minutes and repeat blood sugar to make sure it comes up above 70.  If your blood sugar is above 70 and you are due for a meal, have a meal.  If you are not due for a meal have a snack.  This snack helps keeps your blood sugar at a safe range.

## 2022-07-11 NOTE — DISCHARGE NOTE PROVIDER - NSDCMRMEDTOKEN_GEN_ALL_CORE_FT
albuterol 2.5 mg/3 mL (0.083%) inhalation solution: 1 dose(s) inhaled every 6 hours, As Needed   aspirin 81 mg oral delayed release tablet: 1 tab(s) orally once a day  aspirin 81 mg oral delayed release tablet: 1 tab(s) orally once a day  budesonide-formoterol 160 mcg-4.5 mcg/inh inhalation aerosol: 1 dose(s) inhaled 2 times a day   fluconazole 100 mg oral tablet: 1 tab(s) orally once a day  fluticasone 50 mcg/inh nasal spray: 2 spray(s) nasal 2 times a day  furosemide 20 mg oral tablet: 3 tab(s) orally once a day  Glucometer : 1   once a day   hydroxychloroquine 200 mg oral tablet: 2 tab(s) orally once a day.  TAKE AT 6:30 PM.   Lancets : FS AC/HS  losartan 50 mg oral tablet: 1 tab(s) orally once a day  metFORMIN 500 mg oral tablet: 1 tab(s) orally 2 times a day   methylPREDNISolone 16 mg oral tablet: 20 milligram(s) orally once a day ( Resumed after prednisone 30mg is completed)   mycophenolate mofetil 500 mg oral tablet: 3 tab(s) orally once a day  oxycodone-acetaminophen 5 mg-325 mg oral tablet: 2 tab(s) orally every 6 hours, As needed, Moderate Pain (4 - 6)  pantoprazole 40 mg oral delayed release tablet: 1 tab(s) orally once a day (before a meal)  potassium chloride 20 mEq oral tablet, extended release: 1 tab(s) orally once a day  predniSONE 10 mg oral tablet: 3 tab(s) orally once a day   Strips : 1   once a day ( FA AC/HS)  Strips : 1 application  once a day  ( AC/HS)   sulfamethoxazole-trimethoprim 800 mg-160 mg oral tablet: 1 tab(s) orally 3 times a week   TAKE ON SUNDAY, tUES, FRI  Synthroid 125 mcg (0.125 mg) oral tablet: 1 tab(s) orally once a day  tiotropium 18 mcg inhalation capsule: 1 cap(s) inhaled once a day   albuterol 2.5 mg/3 mL (0.083%) inhalation solution: 1 dose(s) inhaled every 6 hours, As Needed   aspirin 81 mg oral delayed release tablet: 1 tab(s) orally once a day  aspirin 81 mg oral delayed release tablet: 1 tab(s) orally once a day  benzonatate 100 mg oral capsule: 1 cap(s) orally 3 times a day  budesonide-formoterol 160 mcg-4.5 mcg/inh inhalation aerosol: 1 dose(s) inhaled 2 times a day   fluconazole 100 mg oral tablet: 1 tab(s) orally once a day  fluticasone 50 mcg/inh nasal spray: 2 spray(s) nasal 2 times a day  furosemide 20 mg oral tablet: 3 tab(s) orally once a day  Glucometer : 1   once a day   hydroxychloroquine 200 mg oral tablet: 2 tab(s) orally once a day.  TAKE AT 6:30 PM.   Lancets : FS AC/HS  losartan 50 mg oral tablet: 1 tab(s) orally once a day  metFORMIN 500 mg oral tablet: 1 tab(s) orally 2 times a day   methylPREDNISolone 16 mg oral tablet: 20 milligram(s) orally once a day ( Resumed after prednisone 30mg is completed)   mycophenolate mofetil 500 mg oral tablet: 3 tab(s) orally once a day  Narcan 4 mg/0.1 mL nasal spray: 4 milligram(s) intranasally once   oxycodone-acetaminophen 5 mg-325 mg oral tablet: 1 tab(s) orally every 6 hours, As Needed -Moderate Pain (4 - 6) MDD:4  pantoprazole 40 mg oral delayed release tablet: 1 tab(s) orally once a day (before a meal)  potassium chloride 20 mEq oral tablet, extended release: 1 tab(s) orally once a day  predniSONE 10 mg oral tablet: 3 tab(s) orally once a day   Strips : 1 application  once a day  ( AC/HS)   sulfamethoxazole-trimethoprim 800 mg-160 mg oral tablet: 1 tab(s) orally 3 times a week   TAKE ON SUNDAY, tUES, FRI  Synthroid 125 mcg (0.125 mg) oral tablet: 1 tab(s) orally once a day  tiotropium 18 mcg inhalation capsule: 1 cap(s) inhaled once a day

## 2022-07-11 NOTE — DISCHARGE NOTE PROVIDER - CARE PROVIDERS DIRECT ADDRESSES
[email or web address]

## 2022-07-12 ENCOUNTER — TRANSCRIPTION ENCOUNTER (OUTPATIENT)
Age: 47
End: 2022-07-12

## 2022-07-12 ENCOUNTER — NON-APPOINTMENT (OUTPATIENT)
Age: 47
End: 2022-07-12

## 2022-07-13 ENCOUNTER — TRANSCRIPTION ENCOUNTER (OUTPATIENT)
Age: 47
End: 2022-07-13

## 2022-07-15 ENCOUNTER — APPOINTMENT (OUTPATIENT)
Dept: PULMONOLOGY | Facility: CLINIC | Age: 47
End: 2022-07-15

## 2022-07-15 VITALS
HEART RATE: 130 BPM | OXYGEN SATURATION: 95 % | HEIGHT: 68 IN | DIASTOLIC BLOOD PRESSURE: 80 MMHG | WEIGHT: 257 LBS | TEMPERATURE: 95.8 F | BODY MASS INDEX: 38.95 KG/M2 | SYSTOLIC BLOOD PRESSURE: 120 MMHG

## 2022-07-15 PROCEDURE — 99214 OFFICE O/P EST MOD 30 MIN: CPT

## 2022-07-15 NOTE — COUNSELING
[Other: ____] : [unfilled] [Good understanding] : Patient has a good understanding of lifestyle changes and steps needed to achieve self management goal [de-identified] : Anxiety

## 2022-07-15 NOTE — ASSESSMENT
[FreeTextEntry1] : 47 year old male MTA  Hx Lupus, on multiple medications including Cell Cept, methylprednisolone, Sapnelo. Hx mild/moderate  asthma, on Trelegy Ellipta 200 presents s/p recent hospitalization asthma exacerbation secondary viral URI\par \par Continue Trelegy 200-25 mcg daily\par Continue methylprednisolone, Cell Cept, Saphnelo, Bactrim\par  Continue Famotidine 40 mg daily\par Follow up Rheumatology\par Continue  Physical Therapy\par Follow up Behavioral Health\par \par Follow up 4-6 weeks\par \par

## 2022-07-15 NOTE — HISTORY OF PRESENT ILLNESS
[Never] : never [TextBox_4] : 47 year old male Hx Hx Lupus, asthma, on Plaquenil, Cell Cept recent COVID 19 virus infection, presents for follow up. Patietn recent hospitalization overnight for increased SOB and cough.  He had recent diagnosis adeno/entero virus.  He was treated with Medrol and nebulizers.  He is following with Rheumatology, currently on Sapnelo, CellCept, methylprednisolone.  He reports slow improvement in respiratory symptoms, persistent fatigue, muscle pain.  He is here for follow up.

## 2022-07-15 NOTE — REASON FOR VISIT
[Follow-Up - From Hospitalization] : a follow-up visit after a recent hospitalization [TextBox_44] : Lupus, lupus related muscle weakness, poor respiratory  mechanics

## 2022-07-18 ENCOUNTER — APPOINTMENT (OUTPATIENT)
Dept: INTERNAL MEDICINE | Facility: CLINIC | Age: 47
End: 2022-07-18

## 2022-07-18 VITALS
TEMPERATURE: 97.1 F | OXYGEN SATURATION: 98 % | WEIGHT: 257 LBS | BODY MASS INDEX: 38.95 KG/M2 | HEART RATE: 105 BPM | HEIGHT: 68 IN | DIASTOLIC BLOOD PRESSURE: 84 MMHG | SYSTOLIC BLOOD PRESSURE: 122 MMHG

## 2022-07-18 PROCEDURE — 36415 COLL VENOUS BLD VENIPUNCTURE: CPT

## 2022-07-18 PROCEDURE — 99214 OFFICE O/P EST MOD 30 MIN: CPT | Mod: 25

## 2022-07-19 NOTE — ASSESSMENT
[FreeTextEntry1] : The patient is a 47-year-old male with history of lupus, hypertension, obesity, obstructive sleep apnea/central sleep apnea, candida esophagitis, hypertension hypothyroidism lumbar sacral stenosis, migraines, myopathy, who presents for acute onset of chest wall and back pain.\par \par 1 chest wall /back pain\par patient states pain severe required the past OxyContin and Flexeril.  Patient states that pain was best controlled by Flexeril\par will continue Flexeril five TID continue Tylenol\par will discuss with rheumatology\par will refer to orthopedic back for opinion on back pain. Whether musculoskeletal versus neuropathic pain from disc disease.\par Referral to pain management.\par \par 2 lupus\par continue current management including steroids methylprednisolone 60 mg 1 1/2 tablets every day,mycophenolate parent milligrams three tablets twice a day Saphnelo 300 mg intravenous solution Q4 weeks\par check CBC can profile\par \par 3 hypothyroidism\par continue levothyroxine hundred 25 µg\par \par 4 hypertension\par continue losartan 50 mg once a day\par \par 5 asthma\par continue inhalers doing well\par \par 6 fatigue\par multifactorial check routine labs.\par

## 2022-07-19 NOTE — PHYSICAL EXAM
[Normal Sclera/Conjunctiva] : normal sclera/conjunctiva [Normal Outer Ear/Nose] : the outer ears and nose were normal in appearance [No Carotid Bruits] : no carotid bruits [No Abdominal Bruit] : a ~M bruit was not heard ~T in the abdomen [No Palpable Aorta] : no palpable aorta [No Extremity Clubbing/Cyanosis] : no extremity clubbing/cyanosis [Normal] : no rash [de-identified] : Tenderness around the ribs and back along the costal margin [de-identified] : Spinal tenderness

## 2022-07-19 NOTE — REVIEW OF SYSTEMS
[Fatigue] : fatigue [Constipation] : constipation [Diarrhea] : diarrhea [Muscle Pain] : muscle pain [Back Pain] : back pain [Negative] : Neurological [Fever] : no fever [Chills] : no chills [Hot Flashes] : no hot flashes [Night Sweats] : no night sweats [Recent Change In Weight] : ~T no recent weight change [Abdominal Pain] : no abdominal pain [Nausea] : no nausea [Vomiting] : no vomiting [Heartburn] : no heartburn [Melena] : no melena [Joint Stiffness] : no joint stiffness [Muscle Weakness] : no muscle weakness [Joint Swelling] : no joint swelling

## 2022-07-19 NOTE — HISTORY OF PRESENT ILLNESS
[FreeTextEntry8] : \par \par CC: chest wall pain back pain and edema episode of diarrhea\par \par HPI the patient is a 47-year-old male with history of lupus, pulmonary lung disease/asthma, hypothyroidism, hypertension, peripheral edema, chronic fatigue, chronic Gerd, intent on steroids who presents for increasing chest wall and back pain with one episode of diarrhea denies fever, chills, makes it hard to breathe at times denies shortness of breath at rest. Patient had one episode of loose follow-up diarrhea stools after an episode of constipation presently normal bowel function patient presently off OxyContin and muscle relaxing.

## 2022-07-19 NOTE — PHYSICAL EXAM
[Normal Sclera/Conjunctiva] : normal sclera/conjunctiva [Normal Outer Ear/Nose] : the outer ears and nose were normal in appearance [No Carotid Bruits] : no carotid bruits [No Abdominal Bruit] : a ~M bruit was not heard ~T in the abdomen [No Palpable Aorta] : no palpable aorta [No Extremity Clubbing/Cyanosis] : no extremity clubbing/cyanosis [Normal] : no rash [de-identified] : Tenderness around the ribs and back along the costal margin [de-identified] : Spinal tenderness

## 2022-07-20 ENCOUNTER — APPOINTMENT (OUTPATIENT)
Dept: ORTHOPEDIC SURGERY | Facility: CLINIC | Age: 47
End: 2022-07-20

## 2022-07-20 ENCOUNTER — TRANSCRIPTION ENCOUNTER (OUTPATIENT)
Age: 47
End: 2022-07-20

## 2022-07-20 ENCOUNTER — INPATIENT (INPATIENT)
Facility: HOSPITAL | Age: 47
LOS: 7 days | Discharge: HOME | DRG: 202 | End: 2022-07-28
Attending: HOSPITALIST | Admitting: INTERNAL MEDICINE
Payer: COMMERCIAL

## 2022-07-20 VITALS
HEIGHT: 67 IN | RESPIRATION RATE: 20 BRPM | TEMPERATURE: 98 F | SYSTOLIC BLOOD PRESSURE: 147 MMHG | DIASTOLIC BLOOD PRESSURE: 95 MMHG | WEIGHT: 257.06 LBS | HEART RATE: 109 BPM | OXYGEN SATURATION: 100 %

## 2022-07-20 VITALS — HEIGHT: 68 IN | BODY MASS INDEX: 38.95 KG/M2 | WEIGHT: 257 LBS

## 2022-07-20 DIAGNOSIS — Z29.9 ENCOUNTER FOR PROPHYLACTIC MEASURES, UNSPECIFIED: ICD-10-CM

## 2022-07-20 DIAGNOSIS — R07.9 CHEST PAIN, UNSPECIFIED: ICD-10-CM

## 2022-07-20 DIAGNOSIS — Z90.49 ACQUIRED ABSENCE OF OTHER SPECIFIED PARTS OF DIGESTIVE TRACT: Chronic | ICD-10-CM

## 2022-07-20 DIAGNOSIS — E03.9 HYPOTHYROIDISM, UNSPECIFIED: ICD-10-CM

## 2022-07-20 DIAGNOSIS — R06.02 SHORTNESS OF BREATH: ICD-10-CM

## 2022-07-20 DIAGNOSIS — E87.6 HYPOKALEMIA: ICD-10-CM

## 2022-07-20 DIAGNOSIS — J45.909 UNSPECIFIED ASTHMA, UNCOMPLICATED: ICD-10-CM

## 2022-07-20 DIAGNOSIS — M32.9 SYSTEMIC LUPUS ERYTHEMATOSUS, UNSPECIFIED: ICD-10-CM

## 2022-07-20 DIAGNOSIS — M51.36 OTHER INTERVERTEBRAL DISC DEGENERATION, LUMBAR REGION: ICD-10-CM

## 2022-07-20 LAB
ALBUMIN SERPL ELPH-MCNC: 3.6 G/DL — SIGNIFICANT CHANGE UP (ref 3.3–5)
ALBUMIN SERPL ELPH-MCNC: 3.9 G/DL — SIGNIFICANT CHANGE UP (ref 3.3–5)
ALP SERPL-CCNC: 86 U/L — SIGNIFICANT CHANGE UP (ref 40–120)
ALP SERPL-CCNC: 97 U/L — SIGNIFICANT CHANGE UP (ref 40–120)
ALT FLD-CCNC: 76 U/L — HIGH (ref 10–45)
ALT FLD-CCNC: 83 U/L — HIGH (ref 10–45)
ANION GAP SERPL CALC-SCNC: 12 MMOL/L — SIGNIFICANT CHANGE UP (ref 5–17)
ANION GAP SERPL CALC-SCNC: 15 MMOL/L — SIGNIFICANT CHANGE UP (ref 5–17)
APTT BLD: 27.2 SEC — LOW (ref 27.5–35.5)
AST SERPL-CCNC: 33 U/L — SIGNIFICANT CHANGE UP (ref 10–40)
AST SERPL-CCNC: 34 U/L — SIGNIFICANT CHANGE UP (ref 10–40)
BASE EXCESS BLDV CALC-SCNC: -15.8 MMOL/L — LOW (ref -2–2)
BASE EXCESS BLDV CALC-SCNC: 3.3 MMOL/L — HIGH (ref -2–2)
BASE EXCESS BLDV CALC-SCNC: 4 MMOL/L — HIGH (ref -2–2)
BASOPHILS # BLD AUTO: 0.08 K/UL — SIGNIFICANT CHANGE UP (ref 0–0.2)
BASOPHILS NFR BLD AUTO: 0.9 % — SIGNIFICANT CHANGE UP (ref 0–2)
BILIRUB SERPL-MCNC: 0.3 MG/DL — SIGNIFICANT CHANGE UP (ref 0.2–1.2)
BILIRUB SERPL-MCNC: 0.4 MG/DL — SIGNIFICANT CHANGE UP (ref 0.2–1.2)
BUN SERPL-MCNC: 7 MG/DL — SIGNIFICANT CHANGE UP (ref 7–23)
BUN SERPL-MCNC: 8 MG/DL — SIGNIFICANT CHANGE UP (ref 7–23)
CA-I SERPL-SCNC: 0.62 MMOL/L — CRITICAL LOW (ref 1.15–1.33)
CA-I SERPL-SCNC: 1.19 MMOL/L — SIGNIFICANT CHANGE UP (ref 1.15–1.33)
CA-I SERPL-SCNC: 1.24 MMOL/L — SIGNIFICANT CHANGE UP (ref 1.15–1.33)
CALCIUM SERPL-MCNC: 8 MG/DL — LOW (ref 8.4–10.5)
CALCIUM SERPL-MCNC: 9 MG/DL — SIGNIFICANT CHANGE UP (ref 8.4–10.5)
CHLORIDE BLDV-SCNC: 101 MMOL/L — SIGNIFICANT CHANGE UP (ref 96–108)
CHLORIDE BLDV-SCNC: 102 MMOL/L — SIGNIFICANT CHANGE UP (ref 96–108)
CHLORIDE BLDV-SCNC: 127 MMOL/L — HIGH (ref 96–108)
CHLORIDE SERPL-SCNC: 101 MMOL/L — SIGNIFICANT CHANGE UP (ref 96–108)
CHLORIDE SERPL-SCNC: 107 MMOL/L — SIGNIFICANT CHANGE UP (ref 96–108)
CO2 BLDV-SCNC: 30 MMOL/L — HIGH (ref 22–26)
CO2 BLDV-SCNC: 31 MMOL/L — HIGH (ref 22–26)
CO2 BLDV-SCNC: 9 MMOL/L — LOW (ref 22–26)
CO2 SERPL-SCNC: 24 MMOL/L — SIGNIFICANT CHANGE UP (ref 22–31)
CO2 SERPL-SCNC: 24 MMOL/L — SIGNIFICANT CHANGE UP (ref 22–31)
CREAT SERPL-MCNC: 0.83 MG/DL — SIGNIFICANT CHANGE UP (ref 0.5–1.3)
CREAT SERPL-MCNC: 1 MG/DL — SIGNIFICANT CHANGE UP (ref 0.5–1.3)
EGFR: 109 ML/MIN/1.73M2 — SIGNIFICANT CHANGE UP
EGFR: 94 ML/MIN/1.73M2 — SIGNIFICANT CHANGE UP
EOSINOPHIL # BLD AUTO: 0.24 K/UL — SIGNIFICANT CHANGE UP (ref 0–0.5)
EOSINOPHIL NFR BLD AUTO: 2.6 % — SIGNIFICANT CHANGE UP (ref 0–6)
GAS PNL BLDV: 136 MMOL/L — SIGNIFICANT CHANGE UP (ref 136–145)
GAS PNL BLDV: 138 MMOL/L — SIGNIFICANT CHANGE UP (ref 136–145)
GAS PNL BLDV: 144 MMOL/L — SIGNIFICANT CHANGE UP (ref 136–145)
GAS PNL BLDV: SIGNIFICANT CHANGE UP
GLUCOSE BLDV-MCNC: 126 MG/DL — HIGH (ref 70–99)
GLUCOSE BLDV-MCNC: 189 MG/DL — HIGH (ref 70–99)
GLUCOSE BLDV-MCNC: 42 MG/DL — CRITICAL LOW (ref 70–99)
GLUCOSE SERPL-MCNC: 106 MG/DL — HIGH (ref 70–99)
GLUCOSE SERPL-MCNC: 191 MG/DL — HIGH (ref 70–99)
HCO3 BLDV-SCNC: 28 MMOL/L — SIGNIFICANT CHANGE UP (ref 22–29)
HCO3 BLDV-SCNC: 30 MMOL/L — HIGH (ref 22–29)
HCO3 BLDV-SCNC: 9 MMOL/L — CRITICAL LOW (ref 22–29)
HCT VFR BLD CALC: 38.1 % — LOW (ref 39–50)
HCT VFR BLDA CALC: 17 % — CRITICAL LOW (ref 39–51)
HCT VFR BLDA CALC: 37 % — LOW (ref 39–51)
HCT VFR BLDA CALC: 39 % — SIGNIFICANT CHANGE UP (ref 39–51)
HGB BLD CALC-MCNC: 12.3 G/DL — LOW (ref 12.6–17.4)
HGB BLD CALC-MCNC: 13 G/DL — SIGNIFICANT CHANGE UP (ref 12.6–17.4)
HGB BLD CALC-MCNC: 5.6 G/DL — CRITICAL LOW (ref 12.6–17.4)
HGB BLD-MCNC: 13 G/DL — SIGNIFICANT CHANGE UP (ref 13–17)
INR BLD: 1.03 RATIO — SIGNIFICANT CHANGE UP (ref 0.88–1.16)
LACTATE BLDV-MCNC: 0.8 MMOL/L — SIGNIFICANT CHANGE UP (ref 0.7–2)
LACTATE BLDV-MCNC: 2 MMOL/L — SIGNIFICANT CHANGE UP (ref 0.7–2)
LACTATE BLDV-MCNC: 2.4 MMOL/L — HIGH (ref 0.7–2)
LYMPHOCYTES # BLD AUTO: 1.66 K/UL — SIGNIFICANT CHANGE UP (ref 1–3.3)
LYMPHOCYTES # BLD AUTO: 18.2 % — SIGNIFICANT CHANGE UP (ref 13–44)
MAGNESIUM SERPL-MCNC: 2.1 MG/DL — SIGNIFICANT CHANGE UP (ref 1.6–2.6)
MANUAL SMEAR VERIFICATION: SIGNIFICANT CHANGE UP
MCHC RBC-ENTMCNC: 30.4 PG — SIGNIFICANT CHANGE UP (ref 27–34)
MCHC RBC-ENTMCNC: 34.1 GM/DL — SIGNIFICANT CHANGE UP (ref 32–36)
MCV RBC AUTO: 89.2 FL — SIGNIFICANT CHANGE UP (ref 80–100)
METAMYELOCYTES # FLD: 0.9 % — HIGH (ref 0–0)
MONOCYTES # BLD AUTO: 0.47 K/UL — SIGNIFICANT CHANGE UP (ref 0–0.9)
MONOCYTES NFR BLD AUTO: 5.2 % — SIGNIFICANT CHANGE UP (ref 2–14)
MYELOCYTES NFR BLD: 0.9 % — HIGH (ref 0–0)
NEUTROPHILS # BLD AUTO: 6.5 K/UL — SIGNIFICANT CHANGE UP (ref 1.8–7.4)
NEUTROPHILS NFR BLD AUTO: 71.3 % — SIGNIFICANT CHANGE UP (ref 43–77)
NRBC # BLD: 1 /100 — HIGH (ref 0–0)
NT-PROBNP SERPL-SCNC: 40 PG/ML — SIGNIFICANT CHANGE UP (ref 0–300)
OTHER CELLS CSF MANUAL: 6.6 ML/DL — LOW (ref 18–22)
OVALOCYTES BLD QL SMEAR: SLIGHT — SIGNIFICANT CHANGE UP
PCO2 BLDV: 45 MMHG — SIGNIFICANT CHANGE UP (ref 42–55)
PCO2 BLDV: 48 MMHG — SIGNIFICANT CHANGE UP (ref 42–55)
PCO2 BLDV: <19 MMHG — LOW (ref 42–55)
PH BLDV: 7.34 — SIGNIFICANT CHANGE UP (ref 7.32–7.43)
PH BLDV: 7.4 — SIGNIFICANT CHANGE UP (ref 7.32–7.43)
PH BLDV: 7.41 — SIGNIFICANT CHANGE UP (ref 7.32–7.43)
PHOSPHATE SERPL-MCNC: 3 MG/DL — SIGNIFICANT CHANGE UP (ref 2.5–4.5)
PLAT MORPH BLD: NORMAL — SIGNIFICANT CHANGE UP
PLATELET # BLD AUTO: 287 K/UL — SIGNIFICANT CHANGE UP (ref 150–400)
PO2 BLDV: 26 MMHG — SIGNIFICANT CHANGE UP (ref 25–45)
PO2 BLDV: 34 MMHG — SIGNIFICANT CHANGE UP (ref 25–45)
PO2 BLDV: 44 MMHG — SIGNIFICANT CHANGE UP (ref 25–45)
POTASSIUM BLDV-SCNC: 2.5 MMOL/L — CRITICAL LOW (ref 3.5–5.1)
POTASSIUM BLDV-SCNC: 3.3 MMOL/L — LOW (ref 3.5–5.1)
POTASSIUM BLDV-SCNC: <1.2 MMOL/L — CRITICAL LOW (ref 3.5–5.1)
POTASSIUM SERPL-MCNC: 2.7 MMOL/L — CRITICAL LOW (ref 3.5–5.3)
POTASSIUM SERPL-MCNC: 3.4 MMOL/L — LOW (ref 3.5–5.3)
POTASSIUM SERPL-SCNC: 2.7 MMOL/L — CRITICAL LOW (ref 3.5–5.3)
POTASSIUM SERPL-SCNC: 3.4 MMOL/L — LOW (ref 3.5–5.3)
PROT SERPL-MCNC: 5.8 G/DL — LOW (ref 6–8.3)
PROT SERPL-MCNC: 6.3 G/DL — SIGNIFICANT CHANGE UP (ref 6–8.3)
PROTHROM AB SERPL-ACNC: 11.9 SEC — SIGNIFICANT CHANGE UP (ref 10.5–13.4)
RAPID RVP RESULT: DETECTED
RBC # BLD: 4.27 M/UL — SIGNIFICANT CHANGE UP (ref 4.2–5.8)
RBC # FLD: 13.8 % — SIGNIFICANT CHANGE UP (ref 10.3–14.5)
RBC BLD AUTO: SIGNIFICANT CHANGE UP
RSV RNA SPEC QL NAA+PROBE: DETECTED
SAO2 % BLDV: 34.4 % — LOW (ref 67–88)
SAO2 % BLDV: 55.9 % — LOW (ref 67–88)
SAO2 % BLDV: 75.1 % — SIGNIFICANT CHANGE UP (ref 67–88)
SARS-COV-2 RNA SPEC QL NAA+PROBE: SIGNIFICANT CHANGE UP
SODIUM SERPL-SCNC: 140 MMOL/L — SIGNIFICANT CHANGE UP (ref 135–145)
SODIUM SERPL-SCNC: 143 MMOL/L — SIGNIFICANT CHANGE UP (ref 135–145)
TROPONIN T, HIGH SENSITIVITY RESULT: 27 NG/L — SIGNIFICANT CHANGE UP (ref 0–51)
TROPONIN T, HIGH SENSITIVITY RESULT: 30 NG/L — SIGNIFICANT CHANGE UP (ref 0–51)
WBC # BLD: 9.11 K/UL — SIGNIFICANT CHANGE UP (ref 3.8–10.5)
WBC # FLD AUTO: 9.11 K/UL — SIGNIFICANT CHANGE UP (ref 3.8–10.5)

## 2022-07-20 PROCEDURE — 99285 EMERGENCY DEPT VISIT HI MDM: CPT

## 2022-07-20 PROCEDURE — 99214 OFFICE O/P EST MOD 30 MIN: CPT

## 2022-07-20 PROCEDURE — 71046 X-RAY EXAM CHEST 2 VIEWS: CPT | Mod: 26

## 2022-07-20 PROCEDURE — 93010 ELECTROCARDIOGRAM REPORT: CPT

## 2022-07-20 PROCEDURE — 99223 1ST HOSP IP/OBS HIGH 75: CPT | Mod: GC

## 2022-07-20 RX ORDER — BUDESONIDE, MICRONIZED 100 %
0.25 POWDER (GRAM) MISCELLANEOUS ONCE
Refills: 0 | Status: COMPLETED | OUTPATIENT
Start: 2022-07-20 | End: 2022-07-20

## 2022-07-20 RX ORDER — MYCOPHENOLATE MOFETIL 250 MG/1
500 CAPSULE ORAL THREE TIMES A DAY
Refills: 0 | Status: DISCONTINUED | OUTPATIENT
Start: 2022-07-20 | End: 2022-07-21

## 2022-07-20 RX ORDER — ACETAMINOPHEN 500 MG
975 TABLET ORAL ONCE
Refills: 0 | Status: COMPLETED | OUTPATIENT
Start: 2022-07-20 | End: 2022-07-20

## 2022-07-20 RX ORDER — KETOROLAC TROMETHAMINE 30 MG/ML
30 SYRINGE (ML) INJECTION ONCE
Refills: 0 | Status: DISCONTINUED | OUTPATIENT
Start: 2022-07-20 | End: 2022-07-20

## 2022-07-20 RX ORDER — SODIUM CHLORIDE 9 MG/ML
1000 INJECTION, SOLUTION INTRAVENOUS
Refills: 0 | Status: DISCONTINUED | OUTPATIENT
Start: 2022-07-20 | End: 2022-07-28

## 2022-07-20 RX ORDER — HYDROXYCHLOROQUINE SULFATE 200 MG
400 TABLET ORAL DAILY
Refills: 0 | Status: DISCONTINUED | OUTPATIENT
Start: 2022-07-20 | End: 2022-07-28

## 2022-07-20 RX ORDER — DEXTROSE 50 % IN WATER 50 %
15 SYRINGE (ML) INTRAVENOUS ONCE
Refills: 0 | Status: DISCONTINUED | OUTPATIENT
Start: 2022-07-20 | End: 2022-07-28

## 2022-07-20 RX ORDER — ENOXAPARIN SODIUM 100 MG/ML
40 INJECTION SUBCUTANEOUS EVERY 24 HOURS
Refills: 0 | Status: DISCONTINUED | OUTPATIENT
Start: 2022-07-20 | End: 2022-07-28

## 2022-07-20 RX ORDER — DEXTROSE 50 % IN WATER 50 %
25 SYRINGE (ML) INTRAVENOUS ONCE
Refills: 0 | Status: DISCONTINUED | OUTPATIENT
Start: 2022-07-20 | End: 2022-07-28

## 2022-07-20 RX ORDER — IPRATROPIUM/ALBUTEROL SULFATE 18-103MCG
3 AEROSOL WITH ADAPTER (GRAM) INHALATION ONCE
Refills: 0 | Status: COMPLETED | OUTPATIENT
Start: 2022-07-20 | End: 2022-07-20

## 2022-07-20 RX ORDER — MORPHINE SULFATE 50 MG/1
4 CAPSULE, EXTENDED RELEASE ORAL THREE TIMES A DAY
Refills: 0 | Status: DISCONTINUED | OUTPATIENT
Start: 2022-07-20 | End: 2022-07-20

## 2022-07-20 RX ORDER — ASPIRIN/CALCIUM CARB/MAGNESIUM 324 MG
81 TABLET ORAL DAILY
Refills: 0 | Status: DISCONTINUED | OUTPATIENT
Start: 2022-07-20 | End: 2022-07-28

## 2022-07-20 RX ORDER — LOSARTAN POTASSIUM 100 MG/1
50 TABLET, FILM COATED ORAL DAILY
Refills: 0 | Status: DISCONTINUED | OUTPATIENT
Start: 2022-07-20 | End: 2022-07-28

## 2022-07-20 RX ORDER — ASPIRIN/CALCIUM CARB/MAGNESIUM 324 MG
1 TABLET ORAL
Qty: 0 | Refills: 0 | DISCHARGE

## 2022-07-20 RX ORDER — FUROSEMIDE 40 MG
40 TABLET ORAL ONCE
Refills: 0 | Status: COMPLETED | OUTPATIENT
Start: 2022-07-20 | End: 2022-07-20

## 2022-07-20 RX ORDER — POTASSIUM CHLORIDE 20 MEQ
10 PACKET (EA) ORAL
Refills: 0 | Status: COMPLETED | OUTPATIENT
Start: 2022-07-20 | End: 2022-07-20

## 2022-07-20 RX ORDER — POTASSIUM CHLORIDE 20 MEQ
20 PACKET (EA) ORAL DAILY
Refills: 0 | Status: DISCONTINUED | OUTPATIENT
Start: 2022-07-20 | End: 2022-07-20

## 2022-07-20 RX ORDER — CYCLOBENZAPRINE HYDROCHLORIDE 10 MG/1
10 TABLET, FILM COATED ORAL ONCE
Refills: 0 | Status: COMPLETED | OUTPATIENT
Start: 2022-07-20 | End: 2022-07-20

## 2022-07-20 RX ORDER — OXYCODONE AND ACETAMINOPHEN 5; 325 MG/1; MG/1
2 TABLET ORAL EVERY 6 HOURS
Refills: 0 | Status: DISCONTINUED | OUTPATIENT
Start: 2022-07-20 | End: 2022-07-21

## 2022-07-20 RX ORDER — GLUCAGON INJECTION, SOLUTION 0.5 MG/.1ML
1 INJECTION, SOLUTION SUBCUTANEOUS ONCE
Refills: 0 | Status: DISCONTINUED | OUTPATIENT
Start: 2022-07-20 | End: 2022-07-28

## 2022-07-20 RX ORDER — INSULIN LISPRO 100/ML
VIAL (ML) SUBCUTANEOUS
Refills: 0 | Status: DISCONTINUED | OUTPATIENT
Start: 2022-07-20 | End: 2022-07-28

## 2022-07-20 RX ORDER — MORPHINE SULFATE 50 MG/1
4 CAPSULE, EXTENDED RELEASE ORAL EVERY 4 HOURS
Refills: 0 | Status: DISCONTINUED | OUTPATIENT
Start: 2022-07-20 | End: 2022-07-21

## 2022-07-20 RX ORDER — PANTOPRAZOLE SODIUM 20 MG/1
40 TABLET, DELAYED RELEASE ORAL
Refills: 0 | Status: DISCONTINUED | OUTPATIENT
Start: 2022-07-20 | End: 2022-07-28

## 2022-07-20 RX ORDER — LIDOCAINE 4 G/100G
2 CREAM TOPICAL ONCE
Refills: 0 | Status: COMPLETED | OUTPATIENT
Start: 2022-07-20 | End: 2022-07-20

## 2022-07-20 RX ORDER — LEVOTHYROXINE SODIUM 125 MCG
125 TABLET ORAL DAILY
Refills: 0 | Status: DISCONTINUED | OUTPATIENT
Start: 2022-07-20 | End: 2022-07-28

## 2022-07-20 RX ORDER — DEXTROSE 50 % IN WATER 50 %
12.5 SYRINGE (ML) INTRAVENOUS ONCE
Refills: 0 | Status: DISCONTINUED | OUTPATIENT
Start: 2022-07-20 | End: 2022-07-28

## 2022-07-20 RX ORDER — MAGNESIUM SULFATE 500 MG/ML
1 VIAL (ML) INJECTION ONCE
Refills: 0 | Status: COMPLETED | OUTPATIENT
Start: 2022-07-20 | End: 2022-07-20

## 2022-07-20 RX ORDER — POTASSIUM CHLORIDE 20 MEQ
40 PACKET (EA) ORAL ONCE
Refills: 0 | Status: COMPLETED | OUTPATIENT
Start: 2022-07-20 | End: 2022-07-20

## 2022-07-20 RX ORDER — METFORMIN HYDROCHLORIDE 850 MG/1
500 TABLET ORAL DAILY
Refills: 0 | Status: DISCONTINUED | OUTPATIENT
Start: 2022-07-20 | End: 2022-07-20

## 2022-07-20 RX ADMIN — Medication 40 MILLIGRAM(S): at 20:41

## 2022-07-20 RX ADMIN — MYCOPHENOLATE MOFETIL 500 MILLIGRAM(S): 250 CAPSULE ORAL at 21:48

## 2022-07-20 RX ADMIN — Medication 100 GRAM(S): at 16:27

## 2022-07-20 RX ADMIN — LIDOCAINE 1 PATCH: 4 CREAM TOPICAL at 15:18

## 2022-07-20 RX ADMIN — Medication 40 MILLIGRAM(S): at 19:16

## 2022-07-20 RX ADMIN — Medication 100 MILLIEQUIVALENT(S): at 17:04

## 2022-07-20 RX ADMIN — Medication 40 MILLIEQUIVALENT(S): at 16:25

## 2022-07-20 RX ADMIN — OXYCODONE AND ACETAMINOPHEN 2 TABLET(S): 5; 325 TABLET ORAL at 20:41

## 2022-07-20 RX ADMIN — Medication 100 MILLIEQUIVALENT(S): at 20:41

## 2022-07-20 RX ADMIN — Medication 3 MILLILITER(S): at 15:17

## 2022-07-20 RX ADMIN — Medication 100 MILLIEQUIVALENT(S): at 19:16

## 2022-07-20 RX ADMIN — Medication 0.25 MILLIGRAM(S): at 20:35

## 2022-07-20 RX ADMIN — CYCLOBENZAPRINE HYDROCHLORIDE 10 MILLIGRAM(S): 10 TABLET, FILM COATED ORAL at 15:17

## 2022-07-20 RX ADMIN — Medication 975 MILLIGRAM(S): at 16:25

## 2022-07-20 RX ADMIN — Medication 975 MILLIGRAM(S): at 19:03

## 2022-07-20 RX ADMIN — Medication 200 MILLIGRAM(S): at 21:45

## 2022-07-20 NOTE — PHYSICAL EXAM
[Normal] : Gait: normal [Grewal's Sign] : negative Grewal's sign [Pronator Drift] : negative pronator drift [SLR] : negative straight leg raise [de-identified] : 5 out of 5 motor strength,sensation is intact and symmetrical full range of motion flexion extension and rotation, no palpatory tenderness full range of motion of hips knees shoulders and elbows (all four extremities), no atrophy, negative straight leg raise, no pathological reflexes, no swelling, normal ambulation, no apparent distress skin intact, no palpable lymph nodes, no upper or lower extremity instability, alert and oriented x 3 and normal mood. Normal finger-to nose test.  No percussion tenderness thoracic or lumbar.\par  [de-identified] : I reviewed, interpreted and clinically correlated the following outside imaging studies, \par \par MR SPINE LUMBAR 05/03/2021\par \par INTERPRETATION: Clinical Information: Low back pain. Cramps, neuropathy in the right leg.\par \par Comparison: CT scan of the abdomen and pelvis from 10/19/2020.\par \par Technique:\par MRI of the lumbar spine.\par Intravenous contrast: None.\par \par Findings:\par \par Alignment: Straightening, unchanged.\par Bones: Limbus vertebrae at L4. Schmorl's node superiorly at L2, unchanged.\par Discs: Mild disc height loss at L1-L2 and L3-L4.\par Conus: At L1-L2 and appears unremarkable.\par Central Canal: Diffuse narrowing of the thecal sac likely on a congenital basis with prominent posterior epidural fat.\par Paraspinal Muscles: Normal.\par Soft Tissues: Minimal posterior subcutaneous edema.\par \par The findings at the individual levels are as follows:\par \par T12-L1: Visualized only on the sagittal images. Normal.\par \par L1-L2: Minimal disc bulge.\par \par L2-L3: No disc herniation.\par \par L3-L4: Mild disc bulge with mildly indents the ventral thecal sac. Mild to moderate spinal canal stenosis. Mild right and mild to moderate left foraminal narrowing.\par \par L4-L5: Mild right facet arthropathy.\par \par L5-S1: Minimal bilateral facet arthropathy.\par \par Impression:\par Diffuse narrowing of the spinal canal likely on a congenital basis with prominent posterior epidural fat.\par \par Mild disc bulge at L3-4 contributing to mild to moderate spinal canal stenosis with mild right and mild to moderate left foraminal narrowing.\par \par Straightening.\par \par \par

## 2022-07-20 NOTE — ED PROVIDER NOTE - PROGRESS NOTE DETAILS
hypokalemia 2.7. supplemental K ordered with IV mag for asthma/hypoK tx. pt reports mild pain control, requests tylenol for pain. SPO2 >95% on RA, persistent expiratory wheezing with audible cough. pt offered admission for hypoK, further SOB tx/workup. pulmonology paged x1 - Blaine Gonzales PA-C case d/w pulmonology - recommends IV solumedrol 40mg and Pulmicort - Blaine Gonzales PA-C hypokalemia 2.7. supplemental K ordered with IV mag for asthma/hypoK tx. pt reports mild pain control, requests tylenol for pain. SPO2 >95% on RA, persistent expiratory wheezing with audible cough. pt offered admission for hypoK, further SOB tx/workup. pulmonology paged x1 - DAVID Orozco MD. lab called re: first/initial VBG; likely hemodilution. will redraw vbg

## 2022-07-20 NOTE — ED ADULT NURSE NOTE - OBJECTIVE STATEMENT
pt here with SOB  he was distcharged on 7/11 for the same   pt has a cough and back pain  he has lupus and takes lasix which has not produced "as much  urine as it should"  also has a history of asthma

## 2022-07-20 NOTE — H&P ADULT - NSHPPHYSICALEXAM_GEN_ALL_CORE
GENERAL APPEARANCE: Well developed, NAD  HEENT:  PERRL, EOMI. hearing grossly intact.  NECK: Neck supple, non-tender no lymphadenopathy, masses or thyromegaly.  CARDIAC: Normal S1 and S2. no mrg. RRR. No JVD.   LUNGS: (+) Diffuse wheezing B/L, reduced but symmetric excursion, no rales, rhonchi  ABDOMEN: (+) Epigastric tenderness to palpation. Soft , no distention. bowel sounds normal. No guarding or rebound. No HSM appreciated.  MUSCULOSKELETAL: ROM intact.  No joint erythema or tenderness.   EXTREMITIES: No edema. Peripheral pulses 2+ throughout.   NEUROLOGICAL: Non focal. Strength and sensation symmetric and intact throughout.   SKIN: Warm and dry , Well perfused  PSYCHIATRIC: AOx3 , Normal mood and affect GENERAL APPEARANCE: Well developed, NAD  HEENT:  PERRL, EOMI. hearing grossly intact.  NECK: Neck supple, non-tender no lymphadenopathy, masses or thyromegaly.  CARDIAC: Normal S1 and S2. no mrg. RRR. No JVD.   LUNGS: (+) Diffuse wheezing B/L, reduced but symmetric excursion, no rales, rhonchi  ABDOMEN: (+) Epigastric tenderness to palpation. Soft , no distention. bowel sounds normal. No guarding or rebound. No HSM appreciated.  MUSCULOSKELETAL: (+) tenderness to sternal pressure; tenderness at costochondral junctions and costovertebral joints. ROM intact.  No joint erythema.  EXTREMITIES: No edema. Peripheral pulses 2+ throughout.   NEUROLOGICAL: Non focal. Strength and sensation symmetric and intact throughout.   SKIN: Warm and dry , Well perfused  PSYCHIATRIC: AOx3 , Normal mood and affect T(C): 36.7 (07-20 @ 22:18), Max: 36.8 (07-20 @ 12:59)   HR: 106   BP: 126/90   RR: 20   SpO2: 96%    GENERAL APPEARANCE: Well developed, NAD  HEENT:  PERRL, EOMI. hearing grossly intact.  NECK: Neck supple, non-tender no lymphadenopathy, masses or thyromegaly.  CARDIAC: Normal S1 and S2. no mrg. RRR. No JVD.   LUNGS: (+) Diffuse wheezing B/L, reduced but symmetric excursion, no rales, rhonchi  ABDOMEN: (+) Epigastric tenderness to palpation. Soft , no distention. bowel sounds normal. No guarding or rebound. No HSM appreciated.  MUSCULOSKELETAL: (+) tenderness to sternal pressure; tenderness at costochondral junctions and costovertebral joints. ROM intact.  No joint erythema.  EXTREMITIES: No edema. Peripheral pulses 2+ throughout.   NEUROLOGICAL: Non focal. Strength and sensation symmetric and intact throughout.   SKIN: Warm and dry , Well perfused  PSYCHIATRIC: AOx3 , Normal mood and affect

## 2022-07-20 NOTE — H&P ADULT - NSHPREVIEWOFSYSTEMS_GEN_ALL_CORE
CONSTITUTIONAL:  No weight loss, fever, chills, weakness or fatigue.  HEENT:  Eyes:  (+) blurred vision while coughing No visual loss, double vision or yellow sclerae. Ears, Nose, Throat:  No hearing loss, sneezing, congestion, runny nose or sore throat.  SKIN:  No rash or itching.  CARDIOVASCULAR:  No chest pain, chest pressure or chest discomfort. No palpitations.  RESPIRATORY:  (+) shortness of breath with cough and yellow sputum  GASTROINTESTINAL:  No anorexia, nausea, vomiting or diarrhea. No abdominal pain or blood.  GENITOURINARY:  Denies hematuria, dysuria.   NEUROLOGICAL:  No headache, dizziness, syncope, paralysis, ataxia, numbness or tingling in the extremities. No change in bowel or bladder control.  MUSCULOSKELETAL:  No muscle, back pain, joint pain or stiffness.  HEMATOLOGIC:  No anemia, bleeding or bruising.  LYMPHATICS:  No enlarged nodes.   PSYCHIATRIC:  No history of depression or anxiety.  ENDOCRINOLOGIC:  No reports of sweating, cold or heat intolerance. No polyuria or polydipsia.  ALLERGIES:  No history of asthma, hives, eczema or rhinitis.

## 2022-07-20 NOTE — H&P ADULT - HISTORY OF PRESENT ILLNESS
Pt is a 47y male PMHx SLE on prednisone and plaquenil, asthma, hypothyroidism, HTN presenting with shortness of breath 2/2 back and chest pain. Of note, pt had recent admission for presumed asthma exacerbation and costochondritis 2/2 RSV. Pt has had this back/chest pain over the past month, radiating from the lumbar region anteriorly along his ribs. Pain is relieved with leaning forward and with flexeril and percocet and worsened with sudden movement as well as cough. Pt's cough has been present since prior admission and is associated with yellow phlegm. Pt describes SOB as chest tightness and feels that his symptoms have overall have stayed stable post-discharge for his most recent admission. Additionally, pt has taken furosemide over the past 3 days to a subjectively low amount compared to his normal.     In ED, CXR was clear without sign of consolidation, pleural effusion, or pneumothorax. Trops were wnl.  Pt is a 47y male PMHx SLE on prednisone and plaquenil, asthma, hypothyroidism, HTN presenting with shortness of breath 2/2 back and pleuirtic chest pain. Of note, pt had recent admission for presumed asthma exacerbation and costochondritis 2/2 RSV. Pt has had this back/chest pain over the past month, radiating from the lumbar region anteriorly along his ribs. Pain is relieved with leaning forward and with flexeril and percocet and worsened with sudden movement as well as cough. Pt's cough has been present since prior admission and is associated with yellow phlegm. Pt describes SOB as chest tightness and feels that his symptoms have overall have stayed stable post-discharge for his most recent admission. Additionally, pt takes furosemide for lupus associated lower extremit edema, though he notes over the past 3 days, he has voided a subjectively low amount compared to his normal. Pt denies fevers, chills, n/v, diarrhea, other locations of joint tenderness.     In ED, CXR was clear without sign of consolidation, pleural effusion, or pneumothorax. Trops were wnl. EKG showed sinus tachycardia.

## 2022-07-20 NOTE — ED PROVIDER NOTE - ATTENDING APP SHARED VISIT CONTRIBUTION OF CARE
I, Francisco Javier Gandhi, performed a history and physical exam of the patient and discussed their management with the resident and/or advanced care provider. I reviewed the resident and/or advanced care provider's note and agree with the documented findings and plan of care except where noted. I was present and available for all procedures.     I agree with above. with HPI, ROS, and Physical Exam. I wrote MDM.

## 2022-07-20 NOTE — ED PROVIDER NOTE - OBJECTIVE STATEMENT
47y male PMHx SLE on steroids, asthma, hypothyroidism, HTN p/w SOB and CP. pt was recently discharged 9 days ago from [facility] admitted for asthma exacerbation, costochondritis. DCd with steroid taper, albuterol, spiriva, symbicort, reports since discharge he has had worsening chest pain that is aching worse with deep inspiration/coughing, sneezing and having a bowel movement. moderate relief from flexeril/oxycodone PRN, minimal relief from tylenol. pt also notes increased lower extremity swelling, taking lasix daily but has not had urinary output in 2 days. reports persistent but not worsening SOB and cough, taking all inhalers and steroid taper as prescribed with minimal improvement. has followed up with his PMD and pulmonologist since then

## 2022-07-20 NOTE — H&P ADULT - PROBLEM SELECTOR PLAN 3
Lupus may be contributing to inflammatory process  -Rheum consult  -c/w home regimen of plaquenil, cellcept, prednisone  -Bactrim for PCP prophylaxis  -IV lasix to manage associated LE edema

## 2022-07-20 NOTE — DISCUSSION/SUMMARY
[de-identified] : Lumbar degenerative disc disease with a small L3-4 herniation.\par Pain in her thoracic region with coughing.\par Back pain from cough.\par Discussed all options. \par Referred to Dr. Hernan Garcia for neurological evaluation.\par Referred to Dr. Trevon Rebollar for pain management.\par All options discussed including rest,medicine,home exercise, acupuncture, Chiropractic care, physical therapy, pain management and last resort surgery. All questions were answered, all alternatives discussed and the patient is in complete agreement with the plan. Follow up appointment as instructed. If any issues arise, the patient will call or come in sooner.\par His partner agrees with the plan.

## 2022-07-20 NOTE — ED PROVIDER NOTE - NSFOLLOWUPINSTRUCTIONS_ED_ALL_ED_FT
Please follow up with your primary care doctor within 1 week.  Please follow up with your pulmonologist within 1 week    *Bring all printed lab/test results to your appointment(s).*    Take all home medications as prescribed  Take flexeril as needed for severe chest/rib pain. (Please read all medication information/instructions).    Return to the ED for worsening chest pain, shortness of breath, or any other concerns.

## 2022-07-20 NOTE — REVIEW OF SYSTEMS
[Joint Pain] : joint pain [Joint Stiffness] : joint stiffness [Negative] : Heme/Lymph [FreeTextEntry9] : back pain

## 2022-07-20 NOTE — H&P ADULT - ASSESSMENT
Pt is a 47y male PMHx SLE on prednisone and plaquenil, asthma, hypothyroidism, HTN presenting with shortness of breath 2/2 back and pleuritic chest pain i/s/o recent admission for RSV-related asthma exacerbation with costochondritis. Possibilities for presentation include continued costochondritis, pleuritis; less likely, PE.

## 2022-07-20 NOTE — ED PROVIDER NOTE - NS ED ATTENDING STATEMENT MOD
This was a shared visit with the YANE. I reviewed and verified the documentation and independently performed the documented:

## 2022-07-20 NOTE — H&P ADULT - BIRTH SEX
INR result is 4.7  INR   Date Value Ref Range Status   03/05/2020 2.80 (!) 0.9 - 1.1 Final       Will the patient be seen, or did they already see, MD or CNP today? No    Most Recent Warfarin dose day/week  Sunday Monday Tuesday Wednesday Thursday Friday Saturday     2 2 2 2 2     Sunday Monday Tuesday Wednesday Thursday Friday Saturday   2 2            Has the patient missed any doses of Coumadin, Warfarin, Jantoven in the past 7 days? No    Has the patients medications changed since the last visit? No    Has the patient experienced any bleeding recently? No, some bruising on leg    Has the patient experienced any injuries or illness recently? No    Has the patient experienced any 'new' shortness of breath, severe headaches, or changes in vision recently? No    Has the patient had any changes in their diet, or alcohol consumption? No    Is the patient here today to prepare for any type of upcoming surgery, procedure, or for a cardioversion procedure? No    What phone number can we reach the patient at today? home phone listed in demographics.   Male

## 2022-07-20 NOTE — ED ADULT TRIAGE NOTE - PATIENT ON (OXYGEN DELIVERY METHOD)
Assessment & Plan     Choledocholithiasis.  Hospital stay December 21-27 abdominal pain [facility].  Increased bilirubin, bile duct stone successful ERCP with stone extractions and sphincterotomy.    Old stroke dating back to 2015 getting PT OT.  Prior history of laparoscopic cholecystectomy with Dr. Arpit Madrid.  Recurrent choledocholithiasis noted.  Discussed with patient and daughter who accompanies her here today.    Atrial fibrillation with controlled ventricular response rate.  77 continue anticoagulant Eliquis 2.5 mg twice daily.    Hypertension controlled 120/82.    Hyperlipidemia on statin therapy well-tolerated.  No recent history of MI or stroke.              25 minutes spent on the date of the encounter doing chart review, review of outside records, review of test results, interpretation of tests, patient visit, documentation and discussion with family            No follow-ups on file.    Eusebio Dejesus MD  [facility]     Nicole Jackson is 86 y.o. and presents to clinic today for the following health issues   HPI       Hypertension Follow-up      Do you check your blood pressure regularly outside of the clinic? Yes     Are you following a low salt diet? Yes    Are your blood pressures ever more than 140 on the top number (systolic) OR more       than 90 on the bottom number (diastolic), for example 140/90? Yes      How many servings of fruits and vegetables do you eat daily?  0-1    On average, how many sweetened beverages do you drink each day (Examples: soda, juice, sweet tea, etc.  Do NOT count diet or artificially sweetened beverages)?   0    How many days per week do you exercise enough to make your heart beat faster? 7    How many minutes a day do you exercise enough to make your heart beat faster? 60 or more    How many days per week do you miss taking your medication? 0        Review of Systems  No fever chills night sweats no blood in stool or  "urine denies chest pain shortness of breath.  Marked expressive aphasia daughter fills in.  History of old stroke middle cerebral artery left side as a result of a complication of thromboembolic phenomena atrial fibrillation.  Prior to institution of Eliquis therapy.    Non-smoker no excess alcohol.    Allergies codeine only nausea without rash.      Objective    /82 (Patient Site: Left Arm, Patient Position: Sitting)   Pulse 77   Temp 97.2  F (36.2  C)   Ht 5' 2\" (1.575 m)   Wt 116 lb (52.6 kg)   SpO2 96%   BMI 21.22 kg/m    Body mass index is 21.22 kg/m .  Physical Exam  Chest clear to auscultation and percussion.  Heart tones regular rhythm without murmur rub or gallop.  Abdomen soft nontender no organomegaly.  No peritoneal signs.  Extremities free of edema cyanosis or clubbing.  Neck veins nondistended no thyromegaly or scleral icterus noted, carotids full.  Skin warm and dry easily conversant good spirited.  Normal intelligence.  Neurologically intact no gross localizing findings.  Heart tones irregularly irregular apical rate 77 O2 sats 96% temperature this afternoon 97.2 degrees.    62 inches tall weight down 3 pounds from previous 116 currently.  BMI 21.2 blood pressure 120/82 no scleral icterus not jaundiced appears happy and content.              " room air

## 2022-07-20 NOTE — H&P ADULT - PROBLEM SELECTOR PLAN 2
K+ found to be 2.7 on labs at admission, administered 30 mEq IV K+, 40 mEq po KCl  -f/u BMP and replete as needed with oral and IV K+

## 2022-07-20 NOTE — HISTORY OF PRESENT ILLNESS
[Stable] : stable [de-identified] : Pt is a  47 year old male who presents to the office today for an initial evaluation of mid-lower back pain, which started over 1 month ago. Patient states the symptoms started with severe cough. He reports he was recently hospitalized at [facility] for asthma/difficulty breathing on 6/26/22-7/11/22. He states at the hospital he was diagnosed with Chostochondritis and paraflu. \par Mid-low back pain is constant. Bending, changing positions worsen the pain. \par Takes Tylenol and Flexeril for pain as needed, which somewhat help. \par Also takes Prednisone 30mg for Lupus. \par Denies any numbness and tingling. \par Did PT for muscle weakness January-June, which helped. \par No prior KRYSTAL. \par MRI lumbar 5/03/21. \par No fever, chills, sweats, nausea/vomiting. No bowel or bladder dysfunction, no recent weight loss or gain. No night pain. This history is in addition to the intake form that I personally reviewed.\par

## 2022-07-20 NOTE — ED PROVIDER NOTE - CLINICAL SUMMARY MEDICAL DECISION MAKING FREE TEXT BOX
pt with sle, asthma, presents for cp, sob, cough, wheeze, back pain. recent admission and dc from this hospital 9 days ago for similar. since being d/c'd, was initially doing ok but now getting worse despite being compliant with his pulmonary medications (steroids, inhalers, pain meds). pt visibly tachypneic, wheezing diffusely but aerating well. not hypoxic. mildly tachycardic. pt with 2+ pitting edema as well but non tender. during last admission, pt had negative DVT studies and CTA for PE. at this time, pt likely having re-exacerbation of his asthma. at this time, no indication for need for positive pressure support. very low suspicion for VTE given that he is wheezing and that he had negative US and CTA recently. Ddx also includes ?new onset CHF although no JVD and no rales heard. Etiology of his lower leg swelling may be from new acute kidney injury. DDx for his CP and SOB also includes ?pericardial effusion ( pt is not clinically in tamponade physiology), ACS. will check labs, CXR, tx for asthma (will defer steroids for now given his recent admission was c/b hyperglycemia from steroid use), magnesium. will reassess pt but low threshold for re-admission.

## 2022-07-20 NOTE — PATIENT PROFILE ADULT - FALL HARM RISK - RISK INTERVENTIONS

## 2022-07-20 NOTE — H&P ADULT - ATTENDING COMMENTS
47M PMHx SLE on prednisone and Plaquenil, asthma, hypothyroidism, HTN presenting with shortness of breath and severe 15/10 back, rib pain and pleuritic chest pain in the setting of persistent aggressive coughing due recent RSV infection with asthma exacerbation now causing costochondritis.    - Pain control with morphine for now  - c/w steroids for antiinflammatory effect.  - c/w supportive care  - Rheum eval in the AM  - Pulm eval appreciated.  - aggressive repletion of potassium

## 2022-07-20 NOTE — ED PROVIDER NOTE - CARE PLAN
1 Principal Discharge DX:	Shortness of breath  Secondary Diagnosis:	Acute hypokalemia   Principal Discharge DX:	Shortness of breath  Secondary Diagnosis:	Acute hypokalemia  Secondary Diagnosis:	Acute asthma exacerbation  Secondary Diagnosis:	Back pain

## 2022-07-20 NOTE — H&P ADULT - NSHPLABSRESULTS_GEN_ALL_CORE
Labs personally reviewed:                        13.0   9.11  )-----------( 287      ( 20 Jul 2022 15:08 )             38.1      07-20    140  |  101  |  8   ----------------------------<  191<H>  3.4<L>   |  24  |  1.00    Ca    9.0      20 Jul 2022 20:51  Phos  3.0     07-20  Mg     2.1     07-20    TPro  6.3  /  Alb  3.9  /  TBili  0.4  /  DBili  x   /  AST  33  /  ALT  83<H>  /  AlkPhos  97  07-20       CAPILLARY BLOOD GLUCOSE          RADIOLOGY & ADDITIONAL TESTS:    Imaging Personally Reviewed:  < from: Xray Chest 2 Views PA/Lat (07.20.22 @ 15:55) >      FINDINGS:    Clear lungs.  The heart is normal in size.  The bones demonstrate no acute abnormality.    IMPRESSION:  Clear lungs.    < end of copied text >

## 2022-07-20 NOTE — ED PROVIDER NOTE - CROS ED EYES ALL NEG
Retinal tear and detachment warning symptoms reviewed and patient instructed to call immediately if increasing floaters, flashes, or decreasing peripheral vision. negative...

## 2022-07-20 NOTE — H&P ADULT - PROBLEM SELECTOR PLAN 1
EKG sinus tach, trops wnl, CXR negative for acute pulmonary pathology; causes of symptoms likely costochondritis vs pleuritis i/s/o lupus with recent hospitalization for acute asthma exacerbation 2/2 RSV infection with associated cough  -rheumatology consult  -procal, C3, C4, anti-dsDNA  -ctm pain, currently controlling w/ morphine and percocet  -antitussives (tessalon)  -D dimer to rule out PE

## 2022-07-20 NOTE — ADDENDUM
[FreeTextEntry1] : This note was written by Pedro Luis Moulton on 07/20/2022 acting as scribe for Dr. Butch Solomon M.D.\par \par I, Butch Solomon MD, have read and attest that all the information, medical decision making and discharge instructions within are true and accurate.

## 2022-07-21 LAB
ANION GAP SERPL CALC-SCNC: 14 MMOL/L — SIGNIFICANT CHANGE UP (ref 5–17)
ANION GAP SERPL CALC-SCNC: 14 MMOL/L — SIGNIFICANT CHANGE UP (ref 5–17)
BUN SERPL-MCNC: 10 MG/DL — SIGNIFICANT CHANGE UP (ref 7–23)
BUN SERPL-MCNC: 11 MG/DL — SIGNIFICANT CHANGE UP (ref 7–23)
C3 SERPL-MCNC: 154 MG/DL — SIGNIFICANT CHANGE UP (ref 81–157)
C4 SERPL-MCNC: 36 MG/DL — SIGNIFICANT CHANGE UP (ref 13–39)
CALCIUM SERPL-MCNC: 8.8 MG/DL — SIGNIFICANT CHANGE UP (ref 8.4–10.5)
CALCIUM SERPL-MCNC: 9.7 MG/DL — SIGNIFICANT CHANGE UP (ref 8.4–10.5)
CHLORIDE SERPL-SCNC: 97 MMOL/L — SIGNIFICANT CHANGE UP (ref 96–108)
CHLORIDE SERPL-SCNC: 99 MMOL/L — SIGNIFICANT CHANGE UP (ref 96–108)
CO2 SERPL-SCNC: 23 MMOL/L — SIGNIFICANT CHANGE UP (ref 22–31)
CO2 SERPL-SCNC: 25 MMOL/L — SIGNIFICANT CHANGE UP (ref 22–31)
CREAT SERPL-MCNC: 0.95 MG/DL — SIGNIFICANT CHANGE UP (ref 0.5–1.3)
CREAT SERPL-MCNC: 1.04 MG/DL — SIGNIFICANT CHANGE UP (ref 0.5–1.3)
D DIMER BLD IA.RAPID-MCNC: 167 NG/ML DDU — SIGNIFICANT CHANGE UP
EGFR: 100 ML/MIN/1.73M2 — SIGNIFICANT CHANGE UP
EGFR: 90 ML/MIN/1.73M2 — SIGNIFICANT CHANGE UP
GLUCOSE SERPL-MCNC: 155 MG/DL — HIGH (ref 70–99)
GLUCOSE SERPL-MCNC: 409 MG/DL — HIGH (ref 70–99)
HCT VFR BLD CALC: 37.6 % — LOW (ref 39–50)
HGB BLD-MCNC: 12.7 G/DL — LOW (ref 13–17)
MAGNESIUM SERPL-MCNC: 2.1 MG/DL — SIGNIFICANT CHANGE UP (ref 1.6–2.6)
MCHC RBC-ENTMCNC: 30.5 PG — SIGNIFICANT CHANGE UP (ref 27–34)
MCHC RBC-ENTMCNC: 33.8 GM/DL — SIGNIFICANT CHANGE UP (ref 32–36)
MCV RBC AUTO: 90.2 FL — SIGNIFICANT CHANGE UP (ref 80–100)
NRBC # BLD: 0 /100 WBCS — SIGNIFICANT CHANGE UP (ref 0–0)
PHOSPHATE SERPL-MCNC: 2.7 MG/DL — SIGNIFICANT CHANGE UP (ref 2.5–4.5)
PLATELET # BLD AUTO: 294 K/UL — SIGNIFICANT CHANGE UP (ref 150–400)
POTASSIUM SERPL-MCNC: 3.9 MMOL/L — SIGNIFICANT CHANGE UP (ref 3.5–5.3)
POTASSIUM SERPL-MCNC: 4.1 MMOL/L — SIGNIFICANT CHANGE UP (ref 3.5–5.3)
POTASSIUM SERPL-SCNC: 3.9 MMOL/L — SIGNIFICANT CHANGE UP (ref 3.5–5.3)
POTASSIUM SERPL-SCNC: 4.1 MMOL/L — SIGNIFICANT CHANGE UP (ref 3.5–5.3)
PROCALCITONIN SERPL-MCNC: 0.13 NG/ML — HIGH (ref 0.02–0.1)
RBC # BLD: 4.17 M/UL — LOW (ref 4.2–5.8)
RBC # FLD: 13.9 % — SIGNIFICANT CHANGE UP (ref 10.3–14.5)
SODIUM SERPL-SCNC: 134 MMOL/L — LOW (ref 135–145)
SODIUM SERPL-SCNC: 138 MMOL/L — SIGNIFICANT CHANGE UP (ref 135–145)
WBC # BLD: 7.53 K/UL — SIGNIFICANT CHANGE UP (ref 3.8–10.5)
WBC # FLD AUTO: 7.53 K/UL — SIGNIFICANT CHANGE UP (ref 3.8–10.5)

## 2022-07-21 PROCEDURE — 99233 SBSQ HOSP IP/OBS HIGH 50: CPT | Mod: GC

## 2022-07-21 PROCEDURE — 99253 IP/OBS CNSLTJ NEW/EST LOW 45: CPT | Mod: GC

## 2022-07-21 PROCEDURE — 99222 1ST HOSP IP/OBS MODERATE 55: CPT | Mod: GC

## 2022-07-21 RX ORDER — COLCHICINE 0.6 MG
0.6 TABLET ORAL
Refills: 0 | Status: DISCONTINUED | OUTPATIENT
Start: 2022-07-21 | End: 2022-07-28

## 2022-07-21 RX ORDER — MYCOPHENOLATE MOFETIL 250 MG/1
1000 CAPSULE ORAL ONCE
Refills: 0 | Status: COMPLETED | OUTPATIENT
Start: 2022-07-21 | End: 2022-07-21

## 2022-07-21 RX ORDER — POTASSIUM CHLORIDE 20 MEQ
40 PACKET (EA) ORAL ONCE
Refills: 0 | Status: COMPLETED | OUTPATIENT
Start: 2022-07-21 | End: 2022-07-21

## 2022-07-21 RX ORDER — FUROSEMIDE 40 MG
60 TABLET ORAL DAILY
Refills: 0 | Status: DISCONTINUED | OUTPATIENT
Start: 2022-07-21 | End: 2022-07-24

## 2022-07-21 RX ORDER — BUDESONIDE AND FORMOTEROL FUMARATE DIHYDRATE 160; 4.5 UG/1; UG/1
2 AEROSOL RESPIRATORY (INHALATION)
Refills: 0 | Status: DISCONTINUED | OUTPATIENT
Start: 2022-07-21 | End: 2022-07-28

## 2022-07-21 RX ORDER — INSULIN LISPRO 100/ML
VIAL (ML) SUBCUTANEOUS AT BEDTIME
Refills: 0 | Status: DISCONTINUED | OUTPATIENT
Start: 2022-07-21 | End: 2022-07-28

## 2022-07-21 RX ORDER — MORPHINE SULFATE 50 MG/1
4 CAPSULE, EXTENDED RELEASE ORAL EVERY 6 HOURS
Refills: 0 | Status: DISCONTINUED | OUTPATIENT
Start: 2022-07-21 | End: 2022-07-22

## 2022-07-21 RX ORDER — ALBUTEROL 90 UG/1
2.5 AEROSOL, METERED ORAL
Refills: 0 | Status: DISCONTINUED | OUTPATIENT
Start: 2022-07-21 | End: 2022-07-26

## 2022-07-21 RX ORDER — ACETAMINOPHEN 500 MG
1000 TABLET ORAL EVERY 8 HOURS
Refills: 0 | Status: DISCONTINUED | OUTPATIENT
Start: 2022-07-21 | End: 2022-07-22

## 2022-07-21 RX ORDER — ALBUTEROL 90 UG/1
2.5 AEROSOL, METERED ORAL
Refills: 0 | Status: DISCONTINUED | OUTPATIENT
Start: 2022-07-21 | End: 2022-07-21

## 2022-07-21 RX ORDER — IPRATROPIUM/ALBUTEROL SULFATE 18-103MCG
3 AEROSOL WITH ADAPTER (GRAM) INHALATION EVERY 6 HOURS
Refills: 0 | Status: DISCONTINUED | OUTPATIENT
Start: 2022-07-21 | End: 2022-07-28

## 2022-07-21 RX ADMIN — BUDESONIDE AND FORMOTEROL FUMARATE DIHYDRATE 2 PUFF(S): 160; 4.5 AEROSOL RESPIRATORY (INHALATION) at 11:02

## 2022-07-21 RX ADMIN — MYCOPHENOLATE MOFETIL 1000 MILLIGRAM(S): 250 CAPSULE ORAL at 16:36

## 2022-07-21 RX ADMIN — OXYCODONE AND ACETAMINOPHEN 2 TABLET(S): 5; 325 TABLET ORAL at 06:08

## 2022-07-21 RX ADMIN — Medication 60 MILLIGRAM(S): at 16:37

## 2022-07-21 RX ADMIN — ENOXAPARIN SODIUM 40 MILLIGRAM(S): 100 INJECTION SUBCUTANEOUS at 11:29

## 2022-07-21 RX ADMIN — BUDESONIDE AND FORMOTEROL FUMARATE DIHYDRATE 2 PUFF(S): 160; 4.5 AEROSOL RESPIRATORY (INHALATION) at 17:12

## 2022-07-21 RX ADMIN — Medication 200 MILLIGRAM(S): at 06:09

## 2022-07-21 RX ADMIN — Medication 30 MILLIGRAM(S): at 06:08

## 2022-07-21 RX ADMIN — MORPHINE SULFATE 4 MILLIGRAM(S): 50 CAPSULE, EXTENDED RELEASE ORAL at 17:35

## 2022-07-21 RX ADMIN — Medication 1 TABLET(S): at 11:28

## 2022-07-21 RX ADMIN — MORPHINE SULFATE 4 MILLIGRAM(S): 50 CAPSULE, EXTENDED RELEASE ORAL at 17:09

## 2022-07-21 RX ADMIN — OXYCODONE AND ACETAMINOPHEN 2 TABLET(S): 5; 325 TABLET ORAL at 07:02

## 2022-07-21 RX ADMIN — MORPHINE SULFATE 4 MILLIGRAM(S): 50 CAPSULE, EXTENDED RELEASE ORAL at 23:02

## 2022-07-21 RX ADMIN — MORPHINE SULFATE 4 MILLIGRAM(S): 50 CAPSULE, EXTENDED RELEASE ORAL at 23:20

## 2022-07-21 RX ADMIN — LOSARTAN POTASSIUM 50 MILLIGRAM(S): 100 TABLET, FILM COATED ORAL at 06:08

## 2022-07-21 RX ADMIN — PANTOPRAZOLE SODIUM 40 MILLIGRAM(S): 20 TABLET, DELAYED RELEASE ORAL at 06:08

## 2022-07-21 RX ADMIN — Medication 81 MILLIGRAM(S): at 11:27

## 2022-07-21 RX ADMIN — Medication 200 MILLIGRAM(S): at 21:47

## 2022-07-21 RX ADMIN — Medication 3 MILLILITER(S): at 21:55

## 2022-07-21 RX ADMIN — Medication 125 MICROGRAM(S): at 06:08

## 2022-07-21 RX ADMIN — Medication 1: at 08:38

## 2022-07-21 RX ADMIN — Medication 3 MILLILITER(S): at 17:11

## 2022-07-21 RX ADMIN — Medication 400 MILLIGRAM(S): at 17:08

## 2022-07-21 RX ADMIN — Medication 40 MILLIEQUIVALENT(S): at 11:28

## 2022-07-21 RX ADMIN — MYCOPHENOLATE MOFETIL 500 MILLIGRAM(S): 250 CAPSULE ORAL at 11:28

## 2022-07-21 RX ADMIN — Medication 200 MILLIGRAM(S): at 13:07

## 2022-07-21 RX ADMIN — Medication 3 MILLILITER(S): at 11:27

## 2022-07-21 RX ADMIN — LIDOCAINE 2 PATCH: 4 CREAM TOPICAL at 03:00

## 2022-07-21 NOTE — CONSULT NOTE ADULT - ATTENDING COMMENTS
Agree with above  Asthma with multiple recent exacerbations d/t various viral URIs (parainfluenza / enterorhinovirus) now back with another viral URI (RSV)  Also has been having costochondritis, which prevents him from coughing effectively. +slight wheezing on exam, but not in obvious respiratory distress.  - Already on steroids for SLE. c/w current dose and Bactrim prophylaxis  - Resume inhalers, can add albuterol NEBs Q3h PRN  - Cough suppression with dextromorphan or hycodin  - Hypokalemia likely consequence of loop diuresis and albuterol, replete aggressively.  - Start NSAIDs ATC for costochondritis. Naproxen would be best, but patient states he has GI issues in past. Can try 1gm acetaminophen Q8h ATC for two days, but would D/C Percocet to avoid >3gm acetaminophen daily. Can also attempt lidocaine patch to sternal area.  - Would  patient to wear mask when going outside after discharge to reduce changes of further viral URIs.
47M with SLE (MSK and cutaneous) on HCQ< MMF and anifrolumab started 6/2022 presenting with cough, SOB, chest pain, found to have RSV+ URI with costochondritis.  Hold MMF in setting of RSV infection  agree with high dose tylenol for now  trial of colchicine  Can consider to switch to Solumedrol for 1-2 days if costochondritis does not improve as pt cannot tolerate NSAIDS    Dr. Ariana Kelly  Rheumatology Attending  [phone]  [email or web address]

## 2022-07-21 NOTE — CONSULT NOTE ADULT - ASSESSMENT
47y male PMHx SLE on chronic steroid and plaquenil, asthma, hypothyroidism, HTN presenting with worsening SOB, ongoing cough, chest wall and rib pain  x 7 days. tested + RSV this admission, exam significant for expiratory wheezing, concern for asthma exacerbation vs pleuritic chest pain vs costochondritis. Rheumatology consulted for SLE management.      # SLE management      - had previous admission 6/26-7/11 for asthma exacerbation 2/2 parainfluenza + entero/rhinovirus on high dose steroids on 6/26. d/c on prednisone 40mg x 1 week then prednisone 30mg qD. Unlikely to have pleuritic CP given steroid usage.   - C3, C4 wnl, dsDNA negative during last admission    - can check serologies again - C3, C4, dsDNA, urine P/Cr, UA with microscopy    - CXR: neg pleural effusion    - check TTE for pericardial effusion    - still has expiratory wheezing on exam - discussed with pulm, will likely need high dose of steroid for asthma exacerbation    - hold Cellcept  - continue home Plaquenil 400mg qD    - agree with a trial of Naproxen for pain control       Case discussed with Dr. Leticia Garrido, PGY-5  Rheumatology Fellow   Pager: [phone]  please enter a full 10 digit number for call back   During weekends, please page on call fellow   47y male PMHx SLE on chronic steroid and plaquenil, asthma, hypothyroidism, HTN presenting with worsening SOB, ongoing cough, chest wall and rib pain  x 7 days. tested + RSV this admission, exam significant for expiratory wheezing, concern for asthma exacerbation vs pleuritic chest pain vs costochondritis. Rheumatology consulted for SLE management.      # SLE management      - had previous admission 6/26-7/11 for asthma exacerbation 2/2 parainfluenza + entero/rhinovirus on high dose steroids on 6/26. d/c on prednisone 40mg x 1 week then prednisone 30mg qD. Unlikely to have pleuritic CP given steroid usage.   - C3, C4 wnl, dsDNA negative during last admission    - can check serologies again - C3, C4, dsDNA, urine P/Cr, UA with microscopy    - CXR: neg pleural effusion    - check TTE for pericardial effusion    - still has expiratory wheezing on exam - discussed with pulm, will likely need high dose of steroid for asthma exacerbation - to be determined by pulmonary vs   - cannot tolerate NSAIDS, now on Tylenol high doses  - hold Cellcept in setting of RSV infection. Can be restarted once recovered.  - can consider colchicine 0.6mg bid   - if no improvement by tomorrow can consider to switch to Solumedrol 40mg for costochondritis  - continue home Plaquenil 400mg qD      Case discussed with Dr. Leticia Garrido, PGY-5  Rheumatology Fellow   Pager: [phone]  please enter a full 10 digit number for call back   During weekends, please page on call fellow

## 2022-07-21 NOTE — PROGRESS NOTE ADULT - ATTENDING COMMENTS
47M PMHx SLE on prednisone and Plaquenil, asthma, hypothyroidism, HTN presenting with shortness of breath and severe 15/10 back, rib pain and pleuritic chest pain in the setting of persistent aggressive coughing due recent RSV infection with asthma exacerbation now causing costochondritis.    Costochondritis.  - Pain control with morphine for now  - c/w steroids for antiinflammatory effect.  - c/w supportive care  - Rheum eval appreciated --> if no improvement then will start solumedrol  - Pulm eval appreciated --> c/w inhalers for wheezing, increase albuterol to q3hrs prn with standing duonebs q6

## 2022-07-21 NOTE — PROGRESS NOTE ADULT - PROBLEM SELECTOR PLAN 5
-Pulm on board  -currently holding home meds of albuterol and symbicort d/t hypokalemia Pt continues to have diffuse wheezing   -Pulm on board, recs appreciated      -resume inhalers, albuterol q3h neb Pt continues to have diffuse wheezing   -Pulm on board, recs appreciated      -resume inhalers, duonebs on board

## 2022-07-21 NOTE — PROGRESS NOTE ADULT - SUBJECTIVE AND OBJECTIVE BOX
Monae Singh MD  PGY 1 Department of Internal Medicine        Patient is a 47y old  Male who presents with a chief complaint of Shortness of breath, Chest and Back Pain (20 Jul 2022 18:53)      SUBJECTIVE / OVERNIGHT EVENTS: Pt seen and examined. No acute overnight events. Denies fevers, chills, CP, SOB, Abdominal pain, N/V, Constipation, Diarrhea        MEDICATIONS  (STANDING):  aspirin enteric coated 81 milliGRAM(s) Oral daily  benzonatate 200 milliGRAM(s) Oral every 8 hours  dextrose 5%. 1000 milliLiter(s) (50 mL/Hr) IV Continuous <Continuous>  dextrose 5%. 1000 milliLiter(s) (100 mL/Hr) IV Continuous <Continuous>  dextrose 50% Injectable 25 Gram(s) IV Push once  dextrose 50% Injectable 12.5 Gram(s) IV Push once  dextrose 50% Injectable 25 Gram(s) IV Push once  enoxaparin Injectable 40 milliGRAM(s) SubCutaneous every 24 hours  glucagon  Injectable 1 milliGRAM(s) IntraMuscular once  hydroxychloroquine 400 milliGRAM(s) Oral daily  insulin lispro (ADMELOG) corrective regimen sliding scale   SubCutaneous three times a day before meals  insulin lispro (ADMELOG) corrective regimen sliding scale   SubCutaneous at bedtime  levothyroxine 125 MICROGram(s) Oral daily  losartan 50 milliGRAM(s) Oral daily  mycophenolate mofetil 500 milliGRAM(s) Oral three times a day  pantoprazole    Tablet 40 milliGRAM(s) Oral before breakfast  predniSONE   Tablet 30 milliGRAM(s) Oral daily  trimethoprim  160 mG/sulfamethoxazole 800 mG 1 Tablet(s) Oral <User Schedule>    MEDICATIONS  (PRN):  dextrose Oral Gel 15 Gram(s) Oral once PRN Blood Glucose LESS THAN 70 milliGRAM(s)/deciliter  morphine  - Injectable 4 milliGRAM(s) IV Push every 4 hours PRN Severe Pain (7 - 10)  oxycodone    5 mG/acetaminophen 325 mG 2 Tablet(s) Oral every 6 hours PRN Moderate Pain (4 - 6)      I&O's Summary    20 Jul 2022 07:01  -  21 Jul 2022 07:00  --------------------------------------------------------  IN: 200 mL / OUT: 0 mL / NET: 200 mL        Vital Signs Last 24 Hrs  T(C): 36.7 (21 Jul 2022 04:55), Max: 37.1 (21 Jul 2022 01:21)  T(F): 98 (21 Jul 2022 04:55), Max: 98.7 (21 Jul 2022 01:21)  HR: 94 (21 Jul 2022 06:26) (94 - 109)  BP: 141/95 (21 Jul 2022 06:26) (126/90 - 147/95)  BP(mean): --  RR: 18 (21 Jul 2022 06:26) (18 - 22)  SpO2: 94% (21 Jul 2022 06:26) (94% - 100%)    Parameters below as of 21 Jul 2022 06:26  Patient On (Oxygen Delivery Method): room air        CAPILLARY BLOOD GLUCOSE      POCT Blood Glucose.: 294 mg/dL (21 Jul 2022 03:09)  POCT Blood Glucose.: 285 mg/dL (21 Jul 2022 03:07)      PHYSICAL EXAM:  GENERAL: NAD,   HEAD:  Atraumatic, Normocephalic  EYES: EOMI, PERRL, conjunctiva and sclera clear  NECK: No JVD  CHEST/LUNG: Clear to auscultation bilaterally; No wheeze  HEART: Regular rate and rhythm; No murmurs, rubs, or gallops  ABDOMEN: Soft, Nontender, Nondistended; Bowel sounds present  EXTREMITIES:  2+ Peripheral Pulses, No clubbing, cyanosis, or edema  PSYCH: AAOx3  NEUROLOGY: non-focal  SKIN: No rashes or lesions       LABS:                        13.0   9.11  )-----------( 287      ( 20 Jul 2022 15:08 )             38.1     Auto Eosinophil # 0.24  / Auto Eosinophil % 2.6   / Auto Neutrophil # 6.50  / Auto Neutrophil % 71.3  / BANDS % x        07-21    134<L>  |  97  |  10  ----------------------------<  409<H>  3.9   |  23  |  1.04  07-20    140  |  101  |  8   ----------------------------<  191<H>  3.4<L>   |  24  |  1.00  07-20    143  |  107  |  7   ----------------------------<  106<H>  2.7<LL>   |  24  |  0.83    Ca    8.8      21 Jul 2022 00:17  Mg     2.1     07-20  Phos  3.0     07-20  TPro  6.3  /  Alb  3.9  /  TBili  0.4  /  DBili  x   /  AST  33  /  ALT  83<H>  /  AlkPhos  97  07-20  TPro  5.8<L>  /  Alb  3.6  /  TBili  0.3  /  DBili  x   /  AST  34  /  ALT  76<H>  /  AlkPhos  86  07-20    PT/INR - ( 20 Jul 2022 15:08 )   PT: 11.9 sec;   INR: 1.03 ratio         PTT - ( 20 Jul 2022 15:08 )  PTT:27.2 sec              RADIOLOGY & ADDITIONAL TESTS:    Imaging Personally Reviewed:    Consultant(s) Notes Reviewed:      Care Discussed with Consultants/Other Providers:   Monae Singh MD  PGY 1 Department of Internal Medicine        Patient is a 47y old  Male who presents with a chief complaint of Shortness of breath, Chest and Back Pain (21 Jul 2022 13:46)      SUBJECTIVE / OVERNIGHT EVENTS: Pt seen and examined. No acute overnight events. Denies fevers, chills, CP, SOB, Abdominal pain, N/V, Constipation, Diarrhea        MEDICATIONS  (STANDING):  acetaminophen     Tablet .. 1000 milliGRAM(s) Oral every 8 hours  albuterol/ipratropium for Nebulization 3 milliLiter(s) Nebulizer every 6 hours  aspirin enteric coated 81 milliGRAM(s) Oral daily  benzonatate 200 milliGRAM(s) Oral every 8 hours  budesonide 160 MICROgram(s)/formoterol 4.5 MICROgram(s) Inhaler 2 Puff(s) Inhalation two times a day  dextrose 5%. 1000 milliLiter(s) (100 mL/Hr) IV Continuous <Continuous>  dextrose 5%. 1000 milliLiter(s) (50 mL/Hr) IV Continuous <Continuous>  dextrose 50% Injectable 25 Gram(s) IV Push once  dextrose 50% Injectable 12.5 Gram(s) IV Push once  dextrose 50% Injectable 25 Gram(s) IV Push once  enoxaparin Injectable 40 milliGRAM(s) SubCutaneous every 24 hours  furosemide   Injectable 60 milliGRAM(s) IV Push daily  glucagon  Injectable 1 milliGRAM(s) IntraMuscular once  hydroxychloroquine 400 milliGRAM(s) Oral daily  insulin lispro (ADMELOG) corrective regimen sliding scale   SubCutaneous three times a day before meals  insulin lispro (ADMELOG) corrective regimen sliding scale   SubCutaneous at bedtime  levothyroxine 125 MICROGram(s) Oral daily  losartan 50 milliGRAM(s) Oral daily  morphine  - Injectable 4 milliGRAM(s) IV Push every 6 hours  pantoprazole    Tablet 40 milliGRAM(s) Oral before breakfast  predniSONE   Tablet 30 milliGRAM(s) Oral daily  trimethoprim  160 mG/sulfamethoxazole 800 mG 1 Tablet(s) Oral <User Schedule>    MEDICATIONS  (PRN):  ALBUTerol    0.083% 2.5 milliGRAM(s) Nebulizer every 3 hours PRN Shortness of Breath and/or Wheezing  dextrose Oral Gel 15 Gram(s) Oral once PRN Blood Glucose LESS THAN 70 milliGRAM(s)/deciliter      I&O's Summary    20 Jul 2022 07:01  -  21 Jul 2022 07:00  --------------------------------------------------------  IN: 200 mL / OUT: 0 mL / NET: 200 mL    21 Jul 2022 07:01  -  21 Jul 2022 16:54  --------------------------------------------------------  IN: 440 mL / OUT: 0 mL / NET: 440 mL        Vital Signs Last 24 Hrs  T(C): 36.6 (21 Jul 2022 16:04), Max: 37.1 (21 Jul 2022 01:21)  T(F): 97.9 (21 Jul 2022 16:04), Max: 98.7 (21 Jul 2022 01:21)  HR: 102 (21 Jul 2022 16:04) (92 - 106)  BP: 127/84 (21 Jul 2022 16:04) (126/90 - 144/89)  BP(mean): --  RR: 17 (21 Jul 2022 16:04) (17 - 20)  SpO2: 95% (21 Jul 2022 16:04) (94% - 99%)    Parameters below as of 21 Jul 2022 16:04  Patient On (Oxygen Delivery Method): room air        CAPILLARY BLOOD GLUCOSE      POCT Blood Glucose.: 136 mg/dL (21 Jul 2022 16:38)  POCT Blood Glucose.: 157 mg/dL (21 Jul 2022 12:04)  POCT Blood Glucose.: 158 mg/dL (21 Jul 2022 07:57)  POCT Blood Glucose.: 294 mg/dL (21 Jul 2022 03:09)  POCT Blood Glucose.: 285 mg/dL (21 Jul 2022 03:07)      PHYSICAL EXAM:  GENERAL: NAD,   HEAD:  Atraumatic, Normocephalic  EYES: EOMI, PERRL, conjunctiva and sclera clear  NECK: No JVD  CHEST/LUNG: Clear to auscultation bilaterally; No wheeze  HEART: Regular rate and rhythm; No murmurs, rubs, or gallops  ABDOMEN: Soft, Nontender, Nondistended; Bowel sounds present  EXTREMITIES:  2+ Peripheral Pulses, No clubbing, cyanosis, or edema  PSYCH: AAOx3  NEUROLOGY: non-focal  SKIN: No rashes or lesions       LABS:                        12.7   7.53  )-----------( 294      ( 21 Jul 2022 11:13 )             37.6     Auto Eosinophil # x     / Auto Eosinophil % x     / Auto Neutrophil # x     / Auto Neutrophil % x     / BANDS % x                            13.0   9.11  )-----------( 287      ( 20 Jul 2022 15:08 )             38.1     Auto Eosinophil # 0.24  / Auto Eosinophil % 2.6   / Auto Neutrophil # 6.50  / Auto Neutrophil % 71.3  / BANDS % x        07-21    138  |  99  |  11  ----------------------------<  155<H>  4.1   |  25  |  0.95  07-21    134<L>  |  97  |  10  ----------------------------<  409<H>  3.9   |  23  |  1.04  07-20    140  |  101  |  8   ----------------------------<  191<H>  3.4<L>   |  24  |  1.00    Ca    9.7      21 Jul 2022 11:13  Mg     2.1     07-21  Phos  2.7     07-21  TPro  6.3  /  Alb  3.9  /  TBili  0.4  /  DBili  x   /  AST  33  /  ALT  83<H>  /  AlkPhos  97  07-20  TPro  5.8<L>  /  Alb  3.6  /  TBili  0.3  /  DBili  x   /  AST  34  /  ALT  76<H>  /  AlkPhos  86  07-20    PT/INR - ( 20 Jul 2022 15:08 )   PT: 11.9 sec;   INR: 1.03 ratio         PTT - ( 20 Jul 2022 15:08 )  PTT:27.2 sec              RADIOLOGY & ADDITIONAL TESTS:    Imaging Personally Reviewed:    Consultant(s) Notes Reviewed:      Care Discussed with Consultants/Other Providers:   Monae Singh MD  PGY 1 Department of Internal Medicine        Patient is a 47y old  Male who presents with a chief complaint of Shortness of breath, Chest and Back Pain (21 Jul 2022 13:46)      SUBJECTIVE / OVERNIGHT EVENTS: Pt seen and examined. No acute overnight events, feels CP and SOB are similar, continues to have cough that worsens chest and back pain. Denies fevers, chills, Abdominal pain, N/V, Constipation, Diarrhea        MEDICATIONS  (STANDING):  acetaminophen     Tablet .. 1000 milliGRAM(s) Oral every 8 hours  albuterol/ipratropium for Nebulization 3 milliLiter(s) Nebulizer every 6 hours  aspirin enteric coated 81 milliGRAM(s) Oral daily  benzonatate 200 milliGRAM(s) Oral every 8 hours  budesonide 160 MICROgram(s)/formoterol 4.5 MICROgram(s) Inhaler 2 Puff(s) Inhalation two times a day  dextrose 5%. 1000 milliLiter(s) (100 mL/Hr) IV Continuous <Continuous>  dextrose 5%. 1000 milliLiter(s) (50 mL/Hr) IV Continuous <Continuous>  dextrose 50% Injectable 25 Gram(s) IV Push once  dextrose 50% Injectable 12.5 Gram(s) IV Push once  dextrose 50% Injectable 25 Gram(s) IV Push once  enoxaparin Injectable 40 milliGRAM(s) SubCutaneous every 24 hours  furosemide   Injectable 60 milliGRAM(s) IV Push daily  glucagon  Injectable 1 milliGRAM(s) IntraMuscular once  hydroxychloroquine 400 milliGRAM(s) Oral daily  insulin lispro (ADMELOG) corrective regimen sliding scale   SubCutaneous three times a day before meals  insulin lispro (ADMELOG) corrective regimen sliding scale   SubCutaneous at bedtime  levothyroxine 125 MICROGram(s) Oral daily  losartan 50 milliGRAM(s) Oral daily  morphine  - Injectable 4 milliGRAM(s) IV Push every 6 hours  pantoprazole    Tablet 40 milliGRAM(s) Oral before breakfast  predniSONE   Tablet 30 milliGRAM(s) Oral daily  trimethoprim  160 mG/sulfamethoxazole 800 mG 1 Tablet(s) Oral <User Schedule>    MEDICATIONS  (PRN):  ALBUTerol    0.083% 2.5 milliGRAM(s) Nebulizer every 3 hours PRN Shortness of Breath and/or Wheezing  dextrose Oral Gel 15 Gram(s) Oral once PRN Blood Glucose LESS THAN 70 milliGRAM(s)/deciliter      I&O's Summary    20 Jul 2022 07:01  -  21 Jul 2022 07:00  --------------------------------------------------------  IN: 200 mL / OUT: 0 mL / NET: 200 mL    21 Jul 2022 07:01  -  21 Jul 2022 16:54  --------------------------------------------------------  IN: 440 mL / OUT: 0 mL / NET: 440 mL        Vital Signs Last 24 Hrs  T(C): 36.6 (21 Jul 2022 16:04), Max: 37.1 (21 Jul 2022 01:21)  T(F): 97.9 (21 Jul 2022 16:04), Max: 98.7 (21 Jul 2022 01:21)  HR: 102 (21 Jul 2022 16:04) (92 - 106)  BP: 127/84 (21 Jul 2022 16:04) (126/90 - 144/89)  BP(mean): --  RR: 17 (21 Jul 2022 16:04) (17 - 20)  SpO2: 95% (21 Jul 2022 16:04) (94% - 99%)    Parameters below as of 21 Jul 2022 16:04  Patient On (Oxygen Delivery Method): room air        CAPILLARY BLOOD GLUCOSE      POCT Blood Glucose.: 136 mg/dL (21 Jul 2022 16:38)  POCT Blood Glucose.: 157 mg/dL (21 Jul 2022 12:04)  POCT Blood Glucose.: 158 mg/dL (21 Jul 2022 07:57)  POCT Blood Glucose.: 294 mg/dL (21 Jul 2022 03:09)  POCT Blood Glucose.: 285 mg/dL (21 Jul 2022 03:07)      GENERAL APPEARANCE: Well developed, NAD  HEENT:  PERRL, EOMI. hearing grossly intact.  NECK: Neck supple, non-tender no lymphadenopathy, masses or thyromegaly.  CARDIAC: Normal S1 and S2. no mrg. RRR. No JVD.   LUNGS: (+) Diffuse wheezing B/L, reduced but symmetric excursion, no rales, rhonchi  ABDOMEN: (+) Epigastric tenderness to palpation. Soft , no distention. bowel sounds normal. No guarding or rebound. No HSM appreciated.  MUSCULOSKELETAL: (+) tenderness to sternal pressure; tenderness at costochondral junctions and costovertebral joints. ROM intact.  No joint erythema.  EXTREMITIES: No edema. Peripheral pulses 2+ throughout.   NEUROLOGICAL: Non focal. Strength and sensation symmetric and intact throughout.   SKIN: Warm and dry , Well perfused  PSYCHIATRIC: AOx3 , Normal mood and affect       LABS:                        12.7   7.53  )-----------( 294      ( 21 Jul 2022 11:13 )             37.6     Auto Eosinophil # x     / Auto Eosinophil % x     / Auto Neutrophil # x     / Auto Neutrophil % x     / BANDS % x                            13.0   9.11  )-----------( 287      ( 20 Jul 2022 15:08 )             38.1     Auto Eosinophil # 0.24  / Auto Eosinophil % 2.6   / Auto Neutrophil # 6.50  / Auto Neutrophil % 71.3  / BANDS % x        07-21    138  |  99  |  11  ----------------------------<  155<H>  4.1   |  25  |  0.95  07-21    134<L>  |  97  |  10  ----------------------------<  409<H>  3.9   |  23  |  1.04  07-20    140  |  101  |  8   ----------------------------<  191<H>  3.4<L>   |  24  |  1.00    Ca    9.7      21 Jul 2022 11:13  Mg     2.1     07-21  Phos  2.7     07-21  TPro  6.3  /  Alb  3.9  /  TBili  0.4  /  DBili  x   /  AST  33  /  ALT  83<H>  /  AlkPhos  97  07-20  TPro  5.8<L>  /  Alb  3.6  /  TBili  0.3  /  DBili  x   /  AST  34  /  ALT  76<H>  /  AlkPhos  86  07-20    PT/INR - ( 20 Jul 2022 15:08 )   PT: 11.9 sec;   INR: 1.03 ratio         PTT - ( 20 Jul 2022 15:08 )  PTT:27.2 sec

## 2022-07-21 NOTE — PROGRESS NOTE ADULT - PROBLEM SELECTOR PLAN 1
EKG sinus tach, trops wnl, CXR negative for acute pulmonary pathology; causes of symptoms likely costochondritis vs pleuritis i/s/o lupus with recent hospitalization for acute asthma exacerbation 2/2 RSV infection with associated cough  -rheumatology consult  -procal, C3, C4, anti-dsDNA  -ctm pain, currently controlling w/ morphine and percocet  -antitussives (tessalon)  -D dimer to rule out PE EKG sinus tach, trops wnl, CXR negative for acute pulmonary pathology; causes of symptoms likely costochondritis  i/s/o lupus with recent hospitalization for acute asthma exacerbation 2/2 RSV infection with associated cough  -procal, C3, C4, anti-dsDNA  -ctm pain, currently controlling w/ morphine and percocet  -antitussives (tessalon)  -rheumatology consulted, appreciate recs    -hold cellcept    -continue plaquenil    -echo pending  -D dimer negative EKG sinus tach, trops wnl, CXR negative for acute pulmonary pathology; causes of symptoms likely costochondritis  i/s/o lupus with recent hospitalization for acute asthma exacerbation 2/2 RSV infection with associated cough  -procal, C3, C4, anti-dsDNA  -ctm pain, currently controlling w/ morphine and percocet  -antitussives (tessalon)  -rheumatology consulted, appreciate recs    -hold cellcept    -continue plaquenil    -echo pending    -solumedrol 80mg taper instead of prednisone 30mg  -D dimer negative EKG sinus tach, trops wnl, CXR negative for acute pulmonary pathology; causes of symptoms likely costochondritis  i/s/o lupus with recent hospitalization for acute asthma exacerbation 2/2 RSV infection with associated cough  -procal, C3, C4, anti-dsDNA  -ctm pain, currently controlling w/ morphine and percocet  -antitussives (tessalon)  -rheumatology consulted, appreciate recs    -hold cellcept    -continue plaquenil    -echo pending    -solumedrol 80mg taper instead of prednisone 30mg    -UA w/ microscopyl urine Pr/Cr ratio  -D dimer negative

## 2022-07-21 NOTE — PROGRESS NOTE ADULT - PROBLEM SELECTOR PLAN 2
K+ found to be 2.7 on labs at admission, administered 30 mEq IV K+, 40 mEq po KCl  -f/u BMP and replete as needed with oral and IV K+ K+ found to be 2.7 on labs at admission, administered 30 mEq IV K+, 40 mEq po KCl  -K+ currently 4.1  -f/u BMP and replete as needed with oral and IV K+ considering diuresis

## 2022-07-21 NOTE — CONSULT NOTE ADULT - ASSESSMENT
47y male PMHx SLE on pred/HCQ/MMF, asthma, hypothyroidism, HTN presenting with shortness of breath 2/2 repeated viral infections and costochondritis.     # Costochondritis  - Recommend anti-inflammatory medications, such as a short course of Naproxen, if pt is amenable.   - Tylenol if pt does not want nsaids  - Hycodan for cough suppression     # Asthma, persistent  - Pt with wheezing on exam  - Please continue home inhalers  - Diligent repletion of potassium.  - Pt should be counseled on mask wearing given 3 different viral infections in the past 5 weeks.      47y male PMHx SLE on pred/HCQ/MMF, asthma, hypothyroidism, HTN presenting with shortness of breath 2/2 repeated viral infections and costochondritis.     # Costochondritis  - Recommend anti-inflammatory medications, such as a short course of Naproxen, if pt is amenable.   - Tylenol if pt does not want nsaids  - Hycodan for cough suppression   - Can try lidocaine patch over the tender area    # Asthma, persistent  - Pt with wheezing on exam  - Please continue home inhalers  - Diligent repletion of potassium.  - Pt should be counseled on mask wearing given 3 different viral infections in the past 5 weeks.

## 2022-07-21 NOTE — CONSULT NOTE ADULT - SUBJECTIVE AND OBJECTIVE BOX
CHIEF COMPLAINT:Patient is a 47y old  Male who presents with a chief complaint of Shortness of breath, Chest and Back Pain (21 Jul 2022 07:50)      HPI:  Pt is a 47y male PMHx SLE on prednisone and plaquenil, asthma, hypothyroidism, HTN presenting with shortness of breath 2/2 back and pleuirtic chest pain.       Reports onset of shortness of breath with cough productive of yellow sputum since mid June.   Pt initially positive for parainfluenza on 6/14, seen in the ED and discharged home.   Returned to hospital and was admitted from 6/25-7/14. Treated with nebulizers and steroids for Asthma exacerbation 2/2 viral respiratory infection. Course with onset of costochondritis 2/2 violent coughing.    After discharge, was still having persistent pain 2/2 costocondritis so returned to the hospital. Pain is from sternum to back with radiation across the chest wall. Worse with motion and coughing and tender to palpation. Was taking flexeril and percocet which provided some relief. Reports good adherence with all medications and office visits.     Found to be RSV Positive on admission on 7/20. Also notes increased pedal edema with decreased urinary output for the past 3 days. Labs notable for K of 2.7 on admisison, likely 2/2 lasix use.        PAST MEDICAL & SURGICAL HISTORY:  Lupus      Asthma      Hypothyroid      History of cholecystectomy          FAMILY HISTORY:      SOCIAL HISTORY:  Smoking: [x] Never Smoked [ ] Former Smoker (__ packs x ___ years) [ ] Current Smoker  (__ packs x ___ years)  Substance Use: [x] Never Used [ ] Used ____  EtOH Use: None    Allergies    No Known Allergies    Intolerances        HOME MEDICATIONS:  Home Medications:  aspirin 81 mg oral delayed release tablet: 1 tab(s) orally once a day (20 Jul 2022 20:04)  furosemide 20 mg oral tablet: 3 tab(s) orally once a day (20 Jul 2022 20:04)  losartan 50 mg oral tablet: 1 tab(s) orally once a day (20 Jul 2022 20:04)  methylPREDNISolone 16 mg oral tablet: 20 milligram(s) orally once a day ( Resumed after prednisone 30mg is completed)  (20 Jul 2022 20:04)  mycophenolate mofetil 500 mg oral tablet: 3 tab(s) orally once a day (20 Jul 2022 20:04)  potassium chloride 20 mEq oral tablet, extended release: 1 tab(s) orally once a day (20 Jul 2022 20:04)  Synthroid 125 mcg (0.125 mg) oral tablet: 1 tab(s) orally once a day (20 Jul 2022 20:04)      REVIEW OF SYSTEMS:  Constitutional: [x] negative [ ] fevers [ ] chills [ ] weight loss [ ] weight gain  HEENT: [ ] negative [ ] dry eyes [ ] eye irritation [ ] postnasal drip [x] nasal congestion  CV: [x] negative  [ ] chest pain [ ] orthopnea [ ] palpitations [ ] murmur  Resp: [ ] negative [x] cough [x] shortness of breath [x] dyspnea [x] wheezing [x] sputum [ ] hemoptysis  GI: [ ] negative [ ] nausea [ ] vomiting [ ] diarrhea [ ] constipation [ ] abd pain [ ] dysphagia   : [x] negative [ ] dysuria [ ] nocturia [ ] hematuria [ ] increased urinary frequency  Musculoskeletal: [x] leg swelling [ ] back pain [ ] myalgias [ ] arthralgias [ ] fracture  Skin: [x] negative [ ] rash [ ] itch  Neurological: [x] negative [ ] headache [ ] dizziness [ ] syncope [ ] weakness [ ] numbness  Psychiatric: [x] negative [ ] anxiety [ ] depression  Endocrine: [ ] negative [x] diabetes [ ] thyroid problem  Hematologic/Lymphatic: [x] negative [ ] anemia [ ] bleeding problem  Allergic/Immunologic: [x] negative [ ] itchy eyes [ ] nasal discharge [ ] hives [ ] angioedema  [x] All other systems negative  [ ] Unable to assess ROS because ________    OBJECTIVE:  ICU Vital Signs Last 24 Hrs  T(C): 36.4 (21 Jul 2022 08:42), Max: 37.1 (21 Jul 2022 01:21)  T(F): 97.5 (21 Jul 2022 08:42), Max: 98.7 (21 Jul 2022 01:21)  HR: 92 (21 Jul 2022 08:42) (92 - 109)  BP: 136/90 (21 Jul 2022 08:42) (126/90 - 147/95)  BP(mean): --  ABP: --  ABP(mean): --  RR: 17 (21 Jul 2022 08:42) (17 - 22)  SpO2: 97% (21 Jul 2022 08:42) (94% - 100%)    O2 Parameters below as of 21 Jul 2022 08:42  Patient On (Oxygen Delivery Method): room air              07-20 @ 07:01  -  07-21 @ 07:00  --------------------------------------------------------  IN: 200 mL / OUT: 0 mL / NET: 200 mL    07-21 @ 07:01  -  07-21 @ 10:38  --------------------------------------------------------  IN: 200 mL / OUT: 0 mL / NET: 200 mL      CAPILLARY BLOOD GLUCOSE      POCT Blood Glucose.: 158 mg/dL (21 Jul 2022 07:57)      PHYSICAL EXAM:  GENERAL: NAD, well-groomed, well-developed  HEAD:  Atraumatic, Normocephalic  EYES: EOMI, PERRLA, conjunctiva and sclera clear  ENMT: Moist mucous membranes  CHEST/LUNG: bilateral end expiratory wheezing, most prominent in the right base.  HEART: Regular rate and rhythm; No murmurs, rubs, or gallops  ABDOMEN: Soft, Nontender, Nondistended; Bowel sounds present  VASCULAR:  1+ pitting lower extremity edema up to the knees bilaterally  NERVOUS SYSTEM:  Alert & Oriented X3, Good concentration    HOSPITAL MEDICATIONS:  Standing Meds:  albuterol/ipratropium for Nebulization 3 milliLiter(s) Nebulizer every 6 hours  aspirin enteric coated 81 milliGRAM(s) Oral daily  benzonatate 200 milliGRAM(s) Oral every 8 hours  budesonide 160 MICROgram(s)/formoterol 4.5 MICROgram(s) Inhaler 2 Puff(s) Inhalation two times a day  dextrose 5%. 1000 milliLiter(s) IV Continuous <Continuous>  dextrose 5%. 1000 milliLiter(s) IV Continuous <Continuous>  dextrose 50% Injectable 25 Gram(s) IV Push once  dextrose 50% Injectable 12.5 Gram(s) IV Push once  dextrose 50% Injectable 25 Gram(s) IV Push once  enoxaparin Injectable 40 milliGRAM(s) SubCutaneous every 24 hours  glucagon  Injectable 1 milliGRAM(s) IntraMuscular once  hydroxychloroquine 400 milliGRAM(s) Oral daily  insulin lispro (ADMELOG) corrective regimen sliding scale   SubCutaneous three times a day before meals  insulin lispro (ADMELOG) corrective regimen sliding scale   SubCutaneous at bedtime  levothyroxine 125 MICROGram(s) Oral daily  losartan 50 milliGRAM(s) Oral daily  mycophenolate mofetil 500 milliGRAM(s) Oral three times a day  pantoprazole    Tablet 40 milliGRAM(s) Oral before breakfast  potassium chloride    Tablet ER 40 milliEquivalent(s) Oral once  predniSONE   Tablet 30 milliGRAM(s) Oral daily  trimethoprim  160 mG/sulfamethoxazole 800 mG 1 Tablet(s) Oral <User Schedule>      PRN Meds:  dextrose Oral Gel 15 Gram(s) Oral once PRN  morphine  - Injectable 4 milliGRAM(s) IV Push every 4 hours PRN  oxycodone    5 mG/acetaminophen 325 mG 2 Tablet(s) Oral every 6 hours PRN      LABS:    The Labs were reviewed by me   The Radiology was reviewed by me    EKG tracing reviewed by me    07-21    134<L>  |  97  |  10  ----------------------------<  409<H>  3.9   |  23  |  1.04  07-20    140  |  101  |  8   ----------------------------<  191<H>  3.4<L>   |  24  |  1.00  07-20    143  |  107  |  7   ----------------------------<  106<H>  2.7<LL>   |  24  |  0.83    Ca    8.8      21 Jul 2022 00:17  Ca    9.0      20 Jul 2022 20:51  Ca    8.0<L>      20 Jul 2022 15:08  Phos  3.0     07-20  Mg     2.1     07-20    TPro  6.3  /  Alb  3.9  /  TBili  0.4  /  DBili  x   /  AST  33  /  ALT  83<H>  /  AlkPhos  97  07-20  TPro  5.8<L>  /  Alb  3.6  /  TBili  0.3  /  DBili  x   /  AST  34  /  ALT  76<H>  /  AlkPhos  86  07-20    Magnesium, Serum: 2.1 mg/dL (07-20-22 @ 20:51)    Phosphorus Level, Serum: 3.0 mg/dL (07-20-22 @ 20:51)      PT/INR - ( 20 Jul 2022 15:08 )   PT: 11.9 sec;   INR: 1.03 ratio         PTT - ( 20 Jul 2022 15:08 )  PTT:27.2 sec                                        13.0   9.11  )-----------( 287      ( 20 Jul 2022 15:08 )             38.1     CAPILLARY BLOOD GLUCOSE      POCT Blood Glucose.: 158 mg/dL (21 Jul 2022 07:57)  POCT Blood Glucose.: 294 mg/dL (21 Jul 2022 03:09)  POCT Blood Glucose.: 285 mg/dL (21 Jul 2022 03:07)    Blood Gas Source Venous: Venous (07-20-22 @ 20:51)  Blood Gas Source Venous: Venous (07-20-22 @ 16:06)  Blood Gas Source Venous: Venous (07-20-22 @ 15:08)     CHIEF COMPLAINT:Patient is a 47y old  Male who presents with a chief complaint of Shortness of breath, Chest and Back Pain (21 Jul 2022 07:50)    HPI:  Pt is a 47y male PMHx SLE on prednisone and Plaquenil, asthma, hypothyroidism, HTN presenting with shortness of breath 2/2 back and pleuritic chest pain.   Reports onset of shortness of breath with cough productive of yellow sputum since mid June.   Pt initially positive for parainfluenza on 6/14, seen in the ED and discharged home.   Returned to hospital and was admitted from 6/25-7/14. Treated with nebulizers and steroids for Asthma exacerbation 2/2 viral respiratory infection. Course with onset of costochondritis 2/2 violent coughing.    After discharge, was still having persistent pain 2/2 costochondritis so returned to the hospital. Pain is from sternum to back with radiation across the chest wall. Worse with motion and coughing and tender to palpation. Was taking flexeril and percocet which provided some relief. Reports good adherence with all medications and office visits.     Found to be RSV Positive on admission on 7/20. Also notes increased pedal edema with decreased urinary output for the past 3 days. Labs notable for K of 2.7 on admission, likely 2/2 Lasix use.      PAST MEDICAL & SURGICAL HISTORY:  Lupus      Asthma      Hypothyroid      History of cholecystectomy          FAMILY HISTORY:      SOCIAL HISTORY:  Smoking: [x] Never Smoked [ ] Former Smoker (__ packs x ___ years) [ ] Current Smoker  (__ packs x ___ years)  Substance Use: [x] Never Used [ ] Used ____  EtOH Use: None    Allergies    No Known Allergies    Intolerances        HOME MEDICATIONS:  Home Medications:  aspirin 81 mg oral delayed release tablet: 1 tab(s) orally once a day (20 Jul 2022 20:04)  furosemide 20 mg oral tablet: 3 tab(s) orally once a day (20 Jul 2022 20:04)  losartan 50 mg oral tablet: 1 tab(s) orally once a day (20 Jul 2022 20:04)  methylPREDNISolone 16 mg oral tablet: 20 milligram(s) orally once a day ( Resumed after prednisone 30mg is completed)  (20 Jul 2022 20:04)  mycophenolate mofetil 500 mg oral tablet: 3 tab(s) orally once a day (20 Jul 2022 20:04)  potassium chloride 20 mEq oral tablet, extended release: 1 tab(s) orally once a day (20 Jul 2022 20:04)  Synthroid 125 mcg (0.125 mg) oral tablet: 1 tab(s) orally once a day (20 Jul 2022 20:04)      REVIEW OF SYSTEMS:  Constitutional: [x] negative [ ] fevers [ ] chills [ ] weight loss [ ] weight gain  HEENT: [ ] negative [ ] dry eyes [ ] eye irritation [ ] postnasal drip [x] nasal congestion  CV: [x] negative  [ ] chest pain [ ] orthopnea [ ] palpitations [ ] murmur  Resp: [ ] negative [x] cough [x] shortness of breath [x] dyspnea [x] wheezing [x] sputum [ ] hemoptysis  GI: [ ] negative [ ] nausea [ ] vomiting [ ] diarrhea [ ] constipation [ ] abd pain [ ] dysphagia   : [x] negative [ ] dysuria [ ] nocturia [ ] hematuria [ ] increased urinary frequency  Musculoskeletal: [x] leg swelling [ ] back pain [ ] myalgias [ ] arthralgias [ ] fracture  Skin: [x] negative [ ] rash [ ] itch  Neurological: [x] negative [ ] headache [ ] dizziness [ ] syncope [ ] weakness [ ] numbness  Psychiatric: [x] negative [ ] anxiety [ ] depression  Endocrine: [ ] negative [x] diabetes [ ] thyroid problem  Hematologic/Lymphatic: [x] negative [ ] anemia [ ] bleeding problem  Allergic/Immunologic: [x] negative [ ] itchy eyes [ ] nasal discharge [ ] hives [ ] angioedema  [x] All other systems negative  [ ] Unable to assess ROS because ________    OBJECTIVE:  ICU Vital Signs Last 24 Hrs  T(C): 36.4 (21 Jul 2022 08:42), Max: 37.1 (21 Jul 2022 01:21)  T(F): 97.5 (21 Jul 2022 08:42), Max: 98.7 (21 Jul 2022 01:21)  HR: 92 (21 Jul 2022 08:42) (92 - 109)  BP: 136/90 (21 Jul 2022 08:42) (126/90 - 147/95)  BP(mean): --  ABP: --  ABP(mean): --  RR: 17 (21 Jul 2022 08:42) (17 - 22)  SpO2: 97% (21 Jul 2022 08:42) (94% - 100%)    O2 Parameters below as of 21 Jul 2022 08:42  Patient On (Oxygen Delivery Method): room air              07-20 @ 07:01  -  07-21 @ 07:00  --------------------------------------------------------  IN: 200 mL / OUT: 0 mL / NET: 200 mL    07-21 @ 07:01  -  07-21 @ 10:38  --------------------------------------------------------  IN: 200 mL / OUT: 0 mL / NET: 200 mL      CAPILLARY BLOOD GLUCOSE      POCT Blood Glucose.: 158 mg/dL (21 Jul 2022 07:57)      PHYSICAL EXAM:  GENERAL: NAD, well-groomed, well-developed  HEAD:  Atraumatic, Normocephalic  EYES: EOMI, PERRLA, conjunctiva and sclera clear  ENMT: Moist mucous membranes  CHEST/LUNG: bilateral end expiratory wheezing, most prominent in the right base.  HEART: Regular rate and rhythm; No murmurs, rubs, or gallops  ABDOMEN: Soft, Nontender, Nondistended; Bowel sounds present  VASCULAR:  1+ pitting lower extremity edema up to the knees bilaterally  NERVOUS SYSTEM:  Alert & Oriented X3, Good concentration    HOSPITAL MEDICATIONS:  Standing Meds:  albuterol/ipratropium for Nebulization 3 milliLiter(s) Nebulizer every 6 hours  aspirin enteric coated 81 milliGRAM(s) Oral daily  benzonatate 200 milliGRAM(s) Oral every 8 hours  budesonide 160 MICROgram(s)/formoterol 4.5 MICROgram(s) Inhaler 2 Puff(s) Inhalation two times a day  dextrose 5%. 1000 milliLiter(s) IV Continuous <Continuous>  dextrose 5%. 1000 milliLiter(s) IV Continuous <Continuous>  dextrose 50% Injectable 25 Gram(s) IV Push once  dextrose 50% Injectable 12.5 Gram(s) IV Push once  dextrose 50% Injectable 25 Gram(s) IV Push once  enoxaparin Injectable 40 milliGRAM(s) SubCutaneous every 24 hours  glucagon  Injectable 1 milliGRAM(s) IntraMuscular once  hydroxychloroquine 400 milliGRAM(s) Oral daily  insulin lispro (ADMELOG) corrective regimen sliding scale   SubCutaneous three times a day before meals  insulin lispro (ADMELOG) corrective regimen sliding scale   SubCutaneous at bedtime  levothyroxine 125 MICROGram(s) Oral daily  losartan 50 milliGRAM(s) Oral daily  mycophenolate mofetil 500 milliGRAM(s) Oral three times a day  pantoprazole    Tablet 40 milliGRAM(s) Oral before breakfast  potassium chloride    Tablet ER 40 milliEquivalent(s) Oral once  predniSONE   Tablet 30 milliGRAM(s) Oral daily  trimethoprim  160 mG/sulfamethoxazole 800 mG 1 Tablet(s) Oral <User Schedule>      PRN Meds:  dextrose Oral Gel 15 Gram(s) Oral once PRN  morphine  - Injectable 4 milliGRAM(s) IV Push every 4 hours PRN  oxycodone    5 mG/acetaminophen 325 mG 2 Tablet(s) Oral every 6 hours PRN      LABS:    The Labs were reviewed by me   The Radiology was reviewed by me    EKG tracing reviewed by me    07-21    134<L>  |  97  |  10  ----------------------------<  409<H>  3.9   |  23  |  1.04  07-20    140  |  101  |  8   ----------------------------<  191<H>  3.4<L>   |  24  |  1.00  07-20    143  |  107  |  7   ----------------------------<  106<H>  2.7<LL>   |  24  |  0.83    Ca    8.8      21 Jul 2022 00:17  Ca    9.0      20 Jul 2022 20:51  Ca    8.0<L>      20 Jul 2022 15:08  Phos  3.0     07-20  Mg     2.1     07-20    TPro  6.3  /  Alb  3.9  /  TBili  0.4  /  DBili  x   /  AST  33  /  ALT  83<H>  /  AlkPhos  97  07-20  TPro  5.8<L>  /  Alb  3.6  /  TBili  0.3  /  DBili  x   /  AST  34  /  ALT  76<H>  /  AlkPhos  86  07-20    Magnesium, Serum: 2.1 mg/dL (07-20-22 @ 20:51)    Phosphorus Level, Serum: 3.0 mg/dL (07-20-22 @ 20:51)      PT/INR - ( 20 Jul 2022 15:08 )   PT: 11.9 sec;   INR: 1.03 ratio         PTT - ( 20 Jul 2022 15:08 )  PTT:27.2 sec                                        13.0   9.11  )-----------( 287      ( 20 Jul 2022 15:08 )             38.1     CAPILLARY BLOOD GLUCOSE      POCT Blood Glucose.: 158 mg/dL (21 Jul 2022 07:57)  POCT Blood Glucose.: 294 mg/dL (21 Jul 2022 03:09)  POCT Blood Glucose.: 285 mg/dL (21 Jul 2022 03:07)    Blood Gas Source Venous: Venous (07-20-22 @ 20:51)  Blood Gas Source Venous: Venous (07-20-22 @ 16:06)  Blood Gas Source Venous: Venous (07-20-22 @ 15:08)

## 2022-07-22 LAB
ANION GAP SERPL CALC-SCNC: 14 MMOL/L — SIGNIFICANT CHANGE UP (ref 5–17)
APPEARANCE UR: CLEAR — SIGNIFICANT CHANGE UP
BACTERIA # UR AUTO: NEGATIVE — SIGNIFICANT CHANGE UP
BILIRUB UR-MCNC: NEGATIVE — SIGNIFICANT CHANGE UP
BUN SERPL-MCNC: 10 MG/DL — SIGNIFICANT CHANGE UP (ref 7–23)
CALCIUM SERPL-MCNC: 9.9 MG/DL — SIGNIFICANT CHANGE UP (ref 8.4–10.5)
CHLORIDE SERPL-SCNC: 95 MMOL/L — LOW (ref 96–108)
CO2 SERPL-SCNC: 27 MMOL/L — SIGNIFICANT CHANGE UP (ref 22–31)
COLOR SPEC: YELLOW — SIGNIFICANT CHANGE UP
CREAT ?TM UR-MCNC: 377 MG/DL — SIGNIFICANT CHANGE UP
CREAT SERPL-MCNC: 1.09 MG/DL — SIGNIFICANT CHANGE UP (ref 0.5–1.3)
DIFF PNL FLD: NEGATIVE — SIGNIFICANT CHANGE UP
EGFR: 85 ML/MIN/1.73M2 — SIGNIFICANT CHANGE UP
EPI CELLS # UR: 3 /HPF — SIGNIFICANT CHANGE UP
GLUCOSE SERPL-MCNC: 144 MG/DL — HIGH (ref 70–99)
GLUCOSE UR QL: NEGATIVE — SIGNIFICANT CHANGE UP
HCT VFR BLD CALC: 43 % — SIGNIFICANT CHANGE UP (ref 39–50)
HGB BLD-MCNC: 14.3 G/DL — SIGNIFICANT CHANGE UP (ref 13–17)
HYALINE CASTS # UR AUTO: 1 /LPF — SIGNIFICANT CHANGE UP (ref 0–7)
KETONES UR-MCNC: NEGATIVE — SIGNIFICANT CHANGE UP
LEUKOCYTE ESTERASE UR-ACNC: NEGATIVE — SIGNIFICANT CHANGE UP
MAGNESIUM SERPL-MCNC: 2.2 MG/DL — SIGNIFICANT CHANGE UP (ref 1.6–2.6)
MCHC RBC-ENTMCNC: 30.8 PG — SIGNIFICANT CHANGE UP (ref 27–34)
MCHC RBC-ENTMCNC: 33.3 GM/DL — SIGNIFICANT CHANGE UP (ref 32–36)
MCV RBC AUTO: 92.7 FL — SIGNIFICANT CHANGE UP (ref 80–100)
NITRITE UR-MCNC: NEGATIVE — SIGNIFICANT CHANGE UP
NRBC # BLD: 0 /100 WBCS — SIGNIFICANT CHANGE UP (ref 0–0)
PH UR: 6.5 — SIGNIFICANT CHANGE UP (ref 5–8)
PHOSPHATE SERPL-MCNC: 2.8 MG/DL — SIGNIFICANT CHANGE UP (ref 2.5–4.5)
PLATELET # BLD AUTO: 327 K/UL — SIGNIFICANT CHANGE UP (ref 150–400)
POTASSIUM SERPL-MCNC: 3.4 MMOL/L — LOW (ref 3.5–5.3)
POTASSIUM SERPL-SCNC: 3.4 MMOL/L — LOW (ref 3.5–5.3)
PROT ?TM UR-MCNC: 74 MG/DL — HIGH (ref 0–12)
PROT UR-MCNC: 100 — SIGNIFICANT CHANGE UP
PROT/CREAT UR-RTO: 0.2 RATIO — SIGNIFICANT CHANGE UP (ref 0–0.2)
RBC # BLD: 4.64 M/UL — SIGNIFICANT CHANGE UP (ref 4.2–5.8)
RBC # FLD: 14.2 % — SIGNIFICANT CHANGE UP (ref 10.3–14.5)
RBC CASTS # UR COMP ASSIST: 2 /HPF — SIGNIFICANT CHANGE UP (ref 0–4)
SODIUM SERPL-SCNC: 136 MMOL/L — SIGNIFICANT CHANGE UP (ref 135–145)
SP GR SPEC: 1.03 — HIGH (ref 1.01–1.02)
UROBILINOGEN FLD QL: NEGATIVE — SIGNIFICANT CHANGE UP
WBC # BLD: 8.91 K/UL — SIGNIFICANT CHANGE UP (ref 3.8–10.5)
WBC # FLD AUTO: 8.91 K/UL — SIGNIFICANT CHANGE UP (ref 3.8–10.5)
WBC UR QL: 2 /HPF — SIGNIFICANT CHANGE UP (ref 0–5)

## 2022-07-22 PROCEDURE — 99233 SBSQ HOSP IP/OBS HIGH 50: CPT | Mod: GC

## 2022-07-22 RX ORDER — LIDOCAINE 4 G/100G
2 CREAM TOPICAL DAILY
Refills: 0 | Status: DISCONTINUED | OUTPATIENT
Start: 2022-07-22 | End: 2022-07-28

## 2022-07-22 RX ORDER — POTASSIUM CHLORIDE 20 MEQ
40 PACKET (EA) ORAL ONCE
Refills: 0 | Status: COMPLETED | OUTPATIENT
Start: 2022-07-22 | End: 2022-07-22

## 2022-07-22 RX ORDER — ACETAMINOPHEN 500 MG
1000 TABLET ORAL EVERY 6 HOURS
Refills: 0 | Status: COMPLETED | OUTPATIENT
Start: 2022-07-22 | End: 2022-07-25

## 2022-07-22 RX ORDER — HYDROMORPHONE HYDROCHLORIDE 2 MG/ML
1 INJECTION INTRAMUSCULAR; INTRAVENOUS; SUBCUTANEOUS
Refills: 0 | Status: DISCONTINUED | OUTPATIENT
Start: 2022-07-22 | End: 2022-07-24

## 2022-07-22 RX ORDER — CYCLOBENZAPRINE HYDROCHLORIDE 10 MG/1
5 TABLET, FILM COATED ORAL AT BEDTIME
Refills: 0 | Status: DISCONTINUED | OUTPATIENT
Start: 2022-07-22 | End: 2022-07-28

## 2022-07-22 RX ORDER — POLYETHYLENE GLYCOL 3350 17 G/17G
17 POWDER, FOR SOLUTION ORAL DAILY
Refills: 0 | Status: DISCONTINUED | OUTPATIENT
Start: 2022-07-22 | End: 2022-07-28

## 2022-07-22 RX ADMIN — CYCLOBENZAPRINE HYDROCHLORIDE 5 MILLIGRAM(S): 10 TABLET, FILM COATED ORAL at 22:36

## 2022-07-22 RX ADMIN — Medication 40 MILLIGRAM(S): at 12:29

## 2022-07-22 RX ADMIN — MORPHINE SULFATE 4 MILLIGRAM(S): 50 CAPSULE, EXTENDED RELEASE ORAL at 06:27

## 2022-07-22 RX ADMIN — Medication 3 MILLILITER(S): at 23:22

## 2022-07-22 RX ADMIN — Medication 81 MILLIGRAM(S): at 12:31

## 2022-07-22 RX ADMIN — Medication 40 MILLIEQUIVALENT(S): at 12:29

## 2022-07-22 RX ADMIN — ALBUTEROL 2.5 MILLIGRAM(S): 90 AEROSOL, METERED ORAL at 17:28

## 2022-07-22 RX ADMIN — Medication 0.6 MILLIGRAM(S): at 06:30

## 2022-07-22 RX ADMIN — HYDROMORPHONE HYDROCHLORIDE 1 MILLIGRAM(S): 2 INJECTION INTRAMUSCULAR; INTRAVENOUS; SUBCUTANEOUS at 23:18

## 2022-07-22 RX ADMIN — Medication 200 MILLIGRAM(S): at 06:29

## 2022-07-22 RX ADMIN — Medication 1000 MILLIGRAM(S): at 17:58

## 2022-07-22 RX ADMIN — BUDESONIDE AND FORMOTEROL FUMARATE DIHYDRATE 2 PUFF(S): 160; 4.5 AEROSOL RESPIRATORY (INHALATION) at 06:28

## 2022-07-22 RX ADMIN — Medication 3 MILLILITER(S): at 17:29

## 2022-07-22 RX ADMIN — Medication 0.6 MILLIGRAM(S): at 17:28

## 2022-07-22 RX ADMIN — HYDROMORPHONE HYDROCHLORIDE 1 MILLIGRAM(S): 2 INJECTION INTRAMUSCULAR; INTRAVENOUS; SUBCUTANEOUS at 23:50

## 2022-07-22 RX ADMIN — Medication 1000 MILLIGRAM(S): at 17:28

## 2022-07-22 RX ADMIN — Medication 3: at 17:27

## 2022-07-22 RX ADMIN — MORPHINE SULFATE 4 MILLIGRAM(S): 50 CAPSULE, EXTENDED RELEASE ORAL at 06:57

## 2022-07-22 RX ADMIN — Medication 1: at 12:47

## 2022-07-22 RX ADMIN — ENOXAPARIN SODIUM 40 MILLIGRAM(S): 100 INJECTION SUBCUTANEOUS at 12:30

## 2022-07-22 RX ADMIN — Medication 400 MILLIGRAM(S): at 17:28

## 2022-07-22 RX ADMIN — LOSARTAN POTASSIUM 50 MILLIGRAM(S): 100 TABLET, FILM COATED ORAL at 06:29

## 2022-07-22 RX ADMIN — HYDROMORPHONE HYDROCHLORIDE 1 MILLIGRAM(S): 2 INJECTION INTRAMUSCULAR; INTRAVENOUS; SUBCUTANEOUS at 14:13

## 2022-07-22 RX ADMIN — Medication 200 MILLIGRAM(S): at 22:31

## 2022-07-22 RX ADMIN — PANTOPRAZOLE SODIUM 40 MILLIGRAM(S): 20 TABLET, DELAYED RELEASE ORAL at 06:29

## 2022-07-22 RX ADMIN — Medication 60 MILLIGRAM(S): at 12:31

## 2022-07-22 RX ADMIN — POLYETHYLENE GLYCOL 3350 17 GRAM(S): 17 POWDER, FOR SOLUTION ORAL at 13:43

## 2022-07-22 RX ADMIN — Medication 125 MICROGRAM(S): at 06:29

## 2022-07-22 RX ADMIN — HYDROMORPHONE HYDROCHLORIDE 1 MILLIGRAM(S): 2 INJECTION INTRAMUSCULAR; INTRAVENOUS; SUBCUTANEOUS at 13:43

## 2022-07-22 RX ADMIN — Medication 200 MILLIGRAM(S): at 13:38

## 2022-07-22 RX ADMIN — LIDOCAINE 2 PATCH: 4 CREAM TOPICAL at 12:30

## 2022-07-22 RX ADMIN — LIDOCAINE 2 PATCH: 4 CREAM TOPICAL at 23:21

## 2022-07-22 RX ADMIN — Medication 3 MILLILITER(S): at 06:28

## 2022-07-22 RX ADMIN — LIDOCAINE 2 PATCH: 4 CREAM TOPICAL at 20:37

## 2022-07-22 RX ADMIN — Medication 30 MILLIGRAM(S): at 06:33

## 2022-07-22 NOTE — PROGRESS NOTE ADULT - SUBJECTIVE AND OBJECTIVE BOX
Pulmonary Consult Follow up     Interval Events:          REVIEW OF SYSTEMS:  [x] All other systems negative except per HPI   [ ] Unable to assess ROS because ________    OBJECTIVE:  ICU Vital Signs Last 24 Hrs  T(C): 36.4 (22 Jul 2022 07:32), Max: 36.9 (22 Jul 2022 00:12)  T(F): 97.6 (22 Jul 2022 07:32), Max: 98.4 (22 Jul 2022 00:12)  HR: 114 (22 Jul 2022 07:32) (92 - 114)  BP: 125/99 (22 Jul 2022 07:32) (119/79 - 136/90)  BP(mean): --  ABP: --  ABP(mean): --  RR: 18 (22 Jul 2022 07:32) (17 - 18)  SpO2: 98% (22 Jul 2022 07:32) (95% - 98%)    O2 Parameters below as of 22 Jul 2022 07:32  Patient On (Oxygen Delivery Method): room air              07-21 @ 07:01  -  07-22 @ 07:00  --------------------------------------------------------  IN: 1140 mL / OUT: 30 mL / NET: 1110 mL        PHYSICAL EXAM:  GENERAL: NAD, well-groomed, well-developed  EYES: EOMI,conjunctiva and sclera clear  CHEST/LUNG: Clear to auscultation bilaterally; No rales, rhonchi, wheezing, or rubs  HEART: Regular rate and rhythm; No murmurs, rubs, or gallops  ABDOMEN: Soft, Nontender, Nondistended  VASCULAR:  1+ pitting b/l LE Edema up to knees  SKIN: No rashes or lesions  NERVOUS SYSTEM:  Alert & Oriented X3, Good concentration    HOSPITAL MEDICATIONS:  MEDICATIONS  (STANDING):  acetaminophen     Tablet .. 1000 milliGRAM(s) Oral every 8 hours  albuterol/ipratropium for Nebulization 3 milliLiter(s) Nebulizer every 6 hours  aspirin enteric coated 81 milliGRAM(s) Oral daily  benzonatate 200 milliGRAM(s) Oral every 8 hours  budesonide 160 MICROgram(s)/formoterol 4.5 MICROgram(s) Inhaler 2 Puff(s) Inhalation two times a day  colchicine 0.6 milliGRAM(s) Oral two times a day  dextrose 5%. 1000 milliLiter(s) (50 mL/Hr) IV Continuous <Continuous>  dextrose 5%. 1000 milliLiter(s) (100 mL/Hr) IV Continuous <Continuous>  dextrose 50% Injectable 25 Gram(s) IV Push once  dextrose 50% Injectable 12.5 Gram(s) IV Push once  dextrose 50% Injectable 25 Gram(s) IV Push once  enoxaparin Injectable 40 milliGRAM(s) SubCutaneous every 24 hours  furosemide   Injectable 60 milliGRAM(s) IV Push daily  glucagon  Injectable 1 milliGRAM(s) IntraMuscular once  hydroxychloroquine 400 milliGRAM(s) Oral daily  insulin lispro (ADMELOG) corrective regimen sliding scale   SubCutaneous at bedtime  insulin lispro (ADMELOG) corrective regimen sliding scale   SubCutaneous three times a day before meals  levothyroxine 125 MICROGram(s) Oral daily  losartan 50 milliGRAM(s) Oral daily  morphine  - Injectable 4 milliGRAM(s) IV Push every 6 hours  pantoprazole    Tablet 40 milliGRAM(s) Oral before breakfast  predniSONE   Tablet 30 milliGRAM(s) Oral daily  trimethoprim  160 mG/sulfamethoxazole 800 mG 1 Tablet(s) Oral <User Schedule>    MEDICATIONS  (PRN):  ALBUTerol    0.083% 2.5 milliGRAM(s) Nebulizer every 3 hours PRN Shortness of Breath and/or Wheezing  dextrose Oral Gel 15 Gram(s) Oral once PRN Blood Glucose LESS THAN 70 milliGRAM(s)/deciliter      LABS:    07-21    138  |  99  |  11  ----------------------------<  155<H>  4.1   |  25  |  0.95  07-21    134<L>  |  97  |  10  ----------------------------<  409<H>  3.9   |  23  |  1.04  07-20    140  |  101  |  8   ----------------------------<  191<H>  3.4<L>   |  24  |  1.00    Ca    9.7      21 Jul 2022 11:13  Ca    8.8      21 Jul 2022 00:17  Ca    9.0      20 Jul 2022 20:51  Phos  2.7     07-21  Mg     2.1     07-21    TPro  6.3  /  Alb  3.9  /  TBili  0.4  /  DBili  x   /  AST  33  /  ALT  83<H>  /  AlkPhos  97  07-20  TPro  5.8<L>  /  Alb  3.6  /  TBili  0.3  /  DBili  x   /  AST  34  /  ALT  76<H>  /  AlkPhos  86  07-20    Magnesium, Serum: 2.1 mg/dL (07-21-22 @ 11:13)  Magnesium, Serum: 2.1 mg/dL (07-20-22 @ 20:51)    Phosphorus Level, Serum: 2.7 mg/dL (07-21-22 @ 11:13)  Phosphorus Level, Serum: 3.0 mg/dL (07-20-22 @ 20:51)      PT/INR - ( 20 Jul 2022 15:08 )   PT: 11.9 sec;   INR: 1.03 ratio         PTT - ( 20 Jul 2022 15:08 )  PTT:27.2 sec                                        12.7   7.53  )-----------( 294      ( 21 Jul 2022 11:13 )             37.6                         13.0   9.11  )-----------( 287      ( 20 Jul 2022 15:08 )             38.1     CAPILLARY BLOOD GLUCOSE      POCT Blood Glucose.: 156 mg/dL (21 Jul 2022 21:17)  POCT Blood Glucose.: 136 mg/dL (21 Jul 2022 16:38)  POCT Blood Glucose.: 157 mg/dL (21 Jul 2022 12:04)  POCT Blood Glucose.: 158 mg/dL (21 Jul 2022 07:57)    Blood Gas Source Venous: Venous (07-20-22 @ 20:51)  Blood Gas Source Venous: Venous (07-20-22 @ 16:06)  Blood Gas Source Venous: Venous (07-20-22 @ 15:08)       Pulmonary Consult Follow up     Interval Events:    Reports he had a rough night as he was coughing a lot.     This morning, still c/o wheezing.    Started on Colchicine 0.6mg BID by rheum.    REVIEW OF SYSTEMS:  [x] All other systems negative except per HPI   [ ] Unable to assess ROS because ________    OBJECTIVE:  ICU Vital Signs Last 24 Hrs  T(C): 36.4 (22 Jul 2022 07:32), Max: 36.9 (22 Jul 2022 00:12)  T(F): 97.6 (22 Jul 2022 07:32), Max: 98.4 (22 Jul 2022 00:12)  HR: 114 (22 Jul 2022 07:32) (92 - 114)  BP: 125/99 (22 Jul 2022 07:32) (119/79 - 136/90)  BP(mean): --  ABP: --  ABP(mean): --  RR: 18 (22 Jul 2022 07:32) (17 - 18)  SpO2: 98% (22 Jul 2022 07:32) (95% - 98%)    O2 Parameters below as of 22 Jul 2022 07:32  Patient On (Oxygen Delivery Method): room air              07-21 @ 07:01  -  07-22 @ 07:00  --------------------------------------------------------  IN: 1140 mL / OUT: 30 mL / NET: 1110 mL        PHYSICAL EXAM:  GENERAL: NAD, well-groomed, well-developed  EYES: EOMI, conjunctiva and sclera clear  CHEST/LUNG: Bilateral expiratory wheezing throughout   HEART: Regular rate and rhythm; No murmurs, rubs, or gallops  ABDOMEN: Soft, Nontender, Nondistended  VASCULAR:  1+ pitting b/l LE Edema up to knees  SKIN: No rashes or lesions  NERVOUS SYSTEM:  Alert & Oriented X3, Good concentration    HOSPITAL MEDICATIONS:  MEDICATIONS  (STANDING):  acetaminophen     Tablet .. 1000 milliGRAM(s) Oral every 8 hours  albuterol/ipratropium for Nebulization 3 milliLiter(s) Nebulizer every 6 hours  aspirin enteric coated 81 milliGRAM(s) Oral daily  benzonatate 200 milliGRAM(s) Oral every 8 hours  budesonide 160 MICROgram(s)/formoterol 4.5 MICROgram(s) Inhaler 2 Puff(s) Inhalation two times a day  colchicine 0.6 milliGRAM(s) Oral two times a day  dextrose 5%. 1000 milliLiter(s) (50 mL/Hr) IV Continuous <Continuous>  dextrose 5%. 1000 milliLiter(s) (100 mL/Hr) IV Continuous <Continuous>  dextrose 50% Injectable 25 Gram(s) IV Push once  dextrose 50% Injectable 12.5 Gram(s) IV Push once  dextrose 50% Injectable 25 Gram(s) IV Push once  enoxaparin Injectable 40 milliGRAM(s) SubCutaneous every 24 hours  furosemide   Injectable 60 milliGRAM(s) IV Push daily  glucagon  Injectable 1 milliGRAM(s) IntraMuscular once  hydroxychloroquine 400 milliGRAM(s) Oral daily  insulin lispro (ADMELOG) corrective regimen sliding scale   SubCutaneous at bedtime  insulin lispro (ADMELOG) corrective regimen sliding scale   SubCutaneous three times a day before meals  levothyroxine 125 MICROGram(s) Oral daily  losartan 50 milliGRAM(s) Oral daily  morphine  - Injectable 4 milliGRAM(s) IV Push every 6 hours  pantoprazole    Tablet 40 milliGRAM(s) Oral before breakfast  predniSONE   Tablet 30 milliGRAM(s) Oral daily  trimethoprim  160 mG/sulfamethoxazole 800 mG 1 Tablet(s) Oral <User Schedule>    MEDICATIONS  (PRN):  ALBUTerol    0.083% 2.5 milliGRAM(s) Nebulizer every 3 hours PRN Shortness of Breath and/or Wheezing  dextrose Oral Gel 15 Gram(s) Oral once PRN Blood Glucose LESS THAN 70 milliGRAM(s)/deciliter      LABS:    07-21    138  |  99  |  11  ----------------------------<  155<H>  4.1   |  25  |  0.95  07-21    134<L>  |  97  |  10  ----------------------------<  409<H>  3.9   |  23  |  1.04  07-20    140  |  101  |  8   ----------------------------<  191<H>  3.4<L>   |  24  |  1.00    Ca    9.7      21 Jul 2022 11:13  Ca    8.8      21 Jul 2022 00:17  Ca    9.0      20 Jul 2022 20:51  Phos  2.7     07-21  Mg     2.1     07-21    TPro  6.3  /  Alb  3.9  /  TBili  0.4  /  DBili  x   /  AST  33  /  ALT  83<H>  /  AlkPhos  97  07-20  TPro  5.8<L>  /  Alb  3.6  /  TBili  0.3  /  DBili  x   /  AST  34  /  ALT  76<H>  /  AlkPhos  86  07-20    Magnesium, Serum: 2.1 mg/dL (07-21-22 @ 11:13)  Magnesium, Serum: 2.1 mg/dL (07-20-22 @ 20:51)    Phosphorus Level, Serum: 2.7 mg/dL (07-21-22 @ 11:13)  Phosphorus Level, Serum: 3.0 mg/dL (07-20-22 @ 20:51)      PT/INR - ( 20 Jul 2022 15:08 )   PT: 11.9 sec;   INR: 1.03 ratio         PTT - ( 20 Jul 2022 15:08 )  PTT:27.2 sec                                        12.7   7.53  )-----------( 294      ( 21 Jul 2022 11:13 )             37.6                         13.0   9.11  )-----------( 287      ( 20 Jul 2022 15:08 )             38.1     CAPILLARY BLOOD GLUCOSE      POCT Blood Glucose.: 156 mg/dL (21 Jul 2022 21:17)  POCT Blood Glucose.: 136 mg/dL (21 Jul 2022 16:38)  POCT Blood Glucose.: 157 mg/dL (21 Jul 2022 12:04)  POCT Blood Glucose.: 158 mg/dL (21 Jul 2022 07:57)    Blood Gas Source Venous: Venous (07-20-22 @ 20:51)  Blood Gas Source Venous: Venous (07-20-22 @ 16:06)  Blood Gas Source Venous: Venous (07-20-22 @ 15:08)

## 2022-07-22 NOTE — PROGRESS NOTE ADULT - PROBLEM SELECTOR PLAN 2
K+ found to be 2.7 on labs at admission, administered 30 mEq IV K+, 40 mEq po KCl  -K+ currently 4.1  -f/u BMP and replete as needed with oral and IV K+ considering diuresis K+ found to be 2.7 on labs at admission, administered 30 mEq IV K+, 40 mEq po KCl  -K+ currently 3.4  -f/u BMP and replete as needed with oral and IV K+ considering diuresis

## 2022-07-22 NOTE — PROGRESS NOTE ADULT - PROBLEM SELECTOR PLAN 1
EKG sinus tach, trops wnl, CXR negative for acute pulmonary pathology; causes of symptoms likely costochondritis  i/s/o lupus with recent hospitalization for acute asthma exacerbation 2/2 RSV infection with associated cough  -procal, C3, C4, anti-dsDNA  -ctm pain, currently controlling w/ morphine and percocet  -antitussives (tessalon)  -rheumatology consulted, appreciate recs    -hold cellcept    -continue plaquenil    -echo pending    -solumedrol 80mg taper instead of prednisone 30mg    -UA w/ microscopyl urine Pr/Cr ratio  -D dimer negative EKG sinus tach, trops wnl, CXR negative for acute pulmonary pathology; causes of symptoms likely costochondritis  i/s/o lupus with recent hospitalization for acute asthma exacerbation 2/2 RSV infection with associated cough  -procal elevated; C3, C4, anti-dsDNA wnl  -ctm pain, increase morphine to 6mg, hycodan on board; acetaminophen 1g on board  -antitussives (tessalon)  -rheumatology consulted, appreciate recs    -hold cellcept    -continue plaquenil    -echo pending    -UA w/ microscopy, urine Pr/Cr ratio  -D dimer negative

## 2022-07-22 NOTE — PROGRESS NOTE ADULT - ASSESSMENT
47y male PMHx SLE on pred/HCQ/MMF, asthma, hypothyroidism, HTN presenting with shortness of breath 2/2 repeated viral infections and costochondritis.     # Costochondritis  - Recommend anti-inflammatory medications, such as a short course of Naproxen, if pt is amenable.   - Tylenol if pt does not want nsaids  - Hycodan for cough suppression   - Can try lidocaine patch over the tender area    # Asthma, persistent  - Pt with wheezing on exam  - Please continue home inhalers  - Diligent repletion of potassium.  - Pt should be counseled on mask wearing given 3 different viral infections in the past 5 weeks.      47y male PMHx SLE on pred/HCQ/MMF, asthma, hypothyroidism, HTN presenting with shortness of breath 2/2 repeated viral infections and costochondritis.     # Costochondritis  - Pt does not want NSAIDS.  - C/w Colchicine as per Rheum.  - Diligent cough suppression to prevent worsening costochondritis  - Hycodan for cough suppression   - Can try lidocaine patch over the tender area    # Asthma, persistent  - Pt with wheezing on exam  - Please continue home inhalers  - Diligent repletion of potassium.  - Pt should be counseled on mask wearing given 3 different viral infections in the past 5 weeks.      47y male PMHx SLE on pred/HCQ/MMF, asthma, hypothyroidism, HTN presenting with shortness of breath 2/2 repeated viral infections and costochondritis.     # Costochondritis  - Pt does not want NSAIDS.  - C/w Colchicine as per Rheum.  - Diligent cough suppression to prevent worsening costochondritis  - Hycodan for cough suppression   - Can try lidocaine patch over the tender area    # Asthma, persistent  - Pt with wheezing on exam  - Please continue home inhalers  - Agree with increase in steroids - Now on methylprednisolone 40mg IV daily  - Diligent repletion of potassium.  - Pt should be counseled on mask wearing given 3 different viral infections in the past 5 weeks.

## 2022-07-22 NOTE — PROGRESS NOTE ADULT - SUBJECTIVE AND OBJECTIVE BOX
Monae Singh MD  PGY 1 Department of Internal Medicine        Patient is a 47y old  Male who presents with a chief complaint of Shortness of breath, Chest and Back Pain (22 Jul 2022 07:45)      SUBJECTIVE / OVERNIGHT EVENTS: Pt seen and examined. No acute overnight events. Denies fevers, chills, CP, SOB, Abdominal pain, N/V, Constipation, Diarrhea        MEDICATIONS  (STANDING):  acetaminophen     Tablet .. 1000 milliGRAM(s) Oral every 8 hours  albuterol/ipratropium for Nebulization 3 milliLiter(s) Nebulizer every 6 hours  aspirin enteric coated 81 milliGRAM(s) Oral daily  benzonatate 200 milliGRAM(s) Oral every 8 hours  budesonide 160 MICROgram(s)/formoterol 4.5 MICROgram(s) Inhaler 2 Puff(s) Inhalation two times a day  colchicine 0.6 milliGRAM(s) Oral two times a day  dextrose 5%. 1000 milliLiter(s) (50 mL/Hr) IV Continuous <Continuous>  dextrose 5%. 1000 milliLiter(s) (100 mL/Hr) IV Continuous <Continuous>  dextrose 50% Injectable 25 Gram(s) IV Push once  dextrose 50% Injectable 12.5 Gram(s) IV Push once  dextrose 50% Injectable 25 Gram(s) IV Push once  enoxaparin Injectable 40 milliGRAM(s) SubCutaneous every 24 hours  furosemide   Injectable 60 milliGRAM(s) IV Push daily  glucagon  Injectable 1 milliGRAM(s) IntraMuscular once  hydroxychloroquine 400 milliGRAM(s) Oral daily  insulin lispro (ADMELOG) corrective regimen sliding scale   SubCutaneous at bedtime  insulin lispro (ADMELOG) corrective regimen sliding scale   SubCutaneous three times a day before meals  levothyroxine 125 MICROGram(s) Oral daily  losartan 50 milliGRAM(s) Oral daily  morphine  - Injectable 4 milliGRAM(s) IV Push every 6 hours  pantoprazole    Tablet 40 milliGRAM(s) Oral before breakfast  predniSONE   Tablet 30 milliGRAM(s) Oral daily  trimethoprim  160 mG/sulfamethoxazole 800 mG 1 Tablet(s) Oral <User Schedule>    MEDICATIONS  (PRN):  ALBUTerol    0.083% 2.5 milliGRAM(s) Nebulizer every 3 hours PRN Shortness of Breath and/or Wheezing  dextrose Oral Gel 15 Gram(s) Oral once PRN Blood Glucose LESS THAN 70 milliGRAM(s)/deciliter      I&O's Summary    21 Jul 2022 07:01  -  22 Jul 2022 07:00  --------------------------------------------------------  IN: 1140 mL / OUT: 30 mL / NET: 1110 mL        Vital Signs Last 24 Hrs  T(C): 36.4 (22 Jul 2022 07:32), Max: 36.9 (22 Jul 2022 00:12)  T(F): 97.6 (22 Jul 2022 07:32), Max: 98.4 (22 Jul 2022 00:12)  HR: 114 (22 Jul 2022 07:32) (92 - 114)  BP: 125/99 (22 Jul 2022 07:32) (119/79 - 136/90)  BP(mean): --  RR: 18 (22 Jul 2022 07:32) (17 - 18)  SpO2: 98% (22 Jul 2022 07:32) (95% - 98%)    Parameters below as of 22 Jul 2022 07:32  Patient On (Oxygen Delivery Method): room air        CAPILLARY BLOOD GLUCOSE      POCT Blood Glucose.: 156 mg/dL (21 Jul 2022 21:17)  POCT Blood Glucose.: 136 mg/dL (21 Jul 2022 16:38)  POCT Blood Glucose.: 157 mg/dL (21 Jul 2022 12:04)      PHYSICAL EXAM:  GENERAL: NAD,   HEAD:  Atraumatic, Normocephalic  EYES: EOMI, PERRL, conjunctiva and sclera clear  NECK: No JVD  CHEST/LUNG: Clear to auscultation bilaterally; No wheeze  HEART: Regular rate and rhythm; No murmurs, rubs, or gallops  ABDOMEN: Soft, Nontender, Nondistended; Bowel sounds present  EXTREMITIES:  2+ Peripheral Pulses, No clubbing, cyanosis, or edema  PSYCH: AAOx3  NEUROLOGY: non-focal  SKIN: No rashes or lesions       LABS:                        12.7   7.53  )-----------( 294      ( 21 Jul 2022 11:13 )             37.6     Auto Eosinophil # x     / Auto Eosinophil % x     / Auto Neutrophil # x     / Auto Neutrophil % x     / BANDS % x                            13.0   9.11  )-----------( 287      ( 20 Jul 2022 15:08 )             38.1     Auto Eosinophil # 0.24  / Auto Eosinophil % 2.6   / Auto Neutrophil # 6.50  / Auto Neutrophil % 71.3  / BANDS % x        07-21    138  |  99  |  11  ----------------------------<  155<H>  4.1   |  25  |  0.95  07-21    134<L>  |  97  |  10  ----------------------------<  409<H>  3.9   |  23  |  1.04  07-20    140  |  101  |  8   ----------------------------<  191<H>  3.4<L>   |  24  |  1.00    Ca    9.7      21 Jul 2022 11:13  Mg     2.1     07-21  Phos  2.7     07-21  TPro  6.3  /  Alb  3.9  /  TBili  0.4  /  DBili  x   /  AST  33  /  ALT  83<H>  /  AlkPhos  97  07-20  TPro  5.8<L>  /  Alb  3.6  /  TBili  0.3  /  DBili  x   /  AST  34  /  ALT  76<H>  /  AlkPhos  86  07-20    PT/INR - ( 20 Jul 2022 15:08 )   PT: 11.9 sec;   INR: 1.03 ratio         PTT - ( 20 Jul 2022 15:08 )  PTT:27.2 sec              RADIOLOGY & ADDITIONAL TESTS:    Imaging Personally Reviewed:    Consultant(s) Notes Reviewed:      Care Discussed with Consultants/Other Providers:   Monae Singh MD  PGY 1 Department of Internal Medicine        Patient is a 47y old  Male who presents with a chief complaint of Shortness of breath, Chest and Back Pain (22 Jul 2022 07:45)      SUBJECTIVE / OVERNIGHT EVENTS: Pt seen and examined. Continues to complain of chest/back pain while coughing despite morphine therapy. Still occasionally SOB w/ associated coughing and producing yellow sputum. Denies fevers, chills, Abdominal pain, N/V, Constipation, Diarrhea.        MEDICATIONS  (STANDING):  acetaminophen     Tablet .. 1000 milliGRAM(s) Oral every 8 hours  albuterol/ipratropium for Nebulization 3 milliLiter(s) Nebulizer every 6 hours  aspirin enteric coated 81 milliGRAM(s) Oral daily  benzonatate 200 milliGRAM(s) Oral every 8 hours  budesonide 160 MICROgram(s)/formoterol 4.5 MICROgram(s) Inhaler 2 Puff(s) Inhalation two times a day  colchicine 0.6 milliGRAM(s) Oral two times a day  dextrose 5%. 1000 milliLiter(s) (50 mL/Hr) IV Continuous <Continuous>  dextrose 5%. 1000 milliLiter(s) (100 mL/Hr) IV Continuous <Continuous>  dextrose 50% Injectable 25 Gram(s) IV Push once  dextrose 50% Injectable 12.5 Gram(s) IV Push once  dextrose 50% Injectable 25 Gram(s) IV Push once  enoxaparin Injectable 40 milliGRAM(s) SubCutaneous every 24 hours  furosemide   Injectable 60 milliGRAM(s) IV Push daily  glucagon  Injectable 1 milliGRAM(s) IntraMuscular once  hydroxychloroquine 400 milliGRAM(s) Oral daily  insulin lispro (ADMELOG) corrective regimen sliding scale   SubCutaneous at bedtime  insulin lispro (ADMELOG) corrective regimen sliding scale   SubCutaneous three times a day before meals  levothyroxine 125 MICROGram(s) Oral daily  losartan 50 milliGRAM(s) Oral daily  morphine  - Injectable 4 milliGRAM(s) IV Push every 6 hours  pantoprazole    Tablet 40 milliGRAM(s) Oral before breakfast  predniSONE   Tablet 30 milliGRAM(s) Oral daily  trimethoprim  160 mG/sulfamethoxazole 800 mG 1 Tablet(s) Oral <User Schedule>    MEDICATIONS  (PRN):  ALBUTerol    0.083% 2.5 milliGRAM(s) Nebulizer every 3 hours PRN Shortness of Breath and/or Wheezing  dextrose Oral Gel 15 Gram(s) Oral once PRN Blood Glucose LESS THAN 70 milliGRAM(s)/deciliter      I&O's Summary    21 Jul 2022 07:01  -  22 Jul 2022 07:00  --------------------------------------------------------  IN: 1140 mL / OUT: 30 mL / NET: 1110 mL        Vital Signs Last 24 Hrs  T(C): 36.4 (22 Jul 2022 07:32), Max: 36.9 (22 Jul 2022 00:12)  T(F): 97.6 (22 Jul 2022 07:32), Max: 98.4 (22 Jul 2022 00:12)  HR: 114 (22 Jul 2022 07:32) (92 - 114)  BP: 125/99 (22 Jul 2022 07:32) (119/79 - 136/90)  BP(mean): --  RR: 18 (22 Jul 2022 07:32) (17 - 18)  SpO2: 98% (22 Jul 2022 07:32) (95% - 98%)    Parameters below as of 22 Jul 2022 07:32  Patient On (Oxygen Delivery Method): room air        CAPILLARY BLOOD GLUCOSE      POCT Blood Glucose.: 156 mg/dL (21 Jul 2022 21:17)  POCT Blood Glucose.: 136 mg/dL (21 Jul 2022 16:38)  POCT Blood Glucose.: 157 mg/dL (21 Jul 2022 12:04)      PHYSICAL EXAM:  GENERAL: NAD,   HEAD:  Atraumatic, Normocephalic  EYES: EOMI, PERRL, conjunctiva and sclera clear  NECK: No JVD  CHEST/LUNG: Clear to auscultation bilaterally; No wheeze  HEART: Regular rate and rhythm; No murmurs, rubs, or gallops  ABDOMEN: Soft, Nontender, Nondistended; Bowel sounds present  EXTREMITIES:  2+ Peripheral Pulses, No clubbing, cyanosis, or edema  PSYCH: AAOx3  NEUROLOGY: non-focal  SKIN: No rashes or lesions       LABS:                        12.7   7.53  )-----------( 294      ( 21 Jul 2022 11:13 )             37.6     Auto Eosinophil # x     / Auto Eosinophil % x     / Auto Neutrophil # x     / Auto Neutrophil % x     / BANDS % x                            13.0   9.11  )-----------( 287      ( 20 Jul 2022 15:08 )             38.1     Auto Eosinophil # 0.24  / Auto Eosinophil % 2.6   / Auto Neutrophil # 6.50  / Auto Neutrophil % 71.3  / BANDS % x        07-21    138  |  99  |  11  ----------------------------<  155<H>  4.1   |  25  |  0.95  07-21    134<L>  |  97  |  10  ----------------------------<  409<H>  3.9   |  23  |  1.04  07-20    140  |  101  |  8   ----------------------------<  191<H>  3.4<L>   |  24  |  1.00    Ca    9.7      21 Jul 2022 11:13  Mg     2.1     07-21  Phos  2.7     07-21  TPro  6.3  /  Alb  3.9  /  TBili  0.4  /  DBili  x   /  AST  33  /  ALT  83<H>  /  AlkPhos  97  07-20  TPro  5.8<L>  /  Alb  3.6  /  TBili  0.3  /  DBili  x   /  AST  34  /  ALT  76<H>  /  AlkPhos  86  07-20    PT/INR - ( 20 Jul 2022 15:08 )   PT: 11.9 sec;   INR: 1.03 ratio         PTT - ( 20 Jul 2022 15:08 )  PTT:27.2 sec

## 2022-07-22 NOTE — PROGRESS NOTE ADULT - ATTENDING COMMENTS
47y male PMHx SLE on pred/HCQ/MMF, asthma, hypothyroidism, HTN presenting with shortness of breath, history of enterorhinovirus and influenza recently, now admitted with asthma exacerbation in the setting of RSV infection.  He has severe rib pain from coughing. He is improving, lung exam today with expiratory rhonchi good air movement.  Still with significant cough.  Agree with current management as outlined above.

## 2022-07-22 NOTE — PROGRESS NOTE ADULT - ATTENDING COMMENTS
47M PMHx SLE on prednisone and Plaquenil, asthma, hypothyroidism, HTN presenting with shortness of breath and severe 15/10 back, rib pain and pleuritic chest pain in the setting of persistent aggressive coughing due recent RSV infection with asthma exacerbation now causing costochondritis.    #Costochondritis.  - Pain control with trial of Dilaudid   - c/w steroids for antiinflammatory effect; will try solumedrol 40mg daily  - c/w maximal supportive care: hycodan, tessalon; nebs, flexeril at bedtime  - Rheum eval appreciated --> if no improvement then will start solumedrol, hold Cellcept  - Pulm eval appreciated --> c/w inhalers for wheezing, increase albuterol to q3hrs prn with standing Duoneb q6

## 2022-07-22 NOTE — PROGRESS NOTE ADULT - ASSESSMENT
Pt is a 47y male PMHx SLE on prednisone and plaquenil, asthma, hypothyroidism, HTN presenting with shortness of breath 2/2 back and pleuritic chest pain i/s/o recent admission for RSV-related asthma exacerbation with costochondritis. Possibilities for presentation include continued costochondritis, pleuritis; less likely, PE.  Pt is a 47y male PMHx SLE on prednisone and plaquenil, asthma, hypothyroidism, HTN presenting with shortness of breath 2/2 back and pleuritic chest pain i/s/o recent admission for RSV-related asthma exacerbation with costochondritis, currently being treated with prednisone 30mg as well as pain control to minimal response.

## 2022-07-22 NOTE — PROGRESS NOTE ADULT - PROBLEM SELECTOR PLAN 5
Pt continues to have diffuse wheezing   -Pulm on board, recs appreciated      -resume inhalers, duonebs on board

## 2022-07-23 LAB
ALBUMIN SERPL ELPH-MCNC: 4.1 G/DL — SIGNIFICANT CHANGE UP (ref 3.3–5)
ALP SERPL-CCNC: 112 U/L — SIGNIFICANT CHANGE UP (ref 40–120)
ALT FLD-CCNC: 99 U/L — HIGH (ref 10–45)
ANION GAP SERPL CALC-SCNC: 15 MMOL/L — SIGNIFICANT CHANGE UP (ref 5–17)
AST SERPL-CCNC: 43 U/L — HIGH (ref 10–40)
BILIRUB SERPL-MCNC: 0.3 MG/DL — SIGNIFICANT CHANGE UP (ref 0.2–1.2)
BUN SERPL-MCNC: 11 MG/DL — SIGNIFICANT CHANGE UP (ref 7–23)
CALCIUM SERPL-MCNC: 10 MG/DL — SIGNIFICANT CHANGE UP (ref 8.4–10.5)
CHLORIDE SERPL-SCNC: 96 MMOL/L — SIGNIFICANT CHANGE UP (ref 96–108)
CO2 SERPL-SCNC: 27 MMOL/L — SIGNIFICANT CHANGE UP (ref 22–31)
CREAT SERPL-MCNC: 0.92 MG/DL — SIGNIFICANT CHANGE UP (ref 0.5–1.3)
DSDNA AB SER-ACNC: <12 IU/ML — SIGNIFICANT CHANGE UP
EGFR: 104 ML/MIN/1.73M2 — SIGNIFICANT CHANGE UP
GLUCOSE SERPL-MCNC: 160 MG/DL — HIGH (ref 70–99)
HCT VFR BLD CALC: 37.4 % — LOW (ref 39–50)
HGB BLD-MCNC: 12.8 G/DL — LOW (ref 13–17)
MAGNESIUM SERPL-MCNC: 2.3 MG/DL — SIGNIFICANT CHANGE UP (ref 1.6–2.6)
MCHC RBC-ENTMCNC: 31 PG — SIGNIFICANT CHANGE UP (ref 27–34)
MCHC RBC-ENTMCNC: 34.2 GM/DL — SIGNIFICANT CHANGE UP (ref 32–36)
MCV RBC AUTO: 90.6 FL — SIGNIFICANT CHANGE UP (ref 80–100)
NRBC # BLD: 0 /100 WBCS — SIGNIFICANT CHANGE UP (ref 0–0)
PHOSPHATE SERPL-MCNC: 4.1 MG/DL — SIGNIFICANT CHANGE UP (ref 2.5–4.5)
PLATELET # BLD AUTO: 323 K/UL — SIGNIFICANT CHANGE UP (ref 150–400)
POTASSIUM SERPL-MCNC: 3.4 MMOL/L — LOW (ref 3.5–5.3)
POTASSIUM SERPL-SCNC: 3.4 MMOL/L — LOW (ref 3.5–5.3)
PROT SERPL-MCNC: 6.7 G/DL — SIGNIFICANT CHANGE UP (ref 6–8.3)
RBC # BLD: 4.13 M/UL — LOW (ref 4.2–5.8)
RBC # FLD: 14.1 % — SIGNIFICANT CHANGE UP (ref 10.3–14.5)
SODIUM SERPL-SCNC: 138 MMOL/L — SIGNIFICANT CHANGE UP (ref 135–145)
WBC # BLD: 9.74 K/UL — SIGNIFICANT CHANGE UP (ref 3.8–10.5)
WBC # FLD AUTO: 9.74 K/UL — SIGNIFICANT CHANGE UP (ref 3.8–10.5)

## 2022-07-23 PROCEDURE — 99232 SBSQ HOSP IP/OBS MODERATE 35: CPT | Mod: GC

## 2022-07-23 RX ORDER — POTASSIUM CHLORIDE 20 MEQ
40 PACKET (EA) ORAL ONCE
Refills: 0 | Status: COMPLETED | OUTPATIENT
Start: 2022-07-23 | End: 2022-07-23

## 2022-07-23 RX ADMIN — BUDESONIDE AND FORMOTEROL FUMARATE DIHYDRATE 2 PUFF(S): 160; 4.5 AEROSOL RESPIRATORY (INHALATION) at 05:34

## 2022-07-23 RX ADMIN — LOSARTAN POTASSIUM 50 MILLIGRAM(S): 100 TABLET, FILM COATED ORAL at 05:39

## 2022-07-23 RX ADMIN — HYDROMORPHONE HYDROCHLORIDE 1 MILLIGRAM(S): 2 INJECTION INTRAMUSCULAR; INTRAVENOUS; SUBCUTANEOUS at 17:18

## 2022-07-23 RX ADMIN — Medication 2: at 08:26

## 2022-07-23 RX ADMIN — Medication 0.6 MILLIGRAM(S): at 05:41

## 2022-07-23 RX ADMIN — Medication 1000 MILLIGRAM(S): at 18:00

## 2022-07-23 RX ADMIN — PANTOPRAZOLE SODIUM 40 MILLIGRAM(S): 20 TABLET, DELAYED RELEASE ORAL at 05:36

## 2022-07-23 RX ADMIN — Medication 60 MILLIGRAM(S): at 12:27

## 2022-07-23 RX ADMIN — Medication 3 MILLILITER(S): at 17:18

## 2022-07-23 RX ADMIN — HYDROMORPHONE HYDROCHLORIDE 1 MILLIGRAM(S): 2 INJECTION INTRAMUSCULAR; INTRAVENOUS; SUBCUTANEOUS at 20:51

## 2022-07-23 RX ADMIN — Medication 200 MILLIGRAM(S): at 14:04

## 2022-07-23 RX ADMIN — Medication 1000 MILLIGRAM(S): at 13:00

## 2022-07-23 RX ADMIN — Medication 0.6 MILLIGRAM(S): at 17:21

## 2022-07-23 RX ADMIN — Medication 3 MILLILITER(S): at 12:18

## 2022-07-23 RX ADMIN — Medication 3 MILLILITER(S): at 23:18

## 2022-07-23 RX ADMIN — Medication 1000 MILLIGRAM(S): at 05:40

## 2022-07-23 RX ADMIN — HYDROMORPHONE HYDROCHLORIDE 1 MILLIGRAM(S): 2 INJECTION INTRAMUSCULAR; INTRAVENOUS; SUBCUTANEOUS at 13:00

## 2022-07-23 RX ADMIN — Medication 2: at 12:25

## 2022-07-23 RX ADMIN — ENOXAPARIN SODIUM 40 MILLIGRAM(S): 100 INJECTION SUBCUTANEOUS at 12:26

## 2022-07-23 RX ADMIN — Medication 200 MILLIGRAM(S): at 23:17

## 2022-07-23 RX ADMIN — Medication 1000 MILLIGRAM(S): at 06:38

## 2022-07-23 RX ADMIN — BUDESONIDE AND FORMOTEROL FUMARATE DIHYDRATE 2 PUFF(S): 160; 4.5 AEROSOL RESPIRATORY (INHALATION) at 17:20

## 2022-07-23 RX ADMIN — Medication 1000 MILLIGRAM(S): at 23:17

## 2022-07-23 RX ADMIN — Medication 40 MILLIGRAM(S): at 05:38

## 2022-07-23 RX ADMIN — Medication 125 MICROGRAM(S): at 05:36

## 2022-07-23 RX ADMIN — Medication 3 MILLILITER(S): at 05:34

## 2022-07-23 RX ADMIN — HYDROMORPHONE HYDROCHLORIDE 1 MILLIGRAM(S): 2 INJECTION INTRAMUSCULAR; INTRAVENOUS; SUBCUTANEOUS at 17:50

## 2022-07-23 RX ADMIN — POLYETHYLENE GLYCOL 3350 17 GRAM(S): 17 POWDER, FOR SOLUTION ORAL at 12:26

## 2022-07-23 RX ADMIN — HYDROMORPHONE HYDROCHLORIDE 1 MILLIGRAM(S): 2 INJECTION INTRAMUSCULAR; INTRAVENOUS; SUBCUTANEOUS at 07:01

## 2022-07-23 RX ADMIN — HYDROMORPHONE HYDROCHLORIDE 1 MILLIGRAM(S): 2 INJECTION INTRAMUSCULAR; INTRAVENOUS; SUBCUTANEOUS at 12:18

## 2022-07-23 RX ADMIN — LIDOCAINE 2 PATCH: 4 CREAM TOPICAL at 12:22

## 2022-07-23 RX ADMIN — HYDROMORPHONE HYDROCHLORIDE 1 MILLIGRAM(S): 2 INJECTION INTRAMUSCULAR; INTRAVENOUS; SUBCUTANEOUS at 23:45

## 2022-07-23 RX ADMIN — Medication 40 MILLIEQUIVALENT(S): at 12:18

## 2022-07-23 RX ADMIN — Medication 1000 MILLIGRAM(S): at 17:20

## 2022-07-23 RX ADMIN — Medication 1000 MILLIGRAM(S): at 12:26

## 2022-07-23 RX ADMIN — CYCLOBENZAPRINE HYDROCHLORIDE 5 MILLIGRAM(S): 10 TABLET, FILM COATED ORAL at 21:45

## 2022-07-23 RX ADMIN — HYDROMORPHONE HYDROCHLORIDE 1 MILLIGRAM(S): 2 INJECTION INTRAMUSCULAR; INTRAVENOUS; SUBCUTANEOUS at 07:30

## 2022-07-23 RX ADMIN — Medication 2: at 17:19

## 2022-07-23 RX ADMIN — HYDROMORPHONE HYDROCHLORIDE 1 MILLIGRAM(S): 2 INJECTION INTRAMUSCULAR; INTRAVENOUS; SUBCUTANEOUS at 23:15

## 2022-07-23 RX ADMIN — HYDROMORPHONE HYDROCHLORIDE 1 MILLIGRAM(S): 2 INJECTION INTRAMUSCULAR; INTRAVENOUS; SUBCUTANEOUS at 21:15

## 2022-07-23 RX ADMIN — Medication 200 MILLIGRAM(S): at 05:36

## 2022-07-23 RX ADMIN — Medication 400 MILLIGRAM(S): at 17:21

## 2022-07-23 RX ADMIN — LIDOCAINE 2 PATCH: 4 CREAM TOPICAL at 20:15

## 2022-07-23 RX ADMIN — Medication 81 MILLIGRAM(S): at 14:04

## 2022-07-23 NOTE — PROGRESS NOTE ADULT - PROBLEM SELECTOR PLAN 2
K+ found to be 2.7 on labs at admission, administered 30 mEq IV K+, 40 mEq po KCl  -K+ currently 3.4  -f/u BMP and replete as needed with oral and IV K+ considering diuresis

## 2022-07-23 NOTE — PROGRESS NOTE ADULT - ASSESSMENT
Pt is a 47y male PMHx SLE on prednisone and plaquenil, asthma, hypothyroidism, HTN presenting with shortness of breath 2/2 back and pleuritic chest pain i/s/o recent admission for RSV-related asthma exacerbation with costochondritis.

## 2022-07-23 NOTE — PROGRESS NOTE ADULT - SUBJECTIVE AND OBJECTIVE BOX
***********************************************  Scout Tompkins MD  Internal Medicine   * After 7pm please contact [facility] [employer] Covering Resident   ***********************************************      PROGRESS NOTE:     Patient is a 47y old  Male who presents with a chief complaint of Shortness of breath, Chest and Back Pain (2022 08:20)      SUBJECTIVE / OVERNIGHT EVENTS:    OVERNIGHT:       Patient examined at bedside        MEDICATIONS  (STANDING):  acetaminophen     Tablet .. 1000 milliGRAM(s) Oral every 6 hours  albuterol/ipratropium for Nebulization 3 milliLiter(s) Nebulizer every 6 hours  aspirin enteric coated 81 milliGRAM(s) Oral daily  benzonatate 200 milliGRAM(s) Oral every 8 hours  budesonide 160 MICROgram(s)/formoterol 4.5 MICROgram(s) Inhaler 2 Puff(s) Inhalation two times a day  colchicine 0.6 milliGRAM(s) Oral two times a day  cyclobenzaprine 5 milliGRAM(s) Oral at bedtime  dextrose 5%. 1000 milliLiter(s) (100 mL/Hr) IV Continuous <Continuous>  dextrose 5%. 1000 milliLiter(s) (50 mL/Hr) IV Continuous <Continuous>  dextrose 50% Injectable 25 Gram(s) IV Push once  dextrose 50% Injectable 12.5 Gram(s) IV Push once  dextrose 50% Injectable 25 Gram(s) IV Push once  enoxaparin Injectable 40 milliGRAM(s) SubCutaneous every 24 hours  furosemide   Injectable 60 milliGRAM(s) IV Push daily  glucagon  Injectable 1 milliGRAM(s) IntraMuscular once  hydroxychloroquine 400 milliGRAM(s) Oral daily  insulin lispro (ADMELOG) corrective regimen sliding scale   SubCutaneous three times a day before meals  insulin lispro (ADMELOG) corrective regimen sliding scale   SubCutaneous at bedtime  levothyroxine 125 MICROGram(s) Oral daily  lidocaine   4% Patch 2 Patch Transdermal daily  losartan 50 milliGRAM(s) Oral daily  methylPREDNISolone sodium succinate Injectable 40 milliGRAM(s) IV Push daily  pantoprazole    Tablet 40 milliGRAM(s) Oral before breakfast  polyethylene glycol 3350 17 Gram(s) Oral daily  trimethoprim  160 mG/sulfamethoxazole 800 mG 1 Tablet(s) Oral <User Schedule>    MEDICATIONS  (PRN):  ALBUTerol    0.083% 2.5 milliGRAM(s) Nebulizer every 3 hours PRN Shortness of Breath and/or Wheezing  dextrose Oral Gel 15 Gram(s) Oral once PRN Blood Glucose LESS THAN 70 milliGRAM(s)/deciliter  hydrocodone/homatropine Syrup 10 milliLiter(s) Oral every 4 hours PRN Cough  HYDROmorphone  Injectable 1 milliGRAM(s) IV Push every 3 hours PRN Severe Pain (7 - 10)      CAPILLARY BLOOD GLUCOSE      POCT Blood Glucose.: 235 mg/dL (2022 21:46)  POCT Blood Glucose.: 272 mg/dL (2022 16:47)  POCT Blood Glucose.: 183 mg/dL (2022 12:38)  POCT Blood Glucose.: 125 mg/dL (2022 08:26)    I&O's Summary    2022 07:01  -  2022 07:00  --------------------------------------------------------  IN: 890 mL / OUT: 1200 mL / NET: -310 mL        PHYSICAL EXAM:  Vital Signs Last 24 Hrs  T(C): 36.9 (2022 23:48), Max: 36.9 (2022 23:48)  T(F): 98.5 (2022 23:48), Max: 98.5 (2022 23:48)  HR: 102 (2022 23:48) (102 - 110)  BP: 143/91 (2022 05:51) (133/96 - 143/91)  BP(mean): --  RR: 18 (2022 23:48) (18 - 18)  SpO2: 96% (2022 23:48) (96% - 96%)    Parameters below as of 2022 23:48  Patient On (Oxygen Delivery Method): room air        CONSTITUTIONAL: NAD; well-developed  HEENT: PERRL, clear conjunctiva  RESPIRATORY: Normal respiratory effort; lungs are clear to auscultation bilaterally; No Crackles/Rhonchi/Wheezing  CARDIOVASCULAR: Regular rate and rhythm, normal S1 and S2, no murmur/rub/gallop; No lower extremity edema; Peripheral pulses are 2+ bilaterally  ABDOMEN: Nontender to palpation, normoactive bowel sounds, no rebound/guarding; No hepatosplenomegaly  MUSCULOSKELETAL: no clubbing or cyanosis of digits; no joint swelling or tenderness to palpation  EXTREMITY: Lower extremities Non-tender to palpation; non-erythematous B/L  NEURO: A&Ox3; no focal deficits   PSYCH: normal mood; Affect appropirate    LABS:                        12.8   9.74  )-----------( 323      ( 2022 06:52 )             37.4     07-22    136  |  95<L>  |  10  ----------------------------<  144<H>  3.4<L>   |  27  |  1.09    Ca    9.9      2022 08:58  Phos  2.8     07-  Mg     2.2     07-22            Urinalysis Basic - ( 2022 09:33 )    Color: Yellow / Appearance: Clear / S.028 / pH: x  Gluc: x / Ketone: Negative  / Bili: Negative / Urobili: Negative   Blood: x / Protein: 100 / Nitrite: Negative   Leuk Esterase: Negative / RBC: 2 /hpf / WBC 2 /HPF   Sq Epi: x / Non Sq Epi: 3 /hpf / Bacteria: Negative          RADIOLOGY & ADDITIONAL TESTS:  Results Reviewed:   Imaging Personally Reviewed:  Electrocardiogram Personally Reviewed:    COORDINATION OF CARE:  Care Discussed with Consultants/Other Providers [Y/N]:  Prior or Outpatient Records Reviewed [Y/N]:   ***********************************************  Scout Tompkins MD  Internal Medicine   * After 7pm please contact [facility] [employer] Covering Resident   ***********************************************      PROGRESS NOTE:     Patient is a 47y old  Male who presents with a chief complaint of Shortness of breath, Chest and Back Pain (2022 08:20)      SUBJECTIVE / OVERNIGHT EVENTS:    OVERNIGHT:   - hycodan x1   - Dilauded x1       Patient examined at bedside with no acute complaints / pain. Feels the medications are helping him with his symptoms and overall he has improved.         MEDICATIONS  (STANDING):  acetaminophen     Tablet .. 1000 milliGRAM(s) Oral every 6 hours  albuterol/ipratropium for Nebulization 3 milliLiter(s) Nebulizer every 6 hours  aspirin enteric coated 81 milliGRAM(s) Oral daily  benzonatate 200 milliGRAM(s) Oral every 8 hours  budesonide 160 MICROgram(s)/formoterol 4.5 MICROgram(s) Inhaler 2 Puff(s) Inhalation two times a day  colchicine 0.6 milliGRAM(s) Oral two times a day  cyclobenzaprine 5 milliGRAM(s) Oral at bedtime  dextrose 5%. 1000 milliLiter(s) (100 mL/Hr) IV Continuous <Continuous>  dextrose 5%. 1000 milliLiter(s) (50 mL/Hr) IV Continuous <Continuous>  dextrose 50% Injectable 25 Gram(s) IV Push once  dextrose 50% Injectable 12.5 Gram(s) IV Push once  dextrose 50% Injectable 25 Gram(s) IV Push once  enoxaparin Injectable 40 milliGRAM(s) SubCutaneous every 24 hours  furosemide   Injectable 60 milliGRAM(s) IV Push daily  glucagon  Injectable 1 milliGRAM(s) IntraMuscular once  hydroxychloroquine 400 milliGRAM(s) Oral daily  insulin lispro (ADMELOG) corrective regimen sliding scale   SubCutaneous three times a day before meals  insulin lispro (ADMELOG) corrective regimen sliding scale   SubCutaneous at bedtime  levothyroxine 125 MICROGram(s) Oral daily  lidocaine   4% Patch 2 Patch Transdermal daily  losartan 50 milliGRAM(s) Oral daily  methylPREDNISolone sodium succinate Injectable 40 milliGRAM(s) IV Push daily  pantoprazole    Tablet 40 milliGRAM(s) Oral before breakfast  polyethylene glycol 3350 17 Gram(s) Oral daily  trimethoprim  160 mG/sulfamethoxazole 800 mG 1 Tablet(s) Oral <User Schedule>    MEDICATIONS  (PRN):  ALBUTerol    0.083% 2.5 milliGRAM(s) Nebulizer every 3 hours PRN Shortness of Breath and/or Wheezing  dextrose Oral Gel 15 Gram(s) Oral once PRN Blood Glucose LESS THAN 70 milliGRAM(s)/deciliter  hydrocodone/homatropine Syrup 10 milliLiter(s) Oral every 4 hours PRN Cough  HYDROmorphone  Injectable 1 milliGRAM(s) IV Push every 3 hours PRN Severe Pain (7 - 10)      CAPILLARY BLOOD GLUCOSE      POCT Blood Glucose.: 235 mg/dL (2022 21:46)  POCT Blood Glucose.: 272 mg/dL (2022 16:47)  POCT Blood Glucose.: 183 mg/dL (2022 12:38)  POCT Blood Glucose.: 125 mg/dL (2022 08:26)    I&O's Summary    2022 07:01  -  2022 07:00  --------------------------------------------------------  IN: 890 mL / OUT: 1200 mL / NET: -310 mL        PHYSICAL EXAM:  Vital Signs Last 24 Hrs  T(C): 36.9 (2022 23:48), Max: 36.9 (2022 23:48)  T(F): 98.5 (2022 23:48), Max: 98.5 (2022 23:48)  HR: 102 (2022 23:48) (102 - 110)  BP: 143/91 (2022 05:51) (133/96 - 143/91)  BP(mean): --  RR: 18 (2022 23:48) (18 - 18)  SpO2: 96% (2022 23:48) (96% - 96%)    Parameters below as of 2022 23:48  Patient On (Oxygen Delivery Method): room air        GENERAL: NAD,   HEAD:  Atraumatic, Normocephalic  EYES: EOMI, PERRL, conjunctiva and sclera clear  NECK: No JVD  CHEST/LUNG: Clear to auscultation bilaterally; No wheeze  HEART: Regular rate and rhythm; No murmurs, rubs, or gallops  ABDOMEN: Soft, Nontender, Nondistended; Bowel sounds present  EXTREMITIES:  2+ Peripheral Pulses, No clubbing, cyanosis, or edema  PSYCH: AAOx3  NEUROLOGY: non-focal  SKIN: No rashes or lesions    LABS:                        12.8   9.74  )-----------( 323      ( 2022 06:52 )             37.4     07-22    136  |  95<L>  |  10  ----------------------------<  144<H>  3.4<L>   |  27  |  1.09    Ca    9.9      2022 08:58  Phos  2.8     07-  Mg     2.2     07-            Urinalysis Basic - ( 2022 09:33 )    Color: Yellow / Appearance: Clear / S.028 / pH: x  Gluc: x / Ketone: Negative  / Bili: Negative / Urobili: Negative   Blood: x / Protein: 100 / Nitrite: Negative   Leuk Esterase: Negative / RBC: 2 /hpf / WBC 2 /HPF   Sq Epi: x / Non Sq Epi: 3 /hpf / Bacteria: Negative          RADIOLOGY & ADDITIONAL TESTS:  Results Reviewed:   Imaging Personally Reviewed:  Electrocardiogram Personally Reviewed:    COORDINATION OF CARE:  Care Discussed with Consultants/Other Providers [Y/N]:  Prior or Outpatient Records Reviewed [Y/N]:

## 2022-07-23 NOTE — PROGRESS NOTE ADULT - ATTENDING COMMENTS
Pt seen and examined.   47M PMHx SLE on prednisone and Plaquenil, asthma, hypothyroidism, HTN presenting with shortness of breath and severe 15/10 back, rib pain and pleuritic chest pain in the setting of persistent aggressive coughing due recent RSV infection with asthma exacerbation now causing costochondritis.  My exam notable for wheezing, improved LE edema     - pain improved with IV dilaudid, but will get occasional cough paroxysms leading to increasing SOB, wheezing and chest pain  - will c/w IV Dilaudid today, attempt to trial off tomorrow   - c/w IV lasix for now as it is helping with LE edema, will consider conversion to PO in 1-2 days   - c/w IV solumedrol for now   - antitussives as needed   - f/u pulm and rheum    rest of plan as above Pt seen and examined.   47M PMHx SLE on prednisone and Plaquenil, asthma, hypothyroidism, HTN presenting with shortness of breath and severe 15/10 back, rib pain and pleuritic chest pain in the setting of persistent aggressive coughing due recent RSV infection with asthma exacerbation now causing costochondritis.  My exam notable for wheezing, improved LE edema     - pain improved with IV dilaudid, but will get occasional cough paroxysms leading to increasing SOB, wheezing and chest pain  - will c/w IV Dilaudid today, attempt to trial off tomorrow   - c/w IV lasix for now as it is helping with LE edema, will consider conversion to PO in 1-2 days   - c/w IV solumedrol for now   - antitussives as needed   - f/u pulm and rheum    rest of plan as above  d/w Dr Tompkins

## 2022-07-23 NOTE — PROGRESS NOTE ADULT - PROBLEM SELECTOR PLAN 4
I have personally seen and examined this patient.  I have fully participated in the care of this patient. I have reviewed all pertinent clinical information, including history, physical exam, plan and the Resident’s note and agree except as noted. c/w home medications of synthroid 125 mcg

## 2022-07-23 NOTE — PROGRESS NOTE ADULT - PROBLEM SELECTOR PLAN 1
EKG sinus tach, trops wnl, CXR negative for acute pulmonary pathology; causes of symptoms likely costochondritis  i/s/o lupus with recent hospitalization for acute asthma exacerbation 2/2 RSV infection with associated cough  -procal elevated; C3, C4, anti-dsDNA wnl  -ctm pain, increase morphine to 6mg, hycodan on board; acetaminophen 1g on board  -antitussives (tessalon)  -rheumatology consulted, appreciate recs    -hold cellcept    -continue plaquenil    -UA w/ microscopy, urine Pr/Cr ratio  -D dimer negative    - Will try to de-escalate IV pain meds tomorrow.

## 2022-07-23 NOTE — PROGRESS NOTE ADULT - PROBLEM SELECTOR PLAN 3
Lupus may be contributing to inflammatory process  -Rheum consult  -c/w home regimen of plaquenil, prednisone  -hold cellcept  -Bactrim for PCP prophylaxis  -IV lasix to manage associated LE edema, improving

## 2022-07-24 LAB
ALBUMIN SERPL ELPH-MCNC: 4.1 G/DL — SIGNIFICANT CHANGE UP (ref 3.3–5)
ALP SERPL-CCNC: 118 U/L — SIGNIFICANT CHANGE UP (ref 40–120)
ALT FLD-CCNC: 159 U/L — HIGH (ref 10–45)
ANION GAP SERPL CALC-SCNC: 16 MMOL/L — SIGNIFICANT CHANGE UP (ref 5–17)
AST SERPL-CCNC: 95 U/L — HIGH (ref 10–40)
BILIRUB SERPL-MCNC: 0.3 MG/DL — SIGNIFICANT CHANGE UP (ref 0.2–1.2)
BUN SERPL-MCNC: 12 MG/DL — SIGNIFICANT CHANGE UP (ref 7–23)
CALCIUM SERPL-MCNC: 9.8 MG/DL — SIGNIFICANT CHANGE UP (ref 8.4–10.5)
CHLORIDE SERPL-SCNC: 94 MMOL/L — LOW (ref 96–108)
CO2 SERPL-SCNC: 25 MMOL/L — SIGNIFICANT CHANGE UP (ref 22–31)
CREAT SERPL-MCNC: 0.89 MG/DL — SIGNIFICANT CHANGE UP (ref 0.5–1.3)
EGFR: 107 ML/MIN/1.73M2 — SIGNIFICANT CHANGE UP
GLUCOSE SERPL-MCNC: 155 MG/DL — HIGH (ref 70–99)
HCT VFR BLD CALC: 38.1 % — LOW (ref 39–50)
HGB BLD-MCNC: 13 G/DL — SIGNIFICANT CHANGE UP (ref 13–17)
MAGNESIUM SERPL-MCNC: 2.1 MG/DL — SIGNIFICANT CHANGE UP (ref 1.6–2.6)
MCHC RBC-ENTMCNC: 30.5 PG — SIGNIFICANT CHANGE UP (ref 27–34)
MCHC RBC-ENTMCNC: 34.1 GM/DL — SIGNIFICANT CHANGE UP (ref 32–36)
MCV RBC AUTO: 89.4 FL — SIGNIFICANT CHANGE UP (ref 80–100)
NRBC # BLD: 0 /100 WBCS — SIGNIFICANT CHANGE UP (ref 0–0)
PHOSPHATE SERPL-MCNC: 3.1 MG/DL — SIGNIFICANT CHANGE UP (ref 2.5–4.5)
PLATELET # BLD AUTO: 325 K/UL — SIGNIFICANT CHANGE UP (ref 150–400)
POTASSIUM SERPL-MCNC: 3.1 MMOL/L — LOW (ref 3.5–5.3)
POTASSIUM SERPL-SCNC: 3.1 MMOL/L — LOW (ref 3.5–5.3)
PROT SERPL-MCNC: 6.8 G/DL — SIGNIFICANT CHANGE UP (ref 6–8.3)
RBC # BLD: 4.26 M/UL — SIGNIFICANT CHANGE UP (ref 4.2–5.8)
RBC # FLD: 14 % — SIGNIFICANT CHANGE UP (ref 10.3–14.5)
SODIUM SERPL-SCNC: 135 MMOL/L — SIGNIFICANT CHANGE UP (ref 135–145)
WBC # BLD: 10.55 K/UL — HIGH (ref 3.8–10.5)
WBC # FLD AUTO: 10.55 K/UL — HIGH (ref 3.8–10.5)

## 2022-07-24 PROCEDURE — 99232 SBSQ HOSP IP/OBS MODERATE 35: CPT

## 2022-07-24 RX ORDER — FUROSEMIDE 40 MG
60 TABLET ORAL DAILY
Refills: 0 | Status: DISCONTINUED | OUTPATIENT
Start: 2022-07-25 | End: 2022-07-25

## 2022-07-24 RX ORDER — OXYCODONE HYDROCHLORIDE 5 MG/1
5 TABLET ORAL EVERY 6 HOURS
Refills: 0 | Status: DISCONTINUED | OUTPATIENT
Start: 2022-07-24 | End: 2022-07-24

## 2022-07-24 RX ORDER — FUROSEMIDE 40 MG
40 TABLET ORAL DAILY
Refills: 0 | Status: DISCONTINUED | OUTPATIENT
Start: 2022-07-24 | End: 2022-07-24

## 2022-07-24 RX ORDER — HYDROMORPHONE HYDROCHLORIDE 2 MG/ML
0.5 INJECTION INTRAMUSCULAR; INTRAVENOUS; SUBCUTANEOUS
Refills: 0 | Status: DISCONTINUED | OUTPATIENT
Start: 2022-07-24 | End: 2022-07-25

## 2022-07-24 RX ORDER — OXYCODONE HYDROCHLORIDE 5 MG/1
5 TABLET ORAL EVERY 6 HOURS
Refills: 0 | Status: DISCONTINUED | OUTPATIENT
Start: 2022-07-24 | End: 2022-07-25

## 2022-07-24 RX ORDER — LANOLIN ALCOHOL/MO/W.PET/CERES
6 CREAM (GRAM) TOPICAL AT BEDTIME
Refills: 0 | Status: DISCONTINUED | OUTPATIENT
Start: 2022-07-24 | End: 2022-07-28

## 2022-07-24 RX ORDER — POTASSIUM CHLORIDE 20 MEQ
40 PACKET (EA) ORAL ONCE
Refills: 0 | Status: COMPLETED | OUTPATIENT
Start: 2022-07-24 | End: 2022-07-24

## 2022-07-24 RX ORDER — SENNA PLUS 8.6 MG/1
1 TABLET ORAL DAILY
Refills: 0 | Status: DISCONTINUED | OUTPATIENT
Start: 2022-07-24 | End: 2022-07-28

## 2022-07-24 RX ORDER — FUROSEMIDE 40 MG
40 TABLET ORAL ONCE
Refills: 0 | Status: COMPLETED | OUTPATIENT
Start: 2022-07-24 | End: 2022-07-24

## 2022-07-24 RX ORDER — HYDROMORPHONE HYDROCHLORIDE 2 MG/ML
0.5 INJECTION INTRAMUSCULAR; INTRAVENOUS; SUBCUTANEOUS ONCE
Refills: 0 | Status: DISCONTINUED | OUTPATIENT
Start: 2022-07-24 | End: 2022-07-24

## 2022-07-24 RX ADMIN — Medication 3 MILLILITER(S): at 11:36

## 2022-07-24 RX ADMIN — HYDROMORPHONE HYDROCHLORIDE 0.5 MILLIGRAM(S): 2 INJECTION INTRAMUSCULAR; INTRAVENOUS; SUBCUTANEOUS at 15:30

## 2022-07-24 RX ADMIN — Medication 200 MILLIGRAM(S): at 06:18

## 2022-07-24 RX ADMIN — Medication 1000 MILLIGRAM(S): at 06:45

## 2022-07-24 RX ADMIN — HYDROMORPHONE HYDROCHLORIDE 0.5 MILLIGRAM(S): 2 INJECTION INTRAMUSCULAR; INTRAVENOUS; SUBCUTANEOUS at 15:04

## 2022-07-24 RX ADMIN — Medication 1000 MILLIGRAM(S): at 11:36

## 2022-07-24 RX ADMIN — OXYCODONE HYDROCHLORIDE 5 MILLIGRAM(S): 5 TABLET ORAL at 13:50

## 2022-07-24 RX ADMIN — PANTOPRAZOLE SODIUM 40 MILLIGRAM(S): 20 TABLET, DELAYED RELEASE ORAL at 06:18

## 2022-07-24 RX ADMIN — Medication 200 MILLIGRAM(S): at 15:04

## 2022-07-24 RX ADMIN — Medication 3 MILLILITER(S): at 06:17

## 2022-07-24 RX ADMIN — Medication 40 MILLIGRAM(S): at 15:03

## 2022-07-24 RX ADMIN — Medication 1: at 08:26

## 2022-07-24 RX ADMIN — Medication 81 MILLIGRAM(S): at 11:35

## 2022-07-24 RX ADMIN — ENOXAPARIN SODIUM 40 MILLIGRAM(S): 100 INJECTION SUBCUTANEOUS at 11:36

## 2022-07-24 RX ADMIN — LIDOCAINE 2 PATCH: 4 CREAM TOPICAL at 00:51

## 2022-07-24 RX ADMIN — Medication 40 MILLIEQUIVALENT(S): at 11:35

## 2022-07-24 RX ADMIN — HYDROMORPHONE HYDROCHLORIDE 1 MILLIGRAM(S): 2 INJECTION INTRAMUSCULAR; INTRAVENOUS; SUBCUTANEOUS at 06:45

## 2022-07-24 RX ADMIN — Medication 1000 MILLIGRAM(S): at 00:17

## 2022-07-24 RX ADMIN — Medication 400 MILLIGRAM(S): at 18:03

## 2022-07-24 RX ADMIN — Medication 1000 MILLIGRAM(S): at 12:30

## 2022-07-24 RX ADMIN — Medication 40 MILLIEQUIVALENT(S): at 08:27

## 2022-07-24 RX ADMIN — LIDOCAINE 2 PATCH: 4 CREAM TOPICAL at 19:39

## 2022-07-24 RX ADMIN — Medication 1000 MILLIGRAM(S): at 18:58

## 2022-07-24 RX ADMIN — CYCLOBENZAPRINE HYDROCHLORIDE 5 MILLIGRAM(S): 10 TABLET, FILM COATED ORAL at 21:15

## 2022-07-24 RX ADMIN — SENNA PLUS 1 TABLET(S): 8.6 TABLET ORAL at 08:27

## 2022-07-24 RX ADMIN — OXYCODONE HYDROCHLORIDE 5 MILLIGRAM(S): 5 TABLET ORAL at 18:13

## 2022-07-24 RX ADMIN — Medication 40 MILLIGRAM(S): at 06:17

## 2022-07-24 RX ADMIN — Medication 6 MILLIGRAM(S): at 23:21

## 2022-07-24 RX ADMIN — HYDROMORPHONE HYDROCHLORIDE 0.5 MILLIGRAM(S): 2 INJECTION INTRAMUSCULAR; INTRAVENOUS; SUBCUTANEOUS at 23:22

## 2022-07-24 RX ADMIN — Medication 0.6 MILLIGRAM(S): at 06:18

## 2022-07-24 RX ADMIN — Medication 1000 MILLIGRAM(S): at 06:18

## 2022-07-24 RX ADMIN — LOSARTAN POTASSIUM 50 MILLIGRAM(S): 100 TABLET, FILM COATED ORAL at 06:29

## 2022-07-24 RX ADMIN — Medication 125 MICROGRAM(S): at 06:18

## 2022-07-24 RX ADMIN — Medication 40 MILLIGRAM(S): at 11:35

## 2022-07-24 RX ADMIN — BUDESONIDE AND FORMOTEROL FUMARATE DIHYDRATE 2 PUFF(S): 160; 4.5 AEROSOL RESPIRATORY (INHALATION) at 18:03

## 2022-07-24 RX ADMIN — OXYCODONE HYDROCHLORIDE 5 MILLIGRAM(S): 5 TABLET ORAL at 18:58

## 2022-07-24 RX ADMIN — Medication 1 TABLET(S): at 10:27

## 2022-07-24 RX ADMIN — Medication 200 MILLIGRAM(S): at 23:21

## 2022-07-24 RX ADMIN — HYDROMORPHONE HYDROCHLORIDE 1 MILLIGRAM(S): 2 INJECTION INTRAMUSCULAR; INTRAVENOUS; SUBCUTANEOUS at 06:17

## 2022-07-24 RX ADMIN — BUDESONIDE AND FORMOTEROL FUMARATE DIHYDRATE 2 PUFF(S): 160; 4.5 AEROSOL RESPIRATORY (INHALATION) at 06:19

## 2022-07-24 RX ADMIN — Medication 1000 MILLIGRAM(S): at 23:21

## 2022-07-24 RX ADMIN — Medication 1000 MILLIGRAM(S): at 18:02

## 2022-07-24 RX ADMIN — OXYCODONE HYDROCHLORIDE 5 MILLIGRAM(S): 5 TABLET ORAL at 12:52

## 2022-07-24 RX ADMIN — Medication 3 MILLILITER(S): at 18:02

## 2022-07-24 RX ADMIN — Medication 3 MILLILITER(S): at 22:21

## 2022-07-24 RX ADMIN — Medication 0.6 MILLIGRAM(S): at 18:04

## 2022-07-24 RX ADMIN — Medication 4: at 12:44

## 2022-07-24 RX ADMIN — Medication 2: at 18:04

## 2022-07-24 RX ADMIN — LIDOCAINE 2 PATCH: 4 CREAM TOPICAL at 11:38

## 2022-07-24 NOTE — PROGRESS NOTE ADULT - ATTENDING COMMENTS
Pt seen and examined.   47M PMHx SLE on prednisone and Plaquenil, asthma, hypothyroidism, HTN presenting with shortness of breath and severe 15/10 back, rib pain and pleuritic chest pain in the setting of persistent aggressive coughing due recent RSV infection with asthma exacerbation now causing costochondritis.  Improved cough overnight, states L sided chest wall pain is better, but continues to have R sided chest wall pain. Endorsing worsening LLE edema     - de-escalate from IV dilaudid to PO oxycodone; can get small dose IV dilaudid at night if pain is severe   - c/w IV lasix for now as it is helping with LE edema, will consider conversion to PO in 1-2 days   - check LLE duplex  - c/w IV solumedrol for now   - antitussives as needed   - f/u pulm and rheum    rest of plan as above

## 2022-07-24 NOTE — PROGRESS NOTE ADULT - PROBLEM SELECTOR PLAN 3
Lupus may be contributing to inflammatory process  -Rheum consult  -c/w home regimen of plaquenil, prednisone  -hold cellcept  -Bactrim for PCP prophylaxis  -IV lasix to manage associated LE edema, improving Lupus may be contributing to inflammatory process  -Rheum consult  -c/w home regimen of plaquenil, prednisone  -hold cellcept  -Bactrim for PCP prophylaxis  -transitioned IV to 40mg po lasix to manage associated LE edema, continues to improve

## 2022-07-24 NOTE — PROGRESS NOTE ADULT - PROBLEM SELECTOR PLAN 1
EKG sinus tach, trops wnl, CXR negative for acute pulmonary pathology; causes of symptoms likely costochondritis  i/s/o lupus with recent hospitalization for acute asthma exacerbation 2/2 RSV infection with associated cough  -procal elevated; C3, C4, anti-dsDNA wnl  -ctm pain, increase morphine to 6mg, hycodan on board; acetaminophen 1g on board  -antitussives (tessalon)  -rheumatology consulted, appreciate recs    -hold cellcept    -continue plaquenil    -UA w/ microscopy, urine Pr/Cr ratio  -D dimer negative    - Will try to de-escalate IV pain meds tomorrow. EKG sinus tach, trops wnl, CXR negative for acute pulmonary pathology; causes of symptoms likely costochondritis  i/s/o lupus with recent hospitalization for acute asthma exacerbation 2/2 RSV infection with associated cough  -procal elevated; C3, C4, anti-dsDNA wnl  -ctm pain, increase morphine to 6mg, hycodan on board; acetaminophen 1g on board  -antitussives (tessalon)  -rheumatology consulted, appreciate recs    -hold cellcept    -continue plaquenil    -UA w/ microscopy (nl), urine Pr/Cr ratio (slightly elevated)  -D dimer negative    - Will try to de-escalate IV pain meds tomorrow. EKG sinus tach, trops wnl, CXR negative for acute pulmonary pathology; causes of symptoms likely costochondritis  i/s/o lupus with recent hospitalization for acute asthma exacerbation 2/2 RSV infection with associated cough  -procal elevated; C3, C4, anti-dsDNA wnl  -ctm pain, de-escalated dilaudid to oxycodone po, hycodan on board; acetaminophen 1g on board (last day 07/24/22)  -antitussives (tessalon)  -rheumatology consulted, appreciate recs    -hold cellcept    -continue plaquenil    -UA w/ microscopy (nl), urine Pr/Cr ratio (slightly elevated)  -D dimer negative    - Will try to de-escalate IV pain meds tomorrow.

## 2022-07-24 NOTE — PROGRESS NOTE ADULT - ASSESSMENT
Pt is a 47y male PMHx SLE on prednisone and plaquenil, asthma, hypothyroidism, HTN presenting with shortness of breath 2/2 back and pleuritic chest pain i/s/o recent admission for RSV-related asthma exacerbation with costochondritis. Pt is a 47y male PMHx SLE on prednisone and plaquenil, asthma, hypothyroidism, HTN presenting with shortness of breath 2/2 back and pleuritic chest pain i/s/o recent admission for RSV-related asthma exacerbation with costochondritis. Continues to have occasional productive cough, but pt feels condition has improved.

## 2022-07-24 NOTE — PROGRESS NOTE ADULT - PROBLEM SELECTOR PLAN 2
K+ found to be 2.7 on labs at admission, administered 30 mEq IV K+, 40 mEq po KCl  -K+ currently 3.4  -f/u BMP and replete as needed with oral and IV K+ considering diuresis K+ found to be 2.7 on labs at admission, administered 30 mEq IV K+, 40 mEq po KCl  -K+ currently 3.1  -f/u BMP and replete as needed with oral and IV K+ considering diuresis

## 2022-07-25 ENCOUNTER — NON-APPOINTMENT (OUTPATIENT)
Age: 47
End: 2022-07-25

## 2022-07-25 LAB
ANION GAP SERPL CALC-SCNC: 16 MMOL/L — SIGNIFICANT CHANGE UP (ref 5–17)
BUN SERPL-MCNC: 12 MG/DL — SIGNIFICANT CHANGE UP (ref 7–23)
CALCIUM SERPL-MCNC: 9.1 MG/DL — SIGNIFICANT CHANGE UP (ref 8.4–10.5)
CHLORIDE SERPL-SCNC: 95 MMOL/L — LOW (ref 96–108)
CO2 SERPL-SCNC: 21 MMOL/L — LOW (ref 22–31)
CREAT SERPL-MCNC: 0.9 MG/DL — SIGNIFICANT CHANGE UP (ref 0.5–1.3)
EGFR: 107 ML/MIN/1.73M2 — SIGNIFICANT CHANGE UP
GLUCOSE SERPL-MCNC: 340 MG/DL — HIGH (ref 70–99)
HCT VFR BLD CALC: 36.5 % — LOW (ref 39–50)
HGB BLD-MCNC: 12.5 G/DL — LOW (ref 13–17)
MAGNESIUM SERPL-MCNC: 1.9 MG/DL — SIGNIFICANT CHANGE UP (ref 1.6–2.6)
MCHC RBC-ENTMCNC: 30.6 PG — SIGNIFICANT CHANGE UP (ref 27–34)
MCHC RBC-ENTMCNC: 34.2 GM/DL — SIGNIFICANT CHANGE UP (ref 32–36)
MCV RBC AUTO: 89.2 FL — SIGNIFICANT CHANGE UP (ref 80–100)
NRBC # BLD: 0 /100 WBCS — SIGNIFICANT CHANGE UP (ref 0–0)
PHOSPHATE SERPL-MCNC: 1.7 MG/DL — LOW (ref 2.5–4.5)
PLATELET # BLD AUTO: 354 K/UL — SIGNIFICANT CHANGE UP (ref 150–400)
POTASSIUM SERPL-MCNC: 4.3 MMOL/L — SIGNIFICANT CHANGE UP (ref 3.5–5.3)
POTASSIUM SERPL-SCNC: 4.3 MMOL/L — SIGNIFICANT CHANGE UP (ref 3.5–5.3)
RBC # BLD: 4.09 M/UL — LOW (ref 4.2–5.8)
RBC # FLD: 14.2 % — SIGNIFICANT CHANGE UP (ref 10.3–14.5)
SODIUM SERPL-SCNC: 132 MMOL/L — LOW (ref 135–145)
WBC # BLD: 8.74 K/UL — SIGNIFICANT CHANGE UP (ref 3.8–10.5)
WBC # FLD AUTO: 8.74 K/UL — SIGNIFICANT CHANGE UP (ref 3.8–10.5)

## 2022-07-25 PROCEDURE — 93971 EXTREMITY STUDY: CPT | Mod: 26,LT

## 2022-07-25 PROCEDURE — 99233 SBSQ HOSP IP/OBS HIGH 50: CPT | Mod: GC

## 2022-07-25 RX ORDER — OXYCODONE HYDROCHLORIDE 5 MG/1
10 TABLET ORAL EVERY 4 HOURS
Refills: 0 | Status: DISCONTINUED | OUTPATIENT
Start: 2022-07-25 | End: 2022-07-28

## 2022-07-25 RX ORDER — OXYCODONE HYDROCHLORIDE 5 MG/1
5 TABLET ORAL EVERY 4 HOURS
Refills: 0 | Status: DISCONTINUED | OUTPATIENT
Start: 2022-07-25 | End: 2022-07-28

## 2022-07-25 RX ORDER — HYDROMORPHONE HYDROCHLORIDE 2 MG/ML
1 INJECTION INTRAMUSCULAR; INTRAVENOUS; SUBCUTANEOUS ONCE
Refills: 0 | Status: DISCONTINUED | OUTPATIENT
Start: 2022-07-25 | End: 2022-07-25

## 2022-07-25 RX ORDER — FUROSEMIDE 40 MG
40 TABLET ORAL DAILY
Refills: 0 | Status: DISCONTINUED | OUTPATIENT
Start: 2022-07-26 | End: 2022-07-28

## 2022-07-25 RX ADMIN — Medication 81 MILLIGRAM(S): at 12:18

## 2022-07-25 RX ADMIN — ENOXAPARIN SODIUM 40 MILLIGRAM(S): 100 INJECTION SUBCUTANEOUS at 12:17

## 2022-07-25 RX ADMIN — Medication 3 MILLILITER(S): at 06:15

## 2022-07-25 RX ADMIN — Medication 200 MILLIGRAM(S): at 06:16

## 2022-07-25 RX ADMIN — Medication 125 MICROGRAM(S): at 06:17

## 2022-07-25 RX ADMIN — HYDROMORPHONE HYDROCHLORIDE 1 MILLIGRAM(S): 2 INJECTION INTRAMUSCULAR; INTRAVENOUS; SUBCUTANEOUS at 22:36

## 2022-07-25 RX ADMIN — Medication 1000 MILLIGRAM(S): at 06:16

## 2022-07-25 RX ADMIN — Medication 1000 MILLIGRAM(S): at 06:55

## 2022-07-25 RX ADMIN — POLYETHYLENE GLYCOL 3350 17 GRAM(S): 17 POWDER, FOR SOLUTION ORAL at 12:19

## 2022-07-25 RX ADMIN — Medication 6 MILLIGRAM(S): at 22:35

## 2022-07-25 RX ADMIN — LIDOCAINE 2 PATCH: 4 CREAM TOPICAL at 00:00

## 2022-07-25 RX ADMIN — SENNA PLUS 1 TABLET(S): 8.6 TABLET ORAL at 12:19

## 2022-07-25 RX ADMIN — LIDOCAINE 2 PATCH: 4 CREAM TOPICAL at 23:33

## 2022-07-25 RX ADMIN — Medication 0.6 MILLIGRAM(S): at 18:53

## 2022-07-25 RX ADMIN — Medication 400 MILLIGRAM(S): at 18:53

## 2022-07-25 RX ADMIN — Medication 3: at 17:45

## 2022-07-25 RX ADMIN — HYDROMORPHONE HYDROCHLORIDE 0.5 MILLIGRAM(S): 2 INJECTION INTRAMUSCULAR; INTRAVENOUS; SUBCUTANEOUS at 06:16

## 2022-07-25 RX ADMIN — Medication 200 MILLIGRAM(S): at 13:50

## 2022-07-25 RX ADMIN — LIDOCAINE 2 PATCH: 4 CREAM TOPICAL at 19:00

## 2022-07-25 RX ADMIN — Medication 1: at 08:35

## 2022-07-25 RX ADMIN — Medication 0.6 MILLIGRAM(S): at 06:16

## 2022-07-25 RX ADMIN — Medication 4: at 12:20

## 2022-07-25 RX ADMIN — Medication 60 MILLIGRAM(S): at 12:17

## 2022-07-25 RX ADMIN — HYDROMORPHONE HYDROCHLORIDE 1 MILLIGRAM(S): 2 INJECTION INTRAMUSCULAR; INTRAVENOUS; SUBCUTANEOUS at 23:00

## 2022-07-25 RX ADMIN — HYDROMORPHONE HYDROCHLORIDE 0.5 MILLIGRAM(S): 2 INJECTION INTRAMUSCULAR; INTRAVENOUS; SUBCUTANEOUS at 00:00

## 2022-07-25 RX ADMIN — PANTOPRAZOLE SODIUM 40 MILLIGRAM(S): 20 TABLET, DELAYED RELEASE ORAL at 06:17

## 2022-07-25 RX ADMIN — HYDROMORPHONE HYDROCHLORIDE 0.5 MILLIGRAM(S): 2 INJECTION INTRAMUSCULAR; INTRAVENOUS; SUBCUTANEOUS at 06:55

## 2022-07-25 RX ADMIN — HYDROMORPHONE HYDROCHLORIDE 1 MILLIGRAM(S): 2 INJECTION INTRAMUSCULAR; INTRAVENOUS; SUBCUTANEOUS at 14:20

## 2022-07-25 RX ADMIN — Medication 40 MILLIGRAM(S): at 06:16

## 2022-07-25 RX ADMIN — CYCLOBENZAPRINE HYDROCHLORIDE 5 MILLIGRAM(S): 10 TABLET, FILM COATED ORAL at 21:45

## 2022-07-25 RX ADMIN — HYDROMORPHONE HYDROCHLORIDE 1 MILLIGRAM(S): 2 INJECTION INTRAMUSCULAR; INTRAVENOUS; SUBCUTANEOUS at 13:50

## 2022-07-25 RX ADMIN — Medication 3 MILLILITER(S): at 17:46

## 2022-07-25 RX ADMIN — Medication 200 MILLIGRAM(S): at 22:36

## 2022-07-25 RX ADMIN — Medication 1000 MILLIGRAM(S): at 00:21

## 2022-07-25 RX ADMIN — Medication 3 MILLILITER(S): at 12:18

## 2022-07-25 RX ADMIN — LOSARTAN POTASSIUM 50 MILLIGRAM(S): 100 TABLET, FILM COATED ORAL at 06:16

## 2022-07-25 RX ADMIN — Medication 63.75 MILLIMOLE(S): at 18:44

## 2022-07-25 RX ADMIN — BUDESONIDE AND FORMOTEROL FUMARATE DIHYDRATE 2 PUFF(S): 160; 4.5 AEROSOL RESPIRATORY (INHALATION) at 06:17

## 2022-07-25 RX ADMIN — Medication 1: at 21:49

## 2022-07-25 RX ADMIN — LIDOCAINE 2 PATCH: 4 CREAM TOPICAL at 12:18

## 2022-07-25 RX ADMIN — Medication 20 MILLIGRAM(S): at 17:46

## 2022-07-25 RX ADMIN — Medication 1000 MILLIGRAM(S): at 12:18

## 2022-07-25 RX ADMIN — OXYCODONE HYDROCHLORIDE 10 MILLIGRAM(S): 5 TABLET ORAL at 20:10

## 2022-07-25 RX ADMIN — Medication 3 MILLILITER(S): at 21:45

## 2022-07-25 RX ADMIN — OXYCODONE HYDROCHLORIDE 10 MILLIGRAM(S): 5 TABLET ORAL at 21:00

## 2022-07-25 RX ADMIN — BUDESONIDE AND FORMOTEROL FUMARATE DIHYDRATE 2 PUFF(S): 160; 4.5 AEROSOL RESPIRATORY (INHALATION) at 17:47

## 2022-07-25 NOTE — PROGRESS NOTE ADULT - ATTENDING COMMENTS
47 M with SLE on steroids/hcq/mmf, asthma here with sob and acute hypoxemic respiratory failure due to asthma exacerbation due ot RSV    Patient is still with cough/ pleuritic chest pain    c/w bronchodilators given asthma exacerbation  supportive care for RSV; can consider increasing solumedrol to 60 mg IV daily for now  supplemental o2 as needed

## 2022-07-25 NOTE — PROGRESS NOTE ADULT - ASSESSMENT
Pt is a 47y male PMHx SLE on prednisone and plaquenil, asthma, hypothyroidism, HTN presenting with shortness of breath 2/2 back and pleuritic chest pain i/s/o recent admission for RSV-related asthma exacerbation with costochondritis. Continues to have occasional productive cough, but pt feels condition has improved.  Pt is a 47y male PMHx SLE on prednisone and plaquenil, asthma, hypothyroidism, HTN presenting with shortness of breath 2/2 back and pleuritic chest pain i/s/o recent admission for RSV-related asthma exacerbation with costochondritis. Continues to have occasional productive cough, but pt continues to feel some improvement in symptoms.

## 2022-07-25 NOTE — PROGRESS NOTE ADULT - PROBLEM SELECTOR PLAN 1
EKG sinus tach, trops wnl, CXR negative for acute pulmonary pathology; causes of symptoms likely costochondritis  i/s/o lupus with recent hospitalization for acute asthma exacerbation 2/2 RSV infection with associated cough  -procal elevated; C3, C4, anti-dsDNA wnl  -ctm pain, de-escalated dilaudid to oxycodone po, hycodan on board; acetaminophen 1g on board (last day 07/24/22)  -antitussives (tessalon)  -rheumatology consulted, appreciate recs    -hold cellcept    -continue plaquenil    -UA w/ microscopy (nl), urine Pr/Cr ratio (slightly elevated)  -D dimer negative    - Will try to de-escalate IV pain meds tomorrow. EKG sinus tach, trops wnl, CXR negative for acute pulmonary pathology; causes of symptoms likely costochondritis  i/s/o lupus with recent hospitalization for acute asthma exacerbation 2/2 RSV infection with associated cough  -procal elevated; C3, C4, anti-dsDNA wnl  -solumedrol 60mg per pulmonology recs  -ctm pain, de-escalated dilaudid to oxycodone po, hycodan on board; acetaminophen 1g on board (last day 07/24/22)  -antitussives (tessalon)  -rheumatology consulted, appreciate recs    -hold cellcept    -continue plaquenil    -UA w/ microscopy (nl), urine Pr/Cr ratio (slightly elevated)  -D dimer negative

## 2022-07-25 NOTE — PROGRESS NOTE ADULT - ATTENDING COMMENTS
47M PMHx SLE on prednisone and Plaquenil, asthma, hypothyroidism, HTN presenting with shortness of breath and severe 15/10 back, rib pain and pleuritic chest pain in the setting of persistent aggressive coughing due recent RSV infection with asthma exacerbation now causing costochondritis.  Improved cough overnight, states L sided chest wall pain is better, but continues to have R sided chest wall pain. Endorsing worsening LLE edema     - will wean down IV dilaudid to PO oxycodone; can get small dose IV dilaudid at night if pain is severe   - will try to stop IV opioids over the next 24 hrs  - c/w IV lasix for now as it is helping with LE edema  - c/w IV solumedrol for now   - antitussives as needed   - f/u pulm and rheum

## 2022-07-25 NOTE — PROGRESS NOTE ADULT - PROBLEM SELECTOR PLAN 2
K+ found to be 2.7 on labs at admission, administered 30 mEq IV K+, 40 mEq po KCl  -K+ currently 3.1  -f/u BMP and replete as needed with oral and IV K+ considering diuresis

## 2022-07-25 NOTE — PROGRESS NOTE ADULT - SUBJECTIVE AND OBJECTIVE BOX
Monae Singh MD  PGY 1 Department of Internal Medicine        Patient is a 47y old  Male who presents with a chief complaint of Shortness of breath, Chest and Back Pain (24 Jul 2022 07:40)      SUBJECTIVE / OVERNIGHT EVENTS: Pt seen and examined. No acute overnight events. Denies fevers, chills, CP, SOB, Abdominal pain, N/V, Constipation, Diarrhea        MEDICATIONS  (STANDING):  acetaminophen     Tablet .. 1000 milliGRAM(s) Oral every 6 hours  albuterol/ipratropium for Nebulization 3 milliLiter(s) Nebulizer every 6 hours  aspirin enteric coated 81 milliGRAM(s) Oral daily  benzonatate 200 milliGRAM(s) Oral every 8 hours  budesonide 160 MICROgram(s)/formoterol 4.5 MICROgram(s) Inhaler 2 Puff(s) Inhalation two times a day  colchicine 0.6 milliGRAM(s) Oral two times a day  cyclobenzaprine 5 milliGRAM(s) Oral at bedtime  dextrose 5%. 1000 milliLiter(s) (100 mL/Hr) IV Continuous <Continuous>  dextrose 5%. 1000 milliLiter(s) (50 mL/Hr) IV Continuous <Continuous>  dextrose 50% Injectable 25 Gram(s) IV Push once  dextrose 50% Injectable 12.5 Gram(s) IV Push once  dextrose 50% Injectable 25 Gram(s) IV Push once  enoxaparin Injectable 40 milliGRAM(s) SubCutaneous every 24 hours  furosemide   Injectable 60 milliGRAM(s) IV Push daily  glucagon  Injectable 1 milliGRAM(s) IntraMuscular once  hydroxychloroquine 400 milliGRAM(s) Oral daily  insulin lispro (ADMELOG) corrective regimen sliding scale   SubCutaneous three times a day before meals  insulin lispro (ADMELOG) corrective regimen sliding scale   SubCutaneous at bedtime  levothyroxine 125 MICROGram(s) Oral daily  lidocaine   4% Patch 2 Patch Transdermal daily  losartan 50 milliGRAM(s) Oral daily  melatonin 6 milliGRAM(s) Oral at bedtime  methylPREDNISolone sodium succinate Injectable 40 milliGRAM(s) IV Push daily  pantoprazole    Tablet 40 milliGRAM(s) Oral before breakfast  polyethylene glycol 3350 17 Gram(s) Oral daily  senna 1 Tablet(s) Oral daily  trimethoprim  160 mG/sulfamethoxazole 800 mG 1 Tablet(s) Oral <User Schedule>    MEDICATIONS  (PRN):  ALBUTerol    0.083% 2.5 milliGRAM(s) Nebulizer every 3 hours PRN Shortness of Breath and/or Wheezing  dextrose Oral Gel 15 Gram(s) Oral once PRN Blood Glucose LESS THAN 70 milliGRAM(s)/deciliter  hydrocodone/homatropine Syrup 10 milliLiter(s) Oral every 4 hours PRN Cough  HYDROmorphone  Injectable 0.5 milliGRAM(s) IV Push two times a day PRN Severe Pain (7 - 10)  oxyCODONE    IR 5 milliGRAM(s) Oral every 6 hours PRN Moderate Pain (4 - 6)      I&O's Summary    24 Jul 2022 07:01  -  25 Jul 2022 07:00  --------------------------------------------------------  IN: 0 mL / OUT: 2800 mL / NET: -2800 mL        Vital Signs Last 24 Hrs  T(C): 36.6 (24 Jul 2022 17:30), Max: 36.6 (24 Jul 2022 17:30)  T(F): 97.9 (24 Jul 2022 17:30), Max: 97.9 (24 Jul 2022 17:30)  HR: 103 (25 Jul 2022 06:30) (103 - 108)  BP: 128/94 (25 Jul 2022 06:30) (128/94 - 146/101)  BP(mean): --  RR: 18 (24 Jul 2022 17:30) (18 - 18)  SpO2: 99% (24 Jul 2022 17:30) (99% - 99%)    Parameters below as of 24 Jul 2022 17:30  Patient On (Oxygen Delivery Method): room air        CAPILLARY BLOOD GLUCOSE      POCT Blood Glucose.: 150 mg/dL (24 Jul 2022 21:58)  POCT Blood Glucose.: 220 mg/dL (24 Jul 2022 17:34)  POCT Blood Glucose.: 311 mg/dL (24 Jul 2022 11:57)  POCT Blood Glucose.: 169 mg/dL (24 Jul 2022 07:52)      PHYSICAL EXAM:  GENERAL: NAD,   HEAD:  Atraumatic, Normocephalic  EYES: EOMI, PERRL, conjunctiva and sclera clear  NECK: No JVD  CHEST/LUNG: Clear to auscultation bilaterally; No wheeze  HEART: Regular rate and rhythm; No murmurs, rubs, or gallops  ABDOMEN: Soft, Nontender, Nondistended; Bowel sounds present  EXTREMITIES:  2+ Peripheral Pulses, No clubbing, cyanosis, or edema  PSYCH: AAOx3  NEUROLOGY: non-focal  SKIN: No rashes or lesions       LABS:                        13.0   10.55 )-----------( 325      ( 24 Jul 2022 06:48 )             38.1     Auto Eosinophil # x     / Auto Eosinophil % x     / Auto Neutrophil # x     / Auto Neutrophil % x     / BANDS % x        07-24    135  |  94<L>  |  12  ----------------------------<  155<H>  3.1<L>   |  25  |  0.89    Ca    9.8      24 Jul 2022 06:50  Mg     2.1     07-24  Phos  3.1     07-24  TPro  6.8  /  Alb  4.1  /  TBili  0.3  /  DBili  x   /  AST  95<H>  /  ALT  159<H>  /  AlkPhos  118  07-24                  RADIOLOGY & ADDITIONAL TESTS:    Imaging Personally Reviewed:    Consultant(s) Notes Reviewed:      Care Discussed with Consultants/Other Providers:   Monae Singh MD  PGY 1 Department of Internal Medicine        Patient is a 47y old  Male who presents with a chief complaint of Shortness of breath, Chest and Back Pain (24 Jul 2022 07:40)      SUBJECTIVE / OVERNIGHT EVENTS: Pt seen and examined. No acute overnight events, notes cough has improved though complains of continued back and chest pain. Denies fevers, chills, CP, SOB, Abdominal pain, N/V, Constipation, Diarrhea        MEDICATIONS  (STANDING):  acetaminophen     Tablet .. 1000 milliGRAM(s) Oral every 6 hours  albuterol/ipratropium for Nebulization 3 milliLiter(s) Nebulizer every 6 hours  aspirin enteric coated 81 milliGRAM(s) Oral daily  benzonatate 200 milliGRAM(s) Oral every 8 hours  budesonide 160 MICROgram(s)/formoterol 4.5 MICROgram(s) Inhaler 2 Puff(s) Inhalation two times a day  colchicine 0.6 milliGRAM(s) Oral two times a day  cyclobenzaprine 5 milliGRAM(s) Oral at bedtime  dextrose 5%. 1000 milliLiter(s) (100 mL/Hr) IV Continuous <Continuous>  dextrose 5%. 1000 milliLiter(s) (50 mL/Hr) IV Continuous <Continuous>  dextrose 50% Injectable 25 Gram(s) IV Push once  dextrose 50% Injectable 12.5 Gram(s) IV Push once  dextrose 50% Injectable 25 Gram(s) IV Push once  enoxaparin Injectable 40 milliGRAM(s) SubCutaneous every 24 hours  furosemide   Injectable 60 milliGRAM(s) IV Push daily  glucagon  Injectable 1 milliGRAM(s) IntraMuscular once  hydroxychloroquine 400 milliGRAM(s) Oral daily  insulin lispro (ADMELOG) corrective regimen sliding scale   SubCutaneous three times a day before meals  insulin lispro (ADMELOG) corrective regimen sliding scale   SubCutaneous at bedtime  levothyroxine 125 MICROGram(s) Oral daily  lidocaine   4% Patch 2 Patch Transdermal daily  losartan 50 milliGRAM(s) Oral daily  melatonin 6 milliGRAM(s) Oral at bedtime  methylPREDNISolone sodium succinate Injectable 40 milliGRAM(s) IV Push daily  pantoprazole    Tablet 40 milliGRAM(s) Oral before breakfast  polyethylene glycol 3350 17 Gram(s) Oral daily  senna 1 Tablet(s) Oral daily  trimethoprim  160 mG/sulfamethoxazole 800 mG 1 Tablet(s) Oral <User Schedule>    MEDICATIONS  (PRN):  ALBUTerol    0.083% 2.5 milliGRAM(s) Nebulizer every 3 hours PRN Shortness of Breath and/or Wheezing  dextrose Oral Gel 15 Gram(s) Oral once PRN Blood Glucose LESS THAN 70 milliGRAM(s)/deciliter  hydrocodone/homatropine Syrup 10 milliLiter(s) Oral every 4 hours PRN Cough  HYDROmorphone  Injectable 0.5 milliGRAM(s) IV Push two times a day PRN Severe Pain (7 - 10)  oxyCODONE    IR 5 milliGRAM(s) Oral every 6 hours PRN Moderate Pain (4 - 6)      I&O's Summary    24 Jul 2022 07:01  -  25 Jul 2022 07:00  --------------------------------------------------------  IN: 0 mL / OUT: 2800 mL / NET: -2800 mL        Vital Signs Last 24 Hrs  T(C): 36.6 (24 Jul 2022 17:30), Max: 36.6 (24 Jul 2022 17:30)  T(F): 97.9 (24 Jul 2022 17:30), Max: 97.9 (24 Jul 2022 17:30)  HR: 103 (25 Jul 2022 06:30) (103 - 108)  BP: 128/94 (25 Jul 2022 06:30) (128/94 - 146/101)  BP(mean): --  RR: 18 (24 Jul 2022 17:30) (18 - 18)  SpO2: 99% (24 Jul 2022 17:30) (99% - 99%)    Parameters below as of 24 Jul 2022 17:30  Patient On (Oxygen Delivery Method): room air        CAPILLARY BLOOD GLUCOSE      POCT Blood Glucose.: 150 mg/dL (24 Jul 2022 21:58)  POCT Blood Glucose.: 220 mg/dL (24 Jul 2022 17:34)  POCT Blood Glucose.: 311 mg/dL (24 Jul 2022 11:57)  POCT Blood Glucose.: 169 mg/dL (24 Jul 2022 07:52)      PHYSICAL EXAM:  GENERAL: NAD,   HEAD:  Atraumatic, Normocephalic  EYES: EOMI, PERRL, conjunctiva and sclera clear  NECK: No JVD  CHEST/LUNG: (+) Expiratory wheezes best auscultated on R side. No crackles, bilateral excursion limited by pt pain  HEART: Regular rate and rhythm; No murmurs, rubs, or gallops  ABDOMEN: Soft, Nontender, Nondistended; Bowel sounds present  EXTREMITIES:  (+) 1+ pitting LE edema (L > R) up to mid-shin 2+ Peripheral Pulses, No clubbing, cyanosis  PSYCH: AAOx3  NEUROLOGY: non-focal  SKIN: No rashes or lesions       LABS:                        13.0   10.55 )-----------( 325      ( 24 Jul 2022 06:48 )             38.1     Auto Eosinophil # x     / Auto Eosinophil % x     / Auto Neutrophil # x     / Auto Neutrophil % x     / BANDS % x        07-24    135  |  94<L>  |  12  ----------------------------<  155<H>  3.1<L>   |  25  |  0.89    Ca    9.8      24 Jul 2022 06:50  Mg     2.1     07-24  Phos  3.1     07-24  TPro  6.8  /  Alb  4.1  /  TBili  0.3  /  DBili  x   /  AST  95<H>  /  ALT  159<H>  /  AlkPhos  118  07-24

## 2022-07-25 NOTE — PROGRESS NOTE ADULT - SUBJECTIVE AND OBJECTIVE BOX
CHIEF COMPLAINT:Patient is a 47y old  Male who presents with a chief complaint of Shortness of breath, Chest and Back Pain (25 Jul 2022 21:03)      Interval Events:  This morning the patient expresses frustration regarding ongoing costochondral pain and dyspnea which he does not think have improved significantly as of yet. Otherwise denies f/c, n/v, abd pain. He is amenable to increasing steroid dose.    REVIEW OF SYSTEMS:  [x] All other systems negative except per HPI   [ ] Unable to assess ROS because ________    OBJECTIVE:  ICU Vital Signs Last 24 Hrs  T(C): 36.5 (25 Jul 2022 17:10), Max: 36.6 (25 Jul 2022 08:30)  T(F): 97.7 (25 Jul 2022 17:10), Max: 97.9 (25 Jul 2022 08:30)  HR: 109 (25 Jul 2022 17:10) (103 - 118)  BP: 120/88 (25 Jul 2022 17:10) (120/88 - 140/92)  BP(mean): --  ABP: --  ABP(mean): --  RR: 18 (25 Jul 2022 17:10) (18 - 18)  SpO2: 94% (25 Jul 2022 17:10) (94% - 95%)    O2 Parameters below as of 25 Jul 2022 17:10  Patient On (Oxygen Delivery Method): room air              07-24 @ 07:01  -  07-25 @ 07:00  --------------------------------------------------------  IN: 0 mL / OUT: 2800 mL / NET: -2800 mL    07-25 @ 07:01  -  07-25 @ 21:30  --------------------------------------------------------  IN: 1000 mL / OUT: 1550 mL / NET: -550 mL        PHYSICAL EXAM:  GENERAL: NAD,   HEAD:  Atraumatic  EYES: EOMI, PERRL, conjunctiva and sclera clear  NECK: No JVD  CHEST/LUNG: b/l expiratory wheeze  HEART: Regular rate and rhythm; No murmurs, rubs, or gallops  ABDOMEN: Soft, Nontender, Nondistended; Bowel sounds present  PSYCH: AAOx3  NEUROLOGY: non-focal  SKIN: No rashes or lesions    HOSPITAL MEDICATIONS:  MEDICATIONS  (STANDING):  albuterol/ipratropium for Nebulization 3 milliLiter(s) Nebulizer every 6 hours  aspirin enteric coated 81 milliGRAM(s) Oral daily  benzonatate 200 milliGRAM(s) Oral every 8 hours  budesonide 160 MICROgram(s)/formoterol 4.5 MICROgram(s) Inhaler 2 Puff(s) Inhalation two times a day  colchicine 0.6 milliGRAM(s) Oral two times a day  cyclobenzaprine 5 milliGRAM(s) Oral at bedtime  dextrose 5%. 1000 milliLiter(s) (50 mL/Hr) IV Continuous <Continuous>  dextrose 5%. 1000 milliLiter(s) (100 mL/Hr) IV Continuous <Continuous>  dextrose 50% Injectable 25 Gram(s) IV Push once  dextrose 50% Injectable 12.5 Gram(s) IV Push once  dextrose 50% Injectable 25 Gram(s) IV Push once  enoxaparin Injectable 40 milliGRAM(s) SubCutaneous every 24 hours  glucagon  Injectable 1 milliGRAM(s) IntraMuscular once  hydroxychloroquine 400 milliGRAM(s) Oral daily  insulin lispro (ADMELOG) corrective regimen sliding scale   SubCutaneous at bedtime  insulin lispro (ADMELOG) corrective regimen sliding scale   SubCutaneous three times a day before meals  levothyroxine 125 MICROGram(s) Oral daily  lidocaine   4% Patch 2 Patch Transdermal daily  losartan 50 milliGRAM(s) Oral daily  melatonin 6 milliGRAM(s) Oral at bedtime  pantoprazole    Tablet 40 milliGRAM(s) Oral before breakfast  polyethylene glycol 3350 17 Gram(s) Oral daily  senna 1 Tablet(s) Oral daily  trimethoprim  160 mG/sulfamethoxazole 800 mG 1 Tablet(s) Oral <User Schedule>    MEDICATIONS  (PRN):  ALBUTerol    0.083% 2.5 milliGRAM(s) Nebulizer every 3 hours PRN Shortness of Breath and/or Wheezing  dextrose Oral Gel 15 Gram(s) Oral once PRN Blood Glucose LESS THAN 70 milliGRAM(s)/deciliter  hydrocodone/homatropine Syrup 10 milliLiter(s) Oral every 4 hours PRN Cough  HYDROmorphone  Injectable 1 milliGRAM(s) IV Push once PRN pain before bedtime  oxyCODONE    IR 10 milliGRAM(s) Oral every 4 hours PRN Severe Pain (7 - 10)  oxyCODONE    IR 5 milliGRAM(s) Oral every 4 hours PRN Moderate Pain (4 - 6)      LABS:    The Labs were reviewed by me   The Radiology was reviewed by me    EKG tracing reviewed by me    07-25    132<L>  |  95<L>  |  12  ----------------------------<  340<H>  4.3   |  21<L>  |  0.90  07-24    135  |  94<L>  |  12  ----------------------------<  155<H>  3.1<L>   |  25  |  0.89  07-23    138  |  96  |  11  ----------------------------<  160<H>  3.4<L>   |  27  |  0.92    Ca    9.1      25 Jul 2022 12:17  Ca    9.8      24 Jul 2022 06:50  Ca    10.0      23 Jul 2022 06:52  Phos  1.7     07-25  Mg     1.9     07-25    TPro  6.8  /  Alb  4.1  /  TBili  0.3  /  DBili  x   /  AST  95<H>  /  ALT  159<H>  /  AlkPhos  118  07-24  TPro  6.7  /  Alb  4.1  /  TBili  0.3  /  DBili  x   /  AST  43<H>  /  ALT  99<H>  /  AlkPhos  112  07-23    Magnesium, Serum: 1.9 mg/dL (07-25-22 @ 12:17)  Magnesium, Serum: 2.1 mg/dL (07-24-22 @ 06:50)  Magnesium, Serum: 2.3 mg/dL (07-23-22 @ 06:52)    Phosphorus Level, Serum: 1.7 mg/dL (07-25-22 @ 12:17)  Phosphorus Level, Serum: 3.1 mg/dL (07-24-22 @ 06:50)  Phosphorus Level, Serum: 4.1 mg/dL (07-23-22 @ 06:52)                                              12.5   8.74  )-----------( 354      ( 25 Jul 2022 12:17 )             36.5                         13.0   10.55 )-----------( 325      ( 24 Jul 2022 06:48 )             38.1                         12.8   9.74  )-----------( 323      ( 23 Jul 2022 06:52 )             37.4     CAPILLARY BLOOD GLUCOSE      POCT Blood Glucose.: 258 mg/dL (25 Jul 2022 21:26)  POCT Blood Glucose.: 263 mg/dL (25 Jul 2022 17:02)  POCT Blood Glucose.: 326 mg/dL (25 Jul 2022 12:09)  POCT Blood Glucose.: 172 mg/dL (25 Jul 2022 07:49)  POCT Blood Glucose.: 150 mg/dL (24 Jul 2022 21:58)        MICROBIOLOGY:     RADIOLOGY:  [ ] Reviewed and interpreted by me    Point of Care Ultrasound Findings:    PFT:    EKG:

## 2022-07-25 NOTE — PROGRESS NOTE ADULT - ASSESSMENT
47y male PMHx SLE on prednisone and plaquenil, asthma, hypothyroidism, HTN presenting with asthma exacerbation and costochondritis with recent admission following parainfluenza infection    #Asthma exacerbation  Given pt's ongoing dyspnea, wheezing and pleuritic chest pain as well as noted home pred dose of 30mg qday, would recommend increasing solumedrol to 60mg IV qday for 2 days with decision to be made based on response to taper. Encouraging that oxygenation remains good.  -Solumedrol 60mg IV qday  -Instructions to taper to follow pending response  -c/w bronchodilators

## 2022-07-25 NOTE — PROGRESS NOTE ADULT - PROBLEM SELECTOR PLAN 3
Lupus may be contributing to inflammatory process  -Rheum consult  -c/w home regimen of plaquenil, prednisone  -hold cellcept  -Bactrim for PCP prophylaxis  -transitioned IV to 40mg po lasix to manage associated LE edema, continues to improve

## 2022-07-26 ENCOUNTER — TRANSCRIPTION ENCOUNTER (OUTPATIENT)
Age: 47
End: 2022-07-26

## 2022-07-26 DIAGNOSIS — K59.00 CONSTIPATION, UNSPECIFIED: ICD-10-CM

## 2022-07-26 LAB
ANION GAP SERPL CALC-SCNC: 12 MMOL/L — SIGNIFICANT CHANGE UP (ref 5–17)
BUN SERPL-MCNC: 10 MG/DL — SIGNIFICANT CHANGE UP (ref 7–23)
CALCIUM SERPL-MCNC: 9.6 MG/DL — SIGNIFICANT CHANGE UP (ref 8.4–10.5)
CHLORIDE SERPL-SCNC: 95 MMOL/L — LOW (ref 96–108)
CO2 SERPL-SCNC: 29 MMOL/L — SIGNIFICANT CHANGE UP (ref 22–31)
CREAT SERPL-MCNC: 0.8 MG/DL — SIGNIFICANT CHANGE UP (ref 0.5–1.3)
EGFR: 111 ML/MIN/1.73M2 — SIGNIFICANT CHANGE UP
GLUCOSE SERPL-MCNC: 174 MG/DL — HIGH (ref 70–99)
HCT VFR BLD CALC: 37.3 % — LOW (ref 39–50)
HGB BLD-MCNC: 12.6 G/DL — LOW (ref 13–17)
MAGNESIUM SERPL-MCNC: 2.1 MG/DL — SIGNIFICANT CHANGE UP (ref 1.6–2.6)
MCHC RBC-ENTMCNC: 30 PG — SIGNIFICANT CHANGE UP (ref 27–34)
MCHC RBC-ENTMCNC: 33.8 GM/DL — SIGNIFICANT CHANGE UP (ref 32–36)
MCV RBC AUTO: 88.8 FL — SIGNIFICANT CHANGE UP (ref 80–100)
NRBC # BLD: 0 /100 WBCS — SIGNIFICANT CHANGE UP (ref 0–0)
PHOSPHATE SERPL-MCNC: 3.3 MG/DL — SIGNIFICANT CHANGE UP (ref 2.5–4.5)
PLATELET # BLD AUTO: 389 K/UL — SIGNIFICANT CHANGE UP (ref 150–400)
POTASSIUM SERPL-MCNC: 3.6 MMOL/L — SIGNIFICANT CHANGE UP (ref 3.5–5.3)
POTASSIUM SERPL-SCNC: 3.6 MMOL/L — SIGNIFICANT CHANGE UP (ref 3.5–5.3)
RBC # BLD: 4.2 M/UL — SIGNIFICANT CHANGE UP (ref 4.2–5.8)
RBC # FLD: 14.3 % — SIGNIFICANT CHANGE UP (ref 10.3–14.5)
SODIUM SERPL-SCNC: 136 MMOL/L — SIGNIFICANT CHANGE UP (ref 135–145)
WBC # BLD: 12.06 K/UL — HIGH (ref 3.8–10.5)
WBC # FLD AUTO: 12.06 K/UL — HIGH (ref 3.8–10.5)

## 2022-07-26 PROCEDURE — 99232 SBSQ HOSP IP/OBS MODERATE 35: CPT | Mod: GC

## 2022-07-26 PROCEDURE — 99233 SBSQ HOSP IP/OBS HIGH 50: CPT | Mod: GC

## 2022-07-26 RX ORDER — MULTIVIT WITH MIN/MFOLATE/K2 340-15/3 G
1 POWDER (GRAM) ORAL ONCE
Refills: 0 | Status: COMPLETED | OUTPATIENT
Start: 2022-07-26 | End: 2022-07-26

## 2022-07-26 RX ORDER — POTASSIUM CHLORIDE 20 MEQ
40 PACKET (EA) ORAL ONCE
Refills: 0 | Status: COMPLETED | OUTPATIENT
Start: 2022-07-26 | End: 2022-07-26

## 2022-07-26 RX ORDER — ALPRAZOLAM 0.25 MG
0.25 TABLET ORAL ONCE
Refills: 0 | Status: DISCONTINUED | OUTPATIENT
Start: 2022-07-26 | End: 2022-07-26

## 2022-07-26 RX ADMIN — OXYCODONE HYDROCHLORIDE 10 MILLIGRAM(S): 5 TABLET ORAL at 23:20

## 2022-07-26 RX ADMIN — Medication 81 MILLIGRAM(S): at 12:09

## 2022-07-26 RX ADMIN — BUDESONIDE AND FORMOTEROL FUMARATE DIHYDRATE 2 PUFF(S): 160; 4.5 AEROSOL RESPIRATORY (INHALATION) at 17:05

## 2022-07-26 RX ADMIN — Medication 3 MILLILITER(S): at 22:24

## 2022-07-26 RX ADMIN — Medication 200 MILLIGRAM(S): at 23:13

## 2022-07-26 RX ADMIN — Medication 3: at 12:14

## 2022-07-26 RX ADMIN — Medication 0.25 MILLIGRAM(S): at 13:11

## 2022-07-26 RX ADMIN — OXYCODONE HYDROCHLORIDE 10 MILLIGRAM(S): 5 TABLET ORAL at 07:21

## 2022-07-26 RX ADMIN — Medication 1 BOTTLE: at 17:01

## 2022-07-26 RX ADMIN — Medication 3 MILLILITER(S): at 17:04

## 2022-07-26 RX ADMIN — BUDESONIDE AND FORMOTEROL FUMARATE DIHYDRATE 2 PUFF(S): 160; 4.5 AEROSOL RESPIRATORY (INHALATION) at 06:52

## 2022-07-26 RX ADMIN — LIDOCAINE 2 PATCH: 4 CREAM TOPICAL at 23:53

## 2022-07-26 RX ADMIN — Medication 0.6 MILLIGRAM(S): at 17:04

## 2022-07-26 RX ADMIN — PANTOPRAZOLE SODIUM 40 MILLIGRAM(S): 20 TABLET, DELAYED RELEASE ORAL at 06:52

## 2022-07-26 RX ADMIN — Medication 200 MILLIGRAM(S): at 13:10

## 2022-07-26 RX ADMIN — OXYCODONE HYDROCHLORIDE 10 MILLIGRAM(S): 5 TABLET ORAL at 18:35

## 2022-07-26 RX ADMIN — OXYCODONE HYDROCHLORIDE 10 MILLIGRAM(S): 5 TABLET ORAL at 06:51

## 2022-07-26 RX ADMIN — OXYCODONE HYDROCHLORIDE 10 MILLIGRAM(S): 5 TABLET ORAL at 19:30

## 2022-07-26 RX ADMIN — ENOXAPARIN SODIUM 40 MILLIGRAM(S): 100 INJECTION SUBCUTANEOUS at 12:11

## 2022-07-26 RX ADMIN — SENNA PLUS 1 TABLET(S): 8.6 TABLET ORAL at 12:07

## 2022-07-26 RX ADMIN — Medication 2: at 17:04

## 2022-07-26 RX ADMIN — LIDOCAINE 2 PATCH: 4 CREAM TOPICAL at 21:55

## 2022-07-26 RX ADMIN — OXYCODONE HYDROCHLORIDE 10 MILLIGRAM(S): 5 TABLET ORAL at 12:07

## 2022-07-26 RX ADMIN — Medication 40 MILLIEQUIVALENT(S): at 13:10

## 2022-07-26 RX ADMIN — CYCLOBENZAPRINE HYDROCHLORIDE 5 MILLIGRAM(S): 10 TABLET, FILM COATED ORAL at 21:13

## 2022-07-26 RX ADMIN — Medication 125 MICROGRAM(S): at 06:52

## 2022-07-26 RX ADMIN — OXYCODONE HYDROCHLORIDE 10 MILLIGRAM(S): 5 TABLET ORAL at 13:00

## 2022-07-26 RX ADMIN — OXYCODONE HYDROCHLORIDE 10 MILLIGRAM(S): 5 TABLET ORAL at 22:23

## 2022-07-26 RX ADMIN — LOSARTAN POTASSIUM 50 MILLIGRAM(S): 100 TABLET, FILM COATED ORAL at 06:52

## 2022-07-26 RX ADMIN — Medication 200 MILLIGRAM(S): at 06:52

## 2022-07-26 RX ADMIN — Medication 6 MILLIGRAM(S): at 23:13

## 2022-07-26 RX ADMIN — LIDOCAINE 2 PATCH: 4 CREAM TOPICAL at 12:10

## 2022-07-26 RX ADMIN — Medication 400 MILLIGRAM(S): at 17:05

## 2022-07-26 RX ADMIN — Medication 40 MILLIGRAM(S): at 12:07

## 2022-07-26 RX ADMIN — Medication 3 MILLILITER(S): at 06:44

## 2022-07-26 RX ADMIN — Medication 0.6 MILLIGRAM(S): at 06:51

## 2022-07-26 RX ADMIN — Medication 1: at 08:14

## 2022-07-26 RX ADMIN — Medication 60 MILLIGRAM(S): at 06:49

## 2022-07-26 NOTE — PROGRESS NOTE ADULT - ASSESSMENT
47y male PMHx SLE on prednisone and plaquenil, asthma, hypothyroidism, HTN presenting with asthma exacerbation and costochondritis with recent admission following parainfluenza infection    #Asthma exacerbation  Given pt's ongoing dyspnea, wheezing and pleuritic chest pain as well as noted home pred dose of 30mg qday, increased to methylpred 60mg yesterday, has had some improvement in wheezing and pain symptoms 7/26. Would continue steroids at this dose for an additional day then start a slow taper.  -continue with Solumedrol 60mg IV qday for now  -Instructions to taper to follow pending response  -c/w bronchodilators   -consider PJP ppx

## 2022-07-26 NOTE — PROVIDER CONTACT NOTE (CHANGE IN STATUS NOTIFICATION) - ASSESSMENT
pt reporting last BM was 7/24  abdomen distended
pt has had persistent cough that exacerbates sternal pain  pt reporting increased sob after coughing and felt "the air in the room is too thick"   placed pt on 2L o2 and administered duonebs
pt received po oxy earlier and pt reporting no relief   states that the pain is located in R intercostal area, radiating to Right upper back  pt also states this pain is exacerbating his sob  pt noted to be tachypneic but O2 sat remains >94%, HR elevated @ 113BPM

## 2022-07-26 NOTE — PROGRESS NOTE ADULT - ASSESSMENT
Pt is a 47y male PMHx SLE on prednisone and plaquenil, asthma, hypothyroidism, HTN presenting with shortness of breath 2/2 back and pleuritic chest pain i/s/o recent admission for RSV-related asthma exacerbation with costochondritis. Continues to have occasional productive cough, but pt continues to feel some improvement in symptoms.   Pt is a 47y male PMHx SLE on prednisone and plaquenil, asthma, hypothyroidism, HTN presenting with shortness of breath 2/2 back and pleuritic chest pain i/s/o recent admission for RSV-related asthma exacerbation with costochondritis. Pt feels pain location has decreased in size, cough has improved.

## 2022-07-26 NOTE — PROGRESS NOTE ADULT - SUBJECTIVE AND OBJECTIVE BOX
Monae Singh MD  PGY 1 Department of Internal Medicine        Patient is a 47y old  Male who presents with a chief complaint of Shortness of breath, Chest and Back Pain (25 Jul 2022 21:03)      SUBJECTIVE / OVERNIGHT EVENTS: Pt seen and examined. No acute overnight events. Denies fevers, chills, CP, SOB, Abdominal pain, N/V, Constipation, Diarrhea        MEDICATIONS  (STANDING):  albuterol/ipratropium for Nebulization 3 milliLiter(s) Nebulizer every 6 hours  aspirin enteric coated 81 milliGRAM(s) Oral daily  benzonatate 200 milliGRAM(s) Oral every 8 hours  budesonide 160 MICROgram(s)/formoterol 4.5 MICROgram(s) Inhaler 2 Puff(s) Inhalation two times a day  colchicine 0.6 milliGRAM(s) Oral two times a day  cyclobenzaprine 5 milliGRAM(s) Oral at bedtime  dextrose 5%. 1000 milliLiter(s) (50 mL/Hr) IV Continuous <Continuous>  dextrose 5%. 1000 milliLiter(s) (100 mL/Hr) IV Continuous <Continuous>  dextrose 50% Injectable 25 Gram(s) IV Push once  dextrose 50% Injectable 12.5 Gram(s) IV Push once  dextrose 50% Injectable 25 Gram(s) IV Push once  enoxaparin Injectable 40 milliGRAM(s) SubCutaneous every 24 hours  furosemide    Tablet 40 milliGRAM(s) Oral daily  glucagon  Injectable 1 milliGRAM(s) IntraMuscular once  hydroxychloroquine 400 milliGRAM(s) Oral daily  insulin lispro (ADMELOG) corrective regimen sliding scale   SubCutaneous three times a day before meals  insulin lispro (ADMELOG) corrective regimen sliding scale   SubCutaneous at bedtime  levothyroxine 125 MICROGram(s) Oral daily  lidocaine   4% Patch 2 Patch Transdermal daily  losartan 50 milliGRAM(s) Oral daily  melatonin 6 milliGRAM(s) Oral at bedtime  methylPREDNISolone sodium succinate Injectable 60 milliGRAM(s) IV Push daily  pantoprazole    Tablet 40 milliGRAM(s) Oral before breakfast  polyethylene glycol 3350 17 Gram(s) Oral daily  senna 1 Tablet(s) Oral daily  trimethoprim  160 mG/sulfamethoxazole 800 mG 1 Tablet(s) Oral <User Schedule>    MEDICATIONS  (PRN):  ALBUTerol    0.083% 2.5 milliGRAM(s) Nebulizer every 3 hours PRN Shortness of Breath and/or Wheezing  dextrose Oral Gel 15 Gram(s) Oral once PRN Blood Glucose LESS THAN 70 milliGRAM(s)/deciliter  hydrocodone/homatropine Syrup 10 milliLiter(s) Oral every 4 hours PRN Cough  oxyCODONE    IR 10 milliGRAM(s) Oral every 4 hours PRN Severe Pain (7 - 10)  oxyCODONE    IR 5 milliGRAM(s) Oral every 4 hours PRN Moderate Pain (4 - 6)      I&O's Summary    25 Jul 2022 07:01  -  26 Jul 2022 07:00  --------------------------------------------------------  IN: 1000 mL / OUT: 1550 mL / NET: -550 mL        Vital Signs Last 24 Hrs  T(C): 36.5 (25 Jul 2022 17:10), Max: 36.6 (25 Jul 2022 08:30)  T(F): 97.7 (25 Jul 2022 17:10), Max: 97.9 (25 Jul 2022 08:30)  HR: 109 (25 Jul 2022 17:10) (109 - 118)  BP: 120/88 (25 Jul 2022 17:10) (120/88 - 140/92)  BP(mean): --  RR: 18 (25 Jul 2022 17:10) (18 - 18)  SpO2: 94% (25 Jul 2022 17:10) (94% - 95%)    Parameters below as of 25 Jul 2022 17:10  Patient On (Oxygen Delivery Method): room air        CAPILLARY BLOOD GLUCOSE      POCT Blood Glucose.: 172 mg/dL (26 Jul 2022 07:53)  POCT Blood Glucose.: 258 mg/dL (25 Jul 2022 21:26)  POCT Blood Glucose.: 263 mg/dL (25 Jul 2022 17:02)  POCT Blood Glucose.: 326 mg/dL (25 Jul 2022 12:09)      PHYSICAL EXAM:  GENERAL: NAD,   HEAD:  Atraumatic, Normocephalic  EYES: EOMI, PERRL, conjunctiva and sclera clear  NECK: No JVD  CHEST/LUNG: Clear to auscultation bilaterally; No wheeze  HEART: Regular rate and rhythm; No murmurs, rubs, or gallops  ABDOMEN: Soft, Nontender, Nondistended; Bowel sounds present  EXTREMITIES:  2+ Peripheral Pulses, No clubbing, cyanosis, or edema  PSYCH: AAOx3  NEUROLOGY: non-focal  SKIN: No rashes or lesions       LABS:                        12.5   8.74  )-----------( 354      ( 25 Jul 2022 12:17 )             36.5     Auto Eosinophil # x     / Auto Eosinophil % x     / Auto Neutrophil # x     / Auto Neutrophil % x     / BANDS % x        07-25    132<L>  |  95<L>  |  12  ----------------------------<  340<H>  4.3   |  21<L>  |  0.90    Ca    9.1      25 Jul 2022 12:17  Mg     1.9     07-25  Phos  1.7     07-25                  RADIOLOGY & ADDITIONAL TESTS:    Imaging Personally Reviewed:    Consultant(s) Notes Reviewed:      Care Discussed with Consultants/Other Providers:   Monae Singh MD  PGY 1 Department of Internal Medicine        Patient is a 47y old  Male who presents with a chief complaint of Shortness of breath, Chest and Back Pain (25 Jul 2022 21:03)      SUBJECTIVE / OVERNIGHT EVENTS: Pt seen and examined. No acute overnight events, continues to have back pain radiating toward chest on right side and cough though cough has decreased in frequency and phlegm expulsion. Denies fevers, chills, CP, SOB, palpitations, Abdominal pain, N/V, Constipation, Diarrhea        MEDICATIONS  (STANDING):  albuterol/ipratropium for Nebulization 3 milliLiter(s) Nebulizer every 6 hours  aspirin enteric coated 81 milliGRAM(s) Oral daily  benzonatate 200 milliGRAM(s) Oral every 8 hours  budesonide 160 MICROgram(s)/formoterol 4.5 MICROgram(s) Inhaler 2 Puff(s) Inhalation two times a day  colchicine 0.6 milliGRAM(s) Oral two times a day  cyclobenzaprine 5 milliGRAM(s) Oral at bedtime  dextrose 5%. 1000 milliLiter(s) (50 mL/Hr) IV Continuous <Continuous>  dextrose 5%. 1000 milliLiter(s) (100 mL/Hr) IV Continuous <Continuous>  dextrose 50% Injectable 25 Gram(s) IV Push once  dextrose 50% Injectable 12.5 Gram(s) IV Push once  dextrose 50% Injectable 25 Gram(s) IV Push once  enoxaparin Injectable 40 milliGRAM(s) SubCutaneous every 24 hours  furosemide    Tablet 40 milliGRAM(s) Oral daily  glucagon  Injectable 1 milliGRAM(s) IntraMuscular once  hydroxychloroquine 400 milliGRAM(s) Oral daily  insulin lispro (ADMELOG) corrective regimen sliding scale   SubCutaneous three times a day before meals  insulin lispro (ADMELOG) corrective regimen sliding scale   SubCutaneous at bedtime  levothyroxine 125 MICROGram(s) Oral daily  lidocaine   4% Patch 2 Patch Transdermal daily  losartan 50 milliGRAM(s) Oral daily  melatonin 6 milliGRAM(s) Oral at bedtime  methylPREDNISolone sodium succinate Injectable 60 milliGRAM(s) IV Push daily  pantoprazole    Tablet 40 milliGRAM(s) Oral before breakfast  polyethylene glycol 3350 17 Gram(s) Oral daily  senna 1 Tablet(s) Oral daily  trimethoprim  160 mG/sulfamethoxazole 800 mG 1 Tablet(s) Oral <User Schedule>    MEDICATIONS  (PRN):  ALBUTerol    0.083% 2.5 milliGRAM(s) Nebulizer every 3 hours PRN Shortness of Breath and/or Wheezing  dextrose Oral Gel 15 Gram(s) Oral once PRN Blood Glucose LESS THAN 70 milliGRAM(s)/deciliter  hydrocodone/homatropine Syrup 10 milliLiter(s) Oral every 4 hours PRN Cough  oxyCODONE    IR 10 milliGRAM(s) Oral every 4 hours PRN Severe Pain (7 - 10)  oxyCODONE    IR 5 milliGRAM(s) Oral every 4 hours PRN Moderate Pain (4 - 6)      I&O's Summary    25 Jul 2022 07:01  -  26 Jul 2022 07:00  --------------------------------------------------------  IN: 1000 mL / OUT: 1550 mL / NET: -550 mL        Vital Signs Last 24 Hrs  T(C): 36.5 (25 Jul 2022 17:10), Max: 36.6 (25 Jul 2022 08:30)  T(F): 97.7 (25 Jul 2022 17:10), Max: 97.9 (25 Jul 2022 08:30)  HR: 109 (25 Jul 2022 17:10) (109 - 118)  BP: 120/88 (25 Jul 2022 17:10) (120/88 - 140/92)  BP(mean): --  RR: 18 (25 Jul 2022 17:10) (18 - 18)  SpO2: 94% (25 Jul 2022 17:10) (94% - 95%)    Parameters below as of 25 Jul 2022 17:10  Patient On (Oxygen Delivery Method): room air        CAPILLARY BLOOD GLUCOSE      POCT Blood Glucose.: 172 mg/dL (26 Jul 2022 07:53)  POCT Blood Glucose.: 258 mg/dL (25 Jul 2022 21:26)  POCT Blood Glucose.: 263 mg/dL (25 Jul 2022 17:02)  POCT Blood Glucose.: 326 mg/dL (25 Jul 2022 12:09)      PHYSICAL EXAM:  GENERAL: NAD,   HEAD:  Atraumatic, Normocephalic  EYES: EOMI, PERRL, conjunctiva and sclera clear  NECK: No JVD  CHEST/LUNG: (+) wheezing auscultated on R side, no rhonchi, no crackles; appropriate symmetric bilateral chest expansion  HEART: Regular rate and rhythm; No murmurs, rubs, or gallops  ABDOMEN: Soft, Nontender, Nondistended; Bowel sounds present  EXTREMITIES:  (+) 1+ pitting edema, R> L. 2+ Peripheral Pulses, No clubbing, cyanosis.  PSYCH: AAOx3  NEUROLOGY: non-focal  SKIN: No rashes or lesions       LABS:                        12.5   8.74  )-----------( 354      ( 25 Jul 2022 12:17 )             36.5     Auto Eosinophil # x     / Auto Eosinophil % x     / Auto Neutrophil # x     / Auto Neutrophil % x     / BANDS % x        07-25    132<L>  |  95<L>  |  12  ----------------------------<  340<H>  4.3   |  21<L>  |  0.90    Ca    9.1      25 Jul 2022 12:17  Mg     1.9     07-25  Phos  1.7     07-25

## 2022-07-26 NOTE — DISCHARGE NOTE PROVIDER - NSDCCPCAREPLAN_GEN_ALL_CORE_FT
PRINCIPAL DISCHARGE DIAGNOSIS  Diagnosis: Shortness of breath  Assessment and Plan of Treatment: Shortness of breath can have multiple causes including anemia, asthma/COPD exacerbation, pneumonia, congestive heart failure. In your case, there were no signs of infection, your imaging was clear for any pneumonia, and your labs showed no sign of heart failure. it is most likely that your shortness of breath was secondary to viral infection superimposed on your asthma with associated costochondritis from your previous infection. Please continue with your home dose of steroids, continue use of your asthma medication, and follow up with your rheumatologist and primary care providers for further evaluation      SECONDARY DISCHARGE DIAGNOSES  Diagnosis: Acute hypokalemia  Assessment and Plan of Treatment:     Diagnosis: Acute asthma exacerbation  Assessment and Plan of Treatment:     Diagnosis: Back pain  Assessment and Plan of Treatment:      PRINCIPAL DISCHARGE DIAGNOSIS  Diagnosis: Shortness of breath  Assessment and Plan of Treatment: Shortness of breath can have multiple causes including anemia, asthma/COPD exacerbation, pneumonia, congestive heart failure. In your case, there were no signs of infection, your imaging was clear for any pneumonia, and your labs showed no sign of heart failure. it is most likely that your shortness of breath was secondary to viral infection superimposed on your asthma with associated costochondritis from your previous infection. Continue to use of your asthma medication, and follow up with your rheumatologist and primary care providers for further evaluation  We started you on a higher dose of steroid in the hospital, please continue to taper the steroid doses as instructed:  60mg Prednisone 7/29-7/30  50mg Prednisone 7/31-8/2  40mg Prednisone 8/3-8/5  30mg Prednsione 8/6- till you follow up with your pulmonologist       PRINCIPAL DISCHARGE DIAGNOSIS  Diagnosis: Shortness of breath  Assessment and Plan of Treatment: Shortness of breath can have multiple causes including anemia, asthma/COPD exacerbation, pneumonia, congestive heart failure. In your case, there were no signs of infection, your imaging was clear for any pneumonia, and your labs showed no sign of heart failure. it is most likely that your shortness of breath was secondary to viral infection superimposed on your asthma with associated costochondritis from your previous infection. Continue to use of your asthma medication, and follow up with your rheumatologist and primary care providers for further evaluation  We started you on a higher dose of steroid in the hospital, please continue to taper the steroid doses as instructed:  60mg Prednisone 7/29-7/31  50mg Prednisone 8/1-8/3  40mg Prednisone 8/4-8/6  30mg Prednsione 8/7- till you follow up with your pulmonologist

## 2022-07-26 NOTE — DISCHARGE NOTE PROVIDER - CARE PROVIDER_API CALL
Susanna Neri)  Internal Medicine; Rheumatology  [address] [facility] [address] [facility], [address]  [place] [address]  Phone: [phone]  Fax: [phone]  Established Patient  Scheduled Appointment: 08/11/2022    Pritesh Everett)  Internal Medicine  [address] [place] [address]  Phone: [phone]  Fax: [phone]  Established Patient  Scheduled Appointment: 08/22/2022

## 2022-07-26 NOTE — PROGRESS NOTE ADULT - SUBJECTIVE AND OBJECTIVE BOX
CHIEF COMPLAINT:Patient is a 47y old  Male who presents with a chief complaint of Shortness of breath, Chest and Back Pain (26 Jul 2022 07:54)      Interval Events:  Pt reports having only coughed once overnight which he was pleased with. Had been using hot packs on Rt anterior chest wall with some improvement in pain. Solumedrol dose was increased yesterday per our recommendations as well. He is looking forward to trying to ambulate more today.    REVIEW OF SYSTEMS:  [x] All other systems negative except per HPI   [ ] Unable to assess ROS because ________    OBJECTIVE:  ICU Vital Signs Last 24 Hrs  T(C): 36.6 (26 Jul 2022 08:19), Max: 36.6 (25 Jul 2022 08:30)  T(F): 97.8 (26 Jul 2022 08:19), Max: 97.9 (25 Jul 2022 08:30)  HR: 115 (26 Jul 2022 08:19) (109 - 118)  BP: 156/107 (26 Jul 2022 08:19) (120/88 - 156/107)  BP(mean): --  ABP: --  ABP(mean): --  RR: 18 (26 Jul 2022 08:19) (18 - 18)  SpO2: 97% (26 Jul 2022 08:19) (94% - 97%)    O2 Parameters below as of 26 Jul 2022 08:19  Patient On (Oxygen Delivery Method): room air              07-25 @ 07:01  -  07-26 @ 07:00  --------------------------------------------------------  IN: 1000 mL / OUT: 1550 mL / NET: -550 mL        PHYSICAL EXAM:  GENERAL: NAD,   HEAD:  Atraumatic  EYES: EOMI, PERRL, conjunctiva and sclera clear  NECK: No JVD  CHEST/LUNG: scattered end expiratory wheeze, good air movement  HEART: Regular rate and rhythm; No murmurs, rubs, or gallops  ABDOMEN: Soft, Nontender, Nondistended; Bowel sounds present  PSYCH: AAOx3  NEUROLOGY: non-focal  SKIN: No rashes or lesions    HOSPITAL MEDICATIONS:  MEDICATIONS  (STANDING):  albuterol/ipratropium for Nebulization 3 milliLiter(s) Nebulizer every 6 hours  aspirin enteric coated 81 milliGRAM(s) Oral daily  benzonatate 200 milliGRAM(s) Oral every 8 hours  budesonide 160 MICROgram(s)/formoterol 4.5 MICROgram(s) Inhaler 2 Puff(s) Inhalation two times a day  colchicine 0.6 milliGRAM(s) Oral two times a day  cyclobenzaprine 5 milliGRAM(s) Oral at bedtime  dextrose 5%. 1000 milliLiter(s) (50 mL/Hr) IV Continuous <Continuous>  dextrose 5%. 1000 milliLiter(s) (100 mL/Hr) IV Continuous <Continuous>  dextrose 50% Injectable 25 Gram(s) IV Push once  dextrose 50% Injectable 12.5 Gram(s) IV Push once  dextrose 50% Injectable 25 Gram(s) IV Push once  enoxaparin Injectable 40 milliGRAM(s) SubCutaneous every 24 hours  furosemide    Tablet 40 milliGRAM(s) Oral daily  glucagon  Injectable 1 milliGRAM(s) IntraMuscular once  hydroxychloroquine 400 milliGRAM(s) Oral daily  insulin lispro (ADMELOG) corrective regimen sliding scale   SubCutaneous three times a day before meals  insulin lispro (ADMELOG) corrective regimen sliding scale   SubCutaneous at bedtime  levothyroxine 125 MICROGram(s) Oral daily  lidocaine   4% Patch 2 Patch Transdermal daily  losartan 50 milliGRAM(s) Oral daily  melatonin 6 milliGRAM(s) Oral at bedtime  methylPREDNISolone sodium succinate Injectable 60 milliGRAM(s) IV Push daily  pantoprazole    Tablet 40 milliGRAM(s) Oral before breakfast  polyethylene glycol 3350 17 Gram(s) Oral daily  senna 1 Tablet(s) Oral daily  trimethoprim  160 mG/sulfamethoxazole 800 mG 1 Tablet(s) Oral <User Schedule>    MEDICATIONS  (PRN):  ALBUTerol    0.083% 2.5 milliGRAM(s) Nebulizer every 3 hours PRN Shortness of Breath and/or Wheezing  dextrose Oral Gel 15 Gram(s) Oral once PRN Blood Glucose LESS THAN 70 milliGRAM(s)/deciliter  hydrocodone/homatropine Syrup 10 milliLiter(s) Oral every 4 hours PRN Cough  oxyCODONE    IR 10 milliGRAM(s) Oral every 4 hours PRN Severe Pain (7 - 10)  oxyCODONE    IR 5 milliGRAM(s) Oral every 4 hours PRN Moderate Pain (4 - 6)      LABS:    The Labs were reviewed by me   The Radiology was reviewed by me    EKG tracing reviewed by me    07-25    132<L>  |  95<L>  |  12  ----------------------------<  340<H>  4.3   |  21<L>  |  0.90  07-24    135  |  94<L>  |  12  ----------------------------<  155<H>  3.1<L>   |  25  |  0.89    Ca    9.1      25 Jul 2022 12:17  Ca    9.8      24 Jul 2022 06:50  Phos  1.7     07-25  Mg     1.9     07-25    TPro  6.8  /  Alb  4.1  /  TBili  0.3  /  DBili  x   /  AST  95<H>  /  ALT  159<H>  /  AlkPhos  118  07-24    Magnesium, Serum: 1.9 mg/dL (07-25-22 @ 12:17)  Magnesium, Serum: 2.1 mg/dL (07-24-22 @ 06:50)    Phosphorus Level, Serum: 1.7 mg/dL (07-25-22 @ 12:17)  Phosphorus Level, Serum: 3.1 mg/dL (07-24-22 @ 06:50)                                              12.6   12.06 )-----------( 389      ( 26 Jul 2022 07:08 )             37.3                         12.5   8.74  )-----------( 354      ( 25 Jul 2022 12:17 )             36.5                         13.0   10.55 )-----------( 325      ( 24 Jul 2022 06:48 )             38.1     CAPILLARY BLOOD GLUCOSE      POCT Blood Glucose.: 172 mg/dL (26 Jul 2022 07:53)  POCT Blood Glucose.: 258 mg/dL (25 Jul 2022 21:26)  POCT Blood Glucose.: 263 mg/dL (25 Jul 2022 17:02)  POCT Blood Glucose.: 326 mg/dL (25 Jul 2022 12:09)        MICROBIOLOGY:     RADIOLOGY:  [ ] Reviewed and interpreted by me    Point of Care Ultrasound Findings:    PFT:    EKG:

## 2022-07-26 NOTE — PROVIDER CONTACT NOTE (CHANGE IN STATUS NOTIFICATION) - BACKGROUND
pt adm with sob, costochondriatis   with persistent pain from coughing
pt currently on bowel regimen of miralax & senna
pt adm for SOB, hx SLE  on solumedrol, hycodan

## 2022-07-26 NOTE — PROGRESS NOTE ADULT - PROBLEM SELECTOR PLAN 1
EKG sinus tach, trops wnl, CXR negative for acute pulmonary pathology; causes of symptoms likely costochondritis  i/s/o lupus with recent hospitalization for acute asthma exacerbation 2/2 RSV infection with associated cough  -procal elevated; C3, C4, anti-dsDNA wnl  -solumedrol 60mg per pulmonology recs  -ctm pain, de-escalated dilaudid to oxycodone po, hycodan on board; acetaminophen 1g on board (last day 07/24/22)  -antitussives (tessalon)  -rheumatology consulted, appreciate recs    -hold cellcept    -continue plaquenil    -UA w/ microscopy (nl), urine Pr/Cr ratio (slightly elevated)  -D dimer negative EKG sinus tach, trops wnl, CXR negative for acute pulmonary pathology; causes of symptoms likely costochondritis  i/s/o lupus with recent hospitalization for acute asthma exacerbation 2/2 RSV infection with associated cough  -solumedrol 60mg per pulmonology recs  -procal elevated; C3, C4, anti-dsDNA wnl  -ctm pain, de-escalated dilaudid to oxycodone po, hycodan on board; acetaminophen 1g on board (last day 07/24/22)  -antitussives (tessalon/hycodan)  -rheumatology consulted, appreciate recs    -hold cellcept    -continue plaquenil    -UA w/ microscopy (nl), urine Pr/Cr ratio (slightly elevated)  -D dimer negative

## 2022-07-26 NOTE — PROGRESS NOTE ADULT - ATTENDING COMMENTS
47 M with SLE on steroids/hcq/mmf, asthma here with sob and acute hypoxemic respiratory failure due to asthma exacerbation due ot RSV    Patient is still with cough/ pleuritic chest pain though it is improving on higher dose of steroid.    c/w bronchodilators given asthma exacerbation  supportive care for RSV; would leave on solumedrol 60 mg IV daily for now  supplemental o2 as needed  if patient truly on prednisone 30 mg po daily as an outpatient would consider starting pcp ppx

## 2022-07-26 NOTE — DISCHARGE NOTE PROVIDER - HOSPITAL COURSE
Pt with hx of SLE, asthma with recent hospitalization for exacerbation 2/2 rhinovirus/adenovirus presented for continued pleuritic back/chest pain, shortness of breath, and cough. Pt found to be RSV+, chest imaging negative for PE, nl pro-BNP, lupus flare labs normal. Pain likely 2/2 costochondritis. Pt also had LE edema for which he takes lasix at home, was diuresed to good effect during stay. Pain managed with dilaudid, de-escalated to oxycodone 10mg po. Cough addressed with benzonatate and hycodan. Rheum, pulmonology on board for case, pt was treated with high-dose solumedrol w/ quick taper, colchicine, albuterol, duonebs; cellcept was held I/s/o RSV infxn. Pt pain and cough have improved over course of stay, has remained afebrile with 1 episode of quickly resolving mild leukocytosis.     Pt now stable and medically cleared for discharge.

## 2022-07-26 NOTE — DISCHARGE NOTE PROVIDER - NSDCMRMEDTOKEN_GEN_ALL_CORE_FT
albuterol 2.5 mg/3 mL (0.083%) inhalation solution: 1 dose(s) inhaled every 6 hours, As Needed   aspirin 81 mg oral delayed release tablet: 1 tab(s) orally once a day  benzonatate 100 mg oral capsule: 1 cap(s) orally 3 times a day  budesonide-formoterol 160 mcg-4.5 mcg/inh inhalation aerosol: 1 dose(s) inhaled 2 times a day   fluconazole 100 mg oral tablet: 1 tab(s) orally once a day  fluticasone 50 mcg/inh nasal spray: 2 spray(s) nasal 2 times a day  furosemide 20 mg oral tablet: 3 tab(s) orally once a day  Glucometer : 1   once a day   hydroxychloroquine 200 mg oral tablet: 2 tab(s) orally once a day.  TAKE AT 6:30 PM.   Lancets : FS AC/HS  losartan 50 mg oral tablet: 1 tab(s) orally once a day  metFORMIN 500 mg oral tablet: 1 tab(s) orally 2 times a day   methylPREDNISolone 16 mg oral tablet: 20 milligram(s) orally once a day ( Resumed after prednisone 30mg is completed)   mycophenolate mofetil 500 mg oral tablet: 3 tab(s) orally once a day  Narcan 4 mg/0.1 mL nasal spray: 4 milligram(s) intranasally once   oxycodone-acetaminophen 5 mg-325 mg oral tablet: 1 tab(s) orally every 6 hours, As Needed -Moderate Pain (4 - 6) MDD:4  pantoprazole 40 mg oral delayed release tablet: 1 tab(s) orally once a day (before a meal)  potassium chloride 20 mEq oral tablet, extended release: 1 tab(s) orally once a day  predniSONE 10 mg oral tablet: 3 tab(s) orally once a day   Strips : 1 application  once a day  ( AC/HS)   sulfamethoxazole-trimethoprim 800 mg-160 mg oral tablet: 1 tab(s) orally 3 times a week   TAKE ON SUNDAY, tUES, FRI  Synthroid 125 mcg (0.125 mg) oral tablet: 1 tab(s) orally once a day  tiotropium 18 mcg inhalation capsule: 1 cap(s) inhaled once a day   albuterol 2.5 mg/3 mL (0.083%) inhalation solution: 1 dose(s) inhaled every 6 hours, As Needed   aspirin 81 mg oral delayed release tablet: 1 tab(s) orally once a day  benzonatate 100 mg oral capsule: 1 cap(s) orally 3 times a day  budesonide-formoterol 160 mcg-4.5 mcg/inh inhalation aerosol: 1 dose(s) inhaled 2 times a day   fluconazole 100 mg oral tablet: 1 tab(s) orally once a day  fluticasone 50 mcg/inh nasal spray: 2 spray(s) nasal 2 times a day  furosemide 20 mg oral tablet: 3 tab(s) orally once a day  Glucometer : 1   once a day   hydroxychloroquine 200 mg oral tablet: 2 tab(s) orally once a day.  TAKE AT 6:30 PM.   Lancets : FS AC/HS  losartan 50 mg oral tablet: 1 tab(s) orally once a day  metFORMIN 500 mg oral tablet: 1 tab(s) orally 2 times a day   mycophenolate mofetil 500 mg oral tablet: 3 tab(s) orally once a day  Narcan 4 mg/0.1 mL nasal spray: 4 milligram(s) intranasally once   oxycodone-acetaminophen 5 mg-325 mg oral tablet: 1 tab(s) orally every 6 hours, As Needed -Moderate Pain (4 - 6) MDD:4  pantoprazole 40 mg oral delayed release tablet: 1 tab(s) orally once a day (before a meal)  potassium chloride 20 mEq oral tablet, extended release: 1 tab(s) orally once a day  predniSONE 10 mg oral tablet: 3 tab(s) orally once a day   Strips : 1 application  once a day  ( AC/HS)   sulfamethoxazole-trimethoprim 800 mg-160 mg oral tablet: 1 tab(s) orally 3 times a week   TAKE ON SUNDAY, tUES, FRI  Synthroid 125 mcg (0.125 mg) oral tablet: 1 tab(s) orally once a day  tiotropium 18 mcg inhalation capsule: 1 cap(s) inhaled once a day   albuterol 2.5 mg/3 mL (0.083%) inhalation solution: 1 dose(s) inhaled every 6 hours, As Needed   aspirin 81 mg oral delayed release tablet: 1 tab(s) orally once a day  benzonatate 100 mg oral capsule: 2 cap(s) orally every 8 hours  budesonide-formoterol 160 mcg-4.5 mcg/inh inhalation aerosol: 1 dose(s) inhaled 2 times a day   fluticasone 50 mcg/inh nasal spray: 2 spray(s) nasal 2 times a day  furosemide 40 mg oral tablet: 1 tab(s) orally once a day  hydroxychloroquine 200 mg oral tablet: 2 tab(s) orally once a day  levothyroxine 125 mcg (0.125 mg) oral tablet: 1 tab(s) orally once a day  losartan 50 mg oral tablet: 1 tab(s) orally once a day  melatonin 3 mg oral tablet: 2 tab(s) orally once a day (at bedtime)  metFORMIN 500 mg oral tablet: 1 tab(s) orally 2 times a day   mycophenolate mofetil 500 mg oral tablet: 3 tab(s) orally once a day  Narcan 4 mg/0.1 mL nasal spray: 4 milligram(s) intranasally once   pantoprazole 40 mg oral delayed release tablet: 1 tab(s) orally once a day (before a meal)  polyethylene glycol 3350 oral powder for reconstitution: 17 gram(s) orally once a day  senna leaf extract oral tablet: 1 tab(s) orally once a day  tiotropium 18 mcg inhalation capsule: 1 cap(s) inhaled once a day   albuterol 2.5 mg/3 mL (0.083%) inhalation solution: 1 dose(s) inhaled every 6 hours, As Needed   aspirin 81 mg oral delayed release tablet: 1 tab(s) orally once a day  budesonide-formoterol 160 mcg-4.5 mcg/inh inhalation aerosol: 1 dose(s) inhaled 2 times a day   fluticasone 50 mcg/inh nasal spray: 2 spray(s) nasal 2 times a day  furosemide 40 mg oral tablet: 1 tab(s) orally once a day  hydroxychloroquine 200 mg oral tablet: 2 tab(s) orally once a day  levothyroxine 125 mcg (0.125 mg) oral tablet: 1 tab(s) orally once a day  losartan 50 mg oral tablet: 1 tab(s) orally once a day  melatonin 3 mg oral tablet: 2 tab(s) orally once a day (at bedtime)  metFORMIN 500 mg oral tablet: 1 tab(s) orally 2 times a day   Narcan 4 mg/0.1 mL nasal spray: 4 milligram(s) intranasally once   pantoprazole 40 mg oral delayed release tablet: 1 tab(s) orally once a day (before a meal)  polyethylene glycol 3350 oral powder for reconstitution: 17 gram(s) orally once a day  senna leaf extract oral tablet: 1 tab(s) orally once a day  tiotropium 18 mcg inhalation capsule: 1 cap(s) inhaled once a day   albuterol 2.5 mg/3 mL (0.083%) inhalation solution: 1 dose(s) inhaled every 6 hours, As Needed   aspirin 81 mg oral delayed release tablet: 1 tab(s) orally once a day  benzonatate 100 mg oral capsule: 2 cap(s) orally every 8 hours  budesonide-formoterol 160 mcg-4.5 mcg/inh inhalation aerosol: 1 dose(s) inhaled 2 times a day   cyclobenzaprine 5 mg oral tablet: 1 tab(s) orally once a day (at bedtime) MDD:1  fluticasone 50 mcg/inh nasal spray: 2 spray(s) nasal 2 times a day  furosemide 40 mg oral tablet: 1 tab(s) orally once a day  hydrocodone-homatropine 5 mg-1.5 mg/5 mL oral syrup: 10 milliliter(s) orally every 4 hours, As needed, Cough MDD:40 mL  hydroxychloroquine 200 mg oral tablet: 2 tab(s) orally once a day  levothyroxine 125 mcg (0.125 mg) oral tablet: 1 tab(s) orally once a day  losartan 50 mg oral tablet: 1 tab(s) orally once a day  melatonin 3 mg oral tablet: 2 tab(s) orally once a day (at bedtime)  metFORMIN 500 mg oral tablet: 1 tab(s) orally 2 times a day   mycophenolate mofetil 500 mg oral tablet: 3 tab(s) orally once a day  Narcan 4 mg/0.1 mL nasal spray: 4 milligram(s) intranasally once   oxyCODONE 10 mg oral tablet: 1 tab(s) orally every 4 hours, As needed, Severe Pain (7 - 10) MDD:6  pantoprazole 40 mg oral delayed release tablet: 1 tab(s) orally once a day (before a meal)  polyethylene glycol 3350 oral powder for reconstitution: 17 gram(s) orally once a day  predniSONE 10 mg oral tablet: 60mg Prednisone 7/29 to 7/31  50mg Prednisone 8/1 to 8/3  40mg Prednisone 8/4 to 8/6  30mg Prednsione 8/7 till you follow up with your pulmonologist   senna leaf extract oral tablet: 1 tab(s) orally once a day  sulfamethoxazole-trimethoprim 800 mg-160 mg oral tablet: 1 tab(s) orally 3 times a week MDD:1   tiotropium 18 mcg inhalation capsule: 1 cap(s) inhaled once a day

## 2022-07-26 NOTE — DISCHARGE NOTE PROVIDER - PROVIDER TOKENS
PROVIDER:[TOKEN:[[id number]],SCHEDULEDAPPT:[08/11/2022],ESTABLISHEDPATIENT:[T]],PROVIDER:[TOKEN:[[id number]],SCHEDULEDAPPT:[08/22/2022],ESTABLISHEDPATIENT:[T]]

## 2022-07-26 NOTE — DISCHARGE NOTE PROVIDER - NSDCFUSCHEDAPPT_GEN_ALL_CORE_FT
Susanna Neri  [facility] Physician [facility]  [address]  Scheduled Appointment: 08/11/2022    Pritesh Everett  [facility] Physician [facility]  INTMED [address]  Scheduled Appointment: 08/22/2022    [facility] Physician [facility]  [address]UM [address]  Scheduled Appointment: 08/25/2022    Alyce Gonzales  [facility] Physician [facility]  PULMMED [address]  Scheduled Appointment: 08/26/2022

## 2022-07-26 NOTE — PROGRESS NOTE ADULT - ATTENDING COMMENTS
47M PMHx SLE on prednisone and Plaquenil, asthma, hypothyroidism, HTN presenting with shortness of breath and severe 15/10 back, rib pain and pleuritic chest pain in the setting of persistent aggressive coughing due recent RSV infection with asthma exacerbation now causing costochondritis.  Improved cough overnight, states L sided chest wall pain is better, but continues to have R sided chest wall pain. Endorsing worsening LLE edema     - some what better today. pain located on the right chest wall only.  - will wean down IV dilaudid to PO oxycodone;   - will try to stop IV opioids over the next 24 hrs  - c/w IV lasix for now as it is helping with LE edema  - c/w IV solumedrol for now, pt is gittery  - antitussives as needed

## 2022-07-26 NOTE — DISCHARGE NOTE PROVIDER - NSDCFUADDAPPT_GEN_ALL_CORE_FT
Please follow up with Dr.s Neri and Dr. Everett for you scheduled appointments for discussion of your recent hospitalizations, examination, medication adjustment, and further recommendations for treatment.  Please follow up with Dr. Neri and Dr. Everett for you scheduled appointments for discussion of your recent hospitalizations, examination, medication adjustment, and further recommendations for treatment.

## 2022-07-26 NOTE — PROGRESS NOTE ADULT - PROBLEM SELECTOR PLAN 2
K+ found to be 2.7 on labs at admission, administered 30 mEq IV K+, 40 mEq po KCl  -K+ currently 3.1  -f/u BMP and replete as needed with oral and IV K+ considering diuresis Last BM 3 days ago per pt, no increasing abdominal distension, peritoneal findings; likely 2/2 pain management with opioids  -Miralax + senna insufficient thus far  -added magnesium citrate

## 2022-07-26 NOTE — PROGRESS NOTE ADULT - PROBLEM SELECTOR PLAN 5
Pt continues to have diffuse wheezing   -Pulm on board, recs appreciated      -resume inhalers, duonebs on board c/w home medications of synthroid 125 mcg

## 2022-07-26 NOTE — PROGRESS NOTE ADULT - PROBLEM SELECTOR PLAN 3
Lupus may be contributing to inflammatory process  -Rheum consult  -c/w home regimen of plaquenil, prednisone  -hold cellcept  -Bactrim for PCP prophylaxis  -transitioned IV to 40mg po lasix to manage associated LE edema, continues to improve Currently normal K+ level.     Previously, K+ found to be 2.7 on labs at admission, administered 30 mEq IV K+, 40 mEq po KCl  -f/u BMP and replete as needed with oral and IV K+ considering diuresis

## 2022-07-26 NOTE — DISCHARGE NOTE PROVIDER - CARE PROVIDERS DIRECT ADDRESSES
[email or web address]

## 2022-07-26 NOTE — PROGRESS NOTE ADULT - PROBLEM SELECTOR PLAN 4
c/w home medications of synthroid 125 mcg Lupus may be contributing to inflammatory process  -Rheum consult, recs appreciated     -c/w home regimen of plaquenil, prednisone     -hold cellcept     -Bactrim for PCP prophylaxis     -transitioned IV to 40mg po lasix to manage associated LE edema, continues to improve

## 2022-07-27 LAB
ANION GAP SERPL CALC-SCNC: 12 MMOL/L — SIGNIFICANT CHANGE UP (ref 5–17)
BUN SERPL-MCNC: 10 MG/DL — SIGNIFICANT CHANGE UP (ref 7–23)
CALCIUM SERPL-MCNC: 9.5 MG/DL — SIGNIFICANT CHANGE UP (ref 8.4–10.5)
CHLORIDE SERPL-SCNC: 97 MMOL/L — SIGNIFICANT CHANGE UP (ref 96–108)
CO2 SERPL-SCNC: 30 MMOL/L — SIGNIFICANT CHANGE UP (ref 22–31)
CREAT SERPL-MCNC: 0.9 MG/DL — SIGNIFICANT CHANGE UP (ref 0.5–1.3)
EGFR: 107 ML/MIN/1.73M2 — SIGNIFICANT CHANGE UP
GLUCOSE SERPL-MCNC: 119 MG/DL — HIGH (ref 70–99)
HCT VFR BLD CALC: 38.2 % — LOW (ref 39–50)
HGB BLD-MCNC: 12.9 G/DL — LOW (ref 13–17)
MAGNESIUM SERPL-MCNC: 2.4 MG/DL — SIGNIFICANT CHANGE UP (ref 1.6–2.6)
MCHC RBC-ENTMCNC: 30.9 PG — SIGNIFICANT CHANGE UP (ref 27–34)
MCHC RBC-ENTMCNC: 33.8 GM/DL — SIGNIFICANT CHANGE UP (ref 32–36)
MCV RBC AUTO: 91.4 FL — SIGNIFICANT CHANGE UP (ref 80–100)
NRBC # BLD: 0 /100 WBCS — SIGNIFICANT CHANGE UP (ref 0–0)
PHOSPHATE SERPL-MCNC: 2.3 MG/DL — LOW (ref 2.5–4.5)
PLATELET # BLD AUTO: 366 K/UL — SIGNIFICANT CHANGE UP (ref 150–400)
POTASSIUM SERPL-MCNC: 4.2 MMOL/L — SIGNIFICANT CHANGE UP (ref 3.5–5.3)
POTASSIUM SERPL-SCNC: 4.2 MMOL/L — SIGNIFICANT CHANGE UP (ref 3.5–5.3)
RBC # BLD: 4.18 M/UL — LOW (ref 4.2–5.8)
RBC # FLD: 14.2 % — SIGNIFICANT CHANGE UP (ref 10.3–14.5)
SARS-COV-2 RNA SPEC QL NAA+PROBE: SIGNIFICANT CHANGE UP
SODIUM SERPL-SCNC: 139 MMOL/L — SIGNIFICANT CHANGE UP (ref 135–145)
WBC # BLD: 10.91 K/UL — HIGH (ref 3.8–10.5)
WBC # FLD AUTO: 10.91 K/UL — HIGH (ref 3.8–10.5)

## 2022-07-27 PROCEDURE — 99233 SBSQ HOSP IP/OBS HIGH 50: CPT | Mod: GC

## 2022-07-27 PROCEDURE — 76604 US EXAM CHEST: CPT | Mod: 26

## 2022-07-27 PROCEDURE — 99232 SBSQ HOSP IP/OBS MODERATE 35: CPT | Mod: GC

## 2022-07-27 RX ORDER — ASPIRIN/CALCIUM CARB/MAGNESIUM 324 MG
1 TABLET ORAL
Qty: 0 | Refills: 0 | DISCHARGE
Start: 2022-07-27

## 2022-07-27 RX ORDER — FUROSEMIDE 40 MG
3 TABLET ORAL
Qty: 0 | Refills: 0 | DISCHARGE

## 2022-07-27 RX ORDER — LANOLIN ALCOHOL/MO/W.PET/CERES
2 CREAM (GRAM) TOPICAL
Qty: 0 | Refills: 0 | DISCHARGE
Start: 2022-07-27

## 2022-07-27 RX ORDER — LOSARTAN POTASSIUM 100 MG/1
1 TABLET, FILM COATED ORAL
Qty: 0 | Refills: 0 | DISCHARGE

## 2022-07-27 RX ORDER — POTASSIUM CHLORIDE 20 MEQ
1 PACKET (EA) ORAL
Qty: 0 | Refills: 0 | DISCHARGE

## 2022-07-27 RX ORDER — LOSARTAN POTASSIUM 100 MG/1
1 TABLET, FILM COATED ORAL
Qty: 0 | Refills: 0 | DISCHARGE
Start: 2022-07-27

## 2022-07-27 RX ORDER — HYDROXYCHLOROQUINE SULFATE 200 MG
2 TABLET ORAL
Qty: 0 | Refills: 0 | DISCHARGE
Start: 2022-07-27

## 2022-07-27 RX ORDER — OXYCODONE HYDROCHLORIDE 5 MG/1
1 TABLET ORAL
Qty: 42 | Refills: 0
Start: 2022-07-27 | End: 2022-08-02

## 2022-07-27 RX ORDER — LEVOTHYROXINE SODIUM 125 MCG
1 TABLET ORAL
Qty: 0 | Refills: 0 | DISCHARGE
Start: 2022-07-27

## 2022-07-27 RX ORDER — SODIUM,POTASSIUM PHOSPHATES 278-250MG
1 POWDER IN PACKET (EA) ORAL ONCE
Refills: 0 | Status: COMPLETED | OUTPATIENT
Start: 2022-07-27 | End: 2022-07-27

## 2022-07-27 RX ORDER — LEVOTHYROXINE SODIUM 125 MCG
1 TABLET ORAL
Qty: 0 | Refills: 0 | DISCHARGE

## 2022-07-27 RX ORDER — ACETAMINOPHEN 500 MG
1000 TABLET ORAL ONCE
Refills: 0 | Status: COMPLETED | OUTPATIENT
Start: 2022-07-27 | End: 2022-07-27

## 2022-07-27 RX ORDER — SENNA PLUS 8.6 MG/1
1 TABLET ORAL
Qty: 0 | Refills: 0 | DISCHARGE
Start: 2022-07-27

## 2022-07-27 RX ORDER — PANTOPRAZOLE SODIUM 20 MG/1
1 TABLET, DELAYED RELEASE ORAL
Qty: 0 | Refills: 0 | DISCHARGE
Start: 2022-07-27

## 2022-07-27 RX ORDER — POLYETHYLENE GLYCOL 3350 17 G/17G
17 POWDER, FOR SOLUTION ORAL
Qty: 0 | Refills: 0 | DISCHARGE
Start: 2022-07-27

## 2022-07-27 RX ORDER — FUROSEMIDE 40 MG
1 TABLET ORAL
Qty: 0 | Refills: 0 | DISCHARGE
Start: 2022-07-27

## 2022-07-27 RX ORDER — CYCLOBENZAPRINE HYDROCHLORIDE 10 MG/1
1 TABLET, FILM COATED ORAL
Qty: 14 | Refills: 0
Start: 2022-07-27 | End: 2022-08-09

## 2022-07-27 RX ADMIN — Medication 2: at 12:25

## 2022-07-27 RX ADMIN — Medication 200 MILLIGRAM(S): at 13:54

## 2022-07-27 RX ADMIN — Medication 6 MILLIGRAM(S): at 22:14

## 2022-07-27 RX ADMIN — Medication 1 TABLET(S): at 08:23

## 2022-07-27 RX ADMIN — OXYCODONE HYDROCHLORIDE 10 MILLIGRAM(S): 5 TABLET ORAL at 14:01

## 2022-07-27 RX ADMIN — Medication 60 MILLIGRAM(S): at 06:23

## 2022-07-27 RX ADMIN — BUDESONIDE AND FORMOTEROL FUMARATE DIHYDRATE 2 PUFF(S): 160; 4.5 AEROSOL RESPIRATORY (INHALATION) at 17:58

## 2022-07-27 RX ADMIN — ENOXAPARIN SODIUM 40 MILLIGRAM(S): 100 INJECTION SUBCUTANEOUS at 12:28

## 2022-07-27 RX ADMIN — Medication 1 PACKET(S): at 08:23

## 2022-07-27 RX ADMIN — Medication 81 MILLIGRAM(S): at 12:31

## 2022-07-27 RX ADMIN — OXYCODONE HYDROCHLORIDE 10 MILLIGRAM(S): 5 TABLET ORAL at 08:00

## 2022-07-27 RX ADMIN — BUDESONIDE AND FORMOTEROL FUMARATE DIHYDRATE 2 PUFF(S): 160; 4.5 AEROSOL RESPIRATORY (INHALATION) at 06:22

## 2022-07-27 RX ADMIN — LIDOCAINE 2 PATCH: 4 CREAM TOPICAL at 19:45

## 2022-07-27 RX ADMIN — OXYCODONE HYDROCHLORIDE 10 MILLIGRAM(S): 5 TABLET ORAL at 06:30

## 2022-07-27 RX ADMIN — Medication 125 MICROGRAM(S): at 06:22

## 2022-07-27 RX ADMIN — LIDOCAINE 2 PATCH: 4 CREAM TOPICAL at 12:39

## 2022-07-27 RX ADMIN — CYCLOBENZAPRINE HYDROCHLORIDE 5 MILLIGRAM(S): 10 TABLET, FILM COATED ORAL at 21:05

## 2022-07-27 RX ADMIN — OXYCODONE HYDROCHLORIDE 10 MILLIGRAM(S): 5 TABLET ORAL at 21:04

## 2022-07-27 RX ADMIN — PANTOPRAZOLE SODIUM 40 MILLIGRAM(S): 20 TABLET, DELAYED RELEASE ORAL at 06:24

## 2022-07-27 RX ADMIN — Medication 0.6 MILLIGRAM(S): at 18:01

## 2022-07-27 RX ADMIN — OXYCODONE HYDROCHLORIDE 10 MILLIGRAM(S): 5 TABLET ORAL at 16:15

## 2022-07-27 RX ADMIN — OXYCODONE HYDROCHLORIDE 10 MILLIGRAM(S): 5 TABLET ORAL at 22:00

## 2022-07-27 RX ADMIN — Medication 400 MILLIGRAM(S): at 17:59

## 2022-07-27 RX ADMIN — Medication 200 MILLIGRAM(S): at 22:14

## 2022-07-27 RX ADMIN — Medication 3 MILLILITER(S): at 12:32

## 2022-07-27 RX ADMIN — SENNA PLUS 1 TABLET(S): 8.6 TABLET ORAL at 12:32

## 2022-07-27 RX ADMIN — Medication 3 MILLILITER(S): at 22:52

## 2022-07-27 RX ADMIN — Medication 0.6 MILLIGRAM(S): at 06:23

## 2022-07-27 RX ADMIN — Medication 1000 MILLIGRAM(S): at 17:58

## 2022-07-27 RX ADMIN — POLYETHYLENE GLYCOL 3350 17 GRAM(S): 17 POWDER, FOR SOLUTION ORAL at 12:31

## 2022-07-27 RX ADMIN — Medication 3 MILLILITER(S): at 06:22

## 2022-07-27 RX ADMIN — Medication 40 MILLIGRAM(S): at 12:40

## 2022-07-27 RX ADMIN — LOSARTAN POTASSIUM 50 MILLIGRAM(S): 100 TABLET, FILM COATED ORAL at 06:23

## 2022-07-27 RX ADMIN — Medication 200 MILLIGRAM(S): at 06:23

## 2022-07-27 RX ADMIN — Medication 3 MILLILITER(S): at 17:58

## 2022-07-27 RX ADMIN — Medication 1000 MILLIGRAM(S): at 18:53

## 2022-07-27 NOTE — PROGRESS NOTE ADULT - PROBLEM SELECTOR PLAN 7
DVT ppx: lovenox 40mg  Diet: regular, consistent carb  Full code
DVT ppx: lovenox 40mg  Diet: regular, consistent carb  Full code

## 2022-07-27 NOTE — PROGRESS NOTE ADULT - PROBLEM SELECTOR PLAN 1
EKG sinus tach, trops wnl, CXR negative for acute pulmonary pathology; causes of symptoms likely costochondritis  i/s/o lupus with recent hospitalization for acute asthma exacerbation 2/2 RSV infection with associated cough  -solumedrol 60mg per pulmonology recs  -procal elevated; C3, C4, anti-dsDNA wnl  -ctm pain, de-escalated dilaudid to oxycodone po, hycodan on board; acetaminophen 1g on board (last day 07/24/22)  -antitussives (tessalon/hycodan)  -rheumatology consulted, appreciate recs    -hold cellcept    -continue plaquenil    -UA w/ microscopy (nl), urine Pr/Cr ratio (slightly elevated)  -D dimer negative EKG sinus tach, trops wnl, CXR negative for acute pulmonary pathology; causes of symptoms likely costochondritis  i/s/o lupus with recent hospitalization for acute asthma exacerbation 2/2 RSV infection with associated cough  -prednisone 60mg taper per pulm recs  -procal elevated; C3, C4, anti-dsDNA wnl  -ctm pain, oxycodone po, hycodan on board  -antitussives (tessalon/hycodan)  -rheumatology consulted, appreciate recs    -hold cellcept    -continue plaquenil

## 2022-07-27 NOTE — PROGRESS NOTE ADULT - SUBJECTIVE AND OBJECTIVE BOX
CHIEF COMPLAINT:Patient is a 47y old  Male who presents with a chief complaint of Shortness of breath, Chest and Back Pain (27 Jul 2022 13:50)      Interval Events:  Pt reports Rt upper back pain this morning, has been putting a hot pack on it with some improvement. He reports improvement in cough and wheezing.     REVIEW OF SYSTEMS:  [x] All other systems negative except per HPI   [ ] Unable to assess ROS because ________    OBJECTIVE:  ICU Vital Signs Last 24 Hrs  T(C): 36.6 (27 Jul 2022 16:15), Max: 36.6 (26 Jul 2022 23:00)  T(F): 97.8 (27 Jul 2022 16:15), Max: 97.8 (26 Jul 2022 23:00)  HR: 105 (27 Jul 2022 16:15) (105 - 109)  BP: 120/82 (27 Jul 2022 16:15) (120/82 - 142/-)  BP(mean): --  ABP: --  ABP(mean): --  RR: 18 (27 Jul 2022 16:15) (18 - 18)  SpO2: 98% (27 Jul 2022 16:15) (95% - 98%)    O2 Parameters below as of 27 Jul 2022 16:15  Patient On (Oxygen Delivery Method): room air              07-26 @ 07:01 - 07-27 @ 07:00  --------------------------------------------------------  IN: 740 mL / OUT: 1100 mL / NET: -360 mL    07-27 @ 07:01 - 07-27 @ 16:38  --------------------------------------------------------  IN: 740 mL / OUT: 600 mL / NET: 140 mL        PHYSICAL EXAM:  GENERAL: NAD,   HEAD:  Atraumatic  EYES: EOMI, PERRL, conjunctiva and sclera clear  NECK: No JVD  CHEST/LUNG: No wheezing, good air movement. Fullness 4x6 inches rt upper back, diffusely mildly tender, no point tenderness  HEART: Regular rate and rhythm; No murmurs, rubs, or gallops  ABDOMEN: Soft, Nontender, Nondistended; Bowel sounds present  PSYCH: AAOx3  NEUROLOGY: non-focal  SKIN: No rashes or lesions    HOSPITAL MEDICATIONS:  MEDICATIONS  (STANDING):  albuterol/ipratropium for Nebulization 3 milliLiter(s) Nebulizer every 6 hours  aspirin enteric coated 81 milliGRAM(s) Oral daily  benzonatate 200 milliGRAM(s) Oral every 8 hours  budesonide 160 MICROgram(s)/formoterol 4.5 MICROgram(s) Inhaler 2 Puff(s) Inhalation two times a day  colchicine 0.6 milliGRAM(s) Oral two times a day  cyclobenzaprine 5 milliGRAM(s) Oral at bedtime  dextrose 5%. 1000 milliLiter(s) (50 mL/Hr) IV Continuous <Continuous>  dextrose 5%. 1000 milliLiter(s) (100 mL/Hr) IV Continuous <Continuous>  dextrose 50% Injectable 25 Gram(s) IV Push once  dextrose 50% Injectable 12.5 Gram(s) IV Push once  dextrose 50% Injectable 25 Gram(s) IV Push once  enoxaparin Injectable 40 milliGRAM(s) SubCutaneous every 24 hours  furosemide    Tablet 40 milliGRAM(s) Oral daily  glucagon  Injectable 1 milliGRAM(s) IntraMuscular once  hydroxychloroquine 400 milliGRAM(s) Oral daily  insulin lispro (ADMELOG) corrective regimen sliding scale   SubCutaneous at bedtime  insulin lispro (ADMELOG) corrective regimen sliding scale   SubCutaneous three times a day before meals  levothyroxine 125 MICROGram(s) Oral daily  lidocaine   4% Patch 2 Patch Transdermal daily  losartan 50 milliGRAM(s) Oral daily  melatonin 6 milliGRAM(s) Oral at bedtime  methylPREDNISolone sodium succinate Injectable 60 milliGRAM(s) IV Push daily  pantoprazole    Tablet 40 milliGRAM(s) Oral before breakfast  polyethylene glycol 3350 17 Gram(s) Oral daily  senna 1 Tablet(s) Oral daily  trimethoprim  160 mG/sulfamethoxazole 800 mG 1 Tablet(s) Oral daily    MEDICATIONS  (PRN):  dextrose Oral Gel 15 Gram(s) Oral once PRN Blood Glucose LESS THAN 70 milliGRAM(s)/deciliter  hydrocodone/homatropine Syrup 10 milliLiter(s) Oral every 4 hours PRN Cough  oxyCODONE    IR 10 milliGRAM(s) Oral every 4 hours PRN Severe Pain (7 - 10)  oxyCODONE    IR 5 milliGRAM(s) Oral every 4 hours PRN Moderate Pain (4 - 6)      LABS:    The Labs were reviewed by me   The Radiology was reviewed by me    EKG tracing reviewed by me    07-27    139  |  97  |  10  ----------------------------<  119<H>  4.2   |  30  |  0.90  07-26    136  |  95<L>  |  10  ----------------------------<  174<H>  3.6   |  29  |  0.80  07-25    132<L>  |  95<L>  |  12  ----------------------------<  340<H>  4.3   |  21<L>  |  0.90    Ca    9.5      27 Jul 2022 07:11  Ca    9.6      26 Jul 2022 07:12  Ca    9.1      25 Jul 2022 12:17  Phos  2.3     07-27  Mg     2.4     07-27      Magnesium, Serum: 2.4 mg/dL (07-27-22 @ 07:11)  Magnesium, Serum: 2.1 mg/dL (07-26-22 @ 07:12)  Magnesium, Serum: 1.9 mg/dL (07-25-22 @ 12:17)    Phosphorus Level, Serum: 2.3 mg/dL (07-27-22 @ 07:11)  Phosphorus Level, Serum: 3.3 mg/dL (07-26-22 @ 07:12)  Phosphorus Level, Serum: 1.7 mg/dL (07-25-22 @ 12:17)                                              12.9   10.91 )-----------( 366      ( 27 Jul 2022 07:14 )             38.2                         12.6   12.06 )-----------( 389      ( 26 Jul 2022 07:08 )             37.3                         12.5   8.74  )-----------( 354      ( 25 Jul 2022 12:17 )             36.5     CAPILLARY BLOOD GLUCOSE      POCT Blood Glucose.: 237 mg/dL (27 Jul 2022 11:51)  POCT Blood Glucose.: 137 mg/dL (27 Jul 2022 07:51)  POCT Blood Glucose.: 148 mg/dL (26 Jul 2022 21:30)  POCT Blood Glucose.: 245 mg/dL (26 Jul 2022 16:53)        MICROBIOLOGY:     RADIOLOGY:  [ ] Reviewed and interpreted by me    Point of Care Ultrasound Findings:    PFT:    EKG:

## 2022-07-27 NOTE — PROGRESS NOTE ADULT - PROBLEM SELECTOR PLAN 2
Last BM 3 days ago per pt, no increasing abdominal distension, peritoneal findings; likely 2/2 pain management with opioids  -Miralax + senna insufficient thus far  -added magnesium citrate Currently normal K+ level.     Previously, K+ found to be 2.7 on labs at admission, administered 30 mEq IV K+, 40 mEq po KCl  -f/u BMP and replete as needed with oral and IV K+ considering diuresis

## 2022-07-27 NOTE — PROGRESS NOTE ADULT - SUBJECTIVE AND OBJECTIVE BOX
Monae Singh MD  PGY 1 Department of Internal Medicine        Patient is a 47y old  Male who presents with a chief complaint of Shortness of breath, Chest and Back Pain (26 Jul 2022 14:13)      SUBJECTIVE / OVERNIGHT EVENTS: Pt seen and examined. No acute overnight events. Denies fevers, chills, CP, SOB, Abdominal pain, N/V, Constipation, Diarrhea        MEDICATIONS  (STANDING):  albuterol/ipratropium for Nebulization 3 milliLiter(s) Nebulizer every 6 hours  aspirin enteric coated 81 milliGRAM(s) Oral daily  benzonatate 200 milliGRAM(s) Oral every 8 hours  budesonide 160 MICROgram(s)/formoterol 4.5 MICROgram(s) Inhaler 2 Puff(s) Inhalation two times a day  colchicine 0.6 milliGRAM(s) Oral two times a day  cyclobenzaprine 5 milliGRAM(s) Oral at bedtime  dextrose 5%. 1000 milliLiter(s) (50 mL/Hr) IV Continuous <Continuous>  dextrose 5%. 1000 milliLiter(s) (100 mL/Hr) IV Continuous <Continuous>  dextrose 50% Injectable 25 Gram(s) IV Push once  dextrose 50% Injectable 12.5 Gram(s) IV Push once  dextrose 50% Injectable 25 Gram(s) IV Push once  enoxaparin Injectable 40 milliGRAM(s) SubCutaneous every 24 hours  furosemide    Tablet 40 milliGRAM(s) Oral daily  glucagon  Injectable 1 milliGRAM(s) IntraMuscular once  hydroxychloroquine 400 milliGRAM(s) Oral daily  insulin lispro (ADMELOG) corrective regimen sliding scale   SubCutaneous at bedtime  insulin lispro (ADMELOG) corrective regimen sliding scale   SubCutaneous three times a day before meals  levothyroxine 125 MICROGram(s) Oral daily  lidocaine   4% Patch 2 Patch Transdermal daily  losartan 50 milliGRAM(s) Oral daily  melatonin 6 milliGRAM(s) Oral at bedtime  methylPREDNISolone sodium succinate Injectable 60 milliGRAM(s) IV Push daily  pantoprazole    Tablet 40 milliGRAM(s) Oral before breakfast  polyethylene glycol 3350 17 Gram(s) Oral daily  senna 1 Tablet(s) Oral daily  trimethoprim  160 mG/sulfamethoxazole 800 mG 1 Tablet(s) Oral <User Schedule>    MEDICATIONS  (PRN):  dextrose Oral Gel 15 Gram(s) Oral once PRN Blood Glucose LESS THAN 70 milliGRAM(s)/deciliter  hydrocodone/homatropine Syrup 10 milliLiter(s) Oral every 4 hours PRN Cough  oxyCODONE    IR 10 milliGRAM(s) Oral every 4 hours PRN Severe Pain (7 - 10)  oxyCODONE    IR 5 milliGRAM(s) Oral every 4 hours PRN Moderate Pain (4 - 6)      I&O's Summary    26 Jul 2022 07:01  -  27 Jul 2022 07:00  --------------------------------------------------------  IN: 740 mL / OUT: 1100 mL / NET: -360 mL        Vital Signs Last 24 Hrs  T(C): 36.6 (26 Jul 2022 23:00), Max: 36.7 (26 Jul 2022 16:14)  T(F): 97.8 (26 Jul 2022 23:00), Max: 98 (26 Jul 2022 16:14)  HR: 105 (26 Jul 2022 23:00) (104 - 115)  BP: 121/81 (26 Jul 2022 23:00) (121/81 - 156/107)  BP(mean): --  RR: 18 (26 Jul 2022 23:00) (18 - 18)  SpO2: 98% (26 Jul 2022 23:00) (97% - 98%)    Parameters below as of 26 Jul 2022 23:00  Patient On (Oxygen Delivery Method): room air        CAPILLARY BLOOD GLUCOSE      POCT Blood Glucose.: 148 mg/dL (26 Jul 2022 21:30)  POCT Blood Glucose.: 245 mg/dL (26 Jul 2022 16:53)  POCT Blood Glucose.: 282 mg/dL (26 Jul 2022 11:42)  POCT Blood Glucose.: 172 mg/dL (26 Jul 2022 07:53)      PHYSICAL EXAM:  GENERAL: NAD,   HEAD:  Atraumatic, Normocephalic  EYES: EOMI, PERRL, conjunctiva and sclera clear  NECK: No JVD  CHEST/LUNG: Clear to auscultation bilaterally; No wheeze  HEART: Regular rate and rhythm; No murmurs, rubs, or gallops  ABDOMEN: Soft, Nontender, Nondistended; Bowel sounds present  EXTREMITIES:  2+ Peripheral Pulses, No clubbing, cyanosis, or edema  PSYCH: AAOx3  NEUROLOGY: non-focal  SKIN: No rashes or lesions       LABS:                        12.6   12.06 )-----------( 389      ( 26 Jul 2022 07:08 )             37.3     Auto Eosinophil # x     / Auto Eosinophil % x     / Auto Neutrophil # x     / Auto Neutrophil % x     / BANDS % x                            12.5   8.74  )-----------( 354      ( 25 Jul 2022 12:17 )             36.5     Auto Eosinophil # x     / Auto Eosinophil % x     / Auto Neutrophil # x     / Auto Neutrophil % x     / BANDS % x        07-26    136  |  95<L>  |  10  ----------------------------<  174<H>  3.6   |  29  |  0.80  07-25    132<L>  |  95<L>  |  12  ----------------------------<  340<H>  4.3   |  21<L>  |  0.90    Ca    9.6      26 Jul 2022 07:12  Mg     2.1     07-26  Phos  3.3     07-26                  RADIOLOGY & ADDITIONAL TESTS:    Imaging Personally Reviewed:    Consultant(s) Notes Reviewed:      Care Discussed with Consultants/Other Providers:   Monae Singh MD  PGY 1 Department of Internal Medicine        Patient is a 47y old  Male who presents with a chief complaint of Shortness of breath, Chest and Back Pain (26 Jul 2022 14:13)      SUBJECTIVE / OVERNIGHT EVENTS: Pt seen and examined. No acute overnight events, states back and chest pain have greatly improved, along with wheeze and cough. Denies fevers, chills, SOB, Abdominal pain, N/V, Constipation, Diarrhea        MEDICATIONS  (STANDING):  albuterol/ipratropium for Nebulization 3 milliLiter(s) Nebulizer every 6 hours  aspirin enteric coated 81 milliGRAM(s) Oral daily  benzonatate 200 milliGRAM(s) Oral every 8 hours  budesonide 160 MICROgram(s)/formoterol 4.5 MICROgram(s) Inhaler 2 Puff(s) Inhalation two times a day  colchicine 0.6 milliGRAM(s) Oral two times a day  cyclobenzaprine 5 milliGRAM(s) Oral at bedtime  dextrose 5%. 1000 milliLiter(s) (50 mL/Hr) IV Continuous <Continuous>  dextrose 5%. 1000 milliLiter(s) (100 mL/Hr) IV Continuous <Continuous>  dextrose 50% Injectable 25 Gram(s) IV Push once  dextrose 50% Injectable 12.5 Gram(s) IV Push once  dextrose 50% Injectable 25 Gram(s) IV Push once  enoxaparin Injectable 40 milliGRAM(s) SubCutaneous every 24 hours  furosemide    Tablet 40 milliGRAM(s) Oral daily  glucagon  Injectable 1 milliGRAM(s) IntraMuscular once  hydroxychloroquine 400 milliGRAM(s) Oral daily  insulin lispro (ADMELOG) corrective regimen sliding scale   SubCutaneous at bedtime  insulin lispro (ADMELOG) corrective regimen sliding scale   SubCutaneous three times a day before meals  levothyroxine 125 MICROGram(s) Oral daily  lidocaine   4% Patch 2 Patch Transdermal daily  losartan 50 milliGRAM(s) Oral daily  melatonin 6 milliGRAM(s) Oral at bedtime  methylPREDNISolone sodium succinate Injectable 60 milliGRAM(s) IV Push daily  pantoprazole    Tablet 40 milliGRAM(s) Oral before breakfast  polyethylene glycol 3350 17 Gram(s) Oral daily  senna 1 Tablet(s) Oral daily  trimethoprim  160 mG/sulfamethoxazole 800 mG 1 Tablet(s) Oral <User Schedule>    MEDICATIONS  (PRN):  dextrose Oral Gel 15 Gram(s) Oral once PRN Blood Glucose LESS THAN 70 milliGRAM(s)/deciliter  hydrocodone/homatropine Syrup 10 milliLiter(s) Oral every 4 hours PRN Cough  oxyCODONE    IR 10 milliGRAM(s) Oral every 4 hours PRN Severe Pain (7 - 10)  oxyCODONE    IR 5 milliGRAM(s) Oral every 4 hours PRN Moderate Pain (4 - 6)      I&O's Summary    26 Jul 2022 07:01  -  27 Jul 2022 07:00  --------------------------------------------------------  IN: 740 mL / OUT: 1100 mL / NET: -360 mL        Vital Signs Last 24 Hrs  T(C): 36.6 (26 Jul 2022 23:00), Max: 36.7 (26 Jul 2022 16:14)  T(F): 97.8 (26 Jul 2022 23:00), Max: 98 (26 Jul 2022 16:14)  HR: 105 (26 Jul 2022 23:00) (104 - 115)  BP: 121/81 (26 Jul 2022 23:00) (121/81 - 156/107)  BP(mean): --  RR: 18 (26 Jul 2022 23:00) (18 - 18)  SpO2: 98% (26 Jul 2022 23:00) (97% - 98%)    Parameters below as of 26 Jul 2022 23:00  Patient On (Oxygen Delivery Method): room air        CAPILLARY BLOOD GLUCOSE      POCT Blood Glucose.: 148 mg/dL (26 Jul 2022 21:30)  POCT Blood Glucose.: 245 mg/dL (26 Jul 2022 16:53)  POCT Blood Glucose.: 282 mg/dL (26 Jul 2022 11:42)  POCT Blood Glucose.: 172 mg/dL (26 Jul 2022 07:53)      PHYSICAL EXAM:  GENERAL: NAD,   HEAD:  Atraumatic, Normocephalic  EYES: EOMI, PERRL, conjunctiva and sclera clear  NECK: No JVD  CHEST/LUNG: Mild right sided expiratory wheeze, no rhonchi, crackles  HEART: Regular rate and rhythm; No murmurs, rubs, or gallops  ABDOMEN: Soft, Nontender, Nondistended; Bowel sounds present  EXTREMITIES:  2+ Peripheral Pulses, No clubbing, cyanosis, or edema  PSYCH: AAOx3  NEUROLOGY: non-focal  MSK: (+) Costovertebral swelling at level of scapula, nonerythematous, not warm, no point tenderness  SKIN: No rashes or lesions       LABS:                        12.6   12.06 )-----------( 389      ( 26 Jul 2022 07:08 )             37.3     Auto Eosinophil # x     / Auto Eosinophil % x     / Auto Neutrophil # x     / Auto Neutrophil % x     / BANDS % x                            12.5   8.74  )-----------( 354      ( 25 Jul 2022 12:17 )             36.5     Auto Eosinophil # x     / Auto Eosinophil % x     / Auto Neutrophil # x     / Auto Neutrophil % x     / BANDS % x        07-26    136  |  95<L>  |  10  ----------------------------<  174<H>  3.6   |  29  |  0.80  07-25    132<L>  |  95<L>  |  12  ----------------------------<  340<H>  4.3   |  21<L>  |  0.90    Ca    9.6      26 Jul 2022 07:12  Mg     2.1     07-26  Phos  3.3     07-26

## 2022-07-27 NOTE — PROGRESS NOTE ADULT - ATTENDING COMMENTS
47M PMHx SLE on prednisone and Plaquenil, asthma, hypothyroidism, HTN presenting with shortness of breath and severe 15/10 back, rib pain and pleuritic chest pain in the setting of persistent aggressive coughing due recent RSV infection with asthma exacerbation now causing costochondritis    #Asthma Exacerbation, RSV infection, Costochondritis  - overall feeling better but still with pain located on the right back area. --> the is an area induration with tenderness to palpation but no fluctuance to suggest fluid collection --> will check an US for better evaluation.  - pt able to tolerated just PO oxycodone, will continue him on this regimen.  - can transition to po Lasix  - pt still on high dose solumedrol --> will d/w Pulm and Rheum about down titration and transition to oral.  - antitussives as needed    dispo: d/c in AM

## 2022-07-27 NOTE — PROGRESS NOTE ADULT - PROBLEM SELECTOR PLAN 4
Lupus may be contributing to inflammatory process  -Rheum consult, recs appreciated     -c/w home regimen of plaquenil, prednisone     -hold cellcept     -Bactrim for PCP prophylaxis     -transitioned IV to 40mg po lasix to manage associated LE edema, continues to improve c/w home medications of synthroid 125 mcg

## 2022-07-27 NOTE — PROGRESS NOTE ADULT - ATTENDING COMMENTS
47 M with SLE on steroids/hcq/mmf, asthma here with sob and acute hypoxemic respiratory failure due to asthma exacerbation due ot RSV    Patient is still with cough/ pleuritic chest pain though it is improving on higher dose of steroid.  Pain is centered around a 4"x6" (approx) area of induration on right back near scapula; there is general pain when that area is pushed    c/w bronchodilators given asthma exacerbation  supportive care for RSV; can switch to prednisone 60 mg po daily 7/28  supplemental o2 as needed  if patient truly on prednisone 30 mg po daily as an outpatient would consider starting pcp ppx  recommend imaging of back (?US vs CT)

## 2022-07-27 NOTE — PROGRESS NOTE ADULT - ASSESSMENT
47y male PMHx SLE on prednisone and plaquenil, asthma, hypothyroidism, HTN presenting with asthma exacerbation and costochondritis with recent admission following parainfluenza infection, now with RSV.    #Asthma exacerbation  Following increase in methylpred 60mg pt experienced improvement in wheezing. Would recommend slow steroid taper as below.  -prednisone 60mg qday starting 7/28, decrease by 10mg every 3 days until back to home dose  -c/w bronchodilators   -c/w PJP ppx

## 2022-07-27 NOTE — PROGRESS NOTE ADULT - PROBLEM SELECTOR PLAN 3
Currently normal K+ level.     Previously, K+ found to be 2.7 on labs at admission, administered 30 mEq IV K+, 40 mEq po KCl  -f/u BMP and replete as needed with oral and IV K+ considering diuresis Lupus may be contributing to inflammatory process  -Rheum consult, recs appreciated     -c/w home regimen of plaquenil, prednisone     -hold cellcept     -Bactrim for PCP prophylaxis     -transitioned IV to 40mg po lasix to manage associated LE edema, continues to improve

## 2022-07-27 NOTE — PROGRESS NOTE ADULT - ASSESSMENT
Pt is a 47y male PMHx SLE on prednisone and plaquenil, asthma, hypothyroidism, HTN presenting with shortness of breath 2/2 back and pleuritic chest pain i/s/o recent admission for RSV-related asthma exacerbation with costochondritis. Pt feels pain location has decreased in size, cough has improved.  Pt is a 47y male PMHx SLE on prednisone and plaquenil, asthma, hypothyroidism, HTN presenting with shortness of breath 2/2 back and pleuritic chest pain i/s/o recent admission for RSV-related asthma exacerbation with costochondritis. Pt continues to feel improvement in symptoms with improvement of wheeze on auscultation.

## 2022-07-27 NOTE — PROGRESS NOTE ADULT - PROBLEM SELECTOR PLAN 6
Wheezing has improved, s/p increase in solu-medrol to 60mg  -Pulm on board, recs appreciated      -resume inhalers, duonebs on board Resolved.     Last BM 3 days ago per pt, no increasing abdominal distension, peritoneal findings; likely 2/2 pain management with opioids  -Miralax + senna insufficient thus far  -added magnesium citrate

## 2022-07-27 NOTE — PROGRESS NOTE ADULT - PROBLEM SELECTOR PLAN 5
c/w home medications of synthroid 125 mcg Wheezing has improved, s/p increase in solu-medrol to 60mg  -Pulm on board, recs appreciated      -resume inhalers, duonebs on board

## 2022-07-28 ENCOUNTER — APPOINTMENT (OUTPATIENT)
Dept: RHEUMATOLOGY | Facility: CLINIC | Age: 47
End: 2022-07-28

## 2022-07-28 ENCOUNTER — TRANSCRIPTION ENCOUNTER (OUTPATIENT)
Age: 47
End: 2022-07-28

## 2022-07-28 VITALS
SYSTOLIC BLOOD PRESSURE: 134 MMHG | OXYGEN SATURATION: 95 % | HEART RATE: 118 BPM | DIASTOLIC BLOOD PRESSURE: 93 MMHG | RESPIRATION RATE: 18 BRPM | TEMPERATURE: 98 F

## 2022-07-28 PROCEDURE — 96374 THER/PROPH/DIAG INJ IV PUSH: CPT

## 2022-07-28 PROCEDURE — 85379 FIBRIN DEGRADATION QUANT: CPT

## 2022-07-28 PROCEDURE — 82330 ASSAY OF CALCIUM: CPT

## 2022-07-28 PROCEDURE — 99233 SBSQ HOSP IP/OBS HIGH 50: CPT | Mod: GC

## 2022-07-28 PROCEDURE — 71046 X-RAY EXAM CHEST 2 VIEWS: CPT

## 2022-07-28 PROCEDURE — 82947 ASSAY GLUCOSE BLOOD QUANT: CPT

## 2022-07-28 PROCEDURE — 82962 GLUCOSE BLOOD TEST: CPT

## 2022-07-28 PROCEDURE — 84484 ASSAY OF TROPONIN QUANT: CPT

## 2022-07-28 PROCEDURE — 96375 TX/PRO/DX INJ NEW DRUG ADDON: CPT

## 2022-07-28 PROCEDURE — 85610 PROTHROMBIN TIME: CPT

## 2022-07-28 PROCEDURE — U0005: CPT

## 2022-07-28 PROCEDURE — 84145 PROCALCITONIN (PCT): CPT

## 2022-07-28 PROCEDURE — 80053 COMPREHEN METABOLIC PANEL: CPT

## 2022-07-28 PROCEDURE — 82435 ASSAY OF BLOOD CHLORIDE: CPT

## 2022-07-28 PROCEDURE — 84100 ASSAY OF PHOSPHORUS: CPT

## 2022-07-28 PROCEDURE — 84156 ASSAY OF PROTEIN URINE: CPT

## 2022-07-28 PROCEDURE — 94640 AIRWAY INHALATION TREATMENT: CPT

## 2022-07-28 PROCEDURE — 81001 URINALYSIS AUTO W/SCOPE: CPT

## 2022-07-28 PROCEDURE — 85730 THROMBOPLASTIN TIME PARTIAL: CPT

## 2022-07-28 PROCEDURE — 82570 ASSAY OF URINE CREATININE: CPT

## 2022-07-28 PROCEDURE — 80048 BASIC METABOLIC PNL TOTAL CA: CPT

## 2022-07-28 PROCEDURE — 82803 BLOOD GASES ANY COMBINATION: CPT

## 2022-07-28 PROCEDURE — 83605 ASSAY OF LACTIC ACID: CPT

## 2022-07-28 PROCEDURE — 83880 ASSAY OF NATRIURETIC PEPTIDE: CPT

## 2022-07-28 PROCEDURE — 85027 COMPLETE CBC AUTOMATED: CPT

## 2022-07-28 PROCEDURE — 36415 COLL VENOUS BLD VENIPUNCTURE: CPT

## 2022-07-28 PROCEDURE — 86225 DNA ANTIBODY NATIVE: CPT

## 2022-07-28 PROCEDURE — 85025 COMPLETE CBC W/AUTO DIFF WBC: CPT

## 2022-07-28 PROCEDURE — U0003: CPT

## 2022-07-28 PROCEDURE — 83735 ASSAY OF MAGNESIUM: CPT

## 2022-07-28 PROCEDURE — 84132 ASSAY OF SERUM POTASSIUM: CPT

## 2022-07-28 PROCEDURE — 93971 EXTREMITY STUDY: CPT

## 2022-07-28 PROCEDURE — 76604 US EXAM CHEST: CPT

## 2022-07-28 PROCEDURE — 0225U NFCT DS DNA&RNA 21 SARSCOV2: CPT

## 2022-07-28 PROCEDURE — 85014 HEMATOCRIT: CPT

## 2022-07-28 PROCEDURE — 99285 EMERGENCY DEPT VISIT HI MDM: CPT | Mod: 25

## 2022-07-28 PROCEDURE — 99239 HOSP IP/OBS DSCHRG MGMT >30: CPT | Mod: GC

## 2022-07-28 PROCEDURE — 84295 ASSAY OF SERUM SODIUM: CPT

## 2022-07-28 PROCEDURE — 85018 HEMOGLOBIN: CPT

## 2022-07-28 PROCEDURE — 86160 COMPLEMENT ANTIGEN: CPT

## 2022-07-28 RX ORDER — MYCOPHENOLATE MOFETIL 250 MG/1
3 CAPSULE ORAL
Qty: 21 | Refills: 0
Start: 2022-07-28 | End: 2022-08-03

## 2022-07-28 RX ORDER — MYCOPHENOLATE MOFETIL 250 MG/1
3 CAPSULE ORAL
Qty: 0 | Refills: 0 | DISCHARGE

## 2022-07-28 RX ORDER — HYDROCODONE/CHLORPHEN P-STIREX 10-8MG/5ML
5 SUSPENSION, EXTENDED RELEASE 12 HR ORAL
Qty: 140 | Refills: 0
Start: 2022-07-28 | End: 2022-08-03

## 2022-07-28 RX ADMIN — Medication 200 MILLIGRAM(S): at 13:16

## 2022-07-28 RX ADMIN — OXYCODONE HYDROCHLORIDE 10 MILLIGRAM(S): 5 TABLET ORAL at 13:37

## 2022-07-28 RX ADMIN — Medication 0.6 MILLIGRAM(S): at 06:35

## 2022-07-28 RX ADMIN — Medication 60 MILLIGRAM(S): at 06:32

## 2022-07-28 RX ADMIN — Medication 3: at 12:30

## 2022-07-28 RX ADMIN — OXYCODONE HYDROCHLORIDE 10 MILLIGRAM(S): 5 TABLET ORAL at 07:35

## 2022-07-28 RX ADMIN — Medication 40 MILLIGRAM(S): at 12:41

## 2022-07-28 RX ADMIN — Medication 125 MICROGRAM(S): at 06:34

## 2022-07-28 RX ADMIN — Medication 3 MILLILITER(S): at 06:33

## 2022-07-28 RX ADMIN — Medication 1: at 08:12

## 2022-07-28 RX ADMIN — LIDOCAINE 2 PATCH: 4 CREAM TOPICAL at 12:40

## 2022-07-28 RX ADMIN — SENNA PLUS 1 TABLET(S): 8.6 TABLET ORAL at 12:38

## 2022-07-28 RX ADMIN — BUDESONIDE AND FORMOTEROL FUMARATE DIHYDRATE 2 PUFF(S): 160; 4.5 AEROSOL RESPIRATORY (INHALATION) at 06:33

## 2022-07-28 RX ADMIN — PANTOPRAZOLE SODIUM 40 MILLIGRAM(S): 20 TABLET, DELAYED RELEASE ORAL at 06:33

## 2022-07-28 RX ADMIN — LOSARTAN POTASSIUM 50 MILLIGRAM(S): 100 TABLET, FILM COATED ORAL at 06:33

## 2022-07-28 RX ADMIN — Medication 81 MILLIGRAM(S): at 12:38

## 2022-07-28 RX ADMIN — OXYCODONE HYDROCHLORIDE 10 MILLIGRAM(S): 5 TABLET ORAL at 12:36

## 2022-07-28 RX ADMIN — Medication 200 MILLIGRAM(S): at 06:31

## 2022-07-28 RX ADMIN — LIDOCAINE 2 PATCH: 4 CREAM TOPICAL at 00:22

## 2022-07-28 RX ADMIN — OXYCODONE HYDROCHLORIDE 10 MILLIGRAM(S): 5 TABLET ORAL at 06:31

## 2022-07-28 RX ADMIN — ENOXAPARIN SODIUM 40 MILLIGRAM(S): 100 INJECTION SUBCUTANEOUS at 12:36

## 2022-07-28 RX ADMIN — Medication 3 MILLILITER(S): at 12:37

## 2022-07-28 RX ADMIN — Medication 1 TABLET(S): at 12:38

## 2022-07-28 NOTE — DISCHARGE NOTE NURSING/CASE MANAGEMENT/SOCIAL WORK - NSDCPEFALRISK_GEN_ALL_CORE
For information on Fall & Injury Prevention, visit: [email or web address] OR  [email or web address] OR  https://www.cdc.gov/steadi/patient.html

## 2022-07-28 NOTE — PROGRESS NOTE ADULT - REASON FOR ADMISSION
Shortness of breath, Chest and Back Pain

## 2022-07-28 NOTE — CHART NOTE - NSCHARTNOTEFT_GEN_A_CORE
Pulmonary consulted for asthma exacerbation. Patient follows with Dr. Gonzales, seen by pulmonary service inpatient. Currently presenting with worsening SOB and wheezing on exam. Took multiple doses of rescue inhaler. Currently improved with albuterol nebulizer, but holding off on further given marked hypokalemia.    Afebrile, tachycardic to 100s, Bps 140s/ 90s and O2 sat % on room air.  CXR clear. Pending RVP. Recommend IV solu-medrol 40mg for now. Would continue with bronchodilators however at this time ER deferring SE 2/2 to hypokalemia: can use at least budesonide for now and PRN albuterol nebs. Aggressively replete potassium.    Pulmonary to see patient in AM. Full note to follow.
Andre Reyes, M.D.  Pager: [phone]  Office: [phone]    Patient is a 47y old  Male who presents with a chief complaint of Shortness of breath, Chest and Back Pain (27 Jul 2022 13:50)          SUBJECTIVE / OVERNIGHT EVENTS:    No acute overnight events.  overall better  mild cough  still with moderate posterior chest wall pain      MEDICATIONS  (STANDING):  albuterol/ipratropium for Nebulization 3 milliLiter(s) Nebulizer every 6 hours  aspirin enteric coated 81 milliGRAM(s) Oral daily  benzonatate 200 milliGRAM(s) Oral every 8 hours  budesonide 160 MICROgram(s)/formoterol 4.5 MICROgram(s) Inhaler 2 Puff(s) Inhalation two times a day  colchicine 0.6 milliGRAM(s) Oral two times a day  cyclobenzaprine 5 milliGRAM(s) Oral at bedtime  dextrose 5%. 1000 milliLiter(s) (50 mL/Hr) IV Continuous <Continuous>  dextrose 5%. 1000 milliLiter(s) (100 mL/Hr) IV Continuous <Continuous>  dextrose 50% Injectable 25 Gram(s) IV Push once  dextrose 50% Injectable 12.5 Gram(s) IV Push once  dextrose 50% Injectable 25 Gram(s) IV Push once  enoxaparin Injectable 40 milliGRAM(s) SubCutaneous every 24 hours  furosemide    Tablet 40 milliGRAM(s) Oral daily  glucagon  Injectable 1 milliGRAM(s) IntraMuscular once  hydroxychloroquine 400 milliGRAM(s) Oral daily  insulin lispro (ADMELOG) corrective regimen sliding scale   SubCutaneous three times a day before meals  insulin lispro (ADMELOG) corrective regimen sliding scale   SubCutaneous at bedtime  levothyroxine 125 MICROGram(s) Oral daily  lidocaine   4% Patch 2 Patch Transdermal daily  losartan 50 milliGRAM(s) Oral daily  melatonin 6 milliGRAM(s) Oral at bedtime  pantoprazole    Tablet 40 milliGRAM(s) Oral before breakfast  polyethylene glycol 3350 17 Gram(s) Oral daily  senna 1 Tablet(s) Oral daily  trimethoprim  160 mG/sulfamethoxazole 800 mG 1 Tablet(s) Oral daily    MEDICATIONS  (PRN):  dextrose Oral Gel 15 Gram(s) Oral once PRN Blood Glucose LESS THAN 70 milliGRAM(s)/deciliter  hydrocodone/homatropine Syrup 10 milliLiter(s) Oral every 4 hours PRN Cough  oxyCODONE    IR 10 milliGRAM(s) Oral every 4 hours PRN Severe Pain (7 - 10)  oxyCODONE    IR 5 milliGRAM(s) Oral every 4 hours PRN Moderate Pain (4 - 6)          T(C): 36.5 (07-28 @ 14:32), Max: 36.5 (07-28 @ 10:45)   HR: 118   BP: 134/93   RR: 18   SpO2: 95%    PHYSICAL EXAM:    CONSTITUTIONAL: NAD, well-developed, well-groomed  EYES: PERRLA; conjunctiva and sclera clear  ENMT: Moist oral mucosa, no pharyngeal injection or exudates; normal dentition  NECK: Supple, no palpable masses; no thyromegaly  RESPIRATORY: Normal respiratory effort; minimal right posterior wheezing  CARDIOVASCULAR: Regular rate and rhythm, normal S1 and S2, no murmur/rub/gallop; 2+ lower extremity edema; Peripheral pulses are 2+ bilaterally  ABDOMEN: Nontender to palpation, normoactive bowel sounds, no rebound/guarding; No hepatosplenomegaly  MUSCULOSKELETAL:  Normal gait; no clubbing or cyanosis of digits; no joint swelling or tenderness to palpation  PSYCH: A+O to person, place, and time; affect appropriate  NEUROLOGY: CN 2-12 are intact and symmetric; no gross sensory deficits   SKIN: No rashes; no palpable lesions      LABS:                        12.9   10.91 )-----------( 366      ( 27 Jul 2022 07:14 )             38.2      07-27    139  |  97  |  10  ----------------------------<  119<H>  4.2   |  30  |  0.90    Ca    9.5      27 Jul 2022 07:11  Phos  2.3     07-27  Mg     2.4     07-27         CAPILLARY BLOOD GLUCOSE      POCT Blood Glucose.: 251 mg/dL (28 Jul 2022 11:35)  POCT Blood Glucose.: 178 mg/dL (28 Jul 2022 07:59)  POCT Blood Glucose.: 151 mg/dL (27 Jul 2022 21:22)  POCT Blood Glucose.: 125 mg/dL (27 Jul 2022 17:24)    47M PMHx SLE on prednisone and Plaquenil, asthma, hypothyroidism, HTN presenting with shortness of breath and severe 15/10 back, rib pain and pleuritic chest pain in the setting of persistent aggressive coughing due recent RSV infection with asthma exacerbation now causing costochondritis    #Asthma Exacerbation, RSV infection, Costochondritis  - overall feeling better but still with pain located on the right back area. --> the is an area induration with tenderness to palpation but no fluctuance to suggest fluid collection --> US was negative for acute findings  - pt able to tolerated just PO oxycodone, will continue him on this regimen.  - can transition to po Lasix  - transition to PO prednisone with taper per Pulm  - antitussives as needed    stable for discharge today  Discharge time spent: 34 min

## 2022-07-28 NOTE — PROGRESS NOTE ADULT - ATTENDING COMMENTS
47 M with SLE on steroids/hcq/mmf, asthma here with sob and acute hypoxemic respiratory failure due to asthma exacerbation due ot RSV    Pain still present on back but tolerable    c/w bronchodilators given asthma exacerbation  supportive care for RSV; would taper prednisone by 10 mg po daily every 3 days until at home dose of 30 mg po daily and has f/u with rheum/pulm, consider pcp ppx  outpatient f/u of back

## 2022-07-28 NOTE — DISCHARGE NOTE NURSING/CASE MANAGEMENT/SOCIAL WORK - NSDCVIVACCINE_GEN_ALL_CORE_FT
Tdap; 17-Dec-2018 23:02; Candi Bedolla (ALEKSEY); [employer]; [id number] (Exp. Date: 03-Dec-2020); IntraMuscular; Deltoid Left.; 0.5 milliLiter(s); VIS (VIS Published: 09-May-2013, VIS Presented: 17-Dec-2018);

## 2022-07-28 NOTE — PROGRESS NOTE ADULT - PROVIDER SPECIALTY LIST ADULT
Internal Medicine
Internal Medicine
Pulmonology
Internal Medicine
Internal Medicine
Pulmonology
Internal Medicine

## 2022-07-28 NOTE — DISCHARGE NOTE NURSING/CASE MANAGEMENT/SOCIAL WORK - PATIENT PORTAL LINK FT
You can access the FollowMyHealth Patient Portal offered by [facility] by registering at the following website: [email or web address]. By joining [facility]’s FollowMyHealth portal, you will also be able to view your health information using other applications (apps) compatible with our system.

## 2022-07-28 NOTE — DISCHARGE NOTE NURSING/CASE MANAGEMENT/SOCIAL WORK - NSDCFUADDAPPT_GEN_ALL_CORE_FT
Please follow up with Dr. Neri and Dr. Everett for you scheduled appointments for discussion of your recent hospitalizations, examination, medication adjustment, and further recommendations for treatment.

## 2022-07-28 NOTE — PROGRESS NOTE ADULT - SUBJECTIVE AND OBJECTIVE BOX
[facility] DIVISION OF PULMONARY, CRITICAL CARE and SLEEP MEDICINE  ACUTE LUNG INJURY/PULMONARY PROGRESS NOTE  OFFICE NUMBER: [phone]    PATIENT INFORMATION:  NAME: AMBERLY ESPITIA:  MRN: [id number]    CHIEF COMPLAINT: Patient is a 47y old  Male who presents with a chief complaint of Shortness of breath, Chest and Back Pain (27 Jul 2022 13:50)      [x] INITIAL CONSULT, H&P, FAMILY HISTORY and PAST MEDICAL AND SURGICAL HISTORY REVIEWED    OVERNIGHT EVENTS or CHANGES TO HPI:   wheezing improved  ========================REVIEW OF SYSTEMS========================  CONSTITUTIONAL: no fevers or chills  CARDIOVASCULAR: no cp or orthopnea  PULMONARY: no cough or wheezing  [x] REMAINING REVIEW OF SYSTEMS NEGATIVE  [] UNABLE TO OBTAIN REVIEW OF SYSTEMS DUE TO _______________    ========================MEDICATIONS=============================  MEDICATIONS  (STANDING):  albuterol/ipratropium for Nebulization 3 milliLiter(s) Nebulizer every 6 hours  aspirin enteric coated 81 milliGRAM(s) Oral daily  benzonatate 200 milliGRAM(s) Oral every 8 hours  budesonide 160 MICROgram(s)/formoterol 4.5 MICROgram(s) Inhaler 2 Puff(s) Inhalation two times a day  colchicine 0.6 milliGRAM(s) Oral two times a day  cyclobenzaprine 5 milliGRAM(s) Oral at bedtime  dextrose 5%. 1000 milliLiter(s) (100 mL/Hr) IV Continuous <Continuous>  dextrose 5%. 1000 milliLiter(s) (50 mL/Hr) IV Continuous <Continuous>  dextrose 50% Injectable 25 Gram(s) IV Push once  dextrose 50% Injectable 12.5 Gram(s) IV Push once  dextrose 50% Injectable 25 Gram(s) IV Push once  enoxaparin Injectable 40 milliGRAM(s) SubCutaneous every 24 hours  furosemide    Tablet 40 milliGRAM(s) Oral daily  glucagon  Injectable 1 milliGRAM(s) IntraMuscular once  hydroxychloroquine 400 milliGRAM(s) Oral daily  insulin lispro (ADMELOG) corrective regimen sliding scale   SubCutaneous at bedtime  insulin lispro (ADMELOG) corrective regimen sliding scale   SubCutaneous three times a day before meals  levothyroxine 125 MICROGram(s) Oral daily  lidocaine   4% Patch 2 Patch Transdermal daily  losartan 50 milliGRAM(s) Oral daily  melatonin 6 milliGRAM(s) Oral at bedtime  pantoprazole    Tablet 40 milliGRAM(s) Oral before breakfast  polyethylene glycol 3350 17 Gram(s) Oral daily  senna 1 Tablet(s) Oral daily  trimethoprim  160 mG/sulfamethoxazole 800 mG 1 Tablet(s) Oral daily      MEDICATIONS  (PRN):  dextrose Oral Gel 15 Gram(s) Oral once PRN Blood Glucose LESS THAN 70 milliGRAM(s)/deciliter  hydrocodone/homatropine Syrup 10 milliLiter(s) Oral every 4 hours PRN Cough  oxyCODONE    IR 10 milliGRAM(s) Oral every 4 hours PRN Severe Pain (7 - 10)  oxyCODONE    IR 5 milliGRAM(s) Oral every 4 hours PRN Moderate Pain (4 - 6)      ========================PHYSICAL EXAM============================    VITALS: ICU Vital Signs Last 24 Hrs  T(C): 36.5 (28 Jul 2022 14:32), Max: 36.5 (28 Jul 2022 10:45)  T(F): 97.7 (28 Jul 2022 14:32), Max: 97.7 (28 Jul 2022 10:45)  HR: 118 (28 Jul 2022 14:32) (96 - 118)  BP: 134/93 (28 Jul 2022 14:32) (105/67 - 134/93)  BP(mean): --  ABP: --  ABP(mean): --  RR: 18 (28 Jul 2022 14:32) (18 - 18)  SpO2: 95% (28 Jul 2022 14:32) (95% - 96%)    O2 Parameters below as of 28 Jul 2022 14:32  Patient On (Oxygen Delivery Method): room air            INTAKE and OUTPUT: I&O's Summary    27 Jul 2022 07:01  -  28 Jul 2022 07:00  --------------------------------------------------------  IN: 1060 mL / OUT: 1600 mL / NET: -540 mL    28 Jul 2022 07:01  -  28 Jul 2022 22:33  --------------------------------------------------------  IN: 740 mL / OUT: 0 mL / NET: 740 mL        VENTILATOR SETTINGS:       PHYSICAL EXAM:  GENERAL: NAD,   HEAD:  Atraumatic  EYES: EOMI, PERRL, conjunctiva and sclera clear  NECK: No JVD  CHEST/LUNG: No wheezing, good air movement. Fullness 4x6 inches rt upper back, diffusely mildly tender, no point tenderness  HEART: Regular rate and rhythm; No murmurs, rubs, or gallops  ABDOMEN: Soft, Nontender, Nondistended; Bowel sounds present  PSYCH: AAOx3  NEUROLOGY: non-focal  SKIN: No rashes or lesions  ========================LABORATORY RESULTS AND IMAGING=============                        12.9   10.91 )-----------( 366      ( 27 Jul 2022 07:14 )             38.2                                                    07-27    139  |  97  |  10  ----------------------------<  119<H>  4.2   |  30  |  0.90    Ca    9.5      27 Jul 2022 07:11  Phos  2.3     07-27  Mg     2.4     07-27            Creatinine Trend: 0.90<--, 0.80<--, 0.90<--, 0.89<--, 0.92<--, 1.09<--    CT CHEST:     [] RADIOLOGY REVIEWED AND INTERPRETED BY ME      THANK YOU FOR ALLOWING US TO PARTICIPATE IN THE CARE OF THIS PATIENT

## 2022-07-28 NOTE — PROGRESS NOTE ADULT - TIME BILLING
reviewing chart and coordinating care with primary team/staff, as well as reviewing vitals, radiology, medication list, recent labs, and prior records.
Personal review of data, imaging and discussion with medical team.

## 2022-07-29 RX ORDER — HYDROCODONE BITARTRATE AND HOMATROPINE METHYLBROMIDE 5; 1.5 MG/5ML; MG/5ML
10 SOLUTION ORAL
Qty: 560 | Refills: 0
Start: 2022-07-29 | End: 2022-08-11

## 2022-08-01 ENCOUNTER — APPOINTMENT (OUTPATIENT)
Dept: INTERNAL MEDICINE | Facility: CLINIC | Age: 47
End: 2022-08-01

## 2022-08-01 VITALS
SYSTOLIC BLOOD PRESSURE: 111 MMHG | HEART RATE: 97 BPM | WEIGHT: 257 LBS | HEIGHT: 68 IN | DIASTOLIC BLOOD PRESSURE: 79 MMHG | BODY MASS INDEX: 38.95 KG/M2 | TEMPERATURE: 95.7 F | OXYGEN SATURATION: 99 %

## 2022-08-01 VITALS — DIASTOLIC BLOOD PRESSURE: 82 MMHG | SYSTOLIC BLOOD PRESSURE: 118 MMHG

## 2022-08-01 PROCEDURE — 99496 TRANSJ CARE MGMT HIGH F2F 7D: CPT

## 2022-08-01 NOTE — PHYSICAL EXAM
[Normal Sclera/Conjunctiva] : normal sclera/conjunctiva [Normal Outer Ear/Nose] : the outer ears and nose were normal in appearance [No HSM] : no HSM [Normal Bowel Sounds] : normal bowel sounds [No Hernias] : no hernias [Normal] : affect was normal and insight and judgment were intact [de-identified] : Distended abdomen slightly tender no masses

## 2022-08-01 NOTE — HISTORY OF PRESENT ILLNESS
[Admitted on: ___] : The patient was admitted on [unfilled] [Discharged on ___] : discharged on [unfilled] [Patient Contacted By: ____] : and contacted by [unfilled] [FreeTextEntry3] : Shortness of breath chest wall and back pain [FreeTextEntry2] : \par The patient is a 47-year-old male with history of lupus, pulmonary lung disease/asthma, hypothyroidism, hypertension, peripheral edema, chronic fatigue, chronic Gerd, chronic pain, obesity, who presents for follow-up of hospitalization for shortness of breath/chest wall and back pain. Patient was treated with sire module switched over the prednisone now presents for follow-up patient feels better less shortness of breath dyspnea but still has persistent back and chest wall pain denies lightheadedness, dizziness polyuria polydipsia admits to being constipated.

## 2022-08-01 NOTE — REVIEW OF SYSTEMS
[Fatigue] : fatigue [Constipation] : constipation [Joint Pain] : joint pain [Muscle Pain] : muscle pain [Back Pain] : back pain [Negative] : Integumentary [Fever] : no fever [Chills] : no chills [Hot Flashes] : no hot flashes [Night Sweats] : no night sweats [Recent Change In Weight] : ~T no recent weight change [Abdominal Pain] : no abdominal pain [Nausea] : no nausea [Diarrhea] : no diarrhea [Vomiting] : no vomiting [Heartburn] : no heartburn [Melena] : no melena [Joint Stiffness] : no joint stiffness [Muscle Weakness] : no muscle weakness [Joint Swelling] : no joint swelling

## 2022-08-01 NOTE — ASSESSMENT
[FreeTextEntry1] : Patient is a 47-year-old male with history of lupus, obesity, asthma/lung disease, hypothyroidism, isolate elevated blood pressure/hypertension, sleep apnea, allergic rhinitis, lumbar sacral stenosis, who presents for follow-up of hospitalization for decompensated asthma back and chest pain who complains of constipation\par \par 1. Asthma\par doing well taper prednisone as per pulmonary\par continue inhalers\par \par 2.. Constipation\par start a bottle Mac citrate half tab bilateral if two hours later taken second half\par continue MiraLAX and Judy\par observe\par \par 3. Lupus\par continue current management follow-up with rheumatology\par \par 4 chronic back pain chest wall pain\par mild to moderate spinal stenosis or orthopedic back\par refer to neurology and pain management\par continue Tylenol and Flexeril\par \par 5. Hypertension\par doing well normotensive today on medication\par \par 6. Hypothyroidism\par continue levothyroxine check TFTs next visit\par follow-up in one month

## 2022-08-02 ENCOUNTER — TRANSCRIPTION ENCOUNTER (OUTPATIENT)
Age: 47
End: 2022-08-02

## 2022-08-02 LAB
ALBUMIN SERPL ELPH-MCNC: 4.1 G/DL
ALP BLD-CCNC: 100 U/L
ALT SERPL-CCNC: 85 U/L
ANION GAP SERPL CALC-SCNC: 16 MMOL/L
AST SERPL-CCNC: 36 U/L
BASOPHILS # BLD AUTO: 0.03 K/UL
BASOPHILS NFR BLD AUTO: 0.3 %
BILIRUB SERPL-MCNC: 0.3 MG/DL
BUN SERPL-MCNC: 9 MG/DL
CALCIUM SERPL-MCNC: 10 MG/DL
CHLORIDE SERPL-SCNC: 99 MMOL/L
CK SERPL-CCNC: 138 U/L
CO2 SERPL-SCNC: 27 MMOL/L
CREAT SERPL-MCNC: 1.03 MG/DL
EGFR: 91 ML/MIN/1.73M2
EOSINOPHIL # BLD AUTO: 0.05 K/UL
EOSINOPHIL NFR BLD AUTO: 0.5 %
ERYTHROCYTE [SEDIMENTATION RATE] IN BLOOD BY WESTERGREN METHOD: 26 MM/HR
GLUCOSE SERPL-MCNC: 115 MG/DL
HCT VFR BLD CALC: 40.5 %
HGB BLD-MCNC: 13.4 G/DL
IMM GRANULOCYTES NFR BLD AUTO: 2.9 %
LYMPHOCYTES # BLD AUTO: 1.5 K/UL
LYMPHOCYTES NFR BLD AUTO: 13.6 %
MAN DIFF?: NORMAL
MCHC RBC-ENTMCNC: 30.7 PG
MCHC RBC-ENTMCNC: 33.1 GM/DL
MCV RBC AUTO: 92.7 FL
MONOCYTES # BLD AUTO: 0.77 K/UL
MONOCYTES NFR BLD AUTO: 7 %
NEUTROPHILS # BLD AUTO: 8.36 K/UL
NEUTROPHILS NFR BLD AUTO: 75.7 %
PLATELET # BLD AUTO: 292 K/UL
POTASSIUM SERPL-SCNC: 3.7 MMOL/L
PROT SERPL-MCNC: 6.3 G/DL
RBC # BLD: 4.37 M/UL
RBC # FLD: 14 %
SODIUM SERPL-SCNC: 141 MMOL/L
TSH SERPL-ACNC: 2.1 UIU/ML
WBC # FLD AUTO: 11.03 K/UL

## 2022-08-04 ENCOUNTER — TRANSCRIPTION ENCOUNTER (OUTPATIENT)
Age: 47
End: 2022-08-04

## 2022-08-04 ENCOUNTER — NON-APPOINTMENT (OUTPATIENT)
Age: 47
End: 2022-08-04

## 2022-08-06 ENCOUNTER — TRANSCRIPTION ENCOUNTER (OUTPATIENT)
Age: 47
End: 2022-08-06

## 2022-08-11 ENCOUNTER — TRANSCRIPTION ENCOUNTER (OUTPATIENT)
Age: 47
End: 2022-08-11

## 2022-08-11 ENCOUNTER — APPOINTMENT (OUTPATIENT)
Dept: RHEUMATOLOGY | Facility: CLINIC | Age: 47
End: 2022-08-11

## 2022-08-11 ENCOUNTER — NON-APPOINTMENT (OUTPATIENT)
Age: 47
End: 2022-08-11

## 2022-08-11 VITALS
HEIGHT: 68 IN | HEART RATE: 118 BPM | OXYGEN SATURATION: 98 % | SYSTOLIC BLOOD PRESSURE: 140 MMHG | WEIGHT: 257 LBS | TEMPERATURE: 97.7 F | DIASTOLIC BLOOD PRESSURE: 86 MMHG | BODY MASS INDEX: 38.95 KG/M2

## 2022-08-11 PROCEDURE — 99215 OFFICE O/P EST HI 40 MIN: CPT

## 2022-08-11 RX ORDER — BENZONATATE 100 MG/1
100 CAPSULE ORAL
Qty: 84 | Refills: 0 | Status: COMPLETED | COMMUNITY
Start: 2022-07-28

## 2022-08-11 RX ORDER — METHYLPREDNISOLONE 32 MG/1
32 TABLET ORAL
Qty: 30 | Refills: 0 | Status: COMPLETED | COMMUNITY
Start: 2022-02-16

## 2022-08-11 RX ORDER — OXYCODONE HYDROCHLORIDE 5 MG/1
5 CAPSULE ORAL
Refills: 0 | Status: DISCONTINUED | COMMUNITY
Start: 2022-08-11 | End: 2022-08-11

## 2022-08-11 RX ORDER — MYCOPHENOLATE MOFETIL 500 MG/1
500 TABLET ORAL
Qty: 180 | Refills: 2 | Status: DISCONTINUED | COMMUNITY
Start: 2019-12-11 | End: 2022-08-11

## 2022-08-11 RX ORDER — BLOOD SUGAR DIAGNOSTIC
STRIP MISCELLANEOUS
Refills: 0 | Status: ACTIVE | COMMUNITY
Start: 2022-08-02

## 2022-08-11 RX ORDER — DULOXETINE HYDROCHLORIDE 20 MG/1
20 CAPSULE, DELAYED RELEASE PELLETS ORAL
Qty: 30 | Refills: 0 | Status: COMPLETED | COMMUNITY
Start: 2022-05-10

## 2022-08-11 RX ORDER — BLOOD-GLUCOSE METER
W/DEVICE KIT MISCELLANEOUS
Refills: 0 | Status: ACTIVE | COMMUNITY
Start: 2022-07-11

## 2022-08-11 RX ORDER — LANCETS 28 GAUGE
EACH MISCELLANEOUS
Refills: 0 | Status: ACTIVE | COMMUNITY
Start: 2022-07-11

## 2022-08-11 RX ORDER — METHYLPREDNISOLONE 16 MG/1
16 TABLET ORAL DAILY
Qty: 45 | Refills: 2 | Status: DISCONTINUED | COMMUNITY
Start: 2022-02-16 | End: 2022-08-11

## 2022-08-11 RX ORDER — NALOXONE HYDROCHLORIDE 4 MG/.1ML
4 SPRAY NASAL
Qty: 2 | Refills: 0 | Status: COMPLETED | COMMUNITY
Start: 2022-07-11

## 2022-08-11 RX ORDER — FLUCONAZOLE 200 MG/1
200 TABLET ORAL DAILY
Qty: 15 | Refills: 0 | Status: DISCONTINUED | COMMUNITY
Start: 2022-06-09 | End: 2022-08-11

## 2022-08-11 RX ORDER — METFORMIN HYDROCHLORIDE 500 MG/1
500 TABLET, COATED ORAL
Qty: 60 | Refills: 0 | Status: COMPLETED | COMMUNITY
Start: 2022-07-11

## 2022-08-11 RX ORDER — OXYCODONE 10 MG/1
10 TABLET ORAL
Qty: 42 | Refills: 0 | Status: COMPLETED | COMMUNITY
Start: 2022-07-28

## 2022-08-12 ENCOUNTER — TRANSCRIPTION ENCOUNTER (OUTPATIENT)
Age: 47
End: 2022-08-12

## 2022-08-14 ENCOUNTER — NON-APPOINTMENT (OUTPATIENT)
Age: 47
End: 2022-08-14

## 2022-08-14 ENCOUNTER — TRANSCRIPTION ENCOUNTER (OUTPATIENT)
Age: 47
End: 2022-08-14

## 2022-08-14 LAB
25(OH)D3 SERPL-MCNC: 22.1 NG/ML
ALBUMIN SERPL ELPH-MCNC: 4.5 G/DL
ALP BLD-CCNC: 127 U/L
ALT SERPL-CCNC: 73 U/L
ANION GAP SERPL CALC-SCNC: 14 MMOL/L
APPEARANCE: CLEAR
AST SERPL-CCNC: 30 U/L
BACTERIA: NEGATIVE
BASOPHILS # BLD AUTO: 0.05 K/UL
BASOPHILS NFR BLD AUTO: 0.4 %
BILIRUB SERPL-MCNC: 0.3 MG/DL
BILIRUBIN URINE: NEGATIVE
BLOOD URINE: NEGATIVE
BUN SERPL-MCNC: 10 MG/DL
C3 SERPL-MCNC: 148 MG/DL
C4 SERPL-MCNC: 31 MG/DL
CALCIUM SERPL-MCNC: 9.7 MG/DL
CHLORIDE SERPL-SCNC: 98 MMOL/L
CK SERPL-CCNC: 72 U/L
CO2 SERPL-SCNC: 27 MMOL/L
COLOR: YELLOW
CREAT SERPL-MCNC: 0.82 MG/DL
CREAT SPEC-SCNC: 219 MG/DL
CREAT/PROT UR: 0.2 RATIO
DSDNA AB SER-ACNC: <12 IU/ML
EGFR: 110 ML/MIN/1.73M2
EOSINOPHIL # BLD AUTO: 0.01 K/UL
EOSINOPHIL NFR BLD AUTO: 0.1 %
ERYTHROCYTE [SEDIMENTATION RATE] IN BLOOD BY WESTERGREN METHOD: 36 MM/HR
GLUCOSE QUALITATIVE U: NEGATIVE
GLUCOSE SERPL-MCNC: 139 MG/DL
HCT VFR BLD CALC: 40.3 %
HGB BLD-MCNC: 13.3 G/DL
HYALINE CASTS: 1 /LPF
IMM GRANULOCYTES NFR BLD AUTO: 3.4 %
KETONES URINE: NORMAL
LEUKOCYTE ESTERASE URINE: NEGATIVE
LYMPHOCYTES # BLD AUTO: 1.45 K/UL
LYMPHOCYTES NFR BLD AUTO: 11 %
MAN DIFF?: NORMAL
MCHC RBC-ENTMCNC: 30.6 PG
MCHC RBC-ENTMCNC: 33 GM/DL
MCV RBC AUTO: 92.6 FL
MICROSCOPIC-UA: NORMAL
MONOCYTES # BLD AUTO: 0.9 K/UL
MONOCYTES NFR BLD AUTO: 6.9 %
NEUTROPHILS # BLD AUTO: 10.28 K/UL
NEUTROPHILS NFR BLD AUTO: 78.2 %
NITRITE URINE: NEGATIVE
PH URINE: 6.5
PLATELET # BLD AUTO: 290 K/UL
POTASSIUM SERPL-SCNC: 3.9 MMOL/L
PROT SERPL-MCNC: 6.5 G/DL
PROT UR-MCNC: 43 MG/DL
PROTEIN URINE: ABNORMAL
RBC # BLD: 4.35 M/UL
RBC # FLD: 14.5 %
RED BLOOD CELLS URINE: 2 /HPF
SODIUM SERPL-SCNC: 139 MMOL/L
SPECIFIC GRAVITY URINE: 1.03
SQUAMOUS EPITHELIAL CELLS: 1 /HPF
UROBILINOGEN URINE: ABNORMAL
WBC # FLD AUTO: 13.13 K/UL
WHITE BLOOD CELLS URINE: 1 /HPF

## 2022-08-15 ENCOUNTER — TRANSCRIPTION ENCOUNTER (OUTPATIENT)
Age: 47
End: 2022-08-15

## 2022-08-16 ENCOUNTER — APPOINTMENT (OUTPATIENT)
Dept: INTERNAL MEDICINE | Facility: CLINIC | Age: 47
End: 2022-08-16

## 2022-08-16 ENCOUNTER — TRANSCRIPTION ENCOUNTER (OUTPATIENT)
Age: 47
End: 2022-08-16

## 2022-08-16 DIAGNOSIS — R79.89 OTHER SPECIFIED ABNORMAL FINDINGS OF BLOOD CHEMISTRY: ICD-10-CM

## 2022-08-16 PROCEDURE — 99442: CPT

## 2022-08-16 NOTE — HISTORY OF PRESENT ILLNESS
[Home] : at home, [unfilled] , at the time of the visit. [Medical Office: ([facility])___] : at the medical office located in  [Family Member] : family member [Verbal consent obtained from patient] : the patient, [unfilled] [FreeTextEntry1] : increase in LE edema [de-identified] : 48 yo M, reports that his LE swelling is worse. Pt with edema for greater than a year, pt has lupus, has been on lasix which in the past was more helpful for this, and now no longer. Pt s/p recent hospitalization for RSV and then costochondritis was in for a month. States he was getting IV lasix there, which was helping more until he was switched to oral meds. Swelling goes from the feet to just below the knees. Pt states he is wearing his support stockings and elevating his legs, and urine output is good, but swelling is still there. Pt states his rheumatologist wanted him to see renal, although of note his LFTs were also recently elevated, and his creat and urine protein were not elevated beyond what they have been in the past.

## 2022-08-16 NOTE — PHYSICAL EXAM
[No Acute Distress] : no acute distress [Normal Voice/Communication] : normal voice/communication [No Respiratory Distress] : no respiratory distress  [Speech Grossly Normal] : speech grossly normal [Normal Mood] : the mood was normal [de-identified] : no coughing during phone call

## 2022-08-17 ENCOUNTER — RX RENEWAL (OUTPATIENT)
Age: 47
End: 2022-08-17

## 2022-08-22 ENCOUNTER — APPOINTMENT (OUTPATIENT)
Dept: INTERNAL MEDICINE | Facility: CLINIC | Age: 47
End: 2022-08-22

## 2022-08-22 VITALS
TEMPERATURE: 97 F | WEIGHT: 259 LBS | SYSTOLIC BLOOD PRESSURE: 120 MMHG | DIASTOLIC BLOOD PRESSURE: 85 MMHG | HEIGHT: 68 IN | OXYGEN SATURATION: 98 % | HEART RATE: 134 BPM | BODY MASS INDEX: 39.25 KG/M2

## 2022-08-22 VITALS — HEART RATE: 128 BPM

## 2022-08-22 PROCEDURE — 99214 OFFICE O/P EST MOD 30 MIN: CPT

## 2022-08-22 NOTE — HISTORY OF PRESENT ILLNESS
[FreeTextEntry8] : 46yo M with PMhx of SLE on Bactrim and Saphnelo who presents for dysuria.\par \par 4 days of dysuria and cloudy urine. Notes he recently was switched from furosemide to torsemide approx 1 week ago with increased output in his urine. NO blood, maybe subjective chills but perhaps not worse than normal. Has back pain which is improved. Having some weakness and maybe occasional lightheadedness with standing. Reports his usual HR is 90-120s\par \par Has been on flexeril since June.

## 2022-08-22 NOTE — PHYSICAL EXAM
[No Acute Distress] : no acute distress [Well Nourished] : well nourished [Well Developed] : well developed [No Respiratory Distress] : no respiratory distress  [No Accessory Muscle Use] : no accessory muscle use [Clear to Auscultation] : lungs were clear to auscultation bilaterally [Regular Rhythm] : with a regular rhythm [Normal S1, S2] : normal S1 and S2 [No Murmur] : no murmur heard [Soft] : abdomen soft [Non Tender] : non-tender [Non-distended] : non-distended [de-identified] : Tachycardic [de-identified] : +1 pitting edema bilaterally [de-identified] : Answering questions appropriately. Gets on exam table and ambulates without assistance

## 2022-08-24 ENCOUNTER — TRANSCRIPTION ENCOUNTER (OUTPATIENT)
Age: 47
End: 2022-08-24

## 2022-08-24 DIAGNOSIS — R39.9 UNSPECIFIED SYMPTOMS AND SIGNS INVOLVING THE GENITOURINARY SYSTEM: ICD-10-CM

## 2022-08-24 LAB
ALBUMIN SERPL ELPH-MCNC: 4.6 G/DL
ANION GAP SERPL CALC-SCNC: 17 MMOL/L
APPEARANCE: CLEAR
BACTERIA UR CULT: NORMAL
BACTERIA: NEGATIVE
BILIRUB UR QL STRIP: NORMAL
BILIRUBIN URINE: NEGATIVE
BLOOD URINE: NEGATIVE
BUN SERPL-MCNC: 14 MG/DL
CALCIUM SERPL-MCNC: 9.9 MG/DL
CHLORIDE SERPL-SCNC: 94 MMOL/L
CLARITY UR: CLEAR
CO2 SERPL-SCNC: 29 MMOL/L
COLLECTION METHOD: NORMAL
COLOR: YELLOW
CREAT SERPL-MCNC: 1 MG/DL
EGFR: 94 ML/MIN/1.73M2
GLUCOSE QUALITATIVE U: NEGATIVE
GLUCOSE SERPL-MCNC: 187 MG/DL
GLUCOSE UR-MCNC: NORMAL
HCG UR QL: 0.2 EU/DL
HGB UR QL STRIP.AUTO: NORMAL
HYALINE CASTS: 0 /LPF
KETONES UR-MCNC: NORMAL
KETONES URINE: NEGATIVE
LEUKOCYTE ESTERASE UR QL STRIP: NORMAL
LEUKOCYTE ESTERASE URINE: NEGATIVE
MAGNESIUM SERPL-MCNC: 2.1 MG/DL
MICROSCOPIC-UA: NORMAL
NITRITE UR QL STRIP: NORMAL
NITRITE URINE: NEGATIVE
PH UR STRIP: 7
PH URINE: 6.5
PHOSPHATE SERPL-MCNC: 3.6 MG/DL
POTASSIUM SERPL-SCNC: 3.7 MMOL/L
PROT UR STRIP-MCNC: 30
PROTEIN URINE: ABNORMAL
RED BLOOD CELLS URINE: 3 /HPF
SODIUM SERPL-SCNC: 139 MMOL/L
SP GR UR STRIP: 1.02
SPECIFIC GRAVITY URINE: 1.03
SQUAMOUS EPITHELIAL CELLS: 0 /HPF
UROBILINOGEN URINE: NORMAL
WHITE BLOOD CELLS URINE: 2 /HPF

## 2022-08-25 ENCOUNTER — APPOINTMENT (OUTPATIENT)
Dept: RHEUMATOLOGY | Facility: CLINIC | Age: 47
End: 2022-08-25

## 2022-08-25 RX ORDER — FUROSEMIDE 20 MG/1
20 TABLET ORAL DAILY
Qty: 90 | Refills: 5 | Status: DISCONTINUED | COMMUNITY
Start: 2020-02-13 | End: 2022-08-25

## 2022-08-26 ENCOUNTER — APPOINTMENT (OUTPATIENT)
Dept: PULMONOLOGY | Facility: CLINIC | Age: 47
End: 2022-08-26

## 2022-08-26 VITALS
WEIGHT: 259 LBS | HEART RATE: 122 BPM | BODY MASS INDEX: 39.25 KG/M2 | DIASTOLIC BLOOD PRESSURE: 86 MMHG | SYSTOLIC BLOOD PRESSURE: 114 MMHG | TEMPERATURE: 96.9 F | HEIGHT: 68 IN | OXYGEN SATURATION: 99 %

## 2022-08-26 PROCEDURE — 99214 OFFICE O/P EST MOD 30 MIN: CPT | Mod: 25

## 2022-08-26 NOTE — HISTORY OF PRESENT ILLNESS
[Never] : never [TextBox_4] : 47 year old male Hx Hx Lupus, asthma, Lupus myositis, now with severla medication changes presents for follow up.  Patient now off Cell Cept.  He is now on prednisone and Torsemide.  He is feeling much better, volume overload has significantly improved.  He reports he is more mobile and in less pain.  He is here for follow up

## 2022-08-26 NOTE — ASSESSMENT
[FreeTextEntry1] : 47 year old male MTA  Hx Lupus, medications medication changes now off Cell Cept, anticipating Saphnelo infusion, now wishing to return to work\par \par Continue Trelegy 200-25 mcg daily\par Continue prednisone per Rheumatology\par  Continue Famotidine 40 mg daily\par Follow up Rheumatology\par Follow up Behavioral Health\par PFT next visit \par \par Follow up 4-6 weeks\par \par

## 2022-08-26 NOTE — COUNSELING
[Other: ____] : [unfilled] [Good understanding] : Patient has a good understanding of lifestyle changes and steps needed to achieve self management goal [de-identified] : Anxiety

## 2022-09-01 NOTE — PHYSICAL EXAM
[General Appearance - Well Developed] : well developed [Normal Appearance] : normal appearance [Well Groomed] : well groomed [General Appearance - Well Nourished] : well nourished [No Deformities] : no deformities [General Appearance - In No Acute Distress] : no acute distress [Normal Conjunctiva] : the conjunctiva exhibited no abnormalities [Eyelids - No Xanthelasma] : the eyelids demonstrated no xanthelasmas [Low Lying Soft Palate] : low lying soft palate [Neck Appearance] : the appearance of the neck was normal [Neck Cervical Mass (___cm)] : no neck mass was observed [Jugular Venous Distention Increased] : there was no jugular-venous distention [Thyroid Diffuse Enlargement] : the thyroid was not enlarged [Thyroid Nodule] : there were no palpable thyroid nodules [Heart Rate And Rhythm] : heart rate was normal and rhythm regular [Heart Sounds] : normal S1 and S2 [Heart Sounds Gallop] : no gallops [Murmurs] : no murmurs [Heart Sounds Pericardial Friction Rub] : no pericardial rub [Auscultation Breath Sounds / Voice Sounds] : lungs were clear to auscultation bilaterally [Abnormal Walk] : normal gait [Musculoskeletal - Swelling] : no joint swelling seen [Motor Tone] : muscle strength and tone were normal [Nail Clubbing] : no clubbing of the fingernails [Cyanosis, Localized] : no localized cyanosis [Petechial Hemorrhages (___cm)] : no petechial hemorrhages [Skin Color & Pigmentation] : normal skin color and pigmentation [Skin Turgor] : normal skin turgor [] : no rash [Deep Tendon Reflexes (DTR)] : deep tendon reflexes were 2+ and symmetric [Sensation] : the sensory exam was normal to light touch and pinprick [No Focal Deficits] : no focal deficits [Oriented To Time, Place, And Person] : oriented to person, place, and time [Impaired Insight] : insight and judgment were intact [Affect] : the affect was normal [III] : III Thalidomide Counseling: I discussed with the patient the risks of thalidomide including but not limited to birth defects, anxiety, weakness, chest pain, dizziness, cough and severe allergy.

## 2022-09-02 ENCOUNTER — NON-APPOINTMENT (OUTPATIENT)
Age: 47
End: 2022-09-02

## 2022-09-02 ENCOUNTER — APPOINTMENT (OUTPATIENT)
Dept: RHEUMATOLOGY | Facility: CLINIC | Age: 47
End: 2022-09-02

## 2022-09-02 VITALS
OXYGEN SATURATION: 97 % | HEIGHT: 68 IN | HEART RATE: 109 BPM | TEMPERATURE: 97.4 F | BODY MASS INDEX: 39.25 KG/M2 | WEIGHT: 259 LBS | SYSTOLIC BLOOD PRESSURE: 146 MMHG | DIASTOLIC BLOOD PRESSURE: 79 MMHG

## 2022-09-02 PROCEDURE — 99215 OFFICE O/P EST HI 40 MIN: CPT

## 2022-09-02 RX ORDER — HYDROCODONE BITARTRATE AND HOMATROPINE METHYLBROMIDE 1.5; 5 MG/5ML; MG/5ML
5-1.5 SOLUTION ORAL
Refills: 0 | Status: DISCONTINUED | COMMUNITY
Start: 2022-07-29 | End: 2022-09-02

## 2022-09-02 RX ORDER — OXYCODONE AND ACETAMINOPHEN 5; 325 MG/1; MG/1
5-325 TABLET ORAL
Refills: 0 | Status: DISCONTINUED | COMMUNITY
Start: 2022-07-11 | End: 2022-09-02

## 2022-09-02 RX ORDER — FUROSEMIDE 40 MG/1
40 TABLET ORAL
Qty: 30 | Refills: 1 | Status: DISCONTINUED | COMMUNITY
Start: 2021-10-11 | End: 2022-09-02

## 2022-09-02 RX ORDER — METHOCARBAMOL 750 MG/1
750 TABLET, FILM COATED ORAL
Qty: 30 | Refills: 0 | Status: DISCONTINUED | COMMUNITY
Start: 2022-08-22 | End: 2022-09-02

## 2022-09-02 RX ORDER — CYCLOBENZAPRINE HYDROCHLORIDE 5 MG/1
5 TABLET, FILM COATED ORAL 3 TIMES DAILY
Qty: 21 | Refills: 2 | Status: DISCONTINUED | COMMUNITY
Start: 2022-07-14 | End: 2022-09-02

## 2022-09-04 ENCOUNTER — TRANSCRIPTION ENCOUNTER (OUTPATIENT)
Age: 47
End: 2022-09-04

## 2022-09-04 LAB
25(OH)D3 SERPL-MCNC: 27.2 NG/ML
ALBUMIN SERPL ELPH-MCNC: 4.6 G/DL
ALP BLD-CCNC: 119 U/L
ALT SERPL-CCNC: 76 U/L
ANION GAP SERPL CALC-SCNC: 18 MMOL/L
APPEARANCE: ABNORMAL
AST SERPL-CCNC: 36 U/L
BACTERIA: NEGATIVE
BASOPHILS # BLD AUTO: 0.02 K/UL
BASOPHILS NFR BLD AUTO: 0.2 %
BILIRUB SERPL-MCNC: 0.3 MG/DL
BILIRUBIN URINE: ABNORMAL
BLOOD URINE: NEGATIVE
BUN SERPL-MCNC: 12 MG/DL
C3 SERPL-MCNC: 183 MG/DL
C4 SERPL-MCNC: 36 MG/DL
CALCIUM SERPL-MCNC: 10.1 MG/DL
CHLORIDE SERPL-SCNC: 94 MMOL/L
CO2 SERPL-SCNC: 28 MMOL/L
COLOR: ABNORMAL
CREAT SERPL-MCNC: 0.99 MG/DL
CREAT SPEC-SCNC: 332 MG/DL
CREAT/PROT UR: 0.2 RATIO
DSDNA AB SER-ACNC: <12 IU/ML
EGFR: 95 ML/MIN/1.73M2
EOSINOPHIL # BLD AUTO: 0.02 K/UL
EOSINOPHIL NFR BLD AUTO: 0.2 %
ERYTHROCYTE [SEDIMENTATION RATE] IN BLOOD BY WESTERGREN METHOD: 29 MM/HR
GLUCOSE QUALITATIVE U: NEGATIVE
GLUCOSE SERPL-MCNC: 103 MG/DL
HCT VFR BLD CALC: 43.5 %
HGB BLD-MCNC: 14.5 G/DL
HYALINE CASTS: 1 /LPF
IMM GRANULOCYTES NFR BLD AUTO: 2.5 %
KETONES URINE: NORMAL
LEUKOCYTE ESTERASE URINE: NEGATIVE
LYMPHOCYTES # BLD AUTO: 2.51 K/UL
LYMPHOCYTES NFR BLD AUTO: 22.4 %
MAN DIFF?: NORMAL
MCHC RBC-ENTMCNC: 30.5 PG
MCHC RBC-ENTMCNC: 33.3 GM/DL
MCV RBC AUTO: 91.6 FL
MICROSCOPIC-UA: NORMAL
MONOCYTES # BLD AUTO: 1.01 K/UL
MONOCYTES NFR BLD AUTO: 9 %
NEUTROPHILS # BLD AUTO: 7.38 K/UL
NEUTROPHILS NFR BLD AUTO: 65.7 %
NITRITE URINE: NEGATIVE
PH URINE: 6.5
PLATELET # BLD AUTO: 341 K/UL
POTASSIUM SERPL-SCNC: 3.6 MMOL/L
PROT SERPL-MCNC: 7 G/DL
PROT UR-MCNC: 60 MG/DL
PROTEIN URINE: ABNORMAL
RBC # BLD: 4.75 M/UL
RBC # FLD: 13.7 %
RED BLOOD CELLS URINE: 3 /HPF
SODIUM SERPL-SCNC: 140 MMOL/L
SPECIFIC GRAVITY URINE: 1.03
SQUAMOUS EPITHELIAL CELLS: 1 /HPF
UROBILINOGEN URINE: ABNORMAL
WBC # FLD AUTO: 11.22 K/UL
WHITE BLOOD CELLS URINE: 3 /HPF

## 2022-09-08 ENCOUNTER — APPOINTMENT (OUTPATIENT)
Dept: INTERNAL MEDICINE | Facility: CLINIC | Age: 47
End: 2022-09-08

## 2022-09-08 VITALS
HEIGHT: 68 IN | TEMPERATURE: 97.3 F | BODY MASS INDEX: 39.71 KG/M2 | WEIGHT: 262 LBS | DIASTOLIC BLOOD PRESSURE: 78 MMHG | OXYGEN SATURATION: 98 % | HEART RATE: 78 BPM | SYSTOLIC BLOOD PRESSURE: 118 MMHG

## 2022-09-08 VITALS — SYSTOLIC BLOOD PRESSURE: 110 MMHG | DIASTOLIC BLOOD PRESSURE: 80 MMHG

## 2022-09-08 DIAGNOSIS — B37.81 CANDIDAL ESOPHAGITIS: ICD-10-CM

## 2022-09-08 DIAGNOSIS — Z87.39 PERSONAL HISTORY OF OTHER DISEASES OF THE MUSCULOSKELETAL SYSTEM AND CONNECTIVE TISSUE: ICD-10-CM

## 2022-09-08 DIAGNOSIS — M54.9 DORSALGIA, UNSPECIFIED: ICD-10-CM

## 2022-09-08 DIAGNOSIS — Z88.9 ALLERGY STATUS TO UNSPECIFIED DRUGS, MEDICAMENTS AND BIOLOGICAL SUBSTANCES: ICD-10-CM

## 2022-09-08 DIAGNOSIS — Z86.19 PERSONAL HISTORY OF OTHER INFECTIOUS AND PARASITIC DISEASES: ICD-10-CM

## 2022-09-08 DIAGNOSIS — Z87.898 PERSONAL HISTORY OF OTHER SPECIFIED CONDITIONS: ICD-10-CM

## 2022-09-08 DIAGNOSIS — Z87.19 PERSONAL HISTORY OF OTHER DISEASES OF THE DIGESTIVE SYSTEM: ICD-10-CM

## 2022-09-08 DIAGNOSIS — R39.9 UNSPECIFIED SYMPTOMS AND SIGNS INVOLVING THE GENITOURINARY SYSTEM: ICD-10-CM

## 2022-09-08 DIAGNOSIS — Z86.69 PERSONAL HISTORY OF OTHER DISEASES OF THE NERVOUS SYSTEM AND SENSE ORGANS: ICD-10-CM

## 2022-09-08 PROCEDURE — 99214 OFFICE O/P EST MOD 30 MIN: CPT

## 2022-09-08 NOTE — PHYSICAL EXAM
[Normal Sclera/Conjunctiva] : normal sclera/conjunctiva [Normal Outer Ear/Nose] : the outer ears and nose were normal in appearance [No Carotid Bruits] : no carotid bruits [No Abdominal Bruit] : a ~M bruit was not heard ~T in the abdomen [Pedal Pulses Present] : the pedal pulses are present [No Palpable Aorta] : no palpable aorta [No Extremity Clubbing/Cyanosis] : no extremity clubbing/cyanosis [Normal] : no CVA or spinal tenderness [de-identified] : Bilateral one plus edema

## 2022-09-08 NOTE — ASSESSMENT
[FreeTextEntry1] : Patient is a 47-year-old male with history of lupus, inflammatory myopathy, allergies, asthma, overweight, hypertension, hypothyroidism, Gerd, edema, sleep apnea, compression fracture T7 who presents for follow-up\par \par 1. Lupus/inflammatory myopathy\par doing extremely well denies muscle pain and weakness said rate down to 29\par continue current management follow-up with rheumatology\par \par 2 asthma\par well-controlled on inhalers\par observe\par \par 3. Hypertension\par controlled on medication\par  \par 4. Obesity\par counseled on diet exercise\par \par 5 bilateral leg edema\par elevation elastic stockings and torosemide\par \par 6 chronic lower back pain and muscle pain\par completely resolved patient asymptomatic present time\par \par 7 follow-up in two months.

## 2022-09-08 NOTE — HISTORY OF PRESENT ILLNESS
[FreeTextEntry1] : Follow-up for lupus, hypertension, edema [de-identified] : The patient is a 47-year-old male with history of lupus,/inflammatory myositis, asthma, hypertension, carcinoid, Gerd, central sleep apnea/obstructive sleep apnea, hypothyroidism, lower back pain, glucose intolerance, history of migraines, obesity, obstructive sleep apnea, who presents for follow-up patient feels well denies any chest pain joint pain rashes cramps feels well denies headache, dizziness, joint pain back pain feels well.

## 2022-09-09 NOTE — PROVIDER CONTACT NOTE (CHANGE IN STATUS NOTIFICATION) - SITUATION
Anxiety well controlled  Manages with healthy diet, exercise and regular therapy  
Frequent flareups of pain in the right neck/upper back  Suspecting trapezius muscle spasm  Recommend Heat pack, ibuprofen as needed  PT referral given  
Health Maintenance:   Healthy patient   Normal VS and BP   BMI: normal   Discussed diet and exercise and recommendations   Yearly flu shot recommended and given to patient  Tdap given   Other Immunizations up-to-date  STD testing offered - patient declined  Routine labs ordered for patient: CBC, CMP, lipid panel  Discussed and counseled patient on smoking, alcohol, and drug use    Follow up in one year for repeat physical exam or sooner if needed     
Xanax given PRN   
pt c/o constipation, requesting mag citrate
pt c/o increase in pain, no relief from oxycodone
pt c/o sob, requested supplemental o2

## 2022-09-16 ENCOUNTER — LABORATORY RESULT (OUTPATIENT)
Age: 47
End: 2022-09-16

## 2022-09-16 ENCOUNTER — APPOINTMENT (OUTPATIENT)
Dept: RHEUMATOLOGY | Facility: CLINIC | Age: 47
End: 2022-09-16

## 2022-09-16 ENCOUNTER — RX RENEWAL (OUTPATIENT)
Age: 47
End: 2022-09-16

## 2022-09-16 VITALS
DIASTOLIC BLOOD PRESSURE: 78 MMHG | RESPIRATION RATE: 16 BRPM | OXYGEN SATURATION: 100 % | HEART RATE: 96 BPM | SYSTOLIC BLOOD PRESSURE: 121 MMHG

## 2022-09-16 VITALS
RESPIRATION RATE: 16 BRPM | OXYGEN SATURATION: 97 % | HEART RATE: 115 BPM | TEMPERATURE: 97 F | DIASTOLIC BLOOD PRESSURE: 85 MMHG | SYSTOLIC BLOOD PRESSURE: 134 MMHG

## 2022-09-16 PROCEDURE — 36415 COLL VENOUS BLD VENIPUNCTURE: CPT

## 2022-09-16 PROCEDURE — 96374 THER/PROPH/DIAG INJ IV PUSH: CPT | Mod: 59

## 2022-09-16 PROCEDURE — 96413 CHEMO IV INFUSION 1 HR: CPT

## 2022-09-16 RX ORDER — ACETAMINOPHEN 325 MG/1
325 TABLET, FILM COATED ORAL
Qty: 0 | Refills: 0 | Status: COMPLETED | OUTPATIENT
Start: 2022-08-20

## 2022-09-16 RX ORDER — CETIRIZINE HYDROCHLORIDE 10 MG/1
10 TABLET, FILM COATED ORAL
Qty: 0 | Refills: 0 | Status: COMPLETED | OUTPATIENT
Start: 2022-08-20

## 2022-09-16 RX ORDER — ANIFROLUMAB 300 MG/2ML
300 INJECTION, SOLUTION INTRAVENOUS
Qty: 0 | Refills: 0 | Status: COMPLETED | OUTPATIENT
Start: 2022-09-16

## 2022-09-16 RX ORDER — HYDROCORTISONE SODIUM SUCCINATE 100 MG/2ML
100 INJECTION, POWDER, FOR SOLUTION INTRAMUSCULAR; INTRAVENOUS
Qty: 0 | Refills: 0 | Status: COMPLETED | OUTPATIENT
Start: 2022-08-19

## 2022-09-16 RX ORDER — ANIFROLUMAB 300 MG/2ML
300 INJECTION, SOLUTION INTRAVENOUS
Qty: 0 | Refills: 0 | Status: COMPLETED | OUTPATIENT
Start: 2022-06-02 | End: 1900-01-01

## 2022-09-16 RX ADMIN — ANIFROLUMAB 0 MG/2ML: 300 INJECTION, SOLUTION INTRAVENOUS at 00:00

## 2022-09-17 ENCOUNTER — TRANSCRIPTION ENCOUNTER (OUTPATIENT)
Age: 47
End: 2022-09-17

## 2022-09-19 ENCOUNTER — TRANSCRIPTION ENCOUNTER (OUTPATIENT)
Age: 47
End: 2022-09-19

## 2022-09-19 ENCOUNTER — APPOINTMENT (OUTPATIENT)
Dept: ENDOCRINOLOGY | Facility: CLINIC | Age: 47
End: 2022-09-19

## 2022-09-19 VITALS — TEMPERATURE: 97.7 F | HEIGHT: 67.8 IN | WEIGHT: 258 LBS | BODY MASS INDEX: 39.56 KG/M2

## 2022-09-19 PROCEDURE — 77080 DXA BONE DENSITY AXIAL: CPT

## 2022-09-20 ENCOUNTER — RX RENEWAL (OUTPATIENT)
Age: 47
End: 2022-09-20

## 2022-09-20 ENCOUNTER — TRANSCRIPTION ENCOUNTER (OUTPATIENT)
Age: 47
End: 2022-09-20

## 2022-09-20 LAB
ALBUMIN SERPL ELPH-MCNC: 4.4 G/DL
ALP BLD-CCNC: 100 U/L
ALT SERPL-CCNC: 80 U/L
ANION GAP SERPL CALC-SCNC: 16 MMOL/L
AST SERPL-CCNC: 44 U/L
BILIRUB SERPL-MCNC: 0.3 MG/DL
BUN SERPL-MCNC: 7 MG/DL
CALCIUM SERPL-MCNC: 9.6 MG/DL
CHLORIDE SERPL-SCNC: 94 MMOL/L
CO2 SERPL-SCNC: 28 MMOL/L
CREAT SERPL-MCNC: 0.96 MG/DL
EGFR: 99 ML/MIN/1.73M2
GGT SERPL-CCNC: 198 U/L
GLUCOSE SERPL-MCNC: 95 MG/DL
POTASSIUM SERPL-SCNC: 3.3 MMOL/L
PROT SERPL-MCNC: 6.4 G/DL
SODIUM SERPL-SCNC: 138 MMOL/L

## 2022-09-21 ENCOUNTER — TRANSCRIPTION ENCOUNTER (OUTPATIENT)
Age: 47
End: 2022-09-21

## 2022-09-25 NOTE — ED ADULT NURSE NOTE - HIV OFFER
[facility] has readmitted your patient to [address] [place] [address] service with the following disciplines:      PT  Response needed, please respond via tiger text  Primary focus of home health care weakness related to CHF CKD UTI  Patient stated goals of care    to get stonger and walk better  Anticipated visit pattern and next visit date 1wk1, 2wk2  Next visit planned 9/26/22  See medication list - meds in home differ from AVS  Keiry Toure Patient no lo nger taking diclofenac sodium  Major drug interaction noted for atorvastatin and tacrolimus  Significant clinical findings LE weakness, gait dysfunction  Right knee and low back pain  Potential barriers to goal achievement     pain  Other pertinent information     will be recieving injections to LB and right knee  Thank you for allowing us to participate in the care of your patient        Lynette Perez PT  Opt out

## 2022-09-29 ENCOUNTER — TRANSCRIPTION ENCOUNTER (OUTPATIENT)
Age: 47
End: 2022-09-29

## 2022-09-30 ENCOUNTER — TRANSCRIPTION ENCOUNTER (OUTPATIENT)
Age: 47
End: 2022-09-30

## 2022-09-30 ENCOUNTER — APPOINTMENT (OUTPATIENT)
Dept: PULMONOLOGY | Facility: CLINIC | Age: 47
End: 2022-09-30

## 2022-09-30 VITALS
SYSTOLIC BLOOD PRESSURE: 110 MMHG | BODY MASS INDEX: 39.4 KG/M2 | TEMPERATURE: 96.9 F | WEIGHT: 260 LBS | OXYGEN SATURATION: 99 % | HEIGHT: 68 IN | HEART RATE: 111 BPM | DIASTOLIC BLOOD PRESSURE: 70 MMHG

## 2022-09-30 DIAGNOSIS — Z23 ENCOUNTER FOR IMMUNIZATION: ICD-10-CM

## 2022-09-30 PROCEDURE — G0008: CPT

## 2022-09-30 PROCEDURE — 99214 OFFICE O/P EST MOD 30 MIN: CPT | Mod: 25

## 2022-09-30 PROCEDURE — 90686 IIV4 VACC NO PRSV 0.5 ML IM: CPT

## 2022-09-30 NOTE — ASSESSMENT
[FreeTextEntry1] : 47 year old male MTA  Hx Lupus, medications medication changes now off Cell Cept, anticipating Saphnelo infusion, now wishing to return to work\par \par Continue Trelegy 200-25 mcg daily\par Continue prednisone per Rheumatology\par  Continue Famotidine 40 mg daily\par Follow up Rheumatology\par FOllow PCP for GERD, may need GI evaluation\par Follow up Behavioral Health\par PFT next visit \par \par Follow up 4-6 weeks\par \par

## 2022-09-30 NOTE — HISTORY OF PRESENT ILLNESS
[Never] : never [TextBox_4] : 47 year old male Hx Hx Lupus, asthma, Lupus myositis, now with several medication changes presents for follow up.  Patient now off Cell Cept.  He is now on prednisone and Torsemide. He is tolerating saphnelo infusion.  He is having difficulty with reflux however several dietary indiscretions.  He is here for follow up

## 2022-09-30 NOTE — COUNSELING
[Other: ____] : [unfilled] [Good understanding] : Patient has a good understanding of lifestyle changes and steps needed to achieve self management goal [de-identified] : Anxiety

## 2022-10-03 ENCOUNTER — APPOINTMENT (OUTPATIENT)
Dept: INTERNAL MEDICINE | Facility: CLINIC | Age: 47
End: 2022-10-03

## 2022-10-03 ENCOUNTER — RX RENEWAL (OUTPATIENT)
Age: 47
End: 2022-10-03

## 2022-10-03 ENCOUNTER — TRANSCRIPTION ENCOUNTER (OUTPATIENT)
Age: 47
End: 2022-10-03

## 2022-10-03 VITALS
SYSTOLIC BLOOD PRESSURE: 120 MMHG | TEMPERATURE: 97.3 F | DIASTOLIC BLOOD PRESSURE: 88 MMHG | BODY MASS INDEX: 38.19 KG/M2 | HEART RATE: 110 BPM | OXYGEN SATURATION: 99 % | HEIGHT: 68 IN | WEIGHT: 252 LBS

## 2022-10-03 VITALS — DIASTOLIC BLOOD PRESSURE: 82 MMHG | SYSTOLIC BLOOD PRESSURE: 128 MMHG

## 2022-10-03 DIAGNOSIS — K21.9 GASTRO-ESOPHAGEAL REFLUX DISEASE W/OUT ESOPHAGITIS: ICD-10-CM

## 2022-10-03 PROCEDURE — 36415 COLL VENOUS BLD VENIPUNCTURE: CPT

## 2022-10-03 PROCEDURE — 99214 OFFICE O/P EST MOD 30 MIN: CPT | Mod: 25

## 2022-10-03 NOTE — PHYSICAL EXAM
[Normal Sclera/Conjunctiva] : normal sclera/conjunctiva [Normal Outer Ear/Nose] : the outer ears and nose were normal in appearance [Normal] : no CVA or spinal tenderness [de-identified] : Bilateral edema trace to one plus bilaterally

## 2022-10-03 NOTE — PATIENT PROFILE ADULT - FALL HARM RISK - FACTORS NURSING JUDGEMENT
Erythromycin Counseling:  I discussed with the patient the risks of erythromycin including but not limited to GI upset, allergic reaction, drug rash, diarrhea, increase in liver enzymes, and yeast infections. No

## 2022-10-03 NOTE — REVIEW OF SYSTEMS
[Abdominal Pain] : no abdominal pain [Nausea] : nausea [Constipation] : no constipation [Diarrhea] : no diarrhea [Vomiting] : vomiting [Heartburn] : no heartburn [Melena] : no melena [Negative] : Musculoskeletal

## 2022-10-03 NOTE — HISTORY OF PRESENT ILLNESS
[FreeTextEntry1] : Follow-up for multiple medical issues [de-identified] : The patient is a 47-year-old male with history of lupus/inflammatory myopathy, asthma, hypertension, central/obstructive sleep apnea, hypothyroidism, chronic lower back pain history of candida esophagitis, history of migraines, obesity, who presents for follow-up patient complains of three week history of indigestion/vomiting food feel like court in esophagus denies fever, chills, abdominal pain, blood.

## 2022-10-03 NOTE — ASSESSMENT
[FreeTextEntry1] : 47Patient is a old male with history of lupus/inflammatory myopathy, asthma, hypertension, obesity, central/obstructive sleep apnea, hypothyroidism, lower back pain, peripheral edema, anxiety, allergic rhinitis, lumbar sacral stenosis, history of migraines, obesity, who presents complaining of reflux/food getting caught and nausea vomiting\par \par 1 G.I.\par patient complains of reflux/vomiting/food getting choking feeling\par start pantoprazole 40 mg once a day\par discuss lifestyle refer to G.I. for endoscopy\par \par 2. Asthma\par well-controlled\par \par 3 hypertension\par controlled on current medication\par \par 4 hyperkalemia\par continue supplements potassium three alternating with two check potassium level\par \par 5. Hypothyroidism\par continue supplement check TFTs\par follow-up in one to two months\par receipt flu shot one week ago

## 2022-10-07 ENCOUNTER — RX RENEWAL (OUTPATIENT)
Age: 47
End: 2022-10-07

## 2022-10-07 RX ADMIN — ACETAMINOPHEN 0 MG: 500 TABLET, FILM COATED ORAL at 00:00

## 2022-10-07 RX ADMIN — ANIFROLUMAB 0 MG/2ML: 300 INJECTION, SOLUTION INTRAVENOUS at 00:00

## 2022-10-07 RX ADMIN — CETIRIZINE HYDROCHLORIDE 0 MG: 10 TABLET, FILM COATED ORAL at 00:00

## 2022-10-14 ENCOUNTER — LABORATORY RESULT (OUTPATIENT)
Age: 47
End: 2022-10-14

## 2022-10-14 ENCOUNTER — APPOINTMENT (OUTPATIENT)
Dept: GASTROENTEROLOGY | Facility: CLINIC | Age: 47
End: 2022-10-14
Payer: COMMERCIAL

## 2022-10-14 ENCOUNTER — APPOINTMENT (OUTPATIENT)
Dept: RHEUMATOLOGY | Facility: CLINIC | Age: 47
End: 2022-10-14

## 2022-10-14 ENCOUNTER — NON-APPOINTMENT (OUTPATIENT)
Age: 47
End: 2022-10-14

## 2022-10-14 VITALS
DIASTOLIC BLOOD PRESSURE: 80 MMHG | HEIGHT: 68 IN | HEART RATE: 101 BPM | OXYGEN SATURATION: 97 % | WEIGHT: 262 LBS | SYSTOLIC BLOOD PRESSURE: 115 MMHG | BODY MASS INDEX: 39.71 KG/M2 | TEMPERATURE: 98.7 F

## 2022-10-14 VITALS
OXYGEN SATURATION: 97 % | DIASTOLIC BLOOD PRESSURE: 82 MMHG | HEART RATE: 101 BPM | RESPIRATION RATE: 16 BRPM | SYSTOLIC BLOOD PRESSURE: 116 MMHG

## 2022-10-14 VITALS
RESPIRATION RATE: 16 BRPM | DIASTOLIC BLOOD PRESSURE: 88 MMHG | HEART RATE: 119 BPM | SYSTOLIC BLOOD PRESSURE: 136 MMHG | OXYGEN SATURATION: 97 % | TEMPERATURE: 97.3 F

## 2022-10-14 DIAGNOSIS — K21.9 GASTRO-ESOPHAGEAL REFLUX DISEASE W/OUT ESOPHAGITIS: ICD-10-CM

## 2022-10-14 PROCEDURE — 36415 COLL VENOUS BLD VENIPUNCTURE: CPT

## 2022-10-14 PROCEDURE — 96374 THER/PROPH/DIAG INJ IV PUSH: CPT | Mod: 59

## 2022-10-14 PROCEDURE — 99204 OFFICE O/P NEW MOD 45 MIN: CPT

## 2022-10-14 PROCEDURE — 96413 CHEMO IV INFUSION 1 HR: CPT

## 2022-10-14 RX ORDER — CETIRIZINE HYDROCHLORIDE 10 MG/1
10 TABLET, COATED ORAL
Qty: 0 | Refills: 0 | Status: COMPLETED | OUTPATIENT
Start: 2022-10-07

## 2022-10-14 RX ORDER — ACETAMINOPHEN 325 MG/1
325 TABLET ORAL
Qty: 0 | Refills: 0 | Status: COMPLETED | OUTPATIENT
Start: 2022-10-07

## 2022-10-14 RX ORDER — ANIFROLUMAB 300 MG/2ML
300 INJECTION, SOLUTION INTRAVENOUS
Qty: 0 | Refills: 0 | Status: COMPLETED | OUTPATIENT
Start: 2022-10-07

## 2022-10-14 RX ORDER — HYDROCORTISONE SODIUM SUCCINATE 100 MG/2ML
100 INJECTION, POWDER, FOR SOLUTION INTRAMUSCULAR; INTRAVENOUS
Qty: 0 | Refills: 0 | Status: COMPLETED | OUTPATIENT
Start: 2022-10-14

## 2022-10-14 NOTE — HISTORY OF PRESENT ILLNESS
[FreeTextEntry1] : 47-year-old male\par Complex medical history\par Recent bouts of thrush\par History of endoscopy and colonoscopy 2019 for bleeding, unrevealing\par Recent diagnosis of lupus, mixed connective tissue disease\par Complaints of difficulty swallowing, sensation of food, fluid in the chest, regurgitation without burning, no improvement with pantoprazole or Tums\par Some weight loss\par \par Not 100% clear, but some association of patient's current GI complaints with switch from Lasix (ineffective for his leg swelling) to torsemide\par \par Social history: Wife works for [employer]\par Patient is a  for the [employer]

## 2022-10-14 NOTE — REASON FOR VISIT
[Consultation] : a consultation visit [Spouse] : spouse [FreeTextEntry1] : Dysphagia, regurgitation without burning

## 2022-10-14 NOTE — ASSESSMENT
[FreeTextEntry1] : Dysphagia, reflux, suspicion for gastroparesis, esophageal dysmotility\par \par Plan\par Gastric emptying study\par Esophagram\par Telephone follow-up prior to upper endoscopy\par \par \par Patient referred by Dr. ADAM Everett

## 2022-10-15 ENCOUNTER — TRANSCRIPTION ENCOUNTER (OUTPATIENT)
Age: 47
End: 2022-10-15

## 2022-10-16 ENCOUNTER — TRANSCRIPTION ENCOUNTER (OUTPATIENT)
Age: 47
End: 2022-10-16

## 2022-10-18 ENCOUNTER — APPOINTMENT (OUTPATIENT)
Dept: RHEUMATOLOGY | Facility: CLINIC | Age: 47
End: 2022-10-18

## 2022-10-18 VITALS
HEIGHT: 68 IN | SYSTOLIC BLOOD PRESSURE: 127 MMHG | DIASTOLIC BLOOD PRESSURE: 82 MMHG | RESPIRATION RATE: 17 BRPM | BODY MASS INDEX: 39.71 KG/M2 | HEART RATE: 100 BPM | OXYGEN SATURATION: 97 % | WEIGHT: 262 LBS | TEMPERATURE: 97.7 F

## 2022-10-18 PROCEDURE — 99215 OFFICE O/P EST HI 40 MIN: CPT

## 2022-10-19 ENCOUNTER — RX RENEWAL (OUTPATIENT)
Age: 47
End: 2022-10-19

## 2022-10-19 LAB
ANION GAP SERPL CALC-SCNC: 16 MMOL/L
BASOPHILS # BLD AUTO: 0.04 K/UL
BASOPHILS NFR BLD AUTO: 0.4 %
BUN SERPL-MCNC: 11 MG/DL
CALCIUM SERPL-MCNC: 9.8 MG/DL
CHLORIDE SERPL-SCNC: 96 MMOL/L
CO2 SERPL-SCNC: 29 MMOL/L
CREAT SERPL-MCNC: 1.05 MG/DL
EGFR: 89 ML/MIN/1.73M2
EOSINOPHIL # BLD AUTO: 0.04 K/UL
EOSINOPHIL NFR BLD AUTO: 0.4 %
GLUCOSE SERPL-MCNC: 148 MG/DL
HCT VFR BLD CALC: 41.8 %
HGB BLD-MCNC: 13.7 G/DL
IMM GRANULOCYTES NFR BLD AUTO: 0.8 %
LYMPHOCYTES # BLD AUTO: 2.31 K/UL
LYMPHOCYTES NFR BLD AUTO: 21.8 %
MAN DIFF?: NORMAL
MCHC RBC-ENTMCNC: 30.8 PG
MCHC RBC-ENTMCNC: 32.8 GM/DL
MCV RBC AUTO: 93.9 FL
MONOCYTES # BLD AUTO: 0.83 K/UL
MONOCYTES NFR BLD AUTO: 7.8 %
NEUTROPHILS # BLD AUTO: 7.28 K/UL
NEUTROPHILS NFR BLD AUTO: 68.8 %
PLATELET # BLD AUTO: 410 K/UL
POTASSIUM SERPL-SCNC: 3.6 MMOL/L
RBC # BLD: 4.45 M/UL
RBC # FLD: 14.2 %
SODIUM SERPL-SCNC: 141 MMOL/L
TSH SERPL-ACNC: 1.91 UIU/ML
WBC # FLD AUTO: 10.59 K/UL

## 2022-11-01 ENCOUNTER — TRANSCRIPTION ENCOUNTER (OUTPATIENT)
Age: 47
End: 2022-11-01

## 2022-11-02 ENCOUNTER — TRANSCRIPTION ENCOUNTER (OUTPATIENT)
Age: 47
End: 2022-11-02

## 2022-11-03 ENCOUNTER — TRANSCRIPTION ENCOUNTER (OUTPATIENT)
Age: 47
End: 2022-11-03

## 2022-11-03 LAB
ALBUMIN SERPL ELPH-MCNC: 4.4 G/DL
ALP BLD-CCNC: 86 U/L
ALT SERPL-CCNC: 92 U/L
ANION GAP SERPL CALC-SCNC: 16 MMOL/L
ANION GAP SERPL CALC-SCNC: 16 MMOL/L
AST SERPL-CCNC: 53 U/L
BASOPHILS # BLD AUTO: 0.05 K/UL
BASOPHILS NFR BLD AUTO: 0.5 %
BILIRUB SERPL-MCNC: 0.3 MG/DL
BUN SERPL-MCNC: 7 MG/DL
BUN SERPL-MCNC: 7 MG/DL
CALCIUM SERPL-MCNC: 9.5 MG/DL
CALCIUM SERPL-MCNC: 9.6 MG/DL
CHLORIDE SERPL-SCNC: 99 MMOL/L
CHLORIDE SERPL-SCNC: 99 MMOL/L
CK SERPL-CCNC: 201 U/L
CO2 SERPL-SCNC: 27 MMOL/L
CO2 SERPL-SCNC: 28 MMOL/L
CREAT SERPL-MCNC: 0.94 MG/DL
CREAT SERPL-MCNC: 0.95 MG/DL
CRP SERPL-MCNC: 11 MG/L
EGFR: 100 ML/MIN/1.73M2
EGFR: 101 ML/MIN/1.73M2
EOSINOPHIL # BLD AUTO: 0.14 K/UL
EOSINOPHIL NFR BLD AUTO: 1.5 %
ERYTHROCYTE [SEDIMENTATION RATE] IN BLOOD BY WESTERGREN METHOD: 11 MM/HR
GLUCOSE SERPL-MCNC: 130 MG/DL
HCT VFR BLD CALC: 40.4 %
HGB BLD-MCNC: 13.1 G/DL
IMM GRANULOCYTES NFR BLD AUTO: 0.8 %
LYMPHOCYTES # BLD AUTO: 1.72 K/UL
LYMPHOCYTES NFR BLD AUTO: 18.7 %
MAN DIFF?: NORMAL
MCHC RBC-ENTMCNC: 30.9 PG
MCHC RBC-ENTMCNC: 32.4 GM/DL
MCV RBC AUTO: 95.3 FL
MONOCYTES # BLD AUTO: 1.07 K/UL
MONOCYTES NFR BLD AUTO: 11.6 %
NEUTROPHILS # BLD AUTO: 6.15 K/UL
NEUTROPHILS NFR BLD AUTO: 66.9 %
PLATELET # BLD AUTO: 364 K/UL
POTASSIUM SERPL-SCNC: 3.6 MMOL/L
POTASSIUM SERPL-SCNC: 3.6 MMOL/L
PROT SERPL-MCNC: 6.4 G/DL
RBC # BLD: 4.24 M/UL
RBC # FLD: 13.2 %
SODIUM SERPL-SCNC: 141 MMOL/L
SODIUM SERPL-SCNC: 143 MMOL/L
WBC # FLD AUTO: 9.2 K/UL

## 2022-11-04 LAB
HAV IGM SER QL: NONREACTIVE
HBV CORE IGG+IGM SER QL: NONREACTIVE
HBV CORE IGM SER QL: NONREACTIVE
HBV SURFACE AG SER QL: NONREACTIVE
HCV AB SER QL: NONREACTIVE
HCV S/CO RATIO: 0.06 S/CO

## 2022-11-04 RX ADMIN — ANIFROLUMAB 0 MG/2ML: 300 INJECTION, SOLUTION INTRAVENOUS at 00:00

## 2022-11-05 LAB
M TB IFN-G BLD-IMP: NEGATIVE
QUANTIFERON TB PLUS MITOGEN MINUS NIL: 1.77 IU/ML
QUANTIFERON TB PLUS NIL: 0.01 IU/ML
QUANTIFERON TB PLUS TB1 MINUS NIL: 0 IU/ML
QUANTIFERON TB PLUS TB2 MINUS NIL: 0 IU/ML

## 2022-11-06 ENCOUNTER — RX RENEWAL (OUTPATIENT)
Age: 47
End: 2022-11-06

## 2022-11-14 ENCOUNTER — NON-APPOINTMENT (OUTPATIENT)
Age: 47
End: 2022-11-14

## 2022-11-14 ENCOUNTER — APPOINTMENT (OUTPATIENT)
Dept: INTERNAL MEDICINE | Facility: CLINIC | Age: 47
End: 2022-11-14

## 2022-11-14 VITALS
DIASTOLIC BLOOD PRESSURE: 80 MMHG | TEMPERATURE: 97.2 F | HEART RATE: 89 BPM | OXYGEN SATURATION: 95 % | SYSTOLIC BLOOD PRESSURE: 120 MMHG

## 2022-11-14 VITALS — SYSTOLIC BLOOD PRESSURE: 130 MMHG | DIASTOLIC BLOOD PRESSURE: 80 MMHG

## 2022-11-14 DIAGNOSIS — Z00.00 ENCOUNTER FOR GENERAL ADULT MEDICAL EXAMINATION W/OUT ABNORMAL FINDINGS: ICD-10-CM

## 2022-11-14 PROCEDURE — 36415 COLL VENOUS BLD VENIPUNCTURE: CPT

## 2022-11-14 PROCEDURE — 99396 PREV VISIT EST AGE 40-64: CPT | Mod: 25

## 2022-11-14 PROCEDURE — 93000 ELECTROCARDIOGRAM COMPLETE: CPT

## 2022-11-14 NOTE — REVIEW OF SYSTEMS
[Recent Change In Weight] : ~T recent weight change [Nausea] : nausea [Vomiting] : vomiting [Joint Stiffness] : joint stiffness [Muscle Pain] : muscle pain [Muscle Weakness] : muscle weakness [Back Pain] : back pain [Negative] : Psychiatric [Fever] : no fever [Chills] : no chills [Fatigue] : no fatigue [Hot Flashes] : no hot flashes [Night Sweats] : no night sweats [Abdominal Pain] : no abdominal pain [Constipation] : no constipation [Diarrhea] : no diarrhea [Heartburn] : no heartburn [Melena] : no melena [Joint Pain] : no joint pain

## 2022-11-14 NOTE — HISTORY OF PRESENT ILLNESS
[FreeTextEntry1] : \par \par Comprehensive annual physical examination follow-up for multiple medical issues and complaint of diffuse joint pain muscle pain nausea vomiting and leg edema [de-identified] : The patient is a 47-year-old male with history of SLE/mixed connective tissue tissue disorder, inflammatory myopathy, obesity, hypertension, hypothyroidism, prediabetes, hypertension, T7 fracture, osteopenia of the spine, disc disease, sleep apnea mild, Self phenomena migraines who presents for comprehensive annual physical examination. Patient notes increase in diffuse muscle pain with tapering off steroids, also complains of nausea vomiting worsen since taper steroids also complains of persistent edema of the legs denies chest pain, palpitations, lightheadedness, shortness of breath nausea vomiting cough polyuria polydipsia. Ask endoscopy scheduled for next week for nausea vomiting

## 2022-11-14 NOTE — PHYSICAL EXAM
[Normal Sclera/Conjunctiva] : normal sclera/conjunctiva [Normal Outer Ear/Nose] : the outer ears and nose were normal in appearance [Normal TMs] : both tympanic membranes were normal [No Carotid Bruits] : no carotid bruits [No Abdominal Bruit] : a ~M bruit was not heard ~T in the abdomen [No Varicosities] : no varicosities [Pedal Pulses Present] : the pedal pulses are present [No Palpable Aorta] : no palpable aorta [No Extremity Clubbing/Cyanosis] : no extremity clubbing/cyanosis [No Masses] : no palpable masses [No Nipple Discharge] : no nipple discharge [No Axillary Lymphadenopathy] : no axillary lymphadenopathy [Soft] : abdomen soft [Normal Sphincter Tone] : normal sphincter tone [No Mass] : no mass [Penis Abnormality] : normal circumcised penis [Prostate Enlargement] : the prostate was not enlarged [Prostate Tenderness] : the prostate was not tender [Normal] : no rash [Stool Occult Blood] : stool negative for occult blood [de-identified] : Bilateral 2+ edema [de-identified] : Bilateral strea of abdomen and pelvis

## 2022-11-14 NOTE — HEALTH RISK ASSESSMENT
[Good] : ~his/her~  mood as  good [Never] : Never [No] : No [Never (0 pts)] : Never (0 points) [No falls in past year] : Patient reported no falls in the past year [0] : 2) Feeling down, depressed, or hopeless: Not at all (0) [PHQ-2 Negative - No further assessment needed] : PHQ-2 Negative - No further assessment needed [Patient reported colonoscopy was normal] : Patient reported colonoscopy was normal [HIV test declined] : HIV test declined [Hepatitis C test declined] : Hepatitis C test declined [None] : None [With Family] : lives with family [# of Members in Household ___] :  household currently consist of [unfilled] member(s) [Employed] : employed [] :  [# Of Children ___] : has [unfilled] children [Sexually Active] : sexually active [Feels Safe at Home] : Feels safe at home [Fully functional (bathing, dressing, toileting, transferring, walking, feeding)] : Fully functional (bathing, dressing, toileting, transferring, walking, feeding) [Fully functional (using the telephone, shopping, preparing meals, housekeeping, doing laundry, using] : Fully functional and needs no help or supervision to perform IADLs (using the telephone, shopping, preparing meals, housekeeping, doing laundry, using transportation, managing medications and managing finances) [Reports normal functional visual acuity (ie: able to read med bottle)] : Reports normal functional visual acuity [Smoke Detector] : smoke detector [Carbon Monoxide Detector] : carbon monoxide detector [Safety elements used in home] : safety elements used in home [Seat Belt] :  uses seat belt [Sunscreen] : uses sunscreen [Audit-CScore] : 0 [de-identified] : Minimal walking [de-identified] : Excellent [HUX1Nezss] : 0 [Change in mental status noted] : No change in mental status noted [Language] : denies difficulty with language [Behavior] : denies difficulty with behavior [Learning/Retaining New Information] : denies difficulty learning/retaining new information [Handling Complex Tasks] : denies difficulty handling complex tasks [Reasoning] : denies difficulty with reasoning [Spatial Ability and Orientation] : denies difficulty with spatial ability and orientation [High Risk Behavior] : no high risk behavior [Reports changes in hearing] : Reports no changes in hearing [Reports changes in vision] : Reports no changes in vision [Reports changes in dental health] : Reports no changes in dental health [Guns at Home] : no guns at home [Travel to Developing Areas] : does not  travel to developing areas [TB Exposure] : is not being exposed to tuberculosis [Caregiver Concerns] : does not have caregiver concerns [ColonoscopyDate] :  pending

## 2022-11-14 NOTE — ASSESSMENT
[FreeTextEntry1] : Patient is a 47-year-old male with history of lupus/misconduct disorder/myositis, obesity, prediabetes, hypertension, hyperlipidemia, hypothyroidism, T7 disc fracture, peripheral edema, hypothyroidism, persistent nausea vomiting mild obstructive sleep apnea who presents for comprehensive annual physical examination patient complains of joint pain/nausea vomiting is\par \par 1. Lupus/misconduct of disorder/myositis\par continue prednisone taper and hydrochloric when and Saphnelo infusion\par check CBC CKs\par follow-up with rheumatology\par \par 2. Hypertension\par reason well-controlled continue losartan 50 mg once a day\par \par 3. Leg edema bilateral\par most likely secondary venous insufficiency\par continue Lasix referral for vascular cardiology\par check D dimer BNP\par elevation elastic stockings\par \par 4. Hypothyroidism\par continue levothyroxine hundred 25 David's check TFTs\par \par 5 prediabetes\par  on diet check hemoglobin A1c\par \par 6 hyperlipidemia\par patient risk factor for coronary disease will ask cardiology regarding other options besides statins\par \par 7 nausea vomiting\par pending endoscopy questionable colonoscopy\par \par 8 T7 vertebral fracture\par mild osteopenia of the vertebral spine\par continue to observe\par \par 9 obesity\par  on diet and exercise monitor\par \par 10 health maintenance\par had flu shot\par hi pneumococcal vaccine\par colonoscopy pending\par check routine labs\par follow-up in three months\par \par 11 asthma\par well-controlled on inhalers follow-up with pulmonary\par

## 2022-11-18 ENCOUNTER — APPOINTMENT (OUTPATIENT)
Dept: RHEUMATOLOGY | Facility: CLINIC | Age: 47
End: 2022-11-18

## 2022-11-18 ENCOUNTER — LABORATORY RESULT (OUTPATIENT)
Age: 47
End: 2022-11-18

## 2022-11-18 ENCOUNTER — RX RENEWAL (OUTPATIENT)
Age: 47
End: 2022-11-18

## 2022-11-18 VITALS
DIASTOLIC BLOOD PRESSURE: 81 MMHG | HEART RATE: 90 BPM | OXYGEN SATURATION: 97 % | SYSTOLIC BLOOD PRESSURE: 122 MMHG | RESPIRATION RATE: 16 BRPM

## 2022-11-18 VITALS
SYSTOLIC BLOOD PRESSURE: 142 MMHG | HEART RATE: 100 BPM | TEMPERATURE: 97.1 F | DIASTOLIC BLOOD PRESSURE: 84 MMHG | OXYGEN SATURATION: 99 % | RESPIRATION RATE: 16 BRPM

## 2022-11-18 PROCEDURE — 96413 CHEMO IV INFUSION 1 HR: CPT

## 2022-11-18 PROCEDURE — 96374 THER/PROPH/DIAG INJ IV PUSH: CPT | Mod: 59

## 2022-11-18 PROCEDURE — 36415 COLL VENOUS BLD VENIPUNCTURE: CPT

## 2022-11-18 RX ORDER — ACETAMINOPHEN 325 MG/1
325 TABLET, FILM COATED ORAL
Qty: 0 | Refills: 0 | Status: COMPLETED | OUTPATIENT
Start: 2022-11-12

## 2022-11-18 RX ORDER — CETIRIZINE HYDROCHLORIDE 10 MG/1
10 TABLET, FILM COATED ORAL
Qty: 0 | Refills: 0 | Status: COMPLETED | OUTPATIENT
Start: 2022-11-12

## 2022-11-18 RX ORDER — HYDROCORTISONE SODIUM SUCCINATE 100 MG/2ML
100 INJECTION, POWDER, FOR SOLUTION INTRAMUSCULAR; INTRAVENOUS
Qty: 0 | Refills: 0 | Status: COMPLETED | OUTPATIENT
Start: 2022-11-11

## 2022-11-18 RX ORDER — ANIFROLUMAB 300 MG/2ML
300 INJECTION, SOLUTION INTRAVENOUS
Qty: 0 | Refills: 0 | Status: COMPLETED | OUTPATIENT
Start: 2022-11-04

## 2022-11-18 NOTE — HISTORY OF PRESENT ILLNESS
[8] : 8 [N/A] : N/A [Denies] : Denies [No] : No [Yes] : Yes [Left upper extremity] : Left upper extremity [22g] : 22g [Medication Name: ___] : Medication Name: [unfilled] [Start Time: ___] : Medication Start Time: [unfilled] [End Time: ___] : Medication End Time: [unfilled] [IV discontinued. Intact. No signs or symptoms of IV complications noted. Time: ___] : IV discontinued. Intact. No signs or symptoms of IV complications noted. Time: [unfilled] [Patient  instructed to seek medical attention with signs and symptoms of adverse effects] : Patient  instructed to seek medical attention with signs and symptoms of adverse effects [Patient left unit in no acute distress] : Patient left unit in no acute distress [Medications administered as ordered and tolerated well.] : Medications administered as ordered and tolerated well. [Blood drawn at time of visit] : Blood drawn at time of visit [de-identified] : lower extremity [de-identified] : achy [de-identified] : Right Median Cubital Vein  [de-identified] : Blood drawn on  10/14/2022: CBC, CMP, Anti-ds-DNA, C3/C4, UA w/ micro, and Pr/Cr Ratio. [de-identified] : Patient presents for scheduled Saphnelo infusion in [facility]. Today, patient reports achy pain,  stiffness and swelling to lower extremity, pain as rated above. Reports rash to face and moderate daily fatigue. Patient reports having dry cough few days after infusion, however denies any SOB, CP, or s/s of infection. Patient reports being photosensitive. Currently on 10 mg of Prednisone and scheduled for f/u visit next week. No other symptoms or concerns were verbalized. Patient pre-medicated as prescribed and infusion tolerated well.

## 2022-11-21 ENCOUNTER — RX RENEWAL (OUTPATIENT)
Age: 47
End: 2022-11-21

## 2022-11-21 ENCOUNTER — RESULT REVIEW (OUTPATIENT)
Age: 47
End: 2022-11-21

## 2022-11-21 ENCOUNTER — TRANSCRIPTION ENCOUNTER (OUTPATIENT)
Age: 47
End: 2022-11-21

## 2022-11-21 ENCOUNTER — OUTPATIENT (OUTPATIENT)
Dept: OUTPATIENT SERVICES | Facility: HOSPITAL | Age: 47
LOS: 1 days | End: 2022-11-21
Payer: COMMERCIAL

## 2022-11-21 ENCOUNTER — APPOINTMENT (OUTPATIENT)
Dept: GASTROENTEROLOGY | Facility: HOSPITAL | Age: 47
End: 2022-11-21

## 2022-11-21 VITALS
RESPIRATION RATE: 20 BRPM | SYSTOLIC BLOOD PRESSURE: 142 MMHG | HEART RATE: 95 BPM | OXYGEN SATURATION: 99 % | DIASTOLIC BLOOD PRESSURE: 81 MMHG

## 2022-11-21 VITALS
SYSTOLIC BLOOD PRESSURE: 130 MMHG | HEIGHT: 68 IN | HEART RATE: 100 BPM | OXYGEN SATURATION: 97 % | WEIGHT: 255.96 LBS | RESPIRATION RATE: 20 BRPM | DIASTOLIC BLOOD PRESSURE: 87 MMHG | TEMPERATURE: 98 F

## 2022-11-21 DIAGNOSIS — Z90.49 ACQUIRED ABSENCE OF OTHER SPECIFIED PARTS OF DIGESTIVE TRACT: Chronic | ICD-10-CM

## 2022-11-21 DIAGNOSIS — R13.10 DYSPHAGIA, UNSPECIFIED: ICD-10-CM

## 2022-11-21 DIAGNOSIS — R11.2 NAUSEA WITH VOMITING, UNSPECIFIED: ICD-10-CM

## 2022-11-21 DIAGNOSIS — K21.9 GASTRO-ESOPHAGEAL REFLUX DISEASE WITHOUT ESOPHAGITIS: ICD-10-CM

## 2022-11-21 PROCEDURE — 88305 TISSUE EXAM BY PATHOLOGIST: CPT | Mod: 26

## 2022-11-21 PROCEDURE — 88305 TISSUE EXAM BY PATHOLOGIST: CPT

## 2022-11-21 PROCEDURE — 88312 SPECIAL STAINS GROUP 1: CPT

## 2022-11-21 PROCEDURE — 43239 EGD BIOPSY SINGLE/MULTIPLE: CPT

## 2022-11-21 PROCEDURE — 88312 SPECIAL STAINS GROUP 1: CPT | Mod: 26

## 2022-11-21 RX ORDER — SODIUM CHLORIDE 9 MG/ML
500 INJECTION INTRAMUSCULAR; INTRAVENOUS; SUBCUTANEOUS
Refills: 0 | Status: DISCONTINUED | OUTPATIENT
Start: 2022-11-21 | End: 2022-12-06

## 2022-11-21 NOTE — ASU DISCHARGE PLAN (ADULT/PEDIATRIC) - NS MD DC FALL RISK RISK
For information on Fall & Injury Prevention, visit: [email or web address] OR  [email or web address] OR  https://www.cdc.gov/steadi/patient.html

## 2022-11-21 NOTE — ASU PATIENT PROFILE, ADULT - FALL HARM RISK - UNIVERSAL INTERVENTIONS
Bed in lowest position, wheels locked, appropriate side rails in place/Call bell, personal items and telephone in reach/Instruct patient to call for assistance before getting out of bed or chair/Non-slip footwear when patient is out of bed/[place] to call system/Physically safe environment - no spills, clutter or unnecessary equipment/Purposeful Proactive Rounding/Room/bathroom lighting operational, light cord in reach

## 2022-11-21 NOTE — PRE PROCEDURE NOTE - PRE PROCEDURE EVALUATION
Attending Physician:        Loc Weathers MD                    Procedure:    Indication for Procedure: dysphagia  ________________________________________________________  PAST MEDICAL & SURGICAL HISTORY:  Lupus      Asthma      Hypothyroid      History of cholecystectomy        ALLERGIES:  No Known Allergies    HOME MEDICATIONS:  aspirin 81 mg oral delayed release tablet: 1 tab(s) orally once a day  furosemide 40 mg oral tablet: 1 tab(s) orally once a day  hydroxychloroquine 200 mg oral tablet: 2 tab(s) orally once a day  levothyroxine 125 mcg (0.125 mg) oral tablet: 1 tab(s) orally once a day  losartan 50 mg oral tablet: 1 tab(s) orally once a day  melatonin 3 mg oral tablet: 2 tab(s) orally once a day (at bedtime)  pantoprazole 40 mg oral delayed release tablet: 1 tab(s) orally once a day (before a meal)  polyethylene glycol 3350 oral powder for reconstitution: 17 gram(s) orally once a day  senna leaf extract oral tablet: 1 tab(s) orally once a day    AICD/PPM: [ ] yes   [ x] no    PERTINENT LAB DATA:                      PHYSICAL EXAMINATION:    T(C): --  HR: --  BP: --  RR: --  SpO2: --    Constitutional: NAD  HEENT: PERRLA, EOMI,    Neck:  No JVD  Respiratory: CTAB/L  Cardiovascular: S1 and S2  Gastrointestinal: BS+, soft, NT/ND  Extremities: No peripheral edema  Neurological: A/O x 3, no focal deficits  Psychiatric: Normal mood, normal affect  Skin: No rashes    ASA Class: I [ ]  II [ x]  III [ ]  IV [ ]    COMMENTS:    The patient is a suitable candidate for the planned procedure unless box checked [ ]  No, explain:

## 2022-11-21 NOTE — ASU PREOP CHECKLIST - DENTURES
**STROKE CODE CONSULT NOTE**    Last known well time/Time of onset of symptoms: 6:20pm    HPI: 59F non-smoker with PMH of leaky valve, HTN, ?HLD, who presents with 40 minutes of RUE weakness and numbness. Today, patient expressed to family that she had pain in her head and chest, associated with nausea without vomiting. Symptoms progressed, and she was  given Aspirin 160mg total and sent to ED. No recent illness.     PAST MEDICAL & SURGICAL HISTORY:  CHF (congestive heart failure)  Hypertension  HLD (hyperlipidemia)  HTN (hypertension)    No significant past surgical history    FAMILY HISTORY:  No pertinent family history in first degree relatives    SOCIAL HISTORY:  Tobacco Use: Never smoker  ETOH use: never    ROS:  Constitutional: No fever, weight loss or fatigue  Eyes: No eye pain, visual disturbances, or discharge  ENMT:  No difficulty hearing, tinnitus, vertigo; No sinus or throat pain  Neck: No pain or stiffness  Respiratory: No cough, wheezing, chills or hemoptysis  Cardiovascular: No chest pain, palpitations, shortness of breath, dizziness or leg swelling  Gastrointestinal: No abdominal pain. No nausea, vomiting or hematemesis; No diarrhea or constipation. Nohematochezia.  Genitourinary: No dysuria, frequency, hematuria or incontinence  Neurological: As per HPI  Skin: No itching, burning, rashes or lesions   Endocrine: No heat or cold intolerance; No hair loss  Musculoskeletal: No joint pain or swelling; No muscle, back or extremity pain  Psychiatric: No depression, anxiety, mood swings or difficulty sleeping  Heme/Lymph: No easy bruising or bleeding gums      Allergies  tetanus toxoid (Anaphylaxis)  tetanus toxoid (Unknown)      Vital Signs Last 24 Hrs  T(C): 36.8 (14 May 2019 18:42), Max: 36.8 (14 May 2019 18:42)  T(F): 98.2 (14 May 2019 18:42), Max: 98.2 (14 May 2019 18:42)  HR: 92 (14 May 2019 18:42) (92 - 92)  BP: 185/115 (14 May 2019 18:42) (185/115 - 185/115)  RR: 18 (14 May 2019 18:42) (18 - 18)  SpO2: 100% (14 May 2019 18:42) (100% - 100%)    PHYSICAL EXAM:  Constitutional: WDWN; NAD  Cardiovascular: RRR, no murmurs, no carotid bruits  Neurologic:  Mental status: AAOx3.  Recent and remote memory intact.  Naming, repetition and comprehension intact.  Attention/concentration intact.  No dysarthria, no aphasia.  Fund of knowledge appropriate.    Cranial nerves: PERRLA, visual fields full, no nystagmus, EOMI, V1 through V3 intact bilaterally and symmetric, face symmetric, hearing intact to finger rub, palate elevation symmetric, tongue was midline, sternocleidomastoid/shoulder shrug strength bilaterally 5/5.    Motor:  Normal bulk and tone, strength 5/5 in bilateral upper and lower extremities.   strength 5/5.  Rapid alternating movements intact and symmetric.   Sensation: Intact to light touch, proprioception, and pinprick.  No neglect.   Coordination: No dysmetria on finger-to-nose and heel-to-shin.   Reflexes: 2+ in upper and lower extremities, downgoing toes bilaterally  Gait: Narrow and steady. No ataxia.  Romberg negative    NIHSS: 1 (right sided numbness)     LABS:                        12.3   10.95 )-----------( 309      ( 14 May 2019 18:55 )             38.0         RADIOLOGY & ADDITIONAL STUDIES:    IV-tPA (Y/N):     N                              Bolus time:  Reason IV-tPA not given: **STROKE CODE CONSULT NOTE**    Last known well time/Time of onset of symptoms: 6:20pm    HPI: 59F non-smoker with PMH of leaky valve, and HTN, who presents with progressive RUE, right face and chest numbness/tingling for one day. This was associated with nausea and mild headache. ROS negative for recent illness, fevers, chills, SOB, palpitation, abd pain, n/v/d/c, dysphagia dysarthria, weakness. Numbness and tingling progressed throughout the day, and she was given Aspirin 160 mg total and sent to ED. Stroke code activated on arrival. NIHSS 0. FSG 71.     PAST MEDICAL & SURGICAL HISTORY:  CHF (congestive heart failure)  Hypertension  HLD (hyperlipidemia)  HTN (hypertension)    No significant past surgical history    FAMILY HISTORY:  No pertinent family history in first degree relatives    SOCIAL HISTORY:  Tobacco Use: Never smoker  ETOH use: Denies  Recreational drug use: Denies    ROS:  Constitutional: No fever, weight loss or fatigue  Eyes: No eye pain, visual disturbances, or discharge  ENMT:  No difficulty hearing, tinnitus, vertigo; No sinus or throat pain  Neck: No pain or stiffness  Respiratory: No cough, wheezing, chills or hemoptysis  Cardiovascular: No chest pain, palpitations, shortness of breath, dizziness or leg swelling  Gastrointestinal: No abdominal pain. No nausea, vomiting or hematemesis; No diarrhea or constipation. No hematochezia.  Genitourinary: No dysuria, frequency, hematuria or incontinence  Neurological: As per HPI  Skin: No itching, burning, rashes or lesions   Endocrine: No heat or cold intolerance; No hair loss  Musculoskeletal: No joint pain or swelling; No muscle, back or extremity pain  Psychiatric: No depression, anxiety, mood swings or difficulty sleeping  Heme/Lymph: No easy bruising or bleeding gums    Allergies  tetanus toxoid (Anaphylaxis)  tetanus toxoid (Unknown)    Vital Signs Last 24 Hrs  T(C): 36.8 (14 May 2019 18:42), Max: 36.8 (14 May 2019 18:42)  T(F): 98.2 (14 May 2019 18:42), Max: 98.2 (14 May 2019 18:42)  HR: 92 (14 May 2019 18:42) (92 - 92)  BP: 185/115 (14 May 2019 18:42) (185/115 - 185/115)  RR: 18 (14 May 2019 18:42) (18 - 18)  SpO2: 100% (14 May 2019 18:42) (100% - 100%)    PHYSICAL EXAM:  Constitutional: WDWN; NAD  Cardiovascular: RRR, no murmurs, no carotid bruits  Neurologic:  Mental status: AAOx3.  Recent and remote memory intact.  Naming, repetition and comprehension intact.  Attention/concentration intact.  No dysarthria, no aphasia.  Fund of knowledge appropriate.    Cranial nerves: PERRLA, visual fields full, no nystagmus, EOMI, V1 through V3 intact bilaterally and symmetric, face symmetric, hearing intact to finger rub, palate elevation symmetric, tongue was midline, sternocleidomastoid/shoulder shrug strength bilaterally 5/5.    Motor:  Normal bulk and tone, strength 5/5 in bilateral upper and lower extremities.   strength 5/5.  Rapid alternating movements intact and symmetric.   Sensation: Intact to light touch, proprioception, and pinprick.  No neglect.   Coordination: No dysmetria on finger-to-nose and heel-to-shin.   Reflexes: 2+ in upper and lower extremities, downgoing toes bilaterally  Gait: Narrow and steady. No ataxia.  Romberg negative.     NIHSS: 1 (right sided numbness)     LABS:                        12.3   10.95 )-----------( 309      ( 14 May 2019 18:55 )             38.0         RADIOLOGY & ADDITIONAL STUDIES:    IV-tPA (Y/N):     N                              Bolus time:  Reason IV-tPA not given: **STROKE CODE CONSULT NOTE**    Last known well time/Time of onset of symptoms: 6:20pm    HPI: 59F non-smoker with PMH of leaky valve, and HTN, who presents with progressive RUE, right face and chest numbness/tingling for one day. This was associated with nausea and mild headache. ROS negative for recent illness, fevers, chills, SOB, palpitation, abd pain, n/v/d/c, dysphagia dysarthria, weakness. Numbness and tingling progressed throughout the day, and she was given Aspirin 160 mg total and sent to ED. Stroke code activated on arrival. NIHSS 0. FSG 71.     PAST MEDICAL & SURGICAL HISTORY:  CHF (congestive heart failure)  Hypertension  HLD (hyperlipidemia)  HTN (hypertension)    No significant past surgical history    FAMILY HISTORY:  No pertinent family history in first degree relatives    SOCIAL HISTORY:  Tobacco Use: Never smoker  ETOH use: Denies  Recreational drug use: Denies    ROS:  Constitutional: No fever, weight loss or fatigue  Eyes: No eye pain, visual disturbances, or discharge  ENMT:  No difficulty hearing, tinnitus, vertigo; No sinus or throat pain  Neck: No pain or stiffness  Respiratory: No cough, wheezing, chills or hemoptysis  Cardiovascular: No chest pain, palpitations, shortness of breath, dizziness or leg swelling  Gastrointestinal: No abdominal pain. No nausea, vomiting or hematemesis; No diarrhea or constipation. No hematochezia.  Genitourinary: No dysuria, frequency, hematuria or incontinence  Neurological: As per HPI  Skin: No itching, burning, rashes or lesions   Endocrine: No heat or cold intolerance; No hair loss  Musculoskeletal: No joint pain or swelling; No muscle, back or extremity pain  Psychiatric: No depression, anxiety, mood swings or difficulty sleeping  Heme/Lymph: No easy bruising or bleeding gums    Allergies  tetanus toxoid (Anaphylaxis)  tetanus toxoid (Unknown)    Vital Signs Last 24 Hrs  T(C): 36.8 (14 May 2019 18:42), Max: 36.8 (14 May 2019 18:42)  T(F): 98.2 (14 May 2019 18:42), Max: 98.2 (14 May 2019 18:42)  HR: 92 (14 May 2019 18:42) (92 - 92)  BP: 185/115 (14 May 2019 18:42) (185/115 - 185/115)  RR: 18 (14 May 2019 18:42) (18 - 18)  SpO2: 100% (14 May 2019 18:42) (100% - 100%)    PHYSICAL EXAM:  Constitutional: WDWN; NAD  Cardiovascular: RRR, no murmurs, no carotid bruits  Neurologic:  Mental status: AAOx3.  Recent and remote memory intact.  Naming, repetition and comprehension intact.  Attention/concentration intact.  No dysarthria, no aphasia.  Fund of knowledge appropriate.    Cranial nerves: PERRLA, visual fields full, no nystagmus, EOMI, V1 through V3 intact bilaterally and symmetric, face symmetric, hearing intact to finger rub, palate elevation symmetric, tongue was midline, sternocleidomastoid/shoulder shrug strength bilaterally 5/5.    Motor:  Normal bulk and tone, strength 5/5 in bilateral upper and lower extremities.   strength 5/5.  Rapid alternating movements intact and symmetric.   Sensation: Intact to light touch, proprioception, and pinprick.  No neglect.   Coordination: No dysmetria on finger-to-nose and heel-to-shin.   Reflexes: 2+ in upper and lower extremities, downgoing toes bilaterally  Gait: Narrow and steady. No ataxia.  Romberg negative.     NIHSS: 0     LABS:                        12.3   10.95 )-----------( 309      ( 14 May 2019 18:55 )             38.0         RADIOLOGY & ADDITIONAL STUDIES:    IV-tPA (Y/N):     N                              Bolus time:  Reason IV-tPA not given: no

## 2022-11-23 NOTE — ED ADULT NURSE NOTE - NSIMPLEMENTINTERV_GEN_ALL_ED
Implemented All Universal Safety Interventions:  [place] to call system. Call bell, personal items and telephone within reach. Instruct patient to call for assistance. Room bathroom lighting operational. Non-slip footwear when patient is off stretcher. Physically safe environment: no spills, clutter or unnecessary equipment. Stretcher in lowest position, wheels locked, appropriate side rails in place. Minoxidil Counseling: Minoxidil is a topical medication which can increase blood flow where it is applied. It is uncertain how this medication increases hair growth. Side effects are uncommon and include stinging and allergic reactions.

## 2022-11-28 ENCOUNTER — TRANSCRIPTION ENCOUNTER (OUTPATIENT)
Age: 47
End: 2022-11-28

## 2022-11-28 LAB — SURGICAL PATHOLOGY STUDY: SIGNIFICANT CHANGE UP

## 2022-11-29 ENCOUNTER — APPOINTMENT (OUTPATIENT)
Dept: RHEUMATOLOGY | Facility: CLINIC | Age: 47
End: 2022-11-29
Payer: COMMERCIAL

## 2022-11-30 ENCOUNTER — TRANSCRIPTION ENCOUNTER (OUTPATIENT)
Age: 47
End: 2022-11-30

## 2022-11-30 ENCOUNTER — EMERGENCY (EMERGENCY)
Facility: HOSPITAL | Age: 47
LOS: 1 days | Discharge: HOME | End: 2022-11-30
Attending: EMERGENCY MEDICINE
Payer: COMMERCIAL

## 2022-11-30 ENCOUNTER — NON-APPOINTMENT (OUTPATIENT)
Age: 47
End: 2022-11-30

## 2022-11-30 VITALS
WEIGHT: 251.99 LBS | DIASTOLIC BLOOD PRESSURE: 88 MMHG | HEIGHT: 68 IN | SYSTOLIC BLOOD PRESSURE: 150 MMHG | RESPIRATION RATE: 16 BRPM | HEART RATE: 98 BPM | OXYGEN SATURATION: 96 % | TEMPERATURE: 98 F

## 2022-11-30 DIAGNOSIS — Z90.49 ACQUIRED ABSENCE OF OTHER SPECIFIED PARTS OF DIGESTIVE TRACT: Chronic | ICD-10-CM

## 2022-11-30 LAB
ALBUMIN SERPL ELPH-MCNC: 4 G/DL — SIGNIFICANT CHANGE UP (ref 3.3–5)
ALP SERPL-CCNC: 89 U/L — SIGNIFICANT CHANGE UP (ref 40–120)
ALT FLD-CCNC: 53 U/L — HIGH (ref 10–45)
ANION GAP SERPL CALC-SCNC: 14 MMOL/L — SIGNIFICANT CHANGE UP (ref 5–17)
AST SERPL-CCNC: 53 U/L — HIGH (ref 10–40)
BASE EXCESS BLDV CALC-SCNC: 3.6 MMOL/L — HIGH (ref -2–3)
BASOPHILS # BLD AUTO: 0.05 K/UL — SIGNIFICANT CHANGE UP (ref 0–0.2)
BASOPHILS NFR BLD AUTO: 0.5 % — SIGNIFICANT CHANGE UP (ref 0–2)
BILIRUB SERPL-MCNC: 0.4 MG/DL — SIGNIFICANT CHANGE UP (ref 0.2–1.2)
BUN SERPL-MCNC: 4 MG/DL — LOW (ref 7–23)
CA-I SERPL-SCNC: 1.2 MMOL/L — SIGNIFICANT CHANGE UP (ref 1.15–1.33)
CALCIUM SERPL-MCNC: 9.8 MG/DL — SIGNIFICANT CHANGE UP (ref 8.4–10.5)
CHLORIDE BLDV-SCNC: 98 MMOL/L — SIGNIFICANT CHANGE UP (ref 96–108)
CHLORIDE SERPL-SCNC: 97 MMOL/L — SIGNIFICANT CHANGE UP (ref 96–108)
CO2 BLDV-SCNC: 30 MMOL/L — HIGH (ref 22–26)
CO2 SERPL-SCNC: 26 MMOL/L — SIGNIFICANT CHANGE UP (ref 22–31)
CREAT SERPL-MCNC: 0.96 MG/DL — SIGNIFICANT CHANGE UP (ref 0.5–1.3)
EGFR: 99 ML/MIN/1.73M2 — SIGNIFICANT CHANGE UP
EOSINOPHIL # BLD AUTO: 0.16 K/UL — SIGNIFICANT CHANGE UP (ref 0–0.5)
EOSINOPHIL NFR BLD AUTO: 1.7 % — SIGNIFICANT CHANGE UP (ref 0–6)
GAS PNL BLDV: 135 MMOL/L — LOW (ref 136–145)
GAS PNL BLDV: SIGNIFICANT CHANGE UP
GAS PNL BLDV: SIGNIFICANT CHANGE UP
GLUCOSE BLDV-MCNC: 93 MG/DL — SIGNIFICANT CHANGE UP (ref 70–99)
GLUCOSE SERPL-MCNC: 102 MG/DL — HIGH (ref 70–99)
HCO3 BLDV-SCNC: 29 MMOL/L — SIGNIFICANT CHANGE UP (ref 22–29)
HCT VFR BLD CALC: 38.2 % — LOW (ref 39–50)
HCT VFR BLDA CALC: 33 % — LOW (ref 39–51)
HGB BLD CALC-MCNC: 11 G/DL — LOW (ref 12.6–17.4)
HGB BLD-MCNC: 12.9 G/DL — LOW (ref 13–17)
IMM GRANULOCYTES NFR BLD AUTO: 1 % — HIGH (ref 0–0.9)
LACTATE BLDV-MCNC: 2 MMOL/L — SIGNIFICANT CHANGE UP (ref 0.5–2)
LYMPHOCYTES # BLD AUTO: 1.79 K/UL — SIGNIFICANT CHANGE UP (ref 1–3.3)
LYMPHOCYTES # BLD AUTO: 19.5 % — SIGNIFICANT CHANGE UP (ref 13–44)
MCHC RBC-ENTMCNC: 29.1 PG — SIGNIFICANT CHANGE UP (ref 27–34)
MCHC RBC-ENTMCNC: 33.8 GM/DL — SIGNIFICANT CHANGE UP (ref 32–36)
MCV RBC AUTO: 86.2 FL — SIGNIFICANT CHANGE UP (ref 80–100)
MONOCYTES # BLD AUTO: 1.03 K/UL — HIGH (ref 0–0.9)
MONOCYTES NFR BLD AUTO: 11.2 % — SIGNIFICANT CHANGE UP (ref 2–14)
NEUTROPHILS # BLD AUTO: 6.05 K/UL — SIGNIFICANT CHANGE UP (ref 1.8–7.4)
NEUTROPHILS NFR BLD AUTO: 66.1 % — SIGNIFICANT CHANGE UP (ref 43–77)
NRBC # BLD: 0 /100 WBCS — SIGNIFICANT CHANGE UP (ref 0–0)
NT-PROBNP SERPL-SCNC: 22 PG/ML — SIGNIFICANT CHANGE UP (ref 0–300)
PCO2 BLDV: 47 MMHG — SIGNIFICANT CHANGE UP (ref 42–55)
PH BLDV: 7.4 — SIGNIFICANT CHANGE UP (ref 7.32–7.43)
PLATELET # BLD AUTO: 498 K/UL — HIGH (ref 150–400)
PO2 BLDV: 26 MMHG — SIGNIFICANT CHANGE UP (ref 25–45)
POTASSIUM BLDV-SCNC: 3.1 MMOL/L — LOW (ref 3.5–5.1)
POTASSIUM SERPL-MCNC: 3.1 MMOL/L — LOW (ref 3.5–5.3)
POTASSIUM SERPL-SCNC: 3.1 MMOL/L — LOW (ref 3.5–5.3)
PROT SERPL-MCNC: 7.7 G/DL — SIGNIFICANT CHANGE UP (ref 6–8.3)
RAPID RVP RESULT: SIGNIFICANT CHANGE UP
RBC # BLD: 4.43 M/UL — SIGNIFICANT CHANGE UP (ref 4.2–5.8)
RBC # FLD: 12.8 % — SIGNIFICANT CHANGE UP (ref 10.3–14.5)
SAO2 % BLDV: 38.7 % — LOW (ref 67–88)
SARS-COV-2 RNA SPEC QL NAA+PROBE: SIGNIFICANT CHANGE UP
SODIUM SERPL-SCNC: 137 MMOL/L — SIGNIFICANT CHANGE UP (ref 135–145)
TROPONIN T, HIGH SENSITIVITY RESULT: 24 NG/L — SIGNIFICANT CHANGE UP (ref 0–51)
TROPONIN T, HIGH SENSITIVITY RESULT: 25 NG/L — SIGNIFICANT CHANGE UP (ref 0–51)
WBC # BLD: 9.17 K/UL — SIGNIFICANT CHANGE UP (ref 3.8–10.5)
WBC # FLD AUTO: 9.17 K/UL — SIGNIFICANT CHANGE UP (ref 3.8–10.5)

## 2022-11-30 PROCEDURE — 99220: CPT

## 2022-11-30 PROCEDURE — 71045 X-RAY EXAM CHEST 1 VIEW: CPT | Mod: 26

## 2022-11-30 RX ORDER — HYDROXYCHLOROQUINE SULFATE 200 MG
400 TABLET ORAL AT BEDTIME
Refills: 0 | Status: DISCONTINUED | OUTPATIENT
Start: 2022-11-30 | End: 2022-12-03

## 2022-11-30 RX ORDER — SODIUM CHLORIDE 9 MG/ML
500 INJECTION INTRAMUSCULAR; INTRAVENOUS; SUBCUTANEOUS ONCE
Refills: 0 | Status: COMPLETED | OUTPATIENT
Start: 2022-11-30 | End: 2022-11-30

## 2022-11-30 RX ORDER — IPRATROPIUM/ALBUTEROL SULFATE 18-103MCG
3 AEROSOL WITH ADAPTER (GRAM) INHALATION ONCE
Refills: 0 | Status: COMPLETED | OUTPATIENT
Start: 2022-11-30 | End: 2022-11-30

## 2022-11-30 RX ORDER — FUROSEMIDE 40 MG
40 TABLET ORAL ONCE
Refills: 0 | Status: COMPLETED | OUTPATIENT
Start: 2022-11-30 | End: 2022-11-30

## 2022-11-30 RX ORDER — LEVOTHYROXINE SODIUM 125 MCG
125 TABLET ORAL DAILY
Refills: 0 | Status: DISCONTINUED | OUTPATIENT
Start: 2022-11-30 | End: 2022-12-03

## 2022-11-30 RX ORDER — GUAIFENESIN/DEXTROMETHORPHAN 600MG-30MG
20 TABLET, EXTENDED RELEASE 12 HR ORAL ONCE
Refills: 0 | Status: COMPLETED | OUTPATIENT
Start: 2022-11-30 | End: 2022-11-30

## 2022-11-30 RX ORDER — POTASSIUM CHLORIDE 20 MEQ
20 PACKET (EA) ORAL
Refills: 0 | Status: DISCONTINUED | OUTPATIENT
Start: 2022-11-30 | End: 2022-12-03

## 2022-11-30 RX ORDER — POTASSIUM CHLORIDE 20 MEQ
40 PACKET (EA) ORAL ONCE
Refills: 0 | Status: COMPLETED | OUTPATIENT
Start: 2022-11-30 | End: 2022-11-30

## 2022-11-30 RX ORDER — PANTOPRAZOLE SODIUM 20 MG/1
40 TABLET, DELAYED RELEASE ORAL
Refills: 0 | Status: DISCONTINUED | OUTPATIENT
Start: 2022-11-30 | End: 2022-12-03

## 2022-11-30 RX ORDER — IPRATROPIUM/ALBUTEROL SULFATE 18-103MCG
3 AEROSOL WITH ADAPTER (GRAM) INHALATION EVERY 4 HOURS
Refills: 0 | Status: DISCONTINUED | OUTPATIENT
Start: 2022-11-30 | End: 2022-12-01

## 2022-11-30 RX ORDER — LANOLIN ALCOHOL/MO/W.PET/CERES
5 CREAM (GRAM) TOPICAL AT BEDTIME
Refills: 0 | Status: COMPLETED | OUTPATIENT
Start: 2022-11-30 | End: 2022-11-30

## 2022-11-30 RX ORDER — LOSARTAN POTASSIUM 100 MG/1
50 TABLET, FILM COATED ORAL DAILY
Refills: 0 | Status: DISCONTINUED | OUTPATIENT
Start: 2022-11-30 | End: 2022-12-03

## 2022-11-30 RX ADMIN — Medication 3 MILLILITER(S): at 20:47

## 2022-11-30 RX ADMIN — Medication 40 MILLIGRAM(S): at 20:46

## 2022-11-30 RX ADMIN — Medication 3 MILLILITER(S): at 10:08

## 2022-11-30 RX ADMIN — Medication 1 TABLET(S): at 20:48

## 2022-11-30 RX ADMIN — Medication 3 MILLILITER(S): at 10:07

## 2022-11-30 RX ADMIN — Medication 5 MILLIGRAM(S): at 22:37

## 2022-11-30 RX ADMIN — Medication 50 MILLIGRAM(S): at 11:16

## 2022-11-30 RX ADMIN — Medication 20 MILLILITER(S): at 12:51

## 2022-11-30 RX ADMIN — SODIUM CHLORIDE 500 MILLILITER(S): 9 INJECTION INTRAMUSCULAR; INTRAVENOUS; SUBCUTANEOUS at 11:00

## 2022-11-30 RX ADMIN — Medication 20 MILLIEQUIVALENT(S): at 22:36

## 2022-11-30 RX ADMIN — Medication 200 MILLIGRAM(S): at 20:47

## 2022-11-30 RX ADMIN — Medication 400 MILLIGRAM(S): at 22:37

## 2022-11-30 RX ADMIN — SODIUM CHLORIDE 500 MILLILITER(S): 9 INJECTION INTRAMUSCULAR; INTRAVENOUS; SUBCUTANEOUS at 10:07

## 2022-11-30 RX ADMIN — Medication 40 MILLIEQUIVALENT(S): at 11:16

## 2022-11-30 NOTE — ED CDU PROVIDER INITIAL DAY NOTE - MEDICAL DECISION MAKING DETAILS
Attending MD Youssef: 48 yo M with pmhx Lupus, asthma, dysphagia, hypothyroidism presenting with cough and sob x 2-3 weeks. Patient states he has been having cough, wheezing, and sob for the past couple of weeks. Patient states cough is productive and over the counter medication isnt helping. Patient saw his allergist three days ago and told he had strep throat. Patient states symptoms worsening. Patient unable to tolerate PO medication well but is taking his augmentin. Patient admits to sore throat, nv and increased edema. Patient currently on prednisone 10mg for his lupus. Patient denies chest pain, fever, abd pain, diarrhea, dysuria and back pain.  Lungs  with scattered.   Will get RVP, labs, CXR and give duonebs and reassess.

## 2022-11-30 NOTE — ED CDU PROVIDER INITIAL DAY NOTE - NSICDXFAMILYHX_GEN_ALL_CORE_FT
FAMILY HISTORY:  Mother  Still living? Unknown  FH: rheumatoid arthritis, Age at diagnosis: Age Unknown

## 2022-11-30 NOTE — ED ADULT TRIAGE NOTE - CHIEF COMPLAINT QUOTE
productive cough with yellow phlegm x 3 weeks, on Augmentin x 3 days. reports chills, denies fever. decreased PO intake x 1.5 weeks.

## 2022-11-30 NOTE — ED PROVIDER NOTE - ATTENDING APP SHARED VISIT CONTRIBUTION OF CARE
Attending MD Youssef:   I personally have seen and examined this patient.  Physician assistant note reviewed and agree on plan of care and except where noted.  Please see my MDM for further details.

## 2022-11-30 NOTE — ED CDU PROVIDER INITIAL DAY NOTE - OBJECTIVE STATEMENT
48 yo M with pmhx Lupus, asthma, dysphagia, hypothyroidism presenting with cough and sob x 2-3 weeks. Patient states he has been having cough, wheezing, and sob for the past couple of weeks. Patient states cough is productive and over the counter medication isnt helping. Patient saw his allergist three days ago and told he had strep throat. Patient states symptoms worsening. Patient unable to tolerate PO medication well but is taking his augmentin. Patient admits to sore throat, nv and increased edema. Patient currently on prednisone 10mg for his lupus. Patient denies chest pain, fever, abd pain, diarrhea, dysuria and back pain. 46 yo M with pmhx Lupus (on pred 10mg daily), asthma (on trelegy), "dysphagia"- currently being worked up, hypothyroidism presenting with cough and sob x 2-3 weeks. Patient states he has been having cough, wheezing, and sob for the past couple of weeks. Patient states cough is productive and over the counter medication isnt helping. Patient saw his allergist three days ago and told he had strep throat. Patient states symptoms worsening. Patient unable to tolerate PO medication well but is taking his augmentin. Patient admits to sore throat, nv and increased edema. Patient currently on prednisone 10mg for his lupus. Patient denies chest pain, fever, abd pain, diarrhea, dysuria and back pain.

## 2022-11-30 NOTE — ED CDU PROVIDER INITIAL DAY NOTE - CROS ED ENMT ALL NEG
Per Randa will not allocate GammaGard OR Gammunex to them.    I have contacted [facility] ([employer] team) to see if they are able to order this medication and I am told by Tahir that they are. I sent the rx to them for processing and they are telling me that pts copay is almost $2000 (when Ena ran this through [employer], the copay was $1759.67 but they have internal assistance to help pt).    There are no foundations available to help pt with this. The foundations have money to cover the Gamunex-C but under the diagnosis of ITP.    I sent the rx to them in hopes they may provide assistance for pt.    They will not be able to get the medication to us by Thursday.     I have notified Karishma of the above as Amber has been communicating with pts wife.    - - -

## 2022-11-30 NOTE — ED ADULT NURSE NOTE - OBJECTIVE STATEMENT
Male 47 years old with past medical history of Lupus and Asthma came in for cough. Pt reports productive cough with yellowish sputum for 3 weeks associated with shortness of breathe and wheezing. Reports also nausea, vomiting and decrease appetite and  sore throat.  Saw his allergist three days ago and was told he has strep throat. Denies chest pain, fever, headache or body aches. Will continue to monitor.

## 2022-11-30 NOTE — ED PROVIDER NOTE - OBJECTIVE STATEMENT
46 yo M with pmhx Lupus, asthma, dysphagia, hypothyroidism presenting with cough and sob x 2-3 weeks. Patient states he has been having cough, wheezing, and sob for the past couple of weeks. Patient states cough is productive and over the counter medication isnt helping. Patient saw his allergist three days ago and told he had strep throat. Patient states symptoms worsening. Patient unable to tolerate PO medication well but is taking his augmentin. Patient admits to sore throat, nv and increased edema. Patient currently on prednisone 10mg for his lupus. Patient denies chest pain, fever, abd pain, diarrhea, dysuria and back pain.

## 2022-11-30 NOTE — ED PROVIDER NOTE - CLINICAL SUMMARY MEDICAL DECISION MAKING FREE TEXT BOX
48 yo M with pmhx Lupus, asthma, dysphagia, hypothyroidism presenting with cough and sob x 2-3 weeks. Patient tachycardic, +2 pitting edema and bl lower lobe expiratory wheezing. Throat erythematous. WIll obtain labs, rvp, cxr and give nebs. Reassess pending results 46 yo M with pmhx Lupus, asthma, dysphagia, hypothyroidism presenting with cough and sob x 2-3 weeks. Patient tachycardic, +2 pitting edema and bl lower lobe expiratory wheezing. Throat erythematous. WIll obtain labs, rvp, cxr and give nebs. Reassess pending results    Attending MD Youssef: 46 yo M with pmhx Lupus, asthma, dysphagia, hypothyroidism presenting with cough and sob x 2-3 weeks. Patient states he has been having cough, wheezing, and sob for the past couple of weeks. Patient states cough is productive and over the counter medication isnt helping. Patient saw his allergist three days ago and told he had strep throat. Patient states symptoms worsening. Patient unable to tolerate PO medication well but is taking his augmentin. Patient admits to sore throat, nv and increased edema. Patient currently on prednisone 10mg for his lupus. Patient denies chest pain, fever, abd pain, diarrhea, dysuria and back pain.  Lungs  with scattered.   Will get RVP, labs, CXR and give duonebs and reassess.

## 2022-12-01 ENCOUNTER — TRANSCRIPTION ENCOUNTER (OUTPATIENT)
Age: 47
End: 2022-12-01

## 2022-12-01 ENCOUNTER — APPOINTMENT (OUTPATIENT)
Dept: INTERNAL MEDICINE | Facility: CLINIC | Age: 47
End: 2022-12-01

## 2022-12-01 ENCOUNTER — NON-APPOINTMENT (OUTPATIENT)
Age: 47
End: 2022-12-01

## 2022-12-01 ENCOUNTER — APPOINTMENT (OUTPATIENT)
Dept: PULMONOLOGY | Facility: CLINIC | Age: 47
End: 2022-12-01

## 2022-12-01 VITALS
OXYGEN SATURATION: 98 % | HEART RATE: 88 BPM | RESPIRATION RATE: 18 BRPM | TEMPERATURE: 98 F | SYSTOLIC BLOOD PRESSURE: 110 MMHG | DIASTOLIC BLOOD PRESSURE: 74 MMHG

## 2022-12-01 PROCEDURE — 85018 HEMOGLOBIN: CPT

## 2022-12-01 PROCEDURE — 99217: CPT

## 2022-12-01 PROCEDURE — 0225U NFCT DS DNA&RNA 21 SARSCOV2: CPT

## 2022-12-01 PROCEDURE — 82435 ASSAY OF BLOOD CHLORIDE: CPT

## 2022-12-01 PROCEDURE — 83880 ASSAY OF NATRIURETIC PEPTIDE: CPT

## 2022-12-01 PROCEDURE — G0378: CPT

## 2022-12-01 PROCEDURE — 84484 ASSAY OF TROPONIN QUANT: CPT

## 2022-12-01 PROCEDURE — 82803 BLOOD GASES ANY COMBINATION: CPT

## 2022-12-01 PROCEDURE — 85025 COMPLETE CBC W/AUTO DIFF WBC: CPT

## 2022-12-01 PROCEDURE — 82330 ASSAY OF CALCIUM: CPT

## 2022-12-01 PROCEDURE — 71045 X-RAY EXAM CHEST 1 VIEW: CPT

## 2022-12-01 PROCEDURE — 96374 THER/PROPH/DIAG INJ IV PUSH: CPT

## 2022-12-01 PROCEDURE — 83605 ASSAY OF LACTIC ACID: CPT

## 2022-12-01 PROCEDURE — 99285 EMERGENCY DEPT VISIT HI MDM: CPT | Mod: 25

## 2022-12-01 PROCEDURE — 94640 AIRWAY INHALATION TREATMENT: CPT

## 2022-12-01 PROCEDURE — 80053 COMPREHEN METABOLIC PANEL: CPT

## 2022-12-01 PROCEDURE — 85014 HEMATOCRIT: CPT

## 2022-12-01 PROCEDURE — 84132 ASSAY OF SERUM POTASSIUM: CPT

## 2022-12-01 PROCEDURE — 84295 ASSAY OF SERUM SODIUM: CPT

## 2022-12-01 PROCEDURE — 82947 ASSAY GLUCOSE BLOOD QUANT: CPT

## 2022-12-01 PROCEDURE — 96361 HYDRATE IV INFUSION ADD-ON: CPT

## 2022-12-01 PROCEDURE — 36415 COLL VENOUS BLD VENIPUNCTURE: CPT

## 2022-12-01 RX ORDER — IPRATROPIUM/ALBUTEROL SULFATE 18-103MCG
3 AEROSOL WITH ADAPTER (GRAM) INHALATION EVERY 6 HOURS
Refills: 0 | Status: DISCONTINUED | OUTPATIENT
Start: 2022-12-01 | End: 2022-12-03

## 2022-12-01 RX ADMIN — Medication 125 MICROGRAM(S): at 05:58

## 2022-12-01 RX ADMIN — PANTOPRAZOLE SODIUM 40 MILLIGRAM(S): 20 TABLET, DELAYED RELEASE ORAL at 08:30

## 2022-12-01 RX ADMIN — Medication 1 TABLET(S): at 08:30

## 2022-12-01 RX ADMIN — Medication 3 MILLILITER(S): at 08:33

## 2022-12-01 RX ADMIN — Medication 20 MILLIEQUIVALENT(S): at 08:31

## 2022-12-01 RX ADMIN — LOSARTAN POTASSIUM 50 MILLIGRAM(S): 100 TABLET, FILM COATED ORAL at 08:30

## 2022-12-01 RX ADMIN — Medication 200 MILLIGRAM(S): at 04:19

## 2022-12-01 RX ADMIN — Medication 50 MILLIGRAM(S): at 08:30

## 2022-12-01 NOTE — ED CDU PROVIDER SUBSEQUENT DAY NOTE - PHYSICAL EXAMINATION
· CONSTITUTIONAL: Well appearing, awake, alert, oriented to person, place, time/situation and in no apparent distress.  · HENMT: - - -  · HEAD: Head atraumatic, normal cephalic shape.  · HEAD: Head is atraumatic. Head shape is symmetrical.  · CARDIAC RATE: regular  · CARDIAC SOUNDS: S1-S2  · CARDIAC PEDAL EDEMA: +bilateral lower extremity pitting edema   · RESPIRATORY: - - -  · Breath Sounds: WHEEZES  · GASTROINTESTINAL: Abdomen soft, non-tender, no rebound, no guarding.  · MUSCULOSKELETAL: Spine appears normal, range of motion is not limited  · NEUROLOGICAL: Alert and oriented, clear speech, moving all extremities  · SKIN: Skin normal color for race, warm, dry and intact. No evidence of rash.  · PSYCHIATRIC: Alert and oriented to person, place, time/situation. normal mood and affect

## 2022-12-01 NOTE — ED CDU PROVIDER DISPOSITION NOTE - PATIENT PORTAL LINK FT
You can access the FollowMyHealth Patient Portal offered by [facility] by registering at the following website: [email or web address]. By joining [employer]’s FollowMyHealth portal, you will also be able to view your health information using other applications (apps) compatible with our system.

## 2022-12-01 NOTE — ED CDU PROVIDER DISPOSITION NOTE - NSFOLLOWUPINSTRUCTIONS_ED_ALL_ED_FT
Hydrate.     Start taking Prednisone 50mg for 4 days.    We recommend you follow up with your primary care provider within the next 2-3 days, please bring all of your results with you.     You can also follow up with your pulmonologist.    Please return to the Emergency Department with new, worsening, or concerning symptoms, such as:  -Shortness of breath or trouble breathing  -Pressure, pain, tightness in chest  -Facial drooping, arm weakness, or speech difficulty   -Head injury or loss of consciousness   -Nonstop bleeding or an open wound     *More detailed information regarding your visit and discharge can be found by reviewing this packet 1.  Stay well hydrated  2.  Continue current home medications, including inhalers as needed.  Hold prednisone 10mgs while on prednisone taper  3.  Take Prednisone 40mgs daily x 7 days  4.  Follow up with your pulmonologist, Dr. Gonzales next week.  Her office will contact you to set up an appointment  5.  Return to the ER for shortness of breath, chest pain, fevers or any other concerning symptoms

## 2022-12-01 NOTE — ED CDU PROVIDER SUBSEQUENT DAY NOTE - PROGRESS NOTE DETAILS
Reevaluated patient, lungs CTAB no wheezing. - Nae Wiggins PA-C pt comfortable no tachypnea sats 97 ra - peakflow 750 scattered exp wheeze on exam Patient seen at bedside in NAD.  VSS.  Patient resting comfortably.  O2 sat 97% on RA.  Lungs clear.  Discussed case with patient's pulmonologist, Dr. CARA Gonzales who is recommending DC home on prednisone 40mgs daily and she will see patient in the office next week and start tapering.  Patient agreeable with plan.  Strict return precautions provided.  -Carmelo Hampton PA-C

## 2022-12-01 NOTE — ED ADULT NURSE REASSESSMENT NOTE - NS ED NURSE REASSESS COMMENT FT1
11.00 Pt is evaluated by CDU MD Nitin hewitt . pt is feeling better.  Pt is discharged . Ml out  MINOR Rhodes   explained the follow up care & gave the discharge summary  . Pt has stable vitals steady gait A&OX 4 at the time of Discharge
07.00 Am Received the Pt from  ALEKSEY Arias . Pt is Observed for Acute BR Asthma excerbration . Received the Pt A&OX 4 obeys commands Mary Beth N/V/D fever chills cp SOB   Comfort care & safety measures continued  IV site looks clean & dry no signs of infiltration noted pt denies  pain IV site .  Pt is advised to call for help  call bell with in the reach pt verbalized the understanding .  pending CDU  MD dhaliwal . GCS 15/15 A&OX 4 PERRLA  size 3 Strong upper & lower extremities steady gait   No facial droop  No Hand Leg drop denies numbness tingling  Pt has cough +  mild wheeze BL  Pt not in respiratory distress speaks in clear sentences Continue to monitor
Pt received from ALEKSEY Jeronimo. Pt oriented to CDU & plan of care was discussed. Pt A&O x 4. Pt in CDU for frequent reeval, vitals per routine, continuous pulse ox, neb tx q 4hrs, peak flow q 4hrs, steroids. Pt denies any chest pain, SOB, dizziness, chills, or fever. Pt has wheezing on ascultation. V/S stable, pt afebrile,  IV in place, patent and free of signs of infiltration. Pt resting in bed. Safety & comfort measures maintained. Call bell in reach. Will continue to monitor.

## 2022-12-01 NOTE — ED CDU PROVIDER DISPOSITION NOTE - CLINICAL COURSE
48 yo M with pmhx Lupus (on pred 10mg daily), asthma (on trelegy), "dysphagia"- currently being worked up, hypothyroidism presenting with cough and sob x 2-3 weeks. Patient states he has been having cough, wheezing, and sob for the past couple of weeks. Patient states cough is productive and over the counter medication isnt helping. Patient saw his allergist three days ago and told he had strep throat. Patient states symptoms worsening. Patient unable to tolerate PO medication well but is taking his augmentin. Patient admits to sore throat, nv and increased edema. Patient currently on prednisone 10mg for his lupus. Patient denies chest pain, fever, abd pain, diarrhea, dysuria and back pain.  ED course: CXR negative, RVP negative. Given steroids and nebs. Plan to continue treatments with close observation in CDU. 46 yo M with pmhx Lupus (on pred 10mg daily), asthma (on trelegy), "dysphagia"- currently being worked up, hypothyroidism presenting with cough and sob x 2-3 weeks. Patient states he has been having cough, wheezing, and sob for the past couple of weeks. Patient states cough is productive and over the counter medication isnt helping. Patient saw his allergist three days ago and told he had strep throat. Patient states symptoms worsening. Patient unable to tolerate PO medication well but is taking his augmentin. Patient admits to sore throat, nv and increased edema. Patient currently on prednisone 10mg for his lupus. Patient denies chest pain, fever, abd pain, diarrhea, dysuria and back pain.  ED course: CXR negative, RVP negative. Given steroids and nebs. Plan to continue treatments with close observation in CDU.  In the CDU, O2 sat 97% on RA.  Lungs clear.  Discussed case with patient's pulmonologist, Dr. CARA Gonzales who is recommending DC home on prednisone 40mgs daily and she will see patient in the office next week and start tapering.  Patient agreeable with plan.  Strict return precautions provided.

## 2022-12-01 NOTE — ED CDU PROVIDER DISPOSITION NOTE - ATTENDING CONTRIBUTION TO CARE
I , Dr Miguelito Uriarte has seen and evaluated the patient.  The patient symptoms are stable.  The patient is stable for discharge home and will follow up with their primary physician and pulm.

## 2022-12-01 NOTE — ED CDU PROVIDER SUBSEQUENT DAY NOTE - HISTORY
No interval changes since initial CDU provider note. Pt without new complaint. NAD VSS. Plan to continue nebs/ steroids in the setting of asthma exacerbation. - MINOR Wiggins

## 2022-12-01 NOTE — ED CDU PROVIDER SUBSEQUENT DAY NOTE - ATTENDING APP SHARED VISIT CONTRIBUTION OF CARE
This was a shared visit with YANE.  I have reviewed and discussed the case with the YANE and agree with verified documentation unless otherwise documented.  I have independently spoken with and examined the patient and my documentation of history/pe and MDM are above.

## 2022-12-01 NOTE — ED CDU PROVIDER SUBSEQUENT DAY NOTE - NS ED ROS FT
Constitutional: No fever or chills  Eyes: No visual changes, no eye pain  CV: No chest pain + lower extremity edema  Resp: + SOB +cough  GI: No abd pain. No nausea or vomiting.   : No dysuria, hematuria.   MSK: No musculoskeletal pain  Skin: No rash  Psych: No complaints   Neuro: No headache. No numbness or tingling.  Endo: No known diabetes

## 2022-12-02 ENCOUNTER — TRANSCRIPTION ENCOUNTER (OUTPATIENT)
Age: 47
End: 2022-12-02

## 2022-12-02 RX ADMIN — CETIRIZINE HYDROCHLORIDE 0 MG: 10 TABLET ORAL at 00:00

## 2022-12-02 RX ADMIN — ANIFROLUMAB 0 MG/2ML: 300 INJECTION, SOLUTION INTRAVENOUS at 00:00

## 2022-12-07 RX ADMIN — ANIFROLUMAB 0 MG/2ML: 300 INJECTION, SOLUTION INTRAVENOUS at 00:00

## 2022-12-07 RX ADMIN — CETIRIZINE HYDROCHLORIDE 0 MG: 10 TABLET ORAL at 00:00

## 2022-12-07 RX ADMIN — ACETAMINOPHEN 0 MG: 325 TABLET ORAL at 00:00

## 2022-12-07 RX ADMIN — HYDROCORTISONE SODIUM SUCCINATE 0 MG: 100 INJECTION, POWDER, FOR SOLUTION INTRAMUSCULAR; INTRAVENOUS at 00:00

## 2022-12-08 ENCOUNTER — APPOINTMENT (OUTPATIENT)
Dept: RHEUMATOLOGY | Facility: CLINIC | Age: 47
End: 2022-12-08

## 2022-12-14 ENCOUNTER — APPOINTMENT (OUTPATIENT)
Dept: CARDIOLOGY | Facility: CLINIC | Age: 47
End: 2022-12-14
Payer: COMMERCIAL

## 2022-12-14 VITALS
BODY MASS INDEX: 38.49 KG/M2 | HEIGHT: 68 IN | TEMPERATURE: 97.7 F | HEART RATE: 96 BPM | SYSTOLIC BLOOD PRESSURE: 137 MMHG | WEIGHT: 254 LBS | OXYGEN SATURATION: 98 % | DIASTOLIC BLOOD PRESSURE: 83 MMHG

## 2022-12-14 LAB
ALBUMIN SERPL ELPH-MCNC: 4.4 G/DL
ALP BLD-CCNC: 88 U/L
ALT SERPL-CCNC: 64 U/L
ANION GAP SERPL CALC-SCNC: 13 MMOL/L
AST SERPL-CCNC: 47 U/L
BASOPHILS # BLD AUTO: 0.06 K/UL
BASOPHILS NFR BLD AUTO: 0.7 %
BILIRUB SERPL-MCNC: 0.3 MG/DL
BUN SERPL-MCNC: 8 MG/DL
CALCIUM SERPL-MCNC: 9.6 MG/DL
CHLORIDE SERPL-SCNC: 98 MMOL/L
CHOLEST SERPL-MCNC: 223 MG/DL
CK SERPL-CCNC: 153 U/L
CO2 SERPL-SCNC: 29 MMOL/L
CREAT SERPL-MCNC: 1 MG/DL
DEPRECATED D DIMER PPP IA-ACNC: 202 NG/ML DDU
EGFR: 94 ML/MIN/1.73M2
EOSINOPHIL # BLD AUTO: 0.14 K/UL
EOSINOPHIL NFR BLD AUTO: 1.5 %
ESTIMATED AVERAGE GLUCOSE: 160 MG/DL
GLUCOSE SERPL-MCNC: 104 MG/DL
HBA1C MFR BLD HPLC: 7.2 %
HCT VFR BLD CALC: 41.8 %
HDLC SERPL-MCNC: 63 MG/DL
HGB BLD-MCNC: 13.6 G/DL
IMM GRANULOCYTES NFR BLD AUTO: 0.5 %
LDLC SERPL CALC-MCNC: 142 MG/DL
LYMPHOCYTES # BLD AUTO: 2.2 K/UL
LYMPHOCYTES NFR BLD AUTO: 24.1 %
MAN DIFF?: NORMAL
MCHC RBC-ENTMCNC: 30.1 PG
MCHC RBC-ENTMCNC: 32.5 GM/DL
MCV RBC AUTO: 92.5 FL
MONOCYTES # BLD AUTO: 1.24 K/UL
MONOCYTES NFR BLD AUTO: 13.6 %
NEUTROPHILS # BLD AUTO: 5.43 K/UL
NEUTROPHILS NFR BLD AUTO: 59.6 %
NONHDLC SERPL-MCNC: 160 MG/DL
NT-PROBNP SERPL-MCNC: 69 PG/ML
PLATELET # BLD AUTO: 391 K/UL
POTASSIUM SERPL-SCNC: 3.4 MMOL/L
PROT SERPL-MCNC: 7 G/DL
RBC # BLD: 4.52 M/UL
RBC # FLD: 13.2 %
SODIUM SERPL-SCNC: 140 MMOL/L
T4 FREE SERPL-MCNC: 1.5 NG/DL
TRIGL SERPL-MCNC: 90 MG/DL
TSH SERPL-ACNC: 1.6 UIU/ML
WBC # FLD AUTO: 9.12 K/UL

## 2022-12-14 PROCEDURE — 99204 OFFICE O/P NEW MOD 45 MIN: CPT

## 2022-12-14 NOTE — ASSESSMENT
[FreeTextEntry1] : Assessment:\par 1.  LE edema\par 2.  Proteinuria\par 3.  Normal EF\par 4.  Lupus \par 5.  RA\par \par Plan\par 1.  He has proteinuria and edema, he should see nephrology to evaluate.  Is there nephrotic syndrome?\par 2.  Echocardiogram - will repeat \par 3.  Continue torsemide and potassium \par 4.  Would consider starting spiranolactone 25mg daily in addition to diuretic - will discuss w Dr. Everett \par 5.  Salt avoidance\par 6.  Compression garments \par 7.  I showed him a pneumatic pump that he can purchase.\par 8.  Return in 3 months

## 2022-12-14 NOTE — REASON FOR VISIT
[FreeTextEntry1] : 47M here for cardiac eval\par He has history of lupus, asthma, hypothyroidism\par He had swelling in both legs and feet 2-3 weeks ago\par \par He takes torsemide for this.  He is on torsemide 40mg daily \par \par Still with edema\par Was in hospital a few weeks ago for asthma\par \par Medications;\par infusions of RA/Lupus\par Prednisone 30mg daily (tapering )\par Synthroid 125\par Plaquenil 200mg BID\par Potassium 20 TID\par Torsemide 40mg daily \par Losartan 50mg daily

## 2022-12-15 ENCOUNTER — APPOINTMENT (OUTPATIENT)
Dept: RHEUMATOLOGY | Facility: CLINIC | Age: 47
End: 2022-12-15
Payer: COMMERCIAL

## 2022-12-15 ENCOUNTER — APPOINTMENT (OUTPATIENT)
Dept: NUCLEAR MEDICINE | Facility: HOSPITAL | Age: 47
End: 2022-12-15

## 2022-12-15 ENCOUNTER — LABORATORY RESULT (OUTPATIENT)
Age: 47
End: 2022-12-15

## 2022-12-15 VITALS
TEMPERATURE: 97.2 F | SYSTOLIC BLOOD PRESSURE: 146 MMHG | OXYGEN SATURATION: 96 % | DIASTOLIC BLOOD PRESSURE: 88 MMHG | RESPIRATION RATE: 16 BRPM | HEART RATE: 93 BPM

## 2022-12-15 VITALS
OXYGEN SATURATION: 97 % | SYSTOLIC BLOOD PRESSURE: 141 MMHG | RESPIRATION RATE: 16 BRPM | DIASTOLIC BLOOD PRESSURE: 87 MMHG | HEART RATE: 92 BPM

## 2022-12-15 PROCEDURE — 96374 THER/PROPH/DIAG INJ IV PUSH: CPT | Mod: 59

## 2022-12-15 PROCEDURE — 36415 COLL VENOUS BLD VENIPUNCTURE: CPT

## 2022-12-15 PROCEDURE — 96413 CHEMO IV INFUSION 1 HR: CPT

## 2022-12-15 RX ORDER — CETIRIZINE HYDROCHLORIDE 10 MG/1
10 TABLET, FILM COATED ORAL
Qty: 0 | Refills: 0 | Status: COMPLETED | OUTPATIENT
Start: 2022-12-10

## 2022-12-15 RX ORDER — ANIFROLUMAB 300 MG/2ML
300 INJECTION, SOLUTION INTRAVENOUS
Qty: 0 | Refills: 0 | Status: COMPLETED | OUTPATIENT
Start: 2022-12-02

## 2022-12-15 RX ORDER — ACETAMINOPHEN 325 MG/1
325 TABLET, FILM COATED ORAL
Qty: 0 | Refills: 0 | Status: COMPLETED | OUTPATIENT
Start: 2022-12-10

## 2022-12-15 RX ORDER — HYDROCORTISONE SODIUM SUCCINATE 100 MG/2ML
100 INJECTION, POWDER, FOR SOLUTION INTRAMUSCULAR; INTRAVENOUS
Qty: 0 | Refills: 0 | Status: COMPLETED | OUTPATIENT
Start: 2022-12-09

## 2022-12-15 NOTE — HISTORY OF PRESENT ILLNESS
[N/A] : N/A [Denies] : Denies [No] : No [Yes] : Yes [Declined] : Declined [Right upper extremity] : Right upper extremity [24g] : 24g [Medication Name: ___] : Medication Name: [unfilled] [Start Time: ___] : Medication Start Time: [unfilled] [End Time: ___] : Medication End Time: [unfilled] [IV discontinued. Intact. No signs or symptoms of IV complications noted. Time: ___] : IV discontinued. Intact. No signs or symptoms of IV complications noted. Time: [unfilled] [Patient  instructed to seek medical attention with signs and symptoms of adverse effects] : Patient  instructed to seek medical attention with signs and symptoms of adverse effects [Patient left unit in no acute distress] : Patient left unit in no acute distress [Medications administered as ordered and tolerated well.] : Medications administered as ordered and tolerated well. [Blood drawn at time of visit] : Blood drawn at time of visit [de-identified] : Median cubital vein [de-identified] : Routine labs drawn as per protocol [de-identified] : Patient presents for Saphnelo infusion, doing well overall. Patient reports 3 weeks ago he was in the hospital for asthma, diagnosed with strep and was on antibiotics. Patient reports doing better now, just have a mucous like cough that is lingering. Patient admits to still having moderate daily fatigue and body aches. Patient reports on Prednisone 30MG due to hospitalization but is tapering back down to 10MG (baseline done). Patient denies any pain, rashes or lesions to the skin. Patient pre-medicated, infusion tolerated well.

## 2022-12-16 ENCOUNTER — TRANSCRIPTION ENCOUNTER (OUTPATIENT)
Age: 47
End: 2022-12-16

## 2022-12-19 ENCOUNTER — TRANSCRIPTION ENCOUNTER (OUTPATIENT)
Age: 47
End: 2022-12-19

## 2022-12-20 ENCOUNTER — TRANSCRIPTION ENCOUNTER (OUTPATIENT)
Age: 47
End: 2022-12-20

## 2022-12-22 ENCOUNTER — APPOINTMENT (OUTPATIENT)
Dept: INTERNAL MEDICINE | Facility: CLINIC | Age: 47
End: 2022-12-22

## 2022-12-22 ENCOUNTER — OUTPATIENT (OUTPATIENT)
Dept: OUTPATIENT SERVICES | Facility: HOSPITAL | Age: 47
LOS: 1 days | End: 2022-12-22
Payer: COMMERCIAL

## 2022-12-22 ENCOUNTER — APPOINTMENT (OUTPATIENT)
Dept: RADIOLOGY | Facility: CLINIC | Age: 47
End: 2022-12-22

## 2022-12-22 ENCOUNTER — RESULT CHARGE (OUTPATIENT)
Age: 47
End: 2022-12-22

## 2022-12-22 ENCOUNTER — RX RENEWAL (OUTPATIENT)
Age: 47
End: 2022-12-22

## 2022-12-22 VITALS
WEIGHT: 254 LBS | HEART RATE: 102 BPM | DIASTOLIC BLOOD PRESSURE: 78 MMHG | TEMPERATURE: 97.3 F | OXYGEN SATURATION: 99 % | HEIGHT: 68 IN | BODY MASS INDEX: 38.49 KG/M2 | SYSTOLIC BLOOD PRESSURE: 121 MMHG

## 2022-12-22 DIAGNOSIS — Z00.8 ENCOUNTER FOR OTHER GENERAL EXAMINATION: ICD-10-CM

## 2022-12-22 DIAGNOSIS — J45.909 UNSPECIFIED ASTHMA, UNCOMPLICATED: ICD-10-CM

## 2022-12-22 DIAGNOSIS — R60.0 LOCALIZED EDEMA: ICD-10-CM

## 2022-12-22 DIAGNOSIS — M32.9 SYSTEMIC LUPUS ERYTHEMATOSUS, UNSPECIFIED: ICD-10-CM

## 2022-12-22 DIAGNOSIS — Z90.49 ACQUIRED ABSENCE OF OTHER SPECIFIED PARTS OF DIGESTIVE TRACT: Chronic | ICD-10-CM

## 2022-12-22 PROCEDURE — 99214 OFFICE O/P EST MOD 30 MIN: CPT | Mod: 25

## 2022-12-22 PROCEDURE — 36415 COLL VENOUS BLD VENIPUNCTURE: CPT

## 2022-12-22 PROCEDURE — 83036 HEMOGLOBIN GLYCOSYLATED A1C: CPT | Mod: QW

## 2022-12-22 PROCEDURE — 71046 X-RAY EXAM CHEST 2 VIEWS: CPT | Mod: 26

## 2022-12-22 PROCEDURE — 71046 X-RAY EXAM CHEST 2 VIEWS: CPT

## 2022-12-22 NOTE — REVIEW OF SYSTEMS
[Pain] : no pain [Dryness] : no dryness [Vision Problems] : no vision problems [Itching] : no itching [Earache] : no earache [Hearing Loss] : no hearing loss [Nosebleeds] : no nosebleeds [Sore Throat] : no sore throat [Hoarseness] : no hoarseness [Chest Pain] : no chest pain [Palpitations] : no palpitations [Claudication] : no  leg claudication [Orthopena] : no orthopnea [Paroxysmal Nocturnal Dyspnea] : no paroxysmal nocturnal dyspnea [Shortness Of Breath] : no shortness of breath [Wheezing] : no wheezing [Dyspnea on Exertion] : not dyspnea on exertion

## 2022-12-22 NOTE — HISTORY OF PRESENT ILLNESS
[FreeTextEntry8] : \par \par CC: nasal congestion cough productive greenish sputum and follow-up for diabetes \par  HPI the patient is a 47-year-old male with history of overweight, lupus, on steroids, asthma, borderline diabetes, borderline hyperlipidemia, Gerd, hypothyroidism, obstructive sleep apnea, who presents with three week history of productive cough nasal congestion fatigue. He denies fever chills but notes sick contacts daughter sick at home he denies shortness of breath lightheadedness dizziness chest pain. Patient denies polyuria polydipsia but notes being fluid overload\par \par

## 2022-12-22 NOTE — ASSESSMENT
[FreeTextEntry1] : Patient is a 47-year-old male with history of lupus on steroids, asthma, hypertension, hyperlipidemia, type II diabetes, hypothyroidism, obstructive sleep apnea, who presents for three week history of cough nasal congestion sinus drip with drainage from right\par \par \par 1. Most likely viral URI\par however given immunosuppression will give Augmentin 875 BID, respiratory swab taken\par start Atrovent nasal spray\par tessalon\par x-ray negative for pneumonia\par \par 2 most likely viral conjunctivitis\par start Cipro eyedrops to write\par observe for now\par \par 3. Type II diabetes\par starch audience 10 mg once a day\par hemoglobin A1c today 6.8\par \par 4. Fluid overloaded\par secondary to steroids\par jardiance may decrease fluid retention.\par \par 5 hypothyroidism\par continue levothyroxine check TFTs next visit follow-up in one month

## 2022-12-23 ENCOUNTER — RX RENEWAL (OUTPATIENT)
Age: 47
End: 2022-12-23

## 2022-12-23 LAB
RAPID RVP RESULT: NOT DETECTED
SARS-COV-2 RNA PNL RESP NAA+PROBE: NOT DETECTED

## 2022-12-30 ENCOUNTER — TRANSCRIPTION ENCOUNTER (OUTPATIENT)
Age: 47
End: 2022-12-30

## 2023-01-05 ENCOUNTER — TRANSCRIPTION ENCOUNTER (OUTPATIENT)
Age: 48
End: 2023-01-05

## 2023-01-05 DIAGNOSIS — R05.8 OTHER SPECIFIED COUGH: ICD-10-CM

## 2023-01-05 RX ORDER — FLUTICASONE PROPIONATE 50 UG/1
50 SPRAY, METERED NASAL TWICE DAILY
Qty: 1 | Refills: 0 | Status: ACTIVE | COMMUNITY
Start: 2023-01-05 | End: 1900-01-01

## 2023-01-05 RX ORDER — BENZONATATE 200 MG/1
200 CAPSULE ORAL AT BEDTIME
Qty: 20 | Refills: 0 | Status: COMPLETED | COMMUNITY
Start: 2022-11-28 | End: 2023-01-25

## 2023-01-05 RX ORDER — CETIRIZINE HYDROCHLORIDE 10 MG/1
10 TABLET, COATED ORAL
Qty: 0 | Refills: 0 | Status: COMPLETED | OUTPATIENT
Start: 2022-12-02 | End: 2023-01-05

## 2023-01-05 RX ORDER — AMOXICILLIN AND CLAVULANATE POTASSIUM 875; 125 MG/1; MG/1
875-125 TABLET, COATED ORAL
Refills: 0 | Status: COMPLETED | COMMUNITY
End: 2023-01-05

## 2023-01-13 ENCOUNTER — APPOINTMENT (OUTPATIENT)
Dept: RHEUMATOLOGY | Facility: CLINIC | Age: 48
End: 2023-01-13

## 2023-01-17 ENCOUNTER — APPOINTMENT (OUTPATIENT)
Dept: RADIOLOGY | Facility: HOSPITAL | Age: 48
End: 2023-01-17

## 2023-01-19 ENCOUNTER — RX RENEWAL (OUTPATIENT)
Age: 48
End: 2023-01-19

## 2023-01-23 ENCOUNTER — APPOINTMENT (OUTPATIENT)
Dept: INTERNAL MEDICINE | Facility: CLINIC | Age: 48
End: 2023-01-23
Payer: COMMERCIAL

## 2023-01-23 VITALS
SYSTOLIC BLOOD PRESSURE: 113 MMHG | BODY MASS INDEX: 38.65 KG/M2 | WEIGHT: 255 LBS | HEART RATE: 121 BPM | TEMPERATURE: 97.6 F | OXYGEN SATURATION: 96 % | HEIGHT: 68 IN | DIASTOLIC BLOOD PRESSURE: 78 MMHG

## 2023-01-23 PROCEDURE — 99214 OFFICE O/P EST MOD 30 MIN: CPT

## 2023-01-23 NOTE — HISTORY OF PRESENT ILLNESS
[FreeTextEntry1] : Follow-up for obesity, type II diabetes, hypertension, hypothyroidism, obesity and asthma [de-identified] : Patient is a 47-year-old male with history of history of lupus on steroids, asthma, hypertension, type II diabetes, obesity, hypothyroidism, who presents for follow-up patient feeling well denies shortness of breath joint pain has improved denies headache, dizziness, lightheadedness, fatigue actually doing quite well denies polyuria polydipsia however intolerant to jardiance secondary to frequent urination.

## 2023-01-23 NOTE — ASSESSMENT
[FreeTextEntry1] : Patient is a 47-year-old male with history of obesity, lupus, hypothyroidism, hypertension, asthma, myopathy, lumbar disc disease, type II diabetes, hypothyroidism, hyperlipidemia, who presents for routine follow-up\par \par \par 1. Asthma\par doing well on inhalers no recent decompensation\par also on prednisone 20 mg a day for lupus \par \par 2. Lupus\par continue prednisone 20 mg a day saphnelo\par follow-up with rheumatology\par \par 3 hypertension/venous insufficiency\par continue losartan 50 mg once a day and Toradol 20 mg once a day\par \par 4 type II diabetes\par intolerant to  jardiance will DC\par start ozempic 50 µg PO Q week\par follow-up in 4 to 6 weeks\par follow-up in four weeks\par \par 5. Obesity\par  on diet start ozempic \par \par 6 hypothyroidism\par continue levothyroxine hundred 25 µg check TFTs\par \par 7 hypokalemia\par secondary Toradol check potassium continue supplements

## 2023-01-23 NOTE — PHYSICAL EXAM
[Normal Sclera/Conjunctiva] : normal sclera/conjunctiva [Normal Outer Ear/Nose] : the outer ears and nose were normal in appearance [Normal Supraclavicular Nodes] : no supraclavicular lymphadenopathy [Normal Posterior Cervical Nodes] : no posterior cervical lymphadenopathy [Normal Anterior Cervical Nodes] : no anterior cervical lymphadenopathy [Normal] : no CVA or spinal tenderness

## 2023-01-23 NOTE — REVIEW OF SYSTEMS
[Dysuria] : no dysuria [Incontinence] : no incontinence [Hesitancy] : no hesitancy [Nocturia] : no nocturia [Hematuria] : no hematuria [Frequency] : frequency [Impotence] : no impotency [Poor Libido] : libido not poor [Negative] : Psychiatric

## 2023-01-24 ENCOUNTER — RX RENEWAL (OUTPATIENT)
Age: 48
End: 2023-01-24

## 2023-01-25 LAB
ANION GAP SERPL CALC-SCNC: 19 MMOL/L
BUN SERPL-MCNC: 10 MG/DL
CALCIUM SERPL-MCNC: 9.7 MG/DL
CHLORIDE SERPL-SCNC: 97 MMOL/L
CO2 SERPL-SCNC: 27 MMOL/L
CREAT SERPL-MCNC: 0.97 MG/DL
EGFR: 98 ML/MIN/1.73M2
GLUCOSE SERPL-MCNC: 132 MG/DL
POTASSIUM SERPL-SCNC: 3.4 MMOL/L
SODIUM SERPL-SCNC: 143 MMOL/L
T4 FREE SERPL-MCNC: 1.5 NG/DL
TSH SERPL-ACNC: 1.39 UIU/ML

## 2023-01-31 ENCOUNTER — APPOINTMENT (OUTPATIENT)
Dept: RHEUMATOLOGY | Facility: CLINIC | Age: 48
End: 2023-01-31
Payer: COMMERCIAL

## 2023-01-31 VITALS
SYSTOLIC BLOOD PRESSURE: 126 MMHG | DIASTOLIC BLOOD PRESSURE: 80 MMHG | OXYGEN SATURATION: 96 % | HEIGHT: 68 IN | WEIGHT: 255 LBS | TEMPERATURE: 97.6 F | HEART RATE: 110 BPM | BODY MASS INDEX: 38.65 KG/M2

## 2023-01-31 PROCEDURE — 99215 OFFICE O/P EST HI 40 MIN: CPT

## 2023-01-31 RX ADMIN — HYDROCORTISONE SODIUM SUCCINATE 0 MG: 100 INJECTION, POWDER, FOR SOLUTION INTRAMUSCULAR; INTRAVENOUS at 00:00

## 2023-01-31 RX ADMIN — ACETAMINOPHEN 0 MG: 325 TABLET ORAL at 00:00

## 2023-01-31 RX ADMIN — ANIFROLUMAB 0 MG/2ML: 300 INJECTION, SOLUTION INTRAVENOUS at 00:00

## 2023-01-31 RX ADMIN — CETIRIZINE HYDROCHLORIDE 0 MG: 10 TABLET ORAL at 00:00

## 2023-01-31 NOTE — PHYSICAL EXAM
[General Appearance - Alert] : alert [Sclera] : the sclera and conjunctiva were normal [Extraocular Movements] : extraocular movements were intact [Neck Appearance] : the appearance of the neck was normal [Abnormal Walk] : normal gait [Musculoskeletal - Swelling] : no joint swelling seen [Motor Tone] : muscle strength and tone were normal [Skin Color & Pigmentation] : normal skin color and pigmentation [] : no rash [Oriented To Time, Place, And Person] : oriented to person, place, and time [FreeTextEntry1] : no swollen joints + tender joints.

## 2023-01-31 NOTE — DATA REVIEWED
[FreeTextEntry1] : Laboratory and radiology studies that were personally reviewed at today's visit are summarized below and above: \par Pulm note (8-26-22)  felling better overall and planning PFT for 4-6 weeks. 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 \par 5-19-21:  MRI right thigh - no muscle edema moderate sized right knee joint effusion \par 5-19-21:  MRI Lumbar spine - diffuse spinal canal narrowing and mild disc bulge with mod spinal cnala stenosis \par Duplex Venous LE - no evidence of DVT\par 3-19-21:  Echo: normal pericardium, nl EF\par 11-6-2020:  Knee Xray:  WNL\par 8-2020:  Head CT - WNL\par \par 7-6-20:  cr = 1.2, cpk = 324, cbc  -okay , ua - okay, esr = 85 (increased from prior 43), prot/cr = 0.1, c3 = 120, c4 = 21, dsdna < 12,\par 6-2020:  tsh = 2.49, \par \par 3/10/20:  myomarker with positive F7vpNCK, U1RNP and SSA52, ck - 212, c3/c4 wnl \par \par dsDNA positive in 2019 and now negative \par \par 10-29-19:  CBC okay\par \par 9-11-19:  ua and prot/cr - wnl \par 8-1-19 - c3/c4 WNL, dsdna = 54\par NL G6PD\par LOW TPMT\par \par ct sinus (10-21-19) : Minimal mucosal thickening involves the left frontal, anterior ethmoid and left maxillary sinuses.  Rightward deviation of the nasal septum with a right nasal spur narrowing the right nasal cavity.  Narrowing of the left OMU which may predispose to sinus outflow obstruction. \par Small lucent lesion within the left maxilla favoring a benign fibro-osseous lesion. Follow-up sinus CT\par advised as clinically warranted in 6 months to confirm stability.\par \par lumbar spine (10-21-19):  Developmental limbus vertebra noted at anterior superior aspect of L4 with \par hypertrophic bony remodeling change. Chronic appearing well marginated slight concave contour depression of anterior aspect of L2 superior endplate.  Remaining vertebral body heights maintained. \par No spondylolistheses spondylolysis defects or evidence for dynamic  instability. \par Variable slightly narrowed disc spaces. Unremarkable SI joints and partially \par visualized hips. \par No lytic or blastic lesions. \par Right upper quadrant surgical clips present. \par head ct - 10-24-19:  Minimal mucosal thickening involves the left frontal, anterior ethmoid and \par left maxillary sinuses.  Rightward deviation of the nasal septum with a right nasal spur narrowing \par the right nasal cavity.  Narrowing of the left OMU which may predispose to sinus outflow obstruction. \par Small lucent lesion within the left maxilla favoring a benign fibro-osseous  lesion. Follow-up sinus CT advised as clinically warranted in 6 months to  confirm stability. \par

## 2023-01-31 NOTE — REASON FOR VISIT
[Follow-Up: _____] : a [unfilled] follow-up visit [Source: ______] : History obtained from [unfilled] [FreeTextEntry1] : SLE , fatigue, myositis, headache,

## 2023-01-31 NOTE — ASSESSMENT
[FreeTextEntry1] : 48 yo M hx SLE here follow up \par myalgias and fatigue - unclear if due to hypothyroid , sle/mctd or mood disorder\par + U1rnp and weak positive PM/Sc100\par OWEN 1:2560, +Sm, +RNP \par \par 1)   Lupus/inflammatory myositis overlap (abnormal Myomarker) \par muscle fatigue, sun sensitivity and headaches. CK normalized (was elevated in 500's in 2019), dsdna + (normalized after benlysta) \par continue   mg daily\par follow up optho given HCQ use. \par anafrolimumab to d/c given infection after each infusion\par continue prednsione 10mg daily \par add back mycophenolate as low dose myfortic and monitor for infection \par \par 2)  Asthma \par [] improved and working with pulm \par [] coordinate steroids with pulm\par \par 3)  COMPRESSION FRACTURE assoc. with cough \par [] check dexa  pending and reminded \par []  check vitamin d \par [] minimize steroids \par \par 4) LE edema\par continue torsemide\par followup with pmd \par \par \par More than 50% of the encounter was spent counselling  AMBERLY ESPITIA on differential, workup, disease course, and treatment/management.   Education was provided to AMBERLY ESPITIA during this encounter. All questions and concerns were answered and addressed in detail.  AMBERLY ESPITIA verbalized understanding and agreed to plan\par \par AMBERLY ESPITIA has been instructed to call for an earlier appointment if new symptoms develop \par AMBERLY ESPITIA has been instructed to make a follow up appointment 4 weeks \par \par \par \par return to office in 4 weeks \par continue prednsione 10mg daliu \par restart mycophenolate as myfortic 1 pill bid\par labs in 2 weeks. \par \par \par

## 2023-01-31 NOTE — HISTORY OF PRESENT ILLNESS
[Currently Experiencing] : currently experiencing [Psychological Symptoms] : psychological symptoms [Fatigue] : fatigue [Arthritis] : arthritis [Arthralgias] : arthralgias [Sun Sensitivity] : sun sensitivity [FreeTextEntry1] : AMBERLY ESPITIA is a 47 year  old male, seen on today  for\par SLE/MCTD overlap with OWEN 1:2560, + Sm + RNP + LA, low complement, + U1RNP and weak positive PM/. \par \par Other pertinent medical problems (structural back disease, asthma, migraine)\par \par saphnelo started in 9-2022 and he notes that after each infusion he has a cough for 1 week and then it recurs each infusion \par last infusion was 12-15-23 and missed the last infusion in 1-13-23.  \par Previously stopped mycophenolate because of infections when he took it. \par allergic reaction to benlysta \par has never been on imuran but has low TPMT (2.2) \par \par Joint pain has been severe and fatigue has been severe. \par \par on toresmide for diuresis and feels he has decreased his edema significantly and feels better. \par still wakes up with stiffness in the joints and takes him 5-15 minutes to loosen up in the morning. \par \par \par Compression Fracture T7:  found on MRI 8-1-22, sx of back pain out of proportion to prior back pain (which was lumbar), slowly improving and pending either kyphoplasty or PT based upon how he does over time.  Since he is improving they are favoring PT. \par No history of prior fractures \par pending orthopedics 9-20-22\par back pain is better - significantly better over the past 4-6 weeks \par \par Cough: resolved  \par \par Asthma: better controlled   \par \par no fevers, no chills  and improved cough since d/c\par  [History of Nephritis] : the patient has no history of nephritis [Headache] : no headaches [Sicca] : no sicca [Rash] : no rash [Chest Pain] : no chest pain [Shortness of Breath] : no shortness of breath [Abdominal Pain] : no abdominal pain [FreeTextEntry3] : 2019 [FreeTextEntry7] : OWEN 1:2560, + SM, + RNP, + LA, Low complement,  [FreeTextEntry4] : cellcept (infection), benlysta (allergy), saphenlo (current but has infection after each infusion)

## 2023-02-10 ENCOUNTER — APPOINTMENT (OUTPATIENT)
Dept: RHEUMATOLOGY | Facility: CLINIC | Age: 48
End: 2023-02-10

## 2023-02-18 ENCOUNTER — OUTPATIENT (OUTPATIENT)
Dept: OUTPATIENT SERVICES | Facility: HOSPITAL | Age: 48
LOS: 1 days | End: 2023-02-18
Payer: COMMERCIAL

## 2023-02-18 ENCOUNTER — APPOINTMENT (OUTPATIENT)
Dept: RADIOLOGY | Facility: IMAGING CENTER | Age: 48
End: 2023-02-18
Payer: COMMERCIAL

## 2023-02-18 DIAGNOSIS — R07.81 PLEURODYNIA: ICD-10-CM

## 2023-02-18 DIAGNOSIS — Z90.49 ACQUIRED ABSENCE OF OTHER SPECIFIED PARTS OF DIGESTIVE TRACT: Chronic | ICD-10-CM

## 2023-02-18 LAB
BASOPHILS # BLD AUTO: 0.04 K/UL
BASOPHILS NFR BLD AUTO: 0.3 %
EOSINOPHIL # BLD AUTO: 0.07 K/UL
EOSINOPHIL NFR BLD AUTO: 0.6 %
HCT VFR BLD CALC: 42.1 %
HGB BLD-MCNC: 13.4 G/DL
IMM GRANULOCYTES NFR BLD AUTO: 0.6 %
LYMPHOCYTES # BLD AUTO: 1.79 K/UL
LYMPHOCYTES NFR BLD AUTO: 15.3 %
MAN DIFF?: NORMAL
MCHC RBC-ENTMCNC: 28 PG
MCHC RBC-ENTMCNC: 31.8 GM/DL
MCV RBC AUTO: 88.1 FL
MONOCYTES # BLD AUTO: 1 K/UL
MONOCYTES NFR BLD AUTO: 8.5 %
NEUTROPHILS # BLD AUTO: 8.73 K/UL
NEUTROPHILS NFR BLD AUTO: 74.7 %
PLATELET # BLD AUTO: 334 K/UL
RBC # BLD: 4.78 M/UL
RBC # FLD: 14.5 %
WBC # FLD AUTO: 11.7 K/UL

## 2023-02-18 PROCEDURE — 71100 X-RAY EXAM RIBS UNI 2 VIEWS: CPT

## 2023-02-18 PROCEDURE — 71100 X-RAY EXAM RIBS UNI 2 VIEWS: CPT | Mod: 26

## 2023-02-19 ENCOUNTER — TRANSCRIPTION ENCOUNTER (OUTPATIENT)
Age: 48
End: 2023-02-19

## 2023-02-19 ENCOUNTER — RX RENEWAL (OUTPATIENT)
Age: 48
End: 2023-02-19

## 2023-02-19 LAB
25(OH)D3 SERPL-MCNC: 22 NG/ML
ALBUMIN SERPL ELPH-MCNC: 4.3 G/DL
ALP BLD-CCNC: 89 U/L
ALT SERPL-CCNC: 38 U/L
ANION GAP SERPL CALC-SCNC: 15 MMOL/L
APPEARANCE: CLEAR
AST SERPL-CCNC: 22 U/L
BACTERIA: NEGATIVE
BILIRUB SERPL-MCNC: 0.3 MG/DL
BILIRUBIN URINE: NEGATIVE
BLOOD URINE: NEGATIVE
BUN SERPL-MCNC: 12 MG/DL
CALCIUM SERPL-MCNC: 9.9 MG/DL
CHLORIDE SERPL-SCNC: 101 MMOL/L
CO2 SERPL-SCNC: 27 MMOL/L
COLOR: YELLOW
CREAT SERPL-MCNC: 0.99 MG/DL
CREAT SPEC-SCNC: 276 MG/DL
CREAT/PROT UR: 0.1 RATIO
EGFR: 95 ML/MIN/1.73M2
ERYTHROCYTE [SEDIMENTATION RATE] IN BLOOD BY WESTERGREN METHOD: 36 MM/HR
GLUCOSE QUALITATIVE U: NEGATIVE
GLUCOSE SERPL-MCNC: 99 MG/DL
HYALINE CASTS: 0 /LPF
KETONES URINE: NEGATIVE
LEUKOCYTE ESTERASE URINE: NEGATIVE
MICROSCOPIC-UA: NORMAL
NITRITE URINE: NEGATIVE
PH URINE: 6.5
POTASSIUM SERPL-SCNC: 3.7 MMOL/L
PROT SERPL-MCNC: 7 G/DL
PROT UR-MCNC: 21 MG/DL
PROTEIN URINE: ABNORMAL
RED BLOOD CELLS URINE: 1 /HPF
SODIUM SERPL-SCNC: 143 MMOL/L
SPECIFIC GRAVITY URINE: 1.03
SQUAMOUS EPITHELIAL CELLS: 0 /HPF
UROBILINOGEN URINE: NORMAL
WHITE BLOOD CELLS URINE: 2 /HPF

## 2023-02-20 ENCOUNTER — RX RENEWAL (OUTPATIENT)
Age: 48
End: 2023-02-20

## 2023-02-20 LAB
C3 SERPL-MCNC: 132 MG/DL
C4 SERPL-MCNC: 28 MG/DL
DSDNA AB SER-ACNC: <12 IU/ML

## 2023-02-23 ENCOUNTER — APPOINTMENT (OUTPATIENT)
Dept: INTERNAL MEDICINE | Facility: CLINIC | Age: 48
End: 2023-02-23

## 2023-03-06 ENCOUNTER — APPOINTMENT (OUTPATIENT)
Dept: INTERNAL MEDICINE | Facility: CLINIC | Age: 48
End: 2023-03-06
Payer: COMMERCIAL

## 2023-03-06 VITALS — HEIGHT: 68 IN | BODY MASS INDEX: 35.46 KG/M2 | WEIGHT: 234 LBS

## 2023-03-06 VITALS — TEMPERATURE: 97.8 F | HEART RATE: 103 BPM | OXYGEN SATURATION: 97 %

## 2023-03-06 PROCEDURE — 99214 OFFICE O/P EST MOD 30 MIN: CPT

## 2023-03-06 NOTE — ASSESSMENT
[FreeTextEntry1] : The patient is a 47-year-old male with history of lupus, asthma, obesity, hypertension, hyperlipidemia, hypothyroidism, type II diabetes, who presents with persistent right-sided pleuritic chest pain and weight loss with ozempic.\par \par 1 still persistent severe right sided chest wall pain\par will discuss with radiology breast imaging\par refills negative\par continue current management\par \par 2. Type II diabetes\par continue ozempic .5 mg q. week\par will check hemoglobin A1c next visit\par \par 3 obesity\par 21 pounds since last visit\par follow-up in 6 to 8 weeks\par \par 4 asthma\par well-controlled on inhalers\par \par 5. Borderline hyperlipidemia\par will need statin therapy in the near future diabetes inflammatory disease\par await improvement in chest wall pain\par \par 6 hypertension\par well-controlled on losartan 50 mg once a day\par \par 6 asthma\par continue trelegy\par well-controlled

## 2023-03-06 NOTE — PHYSICAL EXAM
[Normal Sclera/Conjunctiva] : normal sclera/conjunctiva [Normal Outer Ear/Nose] : the outer ears and nose were normal in appearance [Normal] : no CVA or spinal tenderness [de-identified] : Severe sensitivity of ribs right-sided six through 10 and somewhat sternum

## 2023-03-06 NOTE — HISTORY OF PRESENT ILLNESS
[FreeTextEntry1] : Follow-up for type II diabetes obesity and right sided rib chest wall pain [de-identified] : The patient is a 47-year-old with history of asthma hypertension,, lupus, hypothyroidism, Gerd, hypothyroidism, obesity, and type II diabetes who presents for follow-up patient notes 20 pound light weight loss since starting ozempic. He denies nausea vomiting diarrhea but complains of severe right sided chest wall pain almost went to ER in spite of medication denies shortness of breath cough.

## 2023-03-07 ENCOUNTER — EMERGENCY (EMERGENCY)
Facility: HOSPITAL | Age: 48
LOS: 1 days | Discharge: HOME | End: 2023-03-07
Payer: COMMERCIAL

## 2023-03-07 VITALS
RESPIRATION RATE: 19 BRPM | TEMPERATURE: 99 F | DIASTOLIC BLOOD PRESSURE: 80 MMHG | HEART RATE: 102 BPM | OXYGEN SATURATION: 97 % | SYSTOLIC BLOOD PRESSURE: 119 MMHG

## 2023-03-07 VITALS
HEIGHT: 68 IN | TEMPERATURE: 99 F | SYSTOLIC BLOOD PRESSURE: 90 MMHG | DIASTOLIC BLOOD PRESSURE: 68 MMHG | RESPIRATION RATE: 20 BRPM | HEART RATE: 105 BPM | OXYGEN SATURATION: 99 % | WEIGHT: 233.91 LBS

## 2023-03-07 DIAGNOSIS — Z90.49 ACQUIRED ABSENCE OF OTHER SPECIFIED PARTS OF DIGESTIVE TRACT: Chronic | ICD-10-CM

## 2023-03-07 LAB
ALBUMIN SERPL ELPH-MCNC: 4.4 G/DL — SIGNIFICANT CHANGE UP (ref 3.3–5)
ALP SERPL-CCNC: 105 U/L — SIGNIFICANT CHANGE UP (ref 40–120)
ALT FLD-CCNC: 60 U/L — HIGH (ref 10–45)
ANION GAP SERPL CALC-SCNC: 16 MMOL/L — SIGNIFICANT CHANGE UP (ref 5–17)
APTT BLD: 31.4 SEC — SIGNIFICANT CHANGE UP (ref 27.5–35.5)
AST SERPL-CCNC: 40 U/L — SIGNIFICANT CHANGE UP (ref 10–40)
BASE EXCESS BLDV CALC-SCNC: 8.5 MMOL/L — HIGH (ref -2–3)
BASOPHILS # BLD AUTO: 0.05 K/UL — SIGNIFICANT CHANGE UP (ref 0–0.2)
BASOPHILS NFR BLD AUTO: 0.5 % — SIGNIFICANT CHANGE UP (ref 0–2)
BILIRUB SERPL-MCNC: 0.8 MG/DL — SIGNIFICANT CHANGE UP (ref 0.2–1.2)
BLOOD GAS VENOUS - CREATININE: SIGNIFICANT CHANGE UP MG/DL (ref 0.5–1.3)
BUN SERPL-MCNC: 10 MG/DL — SIGNIFICANT CHANGE UP (ref 7–23)
CA-I SERPL-SCNC: 1.16 MMOL/L — SIGNIFICANT CHANGE UP (ref 1.15–1.33)
CALCIUM SERPL-MCNC: 9.9 MG/DL — SIGNIFICANT CHANGE UP (ref 8.4–10.5)
CHLORIDE BLDV-SCNC: 98 MMOL/L — SIGNIFICANT CHANGE UP (ref 96–108)
CHLORIDE SERPL-SCNC: 96 MMOL/L — SIGNIFICANT CHANGE UP (ref 96–108)
CO2 BLDV-SCNC: 37 MMOL/L — HIGH (ref 22–26)
CO2 SERPL-SCNC: 26 MMOL/L — SIGNIFICANT CHANGE UP (ref 22–31)
CREAT SERPL-MCNC: 1.07 MG/DL — SIGNIFICANT CHANGE UP (ref 0.5–1.3)
EGFR: 87 ML/MIN/1.73M2 — SIGNIFICANT CHANGE UP
EOSINOPHIL # BLD AUTO: 0.26 K/UL — SIGNIFICANT CHANGE UP (ref 0–0.5)
EOSINOPHIL NFR BLD AUTO: 2.5 % — SIGNIFICANT CHANGE UP (ref 0–6)
GAS PNL BLDV: 136 MMOL/L — SIGNIFICANT CHANGE UP (ref 136–145)
GAS PNL BLDV: SIGNIFICANT CHANGE UP
GAS PNL BLDV: SIGNIFICANT CHANGE UP
GLUCOSE BLDV-MCNC: 99 MG/DL — SIGNIFICANT CHANGE UP (ref 70–99)
GLUCOSE SERPL-MCNC: 98 MG/DL — SIGNIFICANT CHANGE UP (ref 70–99)
HCO3 BLDV-SCNC: 35 MMOL/L — HIGH (ref 22–29)
HCT VFR BLD CALC: 45.9 % — SIGNIFICANT CHANGE UP (ref 39–50)
HCT VFR BLDA CALC: 46 % — SIGNIFICANT CHANGE UP (ref 39–51)
HGB BLD CALC-MCNC: 15.4 G/DL — SIGNIFICANT CHANGE UP (ref 12.6–17.4)
HGB BLD-MCNC: 14.9 G/DL — SIGNIFICANT CHANGE UP (ref 13–17)
IMM GRANULOCYTES NFR BLD AUTO: 1.8 % — HIGH (ref 0–0.9)
INR BLD: 1.09 RATIO — SIGNIFICANT CHANGE UP (ref 0.88–1.16)
LACTATE BLDV-MCNC: 2.1 MMOL/L — HIGH (ref 0.5–2)
LYMPHOCYTES # BLD AUTO: 1.91 K/UL — SIGNIFICANT CHANGE UP (ref 1–3.3)
LYMPHOCYTES # BLD AUTO: 18.2 % — SIGNIFICANT CHANGE UP (ref 13–44)
MAGNESIUM SERPL-MCNC: 1.9 MG/DL — SIGNIFICANT CHANGE UP (ref 1.6–2.6)
MCHC RBC-ENTMCNC: 27.5 PG — SIGNIFICANT CHANGE UP (ref 27–34)
MCHC RBC-ENTMCNC: 32.5 GM/DL — SIGNIFICANT CHANGE UP (ref 32–36)
MCV RBC AUTO: 84.7 FL — SIGNIFICANT CHANGE UP (ref 80–100)
MONOCYTES # BLD AUTO: 1.1 K/UL — HIGH (ref 0–0.9)
MONOCYTES NFR BLD AUTO: 10.5 % — SIGNIFICANT CHANGE UP (ref 2–14)
NEUTROPHILS # BLD AUTO: 6.96 K/UL — SIGNIFICANT CHANGE UP (ref 1.8–7.4)
NEUTROPHILS NFR BLD AUTO: 66.5 % — SIGNIFICANT CHANGE UP (ref 43–77)
NRBC # BLD: 0 /100 WBCS — SIGNIFICANT CHANGE UP (ref 0–0)
NT-PROBNP SERPL-SCNC: <36 PG/ML — SIGNIFICANT CHANGE UP (ref 0–300)
PCO2 BLDV: 54 MMHG — SIGNIFICANT CHANGE UP (ref 42–55)
PH BLDV: 7.42 — SIGNIFICANT CHANGE UP (ref 7.32–7.43)
PLATELET # BLD AUTO: 351 K/UL — SIGNIFICANT CHANGE UP (ref 150–400)
PO2 BLDV: 23 MMHG — LOW (ref 25–45)
POTASSIUM BLDV-SCNC: 2.8 MMOL/L — CRITICAL LOW (ref 3.5–5.1)
POTASSIUM SERPL-MCNC: 3.3 MMOL/L — LOW (ref 3.5–5.3)
POTASSIUM SERPL-SCNC: 3.3 MMOL/L — LOW (ref 3.5–5.3)
PROT SERPL-MCNC: 7.8 G/DL — SIGNIFICANT CHANGE UP (ref 6–8.3)
PROTHROM AB SERPL-ACNC: 12.6 SEC — SIGNIFICANT CHANGE UP (ref 10.5–13.4)
RAPID RVP RESULT: SIGNIFICANT CHANGE UP
RBC # BLD: 5.42 M/UL — SIGNIFICANT CHANGE UP (ref 4.2–5.8)
RBC # FLD: 14.6 % — HIGH (ref 10.3–14.5)
SAO2 % BLDV: 40.7 % — LOW (ref 67–88)
SARS-COV-2 RNA SPEC QL NAA+PROBE: SIGNIFICANT CHANGE UP
SODIUM SERPL-SCNC: 138 MMOL/L — SIGNIFICANT CHANGE UP (ref 135–145)
TROPONIN T, HIGH SENSITIVITY RESULT: 28 NG/L — SIGNIFICANT CHANGE UP (ref 0–51)
TROPONIN T, HIGH SENSITIVITY RESULT: 29 NG/L — SIGNIFICANT CHANGE UP (ref 0–51)
WBC # BLD: 10.47 K/UL — SIGNIFICANT CHANGE UP (ref 3.8–10.5)
WBC # FLD AUTO: 10.47 K/UL — SIGNIFICANT CHANGE UP (ref 3.8–10.5)

## 2023-03-07 PROCEDURE — 36415 COLL VENOUS BLD VENIPUNCTURE: CPT

## 2023-03-07 PROCEDURE — 85025 COMPLETE CBC W/AUTO DIFF WBC: CPT

## 2023-03-07 PROCEDURE — 71275 CT ANGIOGRAPHY CHEST: CPT | Mod: MC

## 2023-03-07 PROCEDURE — 85730 THROMBOPLASTIN TIME PARTIAL: CPT

## 2023-03-07 PROCEDURE — 0225U NFCT DS DNA&RNA 21 SARSCOV2: CPT

## 2023-03-07 PROCEDURE — 84295 ASSAY OF SERUM SODIUM: CPT

## 2023-03-07 PROCEDURE — 82330 ASSAY OF CALCIUM: CPT

## 2023-03-07 PROCEDURE — 99283 EMERGENCY DEPT VISIT LOW MDM: CPT | Mod: GC

## 2023-03-07 PROCEDURE — 82947 ASSAY GLUCOSE BLOOD QUANT: CPT

## 2023-03-07 PROCEDURE — 96375 TX/PRO/DX INJ NEW DRUG ADDON: CPT

## 2023-03-07 PROCEDURE — 83880 ASSAY OF NATRIURETIC PEPTIDE: CPT

## 2023-03-07 PROCEDURE — 85610 PROTHROMBIN TIME: CPT

## 2023-03-07 PROCEDURE — 82435 ASSAY OF BLOOD CHLORIDE: CPT

## 2023-03-07 PROCEDURE — 99285 EMERGENCY DEPT VISIT HI MDM: CPT

## 2023-03-07 PROCEDURE — 85014 HEMATOCRIT: CPT

## 2023-03-07 PROCEDURE — 80053 COMPREHEN METABOLIC PANEL: CPT

## 2023-03-07 PROCEDURE — 85018 HEMOGLOBIN: CPT

## 2023-03-07 PROCEDURE — 86160 COMPLEMENT ANTIGEN: CPT

## 2023-03-07 PROCEDURE — 71275 CT ANGIOGRAPHY CHEST: CPT | Mod: 26,MC

## 2023-03-07 PROCEDURE — 84484 ASSAY OF TROPONIN QUANT: CPT

## 2023-03-07 PROCEDURE — 96365 THER/PROPH/DIAG IV INF INIT: CPT

## 2023-03-07 PROCEDURE — 82803 BLOOD GASES ANY COMBINATION: CPT

## 2023-03-07 PROCEDURE — 82565 ASSAY OF CREATININE: CPT

## 2023-03-07 PROCEDURE — 99285 EMERGENCY DEPT VISIT HI MDM: CPT | Mod: 25

## 2023-03-07 PROCEDURE — 84132 ASSAY OF SERUM POTASSIUM: CPT

## 2023-03-07 PROCEDURE — 93005 ELECTROCARDIOGRAM TRACING: CPT

## 2023-03-07 PROCEDURE — 82550 ASSAY OF CK (CPK): CPT

## 2023-03-07 PROCEDURE — 83605 ASSAY OF LACTIC ACID: CPT

## 2023-03-07 PROCEDURE — 83735 ASSAY OF MAGNESIUM: CPT

## 2023-03-07 PROCEDURE — 86225 DNA ANTIBODY NATIVE: CPT

## 2023-03-07 PROCEDURE — 96361 HYDRATE IV INFUSION ADD-ON: CPT

## 2023-03-07 PROCEDURE — 86140 C-REACTIVE PROTEIN: CPT

## 2023-03-07 PROCEDURE — 85652 RBC SED RATE AUTOMATED: CPT

## 2023-03-07 RX ORDER — DULOXETINE HYDROCHLORIDE 30 MG/1
1 CAPSULE, DELAYED RELEASE ORAL
Qty: 14 | Refills: 0
Start: 2023-03-07 | End: 2023-03-20

## 2023-03-07 RX ORDER — ACETAMINOPHEN 500 MG
1000 TABLET ORAL ONCE
Refills: 0 | Status: COMPLETED | OUTPATIENT
Start: 2023-03-07 | End: 2023-03-07

## 2023-03-07 RX ORDER — ONDANSETRON 8 MG/1
4 TABLET, FILM COATED ORAL ONCE
Refills: 0 | Status: COMPLETED | OUTPATIENT
Start: 2023-03-07 | End: 2023-03-07

## 2023-03-07 RX ORDER — SODIUM CHLORIDE 9 MG/ML
1000 INJECTION INTRAMUSCULAR; INTRAVENOUS; SUBCUTANEOUS ONCE
Refills: 0 | Status: COMPLETED | OUTPATIENT
Start: 2023-03-07 | End: 2023-03-07

## 2023-03-07 RX ORDER — POTASSIUM CHLORIDE 20 MEQ
40 PACKET (EA) ORAL ONCE
Refills: 0 | Status: COMPLETED | OUTPATIENT
Start: 2023-03-07 | End: 2023-03-07

## 2023-03-07 RX ADMIN — SODIUM CHLORIDE 1000 MILLILITER(S): 9 INJECTION INTRAMUSCULAR; INTRAVENOUS; SUBCUTANEOUS at 11:39

## 2023-03-07 RX ADMIN — Medication 40 MILLIEQUIVALENT(S): at 18:56

## 2023-03-07 RX ADMIN — SODIUM CHLORIDE 1000 MILLILITER(S): 9 INJECTION INTRAMUSCULAR; INTRAVENOUS; SUBCUTANEOUS at 13:14

## 2023-03-07 RX ADMIN — Medication 400 MILLIGRAM(S): at 11:39

## 2023-03-07 RX ADMIN — Medication 1000 MILLIGRAM(S): at 12:14

## 2023-03-07 RX ADMIN — ONDANSETRON 4 MILLIGRAM(S): 8 TABLET, FILM COATED ORAL at 13:00

## 2023-03-07 NOTE — ED ADULT TRIAGE NOTE - BMI (KG/M2)
[facility]ant Discharge


Subjective/Events-Last Exam


No concerns.


Date Patient Was Seen:  Oct 5, 2017


Time Patient Was Seen:  09:23





Condition/Feeding


 Feeding Method:  Breast Milk-Exclusive





Discharge Examination


Level of Alertness:  Alert


Cry Description:  Lusty


Activity/State:  Active Alert


Head Circumference:  13.50


Fontanelles:  Soft


Anterior [place] Descriptio:  WNL


Sclera Description:  Clear (RR present bilaterally 10/5)


Ears:  Normal


Mouth, Nose, Eyes:  Hard & Soft Palate Intact


Neck:  Head Mobile, Clavicles Intact


Chest Circumference:  12.50


Cardiovascular:  Regular Rhythm, No Murmur


Respiratory:  Regular, Unlabored


Breath Sounds:  Clear


Abdomen Circumference:  11.00


Genitalia:  Appear Normal


Genitalia Comments:  


s/p Gomco circ 1.3 clamp


Back:  Spine Closed


Hips:  WNL


Movement:  Symmetric-Body, Full ROM, Symmetric-Face


Muscle Tone:  Active


Extremities:  5 digits present on each extremity


Reflexes:  [place], Suck, Grasp-Bilateral





Weight/Height


Height (Inches):  20.50


Height (Calculated Centimeters:  52.652387


Weight (Pounds):  6


Weight (Ounces):  2.9


Weight (Calculated Kilograms):  2.429367


Weight (Calculated Grams):  2803.768





Vital Signs/Labs/SS


Vital Signs





Vital Signs








  Date Time  Temp Pulse Resp B/P (MAP) Pulse Ox O2 Delivery O2 Flow Rate FiO2


 


10/5/17 01:26     99   


 


10/4/17 20:10 98.9 128 36     


 


10/4/17 08:15 98.5 152 44     


 


10/3/17 21:00 98.1 144 40     


 


10/3/17 11:15 97.6 136   99   


 


10/3/17 10:40 98.2 146 50  97   


 


10/3/17 10:12 97.8 168 50  94   


 


10/3/17 10:02 98.0 164 54  97   


 


10/3/17 09:44 97.8 168 50  99   








Labs


Laboratory Tests


10/4/17 10:05:  Total Bilirubin 5.2L





Hearing Screening


Date of Hearing Screening:  Oct 3, 2017


Results of Hearing Screening:  Pass





Discharge Diagnosis/Plan


Discharge Diagnosis/Impression:  Birth (repeat c/s), Infant (male), Living, Term


Diagnosis/Problems:  


(1) [place]


Qualifiers:  


   Qualified Codes:  Z38.2 - Single liveborn infant, unspecified as to place of 

birth


Assessment & Plan:  Repeat c/s at 38 weeks for maternal mild pre-eclampsia


- Routine  care


- Circ done


- 24h bili 5.2 however infant appears more jaundiced, will repeat bili prior to 

DC.


- DC home today, will follow-up w/ JOSÉ LUIS Johnson DO Oct 5, 2017 09:25
35.6

## 2023-03-07 NOTE — CONSULT NOTE ADULT - ATTENDING COMMENTS
I personally examined and interviewed the patient. Agree with rheumatology fellow's note with my edits as above

## 2023-03-07 NOTE — CONSULT NOTE ADULT - ASSESSMENT
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 Manifestations in the past have included joint pain, fatigue, sun sensitivity with serologies: OWEN 1:2560, +dsDNA 60s, +Sm, +RNP, +LAC, low complements, +U1RNP, weak+ PM/. Currently on PO Prednisone 10mg/day, HCQ 400mg/day, and recently restated Myfortic 360mg BID since 1/31/23. Has myalgias, arthralgias, possible weakness (limited 2/2 pain and muscle spasms), and PIP stiffness which can represent flare of underlying autoimmune etiology, but infection should be ruled out in the setting of chills, abdominal pain, nausea over 3 days and immunosuppressed   -check RVP  -check CK, dsDNA, C3, C4, urinalysis, urine p/c ratio, ESR and CRP  -c/w HCQ 400mg/day  -c/w PO Prednisone 10mg/day  -hold Myfortic until infection r/o  -if there are no signs of infection, can consider increase in prednisone vs. Myfortic for flare. Favor increase in myfortic to avoid   -PPI daily given GI symptoms and chronic steroids    #Chest wall pain: Patient with 1 month of right chest wall pain beneath the right pectoralis muscle x1 month in the setting of "popping" sensation when reaching for object in truck and worsens with movements, reproducible on exam. No skin changes or burning sensation to suggest shingles. Most likely muscular or soft tissue related. Can consider ddx costochondritis. CT PE negative, including for bony pathology.   -f/u echo per primary team  -consider MRI right chest    Plan incomplete until discussed with Dr. 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 Manifestations in the past have included joint pain, fatigue, sun sensitivity with serologies: OWEN 1:2560, +dsDNA 60s, +Sm, +RNP, +LAC, low complements, +U1RNP, weak+ PM/. Currently on PO Prednisone 10mg/day, HCQ 400mg/day, and recently restated Myfortic 360mg BID since 1/31/23. Has myalgias (CK negative), arthralgias, possible weakness (limited 2/2 pain and muscle spasms), and PIP stiffness which can represent flare of underlying autoimmune etiology but lacks supporting objective findings and laboratory data. RVP negative, less likely infection. In the context of hypokalemia and frequent urination 2/2 torsemide, can consider dehydration and electrolyte abnormalities as contributing factors  -check CK, dsDNA, C3, C4, urinalysis, urine p/c ratio, ESR and CRP  -c/w HCQ 400mg/day  -c/w PO Prednisone 10mg/day. Would caution increase given already with compression fracture 2/2 chronic steroid use  -c/w PO Myfortic 360mg BID. Can consider increasing dose outpatient when following up with Dr. Neri. Has follow up scheduled for next week  -PPI daily given GI symptoms and chronic steroids    #Chest wall pain: Patient with 1 month of right chest wall pain beneath the right pectoralis muscle x1 month in the setting of "popping" sensation when reaching for object in truck and worsens with movements, reproducible on exam. No skin changes or burning sensation to suggest shingles. DDx can include muscular or soft tissue related vs. costochondritis vs. referred pain or neurologic impingement from compression fracture CT PE negative, including for bony pathology.   -start cymbalta 30mg at night  -consider MRI right chest outpatient if symptoms do not improve    Plan discussed with Dr. 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 Manifestations in the past have included joint pain, fatigue, sun sensitivity with serologies: OWEN 1:2560, +dsDNA 60s, +Sm, +RNP, +LAC, low complements, +U1RNP, weak+ PM/. Currently on PO Prednisone 10mg/day, HCQ 400mg/day, and recently restated Myfortic 360mg BID since 1/31/23. Has myalgias (CK negative), arthralgias, possible weakness (limited 2/2 pain and muscle spasms), and PIP stiffness which can represent flare of underlying autoimmune etiology but lacks supporting objective findings and laboratory data. RVP negative, less likely infection. In the context of hypokalemia and frequent urination 2/2 torsemide, can consider dehydration and electrolyte abnormalities as contributing factors    # Chronic pain, multiple tender points and fatugue- ? component of secondary fibromyalgia  Chest wall pain: Patient with 1 month of right chest wall pain beneath the right pectoralis muscle x1 month in the setting of "popping" sensation when reaching for object in truck and worsens with movements, reproducible on exam. No skin changes or burning sensation to suggest shingles. DDx can include muscular or soft tissue related vs. costochondritis vs. referred pain or neurologic impingement from compression fracture CT PE negative, including for bony pathology.     # compression fx T7    -check CK, dsDNA, C3, C4, urinalysis, urine p/c ratio, ESR and CRP  -start cymbalta 30mg at night  -c/w HCQ 400mg/day  -c/w PO Prednisone 10mg/day, but would minimize the use of steroids , especially considering presence of compression fracture 2/2 chronic steroid use  -c/w PO Myfortic 360mg BID. Can consider increasing dose outpatient when following up with Dr. Neri. Has follow up scheduled for next week  -PPI daily given GI symptoms and chronic steroids    Plan discussed with Dr. Humble Mae DO  Rheumatology Fellow PGY5  Pager [phone]

## 2023-03-07 NOTE — ED PROVIDER NOTE - NSFOLLOWUPINSTRUCTIONS_ED_ALL_ED_FT
Follow-up with your primary doctor in the next 2 to 3 days.  Additionally please follow-up with your rheumatologist Dr. Neri within the next 1 week for further evaluation/management.    As per rheumatology team, recommend starting Cymbalta 30 mg at night.  Recommend taking over-the-counter Prilosec once daily.  Please follow instructions on packaging for appropriate usage.    Continue current home medications as previously prescribed otherwise.    Return to the Emergency Department immediately if you develop any new/worsening symptoms including but not limited to increasing chest pain, difficulty breathing, numbness/tingling, weakness, dizziness/lightheadedness or any other concerning symptoms.

## 2023-03-07 NOTE — ED PROVIDER NOTE - OBJECTIVE STATEMENT
48 yo male pmhx SLE on prednisone 10mg daily and hydroxychloroquine, asthma, hypothyroidism presents to the ED c/o R sided chest wall pain for the past 1 month, increasing in severity with intermittent associated shortness of breath. Pt recalls 1 month ago he was reaching over his head and felt a "pull" in R chest wall, felt soreness in chest wall after that. Felt similar to when he's had costochondritis in the past but sxs are not improving. Pain pleuritic, worse with direct touch and changing body position. Denies n/v/d, abd pain, fever/chills, recent travel, hemoptysis, numbness/tingling, dizziness/lightheadedness, hemoptysis.   Rheumatologist: Dr. Neri.

## 2023-03-07 NOTE — ED PROVIDER NOTE - PATIENT PORTAL LINK FT
You can access the FollowMyHealth Patient Portal offered by [facility] by registering at the following website: [email or web address]. By joining [facility]’s FollowMyHealth portal, you will also be able to view your health information using other applications (apps) compatible with our system.

## 2023-03-07 NOTE — ED PROVIDER NOTE - SEVERE SEPSIS ALERT DETAILS
isolated lactate elevation to 2.1 without other signs of infection at this time. Will continue to monitor for sepsis.

## 2023-03-07 NOTE — ED PROVIDER NOTE - ATTENDING APP SHARED VISIT CONTRIBUTION OF CARE
MD Shea:  patient seen and evaluated with the PA.  I was present for key portions of the History and Physical, and I agree with the Impression and Plan.      ECG directly visualized by me and shows sinus tachycardia, rate 106, , QRS 92, QTc 446 MD Shea:  patient seen and evaluated with the PA.  I was present for key portions of the History and Physical, and I agree with the Impression and Plan.    47-year-old male complaining of right-sided pleuritic chest pain  Duration: 1 month.    Onset of pain was after lifting something heavy over his head, felt a pop on the right side of his chest m; felt like it was costochondritis but the pain has been persistent and unabating, in fact worsening.  Medical history significant for lupus; taking hydroxychloroquine, prednisone 10 mg daily, torsemide.  Context: Primary rheumatologist is Dr. Betancourt who is aware the patient is in the ED.  Vital signs: Heart rate 110s, blood pressure 90/68, respirations 20, temperature 98.6  General: Adult male, morbidly obese, mild distress.  Appears to be in pain when changing body posture from laying supine to sitting upright.  Right chest wall tender to palpation  Lungs are clear to auscultation bilaterally throughout  Cardiac: Regular rate and rhythm, no rubs murmurs or gallops  Abdomen: Soft, nondistended nontender  Extremities: No edema  Neurologic: Alert and oriented x3, strength is 5 out of 5 bilaterally throughout  Psychiatric: Appropriate    Impression and plan: Differential diagnosis at this time includes pulmonary embolism given history of lupus, sinus tachycardia and chest pain without any clear alternative explanation.  CTA chest is indicated; D-dimer not indicated at this time given that he is high risk enough.    Other differential diagnoses include lupus myocarditis versus NSTEMI, therefore patient will be worked up with cardiac enzymes basic labs.    ECG directly visualized by me and shows sinus tachycardia, rate 106, , QRS 92, QTc 446

## 2023-03-07 NOTE — ED PROVIDER NOTE - PROGRESS NOTE DETAILS
Initial ED attending plan was to place patient in CDU for echocardiogram.  Rheumatology cleared patient for discharge home from their standpoint, recommended Cymbalta.  Patient does not wish to stay for echocardiogram, wishes to follow-up as outpatient.  Discussed with current ED attending Dr. Riley who advised patient would have to leave AMA.  Patient has capacity to make his own medical decisions, explained risk/benefits of leaving against medical advice prior to complete work-up with echocardiogram and potential for undiagnosed underlying pathology, and he acknowledged understanding, patient aware he can return to ED at anytime with any new/worsening symptoms.- Williams Ruiz PA-C Initial ED attending plan was to place patient in CDU for echocardiogram.  Rheumatology cleared patient for discharge home from their standpoint, recommended Cymbalta.  Patient does not wish to stay for echocardiogram, wishes to follow-up as outpatient.  Discussed with current ED attending Dr. Bertrand who advised patient would have to leave AMA.  Patient has capacity to make his own medical decisions, explained risk/benefits of leaving against medical advice prior to complete work-up with echocardiogram and potential for undiagnosed underlying pathology, and he acknowledged understanding, patient aware he can return to ED at anytime with any new/worsening symptoms. AMA paperwork signed, witnessed and placed in chart. - Williams Ruiz PA-C

## 2023-03-07 NOTE — ED ADULT NURSE NOTE - NSFALLRSKHARMRISK_ED_ALL_ED
[facility] Progress Note:   F/up Provider Visit    CC: Weight Management    Radha Culver is a 68 y.o. female who is here for her  f/up medical provider visit for the [facility]. she did attend class last week. She is ready to move to LCD. Weight History  Weight Metrics 2019   Weight - 203 lb 202 lb 11.2 oz - 203 lb - 205 lb 1.6 oz   Waist Measure Inches 41.5 - 41 40.5 - 40.25 -   BMI - 45.51 kg/m2 45.44 kg/m2 - 45.51 kg/m2 - 45.98 kg/m2       Starting wt: 228 lbs  Goal wt: 190lbs     Ideal body weight: 43.9 kg (96 lb 13.9 oz)  Adjusted ideal body weight: 63.2 kg (139 lb 5.1 oz)  Body mass index is 45.51 kg/m². History    Past Medical History:   Diagnosis Date    Arthritis     Diabetes ([facility] [place] [address] 2016    no meds    Hypertension     Morbid obesity ([facility] [address]     Sleep apnea     CPAP machine    Use of cane as ambulatory aid        Past Surgical History:   Procedure Laterality Date    HX COLONOSCOPY      HX GYN          HX HEENT      cataract right and left    HX HYSTERECTOMY      HX ORTHOPAEDIC Right     shoulder surgery    HX OTHER SURGICAL      Nephrectomy       Current Outpatient Medications   Medication Sig Dispense Refill    calcium carbonate (CALCIUM 500 PO) Take 500 mg by mouth.  cholecalciferol (VITAMIN D3) 1,000 unit tablet 1,000 Units.  acetaminophen (TYLENOL) 325 mg tablet Take  by mouth every four (4) hours as needed for Pain. Indications: BACK PAIN, PAIN      losartan (COZAAR) 25 mg tablet Take 25 mg by mouth daily.  diclofenac (VOLTAREN) 1 % gel Apply 2 g to affected area as needed. Indications: OSTEOARTHRITIS      multivitamin (ONE A DAY) tablet Take 1 Tab by mouth daily.  calcium-cholecalciferol, d3, (CALCIUM 600 + D) 600-125 mg-unit tab Take  by mouth daily.          No Known Allergies    Social History     Tobacco Use    Smoking status: Never Smoker    Smokeless tobacco: Never Used   Substance Use Topics    Alcohol use: No    Drug use: No       Family History   Problem Relation Age of Onset    Diabetes Mother     Diabetes Sister        Family Status   Relation Name Status    Mother  (Not Specified)    Sister  (Not Specified)         Medications:  Outpatient Medications Marked as Taking for the 2/26/19 encounter (Office Visit) with Christina Acosta NP   Medication Sig Dispense Refill    calcium carbonate (CALCIUM 500 PO) Take 500 mg by mouth.  cholecalciferol (VITAMIN D3) 1,000 unit tablet 1,000 Units.  acetaminophen (TYLENOL) 325 mg tablet Take  by mouth every four (4) hours as needed for Pain. Indications: BACK PAIN, PAIN      losartan (COZAAR) 25 mg tablet Take 25 mg by mouth daily.  diclofenac (VOLTAREN) 1 % gel Apply 2 g to affected area as needed. Indications: OSTEOARTHRITIS           Review of Systems  Review of Systems   Constitutional: Positive for malaise/fatigue and weight loss. All other systems reviewed and are negative. Objective  Visit Vitals  /74   Pulse 61   Temp 97.6 °F (36.4 °C)   Resp 18   Ht 4' 8\" (1.422 m)   Wt 92.1 kg (203 lb)   SpO2 98%   BMI 45.51 kg/m²     No LMP recorded. Patient has had a hysterectomy. Physical Exam  Physical Exam   Constitutional: She is oriented to person, place, and time. She appears well-developed and well-nourished. HENT:   Head: Normocephalic. Cardiovascular: Normal rate. Pulmonary/Chest: Effort normal.   Musculoskeletal:   [place] gait, walking with cane   Neurological: She is alert and oriented to person, place, and time. Skin: Skin is warm and dry. Psychiatric: She has a normal mood and affect. Nursing note and vitals reviewed. Assessment / Plan    1. Weight management    Degree of control: doing well but would like to add whole foods   Progress was reviewed with patient.     Goal(s) for next appointment:   -3lbs   2 MR and 2 meals, focus on protein and fili -- rec RD to review appropriate foods     Pt will stay in weekly classes at this time until we hear from ortho on if she is ready for joint replacement sx.       2.  Labs    Latest results reviewed with patient   Routine monitoring labs ordered     >50% of 20 min visit spent counseling     ELLI SomersP-BC no

## 2023-03-07 NOTE — ED PROVIDER NOTE - CLINICAL SUMMARY MEDICAL DECISION MAKING FREE TEXT BOX
Impression and plan: Differential diagnosis at this time includes pulmonary embolism given history of lupus, sinus tachycardia and chest pain without any clear alternative explanation.  CTA chest is indicated; D-dimer not indicated at this time given that he is high risk enough.    Other differential diagnoses include lupus myocarditis versus NSTEMI, therefore patient will be worked up with cardiac enzymes basic labs.    ECG directly visualized by me and shows sinus tachycardia, rate 106, , QRS 92, QTc 446

## 2023-03-07 NOTE — CONSULT NOTE ADULT - SUBJECTIVE AND OBJECTIVE BOX
INCOMPLETE NOTE    HISTORY OF PRESENT ILLNESS:   48 yo male pmhx SLE on prednisone 10mg daily and hydroxychloroquine, asthma, hypothyroidism presents to the ED c/o R sided chest wall pain for the past 1 month, increasing in severity with intermittent associated shortness of breath. Pt recalls 1 month ago he was reaching over his head and felt a "pull" in R chest wall, felt soreness in chest wall after that. Felt similar to when he's had costochondritis in the past but sxs are not improving. Pain pleuritic, worse with direct touch and changing body position. Denies n/v/d, abd pain, fever/chills, recent travel, hemoptysis, numbness/tingling, dizziness/lightheadedness, hemoptysis.     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   Manifestations: Joint pain, fatigue, sun sensitivity  Serologies: OWEN 1:2560, +dsDNA 60s, +Sm, +RNP, +LAC, low complements, +U1RNP, weak+ PM/  Previous Medication: Benlysta (allergy), Saphnelo (infection)  Current Medications: Prednisone 10mg/day, HCQ 400mg/day, Myfortic 1 pill BID (restarted 1/2023 after previously d/c 2/2 infection)  Last appt w/ Dr. Neri 1/31/2023. Labs 2/18 negative UA, urine p/c ratio, dsDNA, complements, ESR 36    PAST MEDICAL & SURGICAL HISTORY:  Lupus      Asthma      Hypothyroid      History of cholecystectomy          REVIEW OF SYSTEMS    as per HPI    MEDICATIONS  (STANDING):  ondansetron Injectable 4 milliGRAM(s) IV Push once    MEDICATIONS  (PRN):      Allergies    No Known Allergies    Intolerances        PERTINENT MEDICATION HISTORY:    SOCIAL HISTORY:    FAMILY HISTORY:  FH: rheumatoid arthritis (Mother)        Vital Signs Last 24 Hrs  T(C): 37 (07 Mar 2023 09:22), Max: 37 (07 Mar 2023 09:22)  T(F): 98.6 (07 Mar 2023 09:22), Max: 98.6 (07 Mar 2023 09:22)  HR: 105 (07 Mar 2023 09:22) (105 - 105)  BP: 90/68 (07 Mar 2023 09:22) (90/68 - 90/68)  BP(mean): --  RR: 20 (07 Mar 2023 09:22) (20 - 20)  SpO2: 99% (07 Mar 2023 09:22) (99% - 99%)    Parameters below as of 07 Mar 2023 09:22  Patient On (Oxygen Delivery Method): room air        Daily Height in cm: 172.72 (07 Mar 2023 09:22)    Daily     PHYSICAL EXAM:    Constitutional: WDWN resting comfortably in bed; NAD  Head: NC/AT  Eyes: PERRL, EOMI, clear conjunctiva  ENT: no nasal discharge; uvula midline, no oropharyngeal erythema or exudates; MMM  Neck: supple; no JVD or thyromegaly  Respiratory: CTA B/L; no W/R/R, no retractions  Cardiac: +S1/S2; RRR; no M/R/G; PMI non-displaced  Gastrointestinal: soft, NT/ND; no rebound or guarding; +BSx4  Genitourinary: normal external genitalia  Back: spine midline, no bony tenderness or step-offs; no CVAT B/L  Extremities: WWP, no clubbing or cyanosis; no peripheral edema  Musculoskeletal: NROM x4; no joint swelling, tenderness or erythema  Vascular: 2+ radial, femoral, DP/PT pulses B/L  Dermatologic: skin warm, dry and intact; no rashes, wounds, or scars  Lymphatic: no submandibular or cervical LAD  Neurologic: AAOx3; CNII-XII grossly intact; no focal deficits  Psychiatric: affect and characteristics of appearance, verbalizations, behaviors are appropriate      LABS:                        14.9   10.47 )-----------( 351      ( 07 Mar 2023 11:25 )             45.9     03-07    138  |  96  |  10  ----------------------------<  98  3.3<L>   |  26  |  1.07    Ca    9.9      07 Mar 2023 11:25  Mg     1.9     03-07    TPro  7.8  /  Alb  4.4  /  TBili  0.8  /  DBili  x   /  AST  40  /  ALT  60<H>  /  AlkPhos  105  03-07    PT/INR - ( 07 Mar 2023 11:25 )   PT: 12.6 sec;   INR: 1.09 ratio         PTT - ( 07 Mar 2023 11:25 )  PTT:31.4 sec      RADIOLOGY & ADDITIONAL STUDIES:  < from: CT Angio Chest PE Protocol w/ IV Cont (03.07.23 @ 10:57) >  IMPRESSION: No pulmonary embolus is noted.    Marked loss of height of one of the midthoracic vertebral body has   increased when compared to previous exam.    < end of copied text >   INCOMPLETE NOTE    HISTORY OF PRESENT ILLNESS:   46 yo male pmhx SLE on prednisone 10mg daily and hydroxychloroquine, asthma, hypothyroidism presents to the ED c/o R sided chest wall pain for the past 1 month, increasing in severity with intermittent associated shortness of breath. Pt recalls 1 month ago he was reaching over his head and felt a "pull" in R chest wall, felt soreness in chest wall after that. Felt similar to when he's had costochondritis in the past but sxs are not improving. Pain pleuritic, worse with direct touch and changing body position. Denies n/v/d, abd pain, fever/chills, recent travel, hemoptysis, numbness/tingling, dizziness/lightheadedness, hemoptysis.     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   Manifestations: Joint pain, fatigue, sun sensitivity  Serologies: OWEN 1:2560, +dsDNA 60s, +Sm, +RNP, +LAC, low complements, +U1RNP, weak+ PM/  Previous Medication: Benlysta (allergy), Saphnelo (infection)  Current Medications: Prednisone 10mg/day, HCQ 400mg/day, Myfortic 1 pill BID (restarted 1/2023 after previously d/c 2/2 infection)  Last appt w/ Dr. Neri 1/31/2023. Labs 2/18 negative UA, urine p/c ratio, dsDNA, complements, ESR 36    Patient seen and examined at bedside. Patient reports right sided chest pain, body myalgias, arthralgias, PIP stiffness as well as 3 days of chills, orthostasis, and nausea.   Patient reports right sided chest pain started when reaching for object in his car- heard a "pop" sound and since then was having right sided chest pain that is worse with movements and deep breathing. Has taken tylenol for pain, which helps briefly. Work up included xray of the ribs outpatient which was negative. Pain is not present without movement. No burning sensation or skin changes. Pain worsening since first starting. Pain radiates/wraps around to mid axillary area. Pain is located just beneath right pectoralis muscle. Associated SOB, but more 2/2 inability to take deep breaths.    Body pains in the arms, legs with muscle weakness in both arms and legs. Has had chills for 3 days without fevers or night sweats. No sick contacts, cough.   Has facial redness, b/l shin redness. No other rashes.   Reports right sided abdominal pain with associated nausea, no vomiting. No diarrhea   No ulcers, eye pain, eye redness, urinary changes  Admits to foamy urine. No history DVT/PE      PAST MEDICAL & SURGICAL HISTORY:  Lupus      Asthma      Hypothyroid      History of cholecystectomy          REVIEW OF SYSTEMS    as per HPI    MEDICATIONS  (STANDING):  ondansetron Injectable 4 milliGRAM(s) IV Push once    MEDICATIONS  (PRN):      Allergies    No Known Allergies    Intolerances        PERTINENT MEDICATION HISTORY:    SOCIAL HISTORY:    FAMILY HISTORY:  FH: rheumatoid arthritis (Mother)        Vital Signs Last 24 Hrs  T(C): 37 (07 Mar 2023 09:22), Max: 37 (07 Mar 2023 09:22)  T(F): 98.6 (07 Mar 2023 09:22), Max: 98.6 (07 Mar 2023 09:22)  HR: 105 (07 Mar 2023 09:22) (105 - 105)  BP: 90/68 (07 Mar 2023 09:22) (90/68 - 90/68)  BP(mean): --  RR: 20 (07 Mar 2023 09:22) (20 - 20)  SpO2: 99% (07 Mar 2023 09:22) (99% - 99%)    Parameters below as of 07 Mar 2023 09:22  Patient On (Oxygen Delivery Method): room air        Daily Height in cm: 172.72 (07 Mar 2023 09:22)    Daily     PHYSICAL EXAM:  Constitutional: WDWN resting comfortably in bed; NAD  Head: NC/AT  Eyes: PERRL, clear conjunctiva  ENT: no nasal discharge; uvula midline, no oropharyngeal erythema or exudates; MMM. No ulcers  Neck: supple  Respiratory: CTA B/L; no W/R/R, no retractions  Cardiac: +S1/S2; RRR  Gastrointestinal: RUQ abdominal tenderness. No guarding. BS normoactive x4  Extremities: WWP, no clubbing or cyanosis; no peripheral edema  Musculoskeletal: Tenderness to palpation b/l dorsum of hands, b/l 2nd-4th PIPs, b/l forearms, b/l posterior distal thighs. Right chest wall underneath pectoralis muscle tender to palpation.   Dermatologic: b/l shin erythema, facial erythema  Neurologic: Muscle strength limited 2/2 pain and muscle spasms    LABS:                        14.9   10.47 )-----------( 351      ( 07 Mar 2023 11:25 )             45.9     03-07    138  |  96  |  10  ----------------------------<  98  3.3<L>   |  26  |  1.07    Ca    9.9      07 Mar 2023 11:25  Mg     1.9     03-07    TPro  7.8  /  Alb  4.4  /  TBili  0.8  /  DBili  x   /  AST  40  /  ALT  60<H>  /  AlkPhos  105  03-07    PT/INR - ( 07 Mar 2023 11:25 )   PT: 12.6 sec;   INR: 1.09 ratio         PTT - ( 07 Mar 2023 11:25 )  PTT:31.4 sec      RADIOLOGY & ADDITIONAL STUDIES:  < from: CT Angio Chest PE Protocol w/ IV Cont (03.07.23 @ 10:57) >  IMPRESSION: No pulmonary embolus is noted.    Marked loss of height of one of the midthoracic vertebral body has   increased when compared to previous exam.    < end of copied text >   HISTORY OF PRESENT ILLNESS:   48 yo male pmhx SLE on prednisone 10mg daily and hydroxychloroquine, asthma, hypothyroidism presents to the ED c/o R sided chest wall pain for the past 1 month, increasing in severity with intermittent associated shortness of breath. Pt recalls 1 month ago he was reaching over his head and felt a "pull" in R chest wall, felt soreness in chest wall after that. Felt similar to when he's had costochondritis in the past but sxs are not improving. Pain pleuritic, worse with direct touch and changing body position. Denies n/v/d, abd pain, fever/chills, recent travel, hemoptysis, numbness/tingling, dizziness/lightheadedness, hemoptysis.     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   Manifestations: Joint pain, fatigue, sun sensitivity  Serologies: OWEN 1:2560, +dsDNA 60s, +Sm, +RNP, +LAC, low complements, +U1RNP, weak+ PM/  Previous Medication: Benlysta (allergy), Saphnelo (infection)  Current Medications: Prednisone 10mg/day, HCQ 400mg/day, Myfortic 1 pill BID (restarted 1/2023 after previously d/c 2/2 infection)  Last appt w/ Dr. Neri 1/31/2023. Labs 2/18 negative UA, urine p/c ratio, dsDNA, complements, ESR 36    Patient seen and examined at bedside. Patient reports right sided chest pain, body myalgias, arthralgias, PIP stiffness as well as 3 days of chills, orthostasis, and nausea.   Patient reports right sided chest pain started when reaching for object in his car- heard a "pop" sound and since then was having right sided chest pain that is worse with movements and deep breathing. Has taken tylenol for pain, which helps briefly. Work up included xray of the ribs outpatient which was negative. Pain is not present without movement. No burning sensation or skin changes. Pain worsening since first starting. Pain radiates/wraps around to mid axillary area. Pain is located just beneath right pectoralis muscle. Associated SOB, but more 2/2 inability to take deep breaths.    Body pains in the arms, legs with muscle weakness in both arms and legs. Has had chills for 3 days without fevers or night sweats. No sick contacts, cough.   Has facial redness, b/l shin redness. No other rashes.   Reports right sided abdominal pain with associated nausea, no vomiting. No diarrhea   No ulcers, eye pain, eye redness, urinary changes  Admits to foamy urine. No history DVT/PE      PAST MEDICAL & SURGICAL HISTORY:  Lupus      Asthma      Hypothyroid      History of cholecystectomy          REVIEW OF SYSTEMS    as per HPI    MEDICATIONS  (STANDING):  ondansetron Injectable 4 milliGRAM(s) IV Push once    MEDICATIONS  (PRN):      Allergies    No Known Allergies    Intolerances        PERTINENT MEDICATION HISTORY:    SOCIAL HISTORY:    FAMILY HISTORY:  FH: rheumatoid arthritis (Mother)        Vital Signs Last 24 Hrs  T(C): 37 (07 Mar 2023 09:22), Max: 37 (07 Mar 2023 09:22)  T(F): 98.6 (07 Mar 2023 09:22), Max: 98.6 (07 Mar 2023 09:22)  HR: 105 (07 Mar 2023 09:22) (105 - 105)  BP: 90/68 (07 Mar 2023 09:22) (90/68 - 90/68)  BP(mean): --  RR: 20 (07 Mar 2023 09:22) (20 - 20)  SpO2: 99% (07 Mar 2023 09:22) (99% - 99%)    Parameters below as of 07 Mar 2023 09:22  Patient On (Oxygen Delivery Method): room air        Daily Height in cm: 172.72 (07 Mar 2023 09:22)    Daily     PHYSICAL EXAM:  Constitutional: WDWN resting comfortably in bed; NAD  Head: NC/AT  Eyes: PERRL, clear conjunctiva  ENT: no nasal discharge; uvula midline, no oropharyngeal erythema or exudates; MMM. No ulcers  Neck: supple  Respiratory: CTA B/L; no W/R/R, no retractions  Cardiac: +S1/S2; RRR  Gastrointestinal: RUQ abdominal tenderness. No guarding. BS normoactive x4  Extremities: WWP, no clubbing or cyanosis; no peripheral edema  Musculoskeletal: Tenderness to palpation b/l dorsum of hands, b/l 2nd-4th PIPs, b/l forearms, b/l posterior distal thighs. Right chest wall underneath pectoralis muscle tender to palpation.   Dermatologic: b/l shin erythema, facial erythema  Neurologic: Muscle strength limited 2/2 pain and muscle spasms    LABS:                        14.9   10.47 )-----------( 351      ( 07 Mar 2023 11:25 )             45.9     03-07    138  |  96  |  10  ----------------------------<  98  3.3<L>   |  26  |  1.07    Ca    9.9      07 Mar 2023 11:25  Mg     1.9     03-07    TPro  7.8  /  Alb  4.4  /  TBili  0.8  /  DBili  x   /  AST  40  /  ALT  60<H>  /  AlkPhos  105  03-07    PT/INR - ( 07 Mar 2023 11:25 )   PT: 12.6 sec;   INR: 1.09 ratio         PTT - ( 07 Mar 2023 11:25 )  PTT:31.4 sec      RADIOLOGY & ADDITIONAL STUDIES:  < from: CT Angio Chest PE Protocol w/ IV Cont (03.07.23 @ 10:57) >  IMPRESSION: No pulmonary embolus is noted.    Marked loss of height of one of the midthoracic vertebral body has   increased when compared to previous exam.    < end of copied text >   HISTORY OF PRESENT ILLNESS:   48 yo male pmhx SLE on prednisone 10mg daily and hydroxychloroquine, asthma, hypothyroidism presents to the ED c/o R sided chest wall pain for the past 1 month, increasing in severity with intermittent associated shortness of breath. Pt recalls 1 month ago he was reaching over his head and felt a "pull" in R chest wall, felt soreness in chest wall after that. Felt similar to when he's had costochondritis in the past but sxs are not improving. Pain pleuritic, worse with direct touch and changing body position. Denies n/v/d, abd pain, fever/chills, recent travel, hemoptysis, numbness/tingling, dizziness/lightheadedness, hemoptysis.     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   Manifestations: Joint pain, fatigue, sun sensitivity  Serologies: OWEN 1:2560, +dsDNA 60s, +Sm, +RNP, +LAC, low complements, +U1RNP, weak+ PM/  Previous Medication: Benlysta (allergy), Saphnelo (infection)  Current Medications: Prednisone 10mg/day, HCQ 400mg/day, Myfortic 1 pill BID (restarted 1/2023 after previously d/c 2/2 infection)  Last appt w/ Dr. Neri 1/31/2023. Labs 2/18 negative UA, urine p/c ratio, dsDNA, complements, ESR 36    Patient seen and examined at bedside. Patient reports right sided chest pain, body myalgias, arthralgias, PIP stiffness as well as 3 days of chills, orthostasis, and nausea.   Patient reports right sided chest pain started when reaching for object in his car- heard a "pop" sound and since then was having right sided chest pain that is worse with movements and deep breathing. Has taken tylenol for pain, which helps briefly. Work up included xray of the ribs outpatient which was negative. Pain is not present without movement. No burning sensation or skin changes. Pain worsening since first starting. Pain radiates/wraps around to mid axillary area. Pain is located just beneath right pectoralis muscle. Associated SOB, but more 2/2 inability to take deep breaths.    Body pains in the arms, legs with muscle weakness in both arms and legs. Has had chills for 3 days without fevers or night sweats. No sick contacts, cough.   Has facial redness, b/l shin redness. No other rashes.   Reports right sided abdominal pain with associated nausea, no vomiting. No diarrhea   No ulcers, eye pain, eye redness, urinary changes  Admits to foamy urine. No history DVT/PE      PAST MEDICAL & SURGICAL HISTORY:  Lupus      Asthma      Hypothyroid      History of cholecystectomy          REVIEW OF SYSTEMS    as per HPI    MEDICATIONS  (STANDING):  ondansetron Injectable 4 milliGRAM(s) IV Push once    MEDICATIONS  (PRN):      Allergies    No Known Allergies    Intolerances        PERTINENT MEDICATION HISTORY:    SOCIAL HISTORY:    FAMILY HISTORY:  FH: rheumatoid arthritis (Mother)        Vital Signs Last 24 Hrs  T(C): 37 (07 Mar 2023 09:22), Max: 37 (07 Mar 2023 09:22)  T(F): 98.6 (07 Mar 2023 09:22), Max: 98.6 (07 Mar 2023 09:22)  HR: 105 (07 Mar 2023 09:22) (105 - 105)  BP: 90/68 (07 Mar 2023 09:22) (90/68 - 90/68)  BP(mean): --  RR: 20 (07 Mar 2023 09:22) (20 - 20)  SpO2: 99% (07 Mar 2023 09:22) (99% - 99%)    Parameters below as of 07 Mar 2023 09:22  Patient On (Oxygen Delivery Method): room air        Daily Height in cm: 172.72 (07 Mar 2023 09:22)    Daily     PHYSICAL EXAM:  Constitutional: WDWN resting comfortably in bed; NAD  Head: NC/AT  Eyes: PERRL, clear conjunctiva  ENT: no nasal discharge; uvula midline, no oropharyngeal erythema or exudates; MMM. No ulcers  Neck: supple  Respiratory: CTA B/L; no W/R/R, no retractions  Cardiac: +S1/S2; RRR  Gastrointestinal: RUQ abdominal tenderness. No guarding. BS normoactive x4  Extremities: WWP, no clubbing or cyanosis; no peripheral edema  Musculoskeletal: Tenderness to palpation b/l dorsum of hands, b/l 2nd-4th PIPs, but no synovitis, multiple tender points : b/l forearms, b/l posterior distal thighs. Right chest wall underneath pectoralis muscle tender to palpation.   Dermatologic: b/l shin erythema, facial erythema  Neurologic: Muscle strength limited 2/2 pain and muscle spasms    LABS:                        14.9   10.47 )-----------( 351      ( 07 Mar 2023 11:25 )             45.9     03-07    138  |  96  |  10  ----------------------------<  98  3.3<L>   |  26  |  1.07    Ca    9.9      07 Mar 2023 11:25  Mg     1.9     03-07    TPro  7.8  /  Alb  4.4  /  TBili  0.8  /  DBili  x   /  AST  40  /  ALT  60<H>  /  AlkPhos  105  03-07    PT/INR - ( 07 Mar 2023 11:25 )   PT: 12.6 sec;   INR: 1.09 ratio         PTT - ( 07 Mar 2023 11:25 )  PTT:31.4 sec      RADIOLOGY & ADDITIONAL STUDIES:  < from: CT Angio Chest PE Protocol w/ IV Cont (03.07.23 @ 10:57) >  IMPRESSION: No pulmonary embolus is noted.    Marked loss of height of one of the midthoracic vertebral body has   increased when compared to previous exam.    < end of copied text >

## 2023-03-08 ENCOUNTER — TRANSCRIPTION ENCOUNTER (OUTPATIENT)
Age: 48
End: 2023-03-08

## 2023-03-08 LAB
C3 SERPL-MCNC: 126 MG/DL — SIGNIFICANT CHANGE UP (ref 81–157)
C4 SERPL-MCNC: 28 MG/DL — SIGNIFICANT CHANGE UP (ref 13–39)
DSDNA AB SER-ACNC: <12 IU/ML — SIGNIFICANT CHANGE UP

## 2023-03-08 NOTE — ED POST DISCHARGE NOTE - DETAILS
3/8: spoke with patient, recommended discussion with rheum. states he will send his physician a message on the portal and has scheduled follow up in a few days - Dodie Sorto PA-C

## 2023-03-09 ENCOUNTER — NON-APPOINTMENT (OUTPATIENT)
Age: 48
End: 2023-03-09

## 2023-03-09 ENCOUNTER — RX RENEWAL (OUTPATIENT)
Age: 48
End: 2023-03-09

## 2023-03-10 ENCOUNTER — APPOINTMENT (OUTPATIENT)
Dept: RHEUMATOLOGY | Facility: CLINIC | Age: 48
End: 2023-03-10

## 2023-03-13 ENCOUNTER — APPOINTMENT (OUTPATIENT)
Dept: CARDIOLOGY | Facility: CLINIC | Age: 48
End: 2023-03-13

## 2023-03-13 ENCOUNTER — APPOINTMENT (OUTPATIENT)
Dept: RHEUMATOLOGY | Facility: CLINIC | Age: 48
End: 2023-03-13
Payer: COMMERCIAL

## 2023-03-13 VITALS
SYSTOLIC BLOOD PRESSURE: 128 MMHG | DIASTOLIC BLOOD PRESSURE: 79 MMHG | TEMPERATURE: 97.3 F | OXYGEN SATURATION: 98 % | BODY MASS INDEX: 35.46 KG/M2 | WEIGHT: 234 LBS | HEIGHT: 68 IN

## 2023-03-13 PROCEDURE — 99215 OFFICE O/P EST HI 40 MIN: CPT

## 2023-03-13 NOTE — CONSULT NOTE ADULT - NS ATTEND OPT1 GEN_ALL_CORE
del I attest my time as attending is greater than 50% of the total combined time spent on qualifying patient care activities by the PA/NP and attending.

## 2023-03-15 ENCOUNTER — RESULT REVIEW (OUTPATIENT)
Age: 48
End: 2023-03-15

## 2023-03-17 ENCOUNTER — LABORATORY RESULT (OUTPATIENT)
Age: 48
End: 2023-03-17

## 2023-03-18 ENCOUNTER — APPOINTMENT (OUTPATIENT)
Dept: CARDIOLOGY | Facility: CLINIC | Age: 48
End: 2023-03-18
Payer: COMMERCIAL

## 2023-03-18 DIAGNOSIS — R07.89 OTHER CHEST PAIN: ICD-10-CM

## 2023-03-18 PROCEDURE — 93306 TTE W/DOPPLER COMPLETE: CPT

## 2023-03-19 ENCOUNTER — APPOINTMENT (OUTPATIENT)
Dept: CT IMAGING | Facility: IMAGING CENTER | Age: 48
End: 2023-03-19
Payer: COMMERCIAL

## 2023-03-19 ENCOUNTER — OUTPATIENT (OUTPATIENT)
Dept: OUTPATIENT SERVICES | Facility: HOSPITAL | Age: 48
LOS: 1 days | End: 2023-03-19
Payer: COMMERCIAL

## 2023-03-19 DIAGNOSIS — Z00.8 ENCOUNTER FOR OTHER GENERAL EXAMINATION: ICD-10-CM

## 2023-03-19 DIAGNOSIS — Z90.49 ACQUIRED ABSENCE OF OTHER SPECIFIED PARTS OF DIGESTIVE TRACT: Chronic | ICD-10-CM

## 2023-03-19 DIAGNOSIS — R10.811 RIGHT UPPER QUADRANT ABDOMINAL TENDERNESS: ICD-10-CM

## 2023-03-19 PROBLEM — R07.89 ATYPICAL CHEST PAIN: Status: ACTIVE | Noted: 2023-03-13

## 2023-03-19 PROCEDURE — 74176 CT ABD & PELVIS W/O CONTRAST: CPT

## 2023-03-19 PROCEDURE — 74176 CT ABD & PELVIS W/O CONTRAST: CPT | Mod: 26

## 2023-03-20 ENCOUNTER — TRANSCRIPTION ENCOUNTER (OUTPATIENT)
Age: 48
End: 2023-03-20

## 2023-03-20 ENCOUNTER — RX RENEWAL (OUTPATIENT)
Age: 48
End: 2023-03-20

## 2023-03-20 LAB
ALBUMIN SERPL ELPH-MCNC: 4.5 G/DL
ALP BLD-CCNC: 103 U/L
ALT SERPL-CCNC: 52 U/L
ANION GAP SERPL CALC-SCNC: 16 MMOL/L
APPEARANCE: CLEAR
AST SERPL-CCNC: 34 U/L
BACTERIA: NEGATIVE
BASOPHILS # BLD AUTO: 0.05 K/UL
BASOPHILS NFR BLD AUTO: 0.4 %
BILIRUB SERPL-MCNC: 0.4 MG/DL
BILIRUBIN URINE: NEGATIVE
BLOOD URINE: NEGATIVE
BUN SERPL-MCNC: 10 MG/DL
C3 SERPL-MCNC: 135 MG/DL
C4 SERPL-MCNC: 29 MG/DL
CALCIUM SERPL-MCNC: 9.6 MG/DL
CENTROMERE IGG SER-ACNC: <0.2 CD:130001892
CHLORIDE SERPL-SCNC: 100 MMOL/L
CK SERPL-CCNC: 184 U/L
CO2 SERPL-SCNC: 23 MMOL/L
COLOR: YELLOW
CREAT SERPL-MCNC: 0.95 MG/DL
CREAT SPEC-SCNC: 297 MG/DL
CREAT/PROT UR: 0.1 RATIO
CRP SERPL-MCNC: 9 MG/L
DSDNA AB SER-ACNC: <12 IU/ML
EGFR: 100 ML/MIN/1.73M2
ENA RNP AB SER IA-ACNC: 3 AL
ENA SCL70 IGG SER IA-ACNC: <0.2 AL
ENA SM AB SER IA-ACNC: 2 AL
ENA SS-A AB SER IA-ACNC: <0.2 AL
ENA SS-B AB SER IA-ACNC: <0.2 AL
EOSINOPHIL # BLD AUTO: 0.16 K/UL
EOSINOPHIL NFR BLD AUTO: 1.1 %
ERYTHROCYTE [SEDIMENTATION RATE] IN BLOOD BY WESTERGREN METHOD: 74 MM/HR
GLUCOSE QUALITATIVE U: NEGATIVE
GLUCOSE SERPL-MCNC: 108 MG/DL
HCT VFR BLD CALC: 40.5 %
HGB BLD-MCNC: 13.4 G/DL
HYALINE CASTS: 0 /LPF
IMM GRANULOCYTES NFR BLD AUTO: 0.9 %
KETONES URINE: NORMAL
LEUKOCYTE ESTERASE URINE: NEGATIVE
LYMPHOCYTES # BLD AUTO: 3.13 K/UL
LYMPHOCYTES NFR BLD AUTO: 22 %
MAN DIFF?: NORMAL
MCHC RBC-ENTMCNC: 27.7 PG
MCHC RBC-ENTMCNC: 33.1 GM/DL
MCV RBC AUTO: 83.9 FL
MICROSCOPIC-UA: NORMAL
MONOCYTES # BLD AUTO: 1.33 K/UL
MONOCYTES NFR BLD AUTO: 9.4 %
NEUTROPHILS # BLD AUTO: 9.4 K/UL
NEUTROPHILS NFR BLD AUTO: 66.2 %
NITRITE URINE: NEGATIVE
PH URINE: 6.5
PLATELET # BLD AUTO: 364 K/UL
POTASSIUM SERPL-SCNC: 3.3 MMOL/L
PROT SERPL-MCNC: 7.1 G/DL
PROT UR-MCNC: 26 MG/DL
PROTEIN URINE: ABNORMAL
RBC # BLD: 4.83 M/UL
RBC # FLD: 14.6 %
RED BLOOD CELLS URINE: 5 /HPF
RIBOSOMAL P AB SER IA-ACNC: <0.2 AL
RNA POLYMERASE III IGG: 3 UNITS
SODIUM SERPL-SCNC: 140 MMOL/L
SPECIFIC GRAVITY URINE: 1.03
SQUAMOUS EPITHELIAL CELLS: 1 /HPF
UROBILINOGEN URINE: ABNORMAL
WBC # FLD AUTO: 14.2 K/UL
WHITE BLOOD CELLS URINE: 2 /HPF

## 2023-03-20 NOTE — DATA REVIEWED
[FreeTextEntry1] : Laboratory and radiology studies that were personally reviewed at today's visit are summarized below and above: \par \par CT chest (3-7-2023):  No pulmonary embolus is noted.  Marked loss of height of one of the midthoracic vertebral body has \par increased when compared to previous exam.\par 3-7-2023:  CBC = wnl, K = 3.3, C3-126, c4=28, dsdna < 12 prot/cr = 0.1\par \par \par \par Pulm note (8-26-22)  felling better overall and planning PFT for 4-6 weeks. 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 \par 5-19-21:  MRI right thigh - no muscle edema moderate sized right knee joint effusion \par 5-19-21:  MRI Lumbar spine - diffuse spinal canal narrowing and mild disc bulge with mod spinal cnala stenosis \par Duplex Venous LE - no evidence of DVT\par 3-19-21:  Echo: normal pericardium, nl EF\par 11-6-2020:  Knee Xray:  WNL\par 8-2020:  Head CT - WNL\par \par 7-6-20:  cr = 1.2, cpk = 324, cbc  -okay , ua - okay, esr = 85 (increased from prior 43), prot/cr = 0.1, c3 = 120, c4 = 21, dsdna < 12,\par 6-2020:  tsh = 2.49, \par \par 3/10/20:  myomarker with positive S0csIOI, U1RNP and SSA52, ck - 212, c3/c4 wnl \par \par dsDNA positive in 2019 and now negative \par \par 10-29-19:  CBC okay\par \par 9-11-19:  ua and prot/cr - wnl \par 8-1-19 - c3/c4 WNL, dsdna = 54\par NL G6PD\par LOW TPMT\par \par ct sinus (10-21-19) : Minimal mucosal thickening involves the left frontal, anterior ethmoid and left maxillary sinuses.  Rightward deviation of the nasal septum with a right nasal spur narrowing the right nasal cavity.  Narrowing of the left OMU which may predispose to sinus outflow obstruction. \par Small lucent lesion within the left maxilla favoring a benign fibro-osseous lesion. Follow-up sinus CT\par advised as clinically warranted in 6 months to confirm stability.\par \par lumbar spine (10-21-19):  Developmental limbus vertebra noted at anterior superior aspect of L4 with \par hypertrophic bony remodeling change. Chronic appearing well marginated slight concave contour depression of anterior aspect of L2 superior endplate.  Remaining vertebral body heights maintained. \par No spondylolistheses spondylolysis defects or evidence for dynamic  instability. \par Variable slightly narrowed disc spaces. Unremarkable SI joints and partially \par visualized hips. \par No lytic or blastic lesions. \par Right upper quadrant surgical clips present. \par head ct - 10-24-19:  Minimal mucosal thickening involves the left frontal, anterior ethmoid and \par left maxillary sinuses.  Rightward deviation of the nasal septum with a right nasal spur narrowing \par the right nasal cavity.  Narrowing of the left OMU which may predispose to sinus outflow obstruction. \par Small lucent lesion within the left maxilla favoring a benign fibro-osseous  lesion. Follow-up sinus CT advised as clinically warranted in 6 months to  confirm stability. \par

## 2023-03-20 NOTE — HISTORY OF PRESENT ILLNESS
[Currently Experiencing] : currently experiencing [Psychological Symptoms] : psychological symptoms [Fatigue] : fatigue [Arthritis] : arthritis [Arthralgias] : arthralgias [Sun Sensitivity] : sun sensitivity [Chest Pain] : chest pain [FreeTextEntry1] : AMBERLY ESPITIA is a 47 year  old male, seen on today  for\par SLE: with OWEN 1:2560, + Sm + RNP + LA, low complement, + U1RNP and weak positive PM/. \par \par Other pertinent medical problems (structural back disease, asthma, migraine)\par \par recently in the hosptial for right sided reproducible chest pain/rib pain and facial rash - \par  started on cymbalta (awating from pharmacy) and CT chest without bony abn. \par \par \par Joint pain has been severe and fatigue has been severe.\par \par \par Compression Fracture T7:  found on MRI 8-1-22, sx of back pain out of proportion to prior back pain (which was lumbar), slowly improving and pending either kyphoplasty or PT based upon how he does over time.  Since he is improving they are favoring PT. \par No history of prior fractures \par back pain is better -\par \par \par Asthma: better controlled   \par \par no fevers, no chills  [History of Nephritis] : the patient has no history of nephritis [Headache] : no headaches [Sicca] : no sicca [Rash] : no rash [Shortness of Breath] : no shortness of breath [Abdominal Pain] : no abdominal pain [FreeTextEntry3] : 2019 [FreeTextEntry7] : OWEN 1:2560, + SM, + RNP, + LA, Low complement,  [FreeTextEntry4] : cellcept (infection), benlysta (allergy), saphenlo (recurrent infections)  [FreeTextEntry6] : Low TPMT

## 2023-03-20 NOTE — PHYSICAL EXAM
[General Appearance - Alert] : alert [Sclera] : the sclera and conjunctiva were normal [Extraocular Movements] : extraocular movements were intact [Neck Appearance] : the appearance of the neck was normal [Abnormal Walk] : normal gait [Musculoskeletal - Swelling] : no joint swelling seen [Motor Tone] : muscle strength and tone were normal [Skin Color & Pigmentation] : normal skin color and pigmentation [Oriented To Time, Place, And Person] : oriented to person, place, and time [FreeTextEntry1] : + facial rash on bilateral cheeks (R>L) does not cross NL fold

## 2023-03-20 NOTE — ASSESSMENT
[FreeTextEntry1] : 48 yo M hx SLE here follow up \par myalgias and fatigue - unclear if due to hypothyroid , sle/mctd or mood disorder\par + U1rnp and weak positive PM/Sc100\par OWEN 1:2560, +Sm, +RNP \par \par 1)   Lupus/inflammatory myositis overlap (abnormal Myomarker) \par muscle fatigue, sun sensitivity and headaches. CK normalized (was elevated in 500's in 2019), dsdna + (normalized after benlysta) \par continue   mg daily\par follow up optho given HCQ use. \par continue prednsione 10mg daily \par Continue myfortic 360 BID and consider increased based on blood work and also if he would like to do clincial trial discussed today \par \par 2)  Asthma \par [] improved and working with pulm \par [] coordinate steroids with pulm\par \par 3)  COMPRESSION FRACTURE assoc. with cough \par [] dexa (9-2022) [facility] in spine \par []  check vitamin d \par [] minimize steroids \par \par 4) LE edema\par continue torsemide\par followup with pmd \par \par 5)  RIght chest wall and abdominal pain \par ct chest reviewed \par s/p chol in 2007 \par check abd us to look for cause of TTP in RUQ \par \par More than 50% of the encounter was spent counselling  AMBERLY ESPITIA on differential, workup, disease course, and treatment/management.   Education was provided to AMBERLY ESPITIA during this encounter. All questions and concerns were answered and addressed in detail.  AMBERLY ESPITIA verbalized understanding and agreed to plan\par \par AMBERLY ESPITIA has been instructed to call for an earlier appointment if new symptoms develop \par AMBERLY ESPITIA has been instructed to make a follow up appointment 4 weeks \par \par \par \par \par

## 2023-03-21 ENCOUNTER — TRANSCRIPTION ENCOUNTER (OUTPATIENT)
Age: 48
End: 2023-03-21

## 2023-03-21 ENCOUNTER — APPOINTMENT (OUTPATIENT)
Dept: PULMONOLOGY | Facility: CLINIC | Age: 48
End: 2023-03-21
Payer: COMMERCIAL

## 2023-03-21 VITALS
BODY MASS INDEX: 35.61 KG/M2 | OXYGEN SATURATION: 98 % | HEART RATE: 102 BPM | WEIGHT: 235 LBS | TEMPERATURE: 97.6 F | SYSTOLIC BLOOD PRESSURE: 136 MMHG | DIASTOLIC BLOOD PRESSURE: 80 MMHG | HEIGHT: 68 IN

## 2023-03-21 PROCEDURE — 99214 OFFICE O/P EST MOD 30 MIN: CPT | Mod: 25

## 2023-03-21 RX ORDER — UBIDECARENONE/VIT E ACET 100MG-5
50 MCG CAPSULE ORAL
Qty: 30 | Refills: 0 | Status: ACTIVE | COMMUNITY
Start: 2023-03-21 | End: 1900-01-01

## 2023-03-21 NOTE — HISTORY OF PRESENT ILLNESS
[Never] : never [TextBox_4] : 47 year old male Hx Hx Lupus, asthma, Lupus myositis, now with several medication changes presents for follow up.  Pietro recently hospitalized and found to have right rib fracture.  He has Been on chronic steroids for some time.  He is no on Myfortic and plaquenil.  He reports no increased respiratory symptoms but is being considered for a clinical trial.  He is here for follow up.

## 2023-03-21 NOTE — COUNSELING
[Other: ____] : [unfilled] [Good understanding] : Patient has a good understanding of lifestyle changes and steps needed to achieve self management goal [de-identified] : Anxiety

## 2023-03-21 NOTE — ASSESSMENT
[FreeTextEntry1] : 47 year old male MTA  Hx Lupus, medications medication changes\par \par \par Continue Trelegy 200-25 mcg daily\par Continue prednisone per Rheumatology\par  Continue Famotidine 40 mg daily\par Follow up Rheumatology\par FOllow PCP for GERD, may need GI evaluation\par Follow up Behavioral Health\par PFT next visit \par \par Follow up 4-6 weeks\par \par

## 2023-03-29 ENCOUNTER — RX RENEWAL (OUTPATIENT)
Age: 48
End: 2023-03-29

## 2023-04-03 ENCOUNTER — RX RENEWAL (OUTPATIENT)
Age: 48
End: 2023-04-03

## 2023-04-06 ENCOUNTER — APPOINTMENT (OUTPATIENT)
Dept: RHEUMATOLOGY | Facility: CLINIC | Age: 48
End: 2023-04-06

## 2023-04-06 LAB
EJ AB SER QL: NEGATIVE
ENA JO1 AB SER IA-ACNC: <20 UNITS
ENA PM/SCL AB SER-ACNC: <20 UNITS
ENA SM+RNP AB SER IA-ACNC: 94 UNITS
ENA SS-A IGG SER QL: <20 UNITS
FIBRILLARIN AB SER QL: NEGATIVE
KU AB SER QL: NEGATIVE
MDA-5 (P140)(CADM-140): <20 UNITS
MI2 AB SER QL: NEGATIVE
NXP-2 (P140): <20 UNITS
OJ AB SER QL: NEGATIVE
PL12 AB SER QL: NEGATIVE
PL7 AB SER QL: NEGATIVE
SRP AB SERPL QL: NEGATIVE
TIF GAMMA (P155/140): <20 UNITS
U2 SNRNP AB SER QL: ABNORMAL

## 2023-04-07 LAB — ERYTHROCYTE [SEDIMENTATION RATE] IN BLOOD BY WESTERGREN METHOD: 16 MM/HR

## 2023-04-07 NOTE — ED PROVIDER NOTE - RESPIRATORY [-], MLM
decreased ability to use arms for pushing/pulling/decreased ability to use legs for bridging/pushing no hemoptysis

## 2023-04-11 ENCOUNTER — TRANSCRIPTION ENCOUNTER (OUTPATIENT)
Age: 48
End: 2023-04-11

## 2023-04-11 ENCOUNTER — APPOINTMENT (OUTPATIENT)
Dept: RHEUMATOLOGY | Facility: CLINIC | Age: 48
End: 2023-04-11
Payer: COMMERCIAL

## 2023-04-11 VITALS
TEMPERATURE: 98.3 F | WEIGHT: 256 LBS | OXYGEN SATURATION: 98 % | HEIGHT: 68 IN | SYSTOLIC BLOOD PRESSURE: 118 MMHG | RESPIRATION RATE: 16 BRPM | DIASTOLIC BLOOD PRESSURE: 76 MMHG | HEART RATE: 92 BPM | BODY MASS INDEX: 38.8 KG/M2

## 2023-04-11 DIAGNOSIS — M48.07 SPINAL STENOSIS, LUMBOSACRAL REGION: ICD-10-CM

## 2023-04-11 PROCEDURE — 99214 OFFICE O/P EST MOD 30 MIN: CPT

## 2023-04-11 NOTE — ASSESSMENT
[FreeTextEntry1] : 46 yo M hx SLE here follow up \par myalgias and fatigue - unclear if due to hypothyroid , sle/mctd or mood disorder\par + U1rnp and weak positive PM/Sc100\par OWEN 1:2560, +Sm, +RNP \par has questions about clinical trial and needs fmla form completed. \par \par 1)   Lupus/inflammatory myositis overlap (abnormal Myomarker) \par muscle fatigue, sun sensitivity and headaches. CK normalized (was elevated in 500's in 2019), dsdna + (normalized after benlysta) \par continue   mg daily\par follow up optho given HCQ use. \par continue prednisone 10mg daily \par Continue myfortic 360 BID and consider increased based on blood work and if he gets accepted into clincial trial \par \par 2)  Asthma \par [] improved and working with pulm \par [] coordinate steroids with pulm\par \par 3)  COMPRESSION FRACTURE assoc. with cough \par [] dexa (9-2022) [facility] in spine \par []  check vitamin d \par [] minimize steroids \par \par 4) LE edema\par continue torsemide\par followup with pmd \par \par \par More than 50% of the encounter was spent counselling  AMBERLY ESPITIA on differential, workup, disease course, and treatment/management.   Education was provided to AMBERLY ESPITIA during this encounter. All questions and concerns were answered and addressed in detail.  AMBERLY ESPITIA verbalized understanding and agreed to plan\par \par AMBERLY ESPITIA has been instructed to call for an earlier appointment if new symptoms develop \par AMBERLY ESPITIA has been instructed to make a follow up appointment 4 weeks or clincial trial protocol if he qualifies. \par \par \par \par

## 2023-04-11 NOTE — PHYSICAL EXAM
[General Appearance - Alert] : alert [Neck Appearance] : the appearance of the neck was normal [Abnormal Walk] : normal gait [Musculoskeletal - Swelling] : no joint swelling seen [Motor Tone] : muscle strength and tone were normal [Skin Color & Pigmentation] : normal skin color and pigmentation [Oriented To Time, Place, And Person] : oriented to person, place, and time [FreeTextEntry1] : + facial rash on bilateral cheeks (R>L) does not cross NL fold (mild and improved), striae

## 2023-04-11 NOTE — REASON FOR VISIT
Surgery [Follow-Up: _____] : a [unfilled] follow-up visit [Source: ______] : History obtained from [unfilled] [FreeTextEntry1] : SLE , fatigue, myositis, headache,

## 2023-04-11 NOTE — DATA REVIEWED
[FreeTextEntry1] : Laboratory and radiology studies that were personally reviewed at today's visit are summarized below and above: \par \par CT chest (3-7-2023):  No pulmonary embolus is noted.  Marked loss of height of one of the midthoracic vertebral body has \par increased when compared to previous exam.\par 3-7-2023:  CBC = wnl, K = 3.3, C3-126, c4=28, dsdna < 12 prot/cr = 0.1\par \par \par \par Pulm note (8-26-22)  felling better overall and planning PFT for 4-6 weeks. 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 \par 5-19-21:  MRI right thigh - no muscle edema moderate sized right knee joint effusion \par 5-19-21:  MRI Lumbar spine - diffuse spinal canal narrowing and mild disc bulge with mod spinal cnala stenosis \par Duplex Venous LE - no evidence of DVT\par 3-19-21:  Echo: normal pericardium, nl EF\par 11-6-2020:  Knee Xray:  WNL\par 8-2020:  Head CT - WNL\par \par 7-6-20:  cr = 1.2, cpk = 324, cbc  -okay , ua - okay, esr = 85 (increased from prior 43), prot/cr = 0.1, c3 = 120, c4 = 21, dsdna < 12,\par 6-2020:  tsh = 2.49, \par \par 3/10/20:  myomarker with positive F1itHXZ, U1RNP and SSA52, ck - 212, c3/c4 wnl \par \par dsDNA positive in 2019 and now negative \par \par 10-29-19:  CBC okay\par \par 9-11-19:  ua and prot/cr - wnl \par 8-1-19 - c3/c4 WNL, dsdna = 54\par NL G6PD\par LOW TPMT\par \par ct sinus (10-21-19) : Minimal mucosal thickening involves the left frontal, anterior ethmoid and left maxillary sinuses.  Rightward deviation of the nasal septum with a right nasal spur narrowing the right nasal cavity.  Narrowing of the left OMU which may predispose to sinus outflow obstruction. \par Small lucent lesion within the left maxilla favoring a benign fibro-osseous lesion. Follow-up sinus CT\par advised as clinically warranted in 6 months to confirm stability.\par \par lumbar spine (10-21-19):  Developmental limbus vertebra noted at anterior superior aspect of L4 with \par hypertrophic bony remodeling change. Chronic appearing well marginated slight concave contour depression of anterior aspect of L2 superior endplate.  Remaining vertebral body heights maintained. \par No spondylolistheses spondylolysis defects or evidence for dynamic  instability. \par Variable slightly narrowed disc spaces. Unremarkable SI joints and partially \par visualized hips. \par No lytic or blastic lesions. \par Right upper quadrant surgical clips present. \par head ct - 10-24-19:  Minimal mucosal thickening involves the left frontal, anterior ethmoid and \par left maxillary sinuses.  Rightward deviation of the nasal septum with a right nasal spur narrowing \par the right nasal cavity.  Narrowing of the left OMU which may predispose to sinus outflow obstruction. \par Small lucent lesion within the left maxilla favoring a benign fibro-osseous  lesion. Follow-up sinus CT advised as clinically warranted in 6 months to  confirm stability. \par

## 2023-04-11 NOTE — HISTORY OF PRESENT ILLNESS
[Currently Experiencing] : currently experiencing [Chest Pain] : chest pain [Psychological Symptoms] : psychological symptoms [Fatigue] : fatigue [Arthritis] : arthritis [Arthralgias] : arthralgias [Sun Sensitivity] : sun sensitivity [FreeTextEntry1] : AMBERLY ESPITIA is a 47 year  old male, seen on today  for\par SLE: with OWEN 1:2560, + Sm + RNP + LA, low complement, + U1RNP and weak positive PM/. \par \par Other pertinent medical problems (structural back disease, asthma, migraine)\par \par overall feels the same - fatigue joint pain, muscle pain \par had some pain under the right eye last week that has resolved \par \par Compression Fracture T7:  found on MRI 8-1-22, sx of back pain out of proportion to prior back pain (which was lumbar), slowly improving and pending either kyphoplasty or PT based upon how he does over time.  Since he is improving they are favoring PT. \par No history of prior fractures \par back pain is better\par \par Asthma: better controlled, worries about flares in the spring/summer season. \par \par no fevers, no chills  [History of Nephritis] : the patient has no history of nephritis [Headache] : no headaches [Sicca] : no sicca [Rash] : no rash [Shortness of Breath] : no shortness of breath [Abdominal Pain] : no abdominal pain [FreeTextEntry3] : 2019 [FreeTextEntry7] : OWEN 1:2560, + SM, + RNP, + LA, Low complement,  [FreeTextEntry4] : cellcept (infection), benlysta (allergy), saphenlo (recurrent infections)  [FreeTextEntry6] : Low TPMT

## 2023-04-12 ENCOUNTER — RX RENEWAL (OUTPATIENT)
Age: 48
End: 2023-04-12

## 2023-04-13 ENCOUNTER — TRANSCRIPTION ENCOUNTER (OUTPATIENT)
Age: 48
End: 2023-04-13

## 2023-04-19 ENCOUNTER — NON-APPOINTMENT (OUTPATIENT)
Age: 48
End: 2023-04-19

## 2023-04-19 ENCOUNTER — APPOINTMENT (OUTPATIENT)
Dept: RHEUMATOLOGY | Facility: CLINIC | Age: 48
End: 2023-04-19

## 2023-04-20 ENCOUNTER — TRANSCRIPTION ENCOUNTER (OUTPATIENT)
Age: 48
End: 2023-04-20

## 2023-04-25 ENCOUNTER — APPOINTMENT (OUTPATIENT)
Dept: INTERNAL MEDICINE | Facility: CLINIC | Age: 48
End: 2023-04-25
Payer: COMMERCIAL

## 2023-04-25 VITALS
HEART RATE: 100 BPM | SYSTOLIC BLOOD PRESSURE: 128 MMHG | WEIGHT: 258 LBS | OXYGEN SATURATION: 98 % | HEIGHT: 68 IN | DIASTOLIC BLOOD PRESSURE: 74 MMHG | BODY MASS INDEX: 39.1 KG/M2

## 2023-04-25 DIAGNOSIS — G93.31 POSTVIRAL FATIGUE SYNDROME: ICD-10-CM

## 2023-04-25 DIAGNOSIS — J06.9 ACUTE UPPER RESPIRATORY INFECTION, UNSPECIFIED: ICD-10-CM

## 2023-04-25 DIAGNOSIS — R10.811 RIGHT UPPER QUADRANT ABDOMINAL TENDERNESS: ICD-10-CM

## 2023-04-25 DIAGNOSIS — Z86.39 PERSONAL HISTORY OF OTHER ENDOCRINE, NUTRITIONAL AND METABOLIC DISEASE: ICD-10-CM

## 2023-04-25 DIAGNOSIS — B30.9 VIRAL CONJUNCTIVITIS, UNSPECIFIED: ICD-10-CM

## 2023-04-25 DIAGNOSIS — M94.0 CHONDROCOSTAL JUNCTION SYNDROME [TIETZE]: ICD-10-CM

## 2023-04-25 DIAGNOSIS — S22.49XA MULTIPLE FRACTURES OF RIBS, UNSPECIFIED SIDE, INITIAL ENCOUNTER FOR CLOSED FRACTURE: ICD-10-CM

## 2023-04-25 DIAGNOSIS — R52 PAIN, UNSPECIFIED: ICD-10-CM

## 2023-04-25 DIAGNOSIS — G47.30 SLEEP APNEA, UNSPECIFIED: ICD-10-CM

## 2023-04-25 DIAGNOSIS — Z87.39 PERSONAL HISTORY OF OTHER DISEASES OF THE MUSCULOSKELETAL SYSTEM AND CONNECTIVE TISSUE: ICD-10-CM

## 2023-04-25 PROCEDURE — 99214 OFFICE O/P EST MOD 30 MIN: CPT

## 2023-04-25 RX ORDER — HYDROCODONE BITARTRATE AND HOMATROPINE METHYLBROMIDE 1.5; 5 MG/5ML; MG/5ML
5-1.5 SOLUTION ORAL 3 TIMES DAILY
Qty: 1 | Refills: 0 | Status: DISCONTINUED | COMMUNITY
Start: 2023-01-05 | End: 2023-04-25

## 2023-04-25 RX ORDER — ALPRAZOLAM 0.5 MG/1
0.5 TABLET ORAL
Qty: 28 | Refills: 0 | Status: DISCONTINUED | COMMUNITY
Start: 2020-06-05 | End: 2023-04-25

## 2023-04-25 RX ORDER — AMOXICILLIN AND CLAVULANATE POTASSIUM 875; 125 MG/1; MG/1
875-125 TABLET, COATED ORAL TWICE DAILY
Qty: 14 | Refills: 0 | Status: DISCONTINUED | COMMUNITY
Start: 2022-12-22 | End: 2023-04-25

## 2023-04-25 RX ORDER — FLUCONAZOLE 100 MG/1
100 TABLET ORAL
Qty: 15 | Refills: 0 | Status: DISCONTINUED | COMMUNITY
Start: 2022-06-09 | End: 2023-04-25

## 2023-04-25 RX ORDER — PEN NEEDLE, DIABETIC 32 GX 1/6"
32G X 4 MM NEEDLE, DISPOSABLE MISCELLANEOUS
Qty: 1 | Refills: 0 | Status: ACTIVE | COMMUNITY
Start: 2023-04-25 | End: 1900-01-01

## 2023-04-25 RX ORDER — IPRATROPIUM BROMIDE 42 UG/1
0.06 SPRAY NASAL 4 TIMES DAILY
Qty: 1 | Refills: 0 | Status: DISCONTINUED | COMMUNITY
Start: 2022-12-22 | End: 2023-04-25

## 2023-04-25 RX ORDER — COMPR.STOCKING,THIGH,REG,LARGE
EACH MISCELLANEOUS
Qty: 1 | Refills: 0 | Status: DISCONTINUED | COMMUNITY
Start: 2022-06-09 | End: 2023-04-25

## 2023-04-25 RX ORDER — SULFAMETHOXAZOLE AND TRIMETHOPRIM 800; 160 MG/1; MG/1
800-160 TABLET ORAL
Qty: 36 | Refills: 1 | Status: DISCONTINUED | COMMUNITY
Start: 2022-02-16 | End: 2023-04-25

## 2023-04-25 RX ORDER — CIPROFLOXACIN 3 MG/ML
0.3 SOLUTION OPHTHALMIC EVERY 4 HOURS
Qty: 1 | Refills: 0 | Status: DISCONTINUED | COMMUNITY
Start: 2022-12-22 | End: 2023-04-25

## 2023-04-25 NOTE — HISTORY OF PRESENT ILLNESS
[FreeTextEntry1] : \par Follow-up for type II diabetes asthma, hypertension, obesity [de-identified] : The patient is a 47-year-old male with history of lupus/inflammatory myositis overlap, obesity, hypertension, asthma, hyperlipidemia, degenerative disc disease, right sided rib fracture, Gerd, allergic rhinitis hypothyroidism who presents for follow-up. Patient feels well notes decreasing joint pain denies polyuria polydipsia chest pain shortness of breath complaints of nasal congestion. Denies heat or cold intolerance

## 2023-04-25 NOTE — ASSESSMENT
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 Obesity\par counseled on diet and exercise\par will advance ozempic for sugar and weight reduction \par \par 2 type II diabetes\par list hemoglobin A1c 7.2\par increase ozempic will 0.5 mg Q weekly\par taper as tolerated\par \par 3 asthma\par well-controlled on inhalers\par \par 4 hypertension\par well-controlled on losartan 50 mg Q day\par \par 5 peripheral edema\par continue torsemide 10 mg a day\par \par 6 asthma\par well-controlled on trelegy inhaler\par \par 7 lupus\par continue prednisone 10 mg hydrochloric when and mycophenolate 180 mg\par follow-up rheumatology\par \par 8. Hypothyroidism\par continue levothyroxine hundred 25 µg PO Q day\par next visit check TFTs\par \par 9. Allergic rhinitis\par continue Flonase\par follow-up in 6 to 8 weeks

## 2023-04-25 NOTE — PHYSICAL EXAM
[Normal Sclera/Conjunctiva] : normal sclera/conjunctiva [Normal Outer Ear/Nose] : the outer ears and nose were normal in appearance [Normal] : affect was normal and insight and judgment were intact [de-identified] : Cushingoid face

## 2023-04-27 ENCOUNTER — RX RENEWAL (OUTPATIENT)
Age: 48
End: 2023-04-27

## 2023-04-28 ENCOUNTER — TRANSCRIPTION ENCOUNTER (OUTPATIENT)
Age: 48
End: 2023-04-28

## 2023-05-01 ENCOUNTER — TRANSCRIPTION ENCOUNTER (OUTPATIENT)
Age: 48
End: 2023-05-01

## 2023-05-02 ENCOUNTER — APPOINTMENT (OUTPATIENT)
Dept: PULMONOLOGY | Facility: CLINIC | Age: 48
End: 2023-05-02

## 2023-05-02 ENCOUNTER — TRANSCRIPTION ENCOUNTER (OUTPATIENT)
Age: 48
End: 2023-05-02

## 2023-05-02 NOTE — COUNSELING
[Other: ____] : [unfilled] [Good understanding] : Patient has a good understanding of lifestyle changes and steps needed to achieve self management goal [de-identified] : Anxiety

## 2023-05-02 NOTE — HISTORY OF PRESENT ILLNESS
[Never] : never [TextBox_4] : 47 year old male Hx Hx Lupus, asthma, Lupus myositis, now with several medication changes presents for follow up.  Patient recently hospitalized and found to have right rib fracture.  He has Been on chronic steroids for some time.  He is no on Myfortic and plaquenil.  He reports no increased respiratory symptoms but is being considered for a clinical trial.  He is here for follow up.

## 2023-05-15 ENCOUNTER — TRANSCRIPTION ENCOUNTER (OUTPATIENT)
Age: 48
End: 2023-05-15

## 2023-05-16 ENCOUNTER — APPOINTMENT (OUTPATIENT)
Dept: RHEUMATOLOGY | Facility: CLINIC | Age: 48
End: 2023-05-16
Payer: COMMERCIAL

## 2023-05-16 VITALS
WEIGHT: 258 LBS | TEMPERATURE: 97.1 F | OXYGEN SATURATION: 98 % | HEART RATE: 100 BPM | HEIGHT: 68 IN | BODY MASS INDEX: 39.1 KG/M2 | RESPIRATION RATE: 16 BRPM | DIASTOLIC BLOOD PRESSURE: 94 MMHG | SYSTOLIC BLOOD PRESSURE: 133 MMHG

## 2023-05-16 PROCEDURE — 99214 OFFICE O/P EST MOD 30 MIN: CPT

## 2023-05-16 NOTE — ASSESSMENT
[FreeTextEntry1] : 46 yo M hx SLE here follow up \par myalgias and fatigue - unclear if due to hypothyroid , sle/mctd or mood disorder\par + U1rnp and weak positive PM/Sc100\par OWEN 1:2560, +Sm, +RNP \par \par \par 1)   Lupus/inflammatory myositis overlap (abnormal Myomarker) \par muscle fatigue, sun sensitivity and headaches. CK normalized (was elevated in 500's in 2019), dsdna + (normalized after benlysta) \par continue   mg daily\par follow up optho given HCQ use. \par Myfortic 360 BID is current penaloza and will increase to 720mg in am and 360mg in pm \par lower prednisone from 10mg daily to 7.5mg daily \par sun sensiivity:  sun block or UPF protective clothing \par \par 2)  Asthma \par [] improved and working with pulm \par [] coordinate steroids with pulm\par \par 3)  COMPRESSION FRACTURE assoc. with cough \par [] dexa (9-2022) Openia in spine \par []  check vitamin d \par [] minimize steroids \par \par 4) LE edema\par continue torsemide\par followup with pmd \par \par \par More than 50% of the encounter was spent counselling  AMBERLY ESPITIA on differential, workup, disease course, and treatment/management.   Education was provided to AMBERLY ESPITIA during this encounter. All questions and concerns were answered and addressed in detail.  AMBERLY ESPITIA verbalized understanding and agreed to plan\par \par AMBERLY ESPITIA has been instructed to call for an earlier appointment if new symptoms develop \par AMBERLY ESPITIA has been instructed to make a follow up appointment 4 weeks \par \par

## 2023-05-16 NOTE — HISTORY OF PRESENT ILLNESS
[Currently Experiencing] : currently experiencing [Chest Pain] : chest pain [Psychological Symptoms] : psychological symptoms [Fatigue] : fatigue [Arthritis] : arthritis [Arthralgias] : arthralgias [Sun Sensitivity] : sun sensitivity [FreeTextEntry1] : AMBERLY ESPITIA is a 47 year  old male, seen on today  for\par SLE: with OWEN 1:2560, + Sm + RNP + LA, low complement, + U1RNP and weak positive PM/. \par \par Other pertinent medical problems (structural back disease, asthma, migraine)\par \par today he reports:\par a lot of heat in the feet and axilla and back of neck all the time and not affected by ambient temperature \par minimal redness in the feet \par pain in thigh muscle and weakness in thigh muscles with walking\par has significant sun sensitivity - feels like the sun attaches to him and makes him weak and feels like he was run over by a truck. \par \par \par Compression Fracture T7:  found on MRI 8-1-22, sx of back pain out of proportion to prior back pain (which was lumbar), slowly improving and pending either kyphoplasty or PT based upon how he does over time.  Since he is improving they are favoring PT. \par No history of prior fractures \par back pain is better in lower back \par has mild pain in the upper back someitmes \par \par Asthma: better controlled, worries about flares in the spring/summer season. \par \par no fevers, no chills  [History of Nephritis] : the patient has no history of nephritis [Headache] : no headaches [Sicca] : no sicca [Rash] : no rash [Shortness of Breath] : no shortness of breath [Abdominal Pain] : no abdominal pain [FreeTextEntry3] : 2019 [FreeTextEntry7] : OWEN 1:2560, + SM, + RNP, + LA, Low complement,  [FreeTextEntry4] : cellcept (infection), benlysta (allergy), saphenlo (recurrent infections)  [FreeTextEntry6] : Low TPMT

## 2023-05-16 NOTE — DATA REVIEWED
[FreeTextEntry1] : Laboratory and radiology studies that were personally reviewed at today's visit are summarized below and above: \par \par CT chest (3-7-2023):  No pulmonary embolus is noted.  Marked loss of height of one of the midthoracic vertebral body has \par increased when compared to previous exam.\par 3-7-2023:  CBC = wnl, K = 3.3, C3-126, c4=28, dsdna < 12 prot/cr = 0.1\par \par \par \par Pulm note (8-26-22)  felling better overall and planning PFT for 4-6 weeks. 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 \par 5-19-21:  MRI right thigh - no muscle edema moderate sized right knee joint effusion \par 5-19-21:  MRI Lumbar spine - diffuse spinal canal narrowing and mild disc bulge with mod spinal cnala stenosis \par Duplex Venous LE - no evidence of DVT\par 3-19-21:  Echo: normal pericardium, nl EF\par 11-6-2020:  Knee Xray:  WNL\par 8-2020:  Head CT - WNL\par \par 7-6-20:  cr = 1.2, cpk = 324, cbc  -okay , ua - okay, esr = 85 (increased from prior 43), prot/cr = 0.1, c3 = 120, c4 = 21, dsdna < 12,\par 6-2020:  tsh = 2.49, \par \par 3/10/20:  myomarker with positive [id number], U1RNP and SSA52, ck - 212, c3/c4 wnl \par \par dsDNA positive in 2019 and now negative \par \par 10-29-19:  CBC okay\par \par 9-11-19:  ua and prot/cr - wnl \par 8-1-19 - c3/c4 WNL, dsdna = 54\par NL G6PD\par LOW TPMT\par \par ct sinus (10-21-19) : Minimal mucosal thickening involves the left frontal, anterior ethmoid and left maxillary sinuses.  Rightward deviation of the nasal septum with a right nasal spur narrowing the right nasal cavity.  Narrowing of the left OMU which may predispose to sinus outflow obstruction. \par Small lucent lesion within the left maxilla favoring a benign fibro-osseous lesion. Follow-up sinus CT\par advised as clinically warranted in 6 months to confirm stability.\par \par lumbar spine (10-21-19):  Developmental limbus vertebra noted at anterior superior aspect of L4 with \par hypertrophic bony remodeling change. Chronic appearing well marginated slight concave contour depression of anterior aspect of L2 superior endplate.  Remaining vertebral body heights maintained. \par No spondylolistheses spondylolysis defects or evidence for dynamic  instability. \par Variable slightly narrowed disc spaces. Unremarkable SI joints and partially \par visualized hips. \par No lytic or blastic lesions. \par Right upper quadrant surgical clips present. \par head ct - 10-24-19:  Minimal mucosal thickening involves the left frontal, anterior ethmoid and \par left maxillary sinuses.  Rightward deviation of the nasal septum with a right nasal spur narrowing \par the right nasal cavity.  Narrowing of the left OMU which may predispose to sinus outflow obstruction. \par Small lucent lesion within the left maxilla favoring a benign fibro-osseous  lesion. Follow-up sinus CT advised as clinically warranted in 6 months to  confirm stability. \par

## 2023-05-16 NOTE — PHYSICAL EXAM
[General Appearance - Alert] : alert [Neck Appearance] : the appearance of the neck was normal [Abnormal Walk] : normal gait [Musculoskeletal - Swelling] : no joint swelling seen [Motor Tone] : muscle strength and tone were normal [Skin Color & Pigmentation] : normal skin color and pigmentation [Oriented To Time, Place, And Person] : oriented to person, place, and time [General Appearance - In No Acute Distress] : in no acute distress [General Appearance - Well Nourished] : well nourished [General Appearance - Well Developed] : well developed [Sclera] : the sclera and conjunctiva were normal [FreeTextEntry1] : + facial rash on bilateral cheeks (R>L) does not cross NL fold (mild and improved), striae

## 2023-05-17 ENCOUNTER — TRANSCRIPTION ENCOUNTER (OUTPATIENT)
Age: 48
End: 2023-05-17

## 2023-05-17 LAB
ALBUMIN SERPL ELPH-MCNC: 4.5 G/DL
ALP BLD-CCNC: 95 U/L
ALT SERPL-CCNC: 30 U/L
ANION GAP SERPL CALC-SCNC: 15 MMOL/L
APPEARANCE: CLEAR
AST SERPL-CCNC: 24 U/L
BACTERIA: NEGATIVE /HPF
BILIRUB SERPL-MCNC: 0.3 MG/DL
BILIRUBIN URINE: NEGATIVE
BLOOD URINE: NEGATIVE
BUN SERPL-MCNC: 13 MG/DL
C3 SERPL-MCNC: 139 MG/DL
C4 SERPL-MCNC: 28 MG/DL
CALCIUM SERPL-MCNC: 10.2 MG/DL
CAST: 0 /LPF
CHLORIDE SERPL-SCNC: 102 MMOL/L
CO2 SERPL-SCNC: 25 MMOL/L
COLOR: YELLOW
CREAT SERPL-MCNC: 0.95 MG/DL
CREAT SPEC-SCNC: 272 MG/DL
CREAT/PROT UR: 0.1 RATIO
CRP SERPL-MCNC: 12 MG/L
DSDNA AB SER-ACNC: <12 IU/ML
EGFR: 100 ML/MIN/1.73M2
EPITHELIAL CELLS: 0 /HPF
ERYTHROCYTE [SEDIMENTATION RATE] IN BLOOD BY WESTERGREN METHOD: 43 MM/HR
GLUCOSE QUALITATIVE U: NEGATIVE MG/DL
GLUCOSE SERPL-MCNC: 86 MG/DL
HCT VFR BLD CALC: 41.3 %
HGB BLD-MCNC: 13.4 G/DL
KETONES URINE: NEGATIVE MG/DL
LEUKOCYTE ESTERASE URINE: NEGATIVE
MCHC RBC-ENTMCNC: 27.9 PG
MCHC RBC-ENTMCNC: 32.4 GM/DL
MCV RBC AUTO: 86 FL
MICROSCOPIC-UA: NORMAL
NITRITE URINE: NEGATIVE
PH URINE: 6
PLATELET # BLD AUTO: 383 K/UL
POTASSIUM SERPL-SCNC: 4.2 MMOL/L
PROT SERPL-MCNC: 7.2 G/DL
PROT UR-MCNC: 20 MG/DL
PROTEIN URINE: 30 MG/DL
RBC # BLD: 4.8 M/UL
RBC # FLD: 13.7 %
RED BLOOD CELLS URINE: 1 /HPF
SODIUM SERPL-SCNC: 142 MMOL/L
SPECIFIC GRAVITY URINE: 1.04
T3 SERPL-MCNC: 129 NG/DL
T4 FREE SERPL-MCNC: 1.6 NG/DL
TSH SERPL-ACNC: 1.67 UIU/ML
UROBILINOGEN URINE: 0.2 MG/DL
WBC # FLD AUTO: 12.1 K/UL
WHITE BLOOD CELLS URINE: 1 /HPF

## 2023-05-18 ENCOUNTER — TRANSCRIPTION ENCOUNTER (OUTPATIENT)
Age: 48
End: 2023-05-18

## 2023-05-19 ENCOUNTER — TRANSCRIPTION ENCOUNTER (OUTPATIENT)
Age: 48
End: 2023-05-19

## 2023-05-22 ENCOUNTER — RX RENEWAL (OUTPATIENT)
Age: 48
End: 2023-05-22

## 2023-05-23 ENCOUNTER — APPOINTMENT (OUTPATIENT)
Dept: PULMONOLOGY | Facility: CLINIC | Age: 48
End: 2023-05-23
Payer: COMMERCIAL

## 2023-05-23 VITALS
OXYGEN SATURATION: 97 % | DIASTOLIC BLOOD PRESSURE: 102 MMHG | HEIGHT: 68 IN | WEIGHT: 264 LBS | TEMPERATURE: 97.7 F | SYSTOLIC BLOOD PRESSURE: 136 MMHG | HEART RATE: 100 BPM | BODY MASS INDEX: 40.01 KG/M2

## 2023-05-23 PROCEDURE — 99214 OFFICE O/P EST MOD 30 MIN: CPT | Mod: 25

## 2023-05-23 RX ORDER — FLUTICASONE PROPIONATE 50 UG/1
50 SPRAY, METERED NASAL DAILY
Qty: 1 | Refills: 3 | Status: DISCONTINUED | COMMUNITY
Start: 2023-04-25 | End: 2023-05-23

## 2023-05-23 NOTE — COUNSELING
[Other: ____] : [unfilled] [Good understanding] : Patient has a good understanding of lifestyle changes and steps needed to achieve self management goal [de-identified] : Anxiety

## 2023-05-23 NOTE — HISTORY OF PRESENT ILLNESS
[Never] : never [TextBox_4] : 47 year old male Hx Hx Lupus, asthma, Lupus myositis, now with several medication changes presents for follow up. Patient reports doing well from respiratory point of view.  He reports Rheumatology is attempting to decrease prednisone and increase Ceel Cept.  He reports feeling "achy and cranky."  He is here for follow up.

## 2023-05-23 NOTE — ASSESSMENT
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

## 2023-05-24 ENCOUNTER — RX RENEWAL (OUTPATIENT)
Age: 48
End: 2023-05-24

## 2023-06-02 ENCOUNTER — RX RENEWAL (OUTPATIENT)
Age: 48
End: 2023-06-02

## 2023-06-07 RX ORDER — ALBUTEROL SULFATE 90 UG/1
108 (90 BASE) INHALANT RESPIRATORY (INHALATION)
Qty: 1 | Refills: 1 | Status: ACTIVE | COMMUNITY
Start: 2023-06-07 | End: 1900-01-01

## 2023-06-08 ENCOUNTER — TRANSCRIPTION ENCOUNTER (OUTPATIENT)
Age: 48
End: 2023-06-08

## 2023-06-13 ENCOUNTER — APPOINTMENT (OUTPATIENT)
Dept: RHEUMATOLOGY | Facility: CLINIC | Age: 48
End: 2023-06-13
Payer: COMMERCIAL

## 2023-06-13 VITALS
DIASTOLIC BLOOD PRESSURE: 79 MMHG | HEART RATE: 117 BPM | HEIGHT: 68 IN | SYSTOLIC BLOOD PRESSURE: 130 MMHG | BODY MASS INDEX: 40.01 KG/M2 | OXYGEN SATURATION: 98 % | RESPIRATION RATE: 16 BRPM | TEMPERATURE: 98.3 F | WEIGHT: 264 LBS

## 2023-06-13 DIAGNOSIS — B35.1 TINEA UNGUIUM: ICD-10-CM

## 2023-06-13 PROCEDURE — 99215 OFFICE O/P EST HI 40 MIN: CPT

## 2023-06-13 RX ORDER — MYCOPHENILIC ACID 180 MG/1
180 TABLET, DELAYED RELEASE ORAL
Qty: 60 | Refills: 1 | Status: DISCONTINUED | COMMUNITY
Start: 2023-05-24 | End: 2023-06-13

## 2023-06-13 RX ORDER — PREDNISONE 5 MG/1
5 TABLET ORAL
Qty: 60 | Refills: 0 | Status: DISCONTINUED | COMMUNITY
Start: 2021-04-12 | End: 2023-06-13

## 2023-06-13 NOTE — HISTORY OF PRESENT ILLNESS
[Currently Experiencing] : currently experiencing [Psychological Symptoms] : psychological symptoms [Fatigue] : fatigue [Arthralgias] : arthralgias [FreeTextEntry1] : AMBERLY ESPITIA is a 47 year  old male, seen on today  for\par SLE: with OWEN 1:2560, + Sm + RNP + LA, low complement, + U1RNP and weak positive PM/. \par \par Other pertinent medical problems (structural back disease, asthma, migraine)\par \par today he reports:\par he is overall fatigued and achy but not worse than before steroid taper \par hasn't take cymbalta yet\par needs clearance letter for work \par  [History of Nephritis] : the patient has no history of nephritis [Headache] : no headaches [Sicca] : no sicca [Rash] : no rash [Chest Pain] : no chest pain [Shortness of Breath] : no shortness of breath [Abdominal Pain] : no abdominal pain [Arthritis] : no arthritis [Sun Sensitivity] : sun sensitivity [FreeTextEntry3] : 2019 [FreeTextEntry7] : OWEN 1:2560, + SM, + RNP, + LA, Low complement,  [FreeTextEntry4] : cellcept (infection), benlysta (allergy), saphenlo (recurrent infections)  [FreeTextEntry6] : Low TPMT

## 2023-06-13 NOTE — DATA REVIEWED
[FreeTextEntry1] : Laboratory and radiology studies that were personally reviewed at today's visit are summarized below and above: \par \par CT chest (3-7-2023):  No pulmonary embolus is noted.  Marked loss of height of one of the midthoracic vertebral body has \par increased when compared to previous exam.\par 3-7-2023:  CBC = wnl, K = 3.3, C3-126, c4=28, dsdna < 12 prot/cr = 0.1\par \par \par \par Pulm note (8-26-22)  felling better overall and planning PFT for 4-6 weeks. 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 \par 5-19-21:  MRI right thigh - no muscle edema moderate sized right knee joint effusion \par 5-19-21:  MRI Lumbar spine - diffuse spinal canal narrowing and mild disc bulge with mod spinal cnala stenosis \par Duplex Venous LE - no evidence of DVT\par 3-19-21:  Echo: normal pericardium, nl EF\par 11-6-2020:  Knee Xray:  WNL\par 8-2020:  Head CT - WNL\par \par 7-6-20:  cr = 1.2, cpk = 324, cbc  -okay , ua - okay, esr = 85 (increased from prior 43), prot/cr = 0.1, c3 = 120, c4 = 21, dsdna < 12,\par 6-2020:  tsh = 2.49, \par \par 3/10/20:  myomarker with positive N3zdBRJ, U1RNP and SSA52, ck - 212, c3/c4 wnl \par \par dsDNA positive in 2019 and now negative \par \par 10-29-19:  CBC okay\par \par 9-11-19:  ua and prot/cr - wnl \par 8-1-19 - c3/c4 WNL, dsdna = 54\par NL G6PD\par LOW TPMT\par \par ct sinus (10-21-19) : Minimal mucosal thickening involves the left frontal, anterior ethmoid and left maxillary sinuses.  Rightward deviation of the nasal septum with a right nasal spur narrowing the right nasal cavity.  Narrowing of the left OMU which may predispose to sinus outflow obstruction. \par Small lucent lesion within the left maxilla favoring a benign fibro-osseous lesion. Follow-up sinus CT\par advised as clinically warranted in 6 months to confirm stability.\par \par lumbar spine (10-21-19):  Developmental limbus vertebra noted at anterior superior aspect of L4 with \par hypertrophic bony remodeling change. Chronic appearing well marginated slight concave contour depression of anterior aspect of L2 superior endplate.  Remaining vertebral body heights maintained. \par No spondylolistheses spondylolysis defects or evidence for dynamic  instability. \par Variable slightly narrowed disc spaces. Unremarkable SI joints and partially \par visualized hips. \par No lytic or blastic lesions. \par Right upper quadrant surgical clips present. \par head ct - 10-24-19:  Minimal mucosal thickening involves the left frontal, anterior ethmoid and \par left maxillary sinuses.  Rightward deviation of the nasal septum with a right nasal spur narrowing \par the right nasal cavity.  Narrowing of the left OMU which may predispose to sinus outflow obstruction. \par Small lucent lesion within the left maxilla favoring a benign fibro-osseous  lesion. Follow-up sinus CT advised as clinically warranted in 6 months to  confirm stability. \par

## 2023-06-13 NOTE — PHYSICAL EXAM
[General Appearance - Alert] : alert [General Appearance - In No Acute Distress] : in no acute distress [General Appearance - Well Nourished] : well nourished [General Appearance - Well Developed] : well developed [Sclera] : the sclera and conjunctiva were normal [Neck Appearance] : the appearance of the neck was normal [Abnormal Walk] : normal gait [Musculoskeletal - Swelling] : no joint swelling seen [Motor Tone] : muscle strength and tone were normal [Skin Color & Pigmentation] : normal skin color and pigmentation [Oriented To Time, Place, And Person] : oriented to person, place, and time [FreeTextEntry1] : striae

## 2023-06-13 NOTE — ASSESSMENT
[FreeTextEntry1] : 46 yo M hx SLE here follow up \par myalgias and fatigue - unclear if due to hypothyroid , sle/mctd or mood disorder\par + U1rnp and weak positive PM/Sc100\par OWEN 1:2560, +Sm, +RNP \par \par \par 1)   Lupus/inflammatory myositis overlap (abnormal Myomarker) \par muscle fatigue, sun sensitivity and headaches. CK normalized (was elevated in 500's in 2019), dsdna + (normalized after benlysta) \par continue   mg daily\par follow up optho given HCQ use. \par increase myfortic to 720 bid\par lower prednisone from 7.5 mg daily to 2.5mg bid \par sun sensiivity:  sun block or UPF protective clothing \par \par 2)  Asthma \par [] improved and working with pulm \par [] coordinate steroids with pulm\par \par 3)  COMPRESSION FRACTURE assoc. with cough \par [] dexa (9-2022) Openia in spine \par []  check vitamin d \par [] minimize steroids \par \par 4) LE edema\par continue torsemide\par followup with pmd \par \par \par More than 50% of the encounter was spent counselling  AMBERLY ESPITIA on differential, workup, disease course, and treatment/management.   Education was provided to AMBERLY ESPITIA during this encounter. All questions and concerns were answered and addressed in detail.  AMBERLY ESPITIA verbalized understanding and agreed to plan\par \par AMBERLY ESPITIA has been instructed to call for an earlier appointment if new symptoms develop \par AMBERLY ESPITIA has been instructed to make a follow up appointment  8 weeks \par \par

## 2023-06-14 ENCOUNTER — TRANSCRIPTION ENCOUNTER (OUTPATIENT)
Age: 48
End: 2023-06-14

## 2023-06-14 LAB
25(OH)D3 SERPL-MCNC: 29.7 NG/ML
ALBUMIN SERPL ELPH-MCNC: 4.4 G/DL
ALP BLD-CCNC: 101 U/L
ALT SERPL-CCNC: 30 U/L
ANION GAP SERPL CALC-SCNC: 16 MMOL/L
APPEARANCE: CLEAR
AST SERPL-CCNC: 31 U/L
BACTERIA: NEGATIVE /HPF
BILIRUB SERPL-MCNC: 0.4 MG/DL
BILIRUBIN URINE: NEGATIVE
BLOOD URINE: NEGATIVE
BUN SERPL-MCNC: 8 MG/DL
C3 SERPL-MCNC: 152 MG/DL
C4 SERPL-MCNC: 26 MG/DL
CALCIUM SERPL-MCNC: 9.6 MG/DL
CAST: 0 /LPF
CHLORIDE SERPL-SCNC: 97 MMOL/L
CK SERPL-CCNC: 221 U/L
CO2 SERPL-SCNC: 27 MMOL/L
COLOR: YELLOW
CREAT SERPL-MCNC: 1.12 MG/DL
CREAT SPEC-SCNC: 227 MG/DL
CREAT/PROT UR: 0.1 RATIO
DSDNA AB SER-ACNC: <12 IU/ML
EGFR: 82 ML/MIN/1.73M2
EPITHELIAL CELLS: 0 /HPF
ERYTHROCYTE [SEDIMENTATION RATE] IN BLOOD BY WESTERGREN METHOD: 43 MM/HR
GLUCOSE QUALITATIVE U: NEGATIVE MG/DL
GLUCOSE SERPL-MCNC: 115 MG/DL
HCT VFR BLD CALC: 43.1 %
HGB BLD-MCNC: 13.5 G/DL
KETONES URINE: NEGATIVE MG/DL
LEUKOCYTE ESTERASE URINE: ABNORMAL
MCHC RBC-ENTMCNC: 27.7 PG
MCHC RBC-ENTMCNC: 31.3 GM/DL
MCV RBC AUTO: 88.5 FL
MICROSCOPIC-UA: NORMAL
NITRITE URINE: NEGATIVE
PH URINE: 6.5
PLATELET # BLD AUTO: 457 K/UL
POTASSIUM SERPL-SCNC: 3.3 MMOL/L
PROT SERPL-MCNC: 7 G/DL
PROT UR-MCNC: 19 MG/DL
PROTEIN URINE: 30 MG/DL
RBC # BLD: 4.87 M/UL
RBC # FLD: 13.6 %
RED BLOOD CELLS URINE: 1 /HPF
SODIUM SERPL-SCNC: 139 MMOL/L
SPECIFIC GRAVITY URINE: 1.02
UROBILINOGEN URINE: 1 MG/DL
WBC # FLD AUTO: 11.11 K/UL
WHITE BLOOD CELLS URINE: 1 /HPF

## 2023-06-16 ENCOUNTER — RX RENEWAL (OUTPATIENT)
Age: 48
End: 2023-06-16

## 2023-06-21 ENCOUNTER — APPOINTMENT (OUTPATIENT)
Dept: CARDIOLOGY | Facility: CLINIC | Age: 48
End: 2023-06-21

## 2023-06-22 ENCOUNTER — RX RENEWAL (OUTPATIENT)
Age: 48
End: 2023-06-22

## 2023-06-26 ENCOUNTER — TRANSCRIPTION ENCOUNTER (OUTPATIENT)
Age: 48
End: 2023-06-26

## 2023-06-27 ENCOUNTER — APPOINTMENT (OUTPATIENT)
Dept: INTERNAL MEDICINE | Facility: CLINIC | Age: 48
End: 2023-06-27
Payer: COMMERCIAL

## 2023-06-27 ENCOUNTER — TRANSCRIPTION ENCOUNTER (OUTPATIENT)
Age: 48
End: 2023-06-27

## 2023-06-27 VITALS
HEIGHT: 68 IN | TEMPERATURE: 97.3 F | DIASTOLIC BLOOD PRESSURE: 75 MMHG | BODY MASS INDEX: 39.1 KG/M2 | OXYGEN SATURATION: 96 % | SYSTOLIC BLOOD PRESSURE: 119 MMHG | HEART RATE: 100 BPM | WEIGHT: 258 LBS

## 2023-06-27 DIAGNOSIS — R07.81 PLEURODYNIA: ICD-10-CM

## 2023-06-27 PROCEDURE — 99214 OFFICE O/P EST MOD 30 MIN: CPT

## 2023-06-27 NOTE — ASSESSMENT
[FreeTextEntry1] : Patient is a 48-year-old male with history of lupus, asthma, hypertension, hypothyroidism, Gerd, type II diabetes, obesity, obstructive sleep apnea edema who presents for complaint of diarrhea nausea vomiting and right rib pain\par \par 1. Acute gastroenteritis\par most likely viral from daughter\par as improving will: Zofran for nausea\par hydration advanced diet as tolerated\par if fails to improve get stool studies\par \par 2. Right rib pain\par every year hundred the fracture from vomiting\par check rib films right-sided\par Lidoderm patch\par \par 3. Obesity/type II diabetes\par continue ozempic will advance and check after his resolution of gastroenteritis\par observe for now\par \par 4 hypertension\par well-controlled on losartan 50 mg once a day\par \par 5. Hypothyroidism\par continue levothyroxine hundred 45 µg clinically and by labs you thyroid\par \par 6 asthma\par doing well on trelegy\par \par Follow-up in 2 to 6 weeks

## 2023-06-27 NOTE — REVIEW OF SYSTEMS
[Nausea] : nausea [Constipation] : no constipation [Diarrhea] : diarrhea [Vomiting] : vomiting [Heartburn] : no heartburn [Melena] : no melena [Joint Pain] : no joint pain [Joint Stiffness] : no joint stiffness [Muscle Pain] : muscle pain [Muscle Weakness] : no muscle weakness [Back Pain] : no back pain [Joint Swelling] : no joint swelling [Negative] : Psychiatric

## 2023-06-27 NOTE — PHYSICAL EXAM
[Normal Outer Ear/Nose] : the outer ears and nose were normal in appearance [Normal] : no CVA or spinal tenderness [de-identified] : Tenderness over this six anterior ribs

## 2023-06-27 NOTE — HISTORY OF PRESENT ILLNESS
[FreeTextEntry8] : \par \par CC: diarrhea nausea vomiting since Saturday right sided rib pain\par HPI the patient is a 48-year-old male with lupus/myositis, hypertension, asthma, hypothyroidism, migraines, Gerd, sleep apnea, right sided rib fracture and type II diabetes who presents with complaint of diarrhea since Saturday patient describes nausea vomiting projectile with severe diarrhea has slightly improved over the last 24 hours patient notes to contact order similar symptoms at home. He denies fever, chills, cough, shortness of breath, nasal discharge, melena. But complains out of 610 right rib pain\par

## 2023-06-28 ENCOUNTER — TRANSCRIPTION ENCOUNTER (OUTPATIENT)
Age: 48
End: 2023-06-28

## 2023-06-28 LAB
ALBUMIN SERPL ELPH-MCNC: 4.5 G/DL
ALP BLD-CCNC: 77 U/L
ALT SERPL-CCNC: 54 U/L
ANION GAP SERPL CALC-SCNC: 14 MMOL/L
APPEARANCE: CLEAR
AST SERPL-CCNC: 112 U/L
BACTERIA: NEGATIVE /HPF
BILIRUB SERPL-MCNC: 0.4 MG/DL
BILIRUBIN URINE: NEGATIVE
BLOOD URINE: NEGATIVE
BUN SERPL-MCNC: 7 MG/DL
C3 SERPL-MCNC: 136 MG/DL
C4 SERPL-MCNC: 29 MG/DL
CALCIUM SERPL-MCNC: 9.6 MG/DL
CAST: 0 /LPF
CHLORIDE SERPL-SCNC: 103 MMOL/L
CO2 SERPL-SCNC: 23 MMOL/L
COLOR: YELLOW
CREAT SERPL-MCNC: 1.01 MG/DL
CREAT SPEC-SCNC: 131 MG/DL
CREAT/PROT UR: 0.1 RATIO
EGFR: 92 ML/MIN/1.73M2
EPITHELIAL CELLS: 0 /HPF
ERYTHROCYTE [SEDIMENTATION RATE] IN BLOOD BY WESTERGREN METHOD: 18 MM/HR
GLUCOSE QUALITATIVE U: NEGATIVE MG/DL
GLUCOSE SERPL-MCNC: 121 MG/DL
HCT VFR BLD CALC: 41.2 %
HGB BLD-MCNC: 13.3 G/DL
KETONES URINE: NEGATIVE MG/DL
LEUKOCYTE ESTERASE URINE: NEGATIVE
MCHC RBC-ENTMCNC: 27.5 PG
MCHC RBC-ENTMCNC: 32.3 GM/DL
MCV RBC AUTO: 85.3 FL
MICROSCOPIC-UA: NORMAL
NITRITE URINE: NEGATIVE
PH URINE: 6.5
PLATELET # BLD AUTO: 311 K/UL
POTASSIUM SERPL-SCNC: 4.1 MMOL/L
PROT SERPL-MCNC: 7 G/DL
PROT UR-MCNC: 14 MG/DL
PROTEIN URINE: NORMAL MG/DL
RBC # BLD: 4.83 M/UL
RBC # FLD: 13.9 %
RED BLOOD CELLS URINE: 1 /HPF
SODIUM SERPL-SCNC: 140 MMOL/L
SPECIFIC GRAVITY URINE: 1.01
UROBILINOGEN URINE: 0.2 MG/DL
WBC # FLD AUTO: 9.22 K/UL
WHITE BLOOD CELLS URINE: 0 /HPF

## 2023-06-28 RX ORDER — ONDANSETRON 8 MG/1
8 TABLET, ORALLY DISINTEGRATING ORAL EVERY 8 HOURS
Qty: 10 | Refills: 0 | Status: ACTIVE | COMMUNITY
Start: 2023-06-28 | End: 1900-01-01

## 2023-06-29 LAB
DSDNA AB SER-ACNC: <12 IU/ML
GI PCR PANEL: DETECTED
NOROVIRUS GI/GII: DETECTED
RV AG STL QL IA: NORMAL

## 2023-07-01 ENCOUNTER — OUTPATIENT (OUTPATIENT)
Dept: OUTPATIENT SERVICES | Facility: HOSPITAL | Age: 48
LOS: 1 days | End: 2023-07-01
Payer: COMMERCIAL

## 2023-07-01 ENCOUNTER — APPOINTMENT (OUTPATIENT)
Dept: RADIOLOGY | Facility: IMAGING CENTER | Age: 48
End: 2023-07-01
Payer: COMMERCIAL

## 2023-07-01 DIAGNOSIS — R07.81 PLEURODYNIA: ICD-10-CM

## 2023-07-01 DIAGNOSIS — Z90.49 ACQUIRED ABSENCE OF OTHER SPECIFIED PARTS OF DIGESTIVE TRACT: Chronic | ICD-10-CM

## 2023-07-01 PROCEDURE — 71101 X-RAY EXAM UNILAT RIBS/CHEST: CPT | Mod: 26

## 2023-07-01 PROCEDURE — 71101 X-RAY EXAM UNILAT RIBS/CHEST: CPT

## 2023-07-05 ENCOUNTER — APPOINTMENT (OUTPATIENT)
Dept: PULMONOLOGY | Facility: CLINIC | Age: 48
End: 2023-07-05
Payer: COMMERCIAL

## 2023-07-05 VITALS
OXYGEN SATURATION: 97 % | DIASTOLIC BLOOD PRESSURE: 70 MMHG | HEART RATE: 84 BPM | TEMPERATURE: 97.6 F | HEIGHT: 68 IN | SYSTOLIC BLOOD PRESSURE: 128 MMHG | BODY MASS INDEX: 38.49 KG/M2 | WEIGHT: 254 LBS

## 2023-07-05 PROCEDURE — 99214 OFFICE O/P EST MOD 30 MIN: CPT | Mod: 25

## 2023-07-05 NOTE — HISTORY OF PRESENT ILLNESS
[Never] : never [TextBox_4] : 48 year old male Hx Hx Lupus, asthma, Lupus myositis, presents for follow up. Patient had recent gastroenteritis infection.  He reports he continues to experience diarrhea and other GI symptoms.  His appetite is not at baseline. His daughter has been ill with similar symptoms. He  reports Rheumatology is attempting to decrease prednisone and increase Cell Cept.  He reports feeling "achy and cranky."  He is feeling weak.  He is here for follow up.

## 2023-07-05 NOTE — ASSESSMENT
[FreeTextEntry1] : 48 year old male MTA  Hx Lupus, medications medication changes\par \par Continue Trelegy 200-25 mcg daily\par Continue prednisone per Rheumatology\par  Continue Famotidine 40 mg daily\par Follow up Rheumatology\par FOllow PCP for GERD, may need GI evaluation\par Follow up Behavioral Health\par PFT when at baseline\par \par Patient counseled to stop Torsemide until diarrhea symptoms resolve \par If diarrhea persists consider testing for C. difficile in setting of immunosuppressed status\par \par Follow up 4-6 weeks

## 2023-07-05 NOTE — PHYSICAL EXAM
[General Appearance - Well Developed] : well developed [General Appearance - Well Nourished] : well nourished [General Appearance - In No Acute Distress] : no acute distress [Normal Conjunctiva] : the conjunctiva exhibited no abnormalities [Erythema] : erythema of the pharynx [] : the neck was supple [Jugular Venous Distention Increased] : there was no jugular-venous distention [Heart Sounds] : normal S1 and S2 [Murmurs] : no murmurs present [Respiration, Rhythm And Depth] : normal respiratory rhythm and effort [Auscultation Breath Sounds / Voice Sounds] : lungs were clear to auscultation bilaterally [Abdomen Soft] : soft [Abnormal Walk] : normal gait [Cyanosis, Localized] : no localized cyanosis [Nail Clubbing] : no clubbing of the fingernails [Non-Pitting] : non-pitting [Cranial Nerves] : cranial nerves 2-12 were intact [FreeTextEntry1] : Multiple tattoos + malar rash, redness

## 2023-07-05 NOTE — COUNSELING
[Other: ____] : [unfilled] [Good understanding] : Patient has a good understanding of lifestyle changes and steps needed to achieve self management goal [de-identified] : Anxiety

## 2023-07-07 ENCOUNTER — EMERGENCY (EMERGENCY)
Facility: HOSPITAL | Age: 48
LOS: 1 days | Discharge: HOME | End: 2023-07-07
Attending: EMERGENCY MEDICINE
Payer: COMMERCIAL

## 2023-07-07 VITALS
HEART RATE: 93 BPM | SYSTOLIC BLOOD PRESSURE: 127 MMHG | WEIGHT: 253.97 LBS | HEIGHT: 68 IN | OXYGEN SATURATION: 99 % | DIASTOLIC BLOOD PRESSURE: 79 MMHG | TEMPERATURE: 98 F | RESPIRATION RATE: 97 BRPM

## 2023-07-07 VITALS
TEMPERATURE: 98 F | OXYGEN SATURATION: 98 % | RESPIRATION RATE: 18 BRPM | HEART RATE: 84 BPM | DIASTOLIC BLOOD PRESSURE: 78 MMHG | SYSTOLIC BLOOD PRESSURE: 118 MMHG

## 2023-07-07 DIAGNOSIS — Z90.49 ACQUIRED ABSENCE OF OTHER SPECIFIED PARTS OF DIGESTIVE TRACT: Chronic | ICD-10-CM

## 2023-07-07 LAB
ALBUMIN SERPL ELPH-MCNC: 3.9 G/DL — SIGNIFICANT CHANGE UP (ref 3.3–5)
ALP SERPL-CCNC: 88 U/L — SIGNIFICANT CHANGE UP (ref 40–120)
ALT FLD-CCNC: 32 U/L — SIGNIFICANT CHANGE UP (ref 10–45)
ANION GAP SERPL CALC-SCNC: 10 MMOL/L — SIGNIFICANT CHANGE UP (ref 5–17)
APPEARANCE UR: CLEAR — SIGNIFICANT CHANGE UP
AST SERPL-CCNC: 31 U/L — SIGNIFICANT CHANGE UP (ref 10–40)
BACTERIA # UR AUTO: NEGATIVE — SIGNIFICANT CHANGE UP
BASOPHILS # BLD AUTO: 0.04 K/UL — SIGNIFICANT CHANGE UP (ref 0–0.2)
BASOPHILS NFR BLD AUTO: 0.3 % — SIGNIFICANT CHANGE UP (ref 0–2)
BILIRUB SERPL-MCNC: 0.3 MG/DL — SIGNIFICANT CHANGE UP (ref 0.2–1.2)
BILIRUB UR-MCNC: NEGATIVE — SIGNIFICANT CHANGE UP
BUN SERPL-MCNC: 6 MG/DL — LOW (ref 7–23)
CALCIUM SERPL-MCNC: 9.3 MG/DL — SIGNIFICANT CHANGE UP (ref 8.4–10.5)
CHLORIDE SERPL-SCNC: 104 MMOL/L — SIGNIFICANT CHANGE UP (ref 96–108)
CO2 SERPL-SCNC: 24 MMOL/L — SIGNIFICANT CHANGE UP (ref 22–31)
COLOR SPEC: YELLOW — SIGNIFICANT CHANGE UP
CREAT SERPL-MCNC: 0.9 MG/DL — SIGNIFICANT CHANGE UP (ref 0.5–1.3)
DIFF PNL FLD: NEGATIVE — SIGNIFICANT CHANGE UP
EGFR: 106 ML/MIN/1.73M2 — SIGNIFICANT CHANGE UP
EOSINOPHIL # BLD AUTO: 0.14 K/UL — SIGNIFICANT CHANGE UP (ref 0–0.5)
EOSINOPHIL NFR BLD AUTO: 1.2 % — SIGNIFICANT CHANGE UP (ref 0–6)
EPI CELLS # UR: 2 /HPF — SIGNIFICANT CHANGE UP
GLUCOSE SERPL-MCNC: 85 MG/DL — SIGNIFICANT CHANGE UP (ref 70–99)
GLUCOSE UR QL: NEGATIVE — SIGNIFICANT CHANGE UP
HCT VFR BLD CALC: 39.9 % — SIGNIFICANT CHANGE UP (ref 39–50)
HGB BLD-MCNC: 12.9 G/DL — LOW (ref 13–17)
HYALINE CASTS # UR AUTO: 4 /LPF — HIGH (ref 0–2)
IMM GRANULOCYTES NFR BLD AUTO: 0.5 % — SIGNIFICANT CHANGE UP (ref 0–0.9)
KETONES UR-MCNC: NEGATIVE — SIGNIFICANT CHANGE UP
LEUKOCYTE ESTERASE UR-ACNC: NEGATIVE — SIGNIFICANT CHANGE UP
LIDOCAIN IGE QN: 113 U/L — HIGH (ref 7–60)
LYMPHOCYTES # BLD AUTO: 1.79 K/UL — SIGNIFICANT CHANGE UP (ref 1–3.3)
LYMPHOCYTES # BLD AUTO: 15.4 % — SIGNIFICANT CHANGE UP (ref 13–44)
MAGNESIUM SERPL-MCNC: 2.1 MG/DL — SIGNIFICANT CHANGE UP (ref 1.6–2.6)
MCHC RBC-ENTMCNC: 27.7 PG — SIGNIFICANT CHANGE UP (ref 27–34)
MCHC RBC-ENTMCNC: 32.3 GM/DL — SIGNIFICANT CHANGE UP (ref 32–36)
MCV RBC AUTO: 85.8 FL — SIGNIFICANT CHANGE UP (ref 80–100)
MONOCYTES # BLD AUTO: 1.28 K/UL — HIGH (ref 0–0.9)
MONOCYTES NFR BLD AUTO: 11 % — SIGNIFICANT CHANGE UP (ref 2–14)
NEUTROPHILS # BLD AUTO: 8.35 K/UL — HIGH (ref 1.8–7.4)
NEUTROPHILS NFR BLD AUTO: 71.6 % — SIGNIFICANT CHANGE UP (ref 43–77)
NITRITE UR-MCNC: NEGATIVE — SIGNIFICANT CHANGE UP
NRBC # BLD: 0 /100 WBCS — SIGNIFICANT CHANGE UP (ref 0–0)
PH UR: 7 — SIGNIFICANT CHANGE UP (ref 5–8)
PLATELET # BLD AUTO: 408 K/UL — HIGH (ref 150–400)
POTASSIUM SERPL-MCNC: 3.8 MMOL/L — SIGNIFICANT CHANGE UP (ref 3.5–5.3)
POTASSIUM SERPL-SCNC: 3.8 MMOL/L — SIGNIFICANT CHANGE UP (ref 3.5–5.3)
PROT SERPL-MCNC: 7 G/DL — SIGNIFICANT CHANGE UP (ref 6–8.3)
PROT UR-MCNC: ABNORMAL
RBC # BLD: 4.65 M/UL — SIGNIFICANT CHANGE UP (ref 4.2–5.8)
RBC # FLD: 13.9 % — SIGNIFICANT CHANGE UP (ref 10.3–14.5)
RBC CASTS # UR COMP ASSIST: 2 /HPF — SIGNIFICANT CHANGE UP (ref 0–4)
SODIUM SERPL-SCNC: 138 MMOL/L — SIGNIFICANT CHANGE UP (ref 135–145)
SP GR SPEC: 1.03 — HIGH (ref 1.01–1.02)
UROBILINOGEN FLD QL: ABNORMAL
WBC # BLD: 11.66 K/UL — HIGH (ref 3.8–10.5)
WBC # FLD AUTO: 11.66 K/UL — HIGH (ref 3.8–10.5)
WBC UR QL: 2 /HPF — SIGNIFICANT CHANGE UP (ref 0–5)

## 2023-07-07 PROCEDURE — 96375 TX/PRO/DX INJ NEW DRUG ADDON: CPT | Mod: XU

## 2023-07-07 PROCEDURE — 71260 CT THORAX DX C+: CPT | Mod: 26,MA

## 2023-07-07 PROCEDURE — 99284 EMERGENCY DEPT VISIT MOD MDM: CPT | Mod: 25

## 2023-07-07 PROCEDURE — 83690 ASSAY OF LIPASE: CPT

## 2023-07-07 PROCEDURE — 80053 COMPREHEN METABOLIC PANEL: CPT

## 2023-07-07 PROCEDURE — 99284 EMERGENCY DEPT VISIT MOD MDM: CPT

## 2023-07-07 PROCEDURE — 74177 CT ABD & PELVIS W/CONTRAST: CPT | Mod: 26,MA

## 2023-07-07 PROCEDURE — 96374 THER/PROPH/DIAG INJ IV PUSH: CPT | Mod: XU

## 2023-07-07 PROCEDURE — 81001 URINALYSIS AUTO W/SCOPE: CPT

## 2023-07-07 PROCEDURE — 83735 ASSAY OF MAGNESIUM: CPT

## 2023-07-07 PROCEDURE — 71260 CT THORAX DX C+: CPT | Mod: MA

## 2023-07-07 PROCEDURE — 74177 CT ABD & PELVIS W/CONTRAST: CPT | Mod: MA

## 2023-07-07 PROCEDURE — 85025 COMPLETE CBC W/AUTO DIFF WBC: CPT

## 2023-07-07 RX ORDER — SODIUM CHLORIDE 9 MG/ML
1000 INJECTION INTRAMUSCULAR; INTRAVENOUS; SUBCUTANEOUS ONCE
Refills: 0 | Status: COMPLETED | OUTPATIENT
Start: 2023-07-07 | End: 2023-07-07

## 2023-07-07 RX ORDER — ONDANSETRON 8 MG/1
4 TABLET, FILM COATED ORAL ONCE
Refills: 0 | Status: COMPLETED | OUTPATIENT
Start: 2023-07-07 | End: 2023-07-07

## 2023-07-07 RX ORDER — LIDOCAINE 4 G/100G
1 CREAM TOPICAL ONCE
Refills: 0 | Status: COMPLETED | OUTPATIENT
Start: 2023-07-07 | End: 2023-07-07

## 2023-07-07 RX ORDER — KETOROLAC TROMETHAMINE 30 MG/ML
15 SYRINGE (ML) INJECTION ONCE
Refills: 0 | Status: DISCONTINUED | OUTPATIENT
Start: 2023-07-07 | End: 2023-07-07

## 2023-07-07 RX ADMIN — SODIUM CHLORIDE 1000 MILLILITER(S): 9 INJECTION INTRAMUSCULAR; INTRAVENOUS; SUBCUTANEOUS at 12:13

## 2023-07-07 RX ADMIN — Medication 15 MILLIGRAM(S): at 12:13

## 2023-07-07 RX ADMIN — LIDOCAINE 1 PATCH: 4 CREAM TOPICAL at 12:14

## 2023-07-07 RX ADMIN — ONDANSETRON 4 MILLIGRAM(S): 8 TABLET, FILM COATED ORAL at 12:32

## 2023-07-07 NOTE — ED PROVIDER NOTE - SHIFT CHANGE DETAILS
Attending MD Benito: 48M w SLE on chronic steroids, multiple previous fx, here w ?norovirus, +child with norovirus, nausea, vomiting, diarrhea, rib fx now hurts again (happened wks ago), pending BM for stool tests, pending CTs

## 2023-07-07 NOTE — ED PROVIDER NOTE - OBJECTIVE STATEMENT
Patient is a 48y male with PMHx of lupus, asthma, hypothyroidism, hypokalemia, and R rib fx (4 months ago), complaining of persistent diarrhea for 1 week, R rib pain, weakness, and SOB. Pt had intractable n/v/d from saturday-monday 2/2 norovirus, with diarrhea beginning again on wednesday. His course has additionally been c/b R-rib pain and "popping" sensation 2/2 retching. Patient is a 48y male with PMHx of lupus, asthma, hypothyroidism, hypokalemia, and R rib fx (4 months ago), complaining of persistent diarrhea for 1 week, R rib pain, weakness, and SOB. Pt had intractable n/v/d from saturday-monday 2/2 norovirus, with diarrhea beginning again on wednesday. His course has additionally been c/b R-rib pain following a "popping" sensation 2/2 retching. Pt presents today c/o persistent diarrhea which he describes as voluminous, watery, and brown, refractory to OTC imodium, as well as weakness. ~1.5 weeks ago, blood work showed hypokalemia and transaminitis of unknown origin.  Additionally ROS is positive for abd pain, decrease appetite, decrease PO intake/tolerance, wt loss, fatigue, dark gold/brown urine, SOB 2/2 rib pain. Pertinent negatives include fever, chills, palpitations, dizziness, HA, hematochezia, melena, pale stools, n/v, hematemesis, dysuria Patient is a 48y male with PMHx of lupus, asthma, hypothyroidism, and R rib fx (4 months ago), complaining of persistent diarrhea for 1 week, R rib pain, weakness, and SOB. Pt had intractable n/v/d from saturday-monday 2/2 norovirus, with diarrhea beginning again on wednesday. His course has additionally been c/b R-rib pain following a "popping" sensation 2/2 retching. Pt presents today c/o persistent diarrhea which he describes as voluminous, watery, and brown, refractory to OTC imodium, as well as weakness. ~1.5 weeks ago, blood work showed hypokalemia and transaminitis of unknown origin.  Additionally ROS is positive for abd pain, decrease appetite, decrease PO intake/tolerance, wt loss, fatigue, dark gold/brown urine, SOB 2/2 rib pain. Pertinent negatives include fever, chills, palpitations, dizziness, HA, hematochezia, melena, pale stools, n/v, hematemesis, dysuria

## 2023-07-07 NOTE — ED PROVIDER NOTE - RESPIRATORY, MLM
Breath sounds clear and equal bilaterally, equal chest wall expansion b/l, chest wall with no obvious deformities, no swelling, no ecchymosis. (+) Tenderness to palpation in the right lower ribs in the midclavicular to midaxillary region

## 2023-07-07 NOTE — ED PROVIDER NOTE - ATTENDING CONTRIBUTION TO CARE
Dr. Nichols: I have personally performed a face to face bedside history and physical examination of this patient. I have discussed the history, examination, review of systems, assessment and plan of management with the resident. I have reviewed the electronic medical record and amended it to reflect my history, review of systems, physical exam, assessment and plan.    see mdm

## 2023-07-07 NOTE — ED ADULT NURSE NOTE - NSFALLUNIVINTERV_ED_ALL_ED
Bed/Stretcher in lowest position, wheels locked, appropriate side rails in place/Call bell, personal items and telephone in reach/Instruct patient to call for assistance before getting out of bed/chair/stretcher/Non-slip footwear applied when patient is off stretcher/[place] to call system/Physically safe environment - no spills, clutter or unnecessary equipment/Purposeful proactive rounding/Room/bathroom lighting operational, light cord in reach

## 2023-07-07 NOTE — ED PROVIDER NOTE - NSFOLLOWUPINSTRUCTIONS_ED_ALL_ED_FT
You were seen today in the emergency room for abdominal pain. Although the testing done today indicates that your pain is not from an acute emergency, your pain could still represent a more serious problem. Most commonly, the pain you are having is likely not something serious and will resolve in a few days, however testing was done today based on the symptoms that you currently have; so if you develop new or worsening symptoms this could be a sign of a problem that was not tested for and means you should come back to the emergency room or see your doctor urgently. You need to follow up with your doctor in the next 48-72 hours. If you develop any new or worsening symptoms you need to return immediately to the emergency department. If you experience any of the following please come right back to the emergency room: severe nausea and vomiting with inability to tolerate eating, severe worsening of your pain, a new fever, new bleeding in stool or vomit, confusion, new numbness or weakness, passing out.      Pancreatitis    Pancreatitis is a condition in which the pancreas becomes irritated and swollen (inflammation). The pancreas is a gland that is located behind the stomach. It produces enzymes that help to digest food. Most acute attacks last a couple of days and can cause serious problems and even be life threatening. The most common causes are alcohol abuse and gallstones. Symptoms include pain in the upper abdomen that may radiate to the back, nausea, and vomiting. Diagnosis is made with a medical history and physical exam as well as additional diagnostic testing. At home, eat smaller, more frequent meals and avoid alcohol and fatty foods. Drink enough fluid to keep your urine clear or pale yellow.     SEEK IMMEDIATE MEDICAL CARE IF YOU HAVE ANY OF THE FOLLOWING SYMPTOMS: inability to keep fluids down, increasing pain, yellowing of your skin or eyes, or lightheadedness/dizziness. YOU WERE SEEN IN THE ED FOR: diarrhea and chest pain    WHILE YOU WERE HERE, YOU HAD: lab work, rib XR and CT scans.  Your test results did not reveal any concerning abnormalities.  Your CT scans incidentally showed a small fat-containing umbilical hernia, bony degenerative changes and severe compression deformity of T7 (previously seen 3/7/23) and old right rib fractures.      AS DISCUSSED, YOU DID NOT HAVE A BOWEL MOVEMENT DURING YOUR TIME IN THE EMERGENCY DEPARTMENT.  PLEASE FOLLOW UP WITH YOUR GASTROENTEROLOGIST WHO CAN PERFORM STOOL STUDIES WHICH HE DEEMS NECESSARY.    CONTINUE YOUR HOME MEDICATIONS AS PRESCRIBED.      FOR PAIN, YOU MAY TAKE TYLENOL (ACETAMINOPHEN). FOLLOW THE INSTRUCTIONS ON THE LABEL/CONTAINER.  DO NOT EXCEED 4000MG OF TYLENOL (ACETAMINOPHEN) IN A 24 HOUR PERIOD.    PLEASE FOLLOW UP WITH YOUR PRIVATE PHYSICIAN WITHIN THE NEXT 48 HOURS. BRING COPIES OF YOUR RESULTS.    RETURN TO THE EMERGENCY DEPARTMENT IF YOU EXPERIENCE ANY NEW/CONCERNING/WORSENING SYMPTOMS SUCH AS BUT NOT LIMITED TO: chest pain, shortness of breath, severe abdominal pain or back pain, persistent vomiting or profuse diarrhea, loss of urinary or bowel continence, difficulty urinating (changes in bowel or bladder control), numbness, weakness or tingling in extremities (arms or legs), severe neck pain, increasing pain in any area of your body, blood in your urine, stool, or vomit, severe headache or any other concern.  Pancreatitis    Pancreatitis is a condition in which the pancreas becomes irritated and swollen (inflammation). The pancreas is a gland that is located behind the stomach. It produces enzymes that help to digest food. Most acute attacks last a couple of days and can cause serious problems and even be life threatening. The most common causes are alcohol abuse and gallstones. Symptoms include pain in the upper abdomen that may radiate to the back, nausea, and vomiting. Diagnosis is made with a medical history and physical exam as well as additional diagnostic testing. At home, eat smaller, more frequent meals and avoid alcohol and fatty foods. Drink enough fluid to keep your urine clear or pale yellow.     SEEK IMMEDIATE MEDICAL CARE IF YOU HAVE ANY OF THE FOLLOWING SYMPTOMS: inability to keep fluids down, increasing pain, yellowing of your skin or eyes, or lightheadedness/dizziness.

## 2023-07-07 NOTE — ED PROVIDER NOTE - PROGRESS NOTE DETAILS
Attending MD Benito: CT nonactionable, pending stool for study Attending MD Benito: Patient re-evaluated and feeling improved.  No acute issues at  this time.  Lab and radiology tests reviewed with patient.  Reports has not had a BM since in the Emergency Department, patient has been here for almost 9.5 hrs.  Explained that CDiff less likely.  Reports tolerating po.  Reports follows with Dr. Weathers (GI).  Patient stable for discharge.  Follow up instructions given, importance of follow up emphasized, return to ED parameters reviewed and patient verbalized understanding.  All questions answered, all concerns addressed.

## 2023-07-07 NOTE — ED ADULT NURSE NOTE - OBJECTIVE STATEMENT
49 y/o male PMH lupus presents to ED reporting diarrhea. Patient reports several days of diarrhea and bilateral rib discomfort. Recent diagnosis with Norovirus. On exam, AOx3, speaking in complete sentences. Unlabored, spontaneous respirations, NAD. MD at bedside to evaluate pt. Heplock placed, labs sent.

## 2023-07-07 NOTE — ED ADULT NURSE NOTE - NS_ED_NURSE_TEACHING_TOPIC_ED_A_ED
Continue Regimen: .\\nClobetasol shampoo twice per week\\nSynalar solution twice per week at night Detail Level: Simple Continue Regimen: CeraVe lotion daily Follow-up with GI and PCP. New/worsening symptoms return to ED.

## 2023-07-07 NOTE — ED PROVIDER NOTE - ENMT, MLM
Mucous membranes dry, Airway patent, Nasal mucosa clear. Mouth with normal mucosa. Throat has no vesicles, no oropharyngeal exudates and uvula is midline.

## 2023-07-07 NOTE — ED PROVIDER NOTE - PATIENT PORTAL LINK FT
You can access the FollowMyHealth Patient Portal offered by [facility] by registering at the following website: [email or web address]. By joining [facility]’s FollowMyHealth portal, you will also be able to view your health information using other applications (apps) compatible with our system.

## 2023-07-07 NOTE — ED PROVIDER NOTE - CLINICAL SUMMARY MEDICAL DECISION MAKING FREE TEXT BOX
Dr. Nichols: 48-year-old male history of lupus on chronic steroids, prednisone 5 mg daily, history of atraumatic right rib fracture due to steroid use, hypothyroidism, hypokalemia, exposed to norovirus from 4-year-old daughter last week, presenting with 1 week of progressively worsening multiple episodes of nausea and vomiting nonbilious nonbloody, watery diarrhea.  Also complaining of epigastric and right upper quadrant pain.  No travel, no recent antibiotics.  Went to [facility] who sent him to the emergency department to rule out C. difficile.  Patient has been taking around 2 g of Tylenol daily for pain and denies any extra doses.  Denies alcohol use.  Denies fevers, chills, chest pain, shortness of breath, dysuria.    On exam patient is well-appearing, no acute distress, regular rate and rhythm, lungs clear to auscultation bilaterally, right lateral chest wall tenderness to palpation, epigastric and right upper quadrant tenderness to palpation, negative Islas's, no CVA tenderness to palpation.    We will pain control, check labs, check CT abdomen pelvis, check CT chest rule out rib fractures, reassess

## 2023-07-10 ENCOUNTER — NON-APPOINTMENT (OUTPATIENT)
Age: 48
End: 2023-07-10

## 2023-07-11 ENCOUNTER — TRANSCRIPTION ENCOUNTER (OUTPATIENT)
Age: 48
End: 2023-07-11

## 2023-07-11 ENCOUNTER — NON-APPOINTMENT (OUTPATIENT)
Age: 48
End: 2023-07-11

## 2023-07-14 ENCOUNTER — RX RENEWAL (OUTPATIENT)
Age: 48
End: 2023-07-14

## 2023-07-17 ENCOUNTER — APPOINTMENT (OUTPATIENT)
Dept: GASTROENTEROLOGY | Facility: CLINIC | Age: 48
End: 2023-07-17
Payer: COMMERCIAL

## 2023-07-17 PROCEDURE — 99214 OFFICE O/P EST MOD 30 MIN: CPT | Mod: 95

## 2023-07-18 ENCOUNTER — NON-APPOINTMENT (OUTPATIENT)
Age: 48
End: 2023-07-18

## 2023-07-18 ENCOUNTER — INPATIENT (INPATIENT)
Facility: HOSPITAL | Age: 48
LOS: 8 days | Discharge: HOME | DRG: 202 | End: 2023-07-27
Attending: INTERNAL MEDICINE | Admitting: INTERNAL MEDICINE
Payer: COMMERCIAL

## 2023-07-18 VITALS
SYSTOLIC BLOOD PRESSURE: 98 MMHG | WEIGHT: 257.94 LBS | HEART RATE: 93 BPM | HEIGHT: 68 IN | RESPIRATION RATE: 22 BRPM | OXYGEN SATURATION: 99 % | TEMPERATURE: 99 F | DIASTOLIC BLOOD PRESSURE: 66 MMHG

## 2023-07-18 DIAGNOSIS — E03.9 HYPOTHYROIDISM, UNSPECIFIED: ICD-10-CM

## 2023-07-18 DIAGNOSIS — Z90.49 ACQUIRED ABSENCE OF OTHER SPECIFIED PARTS OF DIGESTIVE TRACT: Chronic | ICD-10-CM

## 2023-07-18 DIAGNOSIS — R19.7 DIARRHEA, UNSPECIFIED: ICD-10-CM

## 2023-07-18 DIAGNOSIS — Z29.9 ENCOUNTER FOR PROPHYLACTIC MEASURES, UNSPECIFIED: ICD-10-CM

## 2023-07-18 DIAGNOSIS — I10 ESSENTIAL (PRIMARY) HYPERTENSION: ICD-10-CM

## 2023-07-18 DIAGNOSIS — M32.9 SYSTEMIC LUPUS ERYTHEMATOSUS, UNSPECIFIED: ICD-10-CM

## 2023-07-18 DIAGNOSIS — J45.901 UNSPECIFIED ASTHMA WITH (ACUTE) EXACERBATION: ICD-10-CM

## 2023-07-18 LAB
ALBUMIN SERPL ELPH-MCNC: 3.9 G/DL — SIGNIFICANT CHANGE UP (ref 3.3–5)
ALP SERPL-CCNC: 90 U/L — SIGNIFICANT CHANGE UP (ref 40–120)
ALT FLD-CCNC: 22 U/L — SIGNIFICANT CHANGE UP (ref 10–45)
ANION GAP SERPL CALC-SCNC: 15 MMOL/L — SIGNIFICANT CHANGE UP (ref 5–17)
AST SERPL-CCNC: 28 U/L — SIGNIFICANT CHANGE UP (ref 10–40)
BASE EXCESS BLDV CALC-SCNC: 1.8 MMOL/L — SIGNIFICANT CHANGE UP (ref -2–3)
BASE EXCESS BLDV CALC-SCNC: 5.5 MMOL/L — HIGH (ref -2–3)
BASOPHILS # BLD AUTO: 0.05 K/UL — SIGNIFICANT CHANGE UP (ref 0–0.2)
BASOPHILS NFR BLD AUTO: 0.6 % — SIGNIFICANT CHANGE UP (ref 0–2)
BILIRUB SERPL-MCNC: 0.6 MG/DL — SIGNIFICANT CHANGE UP (ref 0.2–1.2)
BUN SERPL-MCNC: 6 MG/DL — LOW (ref 7–23)
CA-I SERPL-SCNC: 1.13 MMOL/L — LOW (ref 1.15–1.33)
CA-I SERPL-SCNC: 1.2 MMOL/L — SIGNIFICANT CHANGE UP (ref 1.15–1.33)
CALCIUM SERPL-MCNC: 9.6 MG/DL — SIGNIFICANT CHANGE UP (ref 8.4–10.5)
CHLORIDE BLDV-SCNC: 101 MMOL/L — SIGNIFICANT CHANGE UP (ref 96–108)
CHLORIDE BLDV-SCNC: 102 MMOL/L — SIGNIFICANT CHANGE UP (ref 96–108)
CHLORIDE SERPL-SCNC: 101 MMOL/L — SIGNIFICANT CHANGE UP (ref 96–108)
CO2 BLDV-SCNC: 23 MMOL/L — SIGNIFICANT CHANGE UP (ref 22–26)
CO2 BLDV-SCNC: 33 MMOL/L — HIGH (ref 22–26)
CO2 SERPL-SCNC: 23 MMOL/L — SIGNIFICANT CHANGE UP (ref 22–31)
CREAT SERPL-MCNC: 0.96 MG/DL — SIGNIFICANT CHANGE UP (ref 0.5–1.3)
EGFR: 98 ML/MIN/1.73M2 — SIGNIFICANT CHANGE UP
EOSINOPHIL # BLD AUTO: 0.22 K/UL — SIGNIFICANT CHANGE UP (ref 0–0.5)
EOSINOPHIL NFR BLD AUTO: 2.7 % — SIGNIFICANT CHANGE UP (ref 0–6)
GAS PNL BLDV: 132 MMOL/L — LOW (ref 136–145)
GAS PNL BLDV: 134 MMOL/L — LOW (ref 136–145)
GAS PNL BLDV: SIGNIFICANT CHANGE UP
GLUCOSE BLDV-MCNC: 122 MG/DL — HIGH (ref 70–99)
GLUCOSE BLDV-MCNC: 87 MG/DL — SIGNIFICANT CHANGE UP (ref 70–99)
GLUCOSE SERPL-MCNC: 87 MG/DL — SIGNIFICANT CHANGE UP (ref 70–99)
HCO3 BLDV-SCNC: 22 MMOL/L — SIGNIFICANT CHANGE UP (ref 22–29)
HCO3 BLDV-SCNC: 31 MMOL/L — HIGH (ref 22–29)
HCT VFR BLD CALC: 41 % — SIGNIFICANT CHANGE UP (ref 39–50)
HCT VFR BLDA CALC: 38 % — LOW (ref 39–51)
HCT VFR BLDA CALC: 41 % — SIGNIFICANT CHANGE UP (ref 39–51)
HGB BLD CALC-MCNC: 12.6 G/DL — SIGNIFICANT CHANGE UP (ref 12.6–17.4)
HGB BLD CALC-MCNC: 13.5 G/DL — SIGNIFICANT CHANGE UP (ref 12.6–17.4)
HGB BLD-MCNC: 13.3 G/DL — SIGNIFICANT CHANGE UP (ref 13–17)
IMM GRANULOCYTES NFR BLD AUTO: 0.6 % — SIGNIFICANT CHANGE UP (ref 0–0.9)
LACTATE BLDV-MCNC: 2.2 MMOL/L — HIGH (ref 0.5–2)
LACTATE BLDV-MCNC: 3.4 MMOL/L — HIGH (ref 0.5–2)
LACTATE SERPL-SCNC: 3 MMOL/L — HIGH (ref 0.5–2)
LYMPHOCYTES # BLD AUTO: 1.72 K/UL — SIGNIFICANT CHANGE UP (ref 1–3.3)
LYMPHOCYTES # BLD AUTO: 20.8 % — SIGNIFICANT CHANGE UP (ref 13–44)
MAGNESIUM SERPL-MCNC: 2 MG/DL — SIGNIFICANT CHANGE UP (ref 1.6–2.6)
MCHC RBC-ENTMCNC: 27.6 PG — SIGNIFICANT CHANGE UP (ref 27–34)
MCHC RBC-ENTMCNC: 32.4 GM/DL — SIGNIFICANT CHANGE UP (ref 32–36)
MCV RBC AUTO: 85.1 FL — SIGNIFICANT CHANGE UP (ref 80–100)
MONOCYTES # BLD AUTO: 1.15 K/UL — HIGH (ref 0–0.9)
MONOCYTES NFR BLD AUTO: 13.9 % — SIGNIFICANT CHANGE UP (ref 2–14)
NEUTROPHILS # BLD AUTO: 5.06 K/UL — SIGNIFICANT CHANGE UP (ref 1.8–7.4)
NEUTROPHILS NFR BLD AUTO: 61.4 % — SIGNIFICANT CHANGE UP (ref 43–77)
NRBC # BLD: 0 /100 WBCS — SIGNIFICANT CHANGE UP (ref 0–0)
PCO2 BLDV: 23 MMHG — LOW (ref 42–55)
PCO2 BLDV: 48 MMHG — SIGNIFICANT CHANGE UP (ref 42–55)
PH BLDV: 7.42 — SIGNIFICANT CHANGE UP (ref 7.32–7.43)
PH BLDV: 7.59 — HIGH (ref 7.32–7.43)
PHOSPHATE SERPL-MCNC: 2.7 MG/DL — SIGNIFICANT CHANGE UP (ref 2.5–4.5)
PLATELET # BLD AUTO: 360 K/UL — SIGNIFICANT CHANGE UP (ref 150–400)
PO2 BLDV: 28 MMHG — SIGNIFICANT CHANGE UP (ref 25–45)
PO2 BLDV: 42 MMHG — SIGNIFICANT CHANGE UP (ref 25–45)
POTASSIUM BLDV-SCNC: 2.7 MMOL/L — CRITICAL LOW (ref 3.5–5.1)
POTASSIUM BLDV-SCNC: 3.4 MMOL/L — LOW (ref 3.5–5.1)
POTASSIUM SERPL-MCNC: 3.8 MMOL/L — SIGNIFICANT CHANGE UP (ref 3.5–5.3)
POTASSIUM SERPL-SCNC: 3.8 MMOL/L — SIGNIFICANT CHANGE UP (ref 3.5–5.3)
PROT SERPL-MCNC: 7.2 G/DL — SIGNIFICANT CHANGE UP (ref 6–8.3)
RAPID RVP RESULT: SIGNIFICANT CHANGE UP
RBC # BLD: 4.82 M/UL — SIGNIFICANT CHANGE UP (ref 4.2–5.8)
RBC # FLD: 14.3 % — SIGNIFICANT CHANGE UP (ref 10.3–14.5)
SAO2 % BLDV: 40.3 % — LOW (ref 67–88)
SAO2 % BLDV: 77 % — SIGNIFICANT CHANGE UP (ref 67–88)
SARS-COV-2 RNA SPEC QL NAA+PROBE: SIGNIFICANT CHANGE UP
SODIUM SERPL-SCNC: 139 MMOL/L — SIGNIFICANT CHANGE UP (ref 135–145)
TROPONIN T, HIGH SENSITIVITY RESULT: 25 NG/L — SIGNIFICANT CHANGE UP (ref 0–51)
TROPONIN T, HIGH SENSITIVITY RESULT: 31 NG/L — SIGNIFICANT CHANGE UP (ref 0–51)
WBC # BLD: 8.25 K/UL — SIGNIFICANT CHANGE UP (ref 3.8–10.5)
WBC # FLD AUTO: 8.25 K/UL — SIGNIFICANT CHANGE UP (ref 3.8–10.5)

## 2023-07-18 PROCEDURE — 93308 TTE F-UP OR LMTD: CPT | Mod: 26

## 2023-07-18 PROCEDURE — 99285 EMERGENCY DEPT VISIT HI MDM: CPT

## 2023-07-18 PROCEDURE — 71046 X-RAY EXAM CHEST 2 VIEWS: CPT | Mod: 26

## 2023-07-18 PROCEDURE — 99223 1ST HOSP IP/OBS HIGH 75: CPT

## 2023-07-18 RX ORDER — ONDANSETRON 8 MG/1
4 TABLET, FILM COATED ORAL EVERY 8 HOURS
Refills: 0 | Status: DISCONTINUED | OUTPATIENT
Start: 2023-07-18 | End: 2023-07-27

## 2023-07-18 RX ORDER — ACETAMINOPHEN 500 MG
650 TABLET ORAL EVERY 6 HOURS
Refills: 0 | Status: DISCONTINUED | OUTPATIENT
Start: 2023-07-18 | End: 2023-07-27

## 2023-07-18 RX ORDER — IPRATROPIUM/ALBUTEROL SULFATE 18-103MCG
3 AEROSOL WITH ADAPTER (GRAM) INHALATION EVERY 6 HOURS
Refills: 0 | Status: DISCONTINUED | OUTPATIENT
Start: 2023-07-18 | End: 2023-07-19

## 2023-07-18 RX ORDER — IPRATROPIUM/ALBUTEROL SULFATE 18-103MCG
3 AEROSOL WITH ADAPTER (GRAM) INHALATION ONCE
Refills: 0 | Status: COMPLETED | OUTPATIENT
Start: 2023-07-18 | End: 2023-07-18

## 2023-07-18 RX ORDER — MYCOPHENOLATE MOFETIL 250 MG/1
1500 CAPSULE ORAL DAILY
Refills: 0 | Status: DISCONTINUED | OUTPATIENT
Start: 2023-07-18 | End: 2023-07-20

## 2023-07-18 RX ORDER — LEVOTHYROXINE SODIUM 125 MCG
125 TABLET ORAL DAILY
Refills: 0 | Status: DISCONTINUED | OUTPATIENT
Start: 2023-07-18 | End: 2023-07-27

## 2023-07-18 RX ORDER — MAGNESIUM SULFATE 500 MG/ML
2 VIAL (ML) INJECTION ONCE
Refills: 0 | Status: COMPLETED | OUTPATIENT
Start: 2023-07-18 | End: 2023-07-18

## 2023-07-18 RX ORDER — LANOLIN ALCOHOL/MO/W.PET/CERES
3 CREAM (GRAM) TOPICAL AT BEDTIME
Refills: 0 | Status: DISCONTINUED | OUTPATIENT
Start: 2023-07-18 | End: 2023-07-27

## 2023-07-18 RX ORDER — SODIUM CHLORIDE 9 MG/ML
1000 INJECTION, SOLUTION INTRAVENOUS ONCE
Refills: 0 | Status: COMPLETED | OUTPATIENT
Start: 2023-07-18 | End: 2023-07-18

## 2023-07-18 RX ORDER — HYDROXYCHLOROQUINE SULFATE 200 MG
400 TABLET ORAL DAILY
Refills: 0 | Status: DISCONTINUED | OUTPATIENT
Start: 2023-07-18 | End: 2023-07-27

## 2023-07-18 RX ORDER — PANTOPRAZOLE SODIUM 20 MG/1
40 TABLET, DELAYED RELEASE ORAL
Refills: 0 | Status: DISCONTINUED | OUTPATIENT
Start: 2023-07-18 | End: 2023-07-27

## 2023-07-18 RX ORDER — LOSARTAN POTASSIUM 100 MG/1
50 TABLET, FILM COATED ORAL DAILY
Refills: 0 | Status: DISCONTINUED | OUTPATIENT
Start: 2023-07-18 | End: 2023-07-27

## 2023-07-18 RX ORDER — ALBUTEROL 90 UG/1
2 AEROSOL, METERED ORAL EVERY 6 HOURS
Refills: 0 | Status: DISCONTINUED | OUTPATIENT
Start: 2023-07-18 | End: 2023-07-19

## 2023-07-18 RX ORDER — ASPIRIN/CALCIUM CARB/MAGNESIUM 324 MG
81 TABLET ORAL DAILY
Refills: 0 | Status: DISCONTINUED | OUTPATIENT
Start: 2023-07-18 | End: 2023-07-27

## 2023-07-18 RX ORDER — IPRATROPIUM BROMIDE 0.2 MG/ML
500 SOLUTION, NON-ORAL INHALATION EVERY 6 HOURS
Refills: 0 | Status: DISCONTINUED | OUTPATIENT
Start: 2023-07-18 | End: 2023-07-19

## 2023-07-18 RX ADMIN — Medication 40 MILLIGRAM(S): at 10:32

## 2023-07-18 RX ADMIN — Medication 3 MILLIGRAM(S): at 23:25

## 2023-07-18 RX ADMIN — Medication 3 MILLILITER(S): at 10:41

## 2023-07-18 RX ADMIN — Medication 60 MILLIGRAM(S): at 21:26

## 2023-07-18 RX ADMIN — Medication 150 GRAM(S): at 12:27

## 2023-07-18 RX ADMIN — Medication 3 MILLILITER(S): at 12:27

## 2023-07-18 RX ADMIN — Medication 3 MILLILITER(S): at 23:25

## 2023-07-18 RX ADMIN — SODIUM CHLORIDE 1000 MILLILITER(S): 9 INJECTION, SOLUTION INTRAVENOUS at 12:29

## 2023-07-18 RX ADMIN — SODIUM CHLORIDE 1000 MILLILITER(S): 9 INJECTION, SOLUTION INTRAVENOUS at 10:36

## 2023-07-18 RX ADMIN — Medication 3 MILLILITER(S): at 10:36

## 2023-07-18 RX ADMIN — Medication 650 MILLIGRAM(S): at 20:35

## 2023-07-18 RX ADMIN — Medication 500 MICROGRAM(S): at 23:10

## 2023-07-18 RX ADMIN — SODIUM CHLORIDE 1000 MILLILITER(S): 9 INJECTION, SOLUTION INTRAVENOUS at 16:45

## 2023-07-18 RX ADMIN — Medication 2 GRAM(S): at 12:29

## 2023-07-18 RX ADMIN — Medication 650 MILLIGRAM(S): at 21:18

## 2023-07-18 NOTE — H&P ADULT - PROBLEM SELECTOR PLAN 2
Patient follows with Dr. Neri  - Followup CK level  - Continue Solumedrol [id number]  - Continue on MMP 1500mg daily and Plaquenil 400mg daily

## 2023-07-18 NOTE — H&P ADULT - NSHPREVIEWOFSYSTEMS_GEN_ALL_CORE
Review of Systems:   CONSTITUTIONAL: No fever, weight loss  EYES: No eye pain, visual disturbances, or discharge  ENMT:  No difficulty hearing, tinnitus, vertigo; No sinus or throat pain  RESPIRATORY: No SOB. No cough, wheezing, chills or hemoptysis  CARDIOVASCULAR: No chest pain, palpitations, dizziness, or leg swelling  GASTROINTESTINAL: No abdominal or epigastric pain. No nausea, vomiting, or hematemesis; No diarrhea or constipation. No melena or hematochezia.  GENITOURINARY: No dysuria, frequency, hematuria, or incontinence  NEUROLOGICAL: No headaches, memory loss, loss of strength, numbness, or tremors  SKIN: No itching, burning, rashes, or lesions   LYMPH NODES: No enlarged glands  ENDOCRINE: No heat or cold intolerance; No hair loss  MUSCULOSKELETAL: No joint pain or swelling; No muscle, back pain  PSYCHIATRIC: No depression, anxiety, mood swings, or difficulty sleeping  HEME/LYMPH: No easy bruising, or bleeding gums Review of Systems:   CONSTITUTIONAL: No fever, weight loss  EYES: No eye pain, visual disturbances, or discharge  ENMT:  No difficulty hearing, tinnitus, vertigo; No sinus or throat pain  RESPIRATORY: (+) SOB, cough with yellow phlegm. Denies chills or hemoptysis.   CARDIOVASCULAR: Chest pain associated with cough  GASTROINTESTINAL: No abdominal or epigastric pain. (+) nausea, vomiting, in last month ; (+) diarrhea. No melena or hematochezia.  GENITOURINARY: No dysuria, frequency, hematuria, or incontinence  NEUROLOGICAL: No headaches, memory loss, loss of strength, numbness, or tremors  SKIN: No itching, burning, rashes, or lesions   LYMPH NODES: No enlarged glands  ENDOCRINE: No heat or cold intolerance; No hair loss  MUSCULOSKELETAL: No joint pain or swelling; No muscle, back pain  PSYCHIATRIC: No depression, anxiety, mood swings, or difficulty sleeping  HEME/LYMPH: No easy bruising, or bleeding gums

## 2023-07-18 NOTE — H&P ADULT - PROBLEM SELECTOR PLAN 5
Endorses one month history of nonblood, nonwatery diarrhea  (+) result for Norovirus seen in chart  -Monitor clinically for change in frequency/consistency of stool

## 2023-07-18 NOTE — H&P ADULT - NSHPPHYSICALEXAM_GEN_ALL_CORE
Vital Signs Last 24 Hrs  T(C): 37.1 (18 Jul 2023 09:14), Max: 37.1 (18 Jul 2023 09:14)  T(F): 98.8 (18 Jul 2023 09:14), Max: 98.8 (18 Jul 2023 09:14)  HR: 95 (18 Jul 2023 09:54) (93 - 95)  BP: 139/79 (18 Jul 2023 09:54) (98/66 - 139/79)  BP(mean): --  RR: 22 (18 Jul 2023 09:54) (22 - 22)  SpO2: 97% (18 Jul 2023 09:54) (97% - 99%)    Parameters below as of 18 Jul 2023 09:54  Patient On (Oxygen Delivery Method): room air        CONSTITUTIONAL: Well-groomed, in no apparent distress, resting comfortably   EYES: No conjunctival or scleral injection, non-icteric  ENMT: normal dentition; no pharyngeal injection or exudates, oral mucosa with moist membranes  NECK: Trachea midline without palpable neck mass; nontender  RESPIRATORY: Breathing comfortably; lungs CTA without wheeze/rhonchi/rales  CARDIOVASCULAR: +S1S2, RRR, no M/G/R; pedal pulses full and symmetric; no lower extremity edema  GASTROINTESTINAL: No palpable masses or tenderness, +BS throughout, no rebound/guarding  LYMPHATIC: No cervical LAD or tenderness  MUSCULOSKELETAL: no digital clubbing or cyanosis; normal strength and tone of extremities  SKIN: No rashes or ulcers noted; no subcutaneous nodules or induration palpable  NEUROLOGIC: CN II-XII intact; sensation intact in LEs b/l to light touch; moving all extremities spontaneously   PSYCHIATRIC: A+O x 3; mood and affect appropriate; appropriate insight and judgment Vital Signs Last 24 Hrs  T(C): 37.1 (18 Jul 2023 09:14), Max: 37.1 (18 Jul 2023 09:14)  T(F): 98.8 (18 Jul 2023 09:14), Max: 98.8 (18 Jul 2023 09:14)  HR: 95 (18 Jul 2023 09:54) (93 - 95)  BP: 139/79 (18 Jul 2023 09:54) (98/66 - 139/79)  BP(mean): --  RR: 22 (18 Jul 2023 09:54) (22 - 22)  SpO2: 97% (18 Jul 2023 09:54) (97% - 99%)    Parameters below as of 18 Jul 2023 09:54  Patient On (Oxygen Delivery Method): room air        CONSTITUTIONAL: Well-groomed, in no apparent distress, resting comfortably   EYES: No conjunctival or scleral injection, non-icteric  ENMT: normal dentition; no pharyngeal injection or exudates, oral mucosa with moist membranes  NECK: Trachea midline without palpable neck mass; nontender  RESPIRATORY: Breathing slightly labored, decreased ability to fully expand on inspiration.  lungs CTA without wheeze/rhonchi/rales  CARDIOVASCULAR: +S1S2, RRR, no M/G/R; pedal pulses full and symmetric; no lower extremity edema  GASTROINTESTINAL: No palpable masses or tenderness, +BS throughout, no rebound/guarding  LYMPHATIC: No cervical LAD or tenderness  MUSCULOSKELETAL: no digital clubbing or cyanosis; normal strength and tone of extremities  SKIN: No rashes or ulcers noted; no subcutaneous nodules or induration palpable  NEUROLOGIC: CN II-XII intact; sensation intact in LEs b/l to light touch; moving all extremities spontaneously   PSYCHIATRIC: A+O x 3; mood and affect appropriate; appropriate insight and judgment

## 2023-07-18 NOTE — H&P ADULT - ASSESSMENT
48 year old male with history of SLE on prednisone, Plaquenil and Mycophenylate mofetil, Asthma, hypothyroidism who presents to [facility] ED with chief complaint of shortness of breath admitted for asthma exacerbation in setting of lupus. As per patient's request, discussion held with patient's private pulmonologist, Dr. Gonzales and his private Rheumatologist, Dr. Neri. Patient with history of admission for asthma exacerbation in setting of infectious process(RSV last year and Norovirus on this admission). He has overlap syndrome of SLE/Myositis, for which he has been on an extended prednisone taper. Given clinical status, patient will need stress dose of steroids for improvement of symptoms.  Patient is currently on room air in mild distress multifactorial from cough as well as anxiety.

## 2023-07-18 NOTE — ED PROVIDER NOTE - NS ED ROS FT
asthma exacerbation: Endorses chills, lightheadedness and CP and pressure when he coughs. No HA/Dizziness, No fever, No photophobia/eye pain/changes in vision, no SOB/cough/wheeze/stridor, No abdominal pain, no dysuria/frequency/discharge, No neck/back pain, no changes in neurological status/function.

## 2023-07-18 NOTE — ED PROVIDER NOTE - OBJECTIVE STATEMENT
48 year old male with PMHx Asthma, Lupus, and Hypothyroid presents complaining of Asthma exacerbation that started this past week and has progressed and won't go away. Patient has a cough for the past week w/ yellow phlegm. Last week he came to the ED for N/V/D and was dx with Norovirus which he got from his daughter. He still has diarrhea and goes twice a day. He stated that his home medications are not providing him any relief (twice a day), that his sx are the same without improvement throughout the day, and that he is able to sleep at night. Patient was hospitalized for one month for another viral infection a year ago. Patient is a  sits a lot throughout the day. Endorses chills, lightheadedness and CP and pressure when he coughs. No HA/Dizziness, No fever, No photophobia/eye pain/changes in vision, no SOB/cough/wheeze/stridor, No abdominal pain, no dysuria/frequency/discharge, No neck/back pain, no changes in neurological status/function.

## 2023-07-18 NOTE — H&P ADULT - PROBLEM SELECTOR PLAN 1
Symptoms present since last week with minimal improvement despite inhalers  - Continue Duonebs  - Solumedrol [id number]  - Discussion had with patient's private Pulmonologist, Dr. Gonzales who agrees to steroids if needed. Consider - Pulmonary consult in AM if condition does not improve  - Monitor oxygen saturation  - No leukocytosis or fevers noted with no consolidation on chest xray--> hold off Antibiotic for now

## 2023-07-18 NOTE — ED ADULT TRIAGE NOTE - CHIEF COMPLAINT QUOTE
pt c/o asthma exacerbation and cough x 1 week   pt reports no relief with nebulizer treatments at home

## 2023-07-18 NOTE — H&P ADULT - NSHPOUTPATIENTPROVIDERS_GEN_ALL_CORE
Rheumatology- Dr. Susanna Neri  PCP- Dr. Pritesh Everett  Pulmonologist- Dr. Rona Gonzales. Rheumatology- Dr. Susanna Neri  PCP- Dr. Pritesh Everett  Pulmonologist- Dr. Rona Gonzales

## 2023-07-18 NOTE — H&P ADULT - NSHPLABSRESULTS_GEN_ALL_CORE
CXR personally reviewed by me mild atelectasis bilaterally with no pneumothorax or focal consolidation.     POCUS results reviewed by me showing preserved EF with no pericardial effusion    CBC and CMP within normal limits.   Lactic Acid noted on vbg to be 3.4.

## 2023-07-18 NOTE — ED PROVIDER NOTE - PHYSICAL EXAMINATION
GEN: Pt in NAD, A&O x3. GCS 15  EYES: Sclera white w/o injection, PERRLA  ENT: Head NCAT. Mouth and pharynx without erythema or exudates, uvula midline, no tonsillar enlargement. Neck supple FROM.   RESP: No chest wall tenderness, CTA b/l, no wheezes, rales, or rhonchi.   CARDIAC: RRR, clear distinct S1, S2, no murmurs, gallops, or rubs.   ABD: Mild generalized TTP. Abdomen non-distended, soft, no rebound or guarding.   VASC: 2+ radial pulses b/l. No edema or tenderness of the lower extremities.  SKIN: No rashes noted.

## 2023-07-18 NOTE — ED ADULT NURSE NOTE - OBJECTIVE STATEMENT
47 y/o M w/ PMH of lupus and Norovirus 1 month ago came w/ complaints of asthma exacerbation and cough x 1 week. Pt states that he had been using albuterol nebulizer treatments at home twice daily w/ no relief. Pt also states that he had been having continued diarrhea since last tiffany at the ED for Norovirius. Pt also reports achiness, stating "I think the achiness is because my lupus may be flaring up". Pt A&Ox3 gross neuro intact, ambulatory, no difficulty speaking in complete sentences, s1s2 heart sounds heard, pulses x 4, mcfarland x4, abdomen soft nontender nondistended, skin intact.

## 2023-07-18 NOTE — ED PROVIDER NOTE - PROGRESS NOTE DETAILS
Ivette Albert PA-C: Patient reassessed. States he is" breathing deeper but breaths are more shallow". .

## 2023-07-18 NOTE — ED PROVIDER NOTE - ATTENDING APP SHARED VISIT CONTRIBUTION OF CARE
attending Lisa: 48yM  h/o asthma, Lupus, and Hypothyroid p/w SOB, feels like his asthma exacerbation x 1 week, no improvement with inhaler at home. Assoc with cough with yellow phlegm. Seen in ED last week for GI symptoms, diagnosed with norovirus which he got from his daughter. He still has diarrhea and goes twice a day. Denies h/o DVT/PE. Exam as above. Will obtain ekg, labs, cxr, nebs and steroids, review prior ED visit records, reassess

## 2023-07-18 NOTE — H&P ADULT - NSHPADDITIONALINFOADULT_GEN_ALL_CORE
Plan discussed with patient who verbalizes agreement with plan of care. Plan discussed with patient who verbalizes agreement with plan of care.  Case discussed with patient's private Pulmonologist Dr. Rona Gonzales  Case discussed with patient's private Rheumatologist Dr. Susanna Neri

## 2023-07-18 NOTE — H&P ADULT - HISTORY OF PRESENT ILLNESS
48 year old male with past medical history of Asthma, Lupus/myositis overlap syndrome, Hypothyroidism who presents to [facility] with shortness of breath related to asthma exacerbation that started last week and will not subside. His symptoms are associated with cough with yellow phlegm, as well as chest pain related to the cough.  Patient states that he has been sick for sometime including nausea, vomiting, and diarrhea for the last month after he contracted Norovirus. Patient describes that he was in the hospital last year for asthma exacerbation secondary to RSV as well.  ·Denies  headache, dizziness, fever, abdominal pain.

## 2023-07-19 ENCOUNTER — TRANSCRIPTION ENCOUNTER (OUTPATIENT)
Age: 48
End: 2023-07-19

## 2023-07-19 LAB
BASOPHILS # BLD AUTO: 0.01 K/UL — SIGNIFICANT CHANGE UP (ref 0–0.2)
BASOPHILS NFR BLD AUTO: 0.1 % — SIGNIFICANT CHANGE UP (ref 0–2)
CK SERPL-CCNC: 235 U/L — HIGH (ref 30–200)
EOSINOPHIL # BLD AUTO: 0 K/UL — SIGNIFICANT CHANGE UP (ref 0–0.5)
EOSINOPHIL NFR BLD AUTO: 0 % — SIGNIFICANT CHANGE UP (ref 0–6)
GI PCR PANEL: DETECTED
HCT VFR BLD CALC: 40.5 % — SIGNIFICANT CHANGE UP (ref 39–50)
HGB BLD-MCNC: 13.1 G/DL — SIGNIFICANT CHANGE UP (ref 13–17)
IMM GRANULOCYTES NFR BLD AUTO: 0.7 % — SIGNIFICANT CHANGE UP (ref 0–0.9)
LACTATE SERPL-SCNC: 3.4 MMOL/L — HIGH (ref 0.5–2)
LYMPHOCYTES # BLD AUTO: 0.45 K/UL — LOW (ref 1–3.3)
LYMPHOCYTES # BLD AUTO: 4.4 % — LOW (ref 13–44)
MAGNESIUM SERPL-MCNC: 2.1 MG/DL — SIGNIFICANT CHANGE UP (ref 1.6–2.6)
MCHC RBC-ENTMCNC: 27.3 PG — SIGNIFICANT CHANGE UP (ref 27–34)
MCHC RBC-ENTMCNC: 32.3 GM/DL — SIGNIFICANT CHANGE UP (ref 32–36)
MCV RBC AUTO: 84.4 FL — SIGNIFICANT CHANGE UP (ref 80–100)
MONOCYTES # BLD AUTO: 0.16 K/UL — SIGNIFICANT CHANGE UP (ref 0–0.9)
MONOCYTES NFR BLD AUTO: 1.6 % — LOW (ref 2–14)
NEUTROPHILS # BLD AUTO: 9.63 K/UL — HIGH (ref 1.8–7.4)
NEUTROPHILS NFR BLD AUTO: 93.2 % — HIGH (ref 43–77)
NOROVIRUS GI+II RNA STL QL NAA+NON-PROBE: DETECTED
NRBC # BLD: 0 /100 WBCS — SIGNIFICANT CHANGE UP (ref 0–0)
PHOSPHATE SERPL-MCNC: 2.7 MG/DL — SIGNIFICANT CHANGE UP (ref 2.5–4.5)
PLATELET # BLD AUTO: 353 K/UL — SIGNIFICANT CHANGE UP (ref 150–400)
RBC # BLD: 4.8 M/UL — SIGNIFICANT CHANGE UP (ref 4.2–5.8)
RBC # FLD: 14.4 % — SIGNIFICANT CHANGE UP (ref 10.3–14.5)
TSH SERPL-MCNC: 0.29 UIU/ML — SIGNIFICANT CHANGE UP (ref 0.27–4.2)
WBC # BLD: 10.32 K/UL — SIGNIFICANT CHANGE UP (ref 3.8–10.5)
WBC # FLD AUTO: 10.32 K/UL — SIGNIFICANT CHANGE UP (ref 3.8–10.5)

## 2023-07-19 PROCEDURE — 99223 1ST HOSP IP/OBS HIGH 75: CPT

## 2023-07-19 PROCEDURE — 99233 SBSQ HOSP IP/OBS HIGH 50: CPT

## 2023-07-19 PROCEDURE — 99253 IP/OBS CNSLTJ NEW/EST LOW 45: CPT

## 2023-07-19 RX ORDER — BUDESONIDE AND FORMOTEROL FUMARATE DIHYDRATE 160; 4.5 UG/1; UG/1
2 AEROSOL RESPIRATORY (INHALATION)
Refills: 0 | Status: DISCONTINUED | OUTPATIENT
Start: 2023-07-19 | End: 2023-07-27

## 2023-07-19 RX ORDER — SODIUM CHLORIDE 9 MG/ML
1000 INJECTION, SOLUTION INTRAVENOUS ONCE
Refills: 0 | Status: COMPLETED | OUTPATIENT
Start: 2023-07-19 | End: 2023-07-19

## 2023-07-19 RX ORDER — HEPARIN SODIUM 5000 [USP'U]/ML
5000 INJECTION INTRAVENOUS; SUBCUTANEOUS EVERY 12 HOURS
Refills: 0 | Status: DISCONTINUED | OUTPATIENT
Start: 2023-07-19 | End: 2023-07-27

## 2023-07-19 RX ORDER — IPRATROPIUM/ALBUTEROL SULFATE 18-103MCG
3 AEROSOL WITH ADAPTER (GRAM) INHALATION EVERY 6 HOURS
Refills: 0 | Status: DISCONTINUED | OUTPATIENT
Start: 2023-07-19 | End: 2023-07-27

## 2023-07-19 RX ADMIN — BUDESONIDE AND FORMOTEROL FUMARATE DIHYDRATE 2 PUFF(S): 160; 4.5 AEROSOL RESPIRATORY (INHALATION) at 11:13

## 2023-07-19 RX ADMIN — BUDESONIDE AND FORMOTEROL FUMARATE DIHYDRATE 2 PUFF(S): 160; 4.5 AEROSOL RESPIRATORY (INHALATION) at 17:53

## 2023-07-19 RX ADMIN — Medication 650 MILLIGRAM(S): at 16:06

## 2023-07-19 RX ADMIN — Medication 3 MILLIGRAM(S): at 22:01

## 2023-07-19 RX ADMIN — SODIUM CHLORIDE 1000 MILLILITER(S): 9 INJECTION, SOLUTION INTRAVENOUS at 11:39

## 2023-07-19 RX ADMIN — LOSARTAN POTASSIUM 50 MILLIGRAM(S): 100 TABLET, FILM COATED ORAL at 06:06

## 2023-07-19 RX ADMIN — Medication 400 MILLIGRAM(S): at 11:39

## 2023-07-19 RX ADMIN — Medication 3 MILLILITER(S): at 11:13

## 2023-07-19 RX ADMIN — Medication 650 MILLIGRAM(S): at 16:36

## 2023-07-19 RX ADMIN — SODIUM CHLORIDE 1000 MILLILITER(S): 9 INJECTION, SOLUTION INTRAVENOUS at 18:47

## 2023-07-19 RX ADMIN — Medication 60 MILLIGRAM(S): at 06:25

## 2023-07-19 RX ADMIN — HEPARIN SODIUM 5000 UNIT(S): 5000 INJECTION INTRAVENOUS; SUBCUTANEOUS at 17:53

## 2023-07-19 RX ADMIN — Medication 100 MILLIGRAM(S): at 11:13

## 2023-07-19 RX ADMIN — Medication 200 MILLIGRAM(S): at 21:52

## 2023-07-19 RX ADMIN — Medication 60 MILLIGRAM(S): at 14:40

## 2023-07-19 RX ADMIN — PANTOPRAZOLE SODIUM 40 MILLIGRAM(S): 20 TABLET, DELAYED RELEASE ORAL at 06:06

## 2023-07-19 RX ADMIN — MYCOPHENOLATE MOFETIL 1500 MILLIGRAM(S): 250 CAPSULE ORAL at 11:14

## 2023-07-19 RX ADMIN — Medication 60 MILLIGRAM(S): at 21:34

## 2023-07-19 RX ADMIN — Medication 81 MILLIGRAM(S): at 11:13

## 2023-07-19 RX ADMIN — Medication 125 MICROGRAM(S): at 06:07

## 2023-07-19 RX ADMIN — Medication 3 MILLILITER(S): at 17:53

## 2023-07-19 NOTE — PROGRESS NOTE ADULT - PROBLEM SELECTOR PLAN 1
Symptoms present since last week with minimal improvement despite inhalers.   - Duonebs [id number] ordered.   - Continue Solumedrol [id number] today.   -Start Symbicort  - Discussion had with patient's private Pulmonologist, Dr. Gonzales who agrees with plan. Will consider inpatient   Pulmonary consult.   - Monitor oxygen saturation

## 2023-07-19 NOTE — CONSULT NOTE ADULT - SUBJECTIVE AND OBJECTIVE BOX
CHIEF COMPLAINT:  SOB x one week    HPI:  48 year old male with severe persistent asthma lupus/myositis overlap syndrome, and hypothyroidism presents with shortness of breath that started approx one week ago. Endorses yellow phlegm and chest tightness. Symptoms acutely worsened during a nebulizer treatment prompting him to come to ED for further evaluation. Also has had diarrhea for past month due to Norovirus. Was hospitalized last year for asthma exacerbation due to RSV. He had been on a prolonged course of steroids for lupus. Also takes mycophenolate and plaquenil. At time of exam he feels like his breathing is somewhat better although has considerable chest tightness from recent coughing.    PAST MEDICAL & SURGICAL HISTORY:  Lupus      Asthma      Hypothyroid      History of cholecystectomy          FAMILY HISTORY:  FH: rheumatoid arthritis (Mother)        SOCIAL HISTORY:  Smoking: Denies   Substance Use: [x] Never Used [ ] Used ____  EtOH Use: Denies  Marital Status: [ ] Single [ ]  [ ]  [ ]   Sexual History:   Occupation: Works for "[employer]  Recent Travel:  Country of Birth:  Advance Directives:    Allergies    No Known Allergies    Intolerances        HOME MEDICATIONS:  aspirin 81 mg oral delayed release tablet: 1 tab(s) orally once a day (21 Nov 2022 14:03)  furosemide 40 mg oral tablet: 1 tab(s) orally once a day (21 Nov 2022 14:03)  hydroxychloroquine 200 mg oral tablet: 2 tab(s) orally once a day (21 Nov 2022 14:03)  levothyroxine 125 mcg (0.125 mg) oral tablet: 1 tab(s) orally once a day (21 Nov 2022 14:03)  losartan 50 mg oral tablet: 1 tab(s) orally once a day (21 Nov 2022 14:03)  melatonin 3 mg oral tablet: 2 tab(s) orally once a day (at bedtime) (21 Nov 2022 14:03)  pantoprazole 40 mg oral delayed release tablet: 1 tab(s) orally once a day (before a meal) (21 Nov 2022 14:03)  potassium bicarbonate 20 mEq oral tablet, effervescent: orally 2 times a day (21 Nov 2022 14:03)  Trelegy Ellipta 200 mcg-62.5 mcg-25 mcg/inh inhalation powder: 1 puff(s) inhaled once a day (21 Nov 2022 14:03)    REVIEW OF SYSTEMS:  CONSTITUTIONAL: No fever, weight loss, or fatigue  EYES: No eye pain, visual disturbances, or discharge  ENMT:  No difficulty hearing, tinnitus, vertigo; No sinus or throat pain  NECK: No pain or stiffness  RESPIRATORY: Cough, wheezing. No hemoptysis.   CARDIOVASCULAR: Chest tightness, No palpitations, dizziness, or leg swelling  GASTROINTESTINAL: No abdominal or epigastric pain. No nausea, vomiting, or hematemesis; No diarrhea or constipation. No melena or hematochezia.  GENITOURINARY: No dysuria, frequency, hematuria, or incontinence  NEUROLOGICAL: No headaches, memory loss, loss of strength, numbness, or tremors  SKIN: No itching, burning, rashes, or lesions   LYMPH NODES: No enlarged glands  ENDOCRINE: No heat or cold intolerance; No hair loss  MUSCULOSKELETAL: No joint pain or swelling; No muscle, back, or extremity pain  PSYCHIATRIC: No depression, anxiety, mood swings, or difficulty sleeping  HEME/LYMPH: No easy bruising, or bleeding gums  ALLERGY AND IMMUNOLOGIC: No hives or eczema    OBJECTIVE:  ICU Vital Signs Last 24 Hrs  T(C): 36.4 (19 Jul 2023 15:45), Max: 37 (18 Jul 2023 18:00)  T(F): 97.6 (19 Jul 2023 15:45), Max: 98.6 (18 Jul 2023 18:00)  HR: 104 (19 Jul 2023 15:45) (102 - 114)  BP: 147/94 (19 Jul 2023 15:45) (102/64 - 147/94)  BP(mean): 75 (18 Jul 2023 16:49) (75 - 75)  ABP: --  ABP(mean): --  RR: 18 (19 Jul 2023 15:45) (17 - 30)  SpO2: 95% (19 Jul 2023 15:45) (95% - 99%)    O2 Parameters below as of 19 Jul 2023 15:45  Patient On (Oxygen Delivery Method): room air              07-18 @ 07:01  -  07-19 @ 07:00  --------------------------------------------------------  IN: 0 mL / OUT: 700 mL / NET: -700 mL    07-19 @ 07:01  -  07-19 @ 16:17  --------------------------------------------------------  IN: 900 mL / OUT: 0 mL / NET: 900 mL      CAPILLARY BLOOD GLUCOSE          PHYSICAL EXAM:  General: No apparent distress  HEENT: NC/AT  Neck: Supple  Respiratory: CTAB, no wheezing or crackles, good air entry  Cardiovascular: RRR, no murmur, no LE edema  Abdomen: Soft, nontender, nondistended  Extremities: Warm  Skin: Intact  Neurological: A&Ox3, no focal deficits  Psychiatry: Normal mood and affect    HOSPITAL MEDICATIONS:  aspirin enteric coated 81 milliGRAM(s) Oral daily  heparin   Injectable 5000 Unit(s) SubCutaneous every 12 hours    hydroxychloroquine 400 milliGRAM(s) Oral daily  trimethoprim  160 mG/sulfamethoxazole 800 mG 1 Tablet(s) Oral <User Schedule>    losartan 50 milliGRAM(s) Oral daily    levothyroxine 125 MICROGram(s) Oral daily  methylPREDNISolone sodium succinate Injectable 60 milliGRAM(s) IV Push every 8 hours    albuterol/ipratropium for Nebulization 3 milliLiter(s) Nebulizer every 6 hours  benzonatate 200 milliGRAM(s) Oral every 8 hours  budesonide 160 MICROgram(s)/formoterol 4.5 MICROgram(s) Inhaler 2 Puff(s) Inhalation two times a day  hydrocodone/homatropine Syrup 10 milliLiter(s) Oral every 6 hours PRN    acetaminophen     Tablet .. 650 milliGRAM(s) Oral every 6 hours PRN  melatonin 3 milliGRAM(s) Oral at bedtime PRN  ondansetron Injectable 4 milliGRAM(s) IV Push every 8 hours PRN    aluminum hydroxide/magnesium hydroxide/simethicone Suspension 30 milliLiter(s) Oral every 4 hours PRN  pantoprazole    Tablet 40 milliGRAM(s) Oral before breakfast          mycophenolate mofetil 1500 milliGRAM(s) Oral daily          LABS:                        13.1   10.32 )-----------( 353      ( 19 Jul 2023 06:51 )             40.5     Hgb Trend: 13.1<--, 13.3<--  07-18    139  |  101  |  6<L>  ----------------------------<  87  3.8   |  23  |  0.96    Ca    9.6      18 Jul 2023 11:00  Phos  2.7     07-19  Mg     2.1     07-19    TPro  7.2  /  Alb  3.9  /  TBili  0.6  /  DBili  x   /  AST  28  /  ALT  22  /  AlkPhos  90  07-18    Creatinine Trend: 0.96<--, 0.90<--    Urinalysis Basic - ( 18 Jul 2023 11:00 )    Color: x / Appearance: x / SG: x / pH: x  Gluc: 87 mg/dL / Ketone: x  / Bili: x / Urobili: x   Blood: x / Protein: x / Nitrite: x   Leuk Esterase: x / RBC: x / WBC x   Sq Epi: x / Non Sq Epi: x / Bacteria: x        Venous Blood Gas:  07-18 @ 14:28  7.59/23/42/22/77.0  VBG Lactate: 3.4  Venous Blood Gas:  07-18 @ 10:38  7.42/48/28/31/40.3  VBG Lactate: 2.2      MICROBIOLOGY:     RADIOLOGY:  [x] Reviewed and interpreted by me  CXR 7/17/23 - No focal consolidation or effusion

## 2023-07-19 NOTE — PROGRESS NOTE ADULT - ASSESSMENT
48 year old male with history of SLE on prednisone, Plaquenil and Mycophenylate mofetil, Asthma, hypothyroidism who presents to [facility] ED with chief complaint of shortness of breath admitted for asthma exacerbation in setting of lupus.  Patient continues to be on IV steroids with Solumedrol [id number], states he did not receive his duonebs(reordered) and started on Symbicort. Patient aware that his private pulmonologist/rheumatologist aware of admission. Repeat GI PCR is positive for Norovirus. Patient with elevated lactic acid likely secondary to acute asthma exacerbation, reports weakness with cough, likely requires more fluid.

## 2023-07-19 NOTE — PROGRESS NOTE ADULT - SUBJECTIVE AND OBJECTIVE BOX
[facility] Division of Hospital Medicine  Reinier Guaman DO  Pager (M-F, 8A-5P):  MS Teams PREFERRED  Other Times:  [phone]      SUBJECTIVE / OVERNIGHT EVENTS:  Patient examined at the bedside, states he feels mildly improved, states duonebs were not received and he has irritating cough. No other overnight events.  Patient was explained that yesterday  Dr. Gonzales and Dr. Neri were contacted regarding admission  and gave recommendations. Duonebs to be reordered with Tessalon Perles and continued monitoring.   ADDITIONAL REVIEW OF SYSTEMS:  Patient denies any phlegm with cough at this time. Denies fever, chills, nausea, vomiting, abdominal pain.     MEDICATIONS  (STANDING):  albuterol/ipratropium for Nebulization 3 milliLiter(s) Nebulizer every 6 hours  aspirin enteric coated 81 milliGRAM(s) Oral daily  benzonatate 100 milliGRAM(s) Oral every 8 hours  budesonide 160 MICROgram(s)/formoterol 4.5 MICROgram(s) Inhaler 2 Puff(s) Inhalation two times a day  heparin   Injectable 5000 Unit(s) SubCutaneous every 12 hours  hydroxychloroquine 400 milliGRAM(s) Oral daily  lactated ringers Bolus 1000 milliLiter(s) IV Bolus once  levothyroxine 125 MICROGram(s) Oral daily  losartan 50 milliGRAM(s) Oral daily  methylPREDNISolone sodium succinate Injectable 60 milliGRAM(s) IV Push every 8 hours  mycophenolate mofetil 1500 milliGRAM(s) Oral daily  pantoprazole    Tablet 40 milliGRAM(s) Oral before breakfast    MEDICATIONS  (PRN):  acetaminophen     Tablet .. 650 milliGRAM(s) Oral every 6 hours PRN Temp greater or equal to 38C (100.4F), Mild Pain (1 - 3)  aluminum hydroxide/magnesium hydroxide/simethicone Suspension 30 milliLiter(s) Oral every 4 hours PRN Dyspepsia  hydrocodone/homatropine Syrup 10 milliLiter(s) Oral every 6 hours PRN Cough  melatonin 3 milliGRAM(s) Oral at bedtime PRN Insomnia  ondansetron Injectable 4 milliGRAM(s) IV Push every 8 hours PRN Nausea and/or Vomiting      I&O's Summary    18 Jul 2023 07:01  -  19 Jul 2023 07:00  --------------------------------------------------------  IN: 0 mL / OUT: 700 mL / NET: -700 mL        PHYSICAL EXAM:  Vital Signs Last 24 Hrs  T(C): 36.4 (19 Jul 2023 09:07), Max: 37 (18 Jul 2023 18:00)  T(F): 97.5 (19 Jul 2023 09:07), Max: 98.6 (18 Jul 2023 18:00)  HR: 105 (19 Jul 2023 09:07) (102 - 114)  BP: 127/81 (19 Jul 2023 09:07) (102/64 - 146/76)  BP(mean): 75 (18 Jul 2023 16:49) (75 - 75)  RR: 22 (19 Jul 2023 09:07) (17 - 30)  SpO2: 98% (19 Jul 2023 09:07) (96% - 99%)    Parameters below as of 19 Jul 2023 09:07  Patient On (Oxygen Delivery Method): room air      CONSTITUTIONAL: NAD, well-developed, well-groomed  EYES: PERRLA; conjunctiva and sclera clear  ENMT: Moist oral mucosa, no pharyngeal injection or exudates; normal dentition  NECK: Supple, no palpable masses; no thyromegaly  RESPIRATORY: Minimal wheezing right lung base.   CARDIOVASCULAR: Regular rate and rhythm, normal S1 and S2, no murmur/rub/gallop; No lower extremity edema; Peripheral pulses are 2+ bilaterally  ABDOMEN: Nontender to palpation, normoactive bowel sounds, no rebound/guarding; No hepatosplenomegaly  MUSCULOSKELETAL:  Normal gait; no clubbing or cyanosis of digits; no joint swelling or tenderness to palpation  PSYCH: A+O to person, place, and time; affect appropriate.   NEUROLOGY: CN 2-12 are intact and symmetric; no gross sensory deficits   SKIN: No rashes; no palpable lesions.     LABS:                        13.1   10.32 )-----------( 353      ( 19 Jul 2023 06:51 )             40.5     07-18    139  |  101  |  6<L>  ----------------------------<  87  3.8   |  23  |  0.96    Ca    9.6      18 Jul 2023 11:00  Phos  2.7     07-19  Mg     2.1     07-19    TPro  7.2  /  Alb  3.9  /  TBili  0.6  /  DBili  x   /  AST  28  /  ALT  22  /  AlkPhos  90  07-18          Urinalysis Basic - ( 18 Jul 2023 11:00 )    Color: x / Appearance: x / SG: x / pH: x  Gluc: 87 mg/dL / Ketone: x  / Bili: x / Urobili: x   Blood: x / Protein: x / Nitrite: x   Leuk Esterase: x / RBC: x / WBC x   Sq Epi: x / Non Sq Epi: x / Bacteria: x          RADIOLOGY & ADDITIONAL TESTS:  Results Reviewed:  CBC/CMP reviewed within normal limits. GI PCR positive for Norovirus.   Imaging Personally Reviewed: CXR shows atelectasis, no consolidation.   Electrocardiogram Personally Reviewed:  Normal sinus rhythm.     COORDINATION OF CARE:  Care Discussed with Consultants/Other Providers [Y/N]: Y  Prior or Outpatient Records Reviewed [Y/N]: Y

## 2023-07-19 NOTE — CONSULT NOTE ADULT - ASSESSMENT
48 year old male with severe persistent asthma lupus/myositis overlap syndrome, and hypothyroidism presents with shortness of breath that started approx one week ago. Admitted for acute asthma exacerbation. Clinically improved.    Recommend reducing steroids to IV methylprednisolone 60 mg daily  Will plan for taper based on clinical response  Continue Symbicort BID  Continue Duonebns Q6H    Will continue to follow

## 2023-07-20 LAB
ALBUMIN SERPL ELPH-MCNC: 3.8 G/DL — SIGNIFICANT CHANGE UP (ref 3.3–5)
ALP SERPL-CCNC: 81 U/L — SIGNIFICANT CHANGE UP (ref 40–120)
ALT FLD-CCNC: 21 U/L — SIGNIFICANT CHANGE UP (ref 10–45)
ANION GAP SERPL CALC-SCNC: 13 MMOL/L — SIGNIFICANT CHANGE UP (ref 5–17)
AST SERPL-CCNC: 26 U/L — SIGNIFICANT CHANGE UP (ref 10–40)
BILIRUB SERPL-MCNC: 0.2 MG/DL — SIGNIFICANT CHANGE UP (ref 0.2–1.2)
BUN SERPL-MCNC: 8 MG/DL — SIGNIFICANT CHANGE UP (ref 7–23)
CALCIUM SERPL-MCNC: 9.6 MG/DL — SIGNIFICANT CHANGE UP (ref 8.4–10.5)
CHLORIDE SERPL-SCNC: 104 MMOL/L — SIGNIFICANT CHANGE UP (ref 96–108)
CK SERPL-CCNC: 468 U/L — HIGH (ref 30–200)
CO2 SERPL-SCNC: 23 MMOL/L — SIGNIFICANT CHANGE UP (ref 22–31)
CREAT SERPL-MCNC: 0.78 MG/DL — SIGNIFICANT CHANGE UP (ref 0.5–1.3)
EGFR: 111 ML/MIN/1.73M2 — SIGNIFICANT CHANGE UP
GLUCOSE BLDC GLUCOMTR-MCNC: 142 MG/DL — HIGH (ref 70–99)
GLUCOSE SERPL-MCNC: 144 MG/DL — HIGH (ref 70–99)
HCT VFR BLD CALC: 39.8 % — SIGNIFICANT CHANGE UP (ref 39–50)
HGB BLD-MCNC: 12.8 G/DL — LOW (ref 13–17)
MAGNESIUM SERPL-MCNC: 2.2 MG/DL — SIGNIFICANT CHANGE UP (ref 1.6–2.6)
MCHC RBC-ENTMCNC: 27.8 PG — SIGNIFICANT CHANGE UP (ref 27–34)
MCHC RBC-ENTMCNC: 32.2 GM/DL — SIGNIFICANT CHANGE UP (ref 32–36)
MCV RBC AUTO: 86.5 FL — SIGNIFICANT CHANGE UP (ref 80–100)
NRBC # BLD: 0 /100 WBCS — SIGNIFICANT CHANGE UP (ref 0–0)
PHOSPHATE SERPL-MCNC: 2.1 MG/DL — LOW (ref 2.5–4.5)
PLATELET # BLD AUTO: 355 K/UL — SIGNIFICANT CHANGE UP (ref 150–400)
POTASSIUM SERPL-MCNC: 4.2 MMOL/L — SIGNIFICANT CHANGE UP (ref 3.5–5.3)
POTASSIUM SERPL-SCNC: 4.2 MMOL/L — SIGNIFICANT CHANGE UP (ref 3.5–5.3)
PROT SERPL-MCNC: 6.6 G/DL — SIGNIFICANT CHANGE UP (ref 6–8.3)
RBC # BLD: 4.6 M/UL — SIGNIFICANT CHANGE UP (ref 4.2–5.8)
RBC # FLD: 14.7 % — HIGH (ref 10.3–14.5)
SODIUM SERPL-SCNC: 140 MMOL/L — SIGNIFICANT CHANGE UP (ref 135–145)
WBC # BLD: 19.22 K/UL — HIGH (ref 3.8–10.5)
WBC # FLD AUTO: 19.22 K/UL — HIGH (ref 3.8–10.5)

## 2023-07-20 PROCEDURE — 99233 SBSQ HOSP IP/OBS HIGH 50: CPT

## 2023-07-20 PROCEDURE — 99255 IP/OBS CONSLTJ NEW/EST HI 80: CPT | Mod: GC

## 2023-07-20 RX ORDER — SODIUM,POTASSIUM PHOSPHATES 278-250MG
1 POWDER IN PACKET (EA) ORAL ONCE
Refills: 0 | Status: COMPLETED | OUTPATIENT
Start: 2023-07-20 | End: 2023-07-20

## 2023-07-20 RX ORDER — GABAPENTIN 400 MG/1
100 CAPSULE ORAL THREE TIMES A DAY
Refills: 0 | Status: DISCONTINUED | OUTPATIENT
Start: 2023-07-20 | End: 2023-07-21

## 2023-07-20 RX ORDER — MYCOPHENOLIC ACID 180 MG/1
720 TABLET, DELAYED RELEASE ORAL
Refills: 0 | Status: DISCONTINUED | OUTPATIENT
Start: 2023-07-20 | End: 2023-07-24

## 2023-07-20 RX ORDER — CALCIUM CARBONATE 500(1250)
1 TABLET ORAL DAILY
Refills: 0 | Status: DISCONTINUED | OUTPATIENT
Start: 2023-07-20 | End: 2023-07-27

## 2023-07-20 RX ORDER — LIDOCAINE 4 G/100G
1 CREAM TOPICAL DAILY
Refills: 0 | Status: DISCONTINUED | OUTPATIENT
Start: 2023-07-20 | End: 2023-07-27

## 2023-07-20 RX ADMIN — Medication 650 MILLIGRAM(S): at 21:18

## 2023-07-20 RX ADMIN — Medication 81 MILLIGRAM(S): at 12:02

## 2023-07-20 RX ADMIN — Medication 200 MILLIGRAM(S): at 21:25

## 2023-07-20 RX ADMIN — Medication 60 MILLIGRAM(S): at 05:09

## 2023-07-20 RX ADMIN — BUDESONIDE AND FORMOTEROL FUMARATE DIHYDRATE 2 PUFF(S): 160; 4.5 AEROSOL RESPIRATORY (INHALATION) at 17:36

## 2023-07-20 RX ADMIN — Medication 125 MICROGRAM(S): at 05:09

## 2023-07-20 RX ADMIN — LOSARTAN POTASSIUM 50 MILLIGRAM(S): 100 TABLET, FILM COATED ORAL at 05:09

## 2023-07-20 RX ADMIN — Medication 3 MILLILITER(S): at 17:35

## 2023-07-20 RX ADMIN — MYCOPHENOLATE MOFETIL 1500 MILLIGRAM(S): 250 CAPSULE ORAL at 12:02

## 2023-07-20 RX ADMIN — HEPARIN SODIUM 5000 UNIT(S): 5000 INJECTION INTRAVENOUS; SUBCUTANEOUS at 05:09

## 2023-07-20 RX ADMIN — Medication 200 MILLIGRAM(S): at 05:14

## 2023-07-20 RX ADMIN — Medication 200 MILLIGRAM(S): at 13:50

## 2023-07-20 RX ADMIN — Medication 400 MILLIGRAM(S): at 12:02

## 2023-07-20 RX ADMIN — Medication 650 MILLIGRAM(S): at 12:32

## 2023-07-20 RX ADMIN — Medication 3 MILLILITER(S): at 05:08

## 2023-07-20 RX ADMIN — Medication 1 PACKET(S): at 12:01

## 2023-07-20 RX ADMIN — Medication 3 MILLILITER(S): at 12:01

## 2023-07-20 RX ADMIN — BUDESONIDE AND FORMOTEROL FUMARATE DIHYDRATE 2 PUFF(S): 160; 4.5 AEROSOL RESPIRATORY (INHALATION) at 05:08

## 2023-07-20 RX ADMIN — Medication 650 MILLIGRAM(S): at 22:20

## 2023-07-20 RX ADMIN — HEPARIN SODIUM 5000 UNIT(S): 5000 INJECTION INTRAVENOUS; SUBCUTANEOUS at 17:35

## 2023-07-20 RX ADMIN — GABAPENTIN 100 MILLIGRAM(S): 400 CAPSULE ORAL at 21:17

## 2023-07-20 RX ADMIN — LIDOCAINE 1 PATCH: 4 CREAM TOPICAL at 13:49

## 2023-07-20 RX ADMIN — LIDOCAINE 1 PATCH: 4 CREAM TOPICAL at 19:30

## 2023-07-20 RX ADMIN — Medication 650 MILLIGRAM(S): at 12:02

## 2023-07-20 RX ADMIN — Medication 3 MILLIGRAM(S): at 21:17

## 2023-07-20 RX ADMIN — PANTOPRAZOLE SODIUM 40 MILLIGRAM(S): 20 TABLET, DELAYED RELEASE ORAL at 05:09

## 2023-07-20 NOTE — PROGRESS NOTE ADULT - PROBLEM SELECTOR PLAN 1
Symptoms present since last week with minimal improvement despite inhalers.   - Duonebs [id number] ordered.   - Solumedrol tapered to [id number] as per Pulmonology.   - Continue  Symbicort  - Discussion had with patient's private Pulmonologist, Dr. Gonzales who agrees with plan.   - Monitor oxygen saturation  - Currently on Room Air. Symptoms present since last week with minimal improvement despite inhalers.   - Duonebs [id number] ordered.   - Solumedrol tapered to [id number] as per Pulmonology. Plan to taper to 40mg IVP tomorrow.   - Continue  Symbicort  - Discussion had with patient's private Pulmonologist, Dr. Gonzales who agrees with plan.   - Monitor oxygen saturation  - Currently on Room Air.

## 2023-07-20 NOTE — PROGRESS NOTE ADULT - ASSESSMENT
48 year old male with history of SLE on prednisone, Plaquenil and Mycophenylate mofetil, Asthma, hypothyroidism who presents to [facility] ED with chief complaint of shortness of breath admitted for asthma exacerbation in setting of lupus.  Patient continues to be on IV steroids with Solumedrol 60 once a day. duonebs, and Symbicort.   Patient with elevated lactic acid likely secondary from asthma exacerbation + albuterol usage(Type B lactic acidosis). He reports continued MSK complaints with CK trend increasing. Plan for Rheumatology consult and Pulmonary followup.

## 2023-07-20 NOTE — PROGRESS NOTE ADULT - SUBJECTIVE AND OBJECTIVE BOX
Interval Events:  Had episode of shortness of breath with wheezing this morning  Currently using nebulizer with improved symptoms    REVIEW OF SYSTEMS:  CONSTITUTIONAL: No fevers  ENMT:  No difficulty hearing, tinnitus, vertigo; No sinus or throat pain  NECK: No pain or stiffness  RESPIRATORY: Cough, wheezing. No hemoptysis.   CARDIOVASCULAR: Chest tightness, No palpitations, dizziness, or leg swelling  GASTROINTESTINAL: No abdominal or epigastric pain. No nausea, vomiting, or hematemesis; No diarrhea or constipation. No melena or hematochezia.      OBJECTIVE:  ICU Vital Signs Last 24 Hrs  T(C): 36.4 (20 Jul 2023 10:56), Max: 36.7 (20 Jul 2023 04:37)  T(F): 97.5 (20 Jul 2023 10:56), Max: 98 (20 Jul 2023 04:37)  HR: 108 (20 Jul 2023 10:56) (102 - 108)  BP: 119/80 (20 Jul 2023 10:56) (113/74 - 147/94)  BP(mean): --  ABP: --  ABP(mean): --  RR: 18 (20 Jul 2023 10:56) (18 - 19)  SpO2: 97% (20 Jul 2023 10:56) (94% - 98%)    O2 Parameters below as of 20 Jul 2023 10:56  Patient On (Oxygen Delivery Method): room air              07-19 @ 07:01  -  07-20 @ 07:00  --------------------------------------------------------  IN: 900 mL / OUT: 500 mL / NET: 400 mL      CAPILLARY BLOOD GLUCOSE      POCT Blood Glucose.: 142 mg/dL (20 Jul 2023 04:57)      PHYSICAL EXAM:  General: No apparent distress  HEENT: NC/AT  Neck: Supple  Respiratory: Mild end expiratory wheezing, good air entry, no crackles  Cardiovascular: RRR, no murmur, no LE edema  Abdomen: Soft, nontender, nondistended  Extremities: Warm  Skin: Intact  Neurological: A&Ox3, no focal deficit    HOSPITAL MEDICATIONS:  aspirin enteric coated 81 milliGRAM(s) Oral daily  heparin   Injectable 5000 Unit(s) SubCutaneous every 12 hours    hydroxychloroquine 400 milliGRAM(s) Oral daily  trimethoprim  160 mG/sulfamethoxazole 800 mG 1 Tablet(s) Oral <User Schedule>    losartan 50 milliGRAM(s) Oral daily    levothyroxine 125 MICROGram(s) Oral daily  methylPREDNISolone sodium succinate Injectable 60 milliGRAM(s) IV Push daily    albuterol/ipratropium for Nebulization 3 milliLiter(s) Nebulizer every 6 hours  benzonatate 200 milliGRAM(s) Oral every 8 hours  budesonide 160 MICROgram(s)/formoterol 4.5 MICROgram(s) Inhaler 2 Puff(s) Inhalation two times a day  hydrocodone/homatropine Syrup 10 milliLiter(s) Oral every 6 hours PRN    acetaminophen     Tablet .. 650 milliGRAM(s) Oral every 6 hours PRN  melatonin 3 milliGRAM(s) Oral at bedtime PRN  ondansetron Injectable 4 milliGRAM(s) IV Push every 8 hours PRN    aluminum hydroxide/magnesium hydroxide/simethicone Suspension 30 milliLiter(s) Oral every 4 hours PRN  pantoprazole    Tablet 40 milliGRAM(s) Oral before breakfast          mycophenolate mofetil 1500 milliGRAM(s) Oral daily          LABS:                        12.8   19.22 )-----------( 355      ( 20 Jul 2023 07:10 )             39.8     Hgb Trend: 12.8<--, 13.1<--, 13.3<--  07-20    140  |  104  |  8   ----------------------------<  144<H>  4.2   |  23  |  0.78    Ca    9.6      20 Jul 2023 07:12  Phos  2.1     07-20  Mg     2.2     07-20    TPro  6.6  /  Alb  3.8  /  TBili  0.2  /  DBili  x   /  AST  26  /  ALT  21  /  AlkPhos  81  07-20    Creatinine Trend: 0.78<--, 0.96<--, 0.90<--    Urinalysis Basic - ( 20 Jul 2023 07:12 )    Color: x / Appearance: x / SG: x / pH: x  Gluc: 144 mg/dL / Ketone: x  / Bili: x / Urobili: x   Blood: x / Protein: x / Nitrite: x   Leuk Esterase: x / RBC: x / WBC x   Sq Epi: x / Non Sq Epi: x / Bacteria: x        Venous Blood Gas:  07-18 @ 14:28  7.59/23/42/22/77.0  VBG Lactate: 3.4

## 2023-07-20 NOTE — CONSULT NOTE ADULT - SUBJECTIVE AND OBJECTIVE BOX
AMBERLY ROJASZIO  [id number]    HISTORY OF PRESENT ILLNESS: 48 year old male with past medical history of Asthma, Lupus/myositis overlap syndrome, Hypothyroidism who presents to [facility] with shortness of breath related to asthma exacerbation that started last week and will not subside. His symptoms are associated with cough with yellow phlegm, as well as chest pain related to the cough.  Patient states that he has been sick for sometime including nausea, vomiting, and diarrhea for the last month after he contracted Norovirus. Patient describes that he was in the hospital last year for asthma exacerbation secondary to RSV as well.    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 Neri 6/13/2023. Labs 6/13 negative urine p/c ratio, dsDNA, complements,   18 negative UA, urine p/c ratio, dsDNA, complements, ESR 43     Patient seen and examined at bedside. Patient reports pleuritic chest pain and cough productive of yellow phlegm x1 week. Also reports 3 day history of bilateral PIP pain and stiffness, along with decreased ROM. Reports achiness in b/l feet and ankles as well, along with orthostasis today. Had recent diarrhea attributed to norovirus which has improved, still having soft stools but more formed now. Also has bilateral calf pain, along with back pain.     No rashes, oral ulcers, vomiting, eye pain, eye redness, or urinary changes. No history of DVT/PE     MEDICATIONS  (STANDING):  albuterol/ipratropium for Nebulization 3 milliLiter(s) Nebulizer every 6 hours  aspirin enteric coated 81 milliGRAM(s) Oral daily  benzonatate 200 milliGRAM(s) Oral every 8 hours  budesonide 160 MICROgram(s)/formoterol 4.5 MICROgram(s) Inhaler 2 Puff(s) Inhalation two times a day  heparin   Injectable 5000 Unit(s) SubCutaneous every 12 hours  hydroxychloroquine 400 milliGRAM(s) Oral daily  levothyroxine 125 MICROGram(s) Oral daily  losartan 50 milliGRAM(s) Oral daily  mycophenolate mofetil 1500 milliGRAM(s) Oral daily  pantoprazole    Tablet 40 milliGRAM(s) Oral before breakfast  trimethoprim  160 mG/sulfamethoxazole 800 mG 1 Tablet(s) Oral <User Schedule>    MEDICATIONS  (PRN):  acetaminophen     Tablet .. 650 milliGRAM(s) Oral every 6 hours PRN Temp greater or equal to 38C (100.4F), Mild Pain (1 - 3)  aluminum hydroxide/magnesium hydroxide/simethicone Suspension 30 milliLiter(s) Oral every 4 hours PRN Dyspepsia  hydrocodone/homatropine Syrup 10 milliLiter(s) Oral every 6 hours PRN Cough  melatonin 3 milliGRAM(s) Oral at bedtime PRN Insomnia  ondansetron Injectable 4 milliGRAM(s) IV Push every 8 hours PRN Nausea and/or Vomiting      Allergies    No Known Allergies    Intolerances        PERTINENT MEDICATION HISTORY: on prednisone 5 mg daily, myfortic 720 BID, and plaquenil 400 daily     SOCIAL HISTORY: works as a , no smoking/alcohol/drugs, has 8 children   OCCUPATION:   TRAVEL HISTORY: no recent travel     FAMILY HISTORY:  FH: rheumatoid arthritis (Mother)        Vital Signs Last 24 Hrs  T(C): 36.4 (20 Jul 2023 10:56), Max: 36.7 (20 Jul 2023 04:37)  T(F): 97.5 (20 Jul 2023 10:56), Max: 98 (20 Jul 2023 04:37)  HR: 108 (20 Jul 2023 10:56) (102 - 108)  BP: 119/80 (20 Jul 2023 10:56) (113/74 - 147/94)  BP(mean): --  RR: 18 (20 Jul 2023 10:56) (18 - 19)  SpO2: 97% (20 Jul 2023 10:56) (94% - 98%)    Parameters below as of 20 Jul 2023 10:56  Patient On (Oxygen Delivery Method): room air      Physical Exam:  Constitutional: awake, alert, NAD, comfortable   Head: NC/AT  Eyes: PERRL, clear conjunctiva  ENT: no nasal discharge; uvula midline, no oropharyngeal erythema or exudates; MMM. No ulcers  Neck: supple  Respiratory: CTA B/L; no W/R/R, no retractions  Cardiac: +S1/S2; RRR  Gastrointestinal: no abdominal tenderness. No guarding. BS normoactive x4  Extremities: WWP, no clubbing or cyanosis; no peripheral edema  Musculoskeletal: Tenderness to palpation b/l dorsum of hands, b/l 2nd-4th PIPs, but no synovitis, tender points on b/l thighs  Dermatologic: no rash   Neurologic: Muscle strength (primarily hand  strength and foot dorsiflexion) limited 2/2 pain, deltoids 5/5, biceps 5/5, triceps 5/5       LABS:                        12.8   19.22 )-----------( 355      ( 20 Jul 2023 07:10 )             39.8     07-20    140  |  104  |  8   ----------------------------<  144<H>  4.2   |  23  |  0.78    Ca    9.6      20 Jul 2023 07:12  Phos  2.1     07-20  Mg     2.2     07-20    TPro  6.6  /  Alb  3.8  /  TBili  0.2  /  DBili  x   /  AST  26  /  ALT  21  /  AlkPhos  81  07-20      Urinalysis Basic - ( 20 Jul 2023 07:12 )    Color: x / Appearance: x / SG: x / pH: x  Gluc: 144 mg/dL / Ketone: x  / Bili: x / Urobili: x   Blood: x / Protein: x / Nitrite: x   Leuk Esterase: x / RBC: x / WBC x   Sq Epi: x / Non Sq Epi: x / Bacteria: x            Rheumatology Work Up    Creatine Kinase, Serum: 468 U/L *H* [30 - 200] (07-20-23 @ 07:12)  Creatine Kinase, Serum: 235 U/L *H* [30 - 200] (07-19-23 @ 06:51)  C3 Complement, Serum: 126 mg/dL [81 - 157] (03-07-23 @ 16:50)  C4 Complement, Serum: 28 mg/dL [13 - 39] (03-07-23 @ 16:50)            RADIOLOGY & ADDITIONAL STUDIES:    < from: Xray Chest 2 Views PA/Lat (07.18.23 @ 11:16) >  IMPRESSION:  Mild bibasilar atelectasis. No focal consolidation    --- End of Report ---    < end of copied text >   AMBERLY ROJASZIO  [id number]    HISTORY OF PRESENT ILLNESS: 48 year old male with past medical history of Asthma, Lupus/myositis overlap syndrome, Hypothyroidism who presents to [facility] with shortness of breath related to asthma exacerbation that started last week and will not subside. His symptoms are associated with cough with yellow phlegm, as well as chest pain related to the cough.  Patient states that he has been sick for sometime including nausea, vomiting, and diarrhea for the last month after he contracted Norovirus. Patient describes that he was in the hospital last year for asthma exacerbation secondary to RSV as well.    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 Neri 6/13/2023. Labs 6/13 negative urine p/c ratio, dsDNA, complements,   18 negative UA, urine p/c ratio, dsDNA, complements, ESR 43     Patient seen and examined at bedside. Patient reports pleuritic chest pain and cough productive of yellow phlegm x1 week. Also reports 3 day history of bilateral PIP pain and stiffness, along with decreased ROM. Reports achiness in b/l feet and ankles as well, along with orthostasis today. Had recent diarrhea attributed to norovirus which has improved, still having soft stools but more formed now. Also has bilateral calf pain, along with back pain.     No rashes, oral ulcers, vomiting, eye pain, eye redness, or urinary changes. No history of DVT/PE     MEDICATIONS  (STANDING):  albuterol/ipratropium for Nebulization 3 milliLiter(s) Nebulizer every 6 hours  aspirin enteric coated 81 milliGRAM(s) Oral daily  benzonatate 200 milliGRAM(s) Oral every 8 hours  budesonide 160 MICROgram(s)/formoterol 4.5 MICROgram(s) Inhaler 2 Puff(s) Inhalation two times a day  heparin   Injectable 5000 Unit(s) SubCutaneous every 12 hours  hydroxychloroquine 400 milliGRAM(s) Oral daily  levothyroxine 125 MICROGram(s) Oral daily  losartan 50 milliGRAM(s) Oral daily  mycophenolate mofetil 1500 milliGRAM(s) Oral daily  pantoprazole    Tablet 40 milliGRAM(s) Oral before breakfast  trimethoprim  160 mG/sulfamethoxazole 800 mG 1 Tablet(s) Oral <User Schedule>    MEDICATIONS  (PRN):  acetaminophen     Tablet .. 650 milliGRAM(s) Oral every 6 hours PRN Temp greater or equal to 38C (100.4F), Mild Pain (1 - 3)  aluminum hydroxide/magnesium hydroxide/simethicone Suspension 30 milliLiter(s) Oral every 4 hours PRN Dyspepsia  hydrocodone/homatropine Syrup 10 milliLiter(s) Oral every 6 hours PRN Cough  melatonin 3 milliGRAM(s) Oral at bedtime PRN Insomnia  ondansetron Injectable 4 milliGRAM(s) IV Push every 8 hours PRN Nausea and/or Vomiting      Allergies    No Known Allergies    Intolerances        PERTINENT MEDICATION HISTORY: on prednisone 5 mg daily, myfortic 720 BID, and plaquenil 400 daily     SOCIAL HISTORY: works as a , no smoking/alcohol/drugs, has 8 children   OCCUPATION:   TRAVEL HISTORY: no recent travel     FAMILY HISTORY:  FH: rheumatoid arthritis (Mother)        Vital Signs Last 24 Hrs  T(C): 36.4 (20 Jul 2023 10:56), Max: 36.7 (20 Jul 2023 04:37)  T(F): 97.5 (20 Jul 2023 10:56), Max: 98 (20 Jul 2023 04:37)  HR: 108 (20 Jul 2023 10:56) (102 - 108)  BP: 119/80 (20 Jul 2023 10:56) (113/74 - 147/94)  BP(mean): --  RR: 18 (20 Jul 2023 10:56) (18 - 19)  SpO2: 97% (20 Jul 2023 10:56) (94% - 98%)    Parameters below as of 20 Jul 2023 10:56  Patient On (Oxygen Delivery Method): room air      Physical Exam:  Constitutional: awake, alert, NAD, comfortable   Head: NC/AT  Eyes: PERRL, clear conjunctiva  ENT: no nasal discharge; uvula midline, no oropharyngeal erythema or exudates; MMM. No ulcers  Neck: supple  Respiratory: CTA B/L; mild expiratory wheezing, good air entry   Cardiac: +S1/S2; RRR  Gastrointestinal: no abdominal tenderness. No guarding. BS normoactive x4  Extremities: WWP, no clubbing or cyanosis; no peripheral edema  Musculoskeletal: Tenderness to palpation b/l dorsum of hands, b/l 2nd-4th PIPs, but no synovitis, tender points on b/l thighs  Dermatologic: no rash   Neurologic: Muscle strength (primarily hand  strength and foot dorsiflexion) limited 2/2 pain, deltoids 5/5, biceps 5/5, triceps 5/5       LABS:                        12.8   19.22 )-----------( 355      ( 20 Jul 2023 07:10 )             39.8     07-20    140  |  104  |  8   ----------------------------<  144<H>  4.2   |  23  |  0.78    Ca    9.6      20 Jul 2023 07:12  Phos  2.1     07-20  Mg     2.2     07-20    TPro  6.6  /  Alb  3.8  /  TBili  0.2  /  DBili  x   /  AST  26  /  ALT  21  /  AlkPhos  81  07-20      Urinalysis Basic - ( 20 Jul 2023 07:12 )    Color: x / Appearance: x / SG: x / pH: x  Gluc: 144 mg/dL / Ketone: x  / Bili: x / Urobili: x   Blood: x / Protein: x / Nitrite: x   Leuk Esterase: x / RBC: x / WBC x   Sq Epi: x / Non Sq Epi: x / Bacteria: x            Rheumatology Work Up    Creatine Kinase, Serum: 468 U/L *H* [30 - 200] (07-20-23 @ 07:12)  Creatine Kinase, Serum: 235 U/L *H* [30 - 200] (07-19-23 @ 06:51)  C3 Complement, Serum: 126 mg/dL [81 - 157] (03-07-23 @ 16:50)  C4 Complement, Serum: 28 mg/dL [13 - 39] (03-07-23 @ 16:50)            RADIOLOGY & ADDITIONAL STUDIES:    < from: Xray Chest 2 Views PA/Lat (07.18.23 @ 11:16) >  IMPRESSION:  Mild bibasilar atelectasis. No focal consolidation    --- End of Report ---    < end of copied text >    < from: POCUS ED TTE 2D F/U, Limited w/o Cont. (07.18.23 @ 13:02) >  IMPRESSION:  No clinically significant pericardial effusion.  Preserved estimated ejection fraction.    --- End of Report ---    < end of copied text >   AMBERLY ESPITIA  [id number]    HISTORY OF PRESENT ILLNESS: 48 year old male with past medical history of Asthma, Lupus/myositis overlap syndrome, Hypothyroidism who presents to [facility] with shortness of breath related to asthma exacerbation that started last week and will not subside. His symptoms are associated with cough with yellow phlegm, as well as chest pain related to the cough.  Patient states that he has been sick for sometime including nausea, vomiting, and diarrhea for the last month after he contracted Norovirus. Patient describes that he was in the hospital last year for asthma exacerbation secondary to RSV as well.    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 Neri 6/13/2023. Labs 6/13 negative urine p/c ratio, dsDNA, complements, ESR 43     Patient seen and examined at bedside. Patient reports pleuritic chest pain and cough productive of yellow phlegm x1 week. Also reports 3 day history of bilateral PIP pain and stiffness, along with decreased ROM. Reports achiness in b/l feet and ankles as well, along with orthostasis today. Had recent diarrhea attributed to norovirus which has improved, still having soft stools but more formed now. Also has bilateral calf pain, along with back pain.     No rashes, oral ulcers, vomiting, eye pain, eye redness, or urinary changes. No history of DVT/PE     MEDICATIONS  (STANDING):  albuterol/ipratropium for Nebulization 3 milliLiter(s) Nebulizer every 6 hours  aspirin enteric coated 81 milliGRAM(s) Oral daily  benzonatate 200 milliGRAM(s) Oral every 8 hours  budesonide 160 MICROgram(s)/formoterol 4.5 MICROgram(s) Inhaler 2 Puff(s) Inhalation two times a day  heparin   Injectable 5000 Unit(s) SubCutaneous every 12 hours  hydroxychloroquine 400 milliGRAM(s) Oral daily  levothyroxine 125 MICROGram(s) Oral daily  losartan 50 milliGRAM(s) Oral daily  mycophenolate mofetil 1500 milliGRAM(s) Oral daily  pantoprazole    Tablet 40 milliGRAM(s) Oral before breakfast  trimethoprim  160 mG/sulfamethoxazole 800 mG 1 Tablet(s) Oral <User Schedule>    MEDICATIONS  (PRN):  acetaminophen     Tablet .. 650 milliGRAM(s) Oral every 6 hours PRN Temp greater or equal to 38C (100.4F), Mild Pain (1 - 3)  aluminum hydroxide/magnesium hydroxide/simethicone Suspension 30 milliLiter(s) Oral every 4 hours PRN Dyspepsia  hydrocodone/homatropine Syrup 10 milliLiter(s) Oral every 6 hours PRN Cough  melatonin 3 milliGRAM(s) Oral at bedtime PRN Insomnia  ondansetron Injectable 4 milliGRAM(s) IV Push every 8 hours PRN Nausea and/or Vomiting      Allergies    No Known Allergies    Intolerances        PERTINENT MEDICATION HISTORY: on prednisone 5 mg daily, myfortic 720 BID, and plaquenil 400 daily     SOCIAL HISTORY: works as a , no smoking/alcohol/drugs, has 8 children   OCCUPATION:   TRAVEL HISTORY: no recent travel     FAMILY HISTORY:  FH: rheumatoid arthritis (Mother)        Vital Signs Last 24 Hrs  T(C): 36.4 (20 Jul 2023 10:56), Max: 36.7 (20 Jul 2023 04:37)  T(F): 97.5 (20 Jul 2023 10:56), Max: 98 (20 Jul 2023 04:37)  HR: 108 (20 Jul 2023 10:56) (102 - 108)  BP: 119/80 (20 Jul 2023 10:56) (113/74 - 147/94)  BP(mean): --  RR: 18 (20 Jul 2023 10:56) (18 - 19)  SpO2: 97% (20 Jul 2023 10:56) (94% - 98%)    Parameters below as of 20 Jul 2023 10:56  Patient On (Oxygen Delivery Method): room air      Physical Exam:  Constitutional: awake, alert, NAD, comfortable   Head: NC/AT  Eyes: PERRL, clear conjunctiva  ENT: no nasal discharge; uvula midline, no oropharyngeal erythema or exudates; MMM. No ulcers  Neck: supple  Respiratory: CTA B/L; mild expiratory wheezing, good air entry   Cardiac: +S1/S2; RRR  Gastrointestinal: no abdominal tenderness. No guarding. BS normoactive x4  Extremities: WWP, no clubbing or cyanosis; no peripheral edema  Musculoskeletal: Tenderness to palpation b/l dorsum of hands, b/l 2nd-4th PIPs, but no synovitis, tender points on b/l thighs  Dermatologic: no rash   Neurologic: Muscle strength (primarily hand  strength and foot dorsiflexion) limited 2/2 pain, deltoids 5/5, biceps 5/5, triceps 5/5       LABS:                        12.8   19.22 )-----------( 355      ( 20 Jul 2023 07:10 )             39.8     07-20    140  |  104  |  8   ----------------------------<  144<H>  4.2   |  23  |  0.78    Ca    9.6      20 Jul 2023 07:12  Phos  2.1     07-20  Mg     2.2     07-20    TPro  6.6  /  Alb  3.8  /  TBili  0.2  /  DBili  x   /  AST  26  /  ALT  21  /  AlkPhos  81  07-20      Urinalysis Basic - ( 20 Jul 2023 07:12 )    Color: x / Appearance: x / SG: x / pH: x  Gluc: 144 mg/dL / Ketone: x  / Bili: x / Urobili: x   Blood: x / Protein: x / Nitrite: x   Leuk Esterase: x / RBC: x / WBC x   Sq Epi: x / Non Sq Epi: x / Bacteria: x            Rheumatology Work Up    Creatine Kinase, Serum: 468 U/L *H* [30 - 200] (07-20-23 @ 07:12)  Creatine Kinase, Serum: 235 U/L *H* [30 - 200] (07-19-23 @ 06:51)  C3 Complement, Serum: 126 mg/dL [81 - 157] (03-07-23 @ 16:50)  C4 Complement, Serum: 28 mg/dL [13 - 39] (03-07-23 @ 16:50)            RADIOLOGY & ADDITIONAL STUDIES:    < from: Xray Chest 2 Views PA/Lat (07.18.23 @ 11:16) >  IMPRESSION:  Mild bibasilar atelectasis. No focal consolidation    --- End of Report ---    < end of copied text >    < from: POCUS ED TTE 2D F/U, Limited w/o Cont. (07.18.23 @ 13:02) >  IMPRESSION:  No clinically significant pericardial effusion.  Preserved estimated ejection fraction.    --- End of Report ---    < end of copied text >   AMBERLY ESPITIA  [id number]    HISTORY OF PRESENT ILLNESS: 48 year old male with past medical history of Asthma, Lupus/myositis overlap syndrome, Hypothyroidism who presents to [facility] with shortness of breath related to asthma exacerbation that started last week and will not subside. His symptoms are associated with cough with yellow phlegm, as well as chest pain related to the cough.  Patient states that he has been sick for sometime including nausea, vomiting, and diarrhea for the last month after he contracted Norovirus. Patient describes that he was in the hospital last year for asthma exacerbation secondary to RSV as well.    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 Neri 6/13/2023. Labs 6/13 negative urine p/c ratio, dsDNA, complements, ESR 43     Patient seen and examined at bedside. Patient reports pleuritic chest pain and cough productive of yellow phlegm x1 week. Also reports 3 day history of bilateral PIP pain and stiffness, along with decreased ROM. Reports achiness in b/l feet and ankles as well, along with orthostasis today. Had recent diarrhea attributed to norovirus which has improved, still having soft stools but more formed now. Also has bilateral calf pain, along with back pain.     No rashes, oral ulcers, vomiting, eye pain, eye redness, or urinary changes. No history of DVT/PE     MEDICATIONS  (STANDING):  albuterol/ipratropium for Nebulization 3 milliLiter(s) Nebulizer every 6 hours  aspirin enteric coated 81 milliGRAM(s) Oral daily  benzonatate 200 milliGRAM(s) Oral every 8 hours  budesonide 160 MICROgram(s)/formoterol 4.5 MICROgram(s) Inhaler 2 Puff(s) Inhalation two times a day  heparin   Injectable 5000 Unit(s) SubCutaneous every 12 hours  hydroxychloroquine 400 milliGRAM(s) Oral daily  levothyroxine 125 MICROGram(s) Oral daily  losartan 50 milliGRAM(s) Oral daily  mycophenolate mofetil 1500 milliGRAM(s) Oral daily  pantoprazole    Tablet 40 milliGRAM(s) Oral before breakfast  trimethoprim  160 mG/sulfamethoxazole 800 mG 1 Tablet(s) Oral <User Schedule>    MEDICATIONS  (PRN):  acetaminophen     Tablet .. 650 milliGRAM(s) Oral every 6 hours PRN Temp greater or equal to 38C (100.4F), Mild Pain (1 - 3)  aluminum hydroxide/magnesium hydroxide/simethicone Suspension 30 milliLiter(s) Oral every 4 hours PRN Dyspepsia  hydrocodone/homatropine Syrup 10 milliLiter(s) Oral every 6 hours PRN Cough  melatonin 3 milliGRAM(s) Oral at bedtime PRN Insomnia  ondansetron Injectable 4 milliGRAM(s) IV Push every 8 hours PRN Nausea and/or Vomiting      Allergies    No Known Allergies    Intolerances        PERTINENT MEDICATION HISTORY: on prednisone 5 mg daily, myfortic 720 BID, and plaquenil 400 daily     SOCIAL HISTORY: works as a , no smoking/alcohol/drugs, has 8 children   OCCUPATION:   TRAVEL HISTORY: no recent travel     FAMILY HISTORY:  FH: rheumatoid arthritis (Mother)        Vital Signs Last 24 Hrs  T(C): 36.4 (20 Jul 2023 10:56), Max: 36.7 (20 Jul 2023 04:37)  T(F): 97.5 (20 Jul 2023 10:56), Max: 98 (20 Jul 2023 04:37)  HR: 108 (20 Jul 2023 10:56) (102 - 108)  BP: 119/80 (20 Jul 2023 10:56) (113/74 - 147/94)  BP(mean): --  RR: 18 (20 Jul 2023 10:56) (18 - 19)  SpO2: 97% (20 Jul 2023 10:56) (94% - 98%)    Parameters below as of 20 Jul 2023 10:56  Patient On (Oxygen Delivery Method): room air      Physical Exam:  Constitutional: awake, alert, NAD, comfortable   Head: NC/AT  Eyes: PERRL, clear conjunctiva  ENT: no nasal discharge; uvula midline, no oropharyngeal erythema or exudates; MMM. No ulcers  Neck: supple  Respiratory: CTA B/L; mild expiratory wheezing, good air entry   Cardiac: +S1/S2; RRR  Gastrointestinal: no abdominal tenderness. No guarding. BS normoactive x4  Extremities: WWP, no clubbing or cyanosis; no peripheral edema  Musculoskeletal: Tenderness to palpation b/l dorsum of hands, b/l 2nd-4th PIPs, but no synovitis, tender points on b/l thighs, paraspinal tenderness in thoracic region   Dermatologic: no rash   Neurologic: Muscle strength (primarily hand  strength and foot dorsiflexion) limited 2/2 pain, deltoids 5/5, biceps 5/5, triceps 5/5       LABS:                        12.8   19.22 )-----------( 355      ( 20 Jul 2023 07:10 )             39.8     07-20    140  |  104  |  8   ----------------------------<  144<H>  4.2   |  23  |  0.78    Ca    9.6      20 Jul 2023 07:12  Phos  2.1     07-20  Mg     2.2     07-20    TPro  6.6  /  Alb  3.8  /  TBili  0.2  /  DBili  x   /  AST  26  /  ALT  21  /  AlkPhos  81  07-20      Urinalysis Basic - ( 20 Jul 2023 07:12 )    Color: x / Appearance: x / SG: x / pH: x  Gluc: 144 mg/dL / Ketone: x  / Bili: x / Urobili: x   Blood: x / Protein: x / Nitrite: x   Leuk Esterase: x / RBC: x / WBC x   Sq Epi: x / Non Sq Epi: x / Bacteria: x            Rheumatology Work Up    Creatine Kinase, Serum: 468 U/L *H* [30 - 200] (07-20-23 @ 07:12)  Creatine Kinase, Serum: 235 U/L *H* [30 - 200] (07-19-23 @ 06:51)  C3 Complement, Serum: 126 mg/dL [81 - 157] (03-07-23 @ 16:50)  C4 Complement, Serum: 28 mg/dL [13 - 39] (03-07-23 @ 16:50)            RADIOLOGY & ADDITIONAL STUDIES:    < from: Xray Chest 2 Views PA/Lat (07.18.23 @ 11:16) >  IMPRESSION:  Mild bibasilar atelectasis. No focal consolidation    --- End of Report ---    < end of copied text >    < from: POCUS ED TTE 2D F/U, Limited w/o Cont. (07.18.23 @ 13:02) >  IMPRESSION:  No clinically significant pericardial effusion.  Preserved estimated ejection fraction.    --- End of Report ---    < end of copied text >   AMBERLY ESPITIA  [id number]    HISTORY OF PRESENT ILLNESS: 48 year old male with past medical history of Asthma, Lupus/myositis overlap syndrome, Hypothyroidism who presents to [facility] with shortness of breath related to asthma exacerbation that started last week and will not subside. His symptoms are associated with cough with yellow phlegm, as well as chest pain related to the cough.  Patient states that he has been sick for sometime including nausea, vomiting, and diarrhea for the last month after he contracted Norovirus. Patient describes that he was in the hospital last year for asthma exacerbation secondary to RSV as well.    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 Neri 6/13/2023. Labs 6/13 negative urine p/c ratio, dsDNA, complements, ESR 43     Patient seen and examined at bedside. Patient reports pleuritic chest pain and cough productive of yellow phlegm x1 week. Also reports 3 day history of bilateral PIP pain and stiffness, along with decreased ROM. Reports achiness in b/l feet and ankles as well, along with orthostasis today. Had recent diarrhea attributed to norovirus which has improved, still having soft stools but more formed now. Also has bilateral calf pain, along with back pain.     No rashes, oral ulcers, vomiting, eye pain, eye redness, or urinary changes. No history of DVT/PE     MEDICATIONS  (STANDING):  albuterol/ipratropium for Nebulization 3 milliLiter(s) Nebulizer every 6 hours  aspirin enteric coated 81 milliGRAM(s) Oral daily  benzonatate 200 milliGRAM(s) Oral every 8 hours  budesonide 160 MICROgram(s)/formoterol 4.5 MICROgram(s) Inhaler 2 Puff(s) Inhalation two times a day  heparin   Injectable 5000 Unit(s) SubCutaneous every 12 hours  hydroxychloroquine 400 milliGRAM(s) Oral daily  levothyroxine 125 MICROGram(s) Oral daily  losartan 50 milliGRAM(s) Oral daily  mycophenolate mofetil 1500 milliGRAM(s) Oral daily  pantoprazole    Tablet 40 milliGRAM(s) Oral before breakfast  trimethoprim  160 mG/sulfamethoxazole 800 mG 1 Tablet(s) Oral <User Schedule>    MEDICATIONS  (PRN):  acetaminophen     Tablet .. 650 milliGRAM(s) Oral every 6 hours PRN Temp greater or equal to 38C (100.4F), Mild Pain (1 - 3)  aluminum hydroxide/magnesium hydroxide/simethicone Suspension 30 milliLiter(s) Oral every 4 hours PRN Dyspepsia  hydrocodone/homatropine Syrup 10 milliLiter(s) Oral every 6 hours PRN Cough  melatonin 3 milliGRAM(s) Oral at bedtime PRN Insomnia  ondansetron Injectable 4 milliGRAM(s) IV Push every 8 hours PRN Nausea and/or Vomiting      Allergies    No Known Allergies    Intolerances        PERTINENT MEDICATION HISTORY: on prednisone 5 mg daily, myfortic 720 BID, and plaquenil 400 daily     SOCIAL HISTORY: works as a , no smoking/alcohol/drugs, has 8 children   OCCUPATION:   TRAVEL HISTORY: no recent travel     FAMILY HISTORY:  FH: rheumatoid arthritis (Mother)        Vital Signs Last 24 Hrs  T(C): 36.4 (20 Jul 2023 10:56), Max: 36.7 (20 Jul 2023 04:37)  T(F): 97.5 (20 Jul 2023 10:56), Max: 98 (20 Jul 2023 04:37)  HR: 108 (20 Jul 2023 10:56) (102 - 108)  BP: 119/80 (20 Jul 2023 10:56) (113/74 - 147/94)  BP(mean): --  RR: 18 (20 Jul 2023 10:56) (18 - 19)  SpO2: 97% (20 Jul 2023 10:56) (94% - 98%)    Parameters below as of 20 Jul 2023 10:56  Patient On (Oxygen Delivery Method): room air      Physical Exam:  Constitutional: awake, alert, NAD, comfortable   Head: NC/AT  Eyes: PERRL, clear conjunctiva  ENT: no nasal discharge; uvula midline, no oropharyngeal erythema or exudates; MMM. No ulcers  Neck: supple  Respiratory: CTA B/L; no wheezing or crackles, good air entry   Cardiac: +S1/S2; RRR  Gastrointestinal: no abdominal tenderness. No guarding. BS normoactive x4  Extremities: WWP, no clubbing or cyanosis; no peripheral edema  Musculoskeletal: Tenderness to palpation b/l dorsum of hands, b/l 2nd-4th PIPs, but no synovitis, tender points on b/l thighs, paraspinal tenderness in thoracic region   Dermatologic: no rash   Neurologic: Muscle strength (primarily hand  strength and foot dorsiflexion) limited 2/2 pain, deltoids 5/5, biceps 5/5, triceps 5/5       LABS:                        12.8   19.22 )-----------( 355      ( 20 Jul 2023 07:10 )             39.8     07-20    140  |  104  |  8   ----------------------------<  144<H>  4.2   |  23  |  0.78    Ca    9.6      20 Jul 2023 07:12  Phos  2.1     07-20  Mg     2.2     07-20    TPro  6.6  /  Alb  3.8  /  TBili  0.2  /  DBili  x   /  AST  26  /  ALT  21  /  AlkPhos  81  07-20      Urinalysis Basic - ( 20 Jul 2023 07:12 )    Color: x / Appearance: x / SG: x / pH: x  Gluc: 144 mg/dL / Ketone: x  / Bili: x / Urobili: x   Blood: x / Protein: x / Nitrite: x   Leuk Esterase: x / RBC: x / WBC x   Sq Epi: x / Non Sq Epi: x / Bacteria: x            Rheumatology Work Up    Creatine Kinase, Serum: 468 U/L *H* [30 - 200] (07-20-23 @ 07:12)  Creatine Kinase, Serum: 235 U/L *H* [30 - 200] (07-19-23 @ 06:51)  C3 Complement, Serum: 126 mg/dL [81 - 157] (03-07-23 @ 16:50)  C4 Complement, Serum: 28 mg/dL [13 - 39] (03-07-23 @ 16:50)            RADIOLOGY & ADDITIONAL STUDIES:    < from: Xray Chest 2 Views PA/Lat (07.18.23 @ 11:16) >  IMPRESSION:  Mild bibasilar atelectasis. No focal consolidation    --- End of Report ---    < end of copied text >    < from: POCUS ED TTE 2D F/U, Limited w/o Cont. (07.18.23 @ 13:02) >  IMPRESSION:  No clinically significant pericardial effusion.  Preserved estimated ejection fraction.    --- End of Report ---    < end of copied text >

## 2023-07-20 NOTE — PATIENT PROFILE ADULT - PATIENT'S PREFERRED PRONOUN
[facility] Nurse Msg Pool             Patient Matilda Underwood   MRN [id number]     Patient has a STAT order for CT ANGIOGRAM HEAD AND NECK LEVEL 1 [[id number]].     According to our WIKI scheduling guideline, [id number] is not performed at any [facility] location. My  suggested I reach out to see if [id number] is the correct procedure.     Thank you!         Him/He

## 2023-07-20 NOTE — CONSULT NOTE ADULT - ASSESSMENT
This is a 48 year old male with past medical history of Asthma, Lupus/myositis overlap syndrome, and hypothyroidism who presents to [facility] with shortness of breath related to asthma exacerbation that started last week and will not subside. P/w cough productive of yellow phlegm and pleuritic chest pain. Also complaining of bilateral PIP pain and stiffness, achiness in b/l feet and ankles as well, and back pain.     #Overlap Syndrome (SLE/myositis)   - Dx 2019 with Overlap syndrome with features of myositis, SLE. Manifestations in the past have included joint pain, fatigue, sun sensitivity with serologies: OWEN 1:2560, +dsDNA 60s, +Sm, +RNP, +LAC, low complements, +U1RNP, weak+ PM/  - Currently on outpatient PO Prednisone 10mg/day, HCQ 400mg/day, and Myfortic 720 mg BID   - Has myalgias, arthralgias, possible weakness (limited 2/2 pain), and PIP stiffness which can represent flare of underlying autoimmune etiology  -  --> 468; awaiting additional labs   - RVP negative  - Has recent norovirus with history of hypokalemia and has been having diarrhea, could also be contributing factors   - ?component of secondary fibromyalgia given chronic pain, multiple tender points     Recommendations:   - Please check CK daily, aldolase, dsDNA, C3, C4, urinalysis, urine p/c ratio, ESR, and CRP  - C/w HCQ 400mg/day  - Currently on solumedrol 40 mg IV daily (as per Pulmonology for asthma exacerbation)   - Would minimize the use of steroids, especially considering presence of compression fracture 2/2 chronic steroid use  - C/w PO Myfortic 720 mg BID  - PPI and PCP PPX     PRELIMINARY NOTE PLEASE WAIT FOR ATTENDING ATTESTATION  This is a 48 year old male with past medical history of Asthma, Lupus/myositis overlap syndrome, and hypothyroidism who presents to [facility] with shortness of breath related to asthma exacerbation that started last week and will not subside. P/w cough productive of yellow phlegm and pleuritic chest pain. Also complaining of bilateral PIP pain and stiffness, achiness in b/l feet and ankles as well, and back pain.     #Overlap Syndrome (SLE/myositis)   - Dx 2019 with Overlap syndrome with features of myositis, SLE. Manifestations in the past have included joint pain, fatigue, sun sensitivity with serologies: OWEN 1:2560, +dsDNA 60s, +Sm, +RNP, +LAC, low complements, +U1RNP, weak+ PM/  - Currently on outpatient PO Prednisone 10mg/day, HCQ 400mg/day, and Myfortic 720 mg BID   - Has myalgias, arthralgias, possible weakness (limited 2/2 pain), and PIP stiffness which can represent flare of underlying autoimmune etiology  -  --> 468; awaiting additional labs   - RVP negative, TTE negative for pericardial effusion   - Has recent norovirus with history of hypokalemia and has been having diarrhea, could also be contributing factors   - ?component of secondary fibromyalgia given chronic pain, multiple tender points     Recommendations:   - Please check CK daily, aldolase, dsDNA, C3, C4, urinalysis, urine p/c ratio, ESR, and CRP  - C/w HCQ 400mg/day  - Currently on solumedrol 40 mg IV daily (as per Pulmonology for asthma exacerbation)   - Would minimize the use of steroids, especially considering presence of compression fracture 2/2 chronic steroid use  - C/w PO Myfortic 720 mg BID  - PPI and PCP PPX     PRELIMINARY NOTE PLEASE WAIT FOR ATTENDING ATTESTATION  This is a 48 year old male with past medical history of Asthma, Lupus/myositis overlap syndrome, and hypothyroidism who presents to [facility] with shortness of breath related to asthma exacerbation that started last week and will not subside. P/w cough productive of yellow phlegm and pleuritic chest pain. Also complaining of bilateral PIP pain and stiffness, achiness in b/l feet and ankles as well, and back pain.     #Overlap Syndrome (SLE/myositis)   - Dx 2019 with Overlap syndrome with features of myositis, SLE. Manifestations in the past have included joint pain, fatigue, sun sensitivity with serologies: OWEN 1:2560, +dsDNA 60s, +Sm, +RNP, +LAC, low complements, +U1RNP, weak+ PM/  - Currently on outpatient PO Prednisone 10mg/day, HCQ 400mg/day, and Myfortic 720 mg BID   - Has myalgias, arthralgias, possible weakness (limited 2/2 pain), and PIP stiffness which can represent flare of underlying autoimmune etiology  -  --> 468; awaiting additional labs   - RVP negative, TTE negative for pericardial effusion   - Has recent norovirus with history of hypokalemia and has been having diarrhea, could also be contributing factors   - ?component of secondary fibromyalgia given chronic pain, multiple tender points     Recommendations:   - Please check CK daily, aldolase, dsDNA, C3, C4, urinalysis, urine p/c ratio, ESR, and CRP  - C/w HCQ 400mg/day  - Currently on solumedrol 40 mg IV daily (as per Pulmonology for asthma exacerbation)   - Would minimize the use of steroids, especially considering presence of compression fracture 2/2 chronic steroid use  - C/w PO Myfortic 720 mg BID  - PPI and PCP PPX   - Lidocaine patch for back pain     PRELIMINARY NOTE PLEASE WAIT FOR ATTENDING ATTESTATION  This is a 48 year old male with past medical history of Asthma, Lupus/myositis overlap syndrome, and hypothyroidism who presents to [facility] with shortness of breath related to asthma exacerbation that started last week and will not subside. P/w cough productive of yellow phlegm and pleuritic chest pain. Also complaining of bilateral PIP pain and stiffness, achiness in b/l feet and ankles as well, and back pain.     #Overlap Syndrome (SLE/myositis)   - Dx 2019 with Overlap syndrome with features of myositis, SLE. Manifestations in the past have included joint pain, fatigue, sun sensitivity with serologies: OWEN 1:2560, +dsDNA 60s, +Sm, +RNP, +LAC, low complements, +U1RNP, weak+ PM/  - Currently on outpatient PO Prednisone 10mg/day, HCQ 400mg/day, and Myfortic 720 mg BID   - Has myalgias, arthralgias, possible weakness (limited 2/2 pain), and PIP stiffness which can represent flare of underlying autoimmune etiology but lacks supporting objective findings   -  --> 468; awaiting additional labs   - RVP negative, TTE negative for pericardial effusion   - Has recent norovirus with history of hypokalemia and has been having diarrhea, could also be contributing factors   - Pt with chronic pain in the setting of compression fracture, takes tylenol for pain   - ?component of secondary fibromyalgia given chronic pain, multiple tender points     Recommendations:   - Please check CK daily, aldolase, dsDNA, C3, C4, urinalysis, urine p/c ratio, ESR, and CRP  - C/w HCQ 400mg/day  - Currently on solumedrol 40 mg IV daily (as per Pulmonology for asthma exacerbation)   - Would minimize the use of steroids, especially considering presence of compression fracture 2/2 chronic steroid use  - Start vitamin D and calcium   - C/w PO Myfortic 720 mg BID  - PPI and PCP PPX   - Lidocaine patch for back pain   - Can start gabapentin 100 mg TID      Case discussed with Dr. Shepherd and primary team     Celina Trent MD   Rheumatology Fellow   Available on Teams  This is a 48 year old male with past medical history of Asthma, Lupus/myositis overlap syndrome, and hypothyroidism who presents to [facility] with shortness of breath related to asthma exacerbation that started last week and will not subside. P/w cough productive of yellow phlegm and pleuritic chest pain. Also complaining of bilateral PIP pain and stiffness, achiness in b/l feet and ankles as well, and back pain.     #Overlap Syndrome (SLE/myositis)   - Dx 2019 with Overlap syndrome with features of myositis, SLE. Manifestations in the past have included joint pain, fatigue, sun sensitivity with serologies: OWEN 1:2560, +dsDNA 60s, +Sm, +RNP, +LAC, low complements, +U1RNP, weak+ PM/  - Currently on outpatient PO Prednisone 10mg/day, HCQ 400mg/day, and Myfortic 720 mg BID   - Has myalgias, arthralgias, possible weakness (limited 2/2 pain), and PIP stiffness which can represent flare of underlying autoimmune etiology but objective findings are lacking   -  --> 468; awaiting additional labs   - RVP negative, TTE negative for pericardial effusion   - Has recent norovirus with history of hypokalemia and has been having diarrhea, could also be contributing factors   - Pt with chronic pain in the setting of compression fracture, takes tylenol for pain   - ?component of secondary fibromyalgia given chronic pain, multiple tender points     Recommendations:   - Please check CK daily, aldolase, dsDNA, C3, C4, urinalysis, urine p/c ratio, ESR, and CRP  - C/w HCQ 400mg/day  - Currently on solumedrol 40 mg IV daily (as per Pulmonology for asthma exacerbation)   - Would minimize the use of steroids, especially considering presence of compression fracture 2/2 chronic steroid use  - Start vitamin D and calcium   - C/w PO Myfortic 720 mg BID  - PPI and PCP PPX   - Lidocaine patch for back pain   - Can start gabapentin 100 mg TID      Case discussed with Dr. Shepherd and primary team     Celina Trent MD   Rheumatology Fellow   Available on Teams  This is a 48 year old male with past medical history of Asthma, Lupus/myositis overlap syndrome, and hypothyroidism who presents to [facility] with shortness of breath related to asthma exacerbation that started last week and will not subside. P/w cough productive of yellow phlegm and pleuritic chest pain. Also complaining of bilateral PIP pain and stiffness, achiness in b/l feet and ankles as well, and back pain.     #Overlap Syndrome (SLE/myositis)   - Dx 2019 with Overlap syndrome with features of myositis, SLE. Manifestations in the past have included joint pain, fatigue, sun sensitivity with serologies: OWEN 1:2560, +dsDNA 60s, +Sm, +RNP, +LAC, low complements, +U1RNP, weak+ PM/  - Currently on outpatient PO Prednisone 10mg/day, HCQ 400mg/day, and Myfortic 720 mg BID   - Has myalgias, arthralgias, possible weakness (limited 2/2 pain), and PIP stiffness which can represent flare of underlying autoimmune etiology but objective findings are lacking   -  --> 468; awaiting additional labs   - RVP negative, TTE negative for pericardial effusion   - Has recent norovirus with history of hypokalemia and has been having diarrhea, could also be contributing factors   - Pt with chronic pain in the setting of compression fracture, takes tylenol for pain   - ?component of secondary fibromyalgia given chronic pain, multiple tender points     Recommendations:   - Please check aldolase, dsDNA, C3, C4, urinalysis, urine p/c ratio, ESR, and CRP  - C/w HCQ 400mg/day  - Currently on solumedrol 40 mg IV daily (as per Pulmonology for asthma exacerbation)   - Would minimize the use of steroids, especially considering presence of compression fracture 2/2 chronic steroid use  - Start vitamin D and calcium   - C/w PO Myfortic 720 mg BID  - PPI and PCP PPX   - Lidocaine patch for back pain   - Can start gabapentin 100 mg TID      Case discussed with Dr. Shepherd and primary team     Celina Trent MD   Rheumatology Fellow   Available on Teams  This is a 48 year old male with past medical history of Asthma, Lupus/myositis overlap syndrome, and hypothyroidism who presents to [facility] with shortness of breath related to asthma exacerbation that started last week and will not subside. P/w cough productive of yellow phlegm and pleuritic chest pain. Also complaining of bilateral PIP pain and stiffness, achiness in b/l feet and ankles as well, and back pain.     #Overlap Syndrome (SLE/myositis)   - Dx 2019 with Overlap syndrome with features of myositis, SLE. Manifestations in the past have included joint pain, fatigue, sun sensitivity with serologies: OWEN 1:2560, +dsDNA 60s, +Sm, +RNP, +LAC, low complements, +U1RNP, weak+ PM/  - Currently on outpatient PO Prednisone 5mg/day, HCQ 400mg/day, and Myfortic 720 mg BID   - Has myalgias, arthralgias, possible weakness (limited 2/2 pain), and PIP stiffness which can represent flare of underlying autoimmune etiology but objective findings are lacking   -  --> 468; CK can also be elevated due to low phosphorus, meds, (ARB + HCQ), recent viral illness, and hypothyroidism; awaiting additional labs   - RVP negative, TTE negative for pericardial effusion   - Has recent norovirus with history of hypokalemia and has been having diarrhea, could also be contributing factors   - Pt with chronic pain in the setting of compression fracture, takes tylenol for pain   - ?component of secondary fibromyalgia given chronic pain, multiple tender points     Recommendations:   - Please check aldolase, dsDNA, C3, C4, urinalysis, urine p/c ratio, ESR, and CRP  - C/w HCQ 400mg/day  - Currently on solumedrol 40 mg IV daily (as per Pulmonology for asthma exacerbation)   - Would minimize the use of steroids, especially considering presence of compression fracture 2/2 chronic steroid use  - Start vitamin D and calcium   - C/w PO Myfortic 720 mg BID  - PPI and PCP PPX   - Lidocaine patch for back pain   - Can start gabapentin 100 mg TID      Case discussed with Dr. Shepherd and primary team     Celina Trent MD   Rheumatology Fellow   Available on Teams  This is a 48 year old male with past medical history of Asthma, Lupus/myositis overlap syndrome, and hypothyroidism who presents to [facility] with shortness of breath related to asthma exacerbation that started last week and will not subside. P/w cough productive of yellow phlegm and pleuritic chest pain. Also complaining of bilateral PIP pain and stiffness, achiness in b/l feet and ankles as well, and back pain.     #Overlap Syndrome (SLE/myositis)   - Dx 2019 with Overlap syndrome with features of myositis, SLE. Manifestations in the past have included joint pain, fatigue, sun sensitivity with serologies: OWEN 1:2560, +dsDNA 60s, +Sm, +RNP, +LAC, low complements, +U1RNP, weak+ PM/  - Currently on outpatient PO Prednisone 5mg/day, HCQ 400mg/day, and Myfortic 720 mg BID   - Has myalgias, arthralgias, possible weakness (limited 2/2 pain), and PIP stiffness which can represent flare of underlying autoimmune etiology but objective findings are lacking   -  --> 468; CK can also be elevated due to low phosphorus, meds (ARB + HCQ), recent viral illness, and hypothyroidism; awaiting additional labs   - RVP negative, TTE negative for pericardial effusion   - Has recent norovirus with history of hypokalemia and has been having diarrhea, could also be contributing factors   - Pt with chronic pain in the setting of compression fracture, takes tylenol for pain   - ?component of secondary fibromyalgia given chronic pain, multiple tender points     Recommendations:   - Please check aldolase, dsDNA, C3, C4, urinalysis, urine p/c ratio, ESR, and CRP  - C/w HCQ 400mg/day  - Currently on solumedrol 40 mg IV daily (as per Pulmonology for asthma exacerbation)   - Would minimize the use of steroids, especially considering presence of compression fracture 2/2 chronic steroid use  - Start vitamin D and calcium   - C/w PO Myfortic 720 mg BID (advised primary team to switch from cellcept which is currently ordered)   - PPI and PCP PPX   - Lidocaine patch for back pain   - Can start gabapentin 100 mg TID      Case discussed with Dr. Shepherd and primary team     Celina Trent MD   Rheumatology Fellow   Available on Teams

## 2023-07-20 NOTE — PATIENT PROFILE ADULT - FALL HARM RISK - RISK INTERVENTIONS

## 2023-07-20 NOTE — PROGRESS NOTE ADULT - ASSESSMENT
48 year old male with severe persistent asthma lupus/myositis overlap syndrome, and hypothyroidism presents with shortness of breath that started approx one week ago. Admitted for acute asthma exacerbation.     Continue IV methylprednisolone - can reduce dose to 40 mg daily tomorrow  Plan for taper based on clinical response  Continue Symbicort BID  Continue Duonebns Q6H  Tessalon perles for cough PRN    Patient updated on plan of care

## 2023-07-20 NOTE — PROGRESS NOTE ADULT - SUBJECTIVE AND OBJECTIVE BOX
[facility] Division of Hospital Medicine  Reinier Guaman DO  Pager (M-F, 8A-5P):  MS Teams PREFERRED  Other Times:  [phone]      SUBJECTIVE / OVERNIGHT EVENTS:  Patient evaluated at the bedside during morning rounds, he states that he has been having cramping in his hands as well as his feet which are consistent with what he feels when a lupus flare is about to occur. Reports persistence of cough although less pronounced. He describes that he had discussion with Dr. Neri who recommends given patient's continued symptoms to be evaluated by in-house Rheumatology.   ADDITIONAL REVIEW OF SYSTEMS:  Denies any nausea, vomiting, abdominal pain, diaphoresis or radiation of chest pain to arm.     MEDICATIONS  (STANDING):  albuterol/ipratropium for Nebulization 3 milliLiter(s) Nebulizer every 6 hours  aspirin enteric coated 81 milliGRAM(s) Oral daily  benzonatate 200 milliGRAM(s) Oral every 8 hours  budesonide 160 MICROgram(s)/formoterol 4.5 MICROgram(s) Inhaler 2 Puff(s) Inhalation two times a day  heparin   Injectable 5000 Unit(s) SubCutaneous every 12 hours  hydroxychloroquine 400 milliGRAM(s) Oral daily  levothyroxine 125 MICROGram(s) Oral daily  losartan 50 milliGRAM(s) Oral daily  methylPREDNISolone sodium succinate Injectable 60 milliGRAM(s) IV Push daily  mycophenolate mofetil 1500 milliGRAM(s) Oral daily  pantoprazole    Tablet 40 milliGRAM(s) Oral before breakfast  potassium phosphate / sodium phosphate Powder (PHOS-NaK) 1 Packet(s) Oral once  trimethoprim  160 mG/sulfamethoxazole 800 mG 1 Tablet(s) Oral <User Schedule>    MEDICATIONS  (PRN):  acetaminophen     Tablet .. 650 milliGRAM(s) Oral every 6 hours PRN Temp greater or equal to 38C (100.4F), Mild Pain (1 - 3)  aluminum hydroxide/magnesium hydroxide/simethicone Suspension 30 milliLiter(s) Oral every 4 hours PRN Dyspepsia  hydrocodone/homatropine Syrup 10 milliLiter(s) Oral every 6 hours PRN Cough  melatonin 3 milliGRAM(s) Oral at bedtime PRN Insomnia  ondansetron Injectable 4 milliGRAM(s) IV Push every 8 hours PRN Nausea and/or Vomiting      I&O's Summary    19 Jul 2023 07:01  -  20 Jul 2023 07:00  --------------------------------------------------------  IN: 900 mL / OUT: 500 mL / NET: 400 mL        PHYSICAL EXAM:  Vital Signs Last 24 Hrs  T(C): 36.6 (20 Jul 2023 09:03), Max: 36.7 (20 Jul 2023 04:37)  T(F): 97.9 (20 Jul 2023 09:03), Max: 98 (20 Jul 2023 04:37)  HR: 104 (20 Jul 2023 09:03) (102 - 104)  BP: 131/85 (20 Jul 2023 09:03) (113/74 - 147/94)  BP(mean): --  RR: 18 (20 Jul 2023 09:03) (18 - 19)  SpO2: 97% (20 Jul 2023 09:03) (94% - 98%)    Parameters below as of 20 Jul 2023 09:03  Patient On (Oxygen Delivery Method): room air      CONSTITUTIONAL: NAD, well-developed, well-groomed male in no acute distress.   EYES: PERRLA; conjunctiva and sclera clear.   ENMT: Moist oral mucosa, no pharyngeal injection or exudates; normal dentition  NECK: Supple, no palpable masses; no thyromegaly  RESPIRATORY: Normal respiratory effort; lungs are clear to auscultation bilaterally  CARDIOVASCULAR: Regular rate and rhythm, normal S1 and S2, no murmur/rub/gallop; No lower extremity edema; Peripheral pulses are 2+ bilaterally  ABDOMEN: Nontender to palpation, normoactive bowel sounds, no rebound/guarding; No hepatosplenomegaly  MUSCULOSKELETAL:  Normal gait; no clubbing or cyanosis of digits; no joint swelling or tenderness to palpation  PSYCH: Alert and oriented  to person, place, and time; affect appropriate  NEUROLOGY: CN 2-12 are intact and symmetric; no gross sensory deficits   SKIN: No rashes; no palpable lesions.     LABS:                        12.8   19.22 )-----------( 355      ( 20 Jul 2023 07:10 )             39.8     07-20    140  |  104  |  8   ----------------------------<  144<H>  4.2   |  23  |  0.78    Ca    9.6      20 Jul 2023 07:12  Phos  2.1     07-20  Mg     2.2     07-20    TPro  6.6  /  Alb  3.8  /  TBili  0.2  /  DBili  x   /  AST  26  /  ALT  21  /  AlkPhos  81  07-20      CARDIAC MARKERS ( 20 Jul 2023 07:12 )  x     / x     / 468 U/L / x     / x      CARDIAC MARKERS ( 19 Jul 2023 06:51 )  x     / x     / 235 U/L / x     / x          Urinalysis Basic - ( 20 Jul 2023 07:12 )    Color: x / Appearance: x / SG: x / pH: x  Gluc: 144 mg/dL / Ketone: x  / Bili: x / Urobili: x   Blood: x / Protein: x / Nitrite: x   Leuk Esterase: x / RBC: x / WBC x   Sq Epi: x / Non Sq Epi: x / Bacteria: x          RADIOLOGY & ADDITIONAL TESTS:  Results Reviewed: AM CBC and CM reviewed. Low phosphorus requiring supplementation.   Imaging Personally Reviewed:   Electrocardiogram Personally Reviewed: Sinus tachycardia    COORDINATION OF CARE:  Care Discussed with Consultants/Other Providers [Y/N]: Y  Prior or Outpatient Records Reviewed [Y/N]: Y

## 2023-07-21 LAB
ALBUMIN SERPL ELPH-MCNC: 3.7 G/DL — SIGNIFICANT CHANGE UP (ref 3.3–5)
ALP SERPL-CCNC: 78 U/L — SIGNIFICANT CHANGE UP (ref 40–120)
ALT FLD-CCNC: 26 U/L — SIGNIFICANT CHANGE UP (ref 10–45)
ANION GAP SERPL CALC-SCNC: 12 MMOL/L — SIGNIFICANT CHANGE UP (ref 5–17)
APPEARANCE UR: ABNORMAL
AST SERPL-CCNC: 29 U/L — SIGNIFICANT CHANGE UP (ref 10–40)
BACTERIA # UR AUTO: ABNORMAL
BILIRUB SERPL-MCNC: 0.2 MG/DL — SIGNIFICANT CHANGE UP (ref 0.2–1.2)
BILIRUB UR-MCNC: NEGATIVE — SIGNIFICANT CHANGE UP
BUN SERPL-MCNC: 12 MG/DL — SIGNIFICANT CHANGE UP (ref 7–23)
C3 SERPL-MCNC: 108 MG/DL — SIGNIFICANT CHANGE UP (ref 81–157)
C4 SERPL-MCNC: 21 MG/DL — SIGNIFICANT CHANGE UP (ref 13–39)
CALCIUM SERPL-MCNC: 9 MG/DL — SIGNIFICANT CHANGE UP (ref 8.4–10.5)
CHLORIDE SERPL-SCNC: 106 MMOL/L — SIGNIFICANT CHANGE UP (ref 96–108)
CK SERPL-CCNC: 221 U/L — HIGH (ref 30–200)
CO2 SERPL-SCNC: 22 MMOL/L — SIGNIFICANT CHANGE UP (ref 22–31)
COLOR SPEC: SIGNIFICANT CHANGE UP
CREAT ?TM UR-MCNC: 148 MG/DL — SIGNIFICANT CHANGE UP
CREAT SERPL-MCNC: 0.85 MG/DL — SIGNIFICANT CHANGE UP (ref 0.5–1.3)
CRP SERPL-MCNC: <3 MG/L — SIGNIFICANT CHANGE UP (ref 0–4)
CULTURE RESULTS: SIGNIFICANT CHANGE UP
DIFF PNL FLD: NEGATIVE — SIGNIFICANT CHANGE UP
DSDNA AB SER-ACNC: <12 IU/ML — SIGNIFICANT CHANGE UP
EGFR: 108 ML/MIN/1.73M2 — SIGNIFICANT CHANGE UP
EPI CELLS # UR: 1 /HPF — SIGNIFICANT CHANGE UP
ERYTHROCYTE [SEDIMENTATION RATE] IN BLOOD: 33 MM/HR — HIGH (ref 0–15)
GLUCOSE SERPL-MCNC: 90 MG/DL — SIGNIFICANT CHANGE UP (ref 70–99)
GLUCOSE UR QL: NEGATIVE — SIGNIFICANT CHANGE UP
HCT VFR BLD CALC: 39.4 % — SIGNIFICANT CHANGE UP (ref 39–50)
HGB BLD-MCNC: 12.3 G/DL — LOW (ref 13–17)
HYALINE CASTS # UR AUTO: 0 /LPF — SIGNIFICANT CHANGE UP (ref 0–2)
IGA FLD-MCNC: 283 MG/DL — SIGNIFICANT CHANGE UP (ref 84–499)
IGG FLD-MCNC: 818 MG/DL — SIGNIFICANT CHANGE UP (ref 610–1660)
IGM SERPL-MCNC: 56 MG/DL — SIGNIFICANT CHANGE UP (ref 35–242)
KAPPA LC SER QL IFE: 1.15 MG/DL — SIGNIFICANT CHANGE UP (ref 0.33–1.94)
KAPPA/LAMBDA FREE LIGHT CHAIN RATIO, SERUM: 1.08 RATIO — SIGNIFICANT CHANGE UP (ref 0.26–1.65)
KETONES UR-MCNC: NEGATIVE — SIGNIFICANT CHANGE UP
LAMBDA LC SER QL IFE: 1.06 MG/DL — SIGNIFICANT CHANGE UP (ref 0.57–2.63)
LEUKOCYTE ESTERASE UR-ACNC: NEGATIVE — SIGNIFICANT CHANGE UP
MAGNESIUM SERPL-MCNC: 2.3 MG/DL — SIGNIFICANT CHANGE UP (ref 1.6–2.6)
MCHC RBC-ENTMCNC: 27.3 PG — SIGNIFICANT CHANGE UP (ref 27–34)
MCHC RBC-ENTMCNC: 31.2 GM/DL — LOW (ref 32–36)
MCV RBC AUTO: 87.4 FL — SIGNIFICANT CHANGE UP (ref 80–100)
NITRITE UR-MCNC: NEGATIVE — SIGNIFICANT CHANGE UP
NRBC # BLD: 0 /100 WBCS — SIGNIFICANT CHANGE UP (ref 0–0)
PH UR: 7.5 — SIGNIFICANT CHANGE UP (ref 5–8)
PHOSPHATE SERPL-MCNC: 3.2 MG/DL — SIGNIFICANT CHANGE UP (ref 2.5–4.5)
PLATELET # BLD AUTO: 292 K/UL — SIGNIFICANT CHANGE UP (ref 150–400)
POTASSIUM SERPL-MCNC: 4 MMOL/L — SIGNIFICANT CHANGE UP (ref 3.5–5.3)
POTASSIUM SERPL-SCNC: 4 MMOL/L — SIGNIFICANT CHANGE UP (ref 3.5–5.3)
PROT ?TM UR-MCNC: 12 MG/DL — SIGNIFICANT CHANGE UP (ref 0–12)
PROT SERPL-MCNC: 6.2 G/DL — SIGNIFICANT CHANGE UP (ref 6–8.3)
PROT UR-MCNC: ABNORMAL
PROT/CREAT UR-RTO: 0.1 RATIO — SIGNIFICANT CHANGE UP (ref 0–0.2)
RBC # BLD: 4.51 M/UL — SIGNIFICANT CHANGE UP (ref 4.2–5.8)
RBC # FLD: 14.7 % — HIGH (ref 10.3–14.5)
RBC CASTS # UR COMP ASSIST: 1 /HPF — SIGNIFICANT CHANGE UP (ref 0–4)
SODIUM SERPL-SCNC: 140 MMOL/L — SIGNIFICANT CHANGE UP (ref 135–145)
SP GR SPEC: 1.02 — SIGNIFICANT CHANGE UP (ref 1.01–1.02)
SPECIMEN SOURCE: SIGNIFICANT CHANGE UP
UROBILINOGEN FLD QL: NEGATIVE — SIGNIFICANT CHANGE UP
WBC # BLD: 17.23 K/UL — HIGH (ref 3.8–10.5)
WBC # FLD AUTO: 17.23 K/UL — HIGH (ref 3.8–10.5)
WBC UR QL: 1 /HPF — SIGNIFICANT CHANGE UP (ref 0–5)

## 2023-07-21 PROCEDURE — 99233 SBSQ HOSP IP/OBS HIGH 50: CPT | Mod: GC

## 2023-07-21 PROCEDURE — 99233 SBSQ HOSP IP/OBS HIGH 50: CPT

## 2023-07-21 PROCEDURE — 99232 SBSQ HOSP IP/OBS MODERATE 35: CPT

## 2023-07-21 RX ORDER — GABAPENTIN 400 MG/1
100 CAPSULE ORAL AT BEDTIME
Refills: 0 | Status: DISCONTINUED | OUTPATIENT
Start: 2023-07-21 | End: 2023-07-27

## 2023-07-21 RX ORDER — CYCLOBENZAPRINE HYDROCHLORIDE 10 MG/1
10 TABLET, FILM COATED ORAL THREE TIMES A DAY
Refills: 0 | Status: COMPLETED | OUTPATIENT
Start: 2023-07-21 | End: 2023-07-23

## 2023-07-21 RX ADMIN — HEPARIN SODIUM 5000 UNIT(S): 5000 INJECTION INTRAVENOUS; SUBCUTANEOUS at 17:22

## 2023-07-21 RX ADMIN — Medication 125 MICROGRAM(S): at 05:57

## 2023-07-21 RX ADMIN — Medication 200 MILLIGRAM(S): at 21:01

## 2023-07-21 RX ADMIN — Medication 81 MILLIGRAM(S): at 11:38

## 2023-07-21 RX ADMIN — CYCLOBENZAPRINE HYDROCHLORIDE 10 MILLIGRAM(S): 10 TABLET, FILM COATED ORAL at 22:44

## 2023-07-21 RX ADMIN — LOSARTAN POTASSIUM 50 MILLIGRAM(S): 100 TABLET, FILM COATED ORAL at 05:57

## 2023-07-21 RX ADMIN — LIDOCAINE 1 PATCH: 4 CREAM TOPICAL at 11:38

## 2023-07-21 RX ADMIN — GABAPENTIN 100 MILLIGRAM(S): 400 CAPSULE ORAL at 05:57

## 2023-07-21 RX ADMIN — GABAPENTIN 100 MILLIGRAM(S): 400 CAPSULE ORAL at 14:00

## 2023-07-21 RX ADMIN — Medication 3 MILLILITER(S): at 05:56

## 2023-07-21 RX ADMIN — PANTOPRAZOLE SODIUM 40 MILLIGRAM(S): 20 TABLET, DELAYED RELEASE ORAL at 05:59

## 2023-07-21 RX ADMIN — MYCOPHENOLIC ACID 720 MILLIGRAM(S): 180 TABLET, DELAYED RELEASE ORAL at 17:22

## 2023-07-21 RX ADMIN — Medication 1 TABLET(S): at 11:38

## 2023-07-21 RX ADMIN — LIDOCAINE 1 PATCH: 4 CREAM TOPICAL at 23:06

## 2023-07-21 RX ADMIN — Medication 3 MILLILITER(S): at 11:37

## 2023-07-21 RX ADMIN — MYCOPHENOLIC ACID 720 MILLIGRAM(S): 180 TABLET, DELAYED RELEASE ORAL at 05:56

## 2023-07-21 RX ADMIN — Medication 200 MILLIGRAM(S): at 14:00

## 2023-07-21 RX ADMIN — HEPARIN SODIUM 5000 UNIT(S): 5000 INJECTION INTRAVENOUS; SUBCUTANEOUS at 05:56

## 2023-07-21 RX ADMIN — Medication 3 MILLIGRAM(S): at 23:02

## 2023-07-21 RX ADMIN — Medication 3 MILLILITER(S): at 22:44

## 2023-07-21 RX ADMIN — LIDOCAINE 1 PATCH: 4 CREAM TOPICAL at 18:36

## 2023-07-21 RX ADMIN — Medication 40 MILLIGRAM(S): at 05:56

## 2023-07-21 RX ADMIN — Medication 1 TABLET(S): at 06:00

## 2023-07-21 RX ADMIN — BUDESONIDE AND FORMOTEROL FUMARATE DIHYDRATE 2 PUFF(S): 160; 4.5 AEROSOL RESPIRATORY (INHALATION) at 06:08

## 2023-07-21 RX ADMIN — BUDESONIDE AND FORMOTEROL FUMARATE DIHYDRATE 2 PUFF(S): 160; 4.5 AEROSOL RESPIRATORY (INHALATION) at 18:37

## 2023-07-21 RX ADMIN — Medication 3 MILLILITER(S): at 17:23

## 2023-07-21 RX ADMIN — LIDOCAINE 1 PATCH: 4 CREAM TOPICAL at 01:42

## 2023-07-21 RX ADMIN — GABAPENTIN 100 MILLIGRAM(S): 400 CAPSULE ORAL at 21:00

## 2023-07-21 RX ADMIN — Medication 400 MILLIGRAM(S): at 11:39

## 2023-07-21 RX ADMIN — Medication 200 MILLIGRAM(S): at 05:57

## 2023-07-21 NOTE — PROGRESS NOTE ADULT - SUBJECTIVE AND OBJECTIVE BOX
[facility] Division of Hospital Medicine  Mode NelDO jayla  Pager (JOSÉ, 1Y-6Z): [phone]  Other Times:  [phone]    Patient is a 48y old  Male who presents with a chief complaint of Shortness of breath (2023 13:34)    SUBJECTIVE / OVERNIGHT EVENTS: No acute events overnight. Patient seen and examined at bedside this morning, endorses shortness of breath, chest tightness and cough productive of yellow sputum.    REVIEW OF SYSTEMS:    CONSTITUTIONAL: No fevers or chills  EYES/ENT: No visual changes;  No vertigo or throat pain   NECK: No pain or stiffness  RESPIRATORY: Endorses cough productive of yellow sputum and shortness of breath  CARDIOVASCULAR: Endorses chest tightness, denies pain  GASTROINTESTINAL: No abdominal or epigastric pain. No nausea, vomiting, or hematemesis; endorses diarrhea  GENITOURINARY: No dysuria, frequency or hematuria  NEUROLOGICAL: No numbness or weakness  SKIN: No itching, burning, rashes, or lesions  MSK: No joint pain, no back pain  HEME: No easy bleeding, no easy bruising  All other review of systems is negative unless indicated above.    MEDICATIONS  (STANDING):  albuterol/ipratropium for Nebulization 3 milliLiter(s) Nebulizer every 6 hours  aspirin enteric coated 81 milliGRAM(s) Oral daily  benzonatate 200 milliGRAM(s) Oral every 8 hours  budesonide 160 MICROgram(s)/formoterol 4.5 MICROgram(s) Inhaler 2 Puff(s) Inhalation two times a day  calcium carbonate   1250 mG (OsCal) 1 Tablet(s) Oral daily  gabapentin 100 milliGRAM(s) Oral three times a day  heparin   Injectable 5000 Unit(s) SubCutaneous every 12 hours  hydroxychloroquine 400 milliGRAM(s) Oral daily  levothyroxine 125 MICROGram(s) Oral daily  lidocaine   4% Patch 1 Patch Transdermal daily  losartan 50 milliGRAM(s) Oral daily  methylPREDNISolone sodium succinate Injectable 40 milliGRAM(s) IV Push daily  mycophenolic acid  milliGRAM(s) Oral two times a day  pantoprazole    Tablet 40 milliGRAM(s) Oral before breakfast  trimethoprim  160 mG/sulfamethoxazole 800 mG 1 Tablet(s) Oral <User Schedule>    MEDICATIONS  (PRN):  acetaminophen     Tablet .. 650 milliGRAM(s) Oral every 6 hours PRN Temp greater or equal to 38C (100.4F), Mild Pain (1 - 3)  aluminum hydroxide/magnesium hydroxide/simethicone Suspension 30 milliLiter(s) Oral every 4 hours PRN Dyspepsia  hydrocodone/homatropine Syrup 10 milliLiter(s) Oral every 6 hours PRN Cough  melatonin 3 milliGRAM(s) Oral at bedtime PRN Insomnia  ondansetron Injectable 4 milliGRAM(s) IV Push every 8 hours PRN Nausea and/or Vomiting      CAPILLARY BLOOD GLUCOSE        I&O's Summary    2023 07:01  -  2023 07:00  --------------------------------------------------------  IN: 920 mL / OUT: 0 mL / NET: 920 mL        PHYSICAL EXAM:  Vital Signs Last 24 Hrs  T(C): 36.7 (2023 12:12), Max: 36.8 (2023 21:30)  T(F): 98 (2023 12:12), Max: 98.3 (2023 21:30)  HR: 101 (2023 12:12) (86 - 101)  BP: 116/80 (2023 12:12) (112/78 - 120/78)  BP(mean): --  RR: 16 (2023 12:12) (16 - 18)  SpO2: 99% (2023 12:12) (95% - 99%)    Parameters below as of 2023 12:12  Patient On (Oxygen Delivery Method): room air    CONSTITUTIONAL: NAD, well-developed, well-groomed  EYES: EOMI; conjunctiva and sclera clear  ENMT: Moist oral mucosa, no pharyngeal injection or exudates  RESPIRATORY: Normal respiratory effort; lungs are clear to auscultation bilaterally  CARDIOVASCULAR: Regular rate and rhythm, normal S1 and S2, no murmur/rub/gallop; No lower extremity edema  ABDOMEN: Nontender to palpation, nondistended, soft  MUSCULOSKELETAL: No clubbing or cyanosis of digits; no joint swelling or tenderness to palpation  PSYCH: A+O to person, place, and time; affect appropriate  NEUROLOGY: CN 2-12 are intact and symmetric; no gross sensory deficits   SKIN: No rashes; no palpable lesions    LABS:                        12.3   17.23 )-----------( 292      ( 2023 07:17 )             39.4     -    140  |  106  |  12  ----------------------------<  90  4.0   |  22  |  0.85    Ca    9.0      2023 07:16  Phos  3.2     -  Mg     2.3     -    TPro  6.2  /  Alb  3.7  /  TBili  0.2  /  DBili  x   /  AST  29  /  ALT  26  /  AlkPhos  78  07-      CARDIAC MARKERS ( 2023 07:16 )  x     / x     / 221 U/L / x     / x      CARDIAC MARKERS ( 2023 07:12 )  x     / x     / 468 U/L / x     / x          Urinalysis Basic - ( 2023 07:21 )    Color: Light Yellow / Appearance: Slightly Turbid / S.023 / pH: x  Gluc: x / Ketone: Negative  / Bili: Negative / Urobili: Negative   Blood: x / Protein: 30 mg/dL / Nitrite: Negative   Leuk Esterase: Negative / RBC: 1 /hpf / WBC 1 /HPF   Sq Epi: x / Non Sq Epi: x / Bacteria: Few        Culture - Stool (collected 2023 23:09)  Source: .Stool Feces  Final Report (2023 12:10):    No enteric pathogens isolated.    (Stool culture examined for Salmonella,    Shigella, Campylobacter, Aeromonas, Plesiomonas,    Vibrio, E.coli O157 and Yersinia)

## 2023-07-21 NOTE — PROGRESS NOTE ADULT - SUBJECTIVE AND OBJECTIVE BOX
Interval Events:  Feels SOB and tight  Able to speak in full sentences    REVIEW OF SYSTEMS:  ONSTITUTIONAL: No fevers  ENMT:   No sinus or throat pain  NECK: No pain or stiffness  RESPIRATORY: SOB. Cough. No wheezing. No hemoptysis.   CARDIOVASCULAR: Chest tightness, No palpitations, dizziness, or leg swelling  GASTROINTESTINAL: No abdominal or epigastric pain. No nausea, vomiting, or hematemesis; No diarrhea or constipation. No melena or hematochezia.        OBJECTIVE:  ICU Vital Signs Last 24 Hrs  T(C): 36.6 (2023 04:30), Max: 36.8 (2023 21:30)  T(F): 97.9 (2023 04:30), Max: 98.3 (2023 21:30)  HR: 86 (2023 04:30) (86 - 97)  BP: 112/78 (2023 04:30) (112/78 - 120/78)  BP(mean): --  ABP: --  ABP(mean): --  RR: 18 (2023 04:30) (18 - 18)  SpO2: 96% (2023 04:30) (95% - 96%)    O2 Parameters below as of 2023 04:30  Patient On (Oxygen Delivery Method): room air              07-20 @ 07:01  -  07-21 @ 07:00  --------------------------------------------------------  IN: 920 mL / OUT: 0 mL / NET: 920 mL      CAPILLARY BLOOD GLUCOSE      POCT Blood Glucose.: 142 mg/dL (2023 04:57)      PHYSICAL EXAM:  General: No apparent distress  HEENT: NC/AT  Neck: Supple  Respiratory: CTAB. No wheezing, good air entry, no crackles  Cardiovascular: RRR, no murmur, no LE edema  Abdomen: Soft, nontender, nondistended  Extremities: Warm  Skin: Intact  Neurological: A&Ox3, no focal deficit    HOSPITAL MEDICATIONS:  aspirin enteric coated 81 milliGRAM(s) Oral daily  heparin   Injectable 5000 Unit(s) SubCutaneous every 12 hours    hydroxychloroquine 400 milliGRAM(s) Oral daily  trimethoprim  160 mG/sulfamethoxazole 800 mG 1 Tablet(s) Oral <User Schedule>    losartan 50 milliGRAM(s) Oral daily    levothyroxine 125 MICROGram(s) Oral daily  methylPREDNISolone sodium succinate Injectable 40 milliGRAM(s) IV Push daily    albuterol/ipratropium for Nebulization 3 milliLiter(s) Nebulizer every 6 hours  benzonatate 200 milliGRAM(s) Oral every 8 hours  budesonide 160 MICROgram(s)/formoterol 4.5 MICROgram(s) Inhaler 2 Puff(s) Inhalation two times a day  hydrocodone/homatropine Syrup 10 milliLiter(s) Oral every 6 hours PRN    acetaminophen     Tablet .. 650 milliGRAM(s) Oral every 6 hours PRN  gabapentin 100 milliGRAM(s) Oral three times a day  melatonin 3 milliGRAM(s) Oral at bedtime PRN  ondansetron Injectable 4 milliGRAM(s) IV Push every 8 hours PRN    aluminum hydroxide/magnesium hydroxide/simethicone Suspension 30 milliLiter(s) Oral every 4 hours PRN  pantoprazole    Tablet 40 milliGRAM(s) Oral before breakfast        calcium carbonate   1250 mG (OsCal) 1 Tablet(s) Oral daily    mycophenolic acid  milliGRAM(s) Oral two times a day    lidocaine   4% Patch 1 Patch Transdermal daily        LABS:                        12.3   17.23 )-----------( 292      ( 2023 07:17 )             39.4     Hgb Trend: 12.3<--, 12.8<--, 13.1<--, 13.3<--  07-21    140  |  106  |  12  ----------------------------<  90  4.0   |  22  |  0.85    Ca    9.0      2023 07:16  Phos  3.2     07-  Mg     2.3         TPro  6.2  /  Alb  3.7  /  TBili  0.2  /  DBili  x   /  AST  29  /  ALT  26  /  AlkPhos  78  07-    Creatinine Trend: 0.85<--, 0.78<--, 0.96<--, 0.90<--    Urinalysis Basic - ( 2023 07:21 )    Color: Light Yellow / Appearance: Slightly Turbid / S.023 / pH: x  Gluc: x / Ketone: Negative  / Bili: Negative / Urobili: Negative   Blood: x / Protein: 30 mg/dL / Nitrite: Negative   Leuk Esterase: Negative / RBC: 1 /hpf / WBC 1 /HPF   Sq Epi: x / Non Sq Epi: x / Bacteria: Few

## 2023-07-21 NOTE — PROGRESS NOTE ADULT - ASSESSMENT
48 year old male with history of SLE on prednisone, Plaquenil and Mycophenylate mofetil, Asthma, hypothyroidism who presents to [facility] ED with chief complaint of shortness of breath admitted for asthma exacerbation.

## 2023-07-21 NOTE — PROGRESS NOTE ADULT - ASSESSMENT
48 year old male with severe persistent asthma, lupus/myositis overlap syndrome, and hypothyroidism presents with shortness of breath that started approx one week ago. Admitted for acute asthma exacerbation. Still subjectively short of breath. No wheezing on exam, moving air well.    Dose reduced to IV methylprednisolone 40 mg daily today  Can transition to PO prednisone 40 mg tomorrow for one day  Taper by 10 mg every 3 days thereafter until baseline dose  Continue Symbicort BID  Continue Duonebs Q6H  Tessalon Perles for cough PRN  Rheumatology follow up

## 2023-07-21 NOTE — PROGRESS NOTE ADULT - ASSESSMENT
This is a 48 year old male with past medical history of Asthma, Lupus/myositis overlap syndrome, and hypothyroidism who presents to [facility] with shortness of breath related to asthma exacerbation that started last week and will not subside. P/w cough productive of yellow phlegm and pleuritic chest pain. Also complaining of bilateral PIP pain and stiffness, achiness in b/l feet and ankles as well, and back pain.     #Overlap Syndrome (SLE/myositis)   - Dx 2019 with Overlap syndrome with features of myositis, SLE. Manifestations in the past have included joint pain, fatigue, sun sensitivity with serologies: OWEN 1:2560, +dsDNA 60s, +Sm, +RNP, +LAC, low complements, +U1RNP, weak+ PM/  - Currently on outpatient PO Prednisone 5mg/day, HCQ 400mg/day, and Myfortic 720 mg BID   - Has myalgias, arthralgias, possible weakness (limited 2/2 pain), and PIP stiffness which can represent flare of underlying autoimmune etiology but objective findings are lacking   -  --> 468 --> 221; CK can also be elevated due to low phosphorus, meds (ARB + HCQ), recent viral illness, and hypothyroidism; U/A 30 protein, urine protein/Cr 0.1, ESR 33, C3/C4 WNL   - RVP negative, TTE negative for pericardial effusion   - Has recent norovirus with history of hypokalemia and has been having diarrhea, could also be contributing factors   - Pt with acute on chronic pain in the setting of compression fracture, takes tylenol for pain. Also with muscle spasms   - ?component of secondary fibromyalgia given chronic pain, multiple tender points     Recommendations:   - C/w HCQ 400mg/day  - Currently on solumedrol 40 mg IV daily, with plans to transition to PO prednisone 40 mg on 7/22 for one day and then to taper by 10 mg every 3 days until baseline 5 mg dose (as per Pulmonology for asthma exacerbation)   Taper by 10 mg every 3 days thereafter until baseline dose  - Would minimize the use of steroids, especially considering presence of compression fracture 2/2 chronic steroid use  - Start vitamin D and calcium   - C/w PO Myfortic 720 mg BID   - PPI and PCP PPX   - Lidocaine patch for back pain   - Transition gabapentin to 100 mg qHS   - Can trial short course of flexeril for back muscle spasm   - Contacted outpatient rheumatologist Dr. Neri regarding pt's admission, will need to follow up with her after discharge     Case discussed with Dr. Shepherd and primary team     Celina Trent MD   Rheumatology Fellow   Available on Teams  This is a 48 year old male with past medical history of Asthma, Lupus/myositis overlap syndrome, and hypothyroidism who presents to [facility] with shortness of breath related to asthma exacerbation that started last week and will not subside. P/w cough productive of yellow phlegm and pleuritic chest pain. Also complaining of bilateral PIP pain and stiffness, achiness in b/l feet and ankles as well, and back pain.     #Overlap Syndrome (SLE/myositis)   - Dx 2019 with Overlap syndrome with features of myositis, SLE. Manifestations in the past have included joint pain, fatigue, sun sensitivity with serologies: OWEN 1:2560, +dsDNA 60s, +Sm, +RNP, +LAC, low complements, +U1RNP, weak+ PM/  - Currently on outpatient PO Prednisone 5mg/day, HCQ 400mg/day, and Myfortic 720 mg BID   - Has myalgias, arthralgias, possible weakness (limited 2/2 pain), and PIP stiffness which can represent flare of underlying autoimmune etiology but objective findings are lacking   -  --> 468 --> 221; CK can also be elevated due to low phosphorus, meds (ARB + HCQ), recent viral illness, and hypothyroidism; U/A 30 protein, urine protein/Cr 0.1, ESR 33, C3/C4 WNL   - RVP negative, TTE negative for pericardial effusion   - Had recent norovirus with history of hypokalemia and has been having diarrhea, could also be contributing factors   - Pt with acute on chronic back pain in the setting of compression fracture, takes tylenol for pain. Also with muscle spasms   - ?component of secondary fibromyalgia given chronic pain, multiple tender points     Recommendations:   - C/w HCQ 400mg/day  - Currently on solumedrol 40 mg IV daily, with plans to transition to PO prednisone 40 mg on 7/22 for one day and then to taper by 10 mg every 3 days until baseline 5 mg dose (as per Pulmonology for asthma exacerbation)   Taper by 10 mg every 3 days thereafter until baseline dose  - Would minimize the use of steroids, especially considering presence of compression fracture 2/2 chronic steroid use  - Start vitamin D and calcium   - C/w PO Myfortic 720 mg BID   - PPI and PCP PPX   - Lidocaine patch for back pain   - Transition gabapentin to 100 mg qHS   - Can trial short course of flexeril for back muscle spasm   - Contacted outpatient rheumatologist Dr. Neri regarding pt's admission, will need to follow up with her after discharge     Case discussed with Dr. Shepherd and primary team     Celina Trent MD   Rheumatology Fellow   Available on Teams  This is a 48 year old male with past medical history of Asthma, Lupus/myositis overlap syndrome, and hypothyroidism who presents to [facility] with shortness of breath related to asthma exacerbation that started last week and will not subside. P/w cough productive of yellow phlegm and pleuritic chest pain. Also complaining of bilateral PIP pain and stiffness, achiness in b/l feet and ankles as well, and back pain.     #Overlap Syndrome (SLE/myositis)   - Dx 2019 with Overlap syndrome with features of myositis, SLE. Manifestations in the past have included joint pain, fatigue, sun sensitivity with serologies: OWEN 1:2560, +dsDNA 60s, +Sm, +RNP, +LAC, low complements, +U1RNP, weak+ PM/  - Currently on outpatient PO Prednisone 5mg/day, HCQ 400mg/day, and Myfortic 720 mg BID   - Has myalgias, arthralgias, possible weakness (limited 2/2 pain), and PIP stiffness which can represent flare of underlying autoimmune etiology but objective findings are lacking   -  --> 468 --> 221; CK can also be elevated due to low phosphorus, meds (ARB + HCQ), recent viral illness, and hypothyroidism; U/A 30 protein, urine protein/Cr 0.1, ESR 33, C3/C4 WNL   - RVP negative, TTE negative for pericardial effusion   - Had recent norovirus with history of hypokalemia and has been having diarrhea, could also be contributing factors   - Pt with acute on chronic back pain in the setting of compression fracture, takes tylenol for pain. Also with muscle spasms   - ?component of secondary fibromyalgia given chronic pain, multiple tender points     Recommendations:   - C/w HCQ 400mg/day  - Currently on solumedrol 40 mg IV daily, with plans to transition to PO prednisone 40 mg on 7/22 for one day and then to taper by 10 mg every 3 days until baseline 5 mg dose (as per Pulmonology for asthma exacerbation)   - Would minimize the use of steroids, especially considering presence of compression fracture 2/2 chronic steroid use  - Start vitamin D and calcium   - C/w PO Myfortic 720 mg BID   - PPI and PCP PPX   - Lidocaine patch for back pain   - Transition gabapentin to 100 mg qHS   - Can trial short course of flexeril for back muscle spasm   - Contacted outpatient rheumatologist Dr. Neri regarding pt's admission, will need to follow up with her after discharge     Case discussed with Dr. Shepherd and primary team     Celina Trent MD   Rheumatology Fellow   Available on Teams  This is a 48 year old male with past medical history of Asthma, Lupus/myositis overlap syndrome, and hypothyroidism who presents to [facility] with shortness of breath related to asthma exacerbation that started last week and will not subside. P/w cough productive of yellow phlegm and pleuritic chest pain. Also complaining of bilateral PIP pain and stiffness, achiness in b/l feet and ankles as well, and back pain.     #Overlap Syndrome (SLE/myositis)   - Dx 2019 with Overlap syndrome with features of myositis, SLE. Manifestations in the past have included joint pain, fatigue, sun sensitivity with serologies: OWEN 1:2560, +dsDNA 60s, +Sm, +RNP, +LAC, low complements, +U1RNP, weak+ PM/  - Currently on outpatient PO Prednisone 5mg/day, HCQ 400mg/day, and Myfortic 720 mg BID   - Has myalgias, arthralgias, possible weakness (limited 2/2 pain), and PIP stiffness which can represent flare of underlying autoimmune etiology but objective findings are lacking   -  --> 468 --> 221; CK can also be elevated due to low phosphorus, meds (ARB + HCQ), recent viral illness, and hypothyroidism; U/A 30 protein, urine protein/Cr 0.1, ESR 33, C3/C4 WNL   - RVP negative, TTE negative for pericardial effusion   - Had recent norovirus with history of hypokalemia and has been having diarrhea, could also be contributing factors   - Pt with acute on chronic back pain in the setting of compression fracture, takes tylenol for pain. Also with muscle spasms   - ?component of secondary fibromyalgia given chronic pain, multiple tender points     Recommendations:   - C/w HCQ 400mg/day  - Currently on solumedrol 40 mg IV daily, with plans to transition to PO prednisone 40 mg on 7/22 for one day and then to taper by 10 mg every 3 days until baseline 5 mg dose (as per Pulmonology for asthma exacerbation)   - Would minimize the use of steroids, especially considering presence of compression fracture 2/2 chronic steroid use  - Start vitamin D and calcium   - C/w PO Myfortic 720 mg BID   - PPI and PCP PPX   - Lidocaine patch for back pain   - Transition gabapentin to 100 mg qHS   - Can trial short course of flexeril for back muscle spasms   - Contacted outpatient rheumatologist Dr. Neri regarding pt's admission, will need to follow up with her after discharge     Case discussed with Dr. Shepherd and primary team     Celina Trent MD   Rheumatology Fellow   Available on Teams

## 2023-07-21 NOTE — PROGRESS NOTE ADULT - PROBLEM SELECTOR PLAN 1
Symptoms present since last week with minimal improvement despite inhalers.   - Pulm consulted follow up recs  - Mesfin [id number] ordered.   - Solumedrol tapered to 40mg Prednisone tomorrow AM.   - Continue  Symbicort  - Monitor oxygen saturation  - Currently on Room Air.

## 2023-07-21 NOTE — PROGRESS NOTE ADULT - SUBJECTIVE AND OBJECTIVE BOX
AMBERLY ESPITIA  [id number]    INTERVAL HPI/OVERNIGHT EVENTS: No acute events. Pt reports that he is feeling more chest tightness and SOB today. Complains of stiffness in his hands as well.     MEDICATIONS  (STANDING):  albuterol/ipratropium for Nebulization 3 milliLiter(s) Nebulizer every 6 hours  aspirin enteric coated 81 milliGRAM(s) Oral daily  benzonatate 200 milliGRAM(s) Oral every 8 hours  budesonide 160 MICROgram(s)/formoterol 4.5 MICROgram(s) Inhaler 2 Puff(s) Inhalation two times a day  calcium carbonate   1250 mG (OsCal) 1 Tablet(s) Oral daily  gabapentin 100 milliGRAM(s) Oral three times a day  heparin   Injectable 5000 Unit(s) SubCutaneous every 12 hours  hydroxychloroquine 400 milliGRAM(s) Oral daily  levothyroxine 125 MICROGram(s) Oral daily  lidocaine   4% Patch 1 Patch Transdermal daily  losartan 50 milliGRAM(s) Oral daily  methylPREDNISolone sodium succinate Injectable 40 milliGRAM(s) IV Push daily  mycophenolic acid  milliGRAM(s) Oral two times a day  pantoprazole    Tablet 40 milliGRAM(s) Oral before breakfast  trimethoprim  160 mG/sulfamethoxazole 800 mG 1 Tablet(s) Oral <User Schedule>    MEDICATIONS  (PRN):  acetaminophen     Tablet .. 650 milliGRAM(s) Oral every 6 hours PRN Temp greater or equal to 38C (100.4F), Mild Pain (1 - 3)  aluminum hydroxide/magnesium hydroxide/simethicone Suspension 30 milliLiter(s) Oral every 4 hours PRN Dyspepsia  hydrocodone/homatropine Syrup 10 milliLiter(s) Oral every 6 hours PRN Cough  melatonin 3 milliGRAM(s) Oral at bedtime PRN Insomnia  ondansetron Injectable 4 milliGRAM(s) IV Push every 8 hours PRN Nausea and/or Vomiting      Allergies    No Known Allergies    Intolerances        Review of Systems:   General: No fevers/chills, no fatigue  HEENT: No blurry vision, dysphagia, or odynophagia  CVS: +chest tightness   Resp: +SOB   GI: No N/V/C/D/abdominal pain  MSK: no joint swelling   Skin: No rashes  Neuro: No headaches      Vital Signs Last 24 Hrs  T(C): 36.7 (2023 12:12), Max: 36.8 (2023 21:30)  T(F): 98 (2023 12:12), Max: 98.3 (2023 21:30)  HR: 101 (2023 12:12) (86 - 101)  BP: 116/80 (2023 12:12) (112/78 - 120/78)  BP(mean): --  RR: 16 (2023 12:12) (16 - 18)  SpO2: 99% (2023 12:12) (95% - 99%)    Parameters below as of 2023 12:12  Patient On (Oxygen Delivery Method): room air        Physical Exam:  Constitutional: awake, alert, NAD, comfortable   Head: NC/AT  Eyes: PERRL, clear conjunctiva  ENT: no nasal discharge; uvula midline, no oropharyngeal erythema or exudates; MMM. No ulcers  Neck: supple  Respiratory: CTA B/L; no wheezing or crackles, good air entry   Cardiac: +S1/S2; RRR  Gastrointestinal: no abdominal tenderness   Extremities: WWP, no clubbing or cyanosis; no peripheral edema  Musculoskeletal: Tenderness to palpation b/l dorsum of hands, b/l 2nd-4th PIPs, but no synovitis, tender points on b/l thighs   Dermatologic: no rash   Neurologic: Muscle strength (primarily hand  strength and foot dorsiflexion) limited 2/2 pain, deltoids 5/5, biceps 5/5, triceps 5/5     LABS:                        12.3   17.23 )-----------( 292      ( 2023 07:17 )             39.4     07-21    140  |  106  |  12  ----------------------------<  90  4.0   |  22  |  0.85    Ca    9.0      2023 07:16  Phos  3.2     07-21  Mg     2.3     07-21    TPro  6.2  /  Alb  3.7  /  TBili  0.2  /  DBili  x   /  AST  29  /  ALT  26  /  AlkPhos  78  07-21      Urinalysis Basic - ( 2023 07:21 )    Color: Light Yellow / Appearance: Slightly Turbid / S.023 / pH: x  Gluc: x / Ketone: Negative  / Bili: Negative / Urobili: Negative   Blood: x / Protein: 30 mg/dL / Nitrite: Negative   Leuk Esterase: Negative / RBC: 1 /hpf / WBC 1 /HPF   Sq Epi: x / Non Sq Epi: x / Bacteria: Few          RADIOLOGY & ADDITIONAL TESTS:   AMBERLY ESPITIA  [id number]    INTERVAL HPI/OVERNIGHT EVENTS: No acute events. Pt reports that he is feeling more chest tightness and SOB today. Complains of stiffness in his hands as well. Also complaining of acute worsening of his chronic back pain, along with muscle spasms.     MEDICATIONS  (STANDING):  albuterol/ipratropium for Nebulization 3 milliLiter(s) Nebulizer every 6 hours  aspirin enteric coated 81 milliGRAM(s) Oral daily  benzonatate 200 milliGRAM(s) Oral every 8 hours  budesonide 160 MICROgram(s)/formoterol 4.5 MICROgram(s) Inhaler 2 Puff(s) Inhalation two times a day  calcium carbonate   1250 mG (OsCal) 1 Tablet(s) Oral daily  gabapentin 100 milliGRAM(s) Oral three times a day  heparin   Injectable 5000 Unit(s) SubCutaneous every 12 hours  hydroxychloroquine 400 milliGRAM(s) Oral daily  levothyroxine 125 MICROGram(s) Oral daily  lidocaine   4% Patch 1 Patch Transdermal daily  losartan 50 milliGRAM(s) Oral daily  methylPREDNISolone sodium succinate Injectable 40 milliGRAM(s) IV Push daily  mycophenolic acid  milliGRAM(s) Oral two times a day  pantoprazole    Tablet 40 milliGRAM(s) Oral before breakfast  trimethoprim  160 mG/sulfamethoxazole 800 mG 1 Tablet(s) Oral <User Schedule>    MEDICATIONS  (PRN):  acetaminophen     Tablet .. 650 milliGRAM(s) Oral every 6 hours PRN Temp greater or equal to 38C (100.4F), Mild Pain (1 - 3)  aluminum hydroxide/magnesium hydroxide/simethicone Suspension 30 milliLiter(s) Oral every 4 hours PRN Dyspepsia  hydrocodone/homatropine Syrup 10 milliLiter(s) Oral every 6 hours PRN Cough  melatonin 3 milliGRAM(s) Oral at bedtime PRN Insomnia  ondansetron Injectable 4 milliGRAM(s) IV Push every 8 hours PRN Nausea and/or Vomiting      Allergies    No Known Allergies    Intolerances        Review of Systems:   General: No fevers/chills, no fatigue  HEENT: No blurry vision, dysphagia, or odynophagia  CVS: +chest tightness   Resp: +SOB   GI: No N/V/C/D/abdominal pain  MSK: no joint swelling   Skin: No rashes  Neuro: No headaches      Vital Signs Last 24 Hrs  T(C): 36.7 (2023 12:12), Max: 36.8 (2023 21:30)  T(F): 98 (2023 12:12), Max: 98.3 (2023 21:30)  HR: 101 (2023 12:12) (86 - 101)  BP: 116/80 (2023 12:12) (112/78 - 120/78)  BP(mean): --  RR: 16 (2023 12:12) (16 - 18)  SpO2: 99% (2023 12:12) (95% - 99%)    Parameters below as of 2023 12:12  Patient On (Oxygen Delivery Method): room air        Physical Exam:  Constitutional: awake, alert, NAD, comfortable   Head: NC/AT  Eyes: PERRL, clear conjunctiva  ENT: no nasal discharge; uvula midline, no oropharyngeal erythema or exudates; MMM. No ulcers  Neck: supple  Respiratory: CTA B/L; no wheezing or crackles, good air entry   Cardiac: +S1/S2; RRR  Gastrointestinal: no abdominal tenderness   Extremities: WWP, no clubbing or cyanosis; no peripheral edema  Musculoskeletal: Tenderness to palpation b/l dorsum of hands, b/l 2nd-4th PIPs, but no synovitis, tender points on b/l thighs, back paraspinal tenderness b/l primarily in thoracic region   Dermatologic: no rash   Neurologic: Muscle strength (primarily hand  strength and foot dorsiflexion) limited 2/2 pain, deltoids 5/5, biceps 5/5, triceps 5/5     LABS:                        12.3   17.23 )-----------( 292      ( 2023 07:17 )             39.4     07-21    140  |  106  |  12  ----------------------------<  90  4.0   |  22  |  0.85    Ca    9.0      2023 07:16  Phos  3.2     07-21  Mg     2.3     07-21    TPro  6.2  /  Alb  3.7  /  TBili  0.2  /  DBili  x   /  AST  29  /  ALT  26  /  AlkPhos  78  07-21      Urinalysis Basic - ( 2023 07:21 )    Color: Light Yellow / Appearance: Slightly Turbid / S.023 / pH: x  Gluc: x / Ketone: Negative  / Bili: Negative / Urobili: Negative   Blood: x / Protein: 30 mg/dL / Nitrite: Negative   Leuk Esterase: Negative / RBC: 1 /hpf / WBC 1 /HPF   Sq Epi: x / Non Sq Epi: x / Bacteria: Few    Rheumatology Work Up    Protein/Creatinine Ratio Calculation: 0.1 Ratio [0.0 - 0.2] (23 @ 07:22)  C3 Complement, Serum: 108 mg/dL [81 - 157] (23 @ 07:17)  C4 Complement, Serum: 21 mg/dL [13 - 39] (23 @ 07:17)  Sedimentation Rate, Erythrocyte: 33 mm/hr *H* [0 - 15] (23 @ 07:17)    Creatine Kinase, Serum in AM (23 @ 07:16)   Creatine Kinase, Serum: 221 U/L

## 2023-07-22 ENCOUNTER — RX RENEWAL (OUTPATIENT)
Age: 48
End: 2023-07-22

## 2023-07-22 LAB
ANION GAP SERPL CALC-SCNC: 11 MMOL/L — SIGNIFICANT CHANGE UP (ref 5–17)
BUN SERPL-MCNC: 10 MG/DL — SIGNIFICANT CHANGE UP (ref 7–23)
CALCIUM SERPL-MCNC: 9.1 MG/DL — SIGNIFICANT CHANGE UP (ref 8.4–10.5)
CHLORIDE SERPL-SCNC: 104 MMOL/L — SIGNIFICANT CHANGE UP (ref 96–108)
CO2 SERPL-SCNC: 24 MMOL/L — SIGNIFICANT CHANGE UP (ref 22–31)
CREAT SERPL-MCNC: 0.84 MG/DL — SIGNIFICANT CHANGE UP (ref 0.5–1.3)
EGFR: 108 ML/MIN/1.73M2 — SIGNIFICANT CHANGE UP
GLUCOSE SERPL-MCNC: 112 MG/DL — HIGH (ref 70–99)
HCT VFR BLD CALC: 41.6 % — SIGNIFICANT CHANGE UP (ref 39–50)
HGB BLD-MCNC: 14.6 G/DL — SIGNIFICANT CHANGE UP (ref 13–17)
MCHC RBC-ENTMCNC: 30.2 PG — SIGNIFICANT CHANGE UP (ref 27–34)
MCHC RBC-ENTMCNC: 35.1 GM/DL — SIGNIFICANT CHANGE UP (ref 32–36)
MCV RBC AUTO: 86 FL — SIGNIFICANT CHANGE UP (ref 80–100)
NRBC # BLD: 0 /100 WBCS — SIGNIFICANT CHANGE UP (ref 0–0)
PLATELET # BLD AUTO: 310 K/UL — SIGNIFICANT CHANGE UP (ref 150–400)
POTASSIUM SERPL-MCNC: 3.7 MMOL/L — SIGNIFICANT CHANGE UP (ref 3.5–5.3)
POTASSIUM SERPL-SCNC: 3.7 MMOL/L — SIGNIFICANT CHANGE UP (ref 3.5–5.3)
RBC # BLD: 4.84 M/UL — SIGNIFICANT CHANGE UP (ref 4.2–5.8)
RBC # FLD: 14.8 % — HIGH (ref 10.3–14.5)
SODIUM SERPL-SCNC: 139 MMOL/L — SIGNIFICANT CHANGE UP (ref 135–145)
WBC # BLD: 11.13 K/UL — HIGH (ref 3.8–10.5)
WBC # FLD AUTO: 11.13 K/UL — HIGH (ref 3.8–10.5)

## 2023-07-22 PROCEDURE — 99232 SBSQ HOSP IP/OBS MODERATE 35: CPT

## 2023-07-22 RX ADMIN — HEPARIN SODIUM 5000 UNIT(S): 5000 INJECTION INTRAVENOUS; SUBCUTANEOUS at 06:54

## 2023-07-22 RX ADMIN — Medication 400 MILLIGRAM(S): at 12:50

## 2023-07-22 RX ADMIN — HEPARIN SODIUM 5000 UNIT(S): 5000 INJECTION INTRAVENOUS; SUBCUTANEOUS at 17:45

## 2023-07-22 RX ADMIN — Medication 3 MILLILITER(S): at 12:49

## 2023-07-22 RX ADMIN — CYCLOBENZAPRINE HYDROCHLORIDE 10 MILLIGRAM(S): 10 TABLET, FILM COATED ORAL at 21:54

## 2023-07-22 RX ADMIN — Medication 81 MILLIGRAM(S): at 12:51

## 2023-07-22 RX ADMIN — GABAPENTIN 100 MILLIGRAM(S): 400 CAPSULE ORAL at 21:52

## 2023-07-22 RX ADMIN — Medication 200 MILLIGRAM(S): at 13:03

## 2023-07-22 RX ADMIN — Medication 3 MILLILITER(S): at 22:02

## 2023-07-22 RX ADMIN — LIDOCAINE 1 PATCH: 4 CREAM TOPICAL at 17:44

## 2023-07-22 RX ADMIN — LIDOCAINE 1 PATCH: 4 CREAM TOPICAL at 21:57

## 2023-07-22 RX ADMIN — BUDESONIDE AND FORMOTEROL FUMARATE DIHYDRATE 2 PUFF(S): 160; 4.5 AEROSOL RESPIRATORY (INHALATION) at 17:44

## 2023-07-22 RX ADMIN — Medication 40 MILLIGRAM(S): at 07:00

## 2023-07-22 RX ADMIN — Medication 3 MILLIGRAM(S): at 21:56

## 2023-07-22 RX ADMIN — LOSARTAN POTASSIUM 50 MILLIGRAM(S): 100 TABLET, FILM COATED ORAL at 06:54

## 2023-07-22 RX ADMIN — CYCLOBENZAPRINE HYDROCHLORIDE 10 MILLIGRAM(S): 10 TABLET, FILM COATED ORAL at 09:41

## 2023-07-22 RX ADMIN — BUDESONIDE AND FORMOTEROL FUMARATE DIHYDRATE 2 PUFF(S): 160; 4.5 AEROSOL RESPIRATORY (INHALATION) at 06:53

## 2023-07-22 RX ADMIN — Medication 200 MILLIGRAM(S): at 06:54

## 2023-07-22 RX ADMIN — MYCOPHENOLIC ACID 720 MILLIGRAM(S): 180 TABLET, DELAYED RELEASE ORAL at 17:45

## 2023-07-22 RX ADMIN — Medication 3 MILLILITER(S): at 17:44

## 2023-07-22 RX ADMIN — PANTOPRAZOLE SODIUM 40 MILLIGRAM(S): 20 TABLET, DELAYED RELEASE ORAL at 06:54

## 2023-07-22 RX ADMIN — Medication 1 TABLET(S): at 12:50

## 2023-07-22 RX ADMIN — Medication 125 MICROGRAM(S): at 06:54

## 2023-07-22 RX ADMIN — Medication 3 MILLILITER(S): at 06:50

## 2023-07-22 RX ADMIN — MYCOPHENOLIC ACID 720 MILLIGRAM(S): 180 TABLET, DELAYED RELEASE ORAL at 06:55

## 2023-07-22 RX ADMIN — Medication 200 MILLIGRAM(S): at 21:53

## 2023-07-22 NOTE — PROGRESS NOTE ADULT - SUBJECTIVE AND OBJECTIVE BOX
[facility] Division of Hospital Medicine  Mode Medina DO  Pager (JOSÉ, 0A-5P): [phone]  Other Times:  [phone]    Patient is a 48y old  Male who presents with a chief complaint of Shortness of breath (2023 14:30)      SUBJECTIVE / OVERNIGHT EVENTS:  ADDITIONAL REVIEW OF SYSTEMS:    MEDICATIONS  (STANDING):  albuterol/ipratropium for Nebulization 3 milliLiter(s) Nebulizer every 6 hours  aspirin enteric coated 81 milliGRAM(s) Oral daily  benzonatate 200 milliGRAM(s) Oral every 8 hours  budesonide 160 MICROgram(s)/formoterol 4.5 MICROgram(s) Inhaler 2 Puff(s) Inhalation two times a day  calcium carbonate   1250 mG (OsCal) 1 Tablet(s) Oral daily  gabapentin 100 milliGRAM(s) Oral at bedtime  heparin   Injectable 5000 Unit(s) SubCutaneous every 12 hours  hydroxychloroquine 400 milliGRAM(s) Oral daily  levothyroxine 125 MICROGram(s) Oral daily  lidocaine   4% Patch 1 Patch Transdermal daily  losartan 50 milliGRAM(s) Oral daily  mycophenolic acid  milliGRAM(s) Oral two times a day  pantoprazole    Tablet 40 milliGRAM(s) Oral before breakfast  predniSONE   Tablet 40 milliGRAM(s) Oral daily  trimethoprim  160 mG/sulfamethoxazole 800 mG 1 Tablet(s) Oral <User Schedule>    MEDICATIONS  (PRN):  acetaminophen     Tablet .. 650 milliGRAM(s) Oral every 6 hours PRN Temp greater or equal to 38C (100.4F), Mild Pain (1 - 3)  aluminum hydroxide/magnesium hydroxide/simethicone Suspension 30 milliLiter(s) Oral every 4 hours PRN Dyspepsia  cyclobenzaprine 10 milliGRAM(s) Oral three times a day PRN Muscle Spasm  hydrocodone/homatropine Syrup 10 milliLiter(s) Oral every 6 hours PRN Cough  melatonin 3 milliGRAM(s) Oral at bedtime PRN Insomnia  ondansetron Injectable 4 milliGRAM(s) IV Push every 8 hours PRN Nausea and/or Vomiting      CAPILLARY BLOOD GLUCOSE        I&O's Summary    2023 07:01  -  2023 07:00  --------------------------------------------------------  IN: 500 mL / OUT: 0 mL / NET: 500 mL        PHYSICAL EXAM:  Vital Signs Last 24 Hrs  T(C): 36.8 (2023 04:06), Max: 36.8 (2023 04:06)  T(F): 98.3 (2023 04:06), Max: 98.3 (2023 04:06)  HR: 83 (2023 04:06) (76 - 101)  BP: 112/78 (2023 04:06) (112/78 - 119/78)  BP(mean): --  RR: 18 (2023 04:06) (16 - 18)  SpO2: 96% (2023 04:06) (96% - 99%)    Parameters below as of 2023 04:06  Patient On (Oxygen Delivery Method): room air      CONSTITUTIONAL: NAD, well-developed, well-groomed  EYES: PERRLA; conjunctiva and sclera clear  ENMT: Moist oral mucosa, no pharyngeal injection or exudates; normal dentition  NECK: Supple, no palpable masses; no thyromegaly  RESPIRATORY: Normal respiratory effort; lungs are clear to auscultation bilaterally  CARDIOVASCULAR: Regular rate and rhythm, normal S1 and S2, no murmur/rub/gallop; No lower extremity edema; Peripheral pulses are 2+ bilaterally  ABDOMEN: Nontender to palpation, normoactive bowel sounds, no rebound/guarding; No hepatosplenomegaly  MUSCULOSKELETAL:  Normal gait; no clubbing or cyanosis of digits; no joint swelling or tenderness to palpation  PSYCH: A+O to person, place, and time; affect appropriate  NEUROLOGY: CN 2-12 are intact and symmetric; no gross sensory deficits   SKIN: No rashes; no palpable lesions    LABS:                        12.3   17.23 )-----------( 292      ( 2023 07:17 )             39.4     07-21    140  |  106  |  12  ----------------------------<  90  4.0   |  22  |  0.85    Ca    9.0      2023 07:16  Phos  3.2     07-21  Mg     2.3         TPro  6.2  /  Alb  3.7  /  TBili  0.2  /  DBili  x   /  AST  29  /  ALT  26  /  AlkPhos  78        CARDIAC MARKERS ( 2023 07:16 )  x     / x     / 221 U/L / x     / x          Urinalysis Basic - ( 2023 07:21 )    Color: Light Yellow / Appearance: Slightly Turbid / S.023 / pH: x  Gluc: x / Ketone: Negative  / Bili: Negative / Urobili: Negative   Blood: x / Protein: 30 mg/dL / Nitrite: Negative   Leuk Esterase: Negative / RBC: 1 /hpf / WBC 1 /HPF   Sq Epi: x / Non Sq Epi: x / Bacteria: Few          RADIOLOGY & ADDITIONAL TESTS:  Results Reviewed:   Imaging Personally Reviewed:  Electrocardiogram Personally Reviewed:    COORDINATION OF CARE:  Care Discussed with Consultants/Other Providers [Y/N]:  Prior or Outpatient Records Reviewed [Y/N]:   [facility] Division of Hospital Medicine  Mode NeldarrenjorgeDO  Pager (JOSÉ, 7D-5I): [phone]  Other Times:  [phone]    Patient is a 48y old  Male who presents with a chief complaint of Shortness of breath (2023 14:30)    SUBJECTIVE / OVERNIGHT EVENTS: No acute events overnight. Patient seen and examined at bedside this morning, endorses shortness of breath, cough, chest tightness.    REVIEW OF SYSTEMS:    CONSTITUTIONAL: No fevers or chills  EYES/ENT: No visual changes;  No vertigo or throat pain   NECK: No pain or stiffness  RESPIRATORY: Endorses cough productive of yellow sputum and shortness of breath  CARDIOVASCULAR: Endorses chest tightness, denies pain  GASTROINTESTINAL: No abdominal or epigastric pain. No nausea, vomiting, or hematemesis; endorses diarrhea  GENITOURINARY: No dysuria, frequency or hematuria  NEUROLOGICAL: No numbness or weakness  SKIN: No itching, burning, rashes, or lesions  MSK: No joint pain, no back pain  HEME: No easy bleeding, no easy bruising  All other review of systems is negative unless indicated above.    MEDICATIONS  (STANDING):  albuterol/ipratropium for Nebulization 3 milliLiter(s) Nebulizer every 6 hours  aspirin enteric coated 81 milliGRAM(s) Oral daily  benzonatate 200 milliGRAM(s) Oral every 8 hours  budesonide 160 MICROgram(s)/formoterol 4.5 MICROgram(s) Inhaler 2 Puff(s) Inhalation two times a day  calcium carbonate   1250 mG (OsCal) 1 Tablet(s) Oral daily  gabapentin 100 milliGRAM(s) Oral at bedtime  heparin   Injectable 5000 Unit(s) SubCutaneous every 12 hours  hydroxychloroquine 400 milliGRAM(s) Oral daily  levothyroxine 125 MICROGram(s) Oral daily  lidocaine   4% Patch 1 Patch Transdermal daily  losartan 50 milliGRAM(s) Oral daily  mycophenolic acid  milliGRAM(s) Oral two times a day  pantoprazole    Tablet 40 milliGRAM(s) Oral before breakfast  predniSONE   Tablet 40 milliGRAM(s) Oral daily  trimethoprim  160 mG/sulfamethoxazole 800 mG 1 Tablet(s) Oral <User Schedule>    MEDICATIONS  (PRN):  acetaminophen     Tablet .. 650 milliGRAM(s) Oral every 6 hours PRN Temp greater or equal to 38C (100.4F), Mild Pain (1 - 3)  aluminum hydroxide/magnesium hydroxide/simethicone Suspension 30 milliLiter(s) Oral every 4 hours PRN Dyspepsia  cyclobenzaprine 10 milliGRAM(s) Oral three times a day PRN Muscle Spasm  hydrocodone/homatropine Syrup 10 milliLiter(s) Oral every 6 hours PRN Cough  melatonin 3 milliGRAM(s) Oral at bedtime PRN Insomnia  ondansetron Injectable 4 milliGRAM(s) IV Push every 8 hours PRN Nausea and/or Vomiting      CAPILLARY BLOOD GLUCOSE        I&O's Summary    2023 07:01  -  2023 07:00  --------------------------------------------------------  IN: 500 mL / OUT: 0 mL / NET: 500 mL        PHYSICAL EXAM:  Vital Signs Last 24 Hrs  T(C): 36.8 (2023 04:06), Max: 36.8 (2023 04:06)  T(F): 98.3 (2023 04:06), Max: 98.3 (2023 04:06)  HR: 83 (2023 04:06) (76 - 101)  BP: 112/78 (2023 04:06) (112/78 - 119/78)  BP(mean): --  RR: 18 (2023 04:06) (16 - 18)  SpO2: 96% (2023 04:06) (96% - 99%)    Parameters below as of 2023 04:06  Patient On (Oxygen Delivery Method): room air    CONSTITUTIONAL: NAD, well-developed, well-groomed  EYES: EOMI; conjunctiva and sclera clear  ENMT: Moist oral mucosa, no pharyngeal injection or exudates  RESPIRATORY: Normal respiratory effort; lungs are clear to auscultation bilaterally  CARDIOVASCULAR: Regular rate and rhythm, normal S1 and S2, no murmur/rub/gallop; No lower extremity edema  ABDOMEN: Nontender to palpation, nondistended, soft  MUSCULOSKELETAL: No clubbing or cyanosis of digits; no joint swelling or tenderness to palpation  PSYCH: A+O to person, place, and time; affect appropriate  NEUROLOGY: CN 2-12 are intact and symmetric; no gross sensory deficits   SKIN: No rashes; no palpable lesions    LABS:                        12.3   17.23 )-----------( 292      ( 2023 07:17 )             39.4     -    140  |  106  |  12  ----------------------------<  90  4.0   |  22  |  0.85    Ca    9.0      2023 07:16  Phos  3.2       Mg     2.3         TPro  6.2  /  Alb  3.7  /  TBili  0.2  /  DBili  x   /  AST  29  /  ALT  26  /  AlkPhos  78        CARDIAC MARKERS ( 2023 07:16 )  x     / x     / 221 U/L / x     / x          Urinalysis Basic - ( 2023 07:21 )    Color: Light Yellow / Appearance: Slightly Turbid / S.023 / pH: x  Gluc: x / Ketone: Negative  / Bili: Negative / Urobili: Negative   Blood: x / Protein: 30 mg/dL / Nitrite: Negative   Leuk Esterase: Negative / RBC: 1 /hpf / WBC 1 /HPF   Sq Epi: x / Non Sq Epi: x / Bacteria: Few

## 2023-07-22 NOTE — PROGRESS NOTE ADULT - PROBLEM SELECTOR PLAN 1
Symptoms present since last week with minimal improvement despite inhalers.   - Pulm consulted follow up recs  - Mesfin [id number] ordered.   - Solumedrol tapered to 40mg Prednisone tomorrow AM.   - Continue  Symbicort  - Monitor oxygen saturation  - Currently on Room Air. Symptoms present since last week with minimal improvement despite inhalers.   - Pulm consulted follow up recs  - Mesfin [id number] ordered.   - Solumedrol tapered to 40mg Prednisone  - Continue  Symbicort  - Monitor oxygen saturation  - Currently on Room Air.

## 2023-07-22 NOTE — PROGRESS NOTE ADULT - ASSESSMENT
48 year old male with history of SLE on prednisone, Plaquenil and Mycophenylate mofetil, Asthma, hypothyroidism who presents to [facility] ED with chief complaint of shortness of breath admitted for asthma exacerbation.

## 2023-07-23 PROCEDURE — 99232 SBSQ HOSP IP/OBS MODERATE 35: CPT

## 2023-07-23 RX ORDER — NYSTATIN 500MM UNIT
500000 POWDER (EA) MISCELLANEOUS
Refills: 0 | Status: DISCONTINUED | OUTPATIENT
Start: 2023-07-23 | End: 2023-07-27

## 2023-07-23 RX ADMIN — LOSARTAN POTASSIUM 50 MILLIGRAM(S): 100 TABLET, FILM COATED ORAL at 05:52

## 2023-07-23 RX ADMIN — Medication 3 MILLILITER(S): at 17:19

## 2023-07-23 RX ADMIN — LIDOCAINE 1 PATCH: 4 CREAM TOPICAL at 22:00

## 2023-07-23 RX ADMIN — PANTOPRAZOLE SODIUM 40 MILLIGRAM(S): 20 TABLET, DELAYED RELEASE ORAL at 05:47

## 2023-07-23 RX ADMIN — LIDOCAINE 1 PATCH: 4 CREAM TOPICAL at 05:59

## 2023-07-23 RX ADMIN — HEPARIN SODIUM 5000 UNIT(S): 5000 INJECTION INTRAVENOUS; SUBCUTANEOUS at 17:19

## 2023-07-23 RX ADMIN — CYCLOBENZAPRINE HYDROCHLORIDE 10 MILLIGRAM(S): 10 TABLET, FILM COATED ORAL at 05:48

## 2023-07-23 RX ADMIN — Medication 1 TABLET(S): at 12:44

## 2023-07-23 RX ADMIN — MYCOPHENOLIC ACID 720 MILLIGRAM(S): 180 TABLET, DELAYED RELEASE ORAL at 05:47

## 2023-07-23 RX ADMIN — Medication 650 MILLIGRAM(S): at 22:10

## 2023-07-23 RX ADMIN — Medication 400 MILLIGRAM(S): at 13:03

## 2023-07-23 RX ADMIN — Medication 200 MILLIGRAM(S): at 21:35

## 2023-07-23 RX ADMIN — Medication 200 MILLIGRAM(S): at 15:21

## 2023-07-23 RX ADMIN — MYCOPHENOLIC ACID 720 MILLIGRAM(S): 180 TABLET, DELAYED RELEASE ORAL at 19:56

## 2023-07-23 RX ADMIN — Medication 81 MILLIGRAM(S): at 12:44

## 2023-07-23 RX ADMIN — Medication 125 MICROGRAM(S): at 05:49

## 2023-07-23 RX ADMIN — Medication 650 MILLIGRAM(S): at 21:36

## 2023-07-23 RX ADMIN — CYCLOBENZAPRINE HYDROCHLORIDE 10 MILLIGRAM(S): 10 TABLET, FILM COATED ORAL at 21:35

## 2023-07-23 RX ADMIN — Medication 3 MILLILITER(S): at 12:43

## 2023-07-23 RX ADMIN — GABAPENTIN 100 MILLIGRAM(S): 400 CAPSULE ORAL at 21:41

## 2023-07-23 RX ADMIN — Medication 40 MILLIGRAM(S): at 05:48

## 2023-07-23 RX ADMIN — Medication 1 TABLET(S): at 05:48

## 2023-07-23 RX ADMIN — BUDESONIDE AND FORMOTEROL FUMARATE DIHYDRATE 2 PUFF(S): 160; 4.5 AEROSOL RESPIRATORY (INHALATION) at 05:49

## 2023-07-23 RX ADMIN — Medication 3 MILLIGRAM(S): at 21:41

## 2023-07-23 RX ADMIN — LIDOCAINE 1 PATCH: 4 CREAM TOPICAL at 12:43

## 2023-07-23 RX ADMIN — Medication 3 MILLILITER(S): at 05:47

## 2023-07-23 RX ADMIN — Medication 500000 UNIT(S): at 20:00

## 2023-07-23 RX ADMIN — HEPARIN SODIUM 5000 UNIT(S): 5000 INJECTION INTRAVENOUS; SUBCUTANEOUS at 05:49

## 2023-07-23 RX ADMIN — Medication 200 MILLIGRAM(S): at 06:30

## 2023-07-23 RX ADMIN — BUDESONIDE AND FORMOTEROL FUMARATE DIHYDRATE 2 PUFF(S): 160; 4.5 AEROSOL RESPIRATORY (INHALATION) at 17:19

## 2023-07-23 NOTE — PROGRESS NOTE ADULT - ASSESSMENT
48 year old male with history of SLE on prednisone, Plaquenil and Mycophenylate mofetil, Asthma, hypothyroidism who presents to [facility] ED with chief complaint of shortness of breath admitted for asthma exacerbation.

## 2023-07-23 NOTE — PROGRESS NOTE ADULT - SUBJECTIVE AND OBJECTIVE BOX
[facility] Division of Hospital Medicine  Mode Medina DO  Reachable on [employer] Teams    Patient is a 48y old  Male who presents with a chief complaint of Shortness of breath (22 Jul 2023 08:28)    SUBJECTIVE / OVERNIGHT EVENTS: No acute events overnight. Patient seen and examined at bedside this morning, endorses shortness of breath, chest tightness, cough.    REVIEW OF SYSTEMS:    CONSTITUTIONAL: No fevers or chills  EYES/ENT: No visual changes;  No vertigo or throat pain   NECK: No pain or stiffness  RESPIRATORY: Endorses cough, shortness of breath, chest tightness  CARDIOVASCULAR: Endorses chest tightness, denies pain  GASTROINTESTINAL: No abdominal or epigastric pain. No nausea, vomiting, or hematemesis; endorses diarrhea  GENITOURINARY: No dysuria, frequency or hematuria  NEUROLOGICAL: No numbness or weakness  SKIN: No itching, burning, rashes, or lesions  MSK: No joint pain, no back pain  HEME: No easy bleeding, no easy bruising  All other review of systems is negative unless indicated above.    MEDICATIONS  (STANDING):  albuterol/ipratropium for Nebulization 3 milliLiter(s) Nebulizer every 6 hours  aspirin enteric coated 81 milliGRAM(s) Oral daily  benzonatate 200 milliGRAM(s) Oral every 8 hours  budesonide 160 MICROgram(s)/formoterol 4.5 MICROgram(s) Inhaler 2 Puff(s) Inhalation two times a day  calcium carbonate   1250 mG (OsCal) 1 Tablet(s) Oral daily  gabapentin 100 milliGRAM(s) Oral at bedtime  heparin   Injectable 5000 Unit(s) SubCutaneous every 12 hours  hydroxychloroquine 400 milliGRAM(s) Oral daily  levothyroxine 125 MICROGram(s) Oral daily  lidocaine   4% Patch 1 Patch Transdermal daily  losartan 50 milliGRAM(s) Oral daily  mycophenolic acid  milliGRAM(s) Oral two times a day  pantoprazole    Tablet 40 milliGRAM(s) Oral before breakfast  predniSONE   Tablet 40 milliGRAM(s) Oral daily  trimethoprim  160 mG/sulfamethoxazole 800 mG 1 Tablet(s) Oral <User Schedule>    MEDICATIONS  (PRN):  acetaminophen     Tablet .. 650 milliGRAM(s) Oral every 6 hours PRN Temp greater or equal to 38C (100.4F), Mild Pain (1 - 3)  aluminum hydroxide/magnesium hydroxide/simethicone Suspension 30 milliLiter(s) Oral every 4 hours PRN Dyspepsia  cyclobenzaprine 10 milliGRAM(s) Oral three times a day PRN Muscle Spasm  hydrocodone/homatropine Syrup 10 milliLiter(s) Oral every 6 hours PRN Cough  melatonin 3 milliGRAM(s) Oral at bedtime PRN Insomnia  ondansetron Injectable 4 milliGRAM(s) IV Push every 8 hours PRN Nausea and/or Vomiting      CAPILLARY BLOOD GLUCOSE        I&O's Summary    22 Jul 2023 07:01  -  23 Jul 2023 07:00  --------------------------------------------------------  IN: 540 mL / OUT: 0 mL / NET: 540 mL        PHYSICAL EXAM:  Vital Signs Last 24 Hrs  T(C): 37.1 (23 Jul 2023 11:51), Max: 37.1 (23 Jul 2023 11:51)  T(F): 98.7 (23 Jul 2023 11:51), Max: 98.7 (23 Jul 2023 11:51)  HR: 108 (23 Jul 2023 11:51) (92 - 108)  BP: 113/77 (23 Jul 2023 11:51) (107/73 - 114/71)  BP(mean): --  RR: 18 (23 Jul 2023 11:51) (18 - 18)  SpO2: 97% (23 Jul 2023 11:51) (96% - 97%)    Parameters below as of 23 Jul 2023 11:51  Patient On (Oxygen Delivery Method): room air    CONSTITUTIONAL: NAD, well-developed, well-groomed  EYES: EOMI; conjunctiva and sclera clear  ENMT: Moist oral mucosa, no pharyngeal injection or exudates  RESPIRATORY: Normal respiratory effort; lungs are clear to auscultation bilaterally  CARDIOVASCULAR: Regular rate and rhythm, normal S1 and S2, no murmur/rub/gallop; No lower extremity edema  ABDOMEN: Nontender to palpation, nondistended, soft  MUSCULOSKELETAL: No clubbing or cyanosis of digits; no joint swelling or tenderness to palpation  PSYCH: A+O to person, place, and time; affect appropriate  NEUROLOGY: CN 2-12 are intact and symmetric; no gross sensory deficits   SKIN: No rashes; no palpable lesions    LABS:                        14.6   11.13 )-----------( 310      ( 22 Jul 2023 08:52 )             41.6     07-22    139  |  104  |  10  ----------------------------<  112<H>  3.7   |  24  |  0.84    Ca    9.1      22 Jul 2023 08:52            Urinalysis Basic - ( 22 Jul 2023 08:52 )    Color: x / Appearance: x / SG: x / pH: x  Gluc: 112 mg/dL / Ketone: x  / Bili: x / Urobili: x   Blood: x / Protein: x / Nitrite: x   Leuk Esterase: x / RBC: x / WBC x   Sq Epi: x / Non Sq Epi: x / Bacteria: x

## 2023-07-23 NOTE — PROGRESS NOTE ADULT - PROBLEM SELECTOR PLAN 1
Symptoms present since last week with minimal improvement despite inhalers.   - Pulm consulted follow up recs  - Mesfin [id number] ordered.   - Solumedrol tapered to 40mg Prednisone  - Continue  Symbicort  - Monitor oxygen saturation  - Currently on Room Air.

## 2023-07-24 DIAGNOSIS — A41.9 SEPSIS, UNSPECIFIED ORGANISM: ICD-10-CM

## 2023-07-24 LAB
ALDOLASE SERPL-CCNC: 9.7 U/L — SIGNIFICANT CHANGE UP (ref 3.3–10.3)
ANION GAP SERPL CALC-SCNC: 18 MMOL/L — HIGH (ref 5–17)
APPEARANCE UR: CLEAR — SIGNIFICANT CHANGE UP
BACTERIA # UR AUTO: NEGATIVE — SIGNIFICANT CHANGE UP
BILIRUB UR-MCNC: NEGATIVE — SIGNIFICANT CHANGE UP
BUN SERPL-MCNC: 14 MG/DL — SIGNIFICANT CHANGE UP (ref 7–23)
CALCIUM SERPL-MCNC: 8.7 MG/DL — SIGNIFICANT CHANGE UP (ref 8.4–10.5)
CHLORIDE SERPL-SCNC: 100 MMOL/L — SIGNIFICANT CHANGE UP (ref 96–108)
CO2 SERPL-SCNC: 20 MMOL/L — LOW (ref 22–31)
COLOR SPEC: YELLOW — SIGNIFICANT CHANGE UP
CREAT SERPL-MCNC: 1.18 MG/DL — SIGNIFICANT CHANGE UP (ref 0.5–1.3)
DIFF PNL FLD: NEGATIVE — SIGNIFICANT CHANGE UP
EGFR: 77 ML/MIN/1.73M2 — SIGNIFICANT CHANGE UP
EPI CELLS # UR: 1 /HPF — SIGNIFICANT CHANGE UP
GLUCOSE BLDC GLUCOMTR-MCNC: 148 MG/DL — HIGH (ref 70–99)
GLUCOSE SERPL-MCNC: 98 MG/DL — SIGNIFICANT CHANGE UP (ref 70–99)
GLUCOSE UR QL: NEGATIVE — SIGNIFICANT CHANGE UP
HCT VFR BLD CALC: 38.5 % — LOW (ref 39–50)
HGB BLD-MCNC: 12.3 G/DL — LOW (ref 13–17)
KETONES UR-MCNC: NEGATIVE — SIGNIFICANT CHANGE UP
LEUKOCYTE ESTERASE UR-ACNC: NEGATIVE — SIGNIFICANT CHANGE UP
MCHC RBC-ENTMCNC: 27.5 PG — SIGNIFICANT CHANGE UP (ref 27–34)
MCHC RBC-ENTMCNC: 31.9 GM/DL — LOW (ref 32–36)
MCV RBC AUTO: 86.1 FL — SIGNIFICANT CHANGE UP (ref 80–100)
NITRITE UR-MCNC: NEGATIVE — SIGNIFICANT CHANGE UP
NRBC # BLD: 0 /100 WBCS — SIGNIFICANT CHANGE UP (ref 0–0)
PH UR: 7.5 — SIGNIFICANT CHANGE UP (ref 5–8)
PLATELET # BLD AUTO: 276 K/UL — SIGNIFICANT CHANGE UP (ref 150–400)
POTASSIUM SERPL-MCNC: 3.5 MMOL/L — SIGNIFICANT CHANGE UP (ref 3.5–5.3)
POTASSIUM SERPL-SCNC: 3.5 MMOL/L — SIGNIFICANT CHANGE UP (ref 3.5–5.3)
PROCALCITONIN SERPL-MCNC: 1.15 NG/ML — HIGH (ref 0.02–0.1)
PROT UR-MCNC: ABNORMAL
RAPID RVP RESULT: DETECTED
RBC # BLD: 4.47 M/UL — SIGNIFICANT CHANGE UP (ref 4.2–5.8)
RBC # FLD: 14.8 % — HIGH (ref 10.3–14.5)
RBC CASTS # UR COMP ASSIST: 2 /HPF — SIGNIFICANT CHANGE UP (ref 0–4)
RV+EV RNA SPEC QL NAA+PROBE: DETECTED
SARS-COV-2 RNA SPEC QL NAA+PROBE: SIGNIFICANT CHANGE UP
SODIUM SERPL-SCNC: 138 MMOL/L — SIGNIFICANT CHANGE UP (ref 135–145)
SP GR SPEC: 1.03 — HIGH (ref 1.01–1.02)
UROBILINOGEN FLD QL: NEGATIVE — SIGNIFICANT CHANGE UP
WBC # BLD: 12.48 K/UL — HIGH (ref 3.8–10.5)
WBC # FLD AUTO: 12.48 K/UL — HIGH (ref 3.8–10.5)
WBC UR QL: 1 /HPF — SIGNIFICANT CHANGE UP (ref 0–5)

## 2023-07-24 PROCEDURE — 99233 SBSQ HOSP IP/OBS HIGH 50: CPT

## 2023-07-24 PROCEDURE — 74177 CT ABD & PELVIS W/CONTRAST: CPT | Mod: 26

## 2023-07-24 PROCEDURE — 71045 X-RAY EXAM CHEST 1 VIEW: CPT | Mod: 26

## 2023-07-24 PROCEDURE — 71275 CT ANGIOGRAPHY CHEST: CPT | Mod: 26

## 2023-07-24 RX ORDER — PIPERACILLIN AND TAZOBACTAM 4; .5 G/20ML; G/20ML
3.38 INJECTION, POWDER, LYOPHILIZED, FOR SOLUTION INTRAVENOUS ONCE
Refills: 0 | Status: COMPLETED | OUTPATIENT
Start: 2023-07-24 | End: 2023-07-24

## 2023-07-24 RX ORDER — PIPERACILLIN AND TAZOBACTAM 4; .5 G/20ML; G/20ML
3.38 INJECTION, POWDER, LYOPHILIZED, FOR SOLUTION INTRAVENOUS EVERY 8 HOURS
Refills: 0 | Status: DISCONTINUED | OUTPATIENT
Start: 2023-07-24 | End: 2023-07-26

## 2023-07-24 RX ORDER — SODIUM CHLORIDE 9 MG/ML
500 INJECTION INTRAMUSCULAR; INTRAVENOUS; SUBCUTANEOUS ONCE
Refills: 0 | Status: COMPLETED | OUTPATIENT
Start: 2023-07-24 | End: 2023-07-24

## 2023-07-24 RX ORDER — SODIUM CHLORIDE 9 MG/ML
1000 INJECTION, SOLUTION INTRAVENOUS
Refills: 0 | Status: DISCONTINUED | OUTPATIENT
Start: 2023-07-24 | End: 2023-07-24

## 2023-07-24 RX ORDER — CYCLOBENZAPRINE HYDROCHLORIDE 10 MG/1
10 TABLET, FILM COATED ORAL ONCE
Refills: 0 | Status: COMPLETED | OUTPATIENT
Start: 2023-07-24 | End: 2023-07-24

## 2023-07-24 RX ORDER — SODIUM CHLORIDE 9 MG/ML
1000 INJECTION INTRAMUSCULAR; INTRAVENOUS; SUBCUTANEOUS
Refills: 0 | Status: DISCONTINUED | OUTPATIENT
Start: 2023-07-24 | End: 2023-07-27

## 2023-07-24 RX ORDER — FLUCONAZOLE 150 MG/1
200 TABLET ORAL ONCE
Refills: 0 | Status: COMPLETED | OUTPATIENT
Start: 2023-07-24 | End: 2023-07-24

## 2023-07-24 RX ORDER — FLUCONAZOLE 150 MG/1
100 TABLET ORAL EVERY 24 HOURS
Refills: 0 | Status: DISCONTINUED | OUTPATIENT
Start: 2023-07-25 | End: 2023-07-27

## 2023-07-24 RX ORDER — SODIUM CHLORIDE 9 MG/ML
250 INJECTION INTRAMUSCULAR; INTRAVENOUS; SUBCUTANEOUS ONCE
Refills: 0 | Status: COMPLETED | OUTPATIENT
Start: 2023-07-24 | End: 2023-07-24

## 2023-07-24 RX ORDER — ACETAMINOPHEN 500 MG
1000 TABLET ORAL ONCE
Refills: 0 | Status: COMPLETED | OUTPATIENT
Start: 2023-07-24 | End: 2023-07-24

## 2023-07-24 RX ADMIN — SODIUM CHLORIDE 500 MILLILITER(S): 9 INJECTION INTRAMUSCULAR; INTRAVENOUS; SUBCUTANEOUS at 12:04

## 2023-07-24 RX ADMIN — Medication 3 MILLILITER(S): at 06:25

## 2023-07-24 RX ADMIN — Medication 650 MILLIGRAM(S): at 12:28

## 2023-07-24 RX ADMIN — Medication 650 MILLIGRAM(S): at 23:10

## 2023-07-24 RX ADMIN — Medication 81 MILLIGRAM(S): at 12:18

## 2023-07-24 RX ADMIN — Medication 3 MILLILITER(S): at 23:46

## 2023-07-24 RX ADMIN — SODIUM CHLORIDE 75 MILLILITER(S): 9 INJECTION INTRAMUSCULAR; INTRAVENOUS; SUBCUTANEOUS at 12:18

## 2023-07-24 RX ADMIN — Medication 1000 MILLIGRAM(S): at 01:00

## 2023-07-24 RX ADMIN — GABAPENTIN 100 MILLIGRAM(S): 400 CAPSULE ORAL at 22:30

## 2023-07-24 RX ADMIN — BUDESONIDE AND FORMOTEROL FUMARATE DIHYDRATE 2 PUFF(S): 160; 4.5 AEROSOL RESPIRATORY (INHALATION) at 17:59

## 2023-07-24 RX ADMIN — Medication 500000 UNIT(S): at 18:00

## 2023-07-24 RX ADMIN — SODIUM CHLORIDE 75 MILLILITER(S): 9 INJECTION, SOLUTION INTRAVENOUS at 02:15

## 2023-07-24 RX ADMIN — Medication 500000 UNIT(S): at 12:27

## 2023-07-24 RX ADMIN — Medication 400 MILLIGRAM(S): at 06:14

## 2023-07-24 RX ADMIN — Medication 200 MILLIGRAM(S): at 13:16

## 2023-07-24 RX ADMIN — PIPERACILLIN AND TAZOBACTAM 25 GRAM(S): 4; .5 INJECTION, POWDER, LYOPHILIZED, FOR SOLUTION INTRAVENOUS at 16:08

## 2023-07-24 RX ADMIN — SODIUM CHLORIDE 1000 MILLILITER(S): 9 INJECTION INTRAMUSCULAR; INTRAVENOUS; SUBCUTANEOUS at 00:29

## 2023-07-24 RX ADMIN — Medication 3 MILLILITER(S): at 13:16

## 2023-07-24 RX ADMIN — PIPERACILLIN AND TAZOBACTAM 25 GRAM(S): 4; .5 INJECTION, POWDER, LYOPHILIZED, FOR SOLUTION INTRAVENOUS at 22:31

## 2023-07-24 RX ADMIN — PANTOPRAZOLE SODIUM 40 MILLIGRAM(S): 20 TABLET, DELAYED RELEASE ORAL at 06:23

## 2023-07-24 RX ADMIN — FLUCONAZOLE 200 MILLIGRAM(S): 150 TABLET ORAL at 12:04

## 2023-07-24 RX ADMIN — SODIUM CHLORIDE 75 MILLILITER(S): 9 INJECTION INTRAMUSCULAR; INTRAVENOUS; SUBCUTANEOUS at 23:47

## 2023-07-24 RX ADMIN — Medication 650 MILLIGRAM(S): at 22:27

## 2023-07-24 RX ADMIN — Medication 3 MILLILITER(S): at 18:00

## 2023-07-24 RX ADMIN — Medication 1 TABLET(S): at 12:18

## 2023-07-24 RX ADMIN — MYCOPHENOLIC ACID 720 MILLIGRAM(S): 180 TABLET, DELAYED RELEASE ORAL at 06:34

## 2023-07-24 RX ADMIN — Medication 500000 UNIT(S): at 00:29

## 2023-07-24 RX ADMIN — Medication 125 MICROGRAM(S): at 06:22

## 2023-07-24 RX ADMIN — Medication 200 MILLIGRAM(S): at 22:49

## 2023-07-24 RX ADMIN — Medication 500000 UNIT(S): at 06:14

## 2023-07-24 RX ADMIN — LIDOCAINE 1 PATCH: 4 CREAM TOPICAL at 00:31

## 2023-07-24 RX ADMIN — SODIUM CHLORIDE 250 MILLILITER(S): 9 INJECTION INTRAMUSCULAR; INTRAVENOUS; SUBCUTANEOUS at 21:01

## 2023-07-24 RX ADMIN — PIPERACILLIN AND TAZOBACTAM 200 GRAM(S): 4; .5 INJECTION, POWDER, LYOPHILIZED, FOR SOLUTION INTRAVENOUS at 12:04

## 2023-07-24 RX ADMIN — BUDESONIDE AND FORMOTEROL FUMARATE DIHYDRATE 2 PUFF(S): 160; 4.5 AEROSOL RESPIRATORY (INHALATION) at 06:21

## 2023-07-24 RX ADMIN — HEPARIN SODIUM 5000 UNIT(S): 5000 INJECTION INTRAVENOUS; SUBCUTANEOUS at 18:00

## 2023-07-24 RX ADMIN — LOSARTAN POTASSIUM 50 MILLIGRAM(S): 100 TABLET, FILM COATED ORAL at 06:22

## 2023-07-24 RX ADMIN — Medication 40 MILLIGRAM(S): at 06:19

## 2023-07-24 RX ADMIN — CYCLOBENZAPRINE HYDROCHLORIDE 10 MILLIGRAM(S): 10 TABLET, FILM COATED ORAL at 23:48

## 2023-07-24 RX ADMIN — ONDANSETRON 4 MILLIGRAM(S): 8 TABLET, FILM COATED ORAL at 04:18

## 2023-07-24 RX ADMIN — HEPARIN SODIUM 5000 UNIT(S): 5000 INJECTION INTRAVENOUS; SUBCUTANEOUS at 06:19

## 2023-07-24 RX ADMIN — Medication 400 MILLIGRAM(S): at 12:27

## 2023-07-24 RX ADMIN — Medication 400 MILLIGRAM(S): at 00:28

## 2023-07-24 RX ADMIN — Medication 200 MILLIGRAM(S): at 06:21

## 2023-07-24 NOTE — PROGRESS NOTE ADULT - PROBLEM SELECTOR PLAN 1
Pt is febrile with sinus tachycardia, hypotension, elevated white count and abd pain, could be secondary to norovirus gastroenteritis vs other superimposed infection, elevated procal  - Low ESR/CRP thus low suspicion for lupus/myositis flare  - CTA chest and CT abd ordered  - Given immunocompromised status, started on zosyn  - Noted with thrush with concern for candida esophagitis, cont nystatin S&S, add diflucan IV 200mg X 1 day, then 100mg daily  - If fevers persist, ID consult  - F/u with rheum, re: holding cellcept  - F/u repeat blood cultures  - 500cc IV bolus, then NS at 75cc/hr

## 2023-07-24 NOTE — PROVIDER CONTACT NOTE (OTHER) - ACTION/TREATMENT ORDERED:
IV Tylenol ordered. no new orders. pending Cxray.
IV Tylenol ordered and IVF bolus. pan culture, UA and C X-ray ordered.
give Tylenol PRN dose. no other orders.

## 2023-07-24 NOTE — PROVIDER CONTACT NOTE (OTHER) - ASSESSMENT
pt continues to have chills and cold. pt feels that his Heart is beating fast. /64, pox 94% on RA. PT A&O x4. denies cp/sob.

## 2023-07-24 NOTE — PROGRESS NOTE ADULT - ASSESSMENT
48 year old male with history of SLE on prednisone, Plaquenil and Mycophenylate mofetil, Asthma, hypothyroidism who presents to [facility] ED with chief complaint of shortness of breath admitted for asthma exacerbation.

## 2023-07-24 NOTE — CHART NOTE - NSCHARTNOTEFT_GEN_A_CORE
MEDICINE NP    Notified by RN patient with temperature 101.1 F . Seen at bedside in NAD. Patient is alert and awake. Pt c/o very thirsty and still having diarrhoea total 3 times today. Denies HA, CP, SOB, cough, N/V, or abd pain.    VITAL SIGNS:  T(C): 38.4 (07-23-23 @ 23:43), Max: 38.4 (07-23-23 @ 23:43)  HR: 140 (07-23-23 @ 23:43) (92 - 140)  BP: 102/64 (07-23-23 @ 23:43) (102/64 - 126/83)  RR: 18 (07-23-23 @ 23:43) (18 - 18)  SpO2: 94% (07-23-23 @ 23:43) (94% - 99%)  Wt(kg): --      LABORATORY:                          14.6   11.13 )-----------( 310      ( 22 Jul 2023 08:52 )             41.6       07-22    139  |  104  |  10  ----------------------------<  112<H>  3.7   |  24  |  0.84    Ca    9.1      22 Jul 2023 08:52          ASSESSMENT/PLAN:     48 year old male with past medical history of Asthma, Lupus/myositis overlap syndrome, Hypothyroidism who presents to [facility] with shortness of breath related to asthma exacerbation that started last week and will not subside. His symptoms are associated with cough with yellow phlegm, as well as chest pain related to the cough.  Patient states that he has been sick for sometime including nausea, vomiting, and diarrhea for the last month after he contracted Norovirus. Patient describes that he was in the hospital last year for asthma exacerbation secondary to RSV as well.  ·Denies  headache, dizziness, fever, abdominal pain.  (18 Jul 2023 15:46)      1) Fever most likely from +Norovirus  -tylenol and cooling measures prn for pyrexia  -BC x2, UA/UC  -CXR  -c/w Bactrim  -NS 500cc iv bolus x1 then LR at 75cc/hr for 12 hrs  -f/u labs  -ID consult  -cont assess and monitor  -F/U primary team in AM

## 2023-07-24 NOTE — PROVIDER CONTACT NOTE (OTHER) - SITUATION
nausea/vomitting & HR in 130-150's.
Temp 100.1 and pt c/o chills
Temp 101.1F & -140's on tele monitor. Temp is reduced with PRN Tylenol po given at 2150

## 2023-07-25 ENCOUNTER — TRANSCRIPTION ENCOUNTER (OUTPATIENT)
Age: 48
End: 2023-07-25

## 2023-07-25 LAB
ANION GAP SERPL CALC-SCNC: 14 MMOL/L — SIGNIFICANT CHANGE UP (ref 5–17)
BUN SERPL-MCNC: 12 MG/DL — SIGNIFICANT CHANGE UP (ref 7–23)
CALCIUM SERPL-MCNC: 8.6 MG/DL — SIGNIFICANT CHANGE UP (ref 8.4–10.5)
CHLORIDE SERPL-SCNC: 103 MMOL/L — SIGNIFICANT CHANGE UP (ref 96–108)
CO2 SERPL-SCNC: 21 MMOL/L — LOW (ref 22–31)
CREAT SERPL-MCNC: 1.03 MG/DL — SIGNIFICANT CHANGE UP (ref 0.5–1.3)
EGFR: 90 ML/MIN/1.73M2 — SIGNIFICANT CHANGE UP
GLUCOSE SERPL-MCNC: 117 MG/DL — HIGH (ref 70–99)
MAGNESIUM SERPL-MCNC: 2 MG/DL — SIGNIFICANT CHANGE UP (ref 1.6–2.6)
PHOSPHATE SERPL-MCNC: 3.5 MG/DL — SIGNIFICANT CHANGE UP (ref 2.5–4.5)
POTASSIUM SERPL-MCNC: 3.6 MMOL/L — SIGNIFICANT CHANGE UP (ref 3.5–5.3)
POTASSIUM SERPL-SCNC: 3.6 MMOL/L — SIGNIFICANT CHANGE UP (ref 3.5–5.3)
SODIUM SERPL-SCNC: 138 MMOL/L — SIGNIFICANT CHANGE UP (ref 135–145)

## 2023-07-25 PROCEDURE — 99254 IP/OBS CNSLTJ NEW/EST MOD 60: CPT

## 2023-07-25 PROCEDURE — 99233 SBSQ HOSP IP/OBS HIGH 50: CPT

## 2023-07-25 PROCEDURE — 99233 SBSQ HOSP IP/OBS HIGH 50: CPT | Mod: GC

## 2023-07-25 RX ADMIN — HEPARIN SODIUM 5000 UNIT(S): 5000 INJECTION INTRAVENOUS; SUBCUTANEOUS at 17:27

## 2023-07-25 RX ADMIN — Medication 500000 UNIT(S): at 17:27

## 2023-07-25 RX ADMIN — Medication 30 MILLIGRAM(S): at 05:17

## 2023-07-25 RX ADMIN — Medication 3 MILLILITER(S): at 17:27

## 2023-07-25 RX ADMIN — Medication 3 MILLILITER(S): at 12:55

## 2023-07-25 RX ADMIN — PIPERACILLIN AND TAZOBACTAM 25 GRAM(S): 4; .5 INJECTION, POWDER, LYOPHILIZED, FOR SOLUTION INTRAVENOUS at 05:29

## 2023-07-25 RX ADMIN — Medication 200 MILLIGRAM(S): at 05:22

## 2023-07-25 RX ADMIN — Medication 125 MICROGRAM(S): at 05:22

## 2023-07-25 RX ADMIN — Medication 81 MILLIGRAM(S): at 12:56

## 2023-07-25 RX ADMIN — HEPARIN SODIUM 5000 UNIT(S): 5000 INJECTION INTRAVENOUS; SUBCUTANEOUS at 05:22

## 2023-07-25 RX ADMIN — Medication 3 MILLILITER(S): at 05:21

## 2023-07-25 RX ADMIN — BUDESONIDE AND FORMOTEROL FUMARATE DIHYDRATE 2 PUFF(S): 160; 4.5 AEROSOL RESPIRATORY (INHALATION) at 05:20

## 2023-07-25 RX ADMIN — Medication 1 TABLET(S): at 05:37

## 2023-07-25 RX ADMIN — LOSARTAN POTASSIUM 50 MILLIGRAM(S): 100 TABLET, FILM COATED ORAL at 05:21

## 2023-07-25 RX ADMIN — Medication 3 MILLIGRAM(S): at 22:06

## 2023-07-25 RX ADMIN — PANTOPRAZOLE SODIUM 40 MILLIGRAM(S): 20 TABLET, DELAYED RELEASE ORAL at 05:21

## 2023-07-25 RX ADMIN — Medication 200 MILLIGRAM(S): at 12:56

## 2023-07-25 RX ADMIN — BUDESONIDE AND FORMOTEROL FUMARATE DIHYDRATE 2 PUFF(S): 160; 4.5 AEROSOL RESPIRATORY (INHALATION) at 17:27

## 2023-07-25 RX ADMIN — PIPERACILLIN AND TAZOBACTAM 25 GRAM(S): 4; .5 INJECTION, POWDER, LYOPHILIZED, FOR SOLUTION INTRAVENOUS at 22:07

## 2023-07-25 RX ADMIN — Medication 650 MILLIGRAM(S): at 13:40

## 2023-07-25 RX ADMIN — FLUCONAZOLE 50 MILLIGRAM(S): 150 TABLET ORAL at 05:18

## 2023-07-25 RX ADMIN — GABAPENTIN 100 MILLIGRAM(S): 400 CAPSULE ORAL at 22:00

## 2023-07-25 RX ADMIN — Medication 500000 UNIT(S): at 12:57

## 2023-07-25 RX ADMIN — Medication 400 MILLIGRAM(S): at 12:56

## 2023-07-25 RX ADMIN — Medication 1 TABLET(S): at 12:56

## 2023-07-25 RX ADMIN — PIPERACILLIN AND TAZOBACTAM 25 GRAM(S): 4; .5 INJECTION, POWDER, LYOPHILIZED, FOR SOLUTION INTRAVENOUS at 12:58

## 2023-07-25 RX ADMIN — Medication 500000 UNIT(S): at 05:17

## 2023-07-25 RX ADMIN — Medication 200 MILLIGRAM(S): at 22:00

## 2023-07-25 RX ADMIN — Medication 650 MILLIGRAM(S): at 12:57

## 2023-07-25 NOTE — PROGRESS NOTE ADULT - ASSESSMENT
This is a 48 year old male with past medical history of Asthma, Lupus/myositis overlap syndrome, and hypothyroidism who presents to [facility] with shortness of breath in the setting of asthma exacerbation. Course c/b fevers likely in the setting of entero/rhinovirus.      #Overlap Syndrome (SLE/myositis)   - Dx 2019 with Overlap syndrome with features of myositis, SLE. Manifestations in the past have included joint pain, fatigue, sun sensitivity with serologies: OWEN 1:2560, +dsDNA 60s, +Sm, +RNP, +LAC, low complements, +U1RNP, weak+ PM/  - Currently on outpatient PO Prednisone 5mg/day, HCQ 400mg/day, and Myfortic 720 mg BID     #Fever  - Likely due to entero/rhinovirus   - Myfortic held in the setting of infection   - Prednisone taper started     Recommendations:   - C/w HCQ 400mg/day  - C/w prednisone taper as per Pulmonology: currently on prednisone 30 mg PO with plans to taper by 10 mg every 3 days until baseline 5 mg dose  - Can restart PO Myfortic 720 mg BID on 7/26   - Contacted outpatient rheumatologist Dr. Neri regarding pt's admission, will need to follow up with her after discharge     Case discussed with Dr. Shepherd and primary team     Celina Trent MD   Rheumatology Fellow   Available on Teams  This is a 48 year old male with past medical history of Asthma, Lupus/myositis overlap syndrome, and hypothyroidism who presents to [facility] with shortness of breath in the setting of asthma exacerbation. Course c/b fevers likely in the setting of entero/rhinovirus.      #Overlap Syndrome (SLE/myositis)   - Dx 2019 with Overlap syndrome with features of myositis, SLE. Manifestations in the past have included joint pain, fatigue, sun sensitivity with serologies: OWEN 1:2560, +dsDNA 60s, +Sm, +RNP, +LAC, low complements, +U1RNP, weak+ PM/  - Currently on outpatient PO Prednisone 5mg/day, HCQ 400mg/day, and Myfortic 720 mg BID     #Fever  - Likely due to entero/rhinovirus   - Myfortic held in the setting of infection   - Prednisone taper started     Recommendations:   - C/w HCQ 400mg/day  - C/w prednisone taper as per Pulmonology: currently on prednisone 30 mg PO with plans to taper by 10 mg every 3 days until baseline 5 mg dose  - Can restart PO Myfortic 720 mg BID on 7/26   - Contacted outpatient rheumatologist Dr. Neri regarding pt's admission, will need to follow up with her after discharge (we will ensure he has follow up)     Rheumatology to sign off. Please contact us if any questions or concerns via TEAMS     Case discussed with Dr. Shepherd and primary team     Celina Trent MD   Rheumatology Fellow   Available on TEAMS

## 2023-07-25 NOTE — CONSULT NOTE ADULT - SUBJECTIVE AND OBJECTIVE BOX
Patient is a 48y old  Male who presents with a chief complaint of Shortness of breath (25 Jul 2023 14:18)    HPI:  48M with asthma, lupus/myositis overlap syndrome, hypothyroidism who presented 7/18/2023 c/o SOB.  Last month had norovirus infection.  This admission, he was started on steroids for asthma exacerbation.  Continued to have SOB.  Did not require supplemental O2.  Had PIV that was removed 7/22 due to leaking.  7/24 had fever 101.  BC sent.  CT done that did not show PE or intra-abdominal source of fever. RVP was +rhino/entero.  Started on vancomycin x1 and zosyn.      ID asked to help management.     prior hospital charts reviewed [  ]  primary team notes reviewed [ x ]  other consultant notes reviewed [x  ]    PAST MEDICAL & SURGICAL HISTORY:  Lupus  Asthma  Hypothyroid  History of cholecystectomy    Allergies  No Known Allergies    ANTIMICROBIALS:  fluconAZOLE IVPB 100 every 24 hours (7/24-)  nystatin    Suspension 863481 four times a day  piperacillin/tazobactam IVPB.. 3.375 every 8 hours (7/24-)  trimethoprim  160 mG/sulfamethoxazole 800 mG 1 <User Schedule> (7/21-)    MEDICATIONS  (STANDING):  albuterol/ipratropium for Nebulization 3 every 6 hours  aspirin enteric coated 81 daily  benzonatate 200 every 8 hours  budesonide 160 MICROgram(s)/formoterol 4.5 MICROgram(s) Inhaler 2 two times a day  gabapentin 100 at bedtime  heparin   Injectable 5000 every 12 hours  levothyroxine 125 daily  losartan 50 daily  pantoprazole    Tablet 40 before breakfast  predniSONE   Tablet 30 daily    SOCIAL HISTORY:       FAMILY HISTORY:  FH: rheumatoid arthritis (Mother)    REVIEW OF SYSTEMS  [  ] ROS unobtainable because:    [  ] All other systems negative except as noted below:	    Constitutional:  [ ] fever [ ] chills  [ ] weight loss  [ ] weakness  Skin:  [ ] rash [ ] phlebitis	  Eyes: [ ] icterus [ ] pain  [ ] discharge	  ENMT: [ ] sore throat  [ ] thrush [ ] ulcers [ ] exudates  Respiratory: [ ] dyspnea [ ] hemoptysis [ ] cough [ ] sputum	  Cardiovascular:  [ ] chest pain [ ] palpitations [ ] edema	  Gastrointestinal:  [ ] nausea [ ] vomiting [ ] diarrhea [ ] constipation [ ] pain	  Genitourinary:  [ ] dysuria [ ] frequency [ ] hematuria [ ] discharge [ ] flank pain  [ ] incontinence  Musculoskeletal:  [ ] myalgias [ ] arthralgias [ ] arthritis  [ ] back pain  Neurological:  [ ] headache [ ] seizures  [ ] confusion/altered mental status  Psychiatric:  [ ] anxiety [ ] depression	  Hematology/Lymphatics:  [ ] lymphadenopathy  Endocrine:  [ ] adrenal [ ] thyroid  Allergic/Immunologic:	 [ ] transplant [ ] seasonal    Vital Signs Last 24 Hrs  T(F): 98 (07-25-23 @ 12:25), Max: 101.5 (07-24-23 @ 05:15)  Vital Signs Last 24 Hrs  HR: 98 (07-25-23 @ 12:25) (90 - 99)  BP: 104/71 (07-25-23 @ 12:25) (94/62 - 113/74)  RR: 18 (07-25-23 @ 12:25)  SpO2: 96% (07-25-23 @ 12:25) (94% - 97%)  Wt(kg): --    PHYSICAL EXAM:                              12.3   12.48 )-----------( 276      ( 24 Jul 2023 07:31 )             38.5   07-25    138  |  103  |  12  ----------------------------<  117<H>  3.6   |  21<L>  |  1.03    Ca    8.6      25 Jul 2023 07:13  Phos  3.5     07-25  Mg     2.0     07-25    Urinalysis (07.24.23 @ 00:47)   Blood, Urine: Negative  pH Urine: 7.5  Glucose Qualitative, Urine: Negative  Color: Yellow  Urine Appearance: Clear  Bilirubin: Negative  Ketone - Urine: Negative  Specific Gravity: 1.028  Protein, Urine: 30 mg/dL  Urobilinogen: Negative  Nitrite: Negative  Leukocyte Esterase Concentration: Negative  1WBC    MICROBIOLOGY:  Culture - Blood (collected 24 Jul 2023 07:29)  Source: .Blood Blood-Peripheral  Preliminary Report (25 Jul 2023 09:01):    No growth at 24 hours    Culture - Blood (collected 24 Jul 2023 00:47)  Source: .Blood Blood-Peripheral  Preliminary Report (25 Jul 2023 04:01):    No growth at 24 hours    Rapid RVP Result: Detected (07-24 @ 20:37)    RADIOLOGY:  imaging below personally reviewed and agree with findings    CT Abdomen and Pelvis w/ IV Cont (07.24.23 @ 22:06) >  BONES: Degenerative changes. No lytic or blastic process. Stable moderately severe compression deformity T7 vertebral body.  IMPRESSION:  No evidence pulmonary embolism.  No colitis or diverticulitis.    Xray Chest 1 View- PORTABLE-Routine (Xray Chest 1 View- PORTABLE-Routine in AM.) (07.24.23 @ 09:48) >  IMPRESSION:  Clear lungs.    POCUS ED TTE 2D F/U, Limited w/o Cont. (07.18.23 @ 13:02) >  IMPRESSION:  No clinically significant pericardial effusion.  Preserved estimated ejection fraction.    US Duplex Carotid Arteries Complete, Bilateral (11.30.21 @ 13:35) >  IMPRESSION: Minimal intimal plaque without duplex evidence of hemodynamically significant stenosis of either carotid artery.  Measurement of carotid stenosis is based on velocity parameters that correlate the residual internal carotid diameter with that of the more distal vessel in accordance with a method such as the [employer] Symptomatic Carotid Endarterectomy Trial (NASCET).   Patient is a 48y old  Male who presents with a chief complaint of Shortness of breath (25 Jul 2023 14:18)    HPI:  48M with asthma, lupus/myositis overlap syndrome, hypothyroidism who presented 7/18/2023 c/o SOB.  Last month had norovirus infection.  This admission, he was started on steroids for asthma exacerbation.  Continued to have SOB.  Did not require supplemental O2.  Had PIV that was removed 7/22 due to leaking.  7/24 had fever 101.  BC sent.  CT done that did not show PE or intra-abdominal source of fever. RVP was +rhino/entero.  Started on vancomycin x1 and zosyn.  Diarrhea improved c/w last month.  Having thrush.  No dysuria.  No issues with current PIV.    ID asked to help management.     prior hospital charts reviewed [  ]  primary team notes reviewed [ x ]  other consultant notes reviewed [x  ]    PAST MEDICAL & SURGICAL HISTORY:  Lupus  Asthma  Hypothyroid  History of cholecystectomy    Allergies  No Known Allergies    ANTIMICROBIALS:  fluconAZOLE IVPB 100 every 24 hours (7/24-)  nystatin    Suspension [id number] four times a day  piperacillin/tazobactam IVPB.. 3.375 every 8 hours (7/24-)  trimethoprim  160 mG/sulfamethoxazole 800 mG 1 <User Schedule> (7/21-)    MEDICATIONS  (STANDING):  albuterol/ipratropium for Nebulization 3 every 6 hours  aspirin enteric coated 81 daily  benzonatate 200 every 8 hours  budesonide 160 MICROgram(s)/formoterol 4.5 MICROgram(s) Inhaler 2 two times a day  gabapentin 100 at bedtime  heparin   Injectable 5000 every 12 hours  levothyroxine 125 daily  losartan 50 daily  pantoprazole    Tablet 40 before breakfast  predniSONE   Tablet 30 daily    SOCIAL HISTORY:   lives with girlfriend, 2 children and girlfriend's parents    FAMILY HISTORY:  FH: rheumatoid arthritis (Mother)    REVIEW OF SYSTEMS  [  ] ROS unobtainable because:    [  x] All other systems negative except as noted below:	    Constitutional:  [ x] fever [ ] chills  [ ] weight loss  [ ] weakness  Skin:  [ ] rash [ ] phlebitis	  Eyes: [ ] icterus [ ] pain  [ ] discharge	  ENMT: [ ] sore throat  [ ] thrush [ ] ulcers [ ] exudates  Respiratory: [x ] dyspnea [ ] hemoptysis [ ] cough [ ] sputum	  Cardiovascular:  [ ] chest pain [ ] palpitations [ ] edema	  Gastrointestinal:  [ ] nausea [ ] vomiting [x ] diarrhea [ ] constipation [ ] pain	  Genitourinary:  [ ] dysuria [ ] frequency [ ] hematuria [ ] discharge [ ] flank pain  [ ] incontinence  Musculoskeletal:  [ ] myalgias [ ] arthralgias [ ] arthritis  [ ] back pain  Neurological:  [ ] headache [ ] seizures  [ ] confusion/altered mental status  Psychiatric:  [ ] anxiety [ ] depression	  Hematology/Lymphatics:  [ ] lymphadenopathy  Endocrine:  [ ] adrenal [ ] thyroid  Allergic/Immunologic:	 [ ] transplant [ ] seasonal    Vital Signs Last 24 Hrs  T(F): 98 (07-25-23 @ 12:25), Max: 101.5 (07-24-23 @ 05:15)  Vital Signs Last 24 Hrs  HR: 98 (07-25-23 @ 12:25) (90 - 99)  BP: 104/71 (07-25-23 @ 12:25) (94/62 - 113/74)  RR: 18 (07-25-23 @ 12:25)  SpO2: 96% (07-25-23 @ 12:25) (94% - 97%)  Wt(kg): --    PHYSICAL EXAM:  Constitutional: non-toxic  HEAD/EYES: anicteric  ENT:  supple, thrush  Cardiovascular:   normal S1, S2  Respiratory:  clear BS bilaterally, no wheezes  GI:  soft, non-tender, normal bowel sounds  :  no reeves  Musculoskeletal:  no synovitis  Neurologic: awake and alert, normal strength, no focal findings  Skin:  abdominal striae, no phlebitis  Psychiatric:  awake, alert, appropriate mood                        12.3   12.48 )-----------( 276      ( 24 Jul 2023 07:31 )             38.5     138  |  103  |  12  ----------------------------<  117<H>  3.6   |  21<L>  |  1.03    Ca    8.6      25 Jul 2023 07:13  Phos  3.5     07-25  Mg     2.0     07-25    Urinalysis (07.24.23 @ 00:47)   Blood, Urine: Negative  pH Urine: 7.5  Glucose Qualitative, Urine: Negative  Color: Yellow  Urine Appearance: Clear  Bilirubin: Negative  Ketone - Urine: Negative  Specific Gravity: 1.028  Protein, Urine: 30 mg/dL  Urobilinogen: Negative  Nitrite: Negative  Leukocyte Esterase Concentration: Negative  1WBC    MICROBIOLOGY:  Culture - Blood (collected 24 Jul 2023 07:29)  Source: .Blood Blood-Peripheral  Preliminary Report (25 Jul 2023 09:01):    No growth at 24 hours    Culture - Blood (collected 24 Jul 2023 00:47)  Source: .Blood Blood-Peripheral  Preliminary Report (25 Jul 2023 04:01):    No growth at 24 hours    Rapid RVP Result: Detected (07-24 @ 20:37)    RADIOLOGY:  imaging below personally reviewed and agree with findings    CT Abdomen and Pelvis w/ IV Cont (07.24.23 @ 22:06) >  BONES: Degenerative changes. No lytic or blastic process. Stable moderately severe compression deformity T7 vertebral body.  IMPRESSION:  No evidence pulmonary embolism.  No colitis or diverticulitis.    Xray Chest 1 View- PORTABLE-Routine (Xray Chest 1 View- PORTABLE-Routine in AM.) (07.24.23 @ 09:48) >  IMPRESSION:  Clear lungs.    POCUS ED TTE 2D F/U, Limited w/o Cont. (07.18.23 @ 13:02) >  IMPRESSION:  No clinically significant pericardial effusion.  Preserved estimated ejection fraction.

## 2023-07-25 NOTE — PROGRESS NOTE ADULT - ATTENDING COMMENTS
pt seen and examined by me personally   agree w/ above   he is aware of our impression and plan  consider d/c ABX and f/uv outpatient w/ Dr. Neri  Please recall with any questions
pt seen and examined by me with spouse at bedside   agree w/ above   pt aware of and in agreement w/ our impression and plans  please order trial of flexeril 10mg TID PO

## 2023-07-25 NOTE — PROGRESS NOTE ADULT - SUBJECTIVE AND OBJECTIVE BOX
[facility] Division of Hospital Medicine  Peri Quiroga MD  Available on TEAMS 8a-8p      Patient is a 48y old  Male who presents with a chief complaint of Shortness of breath     SUBJECTIVE / OVERNIGHT EVENTS: No acute events. Fevers resolved    REVIEW OF SYSTEMS: Diffuse abdominal and chest tenderness improved    MEDICATIONS  (STANDING):  albuterol/ipratropium for Nebulization 3 milliLiter(s) Nebulizer every 6 hours  aspirin enteric coated 81 milliGRAM(s) Oral daily  benzonatate 200 milliGRAM(s) Oral every 8 hours  budesonide 160 MICROgram(s)/formoterol 4.5 MICROgram(s) Inhaler 2 Puff(s) Inhalation two times a day  calcium carbonate   1250 mG (OsCal) 1 Tablet(s) Oral daily  gabapentin 100 milliGRAM(s) Oral at bedtime  heparin   Injectable 5000 Unit(s) SubCutaneous every 12 hours  hydroxychloroquine 400 milliGRAM(s) Oral daily  lactated ringers. 1000 milliLiter(s) (75 mL/Hr) IV Continuous <Continuous>  levothyroxine 125 MICROGram(s) Oral daily  lidocaine   4% Patch 1 Patch Transdermal daily  losartan 50 milliGRAM(s) Oral daily  mycophenolic acid  milliGRAM(s) Oral two times a day  nystatin    Suspension [id number] Unit(s) Oral four times a day  pantoprazole    Tablet 40 milliGRAM(s) Oral before breakfast  piperacillin/tazobactam IVPB.- 3.375 Gram(s) IV Intermittent once  piperacillin/tazobactam IVPB.. 3.375 Gram(s) IV Intermittent every 8 hours  predniSONE   Tablet 40 milliGRAM(s) Oral daily  sodium chloride 0.9%. 1000 milliLiter(s) (75 mL/Hr) IV Continuous <Continuous>  trimethoprim  160 mG/sulfamethoxazole 800 mG 1 Tablet(s) Oral <User Schedule>    MEDICATIONS  (PRN):  acetaminophen     Tablet .. 650 milliGRAM(s) Oral every 6 hours PRN Temp greater or equal to 38C (100.4F), Mild Pain (1 - 3)  aluminum hydroxide/magnesium hydroxide/simethicone Suspension 30 milliLiter(s) Oral every 4 hours PRN Dyspepsia  hydrocodone/homatropine Syrup 10 milliLiter(s) Oral every 6 hours PRN Cough  melatonin 3 milliGRAM(s) Oral at bedtime PRN Insomnia  ondansetron Injectable 4 milliGRAM(s) IV Push every 8 hours PRN Nausea and/or Vomiting      PHYSICAL EXAM:  Vital Signs Last 24 Hrs  T(C): 36.7 (25 Jul 2023 12:25), Max: 37.2 (25 Jul 2023 02:50)  T(F): 98 (25 Jul 2023 12:25), Max: 99 (25 Jul 2023 02:50)  HR: 98 (25 Jul 2023 12:25) (90 - 99)  BP: 104/71 (25 Jul 2023 12:25) (94/62 - 113/74)  BP(mean): --  RR: 18 (25 Jul 2023 12:25) (16 - 18)  SpO2: 96% (25 Jul 2023 12:25) (94% - 97%)    Parameters below as of 25 Jul 2023 04:49  Patient On (Oxygen Delivery Method): room air      CONSTITUTIONAL: NAD, diaphoretic  EYES: EOMI; conjunctiva and sclera clear  ENMT: Moist oral mucosa, +thrush, neck rash  RESPIRATORY: Normal respiratory effort; lungs are clear to auscultation bilaterally, no wheezing  CARDIOVASCULAR: Regular rate and rhythm, normal S1 and S2, no murmur/rub/gallop; No lower extremity edema  ABDOMEN: Mild diffusely tender to palpation, nondistended, soft  MUSCULOSKELETAL: No clubbing or cyanosis of digits; no joint swelling or tenderness to palpation  PSYCH: A+O to person, place, and time; affect appropriate  NEUROLOGY: CN 2-12 are intact and symmetric; no gross sensory deficits   SKIN: No rashes; no palpable lesions    LABS: reviewed                                        12.3   12.48 )-----------( 276      ( 24 Jul 2023 07:31 )             38.5       07-25    138  |  103  |  12  ----------------------------<  117<H>  3.6   |  21<L>  |  1.03    Ca    8.6      25 Jul 2023 07:13  Phos  3.5     07-25  Mg     2.0     07-25                Urinalysis Basic - ( 25 Jul 2023 07:13 )    Color: x / Appearance: x / SG: x / pH: x  Gluc: 117 mg/dL / Ketone: x  / Bili: x / Urobili: x   Blood: x / Protein: x / Nitrite: x   Leuk Esterase: x / RBC: x / WBC x   Sq Epi: x / Non Sq Epi: x / Bacteria: x        < from: CT Abdomen and Pelvis w/ IV Cont (07.24.23 @ 22:06) >  IMPRESSION:  No evidence pulmonary embolism.    No colitis or diverticulitis.    < end of copied text >

## 2023-07-25 NOTE — CONSULT NOTE ADULT - ASSESSMENT
48M with asthma, lupus/myositis overlap syndrome, hypothyroidism who presented 7/18/2023 c/o SOB.  Last month had norovirus infection.  This admission, he was started on steroids for asthma exacerbation.  Continued to have SOB.  Did not require supplemental O2.  Had PIV that was removed 7/22 due to leaking.  7/24 had fever 101.  BC sent.  CT done that did not show PE or intra-abdominal source of fever. RVP was +rhino/entero.  Started on vancomycin x1 and zosyn.  Diarrhea improved c/w last month.  Having thrush.  No dysuria.  No issues with current PIV.    Fever resolved  - probably from URI  - can obtain duplex r/o DVT   - if BC remains negative, would d/c zosyn    Thrush  - short course of diflucan    I have discussed plan of care as detailed above with NP [id number]

## 2023-07-25 NOTE — PROGRESS NOTE ADULT - PROBLEM SELECTOR PLAN 1
Pt is febrile with sinus tachycardia, hypotension, elevated white count and abd pain, could be secondary to norovirus gastroenteritis vs other superimposed infection, elevated procal  - Low ESR/CRP thus low suspicion for lupus/myositis flare  - CTA chest and CT abd negative  - Enterovirus positive 7/24  - Given immunocompromised status, started on zosyn, continue  - Noted with thrush with concern for candida esophagitis, cont nystatin S&S, add diflucan IV 200mg X 1 day, then 100mg daily  - ID consulted  - F/u with rheum, re: holding cellcept  - F/u repeat blood cultures, so far, NGTD  - 500cc IV bolus, then NS at 75cc/hr

## 2023-07-25 NOTE — PROGRESS NOTE ADULT - SUBJECTIVE AND OBJECTIVE BOX
AMBERLY ESPITIA  [id number]    INTERVAL HPI/OVERNIGHT EVENTS: Febrile to 101.5 F yesterday, found to be entero/rhinovirus positive. Reports some fatigue and sore throat.     MEDICATIONS  (STANDING):  albuterol/ipratropium for Nebulization 3 milliLiter(s) Nebulizer every 6 hours  aspirin enteric coated 81 milliGRAM(s) Oral daily  benzonatate 200 milliGRAM(s) Oral every 8 hours  budesonide 160 MICROgram(s)/formoterol 4.5 MICROgram(s) Inhaler 2 Puff(s) Inhalation two times a day  calcium carbonate   1250 mG (OsCal) 1 Tablet(s) Oral daily  fluconAZOLE IVPB 100 milliGRAM(s) IV Intermittent every 24 hours  gabapentin 100 milliGRAM(s) Oral at bedtime  heparin   Injectable 5000 Unit(s) SubCutaneous every 12 hours  hydroxychloroquine 400 milliGRAM(s) Oral daily  levothyroxine 125 MICROGram(s) Oral daily  lidocaine   4% Patch 1 Patch Transdermal daily  losartan 50 milliGRAM(s) Oral daily  nystatin    Suspension 318027 Unit(s) Oral four times a day  pantoprazole    Tablet 40 milliGRAM(s) Oral before breakfast  piperacillin/tazobactam IVPB.. 3.375 Gram(s) IV Intermittent every 8 hours  predniSONE   Tablet 30 milliGRAM(s) Oral daily  sodium chloride 0.9%. 1000 milliLiter(s) (75 mL/Hr) IV Continuous <Continuous>  trimethoprim  160 mG/sulfamethoxazole 800 mG 1 Tablet(s) Oral <User Schedule>    MEDICATIONS  (PRN):  acetaminophen     Tablet .. 650 milliGRAM(s) Oral every 6 hours PRN Temp greater or equal to 38C (100.4F), Mild Pain (1 - 3)  aluminum hydroxide/magnesium hydroxide/simethicone Suspension 30 milliLiter(s) Oral every 4 hours PRN Dyspepsia  hydrocodone/homatropine Syrup 10 milliLiter(s) Oral every 6 hours PRN Cough  melatonin 3 milliGRAM(s) Oral at bedtime PRN Insomnia  ondansetron Injectable 4 milliGRAM(s) IV Push every 8 hours PRN Nausea and/or Vomiting      Allergies    No Known Allergies    Intolerances      Review of Systems:   General: +fevers and fatigue   HEENT: No blurry vision, dysphagia, or odynophagia  CVS: No CP/palpitations  Resp: No SOB/wheezing  GI: +vomiting, no abdominal pain   MSK: no joint swelling   Skin: No rashes  Neuro: No headaches    Vital Signs Last 24 Hrs  T(C): 36.7 (25 Jul 2023 12:25), Max: 37.2 (25 Jul 2023 02:50)  T(F): 98 (25 Jul 2023 12:25), Max: 99 (25 Jul 2023 02:50)  HR: 98 (25 Jul 2023 12:25) (90 - 99)  BP: 104/71 (25 Jul 2023 12:25) (94/62 - 113/74)  BP(mean): --  RR: 18 (25 Jul 2023 12:25) (16 - 18)  SpO2: 96% (25 Jul 2023 12:25) (94% - 97%)    Parameters below as of 25 Jul 2023 04:49  Patient On (Oxygen Delivery Method): room air        Physical Exam:  General: NAD, awake   HEENT: EOMI, MMM  Cardio: +S1/S2, RRR  Resp: CTA b/l, no crackles or wheezing   GI: +BS, soft, NT/ND  MSK: no joint swelling or warmth, good ROM   Neuro: AAOx3  Psych: wnl    LABS:                        12.3   12.48 )-----------( 276      ( 24 Jul 2023 07:31 )             38.5     07-25    138  |  103  |  12  ----------------------------<  117<H>  3.6   |  21<L>  |  1.03    Ca    8.6      25 Jul 2023 07:13  Phos  3.5     07-25  Mg     2.0     07-25        Urinalysis Basic - ( 25 Jul 2023 07:13 )    Color: x / Appearance: x / SG: x / pH: x  Gluc: 117 mg/dL / Ketone: x  / Bili: x / Urobili: x   Blood: x / Protein: x / Nitrite: x   Leuk Esterase: x / RBC: x / WBC x   Sq Epi: x / Non Sq Epi: x / Bacteria: x      Entero/Rhinovirus (RapRVP): Detected (07.24.23 @ 20:37)     RADIOLOGY & ADDITIONAL TESTS:  < from: CT Abdomen and Pelvis w/ IV Cont (07.24.23 @ 22:06) >  IMPRESSION:  No evidence pulmonary embolism.    No colitis or diverticulitis.    < end of copied text >

## 2023-07-25 NOTE — PROGRESS NOTE ADULT - ASSESSMENT
48 year old male with history of SLE on prednisone, Plaquenil and Mycophenylate mofetil, Asthma, hypothyroidism who presents to [facility] ED with chief complaint of shortness of breath admitted for asthma exacerbation.

## 2023-07-26 ENCOUNTER — RX RENEWAL (OUTPATIENT)
Age: 48
End: 2023-07-26

## 2023-07-26 ENCOUNTER — TRANSCRIPTION ENCOUNTER (OUTPATIENT)
Age: 48
End: 2023-07-26

## 2023-07-26 PROCEDURE — 93970 EXTREMITY STUDY: CPT | Mod: 26

## 2023-07-26 PROCEDURE — 99233 SBSQ HOSP IP/OBS HIGH 50: CPT

## 2023-07-26 PROCEDURE — 99231 SBSQ HOSP IP/OBS SF/LOW 25: CPT

## 2023-07-26 PROCEDURE — 93306 TTE W/DOPPLER COMPLETE: CPT | Mod: 26

## 2023-07-26 RX ORDER — MYCOPHENOLIC ACID 180 MG/1
720 TABLET, DELAYED RELEASE ORAL
Refills: 0 | Status: DISCONTINUED | OUTPATIENT
Start: 2023-07-26 | End: 2023-07-27

## 2023-07-26 RX ADMIN — Medication 500000 UNIT(S): at 13:06

## 2023-07-26 RX ADMIN — BUDESONIDE AND FORMOTEROL FUMARATE DIHYDRATE 2 PUFF(S): 160; 4.5 AEROSOL RESPIRATORY (INHALATION) at 18:09

## 2023-07-26 RX ADMIN — GABAPENTIN 100 MILLIGRAM(S): 400 CAPSULE ORAL at 22:19

## 2023-07-26 RX ADMIN — LOSARTAN POTASSIUM 50 MILLIGRAM(S): 100 TABLET, FILM COATED ORAL at 05:14

## 2023-07-26 RX ADMIN — Medication 1 TABLET(S): at 13:08

## 2023-07-26 RX ADMIN — PIPERACILLIN AND TAZOBACTAM 25 GRAM(S): 4; .5 INJECTION, POWDER, LYOPHILIZED, FOR SOLUTION INTRAVENOUS at 13:06

## 2023-07-26 RX ADMIN — Medication 500000 UNIT(S): at 18:12

## 2023-07-26 RX ADMIN — MYCOPHENOLIC ACID 720 MILLIGRAM(S): 180 TABLET, DELAYED RELEASE ORAL at 18:13

## 2023-07-26 RX ADMIN — Medication 3 MILLILITER(S): at 00:36

## 2023-07-26 RX ADMIN — Medication 125 MICROGRAM(S): at 05:14

## 2023-07-26 RX ADMIN — Medication 400 MILLIGRAM(S): at 13:08

## 2023-07-26 RX ADMIN — BUDESONIDE AND FORMOTEROL FUMARATE DIHYDRATE 2 PUFF(S): 160; 4.5 AEROSOL RESPIRATORY (INHALATION) at 05:12

## 2023-07-26 RX ADMIN — PANTOPRAZOLE SODIUM 40 MILLIGRAM(S): 20 TABLET, DELAYED RELEASE ORAL at 05:14

## 2023-07-26 RX ADMIN — Medication 500000 UNIT(S): at 05:13

## 2023-07-26 RX ADMIN — HEPARIN SODIUM 5000 UNIT(S): 5000 INJECTION INTRAVENOUS; SUBCUTANEOUS at 05:14

## 2023-07-26 RX ADMIN — Medication 200 MILLIGRAM(S): at 22:18

## 2023-07-26 RX ADMIN — Medication 200 MILLIGRAM(S): at 05:14

## 2023-07-26 RX ADMIN — Medication 30 MILLIGRAM(S): at 05:14

## 2023-07-26 RX ADMIN — Medication 3 MILLIGRAM(S): at 22:18

## 2023-07-26 RX ADMIN — FLUCONAZOLE 50 MILLIGRAM(S): 150 TABLET ORAL at 05:09

## 2023-07-26 RX ADMIN — Medication 3 MILLILITER(S): at 05:13

## 2023-07-26 RX ADMIN — PIPERACILLIN AND TAZOBACTAM 25 GRAM(S): 4; .5 INJECTION, POWDER, LYOPHILIZED, FOR SOLUTION INTRAVENOUS at 06:21

## 2023-07-26 RX ADMIN — Medication 81 MILLIGRAM(S): at 13:07

## 2023-07-26 RX ADMIN — Medication 3 MILLILITER(S): at 13:06

## 2023-07-26 RX ADMIN — HEPARIN SODIUM 5000 UNIT(S): 5000 INJECTION INTRAVENOUS; SUBCUTANEOUS at 18:11

## 2023-07-26 RX ADMIN — Medication 200 MILLIGRAM(S): at 13:06

## 2023-07-26 RX ADMIN — Medication 3 MILLILITER(S): at 18:13

## 2023-07-26 NOTE — PROGRESS NOTE ADULT - SUBJECTIVE AND OBJECTIVE BOX
[facility] Division of Hospital Medicine  Peri Quiroga MD  Available on TEAMS 8a-8p      Patient is a 48y old  Male who presents with a chief complaint of Shortness of breath     SUBJECTIVE / OVERNIGHT EVENTS: No acute events    REVIEW OF SYSTEMS: Diffuse abdominal and chest tenderness improved    MEDICATIONS  (STANDING):  albuterol/ipratropium for Nebulization 3 milliLiter(s) Nebulizer every 6 hours  aspirin enteric coated 81 milliGRAM(s) Oral daily  benzonatate 200 milliGRAM(s) Oral every 8 hours  budesonide 160 MICROgram(s)/formoterol 4.5 MICROgram(s) Inhaler 2 Puff(s) Inhalation two times a day  calcium carbonate   1250 mG (OsCal) 1 Tablet(s) Oral daily  fluconAZOLE IVPB 100 milliGRAM(s) IV Intermittent every 24 hours  gabapentin 100 milliGRAM(s) Oral at bedtime  heparin   Injectable 5000 Unit(s) SubCutaneous every 12 hours  hydroxychloroquine 400 milliGRAM(s) Oral daily  levothyroxine 125 MICROGram(s) Oral daily  lidocaine   4% Patch 1 Patch Transdermal daily  losartan 50 milliGRAM(s) Oral daily  mycophenolic acid  milliGRAM(s) Oral two times a day  nystatin    Suspension 339035 Unit(s) Oral four times a day  pantoprazole    Tablet 40 milliGRAM(s) Oral before breakfast  predniSONE   Tablet 30 milliGRAM(s) Oral daily  sodium chloride 0.9%. 1000 milliLiter(s) (75 mL/Hr) IV Continuous <Continuous>  trimethoprim  160 mG/sulfamethoxazole 800 mG 1 Tablet(s) Oral <User Schedule>    MEDICATIONS  (PRN):  acetaminophen     Tablet .. 650 milliGRAM(s) Oral every 6 hours PRN Temp greater or equal to 38C (100.4F), Mild Pain (1 - 3)  aluminum hydroxide/magnesium hydroxide/simethicone Suspension 30 milliLiter(s) Oral every 4 hours PRN Dyspepsia  melatonin 3 milliGRAM(s) Oral at bedtime PRN Insomnia  ondansetron Injectable 4 milliGRAM(s) IV Push every 8 hours PRN Nausea and/or Vomiting      PHYSICAL EXAM:  Vital Signs Last 24 Hrs  T(C): 36.8 (26 Jul 2023 04:43), Max: 36.8 (26 Jul 2023 04:43)  T(F): 98.2 (26 Jul 2023 04:43), Max: 98.2 (26 Jul 2023 04:43)  HR: 86 (26 Jul 2023 04:43) (74 - 86)  BP: 112/75 (26 Jul 2023 04:43) (106/73 - 112/75)  BP(mean): --  RR: 18 (26 Jul 2023 04:43) (18 - 18)  SpO2: 99% (26 Jul 2023 04:43) (95% - 99%)    Parameters below as of 26 Jul 2023 04:43  Patient On (Oxygen Delivery Method): room air    CONSTITUTIONAL: NAD  EYES: EOMI; conjunctiva and sclera clear  ENMT: Moist oral mucosa, +thrush, neck rash  RESPIRATORY: Normal respiratory effort; lungs are clear to auscultation bilaterally, no wheezing  CARDIOVASCULAR: Regular rate and rhythm, normal S1 and S2, no murmur/rub/gallop; No lower extremity edema  ABDOMEN: Mild diffusely tender to palpation, nondistended, soft  MUSCULOSKELETAL: No clubbing or cyanosis of digits; no joint swelling or tenderness to palpation  PSYCH: A+O to person, place, and time; affect appropriate  NEUROLOGY: CN 2-12 are intact and symmetric; no gross sensory deficits   SKIN: No rashes; no palpable lesions    LABS: reviewed                   07-25    138  |  103  |  12  ----------------------------<  117<H>  3.6   |  21<L>  |  1.03    Ca    8.6      25 Jul 2023 07:13  Phos  3.5     07-25  Mg     2.0     07-25                Urinalysis Basic - ( 25 Jul 2023 07:13 )    Color: x / Appearance: x / SG: x / pH: x  Gluc: 117 mg/dL / Ketone: x  / Bili: x / Urobili: x   Blood: x / Protein: x / Nitrite: x   Leuk Esterase: x / RBC: x / WBC x   Sq Epi: x / Non Sq Epi: x / Bacteria: x                  CAPILLARY BLOOD GLUCOSE

## 2023-07-26 NOTE — PROGRESS NOTE ADULT - PROBLEM SELECTOR PLAN 1
Pt is febrile with sinus tachycardia, hypotension, elevated white count and abd pain, could be secondary to norovirus gastroenteritis vs other superimposed infection, elevated procal  - Low ESR/CRP thus low suspicion for lupus/myositis flare  - CTA chest and CT abd negative  - Enterovirus positive 7/24  - Given immunocompromised status, started on zosyn X 3, last day today  - Noted with thrush with concern for candida esophagitis, cont nystatin S&S, add diflucan IV 200mg X 1 day, then 100mg daily  - ID recs appreciated    - F/u repeat blood cultures, so far, NGTD  - 500cc IV bolus, then NS at 75cc/hr

## 2023-07-26 NOTE — PROGRESS NOTE ADULT - TIME BILLING
- preparing to see the patient (eg, review of tests, documents)  - obtaining and/or reviewing separately obtained history  - performing a medically appropriate examination and/or evaluation  - counseling and educating the patient/family/caregiver  - ordering medications, tests, or procedures  - referring and communicating with other health care professionals  - documenting clinical information in the electronic or other health record  - independently interpreting results and communicating results to the patient/family/caregiver  - care coordination
chart and data review, clinical assessment, and coordination of care. This excludes any time spent on separate procedures or teaching.
chart and data review, clinical assessment, and coordination of care. This excludes any time spent on separate procedures or teaching.
Review of tests, imaging, labs, documents, medical management, coordination of care and counseling.
- preparing to see the patient (eg, review of tests, documents)  - performing a medically appropriate examination and/or evaluation  - counseling and educating the patient/family/caregiver  - ordering medications, tests, or procedures  - referring and communicating with other health care professionals  - documenting clinical information in the electronic or other health record  - independently interpreting results and communicating results to the patient/family/caregiver  - care coordination
Time-based billing (NON-critical care).     The necessity of the time spent during the encounter on this date of service was due to:     - Ordering, reviewing, and interpreting labs, testing, and imaging.  - Independently obtaining a review of systems and performing a physical exam.   - Reviewing prior hospitalization and where necessary, outpatient records.  - Counselling and educating patient and family regarding interpretation of aforementioned items and plan of care.  - Discussing treatment course with other providers/consultants.
- preparing to see the patient (eg, review of tests, documents)  - performing a medically appropriate examination and/or evaluation  - ordering medications, tests, or procedures  - referring and communicating with other health care professionals  - documenting clinical information in the electronic or other health record  - independently interpreting results and communicating results to the patient/family/caregiver  - care coordination
Time-based billing (NON-critical care).     The necessity of the time spent during the encounter on this date of service was due to:     - Ordering, reviewing, and interpreting labs, testing, and imaging.  - Independently obtaining a review of systems and performing a physical exam  - Reviewing prior hospitalization and where necessary, outpatient records.  - Counselling and educating patient and family regarding interpretation of aforementioned items and plan of care.
Review of tests, imaging, labs, documents, medical management, coordination of care and counseling.
Review of tests, imaging, labs, documents, medical management, coordination of care and counseling.

## 2023-07-26 NOTE — PROGRESS NOTE ADULT - ASSESSMENT
48M with asthma, lupus/myositis overlap syndrome, hypothyroidism who presented 7/18/2023 c/o SOB.  Last month had norovirus infection.  This admission, he was started on steroids for asthma exacerbation.  Continued to have SOB.  Did not require supplemental O2.  Had PIV that was removed 7/22 due to leaking.  7/24 had fever 101.  BC sent.  CT done that did not show PE or intra-abdominal source of fever. RVP was +rhino/entero.  Started on vancomycin x1 and zosyn.  Diarrhea improved c/w last month.  Having thrush.  No dysuria.  No issues with current PIV.    Fever resolved  - probably from URI  - Duplex (-) DVT   - cultures negative, UA negative  - d/c zosyn    Thrush  - short course of diflucan 5-7 days    I have discussed plan of care as detailed above with NP [id number]    Please call Infectious Diseases if there is a change in status.  Thank you.  [phone].

## 2023-07-26 NOTE — PROGRESS NOTE ADULT - SUBJECTIVE AND OBJECTIVE BOX
Patient is a 48y old  Male who presents with a chief complaint of Shortness of breath (25 Jul 2023 14:18)    f/u fever    Interval History/ROS:  no fever/chills.  BM are improving.  SOB better.  Remainder of ROS otherwise negative.    PAST MEDICAL & SURGICAL HISTORY:  Lupus  Asthma  Hypothyroid  History of cholecystectomy    Allergies  No Known Allergies    ANTIMICROBIALS:  fluconAZOLE IVPB 100 every 24 hours (7/24-)  nystatin    Suspension [id number] four times a day  piperacillin/tazobactam IVPB.. 3.375 every 8 hours (7/24-)  trimethoprim  160 mG/sulfamethoxazole 800 mG 1 <User Schedule> (7/21-)    MEDICATIONS  (STANDING):  albuterol/ipratropium for Nebulization 3 every 6 hours  aspirin enteric coated 81 daily  benzonatate 200 every 8 hours  budesonide 160 MICROgram(s)/formoterol 4.5 MICROgram(s) Inhaler 2 two times a day  gabapentin 100 at bedtime  heparin   Injectable 5000 every 12 hours  levothyroxine 125 daily  losartan 50 daily  mycophenolic acid  two times a day  pantoprazole    Tablet 40 before breakfast  predniSONE   Tablet 30 daily    Vital Signs Last 24 Hrs  T(F): 98.2 (07-26-23 @ 04:43), Max: 98.2 (07-26-23 @ 04:43)  HR: 86 (07-26-23 @ 04:43)  BP: 112/75 (07-26-23 @ 04:43)  RR: 18 (07-26-23 @ 04:43)  SpO2: 99% (07-26-23 @ 04:43) (95% - 99%)  Wt(kg): --    PHYSICAL EXAM:  Constitutional: non-toxic  HEAD/EYES: anicteric  ENT:  supple, thrush  Cardiovascular:   normal S1, S2  Respiratory:  clear BS bilaterally, no wheezes  GI:  soft, non-tender, normal bowel sounds  :  no reeves  Musculoskeletal:  no synovitis  Neurologic: awake and alert, normal strength, no focal findings  Skin:  abdominal striae, no phlebitis  Psychiatric:  awake, alert, appropriate mood    138  |  103  |  12  ----------------------------<  117  3.6   |  21  |  1.03  Ca    8.6      25 Jul 2023 07:13Phos  3.5     07-25Mg     2.0     07-25             Urinalysis (07.24.23 @ 00:47)   Blood, Urine: Negative  pH Urine: 7.5  Glucose Qualitative, Urine: Negative  Color: Yellow  Urine Appearance: Clear  Bilirubin: Negative  Ketone - Urine: Negative  Specific Gravity: 1.028  Protein, Urine: 30 mg/dL  Urobilinogen: Negative  Nitrite: Negative  Leukocyte Esterase Concentration: Negative  1WBC    MICROBIOLOGY:  Culture - Blood (collected 07-24-23 @ 07:29)  Source: .Blood Blood-Peripheral  Preliminary Report (07-26-23 @ 09:02):    No growth at 48 Hours    Culture - Blood (collected 07-24-23 @ 00:47)  Source: .Blood Blood-Peripheral  Preliminary Report (07-26-23 @ 04:01):    No growth at 48 Hours    Rapid RVP Result: Detected (07-24 @ 20:37)    RADIOLOGY:  imaging below personally reviewed and agree with findings    VA Duplex Lower Ext Vein Scan, Bilat (07.26.23 @ 09:45) >  IMPRESSION:  No evidence of deep venous thrombosis in either lower extremity.    CT Abdomen and Pelvis w/ IV Cont (07.24.23 @ 22:06) >  BONES: Degenerative changes. No lytic or blastic process. Stable moderately severe compression deformity T7 vertebral body.  IMPRESSION:  No evidence pulmonary embolism.  No colitis or diverticulitis.    Xray Chest 1 View- PORTABLE-Routine (Xray Chest 1 View- PORTABLE-Routine in AM.) (07.24.23 @ 09:48) >  IMPRESSION:  Clear lungs.    POCUS ED TTE 2D F/U, Limited w/o Cont. (07.18.23 @ 13:02) >  IMPRESSION:  No clinically significant pericardial effusion.  Preserved estimated ejection fraction.

## 2023-07-26 NOTE — PROGRESS NOTE ADULT - ASSESSMENT
48 year old male with history of SLE on prednisone, Plaquenil and Mycophenylate mofetil, Asthma, hypothyroidism who presents to [facility] ED with chief complaint of shortness of breath admitted for asthma exacerbation.

## 2023-07-27 ENCOUNTER — TRANSCRIPTION ENCOUNTER (OUTPATIENT)
Age: 48
End: 2023-07-27

## 2023-07-27 ENCOUNTER — NON-APPOINTMENT (OUTPATIENT)
Age: 48
End: 2023-07-27

## 2023-07-27 VITALS
SYSTOLIC BLOOD PRESSURE: 111 MMHG | HEART RATE: 96 BPM | TEMPERATURE: 98 F | DIASTOLIC BLOOD PRESSURE: 71 MMHG | OXYGEN SATURATION: 97 % | RESPIRATION RATE: 18 BRPM

## 2023-07-27 PROCEDURE — 0225U NFCT DS DNA&RNA 21 SARSCOV2: CPT

## 2023-07-27 PROCEDURE — 82330 ASSAY OF CALCIUM: CPT

## 2023-07-27 PROCEDURE — 81001 URINALYSIS AUTO W/SCOPE: CPT

## 2023-07-27 PROCEDURE — 84132 ASSAY OF SERUM POTASSIUM: CPT

## 2023-07-27 PROCEDURE — 84100 ASSAY OF PHOSPHORUS: CPT

## 2023-07-27 PROCEDURE — 96374 THER/PROPH/DIAG INJ IV PUSH: CPT

## 2023-07-27 PROCEDURE — 82085 ASSAY OF ALDOLASE: CPT

## 2023-07-27 PROCEDURE — 82784 ASSAY IGA/IGD/IGG/IGM EACH: CPT

## 2023-07-27 PROCEDURE — 82565 ASSAY OF CREATININE: CPT

## 2023-07-27 PROCEDURE — 82570 ASSAY OF URINE CREATININE: CPT

## 2023-07-27 PROCEDURE — 84295 ASSAY OF SERUM SODIUM: CPT

## 2023-07-27 PROCEDURE — 84443 ASSAY THYROID STIM HORMONE: CPT

## 2023-07-27 PROCEDURE — 83735 ASSAY OF MAGNESIUM: CPT

## 2023-07-27 PROCEDURE — 87040 BLOOD CULTURE FOR BACTERIA: CPT

## 2023-07-27 PROCEDURE — 84484 ASSAY OF TROPONIN QUANT: CPT

## 2023-07-27 PROCEDURE — 71275 CT ANGIOGRAPHY CHEST: CPT

## 2023-07-27 PROCEDURE — 83605 ASSAY OF LACTIC ACID: CPT

## 2023-07-27 PROCEDURE — 82435 ASSAY OF BLOOD CHLORIDE: CPT

## 2023-07-27 PROCEDURE — 80053 COMPREHEN METABOLIC PANEL: CPT

## 2023-07-27 PROCEDURE — 71045 X-RAY EXAM CHEST 1 VIEW: CPT

## 2023-07-27 PROCEDURE — 80048 BASIC METABOLIC PNL TOTAL CA: CPT

## 2023-07-27 PROCEDURE — 85027 COMPLETE CBC AUTOMATED: CPT

## 2023-07-27 PROCEDURE — 82550 ASSAY OF CK (CPK): CPT

## 2023-07-27 PROCEDURE — 86160 COMPLEMENT ANTIGEN: CPT

## 2023-07-27 PROCEDURE — C8929: CPT

## 2023-07-27 PROCEDURE — 87046 STOOL CULTR AEROBIC BACT EA: CPT

## 2023-07-27 PROCEDURE — 87507 IADNA-DNA/RNA PROBE TQ 12-25: CPT

## 2023-07-27 PROCEDURE — 85018 HEMOGLOBIN: CPT

## 2023-07-27 PROCEDURE — 87077 CULTURE AEROBIC IDENTIFY: CPT

## 2023-07-27 PROCEDURE — 82947 ASSAY GLUCOSE BLOOD QUANT: CPT

## 2023-07-27 PROCEDURE — 86140 C-REACTIVE PROTEIN: CPT

## 2023-07-27 PROCEDURE — 93005 ELECTROCARDIOGRAM TRACING: CPT

## 2023-07-27 PROCEDURE — 71046 X-RAY EXAM CHEST 2 VIEWS: CPT

## 2023-07-27 PROCEDURE — 96361 HYDRATE IV INFUSION ADD-ON: CPT

## 2023-07-27 PROCEDURE — 99239 HOSP IP/OBS DSCHRG MGMT >30: CPT

## 2023-07-27 PROCEDURE — 82962 GLUCOSE BLOOD TEST: CPT

## 2023-07-27 PROCEDURE — 82803 BLOOD GASES ANY COMBINATION: CPT

## 2023-07-27 PROCEDURE — 85652 RBC SED RATE AUTOMATED: CPT

## 2023-07-27 PROCEDURE — 84156 ASSAY OF PROTEIN URINE: CPT

## 2023-07-27 PROCEDURE — 84145 PROCALCITONIN (PCT): CPT

## 2023-07-27 PROCEDURE — 86225 DNA ANTIBODY NATIVE: CPT

## 2023-07-27 PROCEDURE — 93970 EXTREMITY STUDY: CPT

## 2023-07-27 PROCEDURE — 74177 CT ABD & PELVIS W/CONTRAST: CPT

## 2023-07-27 PROCEDURE — 85025 COMPLETE CBC W/AUTO DIFF WBC: CPT

## 2023-07-27 PROCEDURE — 99285 EMERGENCY DEPT VISIT HI MDM: CPT

## 2023-07-27 PROCEDURE — 85014 HEMATOCRIT: CPT

## 2023-07-27 PROCEDURE — 94640 AIRWAY INHALATION TREATMENT: CPT

## 2023-07-27 PROCEDURE — 36415 COLL VENOUS BLD VENIPUNCTURE: CPT

## 2023-07-27 PROCEDURE — 87045 FECES CULTURE AEROBIC BACT: CPT

## 2023-07-27 PROCEDURE — 93308 TTE F-UP OR LMTD: CPT

## 2023-07-27 RX ORDER — NYSTATIN 500MM UNIT
5 POWDER (EA) MISCELLANEOUS
Qty: 0 | Refills: 0 | DISCHARGE
Start: 2023-07-27

## 2023-07-27 RX ORDER — CALCIUM CARBONATE 500(1250)
1 TABLET ORAL
Qty: 0 | Refills: 0 | DISCHARGE
Start: 2023-07-27

## 2023-07-27 RX ORDER — GABAPENTIN 400 MG/1
1 CAPSULE ORAL
Qty: 30 | Refills: 0
Start: 2023-07-27 | End: 2023-08-25

## 2023-07-27 RX ORDER — LIDOCAINE 4 G/100G
1 CREAM TOPICAL
Qty: 7 | Refills: 0
Start: 2023-07-27

## 2023-07-27 RX ORDER — MYCOPHENOLIC ACID 180 MG/1
2 TABLET, DELAYED RELEASE ORAL
Qty: 120 | Refills: 0
Start: 2023-07-27 | End: 2023-08-25

## 2023-07-27 RX ORDER — POTASSIUM BICARBONATE 978 MG/1
0 TABLET, EFFERVESCENT ORAL
Qty: 0 | Refills: 0 | DISCHARGE

## 2023-07-27 RX ADMIN — LOSARTAN POTASSIUM 50 MILLIGRAM(S): 100 TABLET, FILM COATED ORAL at 05:17

## 2023-07-27 RX ADMIN — PANTOPRAZOLE SODIUM 40 MILLIGRAM(S): 20 TABLET, DELAYED RELEASE ORAL at 05:16

## 2023-07-27 RX ADMIN — Medication 500000 UNIT(S): at 05:15

## 2023-07-27 RX ADMIN — Medication 3 MILLILITER(S): at 05:15

## 2023-07-27 RX ADMIN — Medication 125 MICROGRAM(S): at 05:17

## 2023-07-27 RX ADMIN — MYCOPHENOLIC ACID 720 MILLIGRAM(S): 180 TABLET, DELAYED RELEASE ORAL at 05:16

## 2023-07-27 RX ADMIN — Medication 500000 UNIT(S): at 00:18

## 2023-07-27 RX ADMIN — Medication 1 TABLET(S): at 11:25

## 2023-07-27 RX ADMIN — Medication 400 MILLIGRAM(S): at 11:26

## 2023-07-27 RX ADMIN — HEPARIN SODIUM 5000 UNIT(S): 5000 INJECTION INTRAVENOUS; SUBCUTANEOUS at 05:15

## 2023-07-27 RX ADMIN — Medication 81 MILLIGRAM(S): at 11:26

## 2023-07-27 RX ADMIN — Medication 500000 UNIT(S): at 11:26

## 2023-07-27 RX ADMIN — FLUCONAZOLE 50 MILLIGRAM(S): 150 TABLET ORAL at 05:14

## 2023-07-27 RX ADMIN — Medication 30 MILLIGRAM(S): at 05:16

## 2023-07-27 RX ADMIN — BUDESONIDE AND FORMOTEROL FUMARATE DIHYDRATE 2 PUFF(S): 160; 4.5 AEROSOL RESPIRATORY (INHALATION) at 05:17

## 2023-07-27 RX ADMIN — Medication 3 MILLILITER(S): at 11:27

## 2023-07-27 RX ADMIN — Medication 200 MILLIGRAM(S): at 05:17

## 2023-07-27 NOTE — DISCHARGE NOTE PROVIDER - PROVIDER TOKENS
PROVIDER:[TOKEN:[[id number]],FOLLOWUP:[1 week]],PROVIDER:[TOKEN:[[id number]],FOLLOWUP:[1-3 days]] PROVIDER:[TOKEN:[[id number]],FOLLOWUP:[1 week]],PROVIDER:[TOKEN:[[id number]],FOLLOWUP:[1-3 days]],PROVIDER:[TOKEN:[[id number]]]

## 2023-07-27 NOTE — PROGRESS NOTE ADULT - ASSESSMENT
48 year old male with history of SLE on prednisone, Plaquenil and Mycophenylate mofetil, Asthma, hypothyroidism who presents to [facility] ED with chief complaint of shortness of breath admitted for asthma exacerbation.

## 2023-07-27 NOTE — PROGRESS NOTE ADULT - PROBLEM SELECTOR PLAN 1
Pt is febrile with sinus tachycardia, hypotension, elevated white count and abd pain, secondary toviral infection from enterorhinovirus, improved  - Low ESR/CRP thus low suspicion for lupus/myositis flare  - CTA chest and CT abd negative  - Enterovirus positive 7/24  - Given immunocompromised status, started on zosyn X 3, last day 7/26  - Noted with thrush with concern for candida esophagitis, cont nystatin S&S, s/p diflucan IV 200mg X 1 day, then 100mg daily  - ID recs appreciated    - F/u repeat blood cultures, so far, NGTD  - 500cc IV bolus, then NS at 75cc/hr

## 2023-07-27 NOTE — PROGRESS NOTE ADULT - PROBLEM SELECTOR PLAN 7
DVT PPx: Heparin subcutaneous  Diet: Regular

## 2023-07-27 NOTE — PROGRESS NOTE ADULT - PROBLEM SELECTOR PROBLEM 6
Prophylactic measure
Diarrhea
Prophylactic measure
Diarrhea
Prophylactic measure
Diarrhea
Diarrhea

## 2023-07-27 NOTE — DISCHARGE NOTE NURSING/CASE MANAGEMENT/SOCIAL WORK - PATIENT PORTAL LINK FT
You can access the FollowMyHealth Patient Portal offered by [facility] by registering at the following website: [email or web address]. By joining [employer]’s FollowMyHealth portal, you will also be able to view your health information using other applications (apps) compatible with our system.

## 2023-07-27 NOTE — PROGRESS NOTE ADULT - PROBLEM SELECTOR PLAN 6
DVT PPx: Heparin subq  Diet: Regular
Endorses one month history of nonblood, nonwatery diarrhea  (+) result for Norovirus on GI PCR  - Monitor clinically for change in frequency/consistency of stool.
DVT PPx: Heparin subcutaneous  Diet: Regular
Endorses one month history of nonblood, nonwatery diarrhea  (+) result for Norovirus on GI PCR  - Monitor clinically for change in frequency/consistency of stool.
DVT PPx: Heparin subcutaneous  Diet: Regular
Endorses one month history of nonblood, nonwatery diarrhea  (+) result for Norovirus on GI PCR  - Monitor clinically for change in frequency/consistency of stool.
Endorses one month history of nonblood, nonwatery diarrhea  (+) result for Norovirus on GI PCR  - Monitor clinically for change in frequency/consistency of stool.

## 2023-07-27 NOTE — PROGRESS NOTE ADULT - PROBLEM SELECTOR PROBLEM 4
Hypertension
Hypothyroidism
Hypertension
Hypertension
Hypothyroidism
Hypertension
Hypertension

## 2023-07-27 NOTE — PROGRESS NOTE ADULT - PROVIDER SPECIALTY LIST ADULT
Infectious Disease
Pulmonology
Rheumatology
Pulmonology
Rheumatology
Hospitalist

## 2023-07-27 NOTE — DISCHARGE NOTE PROVIDER - NSDCFUSCHEDAPPT_GEN_ALL_CORE_FT
Pritesh Everett  [facility]  INTMED [address]  Scheduled Appointment: 07/31/2023    Loc Weathers  [facility]  GASTRO [address]  Scheduled Appointment: 08/08/2023    Susanna Neri  [facility]  RHEUM [address]  Scheduled Appointment: 08/15/2023    Alyce Gonzales  [facility]  PULMMED [address]  Scheduled Appointment: 08/17/2023

## 2023-07-27 NOTE — DISCHARGE NOTE PROVIDER - CARE PROVIDERS DIRECT ADDRESSES
[email or web address]

## 2023-07-27 NOTE — PROGRESS NOTE ADULT - PROBLEM SELECTOR PROBLEM 1
Acute asthma exacerbation
Sepsis, unspecified organism
Acute asthma exacerbation
Sepsis, unspecified organism
Acute asthma exacerbation
Sepsis, unspecified organism
Acute asthma exacerbation
Sepsis, unspecified organism
Acute asthma exacerbation

## 2023-07-27 NOTE — PROGRESS NOTE ADULT - PROBLEM SELECTOR PLAN 3
Continue Levothyroxine 125mcg.   -Follow TSH.   -Specimen received in lab, pending result.
Patient follows with private Rheumatologist  Dr. Neri   - Rheum consulted, follow up recs  - Continue Plaquenil 400mg daily  - Myfortic resumed
TSH ordered on this admission 19-Jul-2023-->  0.29   Continue Levothyroxine 125mcg
TSH ordered on this admission 19-Jul-2023-->  0.29   Continue Levothyroxine 125mcg
Patient follows with private Rheumatologist  Dr. Neri   - Rheum consulted, follow up recs  - Continue Plaquenil 400mg daily  - Myfortic resumed
TSH ordered on this admission 19-Jul-2023-->  0.29   Continue Levothyroxine 125mcg
Patient follows with private Rheumatologist  Dr. Neri   - Rheum consulted, follow up recs  - Continue Plaquenil 400mg daily  - Myfortic held in setting of sepsis, discussed with rheum
TSH ordered on this admission 19-Jul-2023-->  0.29   Continue Levothyroxine 125mcg
Patient follows with private Rheumatologist  Dr. Neri   - Rheum consulted, follow up recs  - Continue Myfortic 720mg BID and Plaquenil 400mg daily

## 2023-07-27 NOTE — DISCHARGE NOTE PROVIDER - NSDCCPCAREPLAN_GEN_ALL_CORE_FT
PRINCIPAL DISCHARGE DIAGNOSIS  Diagnosis: Exacerbation of asthma  Assessment and Plan of Treatment: Continue to take your nebulizers/inhalers as rescribed.  Continue to wean your prednisone by 10mg every 3 days until your are at your baseline dose of 5mg daily.  Follow up with your PMD within one week of discharge.  Continued follow up with your pulmonologist.      SECONDARY DISCHARGE DIAGNOSES  Diagnosis: Lupus  Assessment and Plan of Treatment: Continue to take your medications as prescribed and follow up with your Rheumatologist.    Diagnosis: Sepsis, unspecified organism  Assessment and Plan of Treatment: Resolved.  Most likely due to enterovirus.     PRINCIPAL DISCHARGE DIAGNOSIS  Diagnosis: Exacerbation of asthma  Assessment and Plan of Treatment: Continue to take your nebulizers/inhalers as rescribed.  Continue to wean your prednisone by 10mg every 3 days until your are at your baseline dose of 5mg daily.  Follow up with your PMD within one week of discharge.  Continued follow up with your pulmonologist.      SECONDARY DISCHARGE DIAGNOSES  Diagnosis: Lupus  Assessment and Plan of Treatment: Continue to take your medications as prescribed and follow up with your Rheumatologist.    Diagnosis: Hypothyroidism  Assessment and Plan of Treatment: Continue to take your medications daily as prescribed.    Diagnosis: Diarrhea  Assessment and Plan of Treatment: Due to Norovirus noted on PCR.  Please remember to stay hydrated.    Diagnosis: Sepsis, unspecified organism  Assessment and Plan of Treatment: Resolved.  Most likely due to enterovirus.

## 2023-07-27 NOTE — DISCHARGE NOTE PROVIDER - CARE PROVIDER_API CALL
Loc Weathers  Gastroenterology  [address] [place] [address], [address]  [place] [address]  Phone: [phone]  Fax: [phone]  Follow Up Time: 1 week    Pritesh Everett  Internal Medicine  [address], [address]  [place] [address]  Phone: [phone]  Fax: [phone]  Follow Up Time: 1-3 days   Loc Weathers  Gastroenterology  [address], [address]  [place] [address]  Phone: [phone]  Fax: [phone]  Follow Up Time: 1 week    Pritesh Everett  Internal Medicine  [address], [address]  [place] [address]  Phone: [phone]  Fax: [phone]  Follow Up Time: 1-3 days    Alyce Gonzales  Pulmonary Disease  [address], [address]  [place] [address]  Phone: [phone]  Fax: [phone]  Follow Up Time:

## 2023-07-27 NOTE — PROGRESS NOTE ADULT - NSPROGADDITIONALINFOA_GEN_ALL_CORE
Discussed with patient 3 [facility] ACP and pulmonary team.
Dispo: to home tomorrow    D/W pts girlfriend at bedside
Patient was described plan at bedside and agrees with continuation of IV steroids and duonebs.
Discussed with patient and 3 D[facility] ACP.
Discussed with patient, 3 [facility] ACP.
Patient's wife on speaker during patient encounter this morning. Plan discussed with Patient and his wife who agree to plan.
Stable for discharge to home today with outpt followup  Discharge time: 40 minutes

## 2023-07-27 NOTE — DISCHARGE NOTE NURSING/CASE MANAGEMENT/SOCIAL WORK - NSDCVIVACCINE_GEN_ALL_CORE_FT
Tdap; 17-Dec-2018 23:02; Candi Bedolla (ALEKSEY); [employer]; [id number] (Exp. Date: 03-Dec-2020); IntraMuscular; Deltoid Left.; 0.5 milliLiter(s); VIS (VIS Published: 09-May-2013, VIS Presented: 17-Dec-2018);

## 2023-07-27 NOTE — DISCHARGE NOTE PROVIDER - NSDCMRMEDTOKEN_GEN_ALL_CORE_FT
albuterol 2.5 mg/3 mL (0.083%) inhalation solution: 1 dose(s) inhaled every 6 hours, As Needed   aspirin 81 mg oral delayed release tablet: 1 tab(s) orally once a day  budesonide-formoterol 160 mcg-4.5 mcg/inh inhalation aerosol: 1 dose(s) inhaled 2 times a day   cyclobenzaprine 5 mg oral tablet: 1 tab(s) orally once a day (at bedtime) MDD:1  Cymbalta 30 mg oral delayed release capsule: 1 cap(s) orally once a day (at bedtime)   fluticasone 50 mcg/inh nasal spray: 2 spray(s) nasal 2 times a day  furosemide 40 mg oral tablet: 1 tab(s) orally once a day  Hycodan 5 mg-1.5 mg/5 mL oral syrup: 10 milliliter(s) orally every 6 hours, As Needed -for cough MDD:40mls   hydrocodone-chlorpheniramine 10 mg-8 mg/5 mL oral suspension, extended release: 5 milliliter(s) orally every 6 hours, As Needed -for cough MDD:40mg (20mL)  hydroxychloroquine 200 mg oral tablet: 2 tab(s) orally once a day  levothyroxine 125 mcg (0.125 mg) oral tablet: 1 tab(s) orally once a day  losartan 50 mg oral tablet: 1 tab(s) orally once a day  melatonin 3 mg oral tablet: 2 tab(s) orally once a day (at bedtime)  metFORMIN 500 mg oral tablet: 1 tab(s) orally 2 times a day   mycophenolate mofetil 500 mg oral tablet: 3 tab(s) orally once a day  Narcan 4 mg/0.1 mL nasal spray: 4 milligram(s) intranasally once   oxyCODONE 10 mg oral tablet: 1 tab(s) orally every 4 hours, As needed, Severe Pain (7 - 10) MDD:6  pantoprazole 40 mg oral delayed release tablet: 1 tab(s) orally once a day (before a meal)  potassium bicarbonate 20 mEq oral tablet, effervescent: orally 2 times a day  predniSONE 10 mg oral tablet: 60mg Prednisone 7/29 to 7/31  50mg Prednisone 8/1 to 8/3  40mg Prednisone 8/4 to 8/6  30mg Prednsione 8/7 till you follow up with your pulmonologist   predniSONE 20 mg oral tablet: 2 tab(s) orally once a day   sulfamethoxazole-trimethoprim 800 mg-160 mg oral tablet: 1 tab(s) orally 3 times a week MDD:1   tiotropium 18 mcg inhalation capsule: 1 cap(s) inhaled once a day  To whom it may concern, Mr. Francisco Javier Pinzon is current Hospitalize at [facility] since 7/18/23- present: Sick note  Trelegy Ellipta 200 mcg-62.5 mcg-25 mcg/inh inhalation powder: 1 puff(s) inhaled once a day   No adenopathy or splenomegaly. No cervical or inguinal lymphadenopathy. albuterol 2.5 mg/3 mL (0.083%) inhalation solution: 1 dose(s) inhaled every 6 hours, As Needed   aluminum hydroxide-magnesium hydroxide 200 mg-200 mg/5 mL oral suspension: 30 milliliter(s) orally every 4 hours As needed Dyspepsia  aspirin 81 mg oral delayed release tablet: 1 tab(s) orally once a day  benzonatate 100 mg oral capsule: 2 cap(s) orally every 8 hours  budesonide-formoterol 160 mcg-4.5 mcg/inh inhalation aerosol: 1 dose(s) inhaled 2 times a day   calcium carbonate 1250 mg (500 mg elemental calcium) oral tablet: 1 tab(s) orally once a day  gabapentin 100 mg oral capsule: 1 cap(s) orally once a day (at bedtime)  hydroxychloroquine 200 mg oral tablet: 2 tab(s) orally once a day  levothyroxine 125 mcg (0.125 mg) oral tablet: 1 tab(s) orally once a day  lidocaine 4% topical film: Apply topically to affected area once a day  losartan 50 mg oral tablet: 1 tab(s) orally once a day  melatonin 3 mg oral tablet: 2 tab(s) orally once a day (at bedtime)  metFORMIN 500 mg oral tablet: 1 tab(s) orally 2 times a day   mycophenolic acid 360 mg oral delayed release tablet: 2 tab(s) orally 2 times a day  nystatin 100,000 units/mL oral suspension: 5 milliliter(s) orally 4 times a day  pantoprazole 40 mg oral delayed release tablet: 1 tab(s) orally once a day (before a meal)  predniSONE 10 mg oral tablet: 1 tab(s) orally once a day Take 2 tabs PO daily x 3 days; then 1 tab PO x 3 days and stop  predniSONE 5 mg oral tablet: 1 orally once a day  sulfamethoxazole-trimethoprim 800 mg-160 mg oral tablet: 1 tab(s) orally 3 times a week MDD:1   tiotropium 18 mcg inhalation capsule: 1 cap(s) inhaled once a day  Trelegy Ellipta 200 mcg-62.5 mcg-25 mcg/inh inhalation powder: 1 puff(s) inhaled once a day

## 2023-07-27 NOTE — DISCHARGE NOTE PROVIDER - HOSPITAL COURSE
48 year old male with history of SLE on prednisone, Plaquenil and Mycophenylate mofetil, Asthma, hypothyroidism who presents to [facility] ED with chief complaint of shortness of breath admitted for asthma exacerbation.      > Sepsis, unspecified organism.   ·  Plan: Pt is febrile with sinus tachycardia, hypotension, elevated white count and abd pain, could be secondary to norovirus gastroenteritis vs other superimposed infection, elevated procal  - Low ESR/CRP thus low suspicion for lupus/myositis flare  - CTA chest and CT abd negative  - Enterovirus positive 7/24  - Given immunocompromised status, started on zosyn X 3, last day today  - Noted with thrush with concern for candida esophagitis, cont nystatin S&S, add diflucan IV 200mg X 1 day, then 100mg daily  - ID recs appreciated    - F/u repeat blood cultures, so far, NGTD  - 500cc IV bolus, then NS at 75cc/hr.    > Acute asthma exacerbation.   ·  Plan: IMproved  - Appreciate pulm recs  - Duonebs [id number] ordered.   - Solumedrol tapered to 40mg Prednisone  - Continue  Symbicort  - Monitor oxygen saturation  - Currently on Room Air.    >  Lupus.   ·  Plan: Patient follows with private Rheumatologist  Dr. Neri   - Rheum consulted, follow up recs  - Continue Plaquenil 400mg daily  - Myfortic resumed.    >  Hypothyroidism.   ·  Plan: TSH 0.29   Continue Levothyroxine 125mcg.    > Hypertension.   ·  Plan: -Continue Losartan.    > Diarrhea.   ·  Plan: Endorses one month history of nonblood, nonwatery diarrhea  (+) result for Norovirus on GI PCR  - Monitor clinically for change in frequency/consistency of stool.    Cleared for discharge home

## 2023-07-27 NOTE — PROGRESS NOTE ADULT - PROBLEM SELECTOR PROBLEM 2
Acute asthma exacerbation
Lupus
Lupus
Acute asthma exacerbation
Lupus
Acute asthma exacerbation
Acute asthma exacerbation

## 2023-07-27 NOTE — PROGRESS NOTE ADULT - SUBJECTIVE AND OBJECTIVE BOX
[facility] Division of Hospital Medicine  Peri Quiroga MD  Available on TEAMS 8a-8p      Patient is a 48y old  Male who presents with a chief complaint of Shortness of breath     SUBJECTIVE / OVERNIGHT EVENTS: No acute events    REVIEW OF SYSTEMS: Diffuse abdominal and chest tenderness improved    MEDICATIONS  (STANDING):  albuterol/ipratropium for Nebulization 3 milliLiter(s) Nebulizer every 6 hours  aspirin enteric coated 81 milliGRAM(s) Oral daily  benzonatate 200 milliGRAM(s) Oral every 8 hours  budesonide 160 MICROgram(s)/formoterol 4.5 MICROgram(s) Inhaler 2 Puff(s) Inhalation two times a day  calcium carbonate   1250 mG (OsCal) 1 Tablet(s) Oral daily  fluconAZOLE IVPB 100 milliGRAM(s) IV Intermittent every 24 hours  gabapentin 100 milliGRAM(s) Oral at bedtime  heparin   Injectable 5000 Unit(s) SubCutaneous every 12 hours  hydroxychloroquine 400 milliGRAM(s) Oral daily  levothyroxine 125 MICROGram(s) Oral daily  lidocaine   4% Patch 1 Patch Transdermal daily  losartan 50 milliGRAM(s) Oral daily  mycophenolic acid  milliGRAM(s) Oral two times a day  nystatin    Suspension 073947 Unit(s) Oral four times a day  pantoprazole    Tablet 40 milliGRAM(s) Oral before breakfast  predniSONE   Tablet 30 milliGRAM(s) Oral daily  sodium chloride 0.9%. 1000 milliLiter(s) (75 mL/Hr) IV Continuous <Continuous>  trimethoprim  160 mG/sulfamethoxazole 800 mG 1 Tablet(s) Oral <User Schedule>    MEDICATIONS  (PRN):  acetaminophen     Tablet .. 650 milliGRAM(s) Oral every 6 hours PRN Temp greater or equal to 38C (100.4F), Mild Pain (1 - 3)  aluminum hydroxide/magnesium hydroxide/simethicone Suspension 30 milliLiter(s) Oral every 4 hours PRN Dyspepsia  melatonin 3 milliGRAM(s) Oral at bedtime PRN Insomnia  ondansetron Injectable 4 milliGRAM(s) IV Push every 8 hours PRN Nausea and/or Vomiting      PHYSICAL EXAM:  T(C): 36.7 (07-27-23 @ 12:24), Max: 37 (07-26-23 @ 20:45)  T(F): 98 (07-27-23 @ 12:24), Max: 98.6 (07-26-23 @ 20:45)  HR: 96 (07-27-23 @ 12:24) (71 - 96)  BP: 111/71 (07-27-23 @ 12:24) (108/73 - 111/71)  RR: 18 (07-27-23 @ 12:24) (18 - 18)  SpO2: 97% (07-27-23 @ 12:24) (97% - 97%)  Wt(kg): --  CONSTITUTIONAL: NAD  EYES: EOMI; conjunctiva and sclera clear  ENMT: Moist oral mucosa, +thrush, neck rash improved  RESPIRATORY: Normal respiratory effort; lungs are clear to auscultation bilaterally, no wheezing  CARDIOVASCULAR: Regular rate and rhythm, normal S1 and S2, no murmur/rub/gallop; No lower extremity edema  ABDOMEN: Mild diffusely tender to palpation, nondistended, soft  MUSCULOSKELETAL: No clubbing or cyanosis of digits; no joint swelling or tenderness to palpation  PSYCH: A+O to person, place, and time; affect appropriate  NEUROLOGY: CN 2-12 are intact and symmetric; no gross sensory deficits   SKIN: No rashes; no palpable lesions    LABS: reviewed                                                 CAPILLARY BLOOD GLUCOSE

## 2023-07-27 NOTE — PROGRESS NOTE ADULT - PROBLEM SELECTOR PLAN 2
Improved  - Appreciate pulm recs  - Duonebs [id number] ordered.   - Solumedrol tapered to 40mg Prednisone  - Continue  Symbicort  - Monitor oxygen saturation  - Currently on Room Air.
Patient follows with private Rheumatologist  Dr. Neri   CK level returned elevated trend: 235--> 468  - Continue Solumedrol [id number]  - Continue on MMP 1500mg daily and Plaquenil 400mg daily  - Rheumatology consulted will followup recommendations.
Patient follows with Dr. Neri  -Followup CK level  - Continue Solumedrol [id number]  - Continue on MMP 1500mg daily and Plaquenil 400mg daily
Patient follows with private Rheumatologist  Dr. Neri   - Rheum consulted, follow up recs  - Continue Myfortic 720mg BID and Plaquenil 400mg daily
Patient follows with private Rheumatologist  Dr. Neri   - Rheum consulted, follow up recs  - Continue Myfortic 720mg BID and Plaquenil 400mg daily
Symptoms present since last week with minimal improvement despite inhalers.   - Pulm consulted follow up recs  - Mesfin [id number] ordered.   - Solumedrol tapered to 40mg Prednisone  - Continue  Symbicort  - Monitor oxygen saturation  - Currently on Room Air.
IMproved  - Appreciate pulm recs  - Duonebs [id number] ordered.   - Solumedrol tapered to 40mg Prednisone  - Continue  Symbicort  - Monitor oxygen saturation  - Currently on Room Air.
Patient follows with private Rheumatologist  Dr. Neri   - Rheum consulted, follow up recs  - Continue Myfortic 720mg BID and Plaquenil 400mg daily
IMproved  - Appreciate pulm recs  - Duonebs [id number] ordered.   - Solumedrol tapered to 40mg Prednisone  - Continue  Symbicort  - Monitor oxygen saturation  - Currently on Room Air.

## 2023-07-31 ENCOUNTER — APPOINTMENT (OUTPATIENT)
Dept: INTERNAL MEDICINE | Facility: CLINIC | Age: 48
End: 2023-07-31

## 2023-07-31 ENCOUNTER — TRANSCRIPTION ENCOUNTER (OUTPATIENT)
Age: 48
End: 2023-07-31

## 2023-08-02 ENCOUNTER — RX RENEWAL (OUTPATIENT)
Age: 48
End: 2023-08-02

## 2023-08-07 ENCOUNTER — RX RENEWAL (OUTPATIENT)
Age: 48
End: 2023-08-07

## 2023-08-08 ENCOUNTER — APPOINTMENT (OUTPATIENT)
Dept: GASTROENTEROLOGY | Facility: CLINIC | Age: 48
End: 2023-08-08
Payer: COMMERCIAL

## 2023-08-08 VITALS
WEIGHT: 255 LBS | HEART RATE: 98 BPM | HEIGHT: 68 IN | SYSTOLIC BLOOD PRESSURE: 121 MMHG | DIASTOLIC BLOOD PRESSURE: 78 MMHG | OXYGEN SATURATION: 92 % | BODY MASS INDEX: 38.65 KG/M2 | TEMPERATURE: 97 F

## 2023-08-08 DIAGNOSIS — R13.10 DYSPHAGIA, UNSPECIFIED: ICD-10-CM

## 2023-08-08 PROCEDURE — 99214 OFFICE O/P EST MOD 30 MIN: CPT

## 2023-08-08 NOTE — HISTORY OF PRESENT ILLNESS
[FreeTextEntry1] : 48-year-old male Follow-up recent hospitalization Acute onset of nausea vomiting diarrhea in June while caring for one of his sick daughters Eventually vomiting resolved, persistent diarrhea, mainly after meals Denied any bleeding Repeat stool studies both as outpatient and inpatient showing norovirus Patient also developed respiratory virus necessitating hospitalization for asthma During hospitalization, not eating much, stool approximately once per day Patient still having about 1 bowel movement a day currently, but with excessive gas, sometimes explosive, 1 near accident   Patient with prior history of dysphagia noted Upper endoscopy with some suspicion for Candida Patient has yet to perform recommended barium swallow  Social history:

## 2023-08-08 NOTE — REASON FOR VISIT
[Post Hospitalization] : a post hospitalization visit [FreeTextEntry1] : Diarrhea, change of bowel habit, dysphagia

## 2023-08-08 NOTE — ASSESSMENT
[FreeTextEntry1] : Still some persistent diarrhea, change in bowel habit Complaints consistent with postinfectious irritable bowel Persistent mild dysphagia, cannot rule out motility disorder  Plan No further stool studies at this time No indication for colonoscopy at this time Recommend Pepto-Bismol as needed Return if complaints worsen Note: Patient denies any particular food intolerance, no obvious lactose intolerance since his recovery With regards to dysphagia, agreeable to esophagram, reordered Telephone follow-up when results are available

## 2023-08-10 ENCOUNTER — APPOINTMENT (OUTPATIENT)
Dept: INTERNAL MEDICINE | Facility: CLINIC | Age: 48
End: 2023-08-10
Payer: COMMERCIAL

## 2023-08-10 VITALS
WEIGHT: 255 LBS | SYSTOLIC BLOOD PRESSURE: 118 MMHG | OXYGEN SATURATION: 95 % | RESPIRATION RATE: 13 BRPM | BODY MASS INDEX: 38.65 KG/M2 | HEART RATE: 112 BPM | TEMPERATURE: 97.6 F | HEIGHT: 68 IN | DIASTOLIC BLOOD PRESSURE: 70 MMHG

## 2023-08-10 VITALS — SYSTOLIC BLOOD PRESSURE: 110 MMHG | DIASTOLIC BLOOD PRESSURE: 70 MMHG

## 2023-08-10 PROCEDURE — 36415 COLL VENOUS BLD VENIPUNCTURE: CPT

## 2023-08-10 PROCEDURE — 99215 OFFICE O/P EST HI 40 MIN: CPT | Mod: 25

## 2023-08-10 NOTE — HISTORY OF PRESENT ILLNESS
[FreeTextEntry1] :  Follow-up for hospitalization for sepsis and decompensated asthma [de-identified] : The patient is a 48-year-old male with history of asthma, SLE on prednisone, mycophenolate, history obesity, type 2 diabetes, sleep apnea, hypothyroidism, migraines, hypertension, hyperlipidemia, who presents for follow-up of hospitalization for sepsis and decompensated asthma.  Patient was admitted on July 17th when he suddenly became short of breath started on steroids ready to discharge however started vomiting hypotensive diaphoresis continued hospitalization for sepsis finally discharged on July 28 now presents for follow-up patient feels well notes some chest wall pain denies shortness of breath palpitation fever chills.  Patient finishing up Bactrim.

## 2023-08-10 NOTE — PHYSICAL EXAM
[Normal Outer Ear/Nose] : the outer ears and nose were normal in appearance [No JVD] : no jugular venous distention [No Lymphadenopathy] : no lymphadenopathy [Supple] : supple [Normal Supraclavicular Nodes] : no supraclavicular lymphadenopathy [Normal Posterior Cervical Nodes] : no posterior cervical lymphadenopathy [Normal Anterior Cervical Nodes] : no anterior cervical lymphadenopathy [Normal] : affect was normal and insight and judgment were intact [de-identified] : Cushingoid face

## 2023-08-10 NOTE — ASSESSMENT
[FreeTextEntry1] : Patient is a 48-year-old male with history of obesity, lupus, asthma, obstructive sleep apnea, obesity, type 2 diabetes, hypertension, hyperlipidemia history of rib fractures who presents for follow-up of hospitalizations for decompensated asthma and sepsis  1.  History of sepsis Clinically resolved afebrile check white count might be slightly high due to prednisone DC Bactrim  2 asthma Continue prednisone 10 mg and inhalers trilogy 200 mg/62.  25 1 elation daily Albuterol nebulizer or MDI as needed  3.  Hypertension Continue losartan 50 mg once a day  4.  Type 2 diabetes Continue Ozempic 0.5 we will consider increasing check hemoglobin A1c Continue Jardiance 10 mg once a day  5.  Hypothyroidism Continue levothyroxine 125 mcg daily check TFTs  6 obesity Counseled on diet using Ozempic for weight reduction in addition to diabetes management Follow-up in 1 month check routine labs drawn in office today  7 hyperlipidemia Consider statin or other medical therapy  8.  Edema Continue torsemide 20 mg once a day

## 2023-08-11 LAB
ANION GAP SERPL CALC-SCNC: 14 MMOL/L
BUN SERPL-MCNC: 10 MG/DL
CALCIUM SERPL-MCNC: 9.7 MG/DL
CHLORIDE SERPL-SCNC: 101 MMOL/L
CHOLEST SERPL-MCNC: 209 MG/DL
CO2 SERPL-SCNC: 27 MMOL/L
CREAT SERPL-MCNC: 0.99 MG/DL
EGFR: 95 ML/MIN/1.73M2
ESTIMATED AVERAGE GLUCOSE: 143 MG/DL
GLUCOSE SERPL-MCNC: 135 MG/DL
HBA1C MFR BLD HPLC: 6.6 %
HDLC SERPL-MCNC: 83 MG/DL
LDLC SERPL CALC-MCNC: 113 MG/DL
NONHDLC SERPL-MCNC: 126 MG/DL
POTASSIUM SERPL-SCNC: 4.2 MMOL/L
SODIUM SERPL-SCNC: 142 MMOL/L
T4 FREE SERPL-MCNC: 1.5 NG/DL
TRIGL SERPL-MCNC: 69 MG/DL
TSH SERPL-ACNC: 2.22 UIU/ML

## 2023-08-15 ENCOUNTER — APPOINTMENT (OUTPATIENT)
Dept: RHEUMATOLOGY | Facility: CLINIC | Age: 48
End: 2023-08-15
Payer: COMMERCIAL

## 2023-08-15 VITALS
DIASTOLIC BLOOD PRESSURE: 89 MMHG | SYSTOLIC BLOOD PRESSURE: 134 MMHG | HEIGHT: 68 IN | OXYGEN SATURATION: 97 % | WEIGHT: 255 LBS | RESPIRATION RATE: 16 BRPM | HEART RATE: 98 BPM | TEMPERATURE: 97.1 F | BODY MASS INDEX: 38.65 KG/M2

## 2023-08-15 PROCEDURE — 99215 OFFICE O/P EST HI 40 MIN: CPT

## 2023-08-15 RX ORDER — CELECOXIB 200 MG/1
200 CAPSULE ORAL
Qty: 15 | Refills: 1 | Status: DISCONTINUED | COMMUNITY
Start: 2023-02-22 | End: 2023-08-15

## 2023-08-15 RX ORDER — DULOXETINE HYDROCHLORIDE 30 MG/1
30 CAPSULE, DELAYED RELEASE PELLETS ORAL
Qty: 90 | Refills: 0 | Status: DISCONTINUED | COMMUNITY
Start: 2023-03-13 | End: 2023-08-15

## 2023-08-15 NOTE — HISTORY OF PRESENT ILLNESS
[FreeTextEntry1] : AMBERLY ESPITIA is a 48 year  old male, seen on today  for SLE: with OWEN 1:2560, + Sm + RNP + LA, low complement, + U1RNP and weak positive PM/.   Other pertinent medical problems (structural back disease, asthma, migraine)  recent hospitalization due to respiratory virus.   today he reports: diffuse pain  muscle pain and weakness  feels he can't walk well because of his weakness.  when he lies on the bed he has pain when he tried to get up from the bed.  he feels like his ribs compress when he is on the bed and he feels like he has to reposition his bones/body when he gets up.  this is in his bed and in hotel beds - unchanged by location  pains are in all the bones and muscles when he gets up from bed    Compression Fracture T7:  found on MRI 8-1-22, sx of back pain out of proportion to prior back pain (which was lumbar), slowly improving and pending either kyphoplasty or PT based upon how he does over time.  Since he is improving they are favoring PT.  No history of prior fractures  back pain is better in lower back  has mild pain in the upper back someitmes   Asthma: better controlled, worries about flares in the spring/summer season.   no fevers, no chills [History of Nephritis] : the patient has no history of nephritis [Currently Experiencing] : currently experiencing [Headache] : headache [Sicca] : sicca [Rash] : no rash [Chest Pain] : chest pain [Shortness of Breath] : shortness of breath [Abdominal Pain] : no abdominal pain [Psychological Symptoms] : psychological symptoms [Fatigue] : fatigue [Arthritis] : arthritis [Arthralgias] : arthralgias [Sun Sensitivity] : sun sensitivity [FreeTextEntry3] : 2019 [FreeTextEntry7] : OWEN 1:2560, + SM, + RNP, + LA, Low complement,  [FreeTextEntry4] : cellcept (infection), benlysta (allergy), saphenlo (recurrent infections)  [FreeTextEntry6] : Low TPMT + sicca  + oral ulcers  legs have rashes and stomach stretch marks are becoming red  back feels tense.  + joint pain in the legs and wrists

## 2023-08-15 NOTE — ASSESSMENT
[FreeTextEntry1] : AMBERLY ESPITIA is a 48 year  old male, seen on today  for myalgias and fatigue - unclear if due to hypothyroid , sle/mctd or mood disorder + U1rnp and weak positive PM/Sc100 OWEN 1:2560, +Sm, +RNP    1)   Lupus/inflammatory myositis overlap (abnormal Myomarker)  muscle fatigue, sun sensitivity and headaches. CK normalized (was elevated in 500's in 2019), dsdna + (normalized after benlysta)  continue   mg daily follow up optho given HCQ use.  Myfortic 360 BID is current dose and will increase to 1080 mg PO BID  lower prednisone from 10mg daily to 7.5mg daily for 2 weeks then 5mg daily for 2 weeks then 2.5mg daily for 2 weeks then d/c  sun sensiivity:  sun block or UPF protective clothing  Will check laboratory tests to look for markers of disease activity and also to assess for medication toxicity.  2)  Asthma - continue working with pulm   3)  COMPRESSION FRACTURE assoc. with cough  [] dexa (9-2022) Openia in spine  []  check vitamin d  [] minimize steroids     More than 50% of the encounter was spent counselling  AMBERLY OMKAR on differential, workup, disease course, and treatment/management.   Education was provided to AMBERLY ESPITIA during this encounter. All questions and concerns were answered and addressed in detail.  AMBERLY OMKAR verbalized understanding and agreed to plan  AMBERLY ESPITIA has been instructed to call for an earlier appointment if new symptoms develop  AMBERLY ESPITIA has been instructed to make a follow up appointment 4 weeks

## 2023-08-15 NOTE — DATA REVIEWED
[FreeTextEntry1] : Laboratory and radiology studies that were personally reviewed at today's visit are summarized below and above: \par  \par  CT chest (3-7-2023):  No pulmonary embolus is noted.  Marked loss of height of one of the midthoracic vertebral body has \par  increased when compared to previous exam.\par  3-7-2023:  CBC = wnl, K = 3.3, C3-126, c4=28, dsdna < 12 prot/cr = 0.1\par  \par  \par  \par  Pulm note (8-26-22)  felling better overall and planning PFT for 4-6 weeks. \par  7/27/22 dsdna <12\par  7/26/22 - rsv + \par  6/2022:  enterovirus/rhinovirus and parainfluenza + \par  MRI T spine (8-1-22) Moderate acute compressoin fracture of the superior endplate of the T7 vertebral body \par  CT head (11-9-21)  no infarct seen \par  neurology note/emg from 10-25-21 reviewed and overlap myositis in setting of sle \par  5-19-21:  MRI right thigh - no muscle edema moderate sized right knee joint effusion \par  5-19-21:  MRI Lumbar spine - diffuse spinal canal narrowing and mild disc bulge with mod spinal cnala stenosis \par  Duplex Venous LE - no evidence of DVT\par  3-19-21:  Echo: normal pericardium, nl EF\par  11-6-2020:  Knee Xray:  WNL\par  8-2020:  Head CT - WNL\par  \par  7-6-20:  cr = 1.2, cpk = 324, cbc  -okay , ua - okay, esr = 85 (increased from prior 43), prot/cr = 0.1, c3 = 120, c4 = 21, dsdna < 12,\par  6-2020:  tsh = 2.49, \par  \par  3/10/20:  myomarker with positive [id number], U1RNP and SSA52, ck - 212, c3/c4 wnl \par  \par  dsDNA positive in 2019 and now negative \par  \par  10-29-19:  CBC okay\par  \par  9-11-19:  ua and prot/cr - wnl \par  8-1-19 - c3/c4 WNL, dsdna = 54\par  NL G6PD\par  LOW TPMT\par  \par  ct sinus (10-21-19) : Minimal mucosal thickening involves the left frontal, anterior ethmoid and left maxillary sinuses.  Rightward deviation of the nasal septum with a right nasal spur narrowing the right nasal cavity.  Narrowing of the left OMU which may predispose to sinus outflow obstruction. \par  Small lucent lesion within the left maxilla favoring a benign fibro-osseous lesion. Follow-up sinus CT\par  advised as clinically warranted in 6 months to confirm stability.\par  \par  lumbar spine (10-21-19):  Developmental limbus vertebra noted at anterior superior aspect of L4 with \par  hypertrophic bony remodeling change. Chronic appearing well marginated slight concave contour depression of anterior aspect of L2 superior endplate.  Remaining vertebral body heights maintained. \par  No spondylolistheses spondylolysis defects or evidence for dynamic  instability. \par  Variable slightly narrowed disc spaces. Unremarkable SI joints and partially \par  visualized hips. \par  No lytic or blastic lesions. \par  Right upper quadrant surgical clips present. \par  head ct - 10-24-19:  Minimal mucosal thickening involves the left frontal, anterior ethmoid and \par  left maxillary sinuses.  Rightward deviation of the nasal septum with a right nasal spur narrowing \par  the right nasal cavity.  Narrowing of the left OMU which may predispose to sinus outflow obstruction. \par  Small lucent lesion within the left maxilla favoring a benign fibro-osseous  lesion. Follow-up sinus CT advised as clinically warranted in 6 months to  confirm stability. \par

## 2023-08-15 NOTE — PHYSICAL EXAM
[General Appearance - Alert] : alert [General Appearance - In No Acute Distress] : in no acute distress [General Appearance - Well Nourished] : well nourished [General Appearance - Well Developed] : well developed [Sclera] : the sclera and conjunctiva were normal [Neck Appearance] : the appearance of the neck was normal [Abnormal Walk] : normal gait [Musculoskeletal - Swelling] : no joint swelling seen [Motor Tone] : muscle strength and tone were normal [Skin Color & Pigmentation] : normal skin color and pigmentation [FreeTextEntry1] : + facial rash on bilateral cheeks (R>L) (mild), acne on back  [Oriented To Time, Place, And Person] : oriented to person, place, and time

## 2023-08-17 ENCOUNTER — APPOINTMENT (OUTPATIENT)
Dept: PULMONOLOGY | Facility: CLINIC | Age: 48
End: 2023-08-17
Payer: COMMERCIAL

## 2023-08-17 VITALS
DIASTOLIC BLOOD PRESSURE: 84 MMHG | BODY MASS INDEX: 38.65 KG/M2 | WEIGHT: 255 LBS | HEART RATE: 95 BPM | OXYGEN SATURATION: 98 % | SYSTOLIC BLOOD PRESSURE: 118 MMHG | TEMPERATURE: 96.8 F | HEIGHT: 68 IN

## 2023-08-17 DIAGNOSIS — Z79.899 IMMUNODEFICIENCY DUE TO DRUGS: ICD-10-CM

## 2023-08-17 DIAGNOSIS — D84.821 IMMUNODEFICIENCY DUE TO DRUGS: ICD-10-CM

## 2023-08-17 PROCEDURE — 99214 OFFICE O/P EST MOD 30 MIN: CPT

## 2023-08-17 NOTE — ASSESSMENT
[FreeTextEntry1] : 48 year old male MTA  Hx Lupus, myositis, immunosuppressed, recent hospitalization for asthma exacerbation, enterovirus infection  Continue Trelegy 200-25 mcg daily Continue prednisone per Rheumatology  Continue Famotidine 40 mg daily Follow up Rheumatology FOllow PCP for GERD, may need GI evaluation Follow up Behavioral Health Will consider addition of Dupixent therapy  Follow up 4-6 weeks

## 2023-08-17 NOTE — HISTORY OF PRESENT ILLNESS
[Never] : never [TextBox_4] : 48 year old male Hx Hx Lupus, asthma, Lupus myositis, presents for follow up. Patient recently hospitalized for acute asthma exacerbation.  He developed enterovirus while hospitlalized.  Patient continue on Cell Cept and prednisone 7.5 mg.  He is concerned about repeated episodes of shpshortness of breath.  He is here for follow up.

## 2023-08-17 NOTE — COUNSELING
[Other: ____] : [unfilled] [Good understanding] : Patient has a good understanding of lifestyle changes and steps needed to achieve self management goal [de-identified] : Anxiety

## 2023-08-19 ENCOUNTER — TRANSCRIPTION ENCOUNTER (OUTPATIENT)
Age: 48
End: 2023-08-19

## 2023-08-20 ENCOUNTER — TRANSCRIPTION ENCOUNTER (OUTPATIENT)
Age: 48
End: 2023-08-20

## 2023-08-27 LAB
ALBUMIN SERPL ELPH-MCNC: 4.3 G/DL
ALP BLD-CCNC: 100 U/L
ALT SERPL-CCNC: 28 U/L
ANION GAP SERPL CALC-SCNC: 13 MMOL/L
APPEARANCE: CLEAR
AST SERPL-CCNC: 24 U/L
BACTERIA: NEGATIVE /HPF
BILIRUB SERPL-MCNC: 0.3 MG/DL
BILIRUBIN URINE: NEGATIVE
BLOOD URINE: NEGATIVE
BUN SERPL-MCNC: 5 MG/DL
C3 SERPL-MCNC: 149 MG/DL
CALCIUM SERPL-MCNC: 9.4 MG/DL
CAST: 0 /LPF
CHLORIDE SERPL-SCNC: 104 MMOL/L
CK SERPL-CCNC: 164 U/L
CO2 SERPL-SCNC: 25 MMOL/L
COLOR: YELLOW
CREAT SERPL-MCNC: 0.94 MG/DL
CREAT SPEC-SCNC: 51 MG/DL
CREAT/PROT UR: 0.1 RATIO
DSDNA AB SER-ACNC: <12 IU/ML
EGFR: 101 ML/MIN/1.73M2
EPITHELIAL CELLS: 0 /HPF
GLUCOSE QUALITATIVE U: NEGATIVE MG/DL
GLUCOSE SERPL-MCNC: 73 MG/DL
KETONES URINE: NEGATIVE MG/DL
LEUKOCYTE ESTERASE URINE: NEGATIVE
MICROSCOPIC-UA: NORMAL
MYOGLOBIN SERPL-MCNC: 39 NG/ML
NITRITE URINE: NEGATIVE
PH URINE: 6.5
POTASSIUM SERPL-SCNC: 3.7 MMOL/L
PROT SERPL-MCNC: 6.7 G/DL
PROT UR-MCNC: 4 MG/DL
PROTEIN URINE: NEGATIVE MG/DL
RED BLOOD CELLS URINE: 0 /HPF
SODIUM SERPL-SCNC: 142 MMOL/L
SPECIFIC GRAVITY URINE: 1.01
UROBILINOGEN URINE: 0.2 MG/DL
WHITE BLOOD CELLS URINE: 0 /HPF

## 2023-08-28 ENCOUNTER — TRANSCRIPTION ENCOUNTER (OUTPATIENT)
Age: 48
End: 2023-08-28

## 2023-08-28 LAB — ALDOLASE SERPL-CCNC: 5.5 U/L

## 2023-08-29 ENCOUNTER — TRANSCRIPTION ENCOUNTER (OUTPATIENT)
Age: 48
End: 2023-08-29

## 2023-09-06 NOTE — PROGRESS NOTE ADULT - PROBLEM SELECTOR PLAN 5
Endorses one month history of nonblood, nonwatery diarrhea  (+) result for Norovirus on GI PCR  - Monitor clinically for change in frequency/consistency of stool.
123.8
-Continue Losartan
Endorses one month history of nonblood, nonwatery diarrhea  (+) result for Norovirus on GI PCR  - Monitor clinically for change in frequency/consistency of stool
Endorses one month history of nonblood, nonwatery diarrhea  (+) result for Norovirus on GI PCR  - Monitor clinically for change in frequency/consistency of stool.
Endorses one month history of nonblood, nonwatery diarrhea  (+) result for Norovirus on GI PCR  - Monitor clinically for change in frequency/consistency of stool.
-Continue Losartan
Endorses one month history of nonblood, nonwatery diarrhea  (+) result for Norovirus on GI PCR  - Monitor clinically for change in frequency/consistency of stool.
-Continue Losartan
-Continue Losartan

## 2023-09-13 ENCOUNTER — TRANSCRIPTION ENCOUNTER (OUTPATIENT)
Age: 48
End: 2023-09-13

## 2023-09-14 ENCOUNTER — APPOINTMENT (OUTPATIENT)
Dept: INTERNAL MEDICINE | Facility: CLINIC | Age: 48
End: 2023-09-14
Payer: COMMERCIAL

## 2023-09-14 VITALS
SYSTOLIC BLOOD PRESSURE: 128 MMHG | HEART RATE: 111 BPM | OXYGEN SATURATION: 96 % | RESPIRATION RATE: 14 BRPM | DIASTOLIC BLOOD PRESSURE: 76 MMHG | TEMPERATURE: 98.7 F

## 2023-09-14 VITALS — SYSTOLIC BLOOD PRESSURE: 130 MMHG | DIASTOLIC BLOOD PRESSURE: 78 MMHG

## 2023-09-14 DIAGNOSIS — K52.9 NONINFECTIVE GASTROENTERITIS AND COLITIS, UNSPECIFIED: ICD-10-CM

## 2023-09-14 DIAGNOSIS — A08.11 ACUTE GASTROENTEROPATHY DUE TO NORWALK AGENT: ICD-10-CM

## 2023-09-14 DIAGNOSIS — Z86.39 PERSONAL HISTORY OF OTHER ENDOCRINE, NUTRITIONAL AND METABOLIC DISEASE: ICD-10-CM

## 2023-09-14 DIAGNOSIS — Z87.898 PERSONAL HISTORY OF OTHER SPECIFIED CONDITIONS: ICD-10-CM

## 2023-09-14 PROCEDURE — 99214 OFFICE O/P EST MOD 30 MIN: CPT

## 2023-09-15 NOTE — PROGRESS NOTE ADULT - PROBLEM SELECTOR PLAN 3
tested positive as an outpt 1/5, still COVID +now  -S/p MAB 1/6  -No indication for Remdesivir.
pulm/id following  considered asymptomatic  pt received mab earlier in illness
tested positive as an outpt 1/5, still COVID +now  -S/p MAB 1/6  -No indication for Remdesivir
tested positive as an outpt 1/5, still COVID 1/26  -S/p MAB 1/6  -No indication for Remdesivir  -COVID negative on 2/2
pulm/id following  considered asymptomatic  pt received mab earlier in illness
tested positive as an outpt 1/5, still COVID +now  -S/p MAB 1/6  -No indication for Remdesivir
tested positive as an outpt 1/5, still COVID +now  -S/p MAB 1/6  -No indication for Remdesivir
pulm/id following  considered asymptomatic  pt received mab earlier in illness
tested positive as an outpt 1/5, still COVID +now  -S/p MAB 1/6  -No indication for Remdesivir.
tested positive as an outpt 1/5, still COVID +now  -S/p MAB 1/6  -No indication for Remdesivir
tested positive as an outpt 1/5, still COVID +now  -S/p MAB 1/6  -No indication for Remdesivir
pulm/id following  considered asymptomatic  pt received mab earlier in illness
pulm/id following  considered asymptomatic  pt received mab earlier in illness
On Plaquenil and Cellcept as well as chronic steroids.   cellcept increased to full dose as per rheum   pt tp get Benlysta infusion as an outpt, as pt missed his outpt January dose  will check UA and urine protein/cr ratio  outpatient neurology eval for headache   on fluconazole for thrush
Clothing

## 2023-09-20 ENCOUNTER — TRANSCRIPTION ENCOUNTER (OUTPATIENT)
Age: 48
End: 2023-09-20

## 2023-09-22 ENCOUNTER — APPOINTMENT (OUTPATIENT)
Dept: ORTHOPEDIC SURGERY | Facility: CLINIC | Age: 48
End: 2023-09-22
Payer: COMMERCIAL

## 2023-09-22 ENCOUNTER — APPOINTMENT (OUTPATIENT)
Dept: PULMONOLOGY | Facility: CLINIC | Age: 48
End: 2023-09-22
Payer: COMMERCIAL

## 2023-09-22 ENCOUNTER — TRANSCRIPTION ENCOUNTER (OUTPATIENT)
Age: 48
End: 2023-09-22

## 2023-09-22 VITALS
DIASTOLIC BLOOD PRESSURE: 78 MMHG | TEMPERATURE: 97.1 F | SYSTOLIC BLOOD PRESSURE: 115 MMHG | OXYGEN SATURATION: 98 % | HEART RATE: 98 BPM | WEIGHT: 261 LBS | HEIGHT: 68 IN | BODY MASS INDEX: 39.56 KG/M2

## 2023-09-22 VITALS
WEIGHT: 261 LBS | BODY MASS INDEX: 39.56 KG/M2 | TEMPERATURE: 98.7 F | HEIGHT: 68 IN | SYSTOLIC BLOOD PRESSURE: 120 MMHG | HEART RATE: 105 BPM | DIASTOLIC BLOOD PRESSURE: 80 MMHG | OXYGEN SATURATION: 99 %

## 2023-09-22 DIAGNOSIS — G70.89 OTHER SPECIFIED MYONEURAL DISORDERS: ICD-10-CM

## 2023-09-22 DIAGNOSIS — J99 OTHER SPECIFIED MYONEURAL DISORDERS: ICD-10-CM

## 2023-09-22 DIAGNOSIS — I73.00 RAYNAUD'S SYNDROME W/OUT GANGRENE: ICD-10-CM

## 2023-09-22 DIAGNOSIS — M25.541 PAIN IN JOINTS OF RIGHT HAND: ICD-10-CM

## 2023-09-22 PROCEDURE — 99214 OFFICE O/P EST MOD 30 MIN: CPT

## 2023-09-22 PROCEDURE — 73110 X-RAY EXAM OF WRIST: CPT | Mod: RT

## 2023-09-22 PROCEDURE — 99214 OFFICE O/P EST MOD 30 MIN: CPT | Mod: 25

## 2023-09-22 PROCEDURE — 73130 X-RAY EXAM OF HAND: CPT | Mod: RT

## 2023-09-25 ENCOUNTER — APPOINTMENT (OUTPATIENT)
Dept: ORTHOPEDIC SURGERY | Facility: CLINIC | Age: 48
End: 2023-09-25

## 2023-09-26 ENCOUNTER — APPOINTMENT (OUTPATIENT)
Dept: RHEUMATOLOGY | Facility: CLINIC | Age: 48
End: 2023-09-26
Payer: COMMERCIAL

## 2023-09-26 VITALS
DIASTOLIC BLOOD PRESSURE: 77 MMHG | RESPIRATION RATE: 16 BRPM | HEART RATE: 108 BPM | HEIGHT: 68 IN | TEMPERATURE: 97.1 F | BODY MASS INDEX: 39.56 KG/M2 | OXYGEN SATURATION: 98 % | WEIGHT: 261 LBS | SYSTOLIC BLOOD PRESSURE: 132 MMHG

## 2023-09-26 PROCEDURE — 99215 OFFICE O/P EST HI 40 MIN: CPT

## 2023-09-27 ENCOUNTER — TRANSCRIPTION ENCOUNTER (OUTPATIENT)
Age: 48
End: 2023-09-27

## 2023-09-27 LAB
ALBUMIN SERPL ELPH-MCNC: 4.6 G/DL
ALP BLD-CCNC: 98 U/L
ALT SERPL-CCNC: 22 U/L
ANION GAP SERPL CALC-SCNC: 14 MMOL/L
APPEARANCE: CLEAR
AST SERPL-CCNC: 27 U/L
BACTERIA: NEGATIVE /HPF
BASOPHILS # BLD AUTO: 0.04 K/UL
BASOPHILS NFR BLD AUTO: 0.4 %
BILIRUB SERPL-MCNC: 0.4 MG/DL
BILIRUBIN URINE: NEGATIVE
BLOOD URINE: NEGATIVE
BUN SERPL-MCNC: 8 MG/DL
C3 SERPL-MCNC: 153 MG/DL
C4 SERPL-MCNC: 30 MG/DL
CALCIUM SERPL-MCNC: 10.1 MG/DL
CAST: 0 /LPF
CHLORIDE SERPL-SCNC: 101 MMOL/L
CK SERPL-CCNC: 234 U/L
CO2 SERPL-SCNC: 25 MMOL/L
COLOR: YELLOW
CREAT SERPL-MCNC: 1.05 MG/DL
CREAT SPEC-SCNC: 208 MG/DL
CREAT/PROT UR: 0.1 RATIO
DSDNA AB SER-ACNC: <12 IU/ML
EGFR: 88 ML/MIN/1.73M2
EOSINOPHIL # BLD AUTO: 0.11 K/UL
EOSINOPHIL NFR BLD AUTO: 1 %
EPITHELIAL CELLS: 0 /HPF
ERYTHROCYTE [SEDIMENTATION RATE] IN BLOOD BY WESTERGREN METHOD: 40 MM/HR
GLUCOSE QUALITATIVE U: NEGATIVE MG/DL
GLUCOSE SERPL-MCNC: 91 MG/DL
HCT VFR BLD CALC: 42.4 %
HGB BLD-MCNC: 13.7 G/DL
IGA SER QL IEP: 357 MG/DL
IGG SER QL IEP: 898 MG/DL
IGM SER QL IEP: 74 MG/DL
IMM GRANULOCYTES NFR BLD AUTO: 0.5 %
KETONES URINE: NEGATIVE MG/DL
LEUKOCYTE ESTERASE URINE: NEGATIVE
LYMPHOCYTES # BLD AUTO: 1.99 K/UL
LYMPHOCYTES NFR BLD AUTO: 18.6 %
MAN DIFF?: NORMAL
MCHC RBC-ENTMCNC: 27.7 PG
MCHC RBC-ENTMCNC: 32.3 GM/DL
MCV RBC AUTO: 85.8 FL
MICROSCOPIC-UA: NORMAL
MONOCYTES # BLD AUTO: 1.2 K/UL
MONOCYTES NFR BLD AUTO: 11.2 %
NEUTROPHILS # BLD AUTO: 7.33 K/UL
NEUTROPHILS NFR BLD AUTO: 68.3 %
NITRITE URINE: NEGATIVE
PH URINE: 6
PLATELET # BLD AUTO: 440 K/UL
POTASSIUM SERPL-SCNC: 3.8 MMOL/L
PROT SERPL-MCNC: 7.3 G/DL
PROT UR-MCNC: 16 MG/DL
PROTEIN URINE: NORMAL MG/DL
RBC # BLD: 4.94 M/UL
RBC # FLD: 14.6 %
RED BLOOD CELLS URINE: 1 /HPF
SODIUM SERPL-SCNC: 140 MMOL/L
SPECIFIC GRAVITY URINE: 1.02
UROBILINOGEN URINE: 0.2 MG/DL
WBC # FLD AUTO: 10.72 K/UL
WHITE BLOOD CELLS URINE: 0 /HPF

## 2023-09-28 NOTE — ED PROVIDER NOTE - IV ALTEPLASE EXCL ABS HIDDEN
Spoke with Praveen, we discussed the below recommendations. She is agreeable to KELLY/DCCV 1st available. Case request sent.    show

## 2023-09-29 ENCOUNTER — RX RENEWAL (OUTPATIENT)
Age: 48
End: 2023-09-29

## 2023-10-01 ENCOUNTER — RX RENEWAL (OUTPATIENT)
Age: 48
End: 2023-10-01

## 2023-10-06 RX ORDER — DUPILUMAB 300 MG/2ML
300 INJECTION, SOLUTION SUBCUTANEOUS
Qty: 1 | Refills: 3 | Status: ACTIVE | COMMUNITY
Start: 2023-09-27 | End: 1900-01-01

## 2023-10-06 RX ORDER — DUPILUMAB 300 MG/2ML
300 INJECTION, SOLUTION SUBCUTANEOUS
Qty: 1 | Refills: 6 | Status: ACTIVE | COMMUNITY
Start: 2023-09-27 | End: 1900-01-01

## 2023-10-07 ENCOUNTER — OUTPATIENT (OUTPATIENT)
Dept: OUTPATIENT SERVICES | Facility: HOSPITAL | Age: 48
LOS: 1 days | End: 2023-10-07
Payer: COMMERCIAL

## 2023-10-07 ENCOUNTER — APPOINTMENT (OUTPATIENT)
Dept: RADIOLOGY | Facility: IMAGING CENTER | Age: 48
End: 2023-10-07
Payer: COMMERCIAL

## 2023-10-07 DIAGNOSIS — S22.060A WEDGE COMPRESSION FRACTURE OF T7-T8 VERTEBRA, INITIAL ENCOUNTER FOR CLOSED FRACTURE: ICD-10-CM

## 2023-10-07 DIAGNOSIS — Z90.49 ACQUIRED ABSENCE OF OTHER SPECIFIED PARTS OF DIGESTIVE TRACT: Chronic | ICD-10-CM

## 2023-10-07 PROCEDURE — 72110 X-RAY EXAM L-2 SPINE 4/>VWS: CPT | Mod: 26

## 2023-10-07 PROCEDURE — 72070 X-RAY EXAM THORAC SPINE 2VWS: CPT

## 2023-10-07 PROCEDURE — 72070 X-RAY EXAM THORAC SPINE 2VWS: CPT | Mod: 26

## 2023-10-07 PROCEDURE — 72110 X-RAY EXAM L-2 SPINE 4/>VWS: CPT

## 2023-10-09 ENCOUNTER — TRANSCRIPTION ENCOUNTER (OUTPATIENT)
Age: 48
End: 2023-10-09

## 2023-10-10 ENCOUNTER — TRANSCRIPTION ENCOUNTER (OUTPATIENT)
Age: 48
End: 2023-10-10

## 2023-10-11 ENCOUNTER — TRANSCRIPTION ENCOUNTER (OUTPATIENT)
Age: 48
End: 2023-10-11

## 2023-10-16 LAB
EJ AB SER QL: NEGATIVE
ENA JO1 AB SER IA-ACNC: <20 UNITS
ENA PM/SCL AB SER-ACNC: <20 UNITS
ENA SM+RNP AB SER IA-ACNC: 81 UNITS
ENA SS-A IGG SER QL: <20 UNITS
FIBRILLARIN AB SER QL: NEGATIVE
KU AB SER QL: NEGATIVE
MDA-5 (P140)(CADM-140): <20 UNITS
MI2 AB SER QL: NEGATIVE
NXP-2 (P140): <20 UNITS
OJ AB SER QL: NEGATIVE
PL12 AB SER QL: NEGATIVE
PL7 AB SER QL: NEGATIVE
SRP AB SERPL QL: NEGATIVE
TIF GAMMA (P155/140): <20 UNITS
U2 SNRNP AB SER QL: ABNORMAL

## 2023-10-29 ENCOUNTER — RX RENEWAL (OUTPATIENT)
Age: 48
End: 2023-10-29

## 2023-10-29 RX ORDER — ALBUTEROL SULFATE 2.5 MG/3ML
(2.5 MG/3ML) SOLUTION RESPIRATORY (INHALATION)
Qty: 270 | Refills: 1 | Status: ACTIVE | COMMUNITY
Start: 2023-05-12 | End: 1900-01-01

## 2023-10-30 RX ORDER — LOSARTAN POTASSIUM 50 MG/1
50 TABLET, FILM COATED ORAL
Qty: 90 | Refills: 3 | Status: ACTIVE | COMMUNITY
Start: 2021-04-05 | End: 1900-01-01

## 2023-11-01 ENCOUNTER — APPOINTMENT (OUTPATIENT)
Dept: PULMONOLOGY | Facility: CLINIC | Age: 48
End: 2023-11-01
Payer: COMMERCIAL

## 2023-11-06 ENCOUNTER — RX RENEWAL (OUTPATIENT)
Age: 48
End: 2023-11-06

## 2023-11-07 ENCOUNTER — APPOINTMENT (OUTPATIENT)
Dept: RHEUMATOLOGY | Facility: CLINIC | Age: 48
End: 2023-11-07
Payer: COMMERCIAL

## 2023-11-07 VITALS
DIASTOLIC BLOOD PRESSURE: 80 MMHG | OXYGEN SATURATION: 98 % | RESPIRATION RATE: 16 BRPM | HEART RATE: 111 BPM | SYSTOLIC BLOOD PRESSURE: 112 MMHG | HEIGHT: 68 IN | WEIGHT: 261 LBS | TEMPERATURE: 98.3 F | BODY MASS INDEX: 39.56 KG/M2

## 2023-11-07 DIAGNOSIS — R68.89 OTHER GENERAL SYMPTOMS AND SIGNS: ICD-10-CM

## 2023-11-07 PROCEDURE — 99214 OFFICE O/P EST MOD 30 MIN: CPT

## 2023-11-08 ENCOUNTER — TRANSCRIPTION ENCOUNTER (OUTPATIENT)
Age: 48
End: 2023-11-08

## 2023-11-08 LAB
25(OH)D3 SERPL-MCNC: 25 NG/ML
ALBUMIN SERPL ELPH-MCNC: 4.3 G/DL
ALP BLD-CCNC: 102 U/L
ALT SERPL-CCNC: 21 U/L
ANION GAP SERPL CALC-SCNC: 13 MMOL/L
APPEARANCE: CLEAR
AST SERPL-CCNC: 24 U/L
BACTERIA: NEGATIVE /HPF
BASOPHILS # BLD AUTO: 0.04 K/UL
BASOPHILS NFR BLD AUTO: 0.4 %
BILIRUB SERPL-MCNC: 0.3 MG/DL
BILIRUBIN URINE: NEGATIVE
BLOOD URINE: NEGATIVE
BUN SERPL-MCNC: 9 MG/DL
C3 SERPL-MCNC: 135 MG/DL
C4 SERPL-MCNC: 25 MG/DL
CALCIUM SERPL-MCNC: 9.5 MG/DL
CAST: 0 /LPF
CHLORIDE SERPL-SCNC: 101 MMOL/L
CO2 SERPL-SCNC: 26 MMOL/L
COLOR: YELLOW
CREAT SERPL-MCNC: 1.08 MG/DL
CREAT SPEC-SCNC: 120 MG/DL
CREAT/PROT UR: 0.1 RATIO
EGFR: 85 ML/MIN/1.73M2
EOSINOPHIL # BLD AUTO: 0.18 K/UL
EOSINOPHIL NFR BLD AUTO: 1.7 %
EPITHELIAL CELLS: 0 /HPF
ERYTHROCYTE [SEDIMENTATION RATE] IN BLOOD BY WESTERGREN METHOD: 28 MM/HR
GLUCOSE QUALITATIVE U: NEGATIVE MG/DL
GLUCOSE SERPL-MCNC: 129 MG/DL
HCT VFR BLD CALC: 39.7 %
HGB BLD-MCNC: 12.6 G/DL
IMM GRANULOCYTES NFR BLD AUTO: 0.5 %
KETONES URINE: NEGATIVE MG/DL
LEUKOCYTE ESTERASE URINE: NEGATIVE
LYMPHOCYTES # BLD AUTO: 1.96 K/UL
LYMPHOCYTES NFR BLD AUTO: 18.8 %
MAN DIFF?: NORMAL
MCHC RBC-ENTMCNC: 26.7 PG
MCHC RBC-ENTMCNC: 31.7 GM/DL
MCV RBC AUTO: 84.1 FL
MICROSCOPIC-UA: NORMAL
MONOCYTES # BLD AUTO: 1.11 K/UL
MONOCYTES NFR BLD AUTO: 10.7 %
NEUTROPHILS # BLD AUTO: 7.06 K/UL
NEUTROPHILS NFR BLD AUTO: 67.9 %
NITRITE URINE: NEGATIVE
PH URINE: 6.5
PLATELET # BLD AUTO: 351 K/UL
POTASSIUM SERPL-SCNC: 3.5 MMOL/L
PROT SERPL-MCNC: 6.7 G/DL
PROT UR-MCNC: 10 MG/DL
PROTEIN URINE: NEGATIVE MG/DL
RBC # BLD: 4.72 M/UL
RBC # FLD: 14.5 %
RED BLOOD CELLS URINE: 1 /HPF
SODIUM SERPL-SCNC: 140 MMOL/L
SPECIFIC GRAVITY URINE: 1.01
T3 SERPL-MCNC: 136 NG/DL
T4 FREE SERPL-MCNC: 1.4 NG/DL
TSH SERPL-ACNC: 2.49 UIU/ML
UROBILINOGEN URINE: 0.2 MG/DL
WBC # FLD AUTO: 10.4 K/UL
WHITE BLOOD CELLS URINE: 0 /HPF

## 2023-11-10 LAB — DSDNA AB SER-ACNC: <12 IU/ML

## 2023-11-13 ENCOUNTER — RX RENEWAL (OUTPATIENT)
Age: 48
End: 2023-11-13

## 2023-11-15 ENCOUNTER — RX RENEWAL (OUTPATIENT)
Age: 48
End: 2023-11-15

## 2023-11-16 RX ORDER — EPINEPHRINE 0.3 MG/.3ML
0.3 INJECTION INTRAMUSCULAR
Qty: 1 | Refills: 1 | Status: ACTIVE | COMMUNITY
Start: 2023-11-16 | End: 1900-01-01

## 2023-11-18 ENCOUNTER — EMERGENCY (EMERGENCY)
Facility: HOSPITAL | Age: 48
LOS: 1 days | Discharge: HOME | End: 2023-11-18
Attending: EMERGENCY MEDICINE
Payer: COMMERCIAL

## 2023-11-18 VITALS
HEART RATE: 90 BPM | RESPIRATION RATE: 18 BRPM | DIASTOLIC BLOOD PRESSURE: 80 MMHG | TEMPERATURE: 98 F | SYSTOLIC BLOOD PRESSURE: 122 MMHG | OXYGEN SATURATION: 99 %

## 2023-11-18 VITALS
SYSTOLIC BLOOD PRESSURE: 124 MMHG | RESPIRATION RATE: 18 BRPM | TEMPERATURE: 98 F | WEIGHT: 259.93 LBS | OXYGEN SATURATION: 97 % | HEIGHT: 68 IN | DIASTOLIC BLOOD PRESSURE: 85 MMHG | HEART RATE: 93 BPM

## 2023-11-18 DIAGNOSIS — Z90.49 ACQUIRED ABSENCE OF OTHER SPECIFIED PARTS OF DIGESTIVE TRACT: Chronic | ICD-10-CM

## 2023-11-18 LAB
ALBUMIN SERPL ELPH-MCNC: 3.9 G/DL — SIGNIFICANT CHANGE UP (ref 3.3–5)
ALBUMIN SERPL ELPH-MCNC: 3.9 G/DL — SIGNIFICANT CHANGE UP (ref 3.3–5)
ALP SERPL-CCNC: 91 U/L — SIGNIFICANT CHANGE UP (ref 40–120)
ALP SERPL-CCNC: 91 U/L — SIGNIFICANT CHANGE UP (ref 40–120)
ALT FLD-CCNC: 26 U/L — SIGNIFICANT CHANGE UP (ref 10–45)
ALT FLD-CCNC: 26 U/L — SIGNIFICANT CHANGE UP (ref 10–45)
AST SERPL-CCNC: 30 U/L — SIGNIFICANT CHANGE UP (ref 10–40)
AST SERPL-CCNC: 30 U/L — SIGNIFICANT CHANGE UP (ref 10–40)
BASOPHILS # BLD AUTO: 0.04 K/UL — SIGNIFICANT CHANGE UP (ref 0–0.2)
BASOPHILS # BLD AUTO: 0.04 K/UL — SIGNIFICANT CHANGE UP (ref 0–0.2)
BASOPHILS NFR BLD AUTO: 0.4 % — SIGNIFICANT CHANGE UP (ref 0–2)
BASOPHILS NFR BLD AUTO: 0.4 % — SIGNIFICANT CHANGE UP (ref 0–2)
BILIRUB SERPL-MCNC: 0.5 MG/DL — SIGNIFICANT CHANGE UP (ref 0.2–1.2)
BILIRUB SERPL-MCNC: 0.5 MG/DL — SIGNIFICANT CHANGE UP (ref 0.2–1.2)
BUN SERPL-MCNC: 6 MG/DL — LOW (ref 7–23)
BUN SERPL-MCNC: 6 MG/DL — LOW (ref 7–23)
CALCIUM SERPL-MCNC: 9.5 MG/DL — SIGNIFICANT CHANGE UP (ref 8.4–10.5)
CALCIUM SERPL-MCNC: 9.5 MG/DL — SIGNIFICANT CHANGE UP (ref 8.4–10.5)
CHLORIDE SERPL-SCNC: 103 MMOL/L — SIGNIFICANT CHANGE UP (ref 96–108)
CHLORIDE SERPL-SCNC: 103 MMOL/L — SIGNIFICANT CHANGE UP (ref 96–108)
CO2 SERPL-SCNC: 24 MMOL/L — SIGNIFICANT CHANGE UP (ref 22–31)
CO2 SERPL-SCNC: 24 MMOL/L — SIGNIFICANT CHANGE UP (ref 22–31)
CREAT SERPL-MCNC: 0.96 MG/DL — SIGNIFICANT CHANGE UP (ref 0.5–1.3)
CREAT SERPL-MCNC: 0.96 MG/DL — SIGNIFICANT CHANGE UP (ref 0.5–1.3)
EGFR: 98 ML/MIN/1.73M2 — SIGNIFICANT CHANGE UP
EGFR: 98 ML/MIN/1.73M2 — SIGNIFICANT CHANGE UP
EOSINOPHIL # BLD AUTO: 0.2 K/UL — SIGNIFICANT CHANGE UP (ref 0–0.5)
EOSINOPHIL # BLD AUTO: 0.2 K/UL — SIGNIFICANT CHANGE UP (ref 0–0.5)
EOSINOPHIL NFR BLD AUTO: 2.2 % — SIGNIFICANT CHANGE UP (ref 0–6)
EOSINOPHIL NFR BLD AUTO: 2.2 % — SIGNIFICANT CHANGE UP (ref 0–6)
FLUAV AG NPH QL: SIGNIFICANT CHANGE UP
FLUAV AG NPH QL: SIGNIFICANT CHANGE UP
FLUBV AG NPH QL: SIGNIFICANT CHANGE UP
FLUBV AG NPH QL: SIGNIFICANT CHANGE UP
GLUCOSE SERPL-MCNC: 115 MG/DL — HIGH (ref 70–99)
GLUCOSE SERPL-MCNC: 115 MG/DL — HIGH (ref 70–99)
HCT VFR BLD CALC: 37.9 % — LOW (ref 39–50)
HCT VFR BLD CALC: 37.9 % — LOW (ref 39–50)
HGB BLD-MCNC: 12.2 G/DL — LOW (ref 13–17)
HGB BLD-MCNC: 12.2 G/DL — LOW (ref 13–17)
IMM GRANULOCYTES NFR BLD AUTO: 0.3 % — SIGNIFICANT CHANGE UP (ref 0–0.9)
IMM GRANULOCYTES NFR BLD AUTO: 0.3 % — SIGNIFICANT CHANGE UP (ref 0–0.9)
LACTATE BLDV-MCNC: 1.6 MMOL/L — SIGNIFICANT CHANGE UP (ref 0.5–2)
LACTATE BLDV-MCNC: 1.6 MMOL/L — SIGNIFICANT CHANGE UP (ref 0.5–2)
LYMPHOCYTES # BLD AUTO: 1.48 K/UL — SIGNIFICANT CHANGE UP (ref 1–3.3)
LYMPHOCYTES # BLD AUTO: 1.48 K/UL — SIGNIFICANT CHANGE UP (ref 1–3.3)
LYMPHOCYTES # BLD AUTO: 16.2 % — SIGNIFICANT CHANGE UP (ref 13–44)
LYMPHOCYTES # BLD AUTO: 16.2 % — SIGNIFICANT CHANGE UP (ref 13–44)
MCHC RBC-ENTMCNC: 26.8 PG — LOW (ref 27–34)
MCHC RBC-ENTMCNC: 26.8 PG — LOW (ref 27–34)
MCHC RBC-ENTMCNC: 32.2 GM/DL — SIGNIFICANT CHANGE UP (ref 32–36)
MCHC RBC-ENTMCNC: 32.2 GM/DL — SIGNIFICANT CHANGE UP (ref 32–36)
MCV RBC AUTO: 83.3 FL — SIGNIFICANT CHANGE UP (ref 80–100)
MCV RBC AUTO: 83.3 FL — SIGNIFICANT CHANGE UP (ref 80–100)
MONOCYTES # BLD AUTO: 0.79 K/UL — SIGNIFICANT CHANGE UP (ref 0–0.9)
MONOCYTES # BLD AUTO: 0.79 K/UL — SIGNIFICANT CHANGE UP (ref 0–0.9)
MONOCYTES NFR BLD AUTO: 8.7 % — SIGNIFICANT CHANGE UP (ref 2–14)
MONOCYTES NFR BLD AUTO: 8.7 % — SIGNIFICANT CHANGE UP (ref 2–14)
NEUTROPHILS # BLD AUTO: 6.59 K/UL — SIGNIFICANT CHANGE UP (ref 1.8–7.4)
NEUTROPHILS # BLD AUTO: 6.59 K/UL — SIGNIFICANT CHANGE UP (ref 1.8–7.4)
NEUTROPHILS NFR BLD AUTO: 72.2 % — SIGNIFICANT CHANGE UP (ref 43–77)
NEUTROPHILS NFR BLD AUTO: 72.2 % — SIGNIFICANT CHANGE UP (ref 43–77)
NRBC # BLD: 0 /100 WBCS — SIGNIFICANT CHANGE UP (ref 0–0)
NRBC # BLD: 0 /100 WBCS — SIGNIFICANT CHANGE UP (ref 0–0)
PLATELET # BLD AUTO: 332 K/UL — SIGNIFICANT CHANGE UP (ref 150–400)
PLATELET # BLD AUTO: 332 K/UL — SIGNIFICANT CHANGE UP (ref 150–400)
POTASSIUM SERPL-MCNC: 3.8 MMOL/L — SIGNIFICANT CHANGE UP (ref 3.5–5.3)
POTASSIUM SERPL-MCNC: 3.8 MMOL/L — SIGNIFICANT CHANGE UP (ref 3.5–5.3)
POTASSIUM SERPL-SCNC: 3.8 MMOL/L — SIGNIFICANT CHANGE UP (ref 3.5–5.3)
POTASSIUM SERPL-SCNC: 3.8 MMOL/L — SIGNIFICANT CHANGE UP (ref 3.5–5.3)
PROT SERPL-MCNC: 6.8 G/DL — SIGNIFICANT CHANGE UP (ref 6–8.3)
PROT SERPL-MCNC: 6.8 G/DL — SIGNIFICANT CHANGE UP (ref 6–8.3)
RBC # BLD: 4.55 M/UL — SIGNIFICANT CHANGE UP (ref 4.2–5.8)
RBC # BLD: 4.55 M/UL — SIGNIFICANT CHANGE UP (ref 4.2–5.8)
RBC # FLD: 14.5 % — SIGNIFICANT CHANGE UP (ref 10.3–14.5)
RBC # FLD: 14.5 % — SIGNIFICANT CHANGE UP (ref 10.3–14.5)
RSV RNA NPH QL NAA+NON-PROBE: SIGNIFICANT CHANGE UP
RSV RNA NPH QL NAA+NON-PROBE: SIGNIFICANT CHANGE UP
SARS-COV-2 RNA SPEC QL NAA+PROBE: SIGNIFICANT CHANGE UP
SARS-COV-2 RNA SPEC QL NAA+PROBE: SIGNIFICANT CHANGE UP
SODIUM SERPL-SCNC: 139 MMOL/L — SIGNIFICANT CHANGE UP (ref 135–145)
SODIUM SERPL-SCNC: 139 MMOL/L — SIGNIFICANT CHANGE UP (ref 135–145)
WBC # BLD: 9.13 K/UL — SIGNIFICANT CHANGE UP (ref 3.8–10.5)
WBC # BLD: 9.13 K/UL — SIGNIFICANT CHANGE UP (ref 3.8–10.5)
WBC # FLD AUTO: 9.13 K/UL — SIGNIFICANT CHANGE UP (ref 3.8–10.5)
WBC # FLD AUTO: 9.13 K/UL — SIGNIFICANT CHANGE UP (ref 3.8–10.5)

## 2023-11-18 PROCEDURE — 85025 COMPLETE CBC W/AUTO DIFF WBC: CPT

## 2023-11-18 PROCEDURE — 94640 AIRWAY INHALATION TREATMENT: CPT

## 2023-11-18 PROCEDURE — 99285 EMERGENCY DEPT VISIT HI MDM: CPT

## 2023-11-18 PROCEDURE — 96374 THER/PROPH/DIAG INJ IV PUSH: CPT

## 2023-11-18 PROCEDURE — 83605 ASSAY OF LACTIC ACID: CPT

## 2023-11-18 PROCEDURE — 71046 X-RAY EXAM CHEST 2 VIEWS: CPT | Mod: 26

## 2023-11-18 PROCEDURE — 93005 ELECTROCARDIOGRAM TRACING: CPT

## 2023-11-18 PROCEDURE — 71046 X-RAY EXAM CHEST 2 VIEWS: CPT

## 2023-11-18 PROCEDURE — 99285 EMERGENCY DEPT VISIT HI MDM: CPT | Mod: 25

## 2023-11-18 PROCEDURE — 96375 TX/PRO/DX INJ NEW DRUG ADDON: CPT

## 2023-11-18 PROCEDURE — 87637 SARSCOV2&INF A&B&RSV AMP PRB: CPT

## 2023-11-18 PROCEDURE — 80053 COMPREHEN METABOLIC PANEL: CPT

## 2023-11-18 RX ORDER — IPRATROPIUM/ALBUTEROL SULFATE 18-103MCG
3 AEROSOL WITH ADAPTER (GRAM) INHALATION
Refills: 0 | Status: COMPLETED | OUTPATIENT
Start: 2023-11-18 | End: 2023-11-18

## 2023-11-18 RX ORDER — ACETAMINOPHEN 500 MG
975 TABLET ORAL ONCE
Refills: 0 | Status: COMPLETED | OUTPATIENT
Start: 2023-11-18 | End: 2023-11-18

## 2023-11-18 RX ORDER — AZITHROMYCIN 500 MG/1
500 TABLET, FILM COATED ORAL ONCE
Refills: 0 | Status: COMPLETED | OUTPATIENT
Start: 2023-11-18 | End: 2023-11-18

## 2023-11-18 RX ORDER — KETOROLAC TROMETHAMINE 30 MG/ML
15 SYRINGE (ML) INJECTION ONCE
Refills: 0 | Status: DISCONTINUED | OUTPATIENT
Start: 2023-11-18 | End: 2023-11-18

## 2023-11-18 RX ORDER — AZITHROMYCIN 500 MG/1
1 TABLET, FILM COATED ORAL
Qty: 5 | Refills: 0
Start: 2023-11-18 | End: 2023-11-22

## 2023-11-18 RX ADMIN — Medication 975 MILLIGRAM(S): at 11:09

## 2023-11-18 RX ADMIN — AZITHROMYCIN 500 MILLIGRAM(S): 500 TABLET, FILM COATED ORAL at 13:51

## 2023-11-18 RX ADMIN — Medication 3 MILLILITER(S): at 11:21

## 2023-11-18 RX ADMIN — Medication 15 MILLIGRAM(S): at 13:51

## 2023-11-18 RX ADMIN — Medication 3 MILLILITER(S): at 11:12

## 2023-11-18 RX ADMIN — Medication 40 MILLIGRAM(S): at 10:55

## 2023-11-18 RX ADMIN — Medication 975 MILLIGRAM(S): at 10:55

## 2023-11-18 RX ADMIN — Medication 15 MILLIGRAM(S): at 13:59

## 2023-11-18 RX ADMIN — Medication 3 MILLILITER(S): at 10:56

## 2023-11-18 NOTE — ED PROVIDER NOTE - ATTENDING CONTRIBUTION TO CARE
Patient is a 48-year-old male with history of asthma and lupus presenting with cough and mild shortness of breath.  Patient here with clear lung fields me.  Vital signs stable satting 99% on room air.  Supportive care and nebs steroids rule out pneumonia history of sepsis positive sick contact with partner has viral illness with similar symptoms.  Patient exhibiting no signs of respiratory distress at this time.

## 2023-11-18 NOTE — ED PROVIDER NOTE - PHYSICAL EXAMINATION
General: well-appearing, no acute distress  Head: Atraumatic, normocephalic  Eyes: EOM grossly in tact, no scleral icterus, no discharge  ENT: moist mucous membranes  Neurology: A&Ox , nonfocal, MIX x 4  Respiratory: CTAB, no wheezing, decrease air movement  CV: RRR, good s1/s2, no S3, Extremities warm and well perfused  Abdominal: Soft, non-distended, non-tender, no masses  Extremities: No edema, no deformities  Skin: warm and dry. No rashes

## 2023-11-18 NOTE — ED PROVIDER NOTE - PATIENT PORTAL LINK FT
You can access the FollowMyHealth Patient Portal offered by [facility] by registering at the following website: [email or web address]. By joining [employer]’s FollowMyHealth portal, you will also be able to view your health information using other applications (apps) compatible with our system.

## 2023-11-18 NOTE — ED PROVIDER NOTE - OBJECTIVE STATEMENT
47 yo M w/ hx of SLE on 5 mg prednisone daily, and asthma presenting w/ concern of cough, chills, shortness of breath x 1.5 weeks. + sick contacts. Pt denies any known fever. He has been taking his meds as prescribed. Pt concerned for bronchitis. Has not been on any recent antibiotics. No N/V/D.

## 2023-11-18 NOTE — ED ADULT NURSE NOTE - NSFALLHARMRISKINTERV_ED_ALL_ED

## 2023-11-18 NOTE — ED ADULT NURSE NOTE - NS ED NURSE RECORD ANOTHER HT AND WT
Subjective   Ricardo Pereira is a 49 y.o. male.     Chief Complaint   Patient presents with   • Diabetes   • Flu Vaccine       Diabetes  He has type 2 diabetes mellitus. No MedicAlert identification noted. The initial diagnosis of diabetes was made 4 years ago. Hypoglycemia symptoms include dizziness, headaches, mood changes, nervousness/anxiousness and sleepiness. Pertinent negatives for hypoglycemia include no confusion, hunger, pallor, seizures, speech difficulty, sweats or tremors. Associated symptoms include fatigue, foot paresthesias, polyuria and weakness. Pertinent negatives for diabetes include no blurred vision, no chest pain, no foot ulcerations, no polydipsia, no polyphagia, no visual change and no weight loss. Hypoglycemia complications include hospitalization and required glucagon injection. Pertinent negatives for hypoglycemia complications include no blackouts, no nocturnal hypoglycemia and no required assistance. Symptoms are stable. Diabetic complications include impotence, peripheral neuropathy and PVD. Pertinent negatives for diabetic complications include no CVA, heart disease, nephropathy or retinopathy. Risk factors for coronary artery disease include dyslipidemia, hypertension and obesity. Current diabetic treatment includes diet, insulin injections and oral agent (triple therapy). He is compliant with treatment most of the time. He is currently taking insulin pre-breakfast. Insulin injections are given by patient. Rotation sites for injection include the arms. His weight is fluctuating minimally. He is following a generally healthy diet. When asked about meal planning, he reported none. He has had a previous visit with a dietitian. He rarely participates in exercise. He monitors blood glucose at home 3-4 x per day. He monitors urine at home <1 x per month. Blood glucose monitoring compliance is good. His breakfast blood glucose is taken between 5-6 am. His breakfast blood glucose range is  generally  mg/dl. His lunch blood glucose is taken between 12-1 pm. His lunch blood glucose range is generally 110-130 mg/dl. His dinner blood glucose is taken between 5-6 pm. His dinner blood glucose range is generally 130-140 mg/dl. His highest blood glucose is 180-200 mg/dl. His overall blood glucose range is 130-140 mg/dl. He does not see a podiatrist.Eye exam is not current.      Answers for HPI/ROS submitted by the patient on 11/25/2020   Diabetes problem  What is the primary reason for your visit?: Diabetes  Here for follow-up of diabetes.  Patient states to have been compliant with medications.  Blood sugar monitoring - patient states has been running high on average in the 200's.  With a range of 160-384.  Has been seeing endocrinology but the group has had a high turnover in office and patient is having difficulty being seen and feels is not been controlled or followed well.  Asking for new endocrinology group.  No episodes of hypoglycemia, nausea, vomiting, new rashes, syncope or other issues.  Has been feeling fatigued.  Has actually been trying to follow the diabetic diet and has been gradually loosing weight.  Denies any difficulties with the current medication regimen of Januvia 100 mg daily, Jardiance 25 mg daily, and Tresiba injections of 120 U q AM only.  Has known severe neuropathy in the feet but no sores or lesions and doing proper foot care.  Last A1c was 5/21/2020 of 9.73%.    Follow-up for thyroid.  Denies fatigue, weakness, constipation/diarrhea, hair/skin changes, weight gain/loss, depression/anxiety, rashes, palpitations, swelling, chest pain, shortness of breath or other issues.  Has been compliant with taking the medication with no recent changes.  Denies any difficulty with the current medication of levothyroxine 112 mcg daily.  Is due for labs with last labs on 5/21/2020.    Follow-up for cholesterol.  Currently has been feeling well without any myalgias, muscle aches, weakness,  "numbness, chest pain, short of breath or other issues.  Currently is adherent with medication regimen of rosuvastatin 40 mg daily and Vascepa 2 g twice daily and denies medication side effects. Is due for lab follow-up.          The following portions of the patient's history were reviewed and updated as appropriate: allergies, current medications, past family history, past medical history, past social history, past surgical history and problem list.    Depression Screen:  PHQ-2/PHQ-9 Depression Screening 3/9/2020   Little interest or pleasure in doing things 0   Feeling down, depressed, or hopeless 0   Total Score 0       Past Medical History:   Diagnosis Date   • Abdominal hernia    • Abdominal pain, acute    • Abdominal pain, LLQ    • Abnormal brain scan    • Abnormal involuntary movements    • Acute pancreatitis    • Allergic rhinitis    • Anxiety    • Arthritis of right glenohumeral joint    • Asthma    • Bilateral carpal tunnel syndrome    • BMI 38.0-38.9,adult    • Burn of left upper extremity, second degree, initial encounter    • Cancer (CMS/HCC)    • Chronic generalized abdominal pain    • Chronic pain     flank pain \" permenantly damaged muscle\"   • Common migraine without aura    • COPD (chronic obstructive pulmonary disease) (CMS/HCC)    • COPD exacerbation (CMS/HCC)    • Cough syncope    • Depression    • Diabetes mellitus (CMS/HCC)     Insulin pump   • Diabetic neuropathy (CMS/HCC)    • Diabetic peripheral neuropathy (CMS/HCC)    • Disease of thyroid gland     hypothyroid   • Diverticulitis of colon    • Elevated blood sugar level    • Elevated transaminase level    • Encounter for immunization    • Fibromyalgia    • Gait disturbance    • GERD (gastroesophageal reflux disease)    • Hepatic steatosis    • History of pancreatitis     X 2   • History of renal cell cancer     stage 4 , Rt kidney   • Hospital discharge follow-up    • Hyperlipidemia    • Hypertension    • Hypertriglyceridemia    • IBS " (irritable bowel syndrome)    • Incomplete tear of rotator cuff    • Intractable chronic migraine without aura and with status migrainosus    • Late effects of cerebrovascular disease    • Left ankle pain    • Left ankle sprain    • Left foot pain    • Left-sided chest pain    • Leg pain    • Low vitamin D level    • Lumbar strain    • Medicare annual wellness visit, initial    • Migraine, chronic, without aura, intractable    • Nausea and vomiting    • Numbness    • Obstructive sleep apnea    • Photophobia    • Pre-syncope    • Recurrent renal cell carcinoma of kidney (CMS/HCC)    • Renal failure (ARF), acute on chronic (CMS/[facility])    • Right shoulder pain    • Sciatica without lumbago    • Sciatica, left side    • Severe obesity (CMS/[facility])     Severe obesity (BMI 35.0-39.9)   • Shortness of breath    • Sleep apnea    • Stroke (CMS/[facility])     Stroke, lacunar   • SVT (supraventricular tachycardia) (CMS/[facility])    • Syncope and collapse    • Type 2 diabetes mellitus (CMS/[facility])    • Umbilical hernia    • Viral URI with cough        Past Surgical History:   Procedure Laterality Date   • APPENDECTOMY     • COLONOSCOPY  10/27/2014    tics   • HERNIA REPAIR      umbilical   • INGUINAL HERNIA REPAIR     • NEPHRECTOMY Right    • VENTRAL/INCISIONAL HERNIA REPAIR N/A 5/11/2017    Procedure: VENTRAL/INCISIONAL HERNIA REPAIR LAPAROSCOPIC, RECURRENT INCISION, WITH MESH AND AHEDLYSIS;  Surgeon: Albin Bee MD;  Location: [facility];  Service:        Family History   Problem Relation Age of Onset   • Asthma Other    • Bipolar disorder Other    • COPD Other    • Depression Other    • Lupus Other          systemic lupus erythematosus   • Arthritis Other        Social History     Socioeconomic History   • Marital status:      Spouse name: Not on file   • Number of children: Not on file   • Years of education: Not on file   • Highest education level: Not on file   Tobacco Use   • Smoking status: Former Smoker     Types: Cigars    • Smokeless tobacco: Never Used   Substance and Sexual Activity   • Alcohol use: Yes   • Drug use: No   • Sexual activity: Defer       Current Outpatient Medications   Medication Sig Dispense Refill   • albuterol sulfate  (90 Base) MCG/ACT inhaler Inhale 2 puffs Every 4 (Four) Hours As Needed for Wheezing. 1 inhaler 1   • aspirin 81 MG EC tablet Take 1 tablet by mouth Daily. 90 tablet 1   • BD PEN NEEDLE AUDREY U/F 32G X 4 MM misc Use 4 times a day 400 each 1   • DULoxetine (CYMBALTA) 60 MG capsule Take 1 capsule by mouth Daily. 90 capsule 3   • gabapentin (Neurontin) 800 MG tablet Take 1 tablet by mouth 3 (Three) Times a Day. 90 tablet 5   • JANUVIA 100 MG tablet Take 1 tablet by mouth Daily. 90 tablet 3   • JARDIANCE 25 MG tablet Take 25 mg by mouth Daily. 90 tablet 3   • levothyroxine (Synthroid) 112 MCG tablet Take 1 tablet by mouth Daily. On an empty stomach 30 tablet 4   • olmesartan (BENICAR) 20 MG tablet Take 1 tablet by mouth Daily. 90 tablet 0   • pantoprazole (PROTONIX) 40 MG EC tablet Take 1 tablet by mouth once daily 90 tablet 0   • rosuvastatin (CRESTOR) 40 MG tablet Take 1 tablet by mouth Daily. 90 tablet 3   • TRESIBA FLEXTOUCH 200 UNIT/ML solution pen-injector pen injection Inject 120 Units under the skin into the appropriate area as directed Daily With Breakfast. 18 pen 3   • VASCEPA 1 g capsule capsule Take 2 g by mouth 2 (Two) Times a Day With Meals. 360 capsule 3   • vitamin D (ERGOCALCIFEROL) 1.25 MG (91005 UT) capsule capsule Take 50,000 Units by mouth 1 (One) Time Per Week.     • albuterol (ACCUNEB) 1.25 MG/3ML nebulizer solution Take 3 mL by nebulization Every 4 (Four) Hours As Needed for Wheezing. 120 vial 12   • arformoterol (BROVANA) 15 MCG/2ML nebulizer solution Take 2 mL by nebulization 2 (Two) Times a Day. 120 mL 0   • budesonide (PULMICORT) 0.5 MG/2ML nebulizer solution Take 2 mL by nebulization 2 (Two) Times a Day. 60 each 0   • insulin aspart (novoLOG FLEXPEN) 100 UNIT/ML  "solution pen-injector sc pen Use as directed with each meal sliding scale of 2 Units for every 20 over 160 5 pen 3   • Respiratory Therapy Supplies (Nebulizer/Tubing/Mouthpiece) kit 1 kit Take As Directed. 1 each 2   • sulfamethoxazole-trimethoprim (BACTRIM DS,SEPTRA DS) 800-160 MG per tablet Take 1 tablet by mouth 2 (Two) Times a Day. 20 tablet 0     No current facility-administered medications for this visit.        Review of Systems   Constitutional: Positive for fatigue. Negative for activity change, appetite change, fever, unexpected weight gain and unexpected weight loss.   HENT: Negative for nosebleeds, rhinorrhea, trouble swallowing and voice change.    Eyes: Negative for blurred vision and visual disturbance.   Respiratory: Negative for cough, chest tightness, shortness of breath and wheezing.    Cardiovascular: Negative for chest pain, palpitations and leg swelling.   Gastrointestinal: Negative for abdominal pain, blood in stool, constipation, diarrhea, nausea, vomiting, GERD and indigestion.   Endocrine: Positive for polyuria. Negative for polydipsia and polyphagia.   Genitourinary: Positive for impotence. Negative for dysuria, frequency and hematuria.   Musculoskeletal: Negative for arthralgias, back pain and myalgias.   Skin: Negative for pallor, rash and bruise.   Neurological: Positive for dizziness, weakness and numbness. Negative for tremors, seizures, speech difficulty, light-headedness, headache, memory problem and confusion.        Neuropathy in feet   Hematological: Negative for adenopathy. Does not bruise/bleed easily.   Psychiatric/Behavioral: Negative for sleep disturbance and depressed mood. The patient is nervous/anxious.        Objective   /82 (BP Location: Left arm, Patient Position: Sitting, Cuff Size: Large Adult)   Pulse 90   Temp 97.8 °F (36.6 °C) (Temporal)   Ht 177.8 cm (70\")   Wt 114 kg (251 lb)   SpO2 97%   BMI 36.01 kg/m²     Physical Exam  Vitals signs and nursing " note reviewed.   Constitutional:       General: He is not in acute distress.     Appearance: He is well-developed. He is obese. He is not diaphoretic.   HENT:      Head: Normocephalic and atraumatic.      Right Ear: External ear normal.      Left Ear: External ear normal.      Nose: Nose normal.   Eyes:      Conjunctiva/sclera: Conjunctivae normal.      Pupils: Pupils are equal, round, and reactive to light.   Neck:      Musculoskeletal: Normal range of motion and neck supple.      Thyroid: No thyromegaly.      Trachea: No tracheal deviation.   Cardiovascular:      Rate and Rhythm: Normal rate and regular rhythm.      Heart sounds: Normal heart sounds. No murmur. No friction rub. No gallop.    Pulmonary:      Effort: Pulmonary effort is normal. No respiratory distress.      Breath sounds: Normal breath sounds.   Abdominal:      General: Bowel sounds are normal.      Palpations: Abdomen is soft. There is no mass.      Tenderness: There is no abdominal tenderness. There is no guarding.   Musculoskeletal: Normal range of motion.   Feet:      Right foot:      Skin integrity: Skin integrity normal.      Left foot:      Skin integrity: Skin integrity normal.   Lymphadenopathy:      Cervical: No cervical adenopathy.   Skin:     General: Skin is warm and dry.      Capillary Refill: Capillary refill takes less than 2 seconds.      Findings: No rash.   Neurological:      Mental Status: He is alert and oriented to person, place, and time.      Motor: No abnormal muscle tone.      Deep Tendon Reflexes: Reflexes normal.   Psychiatric:         Behavior: Behavior normal.         Thought Content: Thought content normal.         Judgment: Judgment normal.         No results found for this or any previous visit (from the past 2016 hour(s)).  Assessment/Plan   Diagnoses and all orders for this visit:    1. Type 2 diabetes mellitus with stage 3 chronic kidney disease, with long-term current use of insulin, unspecified whether stage 3a  or 3b CKD (CMS/[facility]) (Primary)  -     Hemoglobin A1c  -     Comprehensive Metabolic Panel  -     Lipid Panel  -     insulin aspart (novoLOG FLEXPEN) 100 UNIT/ML solution pen-injector sc pen; Use as directed with each meal sliding scale of 2 Units for every 20 over 160  Dispense: 5 pen; Refill: 3  -     Ambulatory Referral to Endocrinology    2. Diabetic polyneuropathy associated with type 2 diabetes mellitus (CMS/[facility])    3. Hypothyroidism due to Hashimoto's thyroiditis  -     TSH  -     T4, Free  -     Ambulatory Referral to Endocrinology    4. Vitamin D deficiency  -     Vitamin D 25 Hydroxy    5. Chronic obstructive pulmonary disease, unspecified COPD type (CMS/[facility])  -     albuterol (ACCUNEB) 1.25 MG/3ML nebulizer solution; Take 3 mL by nebulization Every 4 (Four) Hours As Needed for Wheezing.  Dispense: 120 vial; Refill: 12  -     Respiratory Therapy Supplies (Nebulizer/Tubing/Mouthpiece) kit; 1 kit Take As Directed.  Dispense: 1 each; Refill: 2    6. Mixed hyperlipidemia  -     Comprehensive Metabolic Panel  -     Lipid Panel    7. Essential hypertension  -     Comprehensive Metabolic Panel  -     Lipid Panel    Other orders  -     FluLaval Quad >6 Months ([id number])      Discussed and reviewed with patient concerning his diabetes thyroid neuropathy vitamin D deficiency etc..  He is not getting good care apparently from his current endocrinologist and we will refer to a second endocrinologist for evaluation and care.  In the meantime I want him to continue the current medications that he is on but add immediate release insulin such as NovoLog on a sliding scale and monitor blood sugars closely including for low blood sugars.  We will also check his thyroid and vitamin D levels.  His COPD is apparently controlled at this time we will continue the albuterol as needed.  Is due to have lipid and CMP today and will get the flu shot today.    Discussed with patient about the poor control of the diabetes and will add  short acting insulin sliding scale.      Yes

## 2023-11-18 NOTE — ED PROVIDER NOTE - NSFOLLOWUPINSTRUCTIONS_ED_ALL_ED_FT
Please follow up with your primary care physician within 2-3 days.   Return to the ER for any new or concerning symptoms.   You may take 975 mg acetaminophen every 6 hours as needed for pain.    Take albuterol 2 puffs every 4 hours for next 48 hours, then every 4 hours as needed for cough/shortness of breath. If he/she is requiring albuterol more frequently, please call your pediatrician or return to the ER. Please follow up with your primary care physician within 2-3 days.   Return to the ER for any new or concerning symptoms.   You may take 975 mg acetaminophen every 6 hours as needed for pain.    Take albuterol 2 puffs every 4 hours for next 48 hours, then every 4 hours as needed for cough/shortness of breath. If he/she is requiring albuterol more frequently, please call your pediatrician or return to the ER.    Call 911 anytime you think you may need emergency care. For example, call if:    You have severe shortness of breath.  You have symptoms of a heart attack. These may include:  Chest pain or pressure, or a strange feeling in the chest.  Sweating.  Shortness of breath.  Nausea or vomiting.  Pain, pressure, or a strange feeling in the back, neck, jaw, or upper belly or in one or both shoulders or arms.  Light-headedness or sudden weakness.  A fast or irregular heartbeat.

## 2023-11-18 NOTE — ED PROVIDER NOTE - PROGRESS NOTE DETAILS
Justin, PGY-3, EM: Patient symptoms resolved after interventions. Discussed with pt results of work up, strict return precautions, and need for follow up.  Pt expressed understanding and agrees with plan.

## 2023-11-20 ENCOUNTER — TRANSCRIPTION ENCOUNTER (OUTPATIENT)
Age: 48
End: 2023-11-20

## 2023-11-22 ENCOUNTER — RX RENEWAL (OUTPATIENT)
Age: 48
End: 2023-11-22

## 2023-11-22 RX ORDER — TORSEMIDE 10 MG/1
10 TABLET ORAL DAILY
Qty: 90 | Refills: 5 | Status: ACTIVE | COMMUNITY
Start: 2022-08-16 | End: 1900-01-01

## 2023-11-25 ENCOUNTER — RX RENEWAL (OUTPATIENT)
Age: 48
End: 2023-11-25

## 2023-11-25 RX ORDER — IPRATROPIUM BROMIDE AND ALBUTEROL SULFATE 2.5; .5 MG/3ML; MG/3ML
0.5-2.5 (3) SOLUTION RESPIRATORY (INHALATION)
Qty: 1 | Refills: 1 | Status: ACTIVE | COMMUNITY
Start: 2022-01-11 | End: 1900-01-01

## 2023-11-27 NOTE — DISCHARGE NOTE PROVIDER - DISCHARGE DATE
Called and spoke to patient. She had questions about medications as they were changed multiple times last week.  Per oncology note 11/24/23, they spoke to Dr Pedro as her HR was low and in regular rhythm and he recommended stopping diltiazem and continuing metoprolol 25mg BID with extra toprol PRN.    Confirmed this with patient.   She states that she had been in regular rhtyhm over the weekend and then late this morning noticed her heart rate was in the 140-150s.  Instructed her to take extra metoprolol and continue to monitor. She is asymptomatic and would not know she is in afib if her smart watch did not show it.    05-Feb-2022

## 2023-11-28 ENCOUNTER — APPOINTMENT (OUTPATIENT)
Dept: INTERNAL MEDICINE | Facility: CLINIC | Age: 48
End: 2023-11-28
Payer: COMMERCIAL

## 2023-11-28 VITALS
HEART RATE: 110 BPM | DIASTOLIC BLOOD PRESSURE: 70 MMHG | SYSTOLIC BLOOD PRESSURE: 126 MMHG | OXYGEN SATURATION: 98 % | HEIGHT: 68 IN | BODY MASS INDEX: 39.56 KG/M2 | WEIGHT: 261 LBS | TEMPERATURE: 97.5 F

## 2023-11-28 VITALS — SYSTOLIC BLOOD PRESSURE: 120 MMHG | DIASTOLIC BLOOD PRESSURE: 70 MMHG

## 2023-11-28 DIAGNOSIS — E78.00 PURE HYPERCHOLESTEROLEMIA, UNSPECIFIED: ICD-10-CM

## 2023-11-28 DIAGNOSIS — J45.30 MILD PERSISTENT ASTHMA, UNCOMPLICATED: ICD-10-CM

## 2023-11-28 DIAGNOSIS — E78.5 HYPERLIPIDEMIA, UNSPECIFIED: ICD-10-CM

## 2023-11-28 DIAGNOSIS — E03.9 HYPOTHYROIDISM, UNSPECIFIED: ICD-10-CM

## 2023-11-28 PROCEDURE — 99214 OFFICE O/P EST MOD 30 MIN: CPT | Mod: 25

## 2023-11-28 PROCEDURE — 36415 COLL VENOUS BLD VENIPUNCTURE: CPT

## 2023-11-28 RX ORDER — SULFAMETHOXAZOLE AND TRIMETHOPRIM 800; 160 MG/1; MG/1
800-160 TABLET ORAL DAILY
Qty: 14 | Refills: 0 | Status: DISCONTINUED | COMMUNITY
Start: 2023-07-31 | End: 2023-11-28

## 2023-11-28 RX ORDER — PANTOPRAZOLE 40 MG/1
40 TABLET, DELAYED RELEASE ORAL TWICE DAILY
Qty: 180 | Refills: 0 | Status: DISCONTINUED | COMMUNITY
Start: 2022-02-16 | End: 2023-11-28

## 2023-11-28 RX ORDER — AZELASTINE HYDROCHLORIDE 137 UG/1
137 SPRAY, METERED NASAL TWICE DAILY
Qty: 3 | Refills: 3 | Status: DISCONTINUED | COMMUNITY
Start: 2023-05-23 | End: 2023-11-28

## 2023-11-28 RX ORDER — BISMUTH SUBSALICYLATE 525MG/15ML
525 SUSPENSION, ORAL (FINAL DOSE FORM) ORAL
Refills: 0 | Status: DISCONTINUED | COMMUNITY
Start: 2023-07-17 | End: 2023-11-28

## 2023-11-28 RX ORDER — MOMETASONE 50 UG/1
50 SPRAY, METERED NASAL TWICE DAILY
Qty: 1 | Refills: 1 | Status: DISCONTINUED | COMMUNITY
Start: 2023-05-23 | End: 2023-11-28

## 2023-12-01 NOTE — ED ADULT NURSE NOTE - NS ED NURSE IV DC DT
Department of Podiatry  Resident Progress Note    Name: Te Higgins  Allergies: Iodides, Shell-fish derived products, and atorvastatin calcium    SUBJECTIVE  The patient is a 65 y.o. male who presents for a post-operative visit. Patient is s/p left foot I&D with graft application and delayed primary closure (DOS 10/23/23). Patient states that he has been doing great since his last appointment. He states that he does have some minimal pain and rates it a 2/10. He notes that he has a home health nurse coming once a week to check his dressing. Patient states that he has transitioned from using a wheelchair to using a jung with some discomfort in his left foot but states that its tolerable. He has been working with PT and OT and states that its going well. Patient denies N/V/F/C/SOB/CP. Patient denies all other pedal complaints     Past Medical History:        Diagnosis Date    Angina at rest     Cardiomyopathy [facility])     Carotid artery stenosis 10/25/2016    SUZANNA stented with 8 x 30 mm non-tapered Xact stent    CHF (congestive heart failure) ([address]) 03/03/2015    EF 30%     CKD (chronic kidney disease) stage 2, GFR 60-89 ml/min     Coronary artery disease     sp CABG UC    Diabetic peripheral neuropathy (HCC)     Diastolic HF (heart failure) (HCC)     Hyperlipidemia with target LDL less than 70     Hypertension goal BP (blood pressure) < 130/80     PVD (peripheral vascular disease) (HCC)     Seizures ([address])     in childhood    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 Patient specifically denies nausea, vomiting, fever, chills, shortness of breath, chest pain, abdominal pain, constipation, or difficulty urinating. OBJECTIVE  Patient presents with accompanied by his daughter with assistance of a walker and a surgical shoe to the left LE.  Patient is AOx3 and 07-Jul-2023 19:07

## 2023-12-04 ENCOUNTER — RX RENEWAL (OUTPATIENT)
Age: 48
End: 2023-12-04

## 2023-12-04 ENCOUNTER — TRANSCRIPTION ENCOUNTER (OUTPATIENT)
Age: 48
End: 2023-12-04

## 2023-12-05 ENCOUNTER — TRANSCRIPTION ENCOUNTER (OUTPATIENT)
Age: 48
End: 2023-12-05

## 2023-12-06 ENCOUNTER — APPOINTMENT (OUTPATIENT)
Dept: PULMONOLOGY | Facility: CLINIC | Age: 48
End: 2023-12-06
Payer: COMMERCIAL

## 2023-12-06 VITALS
SYSTOLIC BLOOD PRESSURE: 120 MMHG | OXYGEN SATURATION: 98 % | WEIGHT: 261 LBS | BODY MASS INDEX: 39.56 KG/M2 | HEIGHT: 68 IN | TEMPERATURE: 97.2 F | HEART RATE: 96 BPM | DIASTOLIC BLOOD PRESSURE: 80 MMHG

## 2023-12-06 DIAGNOSIS — J30.9 ALLERGIC RHINITIS, UNSPECIFIED: ICD-10-CM

## 2023-12-06 PROCEDURE — 99214 OFFICE O/P EST MOD 30 MIN: CPT | Mod: 25

## 2023-12-11 NOTE — ED ADULT NURSE NOTE - NSSEPSISSUSPECTED_ED_A_ED
[facility] called to confirm Dr Camara's name and fax number before assisting [facility]. Provided Dr Camara's fax number.   No

## 2023-12-12 LAB
ANION GAP SERPL CALC-SCNC: 9 MMOL/L
BUN SERPL-MCNC: 12 MG/DL
CALCIUM SERPL-MCNC: 9.6 MG/DL
CHLORIDE SERPL-SCNC: 101 MMOL/L
CO2 SERPL-SCNC: 26 MMOL/L
CREAT SERPL-MCNC: 1.05 MG/DL
EGFR: 88 ML/MIN/1.73M2
ESTIMATED AVERAGE GLUCOSE: 134 MG/DL
GLUCOSE SERPL-MCNC: 144 MG/DL
HBA1C MFR BLD HPLC: 6.3 %
POTASSIUM SERPL-SCNC: 4.1 MMOL/L
SODIUM SERPL-SCNC: 137 MMOL/L

## 2023-12-12 RX ORDER — DUPILUMAB 300 MG/2ML
300 INJECTION, SOLUTION SUBCUTANEOUS
Qty: 1 | Refills: 6 | Status: ACTIVE | COMMUNITY
Start: 2023-12-06 | End: 1900-01-01

## 2023-12-12 RX ORDER — BENZONATATE 200 MG/1
200 CAPSULE ORAL 3 TIMES DAILY
Qty: 21 | Refills: 1 | Status: COMPLETED | COMMUNITY
Start: 2023-12-12 | End: 2023-12-26

## 2023-12-14 ENCOUNTER — APPOINTMENT (OUTPATIENT)
Dept: RHEUMATOLOGY | Facility: CLINIC | Age: 48
End: 2023-12-14
Payer: COMMERCIAL

## 2023-12-14 VITALS
BODY MASS INDEX: 39.4 KG/M2 | OXYGEN SATURATION: 98 % | WEIGHT: 260 LBS | HEART RATE: 93 BPM | HEIGHT: 68 IN | SYSTOLIC BLOOD PRESSURE: 132 MMHG | DIASTOLIC BLOOD PRESSURE: 89 MMHG

## 2023-12-14 DIAGNOSIS — S22.060A WEDGE COMPRESSION FRACTURE OF T7-T8 VERTEBRA, INITIAL ENCOUNTER FOR CLOSED FRACTURE: ICD-10-CM

## 2023-12-14 PROCEDURE — 99215 OFFICE O/P EST HI 40 MIN: CPT

## 2023-12-14 RX ADMIN — ZOLEDRONIC ACID 0 MG/100ML: 5 INJECTION, SOLUTION INTRAVENOUS at 00:00

## 2023-12-14 NOTE — HISTORY OF PRESENT ILLNESS
[FreeTextEntry1] : AMBERLY ESPITIA is a 48 year  old male, seen on today  for SLE: with OWEN 1:2560, + Sm + RNP + LA, low complement, + U1RNP and weak positive PM/.   Other pertinent medical problems (structural back disease, asthma, migraine) -> had repeat MRI of back with worsening compression fracture and also structural back disease was off steroids and tolerating myalgias off steroids  then developed asthma attack and was hospitalized.  started on steroid 40 and tapering off steroids per pulm   also started on dupixient   Today NO myalgias  NO joint pain  + Back Pain + fungal rash on feet + no cp and no n/v/d/c asthma is well controlled  no fevers, no chills [History of Nephritis] : the patient has no history of nephritis [Currently Experiencing] : currently experiencing [Headache] : no headaches [Sicca] : sicca [Rash] : no rash [Chest Pain] : no chest pain [Shortness of Breath] : shortness of breath [Abdominal Pain] : no abdominal pain [Psychological Symptoms] : no psychological symptoms [Fatigue] : no fatigue [Arthritis] : no arthritis [Arthralgias] : no arthralgias [Sun Sensitivity] : sun sensitivity [FreeTextEntry3] : 2019 [FreeTextEntry7] : OWEN 1:2560, + SM, + RNP, + LA, Low complement,  [FreeTextEntry4] : cellcept (infection), benlysta (allergy), saphenlo (recurrent infections)  [FreeTextEntry6] : Low TPMT + sicca  + back pain

## 2023-12-14 NOTE — ASSESSMENT
[FreeTextEntry1] : AMBERLY ESPITIA is a 48 year old male, seen on today for myalgias and fatigue - unclear if due to hypothyroid , sle/mctd or mood disorder + U1rnp and weak positive PM/Sc100 OWEN 1:2560, +Sm, +RNP 1) Lupus/inflammatory myositis overlap (abnormal Myomarker) muscle fatigue, sun sensitivity and headaches. CK normalized (was elevated in 500's in 2019), dsdna + (normalized after benlysta) continue  mg daily. follow up optho given HCQ use. Myfortic 360mg - 3 pills po BID Off steorids for SLE and to taper current steroids for asthma with pulm.  sun sensitivity: sun block or UPF protective clothing Will check laboratory tests to look for markers of disease activity and also to assess for medication toxicity.  2) Asthma - continue working with pulm on Dupixent injection.  steroid taper   3) COMPRESSION FRACTURE assoc. with cough and worsening [] dexa (9-2022) Openia in spine [] check vitamin d [] minimize steroids  [] add reclast given freequent steroid use, osteopenia and compression fracture  ( Risks of, benefits of and alternatives to this treatment plan discussed with patient and patient expressed understanding. )   4) Back pain and neuropathy (feels bugs crawling form his ears) and involuntary movements  involuntary movements occur when he is tired.  follow up neurology and follow up orthospine.   More than 50% of the encounter was spent counseling AMBERLY ESPITIA on the differential, workup, disease course, and treatment/management.   Education was provided to AMBERLY ESPITIA during this encounter. All questions and concerns were answered and addressed in detail.  AMBERLY ESPITIA verbalized understanding and agreed to the plan.   AMBERLY ESPITIA has been instructed to call for an earlier appointment if new symptoms develop in the interim. AMBERLY ESPITIA has been instructed to make a follow-up appointment in 2 months

## 2023-12-14 NOTE — PHYSICAL EXAM
[General Appearance - Alert] : alert [General Appearance - In No Acute Distress] : in no acute distress [General Appearance - Well Nourished] : well nourished [General Appearance - Well Developed] : well developed [Sclera] : the sclera and conjunctiva were normal [Neck Appearance] : the appearance of the neck was normal [Abnormal Walk] : normal gait [Musculoskeletal - Swelling] : no joint swelling seen [Motor Tone] : muscle strength and tone were normal [FreeTextEntry1] : no swollen joints, 5/5 UE/LE D/P [Skin Color & Pigmentation] : normal skin color and pigmentation [] : no rash [Oriented To Time, Place, And Person] : oriented to person, place, and time

## 2023-12-14 NOTE — DATA REVIEWED
[FreeTextEntry1] : Laboratory and radiology studies that were personally reviewed at today's visit are summarized below and above: \par  \par  CT chest (3-7-2023):  No pulmonary embolus is noted.  Marked loss of height of one of the midthoracic vertebral body has \par  increased when compared to previous exam.\par  3-7-2023:  CBC = wnl, K = 3.3, C3-126, c4=28, dsdna < 12 prot/cr = 0.1\par  \par  \par  \par  Pulm note (8-26-22)  felling better overall and planning PFT for 4-6 weeks. \par  7/27/22 dsdna <12\par  7/26/22 - rsv + \par  6/2022:  enterovirus/rhinovirus and parainfluenza + \par  MRI T spine (8-1-22) Moderate acute compressoin fracture of the superior endplate of the T7 vertebral body \par  CT head (11-9-21)  no infarct seen \par  neurology note/emg from 10-25-21 reviewed and overlap myositis in setting of sle \par  5-19-21:  MRI right thigh - no muscle edema moderate sized right knee joint effusion \par  5-19-21:  MRI Lumbar spine - diffuse spinal canal narrowing and mild disc bulge with mod spinal cnala stenosis \par  Duplex Venous LE - no evidence of DVT\par  3-19-21:  Echo: normal pericardium, nl EF\par  11-6-2020:  Knee Xray:  WNL\par  8-2020:  Head CT - WNL\par  \par  7-6-20:  cr = 1.2, cpk = 324, cbc  -okay , ua - okay, esr = 85 (increased from prior 43), prot/cr = 0.1, c3 = 120, c4 = 21, dsdna < 12,\par  6-2020:  tsh = 2.49, \par  \par  3/10/20:  myomarker with positive J0qiUSR, U1RNP and SSA52, ck - 212, c3/c4 wnl \par  \par  dsDNA positive in 2019 and now negative \par  \par  10-29-19:  CBC okay\par  \par  9-11-19:  ua and prot/cr - wnl \par  8-1-19 - c3/c4 WNL, dsdna = 54\par  NL G6PD\par  LOW TPMT\par  \par  ct sinus (10-21-19) : Minimal mucosal thickening involves the left frontal, anterior ethmoid and left maxillary sinuses.  Rightward deviation of the nasal septum with a right nasal spur narrowing the right nasal cavity.  Narrowing of the left OMU which may predispose to sinus outflow obstruction. \par  Small lucent lesion within the left maxilla favoring a benign fibro-osseous lesion. Follow-up sinus CT\par  advised as clinically warranted in 6 months to confirm stability.\par  \par  lumbar spine (10-21-19):  Developmental limbus vertebra noted at anterior superior aspect of L4 with \par  hypertrophic bony remodeling change. Chronic appearing well marginated slight concave contour depression of anterior aspect of L2 superior endplate.  Remaining vertebral body heights maintained. \par  No spondylolistheses spondylolysis defects or evidence for dynamic  instability. \par  Variable slightly narrowed disc spaces. Unremarkable SI joints and partially \par  visualized hips. \par  No lytic or blastic lesions. \par  Right upper quadrant surgical clips present. \par  head ct - 10-24-19:  Minimal mucosal thickening involves the left frontal, anterior ethmoid and \par  left maxillary sinuses.  Rightward deviation of the nasal septum with a right nasal spur narrowing \par  the right nasal cavity.  Narrowing of the left OMU which may predispose to sinus outflow obstruction. \par  Small lucent lesion within the left maxilla favoring a benign fibro-osseous  lesion. Follow-up sinus CT advised as clinically warranted in 6 months to  confirm stability. \par

## 2023-12-15 DIAGNOSIS — M81.8 OTHER OSTEOPOROSIS W/OUT CURRENT PATHOLOGICAL FRACTURE: ICD-10-CM

## 2023-12-15 DIAGNOSIS — T38.0X5A OTHER OSTEOPOROSIS W/OUT CURRENT PATHOLOGICAL FRACTURE: ICD-10-CM

## 2023-12-16 ENCOUNTER — TRANSCRIPTION ENCOUNTER (OUTPATIENT)
Age: 48
End: 2023-12-16

## 2023-12-16 LAB
25(OH)D3 SERPL-MCNC: 27.8 NG/ML
ALBUMIN SERPL ELPH-MCNC: 4.4 G/DL
ALP BLD-CCNC: 100 U/L
ALT SERPL-CCNC: 28 U/L
ANION GAP SERPL CALC-SCNC: 13 MMOL/L
APPEARANCE: CLEAR
AST SERPL-CCNC: 23 U/L
BACTERIA: NEGATIVE /HPF
BASOPHILS # BLD AUTO: 0.03 K/UL
BASOPHILS NFR BLD AUTO: 0.2 %
BILIRUB SERPL-MCNC: 0.3 MG/DL
BILIRUBIN URINE: NEGATIVE
BLOOD URINE: NEGATIVE
BUN SERPL-MCNC: 11 MG/DL
C3 SERPL-MCNC: 140 MG/DL
C4 SERPL-MCNC: 26 MG/DL
CALCIUM SERPL-MCNC: 9.3 MG/DL
CAST: 0 /LPF
CHLORIDE SERPL-SCNC: 99 MMOL/L
CO2 SERPL-SCNC: 26 MMOL/L
COLOR: YELLOW
CREAT SERPL-MCNC: 0.93 MG/DL
CREAT SPEC-SCNC: 184 MG/DL
CREAT/PROT UR: 0.1 RATIO
DSDNA AB SER-ACNC: <12 IU/ML
EGFR: 102 ML/MIN/1.73M2
EOSINOPHIL # BLD AUTO: 0.05 K/UL
EOSINOPHIL NFR BLD AUTO: 0.3 %
EPITHELIAL CELLS: 0 /HPF
ERYTHROCYTE [SEDIMENTATION RATE] IN BLOOD BY WESTERGREN METHOD: 25 MM/HR
GLUCOSE QUALITATIVE U: NEGATIVE MG/DL
GLUCOSE SERPL-MCNC: 83 MG/DL
HCT VFR BLD CALC: 43.7 %
HGB BLD-MCNC: 13.5 G/DL
IMM GRANULOCYTES NFR BLD AUTO: 0.6 %
KETONES URINE: NEGATIVE MG/DL
LEUKOCYTE ESTERASE URINE: NEGATIVE
LYMPHOCYTES # BLD AUTO: 1.81 K/UL
LYMPHOCYTES NFR BLD AUTO: 10.6 %
MAN DIFF?: NORMAL
MCHC RBC-ENTMCNC: 26 PG
MCHC RBC-ENTMCNC: 30.9 GM/DL
MCV RBC AUTO: 84.2 FL
MICROSCOPIC-UA: NORMAL
MONOCYTES # BLD AUTO: 1.18 K/UL
MONOCYTES NFR BLD AUTO: 6.9 %
NEUTROPHILS # BLD AUTO: 13.85 K/UL
NEUTROPHILS NFR BLD AUTO: 81.4 %
NITRITE URINE: NEGATIVE
PH URINE: 6.5
PLATELET # BLD AUTO: 399 K/UL
POTASSIUM SERPL-SCNC: 3.9 MMOL/L
PROT SERPL-MCNC: 7 G/DL
PROT UR-MCNC: 14 MG/DL
PROTEIN URINE: NORMAL MG/DL
RBC # BLD: 5.19 M/UL
RBC # FLD: 15.5 %
RED BLOOD CELLS URINE: 1 /HPF
SODIUM SERPL-SCNC: 138 MMOL/L
SPECIFIC GRAVITY URINE: 1.03
UROBILINOGEN URINE: 0.2 MG/DL
WBC # FLD AUTO: 17.03 K/UL
WHITE BLOOD CELLS URINE: 0 /HPF

## 2023-12-18 ENCOUNTER — RX RENEWAL (OUTPATIENT)
Age: 48
End: 2023-12-18

## 2023-12-18 DIAGNOSIS — M81.0 AGE-RELATED OSTEOPOROSIS W/OUT CURRENT PATHOLOGICAL FRACTURE: ICD-10-CM

## 2023-12-29 ENCOUNTER — NON-APPOINTMENT (OUTPATIENT)
Age: 48
End: 2023-12-29

## 2024-01-01 ENCOUNTER — TRANSCRIPTION ENCOUNTER (OUTPATIENT)
Age: 49
End: 2024-01-01

## 2024-01-01 NOTE — PROGRESS NOTE ADULT - SUBJECTIVE AND OBJECTIVE BOX
INCOMPLETE NOTE    OVERNIGHT EVENTS:    SUBJECTIVE / INTERVAL HPI: Patient seen and examined at bedside.     VITAL SIGNS:  Vital Signs Last 24 Hrs  T(C): 36.9 (2022 15:57), Max: 36.9 (2022 15:57)  T(F): 98.4 (2022 15:57), Max: 98.4 (2022 15:57)  HR: 106 (2022 15:57) (99 - 107)  BP: 108/69 (2022 15:57) (108/69 - 142/74)  BP(mean): 82 (2022 15:57) (82 - 82)  RR: 18 (2022 15:57) (18 - 20)  SpO2: 95% (2022 15:57) (95% - 98%)    PHYSICAL EXAM:    General: WDWN  HEENT: NC/AT; PERRL, anicteric sclera; MMM  Neck: supple  Cardiovascular: +S1/S2, RRR  Respiratory: CTA B/L; no W/R/R  Gastrointestinal: soft, NT/ND; +BSx4  Extremities: WWP; no edema, clubbing or cyanosis  Vascular: 2+ radial, DP/PT pulses B/L  Neurological: AAOx3; no focal deficits    MEDICATIONS:  MEDICATIONS  (STANDING):  albuterol/ipratropium for Nebulization 3 milliLiter(s) Nebulizer every 4 hours  aspirin enteric coated 81 milliGRAM(s) Oral daily  benzonatate 100 milliGRAM(s) Oral three times a day  budesonide 160 MICROgram(s)/formoterol 4.5 MICROgram(s) Inhaler 2 Puff(s) Inhalation two times a day  dextrose 5%. 1000 milliLiter(s) (100 mL/Hr) IV Continuous <Continuous>  dextrose 5%. 1000 milliLiter(s) (50 mL/Hr) IV Continuous <Continuous>  dextrose 50% Injectable 25 Gram(s) IV Push once  dextrose 50% Injectable 12.5 Gram(s) IV Push once  dextrose 50% Injectable 25 Gram(s) IV Push once  enoxaparin Injectable 40 milliGRAM(s) SubCutaneous every 12 hours  furosemide    Tablet 60 milliGRAM(s) Oral daily  glucagon  Injectable 1 milliGRAM(s) IntraMuscular once  hydroxychloroquine 400 milliGRAM(s) Oral daily  insulin glargine Injectable (LANTUS) 10 Unit(s) SubCutaneous at bedtime  insulin lispro (ADMELOG) corrective regimen sliding scale   SubCutaneous at bedtime  insulin lispro (ADMELOG) corrective regimen sliding scale   SubCutaneous three times a day before meals  insulin lispro Injectable (ADMELOG) 2 Unit(s) SubCutaneous before breakfast  insulin lispro Injectable (ADMELOG) 2 Unit(s) SubCutaneous before lunch  insulin lispro Injectable (ADMELOG) 2 Unit(s) SubCutaneous before dinner  levothyroxine 125 MICROGram(s) Oral daily  lidocaine   4% Patch 1 Patch Transdermal daily  losartan 50 milliGRAM(s) Oral daily  methylPREDNISolone sodium succinate Injectable 40 milliGRAM(s) IV Push every 8 hours  pantoprazole    Tablet 40 milliGRAM(s) Oral before breakfast  trimethoprim  160 mG/sulfamethoxazole 800 mG 1 Tablet(s) Oral <User Schedule>    MEDICATIONS  (PRN):  acetaminophen     Tablet .. 650 milliGRAM(s) Oral every 6 hours PRN Temp greater or equal to 38C (100.4F), Mild Pain (1 - 3)  aluminum hydroxide/magnesium hydroxide/simethicone Suspension 30 milliLiter(s) Oral every 4 hours PRN Dyspepsia  cyclobenzaprine 5 milliGRAM(s) Oral three times a day PRN Muscle Spasm  dextrose Oral Gel 15 Gram(s) Oral once PRN Blood Glucose LESS THAN 70 milliGRAM(s)/deciliter  hydrocodone/homatropine Syrup 10 milliLiter(s) Oral every 6 hours PRN Cough  melatonin 3 milliGRAM(s) Oral at bedtime PRN Insomnia  ondansetron Injectable 4 milliGRAM(s) IV Push every 8 hours PRN Nausea and/or Vomiting  oxyCODONE    IR 5 milliGRAM(s) Oral every 6 hours PRN moderate and severe pain      ALLERGIES:  Allergies    No Known Allergies    Intolerances        LABS:                        13.0   13.38 )-----------( 253      ( 2022 06:14 )             39.4     06-28    133<L>  |  95<L>  |  10  ----------------------------<  280<H>  4.3   |  26  |  0.84    Ca    9.5      2022 06:14        Urinalysis Basic - ( 2022 15:57 )    Color: Light Yellow / Appearance: Clear / S.014 / pH: x  Gluc: x / Ketone: Negative  / Bili: Negative / Urobili: Negative   Blood: x / Protein: Trace / Nitrite: Negative   Leuk Esterase: Negative / RBC: 1 /hpf / WBC 0 /HPF   Sq Epi: x / Non Sq Epi: 0 /hpf / Bacteria: Negative      CAPILLARY BLOOD GLUCOSE      POCT Blood Glucose.: 267 mg/dL (2022 12:30)      RADIOLOGY & ADDITIONAL TESTS: Reviewed. SUBJECTIVE / INTERVAL HPI: Patient seen and examined at bedside. Patient reports improvement in chest and back pain to 3-4/10 with flexeril. Still with stable SOB and cough. Denies fevers, chills, night sweats, joint pain/stiffness/swelling, urinary changes, abdominal complaints.     VITAL SIGNS:  Vital Signs Last 24 Hrs  T(C): 36.9 (2022 15:57), Max: 36.9 (2022 15:57)  T(F): 98.4 (2022 15:57), Max: 98.4 (2022 15:57)  HR: 106 (2022 15:57) (99 - 107)  BP: 108/69 (2022 15:57) (108/69 - 142/74)  BP(mean): 82 (2022 15:57) (82 - 82)  RR: 18 (2022 15:57) (18 - 20)  SpO2: 95% (2022 15:57) (95% - 98%)    PHYSICAL EXAM:    General: WDWN  HEENT: NC/AT; clear conjunctiva; MMM  Neck: supple  Gastrointestinal: soft, NT/ND  Extremities: WWP; no edema, clubbing or cyanosis. trace b/l pitting edema LE  MSK: No synovitis. Tenderness to palpation paraspinal muscles in thoracic area. Tense paraspinal muscles    MEDICATIONS:  MEDICATIONS  (STANDING):  albuterol/ipratropium for Nebulization 3 milliLiter(s) Nebulizer every 4 hours  aspirin enteric coated 81 milliGRAM(s) Oral daily  benzonatate 100 milliGRAM(s) Oral three times a day  budesonide 160 MICROgram(s)/formoterol 4.5 MICROgram(s) Inhaler 2 Puff(s) Inhalation two times a day  dextrose 5%. 1000 milliLiter(s) (100 mL/Hr) IV Continuous <Continuous>  dextrose 5%. 1000 milliLiter(s) (50 mL/Hr) IV Continuous <Continuous>  dextrose 50% Injectable 25 Gram(s) IV Push once  dextrose 50% Injectable 12.5 Gram(s) IV Push once  dextrose 50% Injectable 25 Gram(s) IV Push once  enoxaparin Injectable 40 milliGRAM(s) SubCutaneous every 12 hours  furosemide    Tablet 60 milliGRAM(s) Oral daily  glucagon  Injectable 1 milliGRAM(s) IntraMuscular once  hydroxychloroquine 400 milliGRAM(s) Oral daily  insulin glargine Injectable (LANTUS) 10 Unit(s) SubCutaneous at bedtime  insulin lispro (ADMELOG) corrective regimen sliding scale   SubCutaneous at bedtime  insulin lispro (ADMELOG) corrective regimen sliding scale   SubCutaneous three times a day before meals  insulin lispro Injectable (ADMELOG) 2 Unit(s) SubCutaneous before breakfast  insulin lispro Injectable (ADMELOG) 2 Unit(s) SubCutaneous before lunch  insulin lispro Injectable (ADMELOG) 2 Unit(s) SubCutaneous before dinner  levothyroxine 125 MICROGram(s) Oral daily  lidocaine   4% Patch 1 Patch Transdermal daily  losartan 50 milliGRAM(s) Oral daily  methylPREDNISolone sodium succinate Injectable 40 milliGRAM(s) IV Push every 8 hours  pantoprazole    Tablet 40 milliGRAM(s) Oral before breakfast  trimethoprim  160 mG/sulfamethoxazole 800 mG 1 Tablet(s) Oral <User Schedule>    MEDICATIONS  (PRN):  acetaminophen     Tablet .. 650 milliGRAM(s) Oral every 6 hours PRN Temp greater or equal to 38C (100.4F), Mild Pain (1 - 3)  aluminum hydroxide/magnesium hydroxide/simethicone Suspension 30 milliLiter(s) Oral every 4 hours PRN Dyspepsia  cyclobenzaprine 5 milliGRAM(s) Oral three times a day PRN Muscle Spasm  dextrose Oral Gel 15 Gram(s) Oral once PRN Blood Glucose LESS THAN 70 milliGRAM(s)/deciliter  hydrocodone/homatropine Syrup 10 milliLiter(s) Oral every 6 hours PRN Cough  melatonin 3 milliGRAM(s) Oral at bedtime PRN Insomnia  ondansetron Injectable 4 milliGRAM(s) IV Push every 8 hours PRN Nausea and/or Vomiting  oxyCODONE    IR 5 milliGRAM(s) Oral every 6 hours PRN moderate and severe pain      ALLERGIES:  Allergies    No Known Allergies    Intolerances        LABS:                        13.0   13.38 )-----------( 253      ( 2022 06:14 )             39.4         133<L>  |  95<L>  |  10  ----------------------------<  280<H>  4.3   |  26  |  0.84    Ca    9.5      2022 06:14        Urinalysis Basic - ( 2022 15:57 )    Color: Light Yellow / Appearance: Clear / S.014 / pH: x  Gluc: x / Ketone: Negative  / Bili: Negative / Urobili: Negative   Blood: x / Protein: Trace / Nitrite: Negative   Leuk Esterase: Negative / RBC: 1 /hpf / WBC 0 /HPF   Sq Epi: x / Non Sq Epi: 0 /hpf / Bacteria: Negative      CAPILLARY BLOOD GLUCOSE      POCT Blood Glucose.: 267 mg/dL (2022 12:30)      RADIOLOGY & ADDITIONAL TESTS: Reviewed. md notified. as per md, this is to be expected.

## 2024-01-02 ENCOUNTER — TRANSCRIPTION ENCOUNTER (OUTPATIENT)
Age: 49
End: 2024-01-02

## 2024-01-03 RX ORDER — SEMAGLUTIDE 1.34 MG/ML
4 INJECTION, SOLUTION SUBCUTANEOUS
Qty: 1 | Refills: 0 | Status: DISCONTINUED | COMMUNITY
Start: 2023-08-28 | End: 2024-01-03

## 2024-01-04 ENCOUNTER — APPOINTMENT (OUTPATIENT)
Dept: INTERNAL MEDICINE | Facility: CLINIC | Age: 49
End: 2024-01-04
Payer: COMMERCIAL

## 2024-01-04 VITALS
HEART RATE: 96 BPM | BODY MASS INDEX: 40.92 KG/M2 | WEIGHT: 270 LBS | DIASTOLIC BLOOD PRESSURE: 75 MMHG | TEMPERATURE: 98.6 F | SYSTOLIC BLOOD PRESSURE: 115 MMHG | RESPIRATION RATE: 16 BRPM | HEIGHT: 68 IN | OXYGEN SATURATION: 98 %

## 2024-01-04 DIAGNOSIS — R19.7 DIARRHEA, UNSPECIFIED: ICD-10-CM

## 2024-01-04 DIAGNOSIS — I10 ESSENTIAL (PRIMARY) HYPERTENSION: ICD-10-CM

## 2024-01-04 DIAGNOSIS — E11.9 TYPE 2 DIABETES MELLITUS W/OUT COMPLICATIONS: ICD-10-CM

## 2024-01-04 PROCEDURE — 36415 COLL VENOUS BLD VENIPUNCTURE: CPT

## 2024-01-04 PROCEDURE — 99214 OFFICE O/P EST MOD 30 MIN: CPT | Mod: 25

## 2024-01-04 NOTE — ASSESSMENT
[FreeTextEntry1] : Patient is a 48-year-old male with history of obesity, obstructive sleep apnea, lupus, vertebral fracture/lumbar disc disease, type 2 diabetes hyperlipidemia hypertension obstructive sleep apnea who presents for follow-up complaining of loose stools  1.  Loose stool/diarrhea PCR for stools given immunosuppression Lupus Observe  2.  Asthma Well-controlled on inhalers trielegy and albuterol as needed  #3 hypertension Continue losartan 50 mg once a day well-controlled  4.  Hypothyroidism Clinically euthyroid Continue levothyroxine 125 mics  5.  Type 2 diabetes Increase Ozempic to 2 mg weekly Counseled on diet check A1c basic metabolic panel  6.  Hypertension cholesterolemia Continue statin and spite of muscle aches will discuss next visit  7.  Lupus  follow-up with rheumatology continue prednisone and hydroxychloroquine andMycophenolate

## 2024-01-04 NOTE — REVIEW OF SYSTEMS
[Joint Pain] : no joint pain [Joint Stiffness] : no joint stiffness [Back Pain] : back pain [Joint Swelling] : no joint swelling [Negative] : Psychiatric

## 2024-01-04 NOTE — HISTORY OF PRESENT ILLNESS
[FreeTextEntry1] : Follow-up for obesity, asthma, diabetes [de-identified] : The patient is a 48-year-old male with history of obesity, lupus, type 2 diabetes, hyperlipidemia, hypothyroidism, lumbar disc disease/vertebral fracture, obstructive sleep apnea who presents for follow-up patient feels well complains of lower back pain and recently loose greenish stools feels feverish but no temperature denies cough, shortness of breath, dysuria, diarrhea.  Abdominal pain

## 2024-01-05 ENCOUNTER — TRANSCRIPTION ENCOUNTER (OUTPATIENT)
Age: 49
End: 2024-01-05

## 2024-01-05 LAB
ANION GAP SERPL CALC-SCNC: 17 MMOL/L
BUN SERPL-MCNC: 8 MG/DL
CALCIUM SERPL-MCNC: 9.5 MG/DL
CHLORIDE SERPL-SCNC: 100 MMOL/L
CO2 SERPL-SCNC: 21 MMOL/L
CREAT SERPL-MCNC: 0.97 MG/DL
EGFR: 97 ML/MIN/1.73M2
ESTIMATED AVERAGE GLUCOSE: 148 MG/DL
GLUCOSE SERPL-MCNC: 120 MG/DL
HBA1C MFR BLD HPLC: 6.8 %
POTASSIUM SERPL-SCNC: 3.6 MMOL/L
SODIUM SERPL-SCNC: 139 MMOL/L

## 2024-01-06 ENCOUNTER — TRANSCRIPTION ENCOUNTER (OUTPATIENT)
Age: 49
End: 2024-01-06

## 2024-01-09 LAB — GI PCR PANEL: NOT DETECTED

## 2024-01-16 ENCOUNTER — TRANSCRIPTION ENCOUNTER (OUTPATIENT)
Age: 49
End: 2024-01-16

## 2024-01-22 ENCOUNTER — APPOINTMENT (OUTPATIENT)
Dept: ORTHOPEDIC SURGERY | Facility: CLINIC | Age: 49
End: 2024-01-22
Payer: COMMERCIAL

## 2024-01-22 VITALS — HEIGHT: 67 IN | BODY MASS INDEX: 42.38 KG/M2 | WEIGHT: 270 LBS

## 2024-01-22 DIAGNOSIS — M40.209 UNSPECIFIED KYPHOSIS, SITE UNSPECIFIED: ICD-10-CM

## 2024-01-22 DIAGNOSIS — S22.009A UNSPECIFIED FRACTURE OF UNSPECIFIED THORACIC VERTEBRA, INITIAL ENCOUNTER FOR CLOSED FRACTURE: ICD-10-CM

## 2024-01-22 PROCEDURE — 99214 OFFICE O/P EST MOD 30 MIN: CPT

## 2024-01-22 NOTE — PHYSICAL EXAM
[Antalgic] : antalgic [Stooped] : stooped [de-identified] : Examination of the lumbar spine reveals no midline tenderness palpation, step-offs, or skin lesions. Decreased range of motion with respect to flexion, extension, lateral bending, and rotation. No tenderness to palpation of the sciatic notch. No tenderness palpation of the bilateral greater trochanters. No pain with passive internal/external rotation of the hips. No instability of bilateral lower extremities.  Negative JOHN. Negative straight leg raise bilaterally. No bowstring. Negative femoral stretch. 5 out of 5 iliopsoas, hip abductors, hips adductors, quadriceps, hamstrings, gastrocsoleus, tibialis anterior, extensor hallucis longus, peroneals. Grossly intact sensation to light touch bilateral lower extremities. 1+ patellar and Achilles reflexes. Downgoing Babinski. No clonus. Intact proprioception. Palpable pulses. No skin lesion and no edema on the right and left lower extremities. [de-identified] : Lumbar MRI reveals degenerative disc disease L3-4 with some increased stenosis  Review of his recent thoracic x-rays reveals an age-indeterminate T7 compression fracture with kyphosis

## 2024-01-22 NOTE — DISCUSSION/SUMMARY
[de-identified] : We discussed further treatment options.  Given his continued pain I recommend an MRI of his thoracic spine to evaluate for healing of his compression fracture.  Follow-up afterwards.

## 2024-01-22 NOTE — HISTORY OF PRESENT ILLNESS
[de-identified] : Mr. AMBERLY ESPITIA  is a 45 year old male who presents to the office for a follow-up visit.  He is complaining of chronic thoracic pain.  He states he has a compression fx that he was told has gotten worse.

## 2024-01-23 ENCOUNTER — RX RENEWAL (OUTPATIENT)
Age: 49
End: 2024-01-23

## 2024-01-26 ENCOUNTER — TRANSCRIPTION ENCOUNTER (OUTPATIENT)
Age: 49
End: 2024-01-26

## 2024-01-26 DIAGNOSIS — G73.7 OTHER ORGAN OR SYSTEM INVOLVEMENT IN SYSTEMIC LUPUS ERYTHEMATOSUS: ICD-10-CM

## 2024-01-26 DIAGNOSIS — M32.19 OTHER ORGAN OR SYSTEM INVOLVEMENT IN SYSTEMIC LUPUS ERYTHEMATOSUS: ICD-10-CM

## 2024-01-26 RX ORDER — PREDNISONE 10 MG/1
10 TABLET ORAL
Refills: 0 | Status: DISCONTINUED | COMMUNITY
Start: 2023-12-14 | End: 2024-01-26

## 2024-01-29 ENCOUNTER — TRANSCRIPTION ENCOUNTER (OUTPATIENT)
Age: 49
End: 2024-01-29

## 2024-01-29 NOTE — PATIENT PROFILE ADULT - NSFALLSECTIONLABEL_GEN_A_CORE
Consultation - GI   Rahel Aguirre 12 y.o. female MRN: [id number]  Unit/Bed#:  Encounter: [id number]      Assessment/Plan     Assessment:  12-year-old female with chronic nausea.  Plan:  Upper endoscopy with biopsies.    History of Present Illness   Physician Requesting Consult: Kvng Augustin MD  Reason for Consult / Principal Problem: Chronic nausea failure to thrive  Hx and PE limited by:   HPI: Rahel Aguirre is a 12 y.o. year old female who presents with chronic nausea and failure to thrive.    Consults    Review of Systems   Constitutional:  Positive for unexpected weight change. Negative for chills and fever.   HENT:  Negative for ear pain and sore throat.    Eyes:  Negative for pain and visual disturbance.   Respiratory:  Negative for cough and shortness of breath.    Cardiovascular:  Negative for chest pain and palpitations.   Gastrointestinal:  Positive for nausea. Negative for abdominal pain and vomiting.   Genitourinary:  Negative for dysuria and hematuria.   Musculoskeletal:  Negative for back pain and gait problem.   Skin:  Negative for color change and rash.   Neurological:  Negative for seizures and syncope.   All other systems reviewed and are negative.      Historical Information   No past medical history on file.  No past surgical history on file.  Social History   Social History     Substance and Sexual Activity   Alcohol Use None     Social History     Substance and Sexual Activity   Drug Use Not on file     E-Cigarette/Vaping     E-Cigarette/Vaping Substances     Social History     Tobacco Use   Smoking Status Not on file   Smokeless Tobacco Not on file     Family History: non-contributory    Meds/Allergies   all current active meds have been reviewed    Allergies   Allergen Reactions    Lactose - Food Allergy Diarrhea       Objective     No intake or output data in the 24 hours ending 01/29/24 0731    Invasive Devices:        Physical Exam  Vitals and nursing note reviewed.    Constitutional:       General: She is active. She is not in acute distress.  HENT:      Right Ear: Tympanic membrane normal.      Left Ear: Tympanic membrane normal.      Mouth/Throat:      Mouth: Mucous membranes are moist.   Eyes:      General:         Right eye: No discharge.         Left eye: No discharge.      Conjunctiva/sclera: Conjunctivae normal.   Cardiovascular:      Rate and Rhythm: Normal rate and regular rhythm.      Heart sounds: S1 normal and S2 normal. No murmur heard.  Pulmonary:      Effort: Pulmonary effort is normal. No respiratory distress.      Breath sounds: Normal breath sounds. No wheezing, rhonchi or rales.   Abdominal:      General: Bowel sounds are normal.      Palpations: Abdomen is soft.      Tenderness: There is no abdominal tenderness.   Musculoskeletal:         General: No swelling. Normal range of motion.      Cervical back: Neck supple.   Lymphadenopathy:      Cervical: No cervical adenopathy.   Skin:     General: Skin is warm and dry.      Capillary Refill: Capillary refill takes less than 2 seconds.      Findings: No rash.   Neurological:      Mental Status: She is alert.   Psychiatric:         Mood and Affect: Mood normal.         Lab Results: I have personally reviewed pertinent reports.    Imaging Studies: I have personally reviewed pertinent reports.    EKG, Pathology, and Other Studies: I have personally reviewed pertinent reports.      VTE Prophylaxis: Reason for no pharmacologic prophylaxis Short procedure.    Counseling / Coordination of Care  Total floor / unit time spent today 30 minutes. Greater than 50% of total time was spent with the patient and / or family counseling and / or coordination of care. A description of the counseling / coordination of care: Benefits and risks of procedure.   .

## 2024-01-31 ENCOUNTER — NON-APPOINTMENT (OUTPATIENT)
Age: 49
End: 2024-01-31

## 2024-02-01 ENCOUNTER — NON-APPOINTMENT (OUTPATIENT)
Age: 49
End: 2024-02-01

## 2024-02-01 ENCOUNTER — APPOINTMENT (OUTPATIENT)
Dept: RHEUMATOLOGY | Facility: CLINIC | Age: 49
End: 2024-02-01
Payer: COMMERCIAL

## 2024-02-01 VITALS
RESPIRATION RATE: 16 BRPM | DIASTOLIC BLOOD PRESSURE: 79 MMHG | OXYGEN SATURATION: 97 % | SYSTOLIC BLOOD PRESSURE: 119 MMHG | HEART RATE: 92 BPM | TEMPERATURE: 97.3 F

## 2024-02-01 PROCEDURE — 96374 THER/PROPH/DIAG INJ IV PUSH: CPT

## 2024-02-01 RX ORDER — ZOLEDRONIC ACID 5 MG/100ML
5 INJECTION INTRAVENOUS
Qty: 0 | Refills: 0 | Status: COMPLETED | OUTPATIENT
Start: 2023-12-14

## 2024-02-01 NOTE — HISTORY OF PRESENT ILLNESS
[N/A] : N/A [Denies] : Denies [No] : No [Yes] : Yes [Declined] : Declined [Right upper extremity] : Right upper extremity [24g] : 24g [Start Time: ___] : Medication Start Time: [unfilled] [End Time: ___] : Medication End Time: [unfilled] [Medication Name: ___] : Medication Name: [unfilled] [Total Amount Administered: ___] : Total Amount Administered: [unfilled] [IV discontinued. Intact. No signs or symptoms of IV complications noted. Time: ___] : IV discontinued. Intact. No signs or symptoms of IV complications noted. Time: [unfilled] [Patient  instructed to seek medical attention with signs and symptoms of adverse effects] : Patient  instructed to seek medical attention with signs and symptoms of adverse effects [Patient left unit in no acute distress] : Patient left unit in no acute distress [Medications administered as ordered and tolerated well.] : Medications administered as ordered and tolerated well. [de-identified] : Right medial [de-identified] : Patient presents for Zoledronic Acid infusion. Patient denies of any complaints or symptoms today. Education and teaching materials provided to patient. Patient verbalized understanding. Patient tolerated infusion well.

## 2024-02-02 ENCOUNTER — APPOINTMENT (OUTPATIENT)
Dept: PULMONOLOGY | Facility: CLINIC | Age: 49
End: 2024-02-02

## 2024-02-05 ENCOUNTER — RX RENEWAL (OUTPATIENT)
Age: 49
End: 2024-02-05

## 2024-02-08 ENCOUNTER — RX RENEWAL (OUTPATIENT)
Age: 49
End: 2024-02-08

## 2024-02-12 ENCOUNTER — APPOINTMENT (OUTPATIENT)
Dept: RHEUMATOLOGY | Facility: CLINIC | Age: 49
End: 2024-02-12
Payer: COMMERCIAL

## 2024-02-12 VITALS
OXYGEN SATURATION: 98 % | HEART RATE: 93 BPM | DIASTOLIC BLOOD PRESSURE: 77 MMHG | BODY MASS INDEX: 40.49 KG/M2 | WEIGHT: 258 LBS | HEIGHT: 67 IN | SYSTOLIC BLOOD PRESSURE: 115 MMHG

## 2024-02-12 PROCEDURE — 99214 OFFICE O/P EST MOD 30 MIN: CPT

## 2024-02-12 NOTE — ASSESSMENT
[FreeTextEntry1] : AMBERLY ESPITIA is a 48 year old male, seen on today for myalgias and fatigue - unclear if due to hypothyroid , sle/mctd or mood disorder + U1rnp and weak positive PM/Sc100 OWEN 1:2560, +Sm, +RNP 1) Lupus/inflammatory myositis overlap (abnormal Myomarker) muscle fatigue, sun sensitivity and headaches. CK normalized (was elevated in 500's in 2019), dsdna + (normalized after benlysta) continue  mg daily. follow up optho given HCQ use. continue Myfortic 360mg - 3 pills po BID Off steroids for SLE and to taper current steroids for asthma with pulm.  sun sensitivity: sun block or UPF protective clothing Will check laboratory tests to look for markers of disease activity and also to assess for medication toxicity. lower prednisone to 9mg daily  2) Asthma - continue working with pulm on Dupixent injection.  steroid taper   3) COMPRESSION FRACTURE assoc. with cough and worsening [] dexa (9-2022) Openia in spine [] check vitamin d [] minimize steroids  [] reclast # 1 :  2-1-2024    More than 50% of the encounter was spent counseling AMBERLY ESPITIA on the differential, workup, disease course, and treatment/management.   Education was provided to AMBERLY ESPITIA during this encounter. All questions and concerns were answered and addressed in detail.  AMBERLY ESPITIA verbalized understanding and agreed to the plan.   AMBERLY ESPITIA has been instructed to call for an earlier appointment if new symptoms develop in the interim. AMBERLY ESPITIA has been instructed to make a follow-up appointment in 2 months

## 2024-02-12 NOTE — PHYSICAL EXAM
[General Appearance - Alert] : alert [General Appearance - In No Acute Distress] : in no acute distress [General Appearance - Well Nourished] : well nourished [General Appearance - Well Developed] : well developed [Sclera] : the sclera and conjunctiva were normal [Neck Appearance] : the appearance of the neck was normal [Abnormal Walk] : normal gait [Musculoskeletal - Swelling] : no joint swelling seen [Motor Tone] : muscle strength and tone were normal [Skin Color & Pigmentation] : normal skin color and pigmentation [FreeTextEntry1] : fungal rash on feet.  no facial rash  [Oriented To Time, Place, And Person] : oriented to person, place, and time

## 2024-02-12 NOTE — HISTORY OF PRESENT ILLNESS
[FreeTextEntry1] : AMBERLY ESPITIA is a 48 year  old male, seen on today  for SLE: with OWEN 1:2560, + Sm + RNP + LA, low complement, + U1RNP and weak positive PM/.   Other pertinent medical problems (structural back disease, asthma, migraine) Structural back disease and compression fracture -> Saw Christel for back and pending repeat MRI.  ashtma -> persistnet slight wheeze, on dupixent  Osteopororsis -> steroid use, compression fracture, osteopenia on dexa    Today + myalgias  NO joint pain  + Back Pain + fungal rash on feet - not better with cream. sees podiatry,itching is better but the rash is still there - isn't using cream daily  + no cp and no n/v/d/c + loss of appetite on Ozempic  asthma is well controlled but mild wheeze no fevers, no chills [History of Nephritis] : the patient has no history of nephritis [Currently Experiencing] : currently experiencing [Headache] : no headaches [Sicca] : sicca [Rash] : no rash [Chest Pain] : no chest pain [Shortness of Breath] : shortness of breath [Abdominal Pain] : no abdominal pain [Psychological Symptoms] : no psychological symptoms [Fatigue] : no fatigue [Arthritis] : no arthritis [Arthralgias] : no arthralgias [Sun Sensitivity] : sun sensitivity [FreeTextEntry3] : 2019 [FreeTextEntry7] : OWEN 1:2560, + SM, + RNP, + LA, Low complement,  [FreeTextEntry4] : cellcept (infection), benlysta (allergy), saphenlo (recurrent infections)  [FreeTextEntry6] : Low TPMT + sicca  + back pain

## 2024-02-12 NOTE — DATA REVIEWED
[FreeTextEntry1] : Laboratory and radiology studies that were personally reviewed at today's visit are summarized below and above: \par  \par  CT chest (3-7-2023):  No pulmonary embolus is noted.  Marked loss of height of one of the midthoracic vertebral body has \par  increased when compared to previous exam.\par  3-7-2023:  CBC = wnl, K = 3.3, C3-126, c4=28, dsdna < 12 prot/cr = 0.1\par  \par  \par  \par  Pulm note (8-26-22)  felling better overall and planning PFT for 4-6 weeks. \par  7/27/22 dsdna <12\par  7/26/22 - rsv + \par  6/2022:  enterovirus/rhinovirus and parainfluenza + \par  MRI T spine (8-1-22) Moderate acute compressoin fracture of the superior endplate of the T7 vertebral body \par  CT head (11-9-21)  no infarct seen \par  neurology note/emg from 10-25-21 reviewed and overlap myositis in setting of sle \par  5-19-21:  MRI right thigh - no muscle edema moderate sized right knee joint effusion \par  5-19-21:  MRI Lumbar spine - diffuse spinal canal narrowing and mild disc bulge with mod spinal cnala stenosis \par  Duplex Venous LE - no evidence of DVT\par  3-19-21:  Echo: normal pericardium, nl EF\par  11-6-2020:  Knee Xray:  WNL\par  8-2020:  Head CT - WNL\par  \par  7-6-20:  cr = 1.2, cpk = 324, cbc  -okay , ua - okay, esr = 85 (increased from prior 43), prot/cr = 0.1, c3 = 120, c4 = 21, dsdna < 12,\par  6-2020:  tsh = 2.49, \par  \par  3/10/20:  myomarker with positive Z5hiHQM, U1RNP and SSA52, ck - 212, c3/c4 wnl \par  \par  dsDNA positive in 2019 and now negative \par  \par  10-29-19:  CBC okay\par  \par  9-11-19:  ua and prot/cr - wnl \par  8-1-19 - c3/c4 WNL, dsdna = 54\par  NL G6PD\par  LOW TPMT\par  \par  ct sinus (10-21-19) : Minimal mucosal thickening involves the left frontal, anterior ethmoid and left maxillary sinuses.  Rightward deviation of the nasal septum with a right nasal spur narrowing the right nasal cavity.  Narrowing of the left OMU which may predispose to sinus outflow obstruction. \par  Small lucent lesion within the left maxilla favoring a benign fibro-osseous lesion. Follow-up sinus CT\par  advised as clinically warranted in 6 months to confirm stability.\par  \par  lumbar spine (10-21-19):  Developmental limbus vertebra noted at anterior superior aspect of L4 with \par  hypertrophic bony remodeling change. Chronic appearing well marginated slight concave contour depression of anterior aspect of L2 superior endplate.  Remaining vertebral body heights maintained. \par  No spondylolistheses spondylolysis defects or evidence for dynamic  instability. \par  Variable slightly narrowed disc spaces. Unremarkable SI joints and partially \par  visualized hips. \par  No lytic or blastic lesions. \par  Right upper quadrant surgical clips present. \par  head ct - 10-24-19:  Minimal mucosal thickening involves the left frontal, anterior ethmoid and \par  left maxillary sinuses.  Rightward deviation of the nasal septum with a right nasal spur narrowing \par  the right nasal cavity.  Narrowing of the left OMU which may predispose to sinus outflow obstruction. \par  Small lucent lesion within the left maxilla favoring a benign fibro-osseous  lesion. Follow-up sinus CT advised as clinically warranted in 6 months to  confirm stability. \par

## 2024-02-13 ENCOUNTER — TRANSCRIPTION ENCOUNTER (OUTPATIENT)
Age: 49
End: 2024-02-13

## 2024-02-13 LAB
25(OH)D3 SERPL-MCNC: 20.3 NG/ML
ALBUMIN SERPL ELPH-MCNC: 4.7 G/DL
ALP BLD-CCNC: 93 U/L
ALT SERPL-CCNC: 24 U/L
ANION GAP SERPL CALC-SCNC: 13 MMOL/L
APPEARANCE: CLEAR
AST SERPL-CCNC: 27 U/L
BACTERIA: NEGATIVE /HPF
BILIRUB SERPL-MCNC: 0.5 MG/DL
BILIRUBIN URINE: ABNORMAL
BLOOD URINE: NEGATIVE
BUN SERPL-MCNC: 6 MG/DL
C3 SERPL-MCNC: 158 MG/DL
C4 SERPL-MCNC: 27 MG/DL
CALCIUM SERPL-MCNC: 9 MG/DL
CAST: 0 /LPF
CHLORIDE SERPL-SCNC: 101 MMOL/L
CK SERPL-CCNC: 199 U/L
CO2 SERPL-SCNC: 24 MMOL/L
COLOR: NORMAL
CREAT SERPL-MCNC: 0.85 MG/DL
CREAT SPEC-SCNC: 391 MG/DL
CREAT/PROT UR: 0.1 RATIO
DSDNA AB SER-ACNC: <12 IU/ML
EGFR: 108 ML/MIN/1.73M2
EPITHELIAL CELLS: 1 /HPF
ERYTHROCYTE [SEDIMENTATION RATE] IN BLOOD BY WESTERGREN METHOD: 45 MM/HR
GLUCOSE QUALITATIVE U: NEGATIVE MG/DL
KETONES URINE: ABNORMAL MG/DL
LEUKOCYTE ESTERASE URINE: NEGATIVE
MICROSCOPIC-UA: NORMAL
NITRITE URINE: NEGATIVE
PH URINE: 6
POTASSIUM SERPL-SCNC: 3.9 MMOL/L
PROT SERPL-MCNC: 7.4 G/DL
PROT UR-MCNC: 35 MG/DL
PROTEIN URINE: 30 MG/DL
RED BLOOD CELLS URINE: 2 /HPF
SODIUM SERPL-SCNC: 138 MMOL/L
SPECIFIC GRAVITY URINE: >1.03
UROBILINOGEN URINE: 1 MG/DL
WHITE BLOOD CELLS URINE: 0 /HPF

## 2024-02-14 LAB
BASOPHILS # BLD AUTO: 0.04 K/UL
BASOPHILS NFR BLD AUTO: 0.4 %
EOSINOPHIL # BLD AUTO: 0.09 K/UL
EOSINOPHIL NFR BLD AUTO: 0.8 %
HCT VFR BLD CALC: 41.9 %
HGB BLD-MCNC: 13.5 G/DL
IMM GRANULOCYTES NFR BLD AUTO: 0.9 %
LYMPHOCYTES # BLD AUTO: 2.29 K/UL
LYMPHOCYTES NFR BLD AUTO: 21.6 %
MAN DIFF?: NORMAL
MCHC RBC-ENTMCNC: 25.8 PG
MCHC RBC-ENTMCNC: 32.2 GM/DL
MCV RBC AUTO: 80 FL
MONOCYTES # BLD AUTO: 1.14 K/UL
MONOCYTES NFR BLD AUTO: 10.7 %
NEUTROPHILS # BLD AUTO: 6.95 K/UL
NEUTROPHILS NFR BLD AUTO: 65.6 %
PLATELET # BLD AUTO: 347 K/UL
RBC # BLD: 5.24 M/UL
RBC # FLD: 16.1 %
WBC # FLD AUTO: 10.61 K/UL

## 2024-02-21 ENCOUNTER — TRANSCRIPTION ENCOUNTER (OUTPATIENT)
Age: 49
End: 2024-02-21

## 2024-02-22 ENCOUNTER — APPOINTMENT (OUTPATIENT)
Dept: INTERNAL MEDICINE | Facility: CLINIC | Age: 49
End: 2024-02-22
Payer: COMMERCIAL

## 2024-02-22 VITALS
RESPIRATION RATE: 17 BRPM | WEIGHT: 258 LBS | OXYGEN SATURATION: 99 % | SYSTOLIC BLOOD PRESSURE: 119 MMHG | DIASTOLIC BLOOD PRESSURE: 72 MMHG | HEIGHT: 67 IN | HEART RATE: 94 BPM | BODY MASS INDEX: 40.49 KG/M2

## 2024-02-22 PROCEDURE — 36415 COLL VENOUS BLD VENIPUNCTURE: CPT

## 2024-02-22 PROCEDURE — 99214 OFFICE O/P EST MOD 30 MIN: CPT

## 2024-02-22 NOTE — HISTORY OF PRESENT ILLNESS
[FreeTextEntry8] :   CC: Diarrhea followed by abdominal pain nausea and bright red blood per rectum  HPI the patient is a 48-year-old male with history of lupus, asthma, hypertension, diabetes, obesity, hyperlipidemia, who presents with complaint of diarrhea treated with Imodium resolved followed by abdominal pain epigastric radiating laterally with bright red blood per rectum every 4 days ago has lightened up denies vomiting, fever, chills, sick contacts.  Patient has some nausea

## 2024-02-22 NOTE — PHYSICAL EXAM
[Normal Sclera/Conjunctiva] : normal sclera/conjunctiva [Normal Outer Ear/Nose] : the outer ears and nose were normal in appearance [Soft] : abdomen soft [Non-distended] : non-distended [No Masses] : no abdominal mass palpated [No HSM] : no HSM [Normal Bowel Sounds] : normal bowel sounds [No Hernias] : no hernias [No Mass] : no mass [Normal Sphincter Tone] : normal sphincter tone [Normal] : no CVA or spinal tenderness [Stool Occult Blood] : stool negative for occult blood [de-identified] : Cushingoid face [de-identified] : Diffusely tender [FreeTextEntry1] : Stool in vault firm stool guaiac negative

## 2024-02-22 NOTE — REVIEW OF SYSTEMS
[Abdominal Pain] : abdominal pain [Nausea] : nausea [Melena] : meljayshree [Heartburn] : heartburn [Negative] : Psychiatric [Vomiting] : no vomiting [Constipation] : no constipation

## 2024-02-22 NOTE — ASSESSMENT
[FreeTextEntry1] : Patient is a 48-year-old male with history of obesity, type 2 diabetes, lupus, asthma, obstructive sleep apnea, hypertension, hyperlipidemia who presents with 2-week history of diarrhea followed by abdominal pain bright red Per rectum  1.  Diarrhea/abdominal pain/bright red blood per rectum Etiology unclear presently guaiac negative Check CBC ferritin chemistry profile If counts low will refer to GI Start pantoprazole and sonogram of abdomen Follow-up in 1 to 2 weeks 2 weeks if fails to improve  2.  Nausea Start Zofran bland diet brat  3.  Asthma Lungs clear well controlled  4.  Obesity Has lost 12 pounds on Ozempic  5.  Diabetes Well-controlled on Ozempic  6.  Hyperlipidemia Will eventually need statin or other medication for hyperlipidemic  6.  Lupus On Dupixent, hydroxychloroquine and 9 mg of prednisone

## 2024-02-24 ENCOUNTER — OUTPATIENT (OUTPATIENT)
Dept: OUTPATIENT SERVICES | Facility: HOSPITAL | Age: 49
LOS: 1 days | End: 2024-02-24

## 2024-02-24 ENCOUNTER — APPOINTMENT (OUTPATIENT)
Dept: ULTRASOUND IMAGING | Facility: CLINIC | Age: 49
End: 2024-02-24
Payer: COMMERCIAL

## 2024-02-24 DIAGNOSIS — R10.84 GENERALIZED ABDOMINAL PAIN: ICD-10-CM

## 2024-02-24 DIAGNOSIS — Z90.49 ACQUIRED ABSENCE OF OTHER SPECIFIED PARTS OF DIGESTIVE TRACT: Chronic | ICD-10-CM

## 2024-02-24 PROCEDURE — 76700 US EXAM ABDOM COMPLETE: CPT | Mod: 26

## 2024-02-27 ENCOUNTER — RX RENEWAL (OUTPATIENT)
Age: 49
End: 2024-02-27

## 2024-02-27 RX ORDER — LEVOTHYROXINE SODIUM 0.12 MG/1
125 TABLET ORAL
Qty: 90 | Refills: 1 | Status: ACTIVE | COMMUNITY
Start: 2019-07-03 | End: 1900-01-01

## 2024-02-28 LAB
ALBUMIN SERPL ELPH-MCNC: 4.5 G/DL
ALP BLD-CCNC: 84 U/L
ALT SERPL-CCNC: 20 U/L
ANION GAP SERPL CALC-SCNC: 13 MMOL/L
AST SERPL-CCNC: 24 U/L
BILIRUB SERPL-MCNC: 0.4 MG/DL
BUN SERPL-MCNC: 5 MG/DL
CALCIUM SERPL-MCNC: 9.2 MG/DL
CHLORIDE SERPL-SCNC: 103 MMOL/L
CO2 SERPL-SCNC: 21 MMOL/L
CREAT SERPL-MCNC: 0.8 MG/DL
EGFR: 110 ML/MIN/1.73M2
FERRITIN SERPL-MCNC: 37 NG/ML
GLUCOSE SERPL-MCNC: 84 MG/DL
HCT VFR BLD CALC: 41 %
HGB BLD-MCNC: 13.1 G/DL
LPL SERPL-CCNC: 69 U/L
MCHC RBC-ENTMCNC: 26.5 PG
MCHC RBC-ENTMCNC: 32 GM/DL
MCV RBC AUTO: 82.8 FL
PLATELET # BLD AUTO: 448 K/UL
POTASSIUM SERPL-SCNC: 3.9 MMOL/L
PROT SERPL-MCNC: 7 G/DL
RBC # BLD: 4.95 M/UL
RBC # FLD: 16.1 %
SODIUM SERPL-SCNC: 138 MMOL/L
TSH SERPL-ACNC: 1.53 UIU/ML
WBC # FLD AUTO: 9.26 K/UL

## 2024-03-04 ENCOUNTER — TRANSCRIPTION ENCOUNTER (OUTPATIENT)
Age: 49
End: 2024-03-04

## 2024-03-07 ENCOUNTER — APPOINTMENT (OUTPATIENT)
Dept: INTERNAL MEDICINE | Facility: CLINIC | Age: 49
End: 2024-03-07

## 2024-03-11 ENCOUNTER — RX RENEWAL (OUTPATIENT)
Age: 49
End: 2024-03-11

## 2024-03-15 ENCOUNTER — TRANSCRIPTION ENCOUNTER (OUTPATIENT)
Age: 49
End: 2024-03-15

## 2024-03-15 NOTE — ED PROVIDER NOTE - QRS
Stereotactic Percutaneous Biopsy of the Right Breast      1330 Patient ambulated, gait steady, into [facility] pre procedure room. Reviewed procedure with patient and patient verbalizes understanding.  Consent obtained. Allergies, history, and medications reviewed. Vitals signs taken, VSS.    1406 Patient assisted into mammography room and into stereotactic biopsy chair. Technologist taking images.    1415 Timeout Completed.      1415 Dr. Ayala talking with patient and looking at images. Area of concern to right breast located.     Area cleansed with chloraprep x 3 and allowed to dry for 3 minutes.     1418 Dr. Ayala injected lidocaine 2% 4ml  into right breast to numb area, see eMar.     1419 Using 22G spinal needle, Dr. Ayala injected lidocaine 1% with epi  5ml deeper into biopsy site, see eMar.     Using scalpel, small incision made at site by Dr. Ayala.    Needle Guide Eviva NG09R lot [id number] exp 07- used during procedure.      Samples of tissue obtained using Eviva 0913-20 lot [id number] exp 06-     Tissue collected from right breast 1425 and onto grid marked right breast    Compression held to site. Images of specimen taken to check for calcifications present.     Dr. Ayala then inserted SMARK EVIVA-13 lot [id number] exp 10-      Compression held to site for 5 minutes. Site soft, no bleeding. Steri strips, gauze, and tegaderm applied.     Post biopsy mammogram order per verbal order from Dr. Ayala     Specimen orders placed per [facility] protocol.     Vitals taken, VSS.  Assisted patient out of stereotactic chair and into consultation room. Steady gait.     1445 Post bx mamm completed.    1500 Discharge instructions reviewed with patient and patient verbalizes understanding. Biopsy site soft. Dressing clean, dry, and intact. Chest area wrapped in ace wrap. Ice packs given for home. Denies any pain. Patient left [facility] with steady gait. Encouraged to call with any questions or concerns.       94

## 2024-03-19 ENCOUNTER — TRANSCRIPTION ENCOUNTER (OUTPATIENT)
Age: 49
End: 2024-03-19

## 2024-03-26 ENCOUNTER — APPOINTMENT (OUTPATIENT)
Dept: RHEUMATOLOGY | Facility: CLINIC | Age: 49
End: 2024-03-26
Payer: COMMERCIAL

## 2024-03-26 VITALS
HEIGHT: 67 IN | HEART RATE: 89 BPM | OXYGEN SATURATION: 98 % | WEIGHT: 238 LBS | RESPIRATION RATE: 16 BRPM | BODY MASS INDEX: 37.35 KG/M2 | DIASTOLIC BLOOD PRESSURE: 93 MMHG | SYSTOLIC BLOOD PRESSURE: 132 MMHG

## 2024-03-26 PROCEDURE — 99215 OFFICE O/P EST HI 40 MIN: CPT

## 2024-03-26 RX ORDER — HYDROXYCHLOROQUINE SULFATE 200 MG/1
200 TABLET, FILM COATED ORAL
Qty: 180 | Refills: 3 | Status: ACTIVE | COMMUNITY
Start: 2019-06-25 | End: 1900-01-01

## 2024-03-26 RX ORDER — FLUTICASONE FUROATE, UMECLIDINIUM BROMIDE AND VILANTEROL TRIFENATATE 200; 62.5; 25 UG/1; UG/1; UG/1
200-62.5-25 POWDER RESPIRATORY (INHALATION)
Qty: 1 | Refills: 1 | Status: ACTIVE | COMMUNITY
Start: 2023-03-21 | End: 1900-01-01

## 2024-03-26 RX ORDER — DOXYCYCLINE 100 MG/1
100 CAPSULE ORAL
Qty: 14 | Refills: 0 | Status: DISCONTINUED | COMMUNITY
Start: 2023-12-04 | End: 2024-03-26

## 2024-03-26 NOTE — ASSESSMENT
[FreeTextEntry1] : AMBERLY ESPITIA is a 48 year old male, seen on today for myalgias and fatigue - unclear if due to hypothyroid , sle/mctd or mood disorder + U1rnp and weak positive PM/Sc100 OWEN 1:2560, +Sm, +RNP 1) Lupus/inflammatory myositis overlap (abnormal Myomarker) muscle fatigue, sun sensitivity and headaches. CK normalized (was elevated in 500's in 2019), dsdna + (normalized after benlysta) continue  mg daily. follow up optho given HCQ use. continue Myfortic 360mg - 3 pills po BID taper steroids from 9mg daily to 7mg daily  sun sensitivity: sun block or UPF protective clothing Will check laboratory tests to look for markers of disease activity and also to assess for medication toxicity. lower prednisone to 9mg daily  2) Asthma - continue working with pulm on Dupixent injection.  steroid taper  renewed trelegy while awaiting new pulm visit   3) COMPRESSION FRACTURE assoc. with cough and worsening [] dexa (9-2022) Openia in spine [] check vitamin d [] minimize steroids  [] reclast # 1 :  2-1-2024    More than 50% of the encounter was spent counseling AMBERLY ESPITIA on the differential, workup, disease course, and treatment/management.   Education was provided to AMBERLY ESPITIA during this encounter. All questions and concerns were answered and addressed in detail.  AMBERLY ESPITIA verbalized understanding and agreed to the plan.   AMBERLY ESPITIA has been instructed to call for an earlier appointment if new symptoms develop in the interim. AMBERLY ESPITIA has been instructed to make a follow-up appointment in 2 months

## 2024-03-26 NOTE — REASON FOR VISIT
[Source: ______] : History obtained from [unfilled] [Follow-Up: _____] : a [unfilled] follow-up visit [FreeTextEntry1] : SLE , fatigue, myositis, headache,

## 2024-03-26 NOTE — DATA REVIEWED
[FreeTextEntry1] : Laboratory and radiology studies that were personally reviewed at today's visit are summarized below and above: \par  \par  CT chest (3-7-2023):  No pulmonary embolus is noted.  Marked loss of height of one of the midthoracic vertebral body has \par  increased when compared to previous exam.\par  3-7-2023:  CBC = wnl, K = 3.3, C3-126, c4=28, dsdna < 12 prot/cr = 0.1\par  \par  \par  \par  Pulm note (8-26-22)  felling better overall and planning PFT for 4-6 weeks. \par  7/27/22 dsdna <12\par  7/26/22 - rsv + \par  6/2022:  enterovirus/rhinovirus and parainfluenza + \par  MRI T spine (8-1-22) Moderate acute compressoin fracture of the superior endplate of the T7 vertebral body \par  CT head (11-9-21)  no infarct seen \par  neurology note/emg from 10-25-21 reviewed and overlap myositis in setting of sle \par  5-19-21:  MRI right thigh - no muscle edema moderate sized right knee joint effusion \par  5-19-21:  MRI Lumbar spine - diffuse spinal canal narrowing and mild disc bulge with mod spinal cnala stenosis \par  Duplex Venous LE - no evidence of DVT\par  3-19-21:  Echo: normal pericardium, nl EF\par  11-6-2020:  Knee Xray:  WNL\par  8-2020:  Head CT - WNL\par  \par  7-6-20:  cr = 1.2, cpk = 324, cbc  -okay , ua - okay, esr = 85 (increased from prior 43), prot/cr = 0.1, c3 = 120, c4 = 21, dsdna < 12,\par  6-2020:  tsh = 2.49, \par  \par  3/10/20:  myomarker with positive J4iuLOX, U1RNP and SSA52, ck - 212, c3/c4 wnl \par  \par  dsDNA positive in 2019 and now negative \par  \par  10-29-19:  CBC okay\par  \par  9-11-19:  ua and prot/cr - wnl \par  8-1-19 - c3/c4 WNL, dsdna = 54\par  NL G6PD\par  LOW TPMT\par  \par  ct sinus (10-21-19) : Minimal mucosal thickening involves the left frontal, anterior ethmoid and left maxillary sinuses.  Rightward deviation of the nasal septum with a right nasal spur narrowing the right nasal cavity.  Narrowing of the left OMU which may predispose to sinus outflow obstruction. \par  Small lucent lesion within the left maxilla favoring a benign fibro-osseous lesion. Follow-up sinus CT\par  advised as clinically warranted in 6 months to confirm stability.\par  \par  lumbar spine (10-21-19):  Developmental limbus vertebra noted at anterior superior aspect of L4 with \par  hypertrophic bony remodeling change. Chronic appearing well marginated slight concave contour depression of anterior aspect of L2 superior endplate.  Remaining vertebral body heights maintained. \par  No spondylolistheses spondylolysis defects or evidence for dynamic  instability. \par  Variable slightly narrowed disc spaces. Unremarkable SI joints and partially \par  visualized hips. \par  No lytic or blastic lesions. \par  Right upper quadrant surgical clips present. \par  head ct - 10-24-19:  Minimal mucosal thickening involves the left frontal, anterior ethmoid and \par  left maxillary sinuses.  Rightward deviation of the nasal septum with a right nasal spur narrowing \par  the right nasal cavity.  Narrowing of the left OMU which may predispose to sinus outflow obstruction. \par  Small lucent lesion within the left maxilla favoring a benign fibro-osseous  lesion. Follow-up sinus CT advised as clinically warranted in 6 months to  confirm stability. \par

## 2024-03-26 NOTE — HISTORY OF PRESENT ILLNESS
[FreeTextEntry1] : AMBERLY ESPITIA is a 48 year  old male, seen on today  for SLE: with OWEN 1:2560, + Sm + RNP + LA, low complement, + U1RNP and weak positive PM/.  on Ozempic with some relief.  Other pertinent medical problems (structural back disease, asthma, migraine) Structural back disease and compression fracture -> Saw Tangela and Kristen for back and pending repeat MRI.  ashtma -> persistnet slight wheeze, on dupixent  Osteopororsis -> steroid use, compression fracture, osteopenia on dexa    Today abdominal pain has resolved from a few weeks ago.  occasional joint pain  very light sensitive and tint helps but not enough.  he gets exhausted from light.  + myalgias   + Back Pain + fungal rash on feet - not better with cream. sees podiatry, itching is better but the rash is still there  no cp and no n/v/d/c asthma with mild wheeze on dupixient  and pending his response this allergy season to see how he doesn't  no fevers, + chills but intolerant to cold but doesn't like warm or sweating.  feels cold to the core.  [History of Nephritis] : the patient has no history of nephritis [Currently Experiencing] : currently experiencing [Headache] : no headaches [Sicca] : sicca [Rash] : no rash [Chest Pain] : no chest pain [Shortness of Breath] : shortness of breath [Abdominal Pain] : no abdominal pain [Psychological Symptoms] : no psychological symptoms [Fatigue] : no fatigue [Arthritis] : no arthritis [Arthralgias] : no arthralgias [Sun Sensitivity] : sun sensitivity [FreeTextEntry3] : 2019 [FreeTextEntry7] : OWEN 1:2560, + SM, + RNP, + LA, Low complement,  [FreeTextEntry4] : cellcept (infection), benlysta (allergy), saphenlo (recurrent infections)  [FreeTextEntry6] : Low TPMT + sicca  + back pain

## 2024-04-01 ENCOUNTER — APPOINTMENT (OUTPATIENT)
Dept: PULMONOLOGY | Facility: CLINIC | Age: 49
End: 2024-04-01
Payer: COMMERCIAL

## 2024-04-01 VITALS
WEIGHT: 238 LBS | DIASTOLIC BLOOD PRESSURE: 74 MMHG | TEMPERATURE: 96.8 F | BODY MASS INDEX: 37.28 KG/M2 | SYSTOLIC BLOOD PRESSURE: 108 MMHG | OXYGEN SATURATION: 99 % | HEART RATE: 102 BPM

## 2024-04-01 PROCEDURE — 99203 OFFICE O/P NEW LOW 30 MIN: CPT

## 2024-04-01 NOTE — DISCUSSION/SUMMARY
[FreeTextEntry1] : 48 year old man who has SLE who also has asthma  he is on CellCept, prednisone 7 mg prednisone, hydroxychloroquine  he has TAMMY CSA but states not requiring CPAP  he is also on Ozempic  He is on Trelegy 200 mcg, Duoneb and Dupixent latter for 4 months  lungs are clear  he states h is prone to exacerbations  he has allergies and was on allergy shots for many years, stopped when he started Dupixent  PLAN decrease Trelegy to 100 mcg once daily  May use albuterol MDI as needed or nebulized bronchodilator   he will continue regimen per rheumatology  taper steroid if able  continue Dupixent and reassess effect  He is prone to seasonal allergies.   follow with allergist     Further recommendations based on results. Israel Rivas MD

## 2024-04-01 NOTE — REASON FOR VISIT
Medication: Jardiance, Doxazosin, enalapril   Last office visit date: 06/26/2023  Next appointment scheduled?: Yes   Number of refills given: enalapril 0, Jardiance 3, and doxazosin 3    Resending enalapril, patient switched pharmacy   Doxazosin and Jardiance last filled from old PCP.     OKAY TO SEND PER PCP   [Consultation] : a consultation [Sleep Apnea] : sleep apnea

## 2024-04-01 NOTE — PHYSICAL EXAM
[No Acute Distress] : no acute distress [Well Nourished] : well nourished [Well Developed] : well developed [Normal Oropharynx] : normal oropharynx [II] : Mallampati Class: II [Normal Appearance] : normal appearance [No Neck Mass] : no neck mass [Normal Rate/Rhythm] : normal rate/rhythm [Normal S1, S2] : normal s1, s2 [No Murmurs] : no murmurs [No Resp Distress] : no resp distress [Clear to Auscultation Bilaterally] : clear to auscultation bilaterally [Benign] : benign [Not Tender] : not tender [Soft] : soft [No Masses] : no masses [No Clubbing] : no clubbing [No Edema] : no edema [No Focal Deficits] : no focal deficits [Oriented x3] : oriented x3 [Normal Affect] : normal affect

## 2024-04-01 NOTE — HISTORY OF PRESENT ILLNESS
[Never] : never [TextBox_4] : 48 year old patient presents for evaluation of obstructive sleep apnea CSA  he  does not use CPAP, states breathing well at night  he has SLE and lupus myositis neuromuscular weakness  He has had asthma.  He complains of allergies  He is followed by Dr Neri, he takes hydroxychloroquine, prednisone 7 mg and Cell Cept 6 per day   H is on Dupixent for 4 months  for asthma.  He also takes Trelegy  he uses nebulized albuteral/ipratropium several times per day  he states asthma has been stable, but did not note a definite benefit        Primary doctor is Dr Everett     PSH:         PMH:  SLE myositis hypothyroid asthma     HTN       SH:   never smoker       ETOH: no     Occupation: busdriver   No exposure to chemicals, dust, asbestos, mold        ALLERGY:   allergic to  NSAID   Reaction is   environmental/seasonal allergy:  pollen dust       Review of Systems:   develops sinus infections, nasal obstruction no dysphagia no dry mouth   no arthritis no joint aches no joint swelling     no lung cancer   no CAD no MI no chest pain no murmur no CHF  no edema   no peptic ulcer or gastritis no GERD no abdominal pain no liver disease   no Diabetes no thyroid disease no hyperlipidemia     no bleeding   no DVT or PE   no kidney disease   no stroke no seizure

## 2024-04-17 ENCOUNTER — TRANSCRIPTION ENCOUNTER (OUTPATIENT)
Age: 49
End: 2024-04-17

## 2024-04-18 ENCOUNTER — RX RENEWAL (OUTPATIENT)
Age: 49
End: 2024-04-18

## 2024-04-18 RX ORDER — PREDNISONE 2.5 MG/1
2.5 TABLET ORAL
Qty: 60 | Refills: 2 | Status: ACTIVE | COMMUNITY
Start: 2022-08-11 | End: 1900-01-01

## 2024-04-19 NOTE — ED PROVIDER NOTE - MDM ORDERS SUBMITTED SELECTION
----- Message from Mitesh Long sent at 4/19/2024 12:33 PM EDT -----  Subject: Refill Request    QUESTIONS  Name of Medication? HYDROcodone-acetaminophen (NORCO)  MG per tablet  Patient-reported dosage and instructions? Take 1 tablet by mouth every 8   hours as needed for Pain for up to 30 days. Intended supply? 30 days Max   Daily Amount? 3 tablets  How many days do you have left? 10  Preferred Pharmacy? [id number]  Pharmacy phone number (if available)? [phone]  ---------------------------------------------------------------------------  --------------  CALL BACK INFO  What is the best way for the office to contact you? OK to leave message on   voicemail  Preferred Call Back Phone Number? [phone]  ---------------------------------------------------------------------------  --------------  SCRIPT ANSWERS  Relationship to Patient? Self  
[facility]  report reviewed 4/19/2024    The last fill date for Hydrocodone-Acetamin 5-325 Mg  was 3/18/2024 for a 30 d/s qty 90      Last UDS: 2/7/2024    CSA Last signed: 4/3/2024        PCP: Doni Ross MD    Last appt:  4/3/2024  Future Appointments   Date Time Provider Department Center   7/1/2024  9:20 AM Mary Hines MD [facility] BS AMB   7/12/2024  8:15 AM Gordon Nelson MD [facility] BS [facility]   7/26/2024  9:15 AM [facility] VISIT [facility] BS AMB   8/2/2024  8:20 AM Doni Ross MD [facility] BS AMB             
Labs/EKG/Imaging Studies/Medications

## 2024-04-29 ENCOUNTER — TRANSCRIPTION ENCOUNTER (OUTPATIENT)
Age: 49
End: 2024-04-29

## 2024-04-30 NOTE — ASSESSMENT
[FreeTextEntry1] : Patient is a 46-year-old male with history of obesity, hypothyroidism, hypertension, lupus, lumbar disc disease, chronic sinusitis, migraines, who presents for follow-up and complaint of fatigue\par \par 1. Asthma\par well-controlled on inhalers\par status post evaluation by pulmonary\par \par 2. Obesity\par up 10 pounds weight 295\par counseled on diet\par Counseled on a low carbohydrate,manly plant based diet.Closely monitor weight. Daily aerobic exercise for 30 minutes. Avoid high carb drinks.\par \par 3 fatigue\par check TFTs schedule sleep study\par \par 4 hypothyroidism \par levothyroxine hundred 125 µg PO Q day\par check TFTs\par \par 5. Hypertension\par continue losartan 50 mg borderline control\par \par 6 history of edema\par continue Lasix 40 mg a day check electrolytes\par \par 7. Muscle pain\par refer to rheumatology specialist on muscle disease.\par \par 8 lupus\par continue prednisone follow-up with rheumatology.\par \par 7 health maintenance\par schedule CPE\par Prevnar 13 today\par \par  per pst

## 2024-05-01 ENCOUNTER — TRANSCRIPTION ENCOUNTER (OUTPATIENT)
Age: 49
End: 2024-05-01

## 2024-05-06 ENCOUNTER — TRANSCRIPTION ENCOUNTER (OUTPATIENT)
Age: 49
End: 2024-05-06

## 2024-05-07 ENCOUNTER — APPOINTMENT (OUTPATIENT)
Dept: RHEUMATOLOGY | Facility: CLINIC | Age: 49
End: 2024-05-07
Payer: COMMERCIAL

## 2024-05-07 VITALS
BODY MASS INDEX: 37.35 KG/M2 | RESPIRATION RATE: 16 BRPM | SYSTOLIC BLOOD PRESSURE: 112 MMHG | OXYGEN SATURATION: 98 % | WEIGHT: 238 LBS | HEIGHT: 67 IN | HEART RATE: 98 BPM | DIASTOLIC BLOOD PRESSURE: 80 MMHG

## 2024-05-07 PROCEDURE — 99214 OFFICE O/P EST MOD 30 MIN: CPT

## 2024-05-07 NOTE — DATA REVIEWED
[FreeTextEntry1] : Laboratory and radiology studies that were personally reviewed at today's visit are summarized below and above: \par  \par  CT chest (3-7-2023):  No pulmonary embolus is noted.  Marked loss of height of one of the midthoracic vertebral body has \par  increased when compared to previous exam.\par  3-7-2023:  CBC = wnl, K = 3.3, C3-126, c4=28, dsdna < 12 prot/cr = 0.1\par  \par  \par  \par  Pulm note (8-26-22)  felling better overall and planning PFT for 4-6 weeks. \par  7/27/22 dsdna <12\par  7/26/22 - rsv + \par  6/2022:  enterovirus/rhinovirus and parainfluenza + \par  MRI T spine (8-1-22) Moderate acute compressoin fracture of the superior endplate of the T7 vertebral body \par  CT head (11-9-21)  no infarct seen \par  neurology note/emg from 10-25-21 reviewed and overlap myositis in setting of sle \par  5-19-21:  MRI right thigh - no muscle edema moderate sized right knee joint effusion \par  5-19-21:  MRI Lumbar spine - diffuse spinal canal narrowing and mild disc bulge with mod spinal cnala stenosis \par  Duplex Venous LE - no evidence of DVT\par  3-19-21:  Echo: normal pericardium, nl EF\par  11-6-2020:  Knee Xray:  WNL\par  8-2020:  Head CT - WNL\par  \par  7-6-20:  cr = 1.2, cpk = 324, cbc  -okay , ua - okay, esr = 85 (increased from prior 43), prot/cr = 0.1, c3 = 120, c4 = 21, dsdna < 12,\par  6-2020:  tsh = 2.49, \par  \par  3/10/20:  myomarker with positive [id number], U1RNP and SSA52, ck - 212, c3/c4 wnl \par  \par  dsDNA positive in 2019 and now negative \par  \par  10-29-19:  CBC okay\par  \par  9-11-19:  ua and prot/cr - wnl \par  8-1-19 - c3/c4 WNL, dsdna = 54\par  NL G6PD\par  LOW TPMT\par  \par  ct sinus (10-21-19) : Minimal mucosal thickening involves the left frontal, anterior ethmoid and left maxillary sinuses.  Rightward deviation of the nasal septum with a right nasal spur narrowing the right nasal cavity.  Narrowing of the left OMU which may predispose to sinus outflow obstruction. \par  Small lucent lesion within the left maxilla favoring a benign fibro-osseous lesion. Follow-up sinus CT\par  advised as clinically warranted in 6 months to confirm stability.\par  \par  lumbar spine (10-21-19):  Developmental limbus vertebra noted at anterior superior aspect of L4 with \par  hypertrophic bony remodeling change. Chronic appearing well marginated slight concave contour depression of anterior aspect of L2 superior endplate.  Remaining vertebral body heights maintained. \par  No spondylolistheses spondylolysis defects or evidence for dynamic  instability. \par  Variable slightly narrowed disc spaces. Unremarkable SI joints and partially \par  visualized hips. \par  No lytic or blastic lesions. \par  Right upper quadrant surgical clips present. \par  head ct - 10-24-19:  Minimal mucosal thickening involves the left frontal, anterior ethmoid and \par  left maxillary sinuses.  Rightward deviation of the nasal septum with a right nasal spur narrowing \par  the right nasal cavity.  Narrowing of the left OMU which may predispose to sinus outflow obstruction. \par  Small lucent lesion within the left maxilla favoring a benign fibro-osseous  lesion. Follow-up sinus CT advised as clinically warranted in 6 months to  confirm stability. \par

## 2024-05-07 NOTE — ASSESSMENT
[FreeTextEntry1] : AMBERLY ESPITIA is a 48 year old male, seen on today for myalgias and fatigue - unclear if due to hypothyroid , sle/mctd or mood disorder + U1rnp and weak positive PM/Sc100 OWEN 1:2560, +Sm, +RNP 1) Lupus/inflammatory myositis overlap (abnormal Myomarker) muscle fatigue, sun sensitivity and headaches. CK normalized (was elevated in 500's in 2019), dsdna + (normalized after benlysta) continue  mg daily. follow up optho given HCQ use. continue Myfortic 360mg - 3 pills po BID taper steroids from 7mg daily once review blood work  sun sensitivity: sun block or UPF protective clothing Will check laboratory tests to look for markers of disease activity and also to assess for medication toxicity.  2) Asthma - continue working with pulm on Dupixent injection.  steroid taper  renewed trelegy while awaiting new pulm visit   3) COMPRESSION FRACTURE assoc. with cough and worsening [] dexa (9-2022) Openia in spine [] check vitamin d [] minimize steroids  [] reclast # 1 :  2-1-2024    More than 50% of the encounter was spent counseling AMBERLY ESPITIA on the differential, workup, disease course, and treatment/management.   Education was provided to AMBERLY ESPITIA during this encounter. All questions and concerns were answered and addressed in detail.  AMBERLY ESPITIA verbalized understanding and agreed to the plan.   AMBERLY ESPITIA has been instructed to call for an earlier appointment if new symptoms develop in the interim. AMBERLY ESPITIA has been instructed to make a follow-up appointment in 1.5 months

## 2024-05-07 NOTE — PHYSICAL EXAM
[General Appearance - Alert] : alert [General Appearance - In No Acute Distress] : in no acute distress [General Appearance - Well Nourished] : well nourished [General Appearance - Well Developed] : well developed [Sclera] : the sclera and conjunctiva were normal [Neck Appearance] : the appearance of the neck was normal [Skin Color & Pigmentation] : normal skin color and pigmentation [Oriented To Time, Place, And Person] : oriented to person, place, and time [FreeTextEntry1] : fungal rash on feet.  no facial rash

## 2024-05-07 NOTE — HISTORY OF PRESENT ILLNESS
[Currently Experiencing] : currently experiencing [Sicca] : sicca [Arthralgias] : arthralgias [Sun Sensitivity] : sun sensitivity [FreeTextEntry1] : AMBERLY ESPITIA is a 48 year  old male, seen on today  for SLE: with OWEN 1:2560, + Sm + RNP + LA, low complement, + U1RNP and weak positive PM/.  on Ozempic with weight loss  Other pertinent medical problems (structural back disease, asthma, migraine) Structural back disease and compression fracture -> Saw Tangela and Kristen for back and pending repeat MRI.  ashtma -> persistnet slight wheeze, on dupixent  Osteopororsis -> steroid use, compression fracture, osteopenia on dexa    Today abdominal pain has resolved from a few weeks ago.  occasional joint pain  rash on right thumb/hand very light sensitive and tint helps but not enough.  he gets exhausted from light.   + Back Pain + fungal rash on feet - not better with cream. sees podiatry, itching is better but the rash is still there  - hasn't seen derm  no cp and no n/v/d/c asthma with mild wheeze on dupixient  and pending his response this allergy season to see how he doesn't  no fevers, [History of Nephritis] : the patient has no history of nephritis [Headache] : no headaches [Rash] : no rash [Chest Pain] : no chest pain [Shortness of Breath] : no shortness of breath [Abdominal Pain] : no abdominal pain [Psychological Symptoms] : no psychological symptoms [Fatigue] : no fatigue [Arthritis] : no arthritis [FreeTextEntry3] : 2019 [FreeTextEntry7] : OWEN 1:2560, + SM, + RNP, + LA, Low complement,  [FreeTextEntry4] : cellcept (infection), benlysta (allergy), saphenlo (recurrent infections)  [FreeTextEntry6] : Low TPMT + sicca  + back pain

## 2024-05-08 ENCOUNTER — TRANSCRIPTION ENCOUNTER (OUTPATIENT)
Age: 49
End: 2024-05-08

## 2024-05-08 LAB
25(OH)D3 SERPL-MCNC: 22.4 NG/ML
ALBUMIN SERPL ELPH-MCNC: 4.8 G/DL
ALP BLD-CCNC: 84 U/L
ALT SERPL-CCNC: 21 U/L
AMYLASE/CREAT SERPL: 119 U/L
ANION GAP SERPL CALC-SCNC: 14 MMOL/L
APPEARANCE: CLEAR
AST SERPL-CCNC: 25 U/L
BACTERIA: NEGATIVE /HPF
BASOPHILS # BLD AUTO: 0.07 K/UL
BASOPHILS NFR BLD AUTO: 0.8 %
BILIRUB SERPL-MCNC: 0.4 MG/DL
BILIRUBIN URINE: NEGATIVE
BLOOD URINE: NEGATIVE
BUN SERPL-MCNC: 4 MG/DL
C3 SERPL-MCNC: 150 MG/DL
C4 SERPL-MCNC: 26 MG/DL
CALCIUM SERPL-MCNC: 9.8 MG/DL
CAST: 0 /LPF
CHLORIDE SERPL-SCNC: 101 MMOL/L
CO2 SERPL-SCNC: 24 MMOL/L
COLOR: YELLOW
CREAT SERPL-MCNC: 1.02 MG/DL
CREAT SPEC-SCNC: 98 MG/DL
CREAT/PROT UR: 0.1 RATIO
DSDNA AB SER-ACNC: 1 IU/ML
EGFR: 91 ML/MIN/1.73M2
EOSINOPHIL # BLD AUTO: 0.2 K/UL
EOSINOPHIL NFR BLD AUTO: 2.2 %
EPITHELIAL CELLS: 0 /HPF
ERYTHROCYTE [SEDIMENTATION RATE] IN BLOOD BY WESTERGREN METHOD: 66 MM/HR
GLUCOSE QUALITATIVE U: NEGATIVE MG/DL
GLUCOSE SERPL-MCNC: 77 MG/DL
HCT VFR BLD CALC: 43.7 %
HGB BLD-MCNC: 14.2 G/DL
IMM GRANULOCYTES NFR BLD AUTO: 0.4 %
KETONES URINE: NEGATIVE MG/DL
LEUKOCYTE ESTERASE URINE: NEGATIVE
LPL SERPL-CCNC: 72 U/L
LYMPHOCYTES # BLD AUTO: 2.24 K/UL
LYMPHOCYTES NFR BLD AUTO: 24.1 %
MAN DIFF?: NORMAL
MCHC RBC-ENTMCNC: 26.3 PG
MCHC RBC-ENTMCNC: 32.5 GM/DL
MCV RBC AUTO: 81.1 FL
MICROSCOPIC-UA: NORMAL
MONOCYTES # BLD AUTO: 1.06 K/UL
MONOCYTES NFR BLD AUTO: 11.4 %
NEUTROPHILS # BLD AUTO: 5.69 K/UL
NEUTROPHILS NFR BLD AUTO: 61.1 %
NITRITE URINE: NEGATIVE
PH URINE: 6.5
PLATELET # BLD AUTO: 326 K/UL
POTASSIUM SERPL-SCNC: 4.1 MMOL/L
PROT SERPL-MCNC: 7.4 G/DL
PROT UR-MCNC: 8 MG/DL
PROTEIN URINE: NEGATIVE MG/DL
RBC # BLD: 5.39 M/UL
RBC # FLD: 14.5 %
RED BLOOD CELLS URINE: 1 /HPF
SODIUM SERPL-SCNC: 138 MMOL/L
SPECIFIC GRAVITY URINE: 1.01
UROBILINOGEN URINE: 0.2 MG/DL
WBC # FLD AUTO: 9.3 K/UL
WHITE BLOOD CELLS URINE: 0 /HPF

## 2024-05-13 ENCOUNTER — APPOINTMENT (OUTPATIENT)
Dept: INTERNAL MEDICINE | Facility: CLINIC | Age: 49
End: 2024-05-13
Payer: COMMERCIAL

## 2024-05-13 ENCOUNTER — TRANSCRIPTION ENCOUNTER (OUTPATIENT)
Age: 49
End: 2024-05-13

## 2024-05-13 VITALS
DIASTOLIC BLOOD PRESSURE: 84 MMHG | TEMPERATURE: 97.8 F | WEIGHT: 242 LBS | SYSTOLIC BLOOD PRESSURE: 119 MMHG | HEART RATE: 89 BPM | RESPIRATION RATE: 16 BRPM | HEIGHT: 67 IN | OXYGEN SATURATION: 98 % | BODY MASS INDEX: 37.98 KG/M2

## 2024-05-13 DIAGNOSIS — R21 RASH AND OTHER NONSPECIFIC SKIN ERUPTION: ICD-10-CM

## 2024-05-13 DIAGNOSIS — E66.9 OBESITY, UNSPECIFIED: ICD-10-CM

## 2024-05-13 DIAGNOSIS — R10.13 EPIGASTRIC PAIN: ICD-10-CM

## 2024-05-13 DIAGNOSIS — R73.03 PREDIABETES.: ICD-10-CM

## 2024-05-13 PROCEDURE — 99214 OFFICE O/P EST MOD 30 MIN: CPT

## 2024-05-13 RX ORDER — SEMAGLUTIDE 1.34 MG/ML
4 INJECTION, SOLUTION SUBCUTANEOUS
Qty: 3 | Refills: 3 | Status: DISCONTINUED | COMMUNITY
Start: 2023-01-23 | End: 2024-05-13

## 2024-05-13 RX ORDER — MOMETASONE FUROATE 1 MG/G
0.1 CREAM TOPICAL TWICE DAILY
Qty: 1 | Refills: 0 | Status: ACTIVE | COMMUNITY
Start: 2024-05-13 | End: 1900-01-01

## 2024-05-13 RX ORDER — EMPAGLIFLOZIN 10 MG/1
10 TABLET, FILM COATED ORAL
Qty: 30 | Refills: 2 | Status: DISCONTINUED | COMMUNITY
Start: 2022-12-22 | End: 2024-05-13

## 2024-05-13 NOTE — ASSESSMENT
[FreeTextEntry1] : The patient is a 48-year-old male with history of obesity, lupus/mixed connective disorder, hypertension, hyperlipidemia, obstructive sleep apnea, lumbar disc fractures, rib fractures, immunosuppressive, type 2 diabetes, who presents with abdominal pressure and mild elevation of lipase  1.  Mild epigastric pain/slight elevation of lipase Clinically does not appear to be pancreatitis will check sonogram may be more like gastritis esophagitis Start pantoprazole 40 mg Counseled lifestyle modification check sonogram of abdomen consider referral for endoscopy  2.  Lupus On prednisone which may irritate stomach 7.5 Follow-up with rheumatology  3.  Lipase elevation Slightly mild may be variant normal versus mild gastritis  4.  Obesity Patient has lost over 34 pounds continue Ozempic 2 mg/week   #5 rash right hand Elocon cream

## 2024-05-13 NOTE — PHYSICAL EXAM
[Normal Sclera/Conjunctiva] : normal sclera/conjunctiva [Normal Outer Ear/Nose] : the outer ears and nose were normal in appearance [Normal] : no CVA or spinal tenderness [de-identified] : Cushingoid looking [de-identified] : Small area of erythematous maculopapular lesion

## 2024-05-13 NOTE — PATIENT PROFILE ADULT - FLU SEASON?
Preceptor Attestation:  Patient's case reviewed and discussed with the resident, Wade Martinez MD, and I personally evaluated the patient. I agree with written assessment and plan of care.    Supervising Physician:  Laura Will MD   [facility]    Yes...

## 2024-05-13 NOTE — HISTORY OF PRESENT ILLNESS
[FreeTextEntry1] : Epigastric pressure for several months and rash on right hand [de-identified] : The patient is a 48-year-old male with history of lupus/mixed connective tissue tissue disorder, immune compromise, hypothyroidism, hyperlipidemia hypertension asthma GERD hypercholesterolemia migraines obesity type 2 diabetes who presents for follow-up of abdominal pain elevated lipase patient complains of epigastric pressure in the stomach worsen after eating denies nausea vomiting constipation diarrhea dysuria.  Recent blood test by

## 2024-05-14 ENCOUNTER — APPOINTMENT (OUTPATIENT)
Dept: ULTRASOUND IMAGING | Facility: CLINIC | Age: 49
End: 2024-05-14
Payer: COMMERCIAL

## 2024-05-14 PROCEDURE — 76700 US EXAM ABDOM COMPLETE: CPT

## 2024-05-17 ENCOUNTER — NON-APPOINTMENT (OUTPATIENT)
Age: 49
End: 2024-05-17

## 2024-05-18 RX ORDER — PREDNISONE 1 MG/1
1 TABLET ORAL
Qty: 270 | Refills: 2 | Status: ACTIVE | COMMUNITY
Start: 2024-05-18 | End: 1900-01-01

## 2024-05-19 NOTE — PHYSICAL EXAM
[No Acute Distress] : no acute distress [Well Nourished] : well nourished [Well Developed] : well developed [Normal Oropharynx] : normal oropharynx [II] : Mallampati Class: II [Normal Appearance] : normal appearance [No Neck Mass] : no neck mass [Normal Rate/Rhythm] : normal rate/rhythm [Normal S1, S2] : normal s1, s2 [No Murmurs] : no murmurs [No Resp Distress] : no resp distress [Clear to Auscultation Bilaterally] : clear to auscultation bilaterally [Benign] : benign [Not Tender] : not tender [No Masses] : no masses [Soft] : soft [No Clubbing] : no clubbing [No Edema] : no edema [No Focal Deficits] : no focal deficits [Oriented x3] : oriented x3 [Normal Affect] : normal affect

## 2024-05-22 ENCOUNTER — APPOINTMENT (OUTPATIENT)
Dept: PULMONOLOGY | Facility: CLINIC | Age: 49
End: 2024-05-22
Payer: COMMERCIAL

## 2024-05-22 VITALS
HEART RATE: 96 BPM | DIASTOLIC BLOOD PRESSURE: 70 MMHG | TEMPERATURE: 97.6 F | OXYGEN SATURATION: 97 % | SYSTOLIC BLOOD PRESSURE: 104 MMHG

## 2024-05-22 DIAGNOSIS — J45.909 UNSPECIFIED ASTHMA, UNCOMPLICATED: ICD-10-CM

## 2024-05-22 DIAGNOSIS — G47.31 PRIMARY CENTRAL SLEEP APNEA: ICD-10-CM

## 2024-05-22 DIAGNOSIS — J02.9 ACUTE PHARYNGITIS, UNSPECIFIED: ICD-10-CM

## 2024-05-22 DIAGNOSIS — G47.33 OBSTRUCTIVE SLEEP APNEA (ADULT) (PEDIATRIC): ICD-10-CM

## 2024-05-22 PROCEDURE — 99214 OFFICE O/P EST MOD 30 MIN: CPT

## 2024-05-22 RX ORDER — FLUTICASONE PROPIONATE 50 UG/1
50 SPRAY, METERED NASAL DAILY
Qty: 1 | Refills: 3 | Status: ACTIVE | COMMUNITY
Start: 2024-05-22 | End: 1900-01-01

## 2024-05-22 RX ORDER — AMOXICILLIN AND CLAVULANATE POTASSIUM 875; 125 MG/1; MG/1
875-125 TABLET, COATED ORAL
Qty: 14 | Refills: 0 | Status: ACTIVE | COMMUNITY
Start: 2024-05-22 | End: 1900-01-01

## 2024-05-22 NOTE — DISCUSSION/SUMMARY
[FreeTextEntry1] : 48 year old man who has SLE who also has asthma  he is on CellCept, prednisone 7 mg  hydroxychloroquine 200 mg bid  he has TAMMY CSA but states not requiring CPAP  he is also on Ozempic  He is on Trelegy 200 mcg, Duoneb and Dupixent latter for 4 months  lungs are clear  he states he is prone to exacerbations  he has allergies and was on allergy shots for many years, stopped when he started Dupixent he is affected by allergy symptoms at this time   h is having a sore throat  has daughter has been diagnosed with strep throat  PLAN  Order Augmentin  continue Trelegy to 100 mcg once daily  May use albuterol MDI as needed or nebulized bronchodilator   he will continue regimen per rheumatology  on prednisone 7 mg  continue Dupixent and reassess effect  He is prone to seasonal allergies.   follows with allergist   Further recommendations based on results.  Total time spent : 30 minutes Including: Preparation prior to visit - Reviewing prior record, results of tests and Consultation Reports as applicable Conducting an appropriate H & P during today's encounter Appropriate orders for tests, medications and procedures, as applicable Counseling patient  Note completion   Israel Rivas MD

## 2024-05-29 ENCOUNTER — NON-APPOINTMENT (OUTPATIENT)
Age: 49
End: 2024-05-29

## 2024-05-30 ENCOUNTER — APPOINTMENT (OUTPATIENT)
Dept: GASTROENTEROLOGY | Facility: CLINIC | Age: 49
End: 2024-05-30
Payer: COMMERCIAL

## 2024-05-30 VITALS
HEART RATE: 95 BPM | WEIGHT: 238 LBS | SYSTOLIC BLOOD PRESSURE: 112 MMHG | OXYGEN SATURATION: 99 % | BODY MASS INDEX: 37.35 KG/M2 | DIASTOLIC BLOOD PRESSURE: 70 MMHG | HEIGHT: 67 IN

## 2024-05-30 DIAGNOSIS — R10.12 LEFT UPPER QUADRANT PAIN: ICD-10-CM

## 2024-05-30 DIAGNOSIS — K62.5 HEMORRHAGE OF ANUS AND RECTUM: ICD-10-CM

## 2024-05-30 DIAGNOSIS — R10.84 GENERALIZED ABDOMINAL PAIN: ICD-10-CM

## 2024-05-30 PROCEDURE — 99214 OFFICE O/P EST MOD 30 MIN: CPT

## 2024-05-30 RX ORDER — SODIUM SULFATE, POTASSIUM SULFATE AND MAGNESIUM SULFATE 1.6; 3.13; 17.5 G/177ML; G/177ML; G/177ML
17.5-3.13-1.6 SOLUTION ORAL
Qty: 1 | Refills: 0 | Status: ACTIVE | COMMUNITY
Start: 2024-05-30 | End: 1900-01-01

## 2024-05-30 NOTE — HISTORY OF PRESENT ILLNESS
[FreeTextEntry1] : 48-year-old male, history of lupus on low-dose prednisone Left upper quadrant abdominal pain Feels like a gas pain Unrevealing abdominal sonogram Complaints not related to eating drinking or bowel movement Last upper endoscopy 2 years ago, colonoscopy 2019 for bleeding, nondiagnostic Patient does report continued intermittent bright red blood per rectum Believed to have an anal fissure  Social history:

## 2024-05-30 NOTE — ASSESSMENT
[FreeTextEntry1] : Abdominal pain doubt but cannot rule out peptic ulcer Bleeding, likely related to fissure cannot rule out neoplasm Plan Indications risks benefits and alternatives to both upper endoscopy and colonoscopy reviewed.  Patient agreeable.

## 2024-06-03 ENCOUNTER — TRANSCRIPTION ENCOUNTER (OUTPATIENT)
Age: 49
End: 2024-06-03

## 2024-06-05 ENCOUNTER — TRANSCRIPTION ENCOUNTER (OUTPATIENT)
Age: 49
End: 2024-06-05

## 2024-06-07 ENCOUNTER — TRANSCRIPTION ENCOUNTER (OUTPATIENT)
Age: 49
End: 2024-06-07

## 2024-06-08 ENCOUNTER — RX RENEWAL (OUTPATIENT)
Age: 49
End: 2024-06-08

## 2024-06-08 RX ORDER — PREDNISONE 2.5 MG/1
2.5 TABLET ORAL
Qty: 90 | Refills: 2 | Status: ACTIVE | COMMUNITY
Start: 2024-01-26 | End: 1900-01-01

## 2024-06-10 ENCOUNTER — RX RENEWAL (OUTPATIENT)
Age: 49
End: 2024-06-10

## 2024-06-10 RX ORDER — POTASSIUM CHLORIDE 1500 MG/1
20 TABLET, FILM COATED, EXTENDED RELEASE ORAL
Qty: 60 | Refills: 3 | Status: ACTIVE | COMMUNITY
Start: 2021-12-10 | End: 1900-01-01

## 2024-06-11 ENCOUNTER — RX RENEWAL (OUTPATIENT)
Age: 49
End: 2024-06-11

## 2024-06-11 RX ORDER — DICLOFENAC SODIUM 1% 10 MG/G
1 GEL TOPICAL
Qty: 100 | Refills: 2 | Status: ACTIVE | COMMUNITY
Start: 2022-07-18 | End: 1900-01-01

## 2024-06-17 ENCOUNTER — TRANSCRIPTION ENCOUNTER (OUTPATIENT)
Age: 49
End: 2024-06-17

## 2024-06-17 ENCOUNTER — OUTPATIENT (OUTPATIENT)
Dept: OUTPATIENT SERVICES | Facility: HOSPITAL | Age: 49
LOS: 1 days | End: 2024-06-17
Payer: COMMERCIAL

## 2024-06-17 ENCOUNTER — RESULT REVIEW (OUTPATIENT)
Age: 49
End: 2024-06-17

## 2024-06-17 ENCOUNTER — APPOINTMENT (OUTPATIENT)
Dept: GASTROENTEROLOGY | Facility: HOSPITAL | Age: 49
End: 2024-06-17

## 2024-06-17 VITALS
SYSTOLIC BLOOD PRESSURE: 102 MMHG | OXYGEN SATURATION: 98 % | RESPIRATION RATE: 16 BRPM | HEART RATE: 79 BPM | DIASTOLIC BLOOD PRESSURE: 75 MMHG

## 2024-06-17 VITALS
SYSTOLIC BLOOD PRESSURE: 111 MMHG | WEIGHT: 240.08 LBS | DIASTOLIC BLOOD PRESSURE: 66 MMHG | TEMPERATURE: 98 F | RESPIRATION RATE: 18 BRPM | HEIGHT: 67 IN | OXYGEN SATURATION: 100 % | HEART RATE: 82 BPM

## 2024-06-17 DIAGNOSIS — K62.5 HEMORRHAGE OF ANUS AND RECTUM: ICD-10-CM

## 2024-06-17 DIAGNOSIS — R10.12 LEFT UPPER QUADRANT PAIN: ICD-10-CM

## 2024-06-17 DIAGNOSIS — Z90.49 ACQUIRED ABSENCE OF OTHER SPECIFIED PARTS OF DIGESTIVE TRACT: Chronic | ICD-10-CM

## 2024-06-17 LAB
GLUCOSE BLDC GLUCOMTR-MCNC: 74 MG/DL — SIGNIFICANT CHANGE UP (ref 70–99)
GLUCOSE BLDC GLUCOMTR-MCNC: 76 MG/DL — SIGNIFICANT CHANGE UP (ref 70–99)
GLUCOSE BLDC GLUCOMTR-MCNC: 90 MG/DL — SIGNIFICANT CHANGE UP (ref 70–99)

## 2024-06-17 PROCEDURE — 88341 IMHCHEM/IMCYTCHM EA ADD ANTB: CPT

## 2024-06-17 PROCEDURE — 88305 TISSUE EXAM BY PATHOLOGIST: CPT | Mod: 26

## 2024-06-17 PROCEDURE — 43239 EGD BIOPSY SINGLE/MULTIPLE: CPT

## 2024-06-17 PROCEDURE — 45378 DIAGNOSTIC COLONOSCOPY: CPT

## 2024-06-17 PROCEDURE — 88360 TUMOR IMMUNOHISTOCHEM/MANUAL: CPT | Mod: 26

## 2024-06-17 PROCEDURE — 88305 TISSUE EXAM BY PATHOLOGIST: CPT

## 2024-06-17 PROCEDURE — 88342 IMHCHEM/IMCYTCHM 1ST ANTB: CPT | Mod: 26,59

## 2024-06-17 PROCEDURE — 88342 IMHCHEM/IMCYTCHM 1ST ANTB: CPT

## 2024-06-17 PROCEDURE — 88360 TUMOR IMMUNOHISTOCHEM/MANUAL: CPT

## 2024-06-17 PROCEDURE — 88341 IMHCHEM/IMCYTCHM EA ADD ANTB: CPT | Mod: 26,59

## 2024-06-17 PROCEDURE — 82962 GLUCOSE BLOOD TEST: CPT

## 2024-06-17 DEVICE — NET RETRV ROT ROTH 2.5MMX230CM: Type: IMPLANTABLE DEVICE | Status: FUNCTIONAL

## 2024-06-17 RX ORDER — FLUTICASONE FUROATE, UMECLIDINIUM BROMIDE AND VILANTEROL TRIFENATATE 200; 62.5; 25 UG/1; UG/1; UG/1
1 POWDER RESPIRATORY (INHALATION)
Qty: 0 | Refills: 0 | DISCHARGE

## 2024-06-17 RX ORDER — DEXTROSE MONOHYDRATE AND SODIUM CHLORIDE 5; .3 G/100ML; G/100ML
500 INJECTION, SOLUTION INTRAVENOUS
Refills: 0 | Status: DISCONTINUED | OUTPATIENT
Start: 2024-06-17 | End: 2024-07-01

## 2024-06-17 RX ADMIN — DEXTROSE MONOHYDRATE AND SODIUM CHLORIDE 100 MILLILITER(S): 5; .3 INJECTION, SOLUTION INTRAVENOUS at 13:41

## 2024-06-18 ENCOUNTER — RX RENEWAL (OUTPATIENT)
Age: 49
End: 2024-06-18

## 2024-06-18 RX ORDER — SEMAGLUTIDE 2.68 MG/ML
8 INJECTION, SOLUTION SUBCUTANEOUS
Qty: 3 | Refills: 3 | Status: ACTIVE | COMMUNITY
Start: 1900-01-01 | End: 1900-01-01

## 2024-06-20 ENCOUNTER — TRANSCRIPTION ENCOUNTER (OUTPATIENT)
Age: 49
End: 2024-06-20

## 2024-06-20 DIAGNOSIS — K25.9 GASTRIC ULCER, UNSPECIFIED AS ACUTE OR CHRONIC, W/OUT HEMORRHAGE OR PERFORATION: ICD-10-CM

## 2024-06-20 RX ORDER — PANTOPRAZOLE 40 MG/1
40 TABLET, DELAYED RELEASE ORAL
Qty: 90 | Refills: 3 | Status: ACTIVE | COMMUNITY
Start: 2024-05-13 | End: 1900-01-01

## 2024-06-29 ENCOUNTER — RX RENEWAL (OUTPATIENT)
Age: 49
End: 2024-06-29

## 2024-07-01 ENCOUNTER — NON-APPOINTMENT (OUTPATIENT)
Age: 49
End: 2024-07-01

## 2024-07-01 PROBLEM — C16.9 GASTRIC CANCER: Status: ACTIVE | Noted: 2024-07-01

## 2024-07-01 LAB — SURGICAL PATHOLOGY STUDY: SIGNIFICANT CHANGE UP

## 2024-07-02 ENCOUNTER — NON-APPOINTMENT (OUTPATIENT)
Age: 49
End: 2024-07-02

## 2024-07-02 ENCOUNTER — APPOINTMENT (OUTPATIENT)
Dept: RHEUMATOLOGY | Facility: CLINIC | Age: 49
End: 2024-07-02
Payer: COMMERCIAL

## 2024-07-02 VITALS
BODY MASS INDEX: 37.67 KG/M2 | SYSTOLIC BLOOD PRESSURE: 120 MMHG | OXYGEN SATURATION: 98 % | WEIGHT: 240 LBS | HEIGHT: 67 IN | HEART RATE: 87 BPM | DIASTOLIC BLOOD PRESSURE: 81 MMHG

## 2024-07-02 DIAGNOSIS — M32.9 SYSTEMIC LUPUS ERYTHEMATOSUS, UNSPECIFIED: ICD-10-CM

## 2024-07-02 DIAGNOSIS — F41.9 ANXIETY DISORDER, UNSPECIFIED: ICD-10-CM

## 2024-07-02 PROCEDURE — 99214 OFFICE O/P EST MOD 30 MIN: CPT

## 2024-07-02 RX ORDER — ALPRAZOLAM 0.5 MG/1
0.5 TABLET ORAL AT BEDTIME
Qty: 30 | Refills: 0 | Status: ACTIVE | COMMUNITY
Start: 2024-07-02 | End: 1900-01-01

## 2024-07-03 ENCOUNTER — TRANSCRIPTION ENCOUNTER (OUTPATIENT)
Age: 49
End: 2024-07-03

## 2024-07-05 ENCOUNTER — OUTPATIENT (OUTPATIENT)
Dept: OUTPATIENT SERVICES | Facility: HOSPITAL | Age: 49
LOS: 1 days | Discharge: HOME | End: 2024-07-05

## 2024-07-05 ENCOUNTER — TRANSCRIPTION ENCOUNTER (OUTPATIENT)
Age: 49
End: 2024-07-05

## 2024-07-05 DIAGNOSIS — C16.9 MALIGNANT NEOPLASM OF STOMACH, UNSPECIFIED: ICD-10-CM

## 2024-07-05 DIAGNOSIS — Z90.49 ACQUIRED ABSENCE OF OTHER SPECIFIED PARTS OF DIGESTIVE TRACT: Chronic | ICD-10-CM

## 2024-07-06 ENCOUNTER — APPOINTMENT (OUTPATIENT)
Dept: CT IMAGING | Facility: CLINIC | Age: 49
End: 2024-07-06
Payer: COMMERCIAL

## 2024-07-06 ENCOUNTER — OUTPATIENT (OUTPATIENT)
Dept: OUTPATIENT SERVICES | Facility: HOSPITAL | Age: 49
LOS: 1 days | End: 2024-07-06
Payer: COMMERCIAL

## 2024-07-06 DIAGNOSIS — Z90.49 ACQUIRED ABSENCE OF OTHER SPECIFIED PARTS OF DIGESTIVE TRACT: Chronic | ICD-10-CM

## 2024-07-06 DIAGNOSIS — M32.9 SYSTEMIC LUPUS ERYTHEMATOSUS, UNSPECIFIED: ICD-10-CM

## 2024-07-06 PROCEDURE — 74177 CT ABD & PELVIS W/CONTRAST: CPT | Mod: 26

## 2024-07-06 PROCEDURE — 70450 CT HEAD/BRAIN W/O DYE: CPT | Mod: 26

## 2024-07-06 PROCEDURE — 70450 CT HEAD/BRAIN W/O DYE: CPT

## 2024-07-06 PROCEDURE — 71260 CT THORAX DX C+: CPT | Mod: 26

## 2024-07-06 PROCEDURE — 74177 CT ABD & PELVIS W/CONTRAST: CPT

## 2024-07-06 PROCEDURE — 71260 CT THORAX DX C+: CPT

## 2024-07-08 ENCOUNTER — TRANSCRIPTION ENCOUNTER (OUTPATIENT)
Age: 49
End: 2024-07-08

## 2024-07-09 ENCOUNTER — NON-APPOINTMENT (OUTPATIENT)
Age: 49
End: 2024-07-09

## 2024-07-09 ENCOUNTER — APPOINTMENT (OUTPATIENT)
Dept: SURGICAL ONCOLOGY | Facility: CLINIC | Age: 49
End: 2024-07-09
Payer: COMMERCIAL

## 2024-07-09 ENCOUNTER — RESULT REVIEW (OUTPATIENT)
Age: 49
End: 2024-07-09

## 2024-07-09 ENCOUNTER — APPOINTMENT (OUTPATIENT)
Dept: HEMATOLOGY ONCOLOGY | Facility: CLINIC | Age: 49
End: 2024-07-09
Payer: COMMERCIAL

## 2024-07-09 VITALS
BODY MASS INDEX: 37.67 KG/M2 | HEIGHT: 67 IN | OXYGEN SATURATION: 98 % | DIASTOLIC BLOOD PRESSURE: 90 MMHG | WEIGHT: 240 LBS | HEART RATE: 97 BPM | RESPIRATION RATE: 17 BRPM | SYSTOLIC BLOOD PRESSURE: 133 MMHG

## 2024-07-09 VITALS
TEMPERATURE: 97.5 F | OXYGEN SATURATION: 99 % | WEIGHT: 240 LBS | DIASTOLIC BLOOD PRESSURE: 80 MMHG | RESPIRATION RATE: 18 BRPM | BODY MASS INDEX: 37.67 KG/M2 | HEIGHT: 67 IN | SYSTOLIC BLOOD PRESSURE: 115 MMHG | HEART RATE: 84 BPM

## 2024-07-09 DIAGNOSIS — K62.5 HEMORRHAGE OF ANUS AND RECTUM: ICD-10-CM

## 2024-07-09 DIAGNOSIS — A04.8 OTHER SPECIFIED BACTERIAL INTESTINAL INFECTIONS: ICD-10-CM

## 2024-07-09 DIAGNOSIS — Z82.49 FAMILY HISTORY OF ISCHEMIC HEART DISEASE AND OTHER DISEASES OF THE CIRCULATORY SYSTEM: ICD-10-CM

## 2024-07-09 DIAGNOSIS — Z80.9 FAMILY HISTORY OF MALIGNANT NEOPLASM, UNSPECIFIED: ICD-10-CM

## 2024-07-09 DIAGNOSIS — C16.9 MALIGNANT NEOPLASM OF STOMACH, UNSPECIFIED: ICD-10-CM

## 2024-07-09 LAB
BASOPHILS # BLD AUTO: 0.06 K/UL — SIGNIFICANT CHANGE UP (ref 0–0.2)
BASOPHILS NFR BLD AUTO: 0.9 % — SIGNIFICANT CHANGE UP (ref 0–2)
EOSINOPHIL # BLD AUTO: 0.17 K/UL — SIGNIFICANT CHANGE UP (ref 0–0.5)
EOSINOPHIL NFR BLD AUTO: 2.5 % — SIGNIFICANT CHANGE UP (ref 0–6)
HCT VFR BLD CALC: 41.4 % — SIGNIFICANT CHANGE UP (ref 39–50)
HGB BLD-MCNC: 13.4 G/DL — SIGNIFICANT CHANGE UP (ref 13–17)
IMM GRANULOCYTES NFR BLD AUTO: 0.4 % — SIGNIFICANT CHANGE UP (ref 0–0.9)
LYMPHOCYTES # BLD AUTO: 1.76 K/UL — SIGNIFICANT CHANGE UP (ref 1–3.3)
LYMPHOCYTES # BLD AUTO: 26.1 % — SIGNIFICANT CHANGE UP (ref 13–44)
MCHC RBC-ENTMCNC: 27.2 PG — SIGNIFICANT CHANGE UP (ref 27–34)
MCHC RBC-ENTMCNC: 32.4 G/DL — SIGNIFICANT CHANGE UP (ref 32–36)
MCV RBC AUTO: 84 FL — SIGNIFICANT CHANGE UP (ref 80–100)
MONOCYTES # BLD AUTO: 0.78 K/UL — SIGNIFICANT CHANGE UP (ref 0–0.9)
MONOCYTES NFR BLD AUTO: 11.6 % — SIGNIFICANT CHANGE UP (ref 2–14)
NEUTROPHILS # BLD AUTO: 3.94 K/UL — SIGNIFICANT CHANGE UP (ref 1.8–7.4)
NEUTROPHILS NFR BLD AUTO: 58.5 % — SIGNIFICANT CHANGE UP (ref 43–77)
NRBC # BLD: 0 /100 WBCS — SIGNIFICANT CHANGE UP (ref 0–0)
PLATELET # BLD AUTO: 306 K/UL — SIGNIFICANT CHANGE UP (ref 150–400)
RBC # BLD: 4.93 M/UL — SIGNIFICANT CHANGE UP (ref 4.2–5.8)
RBC # FLD: 14.9 % — HIGH (ref 10.3–14.5)
WBC # BLD: 6.74 K/UL — SIGNIFICANT CHANGE UP (ref 3.8–10.5)
WBC # FLD AUTO: 6.74 K/UL — SIGNIFICANT CHANGE UP (ref 3.8–10.5)

## 2024-07-09 PROCEDURE — 99205 OFFICE O/P NEW HI 60 MIN: CPT

## 2024-07-09 PROCEDURE — 99417 PROLNG OP E/M EACH 15 MIN: CPT

## 2024-07-09 PROCEDURE — G2211 COMPLEX E/M VISIT ADD ON: CPT | Mod: NC,1L

## 2024-07-09 RX ORDER — LANSOPRAZOLE, AMOXICILLIN, CLARITHROMYCIN 30-500-500
500 & 500 & 30 KIT ORAL
Qty: 1 | Refills: 0 | Status: ACTIVE | COMMUNITY
Start: 2024-07-09 | End: 1900-01-01

## 2024-07-10 LAB
ALBUMIN SERPL ELPH-MCNC: 4.4 G/DL
ALP BLD-CCNC: 73 U/L
ALT SERPL-CCNC: 17 U/L
AMYLASE/CREAT SERPL: 138 U/L
ANION GAP SERPL CALC-SCNC: 12 MMOL/L
APTT BLD: 32.9 SEC
AST SERPL-CCNC: 22 U/L
BILIRUB SERPL-MCNC: 0.3 MG/DL
CALCIUM SERPL-MCNC: 9.5 MG/DL
CANCER AG19-9 SERPL-ACNC: <2 U/ML
CEA SERPL-MCNC: 3.3 NG/ML
CO2 SERPL-SCNC: 26 MMOL/L
CREAT SERPL-MCNC: 1 MG/DL
EGFR: 93 ML/MIN/1.73M2
ESTIMATED AVERAGE GLUCOSE: 117 MG/DL
FERRITIN SERPL-MCNC: 32 NG/ML
GLUCOSE SERPL-MCNC: 63 MG/DL
HBV SURFACE AB SER QL: NONREACTIVE
HBV SURFACE AG SER QL: NONREACTIVE
HCV AB SER QL: NONREACTIVE
INR PPP: 1.03 RATIO
LDH SERPL-CCNC: 210 U/L
LPL SERPL-CCNC: 81 U/L
MAGNESIUM SERPL-MCNC: 2.2 MG/DL
POTASSIUM SERPL-SCNC: 3.9 MMOL/L
PROT SERPL-MCNC: 7.1 G/DL
PT BLD: 11.7 SEC
SODIUM SERPL-SCNC: 138 MMOL/L

## 2024-07-11 ENCOUNTER — TRANSCRIPTION ENCOUNTER (OUTPATIENT)
Age: 49
End: 2024-07-11

## 2024-07-16 ENCOUNTER — NON-APPOINTMENT (OUTPATIENT)
Age: 49
End: 2024-07-16

## 2024-07-16 LAB
INTERPRETATION PGDFRB: NORMAL
REF LAB TEST METHOD: NORMAL
REVIEWED BY: NORMAL
TA REPEAT RESULT: ABNORMAL
TEST PERFORMANCE INFO SPEC: NORMAL

## 2024-07-18 LAB
DPYD GENOTYPE: NORMAL
DPYD PHENOTYPE: NORMAL
DPYD SPECIMEN: NORMAL
PATHOLOGY STUDY: NORMAL

## 2024-07-19 ENCOUNTER — TRANSCRIPTION ENCOUNTER (OUTPATIENT)
Age: 49
End: 2024-07-19

## 2024-07-19 ENCOUNTER — APPOINTMENT (OUTPATIENT)
Dept: NUCLEAR MEDICINE | Facility: CLINIC | Age: 49
End: 2024-07-19

## 2024-07-19 ENCOUNTER — OUTPATIENT (OUTPATIENT)
Dept: OUTPATIENT SERVICES | Facility: HOSPITAL | Age: 49
LOS: 1 days | End: 2024-07-19
Payer: COMMERCIAL

## 2024-07-19 DIAGNOSIS — C16.9 MALIGNANT NEOPLASM OF STOMACH, UNSPECIFIED: ICD-10-CM

## 2024-07-19 DIAGNOSIS — Z90.49 ACQUIRED ABSENCE OF OTHER SPECIFIED PARTS OF DIGESTIVE TRACT: Chronic | ICD-10-CM

## 2024-07-19 PROCEDURE — 78815 PET IMAGE W/CT SKULL-THIGH: CPT

## 2024-07-19 PROCEDURE — A9552: CPT

## 2024-07-19 PROCEDURE — 78815 PET IMAGE W/CT SKULL-THIGH: CPT | Mod: 26,PI

## 2024-07-19 RX ORDER — POLYETHYLENE GLYCOL 3350 AND ELECTROLYTES WITH LEMON FLAVOR 236; 22.74; 6.74; 5.86; 2.97 G/4L; G/4L; G/4L; G/4L; G/4L
236 POWDER, FOR SOLUTION ORAL
Qty: 1 | Refills: 0 | Status: ACTIVE | COMMUNITY
Start: 2024-07-19 | End: 1900-01-01

## 2024-07-19 RX ORDER — POLYETHYLENE GLYCOL 3350 17 G/17G
17 POWDER, FOR SOLUTION ORAL
Qty: 1 | Refills: 0 | Status: ACTIVE | COMMUNITY
Start: 2024-07-19 | End: 1900-01-01

## 2024-07-24 ENCOUNTER — APPOINTMENT (OUTPATIENT)
Dept: PULMONOLOGY | Facility: CLINIC | Age: 49
End: 2024-07-24
Payer: COMMERCIAL

## 2024-07-24 VITALS
OXYGEN SATURATION: 98 % | TEMPERATURE: 97.3 F | DIASTOLIC BLOOD PRESSURE: 74 MMHG | HEART RATE: 107 BPM | SYSTOLIC BLOOD PRESSURE: 110 MMHG

## 2024-07-24 DIAGNOSIS — G47.33 OBSTRUCTIVE SLEEP APNEA (ADULT) (PEDIATRIC): ICD-10-CM

## 2024-07-24 DIAGNOSIS — J30.9 ALLERGIC RHINITIS, UNSPECIFIED: ICD-10-CM

## 2024-07-24 DIAGNOSIS — C16.9 MALIGNANT NEOPLASM OF STOMACH, UNSPECIFIED: ICD-10-CM

## 2024-07-24 DIAGNOSIS — J45.909 UNSPECIFIED ASTHMA, UNCOMPLICATED: ICD-10-CM

## 2024-07-24 DIAGNOSIS — M32.9 SYSTEMIC LUPUS ERYTHEMATOSUS, UNSPECIFIED: ICD-10-CM

## 2024-07-24 DIAGNOSIS — G47.31 PRIMARY CENTRAL SLEEP APNEA: ICD-10-CM

## 2024-07-24 PROCEDURE — G2211 COMPLEX E/M VISIT ADD ON: CPT | Mod: NC,1L

## 2024-07-24 PROCEDURE — 99215 OFFICE O/P EST HI 40 MIN: CPT

## 2024-07-30 ENCOUNTER — OUTPATIENT (OUTPATIENT)
Dept: OUTPATIENT SERVICES | Facility: HOSPITAL | Age: 49
LOS: 1 days | End: 2024-07-30

## 2024-07-30 VITALS
RESPIRATION RATE: 15 BRPM | WEIGHT: 244.93 LBS | SYSTOLIC BLOOD PRESSURE: 123 MMHG | OXYGEN SATURATION: 97 % | HEIGHT: 68 IN | HEART RATE: 73 BPM | TEMPERATURE: 99 F | DIASTOLIC BLOOD PRESSURE: 80 MMHG

## 2024-07-30 DIAGNOSIS — Z90.49 ACQUIRED ABSENCE OF OTHER SPECIFIED PARTS OF DIGESTIVE TRACT: Chronic | ICD-10-CM

## 2024-07-30 DIAGNOSIS — J45.909 UNSPECIFIED ASTHMA, UNCOMPLICATED: ICD-10-CM

## 2024-07-30 DIAGNOSIS — I10 ESSENTIAL (PRIMARY) HYPERTENSION: ICD-10-CM

## 2024-07-30 DIAGNOSIS — E03.9 HYPOTHYROIDISM, UNSPECIFIED: ICD-10-CM

## 2024-07-30 DIAGNOSIS — C16.9 MALIGNANT NEOPLASM OF STOMACH, UNSPECIFIED: ICD-10-CM

## 2024-07-30 DIAGNOSIS — Z98.890 OTHER SPECIFIED POSTPROCEDURAL STATES: Chronic | ICD-10-CM

## 2024-07-30 DIAGNOSIS — Z98.52 VASECTOMY STATUS: Chronic | ICD-10-CM

## 2024-07-30 DIAGNOSIS — M32.9 SYSTEMIC LUPUS ERYTHEMATOSUS, UNSPECIFIED: ICD-10-CM

## 2024-07-30 DIAGNOSIS — G47.33 OBSTRUCTIVE SLEEP APNEA (ADULT) (PEDIATRIC): ICD-10-CM

## 2024-07-30 LAB
ANION GAP SERPL CALC-SCNC: 14 MMOL/L — SIGNIFICANT CHANGE UP (ref 7–14)
BLD GP AB SCN SERPL QL: NEGATIVE — SIGNIFICANT CHANGE UP
BUN SERPL-MCNC: 8 MG/DL — SIGNIFICANT CHANGE UP (ref 7–23)
CALCIUM SERPL-MCNC: 9.3 MG/DL — SIGNIFICANT CHANGE UP (ref 8.4–10.5)
CHLORIDE SERPL-SCNC: 101 MMOL/L — SIGNIFICANT CHANGE UP (ref 98–107)
CO2 SERPL-SCNC: 25 MMOL/L — SIGNIFICANT CHANGE UP (ref 22–31)
CREAT SERPL-MCNC: 0.98 MG/DL — SIGNIFICANT CHANGE UP (ref 0.5–1.3)
EGFR: 95 ML/MIN/1.73M2 — SIGNIFICANT CHANGE UP
GLUCOSE SERPL-MCNC: 68 MG/DL — LOW (ref 70–99)
POTASSIUM SERPL-MCNC: 4 MMOL/L — SIGNIFICANT CHANGE UP (ref 3.5–5.3)
POTASSIUM SERPL-SCNC: 4 MMOL/L — SIGNIFICANT CHANGE UP (ref 3.5–5.3)
RH IG SCN BLD-IMP: POSITIVE — SIGNIFICANT CHANGE UP
RH IG SCN BLD-IMP: POSITIVE — SIGNIFICANT CHANGE UP
SODIUM SERPL-SCNC: 140 MMOL/L — SIGNIFICANT CHANGE UP (ref 135–145)

## 2024-07-30 NOTE — H&P PST ADULT - HISTORY OF PRESENT ILLNESS
49year old male with past medical history of Asthma, Lupus/myositis overlap syndrome, Hypothyroidism, presents to PST, with pre op diagnosis of  Gastric cancer, for pre op evaluation prior to scheduled suergery- robotic distal gastrectomy possible open with Dr Ferreira Patient reports of .LUQ pain for the last couple of years but more recently as of a few months ago, pain worsened and moved more laterally than previous thus he sought re-evaluation for his symptoms. Pain is currently rated 7/10. It is associated with some dyspepsia and pain with deep inspiration. He was started on Protonix but denies feeling much relief.

## 2024-07-30 NOTE — H&P PST ADULT - HEMATOLOGY/LYMPHATICS COMMENTS
Gastroenterology Cardiology Infectious Disease Palliative Care patient with lupus and myositis - follows up rheumatologist- on Plaquenil, prednisone

## 2024-07-30 NOTE — H&P PST ADULT - CARDIOVASCULAR COMMENTS
reports of hypokalemia in the past, rheumatologist prescribed potassium pills daily, on torsemide prn  for b/l legs

## 2024-07-30 NOTE — H&P PST ADULT - LAST ECHOCARDIOGRAM
03/2023 in allscripts-The left ventricular wall thickness is normal. The left ventricular systolic function is normal with an ejection fraction of 65 % by visual interpretation visually estimated at 60 to 65 %, physiologic mitral regurgitation

## 2024-07-30 NOTE — H&P PST ADULT - NSICDXPASTSURGICALHX_GEN_ALL_CORE_FT
PAST SURGICAL HISTORY:  H/O colonoscopy     H/O endoscopy     History of cholecystectomy     S/P vasectomy

## 2024-07-30 NOTE — H&P PST ADULT - PROBLEM SELECTOR PLAN 6
Patient has lupus and myositis- reports his rheumatologist takes him off Cellcept 1 month ago, now on Plaquenil.  Instructed pt to get  pre op Plaquenil  instructions from rheumatologist.

## 2024-07-30 NOTE — ED ADULT NURSE REASSESSMENT NOTE - NS ED NURSE REASSESS COMMENT FT1
Pt tachycardic in the 100s range. Pt states that it takes more to breath now. Pt placed on cardiac monitor reading NSR in 100s range. MINOR Albert notified of pt's HR and increase work of breathing. No interventions at this time.
37.1wk baby/family seen at the PP bedside.  Mother reports that baby has been unsustained at the breast, has been giving syringes of expressed colostrum and formula. On my assessment, baby's jaw is tight and his mouth is small, making it tough to achieve a wide enough gape for a deep, sustained latch. Despite various strategies, baby is unsustained at the breast.  I reviewed with mother how to hand express colostrum and give it to baby by syringe.  Normalcy of "jarred pipes" was discussed, as mother's colostrum from the right breast is blood tinged, but clearing more as we perform more hand expression. Together, we gave baby 3cc of expressed colostrum and 1 syringe left over for the next feeding time.  Feeding plan for now will be breast attemps using the strategies given, and then hand expressing colostrum and supplementing with said colostrum.  If hand expression is no longer sustainable overnight, mother may ask her PP nurse to bring pump parts for the double electric breast pump already at the BS.  If baby is still unsustained at the breast by 24 hours, I recommend a triple feeding plan.  My assessment and plan of care was discussed with the bedside RN, and a telelactation referral was placed. Parents understand that lactation is available for any further lactation support./primaparous mom/early term/late  infant

## 2024-07-30 NOTE — H&P PST ADULT - NSICDXPASTMEDICALHX_GEN_ALL_CORE_FT
PAST MEDICAL HISTORY:  Asthma     H. pylori infection     H/O compression fracture of spine     Hypothyroid     Lumbosacral stenosis     Lupus     Mild obstructive sleep apnea     Myositis     Obesity     Osteoporosis     Prediabetes

## 2024-07-30 NOTE — H&P PST ADULT - PROBLEM SELECTOR PLAN 1
Patient is tentatively scheduled for robotic distal gastrectomy possible open with Dr Ferreira- 08/20/24.      Pre-op instructions provided. Pt given verbal and written instructions with teach back on chlorhexidine wash and patients own pantoprazole. Pt verbalized understanding with return demonstration.    Patient had recent CBC, A1C, in HIE- WNL.- 07/9/24  BMP - low glucose, BMP, T&S, ABO sent from PST.    **** Patient takes Ozempic for weight management last dose 06/30/24.

## 2024-07-31 ENCOUNTER — TRANSCRIPTION ENCOUNTER (OUTPATIENT)
Age: 49
End: 2024-07-31

## 2024-07-31 ENCOUNTER — OUTPATIENT (OUTPATIENT)
Dept: OUTPATIENT SERVICES | Facility: HOSPITAL | Age: 49
LOS: 1 days | End: 2024-07-31

## 2024-07-31 ENCOUNTER — APPOINTMENT (OUTPATIENT)
Dept: INTERNAL MEDICINE | Facility: CLINIC | Age: 49
End: 2024-07-31

## 2024-07-31 ENCOUNTER — NON-APPOINTMENT (OUTPATIENT)
Age: 49
End: 2024-07-31

## 2024-07-31 DIAGNOSIS — Z98.890 OTHER SPECIFIED POSTPROCEDURAL STATES: Chronic | ICD-10-CM

## 2024-07-31 DIAGNOSIS — Z98.52 VASECTOMY STATUS: Chronic | ICD-10-CM

## 2024-07-31 PROBLEM — G47.33 OBSTRUCTIVE SLEEP APNEA (ADULT) (PEDIATRIC): Chronic | Status: ACTIVE | Noted: 2024-07-30

## 2024-07-31 PROBLEM — Z87.81 PERSONAL HISTORY OF (HEALED) TRAUMATIC FRACTURE: Chronic | Status: ACTIVE | Noted: 2024-07-30

## 2024-07-31 PROBLEM — M60.9 MYOSITIS, UNSPECIFIED: Chronic | Status: ACTIVE | Noted: 2024-07-30

## 2024-07-31 PROBLEM — A04.8 OTHER SPECIFIED BACTERIAL INTESTINAL INFECTIONS: Chronic | Status: ACTIVE | Noted: 2024-07-30

## 2024-07-31 PROBLEM — E66.9 OBESITY, UNSPECIFIED: Chronic | Status: ACTIVE | Noted: 2024-07-30

## 2024-07-31 PROBLEM — R73.03 PREDIABETES: Chronic | Status: ACTIVE | Noted: 2024-07-30

## 2024-07-31 PROBLEM — M81.0 AGE-RELATED OSTEOPOROSIS WITHOUT CURRENT PATHOLOGICAL FRACTURE: Chronic | Status: ACTIVE | Noted: 2024-07-30

## 2024-08-01 ENCOUNTER — TRANSCRIPTION ENCOUNTER (OUTPATIENT)
Age: 49
End: 2024-08-01

## 2024-08-02 NOTE — ASU PATIENT PROFILE, ADULT - FALL HARM RISK - UNIVERSAL INTERVENTIONS
Bed in lowest position, wheels locked, appropriate side rails in place/Call bell, personal items and telephone in reach/Instruct patient to call for assistance before getting out of bed or chair/Non-slip footwear when patient is out of bed/[place] to call system/Physically safe environment - no spills, clutter or unnecessary equipment/Purposeful Proactive Rounding/Room/bathroom lighting operational, light cord in reach

## 2024-08-05 ENCOUNTER — OUTPATIENT (OUTPATIENT)
Dept: OUTPATIENT SERVICES | Facility: HOSPITAL | Age: 49
LOS: 1 days | Discharge: HOME | End: 2024-08-05

## 2024-08-05 ENCOUNTER — APPOINTMENT (OUTPATIENT)
Dept: GASTROENTEROLOGY | Facility: HOSPITAL | Age: 49
End: 2024-08-05

## 2024-08-05 ENCOUNTER — TRANSCRIPTION ENCOUNTER (OUTPATIENT)
Age: 49
End: 2024-08-05

## 2024-08-05 VITALS
OXYGEN SATURATION: 95 % | HEART RATE: 76 BPM | DIASTOLIC BLOOD PRESSURE: 57 MMHG | SYSTOLIC BLOOD PRESSURE: 98 MMHG | RESPIRATION RATE: 17 BRPM

## 2024-08-05 VITALS
WEIGHT: 244.93 LBS | DIASTOLIC BLOOD PRESSURE: 79 MMHG | SYSTOLIC BLOOD PRESSURE: 125 MMHG | OXYGEN SATURATION: 96 % | TEMPERATURE: 98 F | HEIGHT: 68 IN | HEART RATE: 86 BPM

## 2024-08-05 DIAGNOSIS — Z98.52 VASECTOMY STATUS: Chronic | ICD-10-CM

## 2024-08-05 DIAGNOSIS — C16.9 MALIGNANT NEOPLASM OF STOMACH, UNSPECIFIED: ICD-10-CM

## 2024-08-05 DIAGNOSIS — Z98.890 OTHER SPECIFIED POSTPROCEDURAL STATES: Chronic | ICD-10-CM

## 2024-08-05 DIAGNOSIS — Z90.49 ACQUIRED ABSENCE OF OTHER SPECIFIED PARTS OF DIGESTIVE TRACT: Chronic | ICD-10-CM

## 2024-08-05 PROCEDURE — 43259 EGD US EXAM DUODENUM/JEJUNUM: CPT

## 2024-08-05 PROCEDURE — 43236 UPPR GI SCOPE W/SUBMUC INJ: CPT | Mod: 59

## 2024-08-05 PROCEDURE — 45378 DIAGNOSTIC COLONOSCOPY: CPT

## 2024-08-05 NOTE — ASU DISCHARGE PLAN (ADULT/PEDIATRIC) - PROCEDURE
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 Plan:  Wears cosmetic CLs Nasal infiltrate with epi defect - sx for the last week Has prescriptions from ED (tobra and oflox q2h) but has not filled them Encouraged her to fill Rx and use as prescribed (I put oflox and tobra x1 in clinic today) Advised her to cease CL use until seen again  Follow up with Dr Yudi Flowers within 1 week
EUS/colonoscopy

## 2024-08-05 NOTE — PRE PROCEDURE NOTE - PRE PROCEDURE EVALUATION
Attending Physician:   Ari                 Procedure: EGD, EUS, COLONOSCOPY    Indication for Procedure: staging gastric adenocarcinoma, hematochezia   ________________________________________________________  PAST MEDICAL & SURGICAL HISTORY:  Lupus      Asthma      Hypothyroid      Lumbosacral stenosis      Mild obstructive sleep apnea      H/O compression fracture of spine      H. pylori infection      Osteoporosis      Prediabetes      Obesity      Myositis      History of cholecystectomy      H/O endoscopy      S/P vasectomy      H/O colonoscopy        ALLERGIES:  No Known Allergies    HOME MEDICATIONS:  Dupixent Pre-filled Pen 300 mg/2 mL subcutaneous solution: 300 milligram(s) subcutaneously every 2 weeks every sunday  losartan 50 mg oral tablet: 1 tab(s) orally once a day (in the morning)  Ozempic 8 mg/3 mL (2 mg dose) subcutaneous solution: 2 milligram(s) subcutaneously once a week SUNDAY LD 3 weeks ago  pantoprazole 40 mg oral delayed release tablet: 1 tab(s) orally once a day (in the morning)  Plaquenil 200 mg oral tablet: 2 tab(s) orally once a day (at bedtime)  Potassium Chloride (Eqv-K-Tab) 20 mEq oral tablet, extended release: 2 tab(s) orally once a day (at bedtime)  predniSONE 2.5 mg oral tablet: 3 tab(s) orally once a day (in the afternoon)  Synthroid 25 mcg (0.025 mg) oral tablet: 1 tab(s) orally once a day (in the morning)  torsemide 10 mg oral tablet: 2 tab(s) orally once a week as needed for prn for swelling/pedal edema  Trelegy Ellipta 100 mcg-62.5 mcg-25 mcg/inh inhalation powder: 1 puff(s) inhaled once a day (in the morning)    AICD/PPM: [ ] yes   [x ] no    PERTINENT LAB DATA:                      PHYSICAL EXAMINATION:    T(C): --  HR: --  BP: --  RR: --  SpO2: --    Constitutional: NAD  Neck:  supple  Respiratory: no respiratory stress, no audible wheezes  Cardiovascular: RR  Gastrointestinal: soft, NT/ND  Extremities: No peripheral edema  Neurological: Alert, no focal deficits  Psychiatric: Normal mood, normal affect  Skin: No rashes      COMMENTS:    The patient is a suitable candidate for the planned procedure unless box checked [ ]  No, explain:     yes...

## 2024-08-06 ENCOUNTER — TRANSCRIPTION ENCOUNTER (OUTPATIENT)
Age: 49
End: 2024-08-06

## 2024-08-07 ENCOUNTER — TRANSCRIPTION ENCOUNTER (OUTPATIENT)
Age: 49
End: 2024-08-07

## 2024-08-08 ENCOUNTER — NON-APPOINTMENT (OUTPATIENT)
Age: 49
End: 2024-08-08

## 2024-08-09 ENCOUNTER — TRANSCRIPTION ENCOUNTER (OUTPATIENT)
Age: 49
End: 2024-08-09

## 2024-08-12 ENCOUNTER — TRANSCRIPTION ENCOUNTER (OUTPATIENT)
Age: 49
End: 2024-08-12

## 2024-08-12 PROBLEM — M48.07 SPINAL STENOSIS, LUMBOSACRAL REGION: Chronic | Status: ACTIVE | Noted: 2024-07-30

## 2024-08-13 ENCOUNTER — APPOINTMENT (OUTPATIENT)
Dept: INTERNAL MEDICINE | Facility: CLINIC | Age: 49
End: 2024-08-13
Payer: COMMERCIAL

## 2024-08-13 ENCOUNTER — LABORATORY RESULT (OUTPATIENT)
Age: 49
End: 2024-08-13

## 2024-08-13 ENCOUNTER — NON-APPOINTMENT (OUTPATIENT)
Age: 49
End: 2024-08-13

## 2024-08-13 VITALS
HEIGHT: 67 IN | BODY MASS INDEX: 38.77 KG/M2 | HEART RATE: 110 BPM | RESPIRATION RATE: 16 BRPM | SYSTOLIC BLOOD PRESSURE: 122 MMHG | OXYGEN SATURATION: 98 % | WEIGHT: 247 LBS | DIASTOLIC BLOOD PRESSURE: 86 MMHG | TEMPERATURE: 97.6 F

## 2024-08-13 DIAGNOSIS — M32.9 SYSTEMIC LUPUS ERYTHEMATOSUS, UNSPECIFIED: ICD-10-CM

## 2024-08-13 DIAGNOSIS — G47.33 OBSTRUCTIVE SLEEP APNEA (ADULT) (PEDIATRIC): ICD-10-CM

## 2024-08-13 DIAGNOSIS — J45.909 UNSPECIFIED ASTHMA, UNCOMPLICATED: ICD-10-CM

## 2024-08-13 DIAGNOSIS — E11.9 TYPE 2 DIABETES MELLITUS W/OUT COMPLICATIONS: ICD-10-CM

## 2024-08-13 DIAGNOSIS — I10 ESSENTIAL (PRIMARY) HYPERTENSION: ICD-10-CM

## 2024-08-13 DIAGNOSIS — C16.9 MALIGNANT NEOPLASM OF STOMACH, UNSPECIFIED: ICD-10-CM

## 2024-08-13 DIAGNOSIS — E66.9 OBESITY, UNSPECIFIED: ICD-10-CM

## 2024-08-13 DIAGNOSIS — E03.9 HYPOTHYROIDISM, UNSPECIFIED: ICD-10-CM

## 2024-08-13 DIAGNOSIS — Z01.818 ENCOUNTER FOR OTHER PREPROCEDURAL EXAMINATION: ICD-10-CM

## 2024-08-13 PROCEDURE — 99215 OFFICE O/P EST HI 40 MIN: CPT

## 2024-08-13 PROCEDURE — 36415 COLL VENOUS BLD VENIPUNCTURE: CPT

## 2024-08-13 PROCEDURE — 93000 ELECTROCARDIOGRAM COMPLETE: CPT

## 2024-08-14 ENCOUNTER — NON-APPOINTMENT (OUTPATIENT)
Age: 49
End: 2024-08-14

## 2024-08-14 ENCOUNTER — TRANSCRIPTION ENCOUNTER (OUTPATIENT)
Age: 49
End: 2024-08-14

## 2024-08-14 PROBLEM — Z01.818 PREOPERATIVE EXAMINATION: Status: ACTIVE | Noted: 2024-08-14

## 2024-08-14 LAB
ALBUMIN SERPL ELPH-MCNC: 4.6 G/DL
ALP BLD-CCNC: 88 U/L
ALT SERPL-CCNC: 17 U/L
ANION GAP SERPL CALC-SCNC: 14 MMOL/L
AST SERPL-CCNC: 27 U/L
BILIRUB SERPL-MCNC: 0.4 MG/DL
BUN SERPL-MCNC: 7 MG/DL
CALCIUM SERPL-MCNC: 9.7 MG/DL
CHLORIDE SERPL-SCNC: 100 MMOL/L
CO2 SERPL-SCNC: 24 MMOL/L
CREAT SERPL-MCNC: 1.08 MG/DL
EGFR: 85 ML/MIN/1.73M2
ESTIMATED AVERAGE GLUCOSE: 120 MG/DL
GLUCOSE SERPL-MCNC: 96 MG/DL
HBA1C MFR BLD HPLC: 5.8 %
POTASSIUM SERPL-SCNC: 4 MMOL/L
PROT SERPL-MCNC: 7.3 G/DL
SODIUM SERPL-SCNC: 138 MMOL/L

## 2024-08-14 NOTE — ASSESSMENT
[Patient Optimized for Surgery] : Patient optimized for surgery [No Further Testing Recommended] : no further testing recommended [FreeTextEntry4] : Patient is a 49-year-old male with history of obesity, obstructive sleep apnea, lupus on prednisone 7.5 daily, asthma, type 2 diabetes, hypertension, obstructive sleep apnea, hypothyroidism, hypertension, migraines, who presents for preop evaluation prior to gastric resection  1.  Preoperative evaluation Patient is at increased risk but overall acceptable risk for surgery.  Patient is optimized for surgery however suggest stress dose steroids hydrocortisone 100 mg IV on-call to the OR 50 mg hydrocortisone Q 8 hours x 24 then switch back to prednisone if recovering well 7.5 daily DVT prophylaxis as per protocol will reach out to Dr. Kulkarni.  2.  Type 2 diabetes Ozempic on hold x 3 weeks prior to surgery check hemoglobin A1c  3.  Hypertension Hold losartan morning of surgery EKG no ischemic changes or LVH  4 obstructive sleep apnea Has CPAP machine at home  5.  Edema Torsemide on hold  6 asthma Continue Trelegy elliptical inhaler prior to surgery  7 hypothyroidism May take levothyroxine 125 mics follow-up first patient is the male right the fall the  is coming in tomorrow Right now good taking all the morning of surgery

## 2024-08-14 NOTE — PHYSICAL EXAM
[Normal Sclera/Conjunctiva] : normal sclera/conjunctiva [Normal Outer Ear/Nose] : the outer ears and nose were normal in appearance [Normal Appearance] : normal in appearance [No Masses] : no palpable masses [No Nipple Discharge] : no nipple discharge [No Axillary Lymphadenopathy] : no axillary lymphadenopathy [Normal] : affect was normal and insight and judgment were intact [de-identified] : Denied looking

## 2024-08-14 NOTE — HISTORY OF PRESENT ILLNESS
[No Pertinent Cardiac History] : no history of aortic stenosis, atrial fibrillation, coronary artery disease, recent myocardial infarction, or implantable device/pacemaker [Asthma] : asthma [Sleep Apnea] : sleep apnea [Good (7-10 METs)] : Good (7-10 METs) [COPD] : no COPD [Smoker] : not a smoker [Family Member] : no family member with adverse anesthesia reaction/sudden death [Self] : no previous adverse anesthesia reaction [FreeTextEntry1] : Gastric resection [FreeTextEntry2] : August 20, 2024 [FreeTextEntry3] : Dr. Anil Ferreira [FreeTextEntry4] : The patient is a 49-year-old male with history of obesity asthma lupus on steroids maintenance dose presently 7.5 mg/day of prednisone, diabetes, hypertension, obstructive sleep apnea/central sleep apnea, hypothyroidism, history of migraines, who presents for preoperative evaluation prior to resection of gastric carcinoma.  Patient recently underwent endoscopy showing gastric carcinoma early T1-T2.  Patient has a good exercise tolerance without chest pain shortness of breath dyspnea exertion polyuria or polydipsia.  He denies fever chills nausea vomiting constipation diarrhea anesthesia reactions or thrombophilia

## 2024-08-14 NOTE — H&P ADULT - VTE RISK ASSESSMENT
"[facility] CR 2 [id number]       Outpatient Plan as outlined on AVS reviewed with patient.    For any urgent concerns, please contact our 24 hour nurse triage line: [phone] ([phone])       AMIRA Peña  [phone]  [facility]                VTE Assessment already completed for this visit

## 2024-08-15 ENCOUNTER — NON-APPOINTMENT (OUTPATIENT)
Age: 49
End: 2024-08-15

## 2024-08-16 ENCOUNTER — NON-APPOINTMENT (OUTPATIENT)
Age: 49
End: 2024-08-16

## 2024-08-19 ENCOUNTER — TRANSCRIPTION ENCOUNTER (OUTPATIENT)
Age: 49
End: 2024-08-19

## 2024-08-19 NOTE — ASU PATIENT PROFILE, ADULT - PATIENT'S HEIGHT AND WEIGHT RECORDED IN THE VITAL SIGNS FLOWSHEET
Rn called for report, RN from stroke unit said she was expecting a female pt , waiting for call back from stroke unit to give report yes

## 2024-08-19 NOTE — ASU PATIENT PROFILE, ADULT - FALL HARM RISK - UNIVERSAL INTERVENTIONS
Bed in lowest position, wheels locked, appropriate side rails in place/Call bell, personal items and telephone in reach/Instruct patient to call for assistance before getting out of bed or chair/Non-slip footwear when patient is out of bed/[place] to call system/Physically safe environment - no spills, clutter or unnecessary equipment/Purposeful Proactive Rounding/Room/bathroom lighting operational, light cord in reach

## 2024-08-20 ENCOUNTER — APPOINTMENT (OUTPATIENT)
Dept: SURGICAL ONCOLOGY | Facility: HOSPITAL | Age: 49
End: 2024-08-20

## 2024-08-20 ENCOUNTER — INPATIENT (INPATIENT)
Facility: HOSPITAL | Age: 49
LOS: 6 days | Discharge: HOME | End: 2024-08-27
Attending: SURGERY | Admitting: SURGERY
Payer: COMMERCIAL

## 2024-08-20 VITALS
SYSTOLIC BLOOD PRESSURE: 109 MMHG | WEIGHT: 244.93 LBS | HEIGHT: 68 IN | RESPIRATION RATE: 16 BRPM | OXYGEN SATURATION: 98 % | DIASTOLIC BLOOD PRESSURE: 78 MMHG | TEMPERATURE: 98 F | HEART RATE: 78 BPM

## 2024-08-20 DIAGNOSIS — Z90.49 ACQUIRED ABSENCE OF OTHER SPECIFIED PARTS OF DIGESTIVE TRACT: Chronic | ICD-10-CM

## 2024-08-20 DIAGNOSIS — Z98.890 OTHER SPECIFIED POSTPROCEDURAL STATES: Chronic | ICD-10-CM

## 2024-08-20 DIAGNOSIS — C16.9 MALIGNANT NEOPLASM OF STOMACH, UNSPECIFIED: ICD-10-CM

## 2024-08-20 DIAGNOSIS — Z98.52 VASECTOMY STATUS: Chronic | ICD-10-CM

## 2024-08-20 LAB
GLUCOSE BLDC GLUCOMTR-MCNC: 125 MG/DL — HIGH (ref 70–99)
GLUCOSE BLDC GLUCOMTR-MCNC: 128 MG/DL — HIGH (ref 70–99)
GLUCOSE BLDC GLUCOMTR-MCNC: 133 MG/DL — HIGH (ref 70–99)
GLUCOSE BLDC GLUCOMTR-MCNC: 79 MG/DL — SIGNIFICANT CHANGE UP (ref 70–99)

## 2024-08-20 PROCEDURE — 88332 PATH CONSLTJ SURG EA ADD BLK: CPT | Mod: 26

## 2024-08-20 PROCEDURE — 88309 TISSUE EXAM BY PATHOLOGIST: CPT | Mod: 26

## 2024-08-20 PROCEDURE — 88341 IMHCHEM/IMCYTCHM EA ADD ANTB: CPT | Mod: 26

## 2024-08-20 PROCEDURE — S2900 ROBOTIC SURGICAL SYSTEM: CPT | Mod: NC

## 2024-08-20 PROCEDURE — 88305 TISSUE EXAM BY PATHOLOGIST: CPT | Mod: 26

## 2024-08-20 PROCEDURE — 43633 REMOVAL OF STOMACH PARTIAL: CPT

## 2024-08-20 PROCEDURE — 49905 OMENTAL FLAP INTRA-ABDOM: CPT

## 2024-08-20 PROCEDURE — 88342 IMHCHEM/IMCYTCHM 1ST ANTB: CPT | Mod: 26

## 2024-08-20 PROCEDURE — 88331 PATH CONSLTJ SURG 1 BLK 1SPC: CPT | Mod: 26

## 2024-08-20 DEVICE — SURGICEL 2 X 14": Type: IMPLANTABLE DEVICE | Status: FUNCTIONAL

## 2024-08-20 DEVICE — STAPLER COVIDIEN TRI-STAPLE 60MM PURPLE RELOAD: Type: IMPLANTABLE DEVICE | Status: FUNCTIONAL

## 2024-08-20 DEVICE — STAPLER COVIDIEN TRI-STAPLE 60MM PURPLE INTELLIGENT RELOAD: Type: IMPLANTABLE DEVICE | Status: FUNCTIONAL

## 2024-08-20 DEVICE — LIGATING CLIPS WECK HEMOLOK POLYMER LARGE (PURPLE) 6: Type: IMPLANTABLE DEVICE | Status: FUNCTIONAL

## 2024-08-20 RX ORDER — PANTOPRAZOLE SODIUM 20 MG/1
1 TABLET, DELAYED RELEASE ORAL
Refills: 0 | DISCHARGE

## 2024-08-20 RX ORDER — HYDROMORPHONE HYDROCHLORIDE 2 MG/1
0.25 TABLET ORAL
Refills: 0 | Status: DISCONTINUED | OUTPATIENT
Start: 2024-08-20 | End: 2024-08-20

## 2024-08-20 RX ORDER — ORAL SEMAGLUTIDE 14 MG/1
2 TABLET ORAL
Refills: 0 | DISCHARGE

## 2024-08-20 RX ORDER — LEVOTHYROXINE SODIUM 100 MCG
100 TABLET ORAL AT BEDTIME
Refills: 0 | Status: DISCONTINUED | OUTPATIENT
Start: 2024-08-20 | End: 2024-08-21

## 2024-08-20 RX ORDER — NALOXONE HCL 1 MG/ML
0.1 VIAL (ML) INJECTION
Refills: 0 | Status: DISCONTINUED | OUTPATIENT
Start: 2024-08-20 | End: 2024-08-27

## 2024-08-20 RX ORDER — DIAZEPAM 10 MG
2 TABLET ORAL ONCE
Refills: 0 | Status: DISCONTINUED | OUTPATIENT
Start: 2024-08-20 | End: 2024-08-20

## 2024-08-20 RX ORDER — HYDROMORPHONE HYDROCHLORIDE 2 MG/1
0.5 TABLET ORAL
Refills: 0 | Status: DISCONTINUED | OUTPATIENT
Start: 2024-08-20 | End: 2024-08-21

## 2024-08-20 RX ORDER — IPRATROPIUM BROMIDE AND ALBUTEROL SULFATE .5; 3 MG/3ML; MG/3ML
3 SOLUTION RESPIRATORY (INHALATION) ONCE
Refills: 0 | Status: COMPLETED | OUTPATIENT
Start: 2024-08-20 | End: 2024-08-20

## 2024-08-20 RX ORDER — TIOTROPIUM BROMIDE INHALATION SPRAY 3.12 UG/1
2 SPRAY, METERED RESPIRATORY (INHALATION) DAILY
Refills: 0 | Status: DISCONTINUED | OUTPATIENT
Start: 2024-08-20 | End: 2024-08-27

## 2024-08-20 RX ORDER — DEXTROSE 15 G/33 G
25 GEL IN PACKET (GRAM) ORAL ONCE
Refills: 0 | Status: DISCONTINUED | OUTPATIENT
Start: 2024-08-20 | End: 2024-08-26

## 2024-08-20 RX ORDER — DEXTROSE 15 G/33 G
12.5 GEL IN PACKET (GRAM) ORAL ONCE
Refills: 0 | Status: DISCONTINUED | OUTPATIENT
Start: 2024-08-20 | End: 2024-08-26

## 2024-08-20 RX ORDER — POTASSIUM CHLORIDE 1500 MG/1
2 TABLET, EXTENDED RELEASE ORAL
Refills: 0 | DISCHARGE

## 2024-08-20 RX ORDER — ACETAMINOPHEN 325 MG/1
1000 TABLET ORAL EVERY 6 HOURS
Refills: 0 | Status: COMPLETED | OUTPATIENT
Start: 2024-08-20 | End: 2024-08-21

## 2024-08-20 RX ORDER — BUDESONIDE AND FORMOTEROL FUMARATE 80; 4.5 UG/1; UG/1
2 AEROSOL, METERED RESPIRATORY (INHALATION)
Refills: 0 | Status: DISCONTINUED | OUTPATIENT
Start: 2024-08-20 | End: 2024-08-27

## 2024-08-20 RX ORDER — TORSEMIDE 20 MG
2 TABLET ORAL
Refills: 0 | DISCHARGE

## 2024-08-20 RX ORDER — PREDNISONE 10 MG/1
3 TABLET ORAL
Refills: 0 | DISCHARGE

## 2024-08-20 RX ORDER — POTASSIUM CHLORIDE 10 MEQ
40 TABLET, EXT RELEASE, PARTICLES/CRYSTALS ORAL DAILY
Refills: 0 | Status: DISCONTINUED | OUTPATIENT
Start: 2024-08-20 | End: 2024-08-20

## 2024-08-20 RX ORDER — FLUTICASONE FUROATE, UMECLIDINIUM BROMIDE AND VILANTEROL TRIFENATATE 200; 62.5; 25 UG/1; UG/1; UG/1
1 POWDER RESPIRATORY (INHALATION)
Refills: 0 | DISCHARGE

## 2024-08-20 RX ORDER — HYDROXYCHLOROQUINE SULFATE 200 MG/1
400 TABLET, FILM COATED ORAL AT BEDTIME
Refills: 0 | Status: DISCONTINUED | OUTPATIENT
Start: 2024-08-20 | End: 2024-08-21

## 2024-08-20 RX ORDER — ONDANSETRON 2 MG/ML
4 INJECTION, SOLUTION INTRAMUSCULAR; INTRAVENOUS ONCE
Refills: 0 | Status: DISCONTINUED | OUTPATIENT
Start: 2024-08-20 | End: 2024-08-20

## 2024-08-20 RX ORDER — HYDROMORPHONE HYDROCHLORIDE 2 MG/1
30 TABLET ORAL
Refills: 0 | Status: DISCONTINUED | OUTPATIENT
Start: 2024-08-20 | End: 2024-08-21

## 2024-08-20 RX ORDER — HYDROCORTISONE 10 MG/1
50 TABLET ORAL EVERY 8 HOURS
Refills: 0 | Status: COMPLETED | OUTPATIENT
Start: 2024-08-20 | End: 2024-08-21

## 2024-08-20 RX ORDER — HYDROXYCHLOROQUINE SULFATE 200 MG/1
2 TABLET ORAL
Refills: 0 | DISCHARGE

## 2024-08-20 RX ORDER — LEVOTHYROXINE SODIUM 175 MCG
1 TABLET ORAL
Refills: 0 | DISCHARGE

## 2024-08-20 RX ORDER — DEXTROSE 15 G/33 G
15 GEL IN PACKET (GRAM) ORAL ONCE
Refills: 0 | Status: DISCONTINUED | OUTPATIENT
Start: 2024-08-20 | End: 2024-08-26

## 2024-08-20 RX ORDER — LOSARTAN POTASSIUM 50 MG/1
1 TABLET, FILM COATED ORAL
Refills: 0 | DISCHARGE

## 2024-08-20 RX ORDER — LEVOTHYROXINE SODIUM 100 MCG
20 TABLET ORAL AT BEDTIME
Refills: 0 | Status: DISCONTINUED | OUTPATIENT
Start: 2024-08-20 | End: 2024-08-20

## 2024-08-20 RX ORDER — PANTOPRAZOLE SODIUM 40 MG
40 TABLET, DELAYED RELEASE (ENTERIC COATED) ORAL EVERY 24 HOURS
Refills: 0 | Status: DISCONTINUED | OUTPATIENT
Start: 2024-08-20 | End: 2024-08-27

## 2024-08-20 RX ORDER — ENOXAPARIN SODIUM 100 MG/ML
40 INJECTION SUBCUTANEOUS EVERY 24 HOURS
Refills: 0 | Status: DISCONTINUED | OUTPATIENT
Start: 2024-08-19 | End: 2024-08-27

## 2024-08-20 RX ORDER — GLUCAGON INJECTION, SOLUTION 1 MG/.2ML
1 INJECTION, SOLUTION SUBCUTANEOUS ONCE
Refills: 0 | Status: DISCONTINUED | OUTPATIENT
Start: 2024-08-20 | End: 2024-08-27

## 2024-08-20 RX ORDER — FENTANYL CITRATE 50 UG/ML
25 INJECTION INTRAMUSCULAR; INTRAVENOUS
Refills: 0 | Status: DISCONTINUED | OUTPATIENT
Start: 2024-08-20 | End: 2024-08-20

## 2024-08-20 RX ORDER — HYDROMORPHONE HYDROCHLORIDE 2 MG/1
30 TABLET ORAL
Refills: 0 | Status: DISCONTINUED | OUTPATIENT
Start: 2024-08-20 | End: 2024-08-20

## 2024-08-20 RX ORDER — DUPILUMAB 200 MG/1.14ML
300 INJECTION, SOLUTION SUBCUTANEOUS
Refills: 0 | DISCHARGE

## 2024-08-20 RX ADMIN — HYDROMORPHONE HYDROCHLORIDE 0.25 MILLIGRAM(S): 2 TABLET ORAL at 15:13

## 2024-08-20 RX ADMIN — ACETAMINOPHEN 400 MILLIGRAM(S): 325 TABLET ORAL at 18:20

## 2024-08-20 RX ADMIN — HYDROMORPHONE HYDROCHLORIDE 0.25 MILLIGRAM(S): 2 TABLET ORAL at 14:30

## 2024-08-20 RX ADMIN — HYDROMORPHONE HYDROCHLORIDE 30 MILLILITER(S): 2 TABLET ORAL at 20:25

## 2024-08-20 RX ADMIN — HYDROMORPHONE HYDROCHLORIDE 0.25 MILLIGRAM(S): 2 TABLET ORAL at 14:55

## 2024-08-20 RX ADMIN — HYDROMORPHONE HYDROCHLORIDE 30 MILLILITER(S): 2 TABLET ORAL at 20:41

## 2024-08-20 RX ADMIN — HYDROMORPHONE HYDROCHLORIDE 30 MILLILITER(S): 2 TABLET ORAL at 16:10

## 2024-08-20 RX ADMIN — HYDROMORPHONE HYDROCHLORIDE 30 MILLILITER(S): 2 TABLET ORAL at 17:44

## 2024-08-20 RX ADMIN — HYDROCORTISONE 50 MILLIGRAM(S): 10 TABLET ORAL at 16:20

## 2024-08-20 RX ADMIN — HYDROCORTISONE 50 MILLIGRAM(S): 10 TABLET ORAL at 23:24

## 2024-08-20 RX ADMIN — ACETAMINOPHEN 1000 MILLIGRAM(S): 325 TABLET ORAL at 18:35

## 2024-08-20 RX ADMIN — IPRATROPIUM BROMIDE AND ALBUTEROL SULFATE 3 MILLILITER(S): .5; 3 SOLUTION RESPIRATORY (INHALATION) at 17:34

## 2024-08-20 RX ADMIN — HYDROMORPHONE HYDROCHLORIDE 0.5 MILLIGRAM(S): 2 TABLET ORAL at 16:12

## 2024-08-20 RX ADMIN — TIOTROPIUM BROMIDE INHALATION SPRAY 2 PUFF(S): 3.12 SPRAY, METERED RESPIRATORY (INHALATION) at 22:03

## 2024-08-20 RX ADMIN — Medication 2 MILLIGRAM(S): at 17:34

## 2024-08-20 RX ADMIN — HYDROMORPHONE HYDROCHLORIDE 0.25 MILLIGRAM(S): 2 TABLET ORAL at 14:45

## 2024-08-20 RX ADMIN — HYDROMORPHONE HYDROCHLORIDE 0.5 MILLIGRAM(S): 2 TABLET ORAL at 17:32

## 2024-08-20 RX ADMIN — ACETAMINOPHEN 400 MILLIGRAM(S): 325 TABLET ORAL at 23:24

## 2024-08-20 RX ADMIN — Medication 84 MILLILITER(S): at 20:42

## 2024-08-20 RX ADMIN — BUDESONIDE AND FORMOTEROL FUMARATE 2 PUFF(S): 80; 4.5 AEROSOL, METERED RESPIRATORY (INHALATION) at 22:02

## 2024-08-20 RX ADMIN — HYDROMORPHONE HYDROCHLORIDE 30 MILLILITER(S): 2 TABLET ORAL at 15:05

## 2024-08-20 NOTE — BRIEF OPERATIVE NOTE - OPERATION/FINDINGS
Robotic distal gastrectomy w/ Heather en Y reconstruction, D2 lymphadenectomy. Hemostatic at the end of the case. Crystalloids 2100. .

## 2024-08-20 NOTE — CHART NOTE - NSCHARTNOTEFT_GEN_A_CORE
SURGERY POST OP CHECK    STATUS POST PROCEDURE: robotic distal gastrectomy with Heather en Y reconstruction and D2 lymphadenonectomy    SUBJECTIVE: Pt seen and examined at bedside.     --------------------------------------------------------------------------------------------------------------------------------------------------------------------------------------------------------------  OBJECTIVE:  Vital Signs Last 24 Hrs  T(C): 36.1 (20 Aug 2024 14:20), Max: 36.6 (20 Aug 2024 07:30)  T(F): 97 (20 Aug 2024 14:20), Max: 97.9 (20 Aug 2024 07:30)  HR: 78 (20 Aug 2024 19:30) (76 - 98)  BP: 129/99 (20 Aug 2024 19:30) (109/78 - 146/82)  BP(mean): 107 (20 Aug 2024 19:30) (98 - 112)  RR: 16 (20 Aug 2024 19:30) (12 - 29)  SpO2: 98% (20 Aug 2024 19:30) (92% - 100%)    Parameters below as of 20 Aug 2024 18:00  Patient On (Oxygen Delivery Method): nasal cannula  O2 Flow (L/min): 4    I&O's Summary    20 Aug 2024 07:01  -  20 Aug 2024 20:01  --------------------------------------------------------  IN: 252 mL / OUT: 150 mL / NET: 102 mL        PHYSICAL EXAM:  Constitutional: Well developed, obese male appears anxious, alert and appropriate to conversation  Chest: equal chest rise, tachypneic on 6L NC humidified O2, no crackles or rhonchi  Abdomen: Soft, distended abdomen is minimally tender to palpation. Laparoscopic incisions dressings c/d/i. No rebound or guarding  Extremities: Warm to touch, soft, normal strength, sensory and motor intact.  ----------------------------------------------------------------------------------------------------------------------------------------------------------------------------------------------------------------  ASSESSMENT:   Pt is a 49 year old male with a history of lupus c/b myositis on steroids, asthma on Trelegy, TAMMY, hypothyroidism, and gastric adenocarcinoma now POD #0 s/p robotic distal gastrectomy with Heather en Y reconstruction and D2 lymphadenonectomy. Patient is hemodynamically stable on supplemental oxygen with subjective dyspnea and SpO2 , now s/p nebulizer treatment and with home inhaler restarted.    PLAN:  - Monitor pulse ox and supplemental O2 requirement  - Steroid taper per outpatient recs         - Hydrocortisone 100 pre-op completed         - Hydrocortisone 50mg q8h x3 doses         - Hydrocortisone 25mg q8h x3 doses         - Prednisone 7.5mg QD starting on POD3 if able to tolerate PO, solumedrol 6mg IV QD if unable to tolerate PO  - Pain: Tylenol, PCA  - Diet: NPO with sips, on mIVF  - SSI  - Keep Reich in  - Continue home meds         - Plaquenil         - KCl 40mEq QD         - Symbicort/Spiriva daily. Home Trelegy for asthma         - Levothyroxine 100 mcg QD  - DVT ppx: LVX  - Dispo: PACU to floor    Jessica Martínez, PGY1  [id number] SURGERY POST OP CHECK    STATUS POST PROCEDURE: robotic distal gastrectomy with Heather en Y reconstruction and D2 lymphadenectomy    SUBJECTIVE: Pt seen and examined at bedside in the PACU. Patient reports surgical abdominal pain is relieved by the PCA, but feels that the discomfort extends to his diaphragm. Patient stated the discomfort made it difficulty breath and felt similar to prior asthma exacerbations. He was seen by myself and the PACU ACP, initiated on supplemental oxygen, given a nebulizer treatment, and given one dose of valium for anxiety. Patient remained normotensive, HR 80-90s, SpO2  on 6L throughout.    Otherwise patient denies chest pain, nausea, vomiting.     --------------------------------------------------------------------------------------------------------------------------------------------------------------------------------------------------------------  OBJECTIVE:  Vital Signs Last 24 Hrs  T(C): 36.1 (20 Aug 2024 14:20), Max: 36.6 (20 Aug 2024 07:30)  T(F): 97 (20 Aug 2024 14:20), Max: 97.9 (20 Aug 2024 07:30)  HR: 78 (20 Aug 2024 19:30) (76 - 98)  BP: 129/99 (20 Aug 2024 19:30) (109/78 - 146/82)  BP(mean): 107 (20 Aug 2024 19:30) (98 - 112)  RR: 16 (20 Aug 2024 19:30) (12 - 29)  SpO2: 98% (20 Aug 2024 19:30) (92% - 100%)    Parameters below as of 20 Aug 2024 18:00  Patient On (Oxygen Delivery Method): nasal cannula  O2 Flow (L/min): 4    I&O's Summary    20 Aug 2024 07:01  -  20 Aug 2024 20:01  --------------------------------------------------------  IN: 252 mL / OUT: 150 mL / NET: 102 mL        PHYSICAL EXAM:  Constitutional: Well developed, obese male appears anxious, alert and appropriate to conversation  Chest: equal chest rise, tachypneic on 6L NC humidified O2, no crackles or rhonchi  Abdomen: Soft, distended abdomen is minimally tender to palpation. Laparoscopic incisions dressings c/d/i. No rebound or guarding  Extremities: Warm to touch, soft, normal strength, sensory and motor intact.  ----------------------------------------------------------------------------------------------------------------------------------------------------------------------------------------------------------------  ASSESSMENT:   Pt is a 49 year old male with a history of lupus c/b myositis on steroids, asthma on Trelegy, TAMMY, hypothyroidism, and gastric adenocarcinoma now POD #0 s/p robotic distal gastrectomy with Heather en Y reconstruction and D2 lymphadenectomy. Patient is hemodynamically stable on supplemental oxygen with subjective dyspnea and SpO2 , now s/p nebulizer treatment and with home inhaler restarted.    PLAN:  - Monitor pulse ox and supplemental O2 requirement  - Steroid taper per outpatient recs         - Hydrocortisone 100 pre-op completed         - Hydrocortisone 50mg q8h x3 doses         - Hydrocortisone 25mg q8h x3 doses         - Prednisone 7.5mg QD starting on POD3 if able to tolerate PO, solumedrol 6mg IV QD if unable to tolerate PO  - Pain: Tylenol, PCA  - Diet: NPO with sips, on mIVF  - SSI  - Keep Reich in  - Continue home meds         - Plaquenil         - Symbicort/Spiriva daily. Home Trelegy for asthma         - Levothyroxine 100 mcg QD  - DVT ppx: LVX  - Dispo: PACU to floor    Jessica Martínez, PGY1  [id number]

## 2024-08-20 NOTE — BRIEF OPERATIVE NOTE - NSICDXBRIEFPROCEDURE_GEN_ALL_CORE_FT
PROCEDURES:  Gastrectomy, distal, robot-assisted, laparoscopic, with gastrojejunostomy creation 20-Aug-2024 15:12:39 Heather en Y reconstruction Jakob Crawford

## 2024-08-21 LAB
A1C WITH ESTIMATED AVERAGE GLUCOSE RESULT: 5.6 % — SIGNIFICANT CHANGE UP (ref 4–5.6)
ANION GAP SERPL CALC-SCNC: 13 MMOL/L — SIGNIFICANT CHANGE UP (ref 7–14)
BUN SERPL-MCNC: 10 MG/DL — SIGNIFICANT CHANGE UP (ref 7–23)
CALCIUM SERPL-MCNC: 8.4 MG/DL — SIGNIFICANT CHANGE UP (ref 8.4–10.5)
CHLORIDE SERPL-SCNC: 99 MMOL/L — SIGNIFICANT CHANGE UP (ref 98–107)
CO2 SERPL-SCNC: 22 MMOL/L — SIGNIFICANT CHANGE UP (ref 22–31)
CREAT SERPL-MCNC: 0.79 MG/DL — SIGNIFICANT CHANGE UP (ref 0.5–1.3)
EGFR: 110 ML/MIN/1.73M2 — SIGNIFICANT CHANGE UP
ESTIMATED AVERAGE GLUCOSE: 114 — SIGNIFICANT CHANGE UP
FLUAV AG NPH QL: SIGNIFICANT CHANGE UP
FLUBV AG NPH QL: SIGNIFICANT CHANGE UP
GLUCOSE BLDC GLUCOMTR-MCNC: 125 MG/DL — HIGH (ref 70–99)
GLUCOSE BLDC GLUCOMTR-MCNC: 74 MG/DL — SIGNIFICANT CHANGE UP (ref 70–99)
GLUCOSE BLDC GLUCOMTR-MCNC: 84 MG/DL — SIGNIFICANT CHANGE UP (ref 70–99)
GLUCOSE SERPL-MCNC: 116 MG/DL — HIGH (ref 70–99)
HCT VFR BLD CALC: 33.1 % — LOW (ref 39–50)
HGB BLD-MCNC: 10.6 G/DL — LOW (ref 13–17)
MAGNESIUM SERPL-MCNC: 1.5 MG/DL — LOW (ref 1.6–2.6)
MCHC RBC-ENTMCNC: 27.5 PG — SIGNIFICANT CHANGE UP (ref 27–34)
MCHC RBC-ENTMCNC: 32 GM/DL — SIGNIFICANT CHANGE UP (ref 32–36)
MCV RBC AUTO: 86 FL — SIGNIFICANT CHANGE UP (ref 80–100)
NRBC # BLD: 0 /100 WBCS — SIGNIFICANT CHANGE UP (ref 0–0)
NRBC # FLD: 0 K/UL — SIGNIFICANT CHANGE UP (ref 0–0)
PHOSPHATE SERPL-MCNC: 3.5 MG/DL — SIGNIFICANT CHANGE UP (ref 2.5–4.5)
PLATELET # BLD AUTO: 257 K/UL — SIGNIFICANT CHANGE UP (ref 150–400)
POTASSIUM SERPL-MCNC: 4.3 MMOL/L — SIGNIFICANT CHANGE UP (ref 3.5–5.3)
POTASSIUM SERPL-SCNC: 4.3 MMOL/L — SIGNIFICANT CHANGE UP (ref 3.5–5.3)
RBC # BLD: 3.85 M/UL — LOW (ref 4.2–5.8)
RBC # FLD: 14.4 % — SIGNIFICANT CHANGE UP (ref 10.3–14.5)
RSV RNA NPH QL NAA+NON-PROBE: SIGNIFICANT CHANGE UP
SARS-COV-2 RNA SPEC QL NAA+PROBE: SIGNIFICANT CHANGE UP
SODIUM SERPL-SCNC: 134 MMOL/L — LOW (ref 135–145)
WBC # BLD: 11.76 K/UL — HIGH (ref 3.8–10.5)
WBC # FLD AUTO: 11.76 K/UL — HIGH (ref 3.8–10.5)

## 2024-08-21 PROCEDURE — 99223 1ST HOSP IP/OBS HIGH 75: CPT | Mod: GC

## 2024-08-21 PROCEDURE — 71045 X-RAY EXAM CHEST 1 VIEW: CPT | Mod: 26

## 2024-08-21 RX ORDER — HYDROMORPHONE HYDROCHLORIDE 2 MG/1
0.5 TABLET ORAL EVERY 4 HOURS
Refills: 0 | Status: DISCONTINUED | OUTPATIENT
Start: 2024-08-21 | End: 2024-08-21

## 2024-08-21 RX ORDER — LEVOTHYROXINE SODIUM 100 MCG
25 TABLET ORAL DAILY
Refills: 0 | Status: DISCONTINUED | OUTPATIENT
Start: 2024-08-21 | End: 2024-08-21

## 2024-08-21 RX ORDER — FLUTICASONE PROPIONATE 50 UG/1
1 SPRAY, METERED NASAL
Refills: 0 | Status: DISCONTINUED | OUTPATIENT
Start: 2024-08-21 | End: 2024-08-27

## 2024-08-21 RX ORDER — OXYCODONE HYDROCHLORIDE 5 MG/1
5 TABLET ORAL
Refills: 0 | Status: DISCONTINUED | OUTPATIENT
Start: 2024-08-21 | End: 2024-08-21

## 2024-08-21 RX ORDER — ACETAMINOPHEN 325 MG/1
1000 TABLET ORAL EVERY 8 HOURS
Refills: 0 | Status: COMPLETED | OUTPATIENT
Start: 2024-08-21 | End: 2024-08-22

## 2024-08-21 RX ORDER — LEVOTHYROXINE SODIUM 100 MCG
125 TABLET ORAL DAILY
Refills: 0 | Status: DISCONTINUED | OUTPATIENT
Start: 2024-08-22 | End: 2024-08-27

## 2024-08-21 RX ORDER — IPRATROPIUM BROMIDE AND ALBUTEROL SULFATE .5; 3 MG/3ML; MG/3ML
3 SOLUTION RESPIRATORY (INHALATION) EVERY 6 HOURS
Refills: 0 | Status: DISCONTINUED | OUTPATIENT
Start: 2024-08-21 | End: 2024-08-21

## 2024-08-21 RX ORDER — LIDOCAINE/BENZALKONIUM/ALCOHOL
1 SOLUTION, NON-ORAL TOPICAL DAILY
Refills: 0 | Status: DISCONTINUED | OUTPATIENT
Start: 2024-08-21 | End: 2024-08-27

## 2024-08-21 RX ORDER — HYDROXYCHLOROQUINE SULFATE 200 MG/1
400 TABLET, FILM COATED ORAL AT BEDTIME
Refills: 0 | Status: DISCONTINUED | OUTPATIENT
Start: 2024-08-21 | End: 2024-08-27

## 2024-08-21 RX ORDER — ONDANSETRON 2 MG/ML
4 INJECTION, SOLUTION INTRAMUSCULAR; INTRAVENOUS ONCE
Refills: 0 | Status: COMPLETED | OUTPATIENT
Start: 2024-08-21 | End: 2024-08-21

## 2024-08-21 RX ORDER — OXYCODONE HYDROCHLORIDE 5 MG/1
10 TABLET ORAL
Refills: 0 | Status: DISCONTINUED | OUTPATIENT
Start: 2024-08-21 | End: 2024-08-21

## 2024-08-21 RX ORDER — HYDROMORPHONE HYDROCHLORIDE 2 MG/1
0.5 TABLET ORAL
Refills: 0 | Status: DISCONTINUED | OUTPATIENT
Start: 2024-08-21 | End: 2024-08-25

## 2024-08-21 RX ORDER — HYDROMORPHONE HYDROCHLORIDE 2 MG/1
30 TABLET ORAL
Refills: 0 | Status: DISCONTINUED | OUTPATIENT
Start: 2024-08-21 | End: 2024-08-25

## 2024-08-21 RX ADMIN — Medication 40 MILLIGRAM(S): at 05:24

## 2024-08-21 RX ADMIN — ACETAMINOPHEN 400 MILLIGRAM(S): 325 TABLET ORAL at 13:08

## 2024-08-21 RX ADMIN — OXYCODONE HYDROCHLORIDE 10 MILLIGRAM(S): 5 TABLET ORAL at 15:48

## 2024-08-21 RX ADMIN — TIOTROPIUM BROMIDE INHALATION SPRAY 2 PUFF(S): 3.12 SPRAY, METERED RESPIRATORY (INHALATION) at 10:06

## 2024-08-21 RX ADMIN — FLUTICASONE PROPIONATE 1 SPRAY(S): 50 SPRAY, METERED NASAL at 17:43

## 2024-08-21 RX ADMIN — HYDROXYCHLOROQUINE SULFATE 400 MILLIGRAM(S): 200 TABLET, FILM COATED ORAL at 21:31

## 2024-08-21 RX ADMIN — HYDROMORPHONE HYDROCHLORIDE 0.5 MILLIGRAM(S): 2 TABLET ORAL at 16:50

## 2024-08-21 RX ADMIN — BUDESONIDE AND FORMOTEROL FUMARATE 2 PUFF(S): 80; 4.5 AEROSOL, METERED RESPIRATORY (INHALATION) at 10:06

## 2024-08-21 RX ADMIN — ACETAMINOPHEN 1000 MILLIGRAM(S): 325 TABLET ORAL at 00:48

## 2024-08-21 RX ADMIN — HYDROMORPHONE HYDROCHLORIDE 30 MILLILITER(S): 2 TABLET ORAL at 18:42

## 2024-08-21 RX ADMIN — HYDROMORPHONE HYDROCHLORIDE 30 MILLILITER(S): 2 TABLET ORAL at 07:26

## 2024-08-21 RX ADMIN — ONDANSETRON 4 MILLIGRAM(S): 2 INJECTION, SOLUTION INTRAMUSCULAR; INTRAVENOUS at 20:28

## 2024-08-21 RX ADMIN — BUDESONIDE AND FORMOTEROL FUMARATE 2 PUFF(S): 80; 4.5 AEROSOL, METERED RESPIRATORY (INHALATION) at 20:28

## 2024-08-21 RX ADMIN — ACETAMINOPHEN 1000 MILLIGRAM(S): 325 TABLET ORAL at 06:05

## 2024-08-21 RX ADMIN — ACETAMINOPHEN 1000 MILLIGRAM(S): 325 TABLET ORAL at 21:54

## 2024-08-21 RX ADMIN — Medication 25 MICROGRAM(S): at 13:08

## 2024-08-21 RX ADMIN — ENOXAPARIN SODIUM 40 MILLIGRAM(S): 100 INJECTION SUBCUTANEOUS at 05:23

## 2024-08-21 RX ADMIN — ACETAMINOPHEN 400 MILLIGRAM(S): 325 TABLET ORAL at 05:24

## 2024-08-21 RX ADMIN — IPRATROPIUM BROMIDE AND ALBUTEROL SULFATE 3 MILLILITER(S): .5; 3 SOLUTION RESPIRATORY (INHALATION) at 07:32

## 2024-08-21 RX ADMIN — HYDROMORPHONE HYDROCHLORIDE 30 MILLILITER(S): 2 TABLET ORAL at 19:43

## 2024-08-21 RX ADMIN — ACETAMINOPHEN 400 MILLIGRAM(S): 325 TABLET ORAL at 21:30

## 2024-08-21 NOTE — PROGRESS NOTE ADULT - ASSESSMENT
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 Patient is hemodynamically stable on supplemental oxygen with subjective dyspnea and SpO2 , now s/p nebulizer treatment and with home inhaler restarted.    PLAN:  - Monitor pulse ox and supplemental O2 requirement  - wean supplemental 02   - if resp sx persist, consider CXR   - Steroid taper per outpatient rheum recs   - Hydrocortisone 100 pre-op completed 8/20   - Hydrocortisone 50mg q8h x3 doses 8/21   - Hydrocortisone 25mg q8h x3 doses  * for 8/22     - Prednisone 7.5mg QD starting on POD3 tentative on 8/23  if able to tolerate PO, solumedrol 6mg IV QD if unable to tolerate PO  c/w pain medications Tylenol, PCA  c/w NPO with sips, on mIVF  DC fluids  Pt is a 49 year old male with a history of lupus c/b myositis on steroids, asthma on Trelegy, TAMMY, hypothyroidism, and gastric adenocarcinoma now POD #0 s/p robotic distal gastrectomy with Heather en Y reconstruction and D2 lymphadenectomy. Patient is hemodynamically stable on supplemental oxygen with subjective dyspnea and SpO2 , now s/p nebulizer treatment and with home inhaler restarted.    PLAN:  - Monitor pulse ox and supplemental O2 requirement  - wean supplemental 02   - if resp sx persist, consider CXR , inatpeint pulmonary consultation called 8/21/24  - Steroid taper per outpatient rheum recs , inpatient rheum additionally consulted 8/21/24   - Hydrocortisone 100 pre-op completed 8/20   - Hydrocortisone 50mg q8h x3 doses 8/21   - Hydrocortisone 25mg q8h x3 doses  * for 8/22     - Prednisone 7.5mg QD starting on POD3 tentative on 8/23  if able to tolerate PO, solumedrol 6mg IV QD if unable to tolerate PO  c/w pain medications Tylenol, PCA  -advance to clear liquids /DC fluids  -c/w lovenox DVT ppx

## 2024-08-21 NOTE — CONSULT NOTE ADULT - SUBJECTIVE AND OBJECTIVE BOX
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 He self admittedly was under utilizing his Dilaudid PCA this AM and was a little SOB. Overnight team placed him on 6L NC, now weaned to 2L, SAO2 100%, s/p nebulizer treatment. Pulmonary was consulted to evaluate as he had admissions for asthma exacerbation in the past.      Subjective   Patient examined at bedside. Currently on 3L NC sating 97%. Patients reports that last night while in recovery he developed increasing SOB and chest tightness. States he is having difficulty taking a deep breath partly due to feeling weak after surgery. States his current SOB feels somewhat similar to previous asthma exacerbation. He received a nebulizer tx this morning which improved his sx somewhat. He is unable to breath through his nose and is having some throat dryness, and feels warm. Denies chest pain, cough and any other pain or sx at this time.     Pulmonary Hx  Patient reports hospitalization in July 2023, for norovirus/enterovirus at [facility], where had a acute asthma exacerbation during his hospitalization, requiring treatment with IV methylprednisolone. During that hospitalization he was also on albuterol/ipratropium, benzonatate, budenoside, and hydrocodone/homatropine syrup    Previously had a PFT with Dr. Gonzales years ago. He was supposed to get PFT with Dr. Rivas, but his gastric cancer took priority.     Pulmonologist  Dr. Israel Rivas for past year, previously Dr. Janette Gonzales left [facility]?    Asthma Medications  Trelegy Ellipta 100-62.5-25 qd   Dupixent 300 mg every 2 weeks, last dose 8/15/24  Albuterol inhaler prn    PAST MEDICAL & SURGICAL HISTORY:  Lupus      Asthma      Hypothyroid      Lumbosacral stenosis      Mild obstructive sleep apnea      H/O compression fracture of spine      H. pylori infection      Osteoporosis      Prediabetes      Obesity      Myositis      History of cholecystectomy      H/O endoscopy      S/P vasectomy      H/O colonoscopy          FAMILY HISTORY:  FH: rheumatoid arthritis (Mother)  Mother smokes 2 packs of cigarettes, no pulmonary dx in either parents        SOCIAL HISTORY:  Smoking: _0_ packs x __0_ years  EtOH Use: none  Marital Status:  Occupation: [employer]   Exposures: no pets at home  Recent Travel:    Allergies    No Known Allergies    Intolerances        HOME MEDICATIONS:    ROS  [x ] All other systems negative  [ ] Unable to assess ROS because ________    OBJECTIVE:  ICU Vital Signs Last 24 Hrs  T(C): 36.5 (21 Aug 2024 05:20), Max: 36.6 (20 Aug 2024 20:00)  T(F): 97.7 (21 Aug 2024 05:20), Max: 97.9 (20 Aug 2024 20:00)  HR: 81 (21 Aug 2024 05:20) (76 - 98)  BP: 127/85 (21 Aug 2024 05:20) (127/85 - 146/82)  BP(mean): 99 (20 Aug 2024 20:00) (95 - 112)  ABP: --  ABP(mean): --  RR: 18 (21 Aug 2024 05:20) (12 - 29)  SpO2: 100% (21 Aug 2024 05:20) (92% - 100%)    O2 Parameters below as of 21 Aug 2024 05:20  Patient On (Oxygen Delivery Method): nasal cannula  O2 Flow (L/min): 2            08-20 @ 07:01  -  08-21 @ 07:00  --------------------------------------------------------  IN: 1260 mL / OUT: 1390 mL / NET: -130 mL      CAPILLARY BLOOD GLUCOSE      POCT Blood Glucose.: 125 mg/dL (21 Aug 2024 06:05)      PHYSICAL EXAM:  General: Awake, alert, oriented X 3.   HEENT: Atraumatic, normocephalic.   Neck: Suppple  Respiratory: shallow breathing, non-labored respirations, lungs are clear to ausculation bilaterally, no wheeze, rhonchi or rales. Speaking in full sentences  Cardiovascular: S1 S2 normal. No murmurs, rubs or gallops.   Abdomen: Soft, non-tender, non-distended. No organomegaly.  Extremities: Warm to touch. No pedal edema.   Neurological: Non-focal      HOSPITAL MEDICATIONS:  MEDICATIONS  (STANDING):  acetaminophen   IVPB .. 1000 milliGRAM(s) IV Intermittent every 6 hours  budesonide  80 MICROgram(s)/formoterol 4.5 MICROgram(s) Inhaler 2 Puff(s) Inhalation two times a day  dextrose 5%. 1000 milliLiter(s) (50 mL/Hr) IV Continuous <Continuous>  dextrose 5%. 1000 milliLiter(s) (100 mL/Hr) IV Continuous <Continuous>  dextrose 50% Injectable 12.5 Gram(s) IV Push once  dextrose 50% Injectable 25 Gram(s) IV Push once  dextrose 50% Injectable 25 Gram(s) IV Push once  enoxaparin Injectable 40 milliGRAM(s) SubCutaneous every 24 hours  glucagon  Injectable 1 milliGRAM(s) IntraMuscular once  hydrocortisone sodium succinate Injectable 50 milliGRAM(s) IV Push every 8 hours  HYDROmorphone PCA (1 mG/mL) 30 milliLiter(s) PCA Continuous PCA Continuous  insulin lispro (ADMELOG) corrective regimen sliding scale   SubCutaneous every 6 hours  lactated ringers. 1000 milliLiter(s) (84 mL/Hr) IV Continuous <Continuous>  levothyroxine Injectable 100 MICROGram(s) IV Push at bedtime  pantoprazole  Injectable 40 milliGRAM(s) IV Push every 24 hours  tiotropium 2.5 MICROgram(s) Inhaler 2 Puff(s) Inhalation daily    MEDICATIONS  (PRN):  albuterol/ipratropium for Nebulization 3 milliLiter(s) Nebulizer every 6 hours PRN Shortness of Breath  dextrose Oral Gel 15 Gram(s) Oral once PRN Blood Glucose LESS THAN 70 milliGRAM(s)/deciliter  HYDROmorphone PCA (1 mG/mL) Rescue Clinician Bolus 0.5 milliGRAM(s) IV Push every 15 minutes PRN for Pain Scale GREATER THAN 6  naloxone Injectable 0.1 milliGRAM(s) IV Push every 3 minutes PRN For ANY of the following changes in patient status:  A. RR LESS THAN 10 breaths per minute, B. Oxygen saturation LESS THAN 90%, C. Sedation score of 6      LABS:                        10.6   11.76 )-----------( 257      ( 21 Aug 2024 06:22 )             33.1     08-21    134<L>  |  99  |  10  ----------------------------<  116<H>  4.3   |  22  |  0.79    Ca    8.4      21 Aug 2024 06:22  Phos  3.5     08-21  Mg     1.50     08-21        Urinalysis Basic - ( 21 Aug 2024 06:22 )    Color: x / Appearance: x / SG: x / pH: x  Gluc: 116 mg/dL / Ketone: x  / Bili: x / Urobili: x   Blood: x / Protein: x / Nitrite: x   Leuk Esterase: x / RBC: x / WBC x   Sq Epi: x / Non Sq Epi: x / Bacteria: x        MICROBIOLOGY:     RADIOLOGY:  Chest XR    ACC: [id number] EXAM:  XR CHEST PORTABLE URGENT 1V   ORDERED BY: ALANA DEVINE     PROCEDURE DATE:  08/21/2024          INTERPRETATION:  EXAMINATION: XR CHEST URGENT    CLINICAL INDICATION: tammy sob obesity, pod1 surgery    TECHNIQUE: Single frontal, portable view of the chest was obtained.    COMPARISON: Chest CT 7/6/2024 .    FINDINGS:    The heart is normal in size.  The lungs are clear.  There is no pneumothorax or pleural effusion.    IMPRESSION:  Clear lungs.    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 He self admittedly was under utilizing his Dilaudid PCA this AM and was a little SOB. Overnight team placed him on 6L NC, now weaned to 2L, SAO2 100%, s/p nebulizer treatment. Pulmonary was consulted to evaluate as he had admissions for asthma exacerbation in the past.      Subjective   Patient examined at bedside. Currently on 3L NC sating 97%. Patients reports that last night while in recovery he developed increasing SOB and chest tightness. States he is having difficulty taking a deep breath partly due to feeling weak after surgery. States his current SOB feels somewhat similar to previous asthma exacerbation. He received a nebulizer tx this morning which improved his sx somewhat. He is unable to breath through his nose and is having some throat dryness, and feels warm. Denies chest pain, cough and any other pain or sx at this time.     Pulmonary Hx  Patient reports hospitalization in July 2023, for norovirus/enterovirus at [facility], where had a acute asthma exacerbation during his hospitalization, requiring treatment with IV methylprednisolone. During that hospitalization he was also on albuterol/ipratropium, benzonatate, budenoside, and hydrocodone/homatropine syrup    Previously had a PFT with Dr. Gonzales years ago. He was supposed to get PFT with Dr. Rivas, but his gastric cancer took priority.     Pulmonologist  Dr. Israel Rivas for past year, previously Dr. Janette Gonzales who supposedly left [facility] per the patient.    Asthma Medications  Trelegy Ellipta 100-62.5-25 qd   Dupixent 300 mg every 2 weeks, last dose 8/15/24  Albuterol inhaler prn    PAST MEDICAL & SURGICAL HISTORY:  Lupus      Asthma      Hypothyroid      Lumbosacral stenosis      Mild obstructive sleep apnea      H/O compression fracture of spine      H. pylori infection      Osteoporosis      Prediabetes      Obesity      Myositis      History of cholecystectomy      H/O endoscopy      S/P vasectomy      H/O colonoscopy          FAMILY HISTORY:  FH: rheumatoid arthritis (Mother)  Mother smokes 2 packs of cigarettes, no pulmonary dx in either parents        SOCIAL HISTORY:  Smoking: _0_ packs x __0_ years  EtOH Use: none  Marital Status:  Occupation: [employer]   Exposures: no pets at home  Recent Travel:    Allergies    No Known Allergies    Intolerances        HOME MEDICATIONS:    ROS  [x ] All other systems negative  [ ] Unable to assess ROS because ________    OBJECTIVE:  ICU Vital Signs Last 24 Hrs  T(C): 36.5 (21 Aug 2024 05:20), Max: 36.6 (20 Aug 2024 20:00)  T(F): 97.7 (21 Aug 2024 05:20), Max: 97.9 (20 Aug 2024 20:00)  HR: 81 (21 Aug 2024 05:20) (76 - 98)  BP: 127/85 (21 Aug 2024 05:20) (127/85 - 146/82)  BP(mean): 99 (20 Aug 2024 20:00) (95 - 112)  ABP: --  ABP(mean): --  RR: 18 (21 Aug 2024 05:20) (12 - 29)  SpO2: 100% (21 Aug 2024 05:20) (92% - 100%)    O2 Parameters below as of 21 Aug 2024 05:20  Patient On (Oxygen Delivery Method): nasal cannula  O2 Flow (L/min): 2            08-20 @ 07:01  -  08-21 @ 07:00  --------------------------------------------------------  IN: 1260 mL / OUT: 1390 mL / NET: -130 mL      CAPILLARY BLOOD GLUCOSE      POCT Blood Glucose.: 125 mg/dL (21 Aug 2024 06:05)      PHYSICAL EXAM:  General: Awake, alert, oriented X 3.   HEENT: Atraumatic, normocephalic.   Neck: Suppple  Respiratory: shallow breathing, non-labored respirations, lungs are clear to ausculation bilaterally, no wheeze, rhonchi or rales. Speaking in full sentences  Cardiovascular: S1 S2 normal. No murmurs, rubs or gallops.   Abdomen: Soft, non-tender, non-distended. No organomegaly.  Extremities: Warm to touch. No pedal edema.   Neurological: Non-focal      HOSPITAL MEDICATIONS:  MEDICATIONS  (STANDING):  acetaminophen   IVPB .. 1000 milliGRAM(s) IV Intermittent every 6 hours  budesonide  80 MICROgram(s)/formoterol 4.5 MICROgram(s) Inhaler 2 Puff(s) Inhalation two times a day  dextrose 5%. 1000 milliLiter(s) (50 mL/Hr) IV Continuous <Continuous>  dextrose 5%. 1000 milliLiter(s) (100 mL/Hr) IV Continuous <Continuous>  dextrose 50% Injectable 12.5 Gram(s) IV Push once  dextrose 50% Injectable 25 Gram(s) IV Push once  dextrose 50% Injectable 25 Gram(s) IV Push once  enoxaparin Injectable 40 milliGRAM(s) SubCutaneous every 24 hours  glucagon  Injectable 1 milliGRAM(s) IntraMuscular once  hydrocortisone sodium succinate Injectable 50 milliGRAM(s) IV Push every 8 hours  HYDROmorphone PCA (1 mG/mL) 30 milliLiter(s) PCA Continuous PCA Continuous  insulin lispro (ADMELOG) corrective regimen sliding scale   SubCutaneous every 6 hours  lactated ringers. 1000 milliLiter(s) (84 mL/Hr) IV Continuous <Continuous>  levothyroxine Injectable 100 MICROGram(s) IV Push at bedtime  pantoprazole  Injectable 40 milliGRAM(s) IV Push every 24 hours  tiotropium 2.5 MICROgram(s) Inhaler 2 Puff(s) Inhalation daily    MEDICATIONS  (PRN):  albuterol/ipratropium for Nebulization 3 milliLiter(s) Nebulizer every 6 hours PRN Shortness of Breath  dextrose Oral Gel 15 Gram(s) Oral once PRN Blood Glucose LESS THAN 70 milliGRAM(s)/deciliter  HYDROmorphone PCA (1 mG/mL) Rescue Clinician Bolus 0.5 milliGRAM(s) IV Push every 15 minutes PRN for Pain Scale GREATER THAN 6  naloxone Injectable 0.1 milliGRAM(s) IV Push every 3 minutes PRN For ANY of the following changes in patient status:  A. RR LESS THAN 10 breaths per minute, B. Oxygen saturation LESS THAN 90%, C. Sedation score of 6      LABS:                        10.6   11.76 )-----------( 257      ( 21 Aug 2024 06:22 )             33.1     08-21    134<L>  |  99  |  10  ----------------------------<  116<H>  4.3   |  22  |  0.79    Ca    8.4      21 Aug 2024 06:22  Phos  3.5     08-21  Mg     1.50     08-21        Urinalysis Basic - ( 21 Aug 2024 06:22 )    Color: x / Appearance: x / SG: x / pH: x  Gluc: 116 mg/dL / Ketone: x  / Bili: x / Urobili: x   Blood: x / Protein: x / Nitrite: x   Leuk Esterase: x / RBC: x / WBC x   Sq Epi: x / Non Sq Epi: x / Bacteria: x        MICROBIOLOGY:     RADIOLOGY:  Chest XR    ACC: [id number] EXAM:  XR CHEST PORTABLE URGENT 1V   ORDERED BY: ALANA DEVINE     PROCEDURE DATE:  08/21/2024          INTERPRETATION:  EXAMINATION: XR CHEST URGENT    CLINICAL INDICATION: tammy sob obesity, pod1 surgery    TECHNIQUE: Single frontal, portable view of the chest was obtained.    COMPARISON: Chest CT 7/6/2024 .    FINDINGS:    The heart is normal in size.  The lungs are clear.  There is no pneumothorax or pleural effusion.    IMPRESSION:  Clear lungs.    Point of Care Ultrasound Findings;    PFT:    EKG:

## 2024-08-21 NOTE — PROGRESS NOTE ADULT - ASSESSMENT
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 He self admittedly was underutilized his PCA this AM and was a little SOB and overnight team placed him on 6L NC, now weaned to 3L SAO2 100%, s/p nebulizer treatment.  Pulmonary was consulted to evaluate as he had admissions for asthma exacerbation in the past.     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 He self admittedly was underutilized his diluadid PCA this AM and was a little SOB and overnight team placed him on 6L NC, now weaned to 3L SAO2 100%, s/p nebulizer treatment.  Pulmonary was consulted to evaluate as he had admissions for asthma exacerbation in the past.     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 He self admittedly was underutilized his diluadid PCA this AM and was a little SOB and overnight team placed him on 6L NC, now weaned to 3L SAO2 100%, s/p nebulizer treatment.  Pulmonary was consulted to evaluate as he had admissions for asthma exacerbation in the past.     #acute asthma exacerbation  currently on 3L NC sating high 90s, normally not on home O2, s/p nebulizer tx   - continue to wean off NC, SPO2 >90%  - currently on hydrocortisone taper per rheum recs  - order COVID/RSV/Flu panel  - Follow up chest XR results  - start symbicort duoneb  - continue nebulizer tx  - encourage incentive spirometry          Discussed with Pulmonology Fellow and Dr. Bae

## 2024-08-21 NOTE — PROGRESS NOTE ADULT - SUBJECTIVE AND OBJECTIVE BOX
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 He self admittedly was underutilized his PCA this AM and was a little SOB and overnight team placed him on 6L NC, now weaned to 2L SAO2 100%, s/p nebulizer treatment.  Pulmonary was consulted to evaluate as he had admissions for asthma exacerbation in the past.      Subjective   Patient examined at bedside. Currently on 3L NC sating 97%. Patients reports that last night while in recovery he developed increasing SOB and chest tightness. States he is having difficulty taking a deep breath partly due to feeling weak s/p surgery. States his current SOB feels somewhat similar to previous asthma exacerbation. He received a nebulizer tx this morning which improved his sx somewhat. He is unable to breath through his nose and is having some throat dryness, and feels warm. Denies chest pain, cough and any other pain or sx at this time.     Pulmonary Hx  Patient reports hospitalized July 2023, for norovirus/enterovirus at [facility] and had a acute asthma exacerbation during his hospitalization, requiring treatment with IV methylprednisolone. During that hospitalization he was also on albuterol/ipratropium, benzonatate, budenoside, and hydrocodone/homatropine syrup    Previously had a PFT with Dr. Gonzales. He was supposed to get PFT with Dr. Rivas, but his gastric cancer took priority.     Pulmonologist  Dr. Israel Rivas for past year, previously Dr. Janette Gonzales left [facility]?    Medications  Trelegy Ellipta 100-62.5-25 qd   Dupixent 300 mg every 2 weeks, last dose 8/15/24  Albuterol inhaler prn  Prednisone 7.5 mg qd for Lupus    PAST MEDICAL & SURGICAL HISTORY:  Lupus      Asthma      Hypothyroid      Lumbosacral stenosis      Mild obstructive sleep apnea      H/O compression fracture of spine      H. pylori infection      Osteoporosis      Prediabetes      Obesity      Myositis      History of cholecystectomy      H/O endoscopy      S/P vasectomy      H/O colonoscopy          FAMILY HISTORY:  FH: rheumatoid arthritis (Mother)  Mother smokes 2 packs of cigarettes, no pulmonary dx in either parents        SOCIAL HISTORY:  Smoking: _0_ packs x __0_ years  EtOH Use: none  Marital Status:  Occupation: [employer]   Exposures: no pets at home  Recent Travel:    Allergies    No Known Allergies    Intolerances        HOME MEDICATIONS:    ROS  [x ] All other systems negative  [ ] Unable to assess ROS because ________    OBJECTIVE:  ICU Vital Signs Last 24 Hrs  T(C): 36.5 (21 Aug 2024 05:20), Max: 36.6 (20 Aug 2024 20:00)  T(F): 97.7 (21 Aug 2024 05:20), Max: 97.9 (20 Aug 2024 20:00)  HR: 81 (21 Aug 2024 05:20) (76 - 98)  BP: 127/85 (21 Aug 2024 05:20) (127/85 - 146/82)  BP(mean): 99 (20 Aug 2024 20:00) (95 - 112)  ABP: --  ABP(mean): --  RR: 18 (21 Aug 2024 05:20) (12 - 29)  SpO2: 100% (21 Aug 2024 05:20) (92% - 100%)    O2 Parameters below as of 21 Aug 2024 05:20  Patient On (Oxygen Delivery Method): nasal cannula  O2 Flow (L/min): 2            08-20 @ 07:01  -  08-21 @ 07:00  --------------------------------------------------------  IN: 1260 mL / OUT: 1390 mL / NET: -130 mL      CAPILLARY BLOOD GLUCOSE      POCT Blood Glucose.: 125 mg/dL (21 Aug 2024 06:05)      PHYSICAL EXAM:  General: Awake, alert, oriented X 3.   HEENT: Atraumatic, normocephalic.   Neck: No JVD.  Respiratory: Normal chest expansion                         Normal and equal air entry                         No wheeze, rhonchi or rales.  Cardiovascular: S1 S2 normal. No murmurs, rubs or gallops.   Abdomen: Soft, non-tender, non-distended. No organomegaly.  Extremities: Warm to touch. No pedal edema.   Neurological: Non-focal  Psychiatry: Appropriate mood and affect.    HOSPITAL MEDICATIONS:  MEDICATIONS  (STANDING):  acetaminophen   IVPB .. 1000 milliGRAM(s) IV Intermittent every 6 hours  budesonide  80 MICROgram(s)/formoterol 4.5 MICROgram(s) Inhaler 2 Puff(s) Inhalation two times a day  dextrose 5%. 1000 milliLiter(s) (50 mL/Hr) IV Continuous <Continuous>  dextrose 5%. 1000 milliLiter(s) (100 mL/Hr) IV Continuous <Continuous>  dextrose 50% Injectable 12.5 Gram(s) IV Push once  dextrose 50% Injectable 25 Gram(s) IV Push once  dextrose 50% Injectable 25 Gram(s) IV Push once  enoxaparin Injectable 40 milliGRAM(s) SubCutaneous every 24 hours  glucagon  Injectable 1 milliGRAM(s) IntraMuscular once  hydrocortisone sodium succinate Injectable 50 milliGRAM(s) IV Push every 8 hours  HYDROmorphone PCA (1 mG/mL) 30 milliLiter(s) PCA Continuous PCA Continuous  insulin lispro (ADMELOG) corrective regimen sliding scale   SubCutaneous every 6 hours  lactated ringers. 1000 milliLiter(s) (84 mL/Hr) IV Continuous <Continuous>  levothyroxine Injectable 100 MICROGram(s) IV Push at bedtime  pantoprazole  Injectable 40 milliGRAM(s) IV Push every 24 hours  tiotropium 2.5 MICROgram(s) Inhaler 2 Puff(s) Inhalation daily    MEDICATIONS  (PRN):  albuterol/ipratropium for Nebulization 3 milliLiter(s) Nebulizer every 6 hours PRN Shortness of Breath  dextrose Oral Gel 15 Gram(s) Oral once PRN Blood Glucose LESS THAN 70 milliGRAM(s)/deciliter  HYDROmorphone PCA (1 mG/mL) Rescue Clinician Bolus 0.5 milliGRAM(s) IV Push every 15 minutes PRN for Pain Scale GREATER THAN 6  naloxone Injectable 0.1 milliGRAM(s) IV Push every 3 minutes PRN For ANY of the following changes in patient status:  A. RR LESS THAN 10 breaths per minute, B. Oxygen saturation LESS THAN 90%, C. Sedation score of 6      LABS:                        10.6   11.76 )-----------( 257      ( 21 Aug 2024 06:22 )             33.1     08-21    134<L>  |  99  |  10  ----------------------------<  116<H>  4.3   |  22  |  0.79    Ca    8.4      21 Aug 2024 06:22  Phos  3.5     08-21  Mg     1.50     08-21        Urinalysis Basic - ( 21 Aug 2024 06:22 )    Color: x / Appearance: x / SG: x / pH: x  Gluc: 116 mg/dL / Ketone: x  / Bili: x / Urobili: x   Blood: x / Protein: x / Nitrite: x   Leuk Esterase: x / RBC: x / WBC x   Sq Epi: x / Non Sq Epi: x / Bacteria: x            MICROBIOLOGY:     RADIOLOGY:  [ ] Reviewed and interpreted by me    Point of Care Ultrasound Findings;    PFT:    EKG: Junior Treviño, EM/IM PGY-2  [id number]    HPI:    Pt is a 49 year old male with a history of lupus c/b 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 He self admittedly was underutilized his PCA this AM and was a little SOB and overnight team placed him on 6L NC, now weaned to 2L SAO2 100%, s/p nebulizer treatment.  Pulmonary was consulted to evaluate as he had admissions for asthma exacerbation in the past.      Subjective   Patient examined at bedside. Currently on 3L NC sating 97%. Patients reports that last night while in recovery he developed increasing SOB and chest tightness. States he is having difficulty taking a deep breath partly due to feeling weak s/p surgery. States his current SOB feels somewhat similar to previous asthma exacerbation. He received a nebulizer tx this morning which improved his sx somewhat. He is unable to breath through his nose and is having some throat dryness, and feels warm. Denies chest pain, cough and any other pain or sx at this time.     Pulmonary Hx  Patient reports hospitalized July 2023, for norovirus/enterovirus at [facility] and had a acute asthma exacerbation during his hospitalization, requiring treatment with IV methylprednisolone. During that hospitalization he was also on albuterol/ipratropium, benzonatate, budenoside, and hydrocodone/homatropine syrup    Previously had a PFT with Dr. Gonzales years ago. He was supposed to get PFT with Dr. Rivas, but his gastric cancer took priority.     Pulmonologist  Dr. Israel Rivas for past year, previously Dr. Janette Gonzales left [facility]?    Medications  Trelegy Ellipta 100-62.5-25 qd   Dupixent 300 mg every 2 weeks, last dose 8/15/24  Albuterol inhaler prn  Prednisone 7.5 mg qd for Lupus    PAST MEDICAL & SURGICAL HISTORY:  Lupus      Asthma      Hypothyroid      Lumbosacral stenosis      Mild obstructive sleep apnea      H/O compression fracture of spine      H. pylori infection      Osteoporosis      Prediabetes      Obesity      Myositis      History of cholecystectomy      H/O endoscopy      S/P vasectomy      H/O colonoscopy          FAMILY HISTORY:  FH: rheumatoid arthritis (Mother)  Mother smokes 2 packs of cigarettes, no pulmonary dx in either parents        SOCIAL HISTORY:  Smoking: _0_ packs x __0_ years  EtOH Use: none  Marital Status:  Occupation: [employer]   Exposures: no pets at home  Recent Travel:    Allergies    No Known Allergies    Intolerances        HOME MEDICATIONS:    ROS  [x ] All other systems negative  [ ] Unable to assess ROS because ________    OBJECTIVE:  ICU Vital Signs Last 24 Hrs  T(C): 36.5 (21 Aug 2024 05:20), Max: 36.6 (20 Aug 2024 20:00)  T(F): 97.7 (21 Aug 2024 05:20), Max: 97.9 (20 Aug 2024 20:00)  HR: 81 (21 Aug 2024 05:20) (76 - 98)  BP: 127/85 (21 Aug 2024 05:20) (127/85 - 146/82)  BP(mean): 99 (20 Aug 2024 20:00) (95 - 112)  ABP: --  ABP(mean): --  RR: 18 (21 Aug 2024 05:20) (12 - 29)  SpO2: 100% (21 Aug 2024 05:20) (92% - 100%)    O2 Parameters below as of 21 Aug 2024 05:20  Patient On (Oxygen Delivery Method): nasal cannula  O2 Flow (L/min): 2            08-20 @ 07:01  -  08-21 @ 07:00  --------------------------------------------------------  IN: 1260 mL / OUT: 1390 mL / NET: -130 mL      CAPILLARY BLOOD GLUCOSE      POCT Blood Glucose.: 125 mg/dL (21 Aug 2024 06:05)      PHYSICAL EXAM:  General: Awake, alert, oriented X 3.   HEENT: Atraumatic, normocephalic.   Neck: No JVD.  Respiratory: Normal chest expansion                         Normal and equal air entry                         No wheeze, rhonchi or rales.  Cardiovascular: S1 S2 normal. No murmurs, rubs or gallops.   Abdomen: Soft, non-tender, non-distended. No organomegaly.  Extremities: Warm to touch. No pedal edema.   Neurological: Non-focal  Psychiatry: Appropriate mood and affect.    HOSPITAL MEDICATIONS:  MEDICATIONS  (STANDING):  acetaminophen   IVPB .. 1000 milliGRAM(s) IV Intermittent every 6 hours  budesonide  80 MICROgram(s)/formoterol 4.5 MICROgram(s) Inhaler 2 Puff(s) Inhalation two times a day  dextrose 5%. 1000 milliLiter(s) (50 mL/Hr) IV Continuous <Continuous>  dextrose 5%. 1000 milliLiter(s) (100 mL/Hr) IV Continuous <Continuous>  dextrose 50% Injectable 12.5 Gram(s) IV Push once  dextrose 50% Injectable 25 Gram(s) IV Push once  dextrose 50% Injectable 25 Gram(s) IV Push once  enoxaparin Injectable 40 milliGRAM(s) SubCutaneous every 24 hours  glucagon  Injectable 1 milliGRAM(s) IntraMuscular once  hydrocortisone sodium succinate Injectable 50 milliGRAM(s) IV Push every 8 hours  HYDROmorphone PCA (1 mG/mL) 30 milliLiter(s) PCA Continuous PCA Continuous  insulin lispro (ADMELOG) corrective regimen sliding scale   SubCutaneous every 6 hours  lactated ringers. 1000 milliLiter(s) (84 mL/Hr) IV Continuous <Continuous>  levothyroxine Injectable 100 MICROGram(s) IV Push at bedtime  pantoprazole  Injectable 40 milliGRAM(s) IV Push every 24 hours  tiotropium 2.5 MICROgram(s) Inhaler 2 Puff(s) Inhalation daily    MEDICATIONS  (PRN):  albuterol/ipratropium for Nebulization 3 milliLiter(s) Nebulizer every 6 hours PRN Shortness of Breath  dextrose Oral Gel 15 Gram(s) Oral once PRN Blood Glucose LESS THAN 70 milliGRAM(s)/deciliter  HYDROmorphone PCA (1 mG/mL) Rescue Clinician Bolus 0.5 milliGRAM(s) IV Push every 15 minutes PRN for Pain Scale GREATER THAN 6  naloxone Injectable 0.1 milliGRAM(s) IV Push every 3 minutes PRN For ANY of the following changes in patient status:  A. RR LESS THAN 10 breaths per minute, B. Oxygen saturation LESS THAN 90%, C. Sedation score of 6      LABS:                        10.6   11.76 )-----------( 257      ( 21 Aug 2024 06:22 )             33.1     08-21    134<L>  |  99  |  10  ----------------------------<  116<H>  4.3   |  22  |  0.79    Ca    8.4      21 Aug 2024 06:22  Phos  3.5     08-21  Mg     1.50     08-21        Urinalysis Basic - ( 21 Aug 2024 06:22 )    Color: x / Appearance: x / SG: x / pH: x  Gluc: 116 mg/dL / Ketone: x  / Bili: x / Urobili: x   Blood: x / Protein: x / Nitrite: x   Leuk Esterase: x / RBC: x / WBC x   Sq Epi: x / Non Sq Epi: x / Bacteria: x            MICROBIOLOGY:     RADIOLOGY:  [ ] Reviewed and interpreted by me    Point of Care Ultrasound Findings;    PFT:    EKG: Junior Treviño, EM/IM PGY-2  [id number]    HPI:    Pt is a 49 year old male with a history of lupus c/b 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 He self admittedly was underutilized his dilaudid PCA this AM and was a little SOB and overnight team placed him on 6L NC, now weaned to 2L SAO2 100%, s/p nebulizer treatment.  Pulmonary was consulted to evaluate as he had admissions for asthma exacerbation in the past.      Subjective   Patient examined at bedside. Currently on 3L NC sating 97%. Patients reports that last night while in recovery he developed increasing SOB and chest tightness. States he is having difficulty taking a deep breath partly due to feeling weak s/p surgery. States his current SOB feels somewhat similar to previous asthma exacerbation. He received a nebulizer tx this morning which improved his sx somewhat. He is unable to breath through his nose and is having some throat dryness, and feels warm. Denies chest pain, cough and any other pain or sx at this time.     Pulmonary Hx  Patient reports hospitalized July 2023, for norovirus/enterovirus at [facility] and had a acute asthma exacerbation during his hospitalization, requiring treatment with IV methylprednisolone. During that hospitalization he was also on albuterol/ipratropium, benzonatate, budenoside, and hydrocodone/homatropine syrup    Previously had a PFT with Dr. Gonzales years ago. He was supposed to get PFT with Dr. Rivas, but his gastric cancer took priority.     Pulmonologist  Dr. Israel Rivas for past year, previously Dr. Janette Gonzales left [facility]?    Medications  Trelegy Ellipta 100-62.5-25 qd   Dupixent 300 mg every 2 weeks, last dose 8/15/24  Albuterol inhaler prn  Prednisone 7.5 mg qd for Lupus    PAST MEDICAL & SURGICAL HISTORY:  Lupus      Asthma      Hypothyroid      Lumbosacral stenosis      Mild obstructive sleep apnea      H/O compression fracture of spine      H. pylori infection      Osteoporosis      Prediabetes      Obesity      Myositis      History of cholecystectomy      H/O endoscopy      S/P vasectomy      H/O colonoscopy          FAMILY HISTORY:  FH: rheumatoid arthritis (Mother)  Mother smokes 2 packs of cigarettes, no pulmonary dx in either parents        SOCIAL HISTORY:  Smoking: _0_ packs x __0_ years  EtOH Use: none  Marital Status:  Occupation: [employer]   Exposures: no pets at home  Recent Travel:    Allergies    No Known Allergies    Intolerances        HOME MEDICATIONS:    ROS  [x ] All other systems negative  [ ] Unable to assess ROS because ________    OBJECTIVE:  ICU Vital Signs Last 24 Hrs  T(C): 36.5 (21 Aug 2024 05:20), Max: 36.6 (20 Aug 2024 20:00)  T(F): 97.7 (21 Aug 2024 05:20), Max: 97.9 (20 Aug 2024 20:00)  HR: 81 (21 Aug 2024 05:20) (76 - 98)  BP: 127/85 (21 Aug 2024 05:20) (127/85 - 146/82)  BP(mean): 99 (20 Aug 2024 20:00) (95 - 112)  ABP: --  ABP(mean): --  RR: 18 (21 Aug 2024 05:20) (12 - 29)  SpO2: 100% (21 Aug 2024 05:20) (92% - 100%)    O2 Parameters below as of 21 Aug 2024 05:20  Patient On (Oxygen Delivery Method): nasal cannula  O2 Flow (L/min): 2            08-20 @ 07:01  -  08-21 @ 07:00  --------------------------------------------------------  IN: 1260 mL / OUT: 1390 mL / NET: -130 mL      CAPILLARY BLOOD GLUCOSE      POCT Blood Glucose.: 125 mg/dL (21 Aug 2024 06:05)      PHYSICAL EXAM:  General: Awake, alert, oriented X 3.   HEENT: Atraumatic, normocephalic.   Neck: No JVD.  Respiratory: Normal chest expansion                         Normal and equal air entry                         No wheeze, rhonchi or rales.  Cardiovascular: S1 S2 normal. No murmurs, rubs or gallops.   Abdomen: Soft, non-tender, non-distended. No organomegaly.  Extremities: Warm to touch. No pedal edema.   Neurological: Non-focal  Psychiatry: Appropriate mood and affect.    HOSPITAL MEDICATIONS:  MEDICATIONS  (STANDING):  acetaminophen   IVPB .. 1000 milliGRAM(s) IV Intermittent every 6 hours  budesonide  80 MICROgram(s)/formoterol 4.5 MICROgram(s) Inhaler 2 Puff(s) Inhalation two times a day  dextrose 5%. 1000 milliLiter(s) (50 mL/Hr) IV Continuous <Continuous>  dextrose 5%. 1000 milliLiter(s) (100 mL/Hr) IV Continuous <Continuous>  dextrose 50% Injectable 12.5 Gram(s) IV Push once  dextrose 50% Injectable 25 Gram(s) IV Push once  dextrose 50% Injectable 25 Gram(s) IV Push once  enoxaparin Injectable 40 milliGRAM(s) SubCutaneous every 24 hours  glucagon  Injectable 1 milliGRAM(s) IntraMuscular once  hydrocortisone sodium succinate Injectable 50 milliGRAM(s) IV Push every 8 hours  HYDROmorphone PCA (1 mG/mL) 30 milliLiter(s) PCA Continuous PCA Continuous  insulin lispro (ADMELOG) corrective regimen sliding scale   SubCutaneous every 6 hours  lactated ringers. 1000 milliLiter(s) (84 mL/Hr) IV Continuous <Continuous>  levothyroxine Injectable 100 MICROGram(s) IV Push at bedtime  pantoprazole  Injectable 40 milliGRAM(s) IV Push every 24 hours  tiotropium 2.5 MICROgram(s) Inhaler 2 Puff(s) Inhalation daily    MEDICATIONS  (PRN):  albuterol/ipratropium for Nebulization 3 milliLiter(s) Nebulizer every 6 hours PRN Shortness of Breath  dextrose Oral Gel 15 Gram(s) Oral once PRN Blood Glucose LESS THAN 70 milliGRAM(s)/deciliter  HYDROmorphone PCA (1 mG/mL) Rescue Clinician Bolus 0.5 milliGRAM(s) IV Push every 15 minutes PRN for Pain Scale GREATER THAN 6  naloxone Injectable 0.1 milliGRAM(s) IV Push every 3 minutes PRN For ANY of the following changes in patient status:  A. RR LESS THAN 10 breaths per minute, B. Oxygen saturation LESS THAN 90%, C. Sedation score of 6      LABS:                        10.6   11.76 )-----------( 257      ( 21 Aug 2024 06:22 )             33.1     08-21    134<L>  |  99  |  10  ----------------------------<  116<H>  4.3   |  22  |  0.79    Ca    8.4      21 Aug 2024 06:22  Phos  3.5     08-21  Mg     1.50     08-21        Urinalysis Basic - ( 21 Aug 2024 06:22 )    Color: x / Appearance: x / SG: x / pH: x  Gluc: 116 mg/dL / Ketone: x  / Bili: x / Urobili: x   Blood: x / Protein: x / Nitrite: x   Leuk Esterase: x / RBC: x / WBC x   Sq Epi: x / Non Sq Epi: x / Bacteria: x            MICROBIOLOGY:     RADIOLOGY:  [ ] Reviewed and interpreted by me    Point of Care Ultrasound Findings;    PFT:    EKG:

## 2024-08-21 NOTE — PROGRESS NOTE ADULT - SUBJECTIVE AND OBJECTIVE BOX
Anesthesia Pain Management Service- Attending Addendum    SUBJECTIVE: Patient's pain control adequate    Therapy:	  [ X] IV PCA	   [ ] Epidural           [ ] s/p Spinal Opoid              [ ] Postpartum infusion	  [ ] Patient controlled regional anesthesia (PCRA)    [ ] prn Analgesics    Allergies    No Known Allergies    Intolerances      MEDICATIONS  (STANDING):  acetaminophen   IVPB .. 1000 milliGRAM(s) IV Intermittent every 6 hours  budesonide  80 MICROgram(s)/formoterol 4.5 MICROgram(s) Inhaler 2 Puff(s) Inhalation two times a day  dextrose 5%. 1000 milliLiter(s) (100 mL/Hr) IV Continuous <Continuous>  dextrose 5%. 1000 milliLiter(s) (50 mL/Hr) IV Continuous <Continuous>  dextrose 50% Injectable 25 Gram(s) IV Push once  dextrose 50% Injectable 12.5 Gram(s) IV Push once  dextrose 50% Injectable 25 Gram(s) IV Push once  enoxaparin Injectable 40 milliGRAM(s) SubCutaneous every 24 hours  fluticasone propionate 50 MICROgram(s)/spray Nasal Spray 1 Spray(s) Both Nostrils two times a day  glucagon  Injectable 1 milliGRAM(s) IntraMuscular once  hydrocortisone sodium succinate Injectable 50 milliGRAM(s) IV Push every 8 hours  hydroxychloroquine 400 milliGRAM(s) Oral at bedtime  insulin lispro (ADMELOG) corrective regimen sliding scale   SubCutaneous every 6 hours  lactated ringers. 1000 milliLiter(s) (84 mL/Hr) IV Continuous <Continuous>  levothyroxine 25 MICROGram(s) Oral daily  levothyroxine Injectable 100 MICROGram(s) IV Push at bedtime  lidocaine   4% Patch 1 Patch Transdermal daily  pantoprazole  Injectable 40 milliGRAM(s) IV Push every 24 hours  tiotropium 2.5 MICROgram(s) Inhaler 2 Puff(s) Inhalation daily    MEDICATIONS  (PRN):  albuterol    0.083% 2.5 milliGRAM(s) Nebulizer every 6 hours PRN Shortness of Breath and/or Wheezing  dextrose Oral Gel 15 Gram(s) Oral once PRN Blood Glucose LESS THAN 70 milliGRAM(s)/deciliter  HYDROmorphone  Injectable 0.5 milliGRAM(s) IV Push every 4 hours PRN Severe Breakthrough Pain (7 - 10)  naloxone Injectable 0.1 milliGRAM(s) IV Push every 3 minutes PRN For ANY of the following changes in patient status:  A. RR LESS THAN 10 breaths per minute, B. Oxygen saturation LESS THAN 90%, C. Sedation score of 6  oxyCODONE    IR 5 milliGRAM(s) Oral every 3 hours PRN Moderate Pain (4 - 6)  oxyCODONE    IR 10 milliGRAM(s) Oral every 3 hours PRN Severe Pain (7 - 10)      OBJECTIVE:   [X] No new signs     [ ] Other:    Side Effects:  [X ] None			[ ] Other:      ASSESSMENT/PLAN  -Discontinue current therapy    [ ] Therapy changed to:    [ ] IV PCA       [ ] Epidural     [ X] prn Analgesics     Comments: Pain management per primary team, APS to sign off

## 2024-08-21 NOTE — PHYSICAL THERAPY INITIAL EVALUATION ADULT - [id number]_GEN_A_CORE
The pt will require a rolling walker to be able to perform their MRADLs within their home/rolling walker

## 2024-08-21 NOTE — PHYSICAL THERAPY INITIAL EVALUATION ADULT - ADDITIONAL COMMENTS
Pt lives with his wife in a house with 1 step to enter. Pt did not use an assistive device and was independent with ADLs prior. Pt reports no falls in the past 6 months.  Pt left semi supine in bed in NAD, all lines intact, call bell in reach and covering RN made aware.

## 2024-08-21 NOTE — PROGRESS NOTE ADULT - SUBJECTIVE AND OBJECTIVE BOX
Anesthesia Pain Management Service    SUBJECTIVE: Patient reports PCA not controlling pain, helps take the edge off for a short time and then has to press button again because pain comes back soon after. States feeling sharp left-sided abdominal pain. Also has had a T7 vertebral fracture in his back for the past 1.5 year and history of lupus. Does not take pain medications because his PCP does not prescribe them unless recommended and he works for the [employer] so he is limited in what he is able to use while working. Denies medications for anxiety/depression. Denies smoking, alcohol, marijuana and illicit drug use.     Pain Scale Score	At rest: ___ 	With Activity: ___ 	[X ] Refer to charted pain scores    THERAPY:    [ ] IV PCA Morphine		[ ] 5 mg/mL	[ ] 1 mg/mL  [X ] IV PCA Hydromorphone	[ ] 5 mg/mL	[X ] 1 mg/mL  [ ] IV PCA Fentanyl		[ ] 50 micrograms/mL    Demand dose __0.25_ lockout __6_ (minutes) Continuous Rate _0__ Total: _13.1__   mg used (in past 24 hrs)      MEDICATIONS  (STANDING):  acetaminophen   IVPB .. 1000 milliGRAM(s) IV Intermittent every 6 hours  budesonide  80 MICROgram(s)/formoterol 4.5 MICROgram(s) Inhaler 2 Puff(s) Inhalation two times a day  dextrose 5%. 1000 milliLiter(s) (100 mL/Hr) IV Continuous <Continuous>  dextrose 5%. 1000 milliLiter(s) (50 mL/Hr) IV Continuous <Continuous>  dextrose 50% Injectable 25 Gram(s) IV Push once  dextrose 50% Injectable 12.5 Gram(s) IV Push once  dextrose 50% Injectable 25 Gram(s) IV Push once  enoxaparin Injectable 40 milliGRAM(s) SubCutaneous every 24 hours  fluticasone propionate 50 MICROgram(s)/spray Nasal Spray 1 Spray(s) Both Nostrils two times a day  glucagon  Injectable 1 milliGRAM(s) IntraMuscular once  hydrocortisone sodium succinate Injectable 50 milliGRAM(s) IV Push every 8 hours  hydroxychloroquine 400 milliGRAM(s) Oral at bedtime  insulin lispro (ADMELOG) corrective regimen sliding scale   SubCutaneous every 6 hours  lactated ringers. 1000 milliLiter(s) (84 mL/Hr) IV Continuous <Continuous>  levothyroxine 25 MICROGram(s) Oral daily  levothyroxine Injectable 100 MICROGram(s) IV Push at bedtime  lidocaine   4% Patch 1 Patch Transdermal daily  pantoprazole  Injectable 40 milliGRAM(s) IV Push every 24 hours  tiotropium 2.5 MICROgram(s) Inhaler 2 Puff(s) Inhalation daily    MEDICATIONS  (PRN):  albuterol    0.083% 2.5 milliGRAM(s) Nebulizer every 6 hours PRN Shortness of Breath and/or Wheezing  dextrose Oral Gel 15 Gram(s) Oral once PRN Blood Glucose LESS THAN 70 milliGRAM(s)/deciliter  HYDROmorphone  Injectable 0.5 milliGRAM(s) IV Push every 4 hours PRN Severe Breakthrough Pain (7 - 10)  naloxone Injectable 0.1 milliGRAM(s) IV Push every 3 minutes PRN For ANY of the following changes in patient status:  A. RR LESS THAN 10 breaths per minute, B. Oxygen saturation LESS THAN 90%, C. Sedation score of 6  oxyCODONE    IR 5 milliGRAM(s) Oral every 3 hours PRN Moderate Pain (4 - 6)  oxyCODONE    IR 10 milliGRAM(s) Oral every 3 hours PRN Severe Pain (7 - 10)      OBJECTIVE: Patient sitting in bed.    Sedation Score:	[ X] Alert	[ ] Drowsy 	[ ] Arousable	[ ] Asleep	[ ] Unresponsive    Side Effects:	[X ] None	[ ] Nausea	[ ] Vomiting	[ ] Pruritus  		[ ] Other:    Vital Signs Last 24 Hrs  T(C): 36.4 (21 Aug 2024 09:00), Max: 36.6 (20 Aug 2024 20:00)  T(F): 97.6 (21 Aug 2024 09:00), Max: 97.9 (20 Aug 2024 20:00)  HR: 79 (21 Aug 2024 09:00) (76 - 98)  BP: 103/60 (21 Aug 2024 09:00) (103/60 - 146/82)  BP(mean): 99 (20 Aug 2024 20:00) (95 - 112)  RR: 18 (21 Aug 2024 09:00) (12 - 29)  SpO2: 100% (21 Aug 2024 09:00) (92% - 100%)    Parameters below as of 21 Aug 2024 09:00  Patient On (Oxygen Delivery Method): nasal cannula  O2 Flow (L/min): 2      ASSESSMENT/ PLAN    Therapy to  be:	[ ] Continue   [ X] Discontinued   [X ] Change to prn Analgesics    Documentation and Verification of current medications:   [X] Done	[ ] Not done, not elligible    Comments: Discussed with primary team, PCA discontinued. PRN Oral/IV opioids and/or Adjuvant non-opioid medication to be ordered at this point.  ISTOP Reviewed. Reference #[id number]  Alprazolam 0.5mg tablet. Quantity 30 tablets x 30 days. Last dispensed 07/03/2024.    Progress Note written now but Patient was seen earlier. independent

## 2024-08-21 NOTE — PHYSICAL THERAPY INITIAL EVALUATION ADULT - GENERAL OBSERVATIONS, REHAB EVAL
Chart reviewed and cleared for PT by covering RN. Pt received standing in room with pts wife and PCA, all lines intact +IV, pulse ox, nasal canula, SpO2 93%.

## 2024-08-21 NOTE — CONSULT NOTE ADULT - ATTENDING COMMENTS
Pulmonary consulted for post-operative hypoxemia and asthma management.  No active wheezing on exam. Hypoxemia likely multifactorial in setting post-op atelectasis and hypoventilation.   Uses Trelegy at home. Can continue Spiriva/Symbicort. D/C duonebs and change to albuterol nebs prn.   Encourage incentive spirometry  Titrated down to 1LPM and saturation 99%. Continue to wean off O2.  Pain control and splinting leading to hypoventilation.  Add Flonase 1 spray/nostril twice daily for nasal congestion.

## 2024-08-21 NOTE — PATIENT PROFILE ADULT - FUNCTIONAL ASSESSMENT - BASIC MOBILITY 6.
4-calculated by average/Not able to assess (calculate score using [facility] averaging method)

## 2024-08-21 NOTE — PROGRESS NOTE ADULT - SUBJECTIVE AND OBJECTIVE BOX
Surgery Daily Progress Note  =====================================================      Patient is a 49y old  Male who presents with a chief complaint of for stomach surgery (30 Jul 2024 11:48)    HPI:  49year old male with past medical history of Asthma, Lupus/myositis overlap syndrome, Hypothyroidism, presents to PST, with pre op diagnosis of  Gastric cancer, for pre op evaluation prior to scheduled suergery- robotic distal gastrectomy possible open with Dr Ferreira Patient reports of .LUQ pain for the last couple of years but more recently as of a few months ago, pain worsened and moved more laterally than previous thus he sought re-evaluation for his symptoms. Pain is currently rated 7/10. It is associated with some dyspepsia and pain with deep inspiration. He was started on Protonix but denies feeling much relief. (30 Jul 2024 11:48)    Patient is POD#1 from Robotic distal gastrectomy with RNY /D2 lymphadenectomy     SUBJECTIVE: Patient seen and examined at bedside on AM rounds. Anxious , underutilized using PCA medications and reported chest tightness overnight   ALLERGIES:  No Known Allergies    -------------------------------------------------------------------------------------    MEDICATIONS:  acetaminophen   IVPB .. 1000 milliGRAM(s) IV Intermittent every 6 hours  albuterol/ipratropium for Nebulization 3 milliLiter(s) Nebulizer every 6 hours PRN  budesonide  80 MICROgram(s)/formoterol 4.5 MICROgram(s) Inhaler 2 Puff(s) Inhalation two times a day  dextrose 5%. 1000 milliLiter(s) IV Continuous <Continuous>  dextrose 5%. 1000 milliLiter(s) IV Continuous <Continuous>  dextrose 50% Injectable 12.5 Gram(s) IV Push once  dextrose 50% Injectable 25 Gram(s) IV Push once  dextrose 50% Injectable 25 Gram(s) IV Push once  dextrose Oral Gel 15 Gram(s) Oral once PRN  enoxaparin Injectable 40 milliGRAM(s) SubCutaneous every 24 hours  glucagon  Injectable 1 milliGRAM(s) IntraMuscular once  hydrocortisone sodium succinate Injectable 50 milliGRAM(s) IV Push every 8 hours  HYDROmorphone PCA (1 mG/mL) 30 milliLiter(s) PCA Continuous PCA Continuous  HYDROmorphone PCA (1 mG/mL) Rescue Clinician Bolus 0.5 milliGRAM(s) IV Push every 15 minutes PRN  insulin lispro (ADMELOG) corrective regimen sliding scale   SubCutaneous every 6 hours  lactated ringers. 1000 milliLiter(s) IV Continuous <Continuous>  levothyroxine Injectable 100 MICROGram(s) IV Push at bedtime  naloxone Injectable 0.1 milliGRAM(s) IV Push every 3 minutes PRN  pantoprazole  Injectable 40 milliGRAM(s) IV Push every 24 hours  tiotropium 2.5 MICROgram(s) Inhaler 2 Puff(s) Inhalation daily      HOME MEDS :  Home Medications:  Dupixent Pre-filled Pen 300 mg/2 mL subcutaneous solution: 300 milligram(s) subcutaneously every 2 weeks every sunday (20 Aug 2024 07:43)  losartan 50 mg oral tablet: 1 tab(s) orally once a day (in the morning) (20 Aug 2024 07:43)  Ozempic 8 mg/3 mL (2 mg dose) subcutaneous solution: 2 milligram(s) subcutaneously once a week SUNDAY LD 3 weeks ago (20 Aug 2024 07:43)  pantoprazole 40 mg oral delayed release tablet: 1 tab(s) orally once a day (in the morning) (20 Aug 2024 07:43)  Plaquenil 200 mg oral tablet: 2 tab(s) orally once a day (at bedtime) (20 Aug 2024 07:43)  Potassium Chloride (Eqv-K-Tab) 20 mEq oral tablet, extended release: 2 tab(s) orally once a day (at bedtime) (20 Aug 2024 07:43)  predniSONE 2.5 mg oral tablet: 3 tab(s) orally once a day (in the afternoon) (20 Aug 2024 07:43)  Synthroid 25 mcg (0.025 mg) oral tablet: 1 tab(s) orally once a day (in the morning) (20 Aug 2024 07:43)  torsemide 10 mg oral tablet: 2 tab(s) orally once a week as needed for prn for swelling/pedal edema (20 Aug 2024 07:43)  Trelegy Ellipta 100 mcg-62.5 mcg-25 mcg/inh inhalation powder: 1 puff(s) inhaled once a day (in the morning) (20 Aug 2024 07:43)        --------------------------------------------------------------------------------------    VITAL SIGNS:  T(C): 36.5 (08-21-24 @ 05:20), Max: 36.6 (08-20-24 @ 07:30)  HR: 81 (08-21-24 @ 05:20) (76 - 98)  BP: 127/85 (08-21-24 @ 05:20) (109/78 - 146/82)  RR: 18 (08-21-24 @ 05:20) (12 - 29)  SpO2: 100% (08-21-24 @ 05:20) (92% - 100%)  --------------------------------------------------------------------------------------    INS AND OUTS:    I&O's Detail    20 Aug 2024 07:01  -  21 Aug 2024 07:00  --------------------------------------------------------  IN:    Lactated Ringers: 1260 mL  Total IN: 1260 mL    OUT:    Indwelling Catheter - Urethral (mL): 1390 mL  Total OUT: 1390 mL    Total NET: -130 mL        --------------------------------------------------------------------------------------      PHYSICAL EXAM:  Constitutional: Well developed, obese male appears anxious, alert and appropriate to conversation  Chest: equal chest rise, on NC humidified O2, no crackles or rhonchi  Abdomen: Soft, distended abdomen is minimally tender to palpation. Laparoscopic incisions dressings c/d/i. No rebound or guarding  Extremities: Warm to touch, soft, normal strength, sensory and motor intact.  -------------------------------------------------------------------------------------------------------------------------------

## 2024-08-21 NOTE — CONSULT NOTE ADULT - ASSESSMENT
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 Post procedure, he developed increasing SOB and chest tightness. Overnight team placed him on 6L NC weaned to 2L SAO2 100%, s/p nebulizer treatment. Patient admittedly has been under utilizing his Dilaudid PCA. Pulmonary was consulted to evaluate due to hypoxia requiring oxygen and due to history of previous admissions for asthma exacerbation.        PLAN    #acute asthma exacerbation  At home on trelegy, dupixent and albuterol prn   Currently on 3L NC sating high 90s, normally not on home O2, s/p nebulizer tx   Chest XR showed clear lungs, no pneumothorax or pleural effusions  Respiratory symptoms are likely due to a combination of pain and sedation from his surgery, less likely infectious etiology or acute lung pathology  - continue to wean off NC, SPO2 >90%  - start symbicort   - c/w spiriva  - d/c albuterol/ipratoprium duoneb  - start albuterol prn   - encourage incentive spirometry  - start flonase  - currently on hydrocortisone taper per rheum recs          Discussed with Pulm/Crit Fellow and Dr. 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 Post procedure, he developed increasing SOB and chest tightness. Overnight team placed him on 6L NC weaned to 2L SAO2 100%, s/p nebulizer treatment. Patient admittedly has been under utilizing his Dilaudid PCA. Pulmonary was consulted to evaluate due to hypoxia requiring oxygen and due to history of previous admissions for asthma exacerbation.        PLAN    #acute asthma exacerbation  At home on trelegy, dupixent and albuterol prn   Currently on 3L NC sating high 90s, normally not on home O2, s/p nebulizer tx   Chest XR showed clear lungs, no pneumothorax or pleural effusions  Asthma symptoms likely being worsened by combination of pain and sedation from his surgery, less likely acute infectious etiology or acute lung pathology  - continue to wean off NC, SPO2 >90%  - start symbicort   - c/w spiriva  - d/c albuterol/ipratoprium duoneb  - start albuterol prn   - encourage incentive spirometry  - start flonase  - currently on hydrocortisone taper per rheum recs          Discussed with Pulm/Crit Fellow and Dr. 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 Post procedure, he developed increasing SOB and chest tightness. Overnight team placed him on 6L NC weaned to 2L SAO2 100%, s/p nebulizer treatment. Patient admittedly has been under utilizing his Dilaudid PCA. Pulmonary was consulted to evaluate due to hypoxia requiring oxygen and due to history of previous admissions for asthma exacerbation.        PLAN    #asthma   At home on trelegy, dupixent and albuterol prn   Currently on 3L NC sating high 90s, normally not on home O2, s/p nebulizer tx   Chest XR showed clear lungs, no pneumothorax or pleural effusions  Asthma symptoms likely being worsened by combination of pain and sedation from his surgery, less likely acute infectious etiology or acute lung pathology  - continue to wean off NC, SPO2 >90%  - start symbicort   - c/w spiriva  - d/c albuterol/ipratoprium duoneb  - start albuterol prn   - encourage incentive spirometry  - start flonase  - currently on hydrocortisone taper per rheum recs          Discussed with Pulm/Crit Fellow and Dr. Bae

## 2024-08-21 NOTE — PROGRESS NOTE ADULT - SUBJECTIVE AND OBJECTIVE BOX
49y Male POD1  T(C): 36.5 (08-21-24 @ 05:20), Max: 36.6 (08-20-24 @ 20:00)  HR: 81 (08-21-24 @ 05:20) (76 - 98)  BP: 127/85 (08-21-24 @ 05:20) (127/85 - 146/82)  RR: 18 (08-21-24 @ 05:20) (12 - 29)  SpO2: 100% (08-21-24 @ 05:20) (92% - 100%)  Wt(kg): --    Pt seen, doing well, no anesthesia complications or complaints noted or reported.     Pain not well controlled, PCA to be continued

## 2024-08-21 NOTE — CONSULT NOTE ADULT - SUBJECTIVE AND OBJECTIVE BOX
49year old male with past medical history of Asthma, Lupus/myositis overlap syndrome, Hypothyroidism, presents to PST, with pre op diagnosis of  Gastric cancer, for pre op evaluation prior to scheduled suergery- robotic distal gastrectomy possible open with Dr Ferreira Patient reports of .LUQ pain for the last couple of years but more recently as of a few months ago, pain worsened and moved more laterally than previous thus he sought re-evaluation for his symptoms. Pain is currently rated 7/10. It is associated with some dyspepsia and pain with deep inspiration. He was started on Protonix but denies feeling much relief.    Subjective: Patient seen and examined. No new events except as noted.       REVIEW OF SYSTEMS:    CONSTITUTIONAL: No weakness, fevers or chills  EYES/ENT: No visual changes;  No vertigo or throat pain   NECK: No pain or stiffness  RESPIRATORY: No cough, wheezing, hemoptysis; No shortness of breath  CARDIOVASCULAR: No chest pain or palpitations  GASTROINTESTINAL: No abdominal or epigastric pain. No nausea, vomiting, or hematemesis; No diarrhea or constipation. No melena or hematochezia.  GENITOURINARY: No dysuria, frequency or hematuria  NEUROLOGICAL: No numbness or weakness  SKIN: No itching, burning, rashes, or lesions   All other review of systems is negative unless indicated above.    MEDICATIONS:  MEDICATIONS  (STANDING):  acetaminophen   IVPB .. 1000 milliGRAM(s) IV Intermittent every 8 hours  budesonide  80 MICROgram(s)/formoterol 4.5 MICROgram(s) Inhaler 2 Puff(s) Inhalation two times a day  dextrose 5%. 1000 milliLiter(s) (100 mL/Hr) IV Continuous <Continuous>  dextrose 5%. 1000 milliLiter(s) (50 mL/Hr) IV Continuous <Continuous>  dextrose 50% Injectable 25 Gram(s) IV Push once  dextrose 50% Injectable 12.5 Gram(s) IV Push once  dextrose 50% Injectable 25 Gram(s) IV Push once  enoxaparin Injectable 40 milliGRAM(s) SubCutaneous every 24 hours  fluticasone propionate 50 MICROgram(s)/spray Nasal Spray 1 Spray(s) Both Nostrils two times a day  glucagon  Injectable 1 milliGRAM(s) IntraMuscular once  HYDROmorphone PCA (1 mG/mL) 30 milliLiter(s) PCA Continuous PCA Continuous  hydroxychloroquine 400 milliGRAM(s) Oral at bedtime  insulin lispro (ADMELOG) corrective regimen sliding scale   SubCutaneous every 6 hours  lactated ringers. 1000 milliLiter(s) (84 mL/Hr) IV Continuous <Continuous>  lidocaine   4% Patch 1 Patch Transdermal daily  pantoprazole  Injectable 40 milliGRAM(s) IV Push every 24 hours  tiotropium 2.5 MICROgram(s) Inhaler 2 Puff(s) Inhalation daily    Home Medications:  Dupixent Pre-filled Pen 300 mg/2 mL subcutaneous solution: 300 milligram(s) subcutaneously every 2 weeks every sunday (20 Aug 2024 07:43)  losartan 50 mg oral tablet: 1 tab(s) orally once a day (in the morning) (20 Aug 2024 07:43)  Ozempic 8 mg/3 mL (2 mg dose) subcutaneous solution: 2 milligram(s) subcutaneously once a week SUNDAY LD 3 weeks ago (20 Aug 2024 07:43)  pantoprazole 40 mg oral delayed release tablet: 1 tab(s) orally once a day (in the morning) (20 Aug 2024 07:43)  Plaquenil 200 mg oral tablet: 2 tab(s) orally once a day (at bedtime) (20 Aug 2024 07:43)  Potassium Chloride (Eqv-K-Tab) 20 mEq oral tablet, extended release: 2 tab(s) orally once a day (at bedtime) (20 Aug 2024 07:43)  predniSONE 2.5 mg oral tablet: 3 tab(s) orally once a day (in the afternoon) (20 Aug 2024 07:43)  Synthroid 25 mcg (0.025 mg) oral tablet: 1 tab(s) orally once a day (in the morning) (20 Aug 2024 07:43)  torsemide 10 mg oral tablet: 2 tab(s) orally once a week as needed for prn for swelling/pedal edema (20 Aug 2024 07:43)  Trelegy Ellipta 100 mcg-62.5 mcg-25 mcg/inh inhalation powder: 1 puff(s) inhaled once a day (in the morning) (20 Aug 2024 07:43)            PHYSICAL EXAM:  T(C): 37.5 (08-21-24 @ 20:09), Max: 37.5 (08-21-24 @ 20:09)  HR: 100 (08-21-24 @ 20:09) (79 - 100)  BP: 123/67 (08-21-24 @ 20:09) (103/60 - 134/82)  RR: 18 (08-21-24 @ 20:09) (16 - 18)  SpO2: 98% (08-21-24 @ 20:09) (95% - 100%)  Wt(kg): --  I&O's Summary    20 Aug 2024 07:01  -  21 Aug 2024 07:00  --------------------------------------------------------  IN: 1260 mL / OUT: 1390 mL / NET: -130 mL    21 Aug 2024 07:01  -  21 Aug 2024 21:31  --------------------------------------------------------  IN: 0 mL / OUT: 400 mL / NET: -400 mL          Appearance: Normal	  HEENT:  PERRLA   Lymphatic: No lymphadenopathy   Cardiovascular: Normal S1 S2, no JVD  Respiratory: normal effort , clear  Gastrointestinal:  Soft, Non-tender  Skin: No rashes,  warm to touch  Psychiatry:  Mood & affect appropriate  Musculuskeletal: No edema    recent labs, Imaging and EKGs personally reviewed       08-20-24 @ 07:01  -  08-21-24 @ 07:00  --------------------------------------------------------  IN: 1260 mL / OUT: 1390 mL / NET: -130 mL    08-21-24 @ 07:01  -  08-21-24 @ 21:31  --------------------------------------------------------  IN: 0 mL / OUT: 400 mL / NET: -400 mL                          10.6   11.76 )-----------( 257      ( 21 Aug 2024 06:22 )             33.1               08-21    134<L>  |  99  |  10  ----------------------------<  116<H>  4.3   |  22  |  0.79    Ca    8.4      21 Aug 2024 06:22  Phos  3.5     08-21  Mg     1.50     08-21                         Urinalysis Basic - ( 21 Aug 2024 06:22 )    Color: x / Appearance: x / SG: x / pH: x  Gluc: 116 mg/dL / Ketone: x  / Bili: x / Urobili: x   Blood: x / Protein: x / Nitrite: x   Leuk Esterase: x / RBC: x / WBC x   Sq Epi: x / Non Sq Epi: x / Bacteria: x

## 2024-08-21 NOTE — PATIENT PROFILE ADULT - FALL HARM RISK - RISK INTERVENTIONS

## 2024-08-21 NOTE — PHYSICAL THERAPY INITIAL EVALUATION ADULT - PERTINENT HX OF CURRENT PROBLEM, REHAB EVAL
Pt is a 49 year old male with past medical history of Asthma, Lupus/myositis overlap syndrome, Hypothyroidism, presents to PST, with pre op diagnosis of  Gastric cancer, for pre op evaluation prior to scheduled suergery- robotic distal gastrectomy possible open with Dr Ferreira Patient reports of .LUQ pain for the last couple of years but more recently as of a few months ago, pain worsened and moved more laterally than previous thus he sought re-evaluation for his symptoms. Pain is currently rated 7/10. It is associated with some dyspepsia and pain with deep inspiration. He was started on Protonix but denies feeling much relief.

## 2024-08-21 NOTE — CONSULT NOTE ADULT - ASSESSMENT
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 Patient is hemodynamically stable on supplemental oxygen with subjective dyspnea and SpO2 , now s/p nebulizer treatment and with home inhaler restarted.    # gastric CA  S/P Robotic gasterectomy   Surg care appreciated  pain control     # Hypothyroidism  TFTs  synthroid     # Lupus myocytics

## 2024-08-22 LAB
ADD ON TEST-SPECIMEN IN LAB: SIGNIFICANT CHANGE UP
ADD ON TEST-SPECIMEN IN LAB: SIGNIFICANT CHANGE UP
ANION GAP SERPL CALC-SCNC: 13 MMOL/L — SIGNIFICANT CHANGE UP (ref 7–14)
B PERT DNA SPEC QL NAA+PROBE: SIGNIFICANT CHANGE UP
B PERT+PARAPERT DNA PNL SPEC NAA+PROBE: SIGNIFICANT CHANGE UP
BUN SERPL-MCNC: 9 MG/DL — SIGNIFICANT CHANGE UP (ref 7–23)
C PNEUM DNA SPEC QL NAA+PROBE: SIGNIFICANT CHANGE UP
CALCIUM SERPL-MCNC: 8.6 MG/DL — SIGNIFICANT CHANGE UP (ref 8.4–10.5)
CHLORIDE SERPL-SCNC: 98 MMOL/L — SIGNIFICANT CHANGE UP (ref 98–107)
CO2 SERPL-SCNC: 22 MMOL/L — SIGNIFICANT CHANGE UP (ref 22–31)
CREAT SERPL-MCNC: 0.87 MG/DL — SIGNIFICANT CHANGE UP (ref 0.5–1.3)
EGFR: 106 ML/MIN/1.73M2 — SIGNIFICANT CHANGE UP
FLUAV SUBTYP SPEC NAA+PROBE: SIGNIFICANT CHANGE UP
FLUBV RNA SPEC QL NAA+PROBE: SIGNIFICANT CHANGE UP
GLUCOSE BLDC GLUCOMTR-MCNC: 119 MG/DL — HIGH (ref 70–99)
GLUCOSE BLDC GLUCOMTR-MCNC: 121 MG/DL — HIGH (ref 70–99)
GLUCOSE BLDC GLUCOMTR-MCNC: 125 MG/DL — HIGH (ref 70–99)
GLUCOSE BLDC GLUCOMTR-MCNC: 73 MG/DL — SIGNIFICANT CHANGE UP (ref 70–99)
GLUCOSE BLDC GLUCOMTR-MCNC: 95 MG/DL — SIGNIFICANT CHANGE UP (ref 70–99)
GLUCOSE SERPL-MCNC: 84 MG/DL — SIGNIFICANT CHANGE UP (ref 70–99)
GRAM STN FLD: ABNORMAL
HADV DNA SPEC QL NAA+PROBE: SIGNIFICANT CHANGE UP
HCOV 229E RNA SPEC QL NAA+PROBE: SIGNIFICANT CHANGE UP
HCOV HKU1 RNA SPEC QL NAA+PROBE: SIGNIFICANT CHANGE UP
HCOV NL63 RNA SPEC QL NAA+PROBE: SIGNIFICANT CHANGE UP
HCOV OC43 RNA SPEC QL NAA+PROBE: SIGNIFICANT CHANGE UP
HCT VFR BLD CALC: 37.2 % — LOW (ref 39–50)
HGB BLD-MCNC: 12 G/DL — LOW (ref 13–17)
HMPV RNA SPEC QL NAA+PROBE: SIGNIFICANT CHANGE UP
HPIV1 RNA SPEC QL NAA+PROBE: SIGNIFICANT CHANGE UP
HPIV2 RNA SPEC QL NAA+PROBE: SIGNIFICANT CHANGE UP
HPIV3 RNA SPEC QL NAA+PROBE: SIGNIFICANT CHANGE UP
HPIV4 RNA SPEC QL NAA+PROBE: SIGNIFICANT CHANGE UP
M PNEUMO DNA SPEC QL NAA+PROBE: SIGNIFICANT CHANGE UP
MAGNESIUM SERPL-MCNC: 1.6 MG/DL — SIGNIFICANT CHANGE UP (ref 1.6–2.6)
MCHC RBC-ENTMCNC: 27.2 PG — SIGNIFICANT CHANGE UP (ref 27–34)
MCHC RBC-ENTMCNC: 32.3 GM/DL — SIGNIFICANT CHANGE UP (ref 32–36)
MCV RBC AUTO: 84.4 FL — SIGNIFICANT CHANGE UP (ref 80–100)
NRBC # BLD: 0 /100 WBCS — SIGNIFICANT CHANGE UP (ref 0–0)
NRBC # FLD: 0 K/UL — SIGNIFICANT CHANGE UP (ref 0–0)
PHOSPHATE SERPL-MCNC: 2 MG/DL — LOW (ref 2.5–4.5)
PLATELET # BLD AUTO: 303 K/UL — SIGNIFICANT CHANGE UP (ref 150–400)
POTASSIUM SERPL-MCNC: 3.6 MMOL/L — SIGNIFICANT CHANGE UP (ref 3.5–5.3)
POTASSIUM SERPL-SCNC: 3.6 MMOL/L — SIGNIFICANT CHANGE UP (ref 3.5–5.3)
PROCALCITONIN SERPL-MCNC: 0.59 NG/ML — HIGH (ref 0.02–0.1)
RAPID RVP RESULT: SIGNIFICANT CHANGE UP
RBC # BLD: 4.41 M/UL — SIGNIFICANT CHANGE UP (ref 4.2–5.8)
RBC # FLD: 14.4 % — SIGNIFICANT CHANGE UP (ref 10.3–14.5)
RSV RNA SPEC QL NAA+PROBE: SIGNIFICANT CHANGE UP
RV+EV RNA SPEC QL NAA+PROBE: SIGNIFICANT CHANGE UP
SARS-COV-2 RNA SPEC QL NAA+PROBE: SIGNIFICANT CHANGE UP
SODIUM SERPL-SCNC: 133 MMOL/L — LOW (ref 135–145)
SPECIMEN SOURCE: SIGNIFICANT CHANGE UP
WBC # BLD: 13.2 K/UL — HIGH (ref 3.8–10.5)
WBC # FLD AUTO: 13.2 K/UL — HIGH (ref 3.8–10.5)

## 2024-08-22 PROCEDURE — 99233 SBSQ HOSP IP/OBS HIGH 50: CPT | Mod: GC

## 2024-08-22 RX ORDER — HYDROCORTISONE 10 MG/1
25 TABLET ORAL EVERY 8 HOURS
Refills: 0 | Status: COMPLETED | OUTPATIENT
Start: 2024-08-22 | End: 2024-08-23

## 2024-08-22 RX ORDER — POTASSIUM CHLORIDE 10 MEQ
40 TABLET, EXT RELEASE, PARTICLES/CRYSTALS ORAL ONCE
Refills: 0 | Status: COMPLETED | OUTPATIENT
Start: 2024-08-22 | End: 2024-08-22

## 2024-08-22 RX ORDER — METHOCARBAMOL 750 MG/1
500 TABLET, FILM COATED ORAL EVERY 8 HOURS
Refills: 0 | Status: DISCONTINUED | OUTPATIENT
Start: 2024-08-22 | End: 2024-08-27

## 2024-08-22 RX ORDER — HYDROCORTISONE 10 MG/1
50 TABLET ORAL EVERY 8 HOURS
Refills: 0 | Status: DISCONTINUED | OUTPATIENT
Start: 2024-08-22 | End: 2024-08-22

## 2024-08-22 RX ORDER — ONDANSETRON 2 MG/ML
4 INJECTION, SOLUTION INTRAMUSCULAR; INTRAVENOUS ONCE
Refills: 0 | Status: COMPLETED | OUTPATIENT
Start: 2024-08-22 | End: 2024-08-22

## 2024-08-22 RX ORDER — SODIUM PHOSPHATE, MONOBASIC, MONOHYDRATE AND SODIUM PHOSPHATE, DIBASIC ANHYDROUS 276; 142 MG/ML; MG/ML
30 INJECTION, SOLUTION INTRAVENOUS ONCE
Refills: 0 | Status: COMPLETED | OUTPATIENT
Start: 2024-08-22 | End: 2024-08-22

## 2024-08-22 RX ORDER — ONDANSETRON 2 MG/ML
4 INJECTION, SOLUTION INTRAMUSCULAR; INTRAVENOUS ONCE
Refills: 0 | Status: COMPLETED | OUTPATIENT
Start: 2024-08-22 | End: 2024-08-23

## 2024-08-22 RX ORDER — ACETAMINOPHEN 325 MG/1
1000 TABLET ORAL EVERY 6 HOURS
Refills: 0 | Status: COMPLETED | OUTPATIENT
Start: 2024-08-22 | End: 2024-08-23

## 2024-08-22 RX ORDER — ACETAMINOPHEN 325 MG/1
975 TABLET ORAL EVERY 6 HOURS
Refills: 0 | Status: DISCONTINUED | OUTPATIENT
Start: 2024-08-22 | End: 2024-08-22

## 2024-08-22 RX ORDER — GABAPENTIN 100 MG
300 CAPSULE ORAL EVERY 8 HOURS
Refills: 0 | Status: DISCONTINUED | OUTPATIENT
Start: 2024-08-22 | End: 2024-08-27

## 2024-08-22 RX ORDER — KETOROLAC TROMETHAMINE 30 MG/ML
15 INJECTION, SOLUTION INTRAMUSCULAR EVERY 6 HOURS
Refills: 0 | Status: DISCONTINUED | OUTPATIENT
Start: 2024-08-22 | End: 2024-08-26

## 2024-08-22 RX ORDER — PREDNISONE 10 MG
7.5 TABLET, DOSE PACK ORAL DAILY
Refills: 0 | Status: DISCONTINUED | OUTPATIENT
Start: 2024-08-23 | End: 2024-08-27

## 2024-08-22 RX ADMIN — Medication 125 MICROGRAM(S): at 05:25

## 2024-08-22 RX ADMIN — METHOCARBAMOL 500 MILLIGRAM(S): 750 TABLET, FILM COATED ORAL at 14:23

## 2024-08-22 RX ADMIN — BUDESONIDE AND FORMOTEROL FUMARATE 2 PUFF(S): 80; 4.5 AEROSOL, METERED RESPIRATORY (INHALATION) at 09:47

## 2024-08-22 RX ADMIN — ACETAMINOPHEN 400 MILLIGRAM(S): 325 TABLET ORAL at 23:01

## 2024-08-22 RX ADMIN — ACETAMINOPHEN 1000 MILLIGRAM(S): 325 TABLET ORAL at 06:30

## 2024-08-22 RX ADMIN — FLUTICASONE PROPIONATE 1 SPRAY(S): 50 SPRAY, METERED NASAL at 05:26

## 2024-08-22 RX ADMIN — Medication 300 MILLIGRAM(S): at 22:56

## 2024-08-22 RX ADMIN — HYDROXYCHLOROQUINE SULFATE 400 MILLIGRAM(S): 200 TABLET, FILM COATED ORAL at 22:58

## 2024-08-22 RX ADMIN — SODIUM PHOSPHATE, MONOBASIC, MONOHYDRATE AND SODIUM PHOSPHATE, DIBASIC ANHYDROUS 85 MILLIMOLE(S): 276; 142 INJECTION, SOLUTION INTRAVENOUS at 09:46

## 2024-08-22 RX ADMIN — ACETAMINOPHEN 400 MILLIGRAM(S): 325 TABLET ORAL at 14:16

## 2024-08-22 RX ADMIN — HYDROCORTISONE 25 MILLIGRAM(S): 10 TABLET ORAL at 17:39

## 2024-08-22 RX ADMIN — ACETAMINOPHEN 1000 MILLIGRAM(S): 325 TABLET ORAL at 23:30

## 2024-08-22 RX ADMIN — KETOROLAC TROMETHAMINE 15 MILLIGRAM(S): 30 INJECTION, SOLUTION INTRAMUSCULAR at 17:39

## 2024-08-22 RX ADMIN — BUDESONIDE AND FORMOTEROL FUMARATE 2 PUFF(S): 80; 4.5 AEROSOL, METERED RESPIRATORY (INHALATION) at 22:57

## 2024-08-22 RX ADMIN — Medication 2.5 MILLIGRAM(S): at 06:23

## 2024-08-22 RX ADMIN — ACETAMINOPHEN 400 MILLIGRAM(S): 325 TABLET ORAL at 05:25

## 2024-08-22 RX ADMIN — ONDANSETRON 4 MILLIGRAM(S): 2 INJECTION, SOLUTION INTRAMUSCULAR; INTRAVENOUS at 05:25

## 2024-08-22 RX ADMIN — ENOXAPARIN SODIUM 40 MILLIGRAM(S): 100 INJECTION SUBCUTANEOUS at 05:26

## 2024-08-22 RX ADMIN — TIOTROPIUM BROMIDE INHALATION SPRAY 2 PUFF(S): 3.12 SPRAY, METERED RESPIRATORY (INHALATION) at 09:47

## 2024-08-22 RX ADMIN — HYDROCORTISONE 25 MILLIGRAM(S): 10 TABLET ORAL at 22:58

## 2024-08-22 RX ADMIN — KETOROLAC TROMETHAMINE 15 MILLIGRAM(S): 30 INJECTION, SOLUTION INTRAMUSCULAR at 14:16

## 2024-08-22 RX ADMIN — Medication 40 MILLIEQUIVALENT(S): at 14:16

## 2024-08-22 RX ADMIN — HYDROCORTISONE 50 MILLIGRAM(S): 10 TABLET ORAL at 09:46

## 2024-08-22 RX ADMIN — Medication 40 MILLIGRAM(S): at 05:26

## 2024-08-22 RX ADMIN — METHOCARBAMOL 500 MILLIGRAM(S): 750 TABLET, FILM COATED ORAL at 23:05

## 2024-08-22 RX ADMIN — FLUTICASONE PROPIONATE 1 SPRAY(S): 50 SPRAY, METERED NASAL at 17:39

## 2024-08-22 RX ADMIN — Medication 300 MILLIGRAM(S): at 14:16

## 2024-08-22 RX ADMIN — Medication 25 GRAM(S): at 09:46

## 2024-08-22 RX ADMIN — HYDROMORPHONE HYDROCHLORIDE 30 MILLILITER(S): 2 TABLET ORAL at 19:42

## 2024-08-22 RX ADMIN — Medication 75 MILLILITER(S): at 09:45

## 2024-08-22 NOTE — PROGRESS NOTE ADULT - ASSESSMENT
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 Patient is hemodynamically stable on supplemental oxygen with subjective dyspnea and SpO2 , now s/p nebulizer treatment and with home inhaler restarted.    # gastric CA  S/P Robotic gasterectomy   Surg care appreciated  pain control   incentive  oral feeding when bowel function   PT as tolertaed     # Hypothyroidism  TFTs  synthroid     # Lupus myocytics

## 2024-08-22 NOTE — PROGRESS NOTE ADULT - SUBJECTIVE AND OBJECTIVE BOX
Interval Events:     Patient was seen and examined at bedside. Patient reports developing wheezing this morning. He believes it is due to receiving the Symbicort and Spiriva close together in time, as this was his experience when taking Trelegy and albuterol at home; this is why he now has to space out his inhalers when using them. He states the inhalers are also causing him to become tachycardic and tachypneic. Received albuterol neb tx this morning for the wheezing, which mildly improved his sx. Patient also developed a cough with yellow phlegm yesterday afternoon. He is still having difficulty taking a deep breath due to pain from the surgery. Feels like PT exacerbated his pain and respiratory sx. He continues to feel warm despite the room being warm, but denies any other pain or sx at this time.     REVIEW OF SYSTEMS:  Constitutional: [ ] fevers [ ] chills [ ] weight loss [ ] weight gain  CV: [ ] chest pain [ ] orthopnea [ ] palpitations [ ] murmur  Resp: [ ] cough [ ] shortness of breath [ ] dyspnea [ ] wheezing [ ] sputum [ ] hemoptysis  [x ] All other systems negative  [ ] Unable to assess ROS because ________    OBJECTIVE:  ICU Vital Signs Last 24 Hrs  T(C): 37 (22 Aug 2024 06:51), Max: 37.5 (21 Aug 2024 20:09)  T(F): 98.6 (22 Aug 2024 06:51), Max: 99.5 (21 Aug 2024 20:09)  HR: 110 (22 Aug 2024 06:51) (81 - 110)  BP: 134/76 (22 Aug 2024 06:51) (110/55 - 134/76)  BP(mean): 69 (21 Aug 2024 12:14) (69 - 69)  ABP: --  ABP(mean): --  RR: 22 (22 Aug 2024 06:51) (18 - 22)  SpO2: 95% (22 Aug 2024 06:51) (93% - 98%)    O2 Parameters below as of 22 Aug 2024 06:51  Patient On (Oxygen Delivery Method): nasal cannula  O2 Flow (L/min): 1            08-21 @ 07:01  -  08-22 @ 07:00  --------------------------------------------------------  IN: 672 mL / OUT: 1100 mL / NET: -428 mL      CAPILLARY BLOOD GLUCOSE      POCT Blood Glucose.: 73 mg/dL (22 Aug 2024 08:27)      PHYSICAL EXAM:  General: Awake, alert, oriented X 3.   HEENT: Atraumatic, normocephalic.   Neck: Supple  Respiratory: shallow breathing, non-labored respirations, bilateral expiratory wheezes, no rales or rhonchi. Speaking in full sentences  Cardiovascular: S1 S2 normal. No murmurs, rubs or gallops.   Abdomen: Soft, non-tender, non-distended. No organomegaly.  Extremities: Warm to touch. No pedal edema.   Neurological: Non-focal      HOSPITAL MEDICATIONS:  MEDICATIONS  (STANDING):  acetaminophen   IVPB .. 1000 milliGRAM(s) IV Intermittent every 8 hours  budesonide  80 MICROgram(s)/formoterol 4.5 MICROgram(s) Inhaler 2 Puff(s) Inhalation two times a day  dextrose 5% + sodium chloride 0.9%. 1000 milliLiter(s) (75 mL/Hr) IV Continuous <Continuous>  dextrose 5%. 1000 milliLiter(s) (50 mL/Hr) IV Continuous <Continuous>  dextrose 5%. 1000 milliLiter(s) (100 mL/Hr) IV Continuous <Continuous>  dextrose 50% Injectable 12.5 Gram(s) IV Push once  dextrose 50% Injectable 25 Gram(s) IV Push once  dextrose 50% Injectable 25 Gram(s) IV Push once  enoxaparin Injectable 40 milliGRAM(s) SubCutaneous every 24 hours  fluticasone propionate 50 MICROgram(s)/spray Nasal Spray 1 Spray(s) Both Nostrils two times a day  glucagon  Injectable 1 milliGRAM(s) IntraMuscular once  hydrocortisone sodium succinate Injectable 50 milliGRAM(s) IV Push every 8 hours  HYDROmorphone PCA (1 mG/mL) 30 milliLiter(s) PCA Continuous PCA Continuous  hydroxychloroquine 400 milliGRAM(s) Oral at bedtime  insulin lispro (ADMELOG) corrective regimen sliding scale   SubCutaneous three times a day before meals  insulin lispro (ADMELOG) corrective regimen sliding scale   SubCutaneous at bedtime  ketorolac   Injectable 15 milliGRAM(s) IV Push every 6 hours  levothyroxine 125 MICROGram(s) Oral daily  lidocaine   4% Patch 1 Patch Transdermal daily  magnesium sulfate  IVPB 2 Gram(s) IV Intermittent once  pantoprazole  Injectable 40 milliGRAM(s) IV Push every 24 hours  potassium chloride   Powder 40 milliEquivalent(s) Oral once  sodium phosphate 30 milliMole(s)/500 mL IVPB 30 milliMole(s) IV Intermittent once  tiotropium 2.5 MICROgram(s) Inhaler 2 Puff(s) Inhalation daily    MEDICATIONS  (PRN):  albuterol    0.083% 2.5 milliGRAM(s) Nebulizer every 6 hours PRN Shortness of Breath and/or Wheezing  dextrose Oral Gel 15 Gram(s) Oral once PRN Blood Glucose LESS THAN 70 milliGRAM(s)/deciliter  HYDROmorphone PCA (1 mG/mL) Rescue Clinician Bolus 0.5 milliGRAM(s) IV Push every 15 minutes PRN for Pain Scale GREATER THAN 6  naloxone Injectable 0.1 milliGRAM(s) IV Push every 3 minutes PRN For ANY of the following changes in patient status:  A. RR LESS THAN 10 breaths per minute, B. Oxygen saturation LESS THAN 90%, C. Sedation score of 6      LABS:                        12.0   13.20 )-----------( 303      ( 22 Aug 2024 07:01 )             37.2     Hgb Trend: 12.0<--, 10.6<--  08-22    133<L>  |  98  |  9   ----------------------------<  84  3.6   |  22  |  0.87    Ca    8.6      22 Aug 2024 07:01  Phos  2.0     08-22  Mg     1.60     08-22      Creatinine Trend: 0.87<--, 0.79<--, 0.98<--    Urinalysis Basic - ( 22 Aug 2024 07:01 )    Color: x / Appearance: x / SG: x / pH: x  Gluc: 84 mg/dL / Ketone: x  / Bili: x / Urobili: x   Blood: x / Protein: x / Nitrite: x   Leuk Esterase: x / RBC: x / WBC x   Sq Epi: x / Non Sq Epi: x / Bacteria: x          MICROBIOLOGY:   8/21/24 - COVID, Influenza A/B, RSV negative    RADIOLOGY:  [ ] Reviewed and interpreted by me   Interval Events:   This morning noted to be tachypneic, tachycardic. Had low grade fever (99.5) yesterday    Patient was seen and examined at bedside. Patient reports developing wheezing this morning. He believes it is due to receiving the Symbicort and Spiriva close together in time, as this was his experience when taking Trelegy and albuterol at home; this is why he now has to space out his inhalers when using them. He states the inhalers are also causing him to become tachycardic and tachypneic. Received albuterol neb tx this morning for the wheezing, which mildly improved his sx. Patient also developed a cough with yellow phlegm yesterday afternoon. He is still having difficulty taking a deep breath due to pain from the surgery. Feels like PT exacerbated his pain and respiratory sx. He continues to feel warm despite the room being warm, but denies any other pain or sx at this time.     REVIEW OF SYSTEMS:  Constitutional: [ ] fevers [ ] chills [ ] weight loss [ ] weight gain  CV: [ ] chest pain [ ] orthopnea [ ] palpitations [ ] murmur  Resp: [ ] cough [ ] shortness of breath [ ] dyspnea [ ] wheezing [ ] sputum [ ] hemoptysis  [x ] All other systems negative  [ ] Unable to assess ROS because ________    OBJECTIVE:  ICU Vital Signs Last 24 Hrs  T(C): 37 (22 Aug 2024 06:51), Max: 37.5 (21 Aug 2024 20:09)  T(F): 98.6 (22 Aug 2024 06:51), Max: 99.5 (21 Aug 2024 20:09)  HR: 110 (22 Aug 2024 06:51) (81 - 110)  BP: 134/76 (22 Aug 2024 06:51) (110/55 - 134/76)  BP(mean): 69 (21 Aug 2024 12:14) (69 - 69)  ABP: --  ABP(mean): --  RR: 22 (22 Aug 2024 06:51) (18 - 22)  SpO2: 95% (22 Aug 2024 06:51) (93% - 98%)    O2 Parameters below as of 22 Aug 2024 06:51  Patient On (Oxygen Delivery Method): nasal cannula  O2 Flow (L/min): 1            08-21 @ 07:01  -  08-22 @ 07:00  --------------------------------------------------------  IN: 672 mL / OUT: 1100 mL / NET: -428 mL      CAPILLARY BLOOD GLUCOSE      POCT Blood Glucose.: 73 mg/dL (22 Aug 2024 08:27)      PHYSICAL EXAM:  General: Awake, alert, oriented X 3.   HEENT: Atraumatic, normocephalic.   Neck: Supple  Respiratory: shallow breathing, non-labored respirations, bilateral expiratory wheezes, no rales or rhonchi. Speaking in full sentences  Cardiovascular: S1 S2 normal. No murmurs, rubs or gallops.   Abdomen: Soft, non-tender, non-distended. No organomegaly.  Extremities: Warm to touch. No pedal edema.   Neurological: Non-focal      HOSPITAL MEDICATIONS:  MEDICATIONS  (STANDING):  acetaminophen   IVPB .. 1000 milliGRAM(s) IV Intermittent every 8 hours  budesonide  80 MICROgram(s)/formoterol 4.5 MICROgram(s) Inhaler 2 Puff(s) Inhalation two times a day  dextrose 5% + sodium chloride 0.9%. 1000 milliLiter(s) (75 mL/Hr) IV Continuous <Continuous>  dextrose 5%. 1000 milliLiter(s) (50 mL/Hr) IV Continuous <Continuous>  dextrose 5%. 1000 milliLiter(s) (100 mL/Hr) IV Continuous <Continuous>  dextrose 50% Injectable 12.5 Gram(s) IV Push once  dextrose 50% Injectable 25 Gram(s) IV Push once  dextrose 50% Injectable 25 Gram(s) IV Push once  enoxaparin Injectable 40 milliGRAM(s) SubCutaneous every 24 hours  fluticasone propionate 50 MICROgram(s)/spray Nasal Spray 1 Spray(s) Both Nostrils two times a day  glucagon  Injectable 1 milliGRAM(s) IntraMuscular once  hydrocortisone sodium succinate Injectable 50 milliGRAM(s) IV Push every 8 hours  HYDROmorphone PCA (1 mG/mL) 30 milliLiter(s) PCA Continuous PCA Continuous  hydroxychloroquine 400 milliGRAM(s) Oral at bedtime  insulin lispro (ADMELOG) corrective regimen sliding scale   SubCutaneous three times a day before meals  insulin lispro (ADMELOG) corrective regimen sliding scale   SubCutaneous at bedtime  ketorolac   Injectable 15 milliGRAM(s) IV Push every 6 hours  levothyroxine 125 MICROGram(s) Oral daily  lidocaine   4% Patch 1 Patch Transdermal daily  magnesium sulfate  IVPB 2 Gram(s) IV Intermittent once  pantoprazole  Injectable 40 milliGRAM(s) IV Push every 24 hours  potassium chloride   Powder 40 milliEquivalent(s) Oral once  sodium phosphate 30 milliMole(s)/500 mL IVPB 30 milliMole(s) IV Intermittent once  tiotropium 2.5 MICROgram(s) Inhaler 2 Puff(s) Inhalation daily    MEDICATIONS  (PRN):  albuterol    0.083% 2.5 milliGRAM(s) Nebulizer every 6 hours PRN Shortness of Breath and/or Wheezing  dextrose Oral Gel 15 Gram(s) Oral once PRN Blood Glucose LESS THAN 70 milliGRAM(s)/deciliter  HYDROmorphone PCA (1 mG/mL) Rescue Clinician Bolus 0.5 milliGRAM(s) IV Push every 15 minutes PRN for Pain Scale GREATER THAN 6  naloxone Injectable 0.1 milliGRAM(s) IV Push every 3 minutes PRN For ANY of the following changes in patient status:  A. RR LESS THAN 10 breaths per minute, B. Oxygen saturation LESS THAN 90%, C. Sedation score of 6      LABS:                        12.0   13.20 )-----------( 303      ( 22 Aug 2024 07:01 )             37.2     Hgb Trend: 12.0<--, 10.6<--  08-22    133<L>  |  98  |  9   ----------------------------<  84  3.6   |  22  |  0.87    Ca    8.6      22 Aug 2024 07:01  Phos  2.0     08-22  Mg     1.60     08-22      Creatinine Trend: 0.87<--, 0.79<--, 0.98<--    Urinalysis Basic - ( 22 Aug 2024 07:01 )    Color: x / Appearance: x / SG: x / pH: x  Gluc: 84 mg/dL / Ketone: x  / Bili: x / Urobili: x   Blood: x / Protein: x / Nitrite: x   Leuk Esterase: x / RBC: x / WBC x   Sq Epi: x / Non Sq Epi: x / Bacteria: x          MICROBIOLOGY:   8/21/24 - COVID, Influenza A/B, RSV negative    RADIOLOGY:  [ ] Reviewed and interpreted by me   Interval Events:   This morning noted to be tachypneic, tachycardic. Had low grade fever (99.5) yesterday    Patient was seen and examined at bedside. Patient reports developing wheezing this morning. He believes it is due to receiving the Symbicort and Spiriva close together in time, as this was his experience when taking Trelegy and albuterol at home; this is why he now has to space out his inhalers when using them. He states the inhalers are also causing him to become tachycardic and tachypneic. Received albuterol neb tx this morning for the wheezing, which mildly improved his sx. Patient also developed a cough with yellow phlegm yesterday afternoon. He is still having difficulty taking a deep breath due to pain from the surgery. Feels like PT exacerbated his pain and respiratory sx. He continues to feel warm despite the room being cold, but denies any other pain or sx at this time.     REVIEW OF SYSTEMS:  Constitutional: [ ] fevers [ ] chills [ ] weight loss [ ] weight gain  CV: [ ] chest pain [ ] orthopnea [ ] palpitations [ ] murmur  Resp: [ ] cough [ ] shortness of breath [ ] dyspnea [ ] wheezing [ ] sputum [ ] hemoptysis  [x ] All other systems negative  [ ] Unable to assess ROS because ________    OBJECTIVE:  ICU Vital Signs Last 24 Hrs  T(C): 37 (22 Aug 2024 06:51), Max: 37.5 (21 Aug 2024 20:09)  T(F): 98.6 (22 Aug 2024 06:51), Max: 99.5 (21 Aug 2024 20:09)  HR: 110 (22 Aug 2024 06:51) (81 - 110)  BP: 134/76 (22 Aug 2024 06:51) (110/55 - 134/76)  BP(mean): 69 (21 Aug 2024 12:14) (69 - 69)  ABP: --  ABP(mean): --  RR: 22 (22 Aug 2024 06:51) (18 - 22)  SpO2: 95% (22 Aug 2024 06:51) (93% - 98%)    O2 Parameters below as of 22 Aug 2024 06:51  Patient On (Oxygen Delivery Method): nasal cannula  O2 Flow (L/min): 1            08-21 @ 07:01  -  08-22 @ 07:00  --------------------------------------------------------  IN: 672 mL / OUT: 1100 mL / NET: -428 mL      CAPILLARY BLOOD GLUCOSE      POCT Blood Glucose.: 73 mg/dL (22 Aug 2024 08:27)      PHYSICAL EXAM:  General: Awake, alert, oriented X 3.   HEENT: Atraumatic, normocephalic.   Neck: Supple  Respiratory: shallow breathing, non-labored respirations, bilateral expiratory wheezes, no rales or rhonchi. Speaking in full sentences  Cardiovascular: S1 S2 normal. No murmurs, rubs or gallops.   Abdomen: Soft, non-tender, non-distended. No organomegaly.  Extremities: Warm to touch. No pedal edema.   Neurological: Non-focal      HOSPITAL MEDICATIONS:  MEDICATIONS  (STANDING):  acetaminophen   IVPB .. 1000 milliGRAM(s) IV Intermittent every 8 hours  budesonide  80 MICROgram(s)/formoterol 4.5 MICROgram(s) Inhaler 2 Puff(s) Inhalation two times a day  dextrose 5% + sodium chloride 0.9%. 1000 milliLiter(s) (75 mL/Hr) IV Continuous <Continuous>  dextrose 5%. 1000 milliLiter(s) (50 mL/Hr) IV Continuous <Continuous>  dextrose 5%. 1000 milliLiter(s) (100 mL/Hr) IV Continuous <Continuous>  dextrose 50% Injectable 12.5 Gram(s) IV Push once  dextrose 50% Injectable 25 Gram(s) IV Push once  dextrose 50% Injectable 25 Gram(s) IV Push once  enoxaparin Injectable 40 milliGRAM(s) SubCutaneous every 24 hours  fluticasone propionate 50 MICROgram(s)/spray Nasal Spray 1 Spray(s) Both Nostrils two times a day  glucagon  Injectable 1 milliGRAM(s) IntraMuscular once  hydrocortisone sodium succinate Injectable 50 milliGRAM(s) IV Push every 8 hours  HYDROmorphone PCA (1 mG/mL) 30 milliLiter(s) PCA Continuous PCA Continuous  hydroxychloroquine 400 milliGRAM(s) Oral at bedtime  insulin lispro (ADMELOG) corrective regimen sliding scale   SubCutaneous three times a day before meals  insulin lispro (ADMELOG) corrective regimen sliding scale   SubCutaneous at bedtime  ketorolac   Injectable 15 milliGRAM(s) IV Push every 6 hours  levothyroxine 125 MICROGram(s) Oral daily  lidocaine   4% Patch 1 Patch Transdermal daily  magnesium sulfate  IVPB 2 Gram(s) IV Intermittent once  pantoprazole  Injectable 40 milliGRAM(s) IV Push every 24 hours  potassium chloride   Powder 40 milliEquivalent(s) Oral once  sodium phosphate 30 milliMole(s)/500 mL IVPB 30 milliMole(s) IV Intermittent once  tiotropium 2.5 MICROgram(s) Inhaler 2 Puff(s) Inhalation daily    MEDICATIONS  (PRN):  albuterol    0.083% 2.5 milliGRAM(s) Nebulizer every 6 hours PRN Shortness of Breath and/or Wheezing  dextrose Oral Gel 15 Gram(s) Oral once PRN Blood Glucose LESS THAN 70 milliGRAM(s)/deciliter  HYDROmorphone PCA (1 mG/mL) Rescue Clinician Bolus 0.5 milliGRAM(s) IV Push every 15 minutes PRN for Pain Scale GREATER THAN 6  naloxone Injectable 0.1 milliGRAM(s) IV Push every 3 minutes PRN For ANY of the following changes in patient status:  A. RR LESS THAN 10 breaths per minute, B. Oxygen saturation LESS THAN 90%, C. Sedation score of 6      LABS:                        12.0   13.20 )-----------( 303      ( 22 Aug 2024 07:01 )             37.2     Hgb Trend: 12.0<--, 10.6<--  08-22    133<L>  |  98  |  9   ----------------------------<  84  3.6   |  22  |  0.87    Ca    8.6      22 Aug 2024 07:01  Phos  2.0     08-22  Mg     1.60     08-22      Creatinine Trend: 0.87<--, 0.79<--, 0.98<--    Urinalysis Basic - ( 22 Aug 2024 07:01 )    Color: x / Appearance: x / SG: x / pH: x  Gluc: 84 mg/dL / Ketone: x  / Bili: x / Urobili: x   Blood: x / Protein: x / Nitrite: x   Leuk Esterase: x / RBC: x / WBC x   Sq Epi: x / Non Sq Epi: x / Bacteria: x          MICROBIOLOGY:   8/21/24 - COVID, Influenza A/B, RSV negative    RADIOLOGY:  [ ] Reviewed and interpreted by me   Interval Events:   This morning noted to be tachypneic, tachycardic. Had low grade fever (99.5) yesterday.    Patient was seen and examined at bedside. Patient reports developing wheezing this morning. He believes it is due to receiving the Symbicort and Spiriva close together in time, as this was his experience when taking Trelegy and albuterol at home; this is why he now has to space out his inhalers when using them. He states the inhalers are also causing him to become tachycardic and tachypneic. Received albuterol neb tx this morning for the wheezing, which mildly improved his sx. Patient also developed a cough with yellow phlegm yesterday afternoon. He is still having difficulty taking a deep breath due to pain from the surgery. Feels like PT exacerbated his pain and respiratory sx. He continues to feel warm despite the room being cold, but denies any other pain or sx at this time.     REVIEW OF SYSTEMS:  Constitutional: [ ] fevers [ ] chills [ ] weight loss [ ] weight gain  CV: [ ] chest pain [ ] orthopnea [ ] palpitations [ ] murmur  Resp: [ ] cough [ ] shortness of breath [ ] dyspnea [ ] wheezing [ ] sputum [ ] hemoptysis  [x ] All other systems negative  [ ] Unable to assess ROS because ________    OBJECTIVE:  ICU Vital Signs Last 24 Hrs  T(C): 37 (22 Aug 2024 06:51), Max: 37.5 (21 Aug 2024 20:09)  T(F): 98.6 (22 Aug 2024 06:51), Max: 99.5 (21 Aug 2024 20:09)  HR: 110 (22 Aug 2024 06:51) (81 - 110)  BP: 134/76 (22 Aug 2024 06:51) (110/55 - 134/76)  BP(mean): 69 (21 Aug 2024 12:14) (69 - 69)  ABP: --  ABP(mean): --  RR: 22 (22 Aug 2024 06:51) (18 - 22)  SpO2: 95% (22 Aug 2024 06:51) (93% - 98%)    O2 Parameters below as of 22 Aug 2024 06:51  Patient On (Oxygen Delivery Method): nasal cannula  O2 Flow (L/min): 1            08-21 @ 07:01  -  08-22 @ 07:00  --------------------------------------------------------  IN: 672 mL / OUT: 1100 mL / NET: -428 mL      CAPILLARY BLOOD GLUCOSE      POCT Blood Glucose.: 73 mg/dL (22 Aug 2024 08:27)      PHYSICAL EXAM:  General: Awake, alert, oriented X 3.   HEENT: Atraumatic, normocephalic.   Neck: Supple  Respiratory: shallow breathing, non-labored respirations, bilateral expiratory wheezes, no rales or rhonchi. Speaking in full sentences  Cardiovascular: S1 S2 normal. No murmurs, rubs or gallops.   Abdomen: Soft, non-tender, non-distended. No organomegaly.  Extremities: Warm to touch. No pedal edema.   Neurological: Non-focal      HOSPITAL MEDICATIONS:  MEDICATIONS  (STANDING):  acetaminophen   IVPB .. 1000 milliGRAM(s) IV Intermittent every 8 hours  budesonide  80 MICROgram(s)/formoterol 4.5 MICROgram(s) Inhaler 2 Puff(s) Inhalation two times a day  dextrose 5% + sodium chloride 0.9%. 1000 milliLiter(s) (75 mL/Hr) IV Continuous <Continuous>  dextrose 5%. 1000 milliLiter(s) (50 mL/Hr) IV Continuous <Continuous>  dextrose 5%. 1000 milliLiter(s) (100 mL/Hr) IV Continuous <Continuous>  dextrose 50% Injectable 12.5 Gram(s) IV Push once  dextrose 50% Injectable 25 Gram(s) IV Push once  dextrose 50% Injectable 25 Gram(s) IV Push once  enoxaparin Injectable 40 milliGRAM(s) SubCutaneous every 24 hours  fluticasone propionate 50 MICROgram(s)/spray Nasal Spray 1 Spray(s) Both Nostrils two times a day  glucagon  Injectable 1 milliGRAM(s) IntraMuscular once  hydrocortisone sodium succinate Injectable 50 milliGRAM(s) IV Push every 8 hours  HYDROmorphone PCA (1 mG/mL) 30 milliLiter(s) PCA Continuous PCA Continuous  hydroxychloroquine 400 milliGRAM(s) Oral at bedtime  insulin lispro (ADMELOG) corrective regimen sliding scale   SubCutaneous three times a day before meals  insulin lispro (ADMELOG) corrective regimen sliding scale   SubCutaneous at bedtime  ketorolac   Injectable 15 milliGRAM(s) IV Push every 6 hours  levothyroxine 125 MICROGram(s) Oral daily  lidocaine   4% Patch 1 Patch Transdermal daily  magnesium sulfate  IVPB 2 Gram(s) IV Intermittent once  pantoprazole  Injectable 40 milliGRAM(s) IV Push every 24 hours  potassium chloride   Powder 40 milliEquivalent(s) Oral once  sodium phosphate 30 milliMole(s)/500 mL IVPB 30 milliMole(s) IV Intermittent once  tiotropium 2.5 MICROgram(s) Inhaler 2 Puff(s) Inhalation daily    MEDICATIONS  (PRN):  albuterol    0.083% 2.5 milliGRAM(s) Nebulizer every 6 hours PRN Shortness of Breath and/or Wheezing  dextrose Oral Gel 15 Gram(s) Oral once PRN Blood Glucose LESS THAN 70 milliGRAM(s)/deciliter  HYDROmorphone PCA (1 mG/mL) Rescue Clinician Bolus 0.5 milliGRAM(s) IV Push every 15 minutes PRN for Pain Scale GREATER THAN 6  naloxone Injectable 0.1 milliGRAM(s) IV Push every 3 minutes PRN For ANY of the following changes in patient status:  A. RR LESS THAN 10 breaths per minute, B. Oxygen saturation LESS THAN 90%, C. Sedation score of 6      LABS:                        12.0   13.20 )-----------( 303      ( 22 Aug 2024 07:01 )             37.2     Hgb Trend: 12.0<--, 10.6<--  08-22    133<L>  |  98  |  9   ----------------------------<  84  3.6   |  22  |  0.87    Ca    8.6      22 Aug 2024 07:01  Phos  2.0     08-22  Mg     1.60     08-22      Creatinine Trend: 0.87<--, 0.79<--, 0.98<--    Urinalysis Basic - ( 22 Aug 2024 07:01 )    Color: x / Appearance: x / SG: x / pH: x  Gluc: 84 mg/dL / Ketone: x  / Bili: x / Urobili: x   Blood: x / Protein: x / Nitrite: x   Leuk Esterase: x / RBC: x / WBC x   Sq Epi: x / Non Sq Epi: x / Bacteria: x          MICROBIOLOGY:   8/21/24 - COVID, Influenza A/B, RSV negative    RADIOLOGY:  [ ] Reviewed and interpreted by me    8/21 - Chest XR showed clear lungs, no pneumothorax or pleural effusions

## 2024-08-22 NOTE — PROGRESS NOTE ADULT - SUBJECTIVE AND OBJECTIVE BOX
Anesthesia Pain Management Service    SUBJECTIVE: Patient reports some pain on the right upper quadrant. States the IV PCA helps with pain. Patient states yesterday when he tried the Oxycodone after coming off the PCA he experienced a lot of pain and that the Oxycodone did not help. Patient reports that the pain is sharp, feels like poking through a shield" and as if he did " a million push ups". Patient with Lupus, managed well with steroids and Plaquenil per patient. Reports that he had a T7 fracture in June after a cough and states he was given Flexeril at the time which helped. Patient with some back pain currently. Patient with asthma states he gets pain with deep breathing. Intermittent nausea reported. Denies use of opioids at home. Denies alcohol use, smoking and illicit drug use.  Pain Scale Score	At rest: ___ 	With Activity: ___ 	[X ] Refer to charted pain scores    THERAPY:    [ ] IV PCA Morphine		[ ] 5 mg/mL	[ ] 1 mg/mL  [X ] IV PCA Hydromorphone	[ ] 5 mg/mL	[X ] 1 mg/mL  [ ] IV PCA Fentanyl		[ ] 50 micrograms/mL    Demand dose __0.3_ lockout __6_ (minutes) Continuous Rate _0__ Total: _9.2__  mg used (in past 24 hours)      MEDICATIONS  (STANDING):  acetaminophen   IVPB .. 1000 milliGRAM(s) IV Intermittent every 8 hours  budesonide  80 MICROgram(s)/formoterol 4.5 MICROgram(s) Inhaler 2 Puff(s) Inhalation two times a day  dextrose 5% + sodium chloride 0.9%. 1000 milliLiter(s) (75 mL/Hr) IV Continuous <Continuous>  dextrose 5%. 1000 milliLiter(s) (50 mL/Hr) IV Continuous <Continuous>  dextrose 5%. 1000 milliLiter(s) (100 mL/Hr) IV Continuous <Continuous>  dextrose 50% Injectable 25 Gram(s) IV Push once  dextrose 50% Injectable 12.5 Gram(s) IV Push once  dextrose 50% Injectable 25 Gram(s) IV Push once  enoxaparin Injectable 40 milliGRAM(s) SubCutaneous every 24 hours  fluticasone propionate 50 MICROgram(s)/spray Nasal Spray 1 Spray(s) Both Nostrils two times a day  gabapentin 300 milliGRAM(s) Oral every 8 hours  glucagon  Injectable 1 milliGRAM(s) IntraMuscular once  hydrocortisone sodium succinate Injectable 50 milliGRAM(s) IV Push every 8 hours  HYDROmorphone PCA (1 mG/mL) 30 milliLiter(s) PCA Continuous PCA Continuous  hydroxychloroquine 400 milliGRAM(s) Oral at bedtime  insulin lispro (ADMELOG) corrective regimen sliding scale   SubCutaneous three times a day before meals  insulin lispro (ADMELOG) corrective regimen sliding scale   SubCutaneous at bedtime  ketorolac   Injectable 15 milliGRAM(s) IV Push every 6 hours  levothyroxine 125 MICROGram(s) Oral daily  lidocaine   4% Patch 1 Patch Transdermal daily  methocarbamol 500 milliGRAM(s) Oral every 8 hours  pantoprazole  Injectable 40 milliGRAM(s) IV Push every 24 hours  potassium chloride   Powder 40 milliEquivalent(s) Oral once  tiotropium 2.5 MICROgram(s) Inhaler 2 Puff(s) Inhalation daily    MEDICATIONS  (PRN):  albuterol    0.083% 2.5 milliGRAM(s) Nebulizer every 6 hours PRN Shortness of Breath and/or Wheezing  dextrose Oral Gel 15 Gram(s) Oral once PRN Blood Glucose LESS THAN 70 milliGRAM(s)/deciliter  HYDROmorphone PCA (1 mG/mL) Rescue Clinician Bolus 0.5 milliGRAM(s) IV Push every 15 minutes PRN for Pain Scale GREATER THAN 6  naloxone Injectable 0.1 milliGRAM(s) IV Push every 3 minutes PRN For ANY of the following changes in patient status:  A. RR LESS THAN 10 breaths per minute, B. Oxygen saturation LESS THAN 90%, C. Sedation score of 6      OBJECTIVE: Patient sitting up in bed.    Sedation Score:	[ X] Alert	[ ] Drowsy 	[ ] Arousable	[ ] Asleep	[ ] Unresponsive    Side Effects:	[X ] None	[ ] Nausea	[ ] Vomiting	[ ] Pruritus  		[ ] Other:    Vital Signs Last 24 Hrs  T(C): 36.7 (22 Aug 2024 10:13), Max: 37.5 (21 Aug 2024 20:09)  T(F): 98 (22 Aug 2024 10:13), Max: 99.5 (21 Aug 2024 20:09)  HR: 107 (22 Aug 2024 10:13) (97 - 110)  BP: 140/63 (22 Aug 2024 10:13) (118/78 - 140/63)  BP(mean): --  RR: 20 (22 Aug 2024 10:13) (18 - 22)  SpO2: 96% (22 Aug 2024 10:13) (93% - 98%)    Parameters below as of 22 Aug 2024 10:13  Patient On (Oxygen Delivery Method): nasal cannula, 1l        ASSESSMENT/ PLAN    Therapy to  be:	[ X] Continue   [ ] Discontinued   [ ] Change to prn Analgesics    Documentation and Verification of current medications:   [X] Done	[ ] Not done, not elligible    Comments: Continue IV PCA. Discussed patient with primary surgical team. No surgical concerns per primary team. Recommend non-opioid adjuvant analgesics to be used when possible and when allowed by primary surgical team.    Progress Note written now but Patient was seen earlier.

## 2024-08-22 NOTE — PROGRESS NOTE ADULT - SUBJECTIVE AND OBJECTIVE BOX
Name of Patient : AMBERLY ESPITIA  MRN: [id number]  Date of visit: 08-22-24       Subjective: Patient seen and examined. No new events except as noted.   abdominal pain  discomfort     REVIEW OF SYSTEMS:    CONSTITUTIONAL: No weakness, fevers or chills  EYES/ENT: No visual changes;  No vertigo or throat pain   NECK: No pain or stiffness  RESPIRATORY: No cough, wheezing, hemoptysis; No shortness of breath  CARDIOVASCULAR: No chest pain or palpitations  GASTROINTESTINAL: + Abdominal pain   GENITOURINARY: No dysuria, frequency or hematuria  NEUROLOGICAL: No numbness or weakness  SKIN: No itching, burning, rashes, or lesions   All other review of systems is negative unless indicated above.    MEDICATIONS:  MEDICATIONS  (STANDING):  acetaminophen   IVPB .. 1000 milliGRAM(s) IV Intermittent every 8 hours  acetaminophen   IVPB .. 1000 milliGRAM(s) IV Intermittent every 6 hours  budesonide  80 MICROgram(s)/formoterol 4.5 MICROgram(s) Inhaler 2 Puff(s) Inhalation two times a day  dextrose 5% + sodium chloride 0.9%. 1000 milliLiter(s) (75 mL/Hr) IV Continuous <Continuous>  dextrose 5%. 1000 milliLiter(s) (50 mL/Hr) IV Continuous <Continuous>  dextrose 5%. 1000 milliLiter(s) (100 mL/Hr) IV Continuous <Continuous>  dextrose 50% Injectable 12.5 Gram(s) IV Push once  dextrose 50% Injectable 25 Gram(s) IV Push once  dextrose 50% Injectable 25 Gram(s) IV Push once  enoxaparin Injectable 40 milliGRAM(s) SubCutaneous every 24 hours  fluticasone propionate 50 MICROgram(s)/spray Nasal Spray 1 Spray(s) Both Nostrils two times a day  gabapentin 300 milliGRAM(s) Oral every 8 hours  glucagon  Injectable 1 milliGRAM(s) IntraMuscular once  hydrocortisone sodium succinate Injectable 25 milliGRAM(s) IV Push every 8 hours  HYDROmorphone PCA (1 mG/mL) 30 milliLiter(s) PCA Continuous PCA Continuous  hydroxychloroquine 400 milliGRAM(s) Oral at bedtime  insulin lispro (ADMELOG) corrective regimen sliding scale   SubCutaneous three times a day before meals  insulin lispro (ADMELOG) corrective regimen sliding scale   SubCutaneous at bedtime  ketorolac   Injectable 15 milliGRAM(s) IV Push every 6 hours  levothyroxine 125 MICROGram(s) Oral daily  lidocaine   4% Patch 1 Patch Transdermal daily  methocarbamol 500 milliGRAM(s) Oral every 8 hours  pantoprazole  Injectable 40 milliGRAM(s) IV Push every 24 hours  tiotropium 2.5 MICROgram(s) Inhaler 2 Puff(s) Inhalation daily      PHYSICAL EXAM:  T(C): 36.9 (08-22-24 @ 20:47), Max: 37.4 (08-22-24 @ 04:50)  HR: 102 (08-22-24 @ 20:47) (91 - 110)  BP: 145/72 (08-22-24 @ 20:47) (118/78 - 145/72)  RR: 18 (08-22-24 @ 20:47) (18 - 22)  SpO2: 96% (08-22-24 @ 20:47) (93% - 98%)  Wt(kg): --  I&O's Summary    21 Aug 2024 07:01  -  22 Aug 2024 07:00  --------------------------------------------------------  IN: 672 mL / OUT: 1100 mL / NET: -428 mL    22 Aug 2024 07:01  -  22 Aug 2024 22:54  --------------------------------------------------------  IN: 0 mL / OUT: 2450 mL / NET: -2450 mL          Appearance: Normal	  HEENT:  PERRLA   Lymphatic: No lymphadenopathy   Cardiovascular: Normal S1 S2, no JVD  Respiratory: normal effort , clear  Gastrointestinal:  Soft, Non-tender  Skin: No rashes,  warm to touch  Psychiatry:  Mood & affect appropriate  Musculuskeletal: No edema    recent labs, Imaging and EKGs personally reviewed           Culture - Sputum (collected 08-22-24 @ 13:02)  Source: .Sputum Sputum  Gram Stain (08-22-24 @ 18:43):    Few Squamous epithelial cells per low power field    Few polymorphonuclear leukocytes per low power field    Few Gram Positive Cocci in Clusters per oil power field    Few Gram Positive Cocci in Pairs and Chains per oil power field    Few Gram Negative Rods per oil power field    Rare Gram Positive Rods per oil power field        08-21-24 @ 07:01  -  08-22-24 @ 07:00  --------------------------------------------------------  IN: 672 mL / OUT: 1100 mL / NET: -428 mL    08-22-24 @ 07:01  -  08-22-24 @ 22:54  --------------------------------------------------------  IN: 0 mL / OUT: 2450 mL / NET: -2450 mL                          12.0   13.20 )-----------( 303      ( 22 Aug 2024 07:01 )             37.2               08-22    133<L>  |  98  |  9   ----------------------------<  84  3.6   |  22  |  0.87    Ca    8.6      22 Aug 2024 07:01  Phos  2.0     08-22  Mg     1.60     08-22                         Urinalysis Basic - ( 22 Aug 2024 07:01 )    Color: x / Appearance: x / SG: x / pH: x  Gluc: 84 mg/dL / Ketone: x  / Bili: x / Urobili: x   Blood: x / Protein: x / Nitrite: x   Leuk Esterase: x / RBC: x / WBC x   Sq Epi: x / Non Sq Epi: x / Bacteria: x

## 2024-08-22 NOTE — PROGRESS NOTE ADULT - SUBJECTIVE AND OBJECTIVE BOX
TEAM SURGERY PROGRESS NOTE    POST OPERATIVE DAY #: 2 s/p ra distal gastrectomy w/ rny, d2 lymphadenectomy.    SUBJECTIVE: Patient seen and examined at bedside on AM rounds, patient reports pain poorly controlled on oral pain regimen, improved with dpca. States he gets intermittently nauseous well controlled with zofran. Tolerating CLD. Denies passing flatus, having bm.     Vital Signs Last 24 Hrs  T(C): 37 (22 Aug 2024 06:51), Max: 37.5 (21 Aug 2024 20:09)  T(F): 98.6 (22 Aug 2024 06:51), Max: 99.5 (21 Aug 2024 20:09)  HR: 110 (22 Aug 2024 06:51) (79 - 110)  BP: 134/76 (22 Aug 2024 06:51) (103/60 - 134/76)  BP(mean): 69 (21 Aug 2024 12:14) (69 - 69)  RR: 22 (22 Aug 2024 06:51) (18 - 22)  SpO2: 95% (22 Aug 2024 06:51) (93% - 100%)    Parameters below as of 22 Aug 2024 06:51  Patient On (Oxygen Delivery Method): nasal cannula  O2 Flow (L/min): 1    I&O's Detail    21 Aug 2024 07:01  -  22 Aug 2024 07:00  --------------------------------------------------------  IN:    Lactated Ringers: 672 mL  Total IN: 672 mL    OUT:    Voided (mL): 1100 mL  Total OUT: 1100 mL    Total NET: -428 mL        MEDICATIONS  (STANDING):  acetaminophen   IVPB .. 1000 milliGRAM(s) IV Intermittent every 8 hours  budesonide  80 MICROgram(s)/formoterol 4.5 MICROgram(s) Inhaler 2 Puff(s) Inhalation two times a day  dextrose 5% + sodium chloride 0.9%. 1000 milliLiter(s) (75 mL/Hr) IV Continuous <Continuous>  dextrose 5%. 1000 milliLiter(s) (100 mL/Hr) IV Continuous <Continuous>  dextrose 5%. 1000 milliLiter(s) (50 mL/Hr) IV Continuous <Continuous>  dextrose 50% Injectable 12.5 Gram(s) IV Push once  dextrose 50% Injectable 25 Gram(s) IV Push once  dextrose 50% Injectable 25 Gram(s) IV Push once  enoxaparin Injectable 40 milliGRAM(s) SubCutaneous every 24 hours  fluticasone propionate 50 MICROgram(s)/spray Nasal Spray 1 Spray(s) Both Nostrils two times a day  glucagon  Injectable 1 milliGRAM(s) IntraMuscular once  HYDROmorphone PCA (1 mG/mL) 30 milliLiter(s) PCA Continuous PCA Continuous  hydroxychloroquine 400 milliGRAM(s) Oral at bedtime  insulin lispro (ADMELOG) corrective regimen sliding scale   SubCutaneous three times a day before meals  insulin lispro (ADMELOG) corrective regimen sliding scale   SubCutaneous at bedtime  ketorolac   Injectable 15 milliGRAM(s) IV Push every 6 hours  levothyroxine 125 MICROGram(s) Oral daily  lidocaine   4% Patch 1 Patch Transdermal daily  pantoprazole  Injectable 40 milliGRAM(s) IV Push every 24 hours  tiotropium 2.5 MICROgram(s) Inhaler 2 Puff(s) Inhalation daily    MEDICATIONS  (PRN):  albuterol    0.083% 2.5 milliGRAM(s) Nebulizer every 6 hours PRN Shortness of Breath and/or Wheezing  dextrose Oral Gel 15 Gram(s) Oral once PRN Blood Glucose LESS THAN 70 milliGRAM(s)/deciliter  HYDROmorphone PCA (1 mG/mL) Rescue Clinician Bolus 0.5 milliGRAM(s) IV Push every 15 minutes PRN for Pain Scale GREATER THAN 6  naloxone Injectable 0.1 milliGRAM(s) IV Push every 3 minutes PRN For ANY of the following changes in patient status:  A. RR LESS THAN 10 breaths per minute, B. Oxygen saturation LESS THAN 90%, C. Sedation score of 6      Physical Exam  General: A&Ox3, NAD  Respiratory: unlabored breathing on NC 1L  Cardiovascular: Regular rate & rhythm  Abdominal: soft. TTP diffusely. Not distended. Port sites c/d/i.     LABS:                        12.0   13.20 )-----------( 303      ( 22 Aug 2024 07:01 )             37.2     08-21    134<L>  |  99  |  10  ----------------------------<  116<H>  4.3   |  22  |  0.79    Ca    8.4      21 Aug 2024 06:22  Phos  3.5     08-21  Mg     1.50     08-21        Urinalysis Basic - ( 21 Aug 2024 06:22 )    Color: x / Appearance: x / SG: x / pH: x  Gluc: 116 mg/dL / Ketone: x  / Bili: x / Urobili: x   Blood: x / Protein: x / Nitrite: x   Leuk Esterase: x / RBC: x / WBC x   Sq Epi: x / Non Sq Epi: x / Bacteria: x          Patient is a 49y Male

## 2024-08-22 NOTE — PROGRESS NOTE ADULT - ASSESSMENT
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 Patient is hemodynamically stable on supplemental oxygen with subjective dyspnea and SpO2 , now s/p nebulizer treatment and with home inhaler restarted.    PLAN:  - pain control w/ tylenol, toradol, and dpca.  - Pulmonology following: c/w spiriva/sympbicort, albuterol, and flonase. wean supplemental 02   -c/w clear liquids. IVF  -Plan for UGI tomorrow  - Steroid taper per outpatient rheum recs , inpatient rheum additionally consulted 8/21/24   - Hydrocortisone 100 pre-op completed 8/20   - Hydrocortisone 50mg q8h x3 doses 8/22   - Hydrocortisone 25mg q8h x3 doses  * for 8/23     - Prednisone 7.5mg QD starting on POD3 tentative on 8/24  if able to tolerate PO, solumedrol 6mg IV QD if unable to tolerate PO  -Encourage IS/OOBAT  -c/w lovenox DVT ppx     E team surgery  [id number]

## 2024-08-22 NOTE — PROGRESS NOTE ADULT - ASSESSMENT
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 Post procedure, he developed increasing SOB and chest tightness. Overnight team placed him on 6L NC weaned to 2L SAO2 100%, s/p nebulizer treatment. Patient admittedly has been under utilizing his Dilaudid PCA. Pulmonary was consulted to evaluate due to hypoxia requiring oxygen and due to history of previous admissions for asthma exacerbation.        PLAN    #asthma   At home on trelegy, dupixent and albuterol prn   Was on 3L NC sating high 90s, normally not on home O2, s/p nebulizer tx   Chest XR showed clear lungs, no pneumothorax or pleural effusions  Asthma symptoms likely being worsened by combination of pain and sedation from his surgery, less likely acute infectious etiology or acute lung pathology  Today developed expiratory wheezes, received albuterol 2.5 mg neb tx. 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 Post procedure, he developed increasing SOB and chest tightness. Overnight team placed him on 6L NC weaned to 2L SAO2 100%, s/p nebulizer treatment. Patient admittedly has been under utilizing his Dilaudid PCA. Pulmonary was consulted to evaluate due to hypoxia requiring oxygen and due to history of previous admissions for asthma exacerbation.        PLAN    #asthma   At home on trelegy, dupixent and albuterol prn   Was on 3L NC sating high 90s, normally not on home O2, s/p nebulizer tx   8/21 Chest XR showed clear lungs, no pneumothorax or pleural effusions  Asthma symptoms likely being worsened by combination of pain and sedation from his surgery, less likely acute infectious etiology or acute lung pathology  Today developed acute expiratory wheezes and cough with yellow phelgm. Received albuterol 2.5 mg neb tx for sx. Remains afebrile, RVP was negative.  - continue to wean off NC, currently on 1L SPO2 >90%  - c/w symbicort   - c/w spiriva  - c/w albuterol neb tx q6hrs prn   - encourage incentive spirometry, oob to chair, PT/OT  - c/w flonase for nasal congestion  - currently on hydrocortisone taper per rheum recs  - c/w pain mgmt regiment      Discussed with Pulm/Crit fellow and Dr. 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 Post procedure, he developed increasing SOB and chest tightness. Overnight team placed him on 6L NC weaned to 2L SAO2 100%, s/p nebulizer treatment. Patient admittedly has been under utilizing his Dilaudid PCA. Pulmonary was consulted to evaluate due to hypoxia requiring oxygen and due to history of previous admissions for asthma exacerbation.    Today developed acute expiratory wheezes, received albuterol 2.5 mg neb tx for sx. Has cough with yellow phlegm since yesterday. Remains afebrile, RVP was negative.    PLAN    #asthma   At home on Trelegy Dupixent and albuterol prn   Was on 3L NC sating high 90s, normally not on home O2, s/p nebulizer tx   8/21 Chest XR showed clear lungs, no pneumothorax or pleural effusions  Asthma symptoms likely being worsened by combination of pain and sedation from his surgery, less likely acute infectious etiology or acute lung pathology  - continue to wean off NC, currently on 1L SPO2 >90%  - c/w Symbicort and Spiriva consider spacing out medications  - c/w albuterol neb tx q6hrs prn   - encourage incentive spirometry, oob to chair, PT/OT  - c/w Flonase for nasal congestion  - currently on hydrocortisone taper per rheum recs  - c/w pain mgmt regimen        Discussed with Pulm/Crit fellow and Dr. 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 Post procedure, he developed increasing SOB and chest tightness. Overnight team placed him on 6L NC weaned to 2L SAO2 100%, s/p nebulizer treatment. Patient admittedly has been under utilizing his Dilaudid PCA. Pulmonary was consulted to evaluate due to hypoxia requiring oxygen and due to history of previous admissions for asthma exacerbation.        PLAN    #asthma   At home on Trelegy Dupixent and albuterol prn   Was on 3L NC sating high 90s, normally not on home O2, s/p nebulizer tx   8/21 Chest XR showed clear lungs, no pneumothorax or pleural effusions  Asthma symptoms likely being worsened by combination of pain and sedation from his surgery, less likely acute infectious etiology or acute lung pathology  Today developed acute expiratory wheezes, received albuterol 2.5 mg neb tx for sx. Has cough with yellow phlegm since yesterday. Remains afebrile, RVP was negative.  - continue to wean off NC, currently on 1L SPO2 >90%  - c/w Symbicort and Spiriva consider spacing out medications  - c/w albuterol neb tx q6hrs prn   - encourage incentive spirometry, oob to chair, PT/OT  - c/w Flonase for nasal congestion  - currently on hydrocortisone taper per rheum recs  - c/w pain mgmt regimen        Discussed with Pulm/Crit fellow and Dr. 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 Post procedure, he developed increasing SOB and chest tightness. Overnight team placed him on 6L NC weaned to 2L SAO2 100%, s/p nebulizer treatment. Patient admittedly has been under utilizing his Dilaudid PCA. Pulmonary was consulted to evaluate due to hypoxia requiring oxygen and due to history of previous admissions for asthma exacerbation.        PLAN    #asthma   At home on Trelegy Dupixent and albuterol prn   Was on 3L NC sating high 90s, normally not on home O2, s/p nebulizer tx   8/21 - Chest XR showed clear lungs, no pneumothorax or pleural effusions  Asthma symptoms likely being worsened by combination of pain and sedation from his surgery, less likely acute infectious etiology or acute lung pathology  Today developed acute expiratory wheezes, received albuterol 2.5 mg neb tx for sx. Has cough with yellow phlegm since yesterday. Remains afebrile, RVP was negative.  - continue to wean off NC, currently on 1L SPO2 >90%  - c/w Symbicort and Spiriva consider spacing out medications  - c/w albuterol neb tx q6hrs prn   - encourage incentive spirometry, oob to chair, PT/OT  - c/w Flonase for nasal congestion  - currently on hydrocortisone taper per rheum recs  - c/w pain mgmt regimen        Discussed with Pulm/Crit fellow and Dr. 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 Post procedure, he developed increasing SOB and chest tightness. Overnight team placed him on 6L NC weaned to 2L SAO2 100%, s/p nebulizer treatment. Patient admittedly has been under utilizing his Dilaudid PCA. Pulmonary was consulted to evaluate due to hypoxia requiring oxygen and due to history of previous admissions for asthma exacerbation.        PLAN    #asthma   At home on Trelegy Dupixent and albuterol prn   Was on 3L NC sating high 90s, normally not on home O2, s/p nebulizer tx   8/21 - Chest XR showed clear lungs, no pneumothorax or pleural effusions  Today developed acute expiratory wheezes, received albuterol 2.5 mg neb tx for sx. Has cough with yellow phlegm since yesterday. Noted to have low grade fever, RVP was negative. Questionable PNA developing?  - continue to wean off NC, currently on 1L SPO2 >90%  - c/w Symbicort and Spiriva, consider spacing out medications  - c/w albuterol neb tx q6hrs prn   - encourage incentive spirometry, oob to chair, PT/OT  - c/w Flonase for nasal congestion  - currently on hydrocortisone taper per rheum recs  - c/w pain mgmt regimen  - order full RVP  - order sputum cx  - order procalcitonin        Discussed with Pulm/Crit fellow and Dr. 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 Post procedure, he developed increasing SOB and chest tightness. Overnight team placed him on 6L NC weaned to 2L SAO2 100%, s/p nebulizer treatment. Patient admittedly has been under utilizing his Dilaudid PCA. Pulmonary was consulted to evaluate due to hypoxia requiring oxygen and due to history of previous admissions for asthma exacerbation.      PLAN    #asthma   At home on Trelegy Dupixent and albuterol prn   Was on 3L NC sating high 90s, normally not on home O2, s/p nebulizer tx   Asthma symptoms has contributions from pain and sedation from surgery; Chest XR from 8/21 was unremarkable.  This morning, developed expiratory wheezes, received albuterol 2.5 mg neb tx for sx.  - continue to wean off NC, currently on 1L SPO2 >90%  - c/w Symbicort and Spiriva, consider spacing out medications   - c/w albuterol neb tx q6hrs prn   - encourage incentive spirometry, oob to chair, PT/OT  - c/w Flonase for nasal congestion  - currently on hydrocortisone taper per rheum recs  - c/w pain mgmt regimen    #questionable pneumonia  Chest XR from 8/21 was unremarkable.  Has cough with yellow phlegm since yesterday. Noted to have low grade fever (99.5 degF) overnight, on immunosuppressive therapy. RVP was negative. Possible pneumonia could be developing.  - order full RVP  - order sputum cx  - order procalcitonin        Discussed with Pulm/Crit fellow and Dr. 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 Post procedure, he developed increasing SOB and chest tightness. Overnight team placed him on 6L NC weaned to 2L SAO2 100%, s/p nebulizer treatment. Patient admittedly has been under utilizing his Dilaudid PCA. Pulmonary was consulted to evaluate due to hypoxia requiring oxygen and due to history of previous admissions for asthma exacerbation.      PLAN    #asthma   At home on Trelegy Dupixent and albuterol prn   Was on 3L NC sating high 90s, normally not on home O2, s/p nebulizer tx   Asthma symptoms has contributions from pain and sedation from surgery; Chest XR from 8/21 was unremarkable.  This morning, developed expiratory wheezes, received albuterol 2.5 mg neb tx for sx.  - continue to wean off NC, currently on 1L SPO2 >90%  - c/w Symbicort and Spiriva, consider spacing out medications   - c/w albuterol neb tx q6hrs prn   - encourage incentive spirometry, oob to chair, PT/OT  - c/w Flonase for nasal congestion  - currently on hydrocortisone taper per rheum recs  - c/w pain mgmt regimen      #possible pneumonia  Chest XR from 8/21 was unremarkable.  Has cough with yellow phlegm since yesterday. Noted to have low grade fever (99.5 degF) overnight, on immunosuppressive therapy. RVP was negative. Possible pneumonia could be developing.  - order full RVP  - order sputum cx  - order procalcitonin        Discussed with Pulm/Crit fellow and Dr. 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 Post procedure, he developed increasing SOB and chest tightness. Overnight team placed him on 6L NC weaned to 2L SAO2 100%, s/p nebulizer treatment. Patient admittedly has been under utilizing his Dilaudid PCA. Pulmonary was consulted to evaluate due to hypoxia requiring oxygen and due to history of previous admissions for asthma exacerbation.      PLAN    #asthma   At home on Trelegy Dupixent and albuterol prn   On 3L NC sating high 90s, normally not on home O2, s/p nebulizer tx   Asthma symptoms has contributions from pain and sedation from surgery; Chest XR from 8/21 was unremarkable.  This morning, developed expiratory wheezes, received albuterol 2.5 mg neb tx for sx.  - continue to wean off NC, currently on 1L SPO2 >90%  - c/w Symbicort BID and Spiriva qd, consider spacing out medications   - c/w albuterol neb tx q6hrs prn   - encourage incentive spirometry, oob to chair, PT/OT  - c/w Flonase for nasal congestion  - currently on hydrocortisone taper per rheum recs  - c/w pain mgmt regimen      #possible CAP  Chest XR from 8/21 was unremarkable.  Has cough with yellow phlegm since yesterday. Noted to have low grade fever (99.5 degF) overnight, on immunosuppressive therapy. RVP was negative. Possible pneumonia could be developing.  - order full RVP  - order sputum cx  - order procalcitonin        Discussed with Pulm/Crit fellow and Dr. Bae

## 2024-08-23 LAB
ADD ON TEST-SPECIMEN IN LAB: SIGNIFICANT CHANGE UP
ANION GAP SERPL CALC-SCNC: 10 MMOL/L — SIGNIFICANT CHANGE UP (ref 7–14)
BUN SERPL-MCNC: 7 MG/DL — SIGNIFICANT CHANGE UP (ref 7–23)
CALCIUM SERPL-MCNC: 8.6 MG/DL — SIGNIFICANT CHANGE UP (ref 8.4–10.5)
CHLORIDE SERPL-SCNC: 101 MMOL/L — SIGNIFICANT CHANGE UP (ref 98–107)
CO2 SERPL-SCNC: 25 MMOL/L — SIGNIFICANT CHANGE UP (ref 22–31)
CREAT SERPL-MCNC: 0.74 MG/DL — SIGNIFICANT CHANGE UP (ref 0.5–1.3)
EGFR: 112 ML/MIN/1.73M2 — SIGNIFICANT CHANGE UP
GLUCOSE BLDC GLUCOMTR-MCNC: 104 MG/DL — HIGH (ref 70–99)
GLUCOSE BLDC GLUCOMTR-MCNC: 136 MG/DL — HIGH (ref 70–99)
GLUCOSE BLDC GLUCOMTR-MCNC: 81 MG/DL — SIGNIFICANT CHANGE UP (ref 70–99)
GLUCOSE BLDC GLUCOMTR-MCNC: 96 MG/DL — SIGNIFICANT CHANGE UP (ref 70–99)
GLUCOSE BLDC GLUCOMTR-MCNC: 97 MG/DL — SIGNIFICANT CHANGE UP (ref 70–99)
GLUCOSE SERPL-MCNC: 110 MG/DL — HIGH (ref 70–99)
HCT VFR BLD CALC: 33.8 % — LOW (ref 39–50)
HGB BLD-MCNC: 11.1 G/DL — LOW (ref 13–17)
MAGNESIUM SERPL-MCNC: 2.1 MG/DL — SIGNIFICANT CHANGE UP (ref 1.6–2.6)
MCHC RBC-ENTMCNC: 27.4 PG — SIGNIFICANT CHANGE UP (ref 27–34)
MCHC RBC-ENTMCNC: 32.8 GM/DL — SIGNIFICANT CHANGE UP (ref 32–36)
MCV RBC AUTO: 83.5 FL — SIGNIFICANT CHANGE UP (ref 80–100)
NRBC # BLD: 0 /100 WBCS — SIGNIFICANT CHANGE UP (ref 0–0)
NRBC # FLD: 0 K/UL — SIGNIFICANT CHANGE UP (ref 0–0)
PHOSPHATE SERPL-MCNC: 2 MG/DL — LOW (ref 2.5–4.5)
PLATELET # BLD AUTO: 272 K/UL — SIGNIFICANT CHANGE UP (ref 150–400)
POTASSIUM SERPL-MCNC: 3.7 MMOL/L — SIGNIFICANT CHANGE UP (ref 3.5–5.3)
POTASSIUM SERPL-SCNC: 3.7 MMOL/L — SIGNIFICANT CHANGE UP (ref 3.5–5.3)
PROCALCITONIN SERPL-MCNC: 0.67 NG/ML — HIGH (ref 0.02–0.1)
RBC # BLD: 4.05 M/UL — LOW (ref 4.2–5.8)
RBC # FLD: 14.2 % — SIGNIFICANT CHANGE UP (ref 10.3–14.5)
SODIUM SERPL-SCNC: 136 MMOL/L — SIGNIFICANT CHANGE UP (ref 135–145)
WBC # BLD: 12.66 K/UL — HIGH (ref 3.8–10.5)
WBC # FLD AUTO: 12.66 K/UL — HIGH (ref 3.8–10.5)

## 2024-08-23 PROCEDURE — 74240 X-RAY XM UPR GI TRC 1CNTRST: CPT | Mod: 26

## 2024-08-23 PROCEDURE — 99233 SBSQ HOSP IP/OBS HIGH 50: CPT | Mod: GC

## 2024-08-23 PROCEDURE — 74018 RADEX ABDOMEN 1 VIEW: CPT | Mod: 26

## 2024-08-23 RX ORDER — POTASSIUM PHOSPHATE 236; 224 MG/ML; MG/ML
30 INJECTION, SOLUTION INTRAVENOUS ONCE
Refills: 0 | Status: COMPLETED | OUTPATIENT
Start: 2024-08-23 | End: 2024-08-23

## 2024-08-23 RX ORDER — SODIUM PHOSPHATE, DIBASIC, ANHYDROUS, POTASSIUM PHOSPHATE, MONOBASIC, AND SODIUM PHOSPHATE, MONOBASIC, MONOHYDRATE 852; 155; 130 MG/1; MG/1; MG/1
1 TABLET, COATED ORAL
Refills: 0 | Status: COMPLETED | OUTPATIENT
Start: 2024-08-23 | End: 2024-08-24

## 2024-08-23 RX ADMIN — ACETAMINOPHEN 400 MILLIGRAM(S): 325 TABLET ORAL at 13:31

## 2024-08-23 RX ADMIN — POTASSIUM PHOSPHATE 83.33 MILLIMOLE(S): 236; 224 INJECTION, SOLUTION INTRAVENOUS at 17:43

## 2024-08-23 RX ADMIN — KETOROLAC TROMETHAMINE 15 MILLIGRAM(S): 30 INJECTION, SOLUTION INTRAMUSCULAR at 12:00

## 2024-08-23 RX ADMIN — TIOTROPIUM BROMIDE INHALATION SPRAY 2 PUFF(S): 3.12 SPRAY, METERED RESPIRATORY (INHALATION) at 13:32

## 2024-08-23 RX ADMIN — METHOCARBAMOL 500 MILLIGRAM(S): 750 TABLET, FILM COATED ORAL at 13:32

## 2024-08-23 RX ADMIN — ENOXAPARIN SODIUM 40 MILLIGRAM(S): 100 INJECTION SUBCUTANEOUS at 05:55

## 2024-08-23 RX ADMIN — HYDROXYCHLOROQUINE SULFATE 400 MILLIGRAM(S): 200 TABLET, FILM COATED ORAL at 23:10

## 2024-08-23 RX ADMIN — BUDESONIDE AND FORMOTEROL FUMARATE 2 PUFF(S): 80; 4.5 AEROSOL, METERED RESPIRATORY (INHALATION) at 23:09

## 2024-08-23 RX ADMIN — ACETAMINOPHEN 1000 MILLIGRAM(S): 325 TABLET ORAL at 14:00

## 2024-08-23 RX ADMIN — KETOROLAC TROMETHAMINE 15 MILLIGRAM(S): 30 INJECTION, SOLUTION INTRAMUSCULAR at 22:55

## 2024-08-23 RX ADMIN — Medication 125 MICROGRAM(S): at 05:05

## 2024-08-23 RX ADMIN — METHOCARBAMOL 500 MILLIGRAM(S): 750 TABLET, FILM COATED ORAL at 06:34

## 2024-08-23 RX ADMIN — ACETAMINOPHEN 400 MILLIGRAM(S): 325 TABLET ORAL at 19:11

## 2024-08-23 RX ADMIN — BUDESONIDE AND FORMOTEROL FUMARATE 2 PUFF(S): 80; 4.5 AEROSOL, METERED RESPIRATORY (INHALATION) at 08:33

## 2024-08-23 RX ADMIN — HYDROMORPHONE HYDROCHLORIDE 30 MILLILITER(S): 2 TABLET ORAL at 19:09

## 2024-08-23 RX ADMIN — Medication 300 MILLIGRAM(S): at 22:55

## 2024-08-23 RX ADMIN — ACETAMINOPHEN 400 MILLIGRAM(S): 325 TABLET ORAL at 05:53

## 2024-08-23 RX ADMIN — KETOROLAC TROMETHAMINE 15 MILLIGRAM(S): 30 INJECTION, SOLUTION INTRAMUSCULAR at 23:00

## 2024-08-23 RX ADMIN — KETOROLAC TROMETHAMINE 15 MILLIGRAM(S): 30 INJECTION, SOLUTION INTRAMUSCULAR at 03:50

## 2024-08-23 RX ADMIN — SODIUM PHOSPHATE, DIBASIC, ANHYDROUS, POTASSIUM PHOSPHATE, MONOBASIC, AND SODIUM PHOSPHATE, MONOBASIC, MONOHYDRATE 1 PACKET(S): 852; 155; 130 TABLET, COATED ORAL at 18:34

## 2024-08-23 RX ADMIN — KETOROLAC TROMETHAMINE 15 MILLIGRAM(S): 30 INJECTION, SOLUTION INTRAMUSCULAR at 03:20

## 2024-08-23 RX ADMIN — Medication 40 MILLIGRAM(S): at 05:55

## 2024-08-23 RX ADMIN — Medication 300 MILLIGRAM(S): at 06:34

## 2024-08-23 RX ADMIN — Medication 300 MILLIGRAM(S): at 13:31

## 2024-08-23 RX ADMIN — FLUTICASONE PROPIONATE 1 SPRAY(S): 50 SPRAY, METERED NASAL at 05:53

## 2024-08-23 RX ADMIN — HYDROCORTISONE 25 MILLIGRAM(S): 10 TABLET ORAL at 08:33

## 2024-08-23 RX ADMIN — HYDROMORPHONE HYDROCHLORIDE 30 MILLILITER(S): 2 TABLET ORAL at 07:54

## 2024-08-23 RX ADMIN — KETOROLAC TROMETHAMINE 15 MILLIGRAM(S): 30 INJECTION, SOLUTION INTRAMUSCULAR at 10:47

## 2024-08-23 RX ADMIN — KETOROLAC TROMETHAMINE 15 MILLIGRAM(S): 30 INJECTION, SOLUTION INTRAMUSCULAR at 17:43

## 2024-08-23 RX ADMIN — ACETAMINOPHEN 1000 MILLIGRAM(S): 325 TABLET ORAL at 05:30

## 2024-08-23 RX ADMIN — ONDANSETRON 4 MILLIGRAM(S): 2 INJECTION, SOLUTION INTRAMUSCULAR; INTRAVENOUS at 00:20

## 2024-08-23 RX ADMIN — METHOCARBAMOL 500 MILLIGRAM(S): 750 TABLET, FILM COATED ORAL at 23:09

## 2024-08-23 RX ADMIN — ACETAMINOPHEN 1000 MILLIGRAM(S): 325 TABLET ORAL at 19:45

## 2024-08-23 RX ADMIN — KETOROLAC TROMETHAMINE 15 MILLIGRAM(S): 30 INJECTION, SOLUTION INTRAMUSCULAR at 18:00

## 2024-08-23 RX ADMIN — FLUTICASONE PROPIONATE 1 SPRAY(S): 50 SPRAY, METERED NASAL at 17:43

## 2024-08-23 NOTE — PROGRESS NOTE ADULT - ASSESSMENT
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 Patient is hemodynamically stable on supplemental oxygen with subjective dyspnea and SpO2 , now s/p nebulizer treatment and with home inhaler restarted.    # gastric CA  S/P Robotic gasserectomy   Surg care appreciated  pain control   incentive  oral feeding when bowel function   PT as tolerated     # Hypothyroidism  TFTs  synthroid     # Lupus myocytics   s/p hydrocortisone taper  Prednisone 7.5 mg home dose.   outpt rheum follow up.     # Asthma  pulm following  on Trellergy at home.    DVT ppx

## 2024-08-23 NOTE — PROGRESS NOTE ADULT - SUBJECTIVE AND OBJECTIVE BOX
Interval Events: Patient was seen and examined at bedside. No overnight events. On RA currently. Patients to reports continued SOB exacerbated by pain from surgery and wheezes overnight. He did not require albuterol neb tx. He denies any other pain or sx at this time.    REVIEW OF SYSTEMS:  Constitutional: [ ] fevers [ ] chills [ ] weight loss [ ] weight gain  CV: [ ] chest pain [ ] orthopnea [ ] palpitations [ ] murmur  Resp: [ ] cough [ ] shortness of breath [ ] dyspnea [ ] wheezing [ ] sputum [ ] hemoptysis  [ ] All other systems negative  [ ] Unable to assess ROS because ________    OBJECTIVE:  ICU Vital Signs Last 24 Hrs  T(C): 36.8 (23 Aug 2024 04:15), Max: 37.2 (22 Aug 2024 13:15)  T(F): 98.3 (23 Aug 2024 04:15), Max: 98.9 (22 Aug 2024 13:15)  HR: 96 (23 Aug 2024 04:15) (90 - 107)  BP: 115/77 (23 Aug 2024 04:15) (115/77 - 145/72)  BP(mean): --  ABP: --  ABP(mean): --  RR: 18 (23 Aug 2024 04:15) (18 - 20)  SpO2: 100% (23 Aug 2024 04:15) (96% - 100%)    O2 Parameters below as of 23 Aug 2024 04:15  Patient On (Oxygen Delivery Method): room air              08-22 @ 07:01  -  08-23 @ 07:00  --------------------------------------------------------  IN: 1050 mL / OUT: 3250 mL / NET: -2200 mL      CAPILLARY BLOOD GLUCOSE      POCT Blood Glucose.: 96 mg/dL (23 Aug 2024 07:53)      PHYSICAL EXAM:  General: Awake, alert, oriented X 3.   HEENT: Atraumatic, normocephalic.   Neck: Supple  Respiratory: shallow breathing, non-labored respirations, No wheezes, rales or rhonchi. Speaking in full sentences  Cardiovascular: S1 S2 normal. No murmurs, rubs or gallops.   Abdomen: Soft, non-tender, non-distended. No organomegaly.  Extremities: Warm to touch. No pedal edema.   Neurological: Non-focal        HOSPITAL MEDICATIONS:  MEDICATIONS  (STANDING):  acetaminophen   IVPB .. 1000 milliGRAM(s) IV Intermittent every 6 hours  budesonide  80 MICROgram(s)/formoterol 4.5 MICROgram(s) Inhaler 2 Puff(s) Inhalation two times a day  dextrose 5% + sodium chloride 0.9%. 1000 milliLiter(s) (50 mL/Hr) IV Continuous <Continuous>  dextrose 5%. 1000 milliLiter(s) (100 mL/Hr) IV Continuous <Continuous>  dextrose 5%. 1000 milliLiter(s) (50 mL/Hr) IV Continuous <Continuous>  dextrose 50% Injectable 25 Gram(s) IV Push once  dextrose 50% Injectable 12.5 Gram(s) IV Push once  dextrose 50% Injectable 25 Gram(s) IV Push once  enoxaparin Injectable 40 milliGRAM(s) SubCutaneous every 24 hours  fluticasone propionate 50 MICROgram(s)/spray Nasal Spray 1 Spray(s) Both Nostrils two times a day  gabapentin 300 milliGRAM(s) Oral every 8 hours  glucagon  Injectable 1 milliGRAM(s) IntraMuscular once  HYDROmorphone PCA (1 mG/mL) 30 milliLiter(s) PCA Continuous PCA Continuous  hydroxychloroquine 400 milliGRAM(s) Oral at bedtime  insulin lispro (ADMELOG) corrective regimen sliding scale   SubCutaneous three times a day before meals  insulin lispro (ADMELOG) corrective regimen sliding scale   SubCutaneous at bedtime  ketorolac   Injectable 15 milliGRAM(s) IV Push every 6 hours  levothyroxine 125 MICROGram(s) Oral daily  lidocaine   4% Patch 1 Patch Transdermal daily  methocarbamol 500 milliGRAM(s) Oral every 8 hours  pantoprazole  Injectable 40 milliGRAM(s) IV Push every 24 hours  potassium phosphate / sodium phosphate Powder (PHOS-NaK) 1 Packet(s) Oral two times a day  potassium phosphate IVPB 30 milliMole(s) IV Intermittent once  predniSONE   Tablet 7.5 milliGRAM(s) Oral daily  tiotropium 2.5 MICROgram(s) Inhaler 2 Puff(s) Inhalation daily    MEDICATIONS  (PRN):  albuterol    0.083% 2.5 milliGRAM(s) Nebulizer every 6 hours PRN Shortness of Breath and/or Wheezing  dextrose Oral Gel 15 Gram(s) Oral once PRN Blood Glucose LESS THAN 70 milliGRAM(s)/deciliter  HYDROmorphone PCA (1 mG/mL) Rescue Clinician Bolus 0.5 milliGRAM(s) IV Push every 15 minutes PRN for Pain Scale GREATER THAN 6  naloxone Injectable 0.1 milliGRAM(s) IV Push every 3 minutes PRN For ANY of the following changes in patient status:  A. RR LESS THAN 10 breaths per minute, B. Oxygen saturation LESS THAN 90%, C. Sedation score of 6      LABS:                        11.1   12.66 )-----------( 272      ( 23 Aug 2024 06:34 )             33.8     Hgb Trend: 11.1<--, 12.0<--, 10.6<--  08-23    136  |  101  |  7   ----------------------------<  110<H>  3.7   |  25  |  0.74    Ca    8.6      23 Aug 2024 06:34  Phos  2.0     08-23  Mg     2.10     08-23      Creatinine Trend: 0.74<--, 0.87<--, 0.79<--, 0.98<--    Urinalysis Basic - ( 23 Aug 2024 06:34 )    Color: x / Appearance: x / SG: x / pH: x  Gluc: 110 mg/dL / Ketone: x  / Bili: x / Urobili: x   Blood: x / Protein: x / Nitrite: x   Leuk Esterase: x / RBC: x / WBC x   Sq Epi: x / Non Sq Epi: x / Bacteria: x          MICROBIOLOGY:     RADIOLOGY:  [ ] Reviewed and interpreted by me   Interval Events:  No overnight events. Patient was seen and examined while sitting in chair. On RA currently. Patients reports continued SOB exacerbated by pain from surgery and wheezes overnight. He did not require albuterol neb tx. He states his pain is somewhat improving with current pain regiment, states pain mostly located in right upper abdomen, exacerbated by movement, but denies denies any other pain or sx at this time.    REVIEW OF SYSTEMS:  Constitutional: [ ] fevers [ ] chills [ ] weight loss [ ] weight gain  CV: [ ] chest pain [ ] orthopnea [ ] palpitations [ ] murmur  Resp: [ ] cough [ ] shortness of breath [ ] dyspnea [ ] wheezing [ ] sputum [ ] hemoptysis  [x] All other systems negative  [ ] Unable to assess ROS because ________    OBJECTIVE:  ICU Vital Signs Last 24 Hrs  T(C): 36.8 (23 Aug 2024 04:15), Max: 37.2 (22 Aug 2024 13:15)  T(F): 98.3 (23 Aug 2024 04:15), Max: 98.9 (22 Aug 2024 13:15)  HR: 96 (23 Aug 2024 04:15) (90 - 107)  BP: 115/77 (23 Aug 2024 04:15) (115/77 - 145/72)  BP(mean): --  ABP: --  ABP(mean): --  RR: 18 (23 Aug 2024 04:15) (18 - 20)  SpO2: 100% (23 Aug 2024 04:15) (96% - 100%)    O2 Parameters below as of 23 Aug 2024 04:15  Patient On (Oxygen Delivery Method): room air              08-22 @ 07:01  -  08-23 @ 07:00  --------------------------------------------------------  IN: 1050 mL / OUT: 3250 mL / NET: -2200 mL      CAPILLARY BLOOD GLUCOSE      POCT Blood Glucose.: 96 mg/dL (23 Aug 2024 07:53)      PHYSICAL EXAM:  General: Awake, alert, oriented X 3.   HEENT: Atraumatic, normocephalic.   Neck: Supple  Respiratory: shallow breathing, non-labored respirations, No wheezes, rales or rhonchi. Speaking in full sentences  Cardiovascular: S1 S2 normal. No murmurs, rubs or gallops.   Abdomen: Soft, non-tender, non-distended. No organomegaly.  Extremities: Warm to touch. No pedal edema.   Neurological: Non-focal        HOSPITAL MEDICATIONS:  MEDICATIONS  (STANDING):  acetaminophen   IVPB .. 1000 milliGRAM(s) IV Intermittent every 6 hours  budesonide  80 MICROgram(s)/formoterol 4.5 MICROgram(s) Inhaler 2 Puff(s) Inhalation two times a day  dextrose 5% + sodium chloride 0.9%. 1000 milliLiter(s) (50 mL/Hr) IV Continuous <Continuous>  dextrose 5%. 1000 milliLiter(s) (100 mL/Hr) IV Continuous <Continuous>  dextrose 5%. 1000 milliLiter(s) (50 mL/Hr) IV Continuous <Continuous>  dextrose 50% Injectable 25 Gram(s) IV Push once  dextrose 50% Injectable 12.5 Gram(s) IV Push once  dextrose 50% Injectable 25 Gram(s) IV Push once  enoxaparin Injectable 40 milliGRAM(s) SubCutaneous every 24 hours  fluticasone propionate 50 MICROgram(s)/spray Nasal Spray 1 Spray(s) Both Nostrils two times a day  gabapentin 300 milliGRAM(s) Oral every 8 hours  glucagon  Injectable 1 milliGRAM(s) IntraMuscular once  HYDROmorphone PCA (1 mG/mL) 30 milliLiter(s) PCA Continuous PCA Continuous  hydroxychloroquine 400 milliGRAM(s) Oral at bedtime  insulin lispro (ADMELOG) corrective regimen sliding scale   SubCutaneous three times a day before meals  insulin lispro (ADMELOG) corrective regimen sliding scale   SubCutaneous at bedtime  ketorolac   Injectable 15 milliGRAM(s) IV Push every 6 hours  levothyroxine 125 MICROGram(s) Oral daily  lidocaine   4% Patch 1 Patch Transdermal daily  methocarbamol 500 milliGRAM(s) Oral every 8 hours  pantoprazole  Injectable 40 milliGRAM(s) IV Push every 24 hours  potassium phosphate / sodium phosphate Powder (PHOS-NaK) 1 Packet(s) Oral two times a day  potassium phosphate IVPB 30 milliMole(s) IV Intermittent once  predniSONE   Tablet 7.5 milliGRAM(s) Oral daily  tiotropium 2.5 MICROgram(s) Inhaler 2 Puff(s) Inhalation daily    MEDICATIONS  (PRN):  albuterol    0.083% 2.5 milliGRAM(s) Nebulizer every 6 hours PRN Shortness of Breath and/or Wheezing  dextrose Oral Gel 15 Gram(s) Oral once PRN Blood Glucose LESS THAN 70 milliGRAM(s)/deciliter  HYDROmorphone PCA (1 mG/mL) Rescue Clinician Bolus 0.5 milliGRAM(s) IV Push every 15 minutes PRN for Pain Scale GREATER THAN 6  naloxone Injectable 0.1 milliGRAM(s) IV Push every 3 minutes PRN For ANY of the following changes in patient status:  A. RR LESS THAN 10 breaths per minute, B. Oxygen saturation LESS THAN 90%, C. Sedation score of 6      LABS:                        11.1   12.66 )-----------( 272      ( 23 Aug 2024 06:34 )             33.8     Hgb Trend: 11.1<--, 12.0<--, 10.6<--  08-23    136  |  101  |  7   ----------------------------<  110<H>  3.7   |  25  |  0.74    Ca    8.6      23 Aug 2024 06:34  Phos  2.0     08-23  Mg     2.10     08-23      Creatinine Trend: 0.74<--, 0.87<--, 0.79<--, 0.98<--    Urinalysis Basic - ( 23 Aug 2024 06:34 )    Color: x / Appearance: x / SG: x / pH: x  Gluc: 110 mg/dL / Ketone: x  / Bili: x / Urobili: x   Blood: x / Protein: x / Nitrite: x   Leuk Esterase: x / RBC: x / WBC x   Sq Epi: x / Non Sq Epi: x / Bacteria: x          MICROBIOLOGY:     RADIOLOGY:  [ ] Reviewed and interpreted by me   Interval Events:  No overnight events. Patient was seen and examined while sitting in chair. On RA currently. Patients reports continued SOB exacerbated by pain from surgery and wheezes overnight. He did not require albuterol neb tx. Still has cough with yellow phelgm. He states his pain is somewhat improving with current pain regiment, states pain mostly located in right upper abdomen, exacerbated by movement, but denies denies any other pain or sx at this time.    REVIEW OF SYSTEMS:  Constitutional: [ ] fevers [ ] chills [ ] weight loss [ ] weight gain  CV: [ ] chest pain [ ] orthopnea [ ] palpitations [ ] murmur  Resp: [ ] cough [ ] shortness of breath [ ] dyspnea [ ] wheezing [ ] sputum [ ] hemoptysis  [x] All other systems negative  [ ] Unable to assess ROS because ________    OBJECTIVE:  ICU Vital Signs Last 24 Hrs  T(C): 36.8 (23 Aug 2024 04:15), Max: 37.2 (22 Aug 2024 13:15)  T(F): 98.3 (23 Aug 2024 04:15), Max: 98.9 (22 Aug 2024 13:15)  HR: 96 (23 Aug 2024 04:15) (90 - 107)  BP: 115/77 (23 Aug 2024 04:15) (115/77 - 145/72)  BP(mean): --  ABP: --  ABP(mean): --  RR: 18 (23 Aug 2024 04:15) (18 - 20)  SpO2: 100% (23 Aug 2024 04:15) (96% - 100%)    O2 Parameters below as of 23 Aug 2024 04:15  Patient On (Oxygen Delivery Method): room air              08-22 @ 07:01  -  08-23 @ 07:00  --------------------------------------------------------  IN: 1050 mL / OUT: 3250 mL / NET: -2200 mL      CAPILLARY BLOOD GLUCOSE      POCT Blood Glucose.: 96 mg/dL (23 Aug 2024 07:53)      PHYSICAL EXAM:  General: Awake, alert, oriented X 3.   HEENT: Atraumatic, normocephalic.   Neck: Supple  Respiratory: shallow breathing, non-labored respirations, No wheezes, rales or rhonchi. Speaking in full sentences  Cardiovascular: S1 S2 normal. No murmurs, rubs or gallops.   Abdomen: Soft, non-tender, non-distended. No organomegaly.  Extremities: Warm to touch. No pedal edema.   Neurological: Non-focal        HOSPITAL MEDICATIONS:  MEDICATIONS  (STANDING):  acetaminophen   IVPB .. 1000 milliGRAM(s) IV Intermittent every 6 hours  budesonide  80 MICROgram(s)/formoterol 4.5 MICROgram(s) Inhaler 2 Puff(s) Inhalation two times a day  dextrose 5% + sodium chloride 0.9%. 1000 milliLiter(s) (50 mL/Hr) IV Continuous <Continuous>  dextrose 5%. 1000 milliLiter(s) (100 mL/Hr) IV Continuous <Continuous>  dextrose 5%. 1000 milliLiter(s) (50 mL/Hr) IV Continuous <Continuous>  dextrose 50% Injectable 25 Gram(s) IV Push once  dextrose 50% Injectable 12.5 Gram(s) IV Push once  dextrose 50% Injectable 25 Gram(s) IV Push once  enoxaparin Injectable 40 milliGRAM(s) SubCutaneous every 24 hours  fluticasone propionate 50 MICROgram(s)/spray Nasal Spray 1 Spray(s) Both Nostrils two times a day  gabapentin 300 milliGRAM(s) Oral every 8 hours  glucagon  Injectable 1 milliGRAM(s) IntraMuscular once  HYDROmorphone PCA (1 mG/mL) 30 milliLiter(s) PCA Continuous PCA Continuous  hydroxychloroquine 400 milliGRAM(s) Oral at bedtime  insulin lispro (ADMELOG) corrective regimen sliding scale   SubCutaneous three times a day before meals  insulin lispro (ADMELOG) corrective regimen sliding scale   SubCutaneous at bedtime  ketorolac   Injectable 15 milliGRAM(s) IV Push every 6 hours  levothyroxine 125 MICROGram(s) Oral daily  lidocaine   4% Patch 1 Patch Transdermal daily  methocarbamol 500 milliGRAM(s) Oral every 8 hours  pantoprazole  Injectable 40 milliGRAM(s) IV Push every 24 hours  potassium phosphate / sodium phosphate Powder (PHOS-NaK) 1 Packet(s) Oral two times a day  potassium phosphate IVPB 30 milliMole(s) IV Intermittent once  predniSONE   Tablet 7.5 milliGRAM(s) Oral daily  tiotropium 2.5 MICROgram(s) Inhaler 2 Puff(s) Inhalation daily    MEDICATIONS  (PRN):  albuterol    0.083% 2.5 milliGRAM(s) Nebulizer every 6 hours PRN Shortness of Breath and/or Wheezing  dextrose Oral Gel 15 Gram(s) Oral once PRN Blood Glucose LESS THAN 70 milliGRAM(s)/deciliter  HYDROmorphone PCA (1 mG/mL) Rescue Clinician Bolus 0.5 milliGRAM(s) IV Push every 15 minutes PRN for Pain Scale GREATER THAN 6  naloxone Injectable 0.1 milliGRAM(s) IV Push every 3 minutes PRN For ANY of the following changes in patient status:  A. RR LESS THAN 10 breaths per minute, B. Oxygen saturation LESS THAN 90%, C. Sedation score of 6      LABS:                        11.1   12.66 )-----------( 272      ( 23 Aug 2024 06:34 )             33.8     Hgb Trend: 11.1<--, 12.0<--, 10.6<--  08-23    136  |  101  |  7   ----------------------------<  110<H>  3.7   |  25  |  0.74    Ca    8.6      23 Aug 2024 06:34  Phos  2.0     08-23  Mg     2.10     08-23      Creatinine Trend: 0.74<--, 0.87<--, 0.79<--, 0.98<--    Urinalysis Basic - ( 23 Aug 2024 06:34 )    Color: x / Appearance: x / SG: x / pH: x  Gluc: 110 mg/dL / Ketone: x  / Bili: x / Urobili: x   Blood: x / Protein: x / Nitrite: x   Leuk Esterase: x / RBC: x / WBC x   Sq Epi: x / Non Sq Epi: x / Bacteria: x          MICROBIOLOGY:     RADIOLOGY:  [ ] Reviewed and interpreted by me

## 2024-08-23 NOTE — PROGRESS NOTE ADULT - ASSESSMENT
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 Post procedure, he developed increasing SOB and chest tightness. Overnight team placed him on 6L NC weaned to 2L SAO2 100%, s/p nebulizer treatment. Patient admittedly has been under utilizing his Dilaudid PCA. Pulmonary was consulted to evaluate due to hypoxia requiring oxygen and due to history of previous admissions for asthma exacerbation.      Recommendations    #asthma   At home on Trelegy Dupixent and albuterol prn   On 3L NC sating high 90s, normally not on home O2, s/p nebulizer tx   Asthma symptoms has contributions from pain and sedation from surgery; Chest XR from 8/21 was unremarkable.  - continue on RA as tolerated  - c/w Symbicort BID and Spiriva qd, spacing out medications   - c/w albuterol neb tx q6hrs prn   - encourage incentive spirometry, oob to chair, PT/OT  - c/w Flonase for nasal congestion  - currently on steriod taper per rheum recs  - c/w pain mgmt regimen      #possible CAP  Chest XR from 8/21 was unremarkable.  Patient developed cough with yellow phelgm, had low grade fever, on immunosuppressive therapy. Today, he remained afebrile overnight, cough seems to have improved.  - full RVP was negative  - sputum cx revealed normal respiratory ashwini, gram stain showed few gram (+) cocci in clusters/pairs and chains, and few gram (-) rods per oil power field.  - procalcitonin mildly elevated 0.59  - less suspicious for acute CAP, abx at this time not indicated. Continue to monitor for worsening sx.         Discussed with Pulm/Crit fellow and Dr. 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 Post procedure, he developed increasing SOB and chest tightness. Overnight team placed him on 6L NC weaned to 2L SAO2 100%, s/p nebulizer treatment. Patient admittedly has been under utilizing his Dilaudid PCA. Pulmonary was consulted to evaluate due to hypoxia requiring oxygen and due to history of previous admissions for asthma exacerbation.      Recommendations    #asthma   At home on Trelegy Dupixent and albuterol prn   On 3L NC sating high 90s, normally not on home O2, s/p nebulizer tx   Asthma symptoms has contributions from pain and sedation from surgery; Chest XR from 8/21 was unremarkable.  - continue on RA as tolerated  - c/w Symbicort BID and Spiriva qd, spacing out medications   - c/w albuterol neb tx q6hrs prn   - encourage incentive spirometry, oob to chair, PT/OT  - c/w Flonase for nasal congestion  - currently on steriod taper per rheum recs  - c/w pain mgmt regimen      #possible CAP  Chest XR from 8/21 was unremarkable.  Patient developed cough with yellow phelgm, had low grade fever, on immunosuppressive therapy.  - full RVP was negative  - sputum cx revealed normal respiratory ashwini, gram stain showed few gram (+) cocci in clusters/pairs and chains, and few gram (-) rods per oil power field.  - procalcitonin mildly elevated 0.59  - less suspicious for acute CAP, abx at this time not indicated. Continue to monitor for worsening sx.         Discussed with Pulm/Crit fellow and Dr. 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 Post procedure, he developed increasing SOB and chest tightness. Overnight team placed him on 6L NC weaned to 2L SAO2 100%, s/p nebulizer treatment. Patient admittedly has been under utilizing his Dilaudid PCA. Pulmonary was consulted to evaluate due to hypoxia requiring oxygen and due to history of previous admissions for asthma exacerbation.      Recommendations    #asthma   At home on Trelegy Dupixent and albuterol prn   On 3L NC sating high 90s, normally not on home O2, s/p nebulizer tx   Asthma symptoms has contributions from pain and sedation from surgery; Chest XR from 8/21 was unremarkable.  - continue on RA as tolerated  - c/w Symbicort BID and Spiriva qd, spacing out medications   - c/w albuterol neb tx q6hrs prn   - encourage incentive spirometry, oob to chair, PT/OT  - c/w Flonase for nasal congestion  - currently on steriod taper per rheum recs  - c/w pain mgmt regimen      #possible CAP  Chest XR from 8/21 was unremarkable.  Patient developed cough with yellow phelgm, had low grade fever, on immunosuppressive therapy.  - full RVP was negative  - sputum cx revealed normal respiratory ashwini, gram stain showed few gram (+) cocci in clusters/pairs and chains, and few gram (-) rods per oil power field.  - procalcitonin mildly elevated 0.59, order repeat procalcitonin to trend  - no abx treatment indicated at this time        Discussed with Pulm/Crit fellow and Dr. Bae

## 2024-08-23 NOTE — PROGRESS NOTE ADULT - ASSESSMENT
49M hx lupus c/b myositis on steroids, asthma on Trelegy, TAMMY, hypothyroidism, and gastric adenocarcinoma now POD #0 s/p robotic distal gastrectomy with Heather en Y reconstruction and D2 lymphadenectomy. Patient is hemodynamically stable off supplemental oxygen with improved subjective dyspnea and SpO2 .    PLAN:  - pain control w/ tylenol, toradol, and dpca. Plan to dc PCA tomorrow and begin po dilaudid per pain management  - Pulmonology following: c/w spiriva/symbicort, albuterol, and flonase  - Reg diet  - Steroid taper per outpatient rheum recs, now on home dose 7.5 mg prednisone qd, inpatient rheum additionally consulted 8/21/24  -Encourage IS/OOBAT  -c/w lovenox DVT ppx     E team surgery  [id number]

## 2024-08-23 NOTE — PROGRESS NOTE ADULT - ATTENDING COMMENTS
Continue Spiriva/Symbicort.  RVP negative.  Continue incentive spirometry. Pain control.  Reports wheezing episode overnight which he recorded but sounds more like upper airway sounds on forced exhalation.  If recurs can trial CPAP 8 cm H2O empirically given known TAMMY vs ENT eval for VCD.  Flonase 1 spray/nostril twice daily for nasal congestion.
Continue Spiriva/Symbicort but can space is apart and he feels it is causing respiratory issues.  Low grade temp overnight. May not mount fever response as he is on steroids.  Check full RVP.  Check procalcitonin. Sputum culture.  Incentive spirometry.  Flonase 1 spray/nostril twice daily for nasal congestion.

## 2024-08-23 NOTE — PROGRESS NOTE ADULT - SUBJECTIVE AND OBJECTIVE BOX
SUBJECTIVE/ OVERNIGHT EVENTS:  --- Coverage for Dr. Zavala ---  comfortable  no cp, breathing okay, no n/v/d. no abdominal pain.  no headache, no dizziness.   pain is controlled        --------------------------------------------------------------------------------------------  LABS:                        11.1   12.66 )-----------( 272      ( 23 Aug 2024 06:34 )             33.8     08-23    136  |  101  |  7   ----------------------------<  110<H>  3.7   |  25  |  0.74    Ca    8.6      23 Aug 2024 06:34  Phos  2.0     08-23  Mg     2.10     08-23        CAPILLARY BLOOD GLUCOSE      POCT Blood Glucose.: 96 mg/dL (23 Aug 2024 07:53)  POCT Blood Glucose.: 121 mg/dL (22 Aug 2024 21:41)  POCT Blood Glucose.: 125 mg/dL (22 Aug 2024 17:05)  POCT Blood Glucose.: 119 mg/dL (22 Aug 2024 12:00)        Urinalysis Basic - ( 23 Aug 2024 06:34 )    Color: x / Appearance: x / SG: x / pH: x  Gluc: 110 mg/dL / Ketone: x  / Bili: x / Urobili: x   Blood: x / Protein: x / Nitrite: x   Leuk Esterase: x / RBC: x / WBC x   Sq Epi: x / Non Sq Epi: x / Bacteria: x        RADIOLOGY & ADDITIONAL TESTS:    Imaging Personally Reviewed:  [x] YES  [ ] NO    Consultant(s) Notes Reviewed:  [x] YES  [ ] NO    MEDICATIONS  (STANDING):  acetaminophen   IVPB .. 1000 milliGRAM(s) IV Intermittent every 6 hours  budesonide  80 MICROgram(s)/formoterol 4.5 MICROgram(s) Inhaler 2 Puff(s) Inhalation two times a day  dextrose 5% + sodium chloride 0.9%. 1000 milliLiter(s) (50 mL/Hr) IV Continuous <Continuous>  dextrose 5%. 1000 milliLiter(s) (100 mL/Hr) IV Continuous <Continuous>  dextrose 5%. 1000 milliLiter(s) (50 mL/Hr) IV Continuous <Continuous>  dextrose 50% Injectable 12.5 Gram(s) IV Push once  dextrose 50% Injectable 25 Gram(s) IV Push once  dextrose 50% Injectable 25 Gram(s) IV Push once  enoxaparin Injectable 40 milliGRAM(s) SubCutaneous every 24 hours  fluticasone propionate 50 MICROgram(s)/spray Nasal Spray 1 Spray(s) Both Nostrils two times a day  gabapentin 300 milliGRAM(s) Oral every 8 hours  glucagon  Injectable 1 milliGRAM(s) IntraMuscular once  HYDROmorphone PCA (1 mG/mL) 30 milliLiter(s) PCA Continuous PCA Continuous  hydroxychloroquine 400 milliGRAM(s) Oral at bedtime  insulin lispro (ADMELOG) corrective regimen sliding scale   SubCutaneous three times a day before meals  insulin lispro (ADMELOG) corrective regimen sliding scale   SubCutaneous at bedtime  ketorolac   Injectable 15 milliGRAM(s) IV Push every 6 hours  levothyroxine 125 MICROGram(s) Oral daily  lidocaine   4% Patch 1 Patch Transdermal daily  methocarbamol 500 milliGRAM(s) Oral every 8 hours  pantoprazole  Injectable 40 milliGRAM(s) IV Push every 24 hours  potassium phosphate / sodium phosphate Powder (PHOS-NaK) 1 Packet(s) Oral two times a day  potassium phosphate IVPB 30 milliMole(s) IV Intermittent once  predniSONE   Tablet 7.5 milliGRAM(s) Oral daily  tiotropium 2.5 MICROgram(s) Inhaler 2 Puff(s) Inhalation daily    MEDICATIONS  (PRN):  albuterol    0.083% 2.5 milliGRAM(s) Nebulizer every 6 hours PRN Shortness of Breath and/or Wheezing  dextrose Oral Gel 15 Gram(s) Oral once PRN Blood Glucose LESS THAN 70 milliGRAM(s)/deciliter  HYDROmorphone PCA (1 mG/mL) Rescue Clinician Bolus 0.5 milliGRAM(s) IV Push every 15 minutes PRN for Pain Scale GREATER THAN 6  naloxone Injectable 0.1 milliGRAM(s) IV Push every 3 minutes PRN For ANY of the following changes in patient status:  A. RR LESS THAN 10 breaths per minute, B. Oxygen saturation LESS THAN 90%, C. Sedation score of 6      Care Discussed with Consultants/Other Providers [x] YES  [ ] NO    Vital Signs Last 24 Hrs  T(C): 37.1 (23 Aug 2024 08:15), Max: 37.2 (22 Aug 2024 13:15)  T(F): 98.7 (23 Aug 2024 08:15), Max: 98.9 (22 Aug 2024 13:15)  HR: 89 (23 Aug 2024 08:15) (89 - 107)  BP: 121/69 (23 Aug 2024 08:15) (115/77 - 145/72)  BP(mean): --  RR: 18 (23 Aug 2024 08:15) (18 - 20)  SpO2: 100% (23 Aug 2024 08:15) (96% - 100%)    Parameters below as of 23 Aug 2024 08:15  Patient On (Oxygen Delivery Method): room air      I&O's Summary    22 Aug 2024 07:01  -  23 Aug 2024 07:00  --------------------------------------------------------  IN: 1050 mL / OUT: 3250 mL / NET: -2200 mL      PHYSICAL EXAM:  GENERAL: NAD, well-developed, comfortable  HEAD:  Atraumatic, Normocephalic  EYES: EOMI, PERRLA, conjunctiva and sclera clear  NECK: Supple, No JVD  CHEST/LUNG: Clear to auscultation bilaterally; No wheeze  HEART: Regular rate and rhythm; No murmurs, rubs, or gallops  ABDOMEN: Soft, Nontender, Nondistended; Bowel sounds present, +laproscopic dressing d/c/i  Neuro: AAOx3, no focal weakness, 5/5 b/l extremity strength  EXTREMITIES:  2+ Peripheral Pulses, No clubbing, cyanosis, or edema  SKIN: No rashes or lesions

## 2024-08-23 NOTE — PROGRESS NOTE ADULT - ASSESSMENT
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 Patient is hemodynamically stable on supplemental oxygen with subjective dyspnea and SpO2 , now s/p nebulizer treatment and with home inhaler restarted.    PLAN:  - pain control w/ tylenol, toradol, and dpca.  - Pulmonology following: c/w spiriva/sympbicort, albuterol, and flonase. wean supplemental 02   -c/w clear liquids. IVF  -UGI today. If negative, advance to FLD.  - Steroid taper per outpatient rheum recs , inpatient rheum additionally consulted 8/21/24   - Hydrocortisone 100 pre-op completed 8/20   - Hydrocortisone 50mg q8h x3 doses 8/21   - Hydrocortisone 25mg q8h x3 doses  * for 8/22     - Prednisone 7.5mg QD starting on POD3 tentative on 8/23  if able to tolerate PO, solumedrol 6mg IV QD if unable to tolerate PO  -Encourage IS/OOBAT  -c/w lovenox DVT ppx     E team surgery  [id number]

## 2024-08-23 NOTE — PROGRESS NOTE ADULT - SUBJECTIVE AND OBJECTIVE BOX
Anesthesia Pain Management Service    SUBJECTIVE: Patient is doing well with IV PCA and no significant problems reported. Reports feeling dizzy and shaky but may be attributed to trying to decrease use of PCA. Patient reports pain is improved with Gabapentin and Tylenol and trying to wean off PCA slowly.    Pain Scale Score	At rest: ___ 	With Activity: ___ 	[X ] Refer to charted pain scores    THERAPY:    [ ] IV PCA Morphine		[ ] 5 mg/mL	[ ] 1 mg/mL  [X ] IV PCA Hydromorphone	[ ] 5 mg/mL	[X ] 1 mg/mL  [ ] IV PCA Fentanyl		[ ] 50 micrograms/mL    Demand dose __0.3_ lockout __6_ (minutes) Continuous Rate _0__ Total: _5.7__  mg used (in past 24 hours)      MEDICATIONS  (STANDING):  acetaminophen   IVPB .. 1000 milliGRAM(s) IV Intermittent every 6 hours  budesonide  80 MICROgram(s)/formoterol 4.5 MICROgram(s) Inhaler 2 Puff(s) Inhalation two times a day  dextrose 5% + sodium chloride 0.9%. 1000 milliLiter(s) (50 mL/Hr) IV Continuous <Continuous>  dextrose 5%. 1000 milliLiter(s) (100 mL/Hr) IV Continuous <Continuous>  dextrose 5%. 1000 milliLiter(s) (50 mL/Hr) IV Continuous <Continuous>  dextrose 50% Injectable 12.5 Gram(s) IV Push once  dextrose 50% Injectable 25 Gram(s) IV Push once  dextrose 50% Injectable 25 Gram(s) IV Push once  enoxaparin Injectable 40 milliGRAM(s) SubCutaneous every 24 hours  fluticasone propionate 50 MICROgram(s)/spray Nasal Spray 1 Spray(s) Both Nostrils two times a day  gabapentin 300 milliGRAM(s) Oral every 8 hours  glucagon  Injectable 1 milliGRAM(s) IntraMuscular once  HYDROmorphone PCA (1 mG/mL) 30 milliLiter(s) PCA Continuous PCA Continuous  hydroxychloroquine 400 milliGRAM(s) Oral at bedtime  insulin lispro (ADMELOG) corrective regimen sliding scale   SubCutaneous three times a day before meals  insulin lispro (ADMELOG) corrective regimen sliding scale   SubCutaneous at bedtime  ketorolac   Injectable 15 milliGRAM(s) IV Push every 6 hours  levothyroxine 125 MICROGram(s) Oral daily  lidocaine   4% Patch 1 Patch Transdermal daily  methocarbamol 500 milliGRAM(s) Oral every 8 hours  pantoprazole  Injectable 40 milliGRAM(s) IV Push every 24 hours  potassium phosphate / sodium phosphate Powder (PHOS-NaK) 1 Packet(s) Oral two times a day  potassium phosphate IVPB 30 milliMole(s) IV Intermittent once  predniSONE   Tablet 7.5 milliGRAM(s) Oral daily  tiotropium 2.5 MICROgram(s) Inhaler 2 Puff(s) Inhalation daily    MEDICATIONS  (PRN):  albuterol    0.083% 2.5 milliGRAM(s) Nebulizer every 6 hours PRN Shortness of Breath and/or Wheezing  dextrose Oral Gel 15 Gram(s) Oral once PRN Blood Glucose LESS THAN 70 milliGRAM(s)/deciliter  HYDROmorphone PCA (1 mG/mL) Rescue Clinician Bolus 0.5 milliGRAM(s) IV Push every 15 minutes PRN for Pain Scale GREATER THAN 6  naloxone Injectable 0.1 milliGRAM(s) IV Push every 3 minutes PRN For ANY of the following changes in patient status:  A. RR LESS THAN 10 breaths per minute, B. Oxygen saturation LESS THAN 90%, C. Sedation score of 6      OBJECTIVE: Patient sitting in chair.    Sedation Score:	[ X] Alert	[ ] Drowsy 	[ ] Arousable	[ ] Asleep	[ ] Unresponsive    Side Effects:	[X ] None	[ ] Nausea	[ ] Vomiting	[ ] Pruritus  		[ ] Other:    Vital Signs Last 24 Hrs  T(C): 37.1 (23 Aug 2024 08:15), Max: 37.2 (22 Aug 2024 13:15)  T(F): 98.7 (23 Aug 2024 08:15), Max: 98.9 (22 Aug 2024 13:15)  HR: 89 (23 Aug 2024 08:15) (89 - 106)  BP: 121/69 (23 Aug 2024 08:15) (115/77 - 145/72)  BP(mean): --  RR: 18 (23 Aug 2024 08:15) (18 - 18)  SpO2: 100% (23 Aug 2024 08:15) (96% - 100%)    Parameters below as of 23 Aug 2024 08:15  Patient On (Oxygen Delivery Method): room air        ASSESSMENT/ PLAN    Therapy to  be:	[ X] Continue   [ ] Discontinued   [ ] Change to prn Analgesics    Documentation and Verification of current medications:   [X] Done	[ ] Not done, not elligible    Comments: Continue PCA. Recommend non-opioid adjuvant analgesics to be used when possible and when allowed by primary surgical team.    Progress Note written now but Patient was seen earlier.

## 2024-08-23 NOTE — PROGRESS NOTE ADULT - SUBJECTIVE AND OBJECTIVE BOX
TEAM SURGERY PROGRESS NOTE    POST OPERATIVE DAY #: 3 s/p ra distal gastrectomy w/ rny, d2 lymphadenectomy.    SUBJECTIVE: Patient seen and examined at bedside on AM rounds, patient without complaints. Pain better controlled. Tolerating CLD. Passing flatus, no BM. Reports nausea x1 overnight relieved with zofran.     Vital Signs Last 24 Hrs  T(C): 36.8 (23 Aug 2024 04:15), Max: 37.2 (22 Aug 2024 13:15)  T(F): 98.3 (23 Aug 2024 04:15), Max: 98.9 (22 Aug 2024 13:15)  HR: 96 (23 Aug 2024 04:15) (90 - 107)  BP: 115/77 (23 Aug 2024 04:15) (115/77 - 145/72)  BP(mean): --  RR: 18 (23 Aug 2024 04:15) (18 - 20)  SpO2: 100% (23 Aug 2024 04:15) (96% - 100%)    Parameters below as of 23 Aug 2024 04:15  Patient On (Oxygen Delivery Method): room air      I&O's Detail    22 Aug 2024 07:01  -  23 Aug 2024 07:00  --------------------------------------------------------  IN:    dextrose 5% + sodium chloride 0.9%: 900 mL    Oral Fluid: 150 mL  Total IN: 1050 mL    OUT:    Voided (mL): 3250 mL  Total OUT: 3250 mL    Total NET: -2200 mL        MEDICATIONS  (STANDING):  acetaminophen   IVPB .. 1000 milliGRAM(s) IV Intermittent every 6 hours  budesonide  80 MICROgram(s)/formoterol 4.5 MICROgram(s) Inhaler 2 Puff(s) Inhalation two times a day  dextrose 5% + sodium chloride 0.9%. 1000 milliLiter(s) (50 mL/Hr) IV Continuous <Continuous>  dextrose 5%. 1000 milliLiter(s) (50 mL/Hr) IV Continuous <Continuous>  dextrose 5%. 1000 milliLiter(s) (100 mL/Hr) IV Continuous <Continuous>  dextrose 50% Injectable 12.5 Gram(s) IV Push once  dextrose 50% Injectable 25 Gram(s) IV Push once  dextrose 50% Injectable 25 Gram(s) IV Push once  enoxaparin Injectable 40 milliGRAM(s) SubCutaneous every 24 hours  fluticasone propionate 50 MICROgram(s)/spray Nasal Spray 1 Spray(s) Both Nostrils two times a day  gabapentin 300 milliGRAM(s) Oral every 8 hours  glucagon  Injectable 1 milliGRAM(s) IntraMuscular once  hydrocortisone sodium succinate Injectable 25 milliGRAM(s) IV Push every 8 hours  HYDROmorphone PCA (1 mG/mL) 30 milliLiter(s) PCA Continuous PCA Continuous  hydroxychloroquine 400 milliGRAM(s) Oral at bedtime  insulin lispro (ADMELOG) corrective regimen sliding scale   SubCutaneous three times a day before meals  insulin lispro (ADMELOG) corrective regimen sliding scale   SubCutaneous at bedtime  ketorolac   Injectable 15 milliGRAM(s) IV Push every 6 hours  levothyroxine 125 MICROGram(s) Oral daily  lidocaine   4% Patch 1 Patch Transdermal daily  methocarbamol 500 milliGRAM(s) Oral every 8 hours  pantoprazole  Injectable 40 milliGRAM(s) IV Push every 24 hours  predniSONE   Tablet 7.5 milliGRAM(s) Oral daily  tiotropium 2.5 MICROgram(s) Inhaler 2 Puff(s) Inhalation daily    MEDICATIONS  (PRN):  albuterol    0.083% 2.5 milliGRAM(s) Nebulizer every 6 hours PRN Shortness of Breath and/or Wheezing  dextrose Oral Gel 15 Gram(s) Oral once PRN Blood Glucose LESS THAN 70 milliGRAM(s)/deciliter  HYDROmorphone PCA (1 mG/mL) Rescue Clinician Bolus 0.5 milliGRAM(s) IV Push every 15 minutes PRN for Pain Scale GREATER THAN 6  naloxone Injectable 0.1 milliGRAM(s) IV Push every 3 minutes PRN For ANY of the following changes in patient status:  A. RR LESS THAN 10 breaths per minute, B. Oxygen saturation LESS THAN 90%, C. Sedation score of 6      Physical Exam  General: A&Ox3, NAD  Respiratory: Unlabored breathing  Cardiovascular: Regular rate & rhythm  Abdominal: soft. Mildly ttp. Appropriately distended. Port sites c/d/i.     LABS:                        11.1   12.66 )-----------( 272      ( 23 Aug 2024 06:34 )             33.8     08-22    133<L>  |  98  |  9   ----------------------------<  84  3.6   |  22  |  0.87    Ca    8.6      22 Aug 2024 07:01  Phos  2.0     08-22  Mg     1.60     08-22        Urinalysis Basic - ( 22 Aug 2024 07:01 )    Color: x / Appearance: x / SG: x / pH: x  Gluc: 84 mg/dL / Ketone: x  / Bili: x / Urobili: x   Blood: x / Protein: x / Nitrite: x   Leuk Esterase: x / RBC: x / WBC x   Sq Epi: x / Non Sq Epi: x / Bacteria: x          Patient is a 49y Male

## 2024-08-23 NOTE — PROGRESS NOTE ADULT - SUBJECTIVE AND OBJECTIVE BOX
Surgery Progress Note (pg [facility]: [id number])    SUBJECTIVE  The patient was seen and examined at bedside on AM rounds. No acute events overnight. Pain control improved, afebrile, tachy to 105 overnight. +flatus, -BM    OBJECTIVE  ___________________________________________________  VITAL SIGNS / I&O's   Vital Signs Last 24 Hrs  T(C): 36.8 (23 Aug 2024 04:15), Max: 37.2 (22 Aug 2024 13:15)  T(F): 98.3 (23 Aug 2024 04:15), Max: 98.9 (22 Aug 2024 13:15)  HR: 96 (23 Aug 2024 04:15) (90 - 107)  BP: 115/77 (23 Aug 2024 04:15) (115/77 - 145/72)  BP(mean): --  RR: 18 (23 Aug 2024 04:15) (18 - 20)  SpO2: 100% (23 Aug 2024 04:15) (96% - 100%)    Parameters below as of 23 Aug 2024 04:15  Patient On (Oxygen Delivery Method): room air          22 Aug 2024 07:01  -  23 Aug 2024 07:00  --------------------------------------------------------  IN:    dextrose 5% + sodium chloride 0.9%: 900 mL    Oral Fluid: 150 mL  Total IN: 1050 mL    OUT:    Voided (mL): 3250 mL  Total OUT: 3250 mL    Total NET: -2200 mL        ___________________________________________________  PHYSICAL EXAM    -- CONSTITUTIONAL: NAD, lying in bed  -- NEURO: Awake, alert  -- HEENT: NC/AT  -- PULM: Non-labored respirations  -- ABDOMEN: Soft, mildly TTP, ND. Port sites c/d/i  -- EXTREMITIES: Warm and well perfused  -- PSYCH: Affect normal, A&Ox3    ___________________________________________________  LABS                        11.1   12.66 )-----------( 272      ( 23 Aug 2024 06:34 )             33.8     22 Aug 2024 07:01    133    |  98     |  9      ----------------------------<  84     3.6     |  22     |  0.87     Ca    8.6        22 Aug 2024 07:01  Phos  2.0       22 Aug 2024 07:01  Mg     1.60      22 Aug 2024 07:01        CAPILLARY BLOOD GLUCOSE      POCT Blood Glucose.: 121 mg/dL (22 Aug 2024 21:41)  POCT Blood Glucose.: 125 mg/dL (22 Aug 2024 17:05)  POCT Blood Glucose.: 119 mg/dL (22 Aug 2024 12:00)  POCT Blood Glucose.: 73 mg/dL (22 Aug 2024 08:27)        Urinalysis Basic - ( 22 Aug 2024 07:01 )    Color: x / Appearance: x / SG: x / pH: x  Gluc: 84 mg/dL / Ketone: x  / Bili: x / Urobili: x   Blood: x / Protein: x / Nitrite: x   Leuk Esterase: x / RBC: x / WBC x   Sq Epi: x / Non Sq Epi: x / Bacteria: x      ___________________________________________________  MICRO  Recent Cultures:  Specimen Source: .Sputum Sputum, 08-22 @ 13:02; Results --; Gram Stain:   Few Squamous epithelial cells per low power field  Few polymorphonuclear leukocytes per low power field  Few Gram Positive Cocci in Clusters per oil power field  Few Gram Positive Cocci in Pairs and Chains per oil power field  Few Gram Negative Rods per oil power field  Rare Gram Positive Rods per oil power field<!>; Organism: --    ___________________________________________________  MEDICATIONS  (STANDING):  acetaminophen   IVPB .. 1000 milliGRAM(s) IV Intermittent every 6 hours  budesonide  80 MICROgram(s)/formoterol 4.5 MICROgram(s) Inhaler 2 Puff(s) Inhalation two times a day  dextrose 5% + sodium chloride 0.9%. 1000 milliLiter(s) (50 mL/Hr) IV Continuous <Continuous>  dextrose 5%. 1000 milliLiter(s) (100 mL/Hr) IV Continuous <Continuous>  dextrose 5%. 1000 milliLiter(s) (50 mL/Hr) IV Continuous <Continuous>  dextrose 50% Injectable 25 Gram(s) IV Push once  dextrose 50% Injectable 12.5 Gram(s) IV Push once  dextrose 50% Injectable 25 Gram(s) IV Push once  enoxaparin Injectable 40 milliGRAM(s) SubCutaneous every 24 hours  fluticasone propionate 50 MICROgram(s)/spray Nasal Spray 1 Spray(s) Both Nostrils two times a day  gabapentin 300 milliGRAM(s) Oral every 8 hours  glucagon  Injectable 1 milliGRAM(s) IntraMuscular once  hydrocortisone sodium succinate Injectable 25 milliGRAM(s) IV Push every 8 hours  HYDROmorphone PCA (1 mG/mL) 30 milliLiter(s) PCA Continuous PCA Continuous  hydroxychloroquine 400 milliGRAM(s) Oral at bedtime  insulin lispro (ADMELOG) corrective regimen sliding scale   SubCutaneous three times a day before meals  insulin lispro (ADMELOG) corrective regimen sliding scale   SubCutaneous at bedtime  ketorolac   Injectable 15 milliGRAM(s) IV Push every 6 hours  levothyroxine 125 MICROGram(s) Oral daily  lidocaine   4% Patch 1 Patch Transdermal daily  methocarbamol 500 milliGRAM(s) Oral every 8 hours  pantoprazole  Injectable 40 milliGRAM(s) IV Push every 24 hours  predniSONE   Tablet 7.5 milliGRAM(s) Oral daily  tiotropium 2.5 MICROgram(s) Inhaler 2 Puff(s) Inhalation daily    MEDICATIONS  (PRN):  albuterol    0.083% 2.5 milliGRAM(s) Nebulizer every 6 hours PRN Shortness of Breath and/or Wheezing  dextrose Oral Gel 15 Gram(s) Oral once PRN Blood Glucose LESS THAN 70 milliGRAM(s)/deciliter  HYDROmorphone PCA (1 mG/mL) Rescue Clinician Bolus 0.5 milliGRAM(s) IV Push every 15 minutes PRN for Pain Scale GREATER THAN 6  naloxone Injectable 0.1 milliGRAM(s) IV Push every 3 minutes PRN For ANY of the following changes in patient status:  A. RR LESS THAN 10 breaths per minute, B. Oxygen saturation LESS THAN 90%, C. Sedation score of 6

## 2024-08-24 LAB
ANION GAP SERPL CALC-SCNC: 13 MMOL/L — SIGNIFICANT CHANGE UP (ref 7–14)
BUN SERPL-MCNC: 9 MG/DL — SIGNIFICANT CHANGE UP (ref 7–23)
CALCIUM SERPL-MCNC: 8.1 MG/DL — LOW (ref 8.4–10.5)
CHLORIDE SERPL-SCNC: 106 MMOL/L — SIGNIFICANT CHANGE UP (ref 98–107)
CO2 SERPL-SCNC: 22 MMOL/L — SIGNIFICANT CHANGE UP (ref 22–31)
CREAT SERPL-MCNC: 0.86 MG/DL — SIGNIFICANT CHANGE UP (ref 0.5–1.3)
CULTURE RESULTS: ABNORMAL
EGFR: 107 ML/MIN/1.73M2 — SIGNIFICANT CHANGE UP
GLUCOSE BLDC GLUCOMTR-MCNC: 102 MG/DL — HIGH (ref 70–99)
GLUCOSE BLDC GLUCOMTR-MCNC: 115 MG/DL — HIGH (ref 70–99)
GLUCOSE BLDC GLUCOMTR-MCNC: 80 MG/DL — SIGNIFICANT CHANGE UP (ref 70–99)
GLUCOSE BLDC GLUCOMTR-MCNC: 95 MG/DL — SIGNIFICANT CHANGE UP (ref 70–99)
GLUCOSE SERPL-MCNC: 93 MG/DL — SIGNIFICANT CHANGE UP (ref 70–99)
HCT VFR BLD CALC: 31.3 % — LOW (ref 39–50)
HGB BLD-MCNC: 10.4 G/DL — LOW (ref 13–17)
MAGNESIUM SERPL-MCNC: 1.8 MG/DL — SIGNIFICANT CHANGE UP (ref 1.6–2.6)
MCHC RBC-ENTMCNC: 27.7 PG — SIGNIFICANT CHANGE UP (ref 27–34)
MCHC RBC-ENTMCNC: 33.2 GM/DL — SIGNIFICANT CHANGE UP (ref 32–36)
MCV RBC AUTO: 83.2 FL — SIGNIFICANT CHANGE UP (ref 80–100)
NRBC # BLD: 0 /100 WBCS — SIGNIFICANT CHANGE UP (ref 0–0)
NRBC # FLD: 0 K/UL — SIGNIFICANT CHANGE UP (ref 0–0)
PHOSPHATE SERPL-MCNC: 2.1 MG/DL — LOW (ref 2.5–4.5)
PLATELET # BLD AUTO: 287 K/UL — SIGNIFICANT CHANGE UP (ref 150–400)
POTASSIUM SERPL-MCNC: 3.3 MMOL/L — LOW (ref 3.5–5.3)
POTASSIUM SERPL-SCNC: 3.3 MMOL/L — LOW (ref 3.5–5.3)
RBC # BLD: 3.76 M/UL — LOW (ref 4.2–5.8)
RBC # FLD: 14.5 % — SIGNIFICANT CHANGE UP (ref 10.3–14.5)
SODIUM SERPL-SCNC: 141 MMOL/L — SIGNIFICANT CHANGE UP (ref 135–145)
SPECIMEN SOURCE: SIGNIFICANT CHANGE UP
WBC # BLD: 9.17 K/UL — SIGNIFICANT CHANGE UP (ref 3.8–10.5)
WBC # FLD AUTO: 9.17 K/UL — SIGNIFICANT CHANGE UP (ref 3.8–10.5)

## 2024-08-24 PROCEDURE — 74018 RADEX ABDOMEN 1 VIEW: CPT | Mod: 26

## 2024-08-24 RX ORDER — POTASSIUM PHOSPHATE 236; 224 MG/ML; MG/ML
15 INJECTION, SOLUTION INTRAVENOUS ONCE
Refills: 0 | Status: COMPLETED | OUTPATIENT
Start: 2024-08-24 | End: 2024-08-24

## 2024-08-24 RX ORDER — ACETAMINOPHEN 325 MG/1
1000 TABLET ORAL EVERY 6 HOURS
Refills: 0 | Status: COMPLETED | OUTPATIENT
Start: 2024-08-24 | End: 2024-08-25

## 2024-08-24 RX ORDER — POTASSIUM CHLORIDE 10 MEQ
40 TABLET, EXT RELEASE, PARTICLES/CRYSTALS ORAL ONCE
Refills: 0 | Status: COMPLETED | OUTPATIENT
Start: 2024-08-24 | End: 2024-08-24

## 2024-08-24 RX ADMIN — KETOROLAC TROMETHAMINE 15 MILLIGRAM(S): 30 INJECTION, SOLUTION INTRAMUSCULAR at 14:50

## 2024-08-24 RX ADMIN — ACETAMINOPHEN 1000 MILLIGRAM(S): 325 TABLET ORAL at 18:50

## 2024-08-24 RX ADMIN — SODIUM PHOSPHATE, DIBASIC, ANHYDROUS, POTASSIUM PHOSPHATE, MONOBASIC, AND SODIUM PHOSPHATE, MONOBASIC, MONOHYDRATE 1 PACKET(S): 852; 155; 130 TABLET, COATED ORAL at 05:29

## 2024-08-24 RX ADMIN — Medication 40 MILLIGRAM(S): at 05:29

## 2024-08-24 RX ADMIN — Medication 125 MICROGRAM(S): at 04:55

## 2024-08-24 RX ADMIN — Medication 300 MILLIGRAM(S): at 13:50

## 2024-08-24 RX ADMIN — FLUTICASONE PROPIONATE 1 SPRAY(S): 50 SPRAY, METERED NASAL at 05:30

## 2024-08-24 RX ADMIN — KETOROLAC TROMETHAMINE 15 MILLIGRAM(S): 30 INJECTION, SOLUTION INTRAMUSCULAR at 23:30

## 2024-08-24 RX ADMIN — KETOROLAC TROMETHAMINE 15 MILLIGRAM(S): 30 INJECTION, SOLUTION INTRAMUSCULAR at 10:52

## 2024-08-24 RX ADMIN — TIOTROPIUM BROMIDE INHALATION SPRAY 2 PUFF(S): 3.12 SPRAY, METERED RESPIRATORY (INHALATION) at 13:54

## 2024-08-24 RX ADMIN — Medication 300 MILLIGRAM(S): at 23:05

## 2024-08-24 RX ADMIN — ACETAMINOPHEN 400 MILLIGRAM(S): 325 TABLET ORAL at 17:49

## 2024-08-24 RX ADMIN — Medication 40 MILLIEQUIVALENT(S): at 13:51

## 2024-08-24 RX ADMIN — HYDROMORPHONE HYDROCHLORIDE 30 MILLILITER(S): 2 TABLET ORAL at 19:18

## 2024-08-24 RX ADMIN — METHOCARBAMOL 500 MILLIGRAM(S): 750 TABLET, FILM COATED ORAL at 05:29

## 2024-08-24 RX ADMIN — HYDROMORPHONE HYDROCHLORIDE 30 MILLILITER(S): 2 TABLET ORAL at 07:40

## 2024-08-24 RX ADMIN — FLUTICASONE PROPIONATE 1 SPRAY(S): 50 SPRAY, METERED NASAL at 17:51

## 2024-08-24 RX ADMIN — KETOROLAC TROMETHAMINE 15 MILLIGRAM(S): 30 INJECTION, SOLUTION INTRAMUSCULAR at 05:29

## 2024-08-24 RX ADMIN — BUDESONIDE AND FORMOTEROL FUMARATE 2 PUFF(S): 80; 4.5 AEROSOL, METERED RESPIRATORY (INHALATION) at 09:52

## 2024-08-24 RX ADMIN — Medication 300 MILLIGRAM(S): at 05:31

## 2024-08-24 RX ADMIN — HYDROXYCHLOROQUINE SULFATE 400 MILLIGRAM(S): 200 TABLET, FILM COATED ORAL at 21:52

## 2024-08-24 RX ADMIN — BUDESONIDE AND FORMOTEROL FUMARATE 2 PUFF(S): 80; 4.5 AEROSOL, METERED RESPIRATORY (INHALATION) at 21:51

## 2024-08-24 RX ADMIN — Medication 7.5 MILLIGRAM(S): at 05:29

## 2024-08-24 RX ADMIN — POTASSIUM PHOSPHATE 83.33 MILLIMOLE(S): 236; 224 INJECTION, SOLUTION INTRAVENOUS at 13:50

## 2024-08-24 RX ADMIN — METHOCARBAMOL 500 MILLIGRAM(S): 750 TABLET, FILM COATED ORAL at 21:52

## 2024-08-24 RX ADMIN — KETOROLAC TROMETHAMINE 15 MILLIGRAM(S): 30 INJECTION, SOLUTION INTRAMUSCULAR at 23:05

## 2024-08-24 RX ADMIN — KETOROLAC TROMETHAMINE 15 MILLIGRAM(S): 30 INJECTION, SOLUTION INTRAMUSCULAR at 06:00

## 2024-08-24 RX ADMIN — METHOCARBAMOL 500 MILLIGRAM(S): 750 TABLET, FILM COATED ORAL at 14:50

## 2024-08-24 RX ADMIN — ENOXAPARIN SODIUM 40 MILLIGRAM(S): 100 INJECTION SUBCUTANEOUS at 05:30

## 2024-08-24 NOTE — PROGRESS NOTE ADULT - ASSESSMENT
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 Patient is hemodynamically stable on supplemental oxygen with subjective dyspnea and SpO2 , now s/p nebulizer treatment and with home inhaler restarted.    # gastric CA  S/P Robotic gasserectomy   Surg care appreciated  pain control   incentive  oral feeding when bowel function   PT as tolerated     # Hypothyroidism  TFTs  synthroid     # Lupus myocytics   s/p hydrocortisone taper  Prednisone 7.5 mg home dose.   outpt rheum follow up.   Plaquenil home med    # Asthma  pulm following  on Trellergy at home.    DVT ppx

## 2024-08-24 NOTE — PROGRESS NOTE ADULT - SUBJECTIVE AND OBJECTIVE BOX
SUBJECTIVE/ OVERNIGHT EVENTS:  --- Coverage for Dr. Zavala ---  feels well  comfortable  breathing is better  no cp, no sob, no n/v/d. appropriate post surgical abdominal discomfort   no headache, no dizziness.     --------------------------------------------------------------------------------------------  LABS:                        10.4   9.17  )-----------( 287      ( 24 Aug 2024 07:00 )             31.3     08-24    141  |  106  |  9   ----------------------------<  93  3.3<L>   |  22  |  0.86    Ca    8.1<L>      24 Aug 2024 07:00  Phos  2.1     08-24  Mg     1.80     08-24        CAPILLARY BLOOD GLUCOSE      POCT Blood Glucose.: 80 mg/dL (24 Aug 2024 07:59)  POCT Blood Glucose.: 97 mg/dL (23 Aug 2024 23:04)  POCT Blood Glucose.: 104 mg/dL (23 Aug 2024 16:55)  POCT Blood Glucose.: 81 mg/dL (23 Aug 2024 13:39)  POCT Blood Glucose.: 136 mg/dL (23 Aug 2024 10:02)        Urinalysis Basic - ( 24 Aug 2024 07:00 )    Color: x / Appearance: x / SG: x / pH: x  Gluc: 93 mg/dL / Ketone: x  / Bili: x / Urobili: x   Blood: x / Protein: x / Nitrite: x   Leuk Esterase: x / RBC: x / WBC x   Sq Epi: x / Non Sq Epi: x / Bacteria: x        RADIOLOGY & ADDITIONAL TESTS:    Imaging Personally Reviewed:  [x] YES  [ ] NO    Consultant(s) Notes Reviewed:  [x] YES  [ ] NO    MEDICATIONS  (STANDING):  budesonide  80 MICROgram(s)/formoterol 4.5 MICROgram(s) Inhaler 2 Puff(s) Inhalation two times a day  dextrose 5% + sodium chloride 0.9%. 1000 milliLiter(s) (30 mL/Hr) IV Continuous <Continuous>  dextrose 5%. 1000 milliLiter(s) (50 mL/Hr) IV Continuous <Continuous>  dextrose 5%. 1000 milliLiter(s) (100 mL/Hr) IV Continuous <Continuous>  dextrose 50% Injectable 12.5 Gram(s) IV Push once  dextrose 50% Injectable 25 Gram(s) IV Push once  dextrose 50% Injectable 25 Gram(s) IV Push once  enoxaparin Injectable 40 milliGRAM(s) SubCutaneous every 24 hours  fluticasone propionate 50 MICROgram(s)/spray Nasal Spray 1 Spray(s) Both Nostrils two times a day  gabapentin 300 milliGRAM(s) Oral every 8 hours  glucagon  Injectable 1 milliGRAM(s) IntraMuscular once  HYDROmorphone PCA (1 mG/mL) 30 milliLiter(s) PCA Continuous PCA Continuous  hydroxychloroquine 400 milliGRAM(s) Oral at bedtime  insulin lispro (ADMELOG) corrective regimen sliding scale   SubCutaneous three times a day before meals  insulin lispro (ADMELOG) corrective regimen sliding scale   SubCutaneous at bedtime  ketorolac   Injectable 15 milliGRAM(s) IV Push every 6 hours  levothyroxine 125 MICROGram(s) Oral daily  lidocaine   4% Patch 1 Patch Transdermal daily  methocarbamol 500 milliGRAM(s) Oral every 8 hours  pantoprazole  Injectable 40 milliGRAM(s) IV Push every 24 hours  potassium chloride   Powder 40 milliEquivalent(s) Oral once  potassium phosphate IVPB 15 milliMole(s) IV Intermittent once  predniSONE   Tablet 7.5 milliGRAM(s) Oral daily  tiotropium 2.5 MICROgram(s) Inhaler 2 Puff(s) Inhalation daily    MEDICATIONS  (PRN):  albuterol    0.083% 2.5 milliGRAM(s) Nebulizer every 6 hours PRN Shortness of Breath and/or Wheezing  dextrose Oral Gel 15 Gram(s) Oral once PRN Blood Glucose LESS THAN 70 milliGRAM(s)/deciliter  HYDROmorphone PCA (1 mG/mL) Rescue Clinician Bolus 0.5 milliGRAM(s) IV Push every 15 minutes PRN for Pain Scale GREATER THAN 6  naloxone Injectable 0.1 milliGRAM(s) IV Push every 3 minutes PRN For ANY of the following changes in patient status:  A. RR LESS THAN 10 breaths per minute, B. Oxygen saturation LESS THAN 90%, C. Sedation score of 6      Care Discussed with Consultants/Other Providers [x] YES  [ ] NO    Vital Signs Last 24 Hrs  T(C): 37.2 (24 Aug 2024 08:15), Max: 37.3 (23 Aug 2024 23:36)  T(F): 98.9 (24 Aug 2024 08:15), Max: 99.2 (24 Aug 2024 04:57)  HR: 98 (24 Aug 2024 08:15) (84 - 105)  BP: 120/79 (24 Aug 2024 08:15) (116/64 - 133/71)  BP(mean): --  RR: 18 (24 Aug 2024 08:15) (18 - 18)  SpO2: 99% (24 Aug 2024 08:15) (97% - 100%)    Parameters below as of 24 Aug 2024 08:15  Patient On (Oxygen Delivery Method): room air      I&O's Summary    23 Aug 2024 07:01  -  24 Aug 2024 07:00  --------------------------------------------------------  IN: 1600 mL / OUT: 1850 mL / NET: -250 mL      PHYSICAL EXAM:  GENERAL: NAD, well-developed, comfortable  HEAD:  Atraumatic, Normocephalic  EYES: EOMI, PERRLA, conjunctiva and sclera clear  NECK: Supple, No JVD  CHEST/LUNG: Clear to auscultation bilaterally; No wheeze  HEART: Regular rate and rhythm; No murmurs, rubs, or gallops  ABDOMEN: Soft, Nontender, Nondistended; Bowel sounds present, laparoscopic dressing d/c/i  Neuro: AAOx3, no focal weakness, 5/5 b/l extremity strength  EXTREMITIES:  2+ Peripheral Pulses, No clubbing, cyanosis, or edema  SKIN: No rashes or lesions

## 2024-08-24 NOTE — PROGRESS NOTE ADULT - SUBJECTIVE AND OBJECTIVE BOX
Anesthesia Pain Management Service    SUBJECTIVE: Patient is doing well with IV PCA and no significant problems reported. He is ambulating well, passing flatus, having BM. Pain is still present and aggravated by his other comorbidities.    Pain Scale Score	At rest: ___ 	With Activity: ___ 	[X ] Refer to charted pain scores    THERAPY:    [ ] IV PCA Morphine		[ ] 5 mg/mL	[ ] 1 mg/mL  [X ] IV PCA Hydromorphone	[ ] 5 mg/mL	[X ] 1 mg/mL  [ ] IV PCA Fentanyl		[ ] 50 micrograms/mL    Demand dose __0.3_ lockout __6_ (minutes) Continuous Rate _0__ Total: _3.3__  mg used (in past 24 hours)      MEDICATIONS  (STANDING):  budesonide  80 MICROgram(s)/formoterol 4.5 MICROgram(s) Inhaler 2 Puff(s) Inhalation two times a day  dextrose 5% + sodium chloride 0.9%. 1000 milliLiter(s) (30 mL/Hr) IV Continuous <Continuous>  dextrose 5%. 1000 milliLiter(s) (100 mL/Hr) IV Continuous <Continuous>  dextrose 5%. 1000 milliLiter(s) (50 mL/Hr) IV Continuous <Continuous>  dextrose 50% Injectable 25 Gram(s) IV Push once  dextrose 50% Injectable 12.5 Gram(s) IV Push once  dextrose 50% Injectable 25 Gram(s) IV Push once  enoxaparin Injectable 40 milliGRAM(s) SubCutaneous every 24 hours  fluticasone propionate 50 MICROgram(s)/spray Nasal Spray 1 Spray(s) Both Nostrils two times a day  gabapentin 300 milliGRAM(s) Oral every 8 hours  glucagon  Injectable 1 milliGRAM(s) IntraMuscular once  HYDROmorphone PCA (1 mG/mL) 30 milliLiter(s) PCA Continuous PCA Continuous  hydroxychloroquine 400 milliGRAM(s) Oral at bedtime  insulin lispro (ADMELOG) corrective regimen sliding scale   SubCutaneous three times a day before meals  insulin lispro (ADMELOG) corrective regimen sliding scale   SubCutaneous at bedtime  ketorolac   Injectable 15 milliGRAM(s) IV Push every 6 hours  levothyroxine 125 MICROGram(s) Oral daily  lidocaine   4% Patch 1 Patch Transdermal daily  methocarbamol 500 milliGRAM(s) Oral every 8 hours  pantoprazole  Injectable 40 milliGRAM(s) IV Push every 24 hours  predniSONE   Tablet 7.5 milliGRAM(s) Oral daily  tiotropium 2.5 MICROgram(s) Inhaler 2 Puff(s) Inhalation daily    MEDICATIONS  (PRN):  albuterol    0.083% 2.5 milliGRAM(s) Nebulizer every 6 hours PRN Shortness of Breath and/or Wheezing  dextrose Oral Gel 15 Gram(s) Oral once PRN Blood Glucose LESS THAN 70 milliGRAM(s)/deciliter  HYDROmorphone PCA (1 mG/mL) Rescue Clinician Bolus 0.5 milliGRAM(s) IV Push every 15 minutes PRN for Pain Scale GREATER THAN 6  naloxone Injectable 0.1 milliGRAM(s) IV Push every 3 minutes PRN For ANY of the following changes in patient status:  A. RR LESS THAN 10 breaths per minute, B. Oxygen saturation LESS THAN 90%, C. Sedation score of 6      OBJECTIVE: Patient sitting in chair.    Sedation Score:	[ X] Alert	[ ] Drowsy 	[ ] Arousable	[ ] Asleep	[ ] Unresponsive    Side Effects:	[X ] None	[ ] Nausea	[ ] Vomiting	[ ] Pruritus  		[ ] Other:    Vital Signs Last 24 Hrs  T(C): 36.7 (24 Aug 2024 13:10), Max: 37.3 (23 Aug 2024 23:36)  T(F): 98.1 (24 Aug 2024 13:10), Max: 99.2 (24 Aug 2024 04:57)  HR: 87 (24 Aug 2024 13:10) (87 - 105)  BP: 115/55 (24 Aug 2024 13:10) (115/55 - 133/71)  BP(mean): --  RR: 18 (24 Aug 2024 13:10) (18 - 18)  SpO2: 99% (24 Aug 2024 13:10) (97% - 100%)    Parameters below as of 24 Aug 2024 13:10  Patient On (Oxygen Delivery Method): room air        ASSESSMENT/ PLAN    Therapy to  be:	[ X] Continue   [ ] Discontinued   [ ] Change to prn Analgesics    Documentation and Verification of current medications:   [X] Done	[ ] Not done, not elligible    Comments: Discussed at length with patient, PCA to be discontinued tomorrow and will start PO Dilaudid as soon as PCA is turned off to manage pain. Patient agreed. Discussed with primary team, continue PCA. Recommend non-opioid adjuvant analgesics to be used when possible and when allowed by primary surgical team.    Progress Note written now but Patient was seen earlier.

## 2024-08-25 LAB
ALBUMIN SERPL ELPH-MCNC: 3.1 G/DL — LOW (ref 3.3–5)
ALP SERPL-CCNC: 62 U/L — SIGNIFICANT CHANGE UP (ref 40–120)
ALT FLD-CCNC: 21 U/L — SIGNIFICANT CHANGE UP (ref 4–41)
ANION GAP SERPL CALC-SCNC: 10 MMOL/L — SIGNIFICANT CHANGE UP (ref 7–14)
AST SERPL-CCNC: 22 U/L — SIGNIFICANT CHANGE UP (ref 4–40)
BILIRUB SERPL-MCNC: 0.4 MG/DL — SIGNIFICANT CHANGE UP (ref 0.2–1.2)
BUN SERPL-MCNC: 11 MG/DL — SIGNIFICANT CHANGE UP (ref 7–23)
CALCIUM SERPL-MCNC: 8 MG/DL — LOW (ref 8.4–10.5)
CHLORIDE SERPL-SCNC: 106 MMOL/L — SIGNIFICANT CHANGE UP (ref 98–107)
CO2 SERPL-SCNC: 23 MMOL/L — SIGNIFICANT CHANGE UP (ref 22–31)
CREAT SERPL-MCNC: 0.81 MG/DL — SIGNIFICANT CHANGE UP (ref 0.5–1.3)
EGFR: 109 ML/MIN/1.73M2 — SIGNIFICANT CHANGE UP
GLUCOSE BLDC GLUCOMTR-MCNC: 102 MG/DL — HIGH (ref 70–99)
GLUCOSE BLDC GLUCOMTR-MCNC: 109 MG/DL — HIGH (ref 70–99)
GLUCOSE BLDC GLUCOMTR-MCNC: 95 MG/DL — SIGNIFICANT CHANGE UP (ref 70–99)
GLUCOSE BLDC GLUCOMTR-MCNC: 96 MG/DL — SIGNIFICANT CHANGE UP (ref 70–99)
GLUCOSE SERPL-MCNC: 96 MG/DL — SIGNIFICANT CHANGE UP (ref 70–99)
HCT VFR BLD CALC: 29.7 % — LOW (ref 39–50)
HGB BLD-MCNC: 9.7 G/DL — LOW (ref 13–17)
MCHC RBC-ENTMCNC: 27 PG — SIGNIFICANT CHANGE UP (ref 27–34)
MCHC RBC-ENTMCNC: 32.7 GM/DL — SIGNIFICANT CHANGE UP (ref 32–36)
MCV RBC AUTO: 82.7 FL — SIGNIFICANT CHANGE UP (ref 80–100)
NRBC # BLD: 0 /100 WBCS — SIGNIFICANT CHANGE UP (ref 0–0)
NRBC # FLD: 0 K/UL — SIGNIFICANT CHANGE UP (ref 0–0)
PLATELET # BLD AUTO: 281 K/UL — SIGNIFICANT CHANGE UP (ref 150–400)
POTASSIUM SERPL-MCNC: 3.6 MMOL/L — SIGNIFICANT CHANGE UP (ref 3.5–5.3)
POTASSIUM SERPL-SCNC: 3.6 MMOL/L — SIGNIFICANT CHANGE UP (ref 3.5–5.3)
PROT SERPL-MCNC: 6 G/DL — SIGNIFICANT CHANGE UP (ref 6–8.3)
RBC # BLD: 3.59 M/UL — LOW (ref 4.2–5.8)
RBC # FLD: 14.7 % — HIGH (ref 10.3–14.5)
SODIUM SERPL-SCNC: 139 MMOL/L — SIGNIFICANT CHANGE UP (ref 135–145)
WBC # BLD: 8.26 K/UL — SIGNIFICANT CHANGE UP (ref 3.8–10.5)
WBC # FLD AUTO: 8.26 K/UL — SIGNIFICANT CHANGE UP (ref 3.8–10.5)

## 2024-08-25 RX ORDER — HYDROMORPHONE HYDROCHLORIDE 2 MG/1
4 TABLET ORAL
Refills: 0 | Status: DISCONTINUED | OUTPATIENT
Start: 2024-08-25 | End: 2024-08-27

## 2024-08-25 RX ORDER — HYDROMORPHONE HYDROCHLORIDE 2 MG/1
2 TABLET ORAL
Refills: 0 | Status: DISCONTINUED | OUTPATIENT
Start: 2024-08-25 | End: 2024-08-25

## 2024-08-25 RX ORDER — OXYCODONE HYDROCHLORIDE 5 MG/1
5 TABLET ORAL EVERY 4 HOURS
Refills: 0 | Status: DISCONTINUED | OUTPATIENT
Start: 2024-08-25 | End: 2024-08-25

## 2024-08-25 RX ORDER — OXYCODONE HYDROCHLORIDE 5 MG/1
10 TABLET ORAL EVERY 4 HOURS
Refills: 0 | Status: DISCONTINUED | OUTPATIENT
Start: 2024-08-25 | End: 2024-08-25

## 2024-08-25 RX ORDER — HYDROMORPHONE HYDROCHLORIDE 2 MG/1
2 TABLET ORAL
Refills: 0 | Status: DISCONTINUED | OUTPATIENT
Start: 2024-08-25 | End: 2024-08-27

## 2024-08-25 RX ORDER — HYDROMORPHONE HYDROCHLORIDE 2 MG/1
0.5 TABLET ORAL
Refills: 0 | Status: DISCONTINUED | OUTPATIENT
Start: 2024-08-25 | End: 2024-08-25

## 2024-08-25 RX ORDER — HYDROMORPHONE HYDROCHLORIDE 2 MG/1
4 TABLET ORAL
Refills: 0 | Status: DISCONTINUED | OUTPATIENT
Start: 2024-08-25 | End: 2024-08-25

## 2024-08-25 RX ORDER — HYDROMORPHONE HYDROCHLORIDE 2 MG/1
0.5 TABLET ORAL
Refills: 0 | Status: DISCONTINUED | OUTPATIENT
Start: 2024-08-25 | End: 2024-08-26

## 2024-08-25 RX ADMIN — HYDROMORPHONE HYDROCHLORIDE 30 MILLILITER(S): 2 TABLET ORAL at 07:36

## 2024-08-25 RX ADMIN — METHOCARBAMOL 500 MILLIGRAM(S): 750 TABLET, FILM COATED ORAL at 06:06

## 2024-08-25 RX ADMIN — TIOTROPIUM BROMIDE INHALATION SPRAY 2 PUFF(S): 3.12 SPRAY, METERED RESPIRATORY (INHALATION) at 15:31

## 2024-08-25 RX ADMIN — ACETAMINOPHEN 400 MILLIGRAM(S): 325 TABLET ORAL at 17:25

## 2024-08-25 RX ADMIN — BUDESONIDE AND FORMOTEROL FUMARATE 2 PUFF(S): 80; 4.5 AEROSOL, METERED RESPIRATORY (INHALATION) at 13:22

## 2024-08-25 RX ADMIN — FLUTICASONE PROPIONATE 1 SPRAY(S): 50 SPRAY, METERED NASAL at 06:06

## 2024-08-25 RX ADMIN — METHOCARBAMOL 500 MILLIGRAM(S): 750 TABLET, FILM COATED ORAL at 13:21

## 2024-08-25 RX ADMIN — Medication 300 MILLIGRAM(S): at 22:32

## 2024-08-25 RX ADMIN — Medication 300 MILLIGRAM(S): at 06:04

## 2024-08-25 RX ADMIN — ENOXAPARIN SODIUM 40 MILLIGRAM(S): 100 INJECTION SUBCUTANEOUS at 06:07

## 2024-08-25 RX ADMIN — KETOROLAC TROMETHAMINE 15 MILLIGRAM(S): 30 INJECTION, SOLUTION INTRAMUSCULAR at 15:20

## 2024-08-25 RX ADMIN — KETOROLAC TROMETHAMINE 15 MILLIGRAM(S): 30 INJECTION, SOLUTION INTRAMUSCULAR at 10:20

## 2024-08-25 RX ADMIN — HYDROMORPHONE HYDROCHLORIDE 4 MILLIGRAM(S): 2 TABLET ORAL at 13:19

## 2024-08-25 RX ADMIN — KETOROLAC TROMETHAMINE 15 MILLIGRAM(S): 30 INJECTION, SOLUTION INTRAMUSCULAR at 10:00

## 2024-08-25 RX ADMIN — KETOROLAC TROMETHAMINE 15 MILLIGRAM(S): 30 INJECTION, SOLUTION INTRAMUSCULAR at 16:33

## 2024-08-25 RX ADMIN — KETOROLAC TROMETHAMINE 15 MILLIGRAM(S): 30 INJECTION, SOLUTION INTRAMUSCULAR at 06:07

## 2024-08-25 RX ADMIN — HYDROXYCHLOROQUINE SULFATE 400 MILLIGRAM(S): 200 TABLET, FILM COATED ORAL at 22:31

## 2024-08-25 RX ADMIN — KETOROLAC TROMETHAMINE 15 MILLIGRAM(S): 30 INJECTION, SOLUTION INTRAMUSCULAR at 22:33

## 2024-08-25 RX ADMIN — KETOROLAC TROMETHAMINE 15 MILLIGRAM(S): 30 INJECTION, SOLUTION INTRAMUSCULAR at 17:00

## 2024-08-25 RX ADMIN — ACETAMINOPHEN 400 MILLIGRAM(S): 325 TABLET ORAL at 13:19

## 2024-08-25 RX ADMIN — BUDESONIDE AND FORMOTEROL FUMARATE 2 PUFF(S): 80; 4.5 AEROSOL, METERED RESPIRATORY (INHALATION) at 22:30

## 2024-08-25 RX ADMIN — FLUTICASONE PROPIONATE 1 SPRAY(S): 50 SPRAY, METERED NASAL at 17:25

## 2024-08-25 RX ADMIN — KETOROLAC TROMETHAMINE 15 MILLIGRAM(S): 30 INJECTION, SOLUTION INTRAMUSCULAR at 06:30

## 2024-08-25 RX ADMIN — Medication 300 MILLIGRAM(S): at 13:19

## 2024-08-25 RX ADMIN — METHOCARBAMOL 500 MILLIGRAM(S): 750 TABLET, FILM COATED ORAL at 22:32

## 2024-08-25 RX ADMIN — Medication 7.5 MILLIGRAM(S): at 06:00

## 2024-08-25 RX ADMIN — Medication 125 MICROGRAM(S): at 06:04

## 2024-08-25 RX ADMIN — Medication 40 MILLIGRAM(S): at 06:07

## 2024-08-25 RX ADMIN — KETOROLAC TROMETHAMINE 15 MILLIGRAM(S): 30 INJECTION, SOLUTION INTRAMUSCULAR at 23:00

## 2024-08-25 RX ADMIN — HYDROMORPHONE HYDROCHLORIDE 4 MILLIGRAM(S): 2 TABLET ORAL at 14:19

## 2024-08-25 NOTE — PROGRESS NOTE ADULT - SUBJECTIVE AND OBJECTIVE BOX
SUBJECTIVE/ OVERNIGHT EVENTS:  --- Coverage for Dr. Zavala ---  Pt seen and examined earlier today.   feeling better  breathing improved  Pain is better controlled.  no cp, no sob, no n/v/d.  no HA, no dizziness. no abdominal pain.     --------------------------------------------------------------------------------------------  LABS:                        9.7    8.26  )-----------( 281      ( 25 Aug 2024 07:58 )             29.7     08-25    139  |  106  |  11  ----------------------------<  96  3.6   |  23  |  0.81    Ca    8.0<L>      25 Aug 2024 07:58  Phos  2.1     08-24  Mg     1.80     08-24    TPro  6.0  /  Alb  3.1<L>  /  TBili  0.4  /  DBili  x   /  AST  22  /  ALT  21  /  AlkPhos  62  08-25      CAPILLARY BLOOD GLUCOSE      POCT Blood Glucose.: 109 mg/dL (25 Aug 2024 21:52)  POCT Blood Glucose.: 102 mg/dL (25 Aug 2024 17:26)  POCT Blood Glucose.: 96 mg/dL (25 Aug 2024 12:03)  POCT Blood Glucose.: 95 mg/dL (25 Aug 2024 07:57)  POCT Blood Glucose.: 115 mg/dL (24 Aug 2024 22:19)        Urinalysis Basic - ( 25 Aug 2024 07:58 )    Color: x / Appearance: x / SG: x / pH: x  Gluc: 96 mg/dL / Ketone: x  / Bili: x / Urobili: x   Blood: x / Protein: x / Nitrite: x   Leuk Esterase: x / RBC: x / WBC x   Sq Epi: x / Non Sq Epi: x / Bacteria: x        RADIOLOGY & ADDITIONAL TESTS:    Imaging Personally Reviewed:  [x] YES  [ ] NO    Consultant(s) Notes Reviewed:  [x] YES  [ ] NO    MEDICATIONS  (STANDING):  budesonide  80 MICROgram(s)/formoterol 4.5 MICROgram(s) Inhaler 2 Puff(s) Inhalation two times a day  dextrose 5%. 1000 milliLiter(s) (100 mL/Hr) IV Continuous <Continuous>  dextrose 5%. 1000 milliLiter(s) (50 mL/Hr) IV Continuous <Continuous>  dextrose 50% Injectable 25 Gram(s) IV Push once  dextrose 50% Injectable 12.5 Gram(s) IV Push once  dextrose 50% Injectable 25 Gram(s) IV Push once  enoxaparin Injectable 40 milliGRAM(s) SubCutaneous every 24 hours  fluticasone propionate 50 MICROgram(s)/spray Nasal Spray 1 Spray(s) Both Nostrils two times a day  gabapentin 300 milliGRAM(s) Oral every 8 hours  glucagon  Injectable 1 milliGRAM(s) IntraMuscular once  hydroxychloroquine 400 milliGRAM(s) Oral at bedtime  insulin lispro (ADMELOG) corrective regimen sliding scale   SubCutaneous three times a day before meals  insulin lispro (ADMELOG) corrective regimen sliding scale   SubCutaneous at bedtime  ketorolac   Injectable 15 milliGRAM(s) IV Push every 6 hours  levothyroxine 125 MICROGram(s) Oral daily  lidocaine   4% Patch 1 Patch Transdermal daily  methocarbamol 500 milliGRAM(s) Oral every 8 hours  pantoprazole  Injectable 40 milliGRAM(s) IV Push every 24 hours  predniSONE   Tablet 7.5 milliGRAM(s) Oral daily  tiotropium 2.5 MICROgram(s) Inhaler 2 Puff(s) Inhalation daily    MEDICATIONS  (PRN):  albuterol    0.083% 2.5 milliGRAM(s) Nebulizer every 6 hours PRN Shortness of Breath and/or Wheezing  dextrose Oral Gel 15 Gram(s) Oral once PRN Blood Glucose LESS THAN 70 milliGRAM(s)/deciliter  HYDROmorphone   Tablet 2 milliGRAM(s) Oral every 3 hours PRN Moderate Pain (4 - 6)  HYDROmorphone   Tablet 4 milliGRAM(s) Oral every 3 hours PRN Severe Pain (7 - 10)  HYDROmorphone  Injectable 0.5 milliGRAM(s) IV Push every 3 hours PRN Severe Breakthrough Pain (7 - 10)  naloxone Injectable 0.1 milliGRAM(s) IV Push every 3 minutes PRN For ANY of the following changes in patient status:  A. RR LESS THAN 10 breaths per minute, B. Oxygen saturation LESS THAN 90%, C. Sedation score of 6      Care Discussed with Consultants/Other Providers [x] YES  [ ] NO    Vital Signs Last 24 Hrs  T(C): 36.7 (25 Aug 2024 20:24), Max: 37.2 (25 Aug 2024 06:03)  T(F): 98 (25 Aug 2024 20:24), Max: 99 (25 Aug 2024 06:03)  HR: 68 (25 Aug 2024 20:24) (68 - 90)  BP: 138/81 (25 Aug 2024 20:24) (115/62 - 138/85)  BP(mean): --  RR: 18 (25 Aug 2024 20:24) (18 - 18)  SpO2: 100% (25 Aug 2024 20:24) (96% - 100%)    Parameters below as of 25 Aug 2024 20:24  Patient On (Oxygen Delivery Method): room air      I&O's Summary    24 Aug 2024 07:01  -  25 Aug 2024 07:00  --------------------------------------------------------  IN: 700 mL / OUT: 900 mL / NET: -200 mL    25 Aug 2024 07:01  -  25 Aug 2024 22:05  --------------------------------------------------------  IN: 700 mL / OUT: 1000 mL / NET: -300 mL      PHYSICAL EXAM:  GENERAL: NAD, well-developed, comfortable  HEAD:  Atraumatic, Normocephalic  EYES: EOMI, PERRLA, conjunctiva and sclera clear  NECK: Supple, No JVD  CHEST/LUNG: mild decrease breath sounds bilaterally; No wheeze   HEART: Regular rate and rhythm; No murmurs, rubs, or gallops  ABDOMEN: Soft, Nontender, Nondistended; Bowel sounds present, laparoscopic dressing d/c/i  Neuro: AAOx3, no focal weakness, 5/5 b/l extremity strength  EXTREMITIES:  2+ Peripheral Pulses, No clubbing, cyanosis, or edema  SKIN: No rashes or lesions

## 2024-08-25 NOTE — PROGRESS NOTE ADULT - NSPROGADDITIONALINFOA_GEN_ALL_CORE
--- Coverage for Dr. Zavala ---  - Dr. [address]   - [phone]
--- Coverage for Dr. Zavala ---  - Dr. [address] [facility]   - [phone]
--- Coverage for Dr. Zavala ---  - Dr. [address] [facility]   - [phone]

## 2024-08-25 NOTE — PROGRESS NOTE ADULT - ASSESSMENT
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 Patient is hemodynamically stable on supplemental oxygen with subjective dyspnea and SpO2 , now s/p nebulizer treatment and with home inhaler restarted.    # gastric CA  S/P Robotic gasserectomy   Surg care appreciated  pain control   incentive spirmetry  diet advanced: regular diet  PT as tolerated     # Hypothyroidism  TFTs  synthroid     # Lupus myocytics   s/p IV hydrocortisone taper  Prednisone 7.5 mg home dose.   outpt rheum follow up.   Plaquenil home med    # Asthma  pulm following  on Trellergy at home.    DVT ppx

## 2024-08-25 NOTE — PROGRESS NOTE ADULT - SUBJECTIVE AND OBJECTIVE BOX
Anesthesia Pain Management Service    SUBJECTIVE: Patient is doing well with IV PCA and no significant problems reported. Tolerating regular diet. Discussed transitioning to PO pain regimen with patient. Oxycodone does not help with pain per patient. Requesting PO Dilaudid.    Pain Scale Score	At5/10___ 	With Activity: ___ 	[X ] Refer to charted pain scores    THERAPY:    [ ] IV PCA Morphine		[ ] 5 mg/mL	[ ] 1 mg/mL  [X ] IV PCA Hydromorphone	[ ] 5 mg/mL	[X ] 1 mg/mL  [ ] IV PCA Fentanyl		[ ] 50 micrograms/mL    Demand dose __0.2_ lockout __6_ (minutes) Continuous Rate _0__ Total: __5.7_   mg used (in past 24 hrs)      MEDICATIONS  (STANDING):  acetaminophen   IVPB .. 1000 milliGRAM(s) IV Intermittent every 6 hours  budesonide  80 MICROgram(s)/formoterol 4.5 MICROgram(s) Inhaler 2 Puff(s) Inhalation two times a day  dextrose 5% + sodium chloride 0.9%. 1000 milliLiter(s) (30 mL/Hr) IV Continuous <Continuous>  dextrose 5%. 1000 milliLiter(s) (50 mL/Hr) IV Continuous <Continuous>  dextrose 5%. 1000 milliLiter(s) (100 mL/Hr) IV Continuous <Continuous>  dextrose 50% Injectable 12.5 Gram(s) IV Push once  dextrose 50% Injectable 25 Gram(s) IV Push once  dextrose 50% Injectable 25 Gram(s) IV Push once  enoxaparin Injectable 40 milliGRAM(s) SubCutaneous every 24 hours  fluticasone propionate 50 MICROgram(s)/spray Nasal Spray 1 Spray(s) Both Nostrils two times a day  gabapentin 300 milliGRAM(s) Oral every 8 hours  glucagon  Injectable 1 milliGRAM(s) IntraMuscular once  hydroxychloroquine 400 milliGRAM(s) Oral at bedtime  insulin lispro (ADMELOG) corrective regimen sliding scale   SubCutaneous three times a day before meals  insulin lispro (ADMELOG) corrective regimen sliding scale   SubCutaneous at bedtime  ketorolac   Injectable 15 milliGRAM(s) IV Push every 6 hours  levothyroxine 125 MICROGram(s) Oral daily  lidocaine   4% Patch 1 Patch Transdermal daily  methocarbamol 500 milliGRAM(s) Oral every 8 hours  pantoprazole  Injectable 40 milliGRAM(s) IV Push every 24 hours  predniSONE   Tablet 7.5 milliGRAM(s) Oral daily  tiotropium 2.5 MICROgram(s) Inhaler 2 Puff(s) Inhalation daily    MEDICATIONS  (PRN):  albuterol    0.083% 2.5 milliGRAM(s) Nebulizer every 6 hours PRN Shortness of Breath and/or Wheezing  dextrose Oral Gel 15 Gram(s) Oral once PRN Blood Glucose LESS THAN 70 milliGRAM(s)/deciliter  HYDROmorphone   Tablet 4 milliGRAM(s) Oral every 3 hours PRN Severe Pain (7 - 10)  HYDROmorphone   Tablet 2 milliGRAM(s) Oral every 3 hours PRN Moderate Pain (4 - 6)  HYDROmorphone  Injectable 0.5 milliGRAM(s) IV Push every 3 hours PRN Severe Breakthrough Pain (7 - 10)  naloxone Injectable 0.1 milliGRAM(s) IV Push every 3 minutes PRN For ANY of the following changes in patient status:  A. RR LESS THAN 10 breaths per minute, B. Oxygen saturation LESS THAN 90%, C. Sedation score of 6  oxyCODONE    IR 10 milliGRAM(s) Oral every 4 hours PRN Severe Pain (7 - 10)      OBJECTIVE: Patient laying in bed.    Sedation Score:	[ X] Alert	[ ] Drowsy 	[ ] Arousable	[ ] Asleep	[ ] Unresponsive    Side Effects:	[X ] None	[ ] Nausea	[ ] Vomiting	[ ] Pruritus  		[ ] Other:    Vital Signs Last 24 Hrs  T(C): 37.2 (25 Aug 2024 06:03), Max: 37.2 (25 Aug 2024 06:03)  T(F): 99 (25 Aug 2024 06:03), Max: 99 (25 Aug 2024 06:03)  HR: 89 (25 Aug 2024 06:03) (79 - 90)  BP: 126/67 (25 Aug 2024 06:03) (115/55 - 132/74)  BP(mean): --  RR: 18 (25 Aug 2024 06:03) (18 - 18)  SpO2: 96% (25 Aug 2024 06:03) (96% - 100%)    Parameters below as of 25 Aug 2024 06:03  Patient On (Oxygen Delivery Method): room air        ASSESSMENT/ PLAN    Therapy to  be:	[ ] Continue   [ X] Discontinued   [X ] Change to prn Analgesics    Documentation and Verification of current medications:   [X] Done	[ ] Not done, not elligible    Comments: IV PCA discontinued. PRN Oral/IV opioids and/or Adjuvant non-opioid medication to be ordered at this point.    Progress Note written now but Patient was seen earlier.

## 2024-08-25 NOTE — PROGRESS NOTE ADULT - SUBJECTIVE AND OBJECTIVE BOX
Surgery Progress Note:    OVERNIGHT EVENTS: NAEO    SUBJECTIVE: Pt seen and examined at bedside. Patient comfortable and in no-apparent distress. Pain is controlled. Passing flatus, and passing stool. Tolerating regular diet.     MEDICATIONS  (STANDING):  acetaminophen   IVPB .. 1000 milliGRAM(s) IV Intermittent every 6 hours  budesonide  80 MICROgram(s)/formoterol 4.5 MICROgram(s) Inhaler 2 Puff(s) Inhalation two times a day  dextrose 5% + sodium chloride 0.9%. 1000 milliLiter(s) (30 mL/Hr) IV Continuous <Continuous>  dextrose 5%. 1000 milliLiter(s) (100 mL/Hr) IV Continuous <Continuous>  dextrose 5%. 1000 milliLiter(s) (50 mL/Hr) IV Continuous <Continuous>  dextrose 50% Injectable 25 Gram(s) IV Push once  dextrose 50% Injectable 12.5 Gram(s) IV Push once  dextrose 50% Injectable 25 Gram(s) IV Push once  enoxaparin Injectable 40 milliGRAM(s) SubCutaneous every 24 hours  fluticasone propionate 50 MICROgram(s)/spray Nasal Spray 1 Spray(s) Both Nostrils two times a day  gabapentin 300 milliGRAM(s) Oral every 8 hours  glucagon  Injectable 1 milliGRAM(s) IntraMuscular once  hydroxychloroquine 400 milliGRAM(s) Oral at bedtime  insulin lispro (ADMELOG) corrective regimen sliding scale   SubCutaneous three times a day before meals  insulin lispro (ADMELOG) corrective regimen sliding scale   SubCutaneous at bedtime  ketorolac   Injectable 15 milliGRAM(s) IV Push every 6 hours  levothyroxine 125 MICROGram(s) Oral daily  lidocaine   4% Patch 1 Patch Transdermal daily  methocarbamol 500 milliGRAM(s) Oral every 8 hours  pantoprazole  Injectable 40 milliGRAM(s) IV Push every 24 hours  predniSONE   Tablet 7.5 milliGRAM(s) Oral daily  tiotropium 2.5 MICROgram(s) Inhaler 2 Puff(s) Inhalation daily    MEDICATIONS  (PRN):  albuterol    0.083% 2.5 milliGRAM(s) Nebulizer every 6 hours PRN Shortness of Breath and/or Wheezing  dextrose Oral Gel 15 Gram(s) Oral once PRN Blood Glucose LESS THAN 70 milliGRAM(s)/deciliter  HYDROmorphone   Tablet 4 milliGRAM(s) Oral every 3 hours PRN Severe Pain (7 - 10)  HYDROmorphone   Tablet 2 milliGRAM(s) Oral every 3 hours PRN Moderate Pain (4 - 6)  HYDROmorphone  Injectable 0.5 milliGRAM(s) IV Push every 3 hours PRN Severe Breakthrough Pain (7 - 10)  naloxone Injectable 0.1 milliGRAM(s) IV Push every 3 minutes PRN For ANY of the following changes in patient status:  A. RR LESS THAN 10 breaths per minute, B. Oxygen saturation LESS THAN 90%, C. Sedation score of 6    T(C): 37.2 (08-25-24 @ 06:03), Max: 37.2 (08-25-24 @ 06:03)  HR: 89 (08-25-24 @ 06:03) (79 - 90)  BP: 126/67 (08-25-24 @ 06:03) (115/55 - 132/74)  RR: 18 (08-25-24 @ 06:03) (18 - 18)  SpO2: 96% (08-25-24 @ 06:03) (96% - 100%)    08-24-24 @ 07:01  -  08-25-24 @ 07:00  --------------------------------------------------------  IN: 700 mL / OUT: 900 mL / NET: -200 mL      LABS:                        9.7    8.26  )-----------( 281      ( 25 Aug 2024 07:58 )             29.7     08-25    139  |  106  |  11  ----------------------------<  96  3.6   |  23  |  0.81    Ca    8.0<L>      25 Aug 2024 07:58  Phos  2.1     08-24  Mg     1.80     08-24    TPro  6.0  /  Alb  3.1<L>  /  TBili  0.4  /  DBili  x   /  AST  22  /  ALT  21  /  AlkPhos  62  08-25      Urinalysis Basic - ( 25 Aug 2024 07:58 )    Color: x / Appearance: x / SG: x / pH: x  Gluc: 96 mg/dL / Ketone: x  / Bili: x / Urobili: x   Blood: x / Protein: x / Nitrite: x   Leuk Esterase: x / RBC: x / WBC x   Sq Epi: x / Non Sq Epi: x / Bacteria: x      PE:  Gen: NAD  CV: Pulse regular present  Resp: Nonlabored  Abdomen: Soft, Mildly ttp. midly distended. Port sites c/d/i.

## 2024-08-25 NOTE — PROGRESS NOTE ADULT - ASSESSMENT
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 Patient is hemodynamically stable on supplemental oxygen with subjective dyspnea and SpO2 , now s/p nebulizer treatment and with home inhaler restarted.    PLAN:  - pain control w/ tylenol, toradol, hydromorphone PRN, gabapentin  - Pulmonology following: c/w spiriva/sympbicort, albuterol, and flonase. wean supplemental 02   - Reg diet  - Steroid taper per outpatient rheum recs , inpatient rheum additionally consulted 8/21/24   - Hydrocortisone 100 pre-op completed 8/20   - Hydrocortisone 50mg q8h x3 doses 8/21   - Hydrocortisone 25mg q8h x3 doses  * for 8/22     - Prednisone 7.5mg QD starting on POD3 - on  -Encourage IS/OOBAT  -c/w lovenox DVT ppx     E team surgery  [id number]

## 2024-08-26 ENCOUNTER — TRANSCRIPTION ENCOUNTER (OUTPATIENT)
Age: 49
End: 2024-08-26

## 2024-08-26 LAB
ANION GAP SERPL CALC-SCNC: 11 MMOL/L — SIGNIFICANT CHANGE UP (ref 7–14)
BUN SERPL-MCNC: 10 MG/DL — SIGNIFICANT CHANGE UP (ref 7–23)
CALCIUM SERPL-MCNC: 8.5 MG/DL — SIGNIFICANT CHANGE UP (ref 8.4–10.5)
CHLORIDE SERPL-SCNC: 106 MMOL/L — SIGNIFICANT CHANGE UP (ref 98–107)
CO2 SERPL-SCNC: 20 MMOL/L — LOW (ref 22–31)
CREAT SERPL-MCNC: 0.85 MG/DL — SIGNIFICANT CHANGE UP (ref 0.5–1.3)
EGFR: 107 ML/MIN/1.73M2 — SIGNIFICANT CHANGE UP
GLUCOSE BLDC GLUCOMTR-MCNC: 109 MG/DL — HIGH (ref 70–99)
GLUCOSE BLDC GLUCOMTR-MCNC: 118 MG/DL — HIGH (ref 70–99)
GLUCOSE BLDC GLUCOMTR-MCNC: 89 MG/DL — SIGNIFICANT CHANGE UP (ref 70–99)
GLUCOSE BLDC GLUCOMTR-MCNC: 94 MG/DL — SIGNIFICANT CHANGE UP (ref 70–99)
GLUCOSE SERPL-MCNC: 97 MG/DL — SIGNIFICANT CHANGE UP (ref 70–99)
HCT VFR BLD CALC: 29.3 % — LOW (ref 39–50)
HGB BLD-MCNC: 9.9 G/DL — LOW (ref 13–17)
MAGNESIUM SERPL-MCNC: 1.8 MG/DL — SIGNIFICANT CHANGE UP (ref 1.6–2.6)
MCHC RBC-ENTMCNC: 27.4 PG — SIGNIFICANT CHANGE UP (ref 27–34)
MCHC RBC-ENTMCNC: 33.8 GM/DL — SIGNIFICANT CHANGE UP (ref 32–36)
MCV RBC AUTO: 81.2 FL — SIGNIFICANT CHANGE UP (ref 80–100)
NRBC # BLD: 0 /100 WBCS — SIGNIFICANT CHANGE UP (ref 0–0)
NRBC # FLD: 0 K/UL — SIGNIFICANT CHANGE UP (ref 0–0)
PHOSPHATE SERPL-MCNC: 2.8 MG/DL — SIGNIFICANT CHANGE UP (ref 2.5–4.5)
PLATELET # BLD AUTO: 296 K/UL — SIGNIFICANT CHANGE UP (ref 150–400)
POTASSIUM SERPL-MCNC: 3.7 MMOL/L — SIGNIFICANT CHANGE UP (ref 3.5–5.3)
POTASSIUM SERPL-SCNC: 3.7 MMOL/L — SIGNIFICANT CHANGE UP (ref 3.5–5.3)
RBC # BLD: 3.61 M/UL — LOW (ref 4.2–5.8)
RBC # FLD: 14.1 % — SIGNIFICANT CHANGE UP (ref 10.3–14.5)
SODIUM SERPL-SCNC: 137 MMOL/L — SIGNIFICANT CHANGE UP (ref 135–145)
WBC # BLD: 7.8 K/UL — SIGNIFICANT CHANGE UP (ref 3.8–10.5)
WBC # FLD AUTO: 7.8 K/UL — SIGNIFICANT CHANGE UP (ref 3.8–10.5)

## 2024-08-26 RX ORDER — LOSARTAN POTASSIUM 50 MG/1
50 TABLET ORAL DAILY
Refills: 0 | Status: DISCONTINUED | OUTPATIENT
Start: 2024-08-26 | End: 2024-08-27

## 2024-08-26 RX ORDER — ACETAMINOPHEN 325 MG/1
1000 TABLET ORAL EVERY 6 HOURS
Refills: 0 | Status: DISCONTINUED | OUTPATIENT
Start: 2024-08-26 | End: 2024-08-27

## 2024-08-26 RX ORDER — IBUPROFEN 600 MG
400 TABLET ORAL EVERY 6 HOURS
Refills: 0 | Status: DISCONTINUED | OUTPATIENT
Start: 2024-08-26 | End: 2024-08-27

## 2024-08-26 RX ADMIN — Medication 400 MILLIGRAM(S): at 13:07

## 2024-08-26 RX ADMIN — Medication 300 MILLIGRAM(S): at 21:21

## 2024-08-26 RX ADMIN — TIOTROPIUM BROMIDE INHALATION SPRAY 2 PUFF(S): 3.12 SPRAY, METERED RESPIRATORY (INHALATION) at 13:48

## 2024-08-26 RX ADMIN — KETOROLAC TROMETHAMINE 15 MILLIGRAM(S): 30 INJECTION, SOLUTION INTRAMUSCULAR at 05:00

## 2024-08-26 RX ADMIN — ACETAMINOPHEN 1000 MILLIGRAM(S): 325 TABLET ORAL at 13:06

## 2024-08-26 RX ADMIN — METHOCARBAMOL 500 MILLIGRAM(S): 750 TABLET, FILM COATED ORAL at 06:11

## 2024-08-26 RX ADMIN — ENOXAPARIN SODIUM 40 MILLIGRAM(S): 100 INJECTION SUBCUTANEOUS at 04:34

## 2024-08-26 RX ADMIN — Medication 300 MILLIGRAM(S): at 06:11

## 2024-08-26 RX ADMIN — HYDROMORPHONE HYDROCHLORIDE 4 MILLIGRAM(S): 2 TABLET ORAL at 04:34

## 2024-08-26 RX ADMIN — KETOROLAC TROMETHAMINE 15 MILLIGRAM(S): 30 INJECTION, SOLUTION INTRAMUSCULAR at 04:34

## 2024-08-26 RX ADMIN — Medication 25 GRAM(S): at 12:34

## 2024-08-26 RX ADMIN — FLUTICASONE PROPIONATE 1 SPRAY(S): 50 SPRAY, METERED NASAL at 06:09

## 2024-08-26 RX ADMIN — Medication 400 MILLIGRAM(S): at 23:02

## 2024-08-26 RX ADMIN — METHOCARBAMOL 500 MILLIGRAM(S): 750 TABLET, FILM COATED ORAL at 21:22

## 2024-08-26 RX ADMIN — ACETAMINOPHEN 1000 MILLIGRAM(S): 325 TABLET ORAL at 17:51

## 2024-08-26 RX ADMIN — Medication 125 MICROGRAM(S): at 06:10

## 2024-08-26 RX ADMIN — HYDROMORPHONE HYDROCHLORIDE 4 MILLIGRAM(S): 2 TABLET ORAL at 21:26

## 2024-08-26 RX ADMIN — FLUTICASONE PROPIONATE 1 SPRAY(S): 50 SPRAY, METERED NASAL at 17:50

## 2024-08-26 RX ADMIN — HYDROXYCHLOROQUINE SULFATE 400 MILLIGRAM(S): 200 TABLET, FILM COATED ORAL at 21:23

## 2024-08-26 RX ADMIN — ACETAMINOPHEN 1000 MILLIGRAM(S): 325 TABLET ORAL at 18:22

## 2024-08-26 RX ADMIN — LOSARTAN POTASSIUM 50 MILLIGRAM(S): 50 TABLET ORAL at 17:54

## 2024-08-26 RX ADMIN — HYDROMORPHONE HYDROCHLORIDE 4 MILLIGRAM(S): 2 TABLET ORAL at 21:56

## 2024-08-26 RX ADMIN — ACETAMINOPHEN 1000 MILLIGRAM(S): 325 TABLET ORAL at 23:02

## 2024-08-26 RX ADMIN — Medication 7.5 MILLIGRAM(S): at 06:11

## 2024-08-26 RX ADMIN — Medication 40 MILLIGRAM(S): at 04:34

## 2024-08-26 RX ADMIN — Medication 400 MILLIGRAM(S): at 23:32

## 2024-08-26 RX ADMIN — METHOCARBAMOL 500 MILLIGRAM(S): 750 TABLET, FILM COATED ORAL at 13:47

## 2024-08-26 RX ADMIN — ACETAMINOPHEN 1000 MILLIGRAM(S): 325 TABLET ORAL at 23:32

## 2024-08-26 RX ADMIN — ACETAMINOPHEN 1000 MILLIGRAM(S): 325 TABLET ORAL at 12:36

## 2024-08-26 RX ADMIN — Medication 400 MILLIGRAM(S): at 18:46

## 2024-08-26 RX ADMIN — Medication 300 MILLIGRAM(S): at 13:47

## 2024-08-26 RX ADMIN — BUDESONIDE AND FORMOTEROL FUMARATE 2 PUFF(S): 80; 4.5 AEROSOL, METERED RESPIRATORY (INHALATION) at 12:34

## 2024-08-26 RX ADMIN — Medication 400 MILLIGRAM(S): at 12:37

## 2024-08-26 RX ADMIN — Medication 400 MILLIGRAM(S): at 17:51

## 2024-08-26 NOTE — PROGRESS NOTE ADULT - SUBJECTIVE AND OBJECTIVE BOX
Name of Patient : AMBERLY ESPITIA  MRN: [id number]  Date of visit: 08-26-24      Subjective: Patient seen and examined. No new events except as noted.     REVIEW OF SYSTEMS:    CONSTITUTIONAL: No weakness, fevers or chills  EYES/ENT: No visual changes;  No vertigo or throat pain   NECK: No pain or stiffness  RESPIRATORY: No cough, wheezing, hemoptysis; No shortness of breath  CARDIOVASCULAR: No chest pain or palpitations  GASTROINTESTINAL: No abdominal or epigastric pain. No nausea, vomiting, or hematemesis; No diarrhea or constipation. No melena or hematochezia.  GENITOURINARY: No dysuria, frequency or hematuria  NEUROLOGICAL: No numbness or weakness  SKIN: No itching, burning, rashes, or lesions   All other review of systems is negative unless indicated above.    MEDICATIONS:  MEDICATIONS  (STANDING):  acetaminophen     Tablet .. 1000 milliGRAM(s) Oral every 6 hours  budesonide  80 MICROgram(s)/formoterol 4.5 MICROgram(s) Inhaler 2 Puff(s) Inhalation two times a day  enoxaparin Injectable 40 milliGRAM(s) SubCutaneous every 24 hours  fluticasone propionate 50 MICROgram(s)/spray Nasal Spray 1 Spray(s) Both Nostrils two times a day  gabapentin 300 milliGRAM(s) Oral every 8 hours  glucagon  Injectable 1 milliGRAM(s) IntraMuscular once  hydroxychloroquine 400 milliGRAM(s) Oral at bedtime  ibuprofen  Tablet. 400 milliGRAM(s) Oral every 6 hours  insulin lispro (ADMELOG) corrective regimen sliding scale   SubCutaneous at bedtime  insulin lispro (ADMELOG) corrective regimen sliding scale   SubCutaneous three times a day before meals  levothyroxine 125 MICROGram(s) Oral daily  lidocaine   4% Patch 1 Patch Transdermal daily  losartan 50 milliGRAM(s) Oral daily  methocarbamol 500 milliGRAM(s) Oral every 8 hours  pantoprazole  Injectable 40 milliGRAM(s) IV Push every 24 hours  predniSONE   Tablet 7.5 milliGRAM(s) Oral daily  tiotropium 2.5 MICROgram(s) Inhaler 2 Puff(s) Inhalation daily      PHYSICAL EXAM:  T(C): 36.8 (08-26-24 @ 20:11), Max: 37.4 (08-26-24 @ 04:32)  HR: 70 (08-26-24 @ 20:11) (70 - 86)  BP: 122/72 (08-26-24 @ 20:11) (122/72 - 137/88)  RR: 18 (08-26-24 @ 20:11) (18 - 18)  SpO2: 100% (08-26-24 @ 20:11) (99% - 100%)  Wt(kg): --  I&O's Summary    25 Aug 2024 07:01  -  26 Aug 2024 07:00  --------------------------------------------------------  IN: 1350 mL / OUT: 2000 mL / NET: -650 mL    26 Aug 2024 07:01  -  26 Aug 2024 22:49  --------------------------------------------------------  IN: 890 mL / OUT: 1050 mL / NET: -160 mL          Appearance: Normal	  HEENT:  PERRLA   Lymphatic: No lymphadenopathy   Cardiovascular: Normal S1 S2, no JVD  Respiratory: normal effort , clear  Gastrointestinal:  Soft, Non-tender  Skin: No rashes,  warm to touch  Psychiatry:  Mood & affect appropriate  Musculuskeletal: No edema    recent labs, Imaging and EKGs personally reviewed     08-25-24 @ 07:01  -  08-26-24 @ 07:00  --------------------------------------------------------  IN: 1350 mL / OUT: 2000 mL / NET: -650 mL    08-26-24 @ 07:01  -  08-26-24 @ 22:49  --------------------------------------------------------  IN: 890 mL / OUT: 1050 mL / NET: -160 mL                        9.9    7.80  )-----------( 296      ( 26 Aug 2024 06:11 )             29.3               08-26    137  |  106  |  10  ----------------------------<  97  3.7   |  20<L>  |  0.85    Ca    8.5      26 Aug 2024 06:11  Phos  2.8     08-26  Mg     1.80     08-26    TPro  6.0  /  Alb  3.1<L>  /  TBili  0.4  /  DBili  x   /  AST  22  /  ALT  21  /  AlkPhos  62  08-25                       Urinalysis Basic - ( 26 Aug 2024 06:11 )    Color: x / Appearance: x / SG: x / pH: x  Gluc: 97 mg/dL / Ketone: x  / Bili: x / Urobili: x   Blood: x / Protein: x / Nitrite: x   Leuk Esterase: x / RBC: x / WBC x   Sq Epi: x / Non Sq Epi: x / Bacteria: x

## 2024-08-26 NOTE — DISCHARGE NOTE NURSING/CASE MANAGEMENT/SOCIAL WORK - PATIENT PORTAL LINK FT
You can access the FollowMyHealth Patient Portal offered by [facility] by registering at the following website: [email or web address]. By joining [employer]’s FollowMyHealth portal, you will also be able to view your health information using other applications (apps) compatible with our system.

## 2024-08-26 NOTE — DISCHARGE NOTE PROVIDER - NSDCCPTREATMENT_GEN_ALL_CORE_FT
PRINCIPAL PROCEDURE  Procedure: Gastrectomy, distal, robot-assisted, laparoscopic, with gastrojejunostomy creation  Findings and Treatment: Heather en Y reconstruction

## 2024-08-26 NOTE — DISCHARGE NOTE PROVIDER - NSDCMRMEDTOKEN_GEN_ALL_CORE_FT
Dupixent Pre-filled Pen 300 mg/2 mL subcutaneous solution: 300 milligram(s) subcutaneously every 2 weeks every sunday  losartan 50 mg oral tablet: 1 tab(s) orally once a day (in the morning)  Ozempic 8 mg/3 mL (2 mg dose) subcutaneous solution: 2 milligram(s) subcutaneously once a week SUNDAY LD 3 weeks ago  pantoprazole 40 mg oral delayed release tablet: 1 tab(s) orally once a day (in the morning)  Plaquenil 200 mg oral tablet: 2 tab(s) orally once a day (at bedtime)  Potassium Chloride (Eqv-K-Tab) 20 mEq oral tablet, extended release: 2 tab(s) orally once a day (at bedtime)  predniSONE 2.5 mg oral tablet: 3 tab(s) orally once a day (in the afternoon)  Synthroid 25 mcg (0.025 mg) oral tablet: 1 tab(s) orally once a day (in the morning)  torsemide 10 mg oral tablet: 2 tab(s) orally once a week as needed for prn for swelling/pedal edema  Trelegy Ellipta 100 mcg-62.5 mcg-25 mcg/inh inhalation powder: 1 puff(s) inhaled once a day (in the morning)   acetaminophen 500 mg oral tablet: 2 tab(s) orally every 6 hours  Dupixent Pre-filled Pen 300 mg/2 mL subcutaneous solution: 300 milligram(s) subcutaneously every 2 weeks every sunday  gabapentin 300 mg oral capsule: 1 cap(s) orally every 8 hours MDD: 3 tablets  HYDROmorphone 2 mg oral tablet: 1 tab(s) orally every 6 hours as needed for Moderate Pain (4 - 6) MDD: 4 tablets  ibuprofen 400 mg oral tablet: 1 tab(s) orally every 6 hours  levothyroxine 125 mcg (0.125 mg) oral tablet: 1 tab(s) orally once a day  losartan 50 mg oral tablet: 1 tab(s) orally once a day (in the morning)  methocarbamol 500 mg oral tablet: 1 tab(s) orally every 8 hours MDD: 3 tablets  Narcan 4 mg/0.1 mL nasal spray: 1 spray(s) intranasally once PLEASE ADMINISTER IF UNRESPONSIVE OR UNCONSCIOUS FROM SUSPECTED OPIOID OVERDOSE.  nystatin 100,000 units/mL oral suspension: 5 milliliter(s) orally 4 times a day  Ozempic 8 mg/3 mL (2 mg dose) subcutaneous solution: 2 milligram(s) subcutaneously once a week SUNDAY LD 3 weeks ago  pantoprazole 40 mg oral delayed release tablet: 1 tab(s) orally once a day (in the morning)  Plaquenil 200 mg oral tablet: 2 tab(s) orally once a day (at bedtime)  Potassium Chloride (Eqv-K-Tab) 20 mEq oral tablet, extended release: 2 tab(s) orally once a day (at bedtime)  predniSONE 2.5 mg oral tablet: 3 tab(s) orally once a day  Rolling Walker: please dispense one to assist w ambulation  torsemide 10 mg oral tablet: 2 tab(s) orally once a week as needed for prn for swelling/pedal edema  Trelegy Ellipta 100 mcg-62.5 mcg-25 mcg/inh inhalation powder: 1 puff(s) inhaled once a day (in the morning)

## 2024-08-26 NOTE — PROGRESS NOTE ADULT - SUBJECTIVE AND OBJECTIVE BOX
SURGERY DAILY PROGRESS NOTE    24 Hour/Overnight Events: No acute events overnight    SUBJECTIVE: Patient seen and evaluated on AM rounds.  Pt seen and examined at bedside. Recovering appropriately, endorsing some pain.   ------------------------------------------------------------------------------------------------------------  OBJECTIVE:  Vital Signs Last 24 Hrs  T(C): 36.4 (26 Aug 2024 08:58), Max: 37.4 (26 Aug 2024 04:32)  T(F): 97.5 (26 Aug 2024 08:58), Max: 99.4 (26 Aug 2024 04:32)  HR: 86 (26 Aug 2024 08:58) (68 - 90)  BP: 137/88 (26 Aug 2024 08:58) (131/74 - 138/85)  BP(mean): --  RR: 18 (26 Aug 2024 08:58) (18 - 18)  SpO2: 99% (26 Aug 2024 08:58) (97% - 100%)    Parameters below as of 26 Aug 2024 08:58  Patient On (Oxygen Delivery Method): room air      I&O's Detail    25 Aug 2024 07:01  -  26 Aug 2024 07:00  --------------------------------------------------------  IN:    Oral Fluid: 1350 mL  Total IN: 1350 mL    OUT:    Voided (mL): 2000 mL  Total OUT: 2000 mL    Total NET: -650 mL      26 Aug 2024 07:01  -  26 Aug 2024 09:54  --------------------------------------------------------  IN:    Oral Fluid: 250 mL  Total IN: 250 mL    OUT:    Voided (mL): 300 mL  Total OUT: 300 mL    Total NET: -50 mL          LABS:                        9.9    7.80  )-----------( 296      ( 26 Aug 2024 06:11 )             29.3     08-26    137  |  106  |  10  ----------------------------<  97  3.7   |  20<L>  |  0.85    Ca    8.5      26 Aug 2024 06:11  Phos  2.8     08-26  Mg     1.80     08-26    TPro  6.0  /  Alb  3.1<L>  /  TBili  0.4  /  DBili  x   /  AST  22  /  ALT  21  /  AlkPhos  62  08-25    LIVER FUNCTIONS - ( 25 Aug 2024 07:58 )  Alb: 3.1 g/dL / Pro: 6.0 g/dL / ALK PHOS: 62 U/L / ALT: 21 U/L / AST: 22 U/L / GGT: x             Urinalysis Basic - ( 26 Aug 2024 06:11 )    Color: x / Appearance: x / SG: x / pH: x  Gluc: 97 mg/dL / Ketone: x  / Bili: x / Urobili: x   Blood: x / Protein: x / Nitrite: x   Leuk Esterase: x / RBC: x / WBC x   Sq Epi: x / Non Sq Epi: x / Bacteria: x        PE:  Gen: NAD  CV: Pulse regular present  Resp: Nonlabored  Abdomen: Soft, Mildly ttp. midly distended. Port sites c/d/i.     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 Patient is hemodynamically stable on supplemental oxygen with subjective dyspnea and SpO2 , now s/p nebulizer treatment and with home inhaler restarted.    PLAN:  -pain team so see pt today  - pain control w/ tylenol, toradol, oxy vs hydromorphone PRN, gabapentin  - Pulmonology following: c/w spiriva/sympbicort, albuterol, and flonase. wean supplemental 02   - Reg diet  - Steroid taper per outpatient rheum recs , inpatient rheum additionally consulted 8/21/24   - Hydrocortisone 100 pre-op completed 8/20   - Hydrocortisone 50mg q8h x3 doses 8/21   - Hydrocortisone 25mg q8h x3 doses  * for 8/22     - Prednisone 7.5mg QD starting on POD3 - on  -Encourage IS/OOBAT  -c/w lovenox DVT ppx     E team surgery  [id number]   SURGERY DAILY PROGRESS NOTE    24 Hour/Overnight Events: No acute events overnight    SUBJECTIVE: Patient seen and evaluated on AM rounds.  Pt seen and examined at bedside. Recovering appropriately, endorsing some pain. Having gas and BM.   ------------------------------------------------------------------------------------------------------------  OBJECTIVE:  Vital Signs Last 24 Hrs  T(C): 36.4 (26 Aug 2024 08:58), Max: 37.4 (26 Aug 2024 04:32)  T(F): 97.5 (26 Aug 2024 08:58), Max: 99.4 (26 Aug 2024 04:32)  HR: 86 (26 Aug 2024 08:58) (68 - 90)  BP: 137/88 (26 Aug 2024 08:58) (131/74 - 138/85)  BP(mean): --  RR: 18 (26 Aug 2024 08:58) (18 - 18)  SpO2: 99% (26 Aug 2024 08:58) (97% - 100%)    Parameters below as of 26 Aug 2024 08:58  Patient On (Oxygen Delivery Method): room air      I&O's Detail    25 Aug 2024 07:01  -  26 Aug 2024 07:00  --------------------------------------------------------  IN:    Oral Fluid: 1350 mL  Total IN: 1350 mL    OUT:    Voided (mL): 2000 mL  Total OUT: 2000 mL    Total NET: -650 mL      26 Aug 2024 07:01  -  26 Aug 2024 09:54  --------------------------------------------------------  IN:    Oral Fluid: 250 mL  Total IN: 250 mL    OUT:    Voided (mL): 300 mL  Total OUT: 300 mL    Total NET: -50 mL          LABS:                        9.9    7.80  )-----------( 296      ( 26 Aug 2024 06:11 )             29.3     08-26    137  |  106  |  10  ----------------------------<  97  3.7   |  20<L>  |  0.85    Ca    8.5      26 Aug 2024 06:11  Phos  2.8     08-26  Mg     1.80     08-26    TPro  6.0  /  Alb  3.1<L>  /  TBili  0.4  /  DBili  x   /  AST  22  /  ALT  21  /  AlkPhos  62  08-25    LIVER FUNCTIONS - ( 25 Aug 2024 07:58 )  Alb: 3.1 g/dL / Pro: 6.0 g/dL / ALK PHOS: 62 U/L / ALT: 21 U/L / AST: 22 U/L / GGT: x             Urinalysis Basic - ( 26 Aug 2024 06:11 )    Color: x / Appearance: x / SG: x / pH: x  Gluc: 97 mg/dL / Ketone: x  / Bili: x / Urobili: x   Blood: x / Protein: x / Nitrite: x   Leuk Esterase: x / RBC: x / WBC x   Sq Epi: x / Non Sq Epi: x / Bacteria: x        PE:  Gen: NAD  CV: Pulse regular present  Resp: Nonlabored  Abdomen: Soft, Mildly ttp. midly distended. Port sites c/d/i.     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 Patient is hemodynamically stable on supplemental oxygen with subjective dyspnea and SpO2 , now s/p nebulizer treatment and with home inhaler restarted.    PLAN:  -pain team so see pt today  - pain control w/ tylenol, toradol, oxy vs hydromorphone PRN, gabapentin  - Pulmonology following: c/w spiriva/sympbicort, albuterol, and flonase. wean supplemental 02   - Reg diet  - Steroid taper per outpatient rheum recs , inpatient rheum additionally consulted 8/21/24   - Hydrocortisone 100 pre-op completed 8/20   - Hydrocortisone 50mg q8h x3 doses 8/21   - Hydrocortisone 25mg q8h x3 doses  * for 8/22     - Prednisone 7.5mg QD starting on POD3 - on  -Encourage IS/OOBAT  -c/w lovenox DVT ppx     E team surgery  [id number]

## 2024-08-26 NOTE — DISCHARGE NOTE PROVIDER - HOSPITAL COURSE
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 Patient tolerated operation well and there were no post-operative complications identified. Patient remained hemodynamically stable in the PACU and transferred to the surgical floor. Diet was restarted and advanced as tolerated. Pain control was transitioned from IV to PO pain meds. At this time, patient is currently ambulating, voiding, tolerating a regular diet. Patient has been deemed stable for discharge home with follow up as an outpatient. 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 Patient tolerated operation well and there were no post-operative complications identified. Patient remained hemodynamically stable in the PACU and transferred to the surgical floor. Patient started on nystatin suspension for thrush. Diet was restarted and advanced as tolerated. Pain control was transitioned from IV to PO pain meds. At this time, patient is currently ambulating, voiding, tolerating a regular diet. Patient has been deemed stable for discharge home with follow up as an outpatient. 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 Patient tolerated operation well and there were no post-operative complications identified. Patient remained hemodynamically stable in the PACU and transferred to the surgical floor. Patient started on nystatin suspension for thrush. Diet was restarted and advanced as tolerated. Pain control was transitioned from IV to PO pain meds. At this time, patient is currently ambulating, voiding, tolerating a regular diet. Patient has been deemed stable for discharge home with follow up as an outpatient.     ISTOP Reference #: [id number]

## 2024-08-26 NOTE — DISCHARGE NOTE PROVIDER - NSDCFUADDAPPT_GEN_ALL_CORE_FT
Please follow up with your primary care provider 1-2 weeks after discharge regarding recent hospitalization.     Please follow up with rheumatologist outpatient upon discharge from hospital.

## 2024-08-26 NOTE — DISCHARGE NOTE PROVIDER - NSDCFUSCHEDAPPT_GEN_ALL_CORE_FT
Susanna Neri  [facility]  [address]  Scheduled Appointment: 11/08/2024

## 2024-08-26 NOTE — DISCHARGE NOTE PROVIDER - CARE PROVIDER_API CALL
Anil Ferreira-Yeou  Surgery  [address] [place] [id number]  Phone: [phone]  Fax: [phone]  Follow Up Time: 1 week

## 2024-08-26 NOTE — DISCHARGE NOTE PROVIDER - NSDCFUADDINST_GEN_ALL_CORE_FT
WOUND CARE:  Please keep incisions clean and dry. Please do not Scrub or rub incisions. Do not use lotion or powder on incisions.   BATHING: You may shower and/or sponge bathe. You may use warm soapy water in the shower and rinse, pat dry.  ACTIVITY: No heavy lifting or straining. Otherwise, you may return to your usual level of physical activity. If you are taking narcotic pain medication DO NOT drive a car, operate machinery or make important decisions.  DIET: Return to your usual diet.  NOTIFY YOUR SURGEON IF YOU HAVE: any bleeding that does not stop, any pus draining from your wound(s), any fever (over 100.4 F) persistent nausea/vomiting, or if your pain is not controlled on your discharge pain medications, unable to urinate.  Please follow up with your primary care physician in one week regarding your hospitalization, bring copies of your discharge paperwork.  Please follow up with your surgeon, Dr. Ferreira. Call [phone] to make an appointment.

## 2024-08-26 NOTE — PROGRESS NOTE ADULT - ASSESSMENT
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 Patient is hemodynamically stable on supplemental oxygen with subjective dyspnea and SpO2 , now s/p nebulizer treatment and with home inhaler restarted.    # gastric CA  S/P Robotic gasserectomy   Surg care appreciated  pain control   incentive spirmetry  diet advanced: regular diet  PT as tolerated   D/C planing     # Hypothyroidism  TFTs  synthroid     # Lupus myocytics   s/p IV hydrocortisone taper  Prednisone 7.5 mg home dose.   outpt rheum follow up.   Plaquenil home med    # Asthma  pulm following  on Trellergy at home.    DVT ppx

## 2024-08-27 VITALS — WEIGHT: 244.93 LBS

## 2024-08-27 LAB
ANION GAP SERPL CALC-SCNC: 14 MMOL/L — SIGNIFICANT CHANGE UP (ref 7–14)
BUN SERPL-MCNC: 11 MG/DL — SIGNIFICANT CHANGE UP (ref 7–23)
CALCIUM SERPL-MCNC: 8.9 MG/DL — SIGNIFICANT CHANGE UP (ref 8.4–10.5)
CHLORIDE SERPL-SCNC: 103 MMOL/L — SIGNIFICANT CHANGE UP (ref 98–107)
CO2 SERPL-SCNC: 21 MMOL/L — LOW (ref 22–31)
CREAT SERPL-MCNC: 0.84 MG/DL — SIGNIFICANT CHANGE UP (ref 0.5–1.3)
EGFR: 108 ML/MIN/1.73M2 — SIGNIFICANT CHANGE UP
GLUCOSE BLDC GLUCOMTR-MCNC: 112 MG/DL — HIGH (ref 70–99)
GLUCOSE SERPL-MCNC: 100 MG/DL — HIGH (ref 70–99)
HCT VFR BLD CALC: 33.4 % — LOW (ref 39–50)
HGB BLD-MCNC: 11.2 G/DL — LOW (ref 13–17)
MAGNESIUM SERPL-MCNC: 1.9 MG/DL — SIGNIFICANT CHANGE UP (ref 1.6–2.6)
MCHC RBC-ENTMCNC: 27.1 PG — SIGNIFICANT CHANGE UP (ref 27–34)
MCHC RBC-ENTMCNC: 33.5 GM/DL — SIGNIFICANT CHANGE UP (ref 32–36)
MCV RBC AUTO: 80.7 FL — SIGNIFICANT CHANGE UP (ref 80–100)
NRBC # BLD: 0 /100 WBCS — SIGNIFICANT CHANGE UP (ref 0–0)
NRBC # FLD: 0 K/UL — SIGNIFICANT CHANGE UP (ref 0–0)
PHOSPHATE SERPL-MCNC: 2.5 MG/DL — SIGNIFICANT CHANGE UP (ref 2.5–4.5)
PLATELET # BLD AUTO: 386 K/UL — SIGNIFICANT CHANGE UP (ref 150–400)
POTASSIUM SERPL-MCNC: 3.9 MMOL/L — SIGNIFICANT CHANGE UP (ref 3.5–5.3)
POTASSIUM SERPL-SCNC: 3.9 MMOL/L — SIGNIFICANT CHANGE UP (ref 3.5–5.3)
RBC # BLD: 4.14 M/UL — LOW (ref 4.2–5.8)
RBC # FLD: 14.2 % — SIGNIFICANT CHANGE UP (ref 10.3–14.5)
SODIUM SERPL-SCNC: 138 MMOL/L — SIGNIFICANT CHANGE UP (ref 135–145)
WBC # BLD: 10.71 K/UL — HIGH (ref 3.8–10.5)
WBC # FLD AUTO: 10.71 K/UL — HIGH (ref 3.8–10.5)

## 2024-08-27 RX ORDER — NALOXONE HCL 1 MG/ML
1 VIAL (ML) INJECTION
Qty: 1 | Refills: 0
Start: 2024-08-27

## 2024-08-27 RX ORDER — IBUPROFEN 600 MG
1 TABLET ORAL
Qty: 0 | Refills: 0 | DISCHARGE
Start: 2024-08-27

## 2024-08-27 RX ORDER — NYSTATIN 100000/ML
5 SUSPENSION, ORAL (FINAL DOSE FORM) ORAL
Qty: 140 | Refills: 0
Start: 2024-08-27 | End: 2024-09-02

## 2024-08-27 RX ORDER — GABAPENTIN 100 MG
1 CAPSULE ORAL
Qty: 21 | Refills: 0
Start: 2024-08-27 | End: 2024-09-02

## 2024-08-27 RX ORDER — PANTOPRAZOLE SODIUM 40 MG
1 TABLET, DELAYED RELEASE (ENTERIC COATED) ORAL
Qty: 30 | Refills: 0
Start: 2024-08-27 | End: 2024-09-25

## 2024-08-27 RX ORDER — HYDROMORPHONE HYDROCHLORIDE 2 MG/1
1 TABLET ORAL
Qty: 20 | Refills: 0
Start: 2024-08-27 | End: 2024-08-31

## 2024-08-27 RX ORDER — LEVOTHYROXINE SODIUM 100 MCG
1 TABLET ORAL
Refills: 0 | DISCHARGE

## 2024-08-27 RX ORDER — LEVOTHYROXINE SODIUM 100 MCG
1 TABLET ORAL
Qty: 0 | Refills: 0 | DISCHARGE
Start: 2024-08-27

## 2024-08-27 RX ORDER — ACETAMINOPHEN 325 MG/1
2 TABLET ORAL
Qty: 0 | Refills: 0 | DISCHARGE
Start: 2024-08-27

## 2024-08-27 RX ORDER — NYSTATIN 100000/ML
500000 SUSPENSION, ORAL (FINAL DOSE FORM) ORAL
Refills: 0 | Status: DISCONTINUED | OUTPATIENT
Start: 2024-08-27 | End: 2024-08-27

## 2024-08-27 RX ORDER — METHOCARBAMOL 750 MG/1
1 TABLET, FILM COATED ORAL
Qty: 15 | Refills: 0
Start: 2024-08-27 | End: 2024-08-31

## 2024-08-27 RX ORDER — NYSTATIN 100000/ML
500000 SUSPENSION, ORAL (FINAL DOSE FORM) ORAL DAILY
Refills: 0 | Status: DISCONTINUED | OUTPATIENT
Start: 2024-08-27 | End: 2024-08-27

## 2024-08-27 RX ORDER — PREDNISONE 10 MG
3 TABLET, DOSE PACK ORAL
Qty: 0 | Refills: 0 | DISCHARGE
Start: 2024-08-27

## 2024-08-27 RX ADMIN — ACETAMINOPHEN 1000 MILLIGRAM(S): 325 TABLET ORAL at 11:43

## 2024-08-27 RX ADMIN — Medication 125 MICROGRAM(S): at 06:07

## 2024-08-27 RX ADMIN — METHOCARBAMOL 500 MILLIGRAM(S): 750 TABLET, FILM COATED ORAL at 06:07

## 2024-08-27 RX ADMIN — Medication 40 MILLIGRAM(S): at 06:08

## 2024-08-27 RX ADMIN — Medication 400 MILLIGRAM(S): at 06:06

## 2024-08-27 RX ADMIN — Medication 7.5 MILLIGRAM(S): at 06:07

## 2024-08-27 RX ADMIN — Medication 400 MILLIGRAM(S): at 11:43

## 2024-08-27 RX ADMIN — LOSARTAN POTASSIUM 50 MILLIGRAM(S): 50 TABLET ORAL at 06:10

## 2024-08-27 RX ADMIN — Medication 300 MILLIGRAM(S): at 06:06

## 2024-08-27 RX ADMIN — ACETAMINOPHEN 1000 MILLIGRAM(S): 325 TABLET ORAL at 06:06

## 2024-08-27 NOTE — DIETITIAN INITIAL EVALUATION ADULT - ORAL INTAKE PTA/DIET HISTORY
Writer met with patient at bedside, however patient deferred interview at this time. No documented food allergies or food intolerances in chart.

## 2024-08-27 NOTE — PROGRESS NOTE ADULT - PROVIDER SPECIALTY LIST ADULT
Anesthesia
Internal Medicine
Pain Medicine
Pulmonology
Surgery
Internal Medicine
Internal Medicine
Pain Medicine
Surgery
Internal Medicine
Internal Medicine
Surgery
Internal Medicine

## 2024-08-27 NOTE — DIETITIAN INITIAL EVALUATION ADULT - ADD RECOMMEND
1. Monitor weights, labs, BM's, skin integrity, PO intake   2. Please monitor % PO intake on flowsheets   3. Honor food preferences as able within therapeutic diet order

## 2024-08-27 NOTE — PROGRESS NOTE ADULT - SUBJECTIVE AND OBJECTIVE BOX
Name of Patient : AMBERLY ESPITIA  MRN: [id number]  Date of visit: 08-27-24      Subjective: Patient seen and examined. No new events except as noted.     REVIEW OF SYSTEMS:    CONSTITUTIONAL: No weakness, fevers or chills  EYES/ENT: No visual changes;  No vertigo or throat pain   NECK: No pain or stiffness  RESPIRATORY: No cough, wheezing, hemoptysis; No shortness of breath  CARDIOVASCULAR: No chest pain or palpitations  GASTROINTESTINAL: No abdominal or epigastric pain. No nausea, vomiting, or hematemesis; No diarrhea or constipation. No melena or hematochezia.  GENITOURINARY: No dysuria, frequency or hematuria  NEUROLOGICAL: No numbness or weakness  SKIN: No itching, burning, rashes, or lesions   All other review of systems is negative unless indicated above.    MEDICATIONS:  MEDICATIONS  (STANDING):  acetaminophen     Tablet .. 1000 milliGRAM(s) Oral every 6 hours  budesonide  80 MICROgram(s)/formoterol 4.5 MICROgram(s) Inhaler 2 Puff(s) Inhalation two times a day  enoxaparin Injectable 40 milliGRAM(s) SubCutaneous every 24 hours  fluticasone propionate 50 MICROgram(s)/spray Nasal Spray 1 Spray(s) Both Nostrils two times a day  gabapentin 300 milliGRAM(s) Oral every 8 hours  glucagon  Injectable 1 milliGRAM(s) IntraMuscular once  hydroxychloroquine 400 milliGRAM(s) Oral at bedtime  ibuprofen  Tablet. 400 milliGRAM(s) Oral every 6 hours  insulin lispro (ADMELOG) corrective regimen sliding scale   SubCutaneous at bedtime  insulin lispro (ADMELOG) corrective regimen sliding scale   SubCutaneous three times a day before meals  levothyroxine 125 MICROGram(s) Oral daily  lidocaine   4% Patch 1 Patch Transdermal daily  losartan 50 milliGRAM(s) Oral daily  methocarbamol 500 milliGRAM(s) Oral every 8 hours  nystatin    Suspension 146196 Unit(s) Oral four times a day  pantoprazole  Injectable 40 milliGRAM(s) IV Push every 24 hours  predniSONE   Tablet 7.5 milliGRAM(s) Oral daily  tiotropium 2.5 MICROgram(s) Inhaler 2 Puff(s) Inhalation daily      PHYSICAL EXAM:  T(C): 36.3 (08-27-24 @ 08:57), Max: 36.6 (08-27-24 @ 06:00)  HR: 86 (08-27-24 @ 08:57) (64 - 86)  BP: 134/84 (08-27-24 @ 08:57) (126/72 - 134/84)  RR: 18 (08-27-24 @ 08:57) (18 - 18)  SpO2: 100% (08-27-24 @ 08:57) (100% - 100%)  Wt(kg): --  I&O's Summary    26 Aug 2024 07:01  -  27 Aug 2024 07:00  --------------------------------------------------------  IN: 1090 mL / OUT: 1450 mL / NET: -360 mL    27 Aug 2024 07:01  -  27 Aug 2024 23:13  --------------------------------------------------------  IN: 240 mL / OUT: 350 mL / NET: -110 mL          Appearance: Normal	  HEENT:  PERRLA   Lymphatic: No lymphadenopathy   Cardiovascular: Normal S1 S2, no JVD  Respiratory: normal effort , clear  Gastrointestinal:  Soft, Non-tender  Skin: No rashes,  warm to touch  Psychiatry:  Mood & affect appropriate  Musculuskeletal: No edema    recent labs, Imaging and EKGs personally reviewed     08-26-24 @ 07:01  -  08-27-24 @ 07:00  --------------------------------------------------------  IN: 1090 mL / OUT: 1450 mL / NET: -360 mL    08-27-24 @ 07:01  -  08-27-24 @ 23:13  --------------------------------------------------------  IN: 240 mL / OUT: 350 mL / NET: -110 mL                          11.2   10.71 )-----------( 386      ( 27 Aug 2024 08:50 )             33.4               08-27    138  |  103  |  11  ----------------------------<  100<H>  3.9   |  21<L>  |  0.84    Ca    8.9      27 Aug 2024 08:50  Phos  2.5     08-27  Mg     1.90     08-27                         Urinalysis Basic - ( 27 Aug 2024 08:50 )    Color: x / Appearance: x / SG: x / pH: x  Gluc: 100 mg/dL / Ketone: x  / Bili: x / Urobili: x   Blood: x / Protein: x / Nitrite: x   Leuk Esterase: x / RBC: x / WBC x   Sq Epi: x / Non Sq Epi: x / Bacteria: x

## 2024-08-27 NOTE — DIETITIAN INITIAL EVALUATION ADULT - PERTINENT LABORATORY DATA
08-27    138  |  103  |  11  ----------------------------<  100<H>  3.9   |  21<L>  |  0.84    Ca    8.9      27 Aug 2024 08:50  Phos  2.5     08-27  Mg     1.90     08-27    POCT Blood Glucose.: 112 mg/dL (08-27-24 @ 08:33)  A1C with Estimated Average Glucose Result: 5.6 % (08-21-24 @ 06:22)

## 2024-08-27 NOTE — DIETITIAN INITIAL EVALUATION ADULT - OTHER INFO
Per chart review, patient is a 49y Male with PMH lupus complicated by myositis on steroids, asthma, TAMMY, hypothyroidism presents with gastric adenocarcinoma s/p robotic distal gastrectomy with Heather en Y reconstruction, D2 lymphadenectomy on 8/20.    Patient advanced from a phase 3 bariatric regular diet to a regular diet on 8/24. Documented on RN flowsheet as consuming % of meals, suggestive of a variable appetite. No report of chewing or swallowing difficulty on current diet order. No report of GI distress (nausea, vomiting, diarrhea, constipation). Last BM 8/24/24 per RN flowsheet documentation. Not noted to be on a bowel regimen. Noted HbA1c 5.6% (8/21).

## 2024-08-27 NOTE — PROGRESS NOTE ADULT - SUBJECTIVE AND OBJECTIVE BOX
TEAM [ E ] Surgery Daily Progress Note  =====================================================    SUBJECTIVE: Patient seen and examined at bedside on AM rounds. Patient reports that they're feeling well. Complaining of "thrush" this am, ordered for nystatin suspension. Patient is passing gas and having bowel movements. He is tolerating diet and appropriately pain controlled. Denies fever, chills, N/V, chest pain, SOB    ALLERGIES:  No Known Allergies    -------------------------------------------------------------------------------------    MEDICATIONS:  acetaminophen     Tablet .. 1000 milliGRAM(s) Oral every 6 hours  albuterol    0.083% 2.5 milliGRAM(s) Nebulizer every 6 hours PRN  budesonide  80 MICROgram(s)/formoterol 4.5 MICROgram(s) Inhaler 2 Puff(s) Inhalation two times a day  enoxaparin Injectable 40 milliGRAM(s) SubCutaneous every 24 hours  fluticasone propionate 50 MICROgram(s)/spray Nasal Spray 1 Spray(s) Both Nostrils two times a day  gabapentin 300 milliGRAM(s) Oral every 8 hours  glucagon  Injectable 1 milliGRAM(s) IntraMuscular once  HYDROmorphone   Tablet 2 milliGRAM(s) Oral every 3 hours PRN  HYDROmorphone   Tablet 4 milliGRAM(s) Oral every 3 hours PRN  hydroxychloroquine 400 milliGRAM(s) Oral at bedtime  ibuprofen  Tablet. 400 milliGRAM(s) Oral every 6 hours  insulin lispro (ADMELOG) corrective regimen sliding scale   SubCutaneous three times a day before meals  insulin lispro (ADMELOG) corrective regimen sliding scale   SubCutaneous at bedtime  levothyroxine 125 MICROGram(s) Oral daily  lidocaine   4% Patch 1 Patch Transdermal daily  losartan 50 milliGRAM(s) Oral daily  methocarbamol 500 milliGRAM(s) Oral every 8 hours  naloxone Injectable 0.1 milliGRAM(s) IV Push every 3 minutes PRN  nystatin    Suspension [id number] Unit(s) Oral daily  pantoprazole  Injectable 40 milliGRAM(s) IV Push every 24 hours  predniSONE   Tablet 7.5 milliGRAM(s) Oral daily  tiotropium 2.5 MICROgram(s) Inhaler 2 Puff(s) Inhalation daily    --------------------------------------------------------------------------------------    VITAL SIGNS:  T(C): 36.6 (08-27-24 @ 06:00), Max: 37 (08-26-24 @ 17:28)  HR: 64 (08-27-24 @ 06:00) (64 - 86)  BP: 126/72 (08-27-24 @ 06:00) (122/72 - 137/88)  RR: 18 (08-27-24 @ 06:00) (18 - 18)  SpO2: 100% (08-27-24 @ 06:00) (99% - 100%)  --------------------------------------------------------------------------------------    INS AND OUTS:    08-25-24 @ 07:01  -  08-26-24 @ 07:00  --------------------------------------------------------  IN: 1350 mL / OUT: 2000 mL / NET: -650 mL    08-26-24 @ 07:01  -  08-27-24 @ 06:51  --------------------------------------------------------  IN: 1090 mL / OUT: 1450 mL / NET: -360 mL      --------------------------------------------------------------------------------------      EXAM    General: NAD, resting in bed comfortably.  Cardiac: regular rate, warm and well perfused  Respiratory: Nonlabored respirations, normal cw expansion.  Abdomen: soft, nontender, nondistended, port sites c/d/i  Extremities: normal strength, FROM, no deformities    --------------------------------------------------------------------------------------    LABS                        9.9    7.80  )-----------( 296      ( 26 Aug 2024 06:11 )             29.3   08-26    137  |  106  |  10  ----------------------------<  97  3.7   |  20<L>  |  0.85    Ca    8.5      26 Aug 2024 06:11  Phos  2.8     08-26  Mg     1.80     08-26    TPro  6.0  /  Alb  3.1<L>  /  TBili  0.4  /  DBili  x   /  AST  22  /  ALT  21  /  AlkPhos  62  08-25

## 2024-08-27 NOTE — DIETITIAN INITIAL EVALUATION ADULT - ORAL NUTRITION SUPPLEMENTS
Consider Ensure Plus High Protein (provides 350kcal, 20gms protein per serving) once daily to support nutrition status

## 2024-08-27 NOTE — PROGRESS NOTE ADULT - ASSESSMENT
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

## 2024-08-27 NOTE — DIETITIAN INITIAL EVALUATION ADULT - PERTINENT MEDS FT
MEDICATIONS  (STANDING):  acetaminophen     Tablet .. 1000 milliGRAM(s) Oral every 6 hours  budesonide  80 MICROgram(s)/formoterol 4.5 MICROgram(s) Inhaler 2 Puff(s) Inhalation two times a day  enoxaparin Injectable 40 milliGRAM(s) SubCutaneous every 24 hours  fluticasone propionate 50 MICROgram(s)/spray Nasal Spray 1 Spray(s) Both Nostrils two times a day  gabapentin 300 milliGRAM(s) Oral every 8 hours  glucagon  Injectable 1 milliGRAM(s) IntraMuscular once  hydroxychloroquine 400 milliGRAM(s) Oral at bedtime  ibuprofen  Tablet. 400 milliGRAM(s) Oral every 6 hours  insulin lispro (ADMELOG) corrective regimen sliding scale   SubCutaneous at bedtime  insulin lispro (ADMELOG) corrective regimen sliding scale   SubCutaneous three times a day before meals  levothyroxine 125 MICROGram(s) Oral daily  lidocaine   4% Patch 1 Patch Transdermal daily  losartan 50 milliGRAM(s) Oral daily  methocarbamol 500 milliGRAM(s) Oral every 8 hours  nystatin    Suspension [id number] Unit(s) Oral four times a day  pantoprazole  Injectable 40 milliGRAM(s) IV Push every 24 hours  predniSONE   Tablet 7.5 milliGRAM(s) Oral daily  tiotropium 2.5 MICROgram(s) Inhaler 2 Puff(s) Inhalation daily    MEDICATIONS  (PRN):  albuterol    0.083% 2.5 milliGRAM(s) Nebulizer every 6 hours PRN Shortness of Breath and/or Wheezing  HYDROmorphone   Tablet 2 milliGRAM(s) Oral every 3 hours PRN Moderate Pain (4 - 6)  HYDROmorphone   Tablet 4 milliGRAM(s) Oral every 3 hours PRN Severe Pain (7 - 10)  naloxone Injectable 0.1 milliGRAM(s) IV Push every 3 minutes PRN For ANY of the following changes in patient status:  A. RR LESS THAN 10 breaths per minute, B. Oxygen saturation LESS THAN 90%, C. Sedation score of 6

## 2024-08-28 ENCOUNTER — NON-APPOINTMENT (OUTPATIENT)
Age: 49
End: 2024-08-28

## 2024-08-30 ENCOUNTER — TRANSCRIPTION ENCOUNTER (OUTPATIENT)
Age: 49
End: 2024-08-30

## 2024-09-04 NOTE — ED PROVIDER NOTE - WR ORDER DATE AND TIME 1
Instructions: This plan will send the code FBSD to the PM system.  DO NOT or CHANGE the price.
Price (Do Not Change): 0.00
Detail Level: Simple
26-Jan-2022 14:31

## 2024-09-05 ENCOUNTER — APPOINTMENT (OUTPATIENT)
Dept: SURGICAL ONCOLOGY | Facility: CLINIC | Age: 49
End: 2024-09-05
Payer: COMMERCIAL

## 2024-09-05 VITALS
HEIGHT: 67 IN | HEART RATE: 80 BPM | SYSTOLIC BLOOD PRESSURE: 100 MMHG | WEIGHT: 247 LBS | BODY MASS INDEX: 38.77 KG/M2 | OXYGEN SATURATION: 98 % | DIASTOLIC BLOOD PRESSURE: 70 MMHG

## 2024-09-05 DIAGNOSIS — C16.9 MALIGNANT NEOPLASM OF STOMACH, UNSPECIFIED: ICD-10-CM

## 2024-09-05 PROCEDURE — 99024 POSTOP FOLLOW-UP VISIT: CPT

## 2024-09-05 NOTE — HISTORY OF PRESENT ILLNESS
[de-identified] : Patient reports that he has had ongoing LUQ pain for the last couple of years but more recently as of a few months ago, pain worsened and moved more laterally than previous thus he sought re-evaluation for his symptoms. Reports that pain is constant, sharp and exacerbated by raising his L arm above his head, which makes him feel like something is tearing inside. It is not affected by other body movements or foods. Pain is currently rated 7/10. It is associated with some dyspepsia and pain with deep inspiration. He was started on Protonix but denies feeling much relief. Of note, when he first noted the symptoms it was soon after he had developed a compression fracture of T7 and costochondritis. He was also having some dysphagia and underwent an EGD in 2022, which he reports did not reveal an etiology of his symptoms.  Patient has had ongoing rectal bleeding since  and reports having had 3 colonoscopies, which have been nondiagnostic. Most recent colonoscopy was 24 with EGD, however prep was poor so needs repeat done. Reports having bright red blood in stool once about every 8-9 days, appears to fill toilet bowl. Was on Ozempic for diabetes and reports losing ~40 lbs over the past year, but this was recently held this past week in anticipation of surgery for gastric cancer. Only eats one meal/day. Has complaints of extreme fatigue but is able to work. Reports that being in the light/sun drains his energy.  Underwent EGD on  with Dr. Weathers Pathology:  Duodenum, biopsy: - Duodenal mucosa with preserved villous architecture, focal increase in intraepithelial lymphocytes and mild lamina propria chronic inflammation (see note)  Note: Part 1. The above changes may be secondary to concomitant Helicobacter pylori infection. 2. Stomach, antrum, biopsy: - Gastric antral mucosa with Helicobacter pylori-associated chronic gastritis - Negative for intestinal metaplasia and dysplasia 3. Stomach, body, biopsy: - Gastric transitional mucosa with Helicobacter pylori-associated active chronic gastritis and patchy intestinal metaplasia - Negative for dysplasia 4. 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  EDG 2024 with Dr. Weathers demonstrated a small 5 mm gastric ulcer along the distal greater curvature body.  Biopsy returned as poorly differentiated adenocarcinoma with signet cell features.  Colonoscopy could not be performed due to poor preparation. CT chest/abdomen/pelvis 2024 did not demonstrate evidence of distant or regional metastases.  There was no abnormal gastric wall thickening. He is referred for surgical treatment.  2024: Patient is s/p robotic distal subtotal gastrectomy/RNY 2024.  He was discharged on POD#7.  He reports residual upper abdominal pain, lethargy and dark urine.  He is overall tolerating diet but has had 2 episodes of emesis since discharge, attributed to over-eating.  He reports flatus/bowel movements. Pathology has not resulted as of this morning.

## 2024-09-05 NOTE — ASSESSMENT
[FreeTextEntry1] : Continue small frequent meals with conscious hydration. Medical oncology follow-up upon return of pathology. Blood work today. Return to office in 2 weeks.

## 2024-09-06 LAB
ALBUMIN SERPL ELPH-MCNC: 4.1 G/DL
ALP BLD-CCNC: 98 U/L
ALT SERPL-CCNC: 26 U/L
ANION GAP SERPL CALC-SCNC: 13 MMOL/L
AST SERPL-CCNC: 37 U/L
BILIRUB SERPL-MCNC: 0.3 MG/DL
BUN SERPL-MCNC: 9 MG/DL
CALCIUM SERPL-MCNC: 9.8 MG/DL
CEA SERPL-MCNC: 2.3 NG/ML
CHLORIDE SERPL-SCNC: 102 MMOL/L
CO2 SERPL-SCNC: 26 MMOL/L
CREAT SERPL-MCNC: 1.07 MG/DL
EGFR: 86 ML/MIN/1.73M2
GLUCOSE SERPL-MCNC: 87 MG/DL
HCT VFR BLD CALC: 38.2 %
HGB BLD-MCNC: 11.6 G/DL
MCHC RBC-ENTMCNC: 26.6 PG
MCHC RBC-ENTMCNC: 30.4 GM/DL
MCV RBC AUTO: 87.6 FL
PLATELET # BLD AUTO: 771 K/UL
POTASSIUM SERPL-SCNC: 4.6 MMOL/L
PROT SERPL-MCNC: 8.1 G/DL
RBC # BLD: 4.36 M/UL
RBC # FLD: 14.6 %
SODIUM SERPL-SCNC: 141 MMOL/L
WBC # FLD AUTO: 9.65 K/UL

## 2024-09-09 LAB — SURGICAL PATHOLOGY STUDY: SIGNIFICANT CHANGE UP

## 2024-09-10 RX ORDER — GABAPENTIN 300 MG/1
300 CAPSULE ORAL EVERY 8 HOURS
Qty: 90 | Refills: 0 | Status: ACTIVE | COMMUNITY
Start: 2024-09-10 | End: 1900-01-01

## 2024-09-10 RX ORDER — HYDROMORPHONE HYDROCHLORIDE 2 MG/1
2 TABLET ORAL EVERY 6 HOURS
Qty: 20 | Refills: 0 | Status: ACTIVE | COMMUNITY
Start: 2024-09-10 | End: 1900-01-01

## 2024-09-11 ENCOUNTER — NON-APPOINTMENT (OUTPATIENT)
Age: 49
End: 2024-09-11

## 2024-09-12 ENCOUNTER — TRANSCRIPTION ENCOUNTER (OUTPATIENT)
Age: 49
End: 2024-09-12

## 2024-09-12 ENCOUNTER — INPATIENT (INPATIENT)
Facility: HOSPITAL | Age: 49
LOS: 7 days | Discharge: HOME | End: 2024-09-20
Attending: SURGERY | Admitting: SURGERY
Payer: COMMERCIAL

## 2024-09-12 VITALS
HEIGHT: 68 IN | TEMPERATURE: 99 F | WEIGHT: 240.08 LBS | HEART RATE: 95 BPM | OXYGEN SATURATION: 99 % | DIASTOLIC BLOOD PRESSURE: 63 MMHG | SYSTOLIC BLOOD PRESSURE: 103 MMHG | RESPIRATION RATE: 16 BRPM

## 2024-09-12 DIAGNOSIS — R53.1 WEAKNESS: ICD-10-CM

## 2024-09-12 DIAGNOSIS — Z98.890 OTHER SPECIFIED POSTPROCEDURAL STATES: Chronic | ICD-10-CM

## 2024-09-12 DIAGNOSIS — Z90.49 ACQUIRED ABSENCE OF OTHER SPECIFIED PARTS OF DIGESTIVE TRACT: Chronic | ICD-10-CM

## 2024-09-12 DIAGNOSIS — Z98.52 VASECTOMY STATUS: Chronic | ICD-10-CM

## 2024-09-12 DIAGNOSIS — M32.9 SYSTEMIC LUPUS ERYTHEMATOSUS, UNSPECIFIED: ICD-10-CM

## 2024-09-12 LAB
ADD ON TEST-SPECIMEN IN LAB: SIGNIFICANT CHANGE UP
ALBUMIN SERPL ELPH-MCNC: 3.7 G/DL — SIGNIFICANT CHANGE UP (ref 3.3–5)
ALP SERPL-CCNC: 97 U/L — SIGNIFICANT CHANGE UP (ref 40–120)
ALT FLD-CCNC: 21 U/L — SIGNIFICANT CHANGE UP (ref 4–41)
ANION GAP SERPL CALC-SCNC: 14 MMOL/L — SIGNIFICANT CHANGE UP (ref 7–14)
ANION GAP SERPL CALC-SCNC: 16 MMOL/L — HIGH (ref 7–14)
APPEARANCE UR: CLEAR — SIGNIFICANT CHANGE UP
APTT BLD: 30.4 SEC — SIGNIFICANT CHANGE UP (ref 24.5–35.6)
AST SERPL-CCNC: 29 U/L — SIGNIFICANT CHANGE UP (ref 4–40)
BASOPHILS # BLD AUTO: 0.04 K/UL — SIGNIFICANT CHANGE UP (ref 0–0.2)
BASOPHILS NFR BLD AUTO: 0.4 % — SIGNIFICANT CHANGE UP (ref 0–2)
BILIRUB SERPL-MCNC: 0.4 MG/DL — SIGNIFICANT CHANGE UP (ref 0.2–1.2)
BILIRUB UR-MCNC: NEGATIVE — SIGNIFICANT CHANGE UP
BLD GP AB SCN SERPL QL: NEGATIVE — SIGNIFICANT CHANGE UP
BLD GP AB SCN SERPL QL: NEGATIVE — SIGNIFICANT CHANGE UP
BLOOD GAS VENOUS COMPREHENSIVE RESULT: SIGNIFICANT CHANGE UP
BUN SERPL-MCNC: 7 MG/DL — SIGNIFICANT CHANGE UP (ref 7–23)
BUN SERPL-MCNC: 8 MG/DL — SIGNIFICANT CHANGE UP (ref 7–23)
CALCIUM SERPL-MCNC: 8.1 MG/DL — LOW (ref 8.4–10.5)
CALCIUM SERPL-MCNC: 8.9 MG/DL — SIGNIFICANT CHANGE UP (ref 8.4–10.5)
CHLORIDE SERPL-SCNC: 101 MMOL/L — SIGNIFICANT CHANGE UP (ref 98–107)
CHLORIDE SERPL-SCNC: 103 MMOL/L — SIGNIFICANT CHANGE UP (ref 98–107)
CK SERPL-CCNC: 88 U/L — SIGNIFICANT CHANGE UP (ref 30–200)
CO2 SERPL-SCNC: 19 MMOL/L — LOW (ref 22–31)
CO2 SERPL-SCNC: 20 MMOL/L — LOW (ref 22–31)
COLOR SPEC: YELLOW — SIGNIFICANT CHANGE UP
CREAT SERPL-MCNC: 0.88 MG/DL — SIGNIFICANT CHANGE UP (ref 0.5–1.3)
CREAT SERPL-MCNC: 1.04 MG/DL — SIGNIFICANT CHANGE UP (ref 0.5–1.3)
DIFF PNL FLD: NEGATIVE — SIGNIFICANT CHANGE UP
EGFR: 106 ML/MIN/1.73M2 — SIGNIFICANT CHANGE UP
EGFR: 89 ML/MIN/1.73M2 — SIGNIFICANT CHANGE UP
EOSINOPHIL # BLD AUTO: 0.17 K/UL — SIGNIFICANT CHANGE UP (ref 0–0.5)
EOSINOPHIL NFR BLD AUTO: 1.7 % — SIGNIFICANT CHANGE UP (ref 0–6)
GLUCOSE SERPL-MCNC: 85 MG/DL — SIGNIFICANT CHANGE UP (ref 70–99)
GLUCOSE SERPL-MCNC: 86 MG/DL — SIGNIFICANT CHANGE UP (ref 70–99)
GLUCOSE UR QL: NEGATIVE MG/DL — SIGNIFICANT CHANGE UP
HCT VFR BLD CALC: 34 % — LOW (ref 39–50)
HGB BLD-MCNC: 11 G/DL — LOW (ref 13–17)
IANC: 6.91 K/UL — SIGNIFICANT CHANGE UP (ref 1.8–7.4)
IMM GRANULOCYTES NFR BLD AUTO: 0.5 % — SIGNIFICANT CHANGE UP (ref 0–0.9)
INR BLD: 1.23 RATIO — HIGH (ref 0.85–1.18)
KETONES UR-MCNC: NEGATIVE MG/DL — SIGNIFICANT CHANGE UP
LEUKOCYTE ESTERASE UR-ACNC: NEGATIVE — SIGNIFICANT CHANGE UP
LIDOCAIN IGE QN: 134 U/L — HIGH (ref 7–60)
LYMPHOCYTES # BLD AUTO: 1.57 K/UL — SIGNIFICANT CHANGE UP (ref 1–3.3)
LYMPHOCYTES # BLD AUTO: 15.3 % — SIGNIFICANT CHANGE UP (ref 13–44)
MAGNESIUM SERPL-MCNC: 1.7 MG/DL — SIGNIFICANT CHANGE UP (ref 1.6–2.6)
MAGNESIUM SERPL-MCNC: 1.8 MG/DL — SIGNIFICANT CHANGE UP (ref 1.6–2.6)
MCHC RBC-ENTMCNC: 26.3 PG — LOW (ref 27–34)
MCHC RBC-ENTMCNC: 32.4 GM/DL — SIGNIFICANT CHANGE UP (ref 32–36)
MCV RBC AUTO: 81.3 FL — SIGNIFICANT CHANGE UP (ref 80–100)
MONOCYTES # BLD AUTO: 1.52 K/UL — HIGH (ref 0–0.9)
MONOCYTES NFR BLD AUTO: 14.8 % — HIGH (ref 2–14)
NEUTROPHILS # BLD AUTO: 6.91 K/UL — SIGNIFICANT CHANGE UP (ref 1.8–7.4)
NEUTROPHILS NFR BLD AUTO: 67.3 % — SIGNIFICANT CHANGE UP (ref 43–77)
NITRITE UR-MCNC: NEGATIVE — SIGNIFICANT CHANGE UP
NRBC # BLD: 0 /100 WBCS — SIGNIFICANT CHANGE UP (ref 0–0)
NRBC # FLD: 0 K/UL — SIGNIFICANT CHANGE UP (ref 0–0)
PH UR: 6.5 — SIGNIFICANT CHANGE UP (ref 5–8)
PHOSPHATE SERPL-MCNC: 2.5 MG/DL — SIGNIFICANT CHANGE UP (ref 2.5–4.5)
PHOSPHATE SERPL-MCNC: 2.7 MG/DL — SIGNIFICANT CHANGE UP (ref 2.5–4.5)
PLATELET # BLD AUTO: 457 K/UL — HIGH (ref 150–400)
POTASSIUM SERPL-MCNC: 3.9 MMOL/L — SIGNIFICANT CHANGE UP (ref 3.5–5.3)
POTASSIUM SERPL-MCNC: 4.7 MMOL/L — SIGNIFICANT CHANGE UP (ref 3.5–5.3)
POTASSIUM SERPL-SCNC: 3.9 MMOL/L — SIGNIFICANT CHANGE UP (ref 3.5–5.3)
POTASSIUM SERPL-SCNC: 4.7 MMOL/L — SIGNIFICANT CHANGE UP (ref 3.5–5.3)
PROT SERPL-MCNC: 7.8 G/DL — SIGNIFICANT CHANGE UP (ref 6–8.3)
PROT UR-MCNC: SIGNIFICANT CHANGE UP MG/DL
PROTHROM AB SERPL-ACNC: 13.8 SEC — HIGH (ref 9.5–13)
RBC # BLD: 4.18 M/UL — LOW (ref 4.2–5.8)
RBC # FLD: 14.2 % — SIGNIFICANT CHANGE UP (ref 10.3–14.5)
RH IG SCN BLD-IMP: POSITIVE — SIGNIFICANT CHANGE UP
RH IG SCN BLD-IMP: POSITIVE — SIGNIFICANT CHANGE UP
SODIUM SERPL-SCNC: 136 MMOL/L — SIGNIFICANT CHANGE UP (ref 135–145)
SODIUM SERPL-SCNC: 137 MMOL/L — SIGNIFICANT CHANGE UP (ref 135–145)
SP GR SPEC: 1.01 — SIGNIFICANT CHANGE UP (ref 1–1.03)
UROBILINOGEN FLD QL: 0.2 MG/DL — SIGNIFICANT CHANGE UP (ref 0.2–1)
WBC # BLD: 10.26 K/UL — SIGNIFICANT CHANGE UP (ref 3.8–10.5)
WBC # FLD AUTO: 10.26 K/UL — SIGNIFICANT CHANGE UP (ref 3.8–10.5)

## 2024-09-12 PROCEDURE — 71045 X-RAY EXAM CHEST 1 VIEW: CPT | Mod: 26

## 2024-09-12 PROCEDURE — 99285 EMERGENCY DEPT VISIT HI MDM: CPT

## 2024-09-12 PROCEDURE — 74177 CT ABD & PELVIS W/CONTRAST: CPT | Mod: 26,MC

## 2024-09-12 RX ORDER — LEVOTHYROXINE SODIUM 100 MCG
125 TABLET ORAL DAILY
Refills: 0 | Status: DISCONTINUED | OUTPATIENT
Start: 2024-09-12 | End: 2024-09-20

## 2024-09-12 RX ORDER — PIPERACILLIN SODIUM AND TAZOBACTAM SODIUM 3; .375 G/15ML; G/15ML
3.38 INJECTION, POWDER, FOR SOLUTION INTRAVENOUS EVERY 8 HOURS
Refills: 0 | Status: COMPLETED | OUTPATIENT
Start: 2024-09-12 | End: 2024-09-19

## 2024-09-12 RX ORDER — HEPARIN SODIUM,BOVINE 1000/ML
5000 VIAL (ML) INJECTION EVERY 8 HOURS
Refills: 0 | Status: DISCONTINUED | OUTPATIENT
Start: 2024-09-12 | End: 2024-09-12

## 2024-09-12 RX ORDER — HYDROXYCHLOROQUINE SULFATE 200 MG/1
400 TABLET, FILM COATED ORAL AT BEDTIME
Refills: 0 | Status: DISCONTINUED | OUTPATIENT
Start: 2024-09-12 | End: 2024-09-20

## 2024-09-12 RX ORDER — SODIUM CHLORIDE 9 MG/ML
1000 INJECTION INTRAMUSCULAR; INTRAVENOUS; SUBCUTANEOUS ONCE
Refills: 0 | Status: COMPLETED | OUTPATIENT
Start: 2024-09-12 | End: 2024-09-12

## 2024-09-12 RX ORDER — PREDNISONE 10 MG
7.5 TABLET, DOSE PACK ORAL DAILY
Refills: 0 | Status: DISCONTINUED | OUTPATIENT
Start: 2024-09-12 | End: 2024-09-20

## 2024-09-12 RX ORDER — ACETAMINOPHEN 325 MG/1
1000 TABLET ORAL ONCE
Refills: 0 | Status: COMPLETED | OUTPATIENT
Start: 2024-09-12 | End: 2024-09-12

## 2024-09-12 RX ORDER — IOHEXOL 350 MG/ML
30 INJECTION, SOLUTION INTRAVENOUS ONCE
Refills: 0 | Status: COMPLETED | OUTPATIENT
Start: 2024-09-12 | End: 2024-09-12

## 2024-09-12 RX ORDER — ONDANSETRON 2 MG/ML
4 INJECTION, SOLUTION INTRAMUSCULAR; INTRAVENOUS ONCE
Refills: 0 | Status: COMPLETED | OUTPATIENT
Start: 2024-09-12 | End: 2024-09-12

## 2024-09-12 RX ORDER — LOSARTAN POTASSIUM 50 MG/1
50 TABLET ORAL DAILY
Refills: 0 | Status: DISCONTINUED | OUTPATIENT
Start: 2024-09-12 | End: 2024-09-20

## 2024-09-12 RX ADMIN — Medication 75 MILLILITER(S): at 16:00

## 2024-09-12 RX ADMIN — ONDANSETRON 4 MILLIGRAM(S): 2 INJECTION, SOLUTION INTRAMUSCULAR; INTRAVENOUS at 11:06

## 2024-09-12 RX ADMIN — HYDROXYCHLOROQUINE SULFATE 400 MILLIGRAM(S): 200 TABLET, FILM COATED ORAL at 22:06

## 2024-09-12 RX ADMIN — ACETAMINOPHEN 400 MILLIGRAM(S): 325 TABLET ORAL at 11:06

## 2024-09-12 RX ADMIN — IOHEXOL 30 MILLILITER(S): 350 INJECTION, SOLUTION INTRAVENOUS at 11:36

## 2024-09-12 RX ADMIN — PIPERACILLIN SODIUM AND TAZOBACTAM SODIUM 25 GRAM(S): 3; .375 INJECTION, POWDER, FOR SOLUTION INTRAVENOUS at 21:38

## 2024-09-12 RX ADMIN — SODIUM CHLORIDE 1000 MILLILITER(S): 9 INJECTION INTRAMUSCULAR; INTRAVENOUS; SUBCUTANEOUS at 11:06

## 2024-09-12 NOTE — PATIENT PROFILE ADULT - FALL HARM RISK - RISK INTERVENTIONS

## 2024-09-12 NOTE — H&P ADULT - NSHPLABSRESULTS_GEN_ALL_CORE
LABS:  cret                        11.0   10.26 )-----------( 457      ( 12 Sep 2024 11:27 )             34.0     09-12    137  |  101  |  8   ----------------------------<  85  4.7   |  20<L>  |  1.04    Ca    8.9      12 Sep 2024 11:27  Phos  2.5     09-12  Mg     1.80     09-12    TPro  7.8  /  Alb  3.7  /  TBili  0.4  /  DBili  x   /  AST  29  /  ALT  21  /  AlkPhos  97  09-12    PT/INR - ( 12 Sep 2024 11:27 )   PT: 13.8 sec;   INR: 1.23 ratio         PTT - ( 12 Sep 2024 11:27 )  PTT:30.4 sec    Albumin: 3.7 g/dL (09-12-24 @ 11:27)      < from: CT Abdomen and Pelvis w/ Oral Cont and w/ IV Cont (09.12.24 @ 13:22) >    FINDINGS:  LOWER CHEST: Dependent changes. Mild cardiomegaly.    LIVER: Within normal limits.  BILE DUCTS: Normal caliber.  GALLBLADDER: Cholecystectomy.  SPLEEN: Within normal limits.  PANCREAS: Mild stranding surrounding the pancreatic head, likely reactive.  ADRENALS: Within normal limits.  KIDNEYS/URETERS: No renal stones or hydronephrosis.    BLADDER: Within normal limits.  REPRODUCTIVE ORGANS: Prostate within normal limits.    BOWEL: No bowel obstruction. Appendix is normal. Status post distal   gastrectomy with gastrojejunostomy. A jejunojejunostomy also noted. 5.5 x   4.1 x 5.3 cm (301/33) loculated collection with air-fluid level adjacent   stranding is noted associated with the duodenal staple line and tract   dorsallyinto the celiac axis. 1.7 x 4.4 x 3.7 cm (301/36) cc loculated   collection with air-fluid level is noted in the right upper quadrant   ventral aspect appears to be contiguous with the collection associated   duodenal staple line and adjacent abdominal wall. There is mild adjacent   fat stranding.  PERITONEUM/RETROPERITONEUM: Within normal limits.  VESSELS: Minimal atherosclerotic calcification.  LYMPH NODES: Mild periportal and upper abdominal adenopathy, likely   reactive.  ABDOMINAL WALL: Postsurgical changes.  BONES: Degenerative changes.    IMPRESSION:  Status post interval distal gastrectomy with a Heather-en-Y.    Loculated collection with air-fluid level is noted associated with the   duodenal staple line, concerning for staple line dehiscence.    Additional loculated collection is noted in the right anterior upper   abdomen and appears to be associated with the anterior abdominal wall and   the periduodenal collection.        --- End of Report ---      < end of copied text >

## 2024-09-12 NOTE — ED ADULT NURSE NOTE - OBJECTIVE STATEMENT
Pt received to rm 4 presents with abd pain s/p gastrectomy in early august. Endorsing nausea. a&ox4, ambulatory at baseline, skin intact, respirations even and unlabored, abd soft and non-distended, nontender to palpation. VSS, 20g iv placed in rt arm ,labs drawn and sent, will medicate as per ordered.

## 2024-09-12 NOTE — ED PROVIDER NOTE - TOBACCO USE
[facility] [place]   [facility]     Patient: Walter Wilkerson Date: 8/3/2023     : 1966 Attending: Amanda Driscoll DO   56 year old male      Chief Complaint on Admission: Shortness of breath  Reason for Consult / Transfer of Care: Heart transplant management     History of Present Illness: Walter Wilkerson is a 56 year old male with a past medical history of OHTx 2006 with chronically elevated DSA, abnormal [facility] labs and CAV 3; multiple recent admissions:     Admitted -->2023 with 3-4 weeks of right flank pain, diarrhea that had just resolved prior to that admission and KATIE; had just recently been in [facility] for 10 days (returned 23); developed GI symptoms including diarrhea while there w/associated dizziness, had lost 10 lbs, had fatigue and was seeing black spots when he bent over. Diuretics held, fluid given, and discharged after improvement off diuretics.    Admitted -->2023 with c/o progressively worsening dyspnea, +COVID on admission.     Admitted -->2023 with Rhinovirus and concern for volume overload. Diuresed, ECHO  with RA pressure of 8 and RVSP of 25 at a weight of 87 kg. RHC and CPET planned (outpt).     Presented to ED on 2023 with ongoing SOB, worse over last 24 hours, occurring at rest, hypoxic on RA (85%), CXR stable/unchanged. NT elevated but unchanged from prior (and expected to be elevated w/CAV per Dr. Jimenez). Does state he felt good at time of last DC, reports that this is an acute change. Weight down from prior DC / last ECHO; now 84 kg.     Patient denies chest pain, abdominal distention/bloating, early satiety, lower extremity edema, dizziness, lightheadedness or palpitations. Endorses compliance with medications and dietary restrictions.      Review of Systems: Pertinent positives above. A comprehensive 10+ point ROS discussed.      Medications/Infusions: Reviewed     Rhythm: Sinus  rhythm      Vital Last Value 24 Hour Range   Temperature 98.8 °F (37.1 °C) Temp  Min: 97.6 °F (36.4 °C)  Max: 98.8 °F (37.1 °C)   Pulse 95 Pulse  Min: 95  Max: 107   Respiratory 18 Resp  Min: 18  Max: 18   Blood Pressure 137/86 BP  Min: 122/83  Max: 137/86   Pulse Oximetry 94 % SpO2  Min: 92 %  Max: 96 %     Vital Today Admitted   Weight 84.2 kg (185 lb 10 oz) Weight: 85.9 kg (189 lb 6 oz)   Height N/A Height: 5' 8\" (172.7 cm)   BMI (body mass index) N/A BMI (Calculated): 28.79     Weight over the past 48 Hours:  Patient Vitals for the past 48 hrs:   Weight   08/02/23 1022 85.9 kg (189 lb 6 oz)   08/03/23 0522 84.2 kg (185 lb 10 oz)      Intake/Output:  I/O this shift:  In: 480 [P.O.:480]  Out: 500 [Urine:500]  I/O last 3 completed shifts:  In: 1000 [P.O.:1000]  Out: 400 [Urine:400]    Intake/Output Summary (Last 24 hours) at 8/3/2023 1219  Last data filed at 8/3/2023 1200  Gross per 24 hour   Intake 1240 ml   Output 900 ml   Net 340 ml     Physical Assessment:  General:    No acute distress   EENT:    Deferred   Oral Mucosa:    Moist   Neck:   Trachea appears midline, minimal JVD    Lungs:     Diminished in bases    Cardiovascular:   S1, S2 without murmur, no edema   Abdomen:     Non-distended, non-tender   Pulses:   1+  bilaterally (radial/DP)   Skin:   Warm (core / periphery)   Neurologic:   Alert and oriented to person, place and time     Laboratory Results:  Lab Results   Component Value Date    WBC 5.3 08/03/2023    HGB 10.5 (L) 08/03/2023    HCT 33.8 (L) 08/03/2023     (L) 08/03/2023    BUN 23 (H) 08/03/2023    CREATININE 1.27 (H) 08/03/2023    SODIUM 143 08/03/2023    POTASSIUM 3.8 08/03/2023    CO2 27 08/03/2023    MG 1.7 08/03/2023    BILIRUBIN 1.2 (H) 08/03/2023    INR 1.1 08/02/2023    PTT 28 08/02/2023    ALKPT 49 08/03/2023    AST 23 08/03/2023    GPT 28 08/03/2023    ALBUMIN 2.5 (L) 08/03/2023    GLUCOSE 116 (H) 08/03/2023    TSH 1.128 10/25/2022     Tacrolimus Level  Lab Results   Component  Value Date    TACRO 9.7 07/29/2023    TACRO 8.6 07/28/2023    TACRO 9.3 07/27/2023     Sirolimus Level  Lab Results   Component Value Date    SIROLIMUS 7.2 07/26/2023    SIROLIMUS 6.1 07/24/2023    SIROLIMUS 7.5 07/15/2023      ECHO 7/25/2023  s/p heart transplant.  Low normal LV function. EF 53%.  PASP 25 mmHg.    ECHO 7/14/2023  Normal LV size. Regional wall motion abnormalities. Normal LV systolic function, LVEF 54%.  Normal right ventricular size and systolic function. Normal RVSP 24 mmHg.  No significant valve abnormalities.  No pericardial effusion.  No significant change since the prior study.    ECHO 7/5/2023  Heart Transplant.  Normal left ventricular size and systolic function, EF 53 %.  Abnormal septal wall motion consistent with post-operative status.  Normal right ventricular size and systolic function. Normal RVSP; 31 mmHg.  Mild mitral valve regurgitation.  Compared to prior study from 1/9/23, no significant change.    [facility] 4/5/2023  Hemodynamic Data  AO= 119/83/102 mmHg  Aortic Valve was not crossed.  Coronary Angiography  Left main coronary artery: Large size vessel. No disease.  LAD : Large size vessel with normal course and caliber to the apex. Prior stent in proximal LAD is patent. Intermediate 40-50% lesion is noted after the stent, just distal to origin of large diagonal.  LCX: Largr size vessel with normal taper in AV groove. There are 4 OM vessels. There is a stable 80% lesion in mid OM3.  RCA: Large dominant vessel with patent stents in mid and distal RCA. Residual disease (approx 50% lesion)  noted in RPAV.  Interpretation  1. Severe chronic CAV Grade 3 w/intermediate to high grade lesions in all 3 major vessels. All prior stents are open.  2. Findings were reviewed with Dr Espinal, who recommends coronary angiography every 6 months for now.  3. US examination showed a patent right radial artery. An image was recorded.  4. Consider assessing candidacy for redo heart transplant if deemed  appropriate.     R/[facility] 10/11/2022 - 90.8 kg  Dist RCA lesion with 90% stenosis.  RPDA lesion with 80% stenosis.  RPAV lesion with 50% stenosis.  Mid RCA to Dist RCA lesion with 75% stenosis  S/P angioplasty and stenting to RCA  Aortic Pressure: 99/70 (80) mmHg           HR= 87 beats/min       SaO2= 94.8 %  LV Pressure: 102/3 (24) mmHg  RA =               13 mmHg  RV =               46/8 (15) mmHg  PA =                45/29 (34) mmHg              PA sat= 70.0%  PCWP =          28 mmHg  TPG =             6 mmHg  PVR   =           1.2 ROSS  SVR   =           1110 dynes/sec/cm-5  Thermodilution Cardiac output (L/min)/cardiac index (L/min/m2) = 4.83/2.36.  Priyanka Cardiac output (L/min)/cardiac index (L/min/m2) = 4.77/2.33.      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)  Coronary Artery Vasculopathy 3 s/p Angioplasty and Stenting to [facility] 10/2023  Hx Elevated DSAs / Abnormal AlloMap and AlloSure  Chronic Essential Hypertension   Volume: Not felt to be elevated based on exam, weights, ECHO 7/25/2023  Perfusion: Adequate  Immunosuppression (modified)  -Tacrolimus 2.5 mg BID (goal level 6-9); AM level 8/3 pending   -Sirolimus 2 mg Wed & Sun / 1 mg AOD (goal level 6-12); AM level 8/3 pending   -Prednisone 10 mg daily    Coronary Artery Vasculopathy / Hx Recovered Systolic Allograft Dysfunction / HTN    -Volume management: Lasix 40 IV BID -->torsemide 20 mg daily     --PTA on torsemide 10 mg daily     -Carvedilol 25 mg PO BID    -Entresto had been on hold d/t renal fx, resumed yesterday, continue on DC    -Jardiance has been on hold since last adm d/t renal fx, consider resumption outpatient pending coarse    --Would decrease torsemide back to 10 mg daily when/if resumed    -Off Aldactone in April d/t recovered LVEF   -DAPT, statin    -Sirolimus as above   -[facility] Q6 months (last 4/2023)  -Daily standing weights and strict  I&O  -Strict I&O / cardiac diet with 2L FR  Chronic Elevation of DSA / Abnormal [facility] Labs   -DSA 7/27/2023 stable     -Modified IS as above      Advanced Heart Failure Therapies   -Deferring evaluation for repeat / redo OHTx at this time     --Recovered LVEF / non-ischemic response on stress test 4/2023    --CPET for further evaluation (planned outpatient along w/RHC on 8/15/2023)   -Moderate EtOH use, DM, social support and compliance are concurrent barriers    Acute (on Chronic) Hypoxemic Respiratory Failure  Mild Intermittent Asthma  Obstructive Sleep Apnea - Non Compliant w/CPAP    -NOT felt d/t volume   -Remote COVID / rhinovirus, hx asthma in childhood   -Pulmonology following (discussed CT results with Dr. 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 Jimenez who is in agreement; he further discussed with pulmonology.   Can DC home with close outpatient f/u per Dr. Jimenez; MD recommends home O2 evaluation.   Updated / discussed with primary team.   Please call with any questions or concerns.     Niya LEAVITT  Advanced Heart Failure / [facility] / Transplant / Pulmonary HTN   Pager [phone]  On-call physician available [phone]     Heart Failure-Transplant Cardiology Attending Note    I saw and evaluated the patient. I discussed the case with DAGMAR Camara and agree with the findings and plan as documented. More than 50% of my time was spent in counseling and/or coordination of care including: Counseling patient regarding diagnosis, prognosis, and treatment plan (including risks and benefits) and discussing case with care team members.     Kwabena Jimenez M.D. M.S.  Advanced HF/ Heart Transplant/ Mechanical Circulatory Support  [facility] [place]   Never smoker

## 2024-09-12 NOTE — ED ADULT NURSE NOTE - NSFALLRISKINTERV_ED_ALL_ED

## 2024-09-12 NOTE — ED PROVIDER NOTE - CARE PLAN
1 Principal Discharge DX:	Generalized weakness   Principal Discharge DX:	Generalized weakness  Secondary Diagnosis:	Abdominal fluid collection

## 2024-09-12 NOTE — ED PROVIDER NOTE - CLINICAL SUMMARY MEDICAL DECISION MAKING FREE TEXT BOX
49 year old male presenting with chief complaint of abdominal pain  - Vital signs: /63, RR 16, HR 95, afebrile, SpO2 99% on room air  - Past medical history of myositis, obesity, prediabetes, osteoporosis, H. pylori, compression fracture of spine, TAMMY, hypothyroidism, asthma, lupus; history of cholecystectomy  - Had a robotic distal gastrectomy with Heather-en-Y reconstruction and D2 lymphadenectomy on 8/20 for gastric adenocarcinoma  - Has been feeling weaker and having abdominal pain since the surgery ("he's been weak since surgery, he's been, like, just very lethargic week having, like, general abdominal tenderness")  - Symptoms have worsened since yesterday with lightheadedness on standing, increased weakness, nausea ("since yesterday, he's been like, he feels that he keeps dizzy when he stands up. It's much harder for him to get up. He's been very weak, nauseous")  - Vomited once this morning, non-bloody; was tolerating PO well before yesterday  - Loose stools but no diarrhea, no blood in stool  - No urinary changes, cough or congestion  - Feeling cold; family notes he looks pale. no bld in stool     Past Medical History:  - Myositis  - Obesity  - Prediabetes  - Osteoporosis  - H. pylori  - Compression fracture of spine  - Obstructive sleep apnea  - Hypothyroidism  - Asthma  - Lupus  - History of cholecystectomy  - Gastric adenocarcinoma status post robotic distal gastrectomy with Heather-en-Y reconstruction and D2 lymphadenectomy on 8/20  - Takes losartan and steroids for lupus    Physical Examination:  - Vital signs as above  Patient is alert and orient x 3, obese.  Does not appear an extremis.     Patient without swelling in his upper or lower extremities.  Chest sounds bilaterally normal, normal S1-S2.  Regular regular.  Patient with tenderness in his left lower quadrant, epigastric as well.    Impression:  -     Management Plan:  - CBC, CMP, lipase, magnesium, PT/INR, type and screen  - Portable chest x-ray  - Right upper quadrant ultrasound  - IV fluids for dehydration  - Ondansetron for nausea  - Ofirmev (IV acetaminophen) for pain control  - Consult surgery regarding postoperative complications

## 2024-09-12 NOTE — ED PROVIDER NOTE - ATTENDING CONTRIBUTION TO CARE
I personally made the management plan, and take responsibility for the patient's management. I have discussed with and reviewed the Student and Resident's note and agree with the History, ROS, Physical Exam and MDM unless otherwise indicated. A brief summary of my personal evaluation and impression can be found below.    49 year old male with a history of lupus c/b myositis on steroids, asthma on Trelegy, TAMMY, hypothyroidism, and gastric adenocarcinoma w/ recent robotic distal gastrectomy with Heather en Y reconstruction and D2 lymphadenectomy With concern of generalized weakness.  Patient states he had pain postoperatively is managing it well at home with OTC analgesia and oxycodone.  Has noticed that he has low energy whenever he is walking around he feels overall weakness. Has followed up w/ his surgeon who checked and his labs were otherwise normal. Pt was hypomagnesemic inpatient, and takes K+ supplementation daily. Pt also takes daily prednisone 7.5 mg.    General: well-appearing, no acute distress  Head: Atraumatic, normocephalic  Eyes: no scleral icterus, no discharge  ENT: moist mucous membranes  Neurology: A&Ox 3, nonfocal, MIX x 4  Respiratory: normal respiratory effort  CV: Extremities warm and well perfused  Abdominal: Soft, non-distended, ttp in epigastrium and LLQ, surgical incisions clean/dry and in tact  Extremities: No edema, no deformities  Skin: warm and dry. No rashes    Differential diagnosis includes but is not limited to Electrolyte abnormality, metabolic derangement, vitamin deficiency, anemia, postoperative complication, infection.  Patient with some mild abdominal tenderness to states it is not much worse than it has been postoperatively.  Given that he had gastric bypass he is at risk for possible vitamin or electrolyte derangement.  He also takes a daily steroid could be experiencing adrenal crisis or adrenal insufficiency.  Patient also known history of hypokalemia and takes potassium supplementation may require additional supplementation.  Will check labs including serum cortisol, thyroid studies as patient is hypothyroid, CT abdomen pelvis with p.o. and IV contrast.  Dispo pending labs and imaging will also consult surgery given he is postop.

## 2024-09-12 NOTE — ED PROVIDER NOTE - PROGRESS NOTE DETAILS
DO Justin, EM Attending: Found to have a fluid collection on CT.  Accepted for admission under  Dr. Anil Ferreira

## 2024-09-12 NOTE — H&P ADULT - HISTORY OF PRESENT ILLNESS
49yM with PMH of Asthma, Lupus/myositis overlap syndrome, Hypothyroidism, Gastric cancer, PSH Robotic distal gastrectomy w/ Heather en Y reconstruction, D2 lymphadenectomy on 8/20/2024 p/w generalized malaise for last few weeks. Patient states that since discharge on 8/27 patient has been consistently fatigued w consistent abdominal pain that has not improved. Abdominal pain has not worsened either.    Patient denies fevers, chills, SOB, CP.

## 2024-09-12 NOTE — H&P ADULT - NSHPPHYSICALEXAM_GEN_ALL_CORE
T(C): 37.2 (09-12-24 @ 09:36), Max: 37.2 (09-12-24 @ 09:36)  HR: 90 (09-12-24 @ 10:41) (90 - 95)  BP: 118/76 (09-12-24 @ 10:41) (103/63 - 118/76)  RR: 17 (09-12-24 @ 10:41) (16 - 17)  SpO2: 100% (09-12-24 @ 10:41) (99% - 100%)    CONSTITUTIONAL: Well groomed, NAD  ENMT: Oral mucosa with moist membranes.  RESP: No respiratory distress, no use of accessory muscles  CV: RRR  GI: Soft, diffusely ttp, ND, incisions well healing, no rebound, no guarding; no palpable masses; no hepatosplenomegaly; no hernia palpated  PSYCH: Appropriate insight/judgment; A+O x 3

## 2024-09-12 NOTE — H&P ADULT - ASSESSMENT
49yM with PMH of Asthma, Lupus/myositis overlap syndrome, Hypothyroidism, Gastric cancer, PSH Robotic distal gastrectomy w/ Heather en Y reconstruction, D2 lymphadenectomy on 8/20/2024 p/w generalized malaise for last few weeks. In ED, VSS. CT showing Loculated collection with air-fluid level is noted associated with the duodenal staple line, concerning for staple line dehiscence.     Plan:  - admit to E team surgery, Dr. Ferreira  - Given patient is hemodynamically stable, will place IR consult for drainage  - Diet pending IR availability  - Will place endocrine consult  - IV zosyn  - Pain control  - DVT ppx    Discussed w Dr. Ferreira  E Team  [id number]

## 2024-09-12 NOTE — ED ADULT NURSE REASSESSMENT NOTE - NS ED NURSE REASSESS COMMENT FT1
Report received from ALEKSEY Zabala. Pt A&Ox4, ambulatory. Respirations equal and unlabored. Pt resting in stretcher, offers no complaints. Pt educted on NPO status after midnight. VS performed. IV patent. No acute distress noted. Pt admitted, pending inpatient bed assignment. Safety maintained.

## 2024-09-12 NOTE — ED ADULT TRIAGE NOTE - CHIEF COMPLAINT QUOTE
pt s/p distal gastrectomy 8/20, pt c/o abd pain since the surgery and episode of vomiting this morning. pt states he is feeling progressively weaker since surgery. pt denies fever/chills.

## 2024-09-13 LAB
ANION GAP SERPL CALC-SCNC: 10 MMOL/L — SIGNIFICANT CHANGE UP (ref 7–14)
APTT BLD: 30.8 SEC — SIGNIFICANT CHANGE UP (ref 24.5–35.6)
BUN SERPL-MCNC: 7 MG/DL — SIGNIFICANT CHANGE UP (ref 7–23)
CALCIUM SERPL-MCNC: 8.6 MG/DL — SIGNIFICANT CHANGE UP (ref 8.4–10.5)
CHLORIDE SERPL-SCNC: 100 MMOL/L — SIGNIFICANT CHANGE UP (ref 98–107)
CO2 SERPL-SCNC: 23 MMOL/L — SIGNIFICANT CHANGE UP (ref 22–31)
CREAT SERPL-MCNC: 1.03 MG/DL — SIGNIFICANT CHANGE UP (ref 0.5–1.3)
EGFR: 90 ML/MIN/1.73M2 — SIGNIFICANT CHANGE UP
GLUCOSE SERPL-MCNC: 89 MG/DL — SIGNIFICANT CHANGE UP (ref 70–99)
HCT VFR BLD CALC: 32.7 % — LOW (ref 39–50)
HGB BLD-MCNC: 10.8 G/DL — LOW (ref 13–17)
INR BLD: 1.21 RATIO — HIGH (ref 0.85–1.18)
MAGNESIUM SERPL-MCNC: 1.9 MG/DL — SIGNIFICANT CHANGE UP (ref 1.6–2.6)
MCHC RBC-ENTMCNC: 26.8 PG — LOW (ref 27–34)
MCHC RBC-ENTMCNC: 33 GM/DL — SIGNIFICANT CHANGE UP (ref 32–36)
MCV RBC AUTO: 81.1 FL — SIGNIFICANT CHANGE UP (ref 80–100)
NRBC # BLD: 0 /100 WBCS — SIGNIFICANT CHANGE UP (ref 0–0)
NRBC # FLD: 0 K/UL — SIGNIFICANT CHANGE UP (ref 0–0)
PHOSPHATE SERPL-MCNC: 3 MG/DL — SIGNIFICANT CHANGE UP (ref 2.5–4.5)
PLATELET # BLD AUTO: 369 K/UL — SIGNIFICANT CHANGE UP (ref 150–400)
POTASSIUM SERPL-MCNC: 3.9 MMOL/L — SIGNIFICANT CHANGE UP (ref 3.5–5.3)
POTASSIUM SERPL-SCNC: 3.9 MMOL/L — SIGNIFICANT CHANGE UP (ref 3.5–5.3)
PROTHROM AB SERPL-ACNC: 13.5 SEC — HIGH (ref 9.5–13)
RBC # BLD: 4.03 M/UL — LOW (ref 4.2–5.8)
RBC # FLD: 13.8 % — SIGNIFICANT CHANGE UP (ref 10.3–14.5)
SODIUM SERPL-SCNC: 133 MMOL/L — LOW (ref 135–145)
WBC # BLD: 8.5 K/UL — SIGNIFICANT CHANGE UP (ref 3.8–10.5)
WBC # FLD AUTO: 8.5 K/UL — SIGNIFICANT CHANGE UP (ref 3.8–10.5)

## 2024-09-13 PROCEDURE — 49406 IMAGE CATH FLUID PERI/RETRO: CPT

## 2024-09-13 PROCEDURE — 99254 IP/OBS CNSLTJ NEW/EST MOD 60: CPT | Mod: GC

## 2024-09-13 RX ORDER — HYDROCORTISONE 10 MG/1
25 TABLET ORAL ONCE
Refills: 0 | Status: COMPLETED | OUTPATIENT
Start: 2024-09-13 | End: 2024-09-13

## 2024-09-13 RX ORDER — ACETAMINOPHEN 325 MG/1
1000 TABLET ORAL EVERY 6 HOURS
Refills: 0 | Status: COMPLETED | OUTPATIENT
Start: 2024-09-13 | End: 2024-09-14

## 2024-09-13 RX ORDER — HYDROMORPHONE HYDROCHLORIDE 2 MG/1
0.2 TABLET ORAL EVERY 4 HOURS
Refills: 0 | Status: DISCONTINUED | OUTPATIENT
Start: 2024-09-13 | End: 2024-09-14

## 2024-09-13 RX ADMIN — Medication 125 MICROGRAM(S): at 05:36

## 2024-09-13 RX ADMIN — HYDROMORPHONE HYDROCHLORIDE 0.2 MILLIGRAM(S): 2 TABLET ORAL at 21:09

## 2024-09-13 RX ADMIN — PIPERACILLIN SODIUM AND TAZOBACTAM SODIUM 25 GRAM(S): 3; .375 INJECTION, POWDER, FOR SOLUTION INTRAVENOUS at 21:09

## 2024-09-13 RX ADMIN — ACETAMINOPHEN 400 MILLIGRAM(S): 325 TABLET ORAL at 18:13

## 2024-09-13 RX ADMIN — HYDROXYCHLOROQUINE SULFATE 400 MILLIGRAM(S): 200 TABLET, FILM COATED ORAL at 21:09

## 2024-09-13 RX ADMIN — ACETAMINOPHEN 1000 MILLIGRAM(S): 325 TABLET ORAL at 13:06

## 2024-09-13 RX ADMIN — LOSARTAN POTASSIUM 50 MILLIGRAM(S): 50 TABLET ORAL at 05:36

## 2024-09-13 RX ADMIN — Medication 7.5 MILLIGRAM(S): at 05:36

## 2024-09-13 RX ADMIN — PIPERACILLIN SODIUM AND TAZOBACTAM SODIUM 25 GRAM(S): 3; .375 INJECTION, POWDER, FOR SOLUTION INTRAVENOUS at 14:08

## 2024-09-13 RX ADMIN — ACETAMINOPHEN 400 MILLIGRAM(S): 325 TABLET ORAL at 12:30

## 2024-09-13 RX ADMIN — ACETAMINOPHEN 1000 MILLIGRAM(S): 325 TABLET ORAL at 19:00

## 2024-09-13 RX ADMIN — PIPERACILLIN SODIUM AND TAZOBACTAM SODIUM 25 GRAM(S): 3; .375 INJECTION, POWDER, FOR SOLUTION INTRAVENOUS at 05:36

## 2024-09-13 RX ADMIN — HYDROMORPHONE HYDROCHLORIDE 0.2 MILLIGRAM(S): 2 TABLET ORAL at 21:39

## 2024-09-13 RX ADMIN — HYDROCORTISONE 25 MILLIGRAM(S): 10 TABLET ORAL at 15:02

## 2024-09-13 NOTE — PROCEDURE NOTE - PROCEDURE FINDINGS AND DETAILS
CT guided drainage of right abdominal  fluid collection with 8 Fr drain placed and appx 12cc cloudy fluid aspirated. Drain placed to ZONIA

## 2024-09-13 NOTE — CONSULT NOTE ADULT - ASSESSMENT
Ordered Hydrocortisone 25mg IV stat pre procedure for stress dose Patient with SLE with overlap myositis on prednisone 7.5mg daily steroids here for IR drain s/p subtotal gastrectomy (for adenocarcinoma of stomach)with anastomosis leak . Patient feels he feels like he is having flare, overal muscle fatigue and weakness. Exam notable for bilateral legs proximal muscle mild weakness.Given his underlying malignancy , difficult to assess weakness which aslo could be cancer related    #SLE with overlap myositis with adenocarcinoma of stomach S/p Subtotal gastrectomy  -Previous treatment: cellcept, prednisone , benlysta  -Current regimen : Prednisone 7.5 mg daily  -Proximal muscle weakness in bilateral LE  -CK: normaL,AST/ALT   previous CK values appear to be 100-200    #Adrenal insufficiency   -Unclear  -Missed his yesterday steroid dose   -Soft BP, mild hyponatremia.      Recommendation  -Given short IR procedure, please given low dose hydrocortisone 25mg IV stat pre procedure  -Continue home dose steroids  -Send Complements, dsDNA  -Send urine with UPCR  -Send Aldolase, LDH  -Get MRI thighs non contrast    D/w Dr.Mader Juliet Agudelo MD, PGY-4  Rheumatology Fellow  Reachable on TEAMS

## 2024-09-13 NOTE — CHART NOTE - NSCHARTNOTEFT_GEN_A_CORE
SURGERY POST OP CHECK    STATUS POST PROCEDURE: CT-guided drainage of right abdominal fluid collection, 8 Fr drain placed    SUBJECTIVE: Pt seen and examined at bedside. He reports 8/10 right-sided abdominal pain that is stable from prior to the procedure and is not well-controlled with Tylenol. Otherwise, he has tolerated a diet without nausea or vomiting, denies fevers or chills.    --------------------------------------------------------------------------------------------------------------------------------------------------------------------------------------------------------------  OBJECTIVE:  Vital Signs Last 24 Hrs  T(C): 36.7 (13 Sep 2024 20:22), Max: 36.9 (13 Sep 2024 05:21)  T(F): 98 (13 Sep 2024 20:22), Max: 98.5 (13 Sep 2024 05:21)  HR: 73 (13 Sep 2024 20:22) (66 - 81)  BP: 111/71 (13 Sep 2024 20:22) (96/62 - 121/60)  BP(mean): 61 (13 Sep 2024 18:08) (61 - 61)  RR: 19 (13 Sep 2024 20:22) (15 - 19)  SpO2: 100% (13 Sep 2024 20:22) (97% - 100%)    Parameters below as of 13 Sep 2024 20:22  Patient On (Oxygen Delivery Method): room air      I&O's Summary    12 Sep 2024 07:01  -  13 Sep 2024 07:00  --------------------------------------------------------  IN: 1215 mL / OUT: 1050 mL / NET: 165 mL    13 Sep 2024 07:01  -  13 Sep 2024 20:34  --------------------------------------------------------  IN: 900 mL / OUT: 250 mL / NET: 650 mL        PHYSICAL EXAM:  Constitutional: Well developed, well nourished, NAD  Chest: Symmetric chest rise bilaterally, no increased WOB  Abdomen: Soft, non-distended with epigastric and right upper quadrant tenderness to light palpation. Percutaneous drain is fixed in place to ZONIA bulb suction with minimal tan fluid in the drain.  Extremities: Move all extremities equally  ----------------------------------------------------------------------------------------------------------------------------------------------------------------------------------------------------------------  ASSESSMENT:     E team  [id number] SURGERY POST OP CHECK    STATUS POST PROCEDURE: CT-guided drainage of right abdominal fluid collection, 8 Fr drain placed    SUBJECTIVE: Pt seen and examined at bedside. He reports 8/10 right-sided abdominal pain that is stable from prior to the procedure and is not well-controlled with Tylenol. Otherwise, he has tolerated a diet without nausea or vomiting, denies fevers or chills.    --------------------------------------------------------------------------------------------------------------------------------------------------------------------------------------------------------------  OBJECTIVE:  Vital Signs Last 24 Hrs  T(C): 36.7 (13 Sep 2024 20:22), Max: 36.9 (13 Sep 2024 05:21)  T(F): 98 (13 Sep 2024 20:22), Max: 98.5 (13 Sep 2024 05:21)  HR: 73 (13 Sep 2024 20:22) (66 - 81)  BP: 111/71 (13 Sep 2024 20:22) (96/62 - 121/60)  BP(mean): 61 (13 Sep 2024 18:08) (61 - 61)  RR: 19 (13 Sep 2024 20:22) (15 - 19)  SpO2: 100% (13 Sep 2024 20:22) (97% - 100%)    Parameters below as of 13 Sep 2024 20:22  Patient On (Oxygen Delivery Method): room air      I&O's Summary    12 Sep 2024 07:01  -  13 Sep 2024 07:00  --------------------------------------------------------  IN: 1215 mL / OUT: 1050 mL / NET: 165 mL    13 Sep 2024 07:01  -  13 Sep 2024 20:34  --------------------------------------------------------  IN: 900 mL / OUT: 250 mL / NET: 650 mL        PHYSICAL EXAM:  Constitutional: Well developed, well nourished, NAD  Chest: Symmetric chest rise bilaterally, no increased WOB  Abdomen: Soft, non-distended with epigastric and right upper quadrant tenderness to light palpation. Percutaneous drain is fixed in place to ZONIA bulb suction with minimal tan fluid in the drain.  Extremities: Move all extremities equally  ----------------------------------------------------------------------------------------------------------------------------------------------------------------------------------------------------------------  ASSESSMENT:   49yM with PMH of Asthma, Lupus/myositis overlap syndrome on prednisone, Hypothyroidism, Gastric cancer, PSH Robotic distal gastrectomy w/ Heather en Y reconstruction, D2 lymphadenectomy on 8/20/2024 who presents with malaise and imaging concerning for duodenal staple line dehiscence with surrounding loculated fluid collection, now s/p CT-guided fluid drainage with 8Fr drain placement. Patient remains hemodynamically stable with poorly controlled pain.    Plan:  - Monitor drain output  - Pain: Tylenol, dIVP 0.2 PRN  - IV zosyn  - Diet: regular with mIVF @75  - Continue home prednisone and Plaquenil, rheumatology following  - DVT ppx: SCD    E Team  [id number]

## 2024-09-13 NOTE — CONSULT NOTE ADULT - ATTENDING COMMENTS
49yM with long standing history of Lupus/myositis overlap , with Gastric cancer, s/p robotic distal gastrectomy / lymphadenectomy on 8/20/2024 with worsening of fatigue and also noted increased LE weakness  x few weeks, especially on using stairs. Symptoms started as the patient is off his maintenance tx because of new dx of gastric ca . Also worsening of fatigue , lo BP and hyponatremia after skipped a dose of prednisone- on chronic prednisone 7.5 mg a day.     Today is schedule for IR procedure to drain loculated collection with air-fluid level . ? infection/ ? reactive serous    There are 2 main problems that may explain increased fatigue  1. r SLE/ Myositis - r/o worsening of activity as IS therapy is d/c - will get MRI LE( thighs) , and will get myoglobin, aldolase, LDH and / vs r/o malignancy associated myositis associated / vs steroid induced myopathy- but it is unusual to see rapid decline in function  2. Iatrogenic adrenal insufficiency ( patient is on chronic prednisone 7.5 mg a day ) was recently on high dose steroids that was quickly tapered - will give extra dose Hydrocortisone 25 mg IV prior to procedure - orders are placed and RN is aware 49yM with long standing history of Lupus/myositis overlap , with Gastric cancer, s/p robotic distal gastrectomy / lymphadenectomy on 8/20/2024 with worsening of fatigue and also noted increased LE weakness  x few weeks, especially on using stairs. Symptoms started as the patient is off his maintenance tx because of new dx of gastric ca . Also worsening of fatigue , low BP and hyponatremia after skipped a dose of prednisone- on chronic prednisone 7.5 mg a day.     Today is scheduled for IR procedure to drain loculated collection with air-fluid level - ? infection/ ? reactive serous fluid    There are 2 main problems that may explain increased fatigue  1. SLE/ Myositis - r/o worsening of activity as IS therapy is d/c - will get MRI LE( thighs) , and will get myoglobin, aldolase, LDH and / vs r/o malignancy associated myositis associated / vs steroid induced myopathy- but it is unusual to see rapid decline in function  2. Iatrogenic adrenal insufficiency (patient is on chronic prednisone 7.5 mg a day ) was recently on high dose steroids that was quickly tapered - will give extra dose Hydrocortisone 25 mg IV prior to procedure - orders are placed and RN is aware

## 2024-09-13 NOTE — PROGRESS NOTE ADULT - SUBJECTIVE AND OBJECTIVE BOX
SURGERY DAILY PROGRESS NOTE    24 Hour/Overnight Events: No acute events overnight    SUBJECTIVE: Patient seen and evaluated on AM rounds.  Patient states that since discharge on 8/27 patient has been consistently fatigued w consistent abdominal pain that has not improved. Abdominal pain has not worsened either.        ------------------------------------------------------------------------------------------------------------  OBJECTIVE:  Vital Signs Last 24 Hrs  T(C): 36.9 (13 Sep 2024 05:21), Max: 37.2 (12 Sep 2024 09:36)  T(F): 98.5 (13 Sep 2024 05:21), Max: 98.9 (12 Sep 2024 09:36)  HR: 66 (13 Sep 2024 05:21) (66 - 95)  BP: 121/60 (13 Sep 2024 05:21) (103/63 - 121/60)  BP(mean): --  RR: 17 (13 Sep 2024 05:21) (16 - 17)  SpO2: 99% (13 Sep 2024 05:21) (98% - 100%)    Parameters below as of 13 Sep 2024 05:21  Patient On (Oxygen Delivery Method): room air      I&O's Detail    12 Sep 2024 07:01  -  13 Sep 2024 07:00  --------------------------------------------------------  IN:    Lactated Ringers: 900 mL    Oral Fluid: 240 mL  Total IN: 1140 mL    OUT:    Voided (mL): 1050 mL  Total OUT: 1050 mL    Total NET: 90 mL          LABS:                        11.0   10.26 )-----------( 457      ( 12 Sep 2024 11:27 )             34.0     09-12    136  |  103  |  7   ----------------------------<  86  3.9   |  19<L>  |  0.88    Ca    8.1<L>      12 Sep 2024 14:48  Phos  2.7     09-12  Mg     1.70     09-12    TPro  7.8  /  Alb  3.7  /  TBili  0.4  /  DBili  x   /  AST  29  /  ALT  21  /  AlkPhos  97  09-12    LIVER FUNCTIONS - ( 12 Sep 2024 11:27 )  Alb: 3.7 g/dL / Pro: 7.8 g/dL / ALK PHOS: 97 U/L / ALT: 21 U/L / AST: 29 U/L / GGT: x           PT/INR - ( 12 Sep 2024 11:27 )   PT: 13.8 sec;   INR: 1.23 ratio         PTT - ( 12 Sep 2024 11:27 )  PTT:30.4 sec  Urinalysis Basic - ( 12 Sep 2024 14:48 )    Color: x / Appearance: x / SG: x / pH: x  Gluc: 86 mg/dL / Ketone: x  / Bili: x / Urobili: x   Blood: x / Protein: x / Nitrite: x   Leuk Esterase: x / RBC: x / WBC x   Sq Epi: x / Non Sq Epi: x / Bacteria: x        PHYSICAL EXAM:  CONSTITUTIONAL: Well groomed, NAD  ENMT: Oral mucosa with moist membranes.  RESP: No respiratory distress, no use of accessory muscles  CV: RRR  GI: Soft, diffusely ttp, ND, incisions well healing, no rebound, no guarding; no palpable masses; no hepatosplenomegaly; no hernia palpated  PSYCH: Appropriate insight/judgment; A+O x 3    Assessment:  · Assessment	  49yM with PMH of Asthma, Lupus/myositis overlap syndrome, Hypothyroidism, Gastric cancer, PSH Robotic distal gastrectomy w/ Heather en Y reconstruction, D2 lymphadenectomy on 8/20/2024 p/w generalized malaise for last few weeks. In ED, VSS. CT showing Loculated collection with air-fluid level is noted associated with the duodenal staple line, concerning for staple line dehiscence.     Plan:  - IR drainage today for collection 2/2 duodenal stump blowout  - Rheumatology to see today, they will follow pt's lupus  -Daily prednisone for lupus myositis  - Plaquenil daily  - IV zosyn  -IVF@ 75  - Pain control  - DVT ppx    Discussed w Dr. Ferreira  E Team  [id number] SURGERY DAILY PROGRESS NOTE    24 Hour/Overnight Events: No acute events overnight    SUBJECTIVE: Patient seen and evaluated on AM rounds.  Patient states that since discharge on 8/27 patient has been consistently fatigued w consistent abdominal pain that has not improved. Abdominal pain has not worsened either.        ------------------------------------------------------------------------------------------------------------  OBJECTIVE:  Vital Signs Last 24 Hrs  T(C): 36.9 (13 Sep 2024 05:21), Max: 37.2 (12 Sep 2024 09:36)  T(F): 98.5 (13 Sep 2024 05:21), Max: 98.9 (12 Sep 2024 09:36)  HR: 66 (13 Sep 2024 05:21) (66 - 95)  BP: 121/60 (13 Sep 2024 05:21) (103/63 - 121/60)  BP(mean): --  RR: 17 (13 Sep 2024 05:21) (16 - 17)  SpO2: 99% (13 Sep 2024 05:21) (98% - 100%)    Parameters below as of 13 Sep 2024 05:21  Patient On (Oxygen Delivery Method): room air      I&O's Detail    12 Sep 2024 07:01  -  13 Sep 2024 07:00  --------------------------------------------------------  IN:    Lactated Ringers: 900 mL    Oral Fluid: 240 mL  Total IN: 1140 mL    OUT:    Voided (mL): 1050 mL  Total OUT: 1050 mL    Total NET: 90 mL          LABS:                        11.0   10.26 )-----------( 457      ( 12 Sep 2024 11:27 )             34.0     09-12    136  |  103  |  7   ----------------------------<  86  3.9   |  19<L>  |  0.88    Ca    8.1<L>      12 Sep 2024 14:48  Phos  2.7     09-12  Mg     1.70     09-12    TPro  7.8  /  Alb  3.7  /  TBili  0.4  /  DBili  x   /  AST  29  /  ALT  21  /  AlkPhos  97  09-12    LIVER FUNCTIONS - ( 12 Sep 2024 11:27 )  Alb: 3.7 g/dL / Pro: 7.8 g/dL / ALK PHOS: 97 U/L / ALT: 21 U/L / AST: 29 U/L / GGT: x           PT/INR - ( 12 Sep 2024 11:27 )   PT: 13.8 sec;   INR: 1.23 ratio         PTT - ( 12 Sep 2024 11:27 )  PTT:30.4 sec  Urinalysis Basic - ( 12 Sep 2024 14:48 )    Color: x / Appearance: x / SG: x / pH: x  Gluc: 86 mg/dL / Ketone: x  / Bili: x / Urobili: x   Blood: x / Protein: x / Nitrite: x   Leuk Esterase: x / RBC: x / WBC x   Sq Epi: x / Non Sq Epi: x / Bacteria: x        PHYSICAL EXAM:  CONSTITUTIONAL: Well groomed, NAD  ENMT: Oral mucosa with moist membranes.  RESP: No respiratory distress, no use of accessory muscles  CV: RRR  GI: Soft, diffusely ttp, ND, incisions well healing, no rebound, no guarding; no palpable masses; no hepatosplenomegaly; no hernia palpated  PSYCH: Appropriate insight/judgment; A+O x 3    Assessment:  · Assessment	  49yM with PMH of Asthma, Lupus/myositis overlap syndrome, Hypothyroidism, Gastric cancer, PSH Robotic distal gastrectomy w/ Heather en Y reconstruction, D2 lymphadenectomy on 8/20/2024 p/w generalized malaise for last few weeks. In ED, VSS. CT showing Loculated collection with air-fluid level is noted associated with the duodenal staple line, concerning for staple line dehiscence.     Plan:  - IR drainage today for collection 2/2 duodenal stump blowout  - Rheumatology - follow pt's lupus, c/w 7.5 Prednisone   - Daily prednisone for lupus myositis  - Plaquenil daily  - IV zosyn  - IVF@ 75  - Pain control  - DVT ppx    Discussed w Dr. Ferreira  E Team  [id number]

## 2024-09-13 NOTE — PRE PROCEDURE NOTE - HISTORY OF PRESENT ILLNESS
Interventional Radiology  Pre-Procedure Note    This is a 49y  Male  presenting for drainage    HPI:  49yM with PMH of Asthma, Lupus/myositis overlap syndrome, Hypothyroidism, Gastric cancer, PSH Robotic distal gastrectomy w/ Heather en Y reconstruction, D2 lymphadenectomy on 8/20/2024 p/w generalized malaise for last few weeks. Patient states that since discharge on 8/27 patient has been consistently fatigued w consistent abdominal pain that has not improved. Abdominal pain has not worsened either.    Patient denies fevers, chills, SOB, CP.  (12 Sep 2024 14:22)      PAST MEDICAL & SURGICAL HISTORY:  Lupus      Asthma      Hypothyroid      Lumbosacral stenosis      Mild obstructive sleep apnea      H/O compression fracture of spine      H. pylori infection      Osteoporosis      Prediabetes      Obesity      Myositis      History of cholecystectomy      H/O endoscopy      S/P vasectomy      H/O colonoscopy          Social History:     FAMILY HISTORY:  FH: rheumatoid arthritis (Mother)        Allergies: No Known Allergies      Current Medications: acetaminophen   IVPB .. 1000 milliGRAM(s) IV Intermittent every 6 hours  hydroxychloroquine 400 milliGRAM(s) Oral at bedtime  lactated ringers. 1000 milliLiter(s) IV Continuous <Continuous>  levothyroxine 125 MICROGram(s) Oral daily  losartan 50 milliGRAM(s) Oral daily  piperacillin/tazobactam IVPB.. 3.375 Gram(s) IV Intermittent every 8 hours  predniSONE   Tablet 7.5 milliGRAM(s) Oral daily      Labs:                         10.8   8.50  )-----------( 369      ( 13 Sep 2024 06:40 )             32.7       09-13    133<L>  |  100  |  7   ----------------------------<  89  3.9   |  23  |  1.03    Ca    8.6      13 Sep 2024 06:40  Phos  3.0     09-13  Mg     1.90     09-13    TPro  7.8  /  Alb  3.7  /  TBili  0.4  /  DBili  x   /  AST  29  /  ALT  21  /  AlkPhos  97  09-12      Radiology:   < from: CT Abdomen and Pelvis w/ Oral Cont and w/ IV Cont (09.12.24 @ 13:22) >    IMPRESSION:  Status post interval distal gastrectomy with a Heather-en-Y.    Loculated collection with air-fluid level is noted associated with the   duodenal staple line, concerning for staple line dehiscence.    Additional loculated collection is noted in the right anterior upper   abdomen and appears to be associated with the anterior abdominal wall and   the periduodenal collection.    < end of copied text >      Assessment/Plan:   This is a 49y Male  presents with right upper abdominal fluid collection  Patient presents to IR for drainage.  Procedure/ risks/ benefits/ goals/ alternatives were explained. All questions answered. Informed content obtained from patient. Consent placed in chart.

## 2024-09-14 LAB
ANION GAP SERPL CALC-SCNC: 12 MMOL/L — SIGNIFICANT CHANGE UP (ref 7–14)
BUN SERPL-MCNC: 8 MG/DL — SIGNIFICANT CHANGE UP (ref 7–23)
C3 SERPL-MCNC: 183 MG/DL — HIGH (ref 90–180)
C4 SERPL-MCNC: 28 MG/DL — SIGNIFICANT CHANGE UP (ref 10–40)
CALCIUM SERPL-MCNC: 8.9 MG/DL — SIGNIFICANT CHANGE UP (ref 8.4–10.5)
CHLORIDE SERPL-SCNC: 104 MMOL/L — SIGNIFICANT CHANGE UP (ref 98–107)
CO2 SERPL-SCNC: 24 MMOL/L — SIGNIFICANT CHANGE UP (ref 22–31)
CREAT SERPL-MCNC: 0.97 MG/DL — SIGNIFICANT CHANGE UP (ref 0.5–1.3)
DSDNA AB SER-ACNC: 1 IU/ML — SIGNIFICANT CHANGE UP
EGFR: 96 ML/MIN/1.73M2 — SIGNIFICANT CHANGE UP
GLUCOSE SERPL-MCNC: 91 MG/DL — SIGNIFICANT CHANGE UP (ref 70–99)
GRAM STN FLD: ABNORMAL
HCT VFR BLD CALC: 34.4 % — LOW (ref 39–50)
HGB BLD-MCNC: 11.1 G/DL — LOW (ref 13–17)
LDH SERPL L TO P-CCNC: 163 U/L — SIGNIFICANT CHANGE UP (ref 135–225)
MAGNESIUM SERPL-MCNC: 2.1 MG/DL — SIGNIFICANT CHANGE UP (ref 1.6–2.6)
MCHC RBC-ENTMCNC: 26.3 PG — LOW (ref 27–34)
MCHC RBC-ENTMCNC: 32.3 GM/DL — SIGNIFICANT CHANGE UP (ref 32–36)
MCV RBC AUTO: 81.5 FL — SIGNIFICANT CHANGE UP (ref 80–100)
NIGHT BLUE STAIN TISS: SIGNIFICANT CHANGE UP
NRBC # BLD: 0 /100 WBCS — SIGNIFICANT CHANGE UP (ref 0–0)
NRBC # FLD: 0 K/UL — SIGNIFICANT CHANGE UP (ref 0–0)
PHOSPHATE SERPL-MCNC: 3.3 MG/DL — SIGNIFICANT CHANGE UP (ref 2.5–4.5)
PLATELET # BLD AUTO: 393 K/UL — SIGNIFICANT CHANGE UP (ref 150–400)
POTASSIUM SERPL-MCNC: 3.6 MMOL/L — SIGNIFICANT CHANGE UP (ref 3.5–5.3)
POTASSIUM SERPL-SCNC: 3.6 MMOL/L — SIGNIFICANT CHANGE UP (ref 3.5–5.3)
RBC # BLD: 4.22 M/UL — SIGNIFICANT CHANGE UP (ref 4.2–5.8)
RBC # FLD: 13.8 % — SIGNIFICANT CHANGE UP (ref 10.3–14.5)
SODIUM SERPL-SCNC: 140 MMOL/L — SIGNIFICANT CHANGE UP (ref 135–145)
SPECIMEN SOURCE: SIGNIFICANT CHANGE UP
SPECIMEN SOURCE: SIGNIFICANT CHANGE UP
WBC # BLD: 7.55 K/UL — SIGNIFICANT CHANGE UP (ref 3.8–10.5)
WBC # FLD AUTO: 7.55 K/UL — SIGNIFICANT CHANGE UP (ref 3.8–10.5)

## 2024-09-14 RX ORDER — HYDROMORPHONE HYDROCHLORIDE 2 MG/1
1 TABLET ORAL EVERY 6 HOURS
Refills: 0 | Status: DISCONTINUED | OUTPATIENT
Start: 2024-09-14 | End: 2024-09-14

## 2024-09-14 RX ORDER — HYDROMORPHONE HYDROCHLORIDE 2 MG/1
0.5 TABLET ORAL EVERY 6 HOURS
Refills: 0 | Status: DISCONTINUED | OUTPATIENT
Start: 2024-09-14 | End: 2024-09-15

## 2024-09-14 RX ORDER — HYDROMORPHONE HYDROCHLORIDE 2 MG/1
0.5 TABLET ORAL EVERY 4 HOURS
Refills: 0 | Status: DISCONTINUED | OUTPATIENT
Start: 2024-09-14 | End: 2024-09-14

## 2024-09-14 RX ORDER — HEPARIN SODIUM,BOVINE 1000/ML
5000 VIAL (ML) INJECTION EVERY 8 HOURS
Refills: 0 | Status: DISCONTINUED | OUTPATIENT
Start: 2024-09-14 | End: 2024-09-20

## 2024-09-14 RX ORDER — ONDANSETRON 2 MG/ML
4 INJECTION, SOLUTION INTRAMUSCULAR; INTRAVENOUS THREE TIMES A DAY
Refills: 0 | Status: DISCONTINUED | OUTPATIENT
Start: 2024-09-14 | End: 2024-09-20

## 2024-09-14 RX ORDER — OXYCODONE HYDROCHLORIDE 5 MG/1
2.5 TABLET ORAL EVERY 6 HOURS
Refills: 0 | Status: DISCONTINUED | OUTPATIENT
Start: 2024-09-14 | End: 2024-09-19

## 2024-09-14 RX ORDER — OXYCODONE HYDROCHLORIDE 5 MG/1
5 TABLET ORAL EVERY 6 HOURS
Refills: 0 | Status: DISCONTINUED | OUTPATIENT
Start: 2024-09-14 | End: 2024-09-19

## 2024-09-14 RX ADMIN — HYDROMORPHONE HYDROCHLORIDE 0.5 MILLIGRAM(S): 2 TABLET ORAL at 15:44

## 2024-09-14 RX ADMIN — HYDROMORPHONE HYDROCHLORIDE 0.5 MILLIGRAM(S): 2 TABLET ORAL at 20:19

## 2024-09-14 RX ADMIN — PIPERACILLIN SODIUM AND TAZOBACTAM SODIUM 25 GRAM(S): 3; .375 INJECTION, POWDER, FOR SOLUTION INTRAVENOUS at 05:22

## 2024-09-14 RX ADMIN — ACETAMINOPHEN 1000 MILLIGRAM(S): 325 TABLET ORAL at 07:01

## 2024-09-14 RX ADMIN — ACETAMINOPHEN 400 MILLIGRAM(S): 325 TABLET ORAL at 06:31

## 2024-09-14 RX ADMIN — ACETAMINOPHEN 400 MILLIGRAM(S): 325 TABLET ORAL at 01:30

## 2024-09-14 RX ADMIN — HYDROMORPHONE HYDROCHLORIDE 0.5 MILLIGRAM(S): 2 TABLET ORAL at 16:20

## 2024-09-14 RX ADMIN — HYDROMORPHONE HYDROCHLORIDE 0.2 MILLIGRAM(S): 2 TABLET ORAL at 02:19

## 2024-09-14 RX ADMIN — ACETAMINOPHEN 1000 MILLIGRAM(S): 325 TABLET ORAL at 02:00

## 2024-09-14 RX ADMIN — Medication 5000 UNIT(S): at 22:00

## 2024-09-14 RX ADMIN — ONDANSETRON 4 MILLIGRAM(S): 2 INJECTION, SOLUTION INTRAMUSCULAR; INTRAVENOUS at 20:19

## 2024-09-14 RX ADMIN — Medication 7.5 MILLIGRAM(S): at 05:21

## 2024-09-14 RX ADMIN — PIPERACILLIN SODIUM AND TAZOBACTAM SODIUM 25 GRAM(S): 3; .375 INJECTION, POWDER, FOR SOLUTION INTRAVENOUS at 13:16

## 2024-09-14 RX ADMIN — HYDROMORPHONE HYDROCHLORIDE 0.5 MILLIGRAM(S): 2 TABLET ORAL at 20:49

## 2024-09-14 RX ADMIN — HYDROMORPHONE HYDROCHLORIDE 0.2 MILLIGRAM(S): 2 TABLET ORAL at 02:49

## 2024-09-14 RX ADMIN — Medication 6 MILLIGRAM(S): at 22:00

## 2024-09-14 RX ADMIN — HYDROXYCHLOROQUINE SULFATE 400 MILLIGRAM(S): 200 TABLET, FILM COATED ORAL at 22:01

## 2024-09-14 RX ADMIN — HYDROMORPHONE HYDROCHLORIDE 0.5 MILLIGRAM(S): 2 TABLET ORAL at 12:00

## 2024-09-14 RX ADMIN — PIPERACILLIN SODIUM AND TAZOBACTAM SODIUM 25 GRAM(S): 3; .375 INJECTION, POWDER, FOR SOLUTION INTRAVENOUS at 22:01

## 2024-09-14 RX ADMIN — Medication 125 MICROGRAM(S): at 05:22

## 2024-09-14 RX ADMIN — HYDROMORPHONE HYDROCHLORIDE 0.5 MILLIGRAM(S): 2 TABLET ORAL at 11:30

## 2024-09-14 NOTE — PROGRESS NOTE ADULT - SUBJECTIVE AND OBJECTIVE BOX
SURGERY DAILY PROGRESS NOTE    24 Hour/Overnight Events: No acute events overnight    SUBJECTIVE: Patient seen and evaluated on AM rounds. Pt is resting comfortably in bed with no acute complaints. Pt reports appropriately controlled pain on current regimen.  Denies fevers/chills, chest pain, dyspnea, abdominal pain, nausea, vomiting, and diarrhea.       ------------------------------------------------------------------------------------------------------------  OBJECTIVE:  Vital Signs Last 24 Hrs  T(C): 36.8 (14 Sep 2024 16:17), Max: 37 (14 Sep 2024 05:00)  T(F): 98.2 (14 Sep 2024 16:17), Max: 98.6 (14 Sep 2024 05:00)  HR: 69 (14 Sep 2024 16:17) (69 - 79)  BP: 113/68 (14 Sep 2024 16:17) (90/50 - 113/68)  BP(mean): --  RR: 17 (14 Sep 2024 16:17) (16 - 19)  SpO2: 100% (14 Sep 2024 16:17) (97% - 100%)    Parameters below as of 14 Sep 2024 16:17  Patient On (Oxygen Delivery Method): room air      I&O's Detail    13 Sep 2024 07:01  -  14 Sep 2024 07:00  --------------------------------------------------------  IN:    IV PiggyBack: 200 mL    Lactated Ringers: 600 mL    Oral Fluid: 200 mL  Total IN: 1000 mL    OUT:    Bulb (mL): 0 mL    Voided (mL): 250 mL  Total OUT: 250 mL    Total NET: 750 mL      14 Sep 2024 07:01  -  14 Sep 2024 19:19  --------------------------------------------------------  IN:    Oral Fluid: 650 mL  Total IN: 650 mL    OUT:    Bulb (mL): 2.5 mL  Total OUT: 2.5 mL    Total NET: 647.5 mL          LABS:                        11.1   7.55  )-----------( 393      ( 14 Sep 2024 05:48 )             34.4     09-14    140  |  104  |  8   ----------------------------<  91  3.6   |  24  |  0.97    Ca    8.9      14 Sep 2024 05:48  Phos  3.3     09-14  Mg     2.10     09-14        PT/INR - ( 13 Sep 2024 06:40 )   PT: 13.5 sec;   INR: 1.21 ratio         PTT - ( 13 Sep 2024 06:40 )  PTT:30.8 sec  Urinalysis Basic - ( 14 Sep 2024 05:48 )    Color: x / Appearance: x / SG: x / pH: x  Gluc: 91 mg/dL / Ketone: x  / Bili: x / Urobili: x   Blood: x / Protein: x / Nitrite: x   Leuk Esterase: x / RBC: x / WBC x   Sq Epi: x / Non Sq Epi: x / Bacteria: x        PHYSICAL EXAM:  CONSTITUTIONAL: Well groomed, NAD  ENMT: Oral mucosa with moist membranes.  RESP: No respiratory distress, no use of accessory muscles  CV: RRR  GI: Soft, diffusely ttp, ND, incisions well healing, no rebound, no guarding; no palpable masses; no hepatosplenomegaly; no hernia palpated  PSYCH: Appropriate insight/judgment; A+O x 3      Assessment:  · Assessment	  49yM with PMH of Asthma, Lupus/myositis overlap syndrome, Hypothyroidism, Gastric cancer, PSH Robotic distal gastrectomy w/ Heather en Y reconstruction, D2 lymphadenectomy on 8/20/2024 p/w generalized malaise for last few weeks. In ED, VSS. CT showing Loculated collection with air-fluid level is noted associated with the duodenal staple line, concerning for staple line dehiscence.     Plan:  - IR drainage for collection 2/2 duodenal stump blowout, placed 8 Fr drain today  - Rheumatology - follow pt's lupus, c/w 7.5 Prednisone   - Daily prednisone for lupus myositis  - Monitor IR drain output  - Plaquenil daily  - IV zosyn  - IVF@ 75  - Pain control  - DVT ppx      E Team  [id number]    Seen w Dr. Gee

## 2024-09-15 LAB
-  AMOXICILLIN/CLAVULANIC ACID: SIGNIFICANT CHANGE UP
-  AMPICILLIN/SULBACTAM: SIGNIFICANT CHANGE UP
-  AMPICILLIN: SIGNIFICANT CHANGE UP
-  AZTREONAM: SIGNIFICANT CHANGE UP
-  CEFAZOLIN: SIGNIFICANT CHANGE UP
-  CEFEPIME: SIGNIFICANT CHANGE UP
-  CEFOXITIN: SIGNIFICANT CHANGE UP
-  CEFTRIAXONE: SIGNIFICANT CHANGE UP
-  CIPROFLOXACIN: SIGNIFICANT CHANGE UP
-  ERTAPENEM: SIGNIFICANT CHANGE UP
-  GENTAMICIN: SIGNIFICANT CHANGE UP
-  IMIPENEM: SIGNIFICANT CHANGE UP
-  LEVOFLOXACIN: SIGNIFICANT CHANGE UP
-  MEROPENEM: SIGNIFICANT CHANGE UP
-  PIPERACILLIN/TAZOBACTAM: SIGNIFICANT CHANGE UP
-  TOBRAMYCIN: SIGNIFICANT CHANGE UP
-  TRIMETHOPRIM/SULFAMETHOXAZOLE: SIGNIFICANT CHANGE UP
ANION GAP SERPL CALC-SCNC: 12 MMOL/L — SIGNIFICANT CHANGE UP (ref 7–14)
BUN SERPL-MCNC: 6 MG/DL — LOW (ref 7–23)
CALCIUM SERPL-MCNC: 8.4 MG/DL — SIGNIFICANT CHANGE UP (ref 8.4–10.5)
CHLORIDE SERPL-SCNC: 102 MMOL/L — SIGNIFICANT CHANGE UP (ref 98–107)
CO2 SERPL-SCNC: 24 MMOL/L — SIGNIFICANT CHANGE UP (ref 22–31)
CREAT SERPL-MCNC: 1.01 MG/DL — SIGNIFICANT CHANGE UP (ref 0.5–1.3)
EGFR: 92 ML/MIN/1.73M2 — SIGNIFICANT CHANGE UP
GLUCOSE SERPL-MCNC: 84 MG/DL — SIGNIFICANT CHANGE UP (ref 70–99)
HCT VFR BLD CALC: 33.2 % — LOW (ref 39–50)
HGB BLD-MCNC: 10.8 G/DL — LOW (ref 13–17)
MAGNESIUM SERPL-MCNC: 1.9 MG/DL — SIGNIFICANT CHANGE UP (ref 1.6–2.6)
MCHC RBC-ENTMCNC: 26.6 PG — LOW (ref 27–34)
MCHC RBC-ENTMCNC: 32.5 GM/DL — SIGNIFICANT CHANGE UP (ref 32–36)
MCV RBC AUTO: 81.8 FL — SIGNIFICANT CHANGE UP (ref 80–100)
METHOD TYPE: SIGNIFICANT CHANGE UP
NRBC # BLD: 0 /100 WBCS — SIGNIFICANT CHANGE UP (ref 0–0)
NRBC # FLD: 0 K/UL — SIGNIFICANT CHANGE UP (ref 0–0)
PHOSPHATE SERPL-MCNC: 2.6 MG/DL — SIGNIFICANT CHANGE UP (ref 2.5–4.5)
PLATELET # BLD AUTO: 362 K/UL — SIGNIFICANT CHANGE UP (ref 150–400)
POTASSIUM SERPL-MCNC: 3.9 MMOL/L — SIGNIFICANT CHANGE UP (ref 3.5–5.3)
POTASSIUM SERPL-SCNC: 3.9 MMOL/L — SIGNIFICANT CHANGE UP (ref 3.5–5.3)
RBC # BLD: 4.06 M/UL — LOW (ref 4.2–5.8)
RBC # FLD: 13.8 % — SIGNIFICANT CHANGE UP (ref 10.3–14.5)
SODIUM SERPL-SCNC: 138 MMOL/L — SIGNIFICANT CHANGE UP (ref 135–145)
WBC # BLD: 9.14 K/UL — SIGNIFICANT CHANGE UP (ref 3.8–10.5)
WBC # FLD AUTO: 9.14 K/UL — SIGNIFICANT CHANGE UP (ref 3.8–10.5)

## 2024-09-15 RX ORDER — HYDROMORPHONE HYDROCHLORIDE 2 MG/1
0.5 TABLET ORAL EVERY 4 HOURS
Refills: 0 | Status: DISCONTINUED | OUTPATIENT
Start: 2024-09-15 | End: 2024-09-15

## 2024-09-15 RX ORDER — HYDROMORPHONE HYDROCHLORIDE 2 MG/1
0.5 TABLET ORAL EVERY 4 HOURS
Refills: 0 | Status: DISCONTINUED | OUTPATIENT
Start: 2024-09-15 | End: 2024-09-19

## 2024-09-15 RX ADMIN — Medication 6 MILLIGRAM(S): at 22:15

## 2024-09-15 RX ADMIN — Medication 5000 UNIT(S): at 13:08

## 2024-09-15 RX ADMIN — HYDROMORPHONE HYDROCHLORIDE 0.5 MILLIGRAM(S): 2 TABLET ORAL at 15:30

## 2024-09-15 RX ADMIN — PIPERACILLIN SODIUM AND TAZOBACTAM SODIUM 25 GRAM(S): 3; .375 INJECTION, POWDER, FOR SOLUTION INTRAVENOUS at 22:15

## 2024-09-15 RX ADMIN — Medication 125 MICROGRAM(S): at 05:20

## 2024-09-15 RX ADMIN — HYDROXYCHLOROQUINE SULFATE 400 MILLIGRAM(S): 200 TABLET, FILM COATED ORAL at 22:15

## 2024-09-15 RX ADMIN — Medication 7.5 MILLIGRAM(S): at 05:20

## 2024-09-15 RX ADMIN — HYDROMORPHONE HYDROCHLORIDE 0.5 MILLIGRAM(S): 2 TABLET ORAL at 00:56

## 2024-09-15 RX ADMIN — HYDROMORPHONE HYDROCHLORIDE 0.5 MILLIGRAM(S): 2 TABLET ORAL at 05:25

## 2024-09-15 RX ADMIN — HYDROMORPHONE HYDROCHLORIDE 0.5 MILLIGRAM(S): 2 TABLET ORAL at 20:20

## 2024-09-15 RX ADMIN — LOSARTAN POTASSIUM 50 MILLIGRAM(S): 50 TABLET ORAL at 05:20

## 2024-09-15 RX ADMIN — Medication 5000 UNIT(S): at 22:20

## 2024-09-15 RX ADMIN — HYDROMORPHONE HYDROCHLORIDE 0.5 MILLIGRAM(S): 2 TABLET ORAL at 00:26

## 2024-09-15 RX ADMIN — HYDROMORPHONE HYDROCHLORIDE 0.5 MILLIGRAM(S): 2 TABLET ORAL at 11:17

## 2024-09-15 RX ADMIN — HYDROMORPHONE HYDROCHLORIDE 0.5 MILLIGRAM(S): 2 TABLET ORAL at 12:00

## 2024-09-15 RX ADMIN — PIPERACILLIN SODIUM AND TAZOBACTAM SODIUM 25 GRAM(S): 3; .375 INJECTION, POWDER, FOR SOLUTION INTRAVENOUS at 13:08

## 2024-09-15 RX ADMIN — PIPERACILLIN SODIUM AND TAZOBACTAM SODIUM 25 GRAM(S): 3; .375 INJECTION, POWDER, FOR SOLUTION INTRAVENOUS at 05:21

## 2024-09-15 RX ADMIN — HYDROMORPHONE HYDROCHLORIDE 0.5 MILLIGRAM(S): 2 TABLET ORAL at 19:51

## 2024-09-15 RX ADMIN — Medication 5000 UNIT(S): at 05:20

## 2024-09-15 RX ADMIN — HYDROMORPHONE HYDROCHLORIDE 0.5 MILLIGRAM(S): 2 TABLET ORAL at 05:55

## 2024-09-15 NOTE — PROGRESS NOTE ADULT - SUBJECTIVE AND OBJECTIVE BOX
SURGERY DAILY PROGRESS NOTE    24 Hour/Overnight Events: No acute events overnight    SUBJECTIVE: Patient seen and evaluated on AM rounds. Pt is resting comfortably in bed with no acute complaints. Tolerating diet. Ambulating well. Denies fevers/chills, chest pain, dyspnea, abdominal pain, nausea, vomiting, and diarrhea    ------------------------------------------------------------------------------------------------------------  OBJECTIVE:  Vital Signs Last 24 Hrs  T(C): 36.8 (15 Sep 2024 08:00), Max: 36.9 (14 Sep 2024 20:21)  T(F): 98.2 (15 Sep 2024 08:00), Max: 98.4 (14 Sep 2024 20:21)  HR: 76 (15 Sep 2024 08:00) (69 - 80)  BP: 116/81 (15 Sep 2024 08:00) (107/66 - 116/81)  BP(mean): --  RR: 16 (15 Sep 2024 08:00) (16 - 18)  SpO2: 100% (15 Sep 2024 08:00) (97% - 100%)    Parameters below as of 15 Sep 2024 08:00  Patient On (Oxygen Delivery Method): room air      I&O's Detail    14 Sep 2024 07:01  -  15 Sep 2024 07:00  --------------------------------------------------------  IN:    Oral Fluid: 950 mL  Total IN: 950 mL    OUT:    Bulb (mL): 5 mL  Total OUT: 5 mL    Total NET: 945 mL      15 Sep 2024 07:01  -  15 Sep 2024 12:20  --------------------------------------------------------  IN:    Oral Fluid: 450 mL  Total IN: 450 mL    OUT:    Bulb (mL): 0 mL    Voided (mL): 150 mL  Total OUT: 150 mL    Total NET: 300 mL          LABS:                        10.8   9.14  )-----------( 362      ( 15 Sep 2024 05:20 )             33.2     09-15    138  |  102  |  6<L>  ----------------------------<  84  3.9   |  24  |  1.01    Ca    8.4      15 Sep 2024 05:20  Phos  2.6     09-15  Mg     1.90     09-15          Urinalysis Basic - ( 15 Sep 2024 05:20 )    Color: x / Appearance: x / SG: x / pH: x  Gluc: 84 mg/dL / Ketone: x  / Bili: x / Urobili: x   Blood: x / Protein: x / Nitrite: x   Leuk Esterase: x / RBC: x / WBC x   Sq Epi: x / Non Sq Epi: x / Bacteria: x      PHYSICAL EXAM:  CONSTITUTIONAL: Well groomed, NAD  ENMT: Oral mucosa with moist membranes.  RESP: No respiratory distress, no use of accessory muscles  CV: RRR  GI: Soft, diffusely ttp, ND, incisions well healing, no rebound, no guarding; no palpable masses; no hepatosplenomegaly; no hernia palpated  PSYCH: Appropriate insight/judgment; A+O x 3

## 2024-09-15 NOTE — PROGRESS NOTE ADULT - ASSESSMENT
49yM with PMH of Asthma, Lupus/myositis overlap syndrome, Hypothyroidism, Gastric cancer, PSH Robotic distal gastrectomy w/ Heather en Y reconstruction, D2 lymphadenectomy on 8/20/2024 p/w generalized malaise for last few weeks. In ED, VSS. CT showing Loculated collection with air-fluid level is noted associated with the duodenal staple line, concerning for staple line dehiscence.  IR drainage for collection 2/2 duodenal stump blowout, placed 8 Fr drain 9/13    Plan:  - Rheumatology - follow pt's lupus, c/w 7.5 Prednisone   - Daily prednisone for lupus myositis  - Monitor IR drain output  - Plaquenil daily  - Pain: oxy 2.5/5 PRN, Dilaudid for breakthrough   - IV zosyn  - DVT ppx: SCD, SQH       E Team  [id number]    Patient seen and examined with Dr. Gee this AM.

## 2024-09-16 ENCOUNTER — TRANSCRIPTION ENCOUNTER (OUTPATIENT)
Age: 49
End: 2024-09-16

## 2024-09-16 LAB
ALDOLASE SERPL-CCNC: 7.3 U/L — SIGNIFICANT CHANGE UP (ref 3.3–10.3)
ANION GAP SERPL CALC-SCNC: 13 MMOL/L — SIGNIFICANT CHANGE UP (ref 7–14)
BUN SERPL-MCNC: 7 MG/DL — SIGNIFICANT CHANGE UP (ref 7–23)
CALCIUM SERPL-MCNC: 8.7 MG/DL — SIGNIFICANT CHANGE UP (ref 8.4–10.5)
CHLORIDE SERPL-SCNC: 102 MMOL/L — SIGNIFICANT CHANGE UP (ref 98–107)
CO2 SERPL-SCNC: 23 MMOL/L — SIGNIFICANT CHANGE UP (ref 22–31)
CREAT SERPL-MCNC: 0.86 MG/DL — SIGNIFICANT CHANGE UP (ref 0.5–1.3)
EGFR: 107 ML/MIN/1.73M2 — SIGNIFICANT CHANGE UP
GLUCOSE SERPL-MCNC: 93 MG/DL — SIGNIFICANT CHANGE UP (ref 70–99)
HCT VFR BLD CALC: 32.4 % — LOW (ref 39–50)
HGB BLD-MCNC: 10.5 G/DL — LOW (ref 13–17)
MAGNESIUM SERPL-MCNC: 2 MG/DL — SIGNIFICANT CHANGE UP (ref 1.6–2.6)
MCHC RBC-ENTMCNC: 26.4 PG — LOW (ref 27–34)
MCHC RBC-ENTMCNC: 32.4 GM/DL — SIGNIFICANT CHANGE UP (ref 32–36)
MCV RBC AUTO: 81.6 FL — SIGNIFICANT CHANGE UP (ref 80–100)
MYOGLOBIN SERPL-MCNC: 26 NG/ML — LOW (ref 28–72)
NRBC # BLD: 0 /100 WBCS — SIGNIFICANT CHANGE UP (ref 0–0)
NRBC # FLD: 0 K/UL — SIGNIFICANT CHANGE UP (ref 0–0)
PHOSPHATE SERPL-MCNC: 3 MG/DL — SIGNIFICANT CHANGE UP (ref 2.5–4.5)
PLATELET # BLD AUTO: 325 K/UL — SIGNIFICANT CHANGE UP (ref 150–400)
POTASSIUM SERPL-MCNC: 3.2 MMOL/L — LOW (ref 3.5–5.3)
POTASSIUM SERPL-SCNC: 3.2 MMOL/L — LOW (ref 3.5–5.3)
RBC # BLD: 3.97 M/UL — LOW (ref 4.2–5.8)
RBC # FLD: 13.8 % — SIGNIFICANT CHANGE UP (ref 10.3–14.5)
SODIUM SERPL-SCNC: 138 MMOL/L — SIGNIFICANT CHANGE UP (ref 135–145)
WBC # BLD: 7.46 K/UL — SIGNIFICANT CHANGE UP (ref 3.8–10.5)
WBC # FLD AUTO: 7.46 K/UL — SIGNIFICANT CHANGE UP (ref 3.8–10.5)

## 2024-09-16 PROCEDURE — 73718 MRI LOWER EXTREMITY W/O DYE: CPT | Mod: 26,50

## 2024-09-16 RX ORDER — POTASSIUM CHLORIDE 10 MEQ
40 TABLET, EXT RELEASE, PARTICLES/CRYSTALS ORAL ONCE
Refills: 0 | Status: COMPLETED | OUTPATIENT
Start: 2024-09-16 | End: 2024-09-16

## 2024-09-16 RX ADMIN — ONDANSETRON 4 MILLIGRAM(S): 2 INJECTION, SOLUTION INTRAMUSCULAR; INTRAVENOUS at 20:08

## 2024-09-16 RX ADMIN — HYDROMORPHONE HYDROCHLORIDE 0.5 MILLIGRAM(S): 2 TABLET ORAL at 15:20

## 2024-09-16 RX ADMIN — OXYCODONE HYDROCHLORIDE 5 MILLIGRAM(S): 5 TABLET ORAL at 16:40

## 2024-09-16 RX ADMIN — HYDROMORPHONE HYDROCHLORIDE 0.5 MILLIGRAM(S): 2 TABLET ORAL at 14:47

## 2024-09-16 RX ADMIN — HYDROMORPHONE HYDROCHLORIDE 0.5 MILLIGRAM(S): 2 TABLET ORAL at 00:14

## 2024-09-16 RX ADMIN — HYDROMORPHONE HYDROCHLORIDE 0.5 MILLIGRAM(S): 2 TABLET ORAL at 20:05

## 2024-09-16 RX ADMIN — HYDROMORPHONE HYDROCHLORIDE 0.5 MILLIGRAM(S): 2 TABLET ORAL at 20:45

## 2024-09-16 RX ADMIN — HYDROMORPHONE HYDROCHLORIDE 0.5 MILLIGRAM(S): 2 TABLET ORAL at 00:35

## 2024-09-16 RX ADMIN — Medication 40 MILLIEQUIVALENT(S): at 10:44

## 2024-09-16 RX ADMIN — HYDROMORPHONE HYDROCHLORIDE 0.5 MILLIGRAM(S): 2 TABLET ORAL at 05:45

## 2024-09-16 RX ADMIN — HYDROMORPHONE HYDROCHLORIDE 0.5 MILLIGRAM(S): 2 TABLET ORAL at 05:17

## 2024-09-16 RX ADMIN — LOSARTAN POTASSIUM 50 MILLIGRAM(S): 50 TABLET ORAL at 06:30

## 2024-09-16 RX ADMIN — PIPERACILLIN SODIUM AND TAZOBACTAM SODIUM 25 GRAM(S): 3; .375 INJECTION, POWDER, FOR SOLUTION INTRAVENOUS at 13:18

## 2024-09-16 RX ADMIN — Medication 5000 UNIT(S): at 21:46

## 2024-09-16 RX ADMIN — Medication 7.5 MILLIGRAM(S): at 06:30

## 2024-09-16 RX ADMIN — Medication 5000 UNIT(S): at 05:20

## 2024-09-16 RX ADMIN — HYDROMORPHONE HYDROCHLORIDE 0.5 MILLIGRAM(S): 2 TABLET ORAL at 11:19

## 2024-09-16 RX ADMIN — Medication 125 MICROGRAM(S): at 05:14

## 2024-09-16 RX ADMIN — HYDROXYCHLOROQUINE SULFATE 400 MILLIGRAM(S): 200 TABLET, FILM COATED ORAL at 21:46

## 2024-09-16 RX ADMIN — Medication 6 MILLIGRAM(S): at 21:45

## 2024-09-16 RX ADMIN — Medication 5000 UNIT(S): at 13:22

## 2024-09-16 RX ADMIN — HYDROMORPHONE HYDROCHLORIDE 0.5 MILLIGRAM(S): 2 TABLET ORAL at 10:44

## 2024-09-16 RX ADMIN — PIPERACILLIN SODIUM AND TAZOBACTAM SODIUM 25 GRAM(S): 3; .375 INJECTION, POWDER, FOR SOLUTION INTRAVENOUS at 05:14

## 2024-09-16 RX ADMIN — PIPERACILLIN SODIUM AND TAZOBACTAM SODIUM 25 GRAM(S): 3; .375 INJECTION, POWDER, FOR SOLUTION INTRAVENOUS at 21:43

## 2024-09-16 RX ADMIN — OXYCODONE HYDROCHLORIDE 5 MILLIGRAM(S): 5 TABLET ORAL at 16:09

## 2024-09-16 NOTE — DISCHARGE NOTE PROVIDER - CARE PROVIDER_API CALL
Anil Ferreira-Yeou  Surgery  [address] [place] [address]  Phone: [phone]  Fax: [phone]  Follow Up Time: 1 week   Anil Ferreira-Yeou  Surgery  [address] [place] [address]  Phone: [phone]  Fax: [phone]  Follow Up Time: 1 week    Makenzie Navarro  Hospice/Palliative Medicine  [address] [facility], [facility] [address]  [place] [address]  Phone: [phone]  Fax: [phone]  Follow Up Time: 2 weeks   Anil Ferreira-Yeou  Surgery  [address] [place] [address]  Phone: [phone]  Fax: [phone]  Follow Up Time: 1 week    Martins-Welch, Diana Claudette  Hospice/Palliative Medicine  [address] [place] [address]  Phone: [phone]  Fax: [phone]  Follow Up Time: 1 month

## 2024-09-16 NOTE — PROGRESS NOTE ADULT - ASSESSMENT
49yM with PMH of Asthma, Lupus/myositis overlap syndrome, Hypothyroidism, Gastric cancer, PSH Robotic distal gastrectomy w/ Heather en Y reconstruction, D2 lymphadenectomy on 8/20/2024 p/w generalized malaise for last few weeks. CT showing collection at the duodenal stump, concerning for duodenal stump blowout which IR was consulted for drainage. Patient is now s/p CT drainage of right abdominal fluid on 9/13 in Interventional Radiology.     Plan:  - Continue drainage, monitor output  - Flush drain 5-10ccs NS BID  - Can be discharged home with drain if outputs remain high  - Will need noncontrast CT + IR tube check once outputs < 10cc/24hr, can be arranged as outpatient follow-up. Outpatient IR office [phone]    [phone]

## 2024-09-16 NOTE — DISCHARGE NOTE PROVIDER - NSDCFUSCHEDAPPT_GEN_ALL_CORE_FT
Pritesh Everett  [facility] Physician [facility]  INTMED [address]  Scheduled Appointment: 09/17/2024    Anil Ferreira  [facility] Physician [facility]  SURGONC [address] [facility]  Scheduled Appointment: 09/19/2024    Marquis Puente  [facility] Physician [facility]  [facility]  Scheduled Appointment: 10/02/2024    Susanna Neri  [facility] Physician [facility]  RHEUM [address] [facility]  Scheduled Appointment: 11/08/2024     Marquis Puente  [facility]  [facility]  Scheduled Appointment: 10/02/2024    Susanna Neri  [facility]  [address]  Scheduled Appointment: 11/08/2024     Marquis Puente  [facility] Physician [facility]  [facility]  Scheduled Appointment: 10/02/2024    Isabela Thakkar  [facility]  GERIATRICS [address]   Scheduled Appointment: 10/17/2024    Susanna Neri  [address]  Scheduled Appointment: 11/08/2024

## 2024-09-16 NOTE — DISCHARGE NOTE PROVIDER - CARE PROVIDERS DIRECT ADDRESSES
,[email or web address]

## 2024-09-16 NOTE — DISCHARGE NOTE PROVIDER - NSDCCPCAREPLAN_GEN_ALL_CORE_FT
PRINCIPAL DISCHARGE DIAGNOSIS  Diagnosis: Generalized weakness  Assessment and Plan of Treatment:       SECONDARY DISCHARGE DIAGNOSES  Diagnosis: Abdominal fluid collection  Assessment and Plan of Treatment:      PRINCIPAL DISCHARGE DIAGNOSIS  Diagnosis: Generalized weakness  Assessment and Plan of Treatment: Shan Owens drain (ZONIA Drain)   You are being discharged with a ZONIA drain. It is important that you measure and record the fluid that is in it (output) on the output record sheet daily. Please bring the output record sheet to your follow-up appointment. Keep the drain secured to your clothing with a safety pin at all times to avoid accidental displacement. Monitor the insertion site for redness, pain, swelling, or purulent drainage.  You may shower with the drain. Wash around the drain with soap and water, pat dry, and reapply dressing. If you noticed a sudden change in the color or amount of fluid in the drain, please contact your surgeon’s office.  Your drain will be removed at an outpatient follow up appointment.   BATHING: You may shower and/or sponge bathe. You may use warm soapy water in the shower and rinse, pat dry.  ACTIVITY: No heavy lifting or straining. Otherwise, you may return to your usual level of physical activity. If you are taking narcotic pain medication DO NOT drive a car, operate machinery or make important decisions.  DIET: Return to your usual diet.  NOTIFY YOUR SURGEON IF YOU HAVE: any bleeding that does not stop, any pus draining from your wound(s), any fever (over 100.4 F) persistent nausea/vomiting, or if your pain is not controlled on your discharge pain medications, unable to urinate.  Please follow up with your primary care physician in one week regarding your hospitalization, bring copies of your discharge paperwork.  Please follow up with your surgeon, Dr. Ferreira. Call [phone] to make an appointment.      SECONDARY DISCHARGE DIAGNOSES  Diagnosis: Abdominal fluid collection  Assessment and Plan of Treatment:

## 2024-09-16 NOTE — DISCHARGE NOTE PROVIDER - PROVIDER TOKENS
PROVIDER:[TOKEN:[[id number]],FOLLOWUP:[1 week]] PROVIDER:[TOKEN:[[id number]],FOLLOWUP:[1 week]],PROVIDER:[TOKEN:[[id number]],FOLLOWUP:[2 weeks]] PROVIDER:[TOKEN:[[id number]],FOLLOWUP:[1 week]],PROVIDER:[TOKEN:[[id number]],FOLLOWUP:[1 month]]

## 2024-09-16 NOTE — PROGRESS NOTE ADULT - SUBJECTIVE AND OBJECTIVE BOX
SURGERY DAILY PROGRESS NOTE    24 Hour/Overnight Events: No acute events overnight    SUBJECTIVE: Patient seen and evaluated on AM rounds. Pt is resting comfortably in bed with no acute complaints. Pt reports appropriately controlled pain on current regimen.  Denies fevers/chills, chest pain, dyspnea, abdominal pain, nausea, vomiting, and diarrhea.       ------------------------------------------------------------------------------------------------------------  OBJECTIVE:  Vital Signs Last 24 Hrs  T(C): 36.6 (16 Sep 2024 05:44), Max: 37 (15 Sep 2024 13:07)  T(F): 97.9 (16 Sep 2024 05:44), Max: 98.6 (15 Sep 2024 13:07)  HR: 71 (16 Sep 2024 05:44) (62 - 78)  BP: 105/64 (16 Sep 2024 05:44) (91/54 - 118/76)  BP(mean): --  RR: 18 (16 Sep 2024 05:44) (18 - 18)  SpO2: 99% (16 Sep 2024 05:44) (98% - 100%)    Parameters below as of 16 Sep 2024 05:44  Patient On (Oxygen Delivery Method): room air      I&O's Detail    15 Sep 2024 07:01  -  16 Sep 2024 07:00  --------------------------------------------------------  IN:    Oral Fluid: 1520 mL  Total IN: 1520 mL    OUT:    Bulb (mL): 2.5 mL    Voided (mL): 550 mL  Total OUT: 552.5 mL    Total NET: 967.5 mL          LABS:                        10.8   9.14  )-----------( 362      ( 15 Sep 2024 05:20 )             33.2     09-15    138  |  102  |  6<L>  ----------------------------<  84  3.9   |  24  |  1.01    Ca    8.4      15 Sep 2024 05:20  Phos  2.6     09-15  Mg     1.90     09-15          Urinalysis Basic - ( 15 Sep 2024 05:20 )    Color: x / Appearance: x / SG: x / pH: x  Gluc: 84 mg/dL / Ketone: x  / Bili: x / Urobili: x   Blood: x / Protein: x / Nitrite: x   Leuk Esterase: x / RBC: x / WBC x   Sq Epi: x / Non Sq Epi: x / Bacteria: x          PHYSICAL EXAM:  CONSTITUTIONAL: Well groomed, NAD  ENMT: Oral mucosa with moist membranes.  RESP: No respiratory distress, no use of accessory muscles  CV: RRR  GI: Soft, diffusely ttp, ND, incisions well healing, no rebound, no guarding; no palpable masses; no hepatosplenomegaly; no hernia palpated. ZONIA drain w minimal output  PSYCH: Appropriate insight/judgment; A+O x 3      Assessment and Plan:   · Assessment	  49yM with PMH of Asthma, Lupus/myositis overlap syndrome, Hypothyroidism, Gastric cancer, PSH Robotic distal gastrectomy w/ Heather en Y reconstruction, D2 lymphadenectomy on 8/20/2024 p/w generalized malaise for last few weeks. In ED, VSS. CT showing Loculated collection with air-fluid level is noted associated with the duodenal staple line, concerning for staple line dehiscence.  IR drainage for collection 2/2 duodenal stump blowout, placed 8 Fr drain 9/13    Plan:  - Plan to dc tomorrow on PO Augmentin  - Home w drain  - Rheumatology -follow pt's lupus, c/w 7.5 Prednisone   - Daily prednisone for lupus myositis  - Monitor IR drain output  - Plaquenil daily  - Pain: oxy 2.5/5 PRN, Dilaudid for breakthrough   - IV zosyn  - DVT ppx: SCD, SQH       E Team  [id number]

## 2024-09-16 NOTE — PROGRESS NOTE ADULT - SUBJECTIVE AND OBJECTIVE BOX
49y Male s/p CT drainage of right abdominal fluid on 9/13 in Interventional Radiology.     Patient seen and examined at bedside, resting comfortably. Ambulating with walker. Patient denies fever, chills, nausea, vomiting. Mild pain at access site.       T(F): 97.9 (09-16-24 @ 08:28), Max: 98.6 (09-15-24 @ 13:07)  HR: 71 (09-16-24 @ 08:28) (62 - 78)  BP: 103/64 (09-16-24 @ 08:28) (91/54 - 118/76)  RR: 18 (09-16-24 @ 08:28) (18 - 18)  SpO2: 100% (09-16-24 @ 08:28) (98% - 100%)  Wt(kg): --    LABS:                        10.5   7.46  )-----------( 325      ( 16 Sep 2024 06:43 )             32.4     09-16    138  |  102  |  7   ----------------------------<  93  3.2<L>   |  23  |  0.86    Ca    8.7      16 Sep 2024 06:43  Phos  3.0     09-16  Mg     2.00     09-16        I&O's Detail    15 Sep 2024 07:01  -  16 Sep 2024 07:00  --------------------------------------------------------  IN:    Oral Fluid: 1520 mL  Total IN: 1520 mL    OUT:    Bulb (mL): 2.5 mL    Voided (mL): 550 mL  Total OUT: 552.5 mL    Total NET: 967.5 mL        PHYSICAL EXAM:  General: Nontoxic, resting comfortably in NAD  Abdominal Drain: dressing c/d/i, drain flushed with 5ccs NS    49y Male s/p CT drainage of right abdominal fluid on 9/13 in Interventional Radiology.     Patient seen and examined at bedside, resting comfortably. Patient denies fever, chills, nausea, vomiting. Mild pain at access site.       T(F): 97.9 (09-16-24 @ 08:28), Max: 98.6 (09-15-24 @ 13:07)  HR: 71 (09-16-24 @ 08:28) (62 - 78)  BP: 103/64 (09-16-24 @ 08:28) (91/54 - 118/76)  RR: 18 (09-16-24 @ 08:28) (18 - 18)  SpO2: 100% (09-16-24 @ 08:28) (98% - 100%)  Wt(kg): --    LABS:                        10.5   7.46  )-----------( 325      ( 16 Sep 2024 06:43 )             32.4     09-16    138  |  102  |  7   ----------------------------<  93  3.2<L>   |  23  |  0.86    Ca    8.7      16 Sep 2024 06:43  Phos  3.0     09-16  Mg     2.00     09-16        I&O's Detail    15 Sep 2024 07:01  -  16 Sep 2024 07:00  --------------------------------------------------------  IN:    Oral Fluid: 1520 mL  Total IN: 1520 mL    OUT:    Bulb (mL): 2.5 mL    Voided (mL): 550 mL  Total OUT: 552.5 mL    Total NET: 967.5 mL        PHYSICAL EXAM:  General: Nontoxic, resting comfortably in NAD  Abdominal Drain: dressing c/d/i, drain flushed with 5ccs NS

## 2024-09-16 NOTE — DISCHARGE NOTE PROVIDER - NSDCMRMEDTOKEN_GEN_ALL_CORE_FT
acetaminophen 500 mg oral tablet: 2 tab(s) orally every 6 hours  Dupixent Pre-filled Pen 300 mg/2 mL subcutaneous solution: 300 milligram(s) subcutaneously every 2 weeks every sunday  gabapentin 300 mg oral capsule: 1 cap(s) orally every 8 hours MDD: 3 tablets  HYDROmorphone 2 mg oral tablet: 1 tab(s) orally every 6 hours as needed for Moderate Pain (4 - 6) MDD: 4 tablets  ibuprofen 400 mg oral tablet: 1 tab(s) orally every 6 hours  levothyroxine 125 mcg (0.125 mg) oral tablet: 1 tab(s) orally once a day  losartan 50 mg oral tablet: 1 tab(s) orally once a day (in the morning)  methocarbamol 500 mg oral tablet: 1 tab(s) orally every 8 hours MDD: 3 tablets  Narcan 4 mg/0.1 mL nasal spray: 1 spray(s) intranasally once PLEASE ADMINISTER IF UNRESPONSIVE OR UNCONSCIOUS FROM SUSPECTED OPIOID OVERDOSE.  Ozempic 8 mg/3 mL (2 mg dose) subcutaneous solution: 2 milligram(s) subcutaneously once a week SUNDAY LD 3 weeks ago  pantoprazole 40 mg oral delayed release tablet: 1 tab(s) orally once a day (in the morning)  Plaquenil 200 mg oral tablet: 2 tab(s) orally once a day (at bedtime)  Potassium Chloride (Eqv-K-Tab) 20 mEq oral tablet, extended release: 2 tab(s) orally once a day (at bedtime)  predniSONE 2.5 mg oral tablet: 3 tab(s) orally once a day  Rolling Walker: please dispense one to assist w ambulation  torsemide 10 mg oral tablet: 2 tab(s) orally once a week as needed for prn for swelling/pedal edema  Trelegy Ellipta 100 mcg-62.5 mcg-25 mcg/inh inhalation powder: 1 puff(s) inhaled once a day (in the morning)   acetaminophen 500 mg oral tablet: 2 tab(s) orally every 6 hours  amoxicillin-clavulanate 875 mg-125 mg oral tablet: 1 tab(s) orally every 12 hours x 7 days  Dupixent Pre-filled Pen 300 mg/2 mL subcutaneous solution: 300 milligram(s) subcutaneously every 2 weeks every sunday  gabapentin 300 mg oral capsule: 1 cap(s) orally every 8 hours MDD: 3 tablets  HYDROmorphone 2 mg oral tablet: 1 tab(s) orally every 6 hours as needed for Moderate Pain (4 - 6) MDD: 4 tablets  ibuprofen 400 mg oral tablet: 1 tab(s) orally every 6 hours  levothyroxine 125 mcg (0.125 mg) oral tablet: 1 tab(s) orally once a day  losartan 50 mg oral tablet: 1 tab(s) orally once a day  methocarbamol 500 mg oral tablet: 1 tab(s) orally every 8 hours MDD: 3 tablets  Narcan 4 mg/0.1 mL nasal spray: 1 spray(s) intranasally once PLEASE ADMINISTER IF UNRESPONSIVE OR UNCONSCIOUS FROM SUSPECTED OPIOID OVERDOSE.  Ozempic 8 mg/3 mL (2 mg dose) subcutaneous solution: 2 milligram(s) subcutaneously once a week SUNDAY LD 3 weeks ago  pantoprazole 40 mg oral delayed release tablet: 1 tab(s) orally once a day (in the morning)  Plaquenil 200 mg oral tablet: 2 tab(s) orally once a day (at bedtime)  Potassium Chloride (Eqv-K-Tab) 20 mEq oral tablet, extended release: 2 tab(s) orally once a day (at bedtime)  predniSONE 2.5 mg oral tablet: 3 tab(s) orally once a day  Rolling Walker: please dispense one to assist w ambulation  torsemide 10 mg oral tablet: 2 tab(s) orally once a week as needed for prn for swelling/pedal edema  Trelegy Ellipta 100 mcg-62.5 mcg-25 mcg/inh inhalation powder: 1 puff(s) inhaled once a day (in the morning)   acetaminophen 500 mg oral tablet: 2 tab(s) orally every 6 hours  amoxicillin-clavulanate 875 mg-125 mg oral tablet: 1 tab(s) orally every 12 hours x 7 days  Dupixent Pre-filled Pen 300 mg/2 mL subcutaneous solution: 300 milligram(s) subcutaneously every 2 weeks every sunday  fentaNYL 12 mcg/hr transdermal film, extended release: 1 patch transdermal every 72 hours MDD: 1  gabapentin 300 mg oral capsule: 1 cap(s) orally every 8 hours MDD: 3 tablets  HYDROmorphone 2 mg oral tablet: 1 tab(s) orally every 6 hours as needed for Moderate Pain (4 - 6) MDD: 4 tablets  ibuprofen 400 mg oral tablet: 1 tab(s) orally every 6 hours  levothyroxine 125 mcg (0.125 mg) oral tablet: 1 tab(s) orally once a day  losartan 50 mg oral tablet: 1 tab(s) orally once a day  methocarbamol 500 mg oral tablet: 1 tab(s) orally every 8 hours MDD: 3 tablets  Narcan 4 mg/0.1 mL nasal spray: 1 spray(s) intranasally once PLEASE ADMINISTER IF UNRESPONSIVE OR UNCONSCIOUS FROM SUSPECTED OPIOID OVERDOSE.  Ozempic 8 mg/3 mL (2 mg dose) subcutaneous solution: 2 milligram(s) subcutaneously once a week SUNDAY LD 3 weeks ago  pantoprazole 40 mg oral delayed release tablet: 1 tab(s) orally once a day (in the morning)  Plaquenil 200 mg oral tablet: 2 tab(s) orally once a day (at bedtime)  Potassium Chloride (Eqv-K-Tab) 20 mEq oral tablet, extended release: 2 tab(s) orally once a day (at bedtime)  predniSONE 2.5 mg oral tablet: 3 tab(s) orally once a day  Rolling Walker: please dispense one to assist w ambulation  torsemide 10 mg oral tablet: 2 tab(s) orally once a week as needed for prn for swelling/pedal edema  Trelegy Ellipta 100 mcg-62.5 mcg-25 mcg/inh inhalation powder: 1 puff(s) inhaled once a day (in the morning)   acetaminophen 500 mg oral tablet: 2 tab(s) orally every 6 hours  amoxicillin-clavulanate 875 mg-125 mg oral tablet: 1 tab(s) orally every 12 hours x 7 days  Dupixent Pre-filled Pen 300 mg/2 mL subcutaneous solution: 300 milligram(s) subcutaneously every 2 weeks every sunday  fentaNYL 12 mcg/hr transdermal film, extended release: 1 patch transdermal every 72 hours MDD: 1  gabapentin 300 mg oral capsule: 1 cap(s) orally every 8 hours MDD: 3 tablets  HYDROmorphone 2 mg oral tablet: 1 tab(s) orally every 6 hours as needed for Moderate Pain (4 - 6) MDD: 4 tablets  ibuprofen 400 mg oral tablet: 1 tab(s) orally every 6 hours  levoFLOXacin 750 mg oral tablet: 1 tab(s) orally once a day x 7 days  levothyroxine 125 mcg (0.125 mg) oral tablet: 1 tab(s) orally once a day  losartan 50 mg oral tablet: 1 tab(s) orally once a day  methocarbamol 500 mg oral tablet: 1 tab(s) orally every 8 hours MDD: 3 tablets  Narcan 4 mg/0.1 mL nasal spray: 1 spray(s) intranasally once PLEASE ADMINISTER IF UNRESPONSIVE OR UNCONSCIOUS FROM SUSPECTED OPIOID OVERDOSE.  Ozempic 8 mg/3 mL (2 mg dose) subcutaneous solution: 2 milligram(s) subcutaneously once a week SUNDAY LD 3 weeks ago  pantoprazole 40 mg oral delayed release tablet: 1 tab(s) orally once a day (in the morning)  Plaquenil 200 mg oral tablet: 2 tab(s) orally once a day (at bedtime)  Potassium Chloride (Eqv-K-Tab) 20 mEq oral tablet, extended release: 2 tab(s) orally once a day (at bedtime)  predniSONE 2.5 mg oral tablet: 3 tab(s) orally once a day  Rolling Walker: please dispense one to assist w ambulation  torsemide 10 mg oral tablet: 2 tab(s) orally once a week as needed for prn for swelling/pedal edema  Trelegy Ellipta 100 mcg-62.5 mcg-25 mcg/inh inhalation powder: 1 puff(s) inhaled once a day (in the morning)   acetaminophen 500 mg oral tablet: 2 tab(s) orally every 6 hours  Dupixent Pre-filled Pen 300 mg/2 mL subcutaneous solution: 300 milligram(s) subcutaneously every 2 weeks every sunday  fentaNYL 12 mcg/hr transdermal film, extended release: 1 patch transdermal every 72 hours MDD: 1  gabapentin 300 mg oral capsule: 1 cap(s) orally every 8 hours MDD: 3 tablets  HYDROmorphone 2 mg oral tablet: 1 tab(s) orally every 6 hours as needed for Moderate Pain (4 - 6) MDD: 4 tablets  ibuprofen 400 mg oral tablet: 1 tab(s) orally every 6 hours  levoFLOXacin 750 mg oral tablet: 1 tab(s) orally once a day x 7 days  levothyroxine 125 mcg (0.125 mg) oral tablet: 1 tab(s) orally once a day  losartan 50 mg oral tablet: 1 tab(s) orally once a day  methocarbamol 500 mg oral tablet: 1 tab(s) orally every 8 hours MDD: 3 tablets  Narcan 4 mg/0.1 mL nasal spray: 1 spray(s) intranasally once PLEASE ADMINISTER IF UNRESPONSIVE OR UNCONSCIOUS FROM SUSPECTED OPIOID OVERDOSE.  Ozempic 8 mg/3 mL (2 mg dose) subcutaneous solution: 2 milligram(s) subcutaneously once a week SUNDAY LD 3 weeks ago  pantoprazole 40 mg oral delayed release tablet: 1 tab(s) orally once a day (in the morning)  Plaquenil 200 mg oral tablet: 2 tab(s) orally once a day (at bedtime)  Potassium Chloride (Eqv-K-Tab) 20 mEq oral tablet, extended release: 2 tab(s) orally once a day (at bedtime)  predniSONE 2.5 mg oral tablet: 3 tab(s) orally once a day  Rolling Walker: please dispense one to assist w ambulation  torsemide 10 mg oral tablet: 2 tab(s) orally once a week as needed for prn for swelling/pedal edema  Trelegy Ellipta 100 mcg-62.5 mcg-25 mcg/inh inhalation powder: 1 puff(s) inhaled once a day (in the morning)   acetaminophen 500 mg oral tablet: 2 tab(s) orally every 6 hours  Dupixent Pre-filled Pen 300 mg/2 mL subcutaneous solution: 300 milligram(s) subcutaneously every 2 weeks every sunday  fentaNYL 12 mcg/hr transdermal film, extended release: 1 patch transdermal every 72 hours MDD: 1  gabapentin 300 mg oral capsule: 1 cap(s) orally every 8 hours MDD: 3 tablets  HYDROmorphone 2 mg oral tablet: 1 tab(s) orally every 6 hours as needed for Moderate Pain (4 - 6) MDD: 4 tablets  ibuprofen 400 mg oral tablet: 1 tab(s) orally every 6 hours  levoFLOXacin 750 mg oral tablet: 1 tab(s) orally once a day  levothyroxine 125 mcg (0.125 mg) oral tablet: 1 tab(s) orally once a day  losartan 50 mg oral tablet: 1 tab(s) orally once a day  methocarbamol 500 mg oral tablet: 1 tab(s) orally every 8 hours MDD: 3 tablets  Narcan 4 mg/0.1 mL nasal spray: 1 spray(s) intranasally once PLEASE ADMINISTER IF UNRESPONSIVE OR UNCONSCIOUS FROM SUSPECTED OPIOID OVERDOSE.  Ozempic 8 mg/3 mL (2 mg dose) subcutaneous solution: 2 milligram(s) subcutaneously once a week SUNDAY LD 3 weeks ago  pantoprazole 40 mg oral delayed release tablet: 1 tab(s) orally once a day (in the morning)  Plaquenil 200 mg oral tablet: 2 tab(s) orally once a day (at bedtime)  Potassium Chloride (Eqv-K-Tab) 20 mEq oral tablet, extended release: 2 tab(s) orally once a day (at bedtime)  predniSONE 2.5 mg oral tablet: 3 tab(s) orally once a day  Rolling Walker: please dispense one to assist w ambulation  torsemide 10 mg oral tablet: 2 tab(s) orally once a week as needed for prn for swelling/pedal edema  Trelegy Ellipta 100 mcg-62.5 mcg-25 mcg/inh inhalation powder: 1 puff(s) inhaled once a day (in the morning)   acetaminophen 500 mg oral tablet: 2 tab(s) orally every 6 hours  cyclobenzaprine 5 mg oral tablet: 1 tab(s) orally 3 times a day x 30 days as needed for Muscle Spasm  Dupixent Pre-filled Pen 300 mg/2 mL subcutaneous solution: 300 milligram(s) subcutaneously every 2 weeks every sunday  fentaNYL 12 mcg/hr transdermal film, extended release: 1 patch transdermal every 72 hours MDD: 1  gabapentin 300 mg oral capsule: 1 cap(s) orally every 8 hours MDD: 3 tablets  HYDROmorphone 2 mg oral tablet: 1 tab(s) orally every 6 hours as needed for Moderate Pain (4 - 6) MDD: 4 tablets  hydroxychloroquine 200 mg oral tablet: 2 tab(s) orally once a day (at bedtime)  levoFLOXacin 750 mg oral tablet: 1 tab(s) orally once a day  levothyroxine 125 mcg (0.125 mg) oral tablet: 1 tab(s) orally once a day  losartan 50 mg oral tablet: 1 tab(s) orally once a day  Narcan 4 mg/0.1 mL nasal spray: 1 spray(s) intranasally once PLEASE ADMINISTER IF UNRESPONSIVE OR UNCONSCIOUS FROM SUSPECTED OPIOID OVERDOSE.  Ozempic 8 mg/3 mL (2 mg dose) subcutaneous solution: 2 milligram(s) subcutaneously once a week SUNDAY LD 3 weeks ago  pantoprazole 40 mg oral delayed release tablet: 1 tab(s) orally once a day (in the morning)  Potassium Chloride (Eqv-K-Tab) 20 mEq oral tablet, extended release: 2 tab(s) orally once a day (at bedtime)  predniSONE 2.5 mg oral tablet: 3 tab(s) orally once a day  Rolling Walker: please dispense one to assist w ambulation  torsemide 10 mg oral tablet: 2 tab(s) orally once a week as needed for prn for swelling/pedal edema  Trelegy Ellipta 100 mcg-62.5 mcg-25 mcg/inh inhalation powder: 1 puff(s) inhaled once a day (in the morning)   acetaminophen 500 mg oral tablet: 2 tab(s) orally every 6 hours  cyclobenzaprine 5 mg oral tablet: 1 tab(s) orally 3 times a day x 30 days as needed for Muscle Spasm  Dilaudid 2 mg oral tablet: 1 tab(s) orally every 6 hours as needed for  severe pain MDD: 4  Dupixent Pre-filled Pen 300 mg/2 mL subcutaneous solution: 300 milligram(s) subcutaneously every 2 weeks every sunday  fentaNYL 12 mcg/hr transdermal film, extended release: 1 patch transdermal every 72 hours MDD: 1  gabapentin 300 mg oral capsule: 1 cap(s) orally every 8 hours MDD: 3 tablets  HYDROmorphone 2 mg oral tablet: 1 tab(s) orally every 6 hours as needed for Moderate Pain (4 - 6) MDD: 4 tablets  hydroxychloroquine 200 mg oral tablet: 2 tab(s) orally once a day (at bedtime)  levoFLOXacin 750 mg oral tablet: 1 tab(s) orally once a day  levothyroxine 125 mcg (0.125 mg) oral tablet: 1 tab(s) orally once a day  losartan 50 mg oral tablet: 1 tab(s) orally once a day  Narcan 4 mg/0.1 mL nasal spray: 1 spray(s) intranasally once PLEASE ADMINISTER IF UNRESPONSIVE OR UNCONSCIOUS FROM SUSPECTED OPIOID OVERDOSE.  Ozempic 8 mg/3 mL (2 mg dose) subcutaneous solution: 2 milligram(s) subcutaneously once a week SUNDAY LD 3 weeks ago  pantoprazole 40 mg oral delayed release tablet: 1 tab(s) orally once a day (in the morning)  Potassium Chloride (Eqv-K-Tab) 20 mEq oral tablet, extended release: 2 tab(s) orally once a day (at bedtime)  predniSONE 2.5 mg oral tablet: 3 tab(s) orally once a day  Rolling Walker: please dispense one to assist w ambulation  torsemide 10 mg oral tablet: 2 tab(s) orally once a week as needed for prn for swelling/pedal edema  Trelegy Ellipta 100 mcg-62.5 mcg-25 mcg/inh inhalation powder: 1 puff(s) inhaled once a day (in the morning)   acetaminophen 500 mg oral tablet: 2 tab(s) orally every 6 hours  amoxicillin-clavulanate 875 mg-125 mg oral tablet: 1 tab(s) orally every 12 hours  cyclobenzaprine 5 mg oral tablet: 1 tab(s) orally 3 times a day x 30 days as needed for Muscle Spasm  Dilaudid 2 mg oral tablet: 1 tab(s) orally every 6 hours as needed for  severe pain MDD: 4  Dupixent Pre-filled Pen 300 mg/2 mL subcutaneous solution: 300 milligram(s) subcutaneously every 2 weeks every sunday  fentaNYL 12 mcg/hr transdermal film, extended release: 1 patch transdermal every 72 hours MDD: 1  gabapentin 300 mg oral capsule: 1 cap(s) orally every 8 hours MDD: 3 tablets  HYDROmorphone 2 mg oral tablet: 1 tab(s) orally every 6 hours as needed for Moderate Pain (4 - 6) MDD: 4 tablets  hydroxychloroquine 200 mg oral tablet: 2 tab(s) orally once a day (at bedtime)  levoFLOXacin 750 mg oral tablet: 1 tab(s) orally once a day  levothyroxine 125 mcg (0.125 mg) oral tablet: 1 tab(s) orally once a day  losartan 50 mg oral tablet: 1 tab(s) orally once a day  Narcan 4 mg/0.1 mL nasal spray: 1 spray(s) intranasally once PLEASE ADMINISTER IF UNRESPONSIVE OR UNCONSCIOUS FROM SUSPECTED OPIOID OVERDOSE.  Ozempic 8 mg/3 mL (2 mg dose) subcutaneous solution: 2 milligram(s) subcutaneously once a week SUNDAY LD 3 weeks ago  pantoprazole 40 mg oral delayed release tablet: 1 tab(s) orally once a day (in the morning)  Potassium Chloride (Eqv-K-Tab) 20 mEq oral tablet, extended release: 2 tab(s) orally once a day (at bedtime)  predniSONE 2.5 mg oral tablet: 3 tab(s) orally once a day  Rolling Walker: please dispense one to assist w ambulation  torsemide 10 mg oral tablet: 2 tab(s) orally once a week as needed for prn for swelling/pedal edema  Trelegy Ellipta 100 mcg-62.5 mcg-25 mcg/inh inhalation powder: 1 puff(s) inhaled once a day (in the morning)   acetaminophen 500 mg oral tablet: 2 tab(s) orally every 6 hours  amoxicillin-clavulanate 875 mg-125 mg oral tablet: 1 tab(s) orally every 12 hours  cyclobenzaprine 5 mg oral tablet: 1 tab(s) orally 3 times a day x 30 days as needed for Muscle Spasm  Dupixent Pre-filled Pen 300 mg/2 mL subcutaneous solution: 300 milligram(s) subcutaneously every 2 weeks every sunday  fentaNYL 12 mcg/hr transdermal film, extended release: 1 patch transdermal every 72 hours MDD: 1  gabapentin 300 mg oral capsule: 1 cap(s) orally every 8 hours MDD: 3 tablets  HYDROmorphone 2 mg oral tablet: 1 tab(s) orally every 4 hours as needed for Moderate Pain (4 - 6) can take 1-2 tabs as needed MDD: 10 tablets  hydroxychloroquine 200 mg oral tablet: 2 tab(s) orally once a day (at bedtime)  levoFLOXacin 750 mg oral tablet: 1 tab(s) orally once a day  levothyroxine 125 mcg (0.125 mg) oral tablet: 1 tab(s) orally once a day  losartan 50 mg oral tablet: 1 tab(s) orally once a day  Narcan 4 mg/0.1 mL nasal spray: 1 spray(s) intranasally once PLEASE ADMINISTER IF UNRESPONSIVE OR UNCONSCIOUS FROM SUSPECTED OPIOID OVERDOSE.  Ozempic 8 mg/3 mL (2 mg dose) subcutaneous solution: 2 milligram(s) subcutaneously once a week SUNDAY LD 3 weeks ago  pantoprazole 40 mg oral delayed release tablet: 1 tab(s) orally once a day (in the morning)  Potassium Chloride (Eqv-K-Tab) 20 mEq oral tablet, extended release: 2 tab(s) orally once a day (at bedtime)  predniSONE 2.5 mg oral tablet: 3 tab(s) orally once a day  torsemide 10 mg oral tablet: 2 tab(s) orally once a week as needed for prn for swelling/pedal edema  Trelegy Ellipta 100 mcg-62.5 mcg-25 mcg/inh inhalation powder: 1 puff(s) inhaled once a day (in the morning)   acetaminophen 500 mg oral tablet: 2 tab(s) orally every 6 hours  amoxicillin-clavulanate 875 mg-125 mg oral tablet: 1 tab(s) orally every 12 hours  cyclobenzaprine 5 mg oral tablet: 1 tab(s) orally 3 times a day x 30 days as needed for Muscle Spasm  Dupixent Pre-filled Pen 300 mg/2 mL subcutaneous solution: 300 milligram(s) subcutaneously every 2 weeks every sunday  fentaNYL 12 mcg/hr transdermal film, extended release: 1 patch transdermal every 72 hours MDD: 1  gabapentin 300 mg oral capsule: 1 cap(s) orally every 8 hours MDD: 3 tablets  HYDROmorphone 2 mg oral tablet: 1 tab(s) orally every 4 hours as needed for Moderate Pain (4 - 6) can take 1-2 tabs as needed MDD: 10 tablets  hydroxychloroquine 200 mg oral tablet: 2 tab(s) orally once a day (at bedtime)  levoFLOXacin 750 mg oral tablet: 1 tab(s) orally once a day  levothyroxine 125 mcg (0.125 mg) oral tablet: 1 tab(s) orally once a day  losartan 50 mg oral tablet: 1 tab(s) orally once a day  MiraLax oral powder for reconstitution: 17 gram(s) orally once a day (at bedtime) as needed for  constipation  Narcan 4 mg/0.1 mL nasal spray: 1 spray(s) intranasally once PLEASE ADMINISTER IF UNRESPONSIVE OR UNCONSCIOUS FROM SUSPECTED OPIOID OVERDOSE.  Ozempic 8 mg/3 mL (2 mg dose) subcutaneous solution: 2 milligram(s) subcutaneously once a week SUNDAY LD 3 weeks ago  pantoprazole 40 mg oral delayed release tablet: 1 tab(s) orally once a day (in the morning)  Potassium Chloride (Eqv-K-Tab) 20 mEq oral tablet, extended release: 2 tab(s) orally once a day (at bedtime)  predniSONE 2.5 mg oral tablet: 3 tab(s) orally once a day  torsemide 10 mg oral tablet: 2 tab(s) orally once a week as needed for prn for swelling/pedal edema  Trelegy Ellipta 100 mcg-62.5 mcg-25 mcg/inh inhalation powder: 1 puff(s) inhaled once a day (in the morning)   acetaminophen 500 mg oral tablet: 2 tab(s) orally every 6 hours  amoxicillin-clavulanate 875 mg-125 mg oral tablet: 1 tab(s) orally every 12 hours  cyclobenzaprine 5 mg oral tablet: 1 tab(s) orally 3 times a day x 30 days as needed for Muscle Spasm  Dupixent Pre-filled Pen 300 mg/2 mL subcutaneous solution: 300 milligram(s) subcutaneously every 2 weeks every sunday  fentaNYL 12 mcg/hr transdermal film, extended release: 1 patch transdermal every 72 hours MDD: 1  gabapentin 300 mg oral capsule: 1 cap(s) orally every 8 hours MDD: 3 tablets  HYDROmorphone 2 mg oral tablet: 1 tab(s) orally every 4 hours as needed for Moderate Pain (4 - 6) can take 1-2 tabs as needed MDD: 6 tablets  hydroxychloroquine 200 mg oral tablet: 2 tab(s) orally once a day (at bedtime)  levoFLOXacin 750 mg oral tablet: 1 tab(s) orally once a day  levothyroxine 125 mcg (0.125 mg) oral tablet: 1 tab(s) orally once a day  losartan 50 mg oral tablet: 1 tab(s) orally once a day  MiraLax oral powder for reconstitution: 17 gram(s) orally once a day (at bedtime) as needed for  constipation  Narcan 4 mg/0.1 mL nasal spray: 1 spray(s) intranasally once PLEASE ADMINISTER IF UNRESPONSIVE OR UNCONSCIOUS FROM SUSPECTED OPIOID OVERDOSE.  Ozempic 8 mg/3 mL (2 mg dose) subcutaneous solution: 2 milligram(s) subcutaneously once a week SUNDAY LD 3 weeks ago  pantoprazole 40 mg oral delayed release tablet: 1 tab(s) orally once a day (in the morning)  Potassium Chloride (Eqv-K-Tab) 20 mEq oral tablet, extended release: 2 tab(s) orally once a day (at bedtime)  predniSONE 2.5 mg oral tablet: 3 tab(s) orally once a day  torsemide 10 mg oral tablet: 2 tab(s) orally once a week as needed for prn for swelling/pedal edema  Trelegy Ellipta 100 mcg-62.5 mcg-25 mcg/inh inhalation powder: 1 puff(s) inhaled once a day (in the morning)

## 2024-09-16 NOTE — DISCHARGE NOTE PROVIDER - NSDCFUADDINST_GEN_ALL_CORE_FT
IR DRAIN CARE: it is important that you measure and record the fluid that is in it (output) on the output record sheet. Please bring the output record sheet to your follow-up appointment. Keep the drain secured to your clothing with a safety pin at all times to avoid accidental displacement. Monitor the insertion site for redness, pain, swelling, or purulent drainage.  You may shower with the drain. Wash around the drain with soap and water, pat dry, and reapply dressing. Flush drain daily with 5cc of normal saline as taught prior to discharge by nurse.  BATHING: Please do not submerge wound underwater. You may shower and/or sponge bathe.  ACTIVITY: No heavy lifting or straining. Otherwise, you may return to your usual level of physical activity. If you are taking narcotic pain medication (such as Percocet) DO NOT drive a car, operate machinery or make important decisions.  DIET: Return to your usual diet.  NOTIFY YOUR SURGEON IF: You have any bleeding that does not stop, any pus draining from your wound(s), any fever (over 100.4 F) or chills, persistent nausea/vomiting, persistent diarrhea, or if your pain is not controlled on your discharge pain medications.  FOLLOW-UP: Please follow up with your primary care physician in one week regarding your hospitalization

## 2024-09-16 NOTE — DISCHARGE NOTE PROVIDER - NSDCFUADDAPPT_GEN_ALL_CORE_FT
Please follow up with Interventional radiology to have your drain evaluated one week from discharge.  Please call today, to schedule an appointment (for one week from discharge date) [phone].   Location is in [facility] on the second floor radiology Room 263. You can park at [facility] parking garage. Continue to empty and record the drain output daily as you have been taught.  Please follow up with Interventional radiology to have your drain evaluated one week from discharge.  Please call today, to schedule an appointment (for one week from discharge date) [phone].   Location is in [facility] on the second floor radiology Room 263. You can park at [facility] parking garage. Continue to empty and record the drain output daily as you have been taught.     Please follow up with Outpatient Palliative team, Dr. Pepito Polo after discharge.  Please follow up with Interventional radiology to have your drain evaluated one week from discharge.  Please call today, to schedule an appointment (for one week from discharge date) [phone].   Location is in [facility] on the second floor radiology Room 263. [facility]. Continue to empty and record the drain output daily as you have been taught.     Please follow up with Outpatient Palliative team, Dr. Makenzie Navarro 1-2 weeks after discharge.  Please follow up with Interventional radiology to have your drain evaluated one week from discharge.  Please call today, to schedule an appointment (for one week from discharge date) [phone].   Location is in [facility] on the second floor radiology Room 263. You can park at [facility] parking garage. Continue to empty and record the drain output daily as you have been taught.     Please follow up with Outpatient Palliative team, Dr. Isabela Thakkar on 10/17/2024 for your scheduled appointment.  Please follow up with Interventional radiology to have your drain evaluated one week from discharge.  Please call today, to schedule an appointment (for one week from discharge date) [phone].   Location is in [facility] on the second floor radiology Room 263. You can park at [facility] parking garage. Continue to empty and record the drain output daily as you have been taught.     Palliative: Please follow up with Outpatient Palliative team, Dr. Isabela Thakkar on 10/17/2024 for your scheduled appointment.     Rheumatology: Please follow up with Dr. Neri after discharge on 11/08/2024 for your scheduled appointment.

## 2024-09-16 NOTE — DISCHARGE NOTE PROVIDER - HOSPITAL COURSE
49yM with PMH of Asthma, Lupus/myositis overlap syndrome, Hypothyroidism, Gastric cancer, PSH Robotic distal gastrectomy w/ Heather en Y reconstruction, D2 lymphadenectomy on 8/20/2024 p/w generalized malaise for last few weeks. In ED, VSS. CT showing Loculated collection with air-fluid level is noted associated with the duodenal staple line, concerning for staple line dehiscence.   Patient admitted to surgical oncology service on 9/12.   Patient underwent CT-guided drainage of right abdominal fluid collection, 8 Fr drain placed with interventional radiology on 9/13.  The patient tolerated the procedure well. The patient was hemodynamically stable and was transferred to a surgical floor. Patient tolerated operation well and there were no post operative complications identified. The patient had daily drain care and was seen by physical therapy which recommended discharge to home. The patient's pain was controlled by PO pain medications. The patient was advanced to a regular diet and tolerated it well. The patient was placed on home medications. At the time of discharge, the patient was hemodynamically stable, was tolerating PO diet, was voiding urine and passing stool, was ambulating, and was comfortable with adequate pain control. The patient was instructed to follow up with Dr. Ferreira within 1 week after discharge from the hospital. The patient/family felt comfortable with discharge. The patient was discharged home with a prescription for _. The patient had no other issues.     49yM with PMH of Asthma, Lupus/myositis overlap syndrome, Hypothyroidism, Gastric cancer, PSH Robotic distal gastrectomy w/ Heather en Y reconstruction, D2 lymphadenectomy on 8/20/2024 p/w generalized malaise for last few weeks. In ED, VSS. CT showing Loculated collection with air-fluid level is noted associated with the duodenal staple line, concerning for staple line dehiscence.     Patient admitted to surgical oncology service on 9/12.   Patient underwent CT-guided drainage of right abdominal fluid collection, 8 Fr drain placed with interventional radiology on 9/13.  Patient underwent CT on 9/18 showing interval percutaneous drainage of a intraperitoneal fluid collection adjacent to the duodenal staple line which is decreased in size. Additional more superficial fluid collection just deep to the right internal oblique muscles is increased in size measuring up to 4.0 cm, previously up to 3.1 cm.     The patient tolerated the procedure well. The patient was hemodynamically stable and was transferred to a surgical floor. Patient tolerated operation well and there were no post operative complications identified. The patient had daily drain care and was seen by physical therapy which recommended discharge to home. The patient's pain was controlled by PO pain medications. The patient was advanced to a regular diet and tolerated it well. The patient was placed on home medications. At the time of discharge, the patient was hemodynamically stable, was tolerating PO diet, was voiding urine and passing stool, was ambulating, and was comfortable with adequate pain control. The patient was instructed to follow up with Dr. Ferreira within 1 week after discharge from the hospital. The patient/family felt comfortable with discharge. The patient was discharged home with a prescription for Augmentin x1 week. Patient will go home with 1 ZONIA drain. Instructions made in order to care for drain. The patient had no other issues.     49yM with PMH of Asthma, Lupus/myositis overlap syndrome, Hypothyroidism, Gastric cancer, PSH Robotic distal gastrectomy w/ Heather en Y reconstruction, D2 lymphadenectomy on 8/20/2024 p/w generalized malaise for last few weeks. In ED, VSS. CT showing Loculated collection with air-fluid level is noted associated with the duodenal staple line, concerning for staple line dehiscence.     Patient admitted to surgical oncology service on 9/12.   Patient underwent CT-guided drainage of right abdominal fluid collection, 8 Fr drain placed with interventional radiology on 9/13.  Patient underwent CT on 9/18 showing interval percutaneous drainage of a intraperitoneal fluid collection adjacent to the duodenal staple line which is decreased in size. Additional more superficial fluid collection just deep to the right internal oblique muscles is increased in size measuring up to 4.0 cm, previously up to 3.1 cm.     The patient tolerated the procedure well. The patient was hemodynamically stable and was transferred to a surgical floor. Patient tolerated operation well and there were no post operative complications identified. The patient had daily drain care and was seen by physical therapy which recommended discharge to home. The patient's pain was controlled by PO pain medications. The patient was advanced to a regular diet and tolerated it well. The patient was placed on home medications. At the time of discharge, the patient was hemodynamically stable, was tolerating PO diet, was voiding urine and passing stool, was ambulating, and was comfortable with adequate pain control. The patient was instructed to follow up with Dr. Ferreira within 1 week after discharge from the hospital. The patient/family felt comfortable with discharge. The patient was discharged home with a prescription for Augmentin x1 week. Patient will go home with 1 ZONIA drain. Instructions made in order to care for drain. The patient had no other issues.    Palliative:  Please follow up with Outpatient Palliative team, Dr. Pepito Polo after discharge.      49yM with PMH of Asthma, Lupus/myositis overlap syndrome, Hypothyroidism, Gastric cancer, PSH Robotic distal gastrectomy w/ Heather en Y reconstruction, D2 lymphadenectomy on 8/20/2024 p/w generalized malaise for last few weeks. In ED, VSS. CT showing Loculated collection with air-fluid level is noted associated with the duodenal staple line, concerning for staple line dehiscence.     Patient admitted to surgical oncology service on 9/12.   Patient underwent CT-guided drainage of right abdominal fluid collection, 8 Fr drain placed with interventional radiology on 9/13.  Patient underwent CT on 9/18 showing interval percutaneous drainage of a intraperitoneal fluid collection adjacent to the duodenal staple line which is decreased in size. Additional more superficial fluid collection just deep to the right internal oblique muscles is increased in size measuring up to 4.0 cm, previously up to 3.1 cm.     The patient tolerated the procedure well. The patient was hemodynamically stable and was transferred to a surgical floor. Patient tolerated operation well and there were no post operative complications identified. The patient had daily drain care and was seen by physical therapy which recommended discharge to home. The patient's pain was controlled by PO pain medications. The patient was advanced to a regular diet and tolerated it well. The patient was placed on home medications. At the time of discharge, the patient was hemodynamically stable, was tolerating PO diet, was voiding urine and passing stool, was ambulating, and was comfortable with adequate pain control. The patient was instructed to follow up with Dr. Ferreira within 1 week after discharge from the hospital. The patient/family felt comfortable with discharge. The patient was discharged home with a prescription for Augmentin x1 week. Patient will go home with 1 ZONIA drain. Instructions made in order to care for drain. The patient had no other issues.    Palliative:  Please follow up with Outpatient Palliative team, Dr. Makenzie Navarro 1-2 weeks after discharge.      49yM with PMH of Asthma, Lupus/myositis overlap syndrome, Hypothyroidism, Gastric cancer, PSH Robotic distal gastrectomy w/ Heather en Y reconstruction, D2 lymphadenectomy on 8/20/2024 p/w generalized malaise for last few weeks. In ED, VSS. CT showing Loculated collection with air-fluid level is noted associated with the duodenal staple line, concerning for staple line dehiscence.     Patient admitted to surgical oncology service on 9/12.   Patient underwent CT-guided drainage of right abdominal fluid collection, 8 Fr drain placed with interventional radiology on 9/13.  Patient underwent CT on 9/18 showing interval percutaneous drainage of a intraperitoneal fluid collection adjacent to the duodenal staple line which is decreased in size. Additional more superficial fluid collection just deep to the right internal oblique muscles is increased in size measuring up to 4.0 cm, previously up to 3.1 cm.     The patient tolerated the procedure well. The patient was hemodynamically stable and was transferred to a surgical floor. Patient tolerated operation well and there were no post operative complications identified. The patient had daily drain care and was seen by physical therapy which recommended discharge to home. The patient's pain was controlled by PO pain medications. The patient was advanced to a regular diet and tolerated it well. The patient was placed on home medications. At the time of discharge, the patient was hemodynamically stable, was tolerating PO diet, was voiding urine and passing stool, was ambulating, and was comfortable with adequate pain control. The patient was instructed to follow up with Dr. Ferreira within 1 week after discharge from the hospital. The patient/family felt comfortable with discharge. The patient was discharged home with a prescription for Augmentin x1 week. Patient will go home with 1 ZONIA drain. Instructions made in order to care for drain. The patient had no other issues.    Palliative:  Please follow up with Outpatient Palliative team, Dr. Isabela Thakkar on 10/17/2024 for your scheduled appointment.       49yM with PMH of Asthma, Lupus/myositis overlap syndrome, Hypothyroidism, Gastric cancer, PSH Robotic distal gastrectomy w/ Heather en Y reconstruction, D2 lymphadenectomy on 8/20/2024 p/w generalized malaise for last few weeks. In ED, VSS. CT showing Loculated collection with air-fluid level is noted associated with the duodenal staple line, concerning for staple line dehiscence.     Patient admitted to surgical oncology service on 9/12.   Patient underwent CT-guided drainage of right abdominal fluid collection, 8 Fr drain placed with interventional radiology on 9/13.  Patient underwent CT on 9/18 showing interval percutaneous drainage of a intraperitoneal fluid collection adjacent to the duodenal staple line which is decreased in size. Additional more superficial fluid collection just deep to the right internal oblique muscles is increased in size measuring up to 4.0 cm, previously up to 3.1 cm.     The patient tolerated the procedure well. The patient was hemodynamically stable and was transferred to a surgical floor. Patient tolerated operation well and there were no post operative complications identified. The patient had daily drain care and was seen by physical therapy which recommended discharge to home. The patient's pain was controlled by PO pain medications. The patient was advanced to a regular diet and tolerated it well. The patient was placed on home medications. At the time of discharge, the patient was hemodynamically stable, was tolerating PO diet, was voiding urine and passing stool, was ambulating, and was comfortable with adequate pain control. The patient was instructed to follow up with Dr. Ferreira within 1 week after discharge from the hospital. The patient/family felt comfortable with discharge. The patient was discharged home with a prescription for Levaquin x1 week (complete on 9/26). Patient will go home with 1 ZONIA drain. Instructions made in order to care for drain. The patient had no other issues.    Palliative:  Please follow up with Outpatient Palliative team, Dr. Isabela Thakkar on 10/17/2024 for your scheduled appointment.       49yM with PMH of Asthma, Lupus/myositis overlap syndrome, Hypothyroidism, Gastric cancer, PSH Robotic distal gastrectomy w/ Heather en Y reconstruction, D2 lymphadenectomy on 8/20/2024 p/w generalized malaise for last few weeks. In ED, VSS. CT showing Loculated collection with air-fluid level is noted associated with the duodenal staple line, concerning for staple line dehiscence.     Patient admitted to surgical oncology service on 9/12.   Patient underwent CT-guided drainage of right abdominal fluid collection, 8 Fr drain placed with interventional radiology on 9/13.  Patient underwent CT on 9/18 showing interval percutaneous drainage of a intraperitoneal fluid collection adjacent to the duodenal staple line which is decreased in size. Additional more superficial fluid collection just deep to the right internal oblique muscles is increased in size measuring up to 4.0 cm, previously up to 3.1 cm.     The patient tolerated the procedure well. The patient was hemodynamically stable and was transferred to a surgical floor. Patient tolerated operation well and there were no post operative complications identified. The patient had daily drain care and was seen by physical therapy which recommended discharge to home. The patient's pain was controlled by PO pain medications. The patient was advanced to a regular diet and tolerated it well. The patient was placed on home medications. At the time of discharge, the patient was hemodynamically stable, was tolerating PO diet, was voiding urine and passing stool, was ambulating, and was comfortable with adequate pain control. The patient was instructed to follow up with Dr. Ferreira within 1 week after discharge from the hospital. The patient/family felt comfortable with discharge. The patient was discharged home with a prescription for Levaquin x1 week (complete on 9/26). Patient will go home with 1 ZONIA drain. Instructions made in order to care for drain. The patient had no other issues.    Palliative:  Please follow up with Outpatient Palliative team, Dr. Isabela Thakkar on 10/17/2024 for your scheduled appointment.      Rheumatology:  Please follow up with Dr. Neri after discharge on 11/08/2024 for your scheduled appointment.   49yM with PMH of Asthma, Lupus/myositis overlap syndrome, Hypothyroidism, Gastric cancer, PSH Robotic distal gastrectomy w/ Heather en Y reconstruction, D2 lymphadenectomy on 8/20/2024 p/w generalized malaise for last few weeks. In ED, VSS. CT showing Loculated collection with air-fluid level is noted associated with the duodenal staple line, concerning for staple line dehiscence.     Patient admitted to surgical oncology service on 9/12.   Patient underwent CT-guided drainage of right abdominal fluid collection, 8 Fr drain placed with interventional radiology on 9/13.  Patient underwent CT on 9/18 showing interval percutaneous drainage of a intraperitoneal fluid collection adjacent to the duodenal staple line which is decreased in size. Additional more superficial fluid collection just deep to the right internal oblique muscles is increased in size measuring up to 4.0 cm, previously up to 3.1 cm.     The patient tolerated the procedure well. The patient was hemodynamically stable and was transferred to a surgical floor. Patient tolerated operation well and there were no post operative complications identified. The patient had daily drain care and was seen by physical therapy which recommended discharge to home. The patient's pain was controlled by PO pain medications. The patient was advanced to a regular diet and tolerated it well. The patient was placed on home medications. At the time of discharge, the patient was hemodynamically stable, was tolerating PO diet, was voiding urine and passing stool, was ambulating, and was comfortable with adequate pain control. The patient was instructed to follow up with Dr. Ferreira within 1 week after discharge from the hospital. The patient/family felt comfortable with discharge. The patient was discharged home with a prescription for Levaquin and Augmentin x1 week (complete on 9/26). Patient will go home with 1 ZONIA drain. Instructions made in order to care for drain. The patient had no other issues.    Palliative:  Please follow up with Outpatient Palliative team, Dr. Isabela Thakkar on 10/17/2024 for your scheduled appointment.      Rheumatology:  Please follow up with Dr. Neri after discharge on 11/08/2024 for your scheduled appointment.   49yM with PMH of Asthma, Lupus/myositis overlap syndrome, Hypothyroidism, Gastric cancer, PSH Robotic distal gastrectomy w/ Heather en Y reconstruction, D2 lymphadenectomy on 8/20/2024 p/w generalized malaise for last few weeks. In ED, VSS. CT showing Loculated collection with air-fluid level is noted associated with the duodenal staple line, concerning for staple line dehiscence.     Patient admitted to surgical oncology service on 9/12.   Patient underwent CT-guided drainage of right abdominal fluid collection, 8 Fr drain placed with interventional radiology on 9/13.  Patient underwent CT on 9/18 showing interval percutaneous drainage of a intraperitoneal fluid collection adjacent to the duodenal staple line which is decreased in size. Additional more superficial fluid collection just deep to the right internal oblique muscles is increased in size measuring up to 4.0 cm, previously up to 3.1 cm.     The patient tolerated the procedure well. The patient was hemodynamically stable and was transferred to a surgical floor. Patient tolerated operation well and there were no post operative complications identified. The patient had daily drain care and was seen by physical therapy which recommended discharge to home. The patient's pain was controlled by PO pain medications. The patient was advanced to a regular diet and tolerated it well. The patient was placed on home medications. At the time of discharge, the patient was hemodynamically stable, was tolerating PO diet, was voiding urine and passing stool, was ambulating, and was comfortable with adequate pain control. The patient was instructed to follow up with Dr. Ferreira within 1 week after discharge from the hospital. The patient/family felt comfortable with discharge. The patient was discharged home with a prescription for Levaquin and Augmentin x1 week (complete on 9/26). Patient will go home with 1 ZONIA drain. Instructions made in order to care for drain. The patient had no other issues.    Palliative:  Please follow up with Outpatient Palliative team, Dr. Isabela Thakkar on 10/17/2024 for your scheduled appointment. Recommended Dilaudid 2-4mg PO q4hr prn for breakthrough pain. Was advised to provide 1 month prescription of Dilaudid until follow up appointment.     Rheumatology:  Please follow up with Dr. Neri after discharge on 11/08/2024 for your scheduled appointment.

## 2024-09-17 ENCOUNTER — APPOINTMENT (OUTPATIENT)
Dept: INTERNAL MEDICINE | Facility: CLINIC | Age: 49
End: 2024-09-17

## 2024-09-17 ENCOUNTER — NON-APPOINTMENT (OUTPATIENT)
Age: 49
End: 2024-09-17

## 2024-09-17 LAB
ANION GAP SERPL CALC-SCNC: 12 MMOL/L — SIGNIFICANT CHANGE UP (ref 7–14)
BUN SERPL-MCNC: 6 MG/DL — LOW (ref 7–23)
CALCIUM SERPL-MCNC: 8.8 MG/DL — SIGNIFICANT CHANGE UP (ref 8.4–10.5)
CHLORIDE SERPL-SCNC: 104 MMOL/L — SIGNIFICANT CHANGE UP (ref 98–107)
CO2 SERPL-SCNC: 23 MMOL/L — SIGNIFICANT CHANGE UP (ref 22–31)
CREAT SERPL-MCNC: 0.87 MG/DL — SIGNIFICANT CHANGE UP (ref 0.5–1.3)
EGFR: 106 ML/MIN/1.73M2 — SIGNIFICANT CHANGE UP
GLUCOSE SERPL-MCNC: 88 MG/DL — SIGNIFICANT CHANGE UP (ref 70–99)
HCT VFR BLD CALC: 30.5 % — LOW (ref 39–50)
HGB BLD-MCNC: 9.8 G/DL — LOW (ref 13–17)
MAGNESIUM SERPL-MCNC: 1.9 MG/DL — SIGNIFICANT CHANGE UP (ref 1.6–2.6)
MCHC RBC-ENTMCNC: 26 PG — LOW (ref 27–34)
MCHC RBC-ENTMCNC: 32.1 GM/DL — SIGNIFICANT CHANGE UP (ref 32–36)
MCV RBC AUTO: 80.9 FL — SIGNIFICANT CHANGE UP (ref 80–100)
NRBC # BLD: 0 /100 WBCS — SIGNIFICANT CHANGE UP (ref 0–0)
NRBC # FLD: 0 K/UL — SIGNIFICANT CHANGE UP (ref 0–0)
PHOSPHATE SERPL-MCNC: 2.8 MG/DL — SIGNIFICANT CHANGE UP (ref 2.5–4.5)
PLATELET # BLD AUTO: 298 K/UL — SIGNIFICANT CHANGE UP (ref 150–400)
POTASSIUM SERPL-MCNC: 3.5 MMOL/L — SIGNIFICANT CHANGE UP (ref 3.5–5.3)
POTASSIUM SERPL-SCNC: 3.5 MMOL/L — SIGNIFICANT CHANGE UP (ref 3.5–5.3)
RBC # BLD: 3.77 M/UL — LOW (ref 4.2–5.8)
RBC # FLD: 14 % — SIGNIFICANT CHANGE UP (ref 10.3–14.5)
SODIUM SERPL-SCNC: 139 MMOL/L — SIGNIFICANT CHANGE UP (ref 135–145)
WBC # BLD: 8.72 K/UL — SIGNIFICANT CHANGE UP (ref 3.8–10.5)
WBC # FLD AUTO: 8.72 K/UL — SIGNIFICANT CHANGE UP (ref 3.8–10.5)

## 2024-09-17 PROCEDURE — 99232 SBSQ HOSP IP/OBS MODERATE 35: CPT | Mod: 1L,GC

## 2024-09-17 RX ORDER — SODIUM PHOSPHATE, DIBASIC, ANHYDROUS, POTASSIUM PHOSPHATE, MONOBASIC, AND SODIUM PHOSPHATE, MONOBASIC, MONOHYDRATE 852; 155; 130 MG/1; MG/1; MG/1
2 TABLET, COATED ORAL ONCE
Refills: 0 | Status: COMPLETED | OUTPATIENT
Start: 2024-09-17 | End: 2024-09-17

## 2024-09-17 RX ORDER — POTASSIUM CHLORIDE 10 MEQ
40 TABLET, EXT RELEASE, PARTICLES/CRYSTALS ORAL ONCE
Refills: 0 | Status: COMPLETED | OUTPATIENT
Start: 2024-09-17 | End: 2024-09-17

## 2024-09-17 RX ADMIN — HYDROMORPHONE HYDROCHLORIDE 0.5 MILLIGRAM(S): 2 TABLET ORAL at 22:44

## 2024-09-17 RX ADMIN — LOSARTAN POTASSIUM 50 MILLIGRAM(S): 50 TABLET ORAL at 06:06

## 2024-09-17 RX ADMIN — HYDROMORPHONE HYDROCHLORIDE 0.5 MILLIGRAM(S): 2 TABLET ORAL at 10:50

## 2024-09-17 RX ADMIN — HYDROMORPHONE HYDROCHLORIDE 0.5 MILLIGRAM(S): 2 TABLET ORAL at 18:41

## 2024-09-17 RX ADMIN — HYDROMORPHONE HYDROCHLORIDE 0.5 MILLIGRAM(S): 2 TABLET ORAL at 06:04

## 2024-09-17 RX ADMIN — PIPERACILLIN SODIUM AND TAZOBACTAM SODIUM 25 GRAM(S): 3; .375 INJECTION, POWDER, FOR SOLUTION INTRAVENOUS at 06:02

## 2024-09-17 RX ADMIN — HYDROMORPHONE HYDROCHLORIDE 0.5 MILLIGRAM(S): 2 TABLET ORAL at 14:23

## 2024-09-17 RX ADMIN — PIPERACILLIN SODIUM AND TAZOBACTAM SODIUM 25 GRAM(S): 3; .375 INJECTION, POWDER, FOR SOLUTION INTRAVENOUS at 13:40

## 2024-09-17 RX ADMIN — HYDROXYCHLOROQUINE SULFATE 400 MILLIGRAM(S): 200 TABLET, FILM COATED ORAL at 22:08

## 2024-09-17 RX ADMIN — Medication 5000 UNIT(S): at 06:03

## 2024-09-17 RX ADMIN — Medication 5000 UNIT(S): at 22:09

## 2024-09-17 RX ADMIN — HYDROMORPHONE HYDROCHLORIDE 0.5 MILLIGRAM(S): 2 TABLET ORAL at 00:39

## 2024-09-17 RX ADMIN — Medication 7.5 MILLIGRAM(S): at 06:06

## 2024-09-17 RX ADMIN — Medication 40 MILLIEQUIVALENT(S): at 09:44

## 2024-09-17 RX ADMIN — HYDROMORPHONE HYDROCHLORIDE 0.5 MILLIGRAM(S): 2 TABLET ORAL at 10:16

## 2024-09-17 RX ADMIN — HYDROMORPHONE HYDROCHLORIDE 0.5 MILLIGRAM(S): 2 TABLET ORAL at 06:50

## 2024-09-17 RX ADMIN — HYDROMORPHONE HYDROCHLORIDE 0.5 MILLIGRAM(S): 2 TABLET ORAL at 19:09

## 2024-09-17 RX ADMIN — Medication 6 MILLIGRAM(S): at 22:08

## 2024-09-17 RX ADMIN — PIPERACILLIN SODIUM AND TAZOBACTAM SODIUM 25 GRAM(S): 3; .375 INJECTION, POWDER, FOR SOLUTION INTRAVENOUS at 22:09

## 2024-09-17 RX ADMIN — Medication 100 GRAM(S): at 10:50

## 2024-09-17 RX ADMIN — HYDROMORPHONE HYDROCHLORIDE 0.5 MILLIGRAM(S): 2 TABLET ORAL at 15:00

## 2024-09-17 RX ADMIN — Medication 125 MICROGRAM(S): at 06:02

## 2024-09-17 RX ADMIN — HYDROMORPHONE HYDROCHLORIDE 0.5 MILLIGRAM(S): 2 TABLET ORAL at 01:10

## 2024-09-17 RX ADMIN — SODIUM PHOSPHATE, DIBASIC, ANHYDROUS, POTASSIUM PHOSPHATE, MONOBASIC, AND SODIUM PHOSPHATE, MONOBASIC, MONOHYDRATE 2 TABLET(S): 852; 155; 130 TABLET, COATED ORAL at 09:44

## 2024-09-17 RX ADMIN — Medication 5000 UNIT(S): at 13:40

## 2024-09-17 NOTE — PROGRESS NOTE ADULT - SUBJECTIVE AND OBJECTIVE BOX
Surgical Oncology Daily Progress Note    SUBJECTIVE: Patient seen and examined by team on morning rounds. No acute events overnight. Patient complaining of discomfort at drain site when he moves. Tolerating diet, ambulating, voiding. Denies chest pain, palpitations, SOB, dizziness, fever, chills, N/V/D.      Vital Signs Last 24 Hrs  T(C): 36.6 (17 Sep 2024 04:56), Max: 37 (16 Sep 2024 20:25)  T(F): 97.8 (17 Sep 2024 04:56), Max: 98.6 (16 Sep 2024 20:25)  HR: 76 (17 Sep 2024 04:56) (62 - 78)  BP: 123/73 (17 Sep 2024 04:56) (97/51 - 123/73)  RR: 16 (17 Sep 2024 04:56) (16 - 18)  SpO2: 100% (17 Sep 2024 04:56) (99% - 100%)  Parameters below as of 17 Sep 2024 04:56  Patient On (Oxygen Delivery Method): room air      General Appearance: Appears well, NAD  Chest: Equal expansion bilaterally  Abdomen: Soft, nontender, nondistended, RUQ IR drain with serobilious OP  Extremities: Grossly symmetric, SCD's in place     I&O's Summary    16 Sep 2024 07:01  -  17 Sep 2024 07:00  --------------------------------------------------------  IN: 675 mL / OUT: 530 mL / NET: 145 mL      I&O's Detail    16 Sep 2024 07:01  -  17 Sep 2024 07:00  --------------------------------------------------------  IN:    Oral Fluid: 675 mL  Total IN: 675 mL    OUT:    Bulb (mL): 30 mL    Voided (mL): 500 mL  Total OUT: 530 mL    Total NET: 145 mL          MEDICATIONS  (STANDING):  heparin   Injectable 5000 Unit(s) SubCutaneous every 8 hours  hydroxychloroquine 400 milliGRAM(s) Oral at bedtime  levothyroxine 125 MICROGram(s) Oral daily  losartan 50 milliGRAM(s) Oral daily  melatonin 6 milliGRAM(s) Oral at bedtime  piperacillin/tazobactam IVPB.. 3.375 Gram(s) IV Intermittent every 8 hours  predniSONE   Tablet 7.5 milliGRAM(s) Oral daily    MEDICATIONS  (PRN):  HYDROmorphone  Injectable 0.5 milliGRAM(s) IV Push every 4 hours PRN Severe Pain (7 - 10)  ondansetron Injectable 4 milliGRAM(s) IV Push three times a day PRN Nausea  oxyCODONE    IR 2.5 milliGRAM(s) Oral every 6 hours PRN Mild Pain (1 - 3)  oxyCODONE    IR 5 milliGRAM(s) Oral every 6 hours PRN Moderate Pain (4 - 6)      LABS:                        9.8    8.72  )-----------( 298      ( 17 Sep 2024 05:50 )             30.5     09-17    139  |  104  |  6<L>  ----------------------------<  88  3.5   |  23  |  0.87    Ca    8.8      17 Sep 2024 05:50  Phos  2.8     09-17  Mg     1.90     09-17        Urinalysis Basic - ( 17 Sep 2024 05:50 )    Color: x / Appearance: x / SG: x / pH: x  Gluc: 88 mg/dL / Ketone: x  / Bili: x / Urobili: x   Blood: x / Protein: x / Nitrite: x   Leuk Esterase: x / RBC: x / WBC x   Sq Epi: x / Non Sq Epi: x / Bacteria: x        RADIOLOGY & ADDITIONAL STUDIES:

## 2024-09-17 NOTE — PROGRESS NOTE ADULT - SUBJECTIVE AND OBJECTIVE BOX
AMBERLY JETTO  [id number]    Subjective;  Patient reports overall weak, abdominal pain.  S/p IR drain placement for anastomosis leak.  no joint pains , rash, dry eyes, dry mouth, Raynaud's oral ulcers    Hospital course    Patient with SLE with overlap myositis , recently diagnosed with adenocarcinoma of stomach s/p subtotal gastrectomy 9/5, found to have an anastamosis leak getting an IR drain this hospitalisation. Rheum consulted for back ground of AID.    Patient complains of diffuse body pain and stiffness that feels similar to his lupus flare. He also feels weak overall. Endorses muscle fatigue on climbing up and down stairs, getting up from chair, keeping his arm up over the shoulders for long period of time. Denies rash, oral ulcers, joint swelling , diarrhea, cough, SOB, dry eyes, dry mouth    Rheum hx:   SLE with myositis previously treated with cellcept, steroids and benlysta. Stopped benlysta in January 2024, stopped Cellcept in august as he was planned for gastric surgery, only on prednisone 7.5mg  -Previous serologies: OWEN, dsDNA, smith, LA, U1RNP, PMscl. Ck have fluctuated 100s -200s.      MEDICATIONS  (STANDING):  acetaminophen   IVPB .. 1000 milliGRAM(s) IV Intermittent every 6 hours  hydroxychloroquine 400 milliGRAM(s) Oral at bedtime  lactated ringers. 1000 milliLiter(s) (75 mL/Hr) IV Continuous <Continuous>  levothyroxine 125 MICROGram(s) Oral daily  losartan 50 milliGRAM(s) Oral daily  piperacillin/tazobactam IVPB.. 3.375 Gram(s) IV Intermittent every 8 hours  predniSONE   Tablet 7.5 milliGRAM(s) Oral daily    MEDICATIONS  (PRN):  HYDROmorphone  Injectable 0.2 milliGRAM(s) IV Push every 4 hours PRN Severe Pain (7 - 10)      Allergies    No Known Allergies    Intolerances      FAMILY HISTORY:  FH: rheumatoid arthritis (Mother)        Vital Signs Last 24 Hrs  T(C): 36.5 (17 Sep 2024 16:07), Max: 37 (16 Sep 2024 20:25)  T(F): 97.7 (17 Sep 2024 16:07), Max: 98.6 (16 Sep 2024 20:25)  HR: 64 (17 Sep 2024 16:07) (63 - 78)  BP: 121/79 (17 Sep 2024 16:07) (97/51 - 123/73)  BP(mean): --  RR: 17 (17 Sep 2024 16:07) (16 - 18)  SpO2: 100% (17 Sep 2024 16:07) (98% - 100%)    Parameters below as of 17 Sep 2024 16:07  Patient On (Oxygen Delivery Method): room air            Physical Exam:  General: No apparent distress  HEENT: EOMI, MMM  CVS: +S1/S2, RRR, no murmurs/rubs/gallops  Resp: CTA b/l. No crackles/wheezing  GI: Soft, NT/ND +BS  MSK: tenderness in all PIPs without synovitis. Tenderness in bilateral wrist without swelling, full ROM  Neuro: AAOx3. Bilateral lower limb proximal muscle 4/5  Skin: no visible rashes      LABS:  cret                        9.8    8.72  )-----------( 298      ( 17 Sep 2024 05:50 )             30.5     09-17    139  |  104  |  6[L]  ----------------------------<  88  3.5   |  23  |  0.87    Ca    8.8      17 Sep 2024 05:50  Phos  2.8     09-17  Mg     1.90     09-17          Rheumatology Work Up    Protein/Creatinine Ratio Calculation: 0.1 Ratio [0.0 - 0.2] (07-21-23 @ 07:22)  C3 Complement, Serum: 108 mg/dL [81 - 157] (07-21-23 @ 07:17)  C4 Complement, Serum: 21 mg/dL [13 - 39] (07-21-23 @ 07:17)  Sedimentation Rate, Erythrocyte: 33 mm/hr *H* [0 - 15] (07-21-23 @ 07:17)  Double Stranded DNA Antibody: 1: Effective April 2, 2024, the assay methodology for anti-dsDNA testing  changed from enzyme-linked immunosorbent assay to multiplexed flow  immunoassay.  Result Interpretation  <= 4 IU/mL:     Negative  5 - 9 IU/mL:     Indeterminate  >= 10 IU/mL:   Positive  Method: Multiplexed flow immunoassay IU/mL (09.14.24 @ 05:48)    Creatine Kinase: 88: SPECIMEN MODERATELY HEMOLYZED U/L (09.12.24 @ 14:48)  Lactate Dehydrogenase, Serum: 163 U/L (09.14.24 @ 05:48)    Myoglobin: 26 ng/mL (09.15.24 @ 05:20)      RADIOLOGY & ADDITIONAL STUDIES:  Bones: There is no fracture or bone marrow edema pattern. There is no   aggressive osseous lesion seen on today's study.    Muscles: Muscle bulk is preserved. There is no edema seen on STIR imaging   within the included musculature the pelvis, thighs, and to the level of   the mid tibia/fibula diaphyses bilaterally.    Soft tissues: Normal.    Trace bilateral knee effusions are present.    IMPRESSION: MRI of bilateral femurs with inclusion of the pelvis and to   the level of the mid tibia and fibula diaphysis demonstrate no evidence   of myositis.

## 2024-09-17 NOTE — PROGRESS NOTE ADULT - ASSESSMENT
49yM with PMH of Asthma, Lupus/myositis overlap syndrome, Hypothyroidism, Gastric cancer, PSH Robotic distal gastrectomy w/ Heather en Y reconstruction, D2 lymphadenectomy on 8/20/2024 p/w generalized malaise for last few weeks. In ED, VSS. CT showing Loculated collection with air-fluid level is noted associated with the duodenal staple line, concerning for staple line dehiscence.  IR drainage for collection 2/2 duodenal stump blowout, placed 8 Fr drain 9/13    Plan:  - Repeat CT scan tm prior to d/c  - IR drain to be flushed with 5cc NS daily  - Rheumatology -follow pt's lupus, c/w 7.5 Prednisone   - Daily prednisone for lupus myositis, appreciate Rheum recs  - Monitor IR drain output  - Plaquenil daily  - Pain: oxy 2.5/5 PRN, Dilaudid for breakthrough   - IV zosyn  - DVT ppx: SCD, SQH       E Team Surgery  [id number]

## 2024-09-17 NOTE — PROGRESS NOTE ADULT - ATTENDING COMMENTS
no inflammatory arthropathy on exam at present   imaging of the thigh is not consistent with myositis     continue to monitor on current dose of steroids     f/u with Dr. Neri on discharge    Dr. Katarzyna Ocampo  Rheumatology Attending  [facility]  Rheumatology at [place]     Contact:   call the office:: [phone] or reach [place] Microsoft Teams, weekdays before 5 pm   after 5 pm or on weekends, contact [phone]

## 2024-09-17 NOTE — PROGRESS NOTE ADULT - ASSESSMENT
Patient with SLE with overlap myositis on prednisone 7.5mg daily steroids here for IR drain s/p subtotal gastrectomy (for adenocarcinoma of stomach)with anastomosis leak, admitted for drain placement . Patient feels he feels like he is having flare, overall muscle fatigue and weakness. Exam and labs not s/o lupus flare.    Impression: Patient doesn't appear to have lupus or myositis flare. Although ahs weakness likely 2/2 deconditioning, underlying malignancy    #SLE with overlap myositis with adenocarcinoma of stomach S/p Subtotal gastrectomy  -Previous treatment: cellcept, prednisone , benlysta  -Current regimen : Prednisone 7.5 mg daily  -CK: normaL,AST/ALT, LDH and myoglobin not elevated  -MRI thighs: without muscle edema or atrophy      Recommendation  -Given short IR procedure, please given low dose hydrocortisone 25mg IV stat pre procedure  -Continue home dose steroids    Rheumatology signing off.  Please reconsult id needed.   Will arrange follow up out patient with  after discharge    D/w Dr.Katzap Juliet Agudelo MD, PGY-4  Rheumatology Fellow  Reachable on TEAMS     Patient with SLE with overlap myositis on prednisone 7.5mg daily steroids here for IR drain s/p subtotal gastrectomy (for adenocarcinoma of stomach)with anastomosis leak, admitted for drain placement . Patient feels he feels like he is having flare, overall muscle fatigue and weakness. Exam and labs not s/o lupus flare.    Impression: Patient doesn't appear to have lupus or myositis flare. Although ahs weakness likely 2/2 deconditioning, underlying malignancy    #SLE with overlap myositis with adenocarcinoma of stomach S/p Subtotal gastrectomy  -Previous treatment: cellcept, prednisone , benlysta  -Normal C3/C4, dsDNA, UPCR  -Current regimen : Prednisone 7.5 mg daily  -CK: normaL,AST/ALT, LDH and myoglobin not elevated  -MRI thighs: without muscle edema or atrophy      Recommendation  -Given short IR procedure, please given low dose hydrocortisone 25mg IV stat pre procedure  -Continue home dose steroids    Rheumatology signing off.  Please reconsult id needed.   Will arrange follow up out patient with  after discharge    D/w Dr.Katzap Juliet Agudelo MD, PGY-4  Rheumatology Fellow  Reachable on TEAMS     Patient with SLE with overlap myositis on prednisone 7.5mg daily steroids here for IR drain s/p subtotal gastrectomy (for adenocarcinoma of stomach)with anastomosis leak, admitted for drain placement . Patient feels he feels like he is having flare, overall muscle fatigue and weakness. Exam and labs not s/o lupus flare.    Impression: Patient doesn't appear to have lupus or myositis flare. Although ahs weakness likely 2/2 deconditioning, underlying malignancy    #SLE with overlap myositis now with recent diagnosed of adenocarcinoma of stomach S/p Subtotal gastrectomy  -Previous treatment: cellcept, prednisone , benlysta  -Normal C3/C4, dsDNA, UPCR  -Current regimen : Prednisone 7.5 mg daily  -CK: normaL,AST/ALT, LDH and myoglobin not elevated  -MRI thighs: without muscle edema or atrophy      Recommendation  -Given short IR procedure, please given low dose hydrocortisone 25mg IV stat pre procedure  -Continue home dose steroids    Rheumatology signing off.  Please reconsult as needed.   Will arrange follow up out patient with  after discharge    D/w Dr.Katzap Juliet Agudelo MD, PGY-4  Rheumatology Fellow  Reachable on TEAMS

## 2024-09-18 ENCOUNTER — TRANSCRIPTION ENCOUNTER (OUTPATIENT)
Age: 49
End: 2024-09-18

## 2024-09-18 LAB
ANION GAP SERPL CALC-SCNC: 13 MMOL/L — SIGNIFICANT CHANGE UP (ref 7–14)
BUN SERPL-MCNC: 5 MG/DL — LOW (ref 7–23)
CALCIUM SERPL-MCNC: 8.5 MG/DL — SIGNIFICANT CHANGE UP (ref 8.4–10.5)
CHLORIDE SERPL-SCNC: 98 MMOL/L — SIGNIFICANT CHANGE UP (ref 98–107)
CO2 SERPL-SCNC: 25 MMOL/L — SIGNIFICANT CHANGE UP (ref 22–31)
CREAT SERPL-MCNC: 0.96 MG/DL — SIGNIFICANT CHANGE UP (ref 0.5–1.3)
CULTURE RESULTS: ABNORMAL
EGFR: 98 ML/MIN/1.73M2 — SIGNIFICANT CHANGE UP
GLUCOSE SERPL-MCNC: 84 MG/DL — SIGNIFICANT CHANGE UP (ref 70–99)
HCT VFR BLD CALC: 30.6 % — LOW (ref 39–50)
HGB BLD-MCNC: 10.1 G/DL — LOW (ref 13–17)
MAGNESIUM SERPL-MCNC: 2 MG/DL — SIGNIFICANT CHANGE UP (ref 1.6–2.6)
MCHC RBC-ENTMCNC: 26.7 PG — LOW (ref 27–34)
MCHC RBC-ENTMCNC: 33 GM/DL — SIGNIFICANT CHANGE UP (ref 32–36)
MCV RBC AUTO: 81 FL — SIGNIFICANT CHANGE UP (ref 80–100)
NRBC # BLD: 0 /100 WBCS — SIGNIFICANT CHANGE UP (ref 0–0)
NRBC # FLD: 0 K/UL — SIGNIFICANT CHANGE UP (ref 0–0)
ORGANISM # SPEC MICROSCOPIC CNT: ABNORMAL
ORGANISM # SPEC MICROSCOPIC CNT: ABNORMAL
PHOSPHATE SERPL-MCNC: 2.8 MG/DL — SIGNIFICANT CHANGE UP (ref 2.5–4.5)
PLATELET # BLD AUTO: 305 K/UL — SIGNIFICANT CHANGE UP (ref 150–400)
POTASSIUM SERPL-MCNC: 3.7 MMOL/L — SIGNIFICANT CHANGE UP (ref 3.5–5.3)
POTASSIUM SERPL-SCNC: 3.7 MMOL/L — SIGNIFICANT CHANGE UP (ref 3.5–5.3)
RBC # BLD: 3.78 M/UL — LOW (ref 4.2–5.8)
RBC # FLD: 13.9 % — SIGNIFICANT CHANGE UP (ref 10.3–14.5)
SODIUM SERPL-SCNC: 136 MMOL/L — SIGNIFICANT CHANGE UP (ref 135–145)
SPECIMEN SOURCE: SIGNIFICANT CHANGE UP
WBC # BLD: 8.27 K/UL — SIGNIFICANT CHANGE UP (ref 3.8–10.5)
WBC # FLD AUTO: 8.27 K/UL — SIGNIFICANT CHANGE UP (ref 3.8–10.5)

## 2024-09-18 PROCEDURE — 74177 CT ABD & PELVIS W/CONTRAST: CPT | Mod: 26

## 2024-09-18 RX ORDER — CYCLOBENZAPRINE HCL 10 MG
5 TABLET ORAL THREE TIMES A DAY
Refills: 0 | Status: DISCONTINUED | OUTPATIENT
Start: 2024-09-18 | End: 2024-09-20

## 2024-09-18 RX ORDER — SODIUM PHOSPHATE, DIBASIC, ANHYDROUS, POTASSIUM PHOSPHATE, MONOBASIC, AND SODIUM PHOSPHATE, MONOBASIC, MONOHYDRATE 852; 155; 130 MG/1; MG/1; MG/1
1 TABLET, COATED ORAL ONCE
Refills: 0 | Status: COMPLETED | OUTPATIENT
Start: 2024-09-18 | End: 2024-09-18

## 2024-09-18 RX ORDER — POTASSIUM CHLORIDE 10 MEQ
20 TABLET, EXT RELEASE, PARTICLES/CRYSTALS ORAL ONCE
Refills: 0 | Status: COMPLETED | OUTPATIENT
Start: 2024-09-18 | End: 2024-09-18

## 2024-09-18 RX ADMIN — HYDROMORPHONE HYDROCHLORIDE 0.5 MILLIGRAM(S): 2 TABLET ORAL at 23:00

## 2024-09-18 RX ADMIN — HYDROMORPHONE HYDROCHLORIDE 0.5 MILLIGRAM(S): 2 TABLET ORAL at 22:33

## 2024-09-18 RX ADMIN — HYDROMORPHONE HYDROCHLORIDE 0.5 MILLIGRAM(S): 2 TABLET ORAL at 14:20

## 2024-09-18 RX ADMIN — Medication 7.5 MILLIGRAM(S): at 05:13

## 2024-09-18 RX ADMIN — HYDROMORPHONE HYDROCHLORIDE 0.5 MILLIGRAM(S): 2 TABLET ORAL at 09:50

## 2024-09-18 RX ADMIN — LOSARTAN POTASSIUM 50 MILLIGRAM(S): 50 TABLET ORAL at 05:13

## 2024-09-18 RX ADMIN — HYDROMORPHONE HYDROCHLORIDE 0.5 MILLIGRAM(S): 2 TABLET ORAL at 09:33

## 2024-09-18 RX ADMIN — Medication 5000 UNIT(S): at 05:14

## 2024-09-18 RX ADMIN — PIPERACILLIN SODIUM AND TAZOBACTAM SODIUM 25 GRAM(S): 3; .375 INJECTION, POWDER, FOR SOLUTION INTRAVENOUS at 05:13

## 2024-09-18 RX ADMIN — HYDROMORPHONE HYDROCHLORIDE 0.5 MILLIGRAM(S): 2 TABLET ORAL at 05:20

## 2024-09-18 RX ADMIN — Medication 5000 UNIT(S): at 22:35

## 2024-09-18 RX ADMIN — HYDROXYCHLOROQUINE SULFATE 400 MILLIGRAM(S): 200 TABLET, FILM COATED ORAL at 22:36

## 2024-09-18 RX ADMIN — SODIUM PHOSPHATE, DIBASIC, ANHYDROUS, POTASSIUM PHOSPHATE, MONOBASIC, AND SODIUM PHOSPHATE, MONOBASIC, MONOHYDRATE 1 TABLET(S): 852; 155; 130 TABLET, COATED ORAL at 09:33

## 2024-09-18 RX ADMIN — OXYCODONE HYDROCHLORIDE 5 MILLIGRAM(S): 5 TABLET ORAL at 11:45

## 2024-09-18 RX ADMIN — HYDROMORPHONE HYDROCHLORIDE 0.5 MILLIGRAM(S): 2 TABLET ORAL at 18:25

## 2024-09-18 RX ADMIN — PIPERACILLIN SODIUM AND TAZOBACTAM SODIUM 25 GRAM(S): 3; .375 INJECTION, POWDER, FOR SOLUTION INTRAVENOUS at 14:04

## 2024-09-18 RX ADMIN — Medication 5000 UNIT(S): at 14:06

## 2024-09-18 RX ADMIN — HYDROMORPHONE HYDROCHLORIDE 0.5 MILLIGRAM(S): 2 TABLET ORAL at 18:06

## 2024-09-18 RX ADMIN — Medication 5 MILLIGRAM(S): at 20:04

## 2024-09-18 RX ADMIN — Medication 6 MILLIGRAM(S): at 22:36

## 2024-09-18 RX ADMIN — PIPERACILLIN SODIUM AND TAZOBACTAM SODIUM 25 GRAM(S): 3; .375 INJECTION, POWDER, FOR SOLUTION INTRAVENOUS at 22:33

## 2024-09-18 RX ADMIN — Medication 125 MICROGRAM(S): at 05:14

## 2024-09-18 RX ADMIN — OXYCODONE HYDROCHLORIDE 5 MILLIGRAM(S): 5 TABLET ORAL at 12:45

## 2024-09-18 RX ADMIN — HYDROMORPHONE HYDROCHLORIDE 0.5 MILLIGRAM(S): 2 TABLET ORAL at 14:05

## 2024-09-18 RX ADMIN — Medication 20 MILLIEQUIVALENT(S): at 09:33

## 2024-09-18 NOTE — PROGRESS NOTE ADULT - SUBJECTIVE AND OBJECTIVE BOX
TEAM [ E ] Surgery Daily Progress Note  =====================================================    SUBJECTIVE: Patient seen and examined at bedside on AM rounds. Patient reports that they're feeling well. Tolerating diet, denies nausea, vomiting. OOB/Amublating as tolerated. Denies fever, chills.      ALLERGIES:  No Known Allergies      --------------------------------------------------------------------------------------    MEDICATIONS:    Neurologic Medications  cyclobenzaprine 5 milliGRAM(s) Oral three times a day PRN Muscle Spasm  HYDROmorphone  Injectable 0.5 milliGRAM(s) IV Push every 4 hours PRN Severe Pain (7 - 10)  melatonin 6 milliGRAM(s) Oral at bedtime  ondansetron Injectable 4 milliGRAM(s) IV Push three times a day PRN Nausea  oxyCODONE    IR 5 milliGRAM(s) Oral every 6 hours PRN Moderate Pain (4 - 6)  oxyCODONE    IR 2.5 milliGRAM(s) Oral every 6 hours PRN Mild Pain (1 - 3)    Respiratory Medications    Cardiovascular Medications  losartan 50 milliGRAM(s) Oral daily    Gastrointestinal Medications    Genitourinary Medications    Hematologic/Oncologic Medications  heparin   Injectable 5000 Unit(s) SubCutaneous every 8 hours    Antimicrobial/Immunologic Medications  hydroxychloroquine 400 milliGRAM(s) Oral at bedtime  piperacillin/tazobactam IVPB.. 3.375 Gram(s) IV Intermittent every 8 hours    Endocrine/Metabolic Medications  levothyroxine 125 MICROGram(s) Oral daily  predniSONE   Tablet 7.5 milliGRAM(s) Oral daily    Topical/Other Medications    --------------------------------------------------------------------------------------    VITAL SIGNS:  T(C): 36.9 (09-18-24 @ 12:00), Max: 36.9 (09-18-24 @ 12:00)  HR: 68 (09-18-24 @ 12:00) (68 - 72)  BP: 114/68 (09-18-24 @ 12:00) (109/65 - 115/71)  RR: 18 (09-18-24 @ 12:00) (16 - 18)  SpO2: 100% (09-18-24 @ 12:00) (95% - 100%)  --------------------------------------------------------------------------------------    EXAM  General Appearance: Appears well, NAD  Chest: Equal expansion bilaterally  Abdomen: Soft, nontender, nondistended, RUQ IR drain with serobilious OP  Extremities: Grossly symmetric, SCD's in place     --------------------------------------------------------------------------------------    LABS                          10.1   8.27  )-----------( 305      ( 18 Sep 2024 06:19 )             30.6     09-18    136  |  98  |  5[L]  ----------------------------<  84  3.7   |  25  |  0.96    Ca    8.5      18 Sep 2024 06:19  Phos  2.8     09-18  Mg     2.00     09-18      --------------------------------------------------------------------------------------    INS AND OUTS:    09-17-24 @ 07:01  -  09-18-24 @ 07:00  --------------------------------------------------------  IN: 360 mL / OUT: 1620 mL / NET: -1260 mL    09-18-24 @ 07:01  -  09-18-24 @ 17:01  --------------------------------------------------------  IN: 0 mL / OUT: 758 mL / NET: -758 mL      --------------------------------------------------------------------------------------

## 2024-09-18 NOTE — DISCHARGE NOTE NURSING/CASE MANAGEMENT/SOCIAL WORK - PATIENT PORTAL LINK FT
You can access the FollowMyHealth Patient Portal offered by [facility] by registering at the following website: [email or web address]. By joining [employer]’s FollowMyHealth portal, you will also be able to view your health information using other applications (apps) compatible with our system.

## 2024-09-18 NOTE — DISCHARGE NOTE NURSING/CASE MANAGEMENT/SOCIAL WORK - NSDCVIVACCINE_GEN_ALL_CORE_FT
Tdap; 17-Dec-2018 23:02; Candi Bedolla (ALEKSEY); [employer]; [id number] (Exp. Date: 03-Dec-2020); IntraMuscular; Deltoid Left.; 0.5 milliLiter(s); VIS (VIS Published: 09-May-2013, VIS Presented: 17-Dec-2018);

## 2024-09-18 NOTE — DISCHARGE NOTE NURSING/CASE MANAGEMENT/SOCIAL WORK - NSDCPEFALRISK_GEN_ALL_CORE
For information on Fall & Injury Prevention, visit: [email or web address] OR  [email or web address] OR  https://www.cdc.gov/steadi/patient.html

## 2024-09-18 NOTE — DISCHARGE NOTE NURSING/CASE MANAGEMENT/SOCIAL WORK - NSDCFUADDAPPT_GEN_ALL_CORE_FT
Please follow up with Interventional radiology to have your drain evaluated one week from discharge.  Please call today, to schedule an appointment (for one week from discharge date) [phone].   Location is in [facility] on the second floor radiology Room 263. You can park at [facility] parking garage. Continue to empty and record the drain output daily as you have been taught.

## 2024-09-18 NOTE — PROGRESS NOTE ADULT - ASSESSMENT
49yM with PMH of Asthma, Lupus/myositis overlap syndrome, Hypothyroidism, Gastric cancer, PSH Robotic distal gastrectomy w/ Heather en Y reconstruction, D2 lymphadenectomy on 8/20/2024 p/w generalized malaise for last few weeks. In ED, VSS. CT showing Loculated collection with air-fluid level is noted associated with the duodenal staple line, concerning for staple line dehiscence.  IR drainage for collection 2/2 duodenal stump blowout, placed 8 Fr drain 9/13    Plan:  - Repeat CT scan today  - IR drain to be flushed with 5cc NS daily  - Rheumatology -follow pt's lupus, c/w 7.5 Prednisone   - Daily prednisone for lupus myositis, appreciate Rheum recs  - Monitor IR drain output  - Plaquenil daily  - Pain: oxy 2.5/5 PRN, Dilaudid for breakthrough   - IV zosyn  - DVT ppx: SCD, SQH     E Team Surgery  [id number]

## 2024-09-19 ENCOUNTER — APPOINTMENT (OUTPATIENT)
Dept: SURGICAL ONCOLOGY | Facility: CLINIC | Age: 49
End: 2024-09-19

## 2024-09-19 DIAGNOSIS — Z51.5 ENCOUNTER FOR PALLIATIVE CARE: ICD-10-CM

## 2024-09-19 DIAGNOSIS — Z71.89 OTHER SPECIFIED COUNSELING: ICD-10-CM

## 2024-09-19 DIAGNOSIS — C16.9 MALIGNANT NEOPLASM OF STOMACH, UNSPECIFIED: ICD-10-CM

## 2024-09-19 DIAGNOSIS — G89.3 NEOPLASM RELATED PAIN (ACUTE) (CHRONIC): ICD-10-CM

## 2024-09-19 LAB
ANION GAP SERPL CALC-SCNC: 11 MMOL/L — SIGNIFICANT CHANGE UP (ref 7–14)
BUN SERPL-MCNC: 6 MG/DL — LOW (ref 7–23)
CALCIUM SERPL-MCNC: 8.5 MG/DL — SIGNIFICANT CHANGE UP (ref 8.4–10.5)
CHLORIDE SERPL-SCNC: 101 MMOL/L — SIGNIFICANT CHANGE UP (ref 98–107)
CO2 SERPL-SCNC: 27 MMOL/L — SIGNIFICANT CHANGE UP (ref 22–31)
CREAT SERPL-MCNC: 0.97 MG/DL — SIGNIFICANT CHANGE UP (ref 0.5–1.3)
EGFR: 96 ML/MIN/1.73M2 — SIGNIFICANT CHANGE UP
GLUCOSE SERPL-MCNC: 83 MG/DL — SIGNIFICANT CHANGE UP (ref 70–99)
HCT VFR BLD CALC: 31 % — LOW (ref 39–50)
HGB BLD-MCNC: 10 G/DL — LOW (ref 13–17)
MAGNESIUM SERPL-MCNC: 2 MG/DL — SIGNIFICANT CHANGE UP (ref 1.6–2.6)
MCHC RBC-ENTMCNC: 25.9 PG — LOW (ref 27–34)
MCHC RBC-ENTMCNC: 32.3 GM/DL — SIGNIFICANT CHANGE UP (ref 32–36)
MCV RBC AUTO: 80.3 FL — SIGNIFICANT CHANGE UP (ref 80–100)
NRBC # BLD: 0 /100 WBCS — SIGNIFICANT CHANGE UP (ref 0–0)
NRBC # FLD: 0 K/UL — SIGNIFICANT CHANGE UP (ref 0–0)
PHOSPHATE SERPL-MCNC: 3.4 MG/DL — SIGNIFICANT CHANGE UP (ref 2.5–4.5)
PLATELET # BLD AUTO: 310 K/UL — SIGNIFICANT CHANGE UP (ref 150–400)
POTASSIUM SERPL-MCNC: 3.9 MMOL/L — SIGNIFICANT CHANGE UP (ref 3.5–5.3)
POTASSIUM SERPL-SCNC: 3.9 MMOL/L — SIGNIFICANT CHANGE UP (ref 3.5–5.3)
RBC # BLD: 3.86 M/UL — LOW (ref 4.2–5.8)
RBC # FLD: 14.2 % — SIGNIFICANT CHANGE UP (ref 10.3–14.5)
SODIUM SERPL-SCNC: 139 MMOL/L — SIGNIFICANT CHANGE UP (ref 135–145)
WBC # BLD: 9.31 K/UL — SIGNIFICANT CHANGE UP (ref 3.8–10.5)
WBC # FLD AUTO: 9.31 K/UL — SIGNIFICANT CHANGE UP (ref 3.8–10.5)

## 2024-09-19 PROCEDURE — 99223 1ST HOSP IP/OBS HIGH 75: CPT

## 2024-09-19 RX ORDER — HYDROMORPHONE HYDROCHLORIDE 2 MG/1
2 TABLET ORAL EVERY 4 HOURS
Refills: 0 | Status: DISCONTINUED | OUTPATIENT
Start: 2024-09-19 | End: 2024-09-19

## 2024-09-19 RX ORDER — AMOXICILLIN AND CLAVULANATE POTASSIUM 250; 125 MG/1; MG/1
1 TABLET, FILM COATED ORAL
Qty: 14 | Refills: 0
Start: 2024-09-19 | End: 2024-09-25

## 2024-09-19 RX ORDER — LEVOTHYROXINE SODIUM 100 MCG
1 TABLET ORAL
Qty: 0 | Refills: 0 | DISCHARGE
Start: 2024-09-19

## 2024-09-19 RX ORDER — LOSARTAN POTASSIUM 50 MG/1
1 TABLET ORAL
Qty: 0 | Refills: 0 | DISCHARGE
Start: 2024-09-19

## 2024-09-19 RX ORDER — HYDROMORPHONE HYDROCHLORIDE 2 MG/1
4 TABLET ORAL EVERY 4 HOURS
Refills: 0 | Status: DISCONTINUED | OUTPATIENT
Start: 2024-09-19 | End: 2024-09-20

## 2024-09-19 RX ORDER — FENTANYL CITRATE 50 UG/ML
1 INJECTION INTRAMUSCULAR; INTRAVENOUS
Refills: 0 | Status: DISCONTINUED | OUTPATIENT
Start: 2024-09-19 | End: 2024-09-20

## 2024-09-19 RX ORDER — LEVOFLOXACIN 5 MG/ML
1 INJECTION, SOLUTION INTRAVENOUS
Qty: 7 | Refills: 0
Start: 2024-09-19 | End: 2024-09-25

## 2024-09-19 RX ORDER — FENTANYL CITRATE 50 UG/ML
1 INJECTION INTRAMUSCULAR; INTRAVENOUS
Qty: 1 | Refills: 0
Start: 2024-09-19 | End: 2024-09-28

## 2024-09-19 RX ORDER — HYDROMORPHONE HYDROCHLORIDE 2 MG/1
2 TABLET ORAL EVERY 4 HOURS
Refills: 0 | Status: DISCONTINUED | OUTPATIENT
Start: 2024-09-19 | End: 2024-09-20

## 2024-09-19 RX ORDER — FENTANYL CITRATE 50 UG/ML
1 INJECTION INTRAMUSCULAR; INTRAVENOUS
Qty: 1 | Refills: 0
Start: 2024-09-19 | End: 2024-10-03

## 2024-09-19 RX ADMIN — HYDROXYCHLOROQUINE SULFATE 400 MILLIGRAM(S): 200 TABLET, FILM COATED ORAL at 22:46

## 2024-09-19 RX ADMIN — Medication 125 MICROGRAM(S): at 06:20

## 2024-09-19 RX ADMIN — LOSARTAN POTASSIUM 50 MILLIGRAM(S): 50 TABLET ORAL at 06:20

## 2024-09-19 RX ADMIN — HYDROMORPHONE HYDROCHLORIDE 2 MILLIGRAM(S): 2 TABLET ORAL at 11:20

## 2024-09-19 RX ADMIN — Medication 5000 UNIT(S): at 06:19

## 2024-09-19 RX ADMIN — HYDROMORPHONE HYDROCHLORIDE 4 MILLIGRAM(S): 2 TABLET ORAL at 22:52

## 2024-09-19 RX ADMIN — HYDROMORPHONE HYDROCHLORIDE 0.5 MILLIGRAM(S): 2 TABLET ORAL at 06:41

## 2024-09-19 RX ADMIN — Medication 5000 UNIT(S): at 22:48

## 2024-09-19 RX ADMIN — HYDROMORPHONE HYDROCHLORIDE 2 MILLIGRAM(S): 2 TABLET ORAL at 10:28

## 2024-09-19 RX ADMIN — Medication 5 MILLIGRAM(S): at 20:01

## 2024-09-19 RX ADMIN — Medication 7.5 MILLIGRAM(S): at 06:20

## 2024-09-19 RX ADMIN — PIPERACILLIN SODIUM AND TAZOBACTAM SODIUM 25 GRAM(S): 3; .375 INJECTION, POWDER, FOR SOLUTION INTRAVENOUS at 13:44

## 2024-09-19 RX ADMIN — Medication 5000 UNIT(S): at 13:45

## 2024-09-19 RX ADMIN — PIPERACILLIN SODIUM AND TAZOBACTAM SODIUM 25 GRAM(S): 3; .375 INJECTION, POWDER, FOR SOLUTION INTRAVENOUS at 06:20

## 2024-09-19 RX ADMIN — HYDROMORPHONE HYDROCHLORIDE 2 MILLIGRAM(S): 2 TABLET ORAL at 14:43

## 2024-09-19 RX ADMIN — HYDROMORPHONE HYDROCHLORIDE 2 MILLIGRAM(S): 2 TABLET ORAL at 18:51

## 2024-09-19 RX ADMIN — FENTANYL CITRATE 1 PATCH: 50 INJECTION INTRAMUSCULAR; INTRAVENOUS at 19:57

## 2024-09-19 RX ADMIN — Medication 6 MILLIGRAM(S): at 22:46

## 2024-09-19 RX ADMIN — HYDROMORPHONE HYDROCHLORIDE 4 MILLIGRAM(S): 2 TABLET ORAL at 23:30

## 2024-09-19 RX ADMIN — HYDROMORPHONE HYDROCHLORIDE 0.5 MILLIGRAM(S): 2 TABLET ORAL at 06:28

## 2024-09-19 RX ADMIN — HYDROMORPHONE HYDROCHLORIDE 2 MILLIGRAM(S): 2 TABLET ORAL at 15:20

## 2024-09-19 RX ADMIN — FENTANYL CITRATE 1 PATCH: 50 INJECTION INTRAMUSCULAR; INTRAVENOUS at 13:42

## 2024-09-19 NOTE — CONSULT NOTE ADULT - PROBLEM SELECTOR RECOMMENDATION 2
I-stop reviewed- patient reports oxycodone (feels like "it's like tylenol"), morphine and toradol do not provide relief.   > Start Fentanyl patch 12mcg (9/19)   > Adjust PRNs to PO dilaudid 2-4mg q4h prn moderate to severe pain   > multimodal pain control: flexeril 5mg tid prn, encouraged ambulation   > narcan prn   > bowel regimen while on opioids   > Patient should establish care with outpatient Palliative care team:   Dr. Thakkar, Dr. Makenzie Navarro, Idalmis Gonzalez NP, Josefa Posey NP    [facility], [facility]  Phone: [phone] [address] [place] [address] I-stop reviewed- patient reports oxycodone (feels like "it's like tylenol"), morphine and toradol do not provide relief.   > Start Fentanyl patch 12mcg (9/19)   > Adjust PRNs to PO dilaudid 2-4mg q4h prn moderate to severe pain   > multimodal pain control: flexeril 5mg tid prn, encouraged ambulation   > narcan prn   > bowel regimen while on opioids   > Patient should establish care with outpatient Palliative care team: 10/17/24 at 1pm with Dr. Ariane Thakkar, Dr. Makenzie Navarro, Idalmis Gonzalez NP, Josefa Posey NP    [facility], [facility]  Phone: [phone] [address] [id number]

## 2024-09-19 NOTE — DIETITIAN INITIAL EVALUATION ADULT - ADD RECOMMEND
1. Recommend SLP evaluation to optimzie diet, follow recommendations  2. Monitor weights, labs, BM's, skin integrity, PO intake   3. Please monitor % PO intake on flowsheets   4. Honor food preferences as able within therapeutic diet order.

## 2024-09-19 NOTE — CONSULT NOTE ADULT - NSCONSULTADDITIONALINFOA_GEN_ALL_CORE
Primary team to prescribe discharge medications to cover patient’s needs between the date of discharge and the date of appointment with either the patient's PMD, the primary oncologist, or a palliative care provider.  Ideally medications are to be prescribed to [facility] pharmacy, so the patient can leave the hospital with those medications.  For pain being treated as a part of cancer care, hospice or other end-of-life care, or pain being treated as part of palliative care prescribing opioids beyond the 7-day and less than 28 days post-discharge window IS ACCEPTABLE as per [place] Department of Health (https://www.health.[place].gov/professionals/narcotic/laws_and_regulations/).  Primary team to  make sure that any pre-approvals are done before discharge.    Pls prescribe Naloxone for here and upon discharge. Patients with 50mg OME have 4x risk of OD and those with 100mg OME have 9x risk (prescribetoprevent.org)    Inpatient: Naloxone 0.4 to 2mg IV q2-3mins PRN (max 10mg)  Upon discharge: Naloxone 4mg/0.1 ml nasal spray, 1 spray q2-3mins alternating between nostrils     Pls provide patient education prior to discharge. Resource: https://www.health.[place].gov/diseases/aids/general/opioid_overdose_prevention/overdose_facts.htm

## 2024-09-19 NOTE — CONSULT NOTE ADULT - REASON FOR ADMISSION
concern for duodenal stump dehiscence

## 2024-09-19 NOTE — CONSULT NOTE ADULT - CONVERSATION DETAILS
Referral for complex decision making and symptom management in setting of new malignancy. Introduced role of GaP team to patient who was amenable to speaking to provider. Reviewed his clinical condition including recent diagnosis of gastric cancer s/p distal gastrectomy and plan is for chemo. He shared that he's dealt with chronic condition of Lupus and understands that his comorbidities make him immunocompromised when he starts chemo. He shared that he has appointment with Dr. Puente on 10/2 but worries how this new ZONIA drain will affect his treatment schedule. He shared that he has dealt with pain for the past month since his surgery and his goal is to be able to function and get back to his life as a  because it does bring him adry. He is  and his wife Carine is his surrogate decision maker. He has 2 children (4 and 6y/o) with Carine and 6 other children from another relationship. He has extensive support and is looking forward getting back to his feet and starting chemo once it is safe.

## 2024-09-19 NOTE — CONSULT NOTE ADULT - PROBLEM SELECTOR RECOMMENDATION 4
In the event of newly developing, evolving, or worsening symptoms, please contact the Palliative Medicine team via pager (if the patient is at [facility] #[phone] or if the patient is at [facility] #[phone]) The Geriatric and Palliative Medicine service has coverage 24 hours a day/ 7 days a week to provide medical recommendations regarding symptom management needs via telephone.

## 2024-09-19 NOTE — DIETITIAN INITIAL EVALUATION ADULT - PERTINENT MEDS FT
MEDICATIONS  (STANDING):  fentaNYL   Patch  12 MICROgram(s)/Hr 1 Patch Transdermal every 72 hours  heparin   Injectable 5000 Unit(s) SubCutaneous every 8 hours  hydroxychloroquine 400 milliGRAM(s) Oral at bedtime  levothyroxine 125 MICROGram(s) Oral daily  losartan 50 milliGRAM(s) Oral daily  melatonin 6 milliGRAM(s) Oral at bedtime  predniSONE   Tablet 7.5 milliGRAM(s) Oral daily    MEDICATIONS  (PRN):  cyclobenzaprine 5 milliGRAM(s) Oral three times a day PRN Muscle Spasm  HYDROmorphone   Tablet 2 milliGRAM(s) Oral every 4 hours PRN Moderate Pain (4 - 6)  HYDROmorphone   Tablet 4 milliGRAM(s) Oral every 4 hours PRN Severe Pain (7 - 10)  ondansetron Injectable 4 milliGRAM(s) IV Push three times a day PRN Nausea

## 2024-09-19 NOTE — DIETITIAN INITIAL EVALUATION ADULT - PERTINENT LABORATORY DATA
09-19    139  |  101  |  6[L]  ----------------------------<  83  3.9   |  27  |  0.97    Ca    8.5      19 Sep 2024 06:10  Phos  3.4     09-19  Mg     2.00     09-19    A1C with Estimated Average Glucose Result: 5.6 % (08-21-24 @ 06:22)

## 2024-09-19 NOTE — CONSULT NOTE ADULT - PROBLEM SELECTOR RECOMMENDATION 9
Patient recently diagnosed with gastric cancer (7/2024) s/p distal gastrectomy w/ Heather ex Y reconstruction, lymphadenectomy on 8/20/24 c/b fluid collection now s/p IR drainage on 9/13   > Appreciate surg/onc recs- Dr. Ferreira   > Per discussion with patient, he has a port and was planned to see Dr. Puente at [facility] on 10/2 to potentially begin adjunctive chemo.   > Appreciate rheum recs as patient also with lupus. Continue prednisone

## 2024-09-19 NOTE — CONSULT NOTE ADULT - TIME BILLING
Time spent for extensive review of the physical chart, electronic medical record, and documentation to obtain collateral information including but not limited to:  [x ] Inpatient records (ED, H&P, primary team, and consultants if applicable, care coordination)  [x ] Inpatient values/results (biomarkers, immunoassays, imaging, and microbiology results)  [x ] Current or proposed treatment plans  [ x ] Discussion with the primary team  [x ] Discussion with the patient, surrogate decision maker, or family    Time spent: >75 min

## 2024-09-19 NOTE — PROGRESS NOTE ADULT - ASSESSMENT
49yM with PMH of Asthma, Lupus/myositis overlap syndrome, Hypothyroidism, Gastric cancer, PSH Robotic distal gastrectomy w/ Heather en Y reconstruction, D2 lymphadenectomy on 8/20/2024 p/w generalized malaise for last few weeks. In ED, VSS. CT showing Loculated collection with air-fluid level is noted associated with the duodenal staple line, concerning for staple line dehiscence.  IR drainage for collection 2/2 duodenal stump blowout, placed 8 Fr drain 9/13. Repeat CT scan 9/18 showing Interval percutaneous drainage of a intraperitoneal fluid collection adjacent to the duodenal staple line which is decreased in size. Additional more superficial fluid collection just deep to the right internal oblique muscles is increased in size measuring up to 4.0 cm, previously up to 3.1 cm.      Plan:  - Rheumatology -follow pt's lupus, c/w 7.5 Prednisone   - Daily prednisone for lupus myositis, appreciate Rheum recs  - Monitor IR drain output, to be flushed with 5cc NS daily  - Plaquenil daily  - Consult palliative for chronic pain control   - Pain: oxy 2.5/5 PRN, Dilaudid for breakthrough   - IV zosyn  - DVT ppx: SCD, SQH   - Dispo: planning home    E Team Surgery  [id number]

## 2024-09-19 NOTE — CONSULT NOTE ADULT - PROBLEM SELECTOR RECOMMENDATION 3
Patient is supported by his wife, Carine, and 8 children. He works as an [employer] . His goal is to have his pain optimized, start chemo with Dr. Puente and wants to get back to work because it's something that he enjoys.     Dispo plan: Home when medically stable and when pain is controlled.

## 2024-09-19 NOTE — DIETITIAN INITIAL EVALUATION ADULT - NUTRITIONGOAL OUTCOME1
Patient will follow a general healthy dietary pattern, consuming % of estimated energy needs to promote weight management.

## 2024-09-19 NOTE — DIETITIAN INITIAL EVALUATION ADULT - OTHER CALCULATIONS
IBW: 154 lb +/- 10%, %%  No weight history available per review of Kathy SPIVEY.   Per previous [facility] RD documentation: 111.1kg (8/27)  Objective weight measurements suggest 2.2kg (2%) weight loss x3 weeks period of time (not significant)

## 2024-09-19 NOTE — DIETITIAN INITIAL EVALUATION ADULT - ORAL INTAKE PTA/DIET HISTORY
Patient reports no known food allergies or food intolerances. Nutrition supplementation at home includes a multivitamin and potassium. Patient reports he maintained a good appetite prior to admission. Enjoys foods such as hamburgers, pasta, mashed potatoes, and knishes. Does not like fish or chicken.

## 2024-09-19 NOTE — DIETITIAN INITIAL EVALUATION ADULT - OTHER INFO
Per chart review, patient is a 49y Male with PMH Asthma, Lupus/myositis overlap syndrome, Hypothyroidism, Gastric cancer, PSH Robotic distal gastrectomy with Heather en Y reconstruction, D2 lymphadenectomy on 8/20/2024 who presented to [facility] with generalized malaise for last few weeks.    Patient is currently ordered for a PO diet. Patient currently reports a fair appetite and PO intake at meals during course of admission secondary to dislike of foods provided. Documented on RN flowsheet as consuming % of meals. Patient deferred writer's offer of an oral nutrition supplement or documentation of food preferences. Patient reports he has trouble swallowing solids, and has to chew slowly. No report of GI distress (nausea, vomiting, diarrhea, constipation). Last BM 9/18/24 per RN flowsheet documentation. Not noted to be on a bowel regimen. Noted HbA1c 5.6% (8/21).    Writer provided verbal education regarding current diet order and nutrition recommendations for after discharge. Patient verbalized understanding to the discussion.

## 2024-09-19 NOTE — PROGRESS NOTE ADULT - SUBJECTIVE AND OBJECTIVE BOX
TEAM [ E ] Surgery Daily Progress Note  =====================================================    SUBJECTIVE: Patient seen and examined at bedside on AM rounds. Patient reports moderate pain in abdomen associated with movement. Tolerating diet, denies nausea, vomiting.  OOB/Amublating as tolerated. Denies fever, chills.    ALLERGIES:  No Known Allergies      --------------------------------------------------------------------------------------    MEDICATIONS:    Neurologic Medications  cyclobenzaprine 5 milliGRAM(s) Oral three times a day PRN Muscle Spasm  HYDROmorphone  Injectable 0.5 milliGRAM(s) IV Push every 4 hours PRN Severe Pain (7 - 10)  melatonin 6 milliGRAM(s) Oral at bedtime  ondansetron Injectable 4 milliGRAM(s) IV Push three times a day PRN Nausea  oxyCODONE    IR 2.5 milliGRAM(s) Oral every 6 hours PRN Mild Pain (1 - 3)  oxyCODONE    IR 5 milliGRAM(s) Oral every 6 hours PRN Moderate Pain (4 - 6)    Respiratory Medications    Cardiovascular Medications  losartan 50 milliGRAM(s) Oral daily    Gastrointestinal Medications    Genitourinary Medications    Hematologic/Oncologic Medications  heparin   Injectable 5000 Unit(s) SubCutaneous every 8 hours    Antimicrobial/Immunologic Medications  hydroxychloroquine 400 milliGRAM(s) Oral at bedtime  piperacillin/tazobactam IVPB.. 3.375 Gram(s) IV Intermittent every 8 hours    Endocrine/Metabolic Medications  levothyroxine 125 MICROGram(s) Oral daily  predniSONE   Tablet 7.5 milliGRAM(s) Oral daily    Topical/Other Medications    --------------------------------------------------------------------------------------    VITAL SIGNS:  T(C): 36.8 (09-19-24 @ 08:00), Max: 37.1 (09-19-24 @ 00:00)  HR: 72 (09-19-24 @ 08:00) (65 - 81)  BP: 119/76 (09-19-24 @ 08:00) (101/57 - 119/76)  RR: 18 (09-19-24 @ 08:00) (18 - 18)  SpO2: 97% (09-19-24 @ 08:00) (95% - 100%)  --------------------------------------------------------------------------------------    EXAM    General Appearance: Appears well, NAD  Chest: Equal expansion bilaterally  Abdomen: Soft, nontender, nondistended, RUQ IR drain with serobilious OP  Extremities: Grossly symmetric, SCD's in place     --------------------------------------------------------------------------------------    LABS                          10.0   9.31  )-----------( 310      ( 19 Sep 2024 06:10 )             31.0     09-19    139  |  101  |  6[L]  ----------------------------<  83  3.9   |  27  |  0.97    Ca    8.5      19 Sep 2024 06:10  Phos  3.4     09-19  Mg     2.00     09-19      --------------------------------------------------------------------------------------    INS AND OUTS:    09-18-24 @ 07:01  -  09-19-24 @ 07:00  --------------------------------------------------------  IN: 240 mL / OUT: 1669 mL / NET: -1429 mL    09-19-24 @ 07:01  -  09-19-24 @ 09:09  --------------------------------------------------------  IN: 0 mL / OUT: 350 mL / NET: -350 mL      --------------------------------------------------------------------------------------

## 2024-09-19 NOTE — CONSULT NOTE ADULT - ASSESSMENT
started fentanyl patch 12mcg   adjust prns to 2-4mg po dilaudid, discontinue oxycodone prns  needs outpatient palliative referral to establish care    49yM with PMH of Asthma, Lupus/myositis overlap syndrome, Hypothyroidism, Gastric cancer, PSH Robotic distal gastrectomy w/ Heather en Y reconstruction, D2 lymphadenectomy on 8/20/2024 p/w generalized malaise for last few weeks. In ED, VSS. CT showing Loculated collection with air-fluid level is noted associated with the duodenal staple line, concerning for staple line dehiscence.  IR drainage for collection 2/2 duodenal stump blowout, placed 8 Fr drain 9/13. Palliative consulted for sx management in setting of recent diagnosis of malignancy.

## 2024-09-20 VITALS
SYSTOLIC BLOOD PRESSURE: 110 MMHG | OXYGEN SATURATION: 98 % | RESPIRATION RATE: 17 BRPM | TEMPERATURE: 98 F | DIASTOLIC BLOOD PRESSURE: 68 MMHG

## 2024-09-20 LAB
ANION GAP SERPL CALC-SCNC: 12 MMOL/L — SIGNIFICANT CHANGE UP (ref 7–14)
BUN SERPL-MCNC: 7 MG/DL — SIGNIFICANT CHANGE UP (ref 7–23)
CALCIUM SERPL-MCNC: 9.5 MG/DL — SIGNIFICANT CHANGE UP (ref 8.4–10.5)
CHLORIDE SERPL-SCNC: 101 MMOL/L — SIGNIFICANT CHANGE UP (ref 98–107)
CO2 SERPL-SCNC: 25 MMOL/L — SIGNIFICANT CHANGE UP (ref 22–31)
CREAT SERPL-MCNC: 0.93 MG/DL — SIGNIFICANT CHANGE UP (ref 0.5–1.3)
EGFR: 101 ML/MIN/1.73M2 — SIGNIFICANT CHANGE UP
GLUCOSE SERPL-MCNC: 84 MG/DL — SIGNIFICANT CHANGE UP (ref 70–99)
HCT VFR BLD CALC: 33.2 % — LOW (ref 39–50)
HGB BLD-MCNC: 10.8 G/DL — LOW (ref 13–17)
MAGNESIUM SERPL-MCNC: 2.1 MG/DL — SIGNIFICANT CHANGE UP (ref 1.6–2.6)
MCHC RBC-ENTMCNC: 26.5 PG — LOW (ref 27–34)
MCHC RBC-ENTMCNC: 32.5 GM/DL — SIGNIFICANT CHANGE UP (ref 32–36)
MCV RBC AUTO: 81.4 FL — SIGNIFICANT CHANGE UP (ref 80–100)
NRBC # BLD: 0 /100 WBCS — SIGNIFICANT CHANGE UP (ref 0–0)
NRBC # FLD: 0 K/UL — SIGNIFICANT CHANGE UP (ref 0–0)
PHOSPHATE SERPL-MCNC: 3.1 MG/DL — SIGNIFICANT CHANGE UP (ref 2.5–4.5)
PLATELET # BLD AUTO: 331 K/UL — SIGNIFICANT CHANGE UP (ref 150–400)
POTASSIUM SERPL-MCNC: 4.1 MMOL/L — SIGNIFICANT CHANGE UP (ref 3.5–5.3)
POTASSIUM SERPL-SCNC: 4.1 MMOL/L — SIGNIFICANT CHANGE UP (ref 3.5–5.3)
RBC # BLD: 4.08 M/UL — LOW (ref 4.2–5.8)
RBC # FLD: 14.1 % — SIGNIFICANT CHANGE UP (ref 10.3–14.5)
SODIUM SERPL-SCNC: 138 MMOL/L — SIGNIFICANT CHANGE UP (ref 135–145)
WBC # BLD: 8.42 K/UL — SIGNIFICANT CHANGE UP (ref 3.8–10.5)
WBC # FLD AUTO: 8.42 K/UL — SIGNIFICANT CHANGE UP (ref 3.8–10.5)

## 2024-09-20 PROCEDURE — 99233 SBSQ HOSP IP/OBS HIGH 50: CPT

## 2024-09-20 RX ORDER — HYDROMORPHONE HYDROCHLORIDE 2 MG/1
1 TABLET ORAL
Qty: 70 | Refills: 0
Start: 2024-09-20 | End: 2024-10-19

## 2024-09-20 RX ORDER — AMOXICILLIN AND CLAVULANATE POTASSIUM 250; 125 MG/1; MG/1
1 TABLET, FILM COATED ORAL EVERY 12 HOURS
Refills: 0 | Status: DISCONTINUED | OUTPATIENT
Start: 2024-09-20 | End: 2024-09-20

## 2024-09-20 RX ORDER — HYDROXYCHLOROQUINE SULFATE 200 MG/1
2 TABLET, FILM COATED ORAL
Qty: 0 | Refills: 0 | DISCHARGE
Start: 2024-09-20

## 2024-09-20 RX ORDER — PREDNISONE 10 MG
3 TABLET, DOSE PACK ORAL
Qty: 0 | Refills: 0 | DISCHARGE
Start: 2024-09-20

## 2024-09-20 RX ORDER — HYDROMORPHONE HYDROCHLORIDE 2 MG/1
1 TABLET ORAL
Qty: 180 | Refills: 0
Start: 2024-09-20 | End: 2024-10-19

## 2024-09-20 RX ORDER — CYCLOBENZAPRINE HCL 10 MG
1 TABLET ORAL
Qty: 90 | Refills: 0
Start: 2024-09-20 | End: 2024-10-19

## 2024-09-20 RX ORDER — HYDROMORPHONE HYDROCHLORIDE 2 MG/1
1 TABLET ORAL
Qty: 30 | Refills: 0
Start: 2024-09-20

## 2024-09-20 RX ORDER — POLYETHYLENE GLYCOL 3350 17 G/17G
17 POWDER, FOR SOLUTION ORAL
Qty: 1 | Refills: 0
Start: 2024-09-20 | End: 2024-09-29

## 2024-09-20 RX ORDER — LEVOFLOXACIN 5 MG/ML
1 INJECTION, SOLUTION INTRAVENOUS
Qty: 6 | Refills: 0
Start: 2024-09-20 | End: 2024-09-25

## 2024-09-20 RX ORDER — AMOXICILLIN AND CLAVULANATE POTASSIUM 250; 125 MG/1; MG/1
1 TABLET, FILM COATED ORAL
Qty: 12 | Refills: 0
Start: 2024-09-20 | End: 2024-09-25

## 2024-09-20 RX ADMIN — HYDROMORPHONE HYDROCHLORIDE 4 MILLIGRAM(S): 2 TABLET ORAL at 05:43

## 2024-09-20 RX ADMIN — LOSARTAN POTASSIUM 50 MILLIGRAM(S): 50 TABLET ORAL at 05:43

## 2024-09-20 RX ADMIN — Medication 125 MICROGRAM(S): at 05:43

## 2024-09-20 RX ADMIN — HYDROMORPHONE HYDROCHLORIDE 4 MILLIGRAM(S): 2 TABLET ORAL at 12:20

## 2024-09-20 RX ADMIN — HYDROMORPHONE HYDROCHLORIDE 4 MILLIGRAM(S): 2 TABLET ORAL at 06:27

## 2024-09-20 RX ADMIN — Medication 7.5 MILLIGRAM(S): at 05:43

## 2024-09-20 RX ADMIN — FENTANYL CITRATE 1 PATCH: 50 INJECTION INTRAMUSCULAR; INTRAVENOUS at 07:30

## 2024-09-20 RX ADMIN — HYDROMORPHONE HYDROCHLORIDE 4 MILLIGRAM(S): 2 TABLET ORAL at 11:26

## 2024-09-20 NOTE — PROGRESS NOTE ADULT - PROVIDER SPECIALTY LIST ADULT
Rheumatology
Surgery
Palliative Care
Surgery
Intervent Radiology

## 2024-09-20 NOTE — PROGRESS NOTE ADULT - SUBJECTIVE AND OBJECTIVE BOX
TEAM [ E ] Surgery Daily Progress Note  =====================================================    SUBJECTIVE: Patient seen and examined at bedside on AM rounds. Patient reports that they're feeling well. States pain overnight during PM flush of drain, however relieved with pain meds few hours later. Tolerating diet, denies nausea, vomiting.  +  Flatus/  +  BM. OOB/Amublating as tolerated. Denies fever, chills.    ALLERGIES:  No Known Allergies      --------------------------------------------------------------------------------------    MEDICATIONS:    Neurologic Medications  cyclobenzaprine 5 milliGRAM(s) Oral three times a day PRN Muscle Spasm  fentaNYL   Patch  12 MICROgram(s)/Hr 1 Patch Transdermal every 72 hours  HYDROmorphone   Tablet 4 milliGRAM(s) Oral every 4 hours PRN Severe Pain (7 - 10)  HYDROmorphone   Tablet 2 milliGRAM(s) Oral every 4 hours PRN Moderate Pain (4 - 6)  melatonin 6 milliGRAM(s) Oral at bedtime  ondansetron Injectable 4 milliGRAM(s) IV Push three times a day PRN Nausea    Respiratory Medications    Cardiovascular Medications  losartan 50 milliGRAM(s) Oral daily    Gastrointestinal Medications    Genitourinary Medications    Hematologic/Oncologic Medications  heparin   Injectable 5000 Unit(s) SubCutaneous every 8 hours    Antimicrobial/Immunologic Medications  hydroxychloroquine 400 milliGRAM(s) Oral at bedtime  levoFLOXacin  Tablet 750 milliGRAM(s) Oral every 24 hours    Endocrine/Metabolic Medications  levothyroxine 125 MICROGram(s) Oral daily  predniSONE   Tablet 7.5 milliGRAM(s) Oral daily    Topical/Other Medications    --------------------------------------------------------------------------------------    VITAL SIGNS:  T(C): 36.7 (09-20-24 @ 05:08), Max: 37 (09-19-24 @ 12:06)  HR: 63 (09-20-24 @ 05:08) (63 - 78)  BP: 106/60 (09-20-24 @ 05:08) (97/58 - 132/81)  RR: 19 (09-20-24 @ 05:08) (17 - 19)  SpO2: 97% (09-20-24 @ 05:08) (97% - 99%)  --------------------------------------------------------------------------------------    EXAM    General Appearance: Appears well, NAD  Chest: Equal expansion bilaterally  Abdomen: Soft, nontender, nondistended, RUQ IR drain with serobilious OP  Extremities: Grossly symmetric, SCD's in place     --------------------------------------------------------------------------------------    LABS                          10.0   9.31  )-----------( 310      ( 19 Sep 2024 06:10 )             31.0     09-19    139  |  101  |  6[L]  ----------------------------<  83  3.9   |  27  |  0.97    Ca    8.5      19 Sep 2024 06:10  Phos  3.4     09-19  Mg     2.00     09-19      --------------------------------------------------------------------------------------    INS AND OUTS:    09-19-24 @ 07:01  -  09-20-24 @ 07:00  --------------------------------------------------------  IN: 1090 mL / OUT: 705 mL / NET: 385 mL      --------------------------------------------------------------------------------------

## 2024-09-20 NOTE — PROGRESS NOTE ADULT - ASSESSMENT
49yM with PMH of Asthma, Lupus/myositis overlap syndrome, Hypothyroidism, Gastric cancer, PSH Robotic distal gastrectomy w/ Heather en Y reconstruction, D2 lymphadenectomy on 8/20/2024 p/w generalized malaise for last few weeks. In ED, VSS. CT showing Loculated collection with air-fluid level is noted associated with the duodenal staple line, concerning for staple line dehiscence.  IR drainage for collection 2/2 duodenal stump blowout, placed 8 Fr drain 9/13. Repeat CT scan 9/18 showing Interval percutaneous drainage of a intraperitoneal fluid collection adjacent to the duodenal staple line which is decreased in size. Additional more superficial fluid collection just deep to the right internal oblique muscles is increased in size measuring up to 4.0 cm, previously up to 3.1 cm.      Plan:  - Rheumatology -follow pt's lupus, c/w 7.5 Prednisone   - Daily prednisone for lupus myositis, appreciate Rheum recs  - Monitor IR drain output, to be flushed with 5cc NS daily  - Plaquenil daily  - Consult palliative for chronic pain control   - Pain: PO Dilaudid, fentanyl patch    - PO Levaquin 750 QD  - DVT ppx: SCD, SQH   - Dispo: planning home    E Team Surgery  [id number] 49yM with PMH of Asthma, Lupus/myositis overlap syndrome, Hypothyroidism, Gastric cancer, PSH Robotic distal gastrectomy w/ Heather en Y reconstruction, D2 lymphadenectomy on 8/20/2024 p/w generalized malaise for last few weeks. In ED, VSS. CT showing Loculated collection with air-fluid level is noted associated with the duodenal staple line, concerning for staple line dehiscence.  IR drainage for collection 2/2 duodenal stump blowout, placed 8 Fr drain 9/13. Repeat CT scan 9/18 showing Interval percutaneous drainage of a intraperitoneal fluid collection adjacent to the duodenal staple line which is decreased in size. Additional more superficial fluid collection just deep to the right internal oblique muscles is increased in size measuring up to 4.0 cm, previously up to 3.1 cm.      Plan:  - Rheumatology -follow pt's lupus, c/w 7.5 Prednisone   - Daily prednisone for lupus myositis, appreciate Rheum recs  - Monitor IR drain output, to be flushed with 5cc NS daily  - Plaquenil daily  - Consult palliative for chronic pain control - requesting 1 month prescription of PO Dilaudid 2mg 1-2tabs q4hrs for pain until follow up appointment in 1 month  - Pain: PO Dilaudid, fentanyl patch    - PO Levaquin 750 QD  - DVT ppx: SCD, SQH   - Dispo: planning home    E Team Surgery  [phone]

## 2024-09-20 NOTE — PROGRESS NOTE ADULT - PROBLEM SELECTOR PLAN 2
I-stop reviewed- patient reports oxycodone (feels like "it's like tylenol"), morphine and toradol do not provide relief.   > Continue Fentanyl patch 12mcg (9/19)   > Continue PRNs to PO dilaudid 2-4mg q4h prn moderate to severe pain   > DISCHARGE MEDICATIONS: FENTANYL PATCH 12MCG Q72 HOURS, PO dilaudid 2-4mg q 4h prn mod-severe pain.   > multimodal pain control: flexeril 5mg tid prn, encouraged ambulation   > narcan prn   > bowel regimen while on opioids   > Patient should establish care with outpatient Palliative care team: 10/17/24 at 1pm with Dr. Ariane Thakkar, Dr. Makenzie Navarro, Idalmis Gonzalez NP, Josefa Posey NP    [facility], [facility]  Phone: [phone]  [address] [place] [address].

## 2024-09-20 NOTE — PROGRESS NOTE ADULT - NSPROGADDITIONALINFOA_GEN_ALL_CORE
Additional Information: Primary team to prescribe discharge medications to cover patient’s needs between the date of discharge and the date of appointment with either the patient's PMD, the primary oncologist, or a palliative care provider.  Ideally medications are to be prescribed to [facility] pharmacy, so the patient can leave the hospital with those medications.  For pain being treated as a part of cancer care, hospice or other end-of-life care, or pain being treated as part of palliative care prescribing opioids beyond the 7-day and less than 28 days post-discharge window IS ACCEPTABLE as per [place] Department of Health (https://www.health.[place].gov/professionals/narcotic/laws_and_regulations/).  Primary team to  make sure that any pre-approvals are done before discharge.    Pls prescribe Naloxone for here and upon discharge. Patients with 50mg OME have 4x risk of OD and those with 100mg OME have 9x risk (prescribetoprevent.org)    Inpatient: Naloxone 0.4 to 2mg IV q2-3mins PRN (max 10mg)  Upon discharge: Naloxone 4mg/0.1 ml nasal spray, 1 spray q2-3mins alternating between nostrils     Pls provide patient education prior to discharge. Resource: https://www.health.[place].gov/diseases/aids/general/opioid_overdose_prevention/overdose_facts.htm

## 2024-09-20 NOTE — PROGRESS NOTE ADULT - PROBLEM SELECTOR PLAN 3
Patient is supported by his wife, Carine, and 8 children. He works as an [employer] . His goal is to have his pain optimized, start chemo with Dr. Puente and wants to get back to work because it's something that he enjoys.     Dispo plan: Home when medically stable and when pain is controlled.

## 2024-09-20 NOTE — PROGRESS NOTE ADULT - PROBLEM SELECTOR PLAN 4
In the event of newly developing, evolving, or worsening symptoms, please contact the Palliative Medicine team via pager (if the patient is at [facility] #[phone] or if the patient is at [facility] #[phone]) The Geriatric and Palliative Medicine service has coverage 24 hours a day/ 7 days a week to provide medical recommendations regarding symptom management needs via telephone.

## 2024-09-20 NOTE — PROGRESS NOTE ADULT - REASON FOR ADMISSION
concern for duodenal stump dehiscence

## 2024-09-20 NOTE — PROGRESS NOTE ADULT - SUBJECTIVE AND OBJECTIVE BOX
[facility] Geriatrics and Palliative Care  Gaye Mccloud Palliative Care Attending  Contact Info: Page [phone] (including Nights/Weekends), message on Microsoft Teams (Gaye Mccloud), or leave  at Palliative Office [phone] (non-urgent)   Date of [id number] @ 11:48    SUBJECTIVE AND OBJECTIVE: Patient seen this AM lying in bed. He reports discomfort with flushing of his ZONIA drain last night but this AM woke up with less pain.     Indication for Geriatrics and Palliative Care Services/INTERVAL HPI: sx m    OVERNIGHT EVENTS:    DNR on chart:  Allergies    No Known Allergies    Intolerances    MEDICATIONS  (STANDING):  amoxicillin  875 milliGRAM(s)/clavulanate 1 Tablet(s) Oral every 12 hours  fentaNYL   Patch  12 MICROgram(s)/Hr 1 Patch Transdermal every 72 hours  heparin   Injectable 5000 Unit(s) SubCutaneous every 8 hours  hydroxychloroquine 400 milliGRAM(s) Oral at bedtime  levoFLOXacin  Tablet 750 milliGRAM(s) Oral every 24 hours  levothyroxine 125 MICROGram(s) Oral daily  losartan 50 milliGRAM(s) Oral daily  melatonin 6 milliGRAM(s) Oral at bedtime  predniSONE   Tablet 7.5 milliGRAM(s) Oral daily    MEDICATIONS  (PRN):  cyclobenzaprine 5 milliGRAM(s) Oral three times a day PRN Muscle Spasm  HYDROmorphone   Tablet 2 milliGRAM(s) Oral every 4 hours PRN Moderate Pain (4 - 6)  HYDROmorphone   Tablet 4 milliGRAM(s) Oral every 4 hours PRN Severe Pain (7 - 10)  ondansetron Injectable 4 milliGRAM(s) IV Push three times a day PRN Nausea      ITEMS UNCHECKED ARE NOT PRESENT    PRESENT SYMPTOMS: [ ]Unable to self-report - see [ ] CPOT [ ] PAINADS [ ] RDOS  Source if other than patient:  [ ]Family   [ ]Team     Pain:  [ ]yes [ ]no  QOL impact -   Location -                    Aggravating factors -  Quality -  Radiation -  Timing-  Severity (0-10 scale):  Minimal acceptable level/ pain goal (0-10 scale):     CPOT:    https://www.[facility].org/getattachment/[email or web address]/Critical-Care-Pain-Observation-Tool-(CPOT)    Dyspnea:                           [ ]Mild [ ]Moderate [ ]Severe  Anxiety:                             [ ]Mild [ ]Moderate [ ]Severe  Fatigue:                             [ ]Mild [ ]Moderate [ ]Severe  Nausea:                             [ ]Mild [ ]Moderate [ ]Severe  Loss of appetite:              [ ]Mild [ ]Moderate [ ]Severe  Constipation:                    [ ]Mild [ ]Moderate [ ]Severe  Other Symptoms:  [ ]All other review of systems negative     PCSSQ[Palliative Care Spiritual Screening Question]   Severity (0-10):  Score of 4 or > indicate consideration of Chaplaincy referral.  Chaplaincy Referral: [ ] yes [ ] refused [ ] following [ ] deferred    Caregiver [place]? : [ ] yes [ ] no [ ] Deferred [ ] Declined             Social work referral [ ] Patient & Family Centered Care Referral [ ]  Anticipatory Grief present?:  [ ] yes [ ] no  [ ] Deferred                  Social work referral [ ] Patient & Family Centered Care Referral [ ]      PHYSICAL EXAM:  Vital Signs Last 24 Hrs  T(C): 36.4 (20 Sep 2024 08:30), Max: 37 (19 Sep 2024 12:06)  T(F): 97.5 (20 Sep 2024 08:30), Max: 98.6 (19 Sep 2024 12:06)  HR: 65 (20 Sep 2024 08:30) (63 - 78)  BP: 118/68 (20 Sep 2024 08:30) (97/58 - 132/81)  BP(mean): --  RR: 18 (20 Sep 2024 08:30) (17 - 19)  SpO2: 100% (20 Sep 2024 08:30) (97% - 100%)    Parameters below as of 20 Sep 2024 08:30  Patient On (Oxygen Delivery Method): room air     I&O's Summary    19 Sep 2024 07:01  -  20 Sep 2024 07:00  --------------------------------------------------------  IN: 1090 mL / OUT: 705 mL / NET: 385 mL    20 Sep 2024 07:01  -  20 Sep 2024 11:48  --------------------------------------------------------  IN: 250 mL / OUT: 200 mL / NET: 50 mL       GENERAL: [ ]Cachexia    [ ]Alert  [ ]Oriented x   [ ]Lethargic  [ ]Unarousable  [ ]Verbal  [ ]Non-Verbal  Behavioral:   [ ]Anxiety  [ ]Delirium [ ]Agitation [ ]Other  HEENT:  [ ]Normal   [ ]Dry mouth   [ ]ET Tube/Trach  [ ]Oral lesions  PULMONARY:   [ ]Clear [ ]Tachypnea  [ ]Audible excessive secretions   [ ]Rhonchi        [ ]Right [ ]Left [ ]Bilateral  [ ]Crackles        [ ]Right [ ]Left [ ]Bilateral  [ ]Wheezing     [ ]Right [ ]Left [ ]Bilateral  [ ]Diminished BS [ ] Right [ ]Left [ ]Bilateral  CARDIOVASCULAR:    [ ]Regular [ ]Irregular [ ]Tachy  [ ]Edwardo [ ]Murmur [ ]Other  GASTROINTESTINAL:  [ ]Soft  [ ]Distended   [ ]+BS  [ ]Non tender [ ]Tender  [ ]Other [ ]PEG [ ]OGT/ NGT   Last BM:   GENITOURINARY:  [ ]Normal [ ]Incontinent   [ ]Oliguria/Anuria   [ ]Reich  MUSCULOSKELETAL:   [ ]Normal   [ ]Weakness  [ ]Bed/Wheelchair bound [ ]Edema  NEUROLOGIC:   [ ]No focal deficits  [ ] Cognitive impairment  [ ] Dysphagia [ ]Dysarthria [ ] Paresis [ ]Other   SKIN: Please see flowsheets   [ ]Normal  [ ]Rash  [ ]Other  [ ]Pressure ulcer(s) [ ]y [ ]n present on admission    CRITICAL CARE:  [ ]Shock Present  [ ]Septic [ ]Cardiogenic [ ]Neurologic [ ]Hypovolemic  [ ]Vasopressors [ ]Inotropes  [ ]Respiratory failure present [ ]Mechanical Ventilation [ ]Non-invasive ventilatory support [ ]High-Flow   [ ]Acute  [ ]Chronic [ ]Hypoxic  [ ]Hypercarbic [ ]Other  [ ]Other organ failure     LABS:                        10.8   8.42  )-----------( 331      ( 20 Sep 2024 06:30 )             33.2   09-20    138  |  101  |  7   ----------------------------<  84  4.1   |  25  |  0.93    Ca    9.5      20 Sep 2024 06:30  Phos  3.1     09-20  Mg     2.10     09-20        Urinalysis Basic - ( 20 Sep 2024 06:30 )    Color: x / Appearance: x / SG: x / pH: x  Gluc: 84 mg/dL / Ketone: x  / Bili: x / Urobili: x   Blood: x / Protein: x / Nitrite: x   Leuk Esterase: x / RBC: x / WBC x   Sq Epi: x / Non Sq Epi: x / Bacteria: x      RADIOLOGY & ADDITIONAL STUDIES:    Protein Calorie Malnutrition Present: [ ]mild [ ]moderate [ ]severe [ ]underweight [ ]morbid obesity  https://www.andeal.org/vault/2440/web/files/ONC/Table_Clinical%20Characteristics%20to%20Document%20Malnutrition-White%20JV%20et%20al%202012.pdf    Height (cm): 172.7 (09-12-24 @ 09:36), 172.7 (08-20-24 @ 07:30), 172.7 (08-05-24 @ 15:06)  Weight (kg): 108.9 (09-12-24 @ 09:36), 111.1 (08-20-24 @ 07:30), 111.1 (08-05-24 @ 15:06)  BMI (kg/m2): 36.5 (09-12-24 @ 09:36), 37.3 (08-20-24 @ 07:30), 37.3 (08-05-24 @ 15:06)    [ ]PPSV2 < or = 30%  [ ]significant weight loss [ ]poor nutritional intake [ ]anasarca[ ]Artificial Nutrition    Other REFERRALS:  [ ]Hospice  [ ]Child Life  [ ]Social Work  [ ]Case management [ ]Holistic Therapy     Goals of Care Document: [facility] Geriatrics and Palliative Care  Gaye Mccloud, Palliative Care Attending  Contact Info: Page [phone] (including Nights/Weekends), message on Microsoft Teams (Gaye Mccloud), or leave  at Palliative Office [phone] (non-urgent)   Date of [id number] @ 11:48    SUBJECTIVE AND OBJECTIVE: Patient seen this AM lying in bed. He reports discomfort with flushing of his ZONIA drain last night but this AM woke up with less pain. He is eager to take a shower and is amenable to going home.     Indication for Geriatrics and Palliative Care Services/INTERVAL HPI: sx management in setting of new malignancy     OVERNIGHT EVENTS:   > 9/20: Over the past 24 hours, patient required PRNs 2mg PO dilaudid x3, 4mg PO dilaudid x2. Fentanyl patch started yesterday afternoon.     DNR on chart:  Allergies    No Known Allergies    Intolerances    MEDICATIONS  (STANDING):  amoxicillin  875 milliGRAM(s)/clavulanate 1 Tablet(s) Oral every 12 hours  fentaNYL   Patch  12 MICROgram(s)/Hr 1 Patch Transdermal every 72 hours  heparin   Injectable 5000 Unit(s) SubCutaneous every 8 hours  hydroxychloroquine 400 milliGRAM(s) Oral at bedtime  levoFLOXacin  Tablet 750 milliGRAM(s) Oral every 24 hours  levothyroxine 125 MICROGram(s) Oral daily  losartan 50 milliGRAM(s) Oral daily  melatonin 6 milliGRAM(s) Oral at bedtime  predniSONE   Tablet 7.5 milliGRAM(s) Oral daily    MEDICATIONS  (PRN):  cyclobenzaprine 5 milliGRAM(s) Oral three times a day PRN Muscle Spasm  HYDROmorphone   Tablet 2 milliGRAM(s) Oral every 4 hours PRN Moderate Pain (4 - 6)  HYDROmorphone   Tablet 4 milliGRAM(s) Oral every 4 hours PRN Severe Pain (7 - 10)  ondansetron Injectable 4 milliGRAM(s) IV Push three times a day PRN Nausea      ITEMS UNCHECKED ARE NOT PRESENT    PRESENT SYMPTOMS: [ ]Unable to self-report - see [ ] CPOT [ ] PAINADS [ ] RDOS  Source if other than patient:  [ ]Family   [ ]Team     Pain: [x ]yes [ ]no  QOL impact - unable to take a deep breath, difficulty performing ADLs without pain   Location -  at the site of ZONIA drain   Aggravating factors - positional and with deep inspiration   Quality - sharp   Radiation - denies   Timing- constant   Severity (0-10 scale): 8  Minimal acceptable level/pain goal (0-10 scale): 4    CPOT:    https://www.[facility].org/getattachment/[email or web address]/Critical-Care-Pain-Observation-Tool-(CPOT)    Dyspnea:                           [ ]Mild [ ]Moderate [ ]Severe  Anxiety:                             [ ]Mild [ ]Moderate [ ]Severe  Fatigue:                             [x ]Mild [ ]Moderate [ ]Severe  Nausea:                             [ ]Mild [ ]Moderate [ ]Severe  Loss of appetite:              [ ]Mild [ ]Moderate [ ]Severe  Constipation:                    [ ]Mild [ ]Moderate [ ]Severe    Other Symptoms:  [x ]All other review of systems negative     PCSSQ[Palliative Care Spiritual Screening Question]   Severity (0-10):  Chaplaincy Referral: [ ] yes [ ] refused [ ] following [x ] deferred     Caregiver [place]? : [ ] yes [ ] no [x ] Deferred [ ] Declined             Social work referral [ ] Patient & Family Centered Care Referral [ ]  Anticipatory Grief present?:  [ ] yes [ ] no  [x ] Deferred                  Social work referral [ ] Patient & Family Centered Care Referral [ ]      PHYSICAL EXAM:  Vital Signs Last 24 Hrs  T(C): 36.4 (20 Sep 2024 08:30), Max: 37 (19 Sep 2024 12:06)  T(F): 97.5 (20 Sep 2024 08:30), Max: 98.6 (19 Sep 2024 12:06)  HR: 65 (20 Sep 2024 08:30) (63 - 78)  BP: 118/68 (20 Sep 2024 08:30) (97/58 - 132/81)  BP(mean): --  RR: 18 (20 Sep 2024 08:30) (17 - 19)  SpO2: 100% (20 Sep 2024 08:30) (97% - 100%)    Parameters below as of 20 Sep 2024 08:30  Patient On (Oxygen Delivery Method): room air     I&O's Summary    19 Sep 2024 07:01  -  20 Sep 2024 07:00  --------------------------------------------------------  IN: 1090 mL / OUT: 705 mL / NET: 385 mL    20 Sep 2024 07:01  -  20 Sep 2024 11:48  --------------------------------------------------------  IN: 250 mL / OUT: 200 mL / NET: 50 mL       GENERAL: [ ]Cachexia    [x ]Alert  [x ]Oriented x 4  [ ]Lethargic  [ ]Unarousable  [x ]Verbal  [ ]Non-Verbal  Behavioral:   [ ] Anxiety  [ ] Delirium [ ] Agitation [x ] Other  HEENT:  [x ]Normal   [ ]Dry mouth   [ ]ET Tube/Trach  [ ]Oral lesions  PULMONARY:   [x ]Clear [ ]Tachypnea  [ ]Audible excessive secretions   [ ]Rhonchi        [ ]Right [ ]Left [ ]Bilateral  [ ]Crackles        [ ]Right [ ]Left [ ]Bilateral  [ ]Wheezing     [ ]Right [ ]Left [ ]Bilateral  [ ]Diminished breath sounds [ ]right [ ]left [ ]bilateral  CARDIOVASCULAR:    [ x]Regular [ ]Irregular [ ]Tachy  [ ]Edwardo [ ]Murmur [ ]Other  GASTROINTESTINAL: ZONIA drain in place with yellow fluid in bulb   [ x]Soft  [ ]Distended   [ ]+BS  [ x]Non tender [ ]Tender  [ ]Other [ ]PEG [ ]OGT/ NGT  Last BM: 9/18  GENITOURINARY:  [x ]Normal [ ] Incontinent   [ ]Oliguria/Anuria   [ ]Reich  MUSCULOSKELETAL:   [x ]Normal   [ ]Weakness  [ ]Bed/Wheelchair bound [ ]Edema  NEUROLOGIC:   [x ]No focal deficits  [ ]Cognitive impairment  [ ]Dysphagia [ ]Dysarthria [ ]Paresis [ ]Other   SKIN: Please see flowsheets   [ ]Normal  [ ]Rash  [ x]Other - ZONIA drain in place   [ ]Pressure ulcer(s)       Present on admission [ ]y [ ]n      CRITICAL CARE:  [ ]Shock Present  [ ]Septic [ ]Cardiogenic [ ]Neurologic [ ]Hypovolemic  [ ]Vasopressors [ ]Inotropes  [ ]Respiratory failure present [ ]Mechanical Ventilation [ ]Non-invasive ventilatory support [ ]High-Flow   [ ]Acute  [ ]Chronic [ ]Hypoxic  [ ]Hypercarbic [ ]Other  [ ]Other organ failure     LABS:                        10.8   8.42  )-----------( 331      ( 20 Sep 2024 06:30 )             33.2   09-20    138  |  101  |  7   ----------------------------<  84  4.1   |  25  |  0.93    Ca    9.5      20 Sep 2024 06:30  Phos  3.1     09-20  Mg     2.10     09-20        Urinalysis Basic - ( 20 Sep 2024 06:30 )    Color: x / Appearance: x / SG: x / pH: x  Gluc: 84 mg/dL / Ketone: x  / Bili: x / Urobili: x   Blood: x / Protein: x / Nitrite: x   Leuk Esterase: x / RBC: x / WBC x   Sq Epi: x / Non Sq Epi: x / Bacteria: x      RADIOLOGY & ADDITIONAL STUDIES: no new     Protein Calorie Malnutrition Present: [ ]mild [ ]moderate [ ]severe [ ]underweight [ ]morbid obesity  https://www.andeal.org/vault/2440/web/files/ONC/Table_Clinical%20Characteristics%20to%20Document%20Malnutrition-White%20JV%20et%20al%202012.pdf    Height (cm): 172.7 (09-12-24 @ 09:36), 172.7 (08-20-24 @ 07:30), 172.7 (08-05-24 @ 15:06)  Weight (kg): 108.9 (09-12-24 @ 09:36), 111.1 (08-20-24 @ 07:30), 111.1 (08-05-24 @ 15:06)  BMI (kg/m2): 36.5 (09-12-24 @ 09:36), 37.3 (08-20-24 @ 07:30), 37.3 (08-05-24 @ 15:06)    [ ]PPSV2 < or = 30%  [ ]significant weight loss [ ]poor nutritional intake [ ]anasarca[ ]Artificial Nutrition    Other REFERRALS:  [ ]Hospice  [ ]Child Life  [ ]Social Work  [ ]Case management [ ]Holistic Therapy

## 2024-09-20 NOTE — PROGRESS NOTE ADULT - ASSESSMENT
49yM with PMH of Asthma, Lupus/myositis overlap syndrome, Hypothyroidism, Gastric cancer, PSH Robotic distal gastrectomy w/ Heather en Y reconstruction, D2 lymphadenectomy on 8/20/2024 p/w generalized malaise for last few weeks. In ED, VSS. CT showing Loculated collection with air-fluid level is noted associated with the duodenal staple line, concerning for staple line dehiscence.  IR drainage for collection 2/2 duodenal stump blowout, placed 8 Fr drain 9/13. Palliative consulted for sx management in setting of recent diagnosis of malignancy.

## 2024-09-20 NOTE — PROGRESS NOTE ADULT - PROBLEM SELECTOR PLAN 1
Patient recently diagnosed with gastric cancer (7/2024) s/p distal gastrectomy w/ Heahter ex Y reconstruction, lymphadenectomy on 8/20/24 c/b fluid collection now s/p IR drainage on 9/13   > Appreciate surg/onc recs- Dr. Ferreira   > Per discussion with patient, he has a port and was planned to see Dr. Puente at [facility] on 10/2 to potentially begin adjunctive chemo.   > Appreciate rheum recs as patient also with lupus. Continue prednisone.  > Patient will need education for ZONIA drain maintenance, how to shower and is asking for supplies for dressing changes.

## 2024-09-20 NOTE — PROGRESS NOTE ADULT - TIME BILLING
Time spent for extensive review of the physical chart, electronic medical record, and documentation to obtain collateral information including but not limited to:  [x ] Inpatient records (ED, H&P, primary team, and consultants if applicable, care coordination)  [x ] Inpatient values/results (biomarkers, immunoassays, imaging, and microbiology results)  [x ] Current or proposed treatment plans  [ x ] Discussion with the primary team  [x ] Discussion with the patient, surrogate decision maker, or family    Time spent: >52 min

## 2024-09-23 ENCOUNTER — OUTPATIENT (OUTPATIENT)
Dept: OUTPATIENT SERVICES | Facility: HOSPITAL | Age: 49
LOS: 1 days | Discharge: HOME | End: 2024-09-23

## 2024-09-23 DIAGNOSIS — Z98.52 VASECTOMY STATUS: Chronic | ICD-10-CM

## 2024-09-23 DIAGNOSIS — C16.9 MALIGNANT NEOPLASM OF STOMACH, UNSPECIFIED: ICD-10-CM

## 2024-09-23 DIAGNOSIS — Z98.890 OTHER SPECIFIED POSTPROCEDURAL STATES: Chronic | ICD-10-CM

## 2024-09-23 DIAGNOSIS — Z90.49 ACQUIRED ABSENCE OF OTHER SPECIFIED PARTS OF DIGESTIVE TRACT: Chronic | ICD-10-CM

## 2024-09-24 RX ORDER — AMOXICILLIN AND CLAVULANATE POTASSIUM 250; 125 MG/1; MG/1
1 TABLET, FILM COATED ORAL
Qty: 2 | Refills: 0
Start: 2024-09-24 | End: 2024-09-24

## 2024-09-24 NOTE — CHART NOTE - NSCHARTNOTEFT_GEN_A_CORE
Post-Discharge Medication Review: Completed	  	  Patient's preferred pharmacy was updated in OMR: [facility]	  	  Patient contacted to offer medication counseling post-discharge. Medication reconciliation completed. Per patient, medications include:	   	  1.	amoxicillin-clavulanate 875 mg-125 mg oral tablet 1 tab(s) orally every 12 hours  2.	cyclobenzaprine 5 mg oral tablet 1 tab(s) orally 3 times a day x 30 days as needed for Muscle Spasm  3.	Dupixent Pre-filled Pen 300 mg/2 mL subcutaneous solution 300 milligram(s) subcutaneously every 2 weeks every sunday  4.	fentaNYL 12 mcg/hr transdermal film, extended release 1 patch transdermal every 72 hours MDD: 1  5.	HYDROmorphone 2 mg oral tablet 1 tab(s) orally every 4 hours as needed for Moderate Pain (4 - 6) can take 1-2 tabs as needed MDD: 6 tablets  6.	hydroxychloroquine 200 mg oral tablet 2 tab(s) orally once a day (at bedtime)  7.	levoFLOXacin 750 mg oral tablet 1 tab(s) orally once a day  8.	levothyroxine 125 mcg (0.125 mg) oral tablet 1 tab(s) orally once a day  9.	losartan 50 mg oral tablet 1 tab(s) orally once a day  10.	MiraLax oral powder for reconstitution 17 gram(s) orally once a day (at bedtime) as needed for constipation  11.	Narcan 4 mg/0.1 mL nasal spray 1 spray(s) intranasally once PLEASE ADMINISTER IF UNRESPONSIVE OR UNCONSCIOUS FROM SUSPECTED OPIOID OVERDOSE.  12.	Ozempic 8 mg/3 mL (2 mg dose) subcutaneous solution 2 milligram(s) subcutaneously once a week - NOTE: last dose 2 months ago - will follow up with PCP before resuming  13.	Potassium Chloride (Eqv-K-Tab) 20 mEq oral tablet, extended release 2 tab(s) orally once a day (at bedtime)  14.	predniSONE 2.5 mg oral tablet 3 tab(s) orally once a day  15.	torsemide 10 mg oral tablet 2 tab(s) orally once a week as needed for prn for swelling/pedal edema  16.	Trelegy Ellipta 100 mcg-62.5 mcg-25 mcg/inh inhalation powder 1 puff(s) inhaled once a day (in the morning)  Medications added to OMR (updated per discussion with patient):	  17.	acetaminophen 500 mg oral tablet 2 tab(s) orally 2 times a day as needed for pain  18.	gabapentin 300 mg oral capsule 1 cap(s) orally every 8 hours as needed   		  Medications removed from OMR (updated per discussion with patient):	  1.	acetaminophen 500 mg oral tablet 2 tab(s) orally every 6 hours  2.	gabapentin 300 mg oral capsule 1 cap(s) orally every 8 hours MDD: 3 tablets  3.	pantoprazole 40 mg oral delayed release tablet 1 tab(s) orally once a day (in the morning)     Medication name, indication, administration, side effect, and monitoring reviewed for new medications during post discharge counseling visit with patient. Patient demonstrated understanding. Counseling offered for all medications.	  	  Patient was discharged to complete 7 days of levofloxacin and Augmentin. He received one dose of levofloxacin but no doses of Augmentin prior to discharge, and both were sent for a 6 days supply. Reached out to the inpatient team to see if they wanted to send an additional two doses of Augmentin to [facility] to make 7 days total. Per inpatient team, will send prescription. Upon speaking to the patient, he states that since discharge he has been feeling extreme muscle tightness and weakness which he attributes to the levofloxacin. He stopped taking it after two days and feels better today. Discussed this with the inpatient team, who recommended that the patient call the attending surgeon's office to discuss recommendations for his antibiotic therapy. Patient expressed understanding and will call the office.    Patient reports that he has not taken pantoprazole in some time and is unsure if he should be on it - he will discuss this with his PCP during their scheduled appointment next week. Patient will also discuss with PCP whether to resume Ozempic as he has not taken it in two months. Patient reports only taking two doses of cyclobenzaprine since discharge, and is taking oral potassium tablets daily. Recommended reaching out to his PCP regarding potentially changing the oral potassium supplement to a liquid formulation if using cyclobenzaprine more frequently due to its anticholinergic effects. 	  	  Dang Cid, PharmD	  Clinical Pharmacy Specialist, Pharmacy Telehealth Team	  Can be reached via MS Teams or [phone]

## 2024-10-01 ENCOUNTER — APPOINTMENT (OUTPATIENT)
Dept: INTERNAL MEDICINE | Facility: CLINIC | Age: 49
End: 2024-10-01
Payer: COMMERCIAL

## 2024-10-01 ENCOUNTER — APPOINTMENT (OUTPATIENT)
Dept: SURGICAL ONCOLOGY | Facility: CLINIC | Age: 49
End: 2024-10-01
Payer: COMMERCIAL

## 2024-10-01 VITALS
WEIGHT: 238 LBS | SYSTOLIC BLOOD PRESSURE: 115 MMHG | BODY MASS INDEX: 37.35 KG/M2 | DIASTOLIC BLOOD PRESSURE: 72 MMHG | OXYGEN SATURATION: 98 % | HEIGHT: 67 IN | TEMPERATURE: 98.4 F | HEART RATE: 100 BPM | RESPIRATION RATE: 16 BRPM

## 2024-10-01 VITALS
HEART RATE: 94 BPM | BODY MASS INDEX: 37.35 KG/M2 | SYSTOLIC BLOOD PRESSURE: 120 MMHG | OXYGEN SATURATION: 98 % | DIASTOLIC BLOOD PRESSURE: 80 MMHG | WEIGHT: 238 LBS | HEIGHT: 67 IN

## 2024-10-01 VITALS — SYSTOLIC BLOOD PRESSURE: 104 MMHG | DIASTOLIC BLOOD PRESSURE: 60 MMHG

## 2024-10-01 DIAGNOSIS — R07.89 OTHER CHEST PAIN: ICD-10-CM

## 2024-10-01 DIAGNOSIS — Z87.898 PERSONAL HISTORY OF OTHER SPECIFIED CONDITIONS: ICD-10-CM

## 2024-10-01 DIAGNOSIS — C16.9 MALIGNANT NEOPLASM OF STOMACH, UNSPECIFIED: ICD-10-CM

## 2024-10-01 DIAGNOSIS — R10.13 EPIGASTRIC PAIN: ICD-10-CM

## 2024-10-01 DIAGNOSIS — K62.5 HEMORRHAGE OF ANUS AND RECTUM: ICD-10-CM

## 2024-10-01 DIAGNOSIS — R10.84 GENERALIZED ABDOMINAL PAIN: ICD-10-CM

## 2024-10-01 DIAGNOSIS — Z87.11 PERSONAL HISTORY OF PEPTIC ULCER DISEASE: ICD-10-CM

## 2024-10-01 DIAGNOSIS — R10.12 LEFT UPPER QUADRANT PAIN: ICD-10-CM

## 2024-10-01 DIAGNOSIS — R68.89 OTHER GENERAL SYMPTOMS AND SIGNS: ICD-10-CM

## 2024-10-01 DIAGNOSIS — I10 ESSENTIAL (PRIMARY) HYPERTENSION: ICD-10-CM

## 2024-10-01 DIAGNOSIS — J45.909 UNSPECIFIED ASTHMA, UNCOMPLICATED: ICD-10-CM

## 2024-10-01 DIAGNOSIS — E11.9 TYPE 2 DIABETES MELLITUS W/OUT COMPLICATIONS: ICD-10-CM

## 2024-10-01 DIAGNOSIS — R60.0 LOCALIZED EDEMA: ICD-10-CM

## 2024-10-01 LAB
ALBUMIN SERPL ELPH-MCNC: 4.3 G/DL
ALP BLD-CCNC: 108 U/L
ALT SERPL-CCNC: 23 U/L
ANION GAP SERPL CALC-SCNC: 15 MMOL/L
AST SERPL-CCNC: 25 U/L
BILIRUB SERPL-MCNC: 0.4 MG/DL
BUN SERPL-MCNC: 7 MG/DL
CALCIUM SERPL-MCNC: 9.2 MG/DL
CHLORIDE SERPL-SCNC: 101 MMOL/L
CO2 SERPL-SCNC: 24 MMOL/L
CREAT SERPL-MCNC: 0.94 MG/DL
EGFR: 100 ML/MIN/1.73M2
GLUCOSE SERPL-MCNC: 110 MG/DL
POTASSIUM SERPL-SCNC: 4 MMOL/L
PROT SERPL-MCNC: 7.5 G/DL
SODIUM SERPL-SCNC: 140 MMOL/L
T4 FREE SERPL-MCNC: 1.6 NG/DL
TSH SERPL-ACNC: 4.22 UIU/ML

## 2024-10-01 PROCEDURE — 99024 POSTOP FOLLOW-UP VISIT: CPT

## 2024-10-01 PROCEDURE — 36415 COLL VENOUS BLD VENIPUNCTURE: CPT

## 2024-10-01 PROCEDURE — 99495 TRANSJ CARE MGMT MOD F2F 14D: CPT

## 2024-10-01 NOTE — PHYSICAL EXAM
[Normal Sclera/Conjunctiva] : normal sclera/conjunctiva [Normal Outer Ear/Nose] : the outer ears and nose were normal in appearance [Soft] : abdomen soft [No HSM] : no HSM [Normal Bowel Sounds] : normal bowel sounds [Normal] : affect was normal and insight and judgment were intact [de-identified] : Healing surgical scar small near umbilicus draining clear yellow fluid diffuse tenderness

## 2024-10-01 NOTE — ASSESSMENT
[FreeTextEntry1] : Patient is a 47-year-old male with history of obesity obstructive sleep apnea, type 2 diabetes, hyperlipidemia, hypertension, lupus/overlap syndrome, asthma, who presents for follow-up of hospitalization for robotic hemigastrectomy for poorly differentiated adenocarcinoma lymph nodes negative  1.  Adenocarcinoma poorly differentiated Follow-up with oncology Dr. Heaton  2.  Status post hemigastrectomy robotic Collection near the duodenum with drains Follow-up with Dr. Cortez surgeon Finishing Levaquin and amoxicillin  3.  Asthma Well-controlled on inhalers  4.  Type 2 diabetes Off Ozempic check A1c  5 hypothyroidism Continue levothyroxine 125 mcg check TFTs  6.  Obesity Lost 9 pounds since surgery Ozempic on hold  7 pain controlled On hydromorphone  8 Lupus  restarting Dupixent and continue prednisone 7.5 mg daily  9. Hypertension Borderline low we will continue losartan 50 mg asymptomatic  #10 health maintenance Flu shot today follow-up in 1 month labs drawn in office

## 2024-10-01 NOTE — REVIEW OF SYSTEMS
[Abdominal Pain] : abdominal pain [Diarrhea] : diarrhea [Joint Pain] : joint pain [Back Pain] : back pain [Negative] : Heme/Lymph [Nausea] : no nausea [Constipation] : no constipation [Vomiting] : no vomiting [Heartburn] : no heartburn [Melena] : no melena [Joint Stiffness] : no joint stiffness [Muscle Pain] : no muscle pain [Muscle Weakness] : no muscle weakness [Joint Swelling] : no joint swelling

## 2024-10-01 NOTE — HISTORY OF PRESENT ILLNESS
[Post-hospitalization from ___ Hospital] : Post-hospitalization from [unfilled] Hospital [Admitted on: ___] : The patient was admitted on [unfilled] [Discharged on ___] : discharged on [unfilled] [Patient Contacted By: ____] : and contacted by [unfilled] [FreeTextEntry3] : Adenocarcinoma of the stomach status post robotic hemigastrectomy [FreeTextEntry2] : The patient is a 47-year-old male with history of lupus/overlap syndrome, asthma, obesity, type 2 diabetes, hyperlipidemia, hypertension, who was admitted on 9/12/2024 underwent robotic hemigastrectomy complicated by fistula requiring drainage placement was discharged on September 20, 2024 on Levaquin and amoxicillin.  Patient now presents for follow-up he denies fever chills but complains of abdominal diffuse pain denies nausea vomiting constipation appetite good.  Denies fever or chills.

## 2024-10-02 ENCOUNTER — APPOINTMENT (OUTPATIENT)
Dept: CT IMAGING | Facility: IMAGING CENTER | Age: 49
End: 2024-10-02
Payer: COMMERCIAL

## 2024-10-02 ENCOUNTER — APPOINTMENT (OUTPATIENT)
Dept: HEMATOLOGY ONCOLOGY | Facility: CLINIC | Age: 49
End: 2024-10-02
Payer: COMMERCIAL

## 2024-10-02 ENCOUNTER — RESULT REVIEW (OUTPATIENT)
Age: 49
End: 2024-10-02

## 2024-10-02 VITALS
RESPIRATION RATE: 16 BRPM | SYSTOLIC BLOOD PRESSURE: 111 MMHG | HEART RATE: 78 BPM | TEMPERATURE: 98.7 F | DIASTOLIC BLOOD PRESSURE: 77 MMHG | OXYGEN SATURATION: 99 %

## 2024-10-02 LAB
ESTIMATED AVERAGE GLUCOSE: 123 MG/DL
HBA1C MFR BLD HPLC: 5.9 %
HCT VFR BLD CALC: 37.8 %
HGB BLD-MCNC: 11.6 G/DL
MCHC RBC-ENTMCNC: 26.4 PG
MCHC RBC-ENTMCNC: 30.7 GM/DL
MCV RBC AUTO: 86.1 FL
PLATELET # BLD AUTO: 444 K/UL
RBC # BLD: 4.39 M/UL
RBC # FLD: 15.6 %
WBC # FLD AUTO: 8.7 K/UL

## 2024-10-02 PROCEDURE — 99215 OFFICE O/P EST HI 40 MIN: CPT

## 2024-10-02 PROCEDURE — G2211 COMPLEX E/M VISIT ADD ON: CPT | Mod: NC

## 2024-10-02 PROCEDURE — 74177 CT ABD & PELVIS W/CONTRAST: CPT | Mod: 26

## 2024-10-02 NOTE — HISTORY OF PRESENT ILLNESS
[Disease: _____________________] : Disease: [unfilled] [de-identified] : Mr. Pinzon is a 49 year old gentlemen presenting to the office for an initial consultation for gastric cancer.  Patient reports that he has had ongoing LUQ pain for the last couple of years but more recently as of a few months ago, pain worsened and moved more laterally than previous thus he sought re-evaluation for his symptoms. Reports that pain is constant, sharp and exacerbated by raising his L arm above his head, which makes him feel like something is tearing inside. It is not affected by other body movements or foods. Pain is currently rated 7/10. It is associated with some dyspepsia and pain with deep inspiration. He was started on Protonix but denies feeling much relief. Of note, when he first noted the symptoms it was soon after he had developed a compression fracture of T7 and costochondritis. He was also having some dysphagia and underwent an EGD in 2022, which he reports did not reveal an etiology of his symptoms.  Patient has had ongoing rectal bleeding since  and reports having had 3 colonoscopies, which have been nondiagnostic. Most recent colonoscopy was 24 with EGD, however prep was poor so needs repeat done. Reports having bright red blood in stool once about every 8-9 days, appears to fill toilet bowl. Was on Ozempic for diabetes and reports losing ~40 lbs over the past year, but this was recently held this past week in anticipation of surgery for gastric cancer. Only eats one meal/day. Has complaints of extreme fatigue but is able to work. Reports that being in the light/sun drains his energy.  Underwent EGD on  with Dr. Weathers Pathology:  Duodenum, biopsy: - Duodenal mucosa with preserved villous architecture, focal increase in intraepithelial lymphocytes and mild lamina propria chronic inflammation (see note)  Note: Part 1.  The above changes may be secondary to concomitant Helicobacter pylori infection. 2. Stomach, antrum, biopsy: - Gastric antral mucosa with Helicobacter pylori-associated chronic gastritis - Negative for intestinal metaplasia and dysplasia 3. Stomach, body, biopsy: - Gastric transitional mucosa with Helicobacter pylori-associated active chronic gastritis and patchy intestinal metaplasia - Negative for dysplasia 4. 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  1. Lymph nodes, retroperitoneal, dissection - Eleven lymph nodes negative for carcinoma (0/11) 2. Distal stomach and lymph nodes, distal gastrectomy - Invasive poorly differentiated adenocarcinoma with signet ring cell feature of stomach - Tumor invading muscularis propria - Resection margins negative for carcinoma - Lymphovascular invasion present - Nine lymph nodes negative for carcinoma (0/9) - Pathologic Stage (AJCC 8th Ed): pT2 N0 - See synoptic summary 3. Lymph nodes, dissection - Four lymph nodes negative for carcinoma (0/4)  He was readmitted to [facility] on  and underwent CT A/P which revealed the following; Status post interval distal gastrectomy with a Heather-en-Y. Loculated collection with air-fluid level is noted associated with the  duodenal staple line, concerning for staple line dehiscence. Additional loculated collection is noted in the right anterior upper  abdomen and appears to be associated with the anterior abdominal wall and  the periduodenal collection.  On  underwent CT guided drainage of right abdominal  fluid collection with 8 Fr drain placed.  f/u CT on  revealed interval percutaneous drainage of a intraperitoneal fluid collection  adjacent to the duodenal staple line which is decreased in size.  Additional more superficial fluid collection just deep to the right internal oblique muscles is increased in size measuring up to 4.0 cm,  previously up to 3.1 cm.  Was sent home on PO Levaquin and amoxicillin which he completed on .  Today he continues to have constant RUQ abdominal pain after his surgery. Certain movements increase the pain. Currently on Dilaudid 2 mg PO Q6 H and Tylenol PRN. ZONIA drain in place and has follow up with IR on 10/9/24.  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

## 2024-10-02 NOTE — PHYSICAL EXAM
[Restricted in physically strenuous activity but ambulatory and able to carry out work of a light or sedentary nature] : Status 1- Restricted in physically strenuous activity but ambulatory and able to carry out work of a light or sedentary nature, e.g., light house work, office work [Normal] : grossly intact [de-identified] : normal conjunctiva b/l, anicteric [de-identified] : moist mucous membranes, no oral lesions; poor dentition [de-identified] : soft, nontender, no reproducible pain on palpation, +BS [de-identified] : no gross deformities [de-identified] : anxious

## 2024-10-02 NOTE — REVIEW OF SYSTEMS
[Fatigue] : fatigue [Negative] : Genitourinary [Fever] : no fever [Chills] : no chills [Night Sweats] : no night sweats [Vomiting] : no vomiting [Constipation] : no constipation [FreeTextEntry2] : weight loss on Ozempic [FreeTextEntry7] : stools less formed but denies diarrhea; LUQ as above in HPI [FreeTextEntry9] : chronic back pain,

## 2024-10-02 NOTE — ASSESSMENT
[Designated Health Care Proxy] : Designated Health Care Proxy [Name: ___] : Name: [unfilled] [Relationship: ___] : Relationship: [unfilled] [FreeTextEntry1] : 48 yo M with medical history of SLE, asthma, hypothyroidism, steroid-induced osteoporosis, diabetes, HTN, compression fracture of T7, and myositis here to establish care for newly diagnosed gastric adenocarcinoma with signet ring cells (HER2 neg, MMR proficient, and has a TPS score of 15) and additional complaints of persistent bright red blood per rectum of unclear etiology.  We explained the diagnosis with patient and patient's partner and discussed that it appears to be early stage clinically, however further staging work up remains pending. CT C/A/P on 7/6/24 were negative for evidence of metastatic disease. Reviewed that treatment could include a combination of surgery alone vs. chemotherapy + surgery vs. chemotherapy + RT + surgery. We will need to wait for final clinical staging to determine appropriate course.   Patient is in significant pain/discomfort but believe this to be more related to his costochondritis/T7 compression fracture as opposed to his gastric cancer as the ulcer noted on EGD was small at 5 mm and unlikely to produce such severe symptoms without any other gross evidence of disease on imaging.   Of note, PET CT scheduled for 7/19. Given patient with SLE, will need to interpret PET CT results with this in mind as there could be false positives for metastatic disease in the setting of inflammation. Will also need to control glucose levels given diabetes.  Patient is s/p robotic distal subtotal gastrectomy/RNY 08/20/2024.  Pathology revealed pT2 N0 (AJCC 1B) - Invasive poorly differentiated adenocarcinoma with signet ring cell feature of stomach with resection margins are negative, + LVI  He was read[facility] to [facility] on 9/12 and underwent CT A/P which revealed loculated collection on the duodenal staple line concerning for staple line dehiscence.  Additional collection is noted in the right anterior upper abdomen. He subsequently underwent IR guided drainage with placement of ZONIA drain on 9/13. Was sent home on PO Levaquin and amoxicillin which he completed on 9/30.  Today he continues to have constant RUQ abdominal pain after his surgery. Currently on Dilaudid 2 mg PO Q6 H and Tylenol PRN. ZONIA drain in place and has follow up with IR on 10/9/24.  D/W Dr. Ferreira, will get STAT CT A/P to assess for worsening fluid collection.  He will require 6 months of adjuvant therapy however will need to address his pain prior to induction of therapy. Will offer CAPOX.  I think pain is superficial and reproducible near drain site. Pain is moderately severe and prevents him from standing up straight or deep breathing.  Labs + STAT CAT today.  Will install mediport once pain has improved and clearly no infection.

## 2024-10-02 NOTE — CONSULT LETTER
[Dear  ___] : Dear  [unfilled], [Consult Letter:] : I had the pleasure of evaluating your patient, [unfilled]. [Please see my note below.] : Please see my note below. [Consult Closing:] : Thank you very much for allowing me to participate in the care of this patient.  If you have any questions, please do not hesitate to contact me. [Sincerely,] : Sincerely, [DrMadhu  ___] : Dr. HENRIQUEZ [DrMadhu ___] : Dr. HENRIQUEZ [___] : [unfilled]

## 2024-10-03 LAB
ALBUMIN SERPL ELPH-MCNC: 4.3 G/DL
ALP BLD-CCNC: 112 U/L
ALT SERPL-CCNC: 25 U/L
ANION GAP SERPL CALC-SCNC: 15 MMOL/L
APTT BLD: 34.3 SEC
AST SERPL-CCNC: 24 U/L
BILIRUB SERPL-MCNC: 0.4 MG/DL
BUN SERPL-MCNC: 7 MG/DL
CALCIUM SERPL-MCNC: 9.3 MG/DL
CANCER AG19-9 SERPL-ACNC: <2 U/ML
CEA SERPL-MCNC: 2.3 NG/ML
CHLORIDE SERPL-SCNC: 100 MMOL/L
CO2 SERPL-SCNC: 25 MMOL/L
CREAT SERPL-MCNC: 0.96 MG/DL
EGFR: 98 ML/MIN/1.73M2
GLUCOSE SERPL-MCNC: 86 MG/DL
INR PPP: 1.07 RATIO
POTASSIUM SERPL-SCNC: 4.4 MMOL/L
PROT SERPL-MCNC: 7.7 G/DL
PT BLD: 12.6 SEC
SODIUM SERPL-SCNC: 141 MMOL/L

## 2024-10-05 NOTE — HISTORY OF PRESENT ILLNESS
[de-identified] : Patient reports that he has had ongoing LUQ pain for the last couple of years but more recently as of a few months ago, pain worsened and moved more laterally than previous thus he sought re-evaluation for his symptoms. Reports that pain is constant, sharp and exacerbated by raising his L arm above his head, which makes him feel like something is tearing inside. It is not affected by other body movements or foods. Pain is currently rated 7/10. It is associated with some dyspepsia and pain with deep inspiration. He was started on Protonix but denies feeling much relief. Of note, when he first noted the symptoms it was soon after he had developed a compression fracture of T7 and costochondritis. He was also having some dysphagia and underwent an EGD in 2022, which he reports did not reveal an etiology of his symptoms.  Patient has had ongoing rectal bleeding since  and reports having had 3 colonoscopies, which have been nondiagnostic. Most recent colonoscopy was 24 with EGD, however prep was poor so needs repeat done. Reports having bright red blood in stool once about every 8-9 days, appears to fill toilet bowl. Was on Ozempic for diabetes and reports losing ~40 lbs over the past year, but this was recently held this past week in anticipation of surgery for gastric cancer. Only eats one meal/day. Has complaints of extreme fatigue but is able to work. Reports that being in the light/sun drains his energy.  Underwent EGD on  with Dr. Weathers Pathology:  Duodenum, biopsy: - Duodenal mucosa with preserved villous architecture, focal increase in intraepithelial lymphocytes and mild lamina propria chronic inflammation (see note)  Note: Part 1. The above changes may be secondary to concomitant Helicobacter pylori infection. 2. Stomach, antrum, biopsy: - Gastric antral mucosa with Helicobacter pylori-associated chronic gastritis - Negative for intestinal metaplasia and dysplasia 3. Stomach, body, biopsy: - Gastric transitional mucosa with Helicobacter pylori-associated active chronic gastritis and patchy intestinal metaplasia - Negative for dysplasia 4. 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  EDG 2024 with Dr. Weathers demonstrated a small 5 mm gastric ulcer along the distal greater curvature body.  Biopsy returned as poorly differentiated adenocarcinoma with signet cell features.  Colonoscopy could not be performed due to poor preparation. CT chest/abdomen/pelvis 2024 did not demonstrate evidence of distant or regional metastases.  There was no abnormal gastric wall thickening. He is referred for surgical treatment.  2024: Patient is s/p robotic distal subtotal gastrectomy/RNY 2024.  He was discharged on POD#7.  He reports residual upper abdominal pain, lethargy and dark urine.  He is overall tolerating diet but has had 2 episodes of emesis since discharge, attributed to over-eating.  He reports flatus/bowel movements. Pathology has not resulted as of this morning.  10/01/2024: [facility]/weakness.  CT scan evaluation demonstrated a para-duodenal stump collection consistent with leak.  He is s/p IR drainage. He is tolerating diet well but complains of pain at drain site. Pathology: pT2N0 (0) invasive poorly differentiated adenocarcinoma with signet ring cell features with LVI,margins negative.

## 2024-10-05 NOTE — PHYSICAL EXAM
[FreeTextEntry1] : Abdomen: soft, nontender/nondistended.  Incisions healed well.  IR drain with clear serous nonbilious fluid. Tender at insertion site but without erythema or purulence.

## 2024-10-05 NOTE — ASSESSMENT
[FreeTextEntry1] : IR tube check is scheduled for next week. Repeat CT abdomen/pelvis if symptoms worsen. Follow up with medical oncology to discuss adjuvant treatment after resolution of duodenal leak.

## 2024-10-07 ENCOUNTER — TRANSCRIPTION ENCOUNTER (OUTPATIENT)
Age: 49
End: 2024-10-07

## 2024-10-07 ENCOUNTER — RESULT REVIEW (OUTPATIENT)
Age: 49
End: 2024-10-07

## 2024-10-07 ENCOUNTER — APPOINTMENT (OUTPATIENT)
Dept: CT IMAGING | Facility: HOSPITAL | Age: 49
End: 2024-10-07

## 2024-10-07 ENCOUNTER — OUTPATIENT (OUTPATIENT)
Dept: OUTPATIENT SERVICES | Facility: HOSPITAL | Age: 49
LOS: 1 days | End: 2024-10-07
Payer: COMMERCIAL

## 2024-10-07 VITALS
SYSTOLIC BLOOD PRESSURE: 123 MMHG | DIASTOLIC BLOOD PRESSURE: 83 MMHG | RESPIRATION RATE: 18 BRPM | HEART RATE: 82 BPM | OXYGEN SATURATION: 99 % | TEMPERATURE: 97 F

## 2024-10-07 VITALS
DIASTOLIC BLOOD PRESSURE: 84 MMHG | SYSTOLIC BLOOD PRESSURE: 132 MMHG | RESPIRATION RATE: 18 BRPM | OXYGEN SATURATION: 100 % | HEART RATE: 78 BPM | TEMPERATURE: 97 F

## 2024-10-07 DIAGNOSIS — Z98.890 OTHER SPECIFIED POSTPROCEDURAL STATES: Chronic | ICD-10-CM

## 2024-10-07 DIAGNOSIS — T81.41XA INFECTION FOLLOWING A PROCEDURE, SUPERFICIAL INCISIONAL SURGICAL SITE, INITIAL ENCOUNTER: ICD-10-CM

## 2024-10-07 DIAGNOSIS — Z90.3 ACQUIRED ABSENCE OF STOMACH [PART OF]: ICD-10-CM

## 2024-10-07 DIAGNOSIS — Z46.82 ENCOUNTER FOR FITTING AND ADJUSTMENT OF NON-VASCULAR CATHETER: ICD-10-CM

## 2024-10-07 DIAGNOSIS — R10.11 RIGHT UPPER QUADRANT PAIN: ICD-10-CM

## 2024-10-07 DIAGNOSIS — Z98.52 VASECTOMY STATUS: Chronic | ICD-10-CM

## 2024-10-07 DIAGNOSIS — Z90.49 ACQUIRED ABSENCE OF OTHER SPECIFIED PARTS OF DIGESTIVE TRACT: Chronic | ICD-10-CM

## 2024-10-07 DIAGNOSIS — Z85.028 PERSONAL HISTORY OF OTHER MALIGNANT NEOPLASM OF STOMACH: ICD-10-CM

## 2024-10-07 PROCEDURE — 49424 ASSESS CYST CONTRAST INJECT: CPT

## 2024-10-07 PROCEDURE — 76080 X-RAY EXAM OF FISTULA: CPT | Mod: 26

## 2024-10-07 RX ORDER — AMOXICILLIN AND CLAVULANATE POTASSIUM 875; 125 MG/1; MG/1
875-125 TABLET, COATED ORAL
Qty: 28 | Refills: 0 | Status: ACTIVE | COMMUNITY
Start: 2024-10-07 | End: 1900-01-01

## 2024-10-11 ENCOUNTER — OUTPATIENT (OUTPATIENT)
Dept: OUTPATIENT SERVICES | Facility: HOSPITAL | Age: 49
LOS: 1 days | End: 2024-10-11
Payer: COMMERCIAL

## 2024-10-11 ENCOUNTER — RESULT REVIEW (OUTPATIENT)
Age: 49
End: 2024-10-11

## 2024-10-11 ENCOUNTER — NON-APPOINTMENT (OUTPATIENT)
Age: 49
End: 2024-10-11

## 2024-10-11 VITALS
HEART RATE: 81 BPM | TEMPERATURE: 99 F | RESPIRATION RATE: 18 BRPM | SYSTOLIC BLOOD PRESSURE: 122 MMHG | DIASTOLIC BLOOD PRESSURE: 77 MMHG | OXYGEN SATURATION: 100 %

## 2024-10-11 VITALS
HEART RATE: 72 BPM | SYSTOLIC BLOOD PRESSURE: 104 MMHG | RESPIRATION RATE: 14 BRPM | DIASTOLIC BLOOD PRESSURE: 72 MMHG | OXYGEN SATURATION: 95 %

## 2024-10-11 DIAGNOSIS — Z98.890 OTHER SPECIFIED POSTPROCEDURAL STATES: Chronic | ICD-10-CM

## 2024-10-11 DIAGNOSIS — Z90.49 ACQUIRED ABSENCE OF OTHER SPECIFIED PARTS OF DIGESTIVE TRACT: Chronic | ICD-10-CM

## 2024-10-11 DIAGNOSIS — Z98.52 VASECTOMY STATUS: Chronic | ICD-10-CM

## 2024-10-11 DIAGNOSIS — C16.9 MALIGNANT NEOPLASM OF STOMACH, UNSPECIFIED: ICD-10-CM

## 2024-10-11 LAB — GLUCOSE BLDC GLUCOMTR-MCNC: 84 MG/DL — SIGNIFICANT CHANGE UP (ref 70–99)

## 2024-10-11 PROCEDURE — 76937 US GUIDE VASCULAR ACCESS: CPT | Mod: 26

## 2024-10-11 PROCEDURE — 77001 FLUOROGUIDE FOR VEIN DEVICE: CPT | Mod: 26,GC

## 2024-10-11 PROCEDURE — 36561 INSERT TUNNELED CV CATH: CPT | Mod: RT

## 2024-10-11 RX ORDER — ONDANSETRON HCL/PF 4 MG/2 ML
4 VIAL (ML) INJECTION ONCE
Refills: 0 | Status: ACTIVE | OUTPATIENT
Start: 2024-10-11 | End: 2025-09-09

## 2024-10-11 RX ORDER — ACETAMINOPHEN 325 MG/1
2 TABLET ORAL
Refills: 0 | DISCHARGE

## 2024-10-11 RX ORDER — FENTANYL CITRATE-0.9 % NACL/PF 300MCG/30
25 PATIENT CONTROLLED ANALGESIA VIAL INJECTION
Refills: 0 | Status: DISCONTINUED | OUTPATIENT
Start: 2024-10-11 | End: 2024-10-11

## 2024-10-11 RX ORDER — GABAPENTIN 100 MG
1 CAPSULE ORAL
Refills: 0 | DISCHARGE

## 2024-10-11 RX ORDER — SEMAGLUTIDE 1 MG/.5ML
2 INJECTION, SOLUTION SUBCUTANEOUS
Refills: 0 | DISCHARGE

## 2024-10-11 RX ORDER — FENTANYL CITRATE-0.9 % NACL/PF 300MCG/30
50 PATIENT CONTROLLED ANALGESIA VIAL INJECTION
Refills: 0 | Status: DISCONTINUED | OUTPATIENT
Start: 2024-10-11 | End: 2024-10-11

## 2024-10-11 NOTE — PRE PROCEDURE NOTE - PRE PROCEDURE EVALUATION
Interventional Radiology    HPI: 49y Male with gastric CA    Review of Systems:   Constitutional: No fever, weight loss, or fatigue  Neurological: No headaches, memory loss, loss of strength, numbness, or tremors  Respiratory: No cough, wheezing, chills or hemoptysis; No shortness of breath  Cardiovascular: No chest pain, palpitations, dizziness, or leg swelling  Gastrointestinal: No abdominal or epigastric pain. No nausea, vomiting, or hematemesis; No diarrhea or constipation. No melena or hematochezia.  Skin: No itching, burning, rashes, or lesions   Musculoskeletal: No joint pain or swelling; No muscle, back, or extremity pain    Allergies: Motrin (Short breath)    Medications (Abx/Cardiac/Anticoagulation/Blood Products)      Data:    T(C): --  HR: --  BP: --  RR: --  SpO2: --            Physical Exam  General: No acute distress, nontoxic, A&Ox3  Chest: Non labored breathing  Abdomen: Non-distended, non-tender, no preitoneal signs; indwelling drain  Extremities: No swelling, warm, ____femoral/dp/pt pulses + ____. No pedal edema or calf tenderness notes.  Skin: No rashes or lesions    RADIOLOGY & ADDITIONAL TESTS:    Imaging Reviewed    H & P Note Reviewed from: 10/2/24  Plan: 49y Male presents for port placement  -Risks/Benefits/alternatives explained with the patient and/or healthcare proxy and witnessed informed consent obtained.

## 2024-10-12 LAB
CULTURE RESULTS: SIGNIFICANT CHANGE UP
SPECIMEN SOURCE: SIGNIFICANT CHANGE UP

## 2024-10-15 ENCOUNTER — NON-APPOINTMENT (OUTPATIENT)
Age: 49
End: 2024-10-15

## 2024-10-17 ENCOUNTER — APPOINTMENT (OUTPATIENT)
Dept: GERIATRICS | Facility: CLINIC | Age: 49
End: 2024-10-17

## 2024-10-17 ENCOUNTER — APPOINTMENT (OUTPATIENT)
Dept: HEMATOLOGY ONCOLOGY | Facility: CLINIC | Age: 49
End: 2024-10-17
Payer: COMMERCIAL

## 2024-10-17 VITALS
HEART RATE: 87 BPM | DIASTOLIC BLOOD PRESSURE: 76 MMHG | RESPIRATION RATE: 16 BRPM | WEIGHT: 242.99 LBS | TEMPERATURE: 98.4 F | BODY MASS INDEX: 36.83 KG/M2 | OXYGEN SATURATION: 98 % | HEIGHT: 68 IN | SYSTOLIC BLOOD PRESSURE: 114 MMHG

## 2024-10-17 PROCEDURE — 99204 OFFICE O/P NEW MOD 45 MIN: CPT

## 2024-10-17 PROCEDURE — G2211 COMPLEX E/M VISIT ADD ON: CPT | Mod: NC

## 2024-10-17 PROCEDURE — 99215 OFFICE O/P EST HI 40 MIN: CPT

## 2024-10-17 RX ORDER — LIDOCAINE AND PRILOCAINE 25; 25 MG/G; MG/G
2.5-2.5 CREAM TOPICAL
Qty: 1 | Refills: 3 | Status: ACTIVE | COMMUNITY
Start: 2024-10-17 | End: 1900-01-01

## 2024-10-17 RX ORDER — DEXAMETHASONE 4 MG/1
4 TABLET ORAL
Qty: 30 | Refills: 0 | Status: ACTIVE | COMMUNITY
Start: 2024-10-17 | End: 1900-01-01

## 2024-10-17 RX ORDER — FENTANYL 12 UG/H
12 PATCH, EXTENDED RELEASE TRANSDERMAL
Qty: 10 | Refills: 0 | Status: ACTIVE | COMMUNITY
Start: 2024-10-17 | End: 1900-01-01

## 2024-10-17 RX ORDER — ONDANSETRON 4 MG/1
4 TABLET, ORALLY DISINTEGRATING ORAL
Qty: 30 | Refills: 1 | Status: ACTIVE | COMMUNITY
Start: 2024-10-17 | End: 1900-01-01

## 2024-10-17 RX ORDER — CAPECITABINE 500 MG/1
500 TABLET ORAL
Qty: 84 | Refills: 1 | Status: ACTIVE | COMMUNITY
Start: 2024-10-17 | End: 1900-01-01

## 2024-10-17 RX ORDER — PROCHLORPERAZINE MALEATE 10 MG/1
10 TABLET ORAL EVERY 6 HOURS
Qty: 30 | Refills: 0 | Status: ACTIVE | COMMUNITY
Start: 2024-10-17 | End: 1900-01-01

## 2024-10-21 ENCOUNTER — RESULT REVIEW (OUTPATIENT)
Age: 49
End: 2024-10-21

## 2024-10-21 ENCOUNTER — OUTPATIENT (OUTPATIENT)
Dept: OUTPATIENT SERVICES | Facility: HOSPITAL | Age: 49
LOS: 1 days | End: 2024-10-21

## 2024-10-21 VITALS
OXYGEN SATURATION: 97 % | RESPIRATION RATE: 18 BRPM | SYSTOLIC BLOOD PRESSURE: 124 MMHG | TEMPERATURE: 98 F | DIASTOLIC BLOOD PRESSURE: 83 MMHG | HEART RATE: 96 BPM

## 2024-10-21 DIAGNOSIS — R10.11 RIGHT UPPER QUADRANT PAIN: ICD-10-CM

## 2024-10-21 DIAGNOSIS — Z98.890 OTHER SPECIFIED POSTPROCEDURAL STATES: Chronic | ICD-10-CM

## 2024-10-21 DIAGNOSIS — Z90.49 ACQUIRED ABSENCE OF OTHER SPECIFIED PARTS OF DIGESTIVE TRACT: Chronic | ICD-10-CM

## 2024-10-21 DIAGNOSIS — Z85.028 PERSONAL HISTORY OF OTHER MALIGNANT NEOPLASM OF STOMACH: ICD-10-CM

## 2024-10-21 DIAGNOSIS — Z46.82 ENCOUNTER FOR FITTING AND ADJUSTMENT OF NON-VASCULAR CATHETER: ICD-10-CM

## 2024-10-21 DIAGNOSIS — Z98.52 VASECTOMY STATUS: Chronic | ICD-10-CM

## 2024-10-21 PROCEDURE — 76080 X-RAY EXAM OF FISTULA: CPT | Mod: 26

## 2024-10-21 PROCEDURE — 49424 ASSESS CYST CONTRAST INJECT: CPT | Mod: 79

## 2024-10-22 DIAGNOSIS — Z45.2 ENCOUNTER FOR ADJUSTMENT AND MANAGEMENT OF VASCULAR ACCESS DEVICE: ICD-10-CM

## 2024-10-23 ENCOUNTER — TRANSCRIPTION ENCOUNTER (OUTPATIENT)
Age: 49
End: 2024-10-23

## 2024-10-23 ENCOUNTER — NON-APPOINTMENT (OUTPATIENT)
Age: 49
End: 2024-10-23

## 2024-10-23 ENCOUNTER — APPOINTMENT (OUTPATIENT)
Dept: HEMATOLOGY ONCOLOGY | Facility: CLINIC | Age: 49
End: 2024-10-23
Payer: COMMERCIAL

## 2024-10-23 ENCOUNTER — RESULT REVIEW (OUTPATIENT)
Age: 49
End: 2024-10-23

## 2024-10-23 ENCOUNTER — APPOINTMENT (OUTPATIENT)
Dept: INFUSION THERAPY | Facility: HOSPITAL | Age: 49
End: 2024-10-23

## 2024-10-23 ENCOUNTER — APPOINTMENT (OUTPATIENT)
Dept: HEMATOLOGY ONCOLOGY | Facility: CLINIC | Age: 49
End: 2024-10-23

## 2024-10-23 DIAGNOSIS — M32.19 OTHER ORGAN OR SYSTEM INVOLVEMENT IN SYSTEMIC LUPUS ERYTHEMATOSUS: ICD-10-CM

## 2024-10-23 LAB
ALBUMIN SERPL ELPH-MCNC: 4.1 G/DL — SIGNIFICANT CHANGE UP (ref 3.3–5)
ALP SERPL-CCNC: 96 U/L — SIGNIFICANT CHANGE UP (ref 40–120)
ALT FLD-CCNC: 19 U/L — SIGNIFICANT CHANGE UP (ref 10–45)
ANION GAP SERPL CALC-SCNC: 11 MMOL/L — SIGNIFICANT CHANGE UP (ref 5–17)
AST SERPL-CCNC: 29 U/L — SIGNIFICANT CHANGE UP (ref 10–40)
BASOPHILS # BLD AUTO: 0.04 K/UL — SIGNIFICANT CHANGE UP (ref 0–0.2)
BASOPHILS NFR BLD AUTO: 0.6 % — SIGNIFICANT CHANGE UP (ref 0–2)
BILIRUB SERPL-MCNC: 0.4 MG/DL — SIGNIFICANT CHANGE UP (ref 0.2–1.2)
BUN SERPL-MCNC: 7 MG/DL — SIGNIFICANT CHANGE UP (ref 7–23)
CALCIUM SERPL-MCNC: 9.7 MG/DL — SIGNIFICANT CHANGE UP (ref 8.4–10.5)
CHLORIDE SERPL-SCNC: 102 MMOL/L — SIGNIFICANT CHANGE UP (ref 96–108)
CO2 SERPL-SCNC: 26 MMOL/L — SIGNIFICANT CHANGE UP (ref 22–31)
CREAT SERPL-MCNC: 0.85 MG/DL — SIGNIFICANT CHANGE UP (ref 0.5–1.3)
EGFR: 107 ML/MIN/1.73M2 — SIGNIFICANT CHANGE UP
EOSINOPHIL # BLD AUTO: 0.29 K/UL — SIGNIFICANT CHANGE UP (ref 0–0.5)
EOSINOPHIL NFR BLD AUTO: 4.4 % — SIGNIFICANT CHANGE UP (ref 0–6)
GLUCOSE SERPL-MCNC: 96 MG/DL — SIGNIFICANT CHANGE UP (ref 70–99)
HCT VFR BLD CALC: 37.1 % — LOW (ref 39–50)
HGB BLD-MCNC: 12.2 G/DL — LOW (ref 13–17)
IMM GRANULOCYTES NFR BLD AUTO: 0.6 % — SIGNIFICANT CHANGE UP (ref 0–0.9)
LYMPHOCYTES # BLD AUTO: 1.85 K/UL — SIGNIFICANT CHANGE UP (ref 1–3.3)
LYMPHOCYTES # BLD AUTO: 28 % — SIGNIFICANT CHANGE UP (ref 13–44)
MAGNESIUM SERPL-MCNC: 2.1 MG/DL — SIGNIFICANT CHANGE UP (ref 1.6–2.6)
MCHC RBC-ENTMCNC: 27.1 PG — SIGNIFICANT CHANGE UP (ref 27–34)
MCHC RBC-ENTMCNC: 32.9 G/DL — SIGNIFICANT CHANGE UP (ref 32–36)
MCV RBC AUTO: 82.3 FL — SIGNIFICANT CHANGE UP (ref 80–100)
MONOCYTES # BLD AUTO: 0.82 K/UL — SIGNIFICANT CHANGE UP (ref 0–0.9)
MONOCYTES NFR BLD AUTO: 12.4 % — SIGNIFICANT CHANGE UP (ref 2–14)
NEUTROPHILS # BLD AUTO: 3.57 K/UL — SIGNIFICANT CHANGE UP (ref 1.8–7.4)
NEUTROPHILS NFR BLD AUTO: 54 % — SIGNIFICANT CHANGE UP (ref 43–77)
NRBC # BLD: 0 /100 WBCS — SIGNIFICANT CHANGE UP (ref 0–0)
PLATELET # BLD AUTO: 282 K/UL — SIGNIFICANT CHANGE UP (ref 150–400)
POTASSIUM SERPL-MCNC: 3.5 MMOL/L — SIGNIFICANT CHANGE UP (ref 3.5–5.3)
POTASSIUM SERPL-SCNC: 3.5 MMOL/L — SIGNIFICANT CHANGE UP (ref 3.5–5.3)
PROT SERPL-MCNC: 7.6 G/DL — SIGNIFICANT CHANGE UP (ref 6–8.3)
RBC # BLD: 4.51 M/UL — SIGNIFICANT CHANGE UP (ref 4.2–5.8)
RBC # FLD: 15.2 % — HIGH (ref 10.3–14.5)
SODIUM SERPL-SCNC: 139 MMOL/L — SIGNIFICANT CHANGE UP (ref 135–145)
WBC # BLD: 6.61 K/UL — SIGNIFICANT CHANGE UP (ref 3.8–10.5)
WBC # FLD AUTO: 6.61 K/UL — SIGNIFICANT CHANGE UP (ref 3.8–10.5)

## 2024-10-23 PROCEDURE — G2211 COMPLEX E/M VISIT ADD ON: CPT | Mod: NC

## 2024-10-23 PROCEDURE — 99214 OFFICE O/P EST MOD 30 MIN: CPT

## 2024-10-23 RX ORDER — PANTOPRAZOLE SODIUM 40 MG/1
1 TABLET, DELAYED RELEASE ORAL
Refills: 0 | DISCHARGE

## 2024-10-23 RX ORDER — LANSOPRAZOLE, AMOXICILLIN, AND CLARITHROMYCIN 30-500-500
0 KIT ORAL
Refills: 0 | DISCHARGE

## 2024-10-23 RX ORDER — IPRATROPIUM BROMIDE AND ALBUTEROL SULFATE .5; 3 MG/3ML; MG/3ML
3 SOLUTION RESPIRATORY (INHALATION)
Refills: 0 | DISCHARGE

## 2024-10-23 RX ORDER — ALBUTEROL 90 MCG
2.5 AEROSOL (GRAM) INHALATION
Refills: 0 | DISCHARGE

## 2024-10-23 RX ORDER — ALPRAZOLAM 0.5 MG/1
1 TABLET ORAL
Refills: 0 | DISCHARGE

## 2024-10-24 ENCOUNTER — NON-APPOINTMENT (OUTPATIENT)
Age: 49
End: 2024-10-24

## 2024-10-24 DIAGNOSIS — Z51.11 ENCOUNTER FOR ANTINEOPLASTIC CHEMOTHERAPY: ICD-10-CM

## 2024-10-24 DIAGNOSIS — R11.2 NAUSEA WITH VOMITING, UNSPECIFIED: ICD-10-CM

## 2024-10-24 PROBLEM — C16.9 MALIGNANT NEOPLASM OF STOMACH, UNSPECIFIED: Chronic | Status: ACTIVE | Noted: 2024-10-21

## 2024-10-26 ENCOUNTER — RX RENEWAL (OUTPATIENT)
Age: 49
End: 2024-10-26

## 2024-10-28 ENCOUNTER — NON-APPOINTMENT (OUTPATIENT)
Age: 49
End: 2024-10-28

## 2024-10-29 ENCOUNTER — NON-APPOINTMENT (OUTPATIENT)
Age: 49
End: 2024-10-29

## 2024-10-29 ENCOUNTER — APPOINTMENT (OUTPATIENT)
Dept: SURGICAL ONCOLOGY | Facility: CLINIC | Age: 49
End: 2024-10-29
Payer: COMMERCIAL

## 2024-10-29 VITALS
DIASTOLIC BLOOD PRESSURE: 78 MMHG | HEIGHT: 68 IN | SYSTOLIC BLOOD PRESSURE: 110 MMHG | WEIGHT: 242 LBS | BODY MASS INDEX: 36.68 KG/M2 | OXYGEN SATURATION: 99 % | HEART RATE: 98 BPM

## 2024-10-29 PROBLEM — Z51.5 ENCOUNTER FOR PALLIATIVE CARE: Status: ACTIVE | Noted: 2024-10-29

## 2024-10-29 PROBLEM — G47.9 TROUBLE IN SLEEPING: Status: ACTIVE | Noted: 2024-10-29

## 2024-10-29 PROBLEM — R10.11 RIGHT UPPER QUADRANT ABDOMINAL PAIN: Status: ACTIVE | Noted: 2024-10-29

## 2024-10-29 PROCEDURE — 99024 POSTOP FOLLOW-UP VISIT: CPT

## 2024-10-30 ENCOUNTER — TRANSCRIPTION ENCOUNTER (OUTPATIENT)
Age: 49
End: 2024-10-30

## 2024-10-30 ENCOUNTER — NON-APPOINTMENT (OUTPATIENT)
Age: 49
End: 2024-10-30

## 2024-11-05 NOTE — PATIENT PROFILE ADULT - FUNCTIONAL ASSESSMENT - BASIC MOBILITY 6.
Pharmacy left message requesting a refill for Mobic and [place] to be sent to [facility]. Pending RX to Provider to be sent to pharmacy.   
4-calculated by average/Not able to assess (calculate score using [facility] averaging method)

## 2024-11-07 ENCOUNTER — APPOINTMENT (OUTPATIENT)
Dept: INTERNAL MEDICINE | Facility: CLINIC | Age: 49
End: 2024-11-07
Payer: COMMERCIAL

## 2024-11-07 ENCOUNTER — RESULT REVIEW (OUTPATIENT)
Age: 49
End: 2024-11-07

## 2024-11-07 ENCOUNTER — APPOINTMENT (OUTPATIENT)
Dept: HEMATOLOGY ONCOLOGY | Facility: CLINIC | Age: 49
End: 2024-11-07
Payer: COMMERCIAL

## 2024-11-07 VITALS
RESPIRATION RATE: 16 BRPM | HEIGHT: 68 IN | BODY MASS INDEX: 36.83 KG/M2 | TEMPERATURE: 98.1 F | OXYGEN SATURATION: 99 % | HEART RATE: 87 BPM | WEIGHT: 243 LBS

## 2024-11-07 VITALS
DIASTOLIC BLOOD PRESSURE: 88 MMHG | TEMPERATURE: 97.3 F | BODY MASS INDEX: 37.06 KG/M2 | RESPIRATION RATE: 16 BRPM | WEIGHT: 244.57 LBS | HEIGHT: 68 IN | SYSTOLIC BLOOD PRESSURE: 121 MMHG | HEART RATE: 82 BPM | OXYGEN SATURATION: 100 %

## 2024-11-07 DIAGNOSIS — J45.909 UNSPECIFIED ASTHMA, UNCOMPLICATED: ICD-10-CM

## 2024-11-07 DIAGNOSIS — I10 ESSENTIAL (PRIMARY) HYPERTENSION: ICD-10-CM

## 2024-11-07 DIAGNOSIS — R73.03 PREDIABETES.: ICD-10-CM

## 2024-11-07 DIAGNOSIS — J45.30 MILD PERSISTENT ASTHMA, UNCOMPLICATED: ICD-10-CM

## 2024-11-07 LAB
BASOPHILS # BLD AUTO: 0.03 K/UL — SIGNIFICANT CHANGE UP (ref 0–0.2)
BASOPHILS NFR BLD AUTO: 0.3 % — SIGNIFICANT CHANGE UP (ref 0–2)
EOSINOPHIL # BLD AUTO: 0.2 K/UL — SIGNIFICANT CHANGE UP (ref 0–0.5)
EOSINOPHIL NFR BLD AUTO: 2.2 % — SIGNIFICANT CHANGE UP (ref 0–6)
HCT VFR BLD CALC: 35.1 % — LOW (ref 39–50)
HGB BLD-MCNC: 11.8 G/DL — LOW (ref 13–17)
IMM GRANULOCYTES NFR BLD AUTO: 0.8 % — SIGNIFICANT CHANGE UP (ref 0–0.9)
LYMPHOCYTES # BLD AUTO: 2.44 K/UL — SIGNIFICANT CHANGE UP (ref 1–3.3)
LYMPHOCYTES # BLD AUTO: 27 % — SIGNIFICANT CHANGE UP (ref 13–44)
MCHC RBC-ENTMCNC: 27.8 PG — SIGNIFICANT CHANGE UP (ref 27–34)
MCHC RBC-ENTMCNC: 33.6 G/DL — SIGNIFICANT CHANGE UP (ref 32–36)
MCV RBC AUTO: 82.8 FL — SIGNIFICANT CHANGE UP (ref 80–100)
MONOCYTES # BLD AUTO: 1.21 K/UL — HIGH (ref 0–0.9)
MONOCYTES NFR BLD AUTO: 13.4 % — SIGNIFICANT CHANGE UP (ref 2–14)
NEUTROPHILS # BLD AUTO: 5.09 K/UL — SIGNIFICANT CHANGE UP (ref 1.8–7.4)
NEUTROPHILS NFR BLD AUTO: 56.3 % — SIGNIFICANT CHANGE UP (ref 43–77)
NRBC # BLD: 0 /100 WBCS — SIGNIFICANT CHANGE UP (ref 0–0)
PLATELET # BLD AUTO: 210 K/UL — SIGNIFICANT CHANGE UP (ref 150–400)
RBC # BLD: 4.24 M/UL — SIGNIFICANT CHANGE UP (ref 4.2–5.8)
RBC # FLD: 17 % — HIGH (ref 10.3–14.5)
WBC # BLD: 9.04 K/UL — SIGNIFICANT CHANGE UP (ref 3.8–10.5)
WBC # FLD AUTO: 9.04 K/UL — SIGNIFICANT CHANGE UP (ref 3.8–10.5)

## 2024-11-07 PROCEDURE — 99214 OFFICE O/P EST MOD 30 MIN: CPT

## 2024-11-07 PROCEDURE — G2211 COMPLEX E/M VISIT ADD ON: CPT | Mod: NC

## 2024-11-08 ENCOUNTER — APPOINTMENT (OUTPATIENT)
Dept: RHEUMATOLOGY | Facility: CLINIC | Age: 49
End: 2024-11-08
Payer: COMMERCIAL

## 2024-11-08 VITALS
WEIGHT: 243 LBS | OXYGEN SATURATION: 98 % | SYSTOLIC BLOOD PRESSURE: 146 MMHG | RESPIRATION RATE: 16 BRPM | DIASTOLIC BLOOD PRESSURE: 93 MMHG | HEIGHT: 68 IN | HEART RATE: 104 BPM | BODY MASS INDEX: 36.83 KG/M2

## 2024-11-08 DIAGNOSIS — M32.19 OTHER ORGAN OR SYSTEM INVOLVEMENT IN SYSTEMIC LUPUS ERYTHEMATOSUS: ICD-10-CM

## 2024-11-08 PROCEDURE — 99215 OFFICE O/P EST HI 40 MIN: CPT

## 2024-11-11 ENCOUNTER — TRANSCRIPTION ENCOUNTER (OUTPATIENT)
Age: 49
End: 2024-11-11

## 2024-11-11 LAB
25(OH)D3 SERPL-MCNC: 25 NG/ML
ALBUMIN SERPL ELPH-MCNC: 4.4 G/DL
ALP BLD-CCNC: 101 U/L
ALT SERPL-CCNC: 16 U/L
ANION GAP SERPL CALC-SCNC: 14 MMOL/L
AST SERPL-CCNC: 21 U/L
BASOPHILS # BLD AUTO: 0.04 K/UL
BASOPHILS NFR BLD AUTO: 0.4 %
BILIRUB SERPL-MCNC: 0.4 MG/DL
BUN SERPL-MCNC: 5 MG/DL
C3 SERPL-MCNC: 123 MG/DL
C4 SERPL-MCNC: 20 MG/DL
CALCIUM SERPL-MCNC: 9 MG/DL
CHLORIDE SERPL-SCNC: 104 MMOL/L
CO2 SERPL-SCNC: 24 MMOL/L
CREAT SERPL-MCNC: 0.98 MG/DL
CREAT SPEC-SCNC: 120 MG/DL
CREAT/PROT UR: 0.1 RATIO
DSDNA AB SER-ACNC: 1 IU/ML
EGFR: 95 ML/MIN/1.73M2
EOSINOPHIL # BLD AUTO: 0.18 K/UL
EOSINOPHIL NFR BLD AUTO: 1.7 %
ERYTHROCYTE [SEDIMENTATION RATE] IN BLOOD BY WESTERGREN METHOD: 36 MM/HR
GLUCOSE SERPL-MCNC: 119 MG/DL
HCT VFR BLD CALC: 39.3 %
HGB BLD-MCNC: 12.5 G/DL
IMM GRANULOCYTES NFR BLD AUTO: 0.6 %
LYMPHOCYTES # BLD AUTO: 1.52 K/UL
LYMPHOCYTES NFR BLD AUTO: 14.6 %
MAN DIFF?: NORMAL
MCHC RBC-ENTMCNC: 26.8 PG
MCHC RBC-ENTMCNC: 31.8 G/DL
MCV RBC AUTO: 84.3 FL
MONOCYTES # BLD AUTO: 0.94 K/UL
MONOCYTES NFR BLD AUTO: 9 %
NEUTROPHILS # BLD AUTO: 7.65 K/UL
NEUTROPHILS NFR BLD AUTO: 73.7 %
PLATELET # BLD AUTO: 277 K/UL
POTASSIUM SERPL-SCNC: 4.2 MMOL/L
PROT SERPL-MCNC: 7.2 G/DL
PROT UR-MCNC: 10 MG/DL
RBC # BLD: 4.66 M/UL
RBC # FLD: 17.9 %
SODIUM SERPL-SCNC: 142 MMOL/L
WBC # FLD AUTO: 10.39 K/UL

## 2024-11-13 ENCOUNTER — RESULT REVIEW (OUTPATIENT)
Age: 49
End: 2024-11-13

## 2024-11-13 ENCOUNTER — APPOINTMENT (OUTPATIENT)
Dept: INFUSION THERAPY | Facility: HOSPITAL | Age: 49
End: 2024-11-13

## 2024-11-13 ENCOUNTER — APPOINTMENT (OUTPATIENT)
Dept: GERIATRICS | Facility: CLINIC | Age: 49
End: 2024-11-13
Payer: COMMERCIAL

## 2024-11-13 ENCOUNTER — APPOINTMENT (OUTPATIENT)
Dept: HEMATOLOGY ONCOLOGY | Facility: CLINIC | Age: 49
End: 2024-11-13

## 2024-11-13 DIAGNOSIS — Z51.5 ENCOUNTER FOR PALLIATIVE CARE: ICD-10-CM

## 2024-11-13 DIAGNOSIS — F41.9 ANXIETY DISORDER, UNSPECIFIED: ICD-10-CM

## 2024-11-13 DIAGNOSIS — C16.9 MALIGNANT NEOPLASM OF STOMACH, UNSPECIFIED: ICD-10-CM

## 2024-11-13 LAB
ALBUMIN SERPL ELPH-MCNC: 3.7 G/DL — SIGNIFICANT CHANGE UP (ref 3.3–5)
ALP SERPL-CCNC: 97 U/L — SIGNIFICANT CHANGE UP (ref 40–120)
ALT FLD-CCNC: 15 U/L — SIGNIFICANT CHANGE UP (ref 10–45)
ANION GAP SERPL CALC-SCNC: 11 MMOL/L — SIGNIFICANT CHANGE UP (ref 5–17)
APPEARANCE: CLEAR
AST SERPL-CCNC: 27 U/L — SIGNIFICANT CHANGE UP (ref 10–40)
BACTERIA: NEGATIVE /HPF
BASOPHILS # BLD AUTO: 0.03 K/UL — SIGNIFICANT CHANGE UP (ref 0–0.2)
BASOPHILS NFR BLD AUTO: 0.5 % — SIGNIFICANT CHANGE UP (ref 0–2)
BILIRUB SERPL-MCNC: 0.3 MG/DL — SIGNIFICANT CHANGE UP (ref 0.2–1.2)
BILIRUBIN URINE: NEGATIVE
BLOOD URINE: NEGATIVE
BUN SERPL-MCNC: 6 MG/DL — LOW (ref 7–23)
CALCIUM SERPL-MCNC: 9.2 MG/DL — SIGNIFICANT CHANGE UP (ref 8.4–10.5)
CAST: 0 /LPF
CHLORIDE SERPL-SCNC: 102 MMOL/L — SIGNIFICANT CHANGE UP (ref 96–108)
CO2 SERPL-SCNC: 25 MMOL/L — SIGNIFICANT CHANGE UP (ref 22–31)
COLOR: YELLOW
CREAT SERPL-MCNC: 0.81 MG/DL — SIGNIFICANT CHANGE UP (ref 0.5–1.3)
EGFR: 109 ML/MIN/1.73M2 — SIGNIFICANT CHANGE UP
EOSINOPHIL # BLD AUTO: 0.23 K/UL — SIGNIFICANT CHANGE UP (ref 0–0.5)
EOSINOPHIL NFR BLD AUTO: 3.5 % — SIGNIFICANT CHANGE UP (ref 0–6)
EPITHELIAL CELLS: 0 /HPF
GLUCOSE QUALITATIVE U: NEGATIVE MG/DL
GLUCOSE SERPL-MCNC: 99 MG/DL — SIGNIFICANT CHANGE UP (ref 70–99)
HCT VFR BLD CALC: 37.1 % — LOW (ref 39–50)
HGB BLD-MCNC: 12.2 G/DL — LOW (ref 13–17)
IMM GRANULOCYTES NFR BLD AUTO: 0.8 % — SIGNIFICANT CHANGE UP (ref 0–0.9)
KETONES URINE: NEGATIVE MG/DL
LEUKOCYTE ESTERASE URINE: ABNORMAL
LYMPHOCYTES # BLD AUTO: 1.83 K/UL — SIGNIFICANT CHANGE UP (ref 1–3.3)
LYMPHOCYTES # BLD AUTO: 27.6 % — SIGNIFICANT CHANGE UP (ref 13–44)
MAGNESIUM SERPL-MCNC: 2 MG/DL — SIGNIFICANT CHANGE UP (ref 1.6–2.6)
MCHC RBC-ENTMCNC: 27.1 PG — SIGNIFICANT CHANGE UP (ref 27–34)
MCHC RBC-ENTMCNC: 32.9 G/DL — SIGNIFICANT CHANGE UP (ref 32–36)
MCV RBC AUTO: 82.4 FL — SIGNIFICANT CHANGE UP (ref 80–100)
MICROSCOPIC-UA: NORMAL
MONOCYTES # BLD AUTO: 0.62 K/UL — SIGNIFICANT CHANGE UP (ref 0–0.9)
MONOCYTES NFR BLD AUTO: 9.3 % — SIGNIFICANT CHANGE UP (ref 2–14)
NEUTROPHILS # BLD AUTO: 3.88 K/UL — SIGNIFICANT CHANGE UP (ref 1.8–7.4)
NEUTROPHILS NFR BLD AUTO: 58.3 % — SIGNIFICANT CHANGE UP (ref 43–77)
NITRITE URINE: NEGATIVE
NRBC # BLD: 0 /100 WBCS — SIGNIFICANT CHANGE UP (ref 0–0)
PH URINE: 7
PLATELET # BLD AUTO: 229 K/UL — SIGNIFICANT CHANGE UP (ref 150–400)
POTASSIUM SERPL-MCNC: 3.5 MMOL/L — SIGNIFICANT CHANGE UP (ref 3.5–5.3)
POTASSIUM SERPL-SCNC: 3.5 MMOL/L — SIGNIFICANT CHANGE UP (ref 3.5–5.3)
PROT SERPL-MCNC: 6.7 G/DL — SIGNIFICANT CHANGE UP (ref 6–8.3)
PROTEIN URINE: NORMAL MG/DL
RBC # BLD: 4.5 M/UL — SIGNIFICANT CHANGE UP (ref 4.2–5.8)
RBC # FLD: 17.7 % — HIGH (ref 10.3–14.5)
RED BLOOD CELLS URINE: 2 /HPF
SODIUM SERPL-SCNC: 138 MMOL/L — SIGNIFICANT CHANGE UP (ref 135–145)
SPECIFIC GRAVITY URINE: 1.01
UROBILINOGEN URINE: 1 MG/DL
WBC # BLD: 6.64 K/UL — SIGNIFICANT CHANGE UP (ref 3.8–10.5)
WBC # FLD AUTO: 6.64 K/UL — SIGNIFICANT CHANGE UP (ref 3.8–10.5)
WHITE BLOOD CELLS URINE: 0 /HPF

## 2024-11-13 PROCEDURE — 99214 OFFICE O/P EST MOD 30 MIN: CPT

## 2024-11-13 RX ORDER — MIRTAZAPINE 15 MG/1
15 TABLET, FILM COATED ORAL
Qty: 30 | Refills: 2 | Status: ACTIVE | COMMUNITY
Start: 2024-11-13 | End: 1900-01-01

## 2024-11-15 ENCOUNTER — TRANSCRIPTION ENCOUNTER (OUTPATIENT)
Age: 49
End: 2024-11-15

## 2024-11-18 ENCOUNTER — RESULT REVIEW (OUTPATIENT)
Age: 49
End: 2024-11-18

## 2024-11-18 ENCOUNTER — TRANSCRIPTION ENCOUNTER (OUTPATIENT)
Age: 49
End: 2024-11-18

## 2024-11-18 ENCOUNTER — APPOINTMENT (OUTPATIENT)
Dept: INFUSION THERAPY | Facility: HOSPITAL | Age: 49
End: 2024-11-18

## 2024-11-18 LAB
ALBUMIN SERPL ELPH-MCNC: 3.4 G/DL — SIGNIFICANT CHANGE UP (ref 3.3–5)
ALP SERPL-CCNC: 124 U/L — HIGH (ref 40–120)
ALT FLD-CCNC: 13 U/L — SIGNIFICANT CHANGE UP (ref 10–45)
ANION GAP SERPL CALC-SCNC: 12 MMOL/L — SIGNIFICANT CHANGE UP (ref 5–17)
AST SERPL-CCNC: 25 U/L — SIGNIFICANT CHANGE UP (ref 10–40)
BASOPHILS # BLD AUTO: 0.04 K/UL — SIGNIFICANT CHANGE UP (ref 0–0.2)
BASOPHILS NFR BLD AUTO: 0.2 % — SIGNIFICANT CHANGE UP (ref 0–2)
BILIRUB SERPL-MCNC: 0.7 MG/DL — SIGNIFICANT CHANGE UP (ref 0.2–1.2)
BUN SERPL-MCNC: 11 MG/DL — SIGNIFICANT CHANGE UP (ref 7–23)
CALCIUM SERPL-MCNC: 8.8 MG/DL — SIGNIFICANT CHANGE UP (ref 8.4–10.5)
CHLORIDE SERPL-SCNC: 99 MMOL/L — SIGNIFICANT CHANGE UP (ref 96–108)
CO2 SERPL-SCNC: 23 MMOL/L — SIGNIFICANT CHANGE UP (ref 22–31)
CREAT SERPL-MCNC: 0.93 MG/DL — SIGNIFICANT CHANGE UP (ref 0.5–1.3)
CRP SERPL-MCNC: 160 MG/L — HIGH
EGFR: 101 ML/MIN/1.73M2 — SIGNIFICANT CHANGE UP
EOSINOPHIL # BLD AUTO: 0.29 K/UL — SIGNIFICANT CHANGE UP (ref 0–0.5)
EOSINOPHIL NFR BLD AUTO: 1.6 % — SIGNIFICANT CHANGE UP (ref 0–6)
GLUCOSE SERPL-MCNC: 132 MG/DL — HIGH (ref 70–99)
HCT VFR BLD CALC: 36.2 % — LOW (ref 39–50)
HGB BLD-MCNC: 11.8 G/DL — LOW (ref 13–17)
IMM GRANULOCYTES NFR BLD AUTO: 1.3 % — HIGH (ref 0–0.9)
LDH SERPL L TO P-CCNC: 259 U/L — HIGH (ref 50–242)
LYMPHOCYTES # BLD AUTO: 1.54 K/UL — SIGNIFICANT CHANGE UP (ref 1–3.3)
LYMPHOCYTES # BLD AUTO: 8.6 % — LOW (ref 13–44)
MCHC RBC-ENTMCNC: 27 PG — SIGNIFICANT CHANGE UP (ref 27–34)
MCHC RBC-ENTMCNC: 32.6 G/DL — SIGNIFICANT CHANGE UP (ref 32–36)
MCV RBC AUTO: 82.8 FL — SIGNIFICANT CHANGE UP (ref 80–100)
MONOCYTES # BLD AUTO: 0.63 K/UL — SIGNIFICANT CHANGE UP (ref 0–0.9)
MONOCYTES NFR BLD AUTO: 3.5 % — SIGNIFICANT CHANGE UP (ref 2–14)
NEUTROPHILS # BLD AUTO: 15.08 K/UL — HIGH (ref 1.8–7.4)
NEUTROPHILS NFR BLD AUTO: 84.8 % — HIGH (ref 43–77)
NRBC # BLD: 0 /100 WBCS — SIGNIFICANT CHANGE UP (ref 0–0)
PLATELET # BLD AUTO: 164 K/UL — SIGNIFICANT CHANGE UP (ref 150–400)
POTASSIUM SERPL-MCNC: 3.2 MMOL/L — LOW (ref 3.5–5.3)
POTASSIUM SERPL-SCNC: 3.2 MMOL/L — LOW (ref 3.5–5.3)
PROT SERPL-MCNC: 6.7 G/DL — SIGNIFICANT CHANGE UP (ref 6–8.3)
RBC # BLD: 4.37 M/UL — SIGNIFICANT CHANGE UP (ref 4.2–5.8)
RBC # FLD: 17.4 % — HIGH (ref 10.3–14.5)
SODIUM SERPL-SCNC: 133 MMOL/L — LOW (ref 135–145)
WBC # BLD: 17.82 K/UL — HIGH (ref 3.8–10.5)
WBC # FLD AUTO: 17.82 K/UL — HIGH (ref 3.8–10.5)

## 2024-11-18 NOTE — ASU DISCHARGE PLAN (ADULT/PEDIATRIC) - NSDISCH_FEVER_ENDO_ALL_CORE_FT
Please call your doctor if you have fever or chills within the next 48 hours.
Pending sale to [facility]

## 2024-11-19 ENCOUNTER — TRANSCRIPTION ENCOUNTER (OUTPATIENT)
Age: 49
End: 2024-11-19

## 2024-11-19 DIAGNOSIS — E86.0 DEHYDRATION: ICD-10-CM

## 2024-11-19 LAB
APPEARANCE UR: CLEAR — SIGNIFICANT CHANGE UP
BILIRUB UR-MCNC: NEGATIVE — SIGNIFICANT CHANGE UP
COLOR SPEC: YELLOW — SIGNIFICANT CHANGE UP
DIFF PNL FLD: NEGATIVE — SIGNIFICANT CHANGE UP
FLUAV AG NPH QL: SIGNIFICANT CHANGE UP
FLUBV AG NPH QL: SIGNIFICANT CHANGE UP
GLUCOSE UR QL: NEGATIVE MG/DL — SIGNIFICANT CHANGE UP
KETONES UR-MCNC: NEGATIVE MG/DL — SIGNIFICANT CHANGE UP
LEUKOCYTE ESTERASE UR-ACNC: NEGATIVE — SIGNIFICANT CHANGE UP
NITRITE UR-MCNC: NEGATIVE — SIGNIFICANT CHANGE UP
PH UR: 7 — SIGNIFICANT CHANGE UP (ref 5–8)
PROT UR-MCNC: SIGNIFICANT CHANGE UP MG/DL
RSV RNA NPH QL NAA+NON-PROBE: SIGNIFICANT CHANGE UP
SARS-COV-2 RNA SPEC QL NAA+PROBE: SIGNIFICANT CHANGE UP
SP GR SPEC: 1.01 — SIGNIFICANT CHANGE UP (ref 1–1.03)
UROBILINOGEN FLD QL: 0.2 MG/DL — SIGNIFICANT CHANGE UP (ref 0.2–1)

## 2024-11-20 ENCOUNTER — TRANSCRIPTION ENCOUNTER (OUTPATIENT)
Age: 49
End: 2024-11-20

## 2024-11-20 LAB
CULTURE RESULTS: NO GROWTH — SIGNIFICANT CHANGE UP
SPECIMEN SOURCE: SIGNIFICANT CHANGE UP

## 2024-11-20 NOTE — ED CDU PROVIDER INITIAL DAY NOTE - PROGRESS NOTE DETAILS
HR=67 bpm, FPYA=514/95 mmhg, SpO2=88.0 %, Resp=18 B/min, Pain=0, Jaren=10 CDU NOTE MINOR Hi: attempted to call patient's pulmonologist, would've been connected to an RN at center not coverning doctor, pulm can be contacted tomorrow during office hours.

## 2024-11-24 LAB
CULTURE RESULTS: SIGNIFICANT CHANGE UP
SPECIMEN SOURCE: SIGNIFICANT CHANGE UP

## 2024-11-27 ENCOUNTER — OUTPATIENT (OUTPATIENT)
Dept: OUTPATIENT SERVICES | Facility: HOSPITAL | Age: 49
LOS: 1 days | Discharge: HOME | End: 2024-11-27

## 2024-11-27 DIAGNOSIS — Z98.890 OTHER SPECIFIED POSTPROCEDURAL STATES: Chronic | ICD-10-CM

## 2024-11-27 DIAGNOSIS — Z90.49 ACQUIRED ABSENCE OF OTHER SPECIFIED PARTS OF DIGESTIVE TRACT: Chronic | ICD-10-CM

## 2024-11-27 DIAGNOSIS — Z98.52 VASECTOMY STATUS: Chronic | ICD-10-CM

## 2024-11-27 DIAGNOSIS — C16.9 MALIGNANT NEOPLASM OF STOMACH, UNSPECIFIED: ICD-10-CM

## 2024-12-03 ENCOUNTER — TRANSCRIPTION ENCOUNTER (OUTPATIENT)
Age: 49
End: 2024-12-03

## 2024-12-04 ENCOUNTER — RESULT REVIEW (OUTPATIENT)
Age: 49
End: 2024-12-04

## 2024-12-04 ENCOUNTER — APPOINTMENT (OUTPATIENT)
Dept: GERIATRICS | Facility: CLINIC | Age: 49
End: 2024-12-04
Payer: COMMERCIAL

## 2024-12-04 ENCOUNTER — APPOINTMENT (OUTPATIENT)
Dept: HEMATOLOGY ONCOLOGY | Facility: CLINIC | Age: 49
End: 2024-12-04
Payer: COMMERCIAL

## 2024-12-04 ENCOUNTER — NON-APPOINTMENT (OUTPATIENT)
Age: 49
End: 2024-12-04

## 2024-12-04 ENCOUNTER — APPOINTMENT (OUTPATIENT)
Dept: INFUSION THERAPY | Facility: HOSPITAL | Age: 49
End: 2024-12-04

## 2024-12-04 DIAGNOSIS — R11.2 NAUSEA WITH VOMITING, UNSPECIFIED: ICD-10-CM

## 2024-12-04 DIAGNOSIS — Z51.11 ENCOUNTER FOR ANTINEOPLASTIC CHEMOTHERAPY: ICD-10-CM

## 2024-12-04 DIAGNOSIS — F41.9 ANXIETY DISORDER, UNSPECIFIED: ICD-10-CM

## 2024-12-04 DIAGNOSIS — G47.9 SLEEP DISORDER, UNSPECIFIED: ICD-10-CM

## 2024-12-04 DIAGNOSIS — R10.11 RIGHT UPPER QUADRANT PAIN: ICD-10-CM

## 2024-12-04 DIAGNOSIS — Z51.5 ENCOUNTER FOR PALLIATIVE CARE: ICD-10-CM

## 2024-12-04 LAB
ALBUMIN SERPL ELPH-MCNC: 3.7 G/DL — SIGNIFICANT CHANGE UP (ref 3.3–5)
ALP SERPL-CCNC: 106 U/L — SIGNIFICANT CHANGE UP (ref 40–120)
ALT FLD-CCNC: 13 U/L — SIGNIFICANT CHANGE UP (ref 10–45)
ANION GAP SERPL CALC-SCNC: 11 MMOL/L — SIGNIFICANT CHANGE UP (ref 5–17)
AST SERPL-CCNC: 26 U/L — SIGNIFICANT CHANGE UP (ref 10–40)
BASOPHILS # BLD AUTO: 0.03 K/UL — SIGNIFICANT CHANGE UP (ref 0–0.2)
BASOPHILS NFR BLD AUTO: 0.5 % — SIGNIFICANT CHANGE UP (ref 0–2)
BILIRUB SERPL-MCNC: 0.2 MG/DL — SIGNIFICANT CHANGE UP (ref 0.2–1.2)
BUN SERPL-MCNC: 9 MG/DL — SIGNIFICANT CHANGE UP (ref 7–23)
CALCIUM SERPL-MCNC: 9.3 MG/DL — SIGNIFICANT CHANGE UP (ref 8.4–10.5)
CHLORIDE SERPL-SCNC: 105 MMOL/L — SIGNIFICANT CHANGE UP (ref 96–108)
CO2 SERPL-SCNC: 26 MMOL/L — SIGNIFICANT CHANGE UP (ref 22–31)
CREAT SERPL-MCNC: 0.71 MG/DL — SIGNIFICANT CHANGE UP (ref 0.5–1.3)
EGFR: 113 ML/MIN/1.73M2 — SIGNIFICANT CHANGE UP
EOSINOPHIL # BLD AUTO: 0.17 K/UL — SIGNIFICANT CHANGE UP (ref 0–0.5)
EOSINOPHIL NFR BLD AUTO: 2.6 % — SIGNIFICANT CHANGE UP (ref 0–6)
GLUCOSE SERPL-MCNC: 103 MG/DL — HIGH (ref 70–99)
HCT VFR BLD CALC: 38.2 % — LOW (ref 39–50)
HGB BLD-MCNC: 12.6 G/DL — LOW (ref 13–17)
IMM GRANULOCYTES NFR BLD AUTO: 1.7 % — HIGH (ref 0–0.9)
LYMPHOCYTES # BLD AUTO: 1.78 K/UL — SIGNIFICANT CHANGE UP (ref 1–3.3)
LYMPHOCYTES # BLD AUTO: 27.6 % — SIGNIFICANT CHANGE UP (ref 13–44)
MAGNESIUM SERPL-MCNC: 2.1 MG/DL — SIGNIFICANT CHANGE UP (ref 1.6–2.6)
MCHC RBC-ENTMCNC: 27.6 PG — SIGNIFICANT CHANGE UP (ref 27–34)
MCHC RBC-ENTMCNC: 33 G/DL — SIGNIFICANT CHANGE UP (ref 32–36)
MCV RBC AUTO: 83.6 FL — SIGNIFICANT CHANGE UP (ref 80–100)
MONOCYTES # BLD AUTO: 0.6 K/UL — SIGNIFICANT CHANGE UP (ref 0–0.9)
MONOCYTES NFR BLD AUTO: 9.3 % — SIGNIFICANT CHANGE UP (ref 2–14)
NEUTROPHILS # BLD AUTO: 3.77 K/UL — SIGNIFICANT CHANGE UP (ref 1.8–7.4)
NEUTROPHILS NFR BLD AUTO: 58.3 % — SIGNIFICANT CHANGE UP (ref 43–77)
NRBC # BLD: 0 /100 WBCS — SIGNIFICANT CHANGE UP (ref 0–0)
PLATELET # BLD AUTO: 262 K/UL — SIGNIFICANT CHANGE UP (ref 150–400)
POTASSIUM SERPL-MCNC: 3.6 MMOL/L — SIGNIFICANT CHANGE UP (ref 3.5–5.3)
POTASSIUM SERPL-SCNC: 3.6 MMOL/L — SIGNIFICANT CHANGE UP (ref 3.5–5.3)
PROT SERPL-MCNC: 7.1 G/DL — SIGNIFICANT CHANGE UP (ref 6–8.3)
RBC # BLD: 4.57 M/UL — SIGNIFICANT CHANGE UP (ref 4.2–5.8)
RBC # FLD: 19.9 % — HIGH (ref 10.3–14.5)
SODIUM SERPL-SCNC: 141 MMOL/L — SIGNIFICANT CHANGE UP (ref 135–145)
WBC # BLD: 6.46 K/UL — SIGNIFICANT CHANGE UP (ref 3.8–10.5)
WBC # FLD AUTO: 6.46 K/UL — SIGNIFICANT CHANGE UP (ref 3.8–10.5)

## 2024-12-04 PROCEDURE — G2211 COMPLEX E/M VISIT ADD ON: CPT

## 2024-12-04 PROCEDURE — 99215 OFFICE O/P EST HI 40 MIN: CPT

## 2024-12-04 PROCEDURE — 99214 OFFICE O/P EST MOD 30 MIN: CPT

## 2024-12-04 RX ORDER — PROCHLORPERAZINE MALEATE 10 MG/1
10 TABLET ORAL EVERY 6 HOURS
Qty: 90 | Refills: 0 | Status: ACTIVE | COMMUNITY
Start: 2024-12-04 | End: 1900-01-01

## 2024-12-04 RX ORDER — POTASSIUM CHLORIDE 1500 MG/1
20 TABLET, EXTENDED RELEASE ORAL DAILY
Qty: 30 | Refills: 0 | Status: ACTIVE | COMMUNITY
Start: 2024-12-04 | End: 1900-01-01

## 2024-12-05 ENCOUNTER — NON-APPOINTMENT (OUTPATIENT)
Age: 49
End: 2024-12-05

## 2024-12-09 ENCOUNTER — TRANSCRIPTION ENCOUNTER (OUTPATIENT)
Age: 49
End: 2024-12-09

## 2024-12-10 ENCOUNTER — RESULT REVIEW (OUTPATIENT)
Age: 49
End: 2024-12-10

## 2024-12-10 ENCOUNTER — APPOINTMENT (OUTPATIENT)
Dept: INFUSION THERAPY | Facility: HOSPITAL | Age: 49
End: 2024-12-10

## 2024-12-10 LAB
BASOPHILS # BLD AUTO: 0.04 K/UL — SIGNIFICANT CHANGE UP (ref 0–0.2)
BASOPHILS NFR BLD AUTO: 0.3 % — SIGNIFICANT CHANGE UP (ref 0–2)
EOSINOPHIL # BLD AUTO: 0.12 K/UL — SIGNIFICANT CHANGE UP (ref 0–0.5)
EOSINOPHIL NFR BLD AUTO: 1 % — SIGNIFICANT CHANGE UP (ref 0–6)
HCT VFR BLD CALC: 39.1 % — SIGNIFICANT CHANGE UP (ref 39–50)
HGB BLD-MCNC: 12.7 G/DL — LOW (ref 13–17)
IMM GRANULOCYTES NFR BLD AUTO: 2.1 % — HIGH (ref 0–0.9)
LYMPHOCYTES # BLD AUTO: 1.37 K/UL — SIGNIFICANT CHANGE UP (ref 1–3.3)
LYMPHOCYTES # BLD AUTO: 11.4 % — LOW (ref 13–44)
MCHC RBC-ENTMCNC: 27.5 PG — SIGNIFICANT CHANGE UP (ref 27–34)
MCHC RBC-ENTMCNC: 32.5 G/DL — SIGNIFICANT CHANGE UP (ref 32–36)
MCV RBC AUTO: 84.6 FL — SIGNIFICANT CHANGE UP (ref 80–100)
MONOCYTES # BLD AUTO: 0.76 K/UL — SIGNIFICANT CHANGE UP (ref 0–0.9)
MONOCYTES NFR BLD AUTO: 6.3 % — SIGNIFICANT CHANGE UP (ref 2–14)
NEUTROPHILS # BLD AUTO: 9.46 K/UL — HIGH (ref 1.8–7.4)
NEUTROPHILS NFR BLD AUTO: 78.9 % — HIGH (ref 43–77)
NRBC # BLD: 0 /100 WBCS — SIGNIFICANT CHANGE UP (ref 0–0)
PLATELET # BLD AUTO: 197 K/UL — SIGNIFICANT CHANGE UP (ref 150–400)
RBC # BLD: 4.62 M/UL — SIGNIFICANT CHANGE UP (ref 4.2–5.8)
RBC # FLD: 19.8 % — HIGH (ref 10.3–14.5)
WBC # BLD: 12 K/UL — HIGH (ref 3.8–10.5)
WBC # FLD AUTO: 12 K/UL — HIGH (ref 3.8–10.5)

## 2024-12-11 DIAGNOSIS — E86.0 DEHYDRATION: ICD-10-CM

## 2024-12-11 LAB
ALBUMIN SERPL ELPH-MCNC: 4.2 G/DL — SIGNIFICANT CHANGE UP (ref 3.3–5)
ALP SERPL-CCNC: 127 U/L — HIGH (ref 40–120)
ALT FLD-CCNC: 19 U/L — SIGNIFICANT CHANGE UP (ref 10–45)
ANION GAP SERPL CALC-SCNC: 15 MMOL/L — SIGNIFICANT CHANGE UP (ref 5–17)
AST SERPL-CCNC: 30 U/L — SIGNIFICANT CHANGE UP (ref 10–40)
BILIRUB SERPL-MCNC: 0.3 MG/DL — SIGNIFICANT CHANGE UP (ref 0.2–1.2)
BUN SERPL-MCNC: 8 MG/DL — SIGNIFICANT CHANGE UP (ref 7–23)
CALCIUM SERPL-MCNC: 9.4 MG/DL — SIGNIFICANT CHANGE UP (ref 8.4–10.5)
CHLORIDE SERPL-SCNC: 102 MMOL/L — SIGNIFICANT CHANGE UP (ref 96–108)
CO2 SERPL-SCNC: 22 MMOL/L — SIGNIFICANT CHANGE UP (ref 22–31)
CREAT SERPL-MCNC: 0.86 MG/DL — SIGNIFICANT CHANGE UP (ref 0.5–1.3)
EGFR: 107 ML/MIN/1.73M2 — SIGNIFICANT CHANGE UP
GLUCOSE SERPL-MCNC: 153 MG/DL — HIGH (ref 70–99)
MAGNESIUM SERPL-MCNC: 1.9 MG/DL — SIGNIFICANT CHANGE UP (ref 1.6–2.6)
POTASSIUM SERPL-MCNC: 4.7 MMOL/L — SIGNIFICANT CHANGE UP (ref 3.5–5.3)
POTASSIUM SERPL-SCNC: 4.7 MMOL/L — SIGNIFICANT CHANGE UP (ref 3.5–5.3)
PROT SERPL-MCNC: 7.5 G/DL — SIGNIFICANT CHANGE UP (ref 6–8.3)
SODIUM SERPL-SCNC: 138 MMOL/L — SIGNIFICANT CHANGE UP (ref 135–145)

## 2024-12-13 ENCOUNTER — TRANSCRIPTION ENCOUNTER (OUTPATIENT)
Age: 49
End: 2024-12-13

## 2024-12-13 RX ORDER — ONDANSETRON 4 MG/1
4 TABLET, ORALLY DISINTEGRATING ORAL
Qty: 30 | Refills: 1 | Status: ACTIVE | COMMUNITY
Start: 2024-12-13 | End: 1900-01-01

## 2024-12-13 NOTE — PROCEDURE
I spoke to patients sister, she stated that she asked if you could prescribe patient pantoprazole   I sent the med request to your RX basket for approval     
Pts sister called asking if you can refill patients med    
[FreeTextEntry1] : PFT 7/24/2019 personally reviewed Normal PFT\par \par CXR 6/1/2019 personally reviewed clear lungs\par \par Nebulized Duo Neb given in office 8/19/2019\par \par CT angio 5/17/21 personally reviewed negative for pulmonary embolism \par \par PFT 8/4/21 personally reviewed normal spirometry, normal lung volumes, mild gas exchange abnormality

## 2024-12-16 ENCOUNTER — APPOINTMENT (OUTPATIENT)
Dept: INTERNAL MEDICINE | Facility: CLINIC | Age: 49
End: 2024-12-16
Payer: COMMERCIAL

## 2024-12-16 VITALS
WEIGHT: 250 LBS | TEMPERATURE: 97.6 F | DIASTOLIC BLOOD PRESSURE: 73 MMHG | SYSTOLIC BLOOD PRESSURE: 112 MMHG | HEIGHT: 68 IN | OXYGEN SATURATION: 97 % | HEART RATE: 101 BPM | RESPIRATION RATE: 16 BRPM | BODY MASS INDEX: 37.89 KG/M2

## 2024-12-16 DIAGNOSIS — J45.30 MILD PERSISTENT ASTHMA, UNCOMPLICATED: ICD-10-CM

## 2024-12-16 DIAGNOSIS — R53.83 OTHER FATIGUE: ICD-10-CM

## 2024-12-16 DIAGNOSIS — E11.9 TYPE 2 DIABETES MELLITUS W/OUT COMPLICATIONS: ICD-10-CM

## 2024-12-16 DIAGNOSIS — Z51.89 ENCOUNTER FOR OTHER SPECIFIED AFTERCARE: ICD-10-CM

## 2024-12-16 DIAGNOSIS — E03.9 HYPOTHYROIDISM, UNSPECIFIED: ICD-10-CM

## 2024-12-16 PROCEDURE — 99214 OFFICE O/P EST MOD 30 MIN: CPT

## 2024-12-16 PROCEDURE — 36415 COLL VENOUS BLD VENIPUNCTURE: CPT

## 2024-12-17 ENCOUNTER — APPOINTMENT (OUTPATIENT)
Dept: RHEUMATOLOGY | Facility: CLINIC | Age: 49
End: 2024-12-17
Payer: COMMERCIAL

## 2024-12-17 VITALS
RESPIRATION RATE: 16 BRPM | TEMPERATURE: 98.1 F | SYSTOLIC BLOOD PRESSURE: 122 MMHG | WEIGHT: 250 LBS | HEART RATE: 116 BPM | DIASTOLIC BLOOD PRESSURE: 84 MMHG | HEIGHT: 68 IN | BODY MASS INDEX: 37.89 KG/M2 | OXYGEN SATURATION: 98 %

## 2024-12-17 DIAGNOSIS — M32.19 OTHER ORGAN OR SYSTEM INVOLVEMENT IN SYSTEMIC LUPUS ERYTHEMATOSUS: ICD-10-CM

## 2024-12-17 LAB
CK SERPL-CCNC: 117 U/L
CRP SERPL-MCNC: 10 MG/L

## 2024-12-17 PROCEDURE — 99214 OFFICE O/P EST MOD 30 MIN: CPT

## 2024-12-17 RX ORDER — METHYLPREDNISOLONE 8 MG/1
8 TABLET ORAL
Qty: 60 | Refills: 0 | Status: ACTIVE | COMMUNITY
Start: 2024-12-17 | End: 1900-01-01

## 2024-12-18 ENCOUNTER — NON-APPOINTMENT (OUTPATIENT)
Age: 49
End: 2024-12-18

## 2024-12-18 ENCOUNTER — TRANSCRIPTION ENCOUNTER (OUTPATIENT)
Age: 49
End: 2024-12-18

## 2024-12-18 LAB
ALBUMIN SERPL ELPH-MCNC: 4.3 G/DL
ALDOLASE SERPL-CCNC: 16.4 U/L
ALP BLD-CCNC: 134 U/L
ALT SERPL-CCNC: 21 U/L
ANA PAT FLD IF-IMP: ABNORMAL
ANA SER IF-ACNC: ABNORMAL
APPEARANCE: CLEAR
AST SERPL-CCNC: 27 U/L
BACTERIA: NEGATIVE /HPF
BILIRUB DIRECT SERPL-MCNC: 0.1 MG/DL
BILIRUB INDIRECT SERPL-MCNC: 0.2 MG/DL
BILIRUB SERPL-MCNC: 0.3 MG/DL
BILIRUBIN URINE: NEGATIVE
BLOOD URINE: NEGATIVE
C3 SERPL-MCNC: 154 MG/DL
C4 SERPL-MCNC: 24 MG/DL
CAST: 0 /LPF
COLOR: YELLOW
CREAT SPEC-SCNC: 235 MG/DL
CREAT/PROT UR: 0.1 RATIO
DSDNA AB SER-ACNC: 1 IU/ML
EPITHELIAL CELLS: 0 /HPF
GLUCOSE QUALITATIVE U: NEGATIVE MG/DL
KETONES URINE: NEGATIVE MG/DL
LEUKOCYTE ESTERASE URINE: NEGATIVE
MICROSCOPIC-UA: NORMAL
MYOGLOBIN SERPL-MCNC: 22 NG/ML
NITRITE URINE: NEGATIVE
PH URINE: 7
PROT SERPL-MCNC: 7.1 G/DL
PROT UR-MCNC: 18 MG/DL
PROTEIN URINE: 30 MG/DL
RED BLOOD CELLS URINE: 1 /HPF
SPECIFIC GRAVITY URINE: 1.02
UROBILINOGEN URINE: 0.2 MG/DL
WHITE BLOOD CELLS URINE: 0 /HPF

## 2024-12-19 ENCOUNTER — NON-APPOINTMENT (OUTPATIENT)
Age: 49
End: 2024-12-19

## 2024-12-19 ENCOUNTER — TRANSCRIPTION ENCOUNTER (OUTPATIENT)
Age: 49
End: 2024-12-19

## 2024-12-19 LAB — ALDOLASE SERPL-CCNC: 6.4 U/L

## 2024-12-20 ENCOUNTER — TRANSCRIPTION ENCOUNTER (OUTPATIENT)
Age: 49
End: 2024-12-20

## 2024-12-20 LAB
ANION GAP SERPL CALC-SCNC: 14 MMOL/L
BUN SERPL-MCNC: 6 MG/DL
CALCIUM SERPL-MCNC: 9.1 MG/DL
CHLORIDE SERPL-SCNC: 103 MMOL/L
CO2 SERPL-SCNC: 24 MMOL/L
CREAT SERPL-MCNC: 0.95 MG/DL
EGFR: 99 ML/MIN/1.73M2
ESTIMATED AVERAGE GLUCOSE: 131 MG/DL
GLUCOSE SERPL-MCNC: 145 MG/DL
HBA1C MFR BLD HPLC: 6.2 %
HCT VFR BLD CALC: 39.9 %
HGB BLD-MCNC: 12.9 G/DL
MCHC RBC-ENTMCNC: 28.1 PG
MCHC RBC-ENTMCNC: 32.3 G/DL
MCV RBC AUTO: 86.9 FL
PLATELET # BLD AUTO: 199 K/UL
POTASSIUM SERPL-SCNC: 4.2 MMOL/L
RBC # BLD: 4.59 M/UL
RBC # FLD: 21.7 %
SODIUM SERPL-SCNC: 141 MMOL/L
TSH SERPL-ACNC: 2.2 UIU/ML
WBC # FLD AUTO: 13.71 K/UL

## 2024-12-26 ENCOUNTER — RESULT REVIEW (OUTPATIENT)
Age: 49
End: 2024-12-26

## 2024-12-26 ENCOUNTER — APPOINTMENT (OUTPATIENT)
Dept: GERIATRICS | Facility: CLINIC | Age: 49
End: 2024-12-26
Payer: COMMERCIAL

## 2024-12-26 ENCOUNTER — APPOINTMENT (OUTPATIENT)
Dept: HEMATOLOGY ONCOLOGY | Facility: CLINIC | Age: 49
End: 2024-12-26
Payer: COMMERCIAL

## 2024-12-26 ENCOUNTER — APPOINTMENT (OUTPATIENT)
Dept: INFUSION THERAPY | Facility: HOSPITAL | Age: 49
End: 2024-12-26

## 2024-12-26 VITALS
SYSTOLIC BLOOD PRESSURE: 116 MMHG | RESPIRATION RATE: 16 BRPM | TEMPERATURE: 97.5 F | BODY MASS INDEX: 38.59 KG/M2 | WEIGHT: 253.82 LBS | OXYGEN SATURATION: 99 % | HEART RATE: 79 BPM | DIASTOLIC BLOOD PRESSURE: 81 MMHG

## 2024-12-26 DIAGNOSIS — C16.9 MALIGNANT NEOPLASM OF STOMACH, UNSPECIFIED: ICD-10-CM

## 2024-12-26 DIAGNOSIS — G47.9 SLEEP DISORDER, UNSPECIFIED: ICD-10-CM

## 2024-12-26 DIAGNOSIS — R10.11 RIGHT UPPER QUADRANT PAIN: ICD-10-CM

## 2024-12-26 DIAGNOSIS — G62.0 DRUG-INDUCED POLYNEUROPATHY: ICD-10-CM

## 2024-12-26 DIAGNOSIS — F41.9 ANXIETY DISORDER, UNSPECIFIED: ICD-10-CM

## 2024-12-26 DIAGNOSIS — R11.2 NAUSEA WITH VOMITING, UNSPECIFIED: ICD-10-CM

## 2024-12-26 DIAGNOSIS — Z51.5 ENCOUNTER FOR PALLIATIVE CARE: ICD-10-CM

## 2024-12-26 LAB
BASOPHILS # BLD AUTO: 0 K/UL — SIGNIFICANT CHANGE UP (ref 0–0.2)
BASOPHILS NFR BLD AUTO: 0 % — SIGNIFICANT CHANGE UP (ref 0–2)
ELLIPTOCYTES BLD QL SMEAR: SLIGHT — SIGNIFICANT CHANGE UP
EOSINOPHIL # BLD AUTO: 0 K/UL — SIGNIFICANT CHANGE UP (ref 0–0.5)
EOSINOPHIL NFR BLD AUTO: 0 % — SIGNIFICANT CHANGE UP (ref 0–6)
HCT VFR BLD CALC: 41 % — SIGNIFICANT CHANGE UP (ref 39–50)
HGB BLD-MCNC: 13.3 G/DL — SIGNIFICANT CHANGE UP (ref 13–17)
LYMPHOCYTES # BLD AUTO: 17 % — SIGNIFICANT CHANGE UP (ref 13–44)
LYMPHOCYTES # BLD AUTO: 2.56 K/UL — SIGNIFICANT CHANGE UP (ref 1–3.3)
MCHC RBC-ENTMCNC: 28.2 PG — SIGNIFICANT CHANGE UP (ref 27–34)
MCHC RBC-ENTMCNC: 32.4 G/DL — SIGNIFICANT CHANGE UP (ref 32–36)
MCV RBC AUTO: 86.9 FL — SIGNIFICANT CHANGE UP (ref 80–100)
MONOCYTES # BLD AUTO: 1.2 K/UL — HIGH (ref 0–0.9)
MONOCYTES NFR BLD AUTO: 8 % — SIGNIFICANT CHANGE UP (ref 2–14)
MYELOCYTES NFR BLD: 3 % — HIGH (ref 0–0)
NEUTROPHILS # BLD AUTO: 10.84 K/UL — HIGH (ref 1.8–7.4)
NEUTROPHILS NFR BLD AUTO: 72 % — SIGNIFICANT CHANGE UP (ref 43–77)
NRBC # BLD: 0 /100 WBCS — SIGNIFICANT CHANGE UP (ref 0–0)
NRBC # BLD: SIGNIFICANT CHANGE UP /100 WBCS (ref 0–0)
PLAT MORPH BLD: NORMAL — SIGNIFICANT CHANGE UP
PLATELET # BLD AUTO: 290 K/UL — SIGNIFICANT CHANGE UP (ref 150–400)
POIKILOCYTOSIS BLD QL AUTO: SLIGHT — SIGNIFICANT CHANGE UP
RBC # BLD: 4.72 M/UL — SIGNIFICANT CHANGE UP (ref 4.2–5.8)
RBC # FLD: 19.9 % — HIGH (ref 10.3–14.5)
RBC BLD AUTO: ABNORMAL
WBC # BLD: 15.05 K/UL — HIGH (ref 3.8–10.5)
WBC # FLD AUTO: 15.05 K/UL — HIGH (ref 3.8–10.5)

## 2024-12-26 PROCEDURE — G2211 COMPLEX E/M VISIT ADD ON: CPT | Mod: NC

## 2024-12-26 PROCEDURE — 99214 OFFICE O/P EST MOD 30 MIN: CPT

## 2024-12-26 PROCEDURE — 99215 OFFICE O/P EST HI 40 MIN: CPT

## 2024-12-26 RX ORDER — METOCLOPRAMIDE 5 MG/1
5 TABLET ORAL 3 TIMES DAILY
Qty: 42 | Refills: 2 | Status: ACTIVE | COMMUNITY
Start: 2024-12-26 | End: 1900-01-01

## 2024-12-26 RX ORDER — GABAPENTIN 100 MG/1
100 CAPSULE ORAL
Qty: 30 | Refills: 1 | Status: ACTIVE | COMMUNITY
Start: 2024-12-26 | End: 1900-01-01

## 2024-12-27 ENCOUNTER — TRANSCRIPTION ENCOUNTER (OUTPATIENT)
Age: 49
End: 2024-12-27

## 2025-01-02 ENCOUNTER — APPOINTMENT (OUTPATIENT)
Dept: PSYCHIATRY | Facility: CLINIC | Age: 50
End: 2025-01-02

## 2025-01-02 PROCEDURE — 99205 OFFICE O/P NEW HI 60 MIN: CPT | Mod: 95

## 2025-01-07 ENCOUNTER — APPOINTMENT (OUTPATIENT)
Dept: INFUSION THERAPY | Facility: HOSPITAL | Age: 50
End: 2025-01-07

## 2025-01-07 ENCOUNTER — APPOINTMENT (OUTPATIENT)
Dept: HEMATOLOGY ONCOLOGY | Facility: CLINIC | Age: 50
End: 2025-01-07

## 2025-01-07 ENCOUNTER — TRANSCRIPTION ENCOUNTER (OUTPATIENT)
Age: 50
End: 2025-01-07

## 2025-01-07 ENCOUNTER — RESULT REVIEW (OUTPATIENT)
Age: 50
End: 2025-01-07

## 2025-01-07 DIAGNOSIS — C16.9 MALIGNANT NEOPLASM OF STOMACH, UNSPECIFIED: ICD-10-CM

## 2025-01-07 LAB
ALBUMIN SERPL ELPH-MCNC: 4 G/DL — SIGNIFICANT CHANGE UP (ref 3.3–5)
ALP SERPL-CCNC: 123 U/L — HIGH (ref 40–120)
ALT FLD-CCNC: 19 U/L — SIGNIFICANT CHANGE UP (ref 10–45)
ANION GAP SERPL CALC-SCNC: 13 MMOL/L — SIGNIFICANT CHANGE UP (ref 5–17)
AST SERPL-CCNC: 27 U/L — SIGNIFICANT CHANGE UP (ref 10–40)
BASOPHILS # BLD AUTO: 0.05 K/UL — SIGNIFICANT CHANGE UP (ref 0–0.2)
BASOPHILS NFR BLD AUTO: 0.4 % — SIGNIFICANT CHANGE UP (ref 0–2)
BILIRUB SERPL-MCNC: 0.3 MG/DL — SIGNIFICANT CHANGE UP (ref 0.2–1.2)
BUN SERPL-MCNC: 7 MG/DL — SIGNIFICANT CHANGE UP (ref 7–23)
CALCIUM SERPL-MCNC: 9.2 MG/DL — SIGNIFICANT CHANGE UP (ref 8.4–10.5)
CHLORIDE SERPL-SCNC: 102 MMOL/L — SIGNIFICANT CHANGE UP (ref 96–108)
CO2 SERPL-SCNC: 25 MMOL/L — SIGNIFICANT CHANGE UP (ref 22–31)
CREAT SERPL-MCNC: 0.71 MG/DL — SIGNIFICANT CHANGE UP (ref 0.5–1.3)
CRP SERPL-MCNC: 4 MG/L — SIGNIFICANT CHANGE UP
EGFR: 113 ML/MIN/1.73M2 — SIGNIFICANT CHANGE UP
EOSINOPHIL # BLD AUTO: 0.14 K/UL — SIGNIFICANT CHANGE UP (ref 0–0.5)
EOSINOPHIL NFR BLD AUTO: 1 % — SIGNIFICANT CHANGE UP (ref 0–6)
GLUCOSE SERPL-MCNC: 134 MG/DL — HIGH (ref 70–99)
HCT VFR BLD CALC: 37.7 % — LOW (ref 39–50)
HGB BLD-MCNC: 12.6 G/DL — LOW (ref 13–17)
IMM GRANULOCYTES NFR BLD AUTO: 1 % — HIGH (ref 0–0.9)
LDH SERPL L TO P-CCNC: 460 U/L — HIGH (ref 50–242)
LYMPHOCYTES # BLD AUTO: 23.3 % — SIGNIFICANT CHANGE UP (ref 13–44)
LYMPHOCYTES # BLD AUTO: 3.15 K/UL — SIGNIFICANT CHANGE UP (ref 1–3.3)
MAGNESIUM SERPL-MCNC: 1.9 MG/DL — SIGNIFICANT CHANGE UP (ref 1.6–2.6)
MCHC RBC-ENTMCNC: 28.3 PG — SIGNIFICANT CHANGE UP (ref 27–34)
MCHC RBC-ENTMCNC: 33.4 G/DL — SIGNIFICANT CHANGE UP (ref 32–36)
MCV RBC AUTO: 84.5 FL — SIGNIFICANT CHANGE UP (ref 80–100)
MONOCYTES # BLD AUTO: 1.01 K/UL — HIGH (ref 0–0.9)
MONOCYTES NFR BLD AUTO: 7.5 % — SIGNIFICANT CHANGE UP (ref 2–14)
NEUTROPHILS # BLD AUTO: 9.03 K/UL — HIGH (ref 1.8–7.4)
NEUTROPHILS NFR BLD AUTO: 66.8 % — SIGNIFICANT CHANGE UP (ref 43–77)
NRBC # BLD: 0 /100 WBCS — SIGNIFICANT CHANGE UP (ref 0–0)
PLATELET # BLD AUTO: 258 K/UL — SIGNIFICANT CHANGE UP (ref 150–400)
POTASSIUM SERPL-MCNC: 3.4 MMOL/L — LOW (ref 3.5–5.3)
POTASSIUM SERPL-SCNC: 3.4 MMOL/L — LOW (ref 3.5–5.3)
PROT SERPL-MCNC: 7.5 G/DL — SIGNIFICANT CHANGE UP (ref 6–8.3)
RBC # BLD: 4.46 M/UL — SIGNIFICANT CHANGE UP (ref 4.2–5.8)
RBC # FLD: 18.8 % — HIGH (ref 10.3–14.5)
SODIUM SERPL-SCNC: 140 MMOL/L — SIGNIFICANT CHANGE UP (ref 135–145)
WBC # BLD: 13.51 K/UL — HIGH (ref 3.8–10.5)
WBC # FLD AUTO: 13.51 K/UL — HIGH (ref 3.8–10.5)

## 2025-01-07 RX ORDER — POTASSIUM CHLORIDE 750 MG/1
10 TABLET, FILM COATED, EXTENDED RELEASE ORAL DAILY
Qty: 30 | Refills: 0 | Status: ACTIVE | COMMUNITY
Start: 2025-01-07 | End: 1900-01-01

## 2025-01-08 ENCOUNTER — NON-APPOINTMENT (OUTPATIENT)
Age: 50
End: 2025-01-08

## 2025-01-09 ENCOUNTER — APPOINTMENT (OUTPATIENT)
Dept: INFUSION THERAPY | Facility: HOSPITAL | Age: 50
End: 2025-01-09

## 2025-01-09 ENCOUNTER — APPOINTMENT (OUTPATIENT)
Dept: PSYCHIATRY | Facility: CLINIC | Age: 50
End: 2025-01-09

## 2025-01-11 NOTE — ED ADULT NURSE NOTE - BRAND OF FIRST COVID-19 BOOSTER
You are seen for evaluation of chest pain.  Your workup was reassuring against any cardiac cause.  Please follow-up with your primary care doctor.  Return to emergency room for any new or worsening symptoms.  
[employer]

## 2025-01-13 ENCOUNTER — TRANSCRIPTION ENCOUNTER (OUTPATIENT)
Age: 50
End: 2025-01-13

## 2025-01-21 ENCOUNTER — RESULT REVIEW (OUTPATIENT)
Age: 50
End: 2025-01-21

## 2025-01-21 ENCOUNTER — APPOINTMENT (OUTPATIENT)
Dept: GERIATRICS | Facility: CLINIC | Age: 50
End: 2025-01-21

## 2025-01-21 ENCOUNTER — APPOINTMENT (OUTPATIENT)
Dept: HEMATOLOGY ONCOLOGY | Facility: CLINIC | Age: 50
End: 2025-01-21
Payer: COMMERCIAL

## 2025-01-21 ENCOUNTER — APPOINTMENT (OUTPATIENT)
Dept: INFUSION THERAPY | Facility: HOSPITAL | Age: 50
End: 2025-01-21

## 2025-01-21 ENCOUNTER — APPOINTMENT (OUTPATIENT)
Dept: SURGICAL ONCOLOGY | Facility: CLINIC | Age: 50
End: 2025-01-21
Payer: COMMERCIAL

## 2025-01-21 ENCOUNTER — NON-APPOINTMENT (OUTPATIENT)
Age: 50
End: 2025-01-21

## 2025-01-21 VITALS
HEIGHT: 68 IN | HEART RATE: 88 BPM | WEIGHT: 247 LBS | SYSTOLIC BLOOD PRESSURE: 138 MMHG | OXYGEN SATURATION: 97 % | DIASTOLIC BLOOD PRESSURE: 83 MMHG | BODY MASS INDEX: 37.44 KG/M2 | RESPIRATION RATE: 17 BRPM

## 2025-01-21 DIAGNOSIS — C16.9 MALIGNANT NEOPLASM OF STOMACH, UNSPECIFIED: ICD-10-CM

## 2025-01-21 LAB
ALBUMIN SERPL ELPH-MCNC: 4 G/DL — SIGNIFICANT CHANGE UP (ref 3.3–5)
ALP SERPL-CCNC: 120 U/L — SIGNIFICANT CHANGE UP (ref 40–120)
ALT FLD-CCNC: 30 U/L — SIGNIFICANT CHANGE UP (ref 10–45)
ANION GAP SERPL CALC-SCNC: 9 MMOL/L — SIGNIFICANT CHANGE UP (ref 5–17)
AST SERPL-CCNC: 28 U/L — SIGNIFICANT CHANGE UP (ref 10–40)
BASOPHILS # BLD AUTO: 0.04 K/UL — SIGNIFICANT CHANGE UP (ref 0–0.2)
BASOPHILS NFR BLD AUTO: 0.4 % — SIGNIFICANT CHANGE UP (ref 0–2)
BILIRUB SERPL-MCNC: 0.3 MG/DL — SIGNIFICANT CHANGE UP (ref 0.2–1.2)
BUN SERPL-MCNC: 10 MG/DL — SIGNIFICANT CHANGE UP (ref 7–23)
CALCIUM SERPL-MCNC: 9.2 MG/DL — SIGNIFICANT CHANGE UP (ref 8.4–10.5)
CHLORIDE SERPL-SCNC: 101 MMOL/L — SIGNIFICANT CHANGE UP (ref 96–108)
CO2 SERPL-SCNC: 28 MMOL/L — SIGNIFICANT CHANGE UP (ref 22–31)
CREAT SERPL-MCNC: 0.73 MG/DL — SIGNIFICANT CHANGE UP (ref 0.5–1.3)
CRP SERPL-MCNC: <3 MG/L — SIGNIFICANT CHANGE UP
EGFR: 112 ML/MIN/1.73M2 — SIGNIFICANT CHANGE UP
EOSINOPHIL # BLD AUTO: 0.1 K/UL — SIGNIFICANT CHANGE UP (ref 0–0.5)
EOSINOPHIL NFR BLD AUTO: 0.9 % — SIGNIFICANT CHANGE UP (ref 0–6)
GLUCOSE SERPL-MCNC: 118 MG/DL — HIGH (ref 70–99)
HCT VFR BLD CALC: 39.5 % — SIGNIFICANT CHANGE UP (ref 39–50)
HGB BLD-MCNC: 13.2 G/DL — SIGNIFICANT CHANGE UP (ref 13–17)
IMM GRANULOCYTES NFR BLD AUTO: 1.9 % — HIGH (ref 0–0.9)
LDH SERPL L TO P-CCNC: 276 U/L — HIGH (ref 50–242)
LYMPHOCYTES # BLD AUTO: 18.8 % — SIGNIFICANT CHANGE UP (ref 13–44)
LYMPHOCYTES # BLD AUTO: 2.01 K/UL — SIGNIFICANT CHANGE UP (ref 1–3.3)
MAGNESIUM SERPL-MCNC: 2.1 MG/DL — SIGNIFICANT CHANGE UP (ref 1.6–2.6)
MCHC RBC-ENTMCNC: 28.2 PG — SIGNIFICANT CHANGE UP (ref 27–34)
MCHC RBC-ENTMCNC: 33.4 G/DL — SIGNIFICANT CHANGE UP (ref 32–36)
MCV RBC AUTO: 84.4 FL — SIGNIFICANT CHANGE UP (ref 80–100)
MONOCYTES # BLD AUTO: 1.08 K/UL — HIGH (ref 0–0.9)
MONOCYTES NFR BLD AUTO: 10.1 % — SIGNIFICANT CHANGE UP (ref 2–14)
NEUTROPHILS # BLD AUTO: 7.24 K/UL — SIGNIFICANT CHANGE UP (ref 1.8–7.4)
NEUTROPHILS NFR BLD AUTO: 67.9 % — SIGNIFICANT CHANGE UP (ref 43–77)
NRBC # BLD: 0 /100 WBCS — SIGNIFICANT CHANGE UP (ref 0–0)
PLATELET # BLD AUTO: 252 K/UL — SIGNIFICANT CHANGE UP (ref 150–400)
POTASSIUM SERPL-MCNC: 3.7 MMOL/L — SIGNIFICANT CHANGE UP (ref 3.5–5.3)
POTASSIUM SERPL-SCNC: 3.7 MMOL/L — SIGNIFICANT CHANGE UP (ref 3.5–5.3)
PROT SERPL-MCNC: 7.3 G/DL — SIGNIFICANT CHANGE UP (ref 6–8.3)
RBC # BLD: 4.68 M/UL — SIGNIFICANT CHANGE UP (ref 4.2–5.8)
RBC # FLD: 18.7 % — HIGH (ref 10.3–14.5)
SODIUM SERPL-SCNC: 139 MMOL/L — SIGNIFICANT CHANGE UP (ref 135–145)
WBC # BLD: 10.67 K/UL — HIGH (ref 3.8–10.5)
WBC # FLD AUTO: 10.67 K/UL — HIGH (ref 3.8–10.5)

## 2025-01-21 PROCEDURE — 99214 OFFICE O/P EST MOD 30 MIN: CPT

## 2025-01-21 PROCEDURE — G2211 COMPLEX E/M VISIT ADD ON: CPT

## 2025-01-21 PROCEDURE — 99213 OFFICE O/P EST LOW 20 MIN: CPT

## 2025-01-23 ENCOUNTER — APPOINTMENT (OUTPATIENT)
Dept: INFUSION THERAPY | Facility: HOSPITAL | Age: 50
End: 2025-01-23

## 2025-01-27 ENCOUNTER — TRANSCRIPTION ENCOUNTER (OUTPATIENT)
Age: 50
End: 2025-01-27

## 2025-01-28 ENCOUNTER — NON-APPOINTMENT (OUTPATIENT)
Age: 50
End: 2025-01-28

## 2025-01-28 ENCOUNTER — TRANSCRIPTION ENCOUNTER (OUTPATIENT)
Age: 50
End: 2025-01-28

## 2025-01-28 ENCOUNTER — APPOINTMENT (OUTPATIENT)
Dept: INTERNAL MEDICINE | Facility: CLINIC | Age: 50
End: 2025-01-28

## 2025-01-28 ENCOUNTER — OUTPATIENT (OUTPATIENT)
Dept: OUTPATIENT SERVICES | Facility: HOSPITAL | Age: 50
LOS: 1 days | End: 2025-01-28

## 2025-01-28 DIAGNOSIS — Z98.890 OTHER SPECIFIED POSTPROCEDURAL STATES: Chronic | ICD-10-CM

## 2025-01-28 DIAGNOSIS — Z90.49 ACQUIRED ABSENCE OF OTHER SPECIFIED PARTS OF DIGESTIVE TRACT: Chronic | ICD-10-CM

## 2025-01-28 RX ORDER — DIPHENHYDRAMINE HYDROCHLORIDE AND LIDOCAINE HYDROCHLORIDE AND ALUMINUM HYDROXIDE AND MAGNESIUM HYDRO
KIT
Qty: 1 | Refills: 0 | Status: ACTIVE | COMMUNITY
Start: 2025-01-28 | End: 1900-01-01

## 2025-01-30 ENCOUNTER — OUTPATIENT (OUTPATIENT)
Dept: OUTPATIENT SERVICES | Facility: HOSPITAL | Age: 50
LOS: 1 days | Discharge: HOME | End: 2025-01-30

## 2025-01-30 DIAGNOSIS — Z90.49 ACQUIRED ABSENCE OF OTHER SPECIFIED PARTS OF DIGESTIVE TRACT: Chronic | ICD-10-CM

## 2025-01-30 DIAGNOSIS — Z98.890 OTHER SPECIFIED POSTPROCEDURAL STATES: Chronic | ICD-10-CM

## 2025-01-30 DIAGNOSIS — C16.9 MALIGNANT NEOPLASM OF STOMACH, UNSPECIFIED: ICD-10-CM

## 2025-01-30 DIAGNOSIS — Z98.52 VASECTOMY STATUS: Chronic | ICD-10-CM

## 2025-02-03 ENCOUNTER — APPOINTMENT (OUTPATIENT)
Dept: INTERNAL MEDICINE | Facility: CLINIC | Age: 50
End: 2025-02-03
Payer: COMMERCIAL

## 2025-02-03 VITALS
SYSTOLIC BLOOD PRESSURE: 112 MMHG | OXYGEN SATURATION: 98 % | WEIGHT: 246 LBS | BODY MASS INDEX: 37.28 KG/M2 | HEART RATE: 111 BPM | DIASTOLIC BLOOD PRESSURE: 64 MMHG | TEMPERATURE: 98.2 F | HEIGHT: 68 IN | RESPIRATION RATE: 16 BRPM

## 2025-02-03 VITALS — DIASTOLIC BLOOD PRESSURE: 80 MMHG | SYSTOLIC BLOOD PRESSURE: 120 MMHG

## 2025-02-03 DIAGNOSIS — E78.5 HYPERLIPIDEMIA, UNSPECIFIED: ICD-10-CM

## 2025-02-03 DIAGNOSIS — I10 ESSENTIAL (PRIMARY) HYPERTENSION: ICD-10-CM

## 2025-02-03 DIAGNOSIS — E11.9 TYPE 2 DIABETES MELLITUS W/OUT COMPLICATIONS: ICD-10-CM

## 2025-02-03 DIAGNOSIS — J45.30 MILD PERSISTENT ASTHMA, UNCOMPLICATED: ICD-10-CM

## 2025-02-03 DIAGNOSIS — R73.03 PREDIABETES.: ICD-10-CM

## 2025-02-03 PROCEDURE — 99214 OFFICE O/P EST MOD 30 MIN: CPT

## 2025-02-04 ENCOUNTER — APPOINTMENT (OUTPATIENT)
Dept: GERIATRICS | Facility: CLINIC | Age: 50
End: 2025-02-04
Payer: COMMERCIAL

## 2025-02-04 ENCOUNTER — RESULT REVIEW (OUTPATIENT)
Age: 50
End: 2025-02-04

## 2025-02-04 ENCOUNTER — APPOINTMENT (OUTPATIENT)
Dept: HEMATOLOGY ONCOLOGY | Facility: CLINIC | Age: 50
End: 2025-02-04

## 2025-02-04 ENCOUNTER — APPOINTMENT (OUTPATIENT)
Dept: INFUSION THERAPY | Facility: HOSPITAL | Age: 50
End: 2025-02-04

## 2025-02-04 DIAGNOSIS — F41.9 ANXIETY DISORDER, UNSPECIFIED: ICD-10-CM

## 2025-02-04 DIAGNOSIS — G62.0 DRUG-INDUCED POLYNEUROPATHY: ICD-10-CM

## 2025-02-04 DIAGNOSIS — G47.9 SLEEP DISORDER, UNSPECIFIED: ICD-10-CM

## 2025-02-04 DIAGNOSIS — Z51.5 ENCOUNTER FOR PALLIATIVE CARE: ICD-10-CM

## 2025-02-04 DIAGNOSIS — R11.2 NAUSEA WITH VOMITING, UNSPECIFIED: ICD-10-CM

## 2025-02-04 DIAGNOSIS — R53.83 OTHER FATIGUE: ICD-10-CM

## 2025-02-04 LAB
ALBUMIN SERPL ELPH-MCNC: 3.8 G/DL — SIGNIFICANT CHANGE UP (ref 3.3–5)
ALP SERPL-CCNC: 116 U/L — SIGNIFICANT CHANGE UP (ref 40–120)
ALT FLD-CCNC: 37 U/L — SIGNIFICANT CHANGE UP (ref 10–45)
ANION GAP SERPL CALC-SCNC: 15 MMOL/L — SIGNIFICANT CHANGE UP (ref 5–17)
AST SERPL-CCNC: 37 U/L — SIGNIFICANT CHANGE UP (ref 10–40)
BASOPHILS # BLD AUTO: 0.02 K/UL — SIGNIFICANT CHANGE UP (ref 0–0.2)
BASOPHILS NFR BLD AUTO: 0.2 % — SIGNIFICANT CHANGE UP (ref 0–2)
BILIRUB SERPL-MCNC: 0.5 MG/DL — SIGNIFICANT CHANGE UP (ref 0.2–1.2)
BUN SERPL-MCNC: 13 MG/DL — SIGNIFICANT CHANGE UP (ref 7–23)
CALCIUM SERPL-MCNC: 9.4 MG/DL — SIGNIFICANT CHANGE UP (ref 8.4–10.5)
CHLORIDE SERPL-SCNC: 103 MMOL/L — SIGNIFICANT CHANGE UP (ref 96–108)
CO2 SERPL-SCNC: 24 MMOL/L — SIGNIFICANT CHANGE UP (ref 22–31)
CREAT SERPL-MCNC: 0.82 MG/DL — SIGNIFICANT CHANGE UP (ref 0.5–1.3)
CRP SERPL-MCNC: <3 MG/L — SIGNIFICANT CHANGE UP
EGFR: 108 ML/MIN/1.73M2 — SIGNIFICANT CHANGE UP
EGFR: 108 ML/MIN/1.73M2 — SIGNIFICANT CHANGE UP
EOSINOPHIL # BLD AUTO: 0.07 K/UL — SIGNIFICANT CHANGE UP (ref 0–0.5)
EOSINOPHIL NFR BLD AUTO: 0.8 % — SIGNIFICANT CHANGE UP (ref 0–6)
GLUCOSE SERPL-MCNC: 131 MG/DL — HIGH (ref 70–99)
HCT VFR BLD CALC: 42.3 % — SIGNIFICANT CHANGE UP (ref 39–50)
HGB BLD-MCNC: 13.9 G/DL — SIGNIFICANT CHANGE UP (ref 13–17)
IMM GRANULOCYTES NFR BLD AUTO: 3 % — HIGH (ref 0–0.9)
LDH SERPL L TO P-CCNC: 399 U/L — HIGH (ref 50–242)
LYMPHOCYTES # BLD AUTO: 3.01 K/UL — SIGNIFICANT CHANGE UP (ref 1–3.3)
LYMPHOCYTES # BLD AUTO: 33.8 % — SIGNIFICANT CHANGE UP (ref 13–44)
MAGNESIUM SERPL-MCNC: 2 MG/DL — SIGNIFICANT CHANGE UP (ref 1.6–2.6)
MCHC RBC-ENTMCNC: 27.8 PG — SIGNIFICANT CHANGE UP (ref 27–34)
MCHC RBC-ENTMCNC: 32.9 G/DL — SIGNIFICANT CHANGE UP (ref 32–36)
MCV RBC AUTO: 84.6 FL — SIGNIFICANT CHANGE UP (ref 80–100)
MONOCYTES # BLD AUTO: 0.93 K/UL — HIGH (ref 0–0.9)
MONOCYTES NFR BLD AUTO: 10.4 % — SIGNIFICANT CHANGE UP (ref 2–14)
NEUTROPHILS # BLD AUTO: 4.61 K/UL — SIGNIFICANT CHANGE UP (ref 1.8–7.4)
NEUTROPHILS NFR BLD AUTO: 51.8 % — SIGNIFICANT CHANGE UP (ref 43–77)
NRBC # BLD: 0 /100 WBCS — SIGNIFICANT CHANGE UP (ref 0–0)
NRBC BLD-RTO: 0 /100 WBCS — SIGNIFICANT CHANGE UP (ref 0–0)
PLATELET # BLD AUTO: 210 K/UL — SIGNIFICANT CHANGE UP (ref 150–400)
POTASSIUM SERPL-MCNC: 3.1 MMOL/L — LOW (ref 3.5–5.3)
POTASSIUM SERPL-SCNC: 3.1 MMOL/L — LOW (ref 3.5–5.3)
PROT SERPL-MCNC: 7 G/DL — SIGNIFICANT CHANGE UP (ref 6–8.3)
RBC # BLD: 5 M/UL — SIGNIFICANT CHANGE UP (ref 4.2–5.8)
RBC # FLD: 19.1 % — HIGH (ref 10.3–14.5)
SODIUM SERPL-SCNC: 142 MMOL/L — SIGNIFICANT CHANGE UP (ref 135–145)
WBC # BLD: 8.91 K/UL — SIGNIFICANT CHANGE UP (ref 3.8–10.5)
WBC # FLD AUTO: 8.91 K/UL — SIGNIFICANT CHANGE UP (ref 3.8–10.5)

## 2025-02-04 PROCEDURE — G2211 COMPLEX E/M VISIT ADD ON: CPT | Mod: NC

## 2025-02-04 PROCEDURE — 99214 OFFICE O/P EST MOD 30 MIN: CPT

## 2025-02-04 PROCEDURE — 99215 OFFICE O/P EST HI 40 MIN: CPT

## 2025-02-05 ENCOUNTER — TRANSCRIPTION ENCOUNTER (OUTPATIENT)
Age: 50
End: 2025-02-05

## 2025-02-05 DIAGNOSIS — Z51.11 ENCOUNTER FOR ANTINEOPLASTIC CHEMOTHERAPY: ICD-10-CM

## 2025-02-05 DIAGNOSIS — R11.2 NAUSEA WITH VOMITING, UNSPECIFIED: ICD-10-CM

## 2025-02-06 ENCOUNTER — APPOINTMENT (OUTPATIENT)
Dept: INFUSION THERAPY | Facility: HOSPITAL | Age: 50
End: 2025-02-06

## 2025-02-11 ENCOUNTER — TRANSCRIPTION ENCOUNTER (OUTPATIENT)
Age: 50
End: 2025-02-11

## 2025-02-12 LAB
CHOLEST SERPL-MCNC: 219 MG/DL
ESTIMATED AVERAGE GLUCOSE: 197 MG/DL
HBA1C MFR BLD HPLC: 8.5 %
HDLC SERPL-MCNC: 90 MG/DL
LDLC SERPL CALC-MCNC: 110 MG/DL
NONHDLC SERPL-MCNC: 129 MG/DL
T4 FREE SERPL-MCNC: 1.6 NG/DL
TRIGL SERPL-MCNC: 112 MG/DL
TSH SERPL-ACNC: 5.01 UIU/ML

## 2025-02-12 RX ORDER — EMPAGLIFLOZIN 10 MG/1
10 TABLET, FILM COATED ORAL
Qty: 30 | Refills: 6 | Status: ACTIVE | COMMUNITY
Start: 2025-02-12 | End: 1900-01-01

## 2025-02-14 ENCOUNTER — TRANSCRIPTION ENCOUNTER (OUTPATIENT)
Age: 50
End: 2025-02-14

## 2025-02-14 ENCOUNTER — APPOINTMENT (OUTPATIENT)
Dept: HEMATOLOGY ONCOLOGY | Facility: CLINIC | Age: 50
End: 2025-02-14

## 2025-02-14 ENCOUNTER — RESULT REVIEW (OUTPATIENT)
Age: 50
End: 2025-02-14

## 2025-02-14 DIAGNOSIS — M81.8 OTHER OSTEOPOROSIS W/OUT CURRENT PATHOLOGICAL FRACTURE: ICD-10-CM

## 2025-02-14 DIAGNOSIS — T38.0X5A OTHER OSTEOPOROSIS W/OUT CURRENT PATHOLOGICAL FRACTURE: ICD-10-CM

## 2025-02-14 LAB
BASOPHILS # BLD AUTO: 0.03 K/UL — SIGNIFICANT CHANGE UP (ref 0–0.2)
BASOPHILS NFR BLD AUTO: 0.3 % — SIGNIFICANT CHANGE UP (ref 0–2)
EOSINOPHIL # BLD AUTO: 0.01 K/UL — SIGNIFICANT CHANGE UP (ref 0–0.5)
EOSINOPHIL NFR BLD AUTO: 0.1 % — SIGNIFICANT CHANGE UP (ref 0–6)
HCT VFR BLD CALC: 41.2 % — SIGNIFICANT CHANGE UP (ref 39–50)
HGB BLD-MCNC: 14.1 G/DL — SIGNIFICANT CHANGE UP (ref 13–17)
IMM GRANULOCYTES NFR BLD AUTO: 0.9 % — SIGNIFICANT CHANGE UP (ref 0–0.9)
LYMPHOCYTES # BLD AUTO: 0.65 K/UL — LOW (ref 1–3.3)
LYMPHOCYTES # BLD AUTO: 6.3 % — LOW (ref 13–44)
MCHC RBC-ENTMCNC: 28.8 PG — SIGNIFICANT CHANGE UP (ref 27–34)
MCHC RBC-ENTMCNC: 34.2 G/DL — SIGNIFICANT CHANGE UP (ref 32–36)
MCV RBC AUTO: 84.3 FL — SIGNIFICANT CHANGE UP (ref 80–100)
MONOCYTES # BLD AUTO: 0.51 K/UL — SIGNIFICANT CHANGE UP (ref 0–0.9)
MONOCYTES NFR BLD AUTO: 4.9 % — SIGNIFICANT CHANGE UP (ref 2–14)
NEUTROPHILS # BLD AUTO: 9.1 K/UL — HIGH (ref 1.8–7.4)
NEUTROPHILS NFR BLD AUTO: 87.5 % — HIGH (ref 43–77)
NRBC BLD AUTO-RTO: 0 /100 WBCS — SIGNIFICANT CHANGE UP (ref 0–0)
PLATELET # BLD AUTO: 150 K/UL — SIGNIFICANT CHANGE UP (ref 150–400)
RBC # BLD: 4.89 M/UL — SIGNIFICANT CHANGE UP (ref 4.2–5.8)
RBC # FLD: 19 % — HIGH (ref 10.3–14.5)
WBC # BLD: 10.39 K/UL — SIGNIFICANT CHANGE UP (ref 3.8–10.5)
WBC # FLD AUTO: 10.39 K/UL — SIGNIFICANT CHANGE UP (ref 3.8–10.5)

## 2025-02-15 LAB
ALBUMIN SERPL ELPH-MCNC: 4 G/DL
ALP BLD-CCNC: 131 U/L
ALT SERPL-CCNC: 44 U/L
ANION GAP SERPL CALC-SCNC: 18 MMOL/L
AST SERPL-CCNC: 41 U/L
BILIRUB SERPL-MCNC: 0.8 MG/DL
BUN SERPL-MCNC: 9 MG/DL
CALCIUM SERPL-MCNC: 9.1 MG/DL
CHLORIDE SERPL-SCNC: 95 MMOL/L
CO2 SERPL-SCNC: 22 MMOL/L
CREAT SERPL-MCNC: 0.92 MG/DL
EGFR: 103 ML/MIN/1.73M2
FLUAV RNA NPH QL NAA+NON-PROBE: DETECTED
GLUCOSE SERPL-MCNC: 265 MG/DL
POTASSIUM SERPL-SCNC: 3.6 MMOL/L
PROT SERPL-MCNC: 7.2 G/DL
RESP PATH DNA+RNA PNL NPH NAA+NON-PROBE: DETECTED
SARS-COV-2 RNA RESP QL NAA+PROBE: NOT DETECTED
SODIUM SERPL-SCNC: 136 MMOL/L

## 2025-02-17 ENCOUNTER — APPOINTMENT (OUTPATIENT)
Dept: HEMATOLOGY ONCOLOGY | Facility: CLINIC | Age: 50
End: 2025-02-17

## 2025-02-17 ENCOUNTER — APPOINTMENT (OUTPATIENT)
Dept: INFUSION THERAPY | Facility: HOSPITAL | Age: 50
End: 2025-02-17

## 2025-02-17 NOTE — PATIENT PROFILE ADULT - PUBLIC BENEFITS
"[facility]    [facility]  Securely message with [employer] (more info)  Text page via [facility] Paging/Directory   See signed in provider for up to date coverage information  ______________________________________________________________________    Interval History   She is afraid of lowering dilaudid  , afraid of pain and withdrawal, now ok with goping back to  suboxone      Physical Exam   Vital Signs: Temp: 98.1  F (36.7  C) Temp src: Oral BP: 125/72 Pulse: 63   Resp: 16 SpO2: 98 % O2 Device: None (Room air)    Weight: 200 lbs 0 oz  General appearence: awake alert   no apparent distress    RESPIRATORY: lungs clear    CARDIOVASCULAR:S1 S2 regular rate and rhythm, no rubs gallops or murmurs appreciated  GASTROINTESTINAL:soft, non-distended , non-tender , + bowel sounds,   SKIN: warm and dry, no mottling noted   NEUROLOGIC; awake alert and oriented,  EXTREMITIES: no clubbing, cyanosis or edema , left knee with dressing still on       Data         Imaging results reviewed over the past 24 hrs:   No results found for this or any previous visit (from the past 24 hours).   [phone] no

## 2025-02-18 ENCOUNTER — TRANSCRIPTION ENCOUNTER (OUTPATIENT)
Age: 50
End: 2025-02-18

## 2025-02-18 ENCOUNTER — APPOINTMENT (OUTPATIENT)
Dept: HEMATOLOGY ONCOLOGY | Facility: CLINIC | Age: 50
End: 2025-02-18

## 2025-02-19 ENCOUNTER — APPOINTMENT (OUTPATIENT)
Dept: HEMATOLOGY ONCOLOGY | Facility: CLINIC | Age: 50
End: 2025-02-19

## 2025-02-19 ENCOUNTER — APPOINTMENT (OUTPATIENT)
Dept: INFUSION THERAPY | Facility: HOSPITAL | Age: 50
End: 2025-02-19

## 2025-02-20 ENCOUNTER — RESULT REVIEW (OUTPATIENT)
Age: 50
End: 2025-02-20

## 2025-02-20 ENCOUNTER — NON-APPOINTMENT (OUTPATIENT)
Age: 50
End: 2025-02-20

## 2025-02-20 ENCOUNTER — APPOINTMENT (OUTPATIENT)
Dept: INFUSION THERAPY | Facility: HOSPITAL | Age: 50
End: 2025-02-20

## 2025-02-20 ENCOUNTER — APPOINTMENT (OUTPATIENT)
Dept: HEMATOLOGY ONCOLOGY | Facility: CLINIC | Age: 50
End: 2025-02-20

## 2025-02-20 DIAGNOSIS — C16.9 MALIGNANT NEOPLASM OF STOMACH, UNSPECIFIED: ICD-10-CM

## 2025-02-20 LAB
ALBUMIN SERPL ELPH-MCNC: 3.7 G/DL — SIGNIFICANT CHANGE UP (ref 3.3–5)
ALP SERPL-CCNC: 104 U/L — SIGNIFICANT CHANGE UP (ref 40–120)
ALT FLD-CCNC: 58 U/L — HIGH (ref 10–45)
ANION GAP SERPL CALC-SCNC: 12 MMOL/L — SIGNIFICANT CHANGE UP (ref 5–17)
AST SERPL-CCNC: 45 U/L — HIGH (ref 10–40)
BASOPHILS # BLD AUTO: 0.05 K/UL — SIGNIFICANT CHANGE UP (ref 0–0.2)
BASOPHILS NFR BLD AUTO: 0.5 % — SIGNIFICANT CHANGE UP (ref 0–2)
BILIRUB SERPL-MCNC: 0.4 MG/DL — SIGNIFICANT CHANGE UP (ref 0.2–1.2)
BUN SERPL-MCNC: 9 MG/DL — SIGNIFICANT CHANGE UP (ref 7–23)
CALCIUM SERPL-MCNC: 9.9 MG/DL — SIGNIFICANT CHANGE UP (ref 8.4–10.5)
CHLORIDE SERPL-SCNC: 101 MMOL/L — SIGNIFICANT CHANGE UP (ref 96–108)
CO2 SERPL-SCNC: 28 MMOL/L — SIGNIFICANT CHANGE UP (ref 22–31)
CREAT SERPL-MCNC: 0.83 MG/DL — SIGNIFICANT CHANGE UP (ref 0.5–1.3)
EGFR: 108 ML/MIN/1.73M2 — SIGNIFICANT CHANGE UP
EGFR: 108 ML/MIN/1.73M2 — SIGNIFICANT CHANGE UP
EOSINOPHIL # BLD AUTO: 0.03 K/UL — SIGNIFICANT CHANGE UP (ref 0–0.5)
EOSINOPHIL NFR BLD AUTO: 0.3 % — SIGNIFICANT CHANGE UP (ref 0–6)
GLUCOSE SERPL-MCNC: 112 MG/DL — HIGH (ref 70–99)
HCT VFR BLD CALC: 38.6 % — LOW (ref 39–50)
HGB BLD-MCNC: 13 G/DL — SIGNIFICANT CHANGE UP (ref 13–17)
IMM GRANULOCYTES NFR BLD AUTO: 4.7 % — HIGH (ref 0–0.9)
LDH SERPL L TO P-CCNC: 393 U/L — HIGH (ref 50–242)
LYMPHOCYTES # BLD AUTO: 2.26 K/UL — SIGNIFICANT CHANGE UP (ref 1–3.3)
LYMPHOCYTES # BLD AUTO: 21.7 % — SIGNIFICANT CHANGE UP (ref 13–44)
MAGNESIUM SERPL-MCNC: 2 MG/DL — SIGNIFICANT CHANGE UP (ref 1.6–2.6)
MCHC RBC-ENTMCNC: 28.1 PG — SIGNIFICANT CHANGE UP (ref 27–34)
MCHC RBC-ENTMCNC: 33.7 G/DL — SIGNIFICANT CHANGE UP (ref 32–36)
MCV RBC AUTO: 83.4 FL — SIGNIFICANT CHANGE UP (ref 80–100)
MONOCYTES # BLD AUTO: 1.05 K/UL — HIGH (ref 0–0.9)
MONOCYTES NFR BLD AUTO: 10.1 % — SIGNIFICANT CHANGE UP (ref 2–14)
NEUTROPHILS # BLD AUTO: 6.54 K/UL — SIGNIFICANT CHANGE UP (ref 1.8–7.4)
NEUTROPHILS NFR BLD AUTO: 62.7 % — SIGNIFICANT CHANGE UP (ref 43–77)
NRBC BLD AUTO-RTO: 0 /100 WBCS — SIGNIFICANT CHANGE UP (ref 0–0)
PLATELET # BLD AUTO: 328 K/UL — SIGNIFICANT CHANGE UP (ref 150–400)
POTASSIUM SERPL-MCNC: 3 MMOL/L — LOW (ref 3.5–5.3)
POTASSIUM SERPL-SCNC: 3 MMOL/L — LOW (ref 3.5–5.3)
PROT SERPL-MCNC: 7.8 G/DL — SIGNIFICANT CHANGE UP (ref 6–8.3)
RBC # BLD: 4.63 M/UL — SIGNIFICANT CHANGE UP (ref 4.2–5.8)
RBC # FLD: 18.4 % — HIGH (ref 10.3–14.5)
SODIUM SERPL-SCNC: 142 MMOL/L — SIGNIFICANT CHANGE UP (ref 135–145)
WBC # BLD: 10.42 K/UL — SIGNIFICANT CHANGE UP (ref 3.8–10.5)
WBC # FLD AUTO: 10.42 K/UL — SIGNIFICANT CHANGE UP (ref 3.8–10.5)

## 2025-02-21 LAB — CRP SERPL-MCNC: 34 MG/L — HIGH

## 2025-02-22 ENCOUNTER — APPOINTMENT (OUTPATIENT)
Dept: INFUSION THERAPY | Facility: HOSPITAL | Age: 50
End: 2025-02-22

## 2025-02-27 ENCOUNTER — APPOINTMENT (OUTPATIENT)
Dept: PSYCHIATRY | Facility: CLINIC | Age: 50
End: 2025-02-27

## 2025-02-28 ENCOUNTER — NON-APPOINTMENT (OUTPATIENT)
Age: 50
End: 2025-02-28

## 2025-02-28 ENCOUNTER — INPATIENT (INPATIENT)
Facility: HOSPITAL | Age: 50
LOS: 5 days | Discharge: HOME | End: 2025-03-06
Attending: INTERNAL MEDICINE | Admitting: INTERNAL MEDICINE
Payer: COMMERCIAL

## 2025-02-28 ENCOUNTER — TRANSCRIPTION ENCOUNTER (OUTPATIENT)
Age: 50
End: 2025-02-28

## 2025-02-28 VITALS
HEART RATE: 100 BPM | WEIGHT: 246.92 LBS | SYSTOLIC BLOOD PRESSURE: 116 MMHG | HEIGHT: 68 IN | RESPIRATION RATE: 16 BRPM | OXYGEN SATURATION: 96 % | TEMPERATURE: 98 F | DIASTOLIC BLOOD PRESSURE: 84 MMHG

## 2025-02-28 DIAGNOSIS — Z98.52 VASECTOMY STATUS: Chronic | ICD-10-CM

## 2025-02-28 DIAGNOSIS — Z98.890 OTHER SPECIFIED POSTPROCEDURAL STATES: Chronic | ICD-10-CM

## 2025-02-28 DIAGNOSIS — Z90.49 ACQUIRED ABSENCE OF OTHER SPECIFIED PARTS OF DIGESTIVE TRACT: Chronic | ICD-10-CM

## 2025-02-28 LAB
ADD ON TEST-SPECIMEN IN LAB: SIGNIFICANT CHANGE UP
ADD ON TEST-SPECIMEN IN LAB: SIGNIFICANT CHANGE UP
ALBUMIN SERPL ELPH-MCNC: 4 G/DL — SIGNIFICANT CHANGE UP (ref 3.3–5)
ALP SERPL-CCNC: 124 U/L — HIGH (ref 40–120)
ALT FLD-CCNC: 110 U/L — HIGH (ref 4–41)
ANION GAP SERPL CALC-SCNC: 15 MMOL/L — HIGH (ref 7–14)
APPEARANCE UR: CLEAR — SIGNIFICANT CHANGE UP
APTT BLD: 30.9 SEC — SIGNIFICANT CHANGE UP (ref 24.5–35.6)
AST SERPL-CCNC: 68 U/L — HIGH (ref 4–40)
BACTERIA # UR AUTO: NEGATIVE /HPF — SIGNIFICANT CHANGE UP
BASOPHILS # BLD AUTO: 0.05 K/UL — SIGNIFICANT CHANGE UP (ref 0–0.2)
BASOPHILS NFR BLD AUTO: 0.4 % — SIGNIFICANT CHANGE UP (ref 0–2)
BILIRUB SERPL-MCNC: 0.4 MG/DL — SIGNIFICANT CHANGE UP (ref 0.2–1.2)
BILIRUB UR-MCNC: NEGATIVE — SIGNIFICANT CHANGE UP
BLOOD GAS VENOUS COMPREHENSIVE RESULT: SIGNIFICANT CHANGE UP
BUN SERPL-MCNC: 6 MG/DL — LOW (ref 7–23)
CALCIUM SERPL-MCNC: 9.5 MG/DL — SIGNIFICANT CHANGE UP (ref 8.4–10.5)
CAST: 2 /LPF — SIGNIFICANT CHANGE UP (ref 0–4)
CHLORIDE SERPL-SCNC: 102 MMOL/L — SIGNIFICANT CHANGE UP (ref 98–107)
CO2 SERPL-SCNC: 25 MMOL/L — SIGNIFICANT CHANGE UP (ref 22–31)
COLOR SPEC: YELLOW — SIGNIFICANT CHANGE UP
CREAT SERPL-MCNC: 0.78 MG/DL — SIGNIFICANT CHANGE UP (ref 0.5–1.3)
DIFF PNL FLD: NEGATIVE — SIGNIFICANT CHANGE UP
EGFR: 110 ML/MIN/1.73M2 — SIGNIFICANT CHANGE UP
EGFR: 110 ML/MIN/1.73M2 — SIGNIFICANT CHANGE UP
EOSINOPHIL # BLD AUTO: 0.05 K/UL — SIGNIFICANT CHANGE UP (ref 0–0.5)
EOSINOPHIL NFR BLD AUTO: 0.4 % — SIGNIFICANT CHANGE UP (ref 0–6)
FLUAV AG NPH QL: SIGNIFICANT CHANGE UP
FLUBV AG NPH QL: SIGNIFICANT CHANGE UP
GLUCOSE SERPL-MCNC: 78 MG/DL — SIGNIFICANT CHANGE UP (ref 70–99)
GLUCOSE UR QL: NEGATIVE MG/DL — SIGNIFICANT CHANGE UP
HCT VFR BLD CALC: 39.1 % — SIGNIFICANT CHANGE UP (ref 39–50)
HGB BLD-MCNC: 12.9 G/DL — LOW (ref 13–17)
IANC: 9 K/UL — HIGH (ref 1.8–7.4)
IMM GRANULOCYTES NFR BLD AUTO: 3.8 % — HIGH (ref 0–0.9)
INR BLD: 1 RATIO — SIGNIFICANT CHANGE UP (ref 0.85–1.16)
KETONES UR-MCNC: NEGATIVE MG/DL — SIGNIFICANT CHANGE UP
LEUKOCYTE ESTERASE UR-ACNC: NEGATIVE — SIGNIFICANT CHANGE UP
LIDOCAIN IGE QN: 94 U/L — HIGH (ref 7–60)
LYMPHOCYTES # BLD AUTO: 23.9 % — SIGNIFICANT CHANGE UP (ref 13–44)
LYMPHOCYTES # BLD AUTO: 3.29 K/UL — SIGNIFICANT CHANGE UP (ref 1–3.3)
MCHC RBC-ENTMCNC: 28 PG — SIGNIFICANT CHANGE UP (ref 27–34)
MCHC RBC-ENTMCNC: 33 G/DL — SIGNIFICANT CHANGE UP (ref 32–36)
MCV RBC AUTO: 85 FL — SIGNIFICANT CHANGE UP (ref 80–100)
MONOCYTES # BLD AUTO: 0.84 K/UL — SIGNIFICANT CHANGE UP (ref 0–0.9)
MONOCYTES NFR BLD AUTO: 6.1 % — SIGNIFICANT CHANGE UP (ref 2–14)
NEUTROPHILS # BLD AUTO: 9 K/UL — HIGH (ref 1.8–7.4)
NEUTROPHILS NFR BLD AUTO: 65.4 % — SIGNIFICANT CHANGE UP (ref 43–77)
NITRITE UR-MCNC: NEGATIVE — SIGNIFICANT CHANGE UP
NRBC # BLD AUTO: 0.06 K/UL — HIGH (ref 0–0)
NRBC # FLD: 0.06 K/UL — HIGH (ref 0–0)
NRBC BLD AUTO-RTO: 0 /100 WBCS — SIGNIFICANT CHANGE UP (ref 0–0)
PH UR: 7 — SIGNIFICANT CHANGE UP (ref 5–8)
PLATELET # BLD AUTO: 235 K/UL — SIGNIFICANT CHANGE UP (ref 150–400)
POTASSIUM SERPL-MCNC: 3.6 MMOL/L — SIGNIFICANT CHANGE UP (ref 3.5–5.3)
POTASSIUM SERPL-SCNC: 3.6 MMOL/L — SIGNIFICANT CHANGE UP (ref 3.5–5.3)
PROT SERPL-MCNC: 7.8 G/DL — SIGNIFICANT CHANGE UP (ref 6–8.3)
PROT UR-MCNC: 30 MG/DL
PROTHROM AB SERPL-ACNC: 11.9 SEC — SIGNIFICANT CHANGE UP (ref 9.9–13.4)
RBC # BLD: 4.6 M/UL — SIGNIFICANT CHANGE UP (ref 4.2–5.8)
RBC # FLD: 17.8 % — HIGH (ref 10.3–14.5)
RBC CASTS # UR COMP ASSIST: 1 /HPF — SIGNIFICANT CHANGE UP (ref 0–4)
RSV RNA NPH QL NAA+NON-PROBE: SIGNIFICANT CHANGE UP
SARS-COV-2 RNA SPEC QL NAA+PROBE: SIGNIFICANT CHANGE UP
SODIUM SERPL-SCNC: 142 MMOL/L — SIGNIFICANT CHANGE UP (ref 135–145)
SP GR SPEC: 1.02 — SIGNIFICANT CHANGE UP (ref 1–1.03)
SQUAMOUS # UR AUTO: 1 /HPF — SIGNIFICANT CHANGE UP (ref 0–5)
TROPONIN T, HIGH SENSITIVITY RESULT: 21 NG/L — SIGNIFICANT CHANGE UP
TROPONIN T, HIGH SENSITIVITY RESULT: 23 NG/L — SIGNIFICANT CHANGE UP
UROBILINOGEN FLD QL: 0.2 MG/DL — SIGNIFICANT CHANGE UP (ref 0.2–1)
WBC # BLD: 13.75 K/UL — HIGH (ref 3.8–10.5)
WBC # FLD AUTO: 13.75 K/UL — HIGH (ref 3.8–10.5)
WBC UR QL: 1 /HPF — SIGNIFICANT CHANGE UP (ref 0–5)

## 2025-02-28 PROCEDURE — 71275 CT ANGIOGRAPHY CHEST: CPT | Mod: 26

## 2025-02-28 PROCEDURE — 71045 X-RAY EXAM CHEST 1 VIEW: CPT | Mod: 26

## 2025-02-28 PROCEDURE — 99223 1ST HOSP IP/OBS HIGH 75: CPT

## 2025-02-28 RX ORDER — HYDROXYCHLOROQUINE SULFATE 200 MG/1
200 TABLET, FILM COATED ORAL ONCE
Refills: 0 | Status: COMPLETED | OUTPATIENT
Start: 2025-02-28 | End: 2025-02-28

## 2025-02-28 RX ORDER — APIXABAN 2.5 MG/1
10 TABLET, FILM COATED ORAL ONCE
Refills: 0 | Status: COMPLETED | OUTPATIENT
Start: 2025-02-28 | End: 2025-02-28

## 2025-02-28 RX ORDER — METHYLPREDNISOLONE ACETATE 80 MG/ML
60 INJECTION, SUSPENSION INTRA-ARTICULAR; INTRALESIONAL; INTRAMUSCULAR; SOFT TISSUE ONCE
Refills: 0 | Status: COMPLETED | OUTPATIENT
Start: 2025-02-28 | End: 2025-02-28

## 2025-02-28 RX ORDER — METHYLPREDNISOLONE ACETATE 80 MG/ML
8 INJECTION, SUSPENSION INTRA-ARTICULAR; INTRALESIONAL; INTRAMUSCULAR; SOFT TISSUE DAILY
Refills: 0 | Status: DISCONTINUED | OUTPATIENT
Start: 2025-02-28 | End: 2025-03-01

## 2025-02-28 RX ORDER — HYDROXYCHLOROQUINE SULFATE 200 MG/1
200 TABLET, FILM COATED ORAL
Refills: 0 | Status: DISCONTINUED | OUTPATIENT
Start: 2025-02-28 | End: 2025-02-28

## 2025-02-28 RX ORDER — APIXABAN 2.5 MG/1
10 TABLET, FILM COATED ORAL EVERY 12 HOURS
Refills: 0 | Status: DISCONTINUED | OUTPATIENT
Start: 2025-03-01 | End: 2025-03-06

## 2025-02-28 RX ORDER — ALPRAZOLAM 0.5 MG
1 TABLET, EXTENDED RELEASE 24 HR ORAL ONCE
Refills: 0 | Status: DISCONTINUED | OUTPATIENT
Start: 2025-02-28 | End: 2025-02-28

## 2025-02-28 RX ORDER — LOSARTAN POTASSIUM 100 MG/1
50 TABLET, FILM COATED ORAL DAILY
Refills: 0 | Status: DISCONTINUED | OUTPATIENT
Start: 2025-02-28 | End: 2025-03-06

## 2025-02-28 RX ORDER — IPRATROPIUM BROMIDE AND ALBUTEROL SULFATE .5; 2.5 MG/3ML; MG/3ML
3 SOLUTION RESPIRATORY (INHALATION) ONCE
Refills: 0 | Status: COMPLETED | OUTPATIENT
Start: 2025-02-28 | End: 2025-02-28

## 2025-02-28 RX ORDER — LEVOTHYROXINE SODIUM 300 MCG
125 TABLET ORAL DAILY
Refills: 0 | Status: DISCONTINUED | OUTPATIENT
Start: 2025-02-28 | End: 2025-03-06

## 2025-02-28 RX ORDER — HYDROXYCHLOROQUINE SULFATE 200 MG/1
400 TABLET, FILM COATED ORAL AT BEDTIME
Refills: 0 | Status: DISCONTINUED | OUTPATIENT
Start: 2025-03-01 | End: 2025-03-06

## 2025-02-28 RX ADMIN — HYDROXYCHLOROQUINE SULFATE 200 MILLIGRAM(S): 200 TABLET, FILM COATED ORAL at 23:27

## 2025-02-28 RX ADMIN — APIXABAN 10 MILLIGRAM(S): 2.5 TABLET, FILM COATED ORAL at 19:04

## 2025-02-28 RX ADMIN — Medication 20 MILLIGRAM(S): at 18:06

## 2025-02-28 RX ADMIN — HYDROXYCHLOROQUINE SULFATE 200 MILLIGRAM(S): 200 TABLET, FILM COATED ORAL at 23:55

## 2025-02-28 RX ADMIN — METHYLPREDNISOLONE ACETATE 60 MILLIGRAM(S): 80 INJECTION, SUSPENSION INTRA-ARTICULAR; INTRALESIONAL; INTRAMUSCULAR; SOFT TISSUE at 18:06

## 2025-02-28 RX ADMIN — Medication 40 MILLIEQUIVALENT(S): at 23:55

## 2025-02-28 RX ADMIN — Medication 1000 MILLILITER(S): at 14:52

## 2025-02-28 RX ADMIN — Medication 1 MILLIGRAM(S): at 23:55

## 2025-02-28 RX ADMIN — IPRATROPIUM BROMIDE AND ALBUTEROL SULFATE 3 MILLILITER(S): .5; 2.5 SOLUTION RESPIRATORY (INHALATION) at 14:54

## 2025-02-28 NOTE — ED PROVIDER NOTE - CLINICAL SUMMARY MEDICAL DECISION MAKING FREE TEXT BOX
Rosie Banuelos MD attending physician Mr. Pinzon is a 49-year-old gentleman comes into the emergency department complaining that since he tested flu positive on 14 February he is continue to feel unwell.  He feels weak and short of breath    also hx lupus, and gastgric ca on chemo    He primarily focuses his concern on not quite being able to get on top of his breathing issues he is taking prednisone and other meds for his asthma at home but continues to have cough some chest discomfort wheezing along with his weakness and dyspnea    Blood pressure 114/91 respiratory rate 20 heart rate 82 afebrile O2 sat is 100    Pt alert and can phonate well  h at/nc  perrl, conj clear, sclera anicteric,  neck supple  throat no exudate  cor rrr pos s1s2  lungs bilateral breath sounds positive wheeze  abd soft no r/g/t  ext no edema no deformities  neueo awake, lucid normal gait moves all extremities with strength  psych normal affect  vs reasonable    Patient seems very appropriately concern for his dyspnea.  Does have wheeze on exam.  Obviously concerned that this gentleman could also have pneumonia.  Awaiting x-rays lab work upon signout to Dr. ARNOLDO ZABALA at 3 PM.  Dispo will be as per her.

## 2025-02-28 NOTE — ED PROVIDER NOTE - PHYSICAL EXAMINATION
Vital signs reviewed.   CONSTITUTIONAL: Well-appearing; well-nourished; in no apparent distress. Non-toxic appearing.   HEAD: Normocephalic, atraumatic.  EYES: PERRL, EOM intact, conjunctiva and sclera WNL.  ENT: normal nose; no rhinorrhea; normal pharynx with no tonsillar hypertrophy, no erythema, no exudate, no lymphadenopathy.  NECK/LYMPH: Supple; non-tender; no cervical lymphadenopathy.  CARD: Normal S1, S2; no murmurs, rubs, or gallops noted.  RESP: Normal chest excursion with respiration; breath sounds clear and equal bilaterally; no wheezes, rhonchi, or rales. ( exam after nebs given)   ABD/GI: soft, non-distended; non-tender; no palpable organomegaly, no pulsatile mass.  EXT/MS: moves all extremities; distal pulses are normal, no pedal edema.  SKIN: Normal for age and race; warm; dry; good turgor; no apparent lesions or exudate noted.  NEURO: Awake, alert, oriented x 3, no gross deficits, CN II-XII grossly intact, no motor or sensory deficit noted.  PSYCH: Normal mood; appropriate affect.

## 2025-02-28 NOTE — ED ADULT TRIAGE NOTE - CHIEF COMPLAINT QUOTE
Hx gastric cancer on chemo via right chest port, last treatment 2/18/25. Hx asthma, Lupus, pre-diabetes. Diagnosed with flu 2/14/25. C/o continued SOB, WAITE, cough, mid sternal chest pain,  wheezing , generalized weakness.

## 2025-02-28 NOTE — ED PROVIDER NOTE - OBJECTIVE STATEMENT
49-year-old male with past medical history of gastric cancer currently on chemo last chemo 8 days ago, lupus on steroids, asthma with complaint of shortness of breath, chest congestion, cough worsening over the past 3 weeks with weakness/ fatigue. patient was diagnosed with flu on February 14 and notes has not felt well since then.  Has tried inhalers and steroids without relief. + Diarrhea denies nausea, vomiting, abdominal pain, urinary symptoms.

## 2025-02-28 NOTE — ED CDU PROVIDER INITIAL DAY NOTE - CLINICAL SUMMARY MEDICAL DECISION MAKING FREE TEXT BOX
49-year-old male with past medical history of gastric cancer on chemo, asthma, lupus presented to the ER with persistent shortness of breath cough chest discomfort x 3 weeks in ED labs noted for mild leukocytosis to 13.75 CMP noted for LFT elevation will trend in morning, UA negative for infection, RVP negative.  CTA showing right upper and lower lobe segmental pulmonary emboli.  Troponin initially 21 proBNP 40 3 repeat troponin pending patient placed in CDU for continued vital monitoring, initiation of Eliquis, oncology to see evaluate tomorrow, and telemetry monitoring echocardiogram patient has been vitally stable nontachycardic nonhypoxic while in the ER

## 2025-02-28 NOTE — ED PROVIDER NOTE - NSICDXPASTMEDICALHX_GEN_ALL_CORE_FT
PAST MEDICAL HISTORY:  Asthma     Gastric cancer     H. pylori infection     H/O compression fracture of spine     Hypothyroid     Lumbosacral stenosis     Lupus     Mild obstructive sleep apnea     Myositis     Obesity     Osteoporosis     Prediabetes

## 2025-02-28 NOTE — ED ADULT NURSE REASSESSMENT NOTE - NS ED NURSE REASSESS COMMENT FT1
Pt received at handoff report from ALEKSEY Owens, pt A&Ox4, resting comfortably in stretcher. Respirations even and unlabored, NAD noted. NSR on cardiac monitor. Admitting provider at bedside for evaluation. Pt given food as per provider. Comfort measures provided, safety maintained. Plan of care ongoing. Pt received at handoff report from ALEKSEY Owens. Pt A&Ox4, resting comfortably in stretcher. Respirations even and unlabored, NAD noted. NSR on cardiac monitor. Comfort measures provided, safety maintained. Plan of care ongoing.

## 2025-02-28 NOTE — ED CDU PROVIDER INITIAL DAY NOTE - ATTENDING APP SHARED VISIT CONTRIBUTION OF CARE
Dr. Barr:  I performed a face to face bedside interview with patient regarding history of present illness, review of symptoms and past medical history. I completed an independent physical exam.  I have discussed patient's plan of care with PA.   I agree with note as stated above, having amended the EMR as needed to reflect my findings.   This includes HISTORY OF PRESENT ILLNESS, HIV, PAST MEDICAL/SURGICAL/FAMILY/SOCIAL HISTORY, ALLERGIES AND HOME MEDICATIONS, REVIEW OF SYSTEMS, PHYSICAL EXAM, and any PROGRESS NOTES during the time I functioned as the attending physician for this patient.    49M h/o SLE, gastric cancer on chemo, presented to ED with persistent dyspnea and malaise since testing flu positive 2/14.  Found to have subsegmental PE on CT.    Exam:  - nad  - rrr  - ctag  - abd soft ntnd    A/P  - PE  - cdu for tele, echo, heme/onc consult, Eliquis Dr. Barr:  I performed a face to face bedside interview with patient regarding history of present illness, review of symptoms and past medical history. I completed an independent physical exam.  I have discussed patient's plan of care with PA.   I agree with note as stated above, having amended the EMR as needed to reflect my findings.   This includes HISTORY OF PRESENT ILLNESS, HIV, PAST MEDICAL/SURGICAL/FAMILY/SOCIAL HISTORY, ALLERGIES AND HOME MEDICATIONS, REVIEW OF SYSTEMS, PHYSICAL EXAM, and any PROGRESS NOTES during the time I functioned as the attending physician for this patient.    49M h/o SLE, gastric cancer on chemo, presented to ED with persistent dyspnea and malaise since testing flu positive 2/14.  Found to have PE on CT.    Exam:  - nad  - rrr  - ctag  - abd soft ntnd    A/P  - PE  - cdu for tele, echo, heme/onc consult, Eliquis

## 2025-02-28 NOTE — ED PROVIDER NOTE - ATTENDING APP SHARED VISIT CONTRIBUTION OF CARE
Rosie Banuelos MD attending physician Mr. Pinzon is a 49-year-old gentleman comes into the emergency department complaining that since he tested flu positive on 14 February he is continue to feel unwell.  He feels weak and short of breath    also hx lupus, and gastgric ca on chemo    He primarily focuses his concern on not quite being able to get on top of his breathing issues he is taking prednisone and other meds for his asthma at home but continues to have cough some chest discomfort wheezing along with his weakness and dyspnea    Blood pressure 114/91 respiratory rate 20 heart rate 82 afebrile O2 sat is 100    Pt alert and can phonate well  h at/nc  perrl, conj clear, sclera anicteric,  neck supple  throat no exudate  cor rrr pos s1s2  lungs bilateral breath sounds positive wheeze  abd soft no r/g/t  ext no edema no deformities  neueo awake, lucid normal gait moves all extremities with strength  psych normal affect  vs reasonable    Patient seems very appropriately concern for his dyspnea.  Does have wheeze on exam.  Obviously concerned that this gentleman could also have pneumonia.  Awaiting x-rays lab work upon signout to Dr. ARNOLDO ZABALA at 3 PM.  Dispo will be as per her.    I performed a history and physical exam of the patient and discussed their management with the resident and /or advanced care provider. I personally made/approved the management plan and take responsibility for the patient management. I reviewed the resident and /or ACP's note and agree with the documented findings and plan of care. My medical decison making and observations are found above.

## 2025-02-28 NOTE — ED ADULT NURSE NOTE - NS ED NURSE LEVEL OF CONSCIOUSNESS AFFECT
Given neuro change in Right arm she was taken back for CTA/Perfusion which is concerning for instent thrombosis.  Integrilin was at a lower dose due to renal function last night, we increased it on rounds as renal function improved but her stent has re-occluded regardless.    Addendum  She just finished in IR.  They were successful in re-opening her stent, no evidence of distal clot.      Plan:  - Load brilinta 180mg when she returns (anticipate ~1415)  - Load aspirin 324mg at same time  - Will overlap Integrilin for 2 hours after DAPT started  - No need for TEG later per IR    Malik Mayberry M.D.      Calm

## 2025-02-28 NOTE — ED CDU PROVIDER INITIAL DAY NOTE - OBJECTIVE STATEMENT
49-year-old male with past medical history of gastric cancer currently on chemo last chemo 8 days ago, lupus on steroids, asthma with complaint of shortness of breath, chest congestion, cough worsening over the past 3 weeks with weakness/ fatigue. patient was diagnosed with flu on February 14 and notes has not felt well since then.  Has tried inhalers and steroids without relief. + Diarrhea denies nausea, vomiting, abdominal pain, urinary symptoms.    CDU PA: Agree with above patient is a 49-year-old male with past medical history of gastric cancer on chemo, asthma, lupus presented to the ER with persistent shortness of breath cough chest discomfort x 3 weeks in ED labs noted for mild leukocytosis to 13.75 CMP noted for LFT elevation will trend in morning, UA negative for infection, RVP negative.  CTA showing right upper and lower lobe segmental pulmonary emboli.  Troponin initially 21 proBNP  43 repeat troponin pending patient placed in CDU for continued vital monitoring, initiation of Eliquis, oncology to see evaluate tomorrow, and telemetry monitoring echocardiogram r/o RH strain,  patient has been vitally stable nontachycardic nonhypoxic while in the ER

## 2025-02-28 NOTE — ED ADULT NURSE REASSESSMENT NOTE - NS ED NURSE REASSESS COMMENT FT1
Pt is A&Ox4, appears comfortable in stretcher. endorses SOB. breathing is even and unlabored on room air. cardiac monitoring maintained. Stretcher set in lowest position, call bell within reach, safety maintained.

## 2025-02-28 NOTE — ED ADULT NURSE NOTE - OBJECTIVE STATEMENT
Pt received to room 20. Pt is a 49 year old male with Hx of gastric cancer on chemo, lupus, asthma. Pt c/o SOB x 3 weeks. Pt states he tested positive for Flu on 2/14/25. Pt reports having no relief from home inhaler. denies chest pain, headache, dizziness, abdominal pain, n/v.  Pt is A&Ox4, ambulatory without assistance. airway patent, speaking clearly in full sentences. breathing is even and unlabored on room air. spontaneous movement of all extremities. Placed on cardiac monitor and continuous pulse oximetry. VS as noted in flowsheet. left AC 20G IV placed, +blood return, flushes without difficulty. comfort measures provided. stretcher set in lowest position, call bell within reach, safety maintained.

## 2025-03-01 DIAGNOSIS — J45.909 UNSPECIFIED ASTHMA, UNCOMPLICATED: ICD-10-CM

## 2025-03-01 DIAGNOSIS — I26.99 OTHER PULMONARY EMBOLISM WITHOUT ACUTE COR PULMONALE: ICD-10-CM

## 2025-03-01 DIAGNOSIS — E03.9 HYPOTHYROIDISM, UNSPECIFIED: ICD-10-CM

## 2025-03-01 DIAGNOSIS — C16.9 MALIGNANT NEOPLASM OF STOMACH, UNSPECIFIED: ICD-10-CM

## 2025-03-01 DIAGNOSIS — R09.89 OTHER SPECIFIED SYMPTOMS AND SIGNS INVOLVING THE CIRCULATORY AND RESPIRATORY SYSTEMS: ICD-10-CM

## 2025-03-01 DIAGNOSIS — M32.9 SYSTEMIC LUPUS ERYTHEMATOSUS, UNSPECIFIED: ICD-10-CM

## 2025-03-01 LAB
ALBUMIN SERPL ELPH-MCNC: 3.8 G/DL — SIGNIFICANT CHANGE UP (ref 3.3–5)
ALP SERPL-CCNC: 120 U/L — SIGNIFICANT CHANGE UP (ref 40–120)
ALT FLD-CCNC: 93 U/L — HIGH (ref 4–41)
ANION GAP SERPL CALC-SCNC: 16 MMOL/L — HIGH (ref 7–14)
AST SERPL-CCNC: 46 U/L — HIGH (ref 4–40)
BASOPHILS # BLD AUTO: 0.01 K/UL — SIGNIFICANT CHANGE UP (ref 0–0.2)
BASOPHILS NFR BLD AUTO: 0.1 % — SIGNIFICANT CHANGE UP (ref 0–2)
BILIRUB SERPL-MCNC: 0.5 MG/DL — SIGNIFICANT CHANGE UP (ref 0.2–1.2)
BUN SERPL-MCNC: 7 MG/DL — SIGNIFICANT CHANGE UP (ref 7–23)
C3 SERPL-MCNC: 178 MG/DL — SIGNIFICANT CHANGE UP (ref 90–180)
C4 SERPL-MCNC: 31 MG/DL — SIGNIFICANT CHANGE UP (ref 10–40)
CALCIUM SERPL-MCNC: 9.5 MG/DL — SIGNIFICANT CHANGE UP (ref 8.4–10.5)
CHLORIDE SERPL-SCNC: 103 MMOL/L — SIGNIFICANT CHANGE UP (ref 98–107)
CO2 SERPL-SCNC: 22 MMOL/L — SIGNIFICANT CHANGE UP (ref 22–31)
CREAT SERPL-MCNC: 0.73 MG/DL — SIGNIFICANT CHANGE UP (ref 0.5–1.3)
EGFR: 112 ML/MIN/1.73M2 — SIGNIFICANT CHANGE UP
EGFR: 112 ML/MIN/1.73M2 — SIGNIFICANT CHANGE UP
EOSINOPHIL # BLD AUTO: 0.02 K/UL — SIGNIFICANT CHANGE UP (ref 0–0.5)
EOSINOPHIL NFR BLD AUTO: 0.2 % — SIGNIFICANT CHANGE UP (ref 0–6)
GLUCOSE BLDC GLUCOMTR-MCNC: 170 MG/DL — HIGH (ref 70–99)
GLUCOSE BLDC GLUCOMTR-MCNC: 193 MG/DL — HIGH (ref 70–99)
GLUCOSE SERPL-MCNC: 172 MG/DL — HIGH (ref 70–99)
HCT VFR BLD CALC: 36.5 % — LOW (ref 39–50)
HGB BLD-MCNC: 12.1 G/DL — LOW (ref 13–17)
IANC: 8.78 K/UL — HIGH (ref 1.8–7.4)
IMM GRANULOCYTES NFR BLD AUTO: 1.3 % — HIGH (ref 0–0.9)
LYMPHOCYTES # BLD AUTO: 1 K/UL — SIGNIFICANT CHANGE UP (ref 1–3.3)
LYMPHOCYTES # BLD AUTO: 9.6 % — LOW (ref 13–44)
MAGNESIUM SERPL-MCNC: 1.9 MG/DL — SIGNIFICANT CHANGE UP (ref 1.6–2.6)
MCHC RBC-ENTMCNC: 27.9 PG — SIGNIFICANT CHANGE UP (ref 27–34)
MCHC RBC-ENTMCNC: 33.2 G/DL — SIGNIFICANT CHANGE UP (ref 32–36)
MCV RBC AUTO: 84.1 FL — SIGNIFICANT CHANGE UP (ref 80–100)
MONOCYTES # BLD AUTO: 0.49 K/UL — SIGNIFICANT CHANGE UP (ref 0–0.9)
MONOCYTES NFR BLD AUTO: 4.7 % — SIGNIFICANT CHANGE UP (ref 2–14)
NEUTROPHILS # BLD AUTO: 8.78 K/UL — HIGH (ref 1.8–7.4)
NEUTROPHILS NFR BLD AUTO: 84.1 % — HIGH (ref 43–77)
NRBC # BLD AUTO: 0.04 K/UL — HIGH (ref 0–0)
NRBC # FLD: 0.04 K/UL — HIGH (ref 0–0)
NRBC BLD AUTO-RTO: 0 /100 WBCS — SIGNIFICANT CHANGE UP (ref 0–0)
PHOSPHATE SERPL-MCNC: 1.8 MG/DL — LOW (ref 2.5–4.5)
PLATELET # BLD AUTO: 225 K/UL — SIGNIFICANT CHANGE UP (ref 150–400)
POTASSIUM SERPL-MCNC: 3.5 MMOL/L — SIGNIFICANT CHANGE UP (ref 3.5–5.3)
POTASSIUM SERPL-SCNC: 3.5 MMOL/L — SIGNIFICANT CHANGE UP (ref 3.5–5.3)
PROT SERPL-MCNC: 7.1 G/DL — SIGNIFICANT CHANGE UP (ref 6–8.3)
RBC # BLD: 4.34 M/UL — SIGNIFICANT CHANGE UP (ref 4.2–5.8)
RBC # FLD: 17.7 % — HIGH (ref 10.3–14.5)
SODIUM SERPL-SCNC: 141 MMOL/L — SIGNIFICANT CHANGE UP (ref 135–145)
TSH SERPL-MCNC: 0.46 UIU/ML — SIGNIFICANT CHANGE UP (ref 0.27–4.2)
WBC # BLD: 10.44 K/UL — SIGNIFICANT CHANGE UP (ref 3.8–10.5)
WBC # FLD AUTO: 10.44 K/UL — SIGNIFICANT CHANGE UP (ref 3.8–10.5)

## 2025-03-01 PROCEDURE — 99223 1ST HOSP IP/OBS HIGH 75: CPT

## 2025-03-01 PROCEDURE — 93970 EXTREMITY STUDY: CPT | Mod: 26

## 2025-03-01 PROCEDURE — 99233 SBSQ HOSP IP/OBS HIGH 50: CPT

## 2025-03-01 PROCEDURE — 93010 ELECTROCARDIOGRAM REPORT: CPT

## 2025-03-01 PROCEDURE — 99255 IP/OBS CONSLTJ NEW/EST HI 80: CPT | Mod: GC

## 2025-03-01 RX ORDER — IPRATROPIUM BROMIDE AND ALBUTEROL SULFATE .5; 2.5 MG/3ML; MG/3ML
3 SOLUTION RESPIRATORY (INHALATION) EVERY 6 HOURS
Refills: 0 | Status: DISCONTINUED | OUTPATIENT
Start: 2025-03-01 | End: 2025-03-03

## 2025-03-01 RX ORDER — INFLUENZA A VIRUS A/IDAHO/07/2018 (H1N1) ANTIGEN (MDCK CELL DERIVED, PROPIOLACTONE INACTIVATED, INFLUENZA A VIRUS A/INDIANA/08/2018 (H3N2) ANTIGEN (MDCK CELL DERIVED, PROPIOLACTONE INACTIVATED), INFLUENZA B VIRUS B/SINGAPORE/INFTT-16-0610/2016 ANTIGEN (MDCK CELL DERIVED, PROPIOLACTONE INACTIVATED), INFLUENZA B VIRUS B/IOWA/06/2017 ANTIGEN (MDCK CELL DERIVED, PROPIOLACTONE INACTIVATED) 15; 15; 15; 15 UG/.5ML; UG/.5ML; UG/.5ML; UG/.5ML
0.5 INJECTION, SUSPENSION INTRAMUSCULAR ONCE
Refills: 0 | Status: DISCONTINUED | OUTPATIENT
Start: 2025-03-01 | End: 2025-03-06

## 2025-03-01 RX ORDER — IPRATROPIUM BROMIDE AND ALBUTEROL SULFATE .5; 2.5 MG/3ML; MG/3ML
3 SOLUTION RESPIRATORY (INHALATION) ONCE
Refills: 0 | Status: COMPLETED | OUTPATIENT
Start: 2025-03-01 | End: 2025-03-01

## 2025-03-01 RX ORDER — METHYLPREDNISOLONE ACETATE 80 MG/ML
16 INJECTION, SUSPENSION INTRA-ARTICULAR; INTRALESIONAL; INTRAMUSCULAR; SOFT TISSUE DAILY
Refills: 0 | Status: DISCONTINUED | OUTPATIENT
Start: 2025-03-01 | End: 2025-03-06

## 2025-03-01 RX ORDER — METOCLOPRAMIDE HCL 10 MG
10 TABLET ORAL
Refills: 0 | Status: DISCONTINUED | OUTPATIENT
Start: 2025-03-01 | End: 2025-03-06

## 2025-03-01 RX ORDER — ALBUTEROL SULFATE 2.5 MG/3ML
2 VIAL, NEBULIZER (ML) INHALATION EVERY 6 HOURS
Refills: 0 | Status: DISCONTINUED | OUTPATIENT
Start: 2025-03-01 | End: 2025-03-06

## 2025-03-01 RX ADMIN — METHYLPREDNISOLONE ACETATE 8 MILLIGRAM(S): 80 INJECTION, SUSPENSION INTRA-ARTICULAR; INTRALESIONAL; INTRAMUSCULAR; SOFT TISSUE at 09:28

## 2025-03-01 RX ADMIN — APIXABAN 10 MILLIGRAM(S): 2.5 TABLET, FILM COATED ORAL at 18:28

## 2025-03-01 RX ADMIN — IPRATROPIUM BROMIDE AND ALBUTEROL SULFATE 3 MILLILITER(S): .5; 2.5 SOLUTION RESPIRATORY (INHALATION) at 23:46

## 2025-03-01 RX ADMIN — LOSARTAN POTASSIUM 50 MILLIGRAM(S): 100 TABLET, FILM COATED ORAL at 06:42

## 2025-03-01 RX ADMIN — APIXABAN 10 MILLIGRAM(S): 2.5 TABLET, FILM COATED ORAL at 06:42

## 2025-03-01 RX ADMIN — HYDROXYCHLOROQUINE SULFATE 400 MILLIGRAM(S): 200 TABLET, FILM COATED ORAL at 19:57

## 2025-03-01 RX ADMIN — IPRATROPIUM BROMIDE AND ALBUTEROL SULFATE 3 MILLILITER(S): .5; 2.5 SOLUTION RESPIRATORY (INHALATION) at 08:48

## 2025-03-01 RX ADMIN — Medication 10 MILLIGRAM(S): at 18:28

## 2025-03-01 RX ADMIN — Medication 125 MICROGRAM(S): at 06:42

## 2025-03-01 RX ADMIN — Medication 40 MILLIGRAM(S): at 06:42

## 2025-03-01 NOTE — H&P ADULT - PROBLEM SELECTOR PLAN 3
Currently without exacerbation.  - C/w nebs  - Pt is s/p methylprednisolone IV. Will continue his chronic steroid dosing of methylprednisolone 8mg (x 2 tabs) daily per patient.  - If respiratory status worsens will consider escalating steroid dose.

## 2025-03-01 NOTE — ED ADULT NURSE REASSESSMENT NOTE - NS ED NURSE REASSESS COMMENT FT1
pt remains A&Ox4 resting in stretcher. denies medical complaints at this time. denies sob. respirations even and unlabored on room air. NSR on monitor. I was told to fax over the report to the floor after 2 attempts of calling for report. IV is dry and intact, no signs of infiltration. medicated as per orders. no new orders at this time. plan of care continued.

## 2025-03-01 NOTE — H&P ADULT - PROBLEM SELECTOR PLAN 2
Appreciate Oncology consult. Pt most recently on FOLFOX.  - Pt to f/u with Dr. Puente on discharge Appreciate Oncology consult. Pt most recently on FOLFOX.  - Pt reports taking metaclopramide po bid every day to prevent N/V; will continue inpatient (check QTc)  - Pt to f/u with Dr. Puente on discharge

## 2025-03-01 NOTE — ED ADULT NURSE REASSESSMENT NOTE - NS ED NURSE REASSESS COMMENT FT1
Break RN: Received report from ALEKSEY Hernandez. Pt is A&Ox4, resting in stretcher with no complaints at this time. Respirations even and unlabored, chest rise equal b/l. NSR noted on cardiac monitor. VS as noted in flow sheets. Pt denies chest pain, SOB, abdominal pain, N/V/D, h/a, dizziness, numbness/tingling or any urinary symptoms at this time. No acute distress noted. Safety maintained throughout.

## 2025-03-01 NOTE — ED ADULT NURSE REASSESSMENT NOTE - NS ED NURSE REASSESS COMMENT FT1
Pt resting comfortably in stretcher, NAD noted. Respirations even and unlabored. NSR on cardiac monitor. Pt medicated as per provider orders. Pt offers no new complaints. Pt denies c/p, SOB, headache, blurry vision, n/v/d, abd pain, or fever like symptoms. Comfort measures provided, safety maintained.

## 2025-03-01 NOTE — CHART NOTE - NSCHARTNOTEFT_GEN_A_CORE
[facility] NIGHT COVERAGE    49-year-old male with past medical history of gastric cancer currently on chemo (last chemo 8 days ago) lupus on steroids, asthma with complaint of shortness of breath, chest congestion, cough worsening over the past 3 weeks with weakness/ fatigue  Notified by radiology as patient has a thrombus in left gastrocnemius  Findings discussed with Dr. Gordon, continue with Eliquis for now.     Georgi Deal PA-C  [id number]

## 2025-03-01 NOTE — H&P ADULT - NSICDXFAMILYHX_GEN_ALL_CORE_FT
Pt arrived amb with therapy dog.  IVF given per MD order.  Pt fang well.  Next appointment scheduled.  Pt left amb without c/o with therapy dog.    Education Record    Learner:  Patient    Pt here for: IVF for nausea/acute pancreatitis    Barriers / Limitations:  None    Method:  discussion    General Topics:  Plan of care reviewed    Outcome: Pt tolerated infusion with no c/o.      FAMILY HISTORY:  Mother  Still living? Unknown  FH: rheumatoid arthritis, Age at diagnosis: Age Unknown

## 2025-03-01 NOTE — H&P ADULT - HISTORY OF PRESENT ILLNESS
49M SLE, asthma, hypothyroidism, steroid-induced osteoporosis, DM2, HTN, and gastric CA who p/w SOB. Pt diagnosed with influenza 2/14/25 and had slowly improved but with lingering cough. Yesterday pt noticed his pulse ox showed 88-90% SpO2 with HR up to 120s.     In the ED pt diagnosed with right-sided PE and started on eliquis. Also given IV solumedrol and nebs with significant improvement in SOB.    Pt currently denies chest pain, palpitations, lightheadedness. No previous history of VTE. No family history of thrombotic disorder. Denies EtOH, smoking, or other drugs.

## 2025-03-01 NOTE — ED CDU PROVIDER SUBSEQUENT DAY NOTE - CLINICAL SUMMARY MEDICAL DECISION MAKING FREE TEXT BOX
Patient is a 49-year-old male with past medical history of gastric cancer with last chemo 9 days ago, history of lupus on steroids, history of asthma presents with shortness of breath, and chest discomfort x 3 weeks.  Patient reports recent diagnosis of influenza.  Patient denies any fevers, syncope, hemoptysis.  In the emergency department, patient had CT angio of the chest with following impression: "Right upper and lower segmental pulmonary emboli."  Patient was then started on Eliquis.  Patient was placed in the observation unit for telemetry and echocardiogram.  Plan to continue Eliquis.  Await echocardiogram.

## 2025-03-01 NOTE — CONSULT NOTE ADULT - SUBJECTIVE AND OBJECTIVE BOX
HPI: · HPI Objective Statement: 49-year-old male with past medical history of gastric cancer currently on chemo last chemo 8 days ago, lupus on steroids, asthma with complaint of shortness of breath, chest congestion, cough worsening over the past 3 weeks with weakness/ fatigue. patient was diagnosed with flu on February 14 and notes has not felt well since then.  Has tried inhalers and steroids without relief. + Diarrhea denies nausea, vomiting, abdominal pain, urinary symptoms.        PAST MEDICAL & SURGICAL HISTORY:  Lupus      Asthma      Hypothyroid      Lumbosacral stenosis      Mild obstructive sleep apnea      H/O compression fracture of spine      H. pylori infection      Osteoporosis      Prediabetes      Obesity      Myositis      Gastric cancer      History of cholecystectomy      H/O endoscopy      S/P vasectomy      H/O colonoscopy          Allergies    NSAIDs (Short breath)  Motrin (Short breath)    Intolerances        MEDICATIONS  (STANDING):  albuterol/ipratropium for Nebulization. 3 milliLiter(s) Nebulizer once  apixaban 10 milliGRAM(s) Oral every 12 hours  hydrocodone/homatropine Syrup 5 milliLiter(s) Oral once  hydroxychloroquine 400 milliGRAM(s) Oral at bedtime  levothyroxine 125 MICROGram(s) Oral daily  losartan 50 milliGRAM(s) Oral daily  methylPREDNISolone 8 milliGRAM(s) Oral daily  pantoprazole    Tablet 40 milliGRAM(s) Oral before breakfast    MEDICATIONS  (PRN):      FAMILY HISTORY:  FH: rheumatoid arthritis (Mother)        SOCIAL HISTORY: No EtOH, no tobacco    REVIEW OF SYSTEMS:    CONSTITUTIONAL: No weakness, fevers or chills  EYES/ENT: No visual changes;  No vertigo or throat pain   NECK: No pain or stiffness  RESPIRATORY: No cough, wheezing, hemoptysis; No shortness of breath  CARDIOVASCULAR: No chest pain or palpitations  GASTROINTESTINAL: No abdominal or epigastric pain. No nausea, vomiting, or hematemesis; No diarrhea or constipation. No melena or hematochezia.  GENITOURINARY: No dysuria, frequency or hematuria  NEUROLOGICAL: No numbness or weakness  SKIN: No itching, burning, rashes, or lesions   All other review of systems is negative unless indicated above.    Height (cm): 172.7 (02-28 @ 12:11)  Weight (kg): 112 (02-28 @ 12:11)  BMI (kg/m2): 37.6 (02-28 @ 12:11)  BSA (m2): 2.24 (02-28 @ 12:11)    T(F): 97.5 (03-01-25 @ 06:30), Max: 98.5 (02-28-25 @ 16:36)  HR: 81 (03-01-25 @ 06:30)  BP: 135/99 (03-01-25 @ 06:30)  RR: 16 (03-01-25 @ 06:30)  SpO2: 98% (03-01-25 @ 06:30)  Wt(kg): --    GENERAL: NAD, well-developed  HEAD:  Atraumatic, Normocephalic  EYES: EOMI, PERRLA, conjunctiva and sclera clear  NECK: Supple, No JVD  CHEST/LUNG: Clear to auscultation bilaterally; No wheeze  HEART: Regular rate and rhythm; No murmurs, rubs, or gallops  ABDOMEN: Soft, Nontender, Nondistended; Bowel sounds present  EXTREMITIES:  2+ Peripheral Pulses, No clubbing, cyanosis, or edema  NEUROLOGY: non-focal  SKIN: No rashes or lesions                          12.9   13.75 )-----------( 235      ( 28 Feb 2025 14:36 )             39.1       02-28    142  |  102  |  6[L]  ----------------------------<  78  3.6   |  25  |  0.78    Ca    9.5      28 Feb 2025 14:36    TPro  7.8  /  Alb  4.0  /  TBili  0.4  /  DBili  x   /  AST  68[H]  /  ALT  110[H]  /  AlkPhos  124[H]  02-28          PT/INR - ( 28 Feb 2025 14:28 )   PT: 11.9 sec;   INR: 1.00 ratio         PTT - ( 28 Feb 2025 14:28 )  PTT:30.9 sec

## 2025-03-01 NOTE — H&P ADULT - PROBLEM SELECTOR PLAN 1
Right upper and lower segmental PE. Clot burden does not seem very large and pt currently speaking in full sentences with SpO2 97-99% on room air. Nevertheless, PESI is Class IV (high-risk) because of chronic conditions. Multiple risk factors, notably active malignancy, lung disease, obesity.    - Most likely eliquis will be appropriate therapy. For now, continue lead-in dosing of 10mg bid x 7 days, then transition to maintenance dosing of 5mg bid indefinitely (at least for duration of cancer treatment)  - Lower suspicion but since pt has SLE will check anti-cardiolipin and anti-beta 2 glycoprotein I antibodies to evaluate antiphospholipid syndrome (which, if positive, would argue switching to warfarin or LMWH instead of DOAC)  - Check TTE and LE dopplers

## 2025-03-01 NOTE — ED CDU PROVIDER SUBSEQUENT DAY NOTE - HISTORY
Patient is a 49-year-old male with past medical history of gastric cancer with last chemo 9 days ago, history of lupus on steroids, history of asthma presents with shortness of breath, and chest discomfort x 3 weeks.  Patient reports recent diagnosis of influenza.  Patient denies any fevers, syncope, hemoptysis.  In the emergency department, patient had CT angio of the chest with following impression: "Right upper and lower segmental pulmonary emboli."  Patient was then started on Eliquis.  Patient was placed in the observation unit for telemetry and echocardiogram.

## 2025-03-01 NOTE — PATIENT PROFILE ADULT - DO YOU LACK THE NECESSARY SUPPORT TO HELP YOU COPE WITH LIFE CHALLENGES?
Goal Outcome Evaluation:         Pt resting in chair. Stable throughout shift, currently on 1L NC with ETCO2. Ambulated x2 to chair and bathroom. Voiding spontaneously. Medicated for pain x1. Family at bedside, call light within reach.                 no

## 2025-03-01 NOTE — ED CDU PROVIDER SUBSEQUENT DAY NOTE - ATTENDING APP SHARED VISIT CONTRIBUTION OF CARE
48 yo M hx gastric cancer on chemotherapy, SLE on steroids, asthma, initially presented with c/o SOB and chest pain, CTA + for RUL and RLL segmental PE, no evidence of right heart strain. No hypoxia or tachycardia, ent to CDU for TTE. Pt re-evaluated this morning and reports feeling continued shortness of breath, difficulty taking deep breaths and continued chest pain. Feels that he will not be able to manage at home and will be right back here. Pt with wheezing b/l bases, vss. Plan for nebs, hycodan, and admit for onc eval and tte.

## 2025-03-01 NOTE — ED CDU PROVIDER DISPOSITION NOTE - CLINICAL COURSE
MINOR Jama: 49-year-old male with a past medical history of gastric cancer on chemo, lupus presented to the emergency department complaining of shortness of breath and fatigue.  Patient found to have PE on CT patient placed in CDU for monitoring, Eliquis started.  Patient reports continued fatigue, shortness of breath difficulty ambulating due to shortness of breath.  Will admit for further management.  Spoke with oncology aware and agreeable plan will see the patient. Pt aware and agreeable with plan.

## 2025-03-01 NOTE — CONSULT NOTE ADULT - ASSESSMENT
50 yo M f SLE, asthma, hypothyroidism, steroid-induced osteoporosis, diabetes, HTN, compression fracture of T7, myositis, Gastric adenocarcinoma came to [facility] with c/o weakness, SOB. Pt was tested positive for Flu on Feb 14. 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  Due to multiple issues on XELOX, Xeloda was stopped.  Pt was switched to FOLFOX  C1 1/7/25 FOLFOX  C2 1/21/25 FOLFOX  C3 2/4/25?FOLFOX  C4 2/20/25 FOLFOX        # Gastric Cancer (Follows Dr. Puente at [facility])  # Right upper and lower segmental PE  *Hgb 12.9, pro-BNP 43, trop 23  - PE provoked likely iso active malignancy. Does not need hypercoag. workup  -pt was started on eliquis by ED  - Monitor for signs of bleeding while on AC  - Likely will need long term AC  - obtain TTE, r/o right heart strain  - trend LFT daily, avoid hepatotoxic meds      ---incomplete note----      Rakesh Roberts MD  [id number]  Hematology-Oncology Fellow  [facility] 50 yo M f SLE, asthma, hypothyroidism, steroid-induced osteoporosis, diabetes, HTN, compression fracture of T7, myositis, Gastric adenocarcinoma came to [facility] with c/o weakness, SOB. Pt was tested positive for Flu on Feb 14. 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  Due to multiple issues on XELOX, Xeloda was stopped.  Pt was switched to FOLFOX  C1 1/7/25 FOLFOX  C2 1/21/25 FOLFOX  C3 2/4/25?FOLFOX  C4 2/20/25 FOLFOX        # Gastric Cancer (Follows Dr. Puente at [facility])  # Right upper and lower segmental PE  # Transamnitis  *Hgb 12.9, pro-BNP 43, trop 23  - PE provoked likely iso active malignancy. Does not need hypercoag. workup  - pt was started on eliquis by ED  - Monitor for signs of bleeding while on AC  - Likely will need long term AC  - obtain TTE, r/o right heart strain  - trend LFT daily, avoid hepatotoxic meds  - Patient to f/u with Dr Puente on discharge    Rakesh Roberts MD  PGY4  Hematology-Oncology Fellow  [facility]

## 2025-03-01 NOTE — H&P ADULT - ASSESSMENT
49M SLE, asthma, hypothyroidism, steroid-induced osteoporosis, DM2, HTN, and gastric CA who p/w SOB, found to have acute PE.

## 2025-03-01 NOTE — ED ADULT NURSE REASSESSMENT NOTE - NS ED NURSE REASSESS COMMENT FT1
received report form ALEKSEY Hawk, pt remains A&Ox4, ambulatory and continent. denies any medical complaints at this time. respirations even and unlabored on room air. NSR on monitor. no new orders at this time. pending bed assignment. plan of care continued.

## 2025-03-01 NOTE — H&P ADULT - PROBLEM SELECTOR PLAN 4
With low suspicion of flare.  - C/w steroids as above  - Consider checking C3, C4 With low suspicion of flare.  - C/w steroids as above  - C/w hydroxychloroquine  - Consider checking C3, C4

## 2025-03-02 LAB
ALBUMIN SERPL ELPH-MCNC: 3.7 G/DL — SIGNIFICANT CHANGE UP (ref 3.3–5)
ALP SERPL-CCNC: 111 U/L — SIGNIFICANT CHANGE UP (ref 40–120)
ALT FLD-CCNC: 87 U/L — HIGH (ref 4–41)
ANION GAP SERPL CALC-SCNC: 18 MMOL/L — HIGH (ref 7–14)
AST SERPL-CCNC: 44 U/L — HIGH (ref 4–40)
B2 GLYCOPROT1 IGA SER QL: 18.4 U/ML — SIGNIFICANT CHANGE UP
B2 GLYCOPROT1 IGG SER-ACNC: <1.4 U/ML — SIGNIFICANT CHANGE UP
B2 GLYCOPROT1 IGM SER-ACNC: <1.5 U/ML — SIGNIFICANT CHANGE UP
BASOPHILS # BLD AUTO: 0.01 K/UL — SIGNIFICANT CHANGE UP (ref 0–0.2)
BASOPHILS NFR BLD AUTO: 0.1 % — SIGNIFICANT CHANGE UP (ref 0–2)
BILIRUB SERPL-MCNC: 0.3 MG/DL — SIGNIFICANT CHANGE UP (ref 0.2–1.2)
BUN SERPL-MCNC: 8 MG/DL — SIGNIFICANT CHANGE UP (ref 7–23)
CALCIUM SERPL-MCNC: 9.4 MG/DL — SIGNIFICANT CHANGE UP (ref 8.4–10.5)
CARDIOLIPIN IGM SER-MCNC: <1.5 MPL U/ML — SIGNIFICANT CHANGE UP
CARDIOLIPIN IGM SER-MCNC: <1.6 GPL U/ML — SIGNIFICANT CHANGE UP
CHLORIDE SERPL-SCNC: 101 MMOL/L — SIGNIFICANT CHANGE UP (ref 98–107)
CO2 SERPL-SCNC: 22 MMOL/L — SIGNIFICANT CHANGE UP (ref 22–31)
CREAT SERPL-MCNC: 0.78 MG/DL — SIGNIFICANT CHANGE UP (ref 0.5–1.3)
DEPRECATED CARDIOLIPIN IGA SER: 17.7 APL U/ML — SIGNIFICANT CHANGE UP
EGFR: 110 ML/MIN/1.73M2 — SIGNIFICANT CHANGE UP
EGFR: 110 ML/MIN/1.73M2 — SIGNIFICANT CHANGE UP
EOSINOPHIL # BLD AUTO: 0.02 K/UL — SIGNIFICANT CHANGE UP (ref 0–0.5)
EOSINOPHIL NFR BLD AUTO: 0.2 % — SIGNIFICANT CHANGE UP (ref 0–6)
GLUCOSE SERPL-MCNC: 123 MG/DL — HIGH (ref 70–99)
HCT VFR BLD CALC: 36.5 % — LOW (ref 39–50)
HGB BLD-MCNC: 12 G/DL — LOW (ref 13–17)
IANC: 7.83 K/UL — HIGH (ref 1.8–7.4)
IMM GRANULOCYTES NFR BLD AUTO: 1 % — HIGH (ref 0–0.9)
LYMPHOCYTES # BLD AUTO: 2.68 K/UL — SIGNIFICANT CHANGE UP (ref 1–3.3)
LYMPHOCYTES # BLD AUTO: 23.5 % — SIGNIFICANT CHANGE UP (ref 13–44)
MAGNESIUM SERPL-MCNC: 2 MG/DL — SIGNIFICANT CHANGE UP (ref 1.6–2.6)
MCHC RBC-ENTMCNC: 28.3 PG — SIGNIFICANT CHANGE UP (ref 27–34)
MCHC RBC-ENTMCNC: 32.9 G/DL — SIGNIFICANT CHANGE UP (ref 32–36)
MCV RBC AUTO: 86.1 FL — SIGNIFICANT CHANGE UP (ref 80–100)
MONOCYTES # BLD AUTO: 0.74 K/UL — SIGNIFICANT CHANGE UP (ref 0–0.9)
MONOCYTES NFR BLD AUTO: 6.5 % — SIGNIFICANT CHANGE UP (ref 2–14)
NEUTROPHILS # BLD AUTO: 7.83 K/UL — HIGH (ref 1.8–7.4)
NEUTROPHILS NFR BLD AUTO: 68.7 % — SIGNIFICANT CHANGE UP (ref 43–77)
NRBC # BLD AUTO: 0.07 K/UL — HIGH (ref 0–0)
NRBC # FLD: 0.07 K/UL — HIGH (ref 0–0)
NRBC BLD AUTO-RTO: 0 /100 WBCS — SIGNIFICANT CHANGE UP (ref 0–0)
PHOSPHATE SERPL-MCNC: 3.4 MG/DL — SIGNIFICANT CHANGE UP (ref 2.5–4.5)
PLATELET # BLD AUTO: 222 K/UL — SIGNIFICANT CHANGE UP (ref 150–400)
POTASSIUM SERPL-MCNC: 3.3 MMOL/L — LOW (ref 3.5–5.3)
POTASSIUM SERPL-SCNC: 3.3 MMOL/L — LOW (ref 3.5–5.3)
PROT SERPL-MCNC: 6.7 G/DL — SIGNIFICANT CHANGE UP (ref 6–8.3)
RBC # BLD: 4.24 M/UL — SIGNIFICANT CHANGE UP (ref 4.2–5.8)
RBC # FLD: 18.7 % — HIGH (ref 10.3–14.5)
SODIUM SERPL-SCNC: 141 MMOL/L — SIGNIFICANT CHANGE UP (ref 135–145)
WBC # BLD: 11.39 K/UL — HIGH (ref 3.8–10.5)
WBC # FLD AUTO: 11.39 K/UL — HIGH (ref 3.8–10.5)

## 2025-03-02 PROCEDURE — 99233 SBSQ HOSP IP/OBS HIGH 50: CPT

## 2025-03-02 RX ORDER — ACETAMINOPHEN 500 MG/5ML
975 LIQUID (ML) ORAL EVERY 8 HOURS
Refills: 0 | Status: COMPLETED | OUTPATIENT
Start: 2025-03-02 | End: 2025-03-04

## 2025-03-02 RX ADMIN — IPRATROPIUM BROMIDE AND ALBUTEROL SULFATE 3 MILLILITER(S): .5; 2.5 SOLUTION RESPIRATORY (INHALATION) at 15:52

## 2025-03-02 RX ADMIN — Medication 40 MILLIGRAM(S): at 05:14

## 2025-03-02 RX ADMIN — Medication 1 LOZENGE: at 23:51

## 2025-03-02 RX ADMIN — HYDROXYCHLOROQUINE SULFATE 400 MILLIGRAM(S): 200 TABLET, FILM COATED ORAL at 21:36

## 2025-03-02 RX ADMIN — Medication 125 MICROGRAM(S): at 05:12

## 2025-03-02 RX ADMIN — Medication 1 LOZENGE: at 18:05

## 2025-03-02 RX ADMIN — APIXABAN 10 MILLIGRAM(S): 2.5 TABLET, FILM COATED ORAL at 18:06

## 2025-03-02 RX ADMIN — APIXABAN 10 MILLIGRAM(S): 2.5 TABLET, FILM COATED ORAL at 05:12

## 2025-03-02 RX ADMIN — Medication 975 MILLIGRAM(S): at 21:36

## 2025-03-02 RX ADMIN — IPRATROPIUM BROMIDE AND ALBUTEROL SULFATE 3 MILLILITER(S): .5; 2.5 SOLUTION RESPIRATORY (INHALATION) at 08:37

## 2025-03-02 RX ADMIN — Medication 975 MILLIGRAM(S): at 13:30

## 2025-03-02 RX ADMIN — Medication 10 MILLIGRAM(S): at 05:11

## 2025-03-02 RX ADMIN — Medication 40 MILLIEQUIVALENT(S): at 12:44

## 2025-03-02 RX ADMIN — IPRATROPIUM BROMIDE AND ALBUTEROL SULFATE 3 MILLILITER(S): .5; 2.5 SOLUTION RESPIRATORY (INHALATION) at 20:25

## 2025-03-02 RX ADMIN — METHYLPREDNISOLONE ACETATE 16 MILLIGRAM(S): 80 INJECTION, SUSPENSION INTRA-ARTICULAR; INTRALESIONAL; INTRAMUSCULAR; SOFT TISSUE at 05:13

## 2025-03-02 RX ADMIN — Medication 40 MILLIEQUIVALENT(S): at 18:05

## 2025-03-02 RX ADMIN — IPRATROPIUM BROMIDE AND ALBUTEROL SULFATE 3 MILLILITER(S): .5; 2.5 SOLUTION RESPIRATORY (INHALATION) at 05:35

## 2025-03-02 RX ADMIN — LOSARTAN POTASSIUM 50 MILLIGRAM(S): 100 TABLET, FILM COATED ORAL at 05:11

## 2025-03-02 RX ADMIN — Medication 10 MILLIGRAM(S): at 18:05

## 2025-03-02 RX ADMIN — Medication 975 MILLIGRAM(S): at 22:36

## 2025-03-02 NOTE — PROGRESS NOTE ADULT - PROBLEM SELECTOR PLAN 4
With low suspicion of flare.  - C/w steroids as above  - C/w hydroxychloroquine  - Consider checking C3, C4

## 2025-03-02 NOTE — PROGRESS NOTE ADULT - PROBLEM SELECTOR PLAN 2
Appreciate Oncology consult. Pt most recently on FOLFOX.  - Pt reports taking metaclopramide po bid every day to prevent N/V; will continue inpatient (check QTc)  - Pt to f/u with Dr. Puente on discharge

## 2025-03-03 ENCOUNTER — NON-APPOINTMENT (OUTPATIENT)
Age: 50
End: 2025-03-03

## 2025-03-03 ENCOUNTER — TRANSCRIPTION ENCOUNTER (OUTPATIENT)
Age: 50
End: 2025-03-03

## 2025-03-03 ENCOUNTER — RESULT REVIEW (OUTPATIENT)
Age: 50
End: 2025-03-03

## 2025-03-03 LAB
ALBUMIN SERPL ELPH-MCNC: 3.4 G/DL — SIGNIFICANT CHANGE UP (ref 3.3–5)
ALP SERPL-CCNC: 101 U/L — SIGNIFICANT CHANGE UP (ref 40–120)
ALT FLD-CCNC: 74 U/L — HIGH (ref 4–41)
ANION GAP SERPL CALC-SCNC: 13 MMOL/L — SIGNIFICANT CHANGE UP (ref 7–14)
AST SERPL-CCNC: 41 U/L — HIGH (ref 4–40)
BASOPHILS # BLD AUTO: 0.02 K/UL — SIGNIFICANT CHANGE UP (ref 0–0.2)
BASOPHILS NFR BLD AUTO: 0.2 % — SIGNIFICANT CHANGE UP (ref 0–2)
BILIRUB SERPL-MCNC: 0.3 MG/DL — SIGNIFICANT CHANGE UP (ref 0.2–1.2)
BUN SERPL-MCNC: 8 MG/DL — SIGNIFICANT CHANGE UP (ref 7–23)
CALCIUM SERPL-MCNC: 8.8 MG/DL — SIGNIFICANT CHANGE UP (ref 8.4–10.5)
CHLORIDE SERPL-SCNC: 105 MMOL/L — SIGNIFICANT CHANGE UP (ref 98–107)
CO2 SERPL-SCNC: 23 MMOL/L — SIGNIFICANT CHANGE UP (ref 22–31)
CREAT SERPL-MCNC: 0.72 MG/DL — SIGNIFICANT CHANGE UP (ref 0.5–1.3)
EGFR: 113 ML/MIN/1.73M2 — SIGNIFICANT CHANGE UP
EGFR: 113 ML/MIN/1.73M2 — SIGNIFICANT CHANGE UP
EOSINOPHIL # BLD AUTO: 0.02 K/UL — SIGNIFICANT CHANGE UP (ref 0–0.5)
EOSINOPHIL NFR BLD AUTO: 0.2 % — SIGNIFICANT CHANGE UP (ref 0–6)
GLUCOSE SERPL-MCNC: 94 MG/DL — SIGNIFICANT CHANGE UP (ref 70–99)
HCT VFR BLD CALC: 34.1 % — LOW (ref 39–50)
HGB BLD-MCNC: 11.3 G/DL — LOW (ref 13–17)
IANC: 5.52 K/UL — SIGNIFICANT CHANGE UP (ref 1.8–7.4)
IMM GRANULOCYTES NFR BLD AUTO: 1.1 % — HIGH (ref 0–0.9)
LYMPHOCYTES # BLD AUTO: 2.53 K/UL — SIGNIFICANT CHANGE UP (ref 1–3.3)
LYMPHOCYTES # BLD AUTO: 28 % — SIGNIFICANT CHANGE UP (ref 13–44)
MAGNESIUM SERPL-MCNC: 1.9 MG/DL — SIGNIFICANT CHANGE UP (ref 1.6–2.6)
MCHC RBC-ENTMCNC: 28.3 PG — SIGNIFICANT CHANGE UP (ref 27–34)
MCHC RBC-ENTMCNC: 33.1 G/DL — SIGNIFICANT CHANGE UP (ref 32–36)
MCV RBC AUTO: 85.5 FL — SIGNIFICANT CHANGE UP (ref 80–100)
MONOCYTES # BLD AUTO: 0.84 K/UL — SIGNIFICANT CHANGE UP (ref 0–0.9)
MONOCYTES NFR BLD AUTO: 9.3 % — SIGNIFICANT CHANGE UP (ref 2–14)
MRSA PCR RESULT.: SIGNIFICANT CHANGE UP
NEUTROPHILS # BLD AUTO: 5.52 K/UL — SIGNIFICANT CHANGE UP (ref 1.8–7.4)
NEUTROPHILS NFR BLD AUTO: 61.2 % — SIGNIFICANT CHANGE UP (ref 43–77)
NRBC # BLD AUTO: 0.07 K/UL — HIGH (ref 0–0)
NRBC # FLD: 0.07 K/UL — HIGH (ref 0–0)
NRBC BLD AUTO-RTO: 0 /100 WBCS — SIGNIFICANT CHANGE UP (ref 0–0)
PHOSPHATE SERPL-MCNC: 2.3 MG/DL — LOW (ref 2.5–4.5)
PLATELET # BLD AUTO: 190 K/UL — SIGNIFICANT CHANGE UP (ref 150–400)
POTASSIUM SERPL-MCNC: 3.7 MMOL/L — SIGNIFICANT CHANGE UP (ref 3.5–5.3)
POTASSIUM SERPL-SCNC: 3.7 MMOL/L — SIGNIFICANT CHANGE UP (ref 3.5–5.3)
PROT SERPL-MCNC: 6.4 G/DL — SIGNIFICANT CHANGE UP (ref 6–8.3)
RBC # BLD: 3.99 M/UL — LOW (ref 4.2–5.8)
RBC # FLD: 19 % — HIGH (ref 10.3–14.5)
S AUREUS DNA NOSE QL NAA+PROBE: SIGNIFICANT CHANGE UP
SODIUM SERPL-SCNC: 141 MMOL/L — SIGNIFICANT CHANGE UP (ref 135–145)
WBC # BLD: 9.03 K/UL — SIGNIFICANT CHANGE UP (ref 3.8–10.5)
WBC # FLD AUTO: 9.03 K/UL — SIGNIFICANT CHANGE UP (ref 3.8–10.5)

## 2025-03-03 PROCEDURE — 99233 SBSQ HOSP IP/OBS HIGH 50: CPT

## 2025-03-03 RX ORDER — BENZONATATE 100 MG
200 CAPSULE ORAL THREE TIMES A DAY
Refills: 0 | Status: DISCONTINUED | OUTPATIENT
Start: 2025-03-03 | End: 2025-03-06

## 2025-03-03 RX ORDER — MELATONIN 5 MG
3 TABLET ORAL AT BEDTIME
Refills: 0 | Status: DISCONTINUED | OUTPATIENT
Start: 2025-03-03 | End: 2025-03-06

## 2025-03-03 RX ORDER — IPRATROPIUM BROMIDE AND ALBUTEROL SULFATE .5; 2.5 MG/3ML; MG/3ML
3 SOLUTION RESPIRATORY (INHALATION) EVERY 12 HOURS
Refills: 0 | Status: DISCONTINUED | OUTPATIENT
Start: 2025-03-03 | End: 2025-03-06

## 2025-03-03 RX ORDER — APIXABAN 2.5 MG/1
1 TABLET, FILM COATED ORAL
Qty: 60 | Refills: 0
Start: 2025-03-03 | End: 2025-04-01

## 2025-03-03 RX ADMIN — Medication 125 MICROGRAM(S): at 05:15

## 2025-03-03 RX ADMIN — METHYLPREDNISOLONE ACETATE 16 MILLIGRAM(S): 80 INJECTION, SUSPENSION INTRA-ARTICULAR; INTRALESIONAL; INTRAMUSCULAR; SOFT TISSUE at 05:14

## 2025-03-03 RX ADMIN — Medication 40 MILLIGRAM(S): at 05:15

## 2025-03-03 RX ADMIN — IPRATROPIUM BROMIDE AND ALBUTEROL SULFATE 3 MILLILITER(S): .5; 2.5 SOLUTION RESPIRATORY (INHALATION) at 21:29

## 2025-03-03 RX ADMIN — Medication 975 MILLIGRAM(S): at 13:31

## 2025-03-03 RX ADMIN — APIXABAN 10 MILLIGRAM(S): 2.5 TABLET, FILM COATED ORAL at 17:49

## 2025-03-03 RX ADMIN — Medication 10 MILLIGRAM(S): at 17:49

## 2025-03-03 RX ADMIN — HYDROXYCHLOROQUINE SULFATE 400 MILLIGRAM(S): 200 TABLET, FILM COATED ORAL at 21:15

## 2025-03-03 RX ADMIN — Medication 1 LOZENGE: at 17:49

## 2025-03-03 RX ADMIN — APIXABAN 10 MILLIGRAM(S): 2.5 TABLET, FILM COATED ORAL at 05:16

## 2025-03-03 RX ADMIN — Medication 10 MILLIGRAM(S): at 05:15

## 2025-03-03 RX ADMIN — Medication 975 MILLIGRAM(S): at 22:15

## 2025-03-03 RX ADMIN — LOSARTAN POTASSIUM 50 MILLIGRAM(S): 100 TABLET, FILM COATED ORAL at 05:15

## 2025-03-03 RX ADMIN — Medication 1 LOZENGE: at 11:34

## 2025-03-03 RX ADMIN — Medication 1 LOZENGE: at 23:52

## 2025-03-03 RX ADMIN — Medication 3 MILLIGRAM(S): at 21:59

## 2025-03-03 RX ADMIN — Medication 975 MILLIGRAM(S): at 05:15

## 2025-03-03 RX ADMIN — Medication 975 MILLIGRAM(S): at 21:15

## 2025-03-03 RX ADMIN — IPRATROPIUM BROMIDE AND ALBUTEROL SULFATE 3 MILLILITER(S): .5; 2.5 SOLUTION RESPIRATORY (INHALATION) at 07:05

## 2025-03-03 RX ADMIN — Medication 200 MILLIGRAM(S): at 13:31

## 2025-03-03 RX ADMIN — Medication 200 MILLIGRAM(S): at 21:15

## 2025-03-03 RX ADMIN — Medication 1 LOZENGE: at 05:14

## 2025-03-03 RX ADMIN — Medication 1 APPLICATION(S): at 11:34

## 2025-03-03 NOTE — PHYSICAL THERAPY INITIAL EVALUATION ADULT - GENERAL OBSERVATIONS, REHAB EVAL
Patient found sitting at edge of bed NAD, A&Ox4, wife at bedside, denies any pain, OK for PT per RN Ramiro, spO2 97% on room air HR 95

## 2025-03-03 NOTE — PHYSICAL THERAPY INITIAL EVALUATION ADULT - ADDITIONAL COMMENTS
Patient lives in a private home with his wife, reports 1 steps +flight up to bedroom. Patient was fully independent with functional mobility. Denies any falls. Not on home O2.    Patient left in bed, NAD, all lines and tubes intact, call bell within reach, head of bed elevated >30 degrees, RN Ramiro aware of PT, spO2 98% on room air,

## 2025-03-03 NOTE — PHYSICAL THERAPY INITIAL EVALUATION ADULT - PERTINENT HX OF CURRENT PROBLEM, REHAB EVAL
Patient is a 49 year old male who presents with shortness of breath, found to have acute pulmonary embolism. On Eliquis.

## 2025-03-04 ENCOUNTER — APPOINTMENT (OUTPATIENT)
Dept: HEMATOLOGY ONCOLOGY | Facility: CLINIC | Age: 50
End: 2025-03-04

## 2025-03-04 ENCOUNTER — APPOINTMENT (OUTPATIENT)
Dept: GERIATRICS | Facility: CLINIC | Age: 50
End: 2025-03-04

## 2025-03-04 ENCOUNTER — APPOINTMENT (OUTPATIENT)
Dept: INFUSION THERAPY | Facility: HOSPITAL | Age: 50
End: 2025-03-04

## 2025-03-04 LAB
ALBUMIN SERPL ELPH-MCNC: 3.3 G/DL — SIGNIFICANT CHANGE UP (ref 3.3–5)
ALP SERPL-CCNC: 103 U/L — SIGNIFICANT CHANGE UP (ref 40–120)
ALT FLD-CCNC: 69 U/L — HIGH (ref 4–41)
ANION GAP SERPL CALC-SCNC: 13 MMOL/L — SIGNIFICANT CHANGE UP (ref 7–14)
AST SERPL-CCNC: 36 U/L — SIGNIFICANT CHANGE UP (ref 4–40)
BASOPHILS # BLD AUTO: 0.01 K/UL — SIGNIFICANT CHANGE UP (ref 0–0.2)
BASOPHILS NFR BLD AUTO: 0.2 % — SIGNIFICANT CHANGE UP (ref 0–2)
BILIRUB SERPL-MCNC: 0.5 MG/DL — SIGNIFICANT CHANGE UP (ref 0.2–1.2)
BUN SERPL-MCNC: 8 MG/DL — SIGNIFICANT CHANGE UP (ref 7–23)
CALCIUM SERPL-MCNC: 8.7 MG/DL — SIGNIFICANT CHANGE UP (ref 8.4–10.5)
CHLORIDE SERPL-SCNC: 103 MMOL/L — SIGNIFICANT CHANGE UP (ref 98–107)
CO2 SERPL-SCNC: 24 MMOL/L — SIGNIFICANT CHANGE UP (ref 22–31)
CREAT SERPL-MCNC: 0.72 MG/DL — SIGNIFICANT CHANGE UP (ref 0.5–1.3)
EGFR: 113 ML/MIN/1.73M2 — SIGNIFICANT CHANGE UP
EGFR: 113 ML/MIN/1.73M2 — SIGNIFICANT CHANGE UP
EOSINOPHIL # BLD AUTO: 0.03 K/UL — SIGNIFICANT CHANGE UP (ref 0–0.5)
EOSINOPHIL NFR BLD AUTO: 0.5 % — SIGNIFICANT CHANGE UP (ref 0–6)
GLUCOSE SERPL-MCNC: 78 MG/DL — SIGNIFICANT CHANGE UP (ref 70–99)
HCT VFR BLD CALC: 32.9 % — LOW (ref 39–50)
HGB BLD-MCNC: 11.1 G/DL — LOW (ref 13–17)
IANC: 3.41 K/UL — SIGNIFICANT CHANGE UP (ref 1.8–7.4)
IMM GRANULOCYTES NFR BLD AUTO: 0.9 % — SIGNIFICANT CHANGE UP (ref 0–0.9)
LYMPHOCYTES # BLD AUTO: 2.37 K/UL — SIGNIFICANT CHANGE UP (ref 1–3.3)
LYMPHOCYTES # BLD AUTO: 35.9 % — SIGNIFICANT CHANGE UP (ref 13–44)
MAGNESIUM SERPL-MCNC: 1.9 MG/DL — SIGNIFICANT CHANGE UP (ref 1.6–2.6)
MCHC RBC-ENTMCNC: 28.5 PG — SIGNIFICANT CHANGE UP (ref 27–34)
MCHC RBC-ENTMCNC: 33.7 G/DL — SIGNIFICANT CHANGE UP (ref 32–36)
MCV RBC AUTO: 84.4 FL — SIGNIFICANT CHANGE UP (ref 80–100)
MONOCYTES # BLD AUTO: 0.72 K/UL — SIGNIFICANT CHANGE UP (ref 0–0.9)
MONOCYTES NFR BLD AUTO: 10.9 % — SIGNIFICANT CHANGE UP (ref 2–14)
NEUTROPHILS # BLD AUTO: 3.41 K/UL — SIGNIFICANT CHANGE UP (ref 1.8–7.4)
NEUTROPHILS NFR BLD AUTO: 51.6 % — SIGNIFICANT CHANGE UP (ref 43–77)
NRBC # BLD AUTO: 0.03 K/UL — HIGH (ref 0–0)
NRBC # FLD: 0.03 K/UL — HIGH (ref 0–0)
NRBC BLD AUTO-RTO: 0 /100 WBCS — SIGNIFICANT CHANGE UP (ref 0–0)
PHOSPHATE SERPL-MCNC: 2.6 MG/DL — SIGNIFICANT CHANGE UP (ref 2.5–4.5)
PLATELET # BLD AUTO: 186 K/UL — SIGNIFICANT CHANGE UP (ref 150–400)
POTASSIUM SERPL-MCNC: 3.4 MMOL/L — LOW (ref 3.5–5.3)
POTASSIUM SERPL-SCNC: 3.4 MMOL/L — LOW (ref 3.5–5.3)
PROT SERPL-MCNC: 6.4 G/DL — SIGNIFICANT CHANGE UP (ref 6–8.3)
RBC # BLD: 3.9 M/UL — LOW (ref 4.2–5.8)
RBC # FLD: 18.9 % — HIGH (ref 10.3–14.5)
SODIUM SERPL-SCNC: 140 MMOL/L — SIGNIFICANT CHANGE UP (ref 135–145)
WBC # BLD: 6.6 K/UL — SIGNIFICANT CHANGE UP (ref 3.8–10.5)
WBC # FLD AUTO: 6.6 K/UL — SIGNIFICANT CHANGE UP (ref 3.8–10.5)

## 2025-03-04 PROCEDURE — 99232 SBSQ HOSP IP/OBS MODERATE 35: CPT | Mod: GC

## 2025-03-04 PROCEDURE — 99233 SBSQ HOSP IP/OBS HIGH 50: CPT

## 2025-03-04 RX ADMIN — IPRATROPIUM BROMIDE AND ALBUTEROL SULFATE 3 MILLILITER(S): .5; 2.5 SOLUTION RESPIRATORY (INHALATION) at 10:04

## 2025-03-04 RX ADMIN — Medication 10 MILLIGRAM(S): at 05:54

## 2025-03-04 RX ADMIN — Medication 1 APPLICATION(S): at 13:26

## 2025-03-04 RX ADMIN — Medication 1 LOZENGE: at 13:06

## 2025-03-04 RX ADMIN — Medication 3 MILLIGRAM(S): at 21:43

## 2025-03-04 RX ADMIN — Medication 975 MILLIGRAM(S): at 05:54

## 2025-03-04 RX ADMIN — Medication 1 LOZENGE: at 05:56

## 2025-03-04 RX ADMIN — Medication 975 MILLIGRAM(S): at 06:54

## 2025-03-04 RX ADMIN — Medication 10 MILLIGRAM(S): at 17:42

## 2025-03-04 RX ADMIN — IPRATROPIUM BROMIDE AND ALBUTEROL SULFATE 3 MILLILITER(S): .5; 2.5 SOLUTION RESPIRATORY (INHALATION) at 20:41

## 2025-03-04 RX ADMIN — Medication 200 MILLIGRAM(S): at 21:42

## 2025-03-04 RX ADMIN — Medication 1 LOZENGE: at 17:42

## 2025-03-04 RX ADMIN — METHYLPREDNISOLONE ACETATE 16 MILLIGRAM(S): 80 INJECTION, SUSPENSION INTRA-ARTICULAR; INTRALESIONAL; INTRAMUSCULAR; SOFT TISSUE at 05:56

## 2025-03-04 RX ADMIN — LOSARTAN POTASSIUM 50 MILLIGRAM(S): 100 TABLET, FILM COATED ORAL at 05:54

## 2025-03-04 RX ADMIN — Medication 200 MILLIGRAM(S): at 13:06

## 2025-03-04 RX ADMIN — Medication 200 MILLIGRAM(S): at 05:56

## 2025-03-04 RX ADMIN — Medication 40 MILLIEQUIVALENT(S): at 17:42

## 2025-03-04 RX ADMIN — Medication 40 MILLIGRAM(S): at 05:54

## 2025-03-04 RX ADMIN — APIXABAN 10 MILLIGRAM(S): 2.5 TABLET, FILM COATED ORAL at 17:43

## 2025-03-04 RX ADMIN — HYDROXYCHLOROQUINE SULFATE 400 MILLIGRAM(S): 200 TABLET, FILM COATED ORAL at 21:42

## 2025-03-04 RX ADMIN — Medication 125 MICROGRAM(S): at 05:55

## 2025-03-04 RX ADMIN — APIXABAN 10 MILLIGRAM(S): 2.5 TABLET, FILM COATED ORAL at 05:54

## 2025-03-04 NOTE — CONSULT NOTE ADULT - SUBJECTIVE AND OBJECTIVE BOX
AMBERLY ESPITIA  [id number]    HISTORY OF PRESENT ILLNESS:  "49M SLE, asthma, hypothyroidism, steroid-induced osteoporosis, DM2, HTN, and gastric CA who p/w SOB. Pt diagnosed with influenza 2/14/25 and had slowly improved but with lingering cough. Yesterday pt noticed his pulse ox showed 88-90% SpO2 with HR up to 120s.     In the ED pt diagnosed with right-sided PE and started on eliquis. Also given IV solumedrol and nebs with significant improvement in SOB.    Pt currently denies chest pain, palpitations, lightheadedness. No previous history of VTE. No family history of thrombotic disorder. Denies EtOH, smoking, or other drugs."      Rheum ROS    Denies fevers/chills/weight loss/night sweats/alopecia/sinus disease/asthma history/oral ulcers/dysphagia/vision changes/Raynauds/VTE/miscarraiges/sicca symptoms/urinary changes/edema/SOB/joint pain/swelling/jaw claudication/rash/photosensitivity/morning stiffness    Endorses fevers/chills/weight loss/night sweats/alopecia/sinus disease/asthma history/oral ulcers/dysphagia/vision changes/Raynauds/VTE/miscarraiges/sicca symptoms/urinary changes/edema/SOB/joint pain/swelling/jaw claudication/rash/photosensitivity/morning stiffness      MEDICATIONS  (STANDING):  albuterol/ipratropium for Nebulization 3 milliLiter(s) Nebulizer every 12 hours  apixaban 10 milliGRAM(s) Oral every 12 hours  benzocaine/menthol Lozenge 1 Lozenge Oral four times a day  benzonatate 200 milliGRAM(s) Oral three times a day  chlorhexidine 2% Cloths 1 Application(s) Topical daily  hydroxychloroquine 400 milliGRAM(s) Oral at bedtime  influenza   Vaccine 0.5 milliLiter(s) IntraMuscular once  levothyroxine 125 MICROGram(s) Oral daily  losartan 50 milliGRAM(s) Oral daily  methylPREDNISolone 16 milliGRAM(s) Oral daily  metoclopramide 10 milliGRAM(s) Oral two times a day  pantoprazole    Tablet 40 milliGRAM(s) Oral before breakfast    MEDICATIONS  (PRN):  albuterol    90 MICROgram(s) HFA Inhaler 2 Puff(s) Inhalation every 6 hours PRN Shortness of Breath and/or Wheezing  hydrocodone/homatropine Syrup 5 milliLiter(s) Oral every 6 hours PRN Cough  melatonin 3 milliGRAM(s) Oral at bedtime PRN Insomnia      Allergies    NSAIDs (Short breath)  Motrin (Short breath)    Intolerances        PERTINENT MEDICATION HISTORY:    SOCIAL HISTORY:  OCCUPATION:  TRAVEL HISTORY:    FAMILY HISTORY:  FH: rheumatoid arthritis (Mother)        Vital Signs Last 24 Hrs  T(C): 36.7 (04 Mar 2025 12:43), Max: 36.9 (03 Mar 2025 21:43)  T(F): 98 (04 Mar 2025 12:43), Max: 98.4 (03 Mar 2025 21:43)  HR: 91 (04 Mar 2025 12:43) (74 - 91)  BP: 118/79 (04 Mar 2025 12:43) (118/79 - 133/91)  BP(mean): --  RR: 16 (04 Mar 2025 12:43) (16 - 18)  SpO2: 99% (04 Mar 2025 12:43) (95% - 99%)    Parameters below as of 04 Mar 2025 12:43  Patient On (Oxygen Delivery Method): room air        ROS as noted above     Physical Exam:  General: No apparent distress  HEENT: EOMI, MMM  CVS: +S1/S2, RRR, no murmurs/rubs/gallops  Resp: CTA b/l. No crackles/wheezing  GI: Soft, NT/ND +BS  MSK: no swelling/warmth/erythema of the joints of the UE/LE  Neuro: AAOx3  Skin: no visible rashes    LABS:                        11.1   6.60  )-----------( 186      ( 04 Mar 2025 05:40 )             32.9     03-04    140  |  103  |  8   ----------------------------<  78  3.4[L]   |  24  |  0.72    Ca    8.7      04 Mar 2025 05:40  Phos  2.6     03-04  Mg     1.90     03-04    TPro  6.4  /  Alb  3.3  /  TBili  0.5  /  DBili  x   /  AST  36  /  ALT  69[H]  /  AlkPhos  103  03-04      Urinalysis Basic - ( 04 Mar 2025 05:40 )    Color: x / Appearance: x / SG: x / pH: x  Gluc: 78 mg/dL / Ketone: x  / Bili: x / Urobili: x   Blood: x / Protein: x / Nitrite: x   Leuk Esterase: x / RBC: x / WBC x   Sq Epi: x / Non Sq Epi: x / Bacteria: x            Rheumatology Work Up    C3 Complement, Serum: 178 mg/dL [90 - 180] (03-01-25 @ 18:56)  C4 Complement, Serum: 31 mg/dL [10 - 40] (03-01-25 @ 18:56)  C-Reactive Protein: 34 mg/L *H* (02-20-25 @ 15:13)  C-Reactive Protein: <3 mg/L [Test Repeated] (02-04-25 @ 09:47)            RADIOLOGY & ADDITIONAL STUDIES:  < from: CT Angio Chest PE Protocol w/ IV Cont (02.28.25 @ 18:07) >  IMPRESSION:    Right upper and lower segmental pulmonary emboli.      Findings discussed with MINOR Lees  by Dr. Don on 2/28/2025 6:14 PM   with read back confirmation.    < end of copied text >     AMBERLY ESPITIA  [id number]    HISTORY OF PRESENT ILLNESS:  "49M SLE, asthma, hypothyroidism, steroid-induced osteoporosis, DM2, HTN, and gastric CA who p/w SOB. Pt diagnosed with influenza 2/14/25 and had slowly improved but with lingering cough. Yesterday pt noticed his pulse ox showed 88-90% SpO2 with HR up to 120s.     In the ED pt diagnosed with right-sided PE and started on eliquis. Also given IV solumedrol and nebs with significant improvement in SOB.    Pt currently denies chest pain, palpitations, lightheadedness. No previous history of VTE. No family history of thrombotic disorder. Denies EtOH, smoking, or other drugs."    Rheum hx:  Dx SLE/myositis overlap 2019, with MCP stiffness and LE weakness. Positive dsDNA, Florez, RNP, U1RNP       Rheum ROS    Denies fevers/chills/weight loss/night sweats/alopecia/sinus disease/asthma history/oral ulcers/dysphagia/vision changes/Raynauds/VTE/miscarraiges/sicca symptoms/urinary changes/edema/SOB/joint pain/swelling/jaw claudication/rash/photosensitivity/morning stiffness    Endorses fevers/chills/weight loss/night sweats/alopecia/sinus disease/asthma history/oral ulcers/dysphagia/vision changes/Raynauds/VTE/miscarraiges/sicca symptoms/urinary changes/edema/SOB/joint pain/swelling/jaw claudication/rash/photosensitivity/morning stiffness      MEDICATIONS  (STANDING):  albuterol/ipratropium for Nebulization 3 milliLiter(s) Nebulizer every 12 hours  apixaban 10 milliGRAM(s) Oral every 12 hours  benzocaine/menthol Lozenge 1 Lozenge Oral four times a day  benzonatate 200 milliGRAM(s) Oral three times a day  chlorhexidine 2% Cloths 1 Application(s) Topical daily  hydroxychloroquine 400 milliGRAM(s) Oral at bedtime  influenza   Vaccine 0.5 milliLiter(s) IntraMuscular once  levothyroxine 125 MICROGram(s) Oral daily  losartan 50 milliGRAM(s) Oral daily  methylPREDNISolone 16 milliGRAM(s) Oral daily  metoclopramide 10 milliGRAM(s) Oral two times a day  pantoprazole    Tablet 40 milliGRAM(s) Oral before breakfast    MEDICATIONS  (PRN):  albuterol    90 MICROgram(s) HFA Inhaler 2 Puff(s) Inhalation every 6 hours PRN Shortness of Breath and/or Wheezing  hydrocodone/homatropine Syrup 5 milliLiter(s) Oral every 6 hours PRN Cough  melatonin 3 milliGRAM(s) Oral at bedtime PRN Insomnia      Allergies    NSAIDs (Short breath)  Motrin (Short breath)    Intolerances        PERTINENT MEDICATION HISTORY:    SOCIAL HISTORY:  OCCUPATION:  TRAVEL HISTORY:    FAMILY HISTORY:  FH: rheumatoid arthritis (Mother)        Vital Signs Last 24 Hrs  T(C): 36.7 (04 Mar 2025 12:43), Max: 36.9 (03 Mar 2025 21:43)  T(F): 98 (04 Mar 2025 12:43), Max: 98.4 (03 Mar 2025 21:43)  HR: 91 (04 Mar 2025 12:43) (74 - 91)  BP: 118/79 (04 Mar 2025 12:43) (118/79 - 133/91)  BP(mean): --  RR: 16 (04 Mar 2025 12:43) (16 - 18)  SpO2: 99% (04 Mar 2025 12:43) (95% - 99%)    Parameters below as of 04 Mar 2025 12:43  Patient On (Oxygen Delivery Method): room air        ROS as noted above     Physical Exam:  General: No apparent distress  HEENT: EOMI, MMM  CVS: +S1/S2, RRR, no murmurs/rubs/gallops  Resp: CTA b/l. No crackles/wheezing  GI: Soft, NT/ND +BS  MSK: no swelling/warmth/erythema of the joints of the UE/LE  Neuro: AAOx3  Skin: no visible rashes    LABS:                        11.1   6.60  )-----------( 186      ( 04 Mar 2025 05:40 )             32.9     03-04    140  |  103  |  8   ----------------------------<  78  3.4[L]   |  24  |  0.72    Ca    8.7      04 Mar 2025 05:40  Phos  2.6     03-04  Mg     1.90     03-04    TPro  6.4  /  Alb  3.3  /  TBili  0.5  /  DBili  x   /  AST  36  /  ALT  69[H]  /  AlkPhos  103  03-04      Urinalysis Basic - ( 04 Mar 2025 05:40 )    Color: x / Appearance: x / SG: x / pH: x  Gluc: 78 mg/dL / Ketone: x  / Bili: x / Urobili: x   Blood: x / Protein: x / Nitrite: x   Leuk Esterase: x / RBC: x / WBC x   Sq Epi: x / Non Sq Epi: x / Bacteria: x            Rheumatology Work Up    C3 Complement, Serum: 178 mg/dL [90 - 180] (03-01-25 @ 18:56)  C4 Complement, Serum: 31 mg/dL [10 - 40] (03-01-25 @ 18:56)  C-Reactive Protein: 34 mg/L *H* (02-20-25 @ 15:13)  C-Reactive Protein: <3 mg/L [Test Repeated] (02-04-25 @ 09:47)            RADIOLOGY & ADDITIONAL STUDIES:  < from: CT Angio Chest PE Protocol w/ IV Cont (02.28.25 @ 18:07) >  IMPRESSION:    Right upper and lower segmental pulmonary emboli.      Findings discussed with MINOR Lees  by Dr. Don on 2/28/2025 6:14 PM   with read back confirmation.    < end of copied text >     AMBERLY ROJASZIO  [id number]    HISTORY OF PRESENT ILLNESS:  "49M SLE, asthma, hypothyroidism, steroid-induced osteoporosis, DM2, HTN, and gastric CA who p/w SOB. Pt diagnosed with influenza 2/14/25 and had slowly improved but with lingering cough. Yesterday pt noticed his pulse ox showed 88-90% SpO2 with HR up to 120s.     In the ED pt diagnosed with right-sided PE and started on eliquis. Also given IV solumedrol and nebs with significant improvement in SOB.    Pt currently denies chest pain, palpitations, lightheadedness. No previous history of VTE. No family history of thrombotic disorder. Denies EtOH, smoking, or other drugs."    Rheum hx:  Dx SLE/myositis overlap 2019, with MCP stiffness and LE weakness. Positive dsDNA, Florez, RNP, U1RNP, [id number], B2GP1 134. Has been on MMF from [id number] (stopped due to gastric ca), saphnelo 2022 (stopped due to infection), benlysta [id number] (stopped due to allergy). 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 Pt states that he has flares on and off, and feels a flare when he has stiffness in his joints and muscle weakness. He had the flu in February, and since then has been feeling weak. SOB had continued that prompted his hospital visit, found to have PE. Never had PE or DVT before. Currently, has numbness of LLE>RLE and tingling that started 3/3. Difficult for him to make a fist due to stiffness, also endorses elbow stiffness.Has raynauds, previously had oral ulcers but none currently. Has been on chronic steroids since 2019. Was on prednisone 7.5mg prior to 12/2024, but had a flare/severe fatigue in 12/2024 that prompted increase of steroids to medrol 8mg bid with improvement. Is on folfox therapy, last oxaliplatin 2 weeks ago      Rheum ROS  As above      MEDICATIONS  (STANDING):  albuterol/ipratropium for Nebulization 3 milliLiter(s) Nebulizer every 12 hours  apixaban 10 milliGRAM(s) Oral every 12 hours  benzocaine/menthol Lozenge 1 Lozenge Oral four times a day  benzonatate 200 milliGRAM(s) Oral three times a day  chlorhexidine 2% Cloths 1 Application(s) Topical daily  hydroxychloroquine 400 milliGRAM(s) Oral at bedtime  influenza   Vaccine 0.5 milliLiter(s) IntraMuscular once  levothyroxine 125 MICROGram(s) Oral daily  losartan 50 milliGRAM(s) Oral daily  methylPREDNISolone 16 milliGRAM(s) Oral daily  metoclopramide 10 milliGRAM(s) Oral two times a day  pantoprazole    Tablet 40 milliGRAM(s) Oral before breakfast    MEDICATIONS  (PRN):  albuterol    90 MICROgram(s) HFA Inhaler 2 Puff(s) Inhalation every 6 hours PRN Shortness of Breath and/or Wheezing  hydrocodone/homatropine Syrup 5 milliLiter(s) Oral every 6 hours PRN Cough  melatonin 3 milliGRAM(s) Oral at bedtime PRN Insomnia      Allergies    NSAIDs (Short breath)  Motrin (Short breath)    Intolerances        PERTINENT MEDICATION HISTORY: medrol 16 mg qd    SOCIAL HISTORY: did not obtain  OCCUPATION: did not obtain  TRAVEL HISTORY: did not obtain    FAMILY HISTORY:  FH: rheumatoid arthritis (Mother)        Vital Signs Last 24 Hrs  T(C): 36.7 (04 Mar 2025 12:43), Max: 36.9 (03 Mar 2025 21:43)  T(F): 98 (04 Mar 2025 12:43), Max: 98.4 (03 Mar 2025 21:43)  HR: 91 (04 Mar 2025 12:43) (74 - 91)  BP: 118/79 (04 Mar 2025 12:43) (118/79 - 133/91)  BP(mean): --  RR: 16 (04 Mar 2025 12:43) (16 - 18)  SpO2: 99% (04 Mar 2025 12:43) (95% - 99%)    Parameters below as of 04 Mar 2025 12:43  Patient On (Oxygen Delivery Method): room air        ROS as noted above     Physical Exam:  General: No apparent distress  HEENT: EOMI, MMM, no oral ulcers  CVS: +S1/S2, RRR, no murmurs/rubs/gallops  Resp: CTA b/l. No crackles/wheezing  GI: Soft, NT/ND  MSK: no swelling/warmth/erythema of the joints of the UE/LE. 4/5 hip flexors, 4+ on biceps/triceps (R stronger than L). 5/5 distal strength  Neuro: AAOx3  Skin: no visible rashes    LABS:                        11.1   6.60  )-----------( 186      ( 04 Mar 2025 05:40 )             32.9     03-04    140  |  103  |  8   ----------------------------<  78  3.4[L]   |  24  |  0.72    Ca    8.7      04 Mar 2025 05:40  Phos  2.6     03-04  Mg     1.90     03-04    TPro  6.4  /  Alb  3.3  /  TBili  0.5  /  DBili  x   /  AST  36  /  ALT  69[H]  /  AlkPhos  103  03-04      Urinalysis Basic - ( 04 Mar 2025 05:40 )    Color: x / Appearance: x / SG: x / pH: x  Gluc: 78 mg/dL / Ketone: x  / Bili: x / Urobili: x   Blood: x / Protein: x / Nitrite: x   Leuk Esterase: x / RBC: x / WBC x   Sq Epi: x / Non Sq Epi: x / Bacteria: x            Rheumatology Work Up    C3 Complement, Serum: 178 mg/dL [90 - 180] (03-01-25 @ 18:56)  C4 Complement, Serum: 31 mg/dL [10 - 40] (03-01-25 @ 18:56)  C-Reactive Protein: 34 mg/L *H* (02-20-25 @ 15:13)  C-Reactive Protein: <3 mg/L [Test Repeated] (02-04-25 @ 09:47)            RADIOLOGY & ADDITIONAL STUDIES:  < from: CT Angio Chest PE Protocol w/ IV Cont (02.28.25 @ 18:07) >  IMPRESSION:    Right upper and lower segmental pulmonary emboli.      Findings discussed with MINOR Lees  by Dr. Don on 2/28/2025 6:14 PM   with read back confirmation.    < end of copied text >

## 2025-03-04 NOTE — CONSULT NOTE ADULT - ASSESSMENT
49M PMH SLE/myositis overlap, gastric adenocarcinoma on FOLFOX, asthma, hypothyroidism presenting with SOB, found to have PE and DVT. Rheumatology consulted to eval for flare of myositis/SLE.    #SLE/myositis overlap  -Dx 2019, +dsDNA, Sm, RNP, U1RNP, B2GP1 IgA (134)  -Has been on chronic steroids since 2019, most recently on medrol 8mg BID since 12/2024  -Has been on MMF, but stopped due to gastric ca, is on FOLFOX therapy and was given oxaliplatin most recently 2 weeks ago. Admitted with PE and LLE below the leg DVT. Previously, B2GP IgA was positive, repeat B2GP and ACL inpatient is negative. Will obtain atypical APS serologies to further evaluate for APS   -Pt states he feels weaker, with neuropathy in LLE>RLE. CK outpt has been normal  -Will further assess for myositis flare for muscle weakness. Neuropathy less likely to be related to autoimmune disease, possibly 2/2 chemotherapy vs mild hypokalemia vs possible vitamin deficiency. Generally pts are less likely to flare while on chemotherapy    Recommendations:  -Please obtain MR thigh to eval for myositis  -Please obtain CK, dsDNA, UPCR, vitamin B12 level  -Please obtain atypical APS labs (ordered)  -Can consider EMG outpt    Case discussed with Dr Damaris Cotton MD  Rheumatology Fellow 49M PMH SLE/myositis overlap, gastric adenocarcinoma on FOLFOX, asthma, hypothyroidism presenting with SOB, found to have PE and DVT. Rheumatology consulted to eval for flare of myositis/SLE.    #SLE/myositis overlap  -Dx 2019, +dsDNA, Sm, RNP, U1RNP, B2GP1 IgA (134)  -Has been on chronic steroids since 2019, most recently on medrol 8mg BID since 12/2024  -Has been on MMF, but stopped due to gastric ca, is on FOLFOX therapy and was given oxaliplatin most recently 2 weeks ago. Admitted with PE and LLE below the leg DVT. Previously, B2GP IgA was positive, repeat B2GP and ACL inpatient is negative. Will obtain atypical APS serologies to further evaluate for APS   -Pt states he feels weaker, with neuropathy in LLE>RLE. CK outpt has been normal  -Will further assess for myositis flare for muscle weakness. Neuropathy less likely to be related to autoimmune disease, possibly 2/2 chemotherapy vs mild hypokalemia vs possible vitamin deficiency.     Recommendations:  -Please obtain MR femur with fat suppession images to eval for myositis  -Please obtain CK, dsDNA, UPCR, vitamin B12 level  -Please obtain atypical APS labs (ordered)  -Can consider EMG outpt to evaluate neuropathic symptoms.     Case discussed with Dr Damaris Cotton MD  Rheumatology Fellow

## 2025-03-04 NOTE — CONSULT NOTE ADULT - ATTENDING COMMENTS
history of T2N0 gastric adenocarcinoma s/p resection  on adjuvant FOLFOX  develop PE  continue eliquis
as above      Dr. Katarzyna Ocampo  Rheumatology Attending  [facility]  Rheumatology at [place]     Contact:   call the office:: [phone] or reach [place] [employer] Teams, weekdays before 5 pm   after 5 pm or on weekends, contact [phone]

## 2025-03-05 ENCOUNTER — APPOINTMENT (OUTPATIENT)
Dept: RHEUMATOLOGY | Facility: CLINIC | Age: 50
End: 2025-03-05

## 2025-03-05 ENCOUNTER — RESULT REVIEW (OUTPATIENT)
Age: 50
End: 2025-03-05

## 2025-03-05 LAB
ALBUMIN SERPL ELPH-MCNC: 3.5 G/DL — SIGNIFICANT CHANGE UP (ref 3.3–5)
ALP SERPL-CCNC: 102 U/L — SIGNIFICANT CHANGE UP (ref 40–120)
ALT FLD-CCNC: 59 U/L — HIGH (ref 4–41)
ANION GAP SERPL CALC-SCNC: 14 MMOL/L — SIGNIFICANT CHANGE UP (ref 7–14)
AST SERPL-CCNC: 29 U/L — SIGNIFICANT CHANGE UP (ref 4–40)
BASOPHILS # BLD AUTO: 0.01 K/UL — SIGNIFICANT CHANGE UP (ref 0–0.2)
BASOPHILS NFR BLD AUTO: 0.2 % — SIGNIFICANT CHANGE UP (ref 0–2)
BILIRUB SERPL-MCNC: 0.4 MG/DL — SIGNIFICANT CHANGE UP (ref 0.2–1.2)
BUN SERPL-MCNC: 9 MG/DL — SIGNIFICANT CHANGE UP (ref 7–23)
CALCIUM SERPL-MCNC: 9 MG/DL — SIGNIFICANT CHANGE UP (ref 8.4–10.5)
CHLORIDE SERPL-SCNC: 102 MMOL/L — SIGNIFICANT CHANGE UP (ref 98–107)
CK SERPL-CCNC: 63 U/L — SIGNIFICANT CHANGE UP (ref 30–200)
CO2 SERPL-SCNC: 23 MMOL/L — SIGNIFICANT CHANGE UP (ref 22–31)
CREAT SERPL-MCNC: 0.74 MG/DL — SIGNIFICANT CHANGE UP (ref 0.5–1.3)
EGFR: 112 ML/MIN/1.73M2 — SIGNIFICANT CHANGE UP
EGFR: 112 ML/MIN/1.73M2 — SIGNIFICANT CHANGE UP
EOSINOPHIL # BLD AUTO: 0.03 K/UL — SIGNIFICANT CHANGE UP (ref 0–0.5)
EOSINOPHIL NFR BLD AUTO: 0.5 % — SIGNIFICANT CHANGE UP (ref 0–6)
GLUCOSE SERPL-MCNC: 85 MG/DL — SIGNIFICANT CHANGE UP (ref 70–99)
HCT VFR BLD CALC: 35.2 % — LOW (ref 39–50)
HGB BLD-MCNC: 11.6 G/DL — LOW (ref 13–17)
IANC: 3.18 K/UL — SIGNIFICANT CHANGE UP (ref 1.8–7.4)
IMM GRANULOCYTES NFR BLD AUTO: 1.3 % — HIGH (ref 0–0.9)
LYMPHOCYTES # BLD AUTO: 2.13 K/UL — SIGNIFICANT CHANGE UP (ref 1–3.3)
LYMPHOCYTES # BLD AUTO: 34.6 % — SIGNIFICANT CHANGE UP (ref 13–44)
MAGNESIUM SERPL-MCNC: 2.1 MG/DL — SIGNIFICANT CHANGE UP (ref 1.6–2.6)
MCHC RBC-ENTMCNC: 28.2 PG — SIGNIFICANT CHANGE UP (ref 27–34)
MCHC RBC-ENTMCNC: 33 G/DL — SIGNIFICANT CHANGE UP (ref 32–36)
MCV RBC AUTO: 85.6 FL — SIGNIFICANT CHANGE UP (ref 80–100)
MONOCYTES # BLD AUTO: 0.73 K/UL — SIGNIFICANT CHANGE UP (ref 0–0.9)
MONOCYTES NFR BLD AUTO: 11.9 % — SIGNIFICANT CHANGE UP (ref 2–14)
NEUTROPHILS # BLD AUTO: 3.18 K/UL — SIGNIFICANT CHANGE UP (ref 1.8–7.4)
NEUTROPHILS NFR BLD AUTO: 51.5 % — SIGNIFICANT CHANGE UP (ref 43–77)
NRBC # BLD AUTO: 0.04 K/UL — HIGH (ref 0–0)
NRBC # FLD: 0.04 K/UL — HIGH (ref 0–0)
NRBC BLD AUTO-RTO: 0 /100 WBCS — SIGNIFICANT CHANGE UP (ref 0–0)
PHOSPHATE SERPL-MCNC: 3 MG/DL — SIGNIFICANT CHANGE UP (ref 2.5–4.5)
PLATELET # BLD AUTO: 198 K/UL — SIGNIFICANT CHANGE UP (ref 150–400)
POTASSIUM SERPL-MCNC: 3.6 MMOL/L — SIGNIFICANT CHANGE UP (ref 3.5–5.3)
POTASSIUM SERPL-SCNC: 3.6 MMOL/L — SIGNIFICANT CHANGE UP (ref 3.5–5.3)
PROT SERPL-MCNC: 6.4 G/DL — SIGNIFICANT CHANGE UP (ref 6–8.3)
RBC # BLD: 4.11 M/UL — LOW (ref 4.2–5.8)
RBC # FLD: 19.4 % — HIGH (ref 10.3–14.5)
SODIUM SERPL-SCNC: 139 MMOL/L — SIGNIFICANT CHANGE UP (ref 135–145)
VIT B12 SERPL-MCNC: 478 PG/ML — SIGNIFICANT CHANGE UP (ref 200–900)
WBC # BLD: 6.16 K/UL — SIGNIFICANT CHANGE UP (ref 3.8–10.5)
WBC # FLD AUTO: 6.16 K/UL — SIGNIFICANT CHANGE UP (ref 3.8–10.5)

## 2025-03-05 PROCEDURE — 93970 EXTREMITY STUDY: CPT | Mod: 26

## 2025-03-05 PROCEDURE — 99233 SBSQ HOSP IP/OBS HIGH 50: CPT

## 2025-03-05 RX ADMIN — APIXABAN 10 MILLIGRAM(S): 2.5 TABLET, FILM COATED ORAL at 05:57

## 2025-03-05 RX ADMIN — Medication 125 MICROGRAM(S): at 05:58

## 2025-03-05 RX ADMIN — Medication 200 MILLIGRAM(S): at 05:57

## 2025-03-05 RX ADMIN — Medication 200 MILLIGRAM(S): at 13:05

## 2025-03-05 RX ADMIN — IPRATROPIUM BROMIDE AND ALBUTEROL SULFATE 3 MILLILITER(S): .5; 2.5 SOLUTION RESPIRATORY (INHALATION) at 10:32

## 2025-03-05 RX ADMIN — Medication 10 MILLIGRAM(S): at 18:28

## 2025-03-05 RX ADMIN — Medication 10 MILLIGRAM(S): at 05:59

## 2025-03-05 RX ADMIN — LOSARTAN POTASSIUM 50 MILLIGRAM(S): 100 TABLET, FILM COATED ORAL at 05:58

## 2025-03-05 RX ADMIN — Medication 1 APPLICATION(S): at 13:06

## 2025-03-05 RX ADMIN — Medication 1 LOZENGE: at 18:29

## 2025-03-05 RX ADMIN — APIXABAN 10 MILLIGRAM(S): 2.5 TABLET, FILM COATED ORAL at 18:29

## 2025-03-05 RX ADMIN — IPRATROPIUM BROMIDE AND ALBUTEROL SULFATE 3 MILLILITER(S): .5; 2.5 SOLUTION RESPIRATORY (INHALATION) at 20:34

## 2025-03-05 RX ADMIN — Medication 200 MILLIGRAM(S): at 21:58

## 2025-03-05 RX ADMIN — Medication 40 MILLIGRAM(S): at 06:13

## 2025-03-05 RX ADMIN — METHYLPREDNISOLONE ACETATE 16 MILLIGRAM(S): 80 INJECTION, SUSPENSION INTRA-ARTICULAR; INTRALESIONAL; INTRAMUSCULAR; SOFT TISSUE at 05:59

## 2025-03-05 RX ADMIN — HYDROXYCHLOROQUINE SULFATE 400 MILLIGRAM(S): 200 TABLET, FILM COATED ORAL at 21:58

## 2025-03-05 RX ADMIN — Medication 1 LOZENGE: at 05:57

## 2025-03-05 NOTE — PROGRESS NOTE ADULT - TIME BILLING
20 mins-Vitals, meds, new results/radiology, interdisciplinary charting reviewed   20 mins-Patient seen and examined and plan discussed, all questions were answered to the best of my ability  20 mins-Charting/documentation and orders.
Time-based billing (NON-critical care).     more than 50% of the visit was spent counseling and / or coordinating care by the attending physician.  The necessity of the time spent during the encounter on this date of service was due to:     review of laboratory data, radiology results, consultants' recommendations, documentation in [place], discussion with patient/ACP and interdisciplinary staff (such as , social workers, etc). Interventions were performed as documented above.

## 2025-03-06 ENCOUNTER — APPOINTMENT (OUTPATIENT)
Dept: INFUSION THERAPY | Facility: HOSPITAL | Age: 50
End: 2025-03-06

## 2025-03-06 VITALS
HEART RATE: 74 BPM | SYSTOLIC BLOOD PRESSURE: 135 MMHG | OXYGEN SATURATION: 100 % | RESPIRATION RATE: 18 BRPM | TEMPERATURE: 98 F | DIASTOLIC BLOOD PRESSURE: 76 MMHG

## 2025-03-06 LAB
ALBUMIN SERPL ELPH-MCNC: 3.5 G/DL — SIGNIFICANT CHANGE UP (ref 3.3–5)
ALP SERPL-CCNC: 101 U/L — SIGNIFICANT CHANGE UP (ref 40–120)
ALT FLD-CCNC: 54 U/L — HIGH (ref 4–41)
ANION GAP SERPL CALC-SCNC: 14 MMOL/L — SIGNIFICANT CHANGE UP (ref 7–14)
APPEARANCE UR: CLEAR — SIGNIFICANT CHANGE UP
AST SERPL-CCNC: 30 U/L — SIGNIFICANT CHANGE UP (ref 4–40)
BACTERIA # UR AUTO: NEGATIVE /HPF — SIGNIFICANT CHANGE UP
BASOPHILS # BLD AUTO: 0.01 K/UL — SIGNIFICANT CHANGE UP (ref 0–0.2)
BASOPHILS NFR BLD AUTO: 0.2 % — SIGNIFICANT CHANGE UP (ref 0–2)
BILIRUB SERPL-MCNC: 0.5 MG/DL — SIGNIFICANT CHANGE UP (ref 0.2–1.2)
BILIRUB UR-MCNC: NEGATIVE — SIGNIFICANT CHANGE UP
BUN SERPL-MCNC: 7 MG/DL — SIGNIFICANT CHANGE UP (ref 7–23)
CALCIUM SERPL-MCNC: 8.9 MG/DL — SIGNIFICANT CHANGE UP (ref 8.4–10.5)
CAST: 0 /LPF — SIGNIFICANT CHANGE UP (ref 0–4)
CHLORIDE SERPL-SCNC: 101 MMOL/L — SIGNIFICANT CHANGE UP (ref 98–107)
CO2 SERPL-SCNC: 22 MMOL/L — SIGNIFICANT CHANGE UP (ref 22–31)
COLOR SPEC: YELLOW — SIGNIFICANT CHANGE UP
CREAT ?TM UR-MCNC: 91 MG/DL — SIGNIFICANT CHANGE UP
CREAT SERPL-MCNC: 0.73 MG/DL — SIGNIFICANT CHANGE UP (ref 0.5–1.3)
DIFF PNL FLD: NEGATIVE — SIGNIFICANT CHANGE UP
EGFR: 112 ML/MIN/1.73M2 — SIGNIFICANT CHANGE UP
EGFR: 112 ML/MIN/1.73M2 — SIGNIFICANT CHANGE UP
EOSINOPHIL # BLD AUTO: 0.04 K/UL — SIGNIFICANT CHANGE UP (ref 0–0.5)
EOSINOPHIL NFR BLD AUTO: 0.7 % — SIGNIFICANT CHANGE UP (ref 0–6)
GLUCOSE SERPL-MCNC: 87 MG/DL — SIGNIFICANT CHANGE UP (ref 70–99)
GLUCOSE UR QL: NEGATIVE MG/DL — SIGNIFICANT CHANGE UP
HCT VFR BLD CALC: 36.1 % — LOW (ref 39–50)
HGB BLD-MCNC: 12.1 G/DL — LOW (ref 13–17)
IANC: 2.43 K/UL — SIGNIFICANT CHANGE UP (ref 1.8–7.4)
IMM GRANULOCYTES NFR BLD AUTO: 2.4 % — HIGH (ref 0–0.9)
KETONES UR-MCNC: NEGATIVE MG/DL — SIGNIFICANT CHANGE UP
LEUKOCYTE ESTERASE UR-ACNC: NEGATIVE — SIGNIFICANT CHANGE UP
LYMPHOCYTES # BLD AUTO: 2.05 K/UL — SIGNIFICANT CHANGE UP (ref 1–3.3)
LYMPHOCYTES # BLD AUTO: 37.7 % — SIGNIFICANT CHANGE UP (ref 13–44)
MAGNESIUM SERPL-MCNC: 2.1 MG/DL — SIGNIFICANT CHANGE UP (ref 1.6–2.6)
MCHC RBC-ENTMCNC: 28 PG — SIGNIFICANT CHANGE UP (ref 27–34)
MCHC RBC-ENTMCNC: 33.5 G/DL — SIGNIFICANT CHANGE UP (ref 32–36)
MCV RBC AUTO: 83.6 FL — SIGNIFICANT CHANGE UP (ref 80–100)
MONOCYTES # BLD AUTO: 0.78 K/UL — SIGNIFICANT CHANGE UP (ref 0–0.9)
MONOCYTES NFR BLD AUTO: 14.3 % — HIGH (ref 2–14)
NEUTROPHILS # BLD AUTO: 2.43 K/UL — SIGNIFICANT CHANGE UP (ref 1.8–7.4)
NEUTROPHILS NFR BLD AUTO: 44.7 % — SIGNIFICANT CHANGE UP (ref 43–77)
NITRITE UR-MCNC: NEGATIVE — SIGNIFICANT CHANGE UP
NRBC # BLD AUTO: 0.02 K/UL — HIGH (ref 0–0)
NRBC # FLD: 0.02 K/UL — HIGH (ref 0–0)
NRBC BLD AUTO-RTO: 0 /100 WBCS — SIGNIFICANT CHANGE UP (ref 0–0)
PH UR: 7.5 — SIGNIFICANT CHANGE UP (ref 5–8)
PHOSPHATE SERPL-MCNC: 3.2 MG/DL — SIGNIFICANT CHANGE UP (ref 2.5–4.5)
PLATELET # BLD AUTO: 211 K/UL — SIGNIFICANT CHANGE UP (ref 150–400)
POTASSIUM SERPL-MCNC: 3.5 MMOL/L — SIGNIFICANT CHANGE UP (ref 3.5–5.3)
POTASSIUM SERPL-SCNC: 3.5 MMOL/L — SIGNIFICANT CHANGE UP (ref 3.5–5.3)
PROT ?TM UR-MCNC: 11 MG/DL — SIGNIFICANT CHANGE UP
PROT SERPL-MCNC: 6.6 G/DL — SIGNIFICANT CHANGE UP (ref 6–8.3)
PROT UR-MCNC: 30 MG/DL
PROT/CREAT UR-RTO: 0.1 RATIO — SIGNIFICANT CHANGE UP (ref 0–0.2)
RBC # BLD: 4.32 M/UL — SIGNIFICANT CHANGE UP (ref 4.2–5.8)
RBC # FLD: 19 % — HIGH (ref 10.3–14.5)
RBC CASTS # UR COMP ASSIST: 1 /HPF — SIGNIFICANT CHANGE UP (ref 0–4)
SODIUM SERPL-SCNC: 137 MMOL/L — SIGNIFICANT CHANGE UP (ref 135–145)
SP GR SPEC: 1.01 — SIGNIFICANT CHANGE UP (ref 1–1.03)
SQUAMOUS # UR AUTO: 0 /HPF — SIGNIFICANT CHANGE UP (ref 0–5)
UROBILINOGEN FLD QL: 0.2 MG/DL — SIGNIFICANT CHANGE UP (ref 0.2–1)
WBC # BLD: 5.44 K/UL — SIGNIFICANT CHANGE UP (ref 3.8–10.5)
WBC # FLD AUTO: 5.44 K/UL — SIGNIFICANT CHANGE UP (ref 3.8–10.5)
WBC UR QL: 0 /HPF — SIGNIFICANT CHANGE UP (ref 0–5)

## 2025-03-06 PROCEDURE — 73720 MRI LWR EXTREMITY W/O&W/DYE: CPT | Mod: 26,50

## 2025-03-06 PROCEDURE — 99239 HOSP IP/OBS DSCHRG MGMT >30: CPT

## 2025-03-06 RX ORDER — METHYLPREDNISOLONE ACETATE 80 MG/ML
1 INJECTION, SUSPENSION INTRA-ARTICULAR; INTRALESIONAL; INTRAMUSCULAR; SOFT TISSUE
Qty: 0 | Refills: 0 | DISCHARGE

## 2025-03-06 RX ADMIN — Medication 1 APPLICATION(S): at 11:42

## 2025-03-06 RX ADMIN — IPRATROPIUM BROMIDE AND ALBUTEROL SULFATE 3 MILLILITER(S): .5; 2.5 SOLUTION RESPIRATORY (INHALATION) at 07:33

## 2025-03-06 RX ADMIN — APIXABAN 10 MILLIGRAM(S): 2.5 TABLET, FILM COATED ORAL at 06:25

## 2025-03-06 RX ADMIN — Medication 10 MILLIGRAM(S): at 06:25

## 2025-03-06 RX ADMIN — METHYLPREDNISOLONE ACETATE 16 MILLIGRAM(S): 80 INJECTION, SUSPENSION INTRA-ARTICULAR; INTRALESIONAL; INTRAMUSCULAR; SOFT TISSUE at 06:24

## 2025-03-06 RX ADMIN — LOSARTAN POTASSIUM 50 MILLIGRAM(S): 100 TABLET, FILM COATED ORAL at 06:25

## 2025-03-06 RX ADMIN — Medication 200 MILLIGRAM(S): at 06:25

## 2025-03-06 RX ADMIN — Medication 200 MILLIGRAM(S): at 13:19

## 2025-03-06 RX ADMIN — Medication 40 MILLIEQUIVALENT(S): at 11:01

## 2025-03-06 RX ADMIN — Medication 1 LOZENGE: at 06:25

## 2025-03-06 RX ADMIN — Medication 125 MICROGRAM(S): at 06:24

## 2025-03-06 RX ADMIN — Medication 1 LOZENGE: at 11:42

## 2025-03-06 RX ADMIN — Medication 40 MILLIGRAM(S): at 06:25

## 2025-03-06 NOTE — DISCHARGE NOTE NURSING/CASE MANAGEMENT/SOCIAL WORK - FINANCIAL ASSISTANCE
[facility] provides services at a reduced cost to those who are determined to be eligible through [facility]’s financial assistance program. Information regarding [facility]’s financial assistance program can be found by going to [email or web address] or by calling [phone].

## 2025-03-06 NOTE — DISCHARGE NOTE PROVIDER - CARE PROVIDERS DIRECT ADDRESSES
[email or web address]

## 2025-03-06 NOTE — DISCHARGE NOTE PROVIDER - NSDCCPCAREPLAN_GEN_ALL_CORE_FT
PRINCIPAL DISCHARGE DIAGNOSIS  Diagnosis: Pulmonary embolism  Assessment and Plan of Treatment: You were found to have blood clot in your lungs. Continue with Eliquis as prescribed.      SECONDARY DISCHARGE DIAGNOSES  Diagnosis: Gastric adenocarcinoma  Assessment and Plan of Treatment:     Diagnosis: DVT, lower extremity  Assessment and Plan of Treatment:     Diagnosis: Lupus  Assessment and Plan of Treatment:     Diagnosis: Hypothyroid  Assessment and Plan of Treatment:     Diagnosis: Asthma  Assessment and Plan of Treatment:      PRINCIPAL DISCHARGE DIAGNOSIS  Diagnosis: Pulmonary embolism  Assessment and Plan of Treatment: You were found to have blood clot in your lungs. Continue with Eliquis as prescribed. Eliquis 10 mg two times a day through the morning of 3/7, then started with 5 mg PM of 3/7 and continue with 5 mg two times a day thereafter.      SECONDARY DISCHARGE DIAGNOSES  Diagnosis: DVT, lower extremity  Assessment and Plan of Treatment: You were found to have acute left lower extremity blood clot. Contiue with Eliquis as prescribed (10 mg two times a day through the morning of 3/7, then started with 5 mg PM of 3/7 and continue with 5 mg two times a day thereafter).       Diagnosis: Gastric adenocarcinoma  Assessment and Plan of Treatment: Outpatient follow up with Dr. Puente for continuation of cancer treatment.    Diagnosis: Lupus  Assessment and Plan of Treatment: MRI was negative for myositis. Continue with medications as previously prescribed. Outpatient follow up with Dr. Neri.  Given outpatient APLS labs were positive, you will need close rheum follow up  for consideration of Coumadin. Can consider EMG outpt to evaluate neuropathic symptoms.    Diagnosis: Hypothyroid  Assessment and Plan of Treatment: Continue with synthroid as prescribed. Routine outpatient labwork to monitor thyroid function tests.    Diagnosis: Asthma  Assessment and Plan of Treatment: Continue with inhalers as previously prescribed.

## 2025-03-06 NOTE — DISCHARGE NOTE PROVIDER - PROVIDER TOKENS
PROVIDER:[TOKEN:[[id number]]],PROVIDER:[TOKEN:[[id number]]],PROVIDER:[TOKEN:[[id number]]]

## 2025-03-06 NOTE — DISCHARGE NOTE NURSING/CASE MANAGEMENT/SOCIAL WORK - NSDCVIVACCINE_GEN_ALL_CORE_FT
Tdap; 17-Dec-2018 23:02; Candi Bedolla (ALEKSEY); [employer]; [id number] (Exp. Date: 03-Dec-2020); IntraMuscular; Deltoid Left.; 0.5 milliLiter(s); VIS (VIS Published: 09-May-2013, VIS Presented: 17-Dec-2018);

## 2025-03-06 NOTE — DISCHARGE NOTE NURSING/CASE MANAGEMENT/SOCIAL WORK - PATIENT PORTAL LINK FT
You can access the FollowMyHealth Patient Portal offered by [facility] by registering at the following website: [email or web address]. By joining [employer]’s FollowMyHealth portal, you will also be able to view your health information using other applications (apps) compatible with our system.

## 2025-03-06 NOTE — PROGRESS NOTE ADULT - ASSESSMENT
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 2/20/25.  Pt was admitted to [facility] on 3/1 with progressive SOB/cough. CTA chest with right upper and lower segmental PE. LE dopplers w/ L gastrocnemius DVT, started on Eliquis.    --HEME/ONC--  #LA Gastric Cancer (Follows Dr. Puente at [facility])  #Immunocompromised 2/2 cancer  - Currently on adjuvant FOLFOX (L.D. 2/20)  - Pt to continue outpt fu with Dr. Puente for continuation of systemic cancer tx    #Acute Right upper and lower segmental PE  #Acute LLE DVT  #Hypercoag state  - TTE EF 55-60%, dynamic LV systolic function. No RHS.   - Continuing Eliquis    ---RHEUM---  #hx SLE (with outpt APL with B2G +ve)  - APLS labs: Cardioliopin Ab IgA: 17.7 wnl, Anticardiolipin IgG 1.6 wnl, Anticardiolipin IgM 1.5 wnl, Beta 2 Glycoprotein 1 IgG Ab < 1.4 wnl, Beta 2 Glycoprotein 1 IgG Ab < 1.5 wnl, Beta 2 Glycoprotein 1 IgG Ab 18.4 wnl  - C3 178 wnl, C4 31 wnl  - Continuing Medrol q6mg daily.   - Continuing Hydroxycholroquine  - per Rheum, given outpatient APLS labs were positive, will need close rheum follow up outpatient for consideration of Coumadin. Plan for f/u w/ Dr. Neri    ---RESP---  #hx asthma  #Chronic cough  - Starting Tessalon 200mg q8  - Continuing Hycodan 5ml q6/prn  - Continuing duonebs  - PT eval (for home O2 assessment) pending
49M PMH SLE/myositis overlap, gastric adenocarcinoma on FOLFOX, asthma, hypothyroidism presenting with SOB, found to have PE and DVT. Rheumatology consulted to eval for flare of myositis/SLE.    #SLE/myositis overlap  -Dx 2019, +dsDNA, Sm, RNP, U1RNP, B2GP1 IgA (134)  -Has been on chronic steroids since 2019, most recently on medrol 8mg BID since 12/2024  -Has been on MMF, but stopped due to gastric ca, is on FOLFOX therapy and was given oxaliplatin most recently 2 weeks ago. Admitted with PE and LLE below the leg DVT. Previously, B2GP IgA was positive, repeat B2GP and ACL inpatient is negative. Will obtain atypical APS serologies to further evaluate for APS   -Pt states he feels weaker, with neuropathy in LLE>RLE. CK outpt has been normal  -Will further assess for myositis flare for muscle weakness. Neuropathy less likely to be related to autoimmune disease, possibly 2/2 chemotherapy vs mild hypokalemia. CK normal    Note incomplete  
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  Pt was admitted to [facility] on 3/1 with progressive SOB/cough. CTA chest with right upper and lower segmental PE. LE dopplers w/ L gastrocnemius DVT, started on Eliquis.    ACTIVE PROBLEMS  LA Gastric Adenoca (on adjuvant chemotherapy)  Acute R lung PE, LLE DVT  h/o SLE on steroids, hydroxychloroquine  Generalized weakness  Immunocompromised 2/2 cancer, autoimmune disease, cancer treatment    LA Gastric adenoca   - Currently on adjuvant FOLFOX, due for C5 on 3/6, but no plans for inpt chemo while w/u for SLE flare is in progress  - Pt to continue outpt fu with Dr. Puente for continuation of systemic cancer tx  - Long-term prognosis: guarded; pt is currently full code    Acute Right upper and lower segmental PE  Acute LLE DVT  Hypercoag state  - TTE EF 55-60%, dynamic LV systolic function. No RHS.   - 3/5 B/L UE dopplers negative for DVT- L CV superficial thrombophlebitis noted (warm compress ordered, continuing AC as below)  - Continuing Eliquis 10mg BID x14 days followed by 5mg BID    h/o SLE (with outpt APL with B2G +ve)  - APLS labs: Cardioliopin Ab IgA: 17.7 wnl, Anticardiolipin IgG 1.6 wnl, Anticardiolipin IgM 1.5 wnl, Beta 2 Glycoprotein 1 IgG Ab < 1.4 wnl, Beta 2 Glycoprotein 1 IgG Ab < 1.5 wnl, Beta 2 Glycoprotein 1 IgG Ab 18.4 wnl  - C3 178 wnl, C4 31 wnl  - Continuing Medrol q6mg daily   - Continuing Hydroxycholroquine  - Rheum consulted, appreciate recs   * B/l thigh MRIs ordered to r/o myositis/muscle edema   * Additional labs ordered: CK, UA, urine prot/Cr ratio, vit B12 ordered   * Given outpatient APLS labs were positive, will need close rheum follow up outpatient for consideration of Coumadin; pt to continue outpt Rheum f/u w/ Dr. Neri    h/o Asthma, chronic cough  - Continuing Tessalon 200mg q8  - Continuing Hycodan 5ml q6/prn  - Continuing duonebs  - Appreciate PT eval: no skilled needds
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  Pt was admitted to [facility] on 3/1 with progressive SOB/cough. CTA chest with right upper and lower segmental PE. LE dopplers w/ L gastrocnemius DVT, started on Eliquis.    ACTIVE PROBLEMS  LA Gastric Adenoca (on adjuvant chemotherapy)  Acute R lung PE, LLE DVT  h/o SLE on steroids, hydroxychloroquine  Generalized weakness  Immunocompromised 2/2 cancer, autoimmune disease, cancer treatment    LA Gastric adenoca   - Currently on adjuvant FOLFOX, due for C5 on 3/6, but no plans for inpt chemo while w/u for SLE flare is inprogress  - Pt to continue outpt fu with Dr. Puente for continuation of systemic cancer tx  - Long-term prognosis: guarded; pt is currently full code    Acute Right upper and lower segmental PE  Acute LLE DVT  Hypercoag state  - TTE EF 55-60%, dynamic LV systolic function. No RHS.   - B/L UE dopplers ordered today as pt complained of worsening UE paresthesias  - Continuing Eliquis 10mg BID x14 days followed by 5mg BID    h/o SLE (with outpt APL with B2G +ve)  - APLS labs: Cardioliopin Ab IgA: 17.7 wnl, Anticardiolipin IgG 1.6 wnl, Anticardiolipin IgM 1.5 wnl, Beta 2 Glycoprotein 1 IgG Ab < 1.4 wnl, Beta 2 Glycoprotein 1 IgG Ab < 1.5 wnl, Beta 2 Glycoprotein 1 IgG Ab 18.4 wnl  - C3 178 wnl, C4 31 wnl  - Continuing Medrol q6mg daily   - Continuing Hydroxycholroquine  - Rheum consulted, appreciate recs   * B/l thigh MRIs ordered to r/o myositis/muscle edema   * Additional labs ordered: CK, UA, urine prot/Cr ratio, vit B12 ordered today   * Given outpatient APLS labs were positive, will need close rheum follow up outpatient for consideration of Coumadin. Plan for f/u w/ Dr. Neri    h/o Asthma, chronic cough  - Continuing Tessalon 200mg q8  - Continuing Hycodan 5ml q6/prn  - Continuing duonebs  - PT eval (for home O2 assessment) pending
49M SLE, asthma, hypothyroidism, steroid-induced osteoporosis, DM2, HTN, and gastric CA who p/w SOB, found to have acute PE.

## 2025-03-06 NOTE — PROGRESS NOTE ADULT - SUBJECTIVE AND OBJECTIVE BOX
SOLID TUMOR ONCOLOGY HOSPITALIST PROGRESS NOTE    S: No acute events overnight.  However, pt reported that he woke up this morning feeling worse.  He complained of increased generalized weakness, noting that his legs feel like jelly. He confirmed that he was able to ambulate to the bathroom this am but that he felt very tired after.  He also complained of increased UE numbness/tingling and tightness in his fingertips.  He also complained of HA.  He expressed concern that he lupus may be starting to flare up.    CURRENT MEDICATIONS  MEDICATIONS  (STANDING):  albuterol/ipratropium for Nebulization 3 milliLiter(s) Nebulizer every 12 hours  apixaban 10 milliGRAM(s) Oral every 12 hours  benzocaine/menthol Lozenge 1 Lozenge Oral four times a day  benzonatate 200 milliGRAM(s) Oral three times a day  chlorhexidine 2% Cloths 1 Application(s) Topical daily  hydroxychloroquine 400 milliGRAM(s) Oral at bedtime  influenza   Vaccine 0.5 milliLiter(s) IntraMuscular once  levothyroxine 125 MICROGram(s) Oral daily  losartan 50 milliGRAM(s) Oral daily  methylPREDNISolone 16 milliGRAM(s) Oral daily  metoclopramide 10 milliGRAM(s) Oral two times a day  pantoprazole    Tablet 40 milliGRAM(s) Oral before breakfast  potassium chloride    Tablet ER 40 milliEquivalent(s) Oral once  MEDICATIONS  (PRN):  albuterol    90 MICROgram(s) HFA Inhaler 2 Puff(s) Inhalation every 6 hours PRN Shortness of Breath and/or Wheezing  hydrocodone/homatropine Syrup 5 milliLiter(s) Oral every 6 hours PRN Cough  melatonin 3 milliGRAM(s) Oral at bedtime PRN Insomnia      PHYSICAL EXAM  T(C): 36.7 (03-04-25 @ 12:43), Max: 36.9 (03-03-25 @ 21:43)  HR: 91 (03-04-25 @ 12:43) (74 - 91)  BP: 118/79 (03-04-25 @ 12:43) (118/79 - 133/91)  RR: 16 (03-04-25 @ 12:43) (16 - 18)  SpO2: 99% (03-04-25 @ 12:43) (95% - 99%)  Morbidly obese, young man, sitting up comfortably in chair, nad  Answering all questions appropriately and following commands  Extensive tattoos noted on pt's upper torso  Anicteric sclera, no oral lesion/thrus  RRR, no m/r/g  CTAB, no w/c/r; breaths non-labored  Abd obese, soft/nt/nd, normoactive bowel sounds  No peripheral edema; 4/5 strength and normal ROM in all 4 extremities  CN 2-12 grossly intact; no gross focal neuro deficits; pt moves all extremities spontaneously    LABS                        11.1   6.60  )-----------( 186      ( 04 Mar 2025 05:40 )             32.9     03-04    140  |  103  |  8   ----------------------------<  78  3.4[L]   |  24  |  0.72    Ca    8.7      04 Mar 2025 05:40  Phos  2.6     03-04  Mg     1.90     03-04    TPro  6.4  /  Alb  3.3  /  TBili  0.5  /  DBili  x   /  AST  36  /  ALT  69[H]  /  AlkPhos  103  03-04      ADDITIONAL LABS  TSH 0.46 wnl   C3 178 wnl   C4 31 wnl  APLS labs: Cardioliopin Ab IgA: 17.7 wnl, Anticardiolipin IgG 1.6 wnl, Anticardiolipin IgM 1.5 wnl, Beta 2 Glycoprotein 1 IgG Ab < 1.4 wnl, Beta 2 Glycoprotein 1 IgG Ab < 1.5 wnl, Beta 2 Glycoprotein 1 IgG Ab 18.4 wnl      MICROBIOLOGY  Abx: None    Cx Data  2/28 Covid/flu neg   2/28 UA neg       PERTINENT RADIOLOGY  3/3 TTE: EF 55-60%. Dynamic LV systolic funciton. No pericardil effusion.   3/1 Duplex: Acute left lower extremity DVT, below the knee, involving the left   gastrocnemius vein.  No acute DVT of the right lower extremity.  2/28 CTA: Right upper and lower segmental pulmonary emboli.  2/28 CXR: Clear
SOLID TUMOR ONCOLOGY HOSPITALIST PROGRESS NOTE    S: No acute events overnight.  He reported feeling much better today, with less LE pain/paresthesias.     CURRENT MEDICATIONS  MEDICATIONS  (STANDING):  albuterol/ipratropium for Nebulization 3 milliLiter(s) Nebulizer every 12 hours  apixaban 10 milliGRAM(s) Oral every 12 hours  benzocaine/menthol Lozenge 1 Lozenge Oral four times a day  benzonatate 200 milliGRAM(s) Oral three times a day  chlorhexidine 2% Cloths 1 Application(s) Topical daily  hydroxychloroquine 400 milliGRAM(s) Oral at bedtime  influenza   Vaccine 0.5 milliLiter(s) IntraMuscular once  levothyroxine 125 MICROGram(s) Oral daily  losartan 50 milliGRAM(s) Oral daily  methylPREDNISolone 16 milliGRAM(s) Oral daily  metoclopramide 10 milliGRAM(s) Oral two times a day  pantoprazole    Tablet 40 milliGRAM(s) Oral before breakfast  MEDICATIONS  (PRN):  albuterol    90 MICROgram(s) HFA Inhaler 2 Puff(s) Inhalation every 6 hours PRN Shortness of Breath and/or Wheezing  hydrocodone/homatropine Syrup 5 milliLiter(s) Oral every 6 hours PRN Cough  melatonin 3 milliGRAM(s) Oral at bedtime PRN Insomnia      PHYSICAL EXAM  T(C): 36.7 (03-05-25 @ 05:34), Max: 36.7 (03-05-25 @ 05:34)  HR: 77 (03-05-25 @ 05:34) (77 - 97)  BP: 123/81 (03-05-25 @ 05:34) (123/81 - 143/82)  RR: 17 (03-05-25 @ 05:34) (17 - 18)  SpO2: 95% (03-05-25 @ 05:34) (95% - 95%)  Morbidly obese, young man, sitting up comfortably in chair, nad  Answering all questions appropriately and following commands  Extensive tattoos noted on pt's upper torso  Anicteric sclera, no oral lesion/thrus  RRR, no m/r/g  CTAB, no w/c/r; breaths non-labored  Abd obese, soft/nt/nd, normoactive bowel sounds  No peripheral edema; 4/5 strength and normal ROM in all 4 extremities  CN 2-12 grossly intact; no gross focal neuro deficits; pt moves all extremities spontaneously      LABS                        11.6   6.16  )-----------( 198      ( 05 Mar 2025 06:20 )             35.2     03-05    139  |  102  |  9   ----------------------------<  85  3.6   |  23  |  0.74    Ca    9.0      05 Mar 2025 06:20  Phos  3.0     03-05  Mg     2.10     03-05    TPro  6.4  /  Alb  3.5  /  TBili  0.4  /  DBili  x   /  AST  29  /  ALT  59[H]  /  AlkPhos  102  03-05      ADDITIONAL LABS  TSH 0.46 wnl   C3 178 wnl   C4 31 wnl  APLS labs: Cardioliopin Ab IgA: 17.7 wnl, Anticardiolipin IgG 1.6 wnl, Anticardiolipin IgM 1.5 wnl, Beta 2 Glycoprotein 1 IgG Ab < 1.4 wnl, Beta 2 Glycoprotein 1 IgG Ab < 1.5 wnl, Beta 2 Glycoprotein 1 IgG Ab 18.4 wnl      MICROBIOLOGY  Abx: None    Cx Data  2/28 Covid/flu neg   2/28 UA neg       PERTINENT RADIOLOGY  3/5 B/L UE dopplers   1. No evidence of deep vein thrombosis noted within the bilateral upper extremities.   2. Superficial vein thrombosis noted within the left cephalic vein.  3/3 TTE: EF 55-60%. Dynamic LV systolic funciton. No pericardil effusion.   3/1 Duplex: Acute left lower extremity DVT, below the knee, involving the left   gastrocnemius vein.  No acute DVT of the right lower extremity.  2/28 CTA: Right upper and lower segmental pulmonary emboli.  2/28 CXR: Clear.
AMBERLY ESPITIA  [id number]    INTERVAL HPI/OVERNIGHT EVENTS:    MEDICATIONS  (STANDING):  albuterol/ipratropium for Nebulization 3 milliLiter(s) Nebulizer every 12 hours  apixaban 10 milliGRAM(s) Oral every 12 hours  benzocaine/menthol Lozenge 1 Lozenge Oral four times a day  benzonatate 200 milliGRAM(s) Oral three times a day  chlorhexidine 2% Cloths 1 Application(s) Topical daily  hydroxychloroquine 400 milliGRAM(s) Oral at bedtime  influenza   Vaccine 0.5 milliLiter(s) IntraMuscular once  levothyroxine 125 MICROGram(s) Oral daily  losartan 50 milliGRAM(s) Oral daily  methylPREDNISolone 16 milliGRAM(s) Oral daily  metoclopramide 10 milliGRAM(s) Oral two times a day  pantoprazole    Tablet 40 milliGRAM(s) Oral before breakfast  potassium chloride    Tablet ER 40 milliEquivalent(s) Oral once    MEDICATIONS  (PRN):  albuterol    90 MICROgram(s) HFA Inhaler 2 Puff(s) Inhalation every 6 hours PRN Shortness of Breath and/or Wheezing  hydrocodone/homatropine Syrup 5 milliLiter(s) Oral every 6 hours PRN Cough  melatonin 3 milliGRAM(s) Oral at bedtime PRN Insomnia      Allergies    NSAIDs (Short breath)  Motrin (Short breath)    Intolerances        Review of Systems:   General: No fevers/chills, no fatigue  HEENT: No blurry vision, dysphagia, or odynophagia  CVS: No CP/palpitations  Resp: No SOB/wheezing  GI: No N/V/C/D/abdominal pain  MSK: muscle weakness  Skin: No new rashes  Neuro: No headaches      Vital Signs Last 24 Hrs  T(C): 36.7 (06 Mar 2025 06:08), Max: 36.9 (05 Mar 2025 18:00)  T(F): 98 (06 Mar 2025 06:08), Max: 98.5 (05 Mar 2025 18:00)  HR: 72 (06 Mar 2025 07:33) (72 - 88)  BP: 120/85 (06 Mar 2025 06:08) (111/68 - 136/86)  BP(mean): --  RR: 18 (06 Mar 2025 06:08) (16 - 18)  SpO2: 96% (06 Mar 2025 07:33) (94% - 98%)    Parameters below as of 06 Mar 2025 07:33  Patient On (Oxygen Delivery Method): room air        Physical Exam:  General: NAD  HEENT: EOMI, MMM  Cardio: +S1/S2, RRR  Resp: CTA b/l  GI: +BS, soft, NT/ND  MSK:      LABS:                        12.1   5.44  )-----------( 211      ( 06 Mar 2025 05:25 )             36.1     03-06    137  |  101  |  7   ----------------------------<  87  3.5   |  22  |  0.73    Ca    8.9      06 Mar 2025 05:25  Phos  3.2     03-06  Mg     2.10     03-06    TPro  6.6  /  Alb  3.5  /  TBili  0.5  /  DBili  x   /  AST  30  /  ALT  54[H]  /  AlkPhos  101  03-06      Urinalysis Basic - ( 06 Mar 2025 05:25 )    Color: x / Appearance: x / SG: x / pH: x  Gluc: 87 mg/dL / Ketone: x  / Bili: x / Urobili: x   Blood: x / Protein: x / Nitrite: x   Leuk Esterase: x / RBC: x / WBC x   Sq Epi: x / Non Sq Epi: x / Bacteria: x          RADIOLOGY & ADDITIONAL TESTS:  
SOLID TUMOR ONCOLOGY HOSPITALIST PROGRESS NOTE    S: No acute events overnight.  Pt reported that he feels short of breath whenever he takes a hot shower which requires him to rest for a period of time after the shower so that the shortness of breath resolves. He noted that this has been ongoing since he started chemo, and has not worsened since being diagnosed with PE.  He also reported that his cough is improving, and that he typically takes a cough suppressant regimen of Tessalon and Hycodan at home.    CURRENT MEDICATIONS  MEDICATIONS  (STANDING):  acetaminophen     Tablet .. 975 milliGRAM(s) Oral every 8 hours  albuterol/ipratropium for Nebulization 3 milliLiter(s) Nebulizer every 12 hours  apixaban 10 milliGRAM(s) Oral every 12 hours  benzocaine/menthol Lozenge 1 Lozenge Oral four times a day  benzonatate 200 milliGRAM(s) Oral three times a day  chlorhexidine 2% Cloths 1 Application(s) Topical daily  hydroxychloroquine 400 milliGRAM(s) Oral at bedtime  influenza   Vaccine 0.5 milliLiter(s) IntraMuscular once  levothyroxine 125 MICROGram(s) Oral daily  losartan 50 milliGRAM(s) Oral daily  methylPREDNISolone 16 milliGRAM(s) Oral daily  metoclopramide 10 milliGRAM(s) Oral two times a day  pantoprazole    Tablet 40 milliGRAM(s) Oral before breakfast  MEDICATIONS  (PRN):  albuterol    90 MICROgram(s) HFA Inhaler 2 Puff(s) Inhalation every 6 hours PRN Shortness of Breath and/or Wheezing  hydrocodone/homatropine Syrup 5 milliLiter(s) Oral every 6 hours PRN Cough  melatonin 3 milliGRAM(s) Oral at bedtime PRN Insomnia      PHYSICAL EXAM  T(C): 36.9 (03-03-25 @ 21:43), Max: 36.9 (03-03-25 @ 21:43)  HR: 81 (03-03-25 @ 21:43) (74 - 91)  BP: 125/82 (03-03-25 @ 21:43) (125/82 - 146/74)  RR: 18 (03-03-25 @ 21:43) (18 - 18)  SpO2: 97% (03-03-25 @ 21:43) (95% - 99%)    03-02-25 @ 07:01  -  03-03-25 @ 07:00  --------------------------------------------------------  IN: 550 mL / OUT: 150 mL / NET: 400 mL  Morbidly obese, young man, sitting up comfortably in chair, nad  Answering all questions appropriately and following commands  Extensive tattoos noted on pt's upper torso  Anicteric sclera, no oral lesion/thrus  RRR, no m/r/g  CTAB, no w/c/r; breaths non-labored  Abd obese, soft/nt/nd, normoactive bowel sounds  No peripheral edema  CN 2-12 grossly intact; no gross focal neuro deficits      LABS                        11.3   9.03  )-----------( 190      ( 03 Mar 2025 05:50 )             34.1     03-03    141  |  105  |  8   ----------------------------<  94  3.7   |  23  |  0.72    Ca    8.8      03 Mar 2025 05:50  Phos  2.3     03-03  Mg     1.90     03-03    TPro  6.4  /  Alb  3.4  /  TBili  0.3  /  DBili  x   /  AST  41[H]  /  ALT  74[H]  /  AlkPhos  101  03-03      ADDITIONAL LABS  TSH 0.46 wnl   C3 178 wnl   C4 31 wnl  APLS labs: Cardioliopin Ab IgA: 17.7 wnl, Anticardiolipin IgG 1.6 wnl, Anticardiolipin IgM 1.5 wnl, Beta 2 Glycoprotein 1 IgG Ab < 1.4 wnl, Beta 2 Glycoprotein 1 IgG Ab < 1.5 wnl, Beta 2 Glycoprotein 1 IgG Ab 18.4 wnl      MICROBIOLOGY  Abx: None    Cx Data  2/28 Covid/flu neg   2/28 UA neg       PERTINENT RADIOLOGY  3/3 TTE: EF 55-60%. Dynamic LV systolic funciton. No pericardil effusion.   3/1 Duplex: Acute left lower extremity DVT, below the knee, involving the left   gastrocnemius vein.  No acute DVT of the right lower extremity.  2/28 CTA: Right upper and lower segmental pulmonary emboli.  2/28 CXR: Clear
O/N Events: naeo    Subjective/ROS: Patient seen and examined at bedside.     Denies Fever/Chills, HA, CP, SOB, n/v, changes in bowel/urinary habits.  12pt ROS otherwise negative.    VITALS  Vital Signs Last 24 Hrs  T(C): 36.3 (02 Mar 2025 05:10), Max: 36.6 (01 Mar 2025 11:42)  T(F): 97.3 (02 Mar 2025 05:10), Max: 97.9 (01 Mar 2025 11:42)  HR: 81 (02 Mar 2025 05:35) (80 - 92)  BP: 131/87 (02 Mar 2025 05:10) (119/84 - 157/90)  BP(mean): --  RR: 18 (02 Mar 2025 05:10) (17 - 20)  SpO2: 99% (02 Mar 2025 05:35) (94% - 99%)    Parameters below as of 02 Mar 2025 05:35  Patient On (Oxygen Delivery Method): room air        CAPILLARY BLOOD GLUCOSE      POCT Blood Glucose.: 170 mg/dL (01 Mar 2025 21:29)  POCT Blood Glucose.: 193 mg/dL (01 Mar 2025 17:02)      PHYSICAL EXAM  General: NAD  Head: NC/AT; MMM; PERRL; EOMI;  Neck: Supple; no JVD  Respiratory: CTAB; no wheezes/rales/rhonchi  Cardiovascular: Regular rhythm/rate; S1/S2+, no murmurs, rubs gallops   Gastrointestinal: Soft; NTND; bowel sounds normal and present  Extremities: WWP; no edema/cyanosis  Neurological: A&Ox3, CNII-XII grossly intact; no obvious focal deficits    MEDICATIONS  (STANDING):  albuterol/ipratropium for Nebulization 3 milliLiter(s) Nebulizer every 6 hours  apixaban 10 milliGRAM(s) Oral every 12 hours  hydroxychloroquine 400 milliGRAM(s) Oral at bedtime  influenza   Vaccine 0.5 milliLiter(s) IntraMuscular once  levothyroxine 125 MICROGram(s) Oral daily  losartan 50 milliGRAM(s) Oral daily  methylPREDNISolone 16 milliGRAM(s) Oral daily  metoclopramide 10 milliGRAM(s) Oral two times a day  pantoprazole    Tablet 40 milliGRAM(s) Oral before breakfast    MEDICATIONS  (PRN):  albuterol    90 MICROgram(s) HFA Inhaler 2 Puff(s) Inhalation every 6 hours PRN Shortness of Breath and/or Wheezing      NSAIDs (Short breath)  Motrin (Short breath)      LABS                        12.0   11.39 )-----------( 222      ( 02 Mar 2025 05:28 )             36.5     03-02    141  |  101  |  8   ----------------------------<  123[H]  3.3[L]   |  22  |  0.78    Ca    9.4      02 Mar 2025 05:28  Phos  3.4     03-02  Mg     2.00     03-02    TPro  6.7  /  Alb  3.7  /  TBili  0.3  /  DBili  x   /  AST  44[H]  /  ALT  87[H]  /  AlkPhos  111  03-02    PT/INR - ( 28 Feb 2025 14:28 )   PT: 11.9 sec;   INR: 1.00 ratio         PTT - ( 28 Feb 2025 14:28 )  PTT:30.9 sec  Urinalysis Basic - ( 02 Mar 2025 05:28 )    Color: x / Appearance: x / SG: x / pH: x  Gluc: 123 mg/dL / Ketone: x  / Bili: x / Urobili: x   Blood: x / Protein: x / Nitrite: x   Leuk Esterase: x / RBC: x / WBC x   Sq Epi: x / Non Sq Epi: x / Bacteria: x              IMAGING/EKG/ETC

## 2025-03-06 NOTE — DISCHARGE NOTE PROVIDER - HOSPITAL COURSE
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 2/20. Admitted for SOB/cough. CTA chest with right upper and lower segmental PE. LE dopplers w/ L gastrocnemius DVT, started on Eliquis. TTE EF 55-60%, dynamic LV systolic function. No RHS. C/c/b LE muscle weakness and extremitiy neuropathy c/f lupus flare, rheum consulted. MRI b/l thigh w/o myositis, Interval small subacute grade 2 strain at the distal myotendinous junction of left gluteus mala.    LA Gastric adenoca   - Currently on adjuvant FOLFOX, due for C5 on 3/6, but no plans for inpt chemo while w/u for SLE flare is in progress. Outpatient follow up   - Pt to continue outpt fu with Dr. Puente for continuation of systemic cancer tx  - Long-term prognosis: guarded; pt is currently full code    Acute Right upper and lower segmental PE  Acute LLE DVT  Hypercoag state  - 3/1 Duplex: Acute left lower extremity DVT, below the knee, involving the left gastrocnemius vein. No acute DVT of the right lower extremity.  - TTE EF 55-60%, dynamic LV systolic function. No RHS.   - 3/5 B/L UE dopplers negative for DVT- L CV superficial thrombophlebitis noted (warm compress ordered, continuing AC as below)  - Eliquis 10mg BID x14 days followed by 5mg BID    h/o SLE (with outpt APL with B2G +ve)  - APLS labs: Cardioliopin Ab IgA: 17.7 wnl, Anticardiolipin IgG 1.6 wnl, Anticardiolipin IgM 1.5 wnl, Beta 2 Glycoprotein 1 IgG Ab < 1.4 wnl, Beta 2 Glycoprotein 1 IgG Ab < 1.5 wnl, Beta 2 Glycoprotein 1 IgG Ab 18.4 wnl  - C3 178 wnl, C4 31 wnl  - Continuing Medrol q16mg daily   - Continuing Hydroxycholroquine  - Rheum consulted   * B/l thigh MRI: LE extremity 1.  Interval small subacute grade 2 strain at the distal myotendinous junction of left gluteus mala. 2.  No acute myositis of bilateral hip girdle and    proximal thigh musculature.   * Additional labs ordered: CK wnl, UA neg, urine prot/Cr ratio wnl, vit B12 wnl   * Given outpatient APLS labs were positive, will need close rheum follow up outpatient for consideration of Coumadin; pt to continue outpt Rheum f/u w/ Dr. 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 2/20. Admitted for SOB/cough. CTA chest with right upper and lower segmental PE. LE dopplers w/ L gastrocnemius DVT, started on Eliquis. TTE EF 55-60%, dynamic LV systolic function. No RHS. C/c/b LE muscle weakness and extremitiy neuropathy c/f lupus flare, rheum consulted. MRI b/l thigh w/o myositis, Interval small subacute grade 2 strain at the distal myotendinous junction of left gluteus mala.    LA Gastric adenoca   - Currently on adjuvant FOLFOX, due for C5 on 3/6, but no plans for inpt chemo while w/u for SLE flare is in progress. Outpatient follow up   - Pt to continue outpt fu with Dr. Puente for continuation of systemic cancer tx  - Long-term prognosis: guarded; pt is currently full code    Acute Right upper and lower segmental PE  Acute LLE DVT  Hypercoag state  - 2/28 CTA: Right upper and lower segmental pulmonary emboli.  - 3/1 Duplex: Acute left lower extremity DVT, below the knee, involving the left gastrocnemius vein. No acute DVT of the right lower extremity.  - TTE EF 55-60%, dynamic LV systolic function. No RHS.   - 3/5 B/L UE dopplers negative for DVT- L CV superficial thrombophlebitis noted (warm compress ordered, continuing AC as below)  - Eliquis 10mg BID x14 days followed by 5mg BID    h/o SLE (with outpt APL with B2G +ve)  - APLS labs: Cardioliopin Ab IgA: 17.7 wnl, Anticardiolipin IgG 1.6 wnl, Anticardiolipin IgM 1.5 wnl, Beta 2 Glycoprotein 1 IgG Ab < 1.4 wnl, Beta 2 Glycoprotein 1 IgG Ab < 1.5 wnl, Beta 2 Glycoprotein 1 IgG Ab 18.4 wnl  - C3 178 wnl, C4 31 wnl  - Continuing Medrol q16mg daily   - Continuing Hydroxycholroquine  - Rheum consulted   * B/l thigh MRI: LE extremity 1.  Interval small subacute grade 2 strain at the distal myotendinous junction of left gluteus mala. 2.  No acute myositis of bilateral hip girdle and    proximal thigh musculature.   * Additional labs ordered: CK wnl, UA neg, urine prot/Cr ratio wnl, vit B12 wnl   * Given outpatient APLS labs were positive, will need close rheum follow up outpatient for consideration of Coumadin; pt to continue outpt Rheum f/u w/ Dr. Neri    h/o Asthma, chronic cough  - Tessalon 200mg q8  - Hycodan 5ml q6/prn  - duonebs prn  - PT eval: no skilled needds   50 yo man with h/o HTN, DM2, Hypothyroidism, Asthma, h/o steroid-induced osteoporosis c/b compression fx T7, SLE (on steroids, Hydroxychloroquine) a/w myositis, h/o H pylori, and locally-advanced gastric ca w/ signet ring cell features > dx in 6/2024, pMMR, Her2 neg, PD-L1 CPS 15; s/p robotic distal subtotal gastrectomy/RNY 8/2024 (pT2N0, 0/24 LNs +, negative margins; post op course c/b R abd fluid collection for which pt completed course of Levaquin/Amoxicillin) f/b adjuvant XELOX (poorly tolerated) > most recently on adjuvant FOLFOX- started 1/2/25, s/p C4 on 2/20/25 (tx course c/b Oxaliplatin induced peripheral neuropathy for which pt was started on Gabapentin).  Pt was admitted to [facility] on 3/1 with progressive SOB/cough. CTA chest with right upper and lower segmental PE. LE  CTA chest with right upper and lower segmental PE. LE dopplers w/ L gastrocnemius DVT, started on Eliquis. TTE EF 55-60%, dynamic LV systolic function. No RHS. C/c/b LE muscle weakness and extremitiy neuropathy c/f lupus flare, rheum consulted. MRI b/l thigh w/o myositis, Interval small subacute grade 2 strain at the distal myotendinous junction of left gluteus mala.    LA Gastric adenoca   - Currently on adjuvant FOLFOX, due for C5 on 3/6, but no plans for inpt chemo while w/u for SLE flare is in progress. Outpatient follow up   - Pt to continue outpt fu with Dr. Puente for continuation of systemic cancer tx  - Long-term prognosis: guarded; pt is currently full code    Acute Right upper and lower segmental PE  Acute LLE DVT  Hypercoag state  - 2/28 CTA: Right upper and lower segmental pulmonary emboli.  - 3/1 Duplex: Acute left lower extremity DVT, below the knee, involving the left gastrocnemius vein. No acute DVT of the right lower extremity.  - TTE EF 55-60%, dynamic LV systolic function. No RHS.   - 3/5 B/L UE dopplers negative for DVT- L CV superficial thrombophlebitis noted (warm compress ordered, continuing AC as below)  - Eliquis 10mg BID x14 days followed by 5mg BID    h/o SLE (with outpt APL with B2G +ve)  - APLS labs: Cardioliopin Ab IgA: 17.7 wnl, Anticardiolipin IgG 1.6 wnl, Anticardiolipin IgM 1.5 wnl, Beta 2 Glycoprotein 1 IgG Ab < 1.4 wnl, Beta 2 Glycoprotein 1 IgG Ab < 1.5 wnl, Beta 2 Glycoprotein 1 IgG Ab 18.4 wnl  - C3 178 wnl, C4 31 wnl  - Cont Medrol 8mg BID  - Cont Hydroxycholroquine  - Rheum consulted   * B/l thigh MRI: LE extremity 1.  Interval small subacute grade 2 strain at the distal myotendinous junction of left gluteus mala. 2.  No acute myositis of bilateral hip girdle and    proximal thigh musculature.   * Additional labs ordered: CK wnl, UA neg, urine prot/Cr ratio wnl, vit B12 wnl   * Given outpatient APLS labs were positive, will need close rheum follow up outpatient for consideration of Coumadin;    * Pt to continue outpt Rheum f/u w/ Dr. Neri    h/o Asthma, chronic cough  - Cont Tessalon 200mg q8  - Cont Hycodan 5ml q6/prn  - duonebs prn  - PT eval: no skilled needs

## 2025-03-06 NOTE — DISCHARGE NOTE PROVIDER - CARE PROVIDER_API CALL
Pritesh Everett  Internal Medicine  [address], [address]  [place] [address]  Phone: [phone]  Fax: [phone]  Follow Up Time:     Susanna Neri  Rheumatology  [address], [address]  [place] [address]  Phone: [phone]  Fax: [phone]  Follow Up Time:     Marquis Puente  Medical Oncology  [address] [place] [address]  Phone: [phone]  Fax: [phone]  Follow Up Time:

## 2025-03-06 NOTE — DISCHARGE NOTE PROVIDER - NSDCMRMEDTOKEN_GEN_ALL_CORE_FT
Albuterol: 2.5 inhaled every 6 hours as needed for  bronchospasm  ALPRAZolam 0.5 mg oral tablet: 1 tab(s) orally once a day as needed for  anxiety  Eliquis Starter Pack for Treatment of DVT and PE 5 mg oral tablet: 1 tab(s) orally 2 times a day Take 2 tablets twice a day for 3 days, then 1 tablet two times a day  hydroxychloroquine 200 mg oral tablet: 2 tab(s) orally once a day (at bedtime)  ipratropium-albuterol 0.5 mg-2.5 mg/3 mL inhalation solution: 3 milliliter(s) by nebulizer every 6 hours as needed for  bronchospasm  levothyroxine 125 mcg (0.125 mg) oral tablet: 1 tab(s) orally once a day  losartan 50 mg oral tablet: 1 tab(s) orally once a day  Medrol 8 mg oral tablet: 1 tab(s) orally 2 times a day  menthol-benzocaine: 1 lozenge orally 4 times a day as needed for sore throat/cough  pantoprazole 40 mg oral delayed release tablet: 1 tab(s) orally once a day (before a meal)  Potassium Chloride (Eqv-K-Tab) 20 mEq oral tablet, extended release: 2 tab(s) orally once a day (at bedtime)  torsemide 10 mg oral tablet: 2 tab(s) orally once a week as needed for prn for swelling/pedal edema  Trelegy Ellipta 100 mcg-62.5 mcg-25 mcg/inh inhalation powder: 1 puff(s) inhaled once a day (in the morning)

## 2025-03-06 NOTE — CHART NOTE - NSCHARTNOTEFT_GEN_A_CORE
Rheumatology following this pt for hx of SLE/myositis overlap. Pt is a 49M PMH SLE/myositis overlap, gastric adenocarcinoma on FOLFOX, asthma, hypothyroidism presenting with SOB, found to have PE and DVT. Rheumatology consulted to isra for flare of myositis/SLE.    #SLE/myositis overlap  -Dx 2019, +dsDNA, Sm, RNP, U1RNP, B2GP1 IgA (134)  -Has been on chronic steroids since 2019, most recently on medrol 8mg BID since 12/2024  -Has been on MMF, but stopped due to gastric ca, is on FOLFOX therapy and was given oxaliplatin most recently 2 weeks ago. Admitted with PE and LLE below the leg DVT. Previously, B2GP IgA was positive, repeat B2GP and ACL inpatient is negative. Will obtain atypical APS serologies to further evaluate for APS   -Pt states he feels weaker, with neuropathy in LLE>RLE. CK outpt has been normal, inpatient normal at 63  -MR thigh does not show myositis, has grade 2 muscle strain distal myotendionsus of L gluteus mala. Neuropathy less likely to be related to autoimmune disease, possibly 2/2 chemotherapy vs mild hypokalemia    No objection to discharge from rheum perspective. Can c/w medrol 8mg bid. Pt to follow up with Dr Halle ritter    Cased discussed with Dr Damaris Cotton MD  Rheumatology Fellow Rheumatology following this pt for hx of SLE/myositis overlap. Pt is a 49M PMH SLE/myositis overlap, gastric adenocarcinoma on FOLFOX, asthma, hypothyroidism presenting with SOB, found to have PE and DVT. Rheumatology consulted to selinaal for flare of myositis/SLE.    #SLE/myositis overlap  -Dx 2019, +dsDNA, Sm, RNP, U1RNP, B2GP1 IgA (134)  -Has been on chronic steroids since 2019, most recently on medrol 8mg BID since 12/2024  -Has been on MMF, but stopped due to gastric ca, is on FOLFOX therapy and was given oxaliplatin most recently 2 weeks ago. Admitted with PE and LLE below the leg DVT. Previously, B2GP IgA was positive, repeat B2GP and ACL inpatient is negative. Will obtain atypical APS serologies to further evaluate for APS   -Pt states he feels weaker, with neuropathy in LLE>RLE. CK outpt has been normal, inpatient normal at 63  -MR thigh does not show myositis, has grade 2 muscle strain distal myotendionsus of L gluteus mala. Neuropathy less likely to be related to autoimmune disease, possibly 2/2 chemotherapy vs mild hypokalemia    Recommendations:  -No objection to discharge from rheum perspective. Can c/w medrol 8mg bid  -Need to f/u atypical APS labs  -Pt to follow up with Dr Halle ritter    Cased discussed with Dr Damaris Cotton MD  Rheumatology Fellow

## 2025-03-06 NOTE — DISCHARGE NOTE PROVIDER - ATTENDING DISCHARGE PHYSICAL EXAMINATION:
Vital Signs Last 24 Hrs  T(C): 36.8 (06 Mar 2025 14:00), Max: 36.9 (05 Mar 2025 18:00)  T(F): 98.2 (06 Mar 2025 14:00), Max: 98.5 (05 Mar 2025 18:00)  HR: 74 (06 Mar 2025 14:00) (68 - 88)  BP: 135/76 (06 Mar 2025 14:00) (111/68 - 136/86)  RR: 18 (06 Mar 2025 14:00) (17 - 18)  SpO2: 100% (06 Mar 2025 14:00) (94% - 100%)  Parameters below as of 06 Mar 2025 14:00  Patient On (Oxygen Delivery Method): room air  Morbidly obese, young man, sitting up comfortably in chair, nad  Answering all questions appropriately and following commands  Extensive tattoos noted on pt's upper torso  Anicteric sclera, no oral lesions/thrush  RRR, no m/r/g  CTAB, no w/c/r; breaths non-labored  Abd obese, soft/nt/nd, normoactive bowel sounds  No peripheral edema; 4/5 strength and normal ROM in all 4 extremities  CN 2-12 grossly intact; no gross focal neuro deficits; pt moves all extremities spontaneously

## 2025-03-07 ENCOUNTER — TRANSCRIPTION ENCOUNTER (OUTPATIENT)
Age: 50
End: 2025-03-07

## 2025-03-10 ENCOUNTER — APPOINTMENT (OUTPATIENT)
Dept: PULMONOLOGY | Facility: CLINIC | Age: 50
End: 2025-03-10

## 2025-03-11 ENCOUNTER — TRANSCRIPTION ENCOUNTER (OUTPATIENT)
Age: 50
End: 2025-03-11

## 2025-03-12 ENCOUNTER — TRANSCRIPTION ENCOUNTER (OUTPATIENT)
Age: 50
End: 2025-03-12

## 2025-03-14 LAB
ANTI-PHOSPHATIDYLETHANOLAMINE IGA: 81.8 U/ML — HIGH
ANTI-PHOSPHATIDYLETHANOLAMINE IGG: 1.8 U/ML — SIGNIFICANT CHANGE UP
ANTI-PHOSPHATIDYLETHANOLAMINE IGM: 13.9 U/ML — HIGH

## 2025-03-18 ENCOUNTER — RESULT REVIEW (OUTPATIENT)
Age: 50
End: 2025-03-18

## 2025-03-18 ENCOUNTER — APPOINTMENT (OUTPATIENT)
Dept: INFUSION THERAPY | Facility: HOSPITAL | Age: 50
End: 2025-03-18

## 2025-03-18 ENCOUNTER — APPOINTMENT (OUTPATIENT)
Dept: HEMATOLOGY ONCOLOGY | Facility: CLINIC | Age: 50
End: 2025-03-18
Payer: COMMERCIAL

## 2025-03-18 DIAGNOSIS — C16.9 MALIGNANT NEOPLASM OF STOMACH, UNSPECIFIED: ICD-10-CM

## 2025-03-18 LAB
ALBUMIN SERPL ELPH-MCNC: 3.9 G/DL — SIGNIFICANT CHANGE UP (ref 3.3–5)
ALP SERPL-CCNC: 106 U/L — SIGNIFICANT CHANGE UP (ref 40–120)
ALT FLD-CCNC: 42 U/L — SIGNIFICANT CHANGE UP (ref 10–45)
ANION GAP SERPL CALC-SCNC: 14 MMOL/L — SIGNIFICANT CHANGE UP (ref 5–17)
AST SERPL-CCNC: 29 U/L — SIGNIFICANT CHANGE UP (ref 10–40)
BASOPHILS # BLD AUTO: 0 K/UL — SIGNIFICANT CHANGE UP (ref 0–0.2)
BASOPHILS NFR BLD AUTO: 0 % — SIGNIFICANT CHANGE UP (ref 0–2)
BILIRUB SERPL-MCNC: 0.3 MG/DL — SIGNIFICANT CHANGE UP (ref 0.2–1.2)
BUN SERPL-MCNC: 9 MG/DL — SIGNIFICANT CHANGE UP (ref 7–23)
CALCIUM SERPL-MCNC: 9.7 MG/DL — SIGNIFICANT CHANGE UP (ref 8.4–10.5)
CHLORIDE SERPL-SCNC: 105 MMOL/L — SIGNIFICANT CHANGE UP (ref 96–108)
CO2 SERPL-SCNC: 26 MMOL/L — SIGNIFICANT CHANGE UP (ref 22–31)
CREAT SERPL-MCNC: 0.71 MG/DL — SIGNIFICANT CHANGE UP (ref 0.5–1.3)
CRP SERPL-MCNC: <3 MG/L — SIGNIFICANT CHANGE UP
DACRYOCYTES BLD QL SMEAR: SLIGHT — SIGNIFICANT CHANGE UP
EGFR: 113 ML/MIN/1.73M2 — SIGNIFICANT CHANGE UP
EGFR: 113 ML/MIN/1.73M2 — SIGNIFICANT CHANGE UP
ELLIPTOCYTES BLD QL SMEAR: SLIGHT — SIGNIFICANT CHANGE UP
EOSINOPHIL # BLD AUTO: 0 K/UL — SIGNIFICANT CHANGE UP (ref 0–0.5)
EOSINOPHIL NFR BLD AUTO: 0 % — SIGNIFICANT CHANGE UP (ref 0–6)
GLUCOSE SERPL-MCNC: 70 MG/DL — SIGNIFICANT CHANGE UP (ref 70–99)
HCT VFR BLD CALC: 39.5 % — SIGNIFICANT CHANGE UP (ref 39–50)
HGB BLD-MCNC: 13.3 G/DL — SIGNIFICANT CHANGE UP (ref 13–17)
LDH SERPL L TO P-CCNC: 437 U/L — HIGH (ref 50–242)
LYMPHOCYTES # BLD AUTO: 20 % — SIGNIFICANT CHANGE UP (ref 13–44)
LYMPHOCYTES # BLD AUTO: 3.27 K/UL — SIGNIFICANT CHANGE UP (ref 1–3.3)
MAGNESIUM SERPL-MCNC: 2.2 MG/DL — SIGNIFICANT CHANGE UP (ref 1.6–2.6)
MCHC RBC-ENTMCNC: 29 PG — SIGNIFICANT CHANGE UP (ref 27–34)
MCHC RBC-ENTMCNC: 33.7 G/DL — SIGNIFICANT CHANGE UP (ref 32–36)
MCV RBC AUTO: 86.2 FL — SIGNIFICANT CHANGE UP (ref 80–100)
MONOCYTES # BLD AUTO: 0.65 K/UL — SIGNIFICANT CHANGE UP (ref 0–0.9)
MONOCYTES NFR BLD AUTO: 4 % — SIGNIFICANT CHANGE UP (ref 2–14)
MYELOCYTES NFR BLD: 1 % — HIGH (ref 0–0)
NEUTROPHILS # BLD AUTO: 12.28 K/UL — HIGH (ref 1.8–7.4)
NEUTROPHILS NFR BLD AUTO: 75 % — SIGNIFICANT CHANGE UP (ref 43–77)
NRBC # BLD: 1 /100 WBCS — HIGH (ref 0–0)
NRBC BLD AUTO-RTO: SIGNIFICANT CHANGE UP /100 WBCS (ref 0–0)
NRBC BLD-RTO: 1 /100 WBCS — HIGH (ref 0–0)
PLAT MORPH BLD: NORMAL — SIGNIFICANT CHANGE UP
PLATELET # BLD AUTO: 228 K/UL — SIGNIFICANT CHANGE UP (ref 150–400)
POIKILOCYTOSIS BLD QL AUTO: SLIGHT — SIGNIFICANT CHANGE UP
POLYCHROMASIA BLD QL SMEAR: SLIGHT — SIGNIFICANT CHANGE UP
POTASSIUM SERPL-MCNC: 3.6 MMOL/L — SIGNIFICANT CHANGE UP (ref 3.5–5.3)
POTASSIUM SERPL-SCNC: 3.6 MMOL/L — SIGNIFICANT CHANGE UP (ref 3.5–5.3)
PROT SERPL-MCNC: 7.3 G/DL — SIGNIFICANT CHANGE UP (ref 6–8.3)
RBC # BLD: 4.58 M/UL — SIGNIFICANT CHANGE UP (ref 4.2–5.8)
RBC # FLD: 19.4 % — HIGH (ref 10.3–14.5)
RBC BLD AUTO: ABNORMAL
SODIUM SERPL-SCNC: 145 MMOL/L — SIGNIFICANT CHANGE UP (ref 135–145)
WBC # BLD: 16.37 K/UL — HIGH (ref 3.8–10.5)
WBC # FLD AUTO: 16.37 K/UL — HIGH (ref 3.8–10.5)

## 2025-03-18 PROCEDURE — 99214 OFFICE O/P EST MOD 30 MIN: CPT

## 2025-03-18 PROCEDURE — G2211 COMPLEX E/M VISIT ADD ON: CPT | Mod: NC

## 2025-03-19 LAB
PS-PROTHROM CMPLX IGG SERPL IA-ACNC: <10 UNITS — SIGNIFICANT CHANGE UP
PS-PROTHROM CMPLX IGM SERPL IA-ACNC: 16 UNITS — SIGNIFICANT CHANGE UP

## 2025-03-20 ENCOUNTER — APPOINTMENT (OUTPATIENT)
Dept: INFUSION THERAPY | Facility: HOSPITAL | Age: 50
End: 2025-03-20

## 2025-03-20 ENCOUNTER — APPOINTMENT (OUTPATIENT)
Dept: INTERNAL MEDICINE | Facility: CLINIC | Age: 50
End: 2025-03-20

## 2025-03-20 VITALS
HEIGHT: 68 IN | DIASTOLIC BLOOD PRESSURE: 83 MMHG | WEIGHT: 242.5 LBS | SYSTOLIC BLOOD PRESSURE: 135 MMHG | BODY MASS INDEX: 36.75 KG/M2 | HEART RATE: 113 BPM | RESPIRATION RATE: 16 BRPM | TEMPERATURE: 98 F | OXYGEN SATURATION: 97 %

## 2025-03-20 PROCEDURE — 99214 OFFICE O/P EST MOD 30 MIN: CPT

## 2025-03-24 NOTE — PATIENT PROFILE ADULT - FALL HARM RISK - FALL HARM RISK
Detail Level: Zone Initiate Treatment: 5FU Apply a thin layer to affected areas on nose once daily x 3 weeks Render In Strict Bullet Format?: No No indicators present

## 2025-03-25 ENCOUNTER — APPOINTMENT (OUTPATIENT)
Dept: RHEUMATOLOGY | Facility: CLINIC | Age: 50
End: 2025-03-25
Payer: COMMERCIAL

## 2025-03-25 VITALS
SYSTOLIC BLOOD PRESSURE: 139 MMHG | WEIGHT: 241 LBS | BODY MASS INDEX: 36.53 KG/M2 | OXYGEN SATURATION: 97 % | DIASTOLIC BLOOD PRESSURE: 87 MMHG | HEIGHT: 68 IN | HEART RATE: 119 BPM

## 2025-03-25 DIAGNOSIS — M85.80 OTHER SPECIFIED DISORDERS OF BONE DENSITY AND STRUCTURE, UNSPECIFIED SITE: ICD-10-CM

## 2025-03-25 PROCEDURE — 99215 OFFICE O/P EST HI 40 MIN: CPT

## 2025-03-25 PROCEDURE — G2211 COMPLEX E/M VISIT ADD ON: CPT | Mod: NC

## 2025-03-26 ENCOUNTER — NON-APPOINTMENT (OUTPATIENT)
Age: 50
End: 2025-03-26

## 2025-03-26 DIAGNOSIS — R76.0 RAISED ANTIBODY TITER: ICD-10-CM

## 2025-03-26 PROBLEM — I26.99 PULMONARY EMBOLISM: Status: ACTIVE | Noted: 2025-03-26

## 2025-03-26 PROBLEM — I82.409 DEEP VEIN THROMBOSIS: Status: ACTIVE | Noted: 2025-03-26

## 2025-03-26 RX ORDER — ENOXAPARIN SODIUM 150 MG/ML
150 INJECTION, SOLUTION SUBCUTANEOUS
Qty: 30 | Refills: 0 | Status: ACTIVE | COMMUNITY
Start: 2025-03-26 | End: 1900-01-01

## 2025-03-26 RX ORDER — WARFARIN 5 MG/1
5 TABLET ORAL
Qty: 30 | Refills: 0 | Status: ACTIVE | COMMUNITY
Start: 2025-03-26 | End: 1900-01-01

## 2025-03-26 NOTE — PROGRESS NOTE ADULT - SUBJECTIVE AND OBJECTIVE BOX
Patient is a 47y old  Male who presents with a chief complaint of cough, SOB (03 Jul 2022 15:22)        SUBJECTIVE / OVERNIGHT EVENTS: had coughing fit again today.      MEDICATIONS  (STANDING):  albuterol/ipratropium for Nebulization 3 milliLiter(s) Nebulizer every 4 hours  aspirin enteric coated 81 milliGRAM(s) Oral daily  azithromycin   Tablet 500 milliGRAM(s) Oral daily  benzonatate 100 milliGRAM(s) Oral three times a day  buDESOnide    Inhalation Suspension 0.25 milliGRAM(s) Inhalation every 12 hours  budesonide 160 MICROgram(s)/formoterol 4.5 MICROgram(s) Inhaler 2 Puff(s) Inhalation two times a day  dextrose 5%. 1000 milliLiter(s) (50 mL/Hr) IV Continuous <Continuous>  dextrose 5%. 1000 milliLiter(s) (100 mL/Hr) IV Continuous <Continuous>  dextrose 50% Injectable 25 Gram(s) IV Push once  dextrose 50% Injectable 12.5 Gram(s) IV Push once  dextrose 50% Injectable 25 Gram(s) IV Push once  enoxaparin Injectable 40 milliGRAM(s) SubCutaneous every 12 hours  fluticasone propionate 50 MICROgram(s)/spray Nasal Spray 2 Spray(s) Both Nostrils two times a day  furosemide    Tablet 60 milliGRAM(s) Oral daily  glucagon  Injectable 1 milliGRAM(s) IntraMuscular once  hydroxychloroquine 400 milliGRAM(s) Oral daily  insulin glargine Injectable (LANTUS) 16 Unit(s) SubCutaneous at bedtime  insulin lispro (ADMELOG) corrective regimen sliding scale   SubCutaneous three times a day before meals  insulin lispro (ADMELOG) corrective regimen sliding scale   SubCutaneous at bedtime  insulin lispro Injectable (ADMELOG) 4 Unit(s) SubCutaneous three times a day with meals  levothyroxine 125 MICROGram(s) Oral daily  lidocaine   4% Patch 1 Patch Transdermal daily  losartan 50 milliGRAM(s) Oral daily  methylPREDNISolone sodium succinate Injectable 40 milliGRAM(s) IV Push every 12 hours  pantoprazole    Tablet 40 milliGRAM(s) Oral before breakfast  tiotropium 18 MICROgram(s) Capsule 1 Capsule(s) Inhalation daily  trimethoprim  160 mG/sulfamethoxazole 800 mG 1 Tablet(s) Oral <User Schedule>    MEDICATIONS  (PRN):  acetaminophen     Tablet .. 650 milliGRAM(s) Oral every 6 hours PRN Temp greater or equal to 38C (100.4F), Mild Pain (1 - 3)  ALPRAZolam 0.5 milliGRAM(s) Oral three times a day PRN anxiety  aluminum hydroxide/magnesium hydroxide/simethicone Suspension 30 milliLiter(s) Oral every 4 hours PRN Dyspepsia  cyclobenzaprine 5 milliGRAM(s) Oral three times a day PRN Muscle Spasm  dextrose Oral Gel 15 Gram(s) Oral once PRN Blood Glucose LESS THAN 70 milliGRAM(s)/deciliter  hydrocodone/homatropine Syrup 10 milliLiter(s) Oral every 6 hours PRN Cough  melatonin 3 milliGRAM(s) Oral at bedtime PRN Insomnia  ondansetron Injectable 4 milliGRAM(s) IV Push every 8 hours PRN Nausea and/or Vomiting  oxyCODONE    IR 5 milliGRAM(s) Oral every 6 hours PRN moderate and severe pain      Vital Signs Last 24 Hrs  T(C): 36.6 (03 Jul 2022 17:00), Max: 36.7 (03 Jul 2022 13:00)  T(F): 97.8 (03 Jul 2022 17:00), Max: 98.1 (03 Jul 2022 13:00)  HR: 103 (03 Jul 2022 17:00) (96 - 103)  BP: 121/75 (03 Jul 2022 17:00) (121/75 - 138/83)  BP(mean): --  RR: 18 (03 Jul 2022 17:00) (18 - 18)  SpO2: 96% (03 Jul 2022 17:00) (94% - 98%)  CAPILLARY BLOOD GLUCOSE      POCT Blood Glucose.: 238 mg/dL (03 Jul 2022 17:14)  POCT Blood Glucose.: 261 mg/dL (03 Jul 2022 12:32)  POCT Blood Glucose.: 241 mg/dL (03 Jul 2022 08:57)  POCT Blood Glucose.: 223 mg/dL (03 Jul 2022 06:14)  POCT Blood Glucose.: 297 mg/dL (02 Jul 2022 21:13)    I&O's Summary    02 Jul 2022 07:01  -  03 Jul 2022 07:00  --------------------------------------------------------  IN: 1345 mL / OUT: 850 mL / NET: 495 mL    03 Jul 2022 07:01  -  03 Jul 2022 20:38  --------------------------------------------------------  IN: 720 mL / OUT: 1500 mL / NET: -780 mL          PHYSICAL EXAM:   GENERAL: NAD, well-developed  HEAD:  Atraumatic, Normocephalic  EYES: EOMI, PERRLA, conjunctiva and sclera clear  NECK: Supple, No JVD  CHEST/LUNG: wheeze and distant bs  HEART: S1S2 normal. Regular rate and rhythm; No murmurs, rubs, or gallops  ABDOMEN: Soft, Nontender, Nondistended; Bowel sounds present  EXTREMITIES:  2+ Peripheral Pulses, No clubbing, cyanosis, or edema  PSYCH/Neuro: AAOx3. Non-focal.   SKIN: No rashes or lesions      LABS:                        14.1   15.71 )-----------( 286      ( 03 Jul 2022 06:46 )             41.9     07-02    139  |  96  |  10  ----------------------------<  169<H>  4.0   |  29  |  0.80    Ca    9.3      02 Jul 2022 06:44                  RADIOLOGY & ADDITIONAL TESTS:    Imaging Personally Reviewed:  Consultant(s) Notes Reviewed:    Care Discussed with Consultants/Other Providers:   1 Principal Discharge DX:	Acute on chronic respiratory failure with hypoxia

## 2025-03-27 ENCOUNTER — OUTPATIENT (OUTPATIENT)
Dept: OUTPATIENT SERVICES | Facility: HOSPITAL | Age: 50
LOS: 1 days | Discharge: HOME | End: 2025-03-27

## 2025-03-27 DIAGNOSIS — C16.9 MALIGNANT NEOPLASM OF STOMACH, UNSPECIFIED: ICD-10-CM

## 2025-03-27 DIAGNOSIS — Z98.890 OTHER SPECIFIED POSTPROCEDURAL STATES: Chronic | ICD-10-CM

## 2025-03-27 DIAGNOSIS — Z98.52 VASECTOMY STATUS: Chronic | ICD-10-CM

## 2025-04-01 ENCOUNTER — RESULT REVIEW (OUTPATIENT)
Age: 50
End: 2025-04-01

## 2025-04-01 ENCOUNTER — APPOINTMENT (OUTPATIENT)
Dept: HEMATOLOGY ONCOLOGY | Facility: CLINIC | Age: 50
End: 2025-04-01
Payer: COMMERCIAL

## 2025-04-01 ENCOUNTER — APPOINTMENT (OUTPATIENT)
Dept: INFUSION THERAPY | Facility: HOSPITAL | Age: 50
End: 2025-04-01

## 2025-04-01 ENCOUNTER — NON-APPOINTMENT (OUTPATIENT)
Age: 50
End: 2025-04-01

## 2025-04-01 ENCOUNTER — APPOINTMENT (OUTPATIENT)
Dept: GERIATRICS | Facility: CLINIC | Age: 50
End: 2025-04-01
Payer: COMMERCIAL

## 2025-04-01 VITALS
HEIGHT: 68 IN | BODY MASS INDEX: 37.18 KG/M2 | OXYGEN SATURATION: 98 % | WEIGHT: 245.31 LBS | DIASTOLIC BLOOD PRESSURE: 91 MMHG | HEART RATE: 123 BPM | RESPIRATION RATE: 16 BRPM | TEMPERATURE: 97.3 F | SYSTOLIC BLOOD PRESSURE: 130 MMHG

## 2025-04-01 DIAGNOSIS — G47.9 SLEEP DISORDER, UNSPECIFIED: ICD-10-CM

## 2025-04-01 DIAGNOSIS — Z51.5 ENCOUNTER FOR PALLIATIVE CARE: ICD-10-CM

## 2025-04-01 DIAGNOSIS — R11.2 NAUSEA WITH VOMITING, UNSPECIFIED: ICD-10-CM

## 2025-04-01 DIAGNOSIS — F41.9 ANXIETY DISORDER, UNSPECIFIED: ICD-10-CM

## 2025-04-01 DIAGNOSIS — G62.0 DRUG-INDUCED POLYNEUROPATHY: ICD-10-CM

## 2025-04-01 LAB
ALBUMIN SERPL ELPH-MCNC: 3.8 G/DL — SIGNIFICANT CHANGE UP (ref 3.3–5)
ALP SERPL-CCNC: 91 U/L — SIGNIFICANT CHANGE UP (ref 40–120)
ALT FLD-CCNC: 49 U/L — HIGH (ref 10–45)
ANION GAP SERPL CALC-SCNC: 13 MMOL/L — SIGNIFICANT CHANGE UP (ref 5–17)
AST SERPL-CCNC: 33 U/L — SIGNIFICANT CHANGE UP (ref 10–40)
BASOPHILS # BLD AUTO: 0.02 K/UL — SIGNIFICANT CHANGE UP (ref 0–0.2)
BASOPHILS NFR BLD AUTO: 0.2 % — SIGNIFICANT CHANGE UP (ref 0–2)
BILIRUB SERPL-MCNC: 0.4 MG/DL — SIGNIFICANT CHANGE UP (ref 0.2–1.2)
BUN SERPL-MCNC: 8 MG/DL — SIGNIFICANT CHANGE UP (ref 7–23)
CALCIUM SERPL-MCNC: 9.1 MG/DL — SIGNIFICANT CHANGE UP (ref 8.4–10.5)
CHLORIDE SERPL-SCNC: 103 MMOL/L — SIGNIFICANT CHANGE UP (ref 96–108)
CO2 SERPL-SCNC: 24 MMOL/L — SIGNIFICANT CHANGE UP (ref 22–31)
CREAT SERPL-MCNC: 0.71 MG/DL — SIGNIFICANT CHANGE UP (ref 0.5–1.3)
CRP SERPL-MCNC: 4 MG/L — SIGNIFICANT CHANGE UP
EGFR: 113 ML/MIN/1.73M2 — SIGNIFICANT CHANGE UP
EGFR: 113 ML/MIN/1.73M2 — SIGNIFICANT CHANGE UP
EOSINOPHIL # BLD AUTO: 0.1 K/UL — SIGNIFICANT CHANGE UP (ref 0–0.5)
EOSINOPHIL NFR BLD AUTO: 0.9 % — SIGNIFICANT CHANGE UP (ref 0–6)
GLUCOSE SERPL-MCNC: 146 MG/DL — HIGH (ref 70–99)
HCT VFR BLD CALC: 39.9 % — SIGNIFICANT CHANGE UP (ref 39–50)
HGB BLD-MCNC: 13.4 G/DL — SIGNIFICANT CHANGE UP (ref 13–17)
IMM GRANULOCYTES NFR BLD AUTO: 1.3 % — HIGH (ref 0–0.9)
INR BLD: 0.96 RATIO — SIGNIFICANT CHANGE UP (ref 0.85–1.16)
LDH SERPL L TO P-CCNC: 406 U/L — HIGH (ref 50–242)
LYMPHOCYTES # BLD AUTO: 2.98 K/UL — SIGNIFICANT CHANGE UP (ref 1–3.3)
LYMPHOCYTES # BLD AUTO: 27.8 % — SIGNIFICANT CHANGE UP (ref 13–44)
MAGNESIUM SERPL-MCNC: 1.9 MG/DL — SIGNIFICANT CHANGE UP (ref 1.6–2.6)
MCHC RBC-ENTMCNC: 29 PG — SIGNIFICANT CHANGE UP (ref 27–34)
MCHC RBC-ENTMCNC: 33.6 G/DL — SIGNIFICANT CHANGE UP (ref 32–36)
MCV RBC AUTO: 86.4 FL — SIGNIFICANT CHANGE UP (ref 80–100)
MONOCYTES # BLD AUTO: 0.54 K/UL — SIGNIFICANT CHANGE UP (ref 0–0.9)
MONOCYTES NFR BLD AUTO: 5 % — SIGNIFICANT CHANGE UP (ref 2–14)
NEUTROPHILS # BLD AUTO: 6.94 K/UL — SIGNIFICANT CHANGE UP (ref 1.8–7.4)
NEUTROPHILS NFR BLD AUTO: 64.8 % — SIGNIFICANT CHANGE UP (ref 43–77)
NRBC BLD AUTO-RTO: 0 /100 WBCS — SIGNIFICANT CHANGE UP (ref 0–0)
PLATELET # BLD AUTO: 228 K/UL — SIGNIFICANT CHANGE UP (ref 150–400)
POTASSIUM SERPL-MCNC: 3 MMOL/L — LOW (ref 3.5–5.3)
POTASSIUM SERPL-SCNC: 3 MMOL/L — LOW (ref 3.5–5.3)
PROT SERPL-MCNC: 6.9 G/DL — SIGNIFICANT CHANGE UP (ref 6–8.3)
PROTHROM AB SERPL-ACNC: 11.3 SEC — SIGNIFICANT CHANGE UP (ref 9.9–13.4)
RBC # BLD: 4.62 M/UL — SIGNIFICANT CHANGE UP (ref 4.2–5.8)
RBC # FLD: 19.2 % — HIGH (ref 10.3–14.5)
SODIUM SERPL-SCNC: 140 MMOL/L — SIGNIFICANT CHANGE UP (ref 135–145)
WBC # BLD: 10.72 K/UL — HIGH (ref 3.8–10.5)
WBC # FLD AUTO: 10.72 K/UL — HIGH (ref 3.8–10.5)

## 2025-04-01 PROCEDURE — G2211 COMPLEX E/M VISIT ADD ON: CPT | Mod: NC

## 2025-04-01 PROCEDURE — 99214 OFFICE O/P EST MOD 30 MIN: CPT

## 2025-04-01 RX ORDER — POTASSIUM CHLORIDE 750 MG/1
10 TABLET, EXTENDED RELEASE ORAL
Qty: 30 | Refills: 0 | Status: ACTIVE | COMMUNITY
Start: 2025-04-01 | End: 1900-01-01

## 2025-04-02 ENCOUNTER — OUTPATIENT (OUTPATIENT)
Dept: OUTPATIENT SERVICES | Facility: HOSPITAL | Age: 50
LOS: 1 days | End: 2025-04-02
Payer: COMMERCIAL

## 2025-04-02 ENCOUNTER — TRANSCRIPTION ENCOUNTER (OUTPATIENT)
Age: 50
End: 2025-04-02

## 2025-04-02 ENCOUNTER — APPOINTMENT (OUTPATIENT)
Dept: MEDICATION MANAGEMENT | Facility: CLINIC | Age: 50
End: 2025-04-02

## 2025-04-02 DIAGNOSIS — Z79.01 LONG TERM (CURRENT) USE OF ANTICOAGULANTS: ICD-10-CM

## 2025-04-02 DIAGNOSIS — Z98.52 VASECTOMY STATUS: Chronic | ICD-10-CM

## 2025-04-02 DIAGNOSIS — Z90.49 ACQUIRED ABSENCE OF OTHER SPECIFIED PARTS OF DIGESTIVE TRACT: Chronic | ICD-10-CM

## 2025-04-02 DIAGNOSIS — R11.2 NAUSEA WITH VOMITING, UNSPECIFIED: ICD-10-CM

## 2025-04-02 DIAGNOSIS — I82.409 ACUTE EMBOLISM AND THROMBOSIS OF UNSPECIFIED DEEP VEINS OF UNSPECIFIED LOWER EXTREMITY: ICD-10-CM

## 2025-04-02 DIAGNOSIS — Z98.890 OTHER SPECIFIED POSTPROCEDURAL STATES: Chronic | ICD-10-CM

## 2025-04-02 PROBLEM — E66.9 OBESITY (BMI 30-39.9): Status: ACTIVE | Noted: 2025-04-02

## 2025-04-02 PROCEDURE — 98004 SYNCH AUDIO-VIDEO EST SF 10: CPT

## 2025-04-03 ENCOUNTER — APPOINTMENT (OUTPATIENT)
Dept: INFUSION THERAPY | Facility: HOSPITAL | Age: 50
End: 2025-04-03

## 2025-04-03 DIAGNOSIS — Z79.01 LONG TERM (CURRENT) USE OF ANTICOAGULANTS: ICD-10-CM

## 2025-04-03 DIAGNOSIS — I82.409 ACUTE EMBOLISM AND THROMBOSIS OF UNSPECIFIED DEEP VEINS OF UNSPECIFIED LOWER EXTREMITY: ICD-10-CM

## 2025-04-04 ENCOUNTER — APPOINTMENT (OUTPATIENT)
Dept: MEDICATION MANAGEMENT | Facility: CLINIC | Age: 50
End: 2025-04-04

## 2025-04-04 ENCOUNTER — OUTPATIENT (OUTPATIENT)
Dept: OUTPATIENT SERVICES | Facility: HOSPITAL | Age: 50
LOS: 1 days | End: 2025-04-04
Payer: COMMERCIAL

## 2025-04-04 ENCOUNTER — RX RENEWAL (OUTPATIENT)
Age: 50
End: 2025-04-04

## 2025-04-04 DIAGNOSIS — Z79.01 LONG TERM (CURRENT) USE OF ANTICOAGULANTS: ICD-10-CM

## 2025-04-04 DIAGNOSIS — I82.409 ACUTE EMBOLISM AND THROMBOSIS OF UNSPECIFIED DEEP VEINS OF UNSPECIFIED LOWER EXTREMITY: ICD-10-CM

## 2025-04-04 DIAGNOSIS — Z98.890 OTHER SPECIFIED POSTPROCEDURAL STATES: Chronic | ICD-10-CM

## 2025-04-04 LAB
INR PPP: 1.2
PT BLD: 14.1

## 2025-04-04 PROCEDURE — 98012 SYNCH AUDIO-ONLY EST SF 10: CPT

## 2025-04-05 DIAGNOSIS — I82.409 ACUTE EMBOLISM AND THROMBOSIS OF UNSPECIFIED DEEP VEINS OF UNSPECIFIED LOWER EXTREMITY: ICD-10-CM

## 2025-04-05 DIAGNOSIS — Z79.01 LONG TERM (CURRENT) USE OF ANTICOAGULANTS: ICD-10-CM

## 2025-04-08 ENCOUNTER — APPOINTMENT (OUTPATIENT)
Dept: MEDICATION MANAGEMENT | Facility: CLINIC | Age: 50
End: 2025-04-08

## 2025-04-08 ENCOUNTER — OUTPATIENT (OUTPATIENT)
Dept: OUTPATIENT SERVICES | Facility: HOSPITAL | Age: 50
LOS: 1 days | End: 2025-04-08
Payer: COMMERCIAL

## 2025-04-08 DIAGNOSIS — Z90.49 ACQUIRED ABSENCE OF OTHER SPECIFIED PARTS OF DIGESTIVE TRACT: Chronic | ICD-10-CM

## 2025-04-08 DIAGNOSIS — Z98.890 OTHER SPECIFIED POSTPROCEDURAL STATES: Chronic | ICD-10-CM

## 2025-04-08 DIAGNOSIS — Z79.01 LONG TERM (CURRENT) USE OF ANTICOAGULANTS: ICD-10-CM

## 2025-04-08 DIAGNOSIS — I82.409 ACUTE EMBOLISM AND THROMBOSIS OF UNSPECIFIED DEEP VEINS OF UNSPECIFIED LOWER EXTREMITY: ICD-10-CM

## 2025-04-08 LAB
INR PPP: 2.13
PT BLD: 25.1

## 2025-04-08 PROCEDURE — 98012 SYNCH AUDIO-ONLY EST SF 10: CPT

## 2025-04-09 DIAGNOSIS — I82.409 ACUTE EMBOLISM AND THROMBOSIS OF UNSPECIFIED DEEP VEINS OF UNSPECIFIED LOWER EXTREMITY: ICD-10-CM

## 2025-04-09 DIAGNOSIS — Z79.01 LONG TERM (CURRENT) USE OF ANTICOAGULANTS: ICD-10-CM

## 2025-04-11 ENCOUNTER — APPOINTMENT (OUTPATIENT)
Dept: MEDICATION MANAGEMENT | Facility: CLINIC | Age: 50
End: 2025-04-11

## 2025-04-11 ENCOUNTER — OUTPATIENT (OUTPATIENT)
Dept: OUTPATIENT SERVICES | Facility: HOSPITAL | Age: 50
LOS: 1 days | End: 2025-04-11
Payer: COMMERCIAL

## 2025-04-11 DIAGNOSIS — Z98.52 VASECTOMY STATUS: Chronic | ICD-10-CM

## 2025-04-11 DIAGNOSIS — Z98.890 OTHER SPECIFIED POSTPROCEDURAL STATES: Chronic | ICD-10-CM

## 2025-04-11 DIAGNOSIS — Z90.49 ACQUIRED ABSENCE OF OTHER SPECIFIED PARTS OF DIGESTIVE TRACT: Chronic | ICD-10-CM

## 2025-04-11 DIAGNOSIS — I82.409 ACUTE EMBOLISM AND THROMBOSIS OF UNSPECIFIED DEEP VEINS OF UNSPECIFIED LOWER EXTREMITY: ICD-10-CM

## 2025-04-11 DIAGNOSIS — Z79.01 LONG TERM (CURRENT) USE OF ANTICOAGULANTS: ICD-10-CM

## 2025-04-11 LAB
INR PPP: 2.52
PT BLD: 29.5

## 2025-04-11 PROCEDURE — 98012 SYNCH AUDIO-ONLY EST SF 10: CPT

## 2025-04-12 DIAGNOSIS — Z79.01 LONG TERM (CURRENT) USE OF ANTICOAGULANTS: ICD-10-CM

## 2025-04-12 DIAGNOSIS — I82.409 ACUTE EMBOLISM AND THROMBOSIS OF UNSPECIFIED DEEP VEINS OF UNSPECIFIED LOWER EXTREMITY: ICD-10-CM

## 2025-04-14 ENCOUNTER — OUTPATIENT (OUTPATIENT)
Dept: OUTPATIENT SERVICES | Facility: HOSPITAL | Age: 50
LOS: 1 days | End: 2025-04-14
Payer: COMMERCIAL

## 2025-04-14 VITALS
DIASTOLIC BLOOD PRESSURE: 89 MMHG | SYSTOLIC BLOOD PRESSURE: 132 MMHG | TEMPERATURE: 98 F | HEIGHT: 76 IN | HEART RATE: 109 BPM | RESPIRATION RATE: 16 BRPM | OXYGEN SATURATION: 97 %

## 2025-04-14 DIAGNOSIS — I82.409 ACUTE EMBOLISM AND THROMBOSIS OF UNSPECIFIED DEEP VEINS OF UNSPECIFIED LOWER EXTREMITY: ICD-10-CM

## 2025-04-14 DIAGNOSIS — Z98.890 OTHER SPECIFIED POSTPROCEDURAL STATES: Chronic | ICD-10-CM

## 2025-04-14 DIAGNOSIS — Z90.49 ACQUIRED ABSENCE OF OTHER SPECIFIED PARTS OF DIGESTIVE TRACT: Chronic | ICD-10-CM

## 2025-04-14 DIAGNOSIS — M32.9 SYSTEMIC LUPUS ERYTHEMATOSUS, UNSPECIFIED: ICD-10-CM

## 2025-04-14 DIAGNOSIS — G47.33 OBSTRUCTIVE SLEEP APNEA (ADULT) (PEDIATRIC): ICD-10-CM

## 2025-04-14 DIAGNOSIS — Z98.52 VASECTOMY STATUS: Chronic | ICD-10-CM

## 2025-04-14 DIAGNOSIS — C16.9 MALIGNANT NEOPLASM OF STOMACH, UNSPECIFIED: ICD-10-CM

## 2025-04-14 DIAGNOSIS — Z95.828 PRESENCE OF OTHER VASCULAR IMPLANTS AND GRAFTS: Chronic | ICD-10-CM

## 2025-04-14 DIAGNOSIS — Z90.3 ACQUIRED ABSENCE OF STOMACH [PART OF]: Chronic | ICD-10-CM

## 2025-04-14 DIAGNOSIS — E11.9 TYPE 2 DIABETES MELLITUS WITHOUT COMPLICATIONS: ICD-10-CM

## 2025-04-14 DIAGNOSIS — J45.909 UNSPECIFIED ASTHMA, UNCOMPLICATED: ICD-10-CM

## 2025-04-14 DIAGNOSIS — Z01.818 ENCOUNTER FOR OTHER PREPROCEDURAL EXAMINATION: ICD-10-CM

## 2025-04-14 LAB
ANION GAP SERPL CALC-SCNC: 18 MMOL/L — HIGH (ref 5–17)
BUN SERPL-MCNC: 10 MG/DL — SIGNIFICANT CHANGE UP (ref 7–23)
CALCIUM SERPL-MCNC: 9.5 MG/DL — SIGNIFICANT CHANGE UP (ref 8.4–10.5)
CHLORIDE SERPL-SCNC: 100 MMOL/L — SIGNIFICANT CHANGE UP (ref 96–108)
CO2 SERPL-SCNC: 20 MMOL/L — LOW (ref 22–31)
CREAT SERPL-MCNC: 0.78 MG/DL — SIGNIFICANT CHANGE UP (ref 0.5–1.3)
EGFR: 110 ML/MIN/1.73M2 — SIGNIFICANT CHANGE UP
EGFR: 110 ML/MIN/1.73M2 — SIGNIFICANT CHANGE UP
GLUCOSE SERPL-MCNC: 322 MG/DL — HIGH (ref 70–99)
HCT VFR BLD CALC: 41.9 % — SIGNIFICANT CHANGE UP (ref 39–50)
HGB BLD-MCNC: 13.4 G/DL — SIGNIFICANT CHANGE UP (ref 13–17)
INR BLD: 3.31 RATIO — HIGH (ref 0.85–1.16)
MCHC RBC-ENTMCNC: 28.7 PG — SIGNIFICANT CHANGE UP (ref 27–34)
MCHC RBC-ENTMCNC: 32 G/DL — SIGNIFICANT CHANGE UP (ref 32–36)
MCV RBC AUTO: 89.7 FL — SIGNIFICANT CHANGE UP (ref 80–100)
NRBC BLD AUTO-RTO: 0 /100 WBCS — SIGNIFICANT CHANGE UP (ref 0–0)
PLATELET # BLD AUTO: 258 K/UL — SIGNIFICANT CHANGE UP (ref 150–400)
POTASSIUM SERPL-MCNC: 4.1 MMOL/L — SIGNIFICANT CHANGE UP (ref 3.5–5.3)
POTASSIUM SERPL-SCNC: 4.1 MMOL/L — SIGNIFICANT CHANGE UP (ref 3.5–5.3)
PROTHROM AB SERPL-ACNC: 37.3 SEC — HIGH (ref 9.9–13.4)
RBC # BLD: 4.67 M/UL — SIGNIFICANT CHANGE UP (ref 4.2–5.8)
RBC # FLD: 19.3 % — HIGH (ref 10.3–14.5)
SODIUM SERPL-SCNC: 138 MMOL/L — SIGNIFICANT CHANGE UP (ref 135–145)
WBC # BLD: 15.48 K/UL — HIGH (ref 3.8–10.5)
WBC # FLD AUTO: 15.48 K/UL — HIGH (ref 3.8–10.5)

## 2025-04-14 PROCEDURE — G0463: CPT

## 2025-04-14 PROCEDURE — 85027 COMPLETE CBC AUTOMATED: CPT

## 2025-04-14 PROCEDURE — 85610 PROTHROMBIN TIME: CPT

## 2025-04-14 PROCEDURE — 80048 BASIC METABOLIC PNL TOTAL CA: CPT

## 2025-04-14 PROCEDURE — 83036 HEMOGLOBIN GLYCOSYLATED A1C: CPT

## 2025-04-14 NOTE — H&P PST ADULT - NSICDXPASTSURGICALHX_GEN_ALL_CORE_FT
PAST SURGICAL HISTORY:  H/O colonoscopy     H/O endoscopy     History of cholecystectomy     Port-A-Cath in place     S/P partial gastrectomy     S/P vasectomy

## 2025-04-14 NOTE — H&P PST ADULT - PROBLEM SELECTOR PLAN 1
Patient is tentatively scheduled for robotic distal gastrectomy possible open with Dr Ferreira- 08/20/24.      Pre-op instructions provided. Pt given verbal and written instructions with teach back on chlorhexidine wash and patients own pantoprazole. Pt verbalized understanding with return demonstration.    Patient had recent CBC, A1C, in HIE- WNL.- 07/9/24  BMP - low glucose, BMP, T&S, ABO sent from PST.    **** Patient takes Ozempic for weight management last dose 06/30/24. Pt. scheduled for EGD  with Dr. Weathers  on 4/28/25.  Pre-op instructions given, all questions answered.  Surgical Soap given.  Labs: CBC, BMP, A1C, PT/INR

## 2025-04-14 NOTE — H&P PST ADULT - PROBLEM SELECTOR PLAN 3
Patient instructed to take synthroid with a sip of water on the morning of procedure. Pt hx of clots states Rheum has him on Coumadin   as per rheum request PT/INR performed in PST (copy of prescription in chart)  Pt instructed last dose of Coumadin on 4/23/25 to be confirmed with Rheum

## 2025-04-14 NOTE — H&P PST ADULT - PROBLEM SELECTOR PLAN 6
Patient has lupus and myositis- reports his rheumatologist takes him off Cellcept 1 month ago, now on Plaquenil.  Instructed pt to get  pre op Plaquenil  instructions from rheumatologist. last A1C 8.5 on 2/2025 (in allscript)  A1C performed in PST  FS on DOS

## 2025-04-14 NOTE — H&P PST ADULT - CARDIOVASCULAR COMMENTS
reports of hypokalemia in the past, rheumatologist prescribed potassium pills daily, on torsemide prn  for b/l legs reports of hypokalemia in the past, rheumatologist prescribed potassium pills daily,

## 2025-04-14 NOTE — H&P PST ADULT - GASTROINTESTINAL
details… soft/nontender/nondistended/normal active bowel sounds/no guarding/no rigidity/tender soft/nontender/normal active bowel sounds/no guarding

## 2025-04-14 NOTE — H&P PST ADULT - PROBLEM SELECTOR PLAN 4
Instructed patient to continue Trelegy inhaler Patient has lupus and myositis- reports his rheumatologist has him on Plaquenil.

## 2025-04-14 NOTE — H&P PST ADULT - NEUROLOGICAL
details… cranial nerves II-XII intact cranial nerves II-XII intact/sensation intact/responds to pain/responds to verbal commands

## 2025-04-14 NOTE — H&P PST ADULT - PROBLEM SELECTOR PLAN 2
Patient instructed to take losartan with a sip of water on the morning of procedure. carry inhaler DOS

## 2025-04-14 NOTE — H&P PST ADULT - NEGATIVE CARDIOVASCULAR SYMPTOMS
no chest pain/no palpitations/no dyspnea on exertion/no orthopnea/no paroxysmal nocturnal dyspnea no chest pain/no palpitations/no orthopnea/no paroxysmal nocturnal dyspnea

## 2025-04-14 NOTE — H&P PST ADULT - ASSESSMENT
DASI Score:  DASI Activity: able to go up one flight of stairs or walk 1-2 blocks with out difficulty  Loose or removable teeth: denies  DASI Score: 5.72  DASI Activity: Pt able to shower and dress self, able to go up one flight of stairs or walk 1-2 blocks   Loose or removable teeth: denies

## 2025-04-14 NOTE — H&P PST ADULT - HISTORY OF PRESENT ILLNESS
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    ?  Patient reports that he has had ongoing LUQ pain for the last couple of years but more recently as of a few months ago, pain worsened and moved more laterally than previous thus he sought re-evaluation for his symptoms. Reports that pain is constant, sharp and exacerbated by raising his L arm above his head, which makes him feel like something is tearing inside. It is not affected by other body movements or foods. Pain is currently rated 7/10. It is associated with some dyspepsia and pain with deep inspiration. He was started on Protonix but denies feeling much relief. Of note, when he first noted the symptoms it was soon after he had developed a compression fracture of T7 and costochondritis. He was also having some dysphagia and underwent an EGD in Nov 2022, which he reports did not reveal an etiology of his symptoms.  ?  Patient has had ongoing rectal bleeding since 2019 and reports having had 3 colonoscopies, which have been nondiagnostic. Most recent colonoscopy was 6/17/24 with EGD, however prep was poor so needs repeat done. Reports having bright red blood in stool once about every 8-9 days, appears to fill toilet bowl.  Was on Ozempic for diabetes and reports losing ~40 lbs over the past year, but this was recently held this past week in anticipation of surgery for gastric cancer. Only eats one meal/day.  Has complaints of extreme fatigue but is able to work. Reports that being in the light/sun drains his energy.  ?  Underwent EGD on 6/17 with Dr. Weathers  Pathology:   Duodenum, biopsy:  - Duodenal mucosa with preserved villous architecture, focal increase in intraepithelial lymphocytes and mild lamina propria chronic inflammation (see note)  ?  Note: Part 1. The above changes may be secondary to concomitant Helicobacter pylori infection.  2. Stomach, antrum, biopsy:  - Gastric antral mucosa with Helicobacter pylori-associated chronic gastritis  - Negative for intestinal metaplasia and dysplasia  3. Stomach, body, biopsy:  - Gastric transitional mucosa with Helicobacter pylori-associated active chronic gastritis and patchy intestinal metaplasia  - Negative for dysplasia  4. 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 Denies any chest pain, palpitations, SOB, N/V, fever or chills. He now presents to PST prior to scheduled EGD with Dr. Weathers on 4/28/25.    Of note: Pt report receiving chemo treatment  every 2 weeks, scheduled for at home session on 4/15/25.    ?    ?    ?   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 Denies any chest pain, palpitations, SOB, N/V, fever or chills. He now presents to PST prior to scheduled EGD with Dr. Weathers on 4/28/25.    Of note: Pt report receiving chemo treatment  every 2 weeks, scheduled for at home session on 4/15/25.    ?    ?    ?   49 year old male with pmhx HTN, T2DM (last A1C 8.2% on 2/2025, pt states is was on Ozempic but stopped in Aug 2024 due prior to gastric surgery and current chemo therapy, Pt was prescribed Jardiance but PCP (Dr. 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 Denies any chest pain, palpitations, SOB, N/V, fever or chills. He now presents to PST prior to scheduled EGD with Dr. Weathers on 4/28/25.    Of note: Pt reports receiving chemo treatment  every 2 weeks, scheduled for at home chemo session on 4/15/25.       49 year old male with pmhx HTN, T2DM (last A1C 8.2% on 2/2025, pt states he was on Ozempic but stopped in Aug 2024 due prior to gastric surgery and current chemo therapy, Pt was prescribed Jardiance but PCP (Dr. 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 Denies any chest pain, palpitations, SOB, N/V, fever or chills. He now presents to PST prior to scheduled EGD with Dr. Weathers on 4/28/25.    Of note: Pt reports receiving chemo treatment  every 2 weeks, scheduled for at home chemo session on 4/15/25.

## 2025-04-14 NOTE — H&P PST ADULT - LOCATION OF BACK PAIN
patient reports with recent  PET scan showed T7 compression fracture/thoracic spine patient reports PET scan showed T7 compression fracture/cervical spine/thoracic spine

## 2025-04-14 NOTE — H&P PST ADULT - CARDIOVASCULAR
regular rate and rhythm/S1 S2 present/pedal edema/vascular details… regular rate and rhythm/S1 S2 present

## 2025-04-14 NOTE — H&P PST ADULT - RESPIRATORY AND THORAX COMMENTS
asthma - well controlled- last asthma exacerbation- in 7/ 2023 - on standing inhalers, last use of rescue inhaler 3 months ago- follows up with pulmonologist asthma - well controlled- last asthma exacerbation- in 7/ 2023 - uses Albuterol PRN (infrequent)- follows up with pulmonologist

## 2025-04-14 NOTE — H&P PST ADULT - HEMATOLOGY/LYMPHATICS COMMENTS
patient with lupus and myositis - follows up rheumatologist- on Plaquenil, prednisone patient with lupus and myositis - follows up rheumatologist- on Plaquenil, prednisone, and Coumadin

## 2025-04-15 ENCOUNTER — APPOINTMENT (OUTPATIENT)
Dept: HEMATOLOGY ONCOLOGY | Facility: CLINIC | Age: 50
End: 2025-04-15
Payer: COMMERCIAL

## 2025-04-15 ENCOUNTER — RESULT REVIEW (OUTPATIENT)
Age: 50
End: 2025-04-15

## 2025-04-15 ENCOUNTER — APPOINTMENT (OUTPATIENT)
Dept: MEDICATION MANAGEMENT | Facility: CLINIC | Age: 50
End: 2025-04-15

## 2025-04-15 ENCOUNTER — OUTPATIENT (OUTPATIENT)
Dept: OUTPATIENT SERVICES | Facility: HOSPITAL | Age: 50
LOS: 1 days | End: 2025-04-15
Payer: COMMERCIAL

## 2025-04-15 ENCOUNTER — TRANSCRIPTION ENCOUNTER (OUTPATIENT)
Age: 50
End: 2025-04-15

## 2025-04-15 ENCOUNTER — NON-APPOINTMENT (OUTPATIENT)
Age: 50
End: 2025-04-15

## 2025-04-15 ENCOUNTER — APPOINTMENT (OUTPATIENT)
Dept: INFUSION THERAPY | Facility: HOSPITAL | Age: 50
End: 2025-04-15

## 2025-04-15 DIAGNOSIS — I82.409 ACUTE EMBOLISM AND THROMBOSIS OF UNSPECIFIED DEEP VEINS OF UNSPECIFIED LOWER EXTREMITY: ICD-10-CM

## 2025-04-15 DIAGNOSIS — C16.9 MALIGNANT NEOPLASM OF STOMACH, UNSPECIFIED: ICD-10-CM

## 2025-04-15 DIAGNOSIS — Z90.3 ACQUIRED ABSENCE OF STOMACH [PART OF]: Chronic | ICD-10-CM

## 2025-04-15 DIAGNOSIS — Z90.49 ACQUIRED ABSENCE OF OTHER SPECIFIED PARTS OF DIGESTIVE TRACT: Chronic | ICD-10-CM

## 2025-04-15 DIAGNOSIS — Z98.890 OTHER SPECIFIED POSTPROCEDURAL STATES: Chronic | ICD-10-CM

## 2025-04-15 DIAGNOSIS — Z98.52 VASECTOMY STATUS: Chronic | ICD-10-CM

## 2025-04-15 DIAGNOSIS — Z79.01 LONG TERM (CURRENT) USE OF ANTICOAGULANTS: ICD-10-CM

## 2025-04-15 DIAGNOSIS — Z95.828 PRESENCE OF OTHER VASCULAR IMPLANTS AND GRAFTS: Chronic | ICD-10-CM

## 2025-04-15 PROBLEM — R73.03 PREDIABETES: Chronic | Status: INACTIVE | Noted: 2024-07-30 | Resolved: 2025-04-14

## 2025-04-15 LAB
A1C WITH ESTIMATED AVERAGE GLUCOSE RESULT: 9 % — HIGH (ref 4–5.6)
ALBUMIN SERPL ELPH-MCNC: 4 G/DL — SIGNIFICANT CHANGE UP (ref 3.3–5)
ALP SERPL-CCNC: 90 U/L — SIGNIFICANT CHANGE UP (ref 40–120)
ALT FLD-CCNC: 53 U/L — HIGH (ref 10–45)
ANION GAP SERPL CALC-SCNC: 12 MMOL/L — SIGNIFICANT CHANGE UP (ref 5–17)
AST SERPL-CCNC: 34 U/L — SIGNIFICANT CHANGE UP (ref 10–40)
BASOPHILS # BLD AUTO: 0.04 K/UL — SIGNIFICANT CHANGE UP (ref 0–0.2)
BASOPHILS NFR BLD AUTO: 0.3 % — SIGNIFICANT CHANGE UP (ref 0–2)
BILIRUB SERPL-MCNC: 0.4 MG/DL — SIGNIFICANT CHANGE UP (ref 0.2–1.2)
BUN SERPL-MCNC: 10 MG/DL — SIGNIFICANT CHANGE UP (ref 7–23)
CALCIUM SERPL-MCNC: 9.4 MG/DL — SIGNIFICANT CHANGE UP (ref 8.4–10.5)
CHLORIDE SERPL-SCNC: 102 MMOL/L — SIGNIFICANT CHANGE UP (ref 96–108)
CO2 SERPL-SCNC: 26 MMOL/L — SIGNIFICANT CHANGE UP (ref 22–31)
CREAT SERPL-MCNC: 0.68 MG/DL — SIGNIFICANT CHANGE UP (ref 0.5–1.3)
CRP SERPL-MCNC: <3 MG/L — SIGNIFICANT CHANGE UP
EGFR: 115 ML/MIN/1.73M2 — SIGNIFICANT CHANGE UP
EGFR: 115 ML/MIN/1.73M2 — SIGNIFICANT CHANGE UP
EOSINOPHIL # BLD AUTO: 0.06 K/UL — SIGNIFICANT CHANGE UP (ref 0–0.5)
EOSINOPHIL NFR BLD AUTO: 0.5 % — SIGNIFICANT CHANGE UP (ref 0–6)
ESTIMATED AVERAGE GLUCOSE: 212 MG/DL — HIGH (ref 68–114)
GLUCOSE SERPL-MCNC: 94 MG/DL — SIGNIFICANT CHANGE UP (ref 70–99)
HCT VFR BLD CALC: 42.7 % — SIGNIFICANT CHANGE UP (ref 39–50)
HGB BLD-MCNC: 14.2 G/DL — SIGNIFICANT CHANGE UP (ref 13–17)
IMM GRANULOCYTES NFR BLD AUTO: 2.5 % — HIGH (ref 0–0.9)
INR PPP: 3.31
LDH SERPL L TO P-CCNC: 395 U/L — HIGH (ref 50–242)
LYMPHOCYTES # BLD AUTO: 24.7 % — SIGNIFICANT CHANGE UP (ref 13–44)
LYMPHOCYTES # BLD AUTO: 3.11 K/UL — SIGNIFICANT CHANGE UP (ref 1–3.3)
MAGNESIUM SERPL-MCNC: 2.1 MG/DL — SIGNIFICANT CHANGE UP (ref 1.6–2.6)
MCHC RBC-ENTMCNC: 28.8 PG — SIGNIFICANT CHANGE UP (ref 27–34)
MCHC RBC-ENTMCNC: 33.3 G/DL — SIGNIFICANT CHANGE UP (ref 32–36)
MCV RBC AUTO: 86.6 FL — SIGNIFICANT CHANGE UP (ref 80–100)
MONOCYTES # BLD AUTO: 1.12 K/UL — HIGH (ref 0–0.9)
MONOCYTES NFR BLD AUTO: 8.9 % — SIGNIFICANT CHANGE UP (ref 2–14)
NEUTROPHILS # BLD AUTO: 7.94 K/UL — HIGH (ref 1.8–7.4)
NEUTROPHILS NFR BLD AUTO: 63.1 % — SIGNIFICANT CHANGE UP (ref 43–77)
NRBC BLD AUTO-RTO: 0 /100 WBCS — SIGNIFICANT CHANGE UP (ref 0–0)
PLATELET # BLD AUTO: 252 K/UL — SIGNIFICANT CHANGE UP (ref 150–400)
POTASSIUM SERPL-MCNC: 3.7 MMOL/L — SIGNIFICANT CHANGE UP (ref 3.5–5.3)
POTASSIUM SERPL-SCNC: 3.7 MMOL/L — SIGNIFICANT CHANGE UP (ref 3.5–5.3)
PROT SERPL-MCNC: 6.9 G/DL — SIGNIFICANT CHANGE UP (ref 6–8.3)
PT BLD: 37.3
RBC # BLD: 4.93 M/UL — SIGNIFICANT CHANGE UP (ref 4.2–5.8)
RBC # FLD: 19.3 % — HIGH (ref 10.3–14.5)
SODIUM SERPL-SCNC: 141 MMOL/L — SIGNIFICANT CHANGE UP (ref 135–145)
WBC # BLD: 12.59 K/UL — HIGH (ref 3.8–10.5)
WBC # FLD AUTO: 12.59 K/UL — HIGH (ref 3.8–10.5)

## 2025-04-15 PROCEDURE — 99214 OFFICE O/P EST MOD 30 MIN: CPT

## 2025-04-15 PROCEDURE — G2211 COMPLEX E/M VISIT ADD ON: CPT | Mod: NC

## 2025-04-15 PROCEDURE — 98012 SYNCH AUDIO-ONLY EST SF 10: CPT

## 2025-04-16 DIAGNOSIS — I82.409 ACUTE EMBOLISM AND THROMBOSIS OF UNSPECIFIED DEEP VEINS OF UNSPECIFIED LOWER EXTREMITY: ICD-10-CM

## 2025-04-16 DIAGNOSIS — Z79.01 LONG TERM (CURRENT) USE OF ANTICOAGULANTS: ICD-10-CM

## 2025-04-17 ENCOUNTER — APPOINTMENT (OUTPATIENT)
Dept: INFUSION THERAPY | Facility: HOSPITAL | Age: 50
End: 2025-04-17

## 2025-04-21 ENCOUNTER — APPOINTMENT (OUTPATIENT)
Dept: MEDICATION MANAGEMENT | Facility: CLINIC | Age: 50
End: 2025-04-21

## 2025-04-21 LAB
INR PPP: 2.05
PT BLD: 24

## 2025-04-22 ENCOUNTER — OUTPATIENT (OUTPATIENT)
Dept: OUTPATIENT SERVICES | Facility: HOSPITAL | Age: 50
LOS: 1 days | End: 2025-04-22
Payer: COMMERCIAL

## 2025-04-22 ENCOUNTER — APPOINTMENT (OUTPATIENT)
Dept: INTERNAL MEDICINE | Facility: CLINIC | Age: 50
End: 2025-04-22
Payer: COMMERCIAL

## 2025-04-22 ENCOUNTER — APPOINTMENT (OUTPATIENT)
Dept: MEDICATION MANAGEMENT | Facility: CLINIC | Age: 50
End: 2025-04-22

## 2025-04-22 VITALS — SYSTOLIC BLOOD PRESSURE: 120 MMHG | DIASTOLIC BLOOD PRESSURE: 80 MMHG

## 2025-04-22 VITALS
HEIGHT: 68 IN | RESPIRATION RATE: 16 BRPM | OXYGEN SATURATION: 97 % | BODY MASS INDEX: 36.98 KG/M2 | WEIGHT: 244 LBS | TEMPERATURE: 97.8 F | HEART RATE: 106 BPM

## 2025-04-22 DIAGNOSIS — Z95.828 PRESENCE OF OTHER VASCULAR IMPLANTS AND GRAFTS: Chronic | ICD-10-CM

## 2025-04-22 DIAGNOSIS — I82.409 ACUTE EMBOLISM AND THROMBOSIS OF UNSPECIFIED DEEP VEINS OF UNSPECIFIED LOWER EXTREMITY: ICD-10-CM

## 2025-04-22 DIAGNOSIS — Z90.3 ACQUIRED ABSENCE OF STOMACH [PART OF]: Chronic | ICD-10-CM

## 2025-04-22 DIAGNOSIS — Z79.01 LONG TERM (CURRENT) USE OF ANTICOAGULANTS: ICD-10-CM

## 2025-04-22 DIAGNOSIS — R73.03 PREDIABETES.: ICD-10-CM

## 2025-04-22 DIAGNOSIS — Z90.49 ACQUIRED ABSENCE OF OTHER SPECIFIED PARTS OF DIGESTIVE TRACT: Chronic | ICD-10-CM

## 2025-04-22 DIAGNOSIS — Z98.52 VASECTOMY STATUS: Chronic | ICD-10-CM

## 2025-04-22 PROBLEM — E11.9 TYPE 2 DIABETES MELLITUS WITHOUT COMPLICATIONS: Chronic | Status: ACTIVE | Noted: 2025-04-14

## 2025-04-22 LAB
INR PPP: 2.01
PT BLD: 23.6

## 2025-04-22 PROCEDURE — 98012 SYNCH AUDIO-ONLY EST SF 10: CPT

## 2025-04-22 PROCEDURE — 99214 OFFICE O/P EST MOD 30 MIN: CPT

## 2025-04-22 PROCEDURE — 36415 COLL VENOUS BLD VENIPUNCTURE: CPT

## 2025-04-22 RX ORDER — PEN NEEDLE, DIABETIC 29 G X1/2"
31G X 5 MM NEEDLE, DISPOSABLE MISCELLANEOUS
Qty: 1 | Refills: 3 | Status: ACTIVE | COMMUNITY
Start: 2025-04-22 | End: 1900-01-01

## 2025-04-22 RX ORDER — INSULIN LISPRO 100 [IU]/ML
(75-25) 100 INJECTION, SUSPENSION SUBCUTANEOUS
Qty: 1 | Refills: 0 | Status: ACTIVE | COMMUNITY
Start: 2025-04-22 | End: 1900-01-01

## 2025-04-23 DIAGNOSIS — Z79.01 LONG TERM (CURRENT) USE OF ANTICOAGULANTS: ICD-10-CM

## 2025-04-23 DIAGNOSIS — I82.409 ACUTE EMBOLISM AND THROMBOSIS OF UNSPECIFIED DEEP VEINS OF UNSPECIFIED LOWER EXTREMITY: ICD-10-CM

## 2025-04-23 LAB
ANION GAP SERPL CALC-SCNC: 16 MMOL/L
BUN SERPL-MCNC: 10 MG/DL
CALCIUM SERPL-MCNC: 9.5 MG/DL
CHLORIDE SERPL-SCNC: 101 MMOL/L
CO2 SERPL-SCNC: 22 MMOL/L
CREAT SERPL-MCNC: 0.82 MG/DL
EGFRCR SERPLBLD CKD-EPI 2021: 108 ML/MIN/1.73M2
ESTIMATED AVERAGE GLUCOSE: 214 MG/DL
GLUCOSE SERPL-MCNC: 116 MG/DL
HBA1C MFR BLD HPLC: 9.1 %
POTASSIUM SERPL-SCNC: 4.1 MMOL/L
SODIUM SERPL-SCNC: 140 MMOL/L

## 2025-04-23 RX ORDER — INSULIN LISPRO 100 [IU]/ML
(75-25) 100 INJECTION, SUSPENSION SUBCUTANEOUS
Qty: 2 | Refills: 0 | Status: ACTIVE | COMMUNITY
Start: 2025-04-22 | End: 1900-01-01

## 2025-04-23 RX ORDER — BLOOD-GLUCOSE SENSOR
EACH MISCELLANEOUS
Qty: 2 | Refills: 3 | Status: ACTIVE | COMMUNITY
Start: 2025-04-22 | End: 1900-01-01

## 2025-04-23 RX ORDER — BLOOD-GLUCOSE,RECEIVER,CONT
EACH MISCELLANEOUS
Qty: 1 | Refills: 0 | Status: ACTIVE | COMMUNITY
Start: 2025-04-22 | End: 1900-01-01

## 2025-04-24 ENCOUNTER — OUTPATIENT (OUTPATIENT)
Dept: OUTPATIENT SERVICES | Facility: HOSPITAL | Age: 50
LOS: 1 days | End: 2025-04-24
Payer: COMMERCIAL

## 2025-04-24 ENCOUNTER — APPOINTMENT (OUTPATIENT)
Dept: RADIOLOGY | Facility: IMAGING CENTER | Age: 50
End: 2025-04-24

## 2025-04-24 ENCOUNTER — APPOINTMENT (OUTPATIENT)
Dept: CT IMAGING | Facility: IMAGING CENTER | Age: 50
End: 2025-04-24

## 2025-04-24 ENCOUNTER — APPOINTMENT (OUTPATIENT)
Dept: MEDICATION MANAGEMENT | Facility: CLINIC | Age: 50
End: 2025-04-24

## 2025-04-24 DIAGNOSIS — Z98.890 OTHER SPECIFIED POSTPROCEDURAL STATES: Chronic | ICD-10-CM

## 2025-04-24 DIAGNOSIS — Z79.01 LONG TERM (CURRENT) USE OF ANTICOAGULANTS: ICD-10-CM

## 2025-04-24 DIAGNOSIS — C16.9 MALIGNANT NEOPLASM OF STOMACH, UNSPECIFIED: ICD-10-CM

## 2025-04-24 DIAGNOSIS — Z90.3 ACQUIRED ABSENCE OF STOMACH [PART OF]: Chronic | ICD-10-CM

## 2025-04-24 DIAGNOSIS — Z95.828 PRESENCE OF OTHER VASCULAR IMPLANTS AND GRAFTS: Chronic | ICD-10-CM

## 2025-04-24 DIAGNOSIS — M85.80 OTHER SPECIFIED DISORDERS OF BONE DENSITY AND STRUCTURE, UNSPECIFIED SITE: ICD-10-CM

## 2025-04-24 DIAGNOSIS — Z98.52 VASECTOMY STATUS: Chronic | ICD-10-CM

## 2025-04-24 DIAGNOSIS — I82.409 ACUTE EMBOLISM AND THROMBOSIS OF UNSPECIFIED DEEP VEINS OF UNSPECIFIED LOWER EXTREMITY: ICD-10-CM

## 2025-04-24 DIAGNOSIS — Z90.49 ACQUIRED ABSENCE OF OTHER SPECIFIED PARTS OF DIGESTIVE TRACT: Chronic | ICD-10-CM

## 2025-04-24 LAB
INR PPP: 2.3
PT BLD: 27.1

## 2025-04-24 PROCEDURE — 98012 SYNCH AUDIO-ONLY EST SF 10: CPT

## 2025-04-24 PROCEDURE — 77080 DXA BONE DENSITY AXIAL: CPT

## 2025-04-24 PROCEDURE — 74177 CT ABD & PELVIS W/CONTRAST: CPT

## 2025-04-24 PROCEDURE — 71260 CT THORAX DX C+: CPT | Mod: 26

## 2025-04-24 PROCEDURE — 71260 CT THORAX DX C+: CPT

## 2025-04-24 PROCEDURE — 77080 DXA BONE DENSITY AXIAL: CPT | Mod: 26

## 2025-04-24 PROCEDURE — 74177 CT ABD & PELVIS W/CONTRAST: CPT | Mod: 26

## 2025-04-25 DIAGNOSIS — Z79.01 LONG TERM (CURRENT) USE OF ANTICOAGULANTS: ICD-10-CM

## 2025-04-25 DIAGNOSIS — I82.409 ACUTE EMBOLISM AND THROMBOSIS OF UNSPECIFIED DEEP VEINS OF UNSPECIFIED LOWER EXTREMITY: ICD-10-CM

## 2025-04-28 ENCOUNTER — TRANSCRIPTION ENCOUNTER (OUTPATIENT)
Age: 50
End: 2025-04-28

## 2025-04-28 ENCOUNTER — RESULT REVIEW (OUTPATIENT)
Age: 50
End: 2025-04-28

## 2025-04-28 ENCOUNTER — OUTPATIENT (OUTPATIENT)
Dept: OUTPATIENT SERVICES | Facility: HOSPITAL | Age: 50
LOS: 1 days | End: 2025-04-28
Payer: COMMERCIAL

## 2025-04-28 ENCOUNTER — APPOINTMENT (OUTPATIENT)
Dept: GASTROENTEROLOGY | Facility: HOSPITAL | Age: 50
End: 2025-04-28

## 2025-04-28 VITALS
DIASTOLIC BLOOD PRESSURE: 80 MMHG | SYSTOLIC BLOOD PRESSURE: 126 MMHG | RESPIRATION RATE: 15 BRPM | OXYGEN SATURATION: 98 % | HEIGHT: 68 IN | WEIGHT: 244.93 LBS | HEART RATE: 84 BPM | TEMPERATURE: 97 F

## 2025-04-28 VITALS
SYSTOLIC BLOOD PRESSURE: 110 MMHG | RESPIRATION RATE: 16 BRPM | HEART RATE: 82 BPM | OXYGEN SATURATION: 96 % | DIASTOLIC BLOOD PRESSURE: 77 MMHG

## 2025-04-28 DIAGNOSIS — Z90.49 ACQUIRED ABSENCE OF OTHER SPECIFIED PARTS OF DIGESTIVE TRACT: Chronic | ICD-10-CM

## 2025-04-28 DIAGNOSIS — Z98.52 VASECTOMY STATUS: Chronic | ICD-10-CM

## 2025-04-28 DIAGNOSIS — Z95.828 PRESENCE OF OTHER VASCULAR IMPLANTS AND GRAFTS: Chronic | ICD-10-CM

## 2025-04-28 DIAGNOSIS — Z90.3 ACQUIRED ABSENCE OF STOMACH [PART OF]: Chronic | ICD-10-CM

## 2025-04-28 DIAGNOSIS — Z98.890 OTHER SPECIFIED POSTPROCEDURAL STATES: Chronic | ICD-10-CM

## 2025-04-28 DIAGNOSIS — C16.9 MALIGNANT NEOPLASM OF STOMACH, UNSPECIFIED: ICD-10-CM

## 2025-04-28 LAB — GLUCOSE BLDC GLUCOMTR-MCNC: 79 MG/DL — SIGNIFICANT CHANGE UP (ref 70–99)

## 2025-04-28 PROCEDURE — 88305 TISSUE EXAM BY PATHOLOGIST: CPT | Mod: 26

## 2025-04-28 PROCEDURE — 88342 IMHCHEM/IMCYTCHM 1ST ANTB: CPT

## 2025-04-28 PROCEDURE — 88305 TISSUE EXAM BY PATHOLOGIST: CPT

## 2025-04-28 PROCEDURE — 43239 EGD BIOPSY SINGLE/MULTIPLE: CPT | Mod: GC

## 2025-04-28 PROCEDURE — 82962 GLUCOSE BLOOD TEST: CPT

## 2025-04-28 PROCEDURE — 88341 IMHCHEM/IMCYTCHM EA ADD ANTB: CPT | Mod: 26

## 2025-04-28 PROCEDURE — 88341 IMHCHEM/IMCYTCHM EA ADD ANTB: CPT

## 2025-04-28 PROCEDURE — 43239 EGD BIOPSY SINGLE/MULTIPLE: CPT

## 2025-04-28 PROCEDURE — 88342 IMHCHEM/IMCYTCHM 1ST ANTB: CPT | Mod: 26

## 2025-04-28 RX ORDER — INSULIN GLARGINE-YFGN 100 [IU]/ML
10 INJECTION, SOLUTION SUBCUTANEOUS
Refills: 0 | DISCHARGE

## 2025-04-28 RX ORDER — METHYLPREDNISOLONE ACETATE 80 MG/ML
2 INJECTION, SUSPENSION INTRA-ARTICULAR; INTRALESIONAL; INTRAMUSCULAR; SOFT TISSUE
Refills: 0 | DISCHARGE

## 2025-04-28 NOTE — PRE-ANESTHESIA EVALUATION ADULT - NSANTHPMHFT_GEN_ALL_CORE
Currently denies CP, SOB. METS >4  Denies taking Jardiance or Ozempic. States that he has post anesthesia hypoglycemia from previous procedure

## 2025-04-28 NOTE — ASU PREOP CHECKLIST - ADVANCE DIRECTIVE ADDRESSED/READDRESSED
No douching/No excercise/No sports/gym/Nothing per vagina/No intercourse/No heavy lifting/No tub baths/No tampons/for 2 weeks/No weight bearing
done

## 2025-04-28 NOTE — ASU PATIENT PROFILE, ADULT - FALL HARM RISK - UNIVERSAL INTERVENTIONS
Bed in lowest position, wheels locked, appropriate side rails in place/Call bell, personal items and telephone in reach/Instruct patient to call for assistance before getting out of bed or chair/Non-slip footwear when patient is out of bed/[place] to call system/Physically safe environment - no spills, clutter or unnecessary equipment/Purposeful Proactive Rounding/Room/bathroom lighting operational, light cord in reach

## 2025-04-28 NOTE — PRE PROCEDURE NOTE - PRE PROCEDURE EVALUATION
Attending Physician:        Loc Weathers MD                    Procedure:    Indication for Procedure: follow up gastric cancer  ________________________________________________________  PAST MEDICAL & SURGICAL HISTORY:  Lupus      Asthma      Hypothyroid      Lumbosacral stenosis      Mild obstructive sleep apnea      H/O compression fracture of spine      H. pylori infection      Osteoporosis      Obesity      Myositis      Gastric cancer      DVT (deep venous thrombosis)      T2DM (type 2 diabetes mellitus)      History of cholecystectomy      H/O endoscopy      S/P vasectomy      H/O colonoscopy      Port-A-Cath in place      S/P partial gastrectomy        ALLERGIES:  Motrin (Short breath)  NSAIDs (Short breath)    HOME MEDICATIONS:  Albuterol: 2.5 inhaled every 6 hours as needed for  bronchospasm  ALPRAZolam 0.5 mg oral tablet: 1 tab(s) orally once a day as needed for  anxiety  hydroxychloroquine 200 mg oral tablet: 2 tab(s) orally once a day (at bedtime)  levothyroxine 125 mcg (0.125 mg) oral tablet: 1 tab(s) orally once a day  losartan 50 mg oral tablet: 1 tab(s) orally once a day  methylPREDNISolone 8 mg oral tablet: 2 tab(s) orally once a day  Potassium Chloride (Eqv-K-Tab) 20 mEq oral tablet, extended release: 2 tab(s) orally once a day (at bedtime)    AICD/PPM: [ ] yes   [ ] no    PERTINENT LAB DATA:                      PHYSICAL EXAMINATION:    T(C): --  HR: --  BP: --  RR: --  SpO2: --    Constitutional: NAD  HEENT: PERRLA, EOMI,    Neck:  No JVD  Respiratory: CTAB/L  Cardiovascular: S1 and S2  Gastrointestinal: BS+, soft, NT/ND  Extremities: No peripheral edema  Neurological: A/O x 3, no focal deficits  Psychiatric: Normal mood, normal affect  Skin: No rashes    ASA Class: I [ ]  II [ ]  III [ ]  IV [ ]    COMMENTS:    The patient is a suitable candidate for the planned procedure unless box checked [ ]  No, explain:

## 2025-04-28 NOTE — ASU DISCHARGE PLAN (ADULT/PEDIATRIC) - FINANCIAL ASSISTANCE
[facility] provides services at a reduced cost to those who are determined to be eligible through [facility]’s financial assistance program. Information regarding [facility]’s financial assistance program can be found by going to [email or web address] or by calling [phone].

## 2025-04-28 NOTE — ASU PATIENT PROFILE, ADULT - NSICDXPASTMEDICALHX_GEN_ALL_CORE_FT
PAST MEDICAL HISTORY:  Asthma     DVT (deep venous thrombosis)     Gastric cancer     H. pylori infection     H/O compression fracture of spine     Hypothyroid     Lumbosacral stenosis     Lupus     Mild obstructive sleep apnea     Myositis     Obesity     Osteoporosis     T2DM (type 2 diabetes mellitus)

## 2025-04-29 ENCOUNTER — APPOINTMENT (OUTPATIENT)
Dept: SURGICAL ONCOLOGY | Facility: CLINIC | Age: 50
End: 2025-04-29
Payer: COMMERCIAL

## 2025-04-29 ENCOUNTER — NON-APPOINTMENT (OUTPATIENT)
Age: 50
End: 2025-04-29

## 2025-04-29 ENCOUNTER — APPOINTMENT (OUTPATIENT)
Dept: INFUSION THERAPY | Facility: HOSPITAL | Age: 50
End: 2025-04-29

## 2025-04-29 ENCOUNTER — RESULT REVIEW (OUTPATIENT)
Age: 50
End: 2025-04-29

## 2025-04-29 ENCOUNTER — APPOINTMENT (OUTPATIENT)
Dept: HEMATOLOGY ONCOLOGY | Facility: CLINIC | Age: 50
End: 2025-04-29
Payer: COMMERCIAL

## 2025-04-29 ENCOUNTER — APPOINTMENT (OUTPATIENT)
Dept: GERIATRICS | Facility: CLINIC | Age: 50
End: 2025-04-29
Payer: COMMERCIAL

## 2025-04-29 VITALS
RESPIRATION RATE: 17 BRPM | OXYGEN SATURATION: 96 % | DIASTOLIC BLOOD PRESSURE: 94 MMHG | HEIGHT: 68 IN | SYSTOLIC BLOOD PRESSURE: 138 MMHG | HEART RATE: 82 BPM | BODY MASS INDEX: 37.13 KG/M2 | WEIGHT: 245 LBS

## 2025-04-29 DIAGNOSIS — Z51.5 ENCOUNTER FOR PALLIATIVE CARE: ICD-10-CM

## 2025-04-29 DIAGNOSIS — F41.9 ANXIETY DISORDER, UNSPECIFIED: ICD-10-CM

## 2025-04-29 DIAGNOSIS — R11.2 NAUSEA WITH VOMITING, UNSPECIFIED: ICD-10-CM

## 2025-04-29 DIAGNOSIS — G47.9 SLEEP DISORDER, UNSPECIFIED: ICD-10-CM

## 2025-04-29 LAB
ALBUMIN SERPL ELPH-MCNC: 4.1 G/DL — SIGNIFICANT CHANGE UP (ref 3.3–5)
ALP SERPL-CCNC: 87 U/L — SIGNIFICANT CHANGE UP (ref 40–120)
ALT FLD-CCNC: 56 U/L — HIGH (ref 10–45)
ANION GAP SERPL CALC-SCNC: 13 MMOL/L — SIGNIFICANT CHANGE UP (ref 5–17)
AST SERPL-CCNC: 36 U/L — SIGNIFICANT CHANGE UP (ref 10–40)
BASOPHILS # BLD AUTO: 0.02 K/UL — SIGNIFICANT CHANGE UP (ref 0–0.2)
BASOPHILS NFR BLD AUTO: 0.2 % — SIGNIFICANT CHANGE UP (ref 0–2)
BILIRUB SERPL-MCNC: 0.4 MG/DL — SIGNIFICANT CHANGE UP (ref 0.2–1.2)
BUN SERPL-MCNC: 7 MG/DL — SIGNIFICANT CHANGE UP (ref 7–23)
CALCIUM SERPL-MCNC: 9.3 MG/DL — SIGNIFICANT CHANGE UP (ref 8.4–10.5)
CHLORIDE SERPL-SCNC: 104 MMOL/L — SIGNIFICANT CHANGE UP (ref 96–108)
CO2 SERPL-SCNC: 24 MMOL/L — SIGNIFICANT CHANGE UP (ref 22–31)
CREAT SERPL-MCNC: 0.68 MG/DL — SIGNIFICANT CHANGE UP (ref 0.5–1.3)
CRP SERPL-MCNC: 4 MG/L — SIGNIFICANT CHANGE UP
EGFR: 115 ML/MIN/1.73M2 — SIGNIFICANT CHANGE UP
EGFR: 115 ML/MIN/1.73M2 — SIGNIFICANT CHANGE UP
EOSINOPHIL # BLD AUTO: 0.02 K/UL — SIGNIFICANT CHANGE UP (ref 0–0.5)
EOSINOPHIL NFR BLD AUTO: 0.2 % — SIGNIFICANT CHANGE UP (ref 0–6)
GLUCOSE SERPL-MCNC: 85 MG/DL — SIGNIFICANT CHANGE UP (ref 70–99)
HCT VFR BLD CALC: 40.2 % — SIGNIFICANT CHANGE UP (ref 39–50)
HGB BLD-MCNC: 13.5 G/DL — SIGNIFICANT CHANGE UP (ref 13–17)
IMM GRANULOCYTES NFR BLD AUTO: 2 % — HIGH (ref 0–0.9)
LDH SERPL L TO P-CCNC: 435 U/L — HIGH (ref 50–242)
LYMPHOCYTES # BLD AUTO: 1.65 K/UL — SIGNIFICANT CHANGE UP (ref 1–3.3)
LYMPHOCYTES # BLD AUTO: 14.4 % — SIGNIFICANT CHANGE UP (ref 13–44)
MAGNESIUM SERPL-MCNC: 2.1 MG/DL — SIGNIFICANT CHANGE UP (ref 1.6–2.6)
MCHC RBC-ENTMCNC: 29.4 PG — SIGNIFICANT CHANGE UP (ref 27–34)
MCHC RBC-ENTMCNC: 33.6 G/DL — SIGNIFICANT CHANGE UP (ref 32–36)
MCV RBC AUTO: 87.6 FL — SIGNIFICANT CHANGE UP (ref 80–100)
MONOCYTES # BLD AUTO: 0.5 K/UL — SIGNIFICANT CHANGE UP (ref 0–0.9)
MONOCYTES NFR BLD AUTO: 4.4 % — SIGNIFICANT CHANGE UP (ref 2–14)
NEUTROPHILS # BLD AUTO: 9.05 K/UL — HIGH (ref 1.8–7.4)
NEUTROPHILS NFR BLD AUTO: 78.8 % — HIGH (ref 43–77)
NRBC BLD AUTO-RTO: 0 /100 WBCS — SIGNIFICANT CHANGE UP (ref 0–0)
PLATELET # BLD AUTO: 216 K/UL — SIGNIFICANT CHANGE UP (ref 150–400)
POTASSIUM SERPL-MCNC: 3.4 MMOL/L — LOW (ref 3.5–5.3)
POTASSIUM SERPL-SCNC: 3.4 MMOL/L — LOW (ref 3.5–5.3)
PROT SERPL-MCNC: 7.2 G/DL — SIGNIFICANT CHANGE UP (ref 6–8.3)
RBC # BLD: 4.59 M/UL — SIGNIFICANT CHANGE UP (ref 4.2–5.8)
RBC # FLD: 18.7 % — HIGH (ref 10.3–14.5)
SODIUM SERPL-SCNC: 141 MMOL/L — SIGNIFICANT CHANGE UP (ref 135–145)
WBC # BLD: 11.47 K/UL — HIGH (ref 3.8–10.5)
WBC # FLD AUTO: 11.47 K/UL — HIGH (ref 3.8–10.5)

## 2025-04-29 PROCEDURE — 99213 OFFICE O/P EST LOW 20 MIN: CPT

## 2025-04-29 PROCEDURE — G2211 COMPLEX E/M VISIT ADD ON: CPT | Mod: NC

## 2025-04-29 PROCEDURE — 99214 OFFICE O/P EST MOD 30 MIN: CPT

## 2025-04-29 PROCEDURE — 99215 OFFICE O/P EST HI 40 MIN: CPT

## 2025-04-29 RX ORDER — DUPILUMAB 300 MG/2ML
300 INJECTION, SOLUTION SUBCUTANEOUS
Qty: 4 | Refills: 0 | Status: ACTIVE | COMMUNITY
Start: 2024-12-09

## 2025-05-01 ENCOUNTER — OUTPATIENT (OUTPATIENT)
Dept: OUTPATIENT SERVICES | Facility: HOSPITAL | Age: 50
LOS: 1 days | End: 2025-05-01
Payer: COMMERCIAL

## 2025-05-01 ENCOUNTER — APPOINTMENT (OUTPATIENT)
Dept: INFUSION THERAPY | Facility: HOSPITAL | Age: 50
End: 2025-05-01

## 2025-05-01 ENCOUNTER — APPOINTMENT (OUTPATIENT)
Dept: MEDICATION MANAGEMENT | Facility: CLINIC | Age: 50
End: 2025-05-01

## 2025-05-01 DIAGNOSIS — Z90.3 ACQUIRED ABSENCE OF STOMACH [PART OF]: Chronic | ICD-10-CM

## 2025-05-01 DIAGNOSIS — I82.409 ACUTE EMBOLISM AND THROMBOSIS OF UNSPECIFIED DEEP VEINS OF UNSPECIFIED LOWER EXTREMITY: ICD-10-CM

## 2025-05-01 DIAGNOSIS — Z79.01 LONG TERM (CURRENT) USE OF ANTICOAGULANTS: ICD-10-CM

## 2025-05-01 DIAGNOSIS — Z98.890 OTHER SPECIFIED POSTPROCEDURAL STATES: Chronic | ICD-10-CM

## 2025-05-01 DIAGNOSIS — Z95.828 PRESENCE OF OTHER VASCULAR IMPLANTS AND GRAFTS: Chronic | ICD-10-CM

## 2025-05-01 LAB
INR PPP: 1.15
PT BLD: 13.6
SURGICAL PATHOLOGY STUDY: SIGNIFICANT CHANGE UP

## 2025-05-01 PROCEDURE — 98012 SYNCH AUDIO-ONLY EST SF 10: CPT

## 2025-05-02 ENCOUNTER — NON-APPOINTMENT (OUTPATIENT)
Age: 50
End: 2025-05-02

## 2025-05-02 DIAGNOSIS — I82.409 ACUTE EMBOLISM AND THROMBOSIS OF UNSPECIFIED DEEP VEINS OF UNSPECIFIED LOWER EXTREMITY: ICD-10-CM

## 2025-05-02 DIAGNOSIS — Z79.01 LONG TERM (CURRENT) USE OF ANTICOAGULANTS: ICD-10-CM

## 2025-05-05 ENCOUNTER — TRANSCRIPTION ENCOUNTER (OUTPATIENT)
Age: 50
End: 2025-05-05

## 2025-05-05 ENCOUNTER — APPOINTMENT (OUTPATIENT)
Dept: INTERNAL MEDICINE | Facility: CLINIC | Age: 50
End: 2025-05-05
Payer: COMMERCIAL

## 2025-05-05 VITALS
WEIGHT: 245.5 LBS | TEMPERATURE: 97.9 F | HEIGHT: 68 IN | OXYGEN SATURATION: 97 % | BODY MASS INDEX: 37.21 KG/M2 | RESPIRATION RATE: 16 BRPM | HEART RATE: 96 BPM | DIASTOLIC BLOOD PRESSURE: 79 MMHG | SYSTOLIC BLOOD PRESSURE: 112 MMHG

## 2025-05-05 VITALS — DIASTOLIC BLOOD PRESSURE: 84 MMHG | SYSTOLIC BLOOD PRESSURE: 128 MMHG

## 2025-05-05 DIAGNOSIS — I82.409 ACUTE EMBOLISM AND THROMBOSIS OF UNSPECIFIED DEEP VEINS OF UNSPECIFIED LOWER EXTREMITY: ICD-10-CM

## 2025-05-05 DIAGNOSIS — R76.0 RAISED ANTIBODY TITER: ICD-10-CM

## 2025-05-05 DIAGNOSIS — I26.99 OTHER PULMONARY EMBOLISM W/OUT ACUTE COR PULMONALE: ICD-10-CM

## 2025-05-05 DIAGNOSIS — R73.03 PREDIABETES.: ICD-10-CM

## 2025-05-05 LAB — GLUCOSE BLDC GLUCOMTR-MCNC: 89

## 2025-05-05 PROCEDURE — 82962 GLUCOSE BLOOD TEST: CPT

## 2025-05-05 PROCEDURE — 99214 OFFICE O/P EST MOD 30 MIN: CPT

## 2025-05-07 ENCOUNTER — APPOINTMENT (OUTPATIENT)
Dept: MEDICATION MANAGEMENT | Facility: CLINIC | Age: 50
End: 2025-05-07

## 2025-05-07 ENCOUNTER — OUTPATIENT (OUTPATIENT)
Dept: OUTPATIENT SERVICES | Facility: HOSPITAL | Age: 50
LOS: 1 days | End: 2025-05-07
Payer: COMMERCIAL

## 2025-05-07 DIAGNOSIS — Z90.3 ACQUIRED ABSENCE OF STOMACH [PART OF]: Chronic | ICD-10-CM

## 2025-05-07 DIAGNOSIS — I82.409 ACUTE EMBOLISM AND THROMBOSIS OF UNSPECIFIED DEEP VEINS OF UNSPECIFIED LOWER EXTREMITY: ICD-10-CM

## 2025-05-07 DIAGNOSIS — Z95.828 PRESENCE OF OTHER VASCULAR IMPLANTS AND GRAFTS: Chronic | ICD-10-CM

## 2025-05-07 DIAGNOSIS — Z98.890 OTHER SPECIFIED POSTPROCEDURAL STATES: Chronic | ICD-10-CM

## 2025-05-07 DIAGNOSIS — Z98.52 VASECTOMY STATUS: Chronic | ICD-10-CM

## 2025-05-07 DIAGNOSIS — Z90.49 ACQUIRED ABSENCE OF OTHER SPECIFIED PARTS OF DIGESTIVE TRACT: Chronic | ICD-10-CM

## 2025-05-07 DIAGNOSIS — Z79.01 LONG TERM (CURRENT) USE OF ANTICOAGULANTS: ICD-10-CM

## 2025-05-07 LAB
INR PPP: 1.16
PT BLD: 13.7

## 2025-05-07 PROCEDURE — 98012 SYNCH AUDIO-ONLY EST SF 10: CPT

## 2025-05-08 DIAGNOSIS — I82.409 ACUTE EMBOLISM AND THROMBOSIS OF UNSPECIFIED DEEP VEINS OF UNSPECIFIED LOWER EXTREMITY: ICD-10-CM

## 2025-05-08 DIAGNOSIS — Z79.01 LONG TERM (CURRENT) USE OF ANTICOAGULANTS: ICD-10-CM

## 2025-05-09 ENCOUNTER — LABORATORY RESULT (OUTPATIENT)
Age: 50
End: 2025-05-09

## 2025-05-09 ENCOUNTER — APPOINTMENT (OUTPATIENT)
Dept: RHEUMATOLOGY | Facility: CLINIC | Age: 50
End: 2025-05-09
Payer: COMMERCIAL

## 2025-05-09 VITALS
WEIGHT: 245 LBS | SYSTOLIC BLOOD PRESSURE: 125 MMHG | OXYGEN SATURATION: 96 % | DIASTOLIC BLOOD PRESSURE: 88 MMHG | RESPIRATION RATE: 16 BRPM | HEART RATE: 91 BPM | BODY MASS INDEX: 37.13 KG/M2 | HEIGHT: 68 IN

## 2025-05-09 DIAGNOSIS — S22.060A WEDGE COMPRESSION FRACTURE OF T7-T8 VERTEBRA, INITIAL ENCOUNTER FOR CLOSED FRACTURE: ICD-10-CM

## 2025-05-09 DIAGNOSIS — M32.19 OTHER ORGAN OR SYSTEM INVOLVEMENT IN SYSTEMIC LUPUS ERYTHEMATOSUS: ICD-10-CM

## 2025-05-09 PROCEDURE — G2211 COMPLEX E/M VISIT ADD ON: CPT | Mod: NC

## 2025-05-09 PROCEDURE — 99215 OFFICE O/P EST HI 40 MIN: CPT

## 2025-05-09 RX ORDER — METHYLPREDNISOLONE 4 MG/1
4 TABLET ORAL DAILY
Qty: 90 | Refills: 1 | Status: ACTIVE | COMMUNITY
Start: 2025-05-09 | End: 1900-01-01

## 2025-05-09 RX ADMIN — ZOLEDRONIC ACID 0 MG/100ML: 5 INJECTION, SOLUTION INTRAVENOUS at 00:00

## 2025-05-11 NOTE — ED ADULT NURSE NOTE - NS ED NURSE IV DC DT
If your symptoms continue or worsen, please return to the Emergency Department.     Please follow up with your Primary Care Provider within 2-3 days of dischage. We have provided a referral if you would like to establish care with a primary care provider. You can call to schedule an appointment.      19-Oct-2020 21:43

## 2025-05-12 ENCOUNTER — TRANSCRIPTION ENCOUNTER (OUTPATIENT)
Age: 50
End: 2025-05-12

## 2025-05-12 LAB
25(OH)D3 SERPL-MCNC: 28.5 NG/ML
ALBUMIN SERPL ELPH-MCNC: 4.4 G/DL
ALP BLD-CCNC: 92 U/L
ALT SERPL-CCNC: 52 U/L
ANION GAP SERPL CALC-SCNC: 20 MMOL/L
APPEARANCE: CLEAR
AST SERPL-CCNC: 41 U/L
BACTERIA: NEGATIVE /HPF
BASOPHILS # BLD AUTO: 0.04 K/UL
BASOPHILS NFR BLD AUTO: 0.3 %
BILIRUB SERPL-MCNC: 0.4 MG/DL
BILIRUBIN URINE: NEGATIVE
BLOOD URINE: NEGATIVE
BUN SERPL-MCNC: 11 MG/DL
C3 SERPL-MCNC: 176 MG/DL
C4 SERPL-MCNC: 30 MG/DL
CALCIUM SERPL-MCNC: 9.5 MG/DL
CAST: 1 /LPF
CHLORIDE SERPL-SCNC: 98 MMOL/L
CK SERPL-CCNC: 75 U/L
CO2 SERPL-SCNC: 18 MMOL/L
COLOR: YELLOW
CREAT SERPL-MCNC: 0.96 MG/DL
CREAT SPEC-SCNC: 264 MG/DL
CREAT/PROT UR: 0.3 RATIO
DSDNA AB SER-ACNC: <1 IU/ML
EGFRCR SERPLBLD CKD-EPI 2021: 98 ML/MIN/1.73M2
EOSINOPHIL # BLD AUTO: 0.01 K/UL
EOSINOPHIL NFR BLD AUTO: 0.1 %
EPITHELIAL CELLS: 0 /HPF
ERYTHROCYTE [SEDIMENTATION RATE] IN BLOOD BY WESTERGREN METHOD: 62 MM/HR
GLUCOSE QUALITATIVE U: NEGATIVE MG/DL
GLUCOSE SERPL-MCNC: 160 MG/DL
HCT VFR BLD CALC: 42.7 %
HGB BLD-MCNC: 13.8 G/DL
IMM GRANULOCYTES NFR BLD AUTO: 3.3 %
KETONES URINE: ABNORMAL MG/DL
LEUKOCYTE ESTERASE URINE: ABNORMAL
LUPUS ANTICOAGULANT CASCADE REFLEX: NORMAL
LYMPHOCYTES # BLD AUTO: 1.12 K/UL
LYMPHOCYTES NFR BLD AUTO: 9.3 %
MAN DIFF?: NORMAL
MCHC RBC-ENTMCNC: 29.7 PG
MCHC RBC-ENTMCNC: 32.3 G/DL
MCV RBC AUTO: 92 FL
MICROSCOPIC-UA: NORMAL
MONOCYTES # BLD AUTO: 0.61 K/UL
MONOCYTES NFR BLD AUTO: 5 %
NEUTROPHILS # BLD AUTO: 9.91 K/UL
NEUTROPHILS NFR BLD AUTO: 82 %
NITRITE URINE: NEGATIVE
PH URINE: 6.5
PLATELET # BLD AUTO: 248 K/UL
POTASSIUM SERPL-SCNC: 4 MMOL/L
PROT SERPL-MCNC: 7.3 G/DL
PROT UR-MCNC: 84 MG/DL
PROTEIN URINE: 30 MG/DL
RBC # BLD: 4.64 M/UL
RBC # FLD: 19.3 %
RED BLOOD CELLS URINE: 1 /HPF
SODIUM SERPL-SCNC: 136 MMOL/L
SPECIFIC GRAVITY URINE: 1.03
UROBILINOGEN URINE: 1 MG/DL
WBC # FLD AUTO: 12.09 K/UL
WHITE BLOOD CELLS URINE: 0 /HPF

## 2025-05-13 ENCOUNTER — APPOINTMENT (OUTPATIENT)
Dept: INFUSION THERAPY | Facility: HOSPITAL | Age: 50
End: 2025-05-13

## 2025-05-13 ENCOUNTER — APPOINTMENT (OUTPATIENT)
Dept: INTERNAL MEDICINE | Facility: CLINIC | Age: 50
End: 2025-05-13
Payer: COMMERCIAL

## 2025-05-13 ENCOUNTER — APPOINTMENT (OUTPATIENT)
Dept: HEMATOLOGY ONCOLOGY | Facility: CLINIC | Age: 50
End: 2025-05-13

## 2025-05-13 VITALS
TEMPERATURE: 98 F | WEIGHT: 245.9 LBS | SYSTOLIC BLOOD PRESSURE: 145 MMHG | DIASTOLIC BLOOD PRESSURE: 80 MMHG | HEART RATE: 94 BPM | OXYGEN SATURATION: 98 % | BODY MASS INDEX: 37.27 KG/M2 | HEIGHT: 68 IN | RESPIRATION RATE: 16 BRPM

## 2025-05-13 DIAGNOSIS — I10 ESSENTIAL (PRIMARY) HYPERTENSION: ICD-10-CM

## 2025-05-13 DIAGNOSIS — E66.9 OBESITY, UNSPECIFIED: ICD-10-CM

## 2025-05-13 DIAGNOSIS — C16.9 MALIGNANT NEOPLASM OF STOMACH, UNSPECIFIED: ICD-10-CM

## 2025-05-13 DIAGNOSIS — J45.909 UNSPECIFIED ASTHMA, UNCOMPLICATED: ICD-10-CM

## 2025-05-13 DIAGNOSIS — E11.9 TYPE 2 DIABETES MELLITUS W/OUT COMPLICATIONS: ICD-10-CM

## 2025-05-13 PROCEDURE — 99214 OFFICE O/P EST MOD 30 MIN: CPT

## 2025-05-13 RX ORDER — SEMAGLUTIDE 1.34 MG/ML
4 INJECTION, SOLUTION SUBCUTANEOUS
Qty: 3 | Refills: 2 | Status: ACTIVE | COMMUNITY
Start: 2025-05-05 | End: 1900-01-01

## 2025-05-15 ENCOUNTER — APPOINTMENT (OUTPATIENT)
Dept: INFUSION THERAPY | Facility: HOSPITAL | Age: 50
End: 2025-05-15

## 2025-05-16 ENCOUNTER — OUTPATIENT (OUTPATIENT)
Dept: OUTPATIENT SERVICES | Facility: HOSPITAL | Age: 50
LOS: 1 days | End: 2025-05-16
Payer: COMMERCIAL

## 2025-05-16 ENCOUNTER — APPOINTMENT (OUTPATIENT)
Dept: MEDICATION MANAGEMENT | Facility: CLINIC | Age: 50
End: 2025-05-16

## 2025-05-16 DIAGNOSIS — Z95.828 PRESENCE OF OTHER VASCULAR IMPLANTS AND GRAFTS: Chronic | ICD-10-CM

## 2025-05-16 DIAGNOSIS — Z98.52 VASECTOMY STATUS: Chronic | ICD-10-CM

## 2025-05-16 DIAGNOSIS — Z79.01 LONG TERM (CURRENT) USE OF ANTICOAGULANTS: ICD-10-CM

## 2025-05-16 DIAGNOSIS — I82.409 ACUTE EMBOLISM AND THROMBOSIS OF UNSPECIFIED DEEP VEINS OF UNSPECIFIED LOWER EXTREMITY: ICD-10-CM

## 2025-05-16 DIAGNOSIS — Z90.3 ACQUIRED ABSENCE OF STOMACH [PART OF]: Chronic | ICD-10-CM

## 2025-05-16 DIAGNOSIS — Z98.890 OTHER SPECIFIED POSTPROCEDURAL STATES: Chronic | ICD-10-CM

## 2025-05-16 DIAGNOSIS — Z90.49 ACQUIRED ABSENCE OF OTHER SPECIFIED PARTS OF DIGESTIVE TRACT: Chronic | ICD-10-CM

## 2025-05-16 LAB
INR PPP: 3.2
PS-PROTHROM CMPLX IGG SERPL IA-ACNC: <10 UNITS
PS-PROTHROM CMPLX IGM SERPL IA-ACNC: 24 UNITS
PT BLD: 37.3

## 2025-05-16 PROCEDURE — 98012 SYNCH AUDIO-ONLY EST SF 10: CPT

## 2025-05-17 DIAGNOSIS — Z79.01 LONG TERM (CURRENT) USE OF ANTICOAGULANTS: ICD-10-CM

## 2025-05-17 DIAGNOSIS — I82.409 ACUTE EMBOLISM AND THROMBOSIS OF UNSPECIFIED DEEP VEINS OF UNSPECIFIED LOWER EXTREMITY: ICD-10-CM

## 2025-05-27 ENCOUNTER — TRANSCRIPTION ENCOUNTER (OUTPATIENT)
Age: 50
End: 2025-05-27

## 2025-05-27 ENCOUNTER — RX RENEWAL (OUTPATIENT)
Age: 50
End: 2025-05-27

## 2025-05-28 ENCOUNTER — TRANSCRIPTION ENCOUNTER (OUTPATIENT)
Age: 50
End: 2025-05-28

## 2025-05-28 ENCOUNTER — OUTPATIENT (OUTPATIENT)
Dept: OUTPATIENT SERVICES | Facility: HOSPITAL | Age: 50
LOS: 1 days | End: 2025-05-28
Payer: COMMERCIAL

## 2025-05-28 ENCOUNTER — APPOINTMENT (OUTPATIENT)
Dept: MEDICATION MANAGEMENT | Facility: CLINIC | Age: 50
End: 2025-05-28

## 2025-05-28 DIAGNOSIS — Z90.3 ACQUIRED ABSENCE OF STOMACH [PART OF]: Chronic | ICD-10-CM

## 2025-05-28 DIAGNOSIS — Z98.890 OTHER SPECIFIED POSTPROCEDURAL STATES: Chronic | ICD-10-CM

## 2025-05-28 DIAGNOSIS — Z98.52 VASECTOMY STATUS: Chronic | ICD-10-CM

## 2025-05-28 DIAGNOSIS — I82.409 ACUTE EMBOLISM AND THROMBOSIS OF UNSPECIFIED DEEP VEINS OF UNSPECIFIED LOWER EXTREMITY: ICD-10-CM

## 2025-05-28 DIAGNOSIS — Z79.01 LONG TERM (CURRENT) USE OF ANTICOAGULANTS: ICD-10-CM

## 2025-05-28 DIAGNOSIS — Z95.828 PRESENCE OF OTHER VASCULAR IMPLANTS AND GRAFTS: Chronic | ICD-10-CM

## 2025-05-28 LAB
INR PPP: 1.88
PT BLD: 22

## 2025-05-28 PROCEDURE — 98012 SYNCH AUDIO-ONLY EST SF 10: CPT

## 2025-06-04 ENCOUNTER — OUTPATIENT (OUTPATIENT)
Dept: OUTPATIENT SERVICES | Facility: HOSPITAL | Age: 50
LOS: 1 days | End: 2025-06-04
Payer: COMMERCIAL

## 2025-06-04 ENCOUNTER — APPOINTMENT (OUTPATIENT)
Dept: MEDICATION MANAGEMENT | Facility: CLINIC | Age: 50
End: 2025-06-04

## 2025-06-04 DIAGNOSIS — I82.409 ACUTE EMBOLISM AND THROMBOSIS OF UNSPECIFIED DEEP VEINS OF UNSPECIFIED LOWER EXTREMITY: ICD-10-CM

## 2025-06-04 DIAGNOSIS — Z98.890 OTHER SPECIFIED POSTPROCEDURAL STATES: Chronic | ICD-10-CM

## 2025-06-04 DIAGNOSIS — Z90.49 ACQUIRED ABSENCE OF OTHER SPECIFIED PARTS OF DIGESTIVE TRACT: Chronic | ICD-10-CM

## 2025-06-04 DIAGNOSIS — Z90.3 ACQUIRED ABSENCE OF STOMACH [PART OF]: Chronic | ICD-10-CM

## 2025-06-04 DIAGNOSIS — Z79.01 LONG TERM (CURRENT) USE OF ANTICOAGULANTS: ICD-10-CM

## 2025-06-04 DIAGNOSIS — Z98.52 VASECTOMY STATUS: Chronic | ICD-10-CM

## 2025-06-04 DIAGNOSIS — Z95.828 PRESENCE OF OTHER VASCULAR IMPLANTS AND GRAFTS: Chronic | ICD-10-CM

## 2025-06-04 LAB
INR PPP: 2.19
PT BLD: 25.9

## 2025-06-04 PROCEDURE — 98012 SYNCH AUDIO-ONLY EST SF 10: CPT

## 2025-06-09 ENCOUNTER — APPOINTMENT (OUTPATIENT)
Dept: INTERNAL MEDICINE | Facility: CLINIC | Age: 50
End: 2025-06-09
Payer: COMMERCIAL

## 2025-06-09 VITALS
BODY MASS INDEX: 37.13 KG/M2 | SYSTOLIC BLOOD PRESSURE: 122 MMHG | WEIGHT: 245 LBS | DIASTOLIC BLOOD PRESSURE: 84 MMHG | HEIGHT: 68 IN

## 2025-06-09 PROCEDURE — 99214 OFFICE O/P EST MOD 30 MIN: CPT

## 2025-06-18 ENCOUNTER — APPOINTMENT (OUTPATIENT)
Dept: MEDICATION MANAGEMENT | Facility: CLINIC | Age: 50
End: 2025-06-18

## 2025-06-25 ENCOUNTER — OUTPATIENT (OUTPATIENT)
Dept: OUTPATIENT SERVICES | Facility: HOSPITAL | Age: 50
LOS: 1 days | Discharge: HOME | End: 2025-06-25

## 2025-06-25 DIAGNOSIS — Z95.828 PRESENCE OF OTHER VASCULAR IMPLANTS AND GRAFTS: Chronic | ICD-10-CM

## 2025-06-25 DIAGNOSIS — Z90.49 ACQUIRED ABSENCE OF OTHER SPECIFIED PARTS OF DIGESTIVE TRACT: Chronic | ICD-10-CM

## 2025-06-25 DIAGNOSIS — Z98.52 VASECTOMY STATUS: Chronic | ICD-10-CM

## 2025-06-25 DIAGNOSIS — Z90.3 ACQUIRED ABSENCE OF STOMACH [PART OF]: Chronic | ICD-10-CM

## 2025-06-25 DIAGNOSIS — C16.9 MALIGNANT NEOPLASM OF STOMACH, UNSPECIFIED: ICD-10-CM

## 2025-06-26 ENCOUNTER — APPOINTMENT (OUTPATIENT)
Dept: INTERNAL MEDICINE | Facility: CLINIC | Age: 50
End: 2025-06-26
Payer: COMMERCIAL

## 2025-06-26 VITALS
HEIGHT: 68 IN | TEMPERATURE: 98 F | DIASTOLIC BLOOD PRESSURE: 70 MMHG | OXYGEN SATURATION: 96 % | RESPIRATION RATE: 16 BRPM | BODY MASS INDEX: 35.24 KG/M2 | HEART RATE: 100 BPM | SYSTOLIC BLOOD PRESSURE: 102 MMHG | WEIGHT: 232.5 LBS

## 2025-06-26 VITALS — SYSTOLIC BLOOD PRESSURE: 110 MMHG | DIASTOLIC BLOOD PRESSURE: 80 MMHG

## 2025-06-26 PROCEDURE — 99213 OFFICE O/P EST LOW 20 MIN: CPT

## 2025-06-27 ENCOUNTER — APPOINTMENT (OUTPATIENT)
Dept: RHEUMATOLOGY | Facility: CLINIC | Age: 50
End: 2025-06-27
Payer: COMMERCIAL

## 2025-06-27 VITALS
OXYGEN SATURATION: 97 % | BODY MASS INDEX: 35.16 KG/M2 | WEIGHT: 232 LBS | HEIGHT: 68 IN | HEART RATE: 96 BPM | RESPIRATION RATE: 16 BRPM | DIASTOLIC BLOOD PRESSURE: 91 MMHG | SYSTOLIC BLOOD PRESSURE: 129 MMHG

## 2025-06-27 VITALS
RESPIRATION RATE: 16 BRPM | SYSTOLIC BLOOD PRESSURE: 110 MMHG | OXYGEN SATURATION: 95 % | DIASTOLIC BLOOD PRESSURE: 78 MMHG | HEART RATE: 87 BPM

## 2025-06-27 VITALS
RESPIRATION RATE: 16 BRPM | TEMPERATURE: 97.7 F | DIASTOLIC BLOOD PRESSURE: 77 MMHG | SYSTOLIC BLOOD PRESSURE: 117 MMHG | OXYGEN SATURATION: 97 % | HEART RATE: 87 BPM

## 2025-06-27 PROCEDURE — 36415 COLL VENOUS BLD VENIPUNCTURE: CPT

## 2025-06-27 PROCEDURE — 96374 THER/PROPH/DIAG INJ IV PUSH: CPT

## 2025-06-27 PROCEDURE — ZZZZZ: CPT

## 2025-06-27 PROCEDURE — 99215 OFFICE O/P EST HI 40 MIN: CPT | Mod: 25

## 2025-06-29 ENCOUNTER — NON-APPOINTMENT (OUTPATIENT)
Age: 50
End: 2025-06-29

## 2025-06-30 ENCOUNTER — TRANSCRIPTION ENCOUNTER (OUTPATIENT)
Age: 50
End: 2025-06-30

## 2025-06-30 ENCOUNTER — APPOINTMENT (OUTPATIENT)
Dept: MEDICATION MANAGEMENT | Facility: CLINIC | Age: 50
End: 2025-06-30

## 2025-06-30 ENCOUNTER — OUTPATIENT (OUTPATIENT)
Dept: OUTPATIENT SERVICES | Facility: HOSPITAL | Age: 50
LOS: 1 days | End: 2025-06-30
Payer: COMMERCIAL

## 2025-06-30 DIAGNOSIS — I82.409 ACUTE EMBOLISM AND THROMBOSIS OF UNSPECIFIED DEEP VEINS OF UNSPECIFIED LOWER EXTREMITY: ICD-10-CM

## 2025-06-30 DIAGNOSIS — Z79.01 LONG TERM (CURRENT) USE OF ANTICOAGULANTS: ICD-10-CM

## 2025-06-30 DIAGNOSIS — Z90.3 ACQUIRED ABSENCE OF STOMACH [PART OF]: Chronic | ICD-10-CM

## 2025-06-30 DIAGNOSIS — Z90.49 ACQUIRED ABSENCE OF OTHER SPECIFIED PARTS OF DIGESTIVE TRACT: Chronic | ICD-10-CM

## 2025-06-30 DIAGNOSIS — Z98.52 VASECTOMY STATUS: Chronic | ICD-10-CM

## 2025-06-30 DIAGNOSIS — Z98.890 OTHER SPECIFIED POSTPROCEDURAL STATES: Chronic | ICD-10-CM

## 2025-06-30 DIAGNOSIS — Z95.828 PRESENCE OF OTHER VASCULAR IMPLANTS AND GRAFTS: Chronic | ICD-10-CM

## 2025-06-30 LAB
25(OH)D3 SERPL-MCNC: 27.3 NG/ML
ALBUMIN SERPL ELPH-MCNC: 4.2 G/DL
ALP BLD-CCNC: 82 U/L
ALT SERPL-CCNC: 96 U/L
ANION GAP SERPL CALC-SCNC: 20 MMOL/L
APPEARANCE: CLEAR
AST SERPL-CCNC: 65 U/L
BACTERIA: NEGATIVE /HPF
BASOPHILS # BLD AUTO: 0.02 K/UL
BASOPHILS NFR BLD AUTO: 0.2 %
BILIRUB SERPL-MCNC: 0.5 MG/DL
BILIRUBIN URINE: NEGATIVE
BLOOD URINE: NEGATIVE
BUN SERPL-MCNC: 8 MG/DL
C3 SERPL-MCNC: 144 MG/DL
C4 SERPL-MCNC: 27 MG/DL
CALCIUM SERPL-MCNC: 9.5 MG/DL
CAST: NORMAL /LPF
CHLORIDE SERPL-SCNC: 100 MMOL/L
CK SERPL-CCNC: 121 U/L
CO2 SERPL-SCNC: 19 MMOL/L
COLOR: NORMAL
CREAT SERPL-MCNC: 0.72 MG/DL
CREAT SPEC-SCNC: 347 MG/DL
CREAT/PROT UR: 0.1 RATIO
DSDNA AB SER-ACNC: 1 IU/ML
EGFRCR SERPLBLD CKD-EPI 2021: 112 ML/MIN/1.73M2
EOSINOPHIL # BLD AUTO: 0.03 K/UL
EOSINOPHIL NFR BLD AUTO: 0.3 %
EPITHELIAL CELLS: 1 /HPF
ERYTHROCYTE [SEDIMENTATION RATE] IN BLOOD BY WESTERGREN METHOD: 55 MM/HR
ESTIMATED AVERAGE GLUCOSE: 140 MG/DL
GGT SERPL-CCNC: 56 U/L
GLUCOSE QUALITATIVE U: NEGATIVE MG/DL
GLUCOSE SERPL-MCNC: 55 MG/DL
HBA1C MFR BLD HPLC: 6.5 %
HCT VFR BLD CALC: 42.2 %
HGB BLD-MCNC: 13.6 G/DL
IMM GRANULOCYTES NFR BLD AUTO: 0.7 %
INR PPP: 2.48 RATIO
KETONES URINE: ABNORMAL MG/DL
LEUKOCYTE ESTERASE URINE: NEGATIVE
LYMPHOCYTES # BLD AUTO: 1.11 K/UL
LYMPHOCYTES NFR BLD AUTO: 10.1 %
MAN DIFF?: NORMAL
MCHC RBC-ENTMCNC: 30.1 PG
MCHC RBC-ENTMCNC: 32.2 G/DL
MCV RBC AUTO: 93.4 FL
MICROSCOPIC-UA: NORMAL
MONOCYTES # BLD AUTO: 0.99 K/UL
MONOCYTES NFR BLD AUTO: 9 %
MUCUS: PRESENT
NEUTROPHILS # BLD AUTO: 8.77 K/UL
NEUTROPHILS NFR BLD AUTO: 79.7 %
NITRITE URINE: NEGATIVE
PH URINE: 5.5
PLATELET # BLD AUTO: 309 K/UL
POTASSIUM SERPL-SCNC: 4.2 MMOL/L
PROT SERPL-MCNC: 6.9 G/DL
PROT UR-MCNC: 30 MG/DL
PROTEIN URINE: 100 MG/DL
PT BLD: 29 SEC
RBC # BLD: 4.52 M/UL
RBC # FLD: 14.8 %
RED BLOOD CELLS URINE: 2 /HPF
REVIEW: NORMAL
SODIUM SERPL-SCNC: 139 MMOL/L
SPECIFIC GRAVITY URINE: >1.03
UROBILINOGEN URINE: 0.2 MG/DL
WBC # FLD AUTO: 11 K/UL
WHITE BLOOD CELLS URINE: 1 /HPF

## 2025-06-30 PROCEDURE — 98012 SYNCH AUDIO-ONLY EST SF 10: CPT

## 2025-07-01 ENCOUNTER — TRANSCRIPTION ENCOUNTER (OUTPATIENT)
Age: 50
End: 2025-07-01

## 2025-07-09 ENCOUNTER — RESULT REVIEW (OUTPATIENT)
Age: 50
End: 2025-07-09

## 2025-07-09 ENCOUNTER — APPOINTMENT (OUTPATIENT)
Dept: INFUSION THERAPY | Facility: HOSPITAL | Age: 50
End: 2025-07-09

## 2025-07-09 ENCOUNTER — APPOINTMENT (OUTPATIENT)
Dept: HEMATOLOGY ONCOLOGY | Facility: CLINIC | Age: 50
End: 2025-07-09
Payer: COMMERCIAL

## 2025-07-09 ENCOUNTER — APPOINTMENT (OUTPATIENT)
Dept: GERIATRICS | Facility: CLINIC | Age: 50
End: 2025-07-09
Payer: COMMERCIAL

## 2025-07-09 LAB
ALBUMIN SERPL ELPH-MCNC: 4.4 G/DL — SIGNIFICANT CHANGE UP (ref 3.3–5)
ALP SERPL-CCNC: 77 U/L — SIGNIFICANT CHANGE UP (ref 40–120)
ALT FLD-CCNC: 91 U/L — HIGH (ref 10–45)
ANION GAP SERPL CALC-SCNC: 15 MMOL/L — SIGNIFICANT CHANGE UP (ref 5–17)
AST SERPL-CCNC: 55 U/L — HIGH (ref 10–40)
BASOPHILS # BLD AUTO: 0.03 K/UL — SIGNIFICANT CHANGE UP (ref 0–0.2)
BASOPHILS NFR BLD AUTO: 0.2 % — SIGNIFICANT CHANGE UP (ref 0–2)
BILIRUB SERPL-MCNC: 0.5 MG/DL — SIGNIFICANT CHANGE UP (ref 0.2–1.2)
BUN SERPL-MCNC: 8 MG/DL — SIGNIFICANT CHANGE UP (ref 7–23)
CALCIUM SERPL-MCNC: 9.5 MG/DL — SIGNIFICANT CHANGE UP (ref 8.4–10.5)
CEA SERPL-MCNC: 4.9 NG/ML — HIGH (ref 0–3.8)
CHLORIDE SERPL-SCNC: 100 MMOL/L — SIGNIFICANT CHANGE UP (ref 96–108)
CO2 SERPL-SCNC: 23 MMOL/L — SIGNIFICANT CHANGE UP (ref 22–31)
CREAT SERPL-MCNC: 0.71 MG/DL — SIGNIFICANT CHANGE UP (ref 0.5–1.3)
EGFR: 112 ML/MIN/1.73M2 — SIGNIFICANT CHANGE UP
EGFR: 112 ML/MIN/1.73M2 — SIGNIFICANT CHANGE UP
EOSINOPHIL # BLD AUTO: 0.08 K/UL — SIGNIFICANT CHANGE UP (ref 0–0.5)
EOSINOPHIL NFR BLD AUTO: 0.7 % — SIGNIFICANT CHANGE UP (ref 0–6)
GLUCOSE SERPL-MCNC: 80 MG/DL — SIGNIFICANT CHANGE UP (ref 70–99)
HCT VFR BLD CALC: 42.5 % — SIGNIFICANT CHANGE UP (ref 39–50)
HGB BLD-MCNC: 13.9 G/DL — SIGNIFICANT CHANGE UP (ref 13–17)
IMM GRANULOCYTES NFR BLD AUTO: 1.5 % — HIGH (ref 0–0.9)
LDH SERPL L TO P-CCNC: 313 U/L — HIGH (ref 50–242)
LYMPHOCYTES # BLD AUTO: 2.78 K/UL — SIGNIFICANT CHANGE UP (ref 1–3.3)
LYMPHOCYTES # BLD AUTO: 22.8 % — SIGNIFICANT CHANGE UP (ref 13–44)
MCHC RBC-ENTMCNC: 29.3 PG — SIGNIFICANT CHANGE UP (ref 27–34)
MCHC RBC-ENTMCNC: 32.7 G/DL — SIGNIFICANT CHANGE UP (ref 32–36)
MCV RBC AUTO: 89.5 FL — SIGNIFICANT CHANGE UP (ref 80–100)
MONOCYTES # BLD AUTO: 1.25 K/UL — HIGH (ref 0–0.9)
MONOCYTES NFR BLD AUTO: 10.3 % — SIGNIFICANT CHANGE UP (ref 2–14)
NEUTROPHILS # BLD AUTO: 7.87 K/UL — HIGH (ref 1.8–7.4)
NEUTROPHILS NFR BLD AUTO: 64.5 % — SIGNIFICANT CHANGE UP (ref 43–77)
NRBC BLD AUTO-RTO: 0 /100 WBCS — SIGNIFICANT CHANGE UP (ref 0–0)
PLATELET # BLD AUTO: 294 K/UL — SIGNIFICANT CHANGE UP (ref 150–400)
POTASSIUM SERPL-MCNC: 3.5 MMOL/L — SIGNIFICANT CHANGE UP (ref 3.5–5.3)
POTASSIUM SERPL-SCNC: 3.5 MMOL/L — SIGNIFICANT CHANGE UP (ref 3.5–5.3)
PROT SERPL-MCNC: 7.6 G/DL — SIGNIFICANT CHANGE UP (ref 6–8.3)
RBC # BLD: 4.75 M/UL — SIGNIFICANT CHANGE UP (ref 4.2–5.8)
RBC # FLD: 14.2 % — SIGNIFICANT CHANGE UP (ref 10.3–14.5)
SODIUM SERPL-SCNC: 138 MMOL/L — SIGNIFICANT CHANGE UP (ref 135–145)
WBC # BLD: 12.19 K/UL — HIGH (ref 3.8–10.5)
WBC # FLD AUTO: 12.19 K/UL — HIGH (ref 3.8–10.5)

## 2025-07-09 PROCEDURE — 99215 OFFICE O/P EST HI 40 MIN: CPT

## 2025-07-09 PROCEDURE — 99214 OFFICE O/P EST MOD 30 MIN: CPT

## 2025-07-09 PROCEDURE — G2211 COMPLEX E/M VISIT ADD ON: CPT | Mod: NC

## 2025-07-11 ENCOUNTER — TRANSCRIPTION ENCOUNTER (OUTPATIENT)
Age: 50
End: 2025-07-11

## 2025-07-11 NOTE — ASU PATIENT PROFILE, ADULT - REASON FOR ADMISSION, PROFILE
Attempted to call patient to follow up post discharge. Message left to call the [place] [facility] at [phone].    Jocelyn Elizabeth DNP    egd for gastric ca

## 2025-07-17 ENCOUNTER — RX RENEWAL (OUTPATIENT)
Age: 50
End: 2025-07-17

## 2025-07-18 ENCOUNTER — OUTPATIENT (OUTPATIENT)
Dept: OUTPATIENT SERVICES | Facility: HOSPITAL | Age: 50
LOS: 1 days | End: 2025-07-18
Payer: COMMERCIAL

## 2025-07-18 ENCOUNTER — APPOINTMENT (OUTPATIENT)
Dept: ULTRASOUND IMAGING | Facility: IMAGING CENTER | Age: 50
End: 2025-07-18
Payer: COMMERCIAL

## 2025-07-18 DIAGNOSIS — Z98.890 OTHER SPECIFIED POSTPROCEDURAL STATES: Chronic | ICD-10-CM

## 2025-07-18 DIAGNOSIS — Z98.52 VASECTOMY STATUS: Chronic | ICD-10-CM

## 2025-07-18 DIAGNOSIS — Z90.49 ACQUIRED ABSENCE OF OTHER SPECIFIED PARTS OF DIGESTIVE TRACT: Chronic | ICD-10-CM

## 2025-07-18 DIAGNOSIS — C16.9 MALIGNANT NEOPLASM OF STOMACH, UNSPECIFIED: ICD-10-CM

## 2025-07-18 DIAGNOSIS — Z95.828 PRESENCE OF OTHER VASCULAR IMPLANTS AND GRAFTS: Chronic | ICD-10-CM

## 2025-07-18 DIAGNOSIS — Z90.3 ACQUIRED ABSENCE OF STOMACH [PART OF]: Chronic | ICD-10-CM

## 2025-07-18 PROCEDURE — 93971 EXTREMITY STUDY: CPT | Mod: 26,LT

## 2025-07-18 PROCEDURE — 93971 EXTREMITY STUDY: CPT

## 2025-07-30 ENCOUNTER — TRANSCRIPTION ENCOUNTER (OUTPATIENT)
Age: 50
End: 2025-07-30

## 2025-07-30 ENCOUNTER — APPOINTMENT (OUTPATIENT)
Dept: MEDICATION MANAGEMENT | Facility: CLINIC | Age: 50
End: 2025-07-30

## 2025-08-17 ENCOUNTER — NON-APPOINTMENT (OUTPATIENT)
Age: 50
End: 2025-08-17

## 2025-08-26 ENCOUNTER — LABORATORY RESULT (OUTPATIENT)
Age: 50
End: 2025-08-26

## 2025-08-26 ENCOUNTER — APPOINTMENT (OUTPATIENT)
Dept: RHEUMATOLOGY | Facility: CLINIC | Age: 50
End: 2025-08-26
Payer: COMMERCIAL

## 2025-08-26 VITALS
OXYGEN SATURATION: 98 % | WEIGHT: 225 LBS | HEIGHT: 68 IN | DIASTOLIC BLOOD PRESSURE: 86 MMHG | BODY MASS INDEX: 34.1 KG/M2 | SYSTOLIC BLOOD PRESSURE: 133 MMHG | RESPIRATION RATE: 16 BRPM | HEART RATE: 106 BPM

## 2025-08-26 DIAGNOSIS — B36.9 SUPERFICIAL MYCOSIS, UNSPECIFIED: ICD-10-CM

## 2025-08-26 PROCEDURE — G2211 COMPLEX E/M VISIT ADD ON: CPT | Mod: NC

## 2025-08-26 PROCEDURE — 99214 OFFICE O/P EST MOD 30 MIN: CPT | Mod: GC

## 2025-08-27 DIAGNOSIS — M32.19 OTHER ORGAN OR SYSTEM INVOLVEMENT IN SYSTEMIC LUPUS ERYTHEMATOSUS: ICD-10-CM

## 2025-08-28 ENCOUNTER — APPOINTMENT (OUTPATIENT)
Dept: INTERNAL MEDICINE | Facility: CLINIC | Age: 50
End: 2025-08-28
Payer: COMMERCIAL

## 2025-08-28 ENCOUNTER — TRANSCRIPTION ENCOUNTER (OUTPATIENT)
Age: 50
End: 2025-08-28

## 2025-08-28 VITALS
WEIGHT: 225 LBS | HEART RATE: 107 BPM | RESPIRATION RATE: 16 BRPM | OXYGEN SATURATION: 99 % | HEIGHT: 68 IN | TEMPERATURE: 97.9 F | BODY MASS INDEX: 34.1 KG/M2

## 2025-08-28 DIAGNOSIS — E11.9 TYPE 2 DIABETES MELLITUS W/OUT COMPLICATIONS: ICD-10-CM

## 2025-08-28 DIAGNOSIS — N52.1 ERECTILE DYSFUNCTION DUE TO DISEASES CLASSIFIED ELSEWHERE: ICD-10-CM

## 2025-08-28 DIAGNOSIS — J45.909 UNSPECIFIED ASTHMA, UNCOMPLICATED: ICD-10-CM

## 2025-08-28 DIAGNOSIS — E03.9 HYPOTHYROIDISM, UNSPECIFIED: ICD-10-CM

## 2025-08-28 DIAGNOSIS — I82.409 ACUTE EMBOLISM AND THROMBOSIS OF UNSPECIFIED DEEP VEINS OF UNSPECIFIED LOWER EXTREMITY: ICD-10-CM

## 2025-08-28 DIAGNOSIS — E66.9 OBESITY, UNSPECIFIED: ICD-10-CM

## 2025-08-28 DIAGNOSIS — C16.9 MALIGNANT NEOPLASM OF STOMACH, UNSPECIFIED: ICD-10-CM

## 2025-08-28 DIAGNOSIS — B35.1 TINEA UNGUIUM: ICD-10-CM

## 2025-08-28 LAB
25(OH)D3 SERPL-MCNC: 28.2 NG/ML
ALBUMIN SERPL ELPH-MCNC: 4.4 G/DL
ALP BLD-CCNC: 80 U/L
ALT SERPL-CCNC: 65 U/L
ANA SCREEN BY IMMUNOASSAY: POSITIVE
ANION GAP SERPL CALC-SCNC: 18 MMOL/L
APPEARANCE: CLEAR
AST SERPL-CCNC: 56 U/L
BACTERIA: NEGATIVE /HPF
BASOPHILS # BLD AUTO: 0.02 K/UL
BASOPHILS NFR BLD AUTO: 0.2 %
BILIRUB SERPL-MCNC: 0.6 MG/DL
BILIRUBIN URINE: NEGATIVE
BLOOD URINE: NEGATIVE
BUN SERPL-MCNC: 9 MG/DL
C3 SERPL-MCNC: 129 MG/DL
C4 SERPL-MCNC: 24 MG/DL
CALCIUM SERPL-MCNC: 9.4 MG/DL
CAST: 0 /LPF
CENTROMERE B AB SER-ACNC: <0.2 AL
CHLORIDE SERPL-SCNC: 100 MMOL/L
CHROMATIN AB SERPL-ACNC: 0.4 AL
CK SERPL-CCNC: 111 U/L
CO2 SERPL-SCNC: 20 MMOL/L
COLOR: YELLOW
CREAT SERPL-MCNC: 0.69 MG/DL
CREAT SPEC-SCNC: 41 MG/DL
CREAT/PROT UR: NORMAL RATIO
CRP SERPL-MCNC: <3 MG/L
DSDNA AB SER-ACNC: 1 IU/ML
EGFRCR SERPLBLD CKD-EPI 2021: 113 ML/MIN/1.73M2
ENA JO1 AB SER-ACNC: <0.2 AL
ENA RNP AB SER-ACNC: 2.4 AL
ENA SCL70 AB SER-ACNC: <0.2 AL
ENA SM AB SER-ACNC: 1.3 AL
ENA SS-A AB SER-ACNC: <0.2 AL
ENA SS-B AB SER-ACNC: <0.2 AL
EOSINOPHIL # BLD AUTO: 0.03 K/UL
EOSINOPHIL NFR BLD AUTO: 0.2 %
EPITHELIAL CELLS: 0 /HPF
ERYTHROCYTE [SEDIMENTATION RATE] IN BLOOD BY WESTERGREN METHOD: 42 MM/HR
GGT SERPL-CCNC: 49 U/L
GLUCOSE QUALITATIVE U: NEGATIVE MG/DL
GLUCOSE SERPL-MCNC: 81 MG/DL
HCT VFR BLD CALC: 43.9 %
HGB BLD-MCNC: 14 G/DL
IMM GRANULOCYTES NFR BLD AUTO: 0.9 %
INR PPP: 2.6 RATIO
KETONES URINE: NEGATIVE MG/DL
LEUKOCYTE ESTERASE URINE: NEGATIVE
LUPUS ANTICOAGULANT CASCADE REFLEX: NORMAL
LYMPHOCYTES # BLD AUTO: 2.12 K/UL
LYMPHOCYTES NFR BLD AUTO: 16.9 %
MAN DIFF?: NORMAL
MCHC RBC-ENTMCNC: 28.6 PG
MCHC RBC-ENTMCNC: 31.9 G/DL
MCV RBC AUTO: 89.6 FL
MICROSCOPIC-UA: NORMAL
MONOCYTES # BLD AUTO: 1.04 K/UL
MONOCYTES NFR BLD AUTO: 8.3 %
NEUTROPHILS # BLD AUTO: 9.22 K/UL
NEUTROPHILS NFR BLD AUTO: 73.5 %
NITRITE URINE: NEGATIVE
PH URINE: 6.5
PLATELET # BLD AUTO: 320 K/UL
POTASSIUM SERPL-SCNC: 4 MMOL/L
PROT SERPL-MCNC: 7.1 G/DL
PROT UR-MCNC: <4 MG/DL
PROTEIN URINE: NEGATIVE MG/DL
PT BLD: 29.9 SEC
RBC # BLD: 4.9 M/UL
RBC # FLD: 14.4 %
RED BLOOD CELLS URINE: 1 /HPF
RIBOSOMAL P AB SER-ACNC: <0.2 AL
SODIUM SERPL-SCNC: 137 MMOL/L
SPECIFIC GRAVITY URINE: 1.01
UROBILINOGEN URINE: 0.2 MG/DL
WBC # FLD AUTO: 12.54 K/UL
WHITE BLOOD CELLS URINE: 0 /HPF

## 2025-08-28 PROCEDURE — 99214 OFFICE O/P EST MOD 30 MIN: CPT

## 2025-08-28 PROCEDURE — 36415 COLL VENOUS BLD VENIPUNCTURE: CPT

## 2025-08-28 RX ORDER — SILDENAFIL 100 MG/1
100 TABLET, FILM COATED ORAL
Qty: 6 | Refills: 6 | Status: ACTIVE | COMMUNITY
Start: 2025-08-28 | End: 1900-01-01

## 2025-08-28 RX ORDER — TERBINAFINE HYDROCHLORIDE 250 MG/1
250 TABLET ORAL
Qty: 30 | Refills: 0 | Status: ACTIVE | COMMUNITY
Start: 2025-08-28 | End: 1900-01-01

## 2025-09-02 ENCOUNTER — NON-APPOINTMENT (OUTPATIENT)
Age: 50
End: 2025-09-02

## 2025-09-02 LAB
ALBUMIN SERPL ELPH-MCNC: 4.3 G/DL
ALP BLD-CCNC: 80 U/L
ALT SERPL-CCNC: 68 U/L
ANION GAP SERPL CALC-SCNC: 17 MMOL/L
AST SERPL-CCNC: 60 U/L
BILIRUB SERPL-MCNC: 0.5 MG/DL
BUN SERPL-MCNC: 8 MG/DL
CALCIUM SERPL-MCNC: 9.6 MG/DL
CHLORIDE SERPL-SCNC: 103 MMOL/L
CO2 SERPL-SCNC: 18 MMOL/L
CREAT SERPL-MCNC: 0.74 MG/DL
EGFRCR SERPLBLD CKD-EPI 2021: 111 ML/MIN/1.73M2
ESTIMATED AVERAGE GLUCOSE: 128 MG/DL
GLUCOSE SERPL-MCNC: 120 MG/DL
HBA1C MFR BLD HPLC: 6.1 %
POTASSIUM SERPL-SCNC: 4.5 MMOL/L
PROT SERPL-MCNC: 7.1 G/DL
SODIUM SERPL-SCNC: 138 MMOL/L
T4 FREE SERPL-MCNC: 1.8 NG/DL
TESTOST FREE SERPL-MCNC: 1.7 PG/ML
TESTOST SERPL-MCNC: 226 NG/DL
TSH SERPL-ACNC: 0.53 UIU/ML

## 2025-09-06 ENCOUNTER — RESULT REVIEW (OUTPATIENT)
Age: 50
End: 2025-09-06

## 2025-09-06 ENCOUNTER — APPOINTMENT (OUTPATIENT)
Dept: INFUSION THERAPY | Facility: HOSPITAL | Age: 50
End: 2025-09-06

## 2025-09-06 ENCOUNTER — APPOINTMENT (OUTPATIENT)
Dept: CT IMAGING | Facility: IMAGING CENTER | Age: 50
End: 2025-09-06
Payer: COMMERCIAL

## 2025-09-06 ENCOUNTER — OUTPATIENT (OUTPATIENT)
Dept: OUTPATIENT SERVICES | Facility: HOSPITAL | Age: 50
LOS: 1 days | End: 2025-09-06
Payer: COMMERCIAL

## 2025-09-06 DIAGNOSIS — Z98.890 OTHER SPECIFIED POSTPROCEDURAL STATES: Chronic | ICD-10-CM

## 2025-09-06 DIAGNOSIS — Z95.828 PRESENCE OF OTHER VASCULAR IMPLANTS AND GRAFTS: Chronic | ICD-10-CM

## 2025-09-06 DIAGNOSIS — Z90.49 ACQUIRED ABSENCE OF OTHER SPECIFIED PARTS OF DIGESTIVE TRACT: Chronic | ICD-10-CM

## 2025-09-06 DIAGNOSIS — C16.9 MALIGNANT NEOPLASM OF STOMACH, UNSPECIFIED: ICD-10-CM

## 2025-09-06 DIAGNOSIS — Z90.3 ACQUIRED ABSENCE OF STOMACH [PART OF]: Chronic | ICD-10-CM

## 2025-09-06 DIAGNOSIS — Z98.52 VASECTOMY STATUS: Chronic | ICD-10-CM

## 2025-09-06 PROCEDURE — 74177 CT ABD & PELVIS W/CONTRAST: CPT | Mod: 26

## 2025-09-06 PROCEDURE — 74177 CT ABD & PELVIS W/CONTRAST: CPT

## 2025-09-06 PROCEDURE — 71260 CT THORAX DX C+: CPT | Mod: 26

## 2025-09-06 PROCEDURE — 71260 CT THORAX DX C+: CPT

## (undated) DEVICE — BRUSH COLONOSCOPY CYTOLOGY

## (undated) DEVICE — PREP CHLORAPREP HI-LITE ORANGE 26ML

## (undated) DEVICE — XI ARM FORCEP FENESTRATED BIPOLAR 8MM

## (undated) DEVICE — XI ARM FORCEP PROGRASP 8MM

## (undated) DEVICE — SUT MAXON 1 96" T-60

## (undated) DEVICE — SUT SOFSILK 2-0 18" C-23

## (undated) DEVICE — BITE BLOCK ADULT 20 X 27MM (GREEN)

## (undated) DEVICE — SOL INJ NS 0.9% 500ML 2 PORT

## (undated) DEVICE — GLV 8 PROTEXIS (WHITE)

## (undated) DEVICE — BLADE SCALPEL SAFETYLOCK #15

## (undated) DEVICE — MARKER ENDO SPOT EX

## (undated) DEVICE — PACK IV START WITH CHG

## (undated) DEVICE — ENDOCATCH 10MM SPECIMEN POUCH

## (undated) DEVICE — TUBING AIRSEAL TRI-LUMEN FILTERED

## (undated) DEVICE — SOL IRR POUR NS 0.9% 500ML

## (undated) DEVICE — TUBING SMARTCAP WITH CO2 ENDO 140/160/180/190 16"

## (undated) DEVICE — BALLOON US ENDO

## (undated) DEVICE — SYR LUER LOK 50CC

## (undated) DEVICE — CATH IV SAFE BC 20G X 1.16" (PINK)

## (undated) DEVICE — XI VESSEL SEALER

## (undated) DEVICE — POSITIONER PURPLE ARM ONE STEP (LARGE)

## (undated) DEVICE — FOLEY HOLDER STATLOCK 2 WAY ADULT

## (undated) DEVICE — ELCTR GROUNDING PAD ADULT COVIDIEN

## (undated) DEVICE — ENDOCUFF VISION SZ 2 LG GRN

## (undated) DEVICE — TUBING INSUFFLATION LAP FILTER 10FT

## (undated) DEVICE — TUBING SUCTION CONN 6FT STERILE

## (undated) DEVICE — XI ARM NEEDLE DRIVER SUTURECUT MEGA 8MM

## (undated) DEVICE — SUCTION YANKAUER NO CONTROL VENT

## (undated) DEVICE — ENDOCATCH II 15MM

## (undated) DEVICE — DRAPE BACK TABLE COVER HEAVY DUTY 60"

## (undated) DEVICE — XI OBTURATOR OPTICAL BLADELESS 8MM

## (undated) DEVICE — LUBRICATING JELLY HR ONE SHOT 3G

## (undated) DEVICE — GLV 8.5 PROTEXIS (WHITE)

## (undated) DEVICE — CATH IV SAFE BC 22G X 1" (BLUE)

## (undated) DEVICE — XI ARM SCISSOR MONO CURVED

## (undated) DEVICE — TUBING IV SET GRAVITY 3Y 100" MACRO

## (undated) DEVICE — DRAPE MAYO STAND 30"

## (undated) DEVICE — SENSOR O2 FINGER ADULT

## (undated) DEVICE — GLV 7 PROTEXIS (WHITE)

## (undated) DEVICE — SPECIMEN CONTAINER 100ML

## (undated) DEVICE — MEDICATION LABELS W MARKER

## (undated) DEVICE — CLAMP BX HOT RAD JAW 3

## (undated) DEVICE — XI DRAPE COLUMN

## (undated) DEVICE — PACK BASIN SPECIAL PROCEDURE

## (undated) DEVICE — XI ARM FORCEP MARYLAND BIPOLAR

## (undated) DEVICE — DRAIN JACKSON PRATT 10MM FLAT FULL NO TROCAR

## (undated) DEVICE — D HELP - CLEARVIEW CLEARIFY SYSTEM

## (undated) DEVICE — SYR ALLIANCE II INFLATION 60ML

## (undated) DEVICE — ELCTR ECG CONDUCTIVE ADHESIVE

## (undated) DEVICE — BASIN EMESIS 10IN GRADUATED MAUVE

## (undated) DEVICE — XI ARM DISSECTOR CURVED BIPOLAR 8MM

## (undated) DEVICE — ELCTR BOVIE PENCIL SMOKE EVACUATION

## (undated) DEVICE — XI TIP COVER

## (undated) DEVICE — CONTAINER FORMALIN 80ML YELLOW

## (undated) DEVICE — DRSG CURITY GAUZE SPONGE 4 X 4" 12-PLY NON-STERILE

## (undated) DEVICE — Device

## (undated) DEVICE — NDL COUNTER FOAM AND MAGNET 40-70

## (undated) DEVICE — NDL HYPO SAFE 22G X 1.5" (BLACK)

## (undated) DEVICE — GLV 6.5 PROTEXIS (WHITE)

## (undated) DEVICE — TROCAR COVIDIEN VERSASTEP PLUS 15MM STANDARD

## (undated) DEVICE — TUBING SUCTION NONCONDUCTIVE 6MM X 12FT

## (undated) DEVICE — TROCAR SURGIQUEST AIRSEAL 12MMX100MM

## (undated) DEVICE — BIOPSY FORCEP RADIAL JAW 4 STANDARD WITH NEEDLE

## (undated) DEVICE — POLY TRAP ETRAP

## (undated) DEVICE — WARMING BLANKET UPPER ADULT

## (undated) DEVICE — TUBING SUCTION 20FT

## (undated) DEVICE — SUT SOFSILK 2-0 18" V-20 (POP-OFF)

## (undated) DEVICE — SUT POLYSORB 3-0 30" V-20 UNDYED

## (undated) DEVICE — DRSG STERISTRIPS 0.5 X 4"

## (undated) DEVICE — DRSG MASTISOL

## (undated) DEVICE — XI ARM GRASPER TIP UP FENESTRATED

## (undated) DEVICE — XI ARM CLIP APPLIER LARGE

## (undated) DEVICE — SUT SOFSILK 3-0 18" V-20 (POP-OFF)

## (undated) DEVICE — BASIN SET SINGLE

## (undated) DEVICE — SALIVA EJECTOR (BLUE)

## (undated) DEVICE — VENODYNE/SCD SLEEVE CALF MEDIUM

## (undated) DEVICE — TUBING MEDI-VAC W MAXIGRIP CONNECTORS 1/4"X6'

## (undated) DEVICE — POSITIONER FOAM HEAD CRADLE (PINK)

## (undated) DEVICE — PACK ROBOTIC LIJ

## (undated) DEVICE — TROCAR COVIDIEN BLUNT TIP HASSAN 10MM

## (undated) DEVICE — SUT SOFSILK 3-0 30" V-20

## (undated) DEVICE — GLV 7.5 PROTEXIS (WHITE)

## (undated) DEVICE — TUBING IV SET MICROCLAVE ADAPTER

## (undated) DEVICE — XI ARM PERMANENT CAUTERY HOOK

## (undated) DEVICE — XI ARM NEEDLE DRIVER LARGE

## (undated) DEVICE — LIGASURE IMPACT

## (undated) DEVICE — TUBING SMARTCAP ENDO OLYMPUS 140/160/180/190 2"

## (undated) DEVICE — SUT BIOSYN 4-0 18" P-12

## (undated) DEVICE — BLADE SCALPEL SAFETYLOCK #10

## (undated) DEVICE — SUT SOFSILK 2-0 30" V-20

## (undated) DEVICE — BIOPSY FORCEP COLD DISP

## (undated) DEVICE — XI SHEARS HARMONIC CVD 8MM

## (undated) DEVICE — FOLEY TRAY 16FR 5CC LF LUBRISIL ADVANCE TEMP CLOSED

## (undated) DEVICE — WARMING BLANKET LOWER ADULT

## (undated) DEVICE — SCOPE WARMER SEAL DISP

## (undated) DEVICE — DRAPE LIGHT HANDLE COVER (BLUE)

## (undated) DEVICE — DRAIN RESERVOIR FOR JACKSON PRATT 100CC CARDINAL

## (undated) DEVICE — NDL INJ SCLERO INTERJECT 23G

## (undated) DEVICE — FORCEP RADIAL JAW 4 JUMBO 2.8MM 3.2MM 240CM ORANGE DISP

## (undated) DEVICE — XI SEAL UNIVERSIAL 5-12MM

## (undated) DEVICE — DRSG BANDAID 0.75X3"

## (undated) DEVICE — IRRIGATOR BIO SHIELD

## (undated) DEVICE — DRAPE IOBAN 23" X 23"

## (undated) DEVICE — SOL IRR POUR H2O 250ML

## (undated) DEVICE — SUT POLYSORB 0 60" TIES UNDYED

## (undated) DEVICE — GOWN LG

## (undated) DEVICE — POSITIONER FOAM EGG CRATE ULNAR 2PCS (PINK)

## (undated) DEVICE — PACK ADVANCED LAPAROSCOPIC NS

## (undated) DEVICE — XI ARM PERMANENT CAUTERY SPATULA

## (undated) DEVICE — DRAPE INSTRUMENT POUCH 6.75" X 11"

## (undated) DEVICE — STAPLER COVIDIEN ENDO GIA STANDARD HANDLE

## (undated) DEVICE — SUT POLYSORB 0 36" GU-46

## (undated) DEVICE — XI ARM FORCEP TENACULUM

## (undated) DEVICE — XI DRAPE ARM

## (undated) DEVICE — XI ARM CLIP APPLIER MEDIUM-LARGE

## (undated) DEVICE — POSITIONER PINK PAD PIGAZZI SYSTEM

## (undated) DEVICE — DRSG 2X2

## (undated) DEVICE — MARKING PEN W RULER / LABELS

## (undated) DEVICE — DRAIN PENROSE .25" X 18" LATEX